# Patient Record
Sex: MALE | Race: WHITE | NOT HISPANIC OR LATINO | URBAN - METROPOLITAN AREA
[De-identification: names, ages, dates, MRNs, and addresses within clinical notes are randomized per-mention and may not be internally consistent; named-entity substitution may affect disease eponyms.]

---

## 2017-08-25 ENCOUNTER — INPATIENT (INPATIENT)
Facility: HOSPITAL | Age: 42
LOS: 3 days | Discharge: HOME | End: 2017-08-29
Attending: INTERNAL MEDICINE

## 2017-08-25 DIAGNOSIS — F10.229 ALCOHOL DEPENDENCE WITH INTOXICATION, UNSPECIFIED: ICD-10-CM

## 2017-08-31 DIAGNOSIS — Z78.1 PHYSICAL RESTRAINT STATUS: ICD-10-CM

## 2017-08-31 DIAGNOSIS — I95.9 HYPOTENSION, UNSPECIFIED: ICD-10-CM

## 2017-08-31 DIAGNOSIS — R55 SYNCOPE AND COLLAPSE: ICD-10-CM

## 2017-08-31 DIAGNOSIS — R59.0 LOCALIZED ENLARGED LYMPH NODES: ICD-10-CM

## 2017-08-31 DIAGNOSIS — F10.229 ALCOHOL DEPENDENCE WITH INTOXICATION, UNSPECIFIED: ICD-10-CM
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
## 2017-08-31 DIAGNOSIS — J69.0 PNEUMONITIS DUE TO INHALATION OF FOOD AND VOMIT: ICD-10-CM

## 2017-08-31 DIAGNOSIS — K86.9 DISEASE OF PANCREAS, UNSPECIFIED: ICD-10-CM

## 2017-09-05 DIAGNOSIS — F10.229 ALCOHOL DEPENDENCE WITH INTOXICATION, UNSPECIFIED: ICD-10-CM

## 2017-09-05 DIAGNOSIS — D61.818 OTHER PANCYTOPENIA: ICD-10-CM
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
## 2017-09-05 DIAGNOSIS — R40.2432 GLASGOW COMA SCALE SCORE 3-8, AT ARRIVAL TO EMERGENCY DEPARTMENT: ICD-10-CM

## 2017-09-05 DIAGNOSIS — R18.8 OTHER ASCITES: ICD-10-CM

## 2017-09-05 DIAGNOSIS — E27.9 DISORDER OF ADRENAL GLAND, UNSPECIFIED: ICD-10-CM

## 2017-10-02 PROBLEM — Z00.00 ENCOUNTER FOR PREVENTIVE HEALTH EXAMINATION: Status: ACTIVE | Noted: 2017-10-02

## 2018-10-30 ENCOUNTER — INPATIENT (INPATIENT)
Facility: HOSPITAL | Age: 43
LOS: 2 days | Discharge: HOME | End: 2018-11-02
Attending: INTERNAL MEDICINE | Admitting: INTERNAL MEDICINE

## 2018-10-30 VITALS
OXYGEN SATURATION: 98 % | HEART RATE: 95 BPM | DIASTOLIC BLOOD PRESSURE: 103 MMHG | RESPIRATION RATE: 20 BRPM | SYSTOLIC BLOOD PRESSURE: 183 MMHG | TEMPERATURE: 100 F

## 2018-10-30 DIAGNOSIS — Z98.890 OTHER SPECIFIED POSTPROCEDURAL STATES: Chronic | ICD-10-CM

## 2018-10-30 LAB
ALBUMIN SERPL ELPH-MCNC: 4.2 G/DL — SIGNIFICANT CHANGE UP (ref 3.5–5.2)
ALP SERPL-CCNC: 69 U/L — SIGNIFICANT CHANGE UP (ref 30–115)
ALT FLD-CCNC: 231 U/L — HIGH (ref 0–41)
ANION GAP SERPL CALC-SCNC: 22 MMOL/L — HIGH (ref 7–14)
AST SERPL-CCNC: 250 U/L — HIGH (ref 0–41)
BASE EXCESS BLDV CALC-SCNC: 4.6 MMOL/L — HIGH (ref -2–2)
BILIRUB SERPL-MCNC: 2.5 MG/DL — HIGH (ref 0.2–1.2)
BUN SERPL-MCNC: 9 MG/DL — LOW (ref 10–20)
CA-I SERPL-SCNC: 1.01 MMOL/L — LOW (ref 1.12–1.3)
CALCIUM SERPL-MCNC: 8.2 MG/DL — LOW (ref 8.5–10.1)
CHLORIDE SERPL-SCNC: 83 MMOL/L — LOW (ref 98–110)
CK SERPL-CCNC: 445 U/L — HIGH (ref 0–225)
CO2 SERPL-SCNC: 23 MMOL/L — SIGNIFICANT CHANGE UP (ref 17–32)
CREAT SERPL-MCNC: 0.7 MG/DL — SIGNIFICANT CHANGE UP (ref 0.7–1.5)
ETHANOL SERPL-MCNC: <10 MG/DL — HIGH
GAS PNL BLDV: 126 MMOL/L — LOW (ref 136–145)
GAS PNL BLDV: SIGNIFICANT CHANGE UP
GLUCOSE SERPL-MCNC: 137 MG/DL — HIGH (ref 70–99)
HCO3 BLDV-SCNC: 27 MMOL/L — SIGNIFICANT CHANGE UP (ref 22–29)
HCT VFR BLD CALC: 38.4 % — LOW (ref 42–52)
HCT VFR BLDA CALC: 44.6 % — HIGH (ref 34–44)
HGB BLD CALC-MCNC: 14.5 G/DL — SIGNIFICANT CHANGE UP (ref 14–18)
HGB BLD-MCNC: 14.3 G/DL — SIGNIFICANT CHANGE UP (ref 14–18)
LACTATE BLDV-MCNC: 2.6 MMOL/L — HIGH (ref 0.5–1.6)
LIDOCAIN IGE QN: 157 U/L — HIGH (ref 7–60)
MAGNESIUM SERPL-MCNC: 1 MG/DL — LOW (ref 1.8–2.4)
MCHC RBC-ENTMCNC: 31.4 PG — HIGH (ref 27–31)
MCHC RBC-ENTMCNC: 37.2 G/DL — HIGH (ref 32–37)
MCV RBC AUTO: 84.4 FL — SIGNIFICANT CHANGE UP (ref 80–94)
NRBC # BLD: 0 /100 WBCS — SIGNIFICANT CHANGE UP (ref 0–0)
PCO2 BLDV: 32 MMHG — LOW (ref 41–51)
PH BLDV: 7.53 — HIGH (ref 7.26–7.43)
PLATELET # BLD AUTO: 60 K/UL — LOW (ref 130–400)
PO2 BLDV: 45 MMHG — HIGH (ref 20–40)
POTASSIUM BLDV-SCNC: 3.2 MMOL/L — LOW (ref 3.3–5.6)
POTASSIUM SERPL-MCNC: 3.4 MMOL/L — LOW (ref 3.5–5)
POTASSIUM SERPL-SCNC: 3.4 MMOL/L — LOW (ref 3.5–5)
PROT SERPL-MCNC: 6.6 G/DL — SIGNIFICANT CHANGE UP (ref 6–8)
RBC # BLD: 4.55 M/UL — LOW (ref 4.7–6.1)
RBC # FLD: 11.2 % — LOW (ref 11.5–14.5)
SAO2 % BLDV: 85 % — SIGNIFICANT CHANGE UP
SODIUM SERPL-SCNC: 128 MMOL/L — LOW (ref 135–146)
TROPONIN T SERPL-MCNC: <0.01 NG/ML — SIGNIFICANT CHANGE UP
WBC # BLD: 3.94 K/UL — LOW (ref 4.8–10.8)
WBC # FLD AUTO: 3.94 K/UL — LOW (ref 4.8–10.8)

## 2018-10-30 RX ORDER — MAGNESIUM SULFATE 500 MG/ML
2 VIAL (ML) INJECTION ONCE
Qty: 0 | Refills: 0 | Status: COMPLETED | OUTPATIENT
Start: 2018-10-30 | End: 2018-10-30

## 2018-10-30 RX ORDER — CHLORHEXIDINE GLUCONATE 213 G/1000ML
1 SOLUTION TOPICAL
Qty: 0 | Refills: 0 | Status: DISCONTINUED | OUTPATIENT
Start: 2018-10-30 | End: 2018-11-02

## 2018-10-30 RX ORDER — FOLIC ACID 0.8 MG
5 TABLET ORAL ONCE
Qty: 0 | Refills: 0 | Status: DISCONTINUED | OUTPATIENT
Start: 2018-10-30 | End: 2018-10-31

## 2018-10-30 RX ORDER — THIAMINE MONONITRATE (VIT B1) 100 MG
500 TABLET ORAL ONCE
Qty: 0 | Refills: 0 | Status: DISCONTINUED | OUTPATIENT
Start: 2018-10-30 | End: 2018-10-31

## 2018-10-30 RX ORDER — ENOXAPARIN SODIUM 100 MG/ML
40 INJECTION SUBCUTANEOUS DAILY
Qty: 0 | Refills: 0 | Status: DISCONTINUED | OUTPATIENT
Start: 2018-10-30 | End: 2018-11-01

## 2018-10-30 RX ORDER — SODIUM CHLORIDE 9 MG/ML
1000 INJECTION INTRAMUSCULAR; INTRAVENOUS; SUBCUTANEOUS
Qty: 0 | Refills: 0 | Status: DISCONTINUED | OUTPATIENT
Start: 2018-10-30 | End: 2018-10-31

## 2018-10-30 RX ORDER — MAGNESIUM OXIDE 400 MG ORAL TABLET 241.3 MG
400 TABLET ORAL ONCE
Qty: 0 | Refills: 0 | Status: COMPLETED | OUTPATIENT
Start: 2018-10-30 | End: 2018-10-30

## 2018-10-30 RX ORDER — POTASSIUM CHLORIDE 20 MEQ
40 PACKET (EA) ORAL ONCE
Qty: 0 | Refills: 0 | Status: COMPLETED | OUTPATIENT
Start: 2018-10-30 | End: 2018-10-30

## 2018-10-30 RX ORDER — PANTOPRAZOLE SODIUM 20 MG/1
40 TABLET, DELAYED RELEASE ORAL
Qty: 0 | Refills: 0 | Status: DISCONTINUED | OUTPATIENT
Start: 2018-10-30 | End: 2018-11-02

## 2018-10-30 RX ORDER — THIAMINE MONONITRATE (VIT B1) 100 MG
100 TABLET ORAL DAILY
Qty: 0 | Refills: 0 | Status: DISCONTINUED | OUTPATIENT
Start: 2018-10-30 | End: 2018-11-02

## 2018-10-30 RX ORDER — FOLIC ACID 0.8 MG
1 TABLET ORAL DAILY
Qty: 0 | Refills: 0 | Status: DISCONTINUED | OUTPATIENT
Start: 2018-10-30 | End: 2018-11-02

## 2018-10-30 RX ADMIN — Medication 50 GRAM(S): at 21:28

## 2018-10-30 RX ADMIN — SODIUM CHLORIDE 125 MILLILITER(S): 9 INJECTION INTRAMUSCULAR; INTRAVENOUS; SUBCUTANEOUS at 21:34

## 2018-10-30 RX ADMIN — Medication 40 MILLIEQUIVALENT(S): at 21:32

## 2018-10-30 RX ADMIN — Medication 50 GRAM(S): at 23:48

## 2018-10-30 RX ADMIN — Medication 1 MILLIGRAM(S): at 19:56

## 2018-10-30 NOTE — H&P ADULT - NSHPSOCIALHISTORY_GEN_ALL_CORE
Social History:    Marital Status:  ( X )    (   ) Single    (   )    (  )   Lives with: (  ) alone  (  ) children   ( X ) spouse   (  ) parents  (  ) other    Substance Use (street drugs): ( X ) never used  (  ) other:  Tobacco Usage:  (  X ) never smoked   (   ) former smoker   (   ) current smoker  (     ) pack year  (        ) last cigarette date  Alcohol Usage: Heavy drinker

## 2018-10-30 NOTE — H&P ADULT - NSHPLABSRESULTS_GEN_ALL_CORE
14.3   3.94  )-----------( 60       ( 30 Oct 2018 19:15 )             38.4     128<L>  |  83<L>  |  9<L>  ----------------------------<  137<H>  3.4<L>   |  23  |  0.7    Ca    8.2<L>      30 Oct 2018 19:15  Mg     1.0     10-30    TPro  6.6  /  Alb  4.2  /  TBili  2.5<H>  /  DBili  x   /  AST  250<H>  /  ALT  231<H>  /  AlkPhos  69  10-30        CARDIAC MARKERS ( 30 Oct 2018 19:15 )  x     / <0.01 ng/mL / 445 U/L / x     / x        < from: CT Head No Cont (10.30.18 @ 21:02) >      IMPRESSION:     No acute intra-cranial pathology.    < end of copied text >

## 2018-10-30 NOTE — H&P ADULT - ASSESSMENT
43 yo male patient with no pertinent past medical history except for alcohol dependence and abuse, brought in because of seizure like activity on the day of presentation.    #) Alcohol withdrawal seizure 41 yo male patient with no pertinent past medical history except for alcohol dependence and abuse, brought in because of seizure like activity on the day of presentation.    #) Tonic seizure  R/O alcohol withdrawal seizure, vs seizure from hypomagnesemia (Mg 1 on presentation) likely secondary to alcoholism  Currently CIWA 1  Started on lorazepam protocole  Give thiamine and folic acid and multivitamin  Consider detox once patient stable  Replace electrolytes as needed (Keep K>4 and Mg>2)    #) Elevated liver enzymes  Likely secondary to chronic alcoholism  check RUQ sonogram  GI follow up as outpatient    #) GERD  continue with protonix    #) Miscellaneous  DVT and GI ppx  regular diet  activity as tolerated  full code  dispo home

## 2018-10-30 NOTE — H&P ADULT - ATTENDING COMMENTS
Patient is seen and examined independently. I agree with resident note above and plan of care -edited and corrected where applicable- with addition:     PHYSICAL EXAM:  Constitutional: middle age male resting in bed, NAD, awake and alert, well-developed  HEENT: PERR, EOMI, no tongue fasciculations, bluish tongue discoloration but no laceration  Neck: Soft and supple, No LAD, No JVD  Respiratory: Breath sounds are clear bilaterally, No wheezing, rales or rhonchi  Cardiovascular: S1 and S2, regular rate and rhythm, no Murmurs, gallops or rubs  Gastrointestinal: Bowel Sounds present, soft, nontender, nondistended, no guarding, no rebound  Extremities: No peripheral edema  Vascular: 2+ peripheral pulses  Neurological: A/O x 3, no focal deficits  Musculoskeletal: 5/5 strength b/l upper and lower extremities, no visible hand tremors    LABS: All Labs and Imaging Reviewed:  < from: 12 Lead ECG (10.31.18 @ 09:34) >  Diagnosis Line Normal sinus rhythm  Normal ECG    < from: CT Head No Cont (10.31.18 @ 09:21) >    IMPRESSION:   1.  When compared to previous study dated 10/30/2018 there is no   significant change.  2.  No acute fractures, mass effect, midline shift or hemorrhage.   3.  If these are new onset seizures or the patient continues to be   symptomatic MRI of the brain with and without contrast using Seizure   protocol may be helpful for further evaluation.    < from: Xray Chest 1 View- PORTABLE-Routine (10.31.18 @ 06:02) >  Impression:    No radiographic evidence of acute cardiopulmonary disease.    A/P: 43 yo M with PMHx of alcohol dependence and abuse brought in for seizure like activity on the day of presentation.    # Tonic seizure likely 2/2 alcohol withdrawal, r/o additional causes of seizures   - evaluated by critical care, stable for AMF  - CT head negative for acute pathology  - c/w [facility] protocol   - lorazepam prn  - thiamine and folic acid and multivitamin  - Keep K>4 and Mg>2  - pt was no amenable to detox consult today, will re-discuss  - DAVID bach 2/2 seizure, f/u repeat     # Elevated liver enzymes likely 2/2 chronic ETOH abuse  - improving  - f/u RUQ sonogram    # Malnutrition 2/2 alcohol abuse - magnesium deficiency  - Mg 1 on presentation  - repleated  - continue to monitor    # Hyponatremia likely 2/2 vomiting  - improving   - d/c IVFs as pt tolerating diet  - continue to monitor     # Thrombocytopenia likely 2/2 BM suppression 2/2 alcohol abuse  - no signs of bleeding  - continue to monitor     # GERD  - c/w protonix    # DVT ppx  - lovenox SQ    # Dispo: from home, will re-discuss detox eval

## 2018-10-30 NOTE — ED PROVIDER NOTE - CARE PLAN
Principal Discharge DX:	Alcohol withdrawal seizure  Secondary Diagnosis:	Hypomagnesemia  Secondary Diagnosis:	Hyponatremia

## 2018-10-30 NOTE — ED PROVIDER NOTE - PROGRESS NOTE DETAILS
Labs with Mg 1.0 and potassium ~3.2.  Will replete Mg and K+.  No further seizure activity noted, awaiting CTH to admit for alcohol withdrawal sz. Pt confirmed that stopped drinking EtOH 2/2 abd pain and feeling unwell yesterday.  Abd w/o any tenderness on exam today, but will add on lipase and continue to monitor.  Has borderline temperature here in the ED (38.1C/100.5F) of unclear significance, whether reactive in the setting of seizure/alcohol withdrawal or other infection.  Will obtain cultures but not treat otherwise. received s/o from Dr Morris, evaluated patient, patient stated he stopped drinking 2/2 abdominal pain with nausea and diarrhea.  last drink yesterday. mild tremors on exam. denies hallucination/HA/dizziness/anixety. patient is A&O x 3. move all extrmities. no nuchal rigidty. BS equal b/l. abdomen exam : flat soft non-tender. denies urinary sxs.  symptoms and vital sign improved after ativan 1mg IV D/w Dr Larsen, approve for ICU for further monitor

## 2018-10-30 NOTE — ED PROVIDER NOTE - MEDICAL DECISION MAKING DETAILS
42yM alcohol abuse, no prior hx seizures, presents with seizure after alcohol cessation yesterday. No hx WD seizure.  Pt treated with benzos, thiamine/folate, Mg/K+ repletion. CTH obtained.  Borderline fever, so cultures/CXR/UA obtained w/o clear source.  Will not swab for flu given 2d onset of generalized ill feeling w/o focal URI sx, so unlikely to treat even if it were to be positive.  Pt to be admitted to ICU for close monitoring given suspected alcohol withdrawal sz.  No further sz activity seen in the ED up to this time.

## 2018-10-30 NOTE — ED PROVIDER NOTE - NS ED ROS FT
Constitutional: See HPI.  Eyes: No visual changes, eye pain or discharge.   ENMT: No neck pain   Cardiac: No SOB or cp  Respiratory: No cough   GI: No nausea, vomiting, diarrhea or abdominal pain.  : No dysuria, frequency or burning.   MS: No myalgia, muscle weakness, joint pain or back pain.  Neuro: see hpi  Skin: No skin rash.

## 2018-10-30 NOTE — ED PROVIDER NOTE - ATTENDING CONTRIBUTION TO CARE
This is a 42yM with known alcohol abuse who presents after seizure at home.  Pt reports stopping all alcohol intake yesterday because he had some abdominal pain, nausea and ? vomiting.  Today, he had witnessed GTC (2min, witnessed by wife, pt lying on couch, eyes rolled back, tongue biting).  Pt felt warm on ED arrival, denies cough, current abd pain, n/v/d.  No prior hx alcohol cessation, DT or withdrawal seizure.    VS T100.5 HR 96 RR 14 /86 O2 sat 98% RA  CONSTITUTIONAL: well developed; well nourished; well appearing in no acute distress  HEAD: normocephalic; atraumatic  EYES: no conjunctival injection, no scleral icterus  ENT: no nasal discharge; airway clear.  NECK: supple; non tender. + full passive ROM in all directions  CARD: S1, S2 normal; no murmurs, gallops, or rubs. Regular rate and rhythm  RESP: no wheezes, rales or rhonchi. Good air movement bilaterally without significant accessory muscle use  ABD: soft; non-distended; non-tender. No rebound, no guarding, no pulsatile abdominal mass  EXT: moving all extremities spontaneously, normal ROM. No clubbing, cyanosis or edema  SKIN: warm and dry, no lesions noted  NEURO: now awake and alert, oriented, CN II-XII grossly intact,motor and sensory grossly intact with tremor, speech nonslurred, no focal deficits. GCS 15  PSYCH: calm, cooperative, appropriate, good eye contact, logical thought process, no apparent danger to self or others    labs  close monitoring  Ativan IV  thiamine, folate

## 2018-10-30 NOTE — H&P ADULT - HISTORY OF PRESENT ILLNESS
41 yo male patient with no pertinent past medical history except for alcohol dependence and abuse, brought in because of seizure like activity on the day of presentation.  Patient is a heavy alcohol drinker at baseline, but for the last 3 weeks, he was drinking around a bottle of vodka every day. On the day of presentation he had 2 episodes of vomiting in the morning, so he did not drink alcohol that day. In the afternoon, he was sitting in the sofa when suddenly, according to the wife, his eyes uprolled, and he made a weird sound as if he was shouting, then he lost consciousness, and became stiff. He did not have jerky movements of his extremities, but he was stiff during the whole episode. It lasted for like 2 minutes, after that he woke up but was confused for like half an hour. He went back to his baseline in ED.  No current symptoms. no chest pain, no palpitation, no SOB, no restlessness, no abdominal pain, no vomiting, no diarrhea, no urinary symptoms. No sweating/diaphoresis. 43 yo male patient with no pertinent past medical history except for alcohol dependence and abuse, brought in because of seizure like activity on the day of presentation.  Patient is a heavy alcohol drinker at baseline, but for the last 3 weeks, he was drinking around a bottle of vodka every day. On the day of presentation he had 2 episodes of vomiting in the morning, so he did not drink alcohol that day. In the afternoon, he was sitting in the sofa when suddenly, according to the wife, his eyes uprolled, and he made a weird sound as if he was shouting, then he lost consciousness, and became stiff. He did not have jerky movements of his extremities, but he was stiff during the whole episode. It lasted for like 2 minutes, after that he woke up but was confused for like half an hour. He went back to his baseline in ED.  No current symptoms. no chest pain, no palpitation, no SOB, no restlessness, no abdominal pain, no vomiting, no diarrhea, no urinary symptoms. No sweating/diaphoresis.    Attending addition: pt reports last drink was on Thursday. Denies prior history of alcohol withdrawal seizures. Does not recall details of episode. Bit his tongue during seizure episode. Reports good PO intake. Denies fevers, chills, prior seizure or intubation, dysuria.

## 2018-10-30 NOTE — ED PROVIDER NOTE - PHYSICAL EXAMINATION
CONSTITUTIONAL: WA / WN / NAD  HEAD: NCAT  EYES: PERRL; EOMI;   ENT: Normal pharynx; mucous membranes pink/moist, no erythema.  NECK: Supple; no meningeal signs  CARD: RRR; nl S1/S2; no M/R/G.   RESP: Respiratory rate and effort are normal; breath sounds clear and equal bilaterally.  ABD: Soft, NT ND   MSK/EXT: No gross deformities; full range of motion.  SKIN: Warm and dry;   NEURO: AAOx3  PSYCH: Memory Intact, Normal Affect CONSTITUTIONAL: WA / WN / NAD  HEAD: NCAT  EYES: PERRL; EOMI;   ENT: Normal pharynx; mucous membranes pink/moist, no erythema. + tongue biting   NECK: Supple; no meningeal signs  CARD: RRR; nl S1/S2; no M/R/G.   RESP: Respiratory rate and effort are normal; breath sounds clear and equal bilaterally.  ABD: Soft, NT ND   MSK/EXT: No gross deformities; full range of motion.  SKIN: Warm and dry;   NEURO: AAOx3  PSYCH: Memory Intact, Normal Affect

## 2018-10-30 NOTE — ED PROVIDER NOTE - OBJECTIVE STATEMENT
42 year old male no pmh 42 year old male no pmh w/ a history of alcohol abuse typically drinks half a pint a day. Patient's last drink was yesterday. As per patient's wife patient was laying on the couch and had two minutes of tonic clonic seizures with his eyes rolling back and tongue biting. Patient currently denies any fever chills headache dizziness cough n/v abdominal pain or urinary complaints.

## 2018-10-30 NOTE — H&P ADULT - NSHPPHYSICALEXAM_GEN_ALL_CORE
PHYSICAL EXAM:    T(F): 100.5, Max: 100.5 (10-30-18 @ 20:21)  HR: 96  BP: 126/86  RR: 14  SpO2: 98%    GENERAL: well-developed; not in distress  HEAD:  Atraumatic, Normocephalic  EYES: EOMI, PERRLA, conjunctiva and sclera clear  NECK: Supple, No JVD  CHEST/LUNG: Clear to auscultation bilaterally; No wheeze  HEART: Regular rate and rhythm; No murmurs, rubs, or gallops  ABDOMEN: Soft, Nontender, Nondistended; Bowel sounds present  EXTREMITIES:  2+ Peripheral Pulses, No clubbing, cyanosis, or edema  PSYCH: AAOx3  NEUROLOGY: non-focal  SKIN: No rashes or lesions

## 2018-10-31 LAB
ALBUMIN SERPL ELPH-MCNC: 4.5 G/DL — SIGNIFICANT CHANGE UP (ref 3.5–5.2)
ALP SERPL-CCNC: 73 U/L — SIGNIFICANT CHANGE UP (ref 30–115)
ALT FLD-CCNC: 212 U/L — HIGH (ref 0–41)
ANION GAP SERPL CALC-SCNC: 21 MMOL/L — HIGH (ref 7–14)
APTT BLD: 27.3 SEC — SIGNIFICANT CHANGE UP (ref 27–39.2)
AST SERPL-CCNC: 200 U/L — HIGH (ref 0–41)
BASOPHILS # BLD AUTO: 0.01 K/UL — SIGNIFICANT CHANGE UP (ref 0–0.2)
BASOPHILS NFR BLD AUTO: 0.1 % — SIGNIFICANT CHANGE UP (ref 0–1)
BILIRUB DIRECT SERPL-MCNC: 0.7 MG/DL — HIGH (ref 0–0.2)
BILIRUB INDIRECT FLD-MCNC: 1.9 MG/DL — HIGH (ref 0.2–1.2)
BILIRUB SERPL-MCNC: 2.6 MG/DL — HIGH (ref 0.2–1.2)
BUN SERPL-MCNC: 7 MG/DL — LOW (ref 10–20)
CALCIUM SERPL-MCNC: 8.8 MG/DL — SIGNIFICANT CHANGE UP (ref 8.5–10.1)
CHLORIDE SERPL-SCNC: 88 MMOL/L — LOW (ref 98–110)
CHOLEST SERPL-MCNC: 205 MG/DL — HIGH (ref 100–200)
CK SERPL-CCNC: 511 U/L — HIGH (ref 0–225)
CO2 SERPL-SCNC: 23 MMOL/L — SIGNIFICANT CHANGE UP (ref 17–32)
CREAT SERPL-MCNC: 0.7 MG/DL — SIGNIFICANT CHANGE UP (ref 0.7–1.5)
EOSINOPHIL # BLD AUTO: 0 K/UL — SIGNIFICANT CHANGE UP (ref 0–0.7)
EOSINOPHIL NFR BLD AUTO: 0 % — SIGNIFICANT CHANGE UP (ref 0–8)
ESTIMATED AVERAGE GLUCOSE: 103 MG/DL — SIGNIFICANT CHANGE UP (ref 68–114)
GLUCOSE SERPL-MCNC: 110 MG/DL — HIGH (ref 70–99)
HBA1C BLD-MCNC: 5.2 % — SIGNIFICANT CHANGE UP (ref 4–5.6)
HCT VFR BLD CALC: 41.6 % — LOW (ref 42–52)
HDLC SERPL-MCNC: 112 MG/DL — SIGNIFICANT CHANGE UP
HGB BLD-MCNC: 15.4 G/DL — SIGNIFICANT CHANGE UP (ref 14–18)
IMM GRANULOCYTES NFR BLD AUTO: 0.5 % — HIGH (ref 0.1–0.3)
INR BLD: 1.04 RATIO — SIGNIFICANT CHANGE UP (ref 0.65–1.3)
LIPID PNL WITH DIRECT LDL SERPL: 94 MG/DL — SIGNIFICANT CHANGE UP (ref 4–129)
LYMPHOCYTES # BLD AUTO: 0.8 K/UL — LOW (ref 1.2–3.4)
LYMPHOCYTES # BLD AUTO: 9.9 % — LOW (ref 20.5–51.1)
MAGNESIUM SERPL-MCNC: 1.9 MG/DL — SIGNIFICANT CHANGE UP (ref 1.8–2.4)
MCHC RBC-ENTMCNC: 31.6 PG — HIGH (ref 27–31)
MCHC RBC-ENTMCNC: 37 G/DL — SIGNIFICANT CHANGE UP (ref 32–37)
MCV RBC AUTO: 85.4 FL — SIGNIFICANT CHANGE UP (ref 80–94)
MONOCYTES # BLD AUTO: 0.76 K/UL — HIGH (ref 0.1–0.6)
MONOCYTES NFR BLD AUTO: 9.4 % — HIGH (ref 1.7–9.3)
NEUTROPHILS # BLD AUTO: 6.51 K/UL — HIGH (ref 1.4–6.5)
NEUTROPHILS NFR BLD AUTO: 80.1 % — HIGH (ref 42.2–75.2)
NRBC # BLD: 0 /100 WBCS — SIGNIFICANT CHANGE UP (ref 0–0)
PLATELET # BLD AUTO: 68 K/UL — LOW (ref 130–400)
POTASSIUM SERPL-MCNC: 3.8 MMOL/L — SIGNIFICANT CHANGE UP (ref 3.5–5)
POTASSIUM SERPL-SCNC: 3.8 MMOL/L — SIGNIFICANT CHANGE UP (ref 3.5–5)
PROT SERPL-MCNC: 7 G/DL — SIGNIFICANT CHANGE UP (ref 6–8)
PROTHROM AB SERPL-ACNC: 11.9 SEC — SIGNIFICANT CHANGE UP (ref 9.95–12.87)
RBC # BLD: 4.87 M/UL — SIGNIFICANT CHANGE UP (ref 4.7–6.1)
RBC # FLD: 11.3 % — LOW (ref 11.5–14.5)
SODIUM SERPL-SCNC: 132 MMOL/L — LOW (ref 135–146)
TOTAL CHOLESTEROL/HDL RATIO MEASUREMENT: 1.8 RATIO — LOW (ref 4–5.5)
TRIGL SERPL-MCNC: 73 MG/DL — SIGNIFICANT CHANGE UP (ref 10–149)
WBC # BLD: 8.12 K/UL — SIGNIFICANT CHANGE UP (ref 4.8–10.8)
WBC # FLD AUTO: 8.12 K/UL — SIGNIFICANT CHANGE UP (ref 4.8–10.8)

## 2018-10-31 RX ORDER — SODIUM CHLORIDE 9 MG/ML
1000 INJECTION, SOLUTION INTRAVENOUS
Qty: 0 | Refills: 0 | Status: DISCONTINUED | OUTPATIENT
Start: 2018-10-31 | End: 2018-10-31

## 2018-10-31 RX ORDER — MAGNESIUM SULFATE 500 MG/ML
1 VIAL (ML) INJECTION ONCE
Qty: 0 | Refills: 0 | Status: COMPLETED | OUTPATIENT
Start: 2018-10-31 | End: 2018-10-31

## 2018-10-31 RX ORDER — POTASSIUM CHLORIDE 20 MEQ
40 PACKET (EA) ORAL ONCE
Qty: 0 | Refills: 0 | Status: COMPLETED | OUTPATIENT
Start: 2018-10-31 | End: 2018-10-31

## 2018-10-31 RX ADMIN — PANTOPRAZOLE SODIUM 40 MILLIGRAM(S): 20 TABLET, DELAYED RELEASE ORAL at 09:30

## 2018-10-31 RX ADMIN — Medication 1 MILLIGRAM(S): at 14:47

## 2018-10-31 RX ADMIN — MAGNESIUM OXIDE 400 MG ORAL TABLET 400 MILLIGRAM(S): 241.3 TABLET ORAL at 02:48

## 2018-10-31 RX ADMIN — Medication 100 MILLIGRAM(S): at 14:50

## 2018-10-31 RX ADMIN — Medication 0.5 MILLIGRAM(S): at 11:19

## 2018-10-31 RX ADMIN — Medication 1 TABLET(S): at 14:48

## 2018-10-31 RX ADMIN — Medication 40 MILLIEQUIVALENT(S): at 10:26

## 2018-10-31 RX ADMIN — Medication 0.5 MILLIGRAM(S): at 17:05

## 2018-10-31 RX ADMIN — ENOXAPARIN SODIUM 40 MILLIGRAM(S): 100 INJECTION SUBCUTANEOUS at 14:50

## 2018-10-31 RX ADMIN — Medication 100 GRAM(S): at 10:25

## 2018-10-31 NOTE — CONSULT NOTE ADULT - ASSESSMENT
IMPRESSION:    ETOH withdrawal seizure   ETOH dependance   Depression    PLAN:    CNS: Ativan protocol (did not receive any ativan since last night - held by thaddeus) - Cut down to ativan .5 q 6h, than prn. EEG. Thiamine, Folate, MVN    HEENT: Oral care    PULMONARY:  HOB @ 45 degrees, CXR in am     CARDIOVASCULAR: I>O, if tolerating diet dc IVF     GI: GI prophylaxis.  Feeding, prn zofran     RENAL:  Follow up BMP.  Correct as needed. Check UA     INFECTIOUS DISEASE: Follow up cultures. Off abx.     HEMATOLOGICAL:  DVT prophylaxis.    ENDOCRINE:  Follow up FS.     MUSCULOSKELETAL: CK f/u     Downgrade to medical floor IMPRESSION:    ETOH withdrawal seizure   ETOH dependance   Depression    PLAN:    CNS: Ativan protocol.  EEG.  Repeat CTH. Thiamine, Folate, MVI.  Neuro eval     HEENT: Oral care    PULMONARY:  HOB @ 45 degrees, CXR in am     CARDIOVASCULAR: DC IVF once tolerating PO feeds      GI: GI prophylaxis.  Feeding, prn zofran.  RUQ sono.  Hepatitis panel     RENAL:  Follow up BMP.  Correct as needed. Check UA     INFECTIOUS DISEASE: Follow up cultures. Off abx.     HEMATOLOGICAL:  DVT prophylaxis.    ENDOCRINE:  Follow up FS.     MUSCULOSKELETAL: CK f/u     Downgrade to medical floor

## 2018-11-01 LAB
ALBUMIN SERPL ELPH-MCNC: 4.1 G/DL — SIGNIFICANT CHANGE UP (ref 3.5–5.2)
ALP SERPL-CCNC: 76 U/L — SIGNIFICANT CHANGE UP (ref 30–115)
ALT FLD-CCNC: 149 U/L — HIGH (ref 0–41)
ANION GAP SERPL CALC-SCNC: 17 MMOL/L — HIGH (ref 7–14)
AST SERPL-CCNC: 125 U/L — HIGH (ref 0–41)
BASOPHILS # BLD AUTO: 0.01 K/UL — SIGNIFICANT CHANGE UP (ref 0–0.2)
BASOPHILS NFR BLD AUTO: 0.2 % — SIGNIFICANT CHANGE UP (ref 0–1)
BILIRUB DIRECT SERPL-MCNC: 0.4 MG/DL — HIGH (ref 0–0.2)
BILIRUB INDIRECT FLD-MCNC: 1.1 MG/DL — SIGNIFICANT CHANGE UP (ref 0.2–1.2)
BILIRUB SERPL-MCNC: 1.5 MG/DL — HIGH (ref 0.2–1.2)
BUN SERPL-MCNC: 9 MG/DL — LOW (ref 10–20)
CALCIUM SERPL-MCNC: 8.8 MG/DL — SIGNIFICANT CHANGE UP (ref 8.5–10.1)
CHLORIDE SERPL-SCNC: 97 MMOL/L — LOW (ref 98–110)
CO2 SERPL-SCNC: 24 MMOL/L — SIGNIFICANT CHANGE UP (ref 17–32)
CREAT SERPL-MCNC: 0.8 MG/DL — SIGNIFICANT CHANGE UP (ref 0.7–1.5)
EOSINOPHIL # BLD AUTO: 0.03 K/UL — SIGNIFICANT CHANGE UP (ref 0–0.7)
EOSINOPHIL NFR BLD AUTO: 0.6 % — SIGNIFICANT CHANGE UP (ref 0–8)
GLUCOSE SERPL-MCNC: 92 MG/DL — SIGNIFICANT CHANGE UP (ref 70–99)
HCT VFR BLD CALC: 39.6 % — LOW (ref 42–52)
HGB BLD-MCNC: 14.2 G/DL — SIGNIFICANT CHANGE UP (ref 14–18)
IMM GRANULOCYTES NFR BLD AUTO: 0.2 % — SIGNIFICANT CHANGE UP (ref 0.1–0.3)
LYMPHOCYTES # BLD AUTO: 1.1 K/UL — LOW (ref 1.2–3.4)
LYMPHOCYTES # BLD AUTO: 23.4 % — SIGNIFICANT CHANGE UP (ref 20.5–51.1)
MAGNESIUM SERPL-MCNC: 2.3 MG/DL — SIGNIFICANT CHANGE UP (ref 1.8–2.4)
MCHC RBC-ENTMCNC: 31.6 PG — HIGH (ref 27–31)
MCHC RBC-ENTMCNC: 35.9 G/DL — SIGNIFICANT CHANGE UP (ref 32–37)
MCV RBC AUTO: 88.2 FL — SIGNIFICANT CHANGE UP (ref 80–94)
MONOCYTES # BLD AUTO: 0.81 K/UL — HIGH (ref 0.1–0.6)
MONOCYTES NFR BLD AUTO: 17.2 % — HIGH (ref 1.7–9.3)
NEUTROPHILS # BLD AUTO: 2.75 K/UL — SIGNIFICANT CHANGE UP (ref 1.4–6.5)
NEUTROPHILS NFR BLD AUTO: 58.4 % — SIGNIFICANT CHANGE UP (ref 42.2–75.2)
NRBC # BLD: 0 /100 WBCS — SIGNIFICANT CHANGE UP (ref 0–0)
PHOSPHATE SERPL-MCNC: 2.7 MG/DL — SIGNIFICANT CHANGE UP (ref 2.1–4.9)
PLATELET # BLD AUTO: 58 K/UL — LOW (ref 130–400)
POTASSIUM SERPL-MCNC: 3.9 MMOL/L — SIGNIFICANT CHANGE UP (ref 3.5–5)
POTASSIUM SERPL-SCNC: 3.9 MMOL/L — SIGNIFICANT CHANGE UP (ref 3.5–5)
PROT SERPL-MCNC: 6.6 G/DL — SIGNIFICANT CHANGE UP (ref 6–8)
RBC # BLD: 4.49 M/UL — LOW (ref 4.7–6.1)
RBC # FLD: 11.3 % — LOW (ref 11.5–14.5)
SODIUM SERPL-SCNC: 138 MMOL/L — SIGNIFICANT CHANGE UP (ref 135–146)
TSH SERPL-MCNC: 2.07 UIU/ML — SIGNIFICANT CHANGE UP (ref 0.27–4.2)
WBC # BLD: 4.71 K/UL — LOW (ref 4.8–10.8)
WBC # FLD AUTO: 4.71 K/UL — LOW (ref 4.8–10.8)

## 2018-11-01 RX ADMIN — Medication 0.5 MILLIGRAM(S): at 05:32

## 2018-11-01 RX ADMIN — CHLORHEXIDINE GLUCONATE 1 APPLICATION(S): 213 SOLUTION TOPICAL at 05:32

## 2018-11-01 RX ADMIN — Medication 0.5 MILLIGRAM(S): at 17:44

## 2018-11-01 RX ADMIN — Medication 100 MILLIGRAM(S): at 11:35

## 2018-11-01 RX ADMIN — Medication 1 TABLET(S): at 11:35

## 2018-11-01 RX ADMIN — PANTOPRAZOLE SODIUM 40 MILLIGRAM(S): 20 TABLET, DELAYED RELEASE ORAL at 08:37

## 2018-11-01 RX ADMIN — Medication 1 MILLIGRAM(S): at 11:35

## 2018-11-01 NOTE — PROGRESS NOTE ADULT - ASSESSMENT
41 yo male patient with no pertinent past medical history except for alcohol dependence and abuse, brought in because of seizure like activity on the day of presentation.    #Tonic seizure   - R/O alcohol withdrawal seizure, vs seizure from hypomagnesemia (Mg 1 on presentation) likely secondary to alcoholism  - Currently CIWA 1  - Started on lorazepam protocol  - Give thiamine and folic acid and multivitamin  - Replace electrolytes as needed (Keep K>4 and Mg>2)  - EEG Pending     #Elevated liver enzymes  - Likely secondary to chronic alcoholism  check RUQ sonogram  GI follow up as outpatient    #GERD  continue with protonix    #Miscellaneous  DVT and GI ppx  regular diet  activity as tolerated  full code  dispo home 41 yo male patient with no pertinent past medical history except for alcohol dependence and abuse, brought in because of seizure like activity on the day of presentation.    #Tonic seizure   - R/O alcohol withdrawal seizure, vs seizure from hypomagnesemia (Mg 1 on presentation) likely secondary to alcoholism  - Currently CIWA 1  - Started on lorazepam protocol  - Give thiamine and folic acid and multivitamin  - Replace electrolytes as needed (Keep K>4 and Mg>2)  - EEG Pending     #Elevated liver enzymes  - Likely secondary to chronic alcoholism  - GI follow up as outpatient  - JAVAN medrano as above     #Thrombocytopenia  - Possibly secondary to chronic alcohol abuse  - He has baseline low Platelet    - WIll d/c lovenox  - Trend platelet  - No active bleeding     #GERD  continue with protonix    #Miscellaneous  DVT - he is actively ambulating no need for ppx   regular diet  activity as tolerated  full code  dispo home

## 2018-11-01 NOTE — PROGRESS NOTE ADULT - SUBJECTIVE AND OBJECTIVE BOX
SUBJECTIVE:    Patient is a 42y old Male who presents with a chief complaint of seizure on the day of admission (31 Oct 2018 07:28)    Currently admitted to medicine with the primary diagnosis of Alcohol withdrawal seizure     Today is hospital day 2d. No acute overnight events.     PAST MEDICAL & SURGICAL HISTORY  GERD (gastroesophageal reflux disease)  Alcohol abuse  H/O hemorrhoidectomy    SOCIAL HISTORY:  Negative for smoking/alcohol/drug use.     ALLERGIES:  No Known Allergies    MEDICATIONS:  STANDING MEDICATIONS  chlorhexidine 4% Liquid 1 Application(s) Topical <User Schedule>  enoxaparin Injectable 40 milliGRAM(s) SubCutaneous daily  folic acid 1 milliGRAM(s) Oral daily  LORazepam     Tablet   Oral   LORazepam     Tablet 0.5 milliGRAM(s) Oral every 12 hours  multivitamin 1 Tablet(s) Oral daily  pantoprazole    Tablet 40 milliGRAM(s) Oral before breakfast  thiamine 100 milliGRAM(s) Oral daily    PRN MEDICATIONS  LORazepam     Tablet 1 milliGRAM(s) Oral every 4 hours PRN    VITALS:   T(F): 98.1  HR: 95  BP: 151/95  RR: 20  SpO2: 97%    LABS:                        14.2   4.71  )-----------( 58       ( 01 Nov 2018 06:23 )             39.6     11-01    138  |  97<L>  |  9<L>  ----------------------------<  92  3.9   |  24  |  0.8    Ca    8.8      01 Nov 2018 06:23  Phos  2.7     11-01  Mg     2.3     11-01    TPro  6.6  /  Alb  4.1  /  TBili  1.5<H>  /  DBili  0.4<H>  /  AST  125<H>  /  ALT  149<H>  /  AlkPhos  76  11-01    PT/INR - ( 31 Oct 2018 06:31 )   PT: 11.90 sec;   INR: 1.04 ratio         PTT - ( 31 Oct 2018 06:31 )  PTT:27.3 sec    Culture - Blood (collected 30 Oct 2018 21:41)  Source: .Blood Blood  Preliminary Report (01 Nov 2018 02:01):    No growth to date.    Culture - Blood (collected 30 Oct 2018 21:41)  Source: .Blood Blood  Preliminary Report (01 Nov 2018 02:01):    No growth to date.      CARDIAC MARKERS ( 31 Oct 2018 06:31 )  x     / x     / 511 U/L / x     / x      CARDIAC MARKERS ( 30 Oct 2018 19:15 )  x     / <0.01 ng/mL / 445 U/L / x     / x          RADIOLOGY:    < from: US Abdomen Limited (10.31.18 @ 22:17) >    IMPRESSION:    Hepatic steatosis.    Redemonstrated right lobe hemangiomas measure up to 1.4 x 1.0 x 1.4 cm,   stable.    Normal appearing gallbladder and biliary tree.    Note that the pancreas is obscured by bowel gas.    < end of copied text >      PHYSICAL EXAM:  GEN: No acute distress  LUNGS: Clear to auscultation bilaterally   HEART: S1/S2 present. RRR.   ABD: Soft, non-tender, non-distended. Bowel sounds present  EXT: NC/NC/NE/2+PP/FOLEY  NEURO: AAOX3 SUBJECTIVE:    Patient is a 42y old Male who presents with a chief complaint of seizure on the day of admission (31 Oct 2018 07:28)    Currently admitted to medicine with the primary diagnosis of Alcohol withdrawal seizure     Today is hospital day 2d. No acute overnight events. Pt pending EEG. LFTs remain elevated however improving.    PAST MEDICAL & SURGICAL HISTORY  GERD (gastroesophageal reflux disease)  Alcohol abuse  H/O hemorrhoidectomy    SOCIAL HISTORY:  Negative for smoking/alcohol/drug use.     ALLERGIES:  No Known Allergies    MEDICATIONS:  STANDING MEDICATIONS  chlorhexidine 4% Liquid 1 Application(s) Topical <User Schedule>  enoxaparin Injectable 40 milliGRAM(s) SubCutaneous daily  folic acid 1 milliGRAM(s) Oral daily  LORazepam     Tablet   Oral   LORazepam     Tablet 0.5 milliGRAM(s) Oral every 12 hours  multivitamin 1 Tablet(s) Oral daily  pantoprazole    Tablet 40 milliGRAM(s) Oral before breakfast  thiamine 100 milliGRAM(s) Oral daily    PRN MEDICATIONS  LORazepam     Tablet 1 milliGRAM(s) Oral every 4 hours PRN    VITALS:   T(F): 98.1  HR: 95  BP: 151/95  RR: 20  SpO2: 97%    LABS:                        14.2   4.71  )-----------( 58       ( 01 Nov 2018 06:23 )             39.6     11-01    138  |  97<L>  |  9<L>  ----------------------------<  92  3.9   |  24  |  0.8    Ca    8.8      01 Nov 2018 06:23  Phos  2.7     11-01  Mg     2.3     11-01    TPro  6.6  /  Alb  4.1  /  TBili  1.5<H>  /  DBili  0.4<H>  /  AST  125<H>  /  ALT  149<H>  /  AlkPhos  76  11-01    PT/INR - ( 31 Oct 2018 06:31 )   PT: 11.90 sec;   INR: 1.04 ratio         PTT - ( 31 Oct 2018 06:31 )  PTT:27.3 sec    Culture - Blood (collected 30 Oct 2018 21:41)  Source: .Blood Blood  Preliminary Report (01 Nov 2018 02:01):    No growth to date.    Culture - Blood (collected 30 Oct 2018 21:41)  Source: .Blood Blood  Preliminary Report (01 Nov 2018 02:01):    No growth to date.      CARDIAC MARKERS ( 31 Oct 2018 06:31 )  x     / x     / 511 U/L / x     / x      CARDIAC MARKERS ( 30 Oct 2018 19:15 )  x     / <0.01 ng/mL / 445 U/L / x     / x          RADIOLOGY:    < from: US Abdomen Limited (10.31.18 @ 22:17) >    IMPRESSION:    Hepatic steatosis.    Redemonstrated right lobe hemangiomas measure up to 1.4 x 1.0 x 1.4 cm,   stable.    Normal appearing gallbladder and biliary tree.    Note that the pancreas is obscured by bowel gas.    < end of copied text >      PHYSICAL EXAM:  GEN: No acute distress  LUNGS: Clear to auscultation bilaterally   HEART: S1/S2 present. RRR.   ABD: Soft, non-tender, non-distended. Bowel sounds present  EXT: NC/NC/NE/2+PP/FOLEY  NEURO: AAOX3 SUBJECTIVE:    Patient is a 42y old Male who presents with a chief complaint of seizure on the day of admission (31 Oct 2018 07:28)    Currently admitted to medicine with the primary diagnosis of Alcohol withdrawal seizure     Today is hospital day 2d. No acute overnight events. Pt pending EEG. LFTs remain elevated however improving. Denies tremors, auditory or visual hallucinations, headache or blurry vision.     PAST MEDICAL & SURGICAL HISTORY  GERD (gastroesophageal reflux disease)  Alcohol abuse  H/O hemorrhoidectomy    SOCIAL HISTORY:  Negative for smoking/alcohol/drug use.     ALLERGIES:  No Known Allergies    MEDICATIONS:  STANDING MEDICATIONS  chlorhexidine 4% Liquid 1 Application(s) Topical <User Schedule>  enoxaparin Injectable 40 milliGRAM(s) SubCutaneous daily  folic acid 1 milliGRAM(s) Oral daily  LORazepam     Tablet   Oral   LORazepam     Tablet 0.5 milliGRAM(s) Oral every 12 hours  multivitamin 1 Tablet(s) Oral daily  pantoprazole    Tablet 40 milliGRAM(s) Oral before breakfast  thiamine 100 milliGRAM(s) Oral daily    PRN MEDICATIONS  LORazepam     Tablet 1 milliGRAM(s) Oral every 4 hours PRN    VITALS:   T(F): 98.1  HR: 95  BP: 151/95  RR: 20  SpO2: 97%    LABS:                        14.2   4.71  )-----------( 58       ( 01 Nov 2018 06:23 )             39.6     11-01    138  |  97<L>  |  9<L>  ----------------------------<  92  3.9   |  24  |  0.8    Ca    8.8      01 Nov 2018 06:23  Phos  2.7     11-01  Mg     2.3     11-01    TPro  6.6  /  Alb  4.1  /  TBili  1.5<H>  /  DBili  0.4<H>  /  AST  125<H>  /  ALT  149<H>  /  AlkPhos  76  11-01    PT/INR - ( 31 Oct 2018 06:31 )   PT: 11.90 sec;   INR: 1.04 ratio         PTT - ( 31 Oct 2018 06:31 )  PTT:27.3 sec    Culture - Blood (collected 30 Oct 2018 21:41)  Source: .Blood Blood  Preliminary Report (01 Nov 2018 02:01):    No growth to date.    Culture - Blood (collected 30 Oct 2018 21:41)  Source: .Blood Blood  Preliminary Report (01 Nov 2018 02:01):    No growth to date.      CARDIAC MARKERS ( 31 Oct 2018 06:31 )  x     / x     / 511 U/L / x     / x      CARDIAC MARKERS ( 30 Oct 2018 19:15 )  x     / <0.01 ng/mL / 445 U/L / x     / x          RADIOLOGY:    < from: US Abdomen Limited (10.31.18 @ 22:17) >    IMPRESSION:    Hepatic steatosis.    Redemonstrated right lobe hemangiomas measure up to 1.4 x 1.0 x 1.4 cm,   stable.    Normal appearing gallbladder and biliary tree.    Note that the pancreas is obscured by bowel gas.    < end of copied text >      PHYSICAL EXAM:  GEN: No acute distress  LUNGS: Clear to auscultation bilaterally   HEART: S1/S2 present. RRR.   ABD: Soft, non-tender, non-distended. Bowel sounds present  EXT: NC/NC/NE/2+PP/FOLEY  NEURO: AAOX3

## 2018-11-02 VITALS
SYSTOLIC BLOOD PRESSURE: 125 MMHG | RESPIRATION RATE: 18 BRPM | HEART RATE: 104 BPM | DIASTOLIC BLOOD PRESSURE: 77 MMHG | TEMPERATURE: 99 F

## 2018-11-02 LAB
ALBUMIN SERPL ELPH-MCNC: 4.3 G/DL — SIGNIFICANT CHANGE UP (ref 3.5–5.2)
ALP SERPL-CCNC: 104 U/L — SIGNIFICANT CHANGE UP (ref 30–115)
ALT FLD-CCNC: 144 U/L — HIGH (ref 0–41)
ANION GAP SERPL CALC-SCNC: 17 MMOL/L — HIGH (ref 7–14)
AST SERPL-CCNC: 155 U/L — HIGH (ref 0–41)
BASOPHILS # BLD AUTO: 0.01 K/UL — SIGNIFICANT CHANGE UP (ref 0–0.2)
BASOPHILS NFR BLD AUTO: 0.2 % — SIGNIFICANT CHANGE UP (ref 0–1)
BILIRUB SERPL-MCNC: 1.3 MG/DL — HIGH (ref 0.2–1.2)
BUN SERPL-MCNC: 9 MG/DL — LOW (ref 10–20)
CALCIUM SERPL-MCNC: 9 MG/DL — SIGNIFICANT CHANGE UP (ref 8.5–10.1)
CHLORIDE SERPL-SCNC: 97 MMOL/L — LOW (ref 98–110)
CO2 SERPL-SCNC: 19 MMOL/L — SIGNIFICANT CHANGE UP (ref 17–32)
CREAT SERPL-MCNC: 0.9 MG/DL — SIGNIFICANT CHANGE UP (ref 0.7–1.5)
EOSINOPHIL # BLD AUTO: 0.06 K/UL — SIGNIFICANT CHANGE UP (ref 0–0.7)
EOSINOPHIL NFR BLD AUTO: 1.4 % — SIGNIFICANT CHANGE UP (ref 0–8)
GLUCOSE SERPL-MCNC: 195 MG/DL — HIGH (ref 70–99)
HCT VFR BLD CALC: 42.3 % — SIGNIFICANT CHANGE UP (ref 42–52)
HGB BLD-MCNC: 15.1 G/DL — SIGNIFICANT CHANGE UP (ref 14–18)
IMM GRANULOCYTES NFR BLD AUTO: 0.2 % — SIGNIFICANT CHANGE UP (ref 0.1–0.3)
LYMPHOCYTES # BLD AUTO: 1.09 K/UL — LOW (ref 1.2–3.4)
LYMPHOCYTES # BLD AUTO: 26.1 % — SIGNIFICANT CHANGE UP (ref 20.5–51.1)
MAGNESIUM SERPL-MCNC: 2.1 MG/DL — SIGNIFICANT CHANGE UP (ref 1.8–2.4)
MCHC RBC-ENTMCNC: 31.1 PG — HIGH (ref 27–31)
MCHC RBC-ENTMCNC: 35.7 G/DL — SIGNIFICANT CHANGE UP (ref 32–37)
MCV RBC AUTO: 87.2 FL — SIGNIFICANT CHANGE UP (ref 80–94)
MONOCYTES # BLD AUTO: 0.65 K/UL — HIGH (ref 0.1–0.6)
MONOCYTES NFR BLD AUTO: 15.6 % — HIGH (ref 1.7–9.3)
NEUTROPHILS # BLD AUTO: 2.36 K/UL — SIGNIFICANT CHANGE UP (ref 1.4–6.5)
NEUTROPHILS NFR BLD AUTO: 56.5 % — SIGNIFICANT CHANGE UP (ref 42.2–75.2)
NRBC # BLD: 0 /100 WBCS — SIGNIFICANT CHANGE UP (ref 0–0)
PLATELET # BLD AUTO: 79 K/UL — LOW (ref 130–400)
POTASSIUM SERPL-MCNC: 3.4 MMOL/L — LOW (ref 3.5–5)
POTASSIUM SERPL-SCNC: 3.4 MMOL/L — LOW (ref 3.5–5)
PROT SERPL-MCNC: 7.4 G/DL — SIGNIFICANT CHANGE UP (ref 6–8)
RBC # BLD: 4.85 M/UL — SIGNIFICANT CHANGE UP (ref 4.7–6.1)
RBC # FLD: 11.3 % — LOW (ref 11.5–14.5)
SODIUM SERPL-SCNC: 133 MMOL/L — LOW (ref 135–146)
WBC # BLD: 4.18 K/UL — LOW (ref 4.8–10.8)
WBC # FLD AUTO: 4.18 K/UL — LOW (ref 4.8–10.8)

## 2018-11-02 RX ORDER — FOLIC ACID 0.8 MG
1 TABLET ORAL
Qty: 30 | Refills: 0
Start: 2018-11-02 | End: 2018-12-01

## 2018-11-02 RX ORDER — THIAMINE MONONITRATE (VIT B1) 100 MG
1 TABLET ORAL
Qty: 30 | Refills: 0
Start: 2018-11-02 | End: 2018-12-01

## 2018-11-02 RX ORDER — INFLUENZA VIRUS VACCINE 15; 15; 15; 15 UG/.5ML; UG/.5ML; UG/.5ML; UG/.5ML
0.5 SUSPENSION INTRAMUSCULAR ONCE
Qty: 0 | Refills: 0 | Status: DISCONTINUED | OUTPATIENT
Start: 2018-11-02 | End: 2018-11-02

## 2018-11-02 RX ORDER — POTASSIUM CHLORIDE 20 MEQ
40 PACKET (EA) ORAL ONCE
Qty: 0 | Refills: 0 | Status: DISCONTINUED | OUTPATIENT
Start: 2018-11-02 | End: 2018-11-02

## 2018-11-02 RX ORDER — FOLIC ACID 0.8 MG
1 TABLET ORAL
Qty: 0 | Refills: 0 | COMMUNITY
Start: 2018-11-02

## 2018-11-02 RX ORDER — THIAMINE MONONITRATE (VIT B1) 100 MG
1 TABLET ORAL
Qty: 0 | Refills: 0 | COMMUNITY
Start: 2018-11-02

## 2018-11-02 RX ADMIN — Medication 1 MILLIGRAM(S): at 12:38

## 2018-11-02 RX ADMIN — Medication 1 TABLET(S): at 12:38

## 2018-11-02 RX ADMIN — PANTOPRAZOLE SODIUM 40 MILLIGRAM(S): 20 TABLET, DELAYED RELEASE ORAL at 08:31

## 2018-11-02 RX ADMIN — Medication 100 MILLIGRAM(S): at 12:38

## 2018-11-02 NOTE — DISCHARGE NOTE ADULT - HOSPITAL COURSE
A/P: 41 yo M with PMHx of alcohol dependence and abuse brought in for seizure like activity on the day of presentation.    # Tonic seizure likely 2/2 alcohol withdrawal, only 1 episode  - CT head negative for acute pathology  - lorazepam prn  - thiamine and folic acid and multivitamin  - LA vbg 2.6 10/30 likley 2/2 seizure,   - EEG done 11/2 result pending    # Elevated liver enzymes likely 2/2 chronic ETOH abuse  - , ,  11/2  - RUQ sonogram 10/31 - right lobe hemangiomas measure up to 1.4 x 1.0 x 1.4 cm,   - fu GI as out patient  -Hepatitis workup 2017 - negative for Hep A, B, C    # Malnutrition 2/2 alcohol abuse - magnesium deficiency  - Mg 1 on presentation > 2.1 11/2  - repleted    # Hyponatremia likely 2/2 vomiting - improving  - 133     # Thrombocytopenia likely 2/2 BM suppression 2/2 alcohol abuse  - 68 > 58 > 79  - no signs of bleeding    #Leukocytopenia  - 4.18 11/2 A/P: 41 yo M with PMHx of alcohol dependence and abuse brought in for seizure like activity on the day of presentation.    # Tonic seizure likely 2/2 alcohol withdrawal, only 1 episode  - CT head negative for acute pathology  - lorazepam prn  - thiamine and folic acid and multivitamin  - LA vbg 2.6 10/30 likley 2/2 seizure,   - EEG done 11/2 >>>     # Elevated liver enzymes likely 2/2 chronic ETOH abuse  - , ,  11/2  - RUQ sonogram 10/31 - right lobe hemangiomas measure up to 1.4 x 1.0 x 1.4 cm,   - fu GI as out patient  -Hepatitis workup 2017 - negative for Hep A, B, C    # Malnutrition 2/2 alcohol abuse - magnesium deficiency  - Mg 1 on presentation > 2.1 11/2  - repleted    # Hyponatremia likely 2/2 vomiting - improving  - 133     # Thrombocytopenia likely 2/2 BM suppression 2/2 alcohol abuse  - 68 > 58 > 79  - no signs of bleeding    #Leukocytopenia  - 4.18 11/2 A/P: 41 yo M with PMHx of alcohol dependence and abuse brought in for seizure like activity on the day of presentation.    # Tonic seizure likely 2/2 alcohol withdrawal, only 1 episode  - CT head negative for acute pathology  - lorazepam prn  - thiamine and folic acid and multivitamin  - LA vbg 2.6 10/30 likley 2/2 seizure,   - EEG done 11/2 >>> normal    # Elevated liver enzymes likely 2/2 chronic ETOH abuse  - , ,  11/2  - RUQ sonogram 10/31 - right lobe hemangiomas measure up to 1.4 x 1.0 x 1.4 cm,   - fu GI as out patient  -Hepatitis workup 2017 - negative for Hep A, B, C    # Malnutrition 2/2 alcohol abuse - magnesium deficiency  - Mg 1 on presentation > 2.1 11/2  - repleted    # Hyponatremia likely 2/2 vomiting - improving  - 133     # Thrombocytopenia likely 2/2 BM suppression 2/2 alcohol abuse  - 68 > 58 > 79  - no signs of bleeding    #Leukocytopenia  - 4.18 11/2

## 2018-11-02 NOTE — PROGRESS NOTE ADULT - ASSESSMENT
A/P: 43 yo M with PMHx of alcohol dependence and abuse brought in for seizure like activity on the day of presentation.    # Tonic seizure likely 2/2 alcohol withdrawal, only 1 episode  - evaluated by critical care, stable for AMF  - CT head negative for acute pathology  - c/w [facility] protocol   - lorazepam prn  - thiamine and folic acid and multivitamin  - Keep K>4 and Mg>2  - LA isreal 2/2 seizure, f/u repeat   - EEG done 11/2 result pending    # Elevated liver enzymes likely 2/2 chronic ETOH abuse  - , ,  11/2  - RUQ sonogram 10/31 - right lobe hemangiomas measure up to 1.4 x 1.0 x 1.4 cm,   - fu GI as out patient  -Hepatitis workup 2017 - negative for Hep A, B, C    # Malnutrition 2/2 alcohol abuse - magnesium deficiency  - Mg 1 on presentation > 2.1 11/2  - repleted      # Hyponatremia likely 2/2 vomiting - improving  - 133       # Thrombocytopenia likely 2/2 BM suppression 2/2 alcohol abuse  - 68 > 58 > 79  - no signs of bleeding  - will continue to monitor       #Leukocytopenia  - 4.18 11/2    # GERD  - c/w protonix    # DVT ppx- lovenox SQ  # Dispo: from home, will re-discuss detox eval .

## 2018-11-02 NOTE — PROGRESS NOTE ADULT - ATTENDING COMMENTS
Patient is seen and examined independently. I agree with resident note above and plan of care -edited and corrected where applicable- with addition:     Subjective: patient seen and examined at bedside earlier. Denies chest pain, palpitations, tremors, headache or blurry vision. S/p EEG this am, results pending.    Constitutional: middle age male, sitting in chair comfortably eating breakfast, awake and alert, well-developed  HEENT: PERR, EOMI, bruising over tongue   Neck: Soft and supple, No LAD, No JVD  Respiratory: Breath sounds are clear bilaterally, No wheezing, rales or rhonchi  Cardiovascular: S1 and S2, regular rate and rhythm, no Murmurs, gallops or rubs  Gastrointestinal: Bowel Sounds present, soft, nontender, nondistended, no guarding, no rebound  Extremities: No peripheral edema  Vascular: 2+ peripheral pulses  Neurological: A/O x 3, no focal deficits  Musculoskeletal: 5/5 strength b/l upper and lower extremities    All labs and Imaging reviewed    A/P: 41 yo M with PMHx of alcohol dependence and abuse brought in for seizure like activity on the day of presentation.    # Tonic seizure likely 2/2 alcohol withdrawal, r/o additional causes of seizures   - evaluated by critical care, stable for AMF  - CT head negative for acute pathology  - no signs of withdrawal  - c/w thiamine and folic acid and multivitamin  - refused detox eval  - EEG done results pending     # Elevated liver enzymes likely 2/2 chronic ETOH abuse  - improving  - RUQ sonogram negative for acute pathology  - outpt GI f/u for right lobe hemangiomas    # Malnutrition 2/2 alcohol abuse - magnesium deficiency  - Mg 1 on presentation  - resolved  - continue to monitor    Hyponatremia likely 2/2 vomiting   - resolved  - s/p IVFs  - continue to monitor     # Thrombocytopenia likely 2/2 BM suppression 2/2 alcohol abuse  - no signs of bleeding  - continue to monitor     # GERD  - c/w protonix    # DVT ppx  - SCDs due to thrombocytopenia, pt ambulating    # Dispo: stable to d/c home today pending EEG results
Patient is seen and examined independently. I agree with resident note above and plan of care -edited and corrected where applicable- with addition:     PHYSICAL EXAM:  Constitutional: middle age male, sitting in chair, NAD, awake and alert, well-developed  HEENT: PERR, EOMI, tongue discoloration   Neck: Soft and supple, No LAD, No JVD  Respiratory: Breath sounds are clear bilaterally, No wheezing, rales or rhonchi  Cardiovascular: S1 and S2, regular rate and rhythm, no Murmurs, gallops or rubs  Gastrointestinal: Bowel Sounds present, soft, nontender, nondistended, no guarding, no rebound  Extremities: No peripheral edema, no visible tremors  Vascular: 2+ peripheral pulses  Neurological: A/O x 3, no focal deficits    LABS: All Labs and Imaging Reviewed:  < from: US Abdomen Limited (10.31.18 @ 22:17) >  Hepatic steatosis.  Redemonstrated right lobe hemangiomas measure up to 1.4 x 1.0 x 1.4 cm,   stable.  Normal appearing gallbladder and biliary tree.  Note that the pancreas is obscured by bowel gas    A/P: 43 yo M with PMHx of alcohol dependence and abuse brought in for seizure like activity on the day of presentation.    # Tonic seizure likely 2/2 alcohol withdrawal, r/o additional causes of seizures   - evaluated by critical care, stable for AMF  - CT head negative for acute pathology  - c/w [facility] protocol   - lorazepam prn  - thiamine and folic acid and multivitamin  - Keep K>4 and Mg>2  - refused detox eval  - EEG pending    # Elevated liver enzymes likely 2/2 chronic ETOH abuse  - improving  - RUQ sonogram negative for acute pathology  - outpt GI f/u for right lobe hemangiomas    # Malnutrition 2/2 alcohol abuse - magnesium deficiency  - Mg 1 on presentation  - resolved  - continue to monitor    Hyponatremia likely 2/2 vomiting   - resolved  - s/p IVFs  - continue to monitor     # Thrombocytopenia likely 2/2 BM suppression 2/2 alcohol abuse  - no signs of bleeding  - continue to monitor     # GERD  - c/w protonix    # DVT ppx  - SCDs due to thrombocytopenia, pt ambulating    # Dispo: from home

## 2018-11-02 NOTE — PATIENT PROFILE ADULT - LAST BOWEL MOVEMENT DATE
01-Nov-2018
42 y/o male with fever, chills and infected diabetic foot ulcer with blood and foul smelling drainage x1 day. VS nml. Pt given vanc and zosyn. Vascular and Podiatry consulted. Clinically infected does not appear to extend to bone - however will get MR to r/o.  Pt will be admitted for IV abx.

## 2018-11-02 NOTE — DISCHARGE NOTE ADULT - PLAN OF CARE
symptom control, evaluation and prevention of complications You were diagnosed with alcohol withdrawal seizures.  EEG done showed  prolong alcohol use can cause electrolyte and minerals abnormalities, you had low sodium and magnesium levels which were repleted. You were diagnosed with alcohol withdrawal seizures.  EEG done showed normal activity  prolong alcohol use can cause electrolyte and minerals abnormalities, you had low sodium and magnesium levels which were repleted.

## 2018-11-02 NOTE — DISCHARGE NOTE ADULT - ADDITIONAL INSTRUCTIONS
please follow up with your primary care doctor as out patient.  please follow up with your GI doctor as out patient

## 2018-11-02 NOTE — DISCHARGE NOTE ADULT - PATIENT PORTAL LINK FT
You can access the [facility] Patient Portal, offered by [facility], by registering with the following website: [email or web address]

## 2018-11-02 NOTE — PROGRESS NOTE ADULT - SUBJECTIVE AND OBJECTIVE BOX
SUBJECTIVE:  Patient is a 42y old Male who presentED with a chief complaint of seizure on the day of admission (01 Nov 2018 14:10)  Currently admitted to medicine with the primary diagnosis of Alcohol withdrawal seizure       INTERVAL HISTORY:  Today is hospital day 4d. This morning he is resting comfortably in bed and reports no new issues or overnight events.     PAST MEDICAL & SURGICAL HISTORY  GERD (gastroesophageal reflux disease)  Alcohol abuse  H/O hemorrhoidectomy    SOCIAL HISTORY:  Negative for smoking/alcohol/drug use.     ALLERGIES:  No Known Allergies    MEDICATIONS:  STANDING MEDICATIONS  chlorhexidine 4% Liquid 1 Application(s) Topical <User Schedule>  folic acid 1 milliGRAM(s) Oral daily  influenza   Vaccine 0.5 milliLiter(s) IntraMuscular once  multivitamin 1 Tablet(s) Oral daily  pantoprazole    Tablet 40 milliGRAM(s) Oral before breakfast  thiamine 100 milliGRAM(s) Oral daily    PRN MEDICATIONS  LORazepam     Tablet 1 milliGRAM(s) Oral every 4 hours PRN    Home Medications:      VITALS:   T(F): 98  HR: 84  BP: 127/84  RR: 18  SpO2: 99%    LABS:                        15.1   4.18  )-----------( 79       ( 02 Nov 2018 07:32 )             42.3     11-02    133<L>  |  97<L>  |  9<L>  ----------------------------<  195<H>  3.4<L>   |  19  |  0.9    Ca    9.0      02 Nov 2018 07:32  Phos  2.7     11-01  Mg     2.1     11-02    TPro  7.4  /  Alb  4.3  /  TBili  1.3<H>  /  DBili  x   /  AST  155<H>  /  ALT  144<H>  /  AlkPhos  104  11-02      Culture - Blood (collected 30 Oct 2018 21:41)  Source: .Blood Blood  Preliminary Report (01 Nov 2018 02:01):    No growth to date.    Culture - Blood (collected 30 Oct 2018 21:41)  Source: .Blood Blood  Preliminary Report (01 Nov 2018 02:01):    No growth to date.      RADIOLOGY:  < from: US Abdomen Limited (10.31.18 @ 22:17) >  IMPRESSION:  Hepatic steatosis.  Redemonstrated right lobe hemangiomas measure up to 1.4 x 1.0 x 1.4 cm,   stable.  Normal appearing gallbladder and biliary tree.  Note that the pancreas is obscured by bowel gas.      < from: CT Head No Cont (10.31.18 @ 09:21) >  IMPRESSION:   1.  When compared to previous study dated 10/30/2018 there is no   significant change.  2.  No acute fractures, mass effect, midline shift or hemorrhage.   3.  If these are new onset seizures or the patient continues to be   symptomatic MRI of the brain with and without contrast using Seizure   protocol may be helpful for further evaluation.      < from: Xray Chest 1 View- PORTABLE-Routine (10.31.18 @ 06:02) >  Impression:    No radiographic evidence of acute cardiopulmonary disease.      < from: CT Head No Cont (10.30.18 @ 21:02) >  IMPRESSION:   No acute intra-cranial pathology.    PHYSICAL EXAM:  HEENT: PERRLA, EOMI, tongue discoloration   Neck: Soft and supple, No LAD, No JVD  Respiratory: Breath sounds are clear bilaterally, No wheezing, rales or rhonchi  Cardiovascular: S1 and S2, regular rate and rhythm, no Murmurs, gallops or rubs  Gastrointestinal: Bowel Sounds present, soft, nontender, nondistended, no guarding, no rebound  Extremities: No peripheral edema, no visible tremors  Vascular: 2+ peripheral pulses  Neurological: A/O x 3, no focal deficits

## 2018-11-02 NOTE — DISCHARGE NOTE ADULT - MEDICATION SUMMARY - MEDICATIONS TO TAKE
I will START or STAY ON the medications listed below when I get home from the hospital:    Multiple Vitamins oral tablet  -- 1 tab(s) by mouth once a day  -- Indication: For Health    folic acid 1 mg oral tablet  -- 1 tab(s) by mouth once a day  -- Indication: For Health    thiamine 100 mg oral tablet  -- 1 tab(s) by mouth once a day  -- Indication: For Health

## 2018-11-02 NOTE — DISCHARGE NOTE ADULT - CARE PLAN
Principal Discharge DX:	Alcohol withdrawal seizure  Goal:	symptom control, evaluation and prevention of complications  Assessment and plan of treatment:	You were diagnosed with alcohol withdrawal seizures.  EEG done showed  prolong alcohol use can cause electrolyte and minerals abnormalities, you had low sodium and magnesium levels which were repleted. Principal Discharge DX:	Alcohol withdrawal seizure  Goal:	symptom control, evaluation and prevention of complications  Assessment and plan of treatment:	You were diagnosed with alcohol withdrawal seizures.  EEG done showed normal activity  prolong alcohol use can cause electrolyte and minerals abnormalities, you had low sodium and magnesium levels which were repleted.

## 2018-11-05 LAB
CULTURE RESULTS: SIGNIFICANT CHANGE UP
CULTURE RESULTS: SIGNIFICANT CHANGE UP
SPECIMEN SOURCE: SIGNIFICANT CHANGE UP
SPECIMEN SOURCE: SIGNIFICANT CHANGE UP

## 2018-11-08 DIAGNOSIS — E87.1 HYPO-OSMOLALITY AND HYPONATREMIA: ICD-10-CM

## 2018-11-08 DIAGNOSIS — R94.5 ABNORMAL RESULTS OF LIVER FUNCTION STUDIES: ICD-10-CM

## 2018-11-08 DIAGNOSIS — E83.42 HYPOMAGNESEMIA: ICD-10-CM

## 2018-11-08 DIAGNOSIS — F10.239 ALCOHOL DEPENDENCE WITH WITHDRAWAL, UNSPECIFIED: ICD-10-CM

## 2018-11-08 DIAGNOSIS — K21.9 GASTRO-ESOPHAGEAL REFLUX DISEASE WITHOUT ESOPHAGITIS: ICD-10-CM

## 2018-11-08 DIAGNOSIS — F32.9 MAJOR DEPRESSIVE DISORDER, SINGLE EPISODE, UNSPECIFIED: ICD-10-CM

## 2018-11-08 DIAGNOSIS — E46 UNSPECIFIED PROTEIN-CALORIE MALNUTRITION: ICD-10-CM

## 2018-11-08 DIAGNOSIS — D69.6 THROMBOCYTOPENIA, UNSPECIFIED: ICD-10-CM

## 2018-11-08 DIAGNOSIS — D72.819 DECREASED WHITE BLOOD CELL COUNT, UNSPECIFIED: ICD-10-CM

## 2018-11-08 DIAGNOSIS — G40.409 OTHER GENERALIZED EPILEPSY AND EPILEPTIC SYNDROMES, NOT INTRACTABLE, WITHOUT STATUS EPILEPTICUS: ICD-10-CM

## 2019-03-11 PROBLEM — K21.9 GASTRO-ESOPHAGEAL REFLUX DISEASE WITHOUT ESOPHAGITIS: Chronic | Status: ACTIVE | Noted: 2018-10-30

## 2019-03-12 ENCOUNTER — OUTPATIENT (OUTPATIENT)
Dept: OUTPATIENT SERVICES | Facility: HOSPITAL | Age: 44
LOS: 1 days | Discharge: HOME | End: 2019-03-12

## 2019-03-12 DIAGNOSIS — D35.02 BENIGN NEOPLASM OF LEFT ADRENAL GLAND: ICD-10-CM

## 2019-03-12 DIAGNOSIS — Z98.890 OTHER SPECIFIED POSTPROCEDURAL STATES: Chronic | ICD-10-CM

## 2019-06-18 ENCOUNTER — EMERGENCY (EMERGENCY)
Facility: HOSPITAL | Age: 44
LOS: 0 days | Discharge: LEFT BEFORE DISCHARGE | End: 2019-06-18
Attending: EMERGENCY MEDICINE | Admitting: EMERGENCY MEDICINE
Payer: COMMERCIAL

## 2019-06-18 VITALS
DIASTOLIC BLOOD PRESSURE: 91 MMHG | OXYGEN SATURATION: 96 % | TEMPERATURE: 97 F | SYSTOLIC BLOOD PRESSURE: 139 MMHG | RESPIRATION RATE: 18 BRPM | HEART RATE: 78 BPM
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
## 2019-06-18 DIAGNOSIS — Z98.890 OTHER SPECIFIED POSTPROCEDURAL STATES: Chronic | ICD-10-CM
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
## 2019-06-18 DIAGNOSIS — F10.129 ALCOHOL ABUSE WITH INTOXICATION, UNSPECIFIED: ICD-10-CM
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
## 2019-06-18 DIAGNOSIS — Z79.899 OTHER LONG TERM (CURRENT) DRUG THERAPY: ICD-10-CM
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
## 2019-06-18 PROCEDURE — 70450 CT HEAD/BRAIN W/O DYE: CPT | Mod: 26

## 2019-06-18 PROCEDURE — 99284 EMERGENCY DEPT VISIT MOD MDM: CPT

## 2019-06-18 NOTE — ED PROVIDER NOTE - ATTENDING CONTRIBUTION TO CARE
42yo M BIBA after being found intoxicated at a bus stop. Pt states he did fall and hit his head. No loc, no n/v/d, patient endorses drinking hard liquor prior to arrival    CONSTITUTIONAL: Well-developed; well-nourished; in no acute distress. Sitting up and providing appropriate history and physical examination. + AOB, + Clinically intoxicated  TRAUMA: Primary and Secondary surveys intact, GCS 15, no midline CTLS spine tenderness, Pelvis stable, + moving all extremities  SKIN: skin exam is warm and dry, no acute rash.  HEAD: Normocephalic; atraumatic.  EYES: PERRL, 3 mm bilateral, no nystagmus, EOM intact; conjunctiva and sclera clear.  ENT: No nasal discharge; airway clear.  NECK: Supple; non tender. + full passive ROM in all directions. No JVD  CARD: S1, S2 normal; no murmurs, gallops, or rubs. Regular rate and rhythm. + Symmetric Strong Pulses  RESP: No wheezes, rales or rhonchi. Good air movement bilaterally  ABD: soft; non-distended; non-tender. No Rebound, No Guarding, No signs of peritonitis, No CVA tenderness. No pulsatile abdominal mass. + Strong and Symmetric Pulses  EXT: Normal ROM. No clubbing, cyanosis or edema. Dp and Pt Pulses intact. Cap refill less than 3 seconds  NEURO: CN 2-12 intact, normal finger to nose, normal romberg, stable gait, no sensory or motor deficits, Alert, oriented, grossly unremarkable. No Focal deficits. GCS 15. NIH 0  PSYCH: Cooperative, appropriate.

## 2019-06-18 NOTE — ED PROVIDER NOTE - CONSTITUTIONAL, MLM
Well appearing, well nourished, awake, alert, oriented to person, place, time/situation and in no apparent distress. intoxicated normal... Well appearing, awake, alert, oriented to person, place, time/situation and in no apparent distress. intoxicated

## 2019-06-18 NOTE — ED ADULT NURSE NOTE - INTERVENTIONS DEFINITIONS
Detail Level: Generalized Quality 137: Melanoma: Continuity Of Care - Recall System: Patient information entered into a recall system that includes: target date for the next exam specified AND a process to follow up with patients regarding missed or unscheduled appointments Detail Level: Detailed Physically safe environment: no spills, clutter or unnecessary equipment/Reinforce activity limits and safety measures with patient and family/Stretcher in lowest position, wheels locked, appropriate side rails in place Detail Level: Zone

## 2019-06-18 NOTE — ED PROVIDER NOTE - OBJECTIVE STATEMENT
44yo M BIBA after being found intoxicated at a bus stop. Pt states he did fall and hit his head. Pt is a poor historian. Pt denies any CP, SOB, nausea, vomiting, abdominal pain.

## 2019-06-18 NOTE — ED PROVIDER NOTE - CLINICAL SUMMARY MEDICAL DECISION MAKING FREE TEXT BOX
I personally evaluated the patient. I reviewed the Resident’s or Physician Assistant’s note (as assigned above), and agree with the findings and plan except as documented in my note. patient eloped. seen ambulating with stable gait

## 2020-09-21 ENCOUNTER — EMERGENCY (EMERGENCY)
Facility: HOSPITAL | Age: 45
LOS: 0 days | Discharge: HOME | End: 2020-09-21
Attending: EMERGENCY MEDICINE | Admitting: EMERGENCY MEDICINE
Payer: COMMERCIAL

## 2020-09-21 VITALS
RESPIRATION RATE: 17 BRPM | TEMPERATURE: 98 F | SYSTOLIC BLOOD PRESSURE: 123 MMHG | WEIGHT: 279.99 LBS | HEIGHT: 72 IN | OXYGEN SATURATION: 99 % | DIASTOLIC BLOOD PRESSURE: 78 MMHG | HEART RATE: 69 BPM

## 2020-09-21 DIAGNOSIS — F10.120 ALCOHOL ABUSE WITH INTOXICATION, UNCOMPLICATED: ICD-10-CM

## 2020-09-21 DIAGNOSIS — Z98.890 OTHER SPECIFIED POSTPROCEDURAL STATES: Chronic | ICD-10-CM

## 2020-09-21 PROCEDURE — XXXXX: CPT

## 2020-09-21 PROCEDURE — 99284 EMERGENCY DEPT VISIT MOD MDM: CPT

## 2020-09-21 NOTE — ED PROVIDER NOTE - OBJECTIVE STATEMENT
this is a 43 yo male presents to ed for evaluation of alcohol intox. patient states that he was drinking all day. patient was walking home and he went to sleep on someone lawn

## 2020-09-21 NOTE — ED PROVIDER NOTE - NS ED ROS FT
Review of Systems:  	•	CONSTITUTIONAL - intoxicated  no fever, no diaphoresis, no chills  	•	SKIN - no rash  	•	HEMATOLOGIC - no bleeding, no bruising  	•	EYES - no eye pain, no blurry vision  	•	ENT - no change in hearing, no sore throat, no ear pain or tinnitus  	•	RESPIRATORY - no shortness of breath, no cough  	•	CARDIAC - no chest pain, no palpitations  	•	GI - no abd pain, no nausea, no vomiting, no diarrhea, no constipation  	•	GENITO-URINARY - no discharge, no dysuria; no hematuria, no increased urinary frequency  	•	MUSCULOSKELETAL - no joint paint, no swelling, no redness  	•	NEUROLOGIC - no weakness, no headache, no paresthesias, no LOC  	•	PSYCH - no anxiety, non suicidal, non homicidal, no hallucination, no depression

## 2020-09-21 NOTE — ED PROVIDER NOTE - ATTENDING CONTRIBUTION TO CARE
44 y.o. male BIBA for alcohol intoxication. Patient states that he was drinking all day and fell asleep on someone's lawn on a way home. Pt has no complains. No signs of trauma. Agree with PAs exam. Will observe and reevaluate.

## 2020-09-21 NOTE — ED PROVIDER NOTE - NSFOLLOWUPCLINICS_GEN_ALL_ED_FT
[facility]  [facility]  [address] [place] [address]  Phone: [phone]  Fax:   Follow Up Time:

## 2020-09-21 NOTE — ED PROVIDER NOTE - PATIENT PORTAL LINK FT
You can access the FollowMyHealth Patient Portal offered by [facility] by registering at the following website: [email or web address]. By joining [employer]’s FollowMyHealth portal, you will also be able to view your health information using other applications (apps) compatible with our system.

## 2020-09-21 NOTE — ED ADULT NURSE NOTE - NSIMPLEMENTINTERV_GEN_ALL_ED
Implemented All Fall Risk Interventions:  [place] to call system. Call bell, personal items and telephone within reach. Instruct patient to call for assistance. Room bathroom lighting operational. Non-slip footwear when patient is off stretcher. Physically safe environment: no spills, clutter or unnecessary equipment. Stretcher in lowest position, wheels locked, appropriate side rails in place. Provide visual cue, wrist band, yellow gown, etc. Monitor gait and stability. Monitor for mental status changes and reorient to person, place, and time. Review medications for side effects contributing to fall risk. Reinforce activity limits and safety measures with patient and family.

## 2020-09-21 NOTE — ED PROVIDER NOTE - PROGRESS NOTE DETAILS
patient is resting . easily arousable patient walk to rest room . steady gait. patient walk back from rest room with steady gait. patient also clear speech

## 2021-03-29 ENCOUNTER — INPATIENT (INPATIENT)
Facility: HOSPITAL | Age: 46
LOS: 14 days | Discharge: HOME | End: 2021-04-13
Attending: INTERNAL MEDICINE | Admitting: INTERNAL MEDICINE
Payer: COMMERCIAL

## 2021-03-29 VITALS
RESPIRATION RATE: 19 BRPM | OXYGEN SATURATION: 96 % | HEART RATE: 140 BPM | DIASTOLIC BLOOD PRESSURE: 130 MMHG | SYSTOLIC BLOOD PRESSURE: 205 MMHG

## 2021-03-29 DIAGNOSIS — Z98.890 OTHER SPECIFIED POSTPROCEDURAL STATES: Chronic | ICD-10-CM

## 2021-03-29 LAB
ALBUMIN SERPL ELPH-MCNC: 3.5 G/DL — SIGNIFICANT CHANGE UP (ref 3.5–5.2)
ALP SERPL-CCNC: 81 U/L — SIGNIFICANT CHANGE UP (ref 30–115)
ALT FLD-CCNC: 51 U/L — HIGH (ref 0–41)
AMPHET UR-MCNC: SIGNIFICANT CHANGE UP
ANION GAP SERPL CALC-SCNC: 18 MMOL/L — HIGH (ref 7–14)
APPEARANCE UR: CLEAR — SIGNIFICANT CHANGE UP
APTT BLD: 32.1 SEC — SIGNIFICANT CHANGE UP (ref 27–39.2)
AST SERPL-CCNC: 93 U/L — HIGH (ref 0–41)
BACTERIA # UR AUTO: NEGATIVE — SIGNIFICANT CHANGE UP
BARBITURATES UR SCN-MCNC: SIGNIFICANT CHANGE UP
BASE EXCESS BLDV CALC-SCNC: 1.3 MMOL/L — SIGNIFICANT CHANGE UP (ref -2–2)
BASOPHILS # BLD AUTO: 0.01 K/UL — SIGNIFICANT CHANGE UP (ref 0–0.2)
BASOPHILS NFR BLD AUTO: 0.1 % — SIGNIFICANT CHANGE UP (ref 0–1)
BENZODIAZ UR-MCNC: SIGNIFICANT CHANGE UP
BILIRUB SERPL-MCNC: 1 MG/DL — SIGNIFICANT CHANGE UP (ref 0.2–1.2)
BILIRUB UR-MCNC: NEGATIVE — SIGNIFICANT CHANGE UP
BUN SERPL-MCNC: 4 MG/DL — LOW (ref 10–20)
CALCIUM SERPL-MCNC: 6.4 MG/DL — LOW (ref 8.5–10.1)
CHLORIDE SERPL-SCNC: 91 MMOL/L — LOW (ref 98–110)
CK SERPL-CCNC: 683 U/L — HIGH (ref 0–225)
CO2 SERPL-SCNC: 26 MMOL/L — SIGNIFICANT CHANGE UP (ref 17–32)
COCAINE METAB.OTHER UR-MCNC: SIGNIFICANT CHANGE UP
COLOR SPEC: YELLOW — SIGNIFICANT CHANGE UP
CREAT SERPL-MCNC: 0.8 MG/DL — SIGNIFICANT CHANGE UP (ref 0.7–1.5)
DIFF PNL FLD: NEGATIVE — SIGNIFICANT CHANGE UP
EOSINOPHIL # BLD AUTO: 0 K/UL — SIGNIFICANT CHANGE UP (ref 0–0.7)
EOSINOPHIL NFR BLD AUTO: 0 % — SIGNIFICANT CHANGE UP (ref 0–8)
EPI CELLS # UR: 1 /HPF — SIGNIFICANT CHANGE UP (ref 0–5)
ETHANOL SERPL-MCNC: <10 MG/DL — SIGNIFICANT CHANGE UP
GAS PNL BLDA: SIGNIFICANT CHANGE UP
GLUCOSE SERPL-MCNC: 117 MG/DL — HIGH (ref 70–99)
GLUCOSE UR QL: NEGATIVE — SIGNIFICANT CHANGE UP
HCO3 BLDV-SCNC: 27 MMOL/L — SIGNIFICANT CHANGE UP (ref 22–29)
HCT VFR BLD CALC: 36.5 % — LOW (ref 42–52)
HGB BLD-MCNC: 12.8 G/DL — LOW (ref 14–18)
HYALINE CASTS # UR AUTO: 0 /LPF — SIGNIFICANT CHANGE UP (ref 0–7)
IMM GRANULOCYTES NFR BLD AUTO: 0.5 % — HIGH (ref 0.1–0.3)
INR BLD: 1.11 RATIO — SIGNIFICANT CHANGE UP (ref 0.65–1.3)
KETONES UR-MCNC: SIGNIFICANT CHANGE UP
LACTATE BLDV-MCNC: 9.8 MMOL/L — HIGH (ref 0.5–1.6)
LACTATE SERPL-SCNC: 10.5 MMOL/L — CRITICAL HIGH (ref 0.7–2)
LEUKOCYTE ESTERASE UR-ACNC: NEGATIVE — SIGNIFICANT CHANGE UP
LIDOCAIN IGE QN: 205 U/L — HIGH (ref 7–60)
LYMPHOCYTES # BLD AUTO: 0.94 K/UL — LOW (ref 1.2–3.4)
LYMPHOCYTES # BLD AUTO: 12.7 % — LOW (ref 20.5–51.1)
MAGNESIUM SERPL-MCNC: 0.5 MG/DL — LOW (ref 1.8–2.4)
MCHC RBC-ENTMCNC: 32.7 PG — HIGH (ref 27–31)
MCHC RBC-ENTMCNC: 35.1 G/DL — SIGNIFICANT CHANGE UP (ref 32–37)
MCV RBC AUTO: 93.1 FL — SIGNIFICANT CHANGE UP (ref 80–94)
METHADONE UR-MCNC: SIGNIFICANT CHANGE UP
MONOCYTES # BLD AUTO: 0.83 K/UL — HIGH (ref 0.1–0.6)
MONOCYTES NFR BLD AUTO: 11.2 % — HIGH (ref 1.7–9.3)
NEUTROPHILS # BLD AUTO: 5.58 K/UL — SIGNIFICANT CHANGE UP (ref 1.4–6.5)
NEUTROPHILS NFR BLD AUTO: 75.5 % — HIGH (ref 42.2–75.2)
NITRITE UR-MCNC: NEGATIVE — SIGNIFICANT CHANGE UP
NRBC # BLD: 0 /100 WBCS — SIGNIFICANT CHANGE UP (ref 0–0)
OPIATES UR-MCNC: SIGNIFICANT CHANGE UP
OSMOLALITY SERPL: 275 MOS/KG — SIGNIFICANT CHANGE UP (ref 275–300)
PCO2 BLDV: 44 MMHG — SIGNIFICANT CHANGE UP (ref 42–55)
PCP SPEC-MCNC: SIGNIFICANT CHANGE UP
PH BLDV: 7.39 — SIGNIFICANT CHANGE UP (ref 7.26–7.43)
PH UR: 8.5 — HIGH (ref 5–8)
PLATELET # BLD AUTO: 188 K/UL — SIGNIFICANT CHANGE UP (ref 130–400)
PO2 BLDV: 57 MMHG — HIGH (ref 20–40)
POTASSIUM SERPL-MCNC: 3.1 MMOL/L — LOW (ref 3.5–5)
POTASSIUM SERPL-SCNC: 3.1 MMOL/L — LOW (ref 3.5–5)
PROPOXYPHENE QUALITATIVE URINE RESULT: SIGNIFICANT CHANGE UP
PROT SERPL-MCNC: 6.3 G/DL — SIGNIFICANT CHANGE UP (ref 6–8)
PROT UR-MCNC: ABNORMAL
PROTHROM AB SERPL-ACNC: 12.8 SEC — SIGNIFICANT CHANGE UP (ref 9.95–12.87)
RBC # BLD: 3.92 M/UL — LOW (ref 4.7–6.1)
RBC # FLD: 11.8 % — SIGNIFICANT CHANGE UP (ref 11.5–14.5)
RBC CASTS # UR COMP ASSIST: 2 /HPF — SIGNIFICANT CHANGE UP (ref 0–4)
SAO2 % BLDV: 87 % — SIGNIFICANT CHANGE UP
SARS-COV-2 RNA SPEC QL NAA+PROBE: SIGNIFICANT CHANGE UP
SODIUM SERPL-SCNC: 135 MMOL/L — SIGNIFICANT CHANGE UP (ref 135–146)
SP GR SPEC: 1.01 — SIGNIFICANT CHANGE UP (ref 1.01–1.03)
UROBILINOGEN FLD QL: SIGNIFICANT CHANGE UP
WBC # BLD: 7.4 K/UL — SIGNIFICANT CHANGE UP (ref 4.8–10.8)
WBC # FLD AUTO: 7.4 K/UL — SIGNIFICANT CHANGE UP (ref 4.8–10.8)
WBC UR QL: 1 /HPF — SIGNIFICANT CHANGE UP (ref 0–5)

## 2021-03-29 PROCEDURE — 76937 US GUIDE VASCULAR ACCESS: CPT | Mod: 26

## 2021-03-29 PROCEDURE — 71045 X-RAY EXAM CHEST 1 VIEW: CPT | Mod: 26,76

## 2021-03-29 PROCEDURE — 93010 ELECTROCARDIOGRAM REPORT: CPT

## 2021-03-29 PROCEDURE — 36556 INSERT NON-TUNNEL CV CATH: CPT

## 2021-03-29 PROCEDURE — 70450 CT HEAD/BRAIN W/O DYE: CPT | Mod: 26

## 2021-03-29 PROCEDURE — 72125 CT NECK SPINE W/O DYE: CPT | Mod: 26

## 2021-03-29 PROCEDURE — 76705 ECHO EXAM OF ABDOMEN: CPT | Mod: 26

## 2021-03-29 PROCEDURE — 93308 TTE F-UP OR LMTD: CPT | Mod: 26

## 2021-03-29 PROCEDURE — 99291 CRITICAL CARE FIRST HOUR: CPT | Mod: 25

## 2021-03-29 PROCEDURE — 72170 X-RAY EXAM OF PELVIS: CPT | Mod: 26

## 2021-03-29 RX ORDER — THIAMINE MONONITRATE (VIT B1) 100 MG
100 TABLET ORAL DAILY
Refills: 0 | Status: DISCONTINUED | OUTPATIENT
Start: 2021-03-29 | End: 2021-04-11

## 2021-03-29 RX ORDER — ROCURONIUM BROMIDE 10 MG/ML
100 VIAL (ML) INTRAVENOUS ONCE
Refills: 0 | Status: COMPLETED | OUTPATIENT
Start: 2021-03-29 | End: 2021-03-29

## 2021-03-29 RX ORDER — POTASSIUM CHLORIDE 20 MEQ
40 PACKET (EA) ORAL ONCE
Refills: 0 | Status: COMPLETED | OUTPATIENT
Start: 2021-03-29 | End: 2021-03-29

## 2021-03-29 RX ORDER — LEVETIRACETAM 250 MG/1
1000 TABLET, FILM COATED ORAL EVERY 12 HOURS
Refills: 0 | Status: DISCONTINUED | OUTPATIENT
Start: 2021-03-29 | End: 2021-04-12

## 2021-03-29 RX ORDER — THIAMINE MONONITRATE (VIT B1) 100 MG
100 TABLET ORAL DAILY
Refills: 0 | Status: DISCONTINUED | OUTPATIENT
Start: 2021-03-29 | End: 2021-03-29

## 2021-03-29 RX ORDER — PROPOFOL 10 MG/ML
20 INJECTION, EMULSION INTRAVENOUS
Qty: 1000 | Refills: 0 | Status: DISCONTINUED | OUTPATIENT
Start: 2021-03-29 | End: 2021-04-05

## 2021-03-29 RX ORDER — SODIUM CHLORIDE 9 MG/ML
1000 INJECTION, SOLUTION INTRAVENOUS ONCE
Refills: 0 | Status: COMPLETED | OUTPATIENT
Start: 2021-03-29 | End: 2021-03-29

## 2021-03-29 RX ORDER — FENTANYL CITRATE 50 UG/ML
1 INJECTION INTRAVENOUS
Qty: 5000 | Refills: 0 | Status: DISCONTINUED | OUTPATIENT
Start: 2021-03-29 | End: 2021-03-29

## 2021-03-29 RX ORDER — FENTANYL CITRATE 50 UG/ML
100 INJECTION INTRAVENOUS ONCE
Refills: 0 | Status: DISCONTINUED | OUTPATIENT
Start: 2021-03-29 | End: 2021-03-29

## 2021-03-29 RX ORDER — TRANEXAMIC ACID 100 MG/ML
1000 INJECTION, SOLUTION INTRAVENOUS ONCE
Refills: 0 | Status: COMPLETED | OUTPATIENT
Start: 2021-03-29 | End: 2021-03-29

## 2021-03-29 RX ORDER — MAGNESIUM SULFATE 500 MG/ML
2 VIAL (ML) INJECTION ONCE
Refills: 0 | Status: COMPLETED | OUTPATIENT
Start: 2021-03-29 | End: 2021-03-29

## 2021-03-29 RX ORDER — LEVETIRACETAM 250 MG/1
2000 TABLET, FILM COATED ORAL ONCE
Refills: 0 | Status: COMPLETED | OUTPATIENT
Start: 2021-03-29 | End: 2021-03-29

## 2021-03-29 RX ORDER — CALCIUM GLUCONATE 100 MG/ML
2 VIAL (ML) INTRAVENOUS ONCE
Refills: 0 | Status: COMPLETED | OUTPATIENT
Start: 2021-03-29 | End: 2021-03-29

## 2021-03-29 RX ORDER — SODIUM CHLORIDE 9 MG/ML
1000 INJECTION, SOLUTION INTRAVENOUS
Refills: 0 | Status: DISCONTINUED | OUTPATIENT
Start: 2021-03-29 | End: 2021-04-02

## 2021-03-29 RX ORDER — DEXMEDETOMIDINE HYDROCHLORIDE IN 0.9% SODIUM CHLORIDE 4 UG/ML
0.05 INJECTION INTRAVENOUS
Qty: 400 | Refills: 0 | Status: DISCONTINUED | OUTPATIENT
Start: 2021-03-29 | End: 2021-04-11

## 2021-03-29 RX ORDER — FOLIC ACID 0.8 MG
1 TABLET ORAL DAILY
Refills: 0 | Status: DISCONTINUED | OUTPATIENT
Start: 2021-03-29 | End: 2021-03-29

## 2021-03-29 RX ORDER — POTASSIUM CHLORIDE 20 MEQ
20 PACKET (EA) ORAL
Refills: 0 | Status: COMPLETED | OUTPATIENT
Start: 2021-03-29 | End: 2021-03-29

## 2021-03-29 RX ORDER — FOLIC ACID 0.8 MG
1 TABLET ORAL DAILY
Refills: 0 | Status: DISCONTINUED | OUTPATIENT
Start: 2021-03-29 | End: 2021-04-12

## 2021-03-29 RX ORDER — FENTANYL CITRATE 50 UG/ML
0.5 INJECTION INTRAVENOUS
Qty: 2500 | Refills: 0 | Status: DISCONTINUED | OUTPATIENT
Start: 2021-03-29 | End: 2021-04-02

## 2021-03-29 RX ORDER — ETOMIDATE 2 MG/ML
20 INJECTION INTRAVENOUS ONCE
Refills: 0 | Status: COMPLETED | OUTPATIENT
Start: 2021-03-29 | End: 2021-03-29

## 2021-03-29 RX ORDER — THIAMINE MONONITRATE (VIT B1) 100 MG
500 TABLET ORAL ONCE
Refills: 0 | Status: COMPLETED | OUTPATIENT
Start: 2021-03-29 | End: 2021-03-29

## 2021-03-29 RX ORDER — CHLORHEXIDINE GLUCONATE 213 G/1000ML
1 SOLUTION TOPICAL
Refills: 0 | Status: DISCONTINUED | OUTPATIENT
Start: 2021-03-29 | End: 2021-04-13

## 2021-03-29 RX ORDER — ENOXAPARIN SODIUM 100 MG/ML
40 INJECTION SUBCUTANEOUS DAILY
Refills: 0 | Status: DISCONTINUED | OUTPATIENT
Start: 2021-03-29 | End: 2021-04-13

## 2021-03-29 RX ORDER — POTASSIUM CHLORIDE 20 MEQ
40 PACKET (EA) ORAL ONCE
Refills: 0 | Status: DISCONTINUED | OUTPATIENT
Start: 2021-03-29 | End: 2021-03-29

## 2021-03-29 RX ADMIN — TRANEXAMIC ACID 220 MILLIGRAM(S): 100 INJECTION, SOLUTION INTRAVENOUS at 20:45

## 2021-03-29 RX ADMIN — LEVETIRACETAM 600 MILLIGRAM(S): 250 TABLET, FILM COATED ORAL at 17:40

## 2021-03-29 RX ADMIN — ETOMIDATE 20 MILLIGRAM(S): 2 INJECTION INTRAVENOUS at 17:27

## 2021-03-29 RX ADMIN — SODIUM CHLORIDE 125 MILLILITER(S): 9 INJECTION, SOLUTION INTRAVENOUS at 23:01

## 2021-03-29 RX ADMIN — FENTANYL CITRATE 100 MICROGRAM(S): 50 INJECTION INTRAVENOUS at 17:55

## 2021-03-29 RX ADMIN — FENTANYL CITRATE 4.25 MICROGRAM(S)/KG/HR: 50 INJECTION INTRAVENOUS at 21:01

## 2021-03-29 RX ADMIN — Medication 50 MILLIEQUIVALENT(S): at 20:50

## 2021-03-29 RX ADMIN — Medication 105 MILLIGRAM(S): at 18:10

## 2021-03-29 RX ADMIN — Medication 50 GRAM(S): at 18:57

## 2021-03-29 RX ADMIN — Medication 50 MILLIEQUIVALENT(S): at 18:58

## 2021-03-29 RX ADMIN — SODIUM CHLORIDE 2000 MILLILITER(S): 9 INJECTION, SOLUTION INTRAVENOUS at 17:28

## 2021-03-29 RX ADMIN — Medication 200 GRAM(S): at 22:06

## 2021-03-29 RX ADMIN — Medication 100 MILLIGRAM(S): at 20:06

## 2021-03-29 RX ADMIN — Medication 40 MILLIEQUIVALENT(S): at 18:57

## 2021-03-29 RX ADMIN — Medication 2 MILLIGRAM(S): at 18:10

## 2021-03-29 RX ADMIN — Medication 100 MILLIGRAM(S): at 17:28

## 2021-03-29 RX ADMIN — Medication 50 MILLIEQUIVALENT(S): at 23:00

## 2021-03-29 RX ADMIN — SODIUM CHLORIDE 1000 MILLILITER(S): 9 INJECTION, SOLUTION INTRAVENOUS at 22:02

## 2021-03-29 RX ADMIN — PROPOFOL 10.2 MICROGRAM(S)/KG/MIN: 10 INJECTION, EMULSION INTRAVENOUS at 17:50

## 2021-03-29 RX ADMIN — Medication 50 GRAM(S): at 20:46

## 2021-03-29 RX ADMIN — Medication 2 MILLIGRAM(S): at 20:46

## 2021-03-29 NOTE — H&P ADULT - ASSESSMENT
IMPRESSION:  Status epilepticus, generalized convulsive.   ETOH withdrawal / DT's   Acute respiratory failure with hypoxia due to above    10cm Tongue Laceration s/p repair   Hypomagnesemia.   Hypokalemia.  H/o of seizures   H/o HTN    PLAN:    CNS: Spontaneous awakening trial in AM. EEG. Neuro c/s for AEDs. c/w Keppra . Sedation: for now fentanyl, wean propofol, add Precedex     HEENT: Oral care, f/u OMFS for tongue lac.     PULMONARY:  HOB @ 45 degrees.  Vent changes as follows: Wean Fio2 to 60, than follow sat and wean as tolerated. Increase RR 18. Recheck ABG in AM     CARDIOVASCULAR: IVF Banana bag 125.     GI: GI prophylaxis.  Feeding in Am if fails SBT.  Bowel regimen     RENAL:  Follow up lytes.  Correct as needed    INFECTIOUS DISEASE: Follow up cultures    HEMATOLOGICAL:  DVT prophylaxis.    ENDOCRINE:  Follow up FS.  Insulin protocol if needed.    MUSCULOSKELETAL: Bedrest.     Full Code   MICU  IMPRESSION:  Status epilepticus, generalized convulsive.   ETOH withdrawal / DT's   Acute respiratory failure with hypoxia due to above    10cm Tongue Laceration s/p repair   Hypomagnesemia.   Hypokalemia.  H/o of seizures   H/o HTN    PLAN:    CNS: Spontaneous awakening trial in AM. EEG. Neuro c/s for AEDs. c/w Keppra . check UDS. Sedation: for now fentanyl, wean propofol, add Precedex     HEENT: Oral care, f/u OMFS for tongue lac.     PULMONARY:  HOB @ 45 degrees.  Vent changes as follows: Wean Fio2 to 60, than follow sat and wean as tolerated. Increase RR 18. Recheck ABG in AM     CARDIOVASCULAR: IVF Banana bag 125.     GI: GI prophylaxis.  Feeding in Am if fails SBT.  Bowel regimen     RENAL:  Follow up lytes.  Correct as needed    INFECTIOUS DISEASE: Follow up cultures, no abx for now. COVID pending     HEMATOLOGICAL:  DVT prophylaxis.    ENDOCRINE:  Follow up FS.  Insulin protocol if needed.    MUSCULOSKELETAL: Bedrest.     Full Code   MICU  IMPRESSION:  Status epilepticus, generalized convulsive.   ETOH withdrawal / DT's   Acute respiratory failure with hypoxia due to above    10cm Tongue Laceration s/p repair  HAGMA due Lactic acidosis secondary seizures    Hypomagnesemia.   Hypokalemia.  H/o of seizures   H/o HTN    PLAN:    CNS: Spontaneous awakening trial in AM. EEG. Neuro c/s for AEDs. c/w Keppra . check UDS. Sedation: for now fentanyl, wean propofol, add Precedex     HEENT: Oral care, f/u OMFS for tongue lac.     PULMONARY:  HOB @ 45 degrees.  Vent changes as follows: Wean Fio2 to 60, than follow sat and wean as tolerated. Increase RR 18. Recheck ABG in AM     CARDIOVASCULAR: IVF Banana bag 125.     GI: GI prophylaxis.  Feeding in Am if fails SBT.  Bowel regimen     RENAL:  Follow up lytes.  Correct as needed    INFECTIOUS DISEASE: Follow up cultures, no abx for now. COVID pending     HEMATOLOGICAL:  DVT prophylaxis.    ENDOCRINE:  Follow up FS.  Insulin protocol if needed.    MUSCULOSKELETAL: Bedrest.     Full Code   MICU  IMPRESSION:  Status epilepticus, generalized convulsive.   ETOH withdrawal / DT's   10cm Tongue Laceration s/p repair  HAGMA due Lactic acidosis secondary seizures    Hypomagnesemia.   Hypokalemia.  H/o of seizures   H/o HTN    PLAN:    CNS: Spontaneous awakening trial in AM. EEG. Neuro c/s for AEDs. c/w Keppra . check UDS. Sedation: for now fentanyl, wean propofol, add Precedex     HEENT: Oral care, f/u OMFS for tongue lac.     PULMONARY:  HOB @ 45 degrees.  Vent changes as follows: Wean Fio2 to 60, than follow sat and wean as tolerated. Increase RR 18. Recheck ABG in AM     CARDIOVASCULAR: IVF     GI: GI prophylaxis.  Feeding in Am if fails SBT.  Bowel regimen     RENAL:  Follow up lytes.  Correct as needed    INFECTIOUS DISEASE: Follow up cultures, UNASYN    HEMATOLOGICAL:  DVT prophylaxis.    ENDOCRINE:  Follow up FS.  Insulin protocol if needed.    MUSCULOSKELETAL: Bedrest.     Full Code   MICU

## 2021-03-29 NOTE — ED PROVIDER NOTE - CLINICAL SUMMARY MEDICAL DECISION MAKING FREE TEXT BOX
Patient found to have severe electrolyte derangements likely stemming from alcohol abuse and malnutrition.  Patient presents with alcohol withdrawal and likely delirium tremens. Patient found to have severe electrolyte derangements likely stemming from alcohol abuse and malnutrition.  Patient presents with alcohol withdrawal and likely delirium tremens. CT head performed and shows no serious traumatic injury. OMFS resutured tongue. Central line placed for access and electrolyte repletion and patient given Potassium, multiple doses of Magnesium, Thiamine, Calcium. Sedation given with Propofol and Fentanyl and doses of Ativan for alcohol withdrawal given. Patient given fluids and is making urine. CK elevated. QTc 631 and will continue to monitor patient and will repeat EKG. Potassium also given via OG tube. Wife spoken to about results and plan of care, need for ICU admission. Patient accepted to the ICU.

## 2021-03-29 NOTE — ED PROVIDER NOTE - WET READ LAUNCH FT
There are no Wet Read(s) to document. There are 2 Wet Read(s) to document. There are 3 Wet Read(s) to document. There is 1 Wet Read(s) to document.

## 2021-03-29 NOTE — ED PROVIDER NOTE - OBJECTIVE STATEMENT
45M with pmh of hyponatremia, hypomagnesemia, thrombocytopenia, and alcohol use disorder presents s/p seizure prior to arrival. PT arrived to the ED not protecting his airway with copious bloody secretions 2/2 tongue bite. No loss of continence. Limited history 2/2 patient condition. 45M with pmh of htn, hyponatremia, hypomagnesemia, thrombocytopenia, and alcohol use disorder presents s/p seizure prior to arrival. PT arrived to the ED not protecting his airway with copious bloody secretions 2/2 tongue bite. No loss of continence. Limited history 2/2 patient condition.    WF: d/w wife [phone], pt last drink was on friday, has been drinking multiple cans of beer for the past week. no medications. last seizure 2 years prior.

## 2021-03-29 NOTE — H&P ADULT - HISTORY OF PRESENT ILLNESS
44 y/o M PMHx of hyponatremia, hypomagnesemia, thrombocytopenia, and alcohol abuse and dependence who presents as a notification by EMS for status epilepticus. Patient had multiple seizures today and bit his tongue during his seizure with EMS. Patient assessed in Ed with diffuse tongue bleeding. Patient was given Versed by EMS and was not awake or protecting his airway. O2 saturation temporarily 82% and patient had NRB mask and was passively oxygenated to 100%. Patient pre-oxygenated and had emergent RSI for airway protection. After intubation, tongue pulled from mouth and he had a very deep and large laceration with persistent bleeding. Sutures placed and later redone by OMFS team post CT. CTH and C-spine negative. Magnesium and Potassium both found to be critically low. Per wife patient had binge last week and his last drink was on Friday. His last seizure was 2 years ago and tenants report he likely had a seizure in the morning as well. Patient sedated on Fentanyl and propofol in ED, and s/p IV 4mg Ativan, 2000mg Keppra,  40meq K+ powder via NGT, and 3 20meq K riders. 4Gm Mg and 2Gm Calcium gluconate and 1L LR and 500mg thiamine IVPB. Per wife pt not taking any medications, stopped his BP meds on his own 2 yrs ago.  46 y/o M PMHx of hyponatremia, hypomagnesemia, thrombocytopenia, and alcohol abuse and dependence who presents as a notification by EMS for status epilepticus. Patient had multiple seizures today and bit his tongue during his seizure with EMS. Patient assessed in Ed with diffuse tongue bleeding. Patient was given Versed by EMS and was not awake or protecting his airway. O2 saturation temporarily 82% and patient had NRB mask and was passively oxygenated to 100%. Patient pre-oxygenated and had emergent RSI for airway protection. After intubation, tongue pulled from mouth and he had a very deep and large laceration with persistent bleeding. Sutures placed and later redone by OMFS team post CT. CTH and C-spine negative. Magnesium and Potassium both found to be critically low. Per wife patient had binge last week and his last drink was on Friday. His last seizure was 2 years ago and tenants report he likely had a seizure in the morning as well. Patient sedated on Fentanyl and propofol in ED, and s/p IV 4mg Ativan, 2000mg Keppra,  40meq K+ powder via NGT, and 3 20meq K riders. 4Gm Mg and 2Gm Calcium gluconate and 1L LR and 500mg thiamine IVPB. Per wife pt not taking any medications, stopped his BP meds on his own 2 yrs ago. admitted to MICU, on propofol, precedex, fentanyL, Levo,

## 2021-03-29 NOTE — ED ADULT TRIAGE NOTE - CHIEF COMPLAINT QUOTE
pre note for status post seizure, pt bit tongue, profusely bleeding on arrival being suctioned by ems

## 2021-03-29 NOTE — ED PROVIDER NOTE - PROGRESS NOTE DETAILS
SC, written after the fact: PT intubated for airway protection and laceration and treated for likely alcohol withdrawal seizure SC, written after the fact: PT intubated for airway protection, laceration repaired and treated for likely alcohol withdrawal seizure Patient came in as a trauma I intend to get an E-FAST omfs at bedside for tongue laceration, persistent bleeding requiring suction

## 2021-03-29 NOTE — H&P ADULT - ATTENDING COMMENTS
PATIENT SEEN AND EXAMINED, AGREE WITH ABOVE, STATUS EPILEPTICUS/ SEVERE ELECTROLYTES DISTU/ ALCOHOL ABUSE/ SAT/ SBT/ KEPPRA/ CORRECT LYTES, NEURO EVAL, NPO, THIAMINE, FOLIC ACID

## 2021-03-29 NOTE — ED PROCEDURE NOTE - ATTENDING CONTRIBUTION TO CARE
I was physically present and helped performed this Ultrasound.  I supervised all views obtained and reviewed images in real time. I agree with findings documented. Results of Ultrasound discussed with patient.
I was physically present for and directly supervised this procedure.  Procedure was done as documented without any complications.

## 2021-03-29 NOTE — H&P ADULT - NSHPPHYSICALEXAM_GEN_ALL_CORE
ICU Vital Signs Last 24 Hrs  HR: 129 (29 Mar 2021 21:05) (108 - 145)  BP: 177/126 (29 Mar 2021 21:05) (140/83 - 205/130)  RR: 15 (29 Mar 2021 20:57) (15 - 20)  SpO2: 100% (29 Mar 2021 20:57) (96% - 100%)    PHYSICAL EXAM:  GENERAL: intubated and sedated   HEENT: ETT and NGT in place. Toungue with packing due lac repair   NECK: Supple. No lymphadenopathy or thyromegaly.  LUNGS: No respiratory distress or accessory muscle use. CTAB  HEART: RRR, S1 S2 Audible  ABDOMEN: Soft, nontender, and nondistended.  No gaurding or rigidity.  No hepatosplenomegaly was noted.  EXTREMITIES: Without any cyanosis, clubbing, rash, lesions or edema.

## 2021-03-29 NOTE — CONSULT NOTE ADULT - ASSESSMENT
OMFS Consult Note    45M w/ PMHx of hyponatremia, hypomagnesemia, thrombocytopenia, and alcohol abuse and dependence BIBEMS for status epilepticus. Patient experienced multiple seizures and bit his tongue while in route to hospital. Patient assessed in ED with diffuse tongue bleeding with multiple lacerations. OMFS notified for emergent evaluation and management. Patient seen at bedside per OMFS. Intubated and sedated for airway protection.      PAST MEDICAL & SURGICAL HISTORY:  Seizure disorder    Hypomagnesemia    ETOH abuse    ALL: NKDA      PE:    Vital Signs Last 24 Hrs  T(C): --  T(F): --  HR: 106 (29 Mar 2021 22:53) (106 - 145)  BP: 124/80 (29 Mar 2021 22:53) (124/80 - 205/130)  BP(mean): --  RR: 18 (29 Mar 2021 22:53) (15 - 20)  SpO2: 100% (29 Mar 2021 22:53) (96% - 100%)    GEN - Intubated and sedated  HEAD - NCAT, no lesions or lacerations  EYES - unable to assess CN II, II, VI  function due to sedation, no periorbital edema or ecchymosis, no bony steps  EARS - no drainage, no chirinos signs  NOSE - no septal hematomas, no nasal deviation or bony crepitus of nasal bridge  FACE - unable to assess CN V and VII function due to sedation, no lesions or lacerations  ORAL - ET tube in place, copious blood from lateral borders and ventral surface of tongue, multiple deep lacerations of tongue, tongue edematous, no mobility of dentition, maxilla stable  NECK - in C-collar, neck is soft    Imaging CT head: No evidence for intracranial hemorrhage, midline shift, or mass effect. Small right frontal extracalvarial hematoma. Marked nasopharyngeal edema.      Labs:                                   14.7                         12.8   7.40  )-----------( 188      ( 29 Mar 2021 20:08 )             36.5     03-29    135  |  91<L>  |  4<L>  ----------------------------<  117<H>  3.1<L>   |  26  |  0.8    Ca    6.4<L>      29 Mar 2021 18:45  Mg     0.5     03-29    TPro  6.3  /  Alb  3.5  /  TBili  1.0  /  DBili  x   /  AST  93<H>  /  ALT  51<H>  /  AlkPhos  81  03-29       PTT - ( 24 Mar 2021 12:30 )  PTT:25.9 sec      *ASSESSMENT:  45M w/ PMHx of alcohol abuse and dependence sustaining significant tongue bleed 2/2 to occlusal trauma from status epilepticus episode.    ED plan - repair of complex tongue lacerations and bleed at bedside.    Procedure: Tongue depressor bite block placed. 2-0 and 4-0 vicryl sutures placed for immediate closure of oozing wounds. Ventral border of tongue lined with hemostatic guaze. Hemostasis achieved. No occlusal interferences observed.    Recs:    - Unasyn 3g IV q6h x 7 days  - keep hemostatic gauze in place, OMFS to change in AM  - yankaur suction at bedside  - NPO/IVF for now  - OMFS to follow patient while in house  - Please call dental/OMFS at [facility] [phone] with any questions or concerns, thank you      Willie Marquez DMD, MD

## 2021-03-29 NOTE — ED PROVIDER NOTE - ATTENDING CONTRIBUTION TO CARE
I personally evaluated patient. I agree with the findings and plan with all documentation on chart except as documented  in my note.    History, ROS, exam limited by acuity of condition (Status Epilepiticus)    45 M y/o M PMH, hyponatremia, hypomagnesemia, thrombocytopenia, and alcohol use disorder presents s/p seizure prior to arrival. PT arrived to the ED not protecting his airway with copious bloody secretions 2/2 tongue bite. No loss of continence. Limited history 2/2 patient condition.    WF: d/w wife [phone], pt last drink was on friday, has been drinking multiple cans of beer for the past week. no medications. last seizure 2 years prior. I personally evaluated patient. I agree with the findings and plan with all documentation on chart except as documented  in my note.    History, ROS, exam limited by acuity of condition (Status Epilepiticus, airway compromise, critical illness). History obtained by EMS and later form wife and old chart:    45 M y/o M PMH, hyponatremia, hypomagnesemia, thrombocytopenia, and alcohol abuse and dependence who presents as a notification by EMS for status epilepticus. Patient had multiple seizures today and almost bite his tongue completely off during his seizure with EMS. Patient assessed and has diffuse tongue bleeding. Patient was given Versed by EMS and was not awake or protecting his airway. O2 saturation temporarily 82% and patient had NRB mask and was passively oxygenated to 100%. STAT Large bore IV placed. Patient pre-oxygenated and had emergent RSI. Patient intubated via Video laryngoscopy with Decanto suction through continued suction. After intubation, tongue pulled from mouth and he had a very deep and large laceration with persistent bleeding. Whip running stitches placed to control most of the bleeding so patient could be rushed for CT scan.     Complete blood work and head CT performed. Magnesium and Potassium both found to be critically low. They are being repleted. Wife came to ED and was spoken to and patient was drinking non-stop last week and his last drink was on Friday. His last seizure was 2 years ago and tenants report he likely had a seizure in the morning as well. Patient had persistent tongue oozing of blood and OMFS consulted for repair and bleeding control.

## 2021-03-29 NOTE — H&P ADULT - NSHPLABSRESULTS_GEN_ALL_CORE
12.8   7.40  )-----------( 188      ( 29 Mar 2021 20:08 )             36.5   03-29    135  |  91<L>  |  4<L>  ----------------------------<  117<H>  3.1<L>   |  26  |  0.8    Ca    6.4<L>      29 Mar 2021 18:45  Mg     0.5     03-29    TPro  6.3  /  Alb  3.5  /  TBili  1.0  /  DBili  x   /  AST  93<H>  /  ALT  51<H>  /  AlkPhos  81  03-29    arBlood Gas Profile - Arterial (03.29.21 @ 18:33)   pH, Arterial: 7.46: FIO2:100 MODE:_VC/AC VT:_450 RATE:_15   PEEP:_5 Comment:_MD ARMABULA was notified in person of the ABG   results, read back.   pCO2, Arterial: 47: FIO2:100 MODE:_VC/AC VT:_450 RATE:_15   PEEP:_5 Comment:_MD ARAMBULA was notified in person of the ABG   results, read back. mmHg   pO2, Arterial: 353: FIO2:100 MODE:_VC/AC VT:_450 RATE:_15   PEEP:_5 Comment:_MD ARAMBULA was notified in person of the ABG   results, read back. mmHg   HCO3, Arterial: 34: FIO2:100 MODE:_VC/AC VT:_450 RATE:_15   PEEP:_5 Comment:WANDA ARAMBULA was notified in person of the ABG   results, read back. mmoL/L   Base Excess, Arterial: 8.6: FIO2:100 MODE:_VC/AC VT:_450 RATE:_15     < from: Xray Pelvis AP only (03.29.21 @ 18:40) >    FINDINGS/  IMPRESSION:  No acute fracture or dislocation. Symmetric bilateral sacroiliac joints. Intact pubic symphysis.      < end of copied text >    < from: CT Cervical Spine No Cont (03.29.21 @ 19:49) >        IMPRESSION:    Degenerative changes as described above at C6-7. No evidence of fracture or facet subluxation.      < end of copied text >    < from: CT Head No Cont (03.29.21 @ 19:47) >    IMPRESSION:    No evidence for intracranial hemorrhage, midline shift, or mass effect.    Small right frontal extracalvarial hematoma.    Marked nasopharyngeal edema.    < end of copied text >

## 2021-03-29 NOTE — ED PROVIDER NOTE - CARE PLAN
Principal Discharge DX:	Status epilepticus, generalized convulsive  Secondary Diagnosis:	Acute respiratory failure with hypoxia  Secondary Diagnosis:	Delirium tremens  Secondary Diagnosis:	Hypomagnesemia  Secondary Diagnosis:	Hypokalemia

## 2021-03-30 LAB
ANION GAP SERPL CALC-SCNC: 11 MMOL/L — SIGNIFICANT CHANGE UP (ref 7–14)
ANION GAP SERPL CALC-SCNC: 15 MMOL/L — HIGH (ref 7–14)
ANION GAP SERPL CALC-SCNC: 16 MMOL/L — HIGH (ref 7–14)
BUN SERPL-MCNC: 3 MG/DL — LOW (ref 10–20)
BUN SERPL-MCNC: 4 MG/DL — LOW (ref 10–20)
BUN SERPL-MCNC: <3 MG/DL — LOW (ref 10–20)
CALCIUM SERPL-MCNC: 5.9 MG/DL — CRITICAL LOW (ref 8.5–10.1)
CALCIUM SERPL-MCNC: 6.8 MG/DL — LOW (ref 8.5–10.1)
CALCIUM SERPL-MCNC: 7.1 MG/DL — LOW (ref 8.5–10.1)
CHLORIDE SERPL-SCNC: 92 MMOL/L — LOW (ref 98–110)
CHLORIDE SERPL-SCNC: 95 MMOL/L — LOW (ref 98–110)
CHLORIDE SERPL-SCNC: 97 MMOL/L — LOW (ref 98–110)
CO2 SERPL-SCNC: 23 MMOL/L — SIGNIFICANT CHANGE UP (ref 17–32)
CO2 SERPL-SCNC: 26 MMOL/L — SIGNIFICANT CHANGE UP (ref 17–32)
CO2 SERPL-SCNC: 28 MMOL/L — SIGNIFICANT CHANGE UP (ref 17–32)
COVID-19 SPIKE DOMAIN AB INTERP: NEGATIVE — SIGNIFICANT CHANGE UP
COVID-19 SPIKE DOMAIN ANTIBODY RESULT: 0.4 U/ML — SIGNIFICANT CHANGE UP
CREAT SERPL-MCNC: 0.5 MG/DL — LOW (ref 0.7–1.5)
CREAT SERPL-MCNC: 0.7 MG/DL — SIGNIFICANT CHANGE UP (ref 0.7–1.5)
CREAT SERPL-MCNC: 0.7 MG/DL — SIGNIFICANT CHANGE UP (ref 0.7–1.5)
GLUCOSE BLDC GLUCOMTR-MCNC: 92 MG/DL — SIGNIFICANT CHANGE UP (ref 70–99)
GLUCOSE SERPL-MCNC: 113 MG/DL — HIGH (ref 70–99)
GLUCOSE SERPL-MCNC: 119 MG/DL — HIGH (ref 70–99)
GLUCOSE SERPL-MCNC: 125 MG/DL — HIGH (ref 70–99)
HCT VFR BLD CALC: 28.2 % — LOW (ref 42–52)
HGB BLD-MCNC: 10.1 G/DL — LOW (ref 14–18)
LACTATE SERPL-SCNC: 1.4 MMOL/L — SIGNIFICANT CHANGE UP (ref 0.7–2)
LACTATE SERPL-SCNC: 1.6 MMOL/L — SIGNIFICANT CHANGE UP (ref 0.7–2)
MAGNESIUM SERPL-MCNC: 1 MG/DL — LOW (ref 1.8–2.4)
MAGNESIUM SERPL-MCNC: 1.3 MG/DL — LOW (ref 1.8–2.4)
MAGNESIUM SERPL-MCNC: 1.6 MG/DL — LOW (ref 1.8–2.4)
MCHC RBC-ENTMCNC: 33 PG — HIGH (ref 27–31)
MCHC RBC-ENTMCNC: 35.8 G/DL — SIGNIFICANT CHANGE UP (ref 32–37)
MCV RBC AUTO: 92.2 FL — SIGNIFICANT CHANGE UP (ref 80–94)
NRBC # BLD: 0 /100 WBCS — SIGNIFICANT CHANGE UP (ref 0–0)
PHOSPHATE SERPL-MCNC: 4 MG/DL — SIGNIFICANT CHANGE UP (ref 2.1–4.9)
PHOSPHATE SERPL-MCNC: 4.6 MG/DL — SIGNIFICANT CHANGE UP (ref 2.1–4.9)
PLATELET # BLD AUTO: 147 K/UL — SIGNIFICANT CHANGE UP (ref 130–400)
POTASSIUM SERPL-MCNC: 2.8 MMOL/L — LOW (ref 3.5–5)
POTASSIUM SERPL-MCNC: 3.3 MMOL/L — LOW (ref 3.5–5)
POTASSIUM SERPL-MCNC: 3.5 MMOL/L — SIGNIFICANT CHANGE UP (ref 3.5–5)
POTASSIUM SERPL-SCNC: 2.8 MMOL/L — LOW (ref 3.5–5)
POTASSIUM SERPL-SCNC: 3.3 MMOL/L — LOW (ref 3.5–5)
POTASSIUM SERPL-SCNC: 3.5 MMOL/L — SIGNIFICANT CHANGE UP (ref 3.5–5)
RBC # BLD: 3.06 M/UL — LOW (ref 4.7–6.1)
RBC # FLD: 11.9 % — SIGNIFICANT CHANGE UP (ref 11.5–14.5)
SARS-COV-2 IGG+IGM SERPL QL IA: 0.4 U/ML — SIGNIFICANT CHANGE UP
SARS-COV-2 IGG+IGM SERPL QL IA: NEGATIVE — SIGNIFICANT CHANGE UP
SODIUM SERPL-SCNC: 134 MMOL/L — LOW (ref 135–146)
SODIUM SERPL-SCNC: 134 MMOL/L — LOW (ref 135–146)
SODIUM SERPL-SCNC: 135 MMOL/L — SIGNIFICANT CHANGE UP (ref 135–146)
WBC # BLD: 12.07 K/UL — HIGH (ref 4.8–10.8)
WBC # FLD AUTO: 12.07 K/UL — HIGH (ref 4.8–10.8)

## 2021-03-30 PROCEDURE — 71045 X-RAY EXAM CHEST 1 VIEW: CPT | Mod: 26,76

## 2021-03-30 PROCEDURE — 99222 1ST HOSP IP/OBS MODERATE 55: CPT

## 2021-03-30 RX ORDER — FENTANYL CITRATE 50 UG/ML
50 INJECTION INTRAVENOUS ONCE
Refills: 0 | Status: DISCONTINUED | OUTPATIENT
Start: 2021-03-30 | End: 2021-03-30

## 2021-03-30 RX ORDER — CHLORHEXIDINE GLUCONATE 213 G/1000ML
15 SOLUTION TOPICAL EVERY 12 HOURS
Refills: 0 | Status: DISCONTINUED | OUTPATIENT
Start: 2021-03-30 | End: 2021-04-05

## 2021-03-30 RX ORDER — DEXMEDETOMIDINE HYDROCHLORIDE IN 0.9% SODIUM CHLORIDE 4 UG/ML
86 INJECTION INTRAVENOUS ONCE
Refills: 0 | Status: DISCONTINUED | OUTPATIENT
Start: 2021-03-30 | End: 2021-03-30

## 2021-03-30 RX ORDER — MAGNESIUM SULFATE 500 MG/ML
2 VIAL (ML) INJECTION ONCE
Refills: 0 | Status: COMPLETED | OUTPATIENT
Start: 2021-03-30 | End: 2021-03-30

## 2021-03-30 RX ORDER — MAGNESIUM SULFATE 500 MG/ML
4 VIAL (ML) INJECTION ONCE
Refills: 0 | Status: DISCONTINUED | OUTPATIENT
Start: 2021-03-30 | End: 2021-03-30

## 2021-03-30 RX ORDER — NOREPINEPHRINE BITARTRATE/D5W 8 MG/250ML
0.05 PLASTIC BAG, INJECTION (ML) INTRAVENOUS
Qty: 16 | Refills: 0 | Status: DISCONTINUED | OUTPATIENT
Start: 2021-03-30 | End: 2021-04-02

## 2021-03-30 RX ORDER — SODIUM CHLORIDE 9 MG/ML
1000 INJECTION, SOLUTION INTRAVENOUS ONCE
Refills: 0 | Status: COMPLETED | OUTPATIENT
Start: 2021-03-30 | End: 2021-03-30

## 2021-03-30 RX ORDER — AMPICILLIN SODIUM AND SULBACTAM SODIUM 250; 125 MG/ML; MG/ML
3 INJECTION, POWDER, FOR SUSPENSION INTRAMUSCULAR; INTRAVENOUS EVERY 6 HOURS
Refills: 0 | Status: COMPLETED | OUTPATIENT
Start: 2021-03-30 | End: 2021-04-05

## 2021-03-30 RX ORDER — POTASSIUM CHLORIDE 20 MEQ
20 PACKET (EA) ORAL
Refills: 0 | Status: COMPLETED | OUTPATIENT
Start: 2021-03-30 | End: 2021-03-30

## 2021-03-30 RX ORDER — CALCIUM GLUCONATE 100 MG/ML
2 VIAL (ML) INTRAVENOUS ONCE
Refills: 0 | Status: COMPLETED | OUTPATIENT
Start: 2021-03-30 | End: 2021-03-30

## 2021-03-30 RX ORDER — MAGNESIUM SULFATE 500 MG/ML
4 VIAL (ML) INJECTION ONCE
Refills: 0 | Status: COMPLETED | OUTPATIENT
Start: 2021-03-30 | End: 2021-03-30

## 2021-03-30 RX ORDER — MAGNESIUM SULFATE 500 MG/ML
2 VIAL (ML) INJECTION
Refills: 0 | Status: COMPLETED | OUTPATIENT
Start: 2021-03-30 | End: 2021-03-30

## 2021-03-30 RX ADMIN — SODIUM CHLORIDE 1000 MILLILITER(S): 9 INJECTION, SOLUTION INTRAVENOUS at 01:35

## 2021-03-30 RX ADMIN — Medication 100 MILLIEQUIVALENT(S): at 01:35

## 2021-03-30 RX ADMIN — Medication 100 MILLIGRAM(S): at 13:25

## 2021-03-30 RX ADMIN — Medication 100 MILLIEQUIVALENT(S): at 03:00

## 2021-03-30 RX ADMIN — Medication 100 MILLIEQUIVALENT(S): at 04:00

## 2021-03-30 RX ADMIN — Medication 200 GRAM(S): at 01:40

## 2021-03-30 RX ADMIN — DEXMEDETOMIDINE HYDROCHLORIDE IN 0.9% SODIUM CHLORIDE 1.06 MICROGRAM(S)/KG/HR: 4 INJECTION INTRAVENOUS at 20:24

## 2021-03-30 RX ADMIN — FENTANYL CITRATE 100 MICROGRAM(S): 50 INJECTION INTRAVENOUS at 08:50

## 2021-03-30 RX ADMIN — AMPICILLIN SODIUM AND SULBACTAM SODIUM 200 GRAM(S): 250; 125 INJECTION, POWDER, FOR SUSPENSION INTRAMUSCULAR; INTRAVENOUS at 22:22

## 2021-03-30 RX ADMIN — Medication 50 MILLIEQUIVALENT(S): at 09:13

## 2021-03-30 RX ADMIN — LEVETIRACETAM 400 MILLIGRAM(S): 250 TABLET, FILM COATED ORAL at 09:14

## 2021-03-30 RX ADMIN — ENOXAPARIN SODIUM 40 MILLIGRAM(S): 100 INJECTION SUBCUTANEOUS at 13:24

## 2021-03-30 RX ADMIN — Medication 50 MILLIEQUIVALENT(S): at 11:36

## 2021-03-30 RX ADMIN — Medication 1 MILLIGRAM(S): at 13:24

## 2021-03-30 RX ADMIN — LEVETIRACETAM 400 MILLIGRAM(S): 250 TABLET, FILM COATED ORAL at 20:09

## 2021-03-30 RX ADMIN — CHLORHEXIDINE GLUCONATE 15 MILLILITER(S): 213 SOLUTION TOPICAL at 17:32

## 2021-03-30 RX ADMIN — Medication 100 GRAM(S): at 01:35

## 2021-03-30 RX ADMIN — DEXMEDETOMIDINE HYDROCHLORIDE IN 0.9% SODIUM CHLORIDE 1.06 MICROGRAM(S)/KG/HR: 4 INJECTION INTRAVENOUS at 23:14

## 2021-03-30 RX ADMIN — PROPOFOL 10.2 MICROGRAM(S)/KG/MIN: 10 INJECTION, EMULSION INTRAVENOUS at 23:13

## 2021-03-30 RX ADMIN — Medication 50 MILLIEQUIVALENT(S): at 14:53

## 2021-03-30 RX ADMIN — Medication 50 GRAM(S): at 22:22

## 2021-03-30 RX ADMIN — Medication 50 GRAM(S): at 11:37

## 2021-03-30 RX ADMIN — AMPICILLIN SODIUM AND SULBACTAM SODIUM 200 GRAM(S): 250; 125 INJECTION, POWDER, FOR SUSPENSION INTRAMUSCULAR; INTRAVENOUS at 10:14

## 2021-03-30 RX ADMIN — Medication 50 GRAM(S): at 10:46

## 2021-03-30 RX ADMIN — AMPICILLIN SODIUM AND SULBACTAM SODIUM 200 GRAM(S): 250; 125 INJECTION, POWDER, FOR SUSPENSION INTRAMUSCULAR; INTRAVENOUS at 17:34

## 2021-03-30 NOTE — CONSULT NOTE ADULT - ASSESSMENT
Impression:  44 y/o M PMHx of hyponatremia, hypomagnesemia, thrombocytopenia, and alcohol abuse who presented  for status epilepticus. Patient had multiple seizures at home  and bit his tongue during his seizure with brijesh bleeding and laceration needing sutures. Was given Versed by EMS and was not awake or protecting his airway. CTH and C-spine imaging negative for acute findings. Magnesium and Potassium and calcium found to be critically low. Per wife patient had binge drinking last week and his last drink was on Friday. His last seizure was 2 years ago and tenants report he likely had a seizure on the morning of presentation as well. Patient sedated on Fentanyl and propofol in ED, and s/p IV 4mg Ativan, 2000mg Keppra,  40meq K+ powder via NGT, and 3 20meq K riders. 4Gm Mg and 2Gm Calcium gluconate and 1L LR and 500mg thiamine IVPB. Per wife pt not taking any medications, stopped his BP meds on his own 2 yrs ago.   CTH and ct c spine wnl.      #Status Epilepticus in the setting of alcohol withdrawal /electrolyte imbalance        Suggestions:  -REEG in am  -Correct electrolytes, follow up cmp  -Seizure precautions  -Keep magnesium levels >2 and <2.5  -Continue Thiamine and folate  -Continue Keppra 1000mg bid  -Continue sedation  -Call for seizures    Disposition: Continue critical care monitoring     Impression:  44 y/o M PMHx of hyponatremia, hypomagnesemia, thrombocytopenia, and alcohol abuse who presented  for status epilepticus. Patient had multiple seizures at home  and bit his tongue during his seizure with brijesh bleeding and laceration needing sutures. Was given Versed by EMS and was not awake or protecting his airway. CTH and C-spine imaging negative for acute findings. Magnesium and Potassium and calcium found to be critically low. Per wife patient had binge drinking last week and his last drink was on Friday. His last seizure was 2 years ago and tenants report he likely had a seizure on the morning of presentation as well. Patient sedated on Fentanyl and propofol in ED, and s/p IV 4mg Ativan, 2000mg Keppra,  40meq K+ powder via NGT, and 3 20meq K riders. 4Gm Mg and 2Gm Calcium gluconate and 1L LR and 500mg thiamine IVPB. Per wife pt not taking any medications, stopped his BP meds on his own 2 yrs ago.   CTH and ct c spine wnl.      #Status Epilepticus in the setting of alcohol withdrawal /electrolyte imbalance      Plan:  #Neuro:  - Seizure precautions  - Continue Keppra 1gm IV q12hrs  - Neuro checks q1 hr  - REEG  -Correct electrolytes, follow up cmp  -Seizure precautions  -Keep magnesium levels >2 and <2.5  -Continue Thiamine and folate  -Continue sedation  -Call for seizures      #CV:  - Keep normotensive      #Resp:  - Ventilator management as per primary team  - VAP protocol  - HOB > 35 degrees  - Aspiration precautions      #Renal /Fluid/ Electrolytes  - Strict I/Os  - Keep Magnesium level > 2  - Monitor lytes, replete as needed      #GI:  - EN as per primary team  - Start  Protonix 40mg q24hrs      #Heme/Onc:  - SCS while in bed   -Lovenox 40 mg q 24      #ID:  - Keep normothermic; avoid fevers          Disposition: Continue critical care monitoring     Impression:  46 y/o M PMHx of hyponatremia, hypomagnesemia, thrombocytopenia, and alcohol abuse who presented  for status epilepticus. Patient had multiple seizures at home  and bit his tongue during his seizure with rbijesh bleeding and laceration needing sutures. Was given Versed by EMS and was not awake or protecting his airway. CTH and C-spine imaging negative for acute findings. Magnesium and Potassium and calcium found to be critically low. Per wife patient had binge drinking last week and his last drink was on Friday. His last seizure was 2 years ago and tenants report he likely had a seizure on the morning of presentation as well. Patient sedated on Fentanyl and propofol in ED, and s/p IV 4mg Ativan, 2000mg Keppra,  40meq K+ powder via NGT, and 3 20meq K riders. 4Gm Mg and 2Gm Calcium gluconate and 1L LR and 500mg thiamine IVPB. Per wife pt not taking any medications, stopped his BP meds on his own 2 yrs ago.   CTH and ct c spine wnl.      #Status Epilepticus in the setting of alcohol withdrawal /electrolyte imbalance      Plan:  #Neuro:  - Seizure precautions  - Continue Keppra 1gm IV q12hrs  - Neuro checks q1 hr  - Video EEG  -Correct electrolytes, follow up cmp  -Seizure precautions  -Keep magnesium levels >2 and <2.5  -Continue Thiamine and folate  -Continue sedation- Maintain propofol at 50 mcg for now and titrate according to EEG.   - Utilize fentanyl for ventilator synchrony   -Call for seizures      #CV:  - Keep normotensive      #Resp:  - Ventilator management as per primary team  - VAP protocol  - HOB > 35 degrees  - Aspiration precautions      #Renal /Fluid/ Electrolytes  - Strict I/Os  - Keep Magnesium level > 2  - Monitor lytes, replete as needed      #GI:  - EN as per primary team  - Start  Protonix 40mg q24hrs      #Heme/Onc:  - SCS while in bed   -Lovenox 40 mg q 24      #ID:  - Keep normothermic; avoid fevers          Disposition: Continue critical care monitoring    Please call with any patient concerns or questions.    #[phone]       Impression:  46 y/o M PMHx of hyponatremia, hypomagnesemia, thrombocytopenia, and alcohol abuse who presented  for status epilepticus. Patient had multiple seizures at home  and bit his tongue during his seizure with brijesh bleeding and laceration needing sutures. Was given Versed by EMS and was not awake or protecting his airway. CTH and C-spine imaging negative for acute findings. Magnesium and Potassium and calcium found to be critically low. Per wife patient had binge drinking last week and his last drink was on Friday. His last seizure was 2 years ago and tenants report he likely had a seizure on the morning of presentation as well. Patient sedated on Fentanyl and propofol in ED, and s/p IV 4mg Ativan, 2000mg Keppra,  40meq K+ powder via NGT, and 3 20meq K riders. 4Gm Mg and 2Gm Calcium gluconate and 1L LR and 500mg thiamine IVPB. Per wife pt not taking any medications, stopped his BP meds on his own 2 yrs ago.   CTH and ct c spine wnl.      #Status Epilepticus in the setting of alcohol withdrawal /electrolyte imbalance      Plan:  #Neuro:  - Seizure precautions  - Continue Keppra 1gm IV q12hrs  - Neuro checks q1 hr  - Video EEG  -Correct electrolytes, follow up cmp  -Seizure precautions  -Keep magnesium levels >2 and <2.5  -Continue Thiamine and folate  -Continue sedation- Maintain propofol at 50 mcg for now and titrate according to EEG.   - Utilize fentanyl for ventilator synchrony   - Initiate CIWA protocol for withdrawal symptoms  -Call for seizures      #CV:  - Keep normotensive      #Resp:  - Ventilator management as per primary team  - VAP protocol  - HOB > 35 degrees  - Aspiration precautions      #Renal /Fluid/ Electrolytes  - Strict I/Os  - Keep Magnesium level > 2  - Monitor lytes, replete as needed      #GI:  - EN as per primary team  - Start  Protonix 40mg q24hrs      #Heme/Onc:  - SCS while in bed   -Lovenox 40 mg q 24      #ID:  - Keep normothermic; avoid fevers          Disposition: Continue critical care monitoring    Please call with any patient concerns or questions.    #[phone]       Impression:  44 y/o M PMHx of hyponatremia, hypomagnesemia, thrombocytopenia, and alcohol abuse who presented  for status epilepticus. Patient had multiple seizures at home  and bit his tongue during his seizure with brijesh bleeding and laceration needing sutures. Was given Versed by EMS and was not awake or protecting his airway. CTH and C-spine imaging negative for acute findings. Magnesium and Potassium and calcium found to be critically low. Per wife patient had binge drinking last week and his last drink was on Friday. His last seizure was 2 years ago and tenants report he likely had a seizure on the morning of presentation as well. Patient sedated on Fentanyl and propofol in ED, and s/p IV 4mg Ativan, 2000mg Keppra,  40meq K+ powder via NGT, and 3 20meq K riders. 4Gm Mg and 2Gm Calcium gluconate and 1L LR and 500mg thiamine IVPB. Per wife pt not taking any medications, stopped his BP meds on his own 2 yrs ago.   CTH and ct c spine wnl.      #Status Epilepticus in the setting of alcohol withdrawal /electrolyte imbalance      Plan:  #Neuro:  - Seizure precautions  - Continue Keppra 1gm IV q12hrs  - Neuro checks q1 hr  - Video EEG  -Correct electrolytes, follow up cmp  -Seizure precautions  -Keep magnesium levels >2 and <2.5  -Continue Thiamine and folate  -Continue sedation- Maintain propofol at 50 mcg for now and titrate according to EEG (not for ICU sedation).   - Utilize fentanyl for ventilator synchrony   - Initiate CIWA protocol before starting propofol weaning (after 24 hours of VEEG -ve for seizures) for alcohol withdrawal symptoms   -Call for seizures      #CV:  - Keep normotensive      #Resp:  - Ventilator management as per primary team  - VAP protocol  - HOB > 35 degrees  - Aspiration precautions      #Renal /Fluid/ Electrolytes  - Strict I/Os  - Keep Magnesium level > 2  - Monitor lytes, replete as needed      #GI:  - EN as per primary team  - Start  Protonix 40mg q24hrs      #Heme/Onc:  - SCS while in bed   -Lovenox 40 mg q 24      #ID:  - Keep normothermic; avoid fevers          Disposition: Continue critical care monitoring    Please call with any patient concerns or questions.    #[phone]

## 2021-03-30 NOTE — PROGRESS NOTE ADULT - ASSESSMENT
OMFS Consult F/U Note    45M w/ PMHx of alcohol abuse and dependence sustaining significant tongue bleed 2/2 to occlusal trauma from status epilepticus episode.    Patient seen at bedside this AM. Patient transferred from ED to vent unit. ALICIA. He remains intubated and sedated.      PE:    ICU Vital Signs Last 24 Hrs  T(C): 37.3 (30 Mar 2021 02:00), Max: 37.3 (30 Mar 2021 02:00)  T(F): 99.1 (30 Mar 2021 02:00), Max: 99.1 (30 Mar 2021 02:00)  HR: 102 (30 Mar 2021 01:40) (100 - 145)  BP: 71/50 (30 Mar 2021 01:00) (71/50 - 205/130)  BP(mean): 56 (30 Mar 2021 01:00) (56 - 56)  ABP: --  ABP(mean): --  RR: 14 (30 Mar 2021 01:00) (12 - 20)  SpO2: 100% (30 Mar 2021 01:40) (96% - 100%)      GEN - Intubated and sedated  HEAD - NCAT, no lesions or lacerations  EYES - unable to assess CN II, II, VI  function due to sedation, no periorbital edema or ecchymosis, no bony steps  EARS - no drainage, no chirinos signs  NOSE - no septal hematomas, no nasal deviation or bony crepitus of nasal bridge  FACE - unable to assess CN V and VII function due to sedation, no lesions or lacerations  ORAL - ventral and lateral tongue wounds hemostatic, sutures intact, dressing in place, tongue remains edematous, no mobility of dentition, maxilla stable  NECK - neck is soft, FROM    Imaging CT head: No evidence for intracranial hemorrhage, midline shift, or mass effect. Small right frontal extracalvarial hematoma. Marked nasopharyngeal edema.      Labs:                                   10.1   12.07 )-----------( 147      ( 30 Mar 2021 06:00 )             28.2     03-30    134<L>  |  95<L>  |  3<L>  ----------------------------<  113<H>  3.3<L>   |  28  |  0.7    Ca    7.1<L>      30 Mar 2021 06:00  Phos  4.0     03-30  Mg     1.0     03-30    TPro  6.3  /  Alb  3.5  /  TBili  1.0  /  DBili  x   /  AST  93<H>  /  ALT  51<H>  /  AlkPhos  81  03-29      *ASSESSMENT:  45M w/ PMHx of alcohol abuse and dependence sustaining significant tongue bleed 2/2 to occlusal trauma from status epilepticus episode. Tongue bleed currently stable.    Recs:    - Unasyn 3g IV q6h x 7 days  - keep hemostatic gauze in place, OMFS to change in AM  - yankaur suction at bedside  - NPO/IVF for now  - OMFS to follow patient while in house  - Please call dental/OMFS at [facility] [phone] with any questions or concerns, thank you      Willie Marquez DMD, MD

## 2021-03-31 LAB
ALBUMIN SERPL ELPH-MCNC: 2.9 G/DL — LOW (ref 3.5–5.2)
ALP SERPL-CCNC: 60 U/L — SIGNIFICANT CHANGE UP (ref 30–115)
ALT FLD-CCNC: 28 U/L — SIGNIFICANT CHANGE UP (ref 0–41)
ANION GAP SERPL CALC-SCNC: 11 MMOL/L — SIGNIFICANT CHANGE UP (ref 7–14)
AST SERPL-CCNC: 38 U/L — SIGNIFICANT CHANGE UP (ref 0–41)
BASOPHILS # BLD AUTO: 0.03 K/UL — SIGNIFICANT CHANGE UP (ref 0–0.2)
BASOPHILS NFR BLD AUTO: 0.5 % — SIGNIFICANT CHANGE UP (ref 0–1)
BILIRUB SERPL-MCNC: 0.9 MG/DL — SIGNIFICANT CHANGE UP (ref 0.2–1.2)
BUN SERPL-MCNC: 4 MG/DL — LOW (ref 10–20)
CALCIUM SERPL-MCNC: 6.4 MG/DL — LOW (ref 8.5–10.1)
CHLORIDE SERPL-SCNC: 103 MMOL/L — SIGNIFICANT CHANGE UP (ref 98–110)
CO2 SERPL-SCNC: 28 MMOL/L — SIGNIFICANT CHANGE UP (ref 17–32)
CREAT SERPL-MCNC: 0.6 MG/DL — LOW (ref 0.7–1.5)
EOSINOPHIL # BLD AUTO: 0.05 K/UL — SIGNIFICANT CHANGE UP (ref 0–0.7)
EOSINOPHIL NFR BLD AUTO: 0.9 % — SIGNIFICANT CHANGE UP (ref 0–8)
GLUCOSE SERPL-MCNC: 102 MG/DL — HIGH (ref 70–99)
HCT VFR BLD CALC: 26.2 % — LOW (ref 42–52)
HGB BLD-MCNC: 9.2 G/DL — LOW (ref 14–18)
IMM GRANULOCYTES NFR BLD AUTO: 0.5 % — HIGH (ref 0.1–0.3)
LYMPHOCYTES # BLD AUTO: 0.83 K/UL — LOW (ref 1.2–3.4)
LYMPHOCYTES # BLD AUTO: 14.8 % — LOW (ref 20.5–51.1)
MAGNESIUM SERPL-MCNC: 1.7 MG/DL — LOW (ref 1.8–2.4)
MCHC RBC-ENTMCNC: 33 PG — HIGH (ref 27–31)
MCHC RBC-ENTMCNC: 35.1 G/DL — SIGNIFICANT CHANGE UP (ref 32–37)
MCV RBC AUTO: 93.9 FL — SIGNIFICANT CHANGE UP (ref 80–94)
MONOCYTES # BLD AUTO: 0.36 K/UL — SIGNIFICANT CHANGE UP (ref 0.1–0.6)
MONOCYTES NFR BLD AUTO: 6.4 % — SIGNIFICANT CHANGE UP (ref 1.7–9.3)
NEUTROPHILS # BLD AUTO: 4.29 K/UL — SIGNIFICANT CHANGE UP (ref 1.4–6.5)
NEUTROPHILS NFR BLD AUTO: 76.9 % — HIGH (ref 42.2–75.2)
NRBC # BLD: 0 /100 WBCS — SIGNIFICANT CHANGE UP (ref 0–0)
PLATELET # BLD AUTO: 80 K/UL — LOW (ref 130–400)
POTASSIUM SERPL-MCNC: 3.4 MMOL/L — LOW (ref 3.5–5)
POTASSIUM SERPL-SCNC: 3.4 MMOL/L — LOW (ref 3.5–5)
PROT SERPL-MCNC: 4.9 G/DL — LOW (ref 6–8)
RBC # BLD: 2.79 M/UL — LOW (ref 4.7–6.1)
RBC # FLD: 11.9 % — SIGNIFICANT CHANGE UP (ref 11.5–14.5)
SODIUM SERPL-SCNC: 142 MMOL/L — SIGNIFICANT CHANGE UP (ref 135–146)
WBC # BLD: 5.59 K/UL — SIGNIFICANT CHANGE UP (ref 4.8–10.8)
WBC # FLD AUTO: 5.59 K/UL — SIGNIFICANT CHANGE UP (ref 4.8–10.8)

## 2021-03-31 PROCEDURE — 99232 SBSQ HOSP IP/OBS MODERATE 35: CPT

## 2021-03-31 PROCEDURE — 95720 EEG PHY/QHP EA INCR W/VEEG: CPT

## 2021-03-31 PROCEDURE — 71045 X-RAY EXAM CHEST 1 VIEW: CPT | Mod: 26

## 2021-03-31 RX ORDER — MAGNESIUM SULFATE 500 MG/ML
2 VIAL (ML) INJECTION ONCE
Refills: 0 | Status: COMPLETED | OUTPATIENT
Start: 2021-03-31 | End: 2021-03-31

## 2021-03-31 RX ORDER — POTASSIUM CHLORIDE 20 MEQ
40 PACKET (EA) ORAL ONCE
Refills: 0 | Status: COMPLETED | OUTPATIENT
Start: 2021-03-31 | End: 2021-03-31

## 2021-03-31 RX ORDER — MAGNESIUM OXIDE 400 MG ORAL TABLET 241.3 MG
400 TABLET ORAL EVERY 8 HOURS
Refills: 0 | Status: COMPLETED | OUTPATIENT
Start: 2021-03-31 | End: 2021-04-02

## 2021-03-31 RX ORDER — CALCIUM GLUCONATE 100 MG/ML
1 VIAL (ML) INTRAVENOUS ONCE
Refills: 0 | Status: COMPLETED | OUTPATIENT
Start: 2021-03-31 | End: 2021-03-31

## 2021-03-31 RX ORDER — POTASSIUM CHLORIDE 20 MEQ
20 PACKET (EA) ORAL
Refills: 0 | Status: COMPLETED | OUTPATIENT
Start: 2021-03-31 | End: 2021-03-31

## 2021-03-31 RX ORDER — SODIUM ZIRCONIUM CYCLOSILICATE 10 G/10G
5 POWDER, FOR SUSPENSION ORAL ONCE
Refills: 0 | Status: COMPLETED | OUTPATIENT
Start: 2021-03-31 | End: 2021-03-31

## 2021-03-31 RX ADMIN — AMPICILLIN SODIUM AND SULBACTAM SODIUM 200 GRAM(S): 250; 125 INJECTION, POWDER, FOR SUSPENSION INTRAMUSCULAR; INTRAVENOUS at 21:13

## 2021-03-31 RX ADMIN — LEVETIRACETAM 400 MILLIGRAM(S): 250 TABLET, FILM COATED ORAL at 10:44

## 2021-03-31 RX ADMIN — LEVETIRACETAM 400 MILLIGRAM(S): 250 TABLET, FILM COATED ORAL at 20:44

## 2021-03-31 RX ADMIN — CHLORHEXIDINE GLUCONATE 1 APPLICATION(S): 213 SOLUTION TOPICAL at 09:00

## 2021-03-31 RX ADMIN — FENTANYL CITRATE 4.25 MICROGRAM(S)/KG/HR: 50 INJECTION INTRAVENOUS at 02:14

## 2021-03-31 RX ADMIN — DEXMEDETOMIDINE HYDROCHLORIDE IN 0.9% SODIUM CHLORIDE 1.06 MICROGRAM(S)/KG/HR: 4 INJECTION INTRAVENOUS at 12:24

## 2021-03-31 RX ADMIN — Medication 40 MILLIEQUIVALENT(S): at 08:51

## 2021-03-31 RX ADMIN — ENOXAPARIN SODIUM 40 MILLIGRAM(S): 100 INJECTION SUBCUTANEOUS at 11:03

## 2021-03-31 RX ADMIN — MAGNESIUM OXIDE 400 MG ORAL TABLET 400 MILLIGRAM(S): 241.3 TABLET ORAL at 13:05

## 2021-03-31 RX ADMIN — DEXMEDETOMIDINE HYDROCHLORIDE IN 0.9% SODIUM CHLORIDE 1.06 MICROGRAM(S)/KG/HR: 4 INJECTION INTRAVENOUS at 01:50

## 2021-03-31 RX ADMIN — Medication 1 MILLIGRAM(S): at 12:24

## 2021-03-31 RX ADMIN — AMPICILLIN SODIUM AND SULBACTAM SODIUM 200 GRAM(S): 250; 125 INJECTION, POWDER, FOR SUSPENSION INTRAMUSCULAR; INTRAVENOUS at 10:48

## 2021-03-31 RX ADMIN — DEXMEDETOMIDINE HYDROCHLORIDE IN 0.9% SODIUM CHLORIDE 1.06 MICROGRAM(S)/KG/HR: 4 INJECTION INTRAVENOUS at 20:55

## 2021-03-31 RX ADMIN — CHLORHEXIDINE GLUCONATE 15 MILLILITER(S): 213 SOLUTION TOPICAL at 08:51

## 2021-03-31 RX ADMIN — PROPOFOL 10.2 MICROGRAM(S)/KG/MIN: 10 INJECTION, EMULSION INTRAVENOUS at 12:23

## 2021-03-31 RX ADMIN — DEXMEDETOMIDINE HYDROCHLORIDE IN 0.9% SODIUM CHLORIDE 1.06 MICROGRAM(S)/KG/HR: 4 INJECTION INTRAVENOUS at 20:44

## 2021-03-31 RX ADMIN — SODIUM CHLORIDE 75 MILLILITER(S): 9 INJECTION, SOLUTION INTRAVENOUS at 01:49

## 2021-03-31 RX ADMIN — AMPICILLIN SODIUM AND SULBACTAM SODIUM 200 GRAM(S): 250; 125 INJECTION, POWDER, FOR SUSPENSION INTRAMUSCULAR; INTRAVENOUS at 15:01

## 2021-03-31 RX ADMIN — Medication 25 GRAM(S): at 08:17

## 2021-03-31 RX ADMIN — Medication 50 MILLIEQUIVALENT(S): at 17:02

## 2021-03-31 RX ADMIN — AMPICILLIN SODIUM AND SULBACTAM SODIUM 200 GRAM(S): 250; 125 INJECTION, POWDER, FOR SUSPENSION INTRAMUSCULAR; INTRAVENOUS at 04:58

## 2021-03-31 RX ADMIN — Medication 100 MILLIGRAM(S): at 12:23

## 2021-03-31 RX ADMIN — Medication 50 GRAM(S): at 14:59

## 2021-03-31 RX ADMIN — SODIUM ZIRCONIUM CYCLOSILICATE 5 GRAM(S): 10 POWDER, FOR SUSPENSION ORAL at 21:12

## 2021-03-31 RX ADMIN — MAGNESIUM OXIDE 400 MG ORAL TABLET 400 MILLIGRAM(S): 241.3 TABLET ORAL at 21:12

## 2021-03-31 RX ADMIN — PROPOFOL 10.2 MICROGRAM(S)/KG/MIN: 10 INJECTION, EMULSION INTRAVENOUS at 04:58

## 2021-03-31 RX ADMIN — PROPOFOL 10.2 MICROGRAM(S)/KG/MIN: 10 INJECTION, EMULSION INTRAVENOUS at 01:50

## 2021-03-31 RX ADMIN — CHLORHEXIDINE GLUCONATE 15 MILLILITER(S): 213 SOLUTION TOPICAL at 17:03

## 2021-03-31 RX ADMIN — PROPOFOL 10.2 MICROGRAM(S)/KG/MIN: 10 INJECTION, EMULSION INTRAVENOUS at 20:44

## 2021-03-31 RX ADMIN — Medication 100 GRAM(S): at 08:51

## 2021-03-31 RX ADMIN — Medication 50 MILLIEQUIVALENT(S): at 15:48

## 2021-03-31 NOTE — PROGRESS NOTE ADULT - ASSESSMENT
IMPRESSION:    Status epilepticus sp intubation for airway protection  ETOH withdrawal / DT's   Tongue Laceration s/p repair  H/o of seizures   H/o HTN    PLAN:    CNS: Spontaneous awakening trial in AM. EEG. Neuro c/s for AEDs. c/w Keppra . check UDS. Sedation: SAT    HEENT: Oral care, f/u OMFS for tongue lac.     PULMONARY:  HOB @ 45 degrees.  Vent changes as follows:Fio2  TO keep Sao2 92 to 96%, SBT    CARDIOVASCULAR: IVF     GI: GI prophylaxis.  Feeding   Bowel regimen     RENAL:  Follow up lytes.  Correct as needed    INFECTIOUS DISEASE: Follow up cultures, UNASYN    HEMATOLOGICAL:  DVT prophylaxis.    ENDOCRINE:  Follow up FS.  Insulin protocol if needed.    MUSCULOSKELETAL: Bedrest.     Full Code   MICU  IMPRESSION:    Status epilepticus sp intubation for airway protection  ETOH withdrawal / DT's   Tongue Laceration s/p repair  H/o of seizures   H/o HTN  ELECTROLYTES dist    PLAN:    CNS: Spontaneous awakening trial in AM. EEG. Neuro c/s for AEDs. c/w Keppra . check UDS. Sedation: SAT    HEENT: Oral care, f/u OMFS for tongue lac.     PULMONARY:  HOB @ 45 degrees.  Vent changes as follows:Fio2  TO keep Sao2 92 to 96%, SBT    CARDIOVASCULAR: IVF     GI: GI prophylaxis.  Feeding   Bowel regimen     RENAL:  Follow up lytes.  Correct as needed    INFECTIOUS DISEASE: Follow up cultures, UNASYN    HEMATOLOGICAL:  DVT prophylaxis.    ENDOCRINE:  Follow up FS.  Insulin protocol if needed.    MUSCULOSKELETAL: Bedrest.     Full Code   MICU

## 2021-03-31 NOTE — PROGRESS NOTE ADULT - SUBJECTIVE AND OBJECTIVE BOX
Specialty: Neuro Critical Care     HPI:  46 y/o M PMHx of hyponatremia, hypomagnesemia, thrombocytopenia, and alcohol abuse and dependence who presents as a notification by EMS for status epilepticus. Patient had multiple seizures today and bit his tongue during his seizure with EMS. Patient assessed in Ed with diffuse tongue bleeding. Patient was given Versed by EMS and was not awake or protecting his airway. O2 saturation temporarily 82% and patient had NRB mask and was passively oxygenated to 100%. Patient pre-oxygenated and had emergent RSI for airway protection. After intubation, tongue pulled from mouth and he had a very deep and large laceration with persistent bleeding. Sutures placed and later redone by OMFS team post CT. CTH and C-spine negative. Magnesium and Potassium both found to be critically low. Per wife patient had binge last week and his last drink was on Friday. His last seizure was 2 years ago and tenants report he likely had a seizure in the morning as well. Patient sedated on Fentanyl and propofol in ED, and s/p IV 4mg Ativan, 2000mg Keppra,  40meq K+ powder via NGT, and 3 20meq K riders. 4Gm Mg and 2Gm Calcium gluconate and 1L LR and 500mg thiamine IVPB. Per wife pt not taking any medications, stopped his BP meds on his own 2 yrs ago. admitted to MICU, on propofol, precedex, fentanyL, Levo,  (29 Mar 2021 21:28)      Past Medical and Surgical Hx:  PAST MEDICAL & SURGICAL HISTORY:  ETOH abuse  Hypomagnesemia  Seizure disorder      Allergies: No Known Allergies      ROS: unable to be obtained due to neurologic status and endotracheal intubation     PE:  General: ill appearing male, laying in bed, mechanical ventilation with sedation at 40mcg/kg of propofol   Mental status: lethargic, does not opens eyes spontaneously but will arouse with noxious stimuli, does not follow commands or track with eyes  Cranial nerves: pupils are 3mm, round, equal and reactive sluggishly to light bilaterally, upward gaze in both eyes  (+) cough  (+) gag  motor: no spontaneous movement noticed, no antigravity noticed in either extremity   sensory: no withdrawal to pain in either of the extremities  (bedside nurse did report that the patient was able to follow commands prior to sedation)    Vital Signs:  ICU Vital Signs Last 24 Hrs  T(C): 36.7 (31 Mar 2021 15:00), Max: 37.3 (30 Mar 2021 19:30)  T(F): 98 (31 Mar 2021 15:00), Max: 99.1 (30 Mar 2021 19:30)  HR: 72 (31 Mar 2021 15:00) (67 - 110)  BP: 117/78 (31 Mar 2021 15:00) (96/60 - 124/75)  BP(mean): 94 (31 Mar 2021 15:00) (72 - 94)  ABP: --  ABP(mean): --  RR: 15 (31 Mar 2021 15:00) (13 - 20)  SpO2: 100% (31 Mar 2021 15:) (99% - 100%)      Ventilator:  Mode: AC/ CMV (Assist Control/ Continuous Mandatory Ventilation)  RR (machine): 15  TV (machine): 400  FiO2: 40  PEEP: 5  ITime: 1.7  MAP: 8  PIP: 13      I/Os:  I&O's Detail    30 Mar 2021 07:01  -  31 Mar 2021 07:00  --------------------------------------------------------  IN:    Dexmedetomidine: 564 mL    FentaNYL: 114.8 mL    IV PiggyBack: 1300 mL    Lactated Ringers: 2275 mL    Norepinephrine: 137.9 mL    Propofol: 518.2 mL  Total IN: 4909.9 mL    OUT:    Indwelling Catheter - Urethral (mL): 3650 mL  Total OUT: 3650 mL    Total NET: 1259.9 mL      31 Mar 2021 07:01  -  31 Mar 2021 16:54  --------------------------------------------------------  IN:    Dexmedetomidine: 192 mL    FentaNYL: 5.2 mL    IV PiggyBack: 550 mL    Lactated Ringers: 600 mL    Propofol: 153.6 mL  Total IN: 1500.8 mL    OUT:    Estimated Blood Loss (mL): 20 mL    Indwelling Catheter - Urethral (mL): 1015 mL    Norepinephrine: 0 mL  Total OUT: 1035 mL    Total NET: 465.8 mL          Labs:      142  |  103  |  4<L>  ----------------------------<  102<H>  3.4<L>   |  28  |  0.6<L>    Ca    6.4<L>      31 Mar 2021 04:50  Phos  4.0       Mg     1.7         TPro  4.9<L>  /  Alb  2.9<L>  /  TBili  0.9  /  DBili  x   /  AST  38  /  ALT  28  /  AlkPhos  60      CBC Full  -  ( 31 Mar 2021 04:50 )  WBC Count : 5.59 K/uL  RBC Count : 2.79 M/uL  Hemoglobin : 9.2 g/dL  Hematocrit : 26.2 %  Platelet Count - Automated : 80 K/uL  Mean Cell Volume : 93.9 fL  Mean Cell Hemoglobin : 33.0 pg  Mean Cell Hemoglobin Concentration : 35.1 g/dL  Auto Neutrophil # : 4.29 K/uL  Auto Lymphocyte # : 0.83 K/uL  Auto Monocyte # : 0.36 K/uL  Auto Eosinophil # : 0.05 K/uL  Auto Basophil # : 0.03 K/uL  Auto Neutrophil % : 76.9 %  Auto Lymphocyte % : 14.8 %  Auto Monocyte % : 6.4 %  Auto Eosinophil % : 0.9 %  Auto Basophil % : 0.5 %    PT/INR - ( 29 Mar 2021 20:08 )   PT: 12.80 sec;   INR: 1.11 ratio         PTT - ( 29 Mar 2021 20:08 )  PTT:32.1 sec  LIVER FUNCTIONS - ( 31 Mar 2021 04:50 )  Alb: 2.9 g/dL / Pro: 4.9 g/dL / ALK PHOS: 60 U/L / ALT: 28 U/L / AST: 38 U/L / GGT: x           ABG - ( 31 Mar 2021 03:50 )  pH, Arterial: 7.43  pH, Blood: x     /  pCO2: 44    /  pO2: 142   / HCO3: 29    / Base Excess: 4.5   /  SaO2: 99          Microbiology:    Urinalysis Basic - ( 29 Mar 2021 18:35 )    Color: Yellow / Appearance: Clear / S.015 / pH: x  Gluc: x / Ketone: Trace  / Bili: Negative / Urobili: <2 mg/dL   Blood: x / Protein: 30 mg/dL / Nitrite: Negative   Leuk Esterase: Negative / RBC: 2 /HPF / WBC 1 /HPF   Sq Epi: x / Non Sq Epi: 1 /HPF / Bacteria: Negative        Medications Current and PRN:  MEDICATIONS  (STANDING):  ampicillin/sulbactam  IVPB 3 Gram(s) IV Intermittent every 6 hours  chlorhexidine 0.12% Liquid 15 milliLiter(s) Oral Mucosa every 12 hours  chlorhexidine 4% Liquid 1 Application(s) Topical <User Schedule>  dexMEDEtomidine Infusion 0.05 MICROgram(s)/kG/Hr (1.06 mL/Hr) IV Continuous <Continuous>  enoxaparin Injectable 40 milliGRAM(s) SubCutaneous daily  fentaNYL    Injectable 50 MICROGram(s) IV Push once  fentaNYL   Infusion. 0.5 MICROgram(s)/kG/Hr (4.25 mL/Hr) IV Continuous <Continuous>  folic acid Injectable 1 milliGRAM(s) IV Push daily  lactated ringers. 1000 milliLiter(s) (75 mL/Hr) IV Continuous <Continuous>  levETIRAcetam  IVPB 1000 milliGRAM(s) IV Intermittent every 12 hours  magnesium oxide 400 milliGRAM(s) Oral every 8 hours  norepinephrine Infusion 0.05 MICROgram(s)/kG/Min (3.98 mL/Hr) IV Continuous <Continuous>  potassium chloride  20 mEq/100 mL IVPB 20 milliEquivalent(s) IV Intermittent every 2 hours  propofol Infusion 20 MICROgram(s)/kG/Min (10.2 mL/Hr) IV Continuous <Continuous>  thiamine Injectable 100 milliGRAM(s) IV Push daily    MEDICATIONS  (PRN):      Radiology:  LAST CT of brain 3/29/2021 IMPRESSION:  No evidence for intracranial hemorrhage, midline shift, or mass effect.  Small right frontal extracalvarial hematoma.  Marked nasopharyngeal edema.      Assessment: 46 y/o M PMHx of hyponatremia, hypomagnesemia, thrombocytopenia, and alcohol abuse who presented  for status epilepticus. Patient had multiple seizures at home  and bit his tongue during his seizure with brijesh bleeding and laceration needing sutures. CTH and C-spine imaging negative for acute findings. Magnesium and Potassium and calcium found to be critically low. No seizures were reported on 3/31 video EEG.     Plan:  #Neuro:  - Seizure precautions  - Continue Keppra 1gm IV q12hrs  - Neuro checks q1 hr  - Video EEG can continue unless the propofol cannot be weaned down, then we will need to d/c vEEG. VEEG reading can be affected by the high dose of propofol which is why we are recommending the patient to be weaned off slowly.   -Keep magnesium levels >2 and <2.5  -Continue Thiamine and folate  - can begin to wean down the propofol by coming down 2mcg/kg per hour until completely off.   - Utilize fentanyl IV pushes for ventilator synchrony or increase precedex drip  - Initiate CIWA protocol before starting propofol weaning (after 24 hours of VEEG -ve for seizures) for alcohol withdrawal symptoms   -Call for seizures    #CV:  - Keep normotensive    #Resp:  - Ventilator management as per primary team  - HOB > 35 degrees  - Aspiration precautions    #Renal /Fluid/ Electrolytes  - Strict I/Os  - Keep Magnesium level > 2  - Monitor lytes, replete as needed    #GI:  - EN as per primary team    #Heme/Onc:  - SCS while in bed   - as per primary team    #ID:  - Keep normothermic; avoid fevers      MELANIE Delgado  Please feel free to contact for any questions or concerns. Thank you.  Extension: #[phone]

## 2021-03-31 NOTE — DIETITIAN INITIAL EVALUATION ADULT. - PHYSCIAL ASSESSMENT
intubated/ventilated. BMI: 26.9, IBW: 166#, 2+ generalized edema, 3+ to tongue. skin intact./overweight

## 2021-03-31 NOTE — PROGRESS NOTE ADULT - SUBJECTIVE AND OBJECTIVE BOX
Over Night Events: events noted, still intubated, ventilated, low grade fever    PHYSICAL EXAM    ICU Vital Signs Last 24 Hrs  T(C): 37.2 (31 Mar 2021 03:00), Max: 37.3 (30 Mar 2021 08:00)  T(F): 99 (31 Mar 2021 03:00), Max: 99.2 (30 Mar 2021 08:00)  HR: 77 (31 Mar 2021 05:00) (76 - 115)  BP: 103/65 (31 Mar 2021 05:00) (82/44 - 151/83)  BP(mean): 79 (31 Mar 2021 05:00) (72 - 109)  RR: 13 (31 Mar 2021 05:00) (13 - 30)  SpO2: 100% (31 Mar 2021 05:00) (98% - 100%)      General: ill looking  HEENT: DIANA             Lymph Nodes: No cervical LN   Lungs: Bilateral BS, r basilar rhonchi  Cardiovascular: Regular   Abdomen: Soft, Positive BS  Extremities: No clubbing   Skin: Warm  Neurological: Non focal       21 @ 07:01  -  21 @ 07:00  --------------------------------------------------------  IN:    Dexmedetomidine: 564 mL    FentaNYL: 114.8 mL    IV PiggyBack: 1300 mL    Lactated Ringers: 2275 mL    Norepinephrine: 137.9 mL    Propofol: 518.2 mL  Total IN: 4909.9 mL    OUT:    Indwelling Catheter - Urethral (mL): 3650 mL  Total OUT: 3650 mL    Total NET: 1259.9 mL          LABS:                          9.2    5.59  )-----------( 80       ( 31 Mar 2021 04:50 )             26.2                                                   142  |  103  |  4<L>  ----------------------------<  102<H>  3.4<L>   |  28  |  0.6<L>    Ca    6.4<L>      31 Mar 2021 04:50  Phos  4.0       Mg     1.7         TPro  4.9<L>  /  Alb  2.9<L>  /  TBili  0.9  /  DBili  x   /  AST  38  /  ALT  28  /  AlkPhos  60  -31      PT/INR - ( 29 Mar 2021 20:08 )   PT: 12.80 sec;   INR: 1.11 ratio         PTT - ( 29 Mar 2021 20:08 )  PTT:32.1 sec                                       Urinalysis Basic - ( 29 Mar 2021 18:35 )    Color: Yellow / Appearance: Clear / S.015 / pH: x  Gluc: x / Ketone: Trace  / Bili: Negative / Urobili: <2 mg/dL   Blood: x / Protein: 30 mg/dL / Nitrite: Negative   Leuk Esterase: Negative / RBC: 2 /HPF / WBC 1 /HPF   Sq Epi: x / Non Sq Epi: 1 /HPF / Bacteria: Negative        CARDIAC MARKERS ( 29 Mar 2021 18:45 )  x     / x     / 683 U/L / x     / x                                                LIVER FUNCTIONS - ( 31 Mar 2021 04:50 )  Alb: 2.9 g/dL / Pro: 4.9 g/dL / ALK PHOS: 60 U/L / ALT: 28 U/L / AST: 38 U/L / GGT: x                                                                                               Mode: AC/ CMV (Assist Control/ Continuous Mandatory Ventilation)  RR (machine): 15  TV (machine): 400  FiO2: 40  PEEP: 5  ITime: 1  MAP: 9  PIP: 10                                      ABG - ( 31 Mar 2021 03:50 )  pH, Arterial: 7.43  pH, Blood: x     /  pCO2: 44    /  pO2: 142   / HCO3: 29    / Base Excess: 4.5   /  SaO2: 99                  MEDICATIONS  (STANDING):  ampicillin/sulbactam  IVPB 3 Gram(s) IV Intermittent every 6 hours  chlorhexidine 0.12% Liquid 15 milliLiter(s) Oral Mucosa every 12 hours  chlorhexidine 4% Liquid 1 Application(s) Topical <User Schedule>  dexMEDEtomidine Infusion 0.05 MICROgram(s)/kG/Hr (1.06 mL/Hr) IV Continuous <Continuous>  enoxaparin Injectable 40 milliGRAM(s) SubCutaneous daily  fentaNYL    Injectable 50 MICROGram(s) IV Push once  fentaNYL   Infusion. 0.5 MICROgram(s)/kG/Hr (4.25 mL/Hr) IV Continuous <Continuous>  folic acid Injectable 1 milliGRAM(s) IV Push daily  lactated ringers. 1000 milliLiter(s) (75 mL/Hr) IV Continuous <Continuous>  levETIRAcetam  IVPB 1000 milliGRAM(s) IV Intermittent every 12 hours  norepinephrine Infusion 0.05 MICROgram(s)/kG/Min (3.98 mL/Hr) IV Continuous <Continuous>  propofol Infusion 20 MICROgram(s)/kG/Min (10.2 mL/Hr) IV Continuous <Continuous>  thiamine Injectable 100 milliGRAM(s) IV Push daily    CXR reviewed   Over Night Events: events noted, still intubated, ventilated, low grade fever, precedex, propofol, fentanyl, levophed 0.02 and LR    PHYSICAL EXAM    ICU Vital Signs Last 24 Hrs  T(C): 37.2 (31 Mar 2021 03:00), Max: 37.3 (30 Mar 2021 08:00)  T(F): 99 (31 Mar 2021 03:00), Max: 99.2 (30 Mar 2021 08:00)  HR: 77 (31 Mar 2021 05:00) (76 - 115)  BP: 103/65 (31 Mar 2021 05:00) (82/44 - 151/83)  BP(mean): 79 (31 Mar 2021 05:00) (72 - 109)  RR: 13 (31 Mar 2021 05:00) (13 - 30)  SpO2: 100% (31 Mar 2021 05:00) (98% - 100%)      General: ill looking  HEENT: DIANA             Lymph Nodes: No cervical LN   Lungs: Bilateral BS, r basilar rhonchi  Cardiovascular: Regular   Abdomen: Soft, Positive BS  Extremities: No clubbing   Skin: Warm  Neurological: Non focal       21 @ 07:01  -  21 @ 07:00  --------------------------------------------------------  IN:    Dexmedetomidine: 564 mL    FentaNYL: 114.8 mL    IV PiggyBack: 1300 mL    Lactated Ringers: 2275 mL    Norepinephrine: 137.9 mL    Propofol: 518.2 mL  Total IN: 4909.9 mL    OUT:    Indwelling Catheter - Urethral (mL): 3650 mL  Total OUT: 3650 mL    Total NET: 1259.9 mL          LABS:                          9.2    5.59  )-----------( 80       ( 31 Mar 2021 04:50 )             26.2                                                   142  |  103  |  4<L>  ----------------------------<  102<H>  3.4<L>   |  28  |  0.6<L>    Ca    6.4<L>      31 Mar 2021 04:50  Phos  4.0     03-30  Mg     1.7     03-31    TPro  4.9<L>  /  Alb  2.9<L>  /  TBili  0.9  /  DBili  x   /  AST  38  /  ALT  28  /  AlkPhos  60  03-31      PT/INR - ( 29 Mar 2021 20:08 )   PT: 12.80 sec;   INR: 1.11 ratio         PTT - ( 29 Mar 2021 20:08 )  PTT:32.1 sec                                       Urinalysis Basic - ( 29 Mar 2021 18:35 )    Color: Yellow / Appearance: Clear / S.015 / pH: x  Gluc: x / Ketone: Trace  / Bili: Negative / Urobili: <2 mg/dL   Blood: x / Protein: 30 mg/dL / Nitrite: Negative   Leuk Esterase: Negative / RBC: 2 /HPF / WBC 1 /HPF   Sq Epi: x / Non Sq Epi: 1 /HPF / Bacteria: Negative        CARDIAC MARKERS ( 29 Mar 2021 18:45 )  x     / x     / 683 U/L / x     / x                                                LIVER FUNCTIONS - ( 31 Mar 2021 04:50 )  Alb: 2.9 g/dL / Pro: 4.9 g/dL / ALK PHOS: 60 U/L / ALT: 28 U/L / AST: 38 U/L / GGT: x                                                                                               Mode: AC/ CMV (Assist Control/ Continuous Mandatory Ventilation)  RR (machine): 15  TV (machine): 400  FiO2: 40  PEEP: 5  ITime: 1  MAP: 9  PIP: 10                                      ABG - ( 31 Mar 2021 03:50 )  pH, Arterial: 7.43  pH, Blood: x     /  pCO2: 44    /  pO2: 142   / HCO3: 29    / Base Excess: 4.5   /  SaO2: 99                  MEDICATIONS  (STANDING):  ampicillin/sulbactam  IVPB 3 Gram(s) IV Intermittent every 6 hours  chlorhexidine 0.12% Liquid 15 milliLiter(s) Oral Mucosa every 12 hours  chlorhexidine 4% Liquid 1 Application(s) Topical <User Schedule>  dexMEDEtomidine Infusion 0.05 MICROgram(s)/kG/Hr (1.06 mL/Hr) IV Continuous <Continuous>  enoxaparin Injectable 40 milliGRAM(s) SubCutaneous daily  fentaNYL    Injectable 50 MICROGram(s) IV Push once  fentaNYL   Infusion. 0.5 MICROgram(s)/kG/Hr (4.25 mL/Hr) IV Continuous <Continuous>  folic acid Injectable 1 milliGRAM(s) IV Push daily  lactated ringers. 1000 milliLiter(s) (75 mL/Hr) IV Continuous <Continuous>  levETIRAcetam  IVPB 1000 milliGRAM(s) IV Intermittent every 12 hours  norepinephrine Infusion 0.05 MICROgram(s)/kG/Min (3.98 mL/Hr) IV Continuous <Continuous>  propofol Infusion 20 MICROgram(s)/kG/Min (10.2 mL/Hr) IV Continuous <Continuous>  thiamine Injectable 100 milliGRAM(s) IV Push daily    CXR reviewed

## 2021-03-31 NOTE — DIETITIAN INITIAL EVALUATION ADULT. - OTHER CALCULATIONS
Dosing wt: 187#/85kg, Ve: 6.54, Tmax: 37.3-->Calories: 1887 kcal/day ([id number]); Protein: 102-128 gm/day (1.2-1.5 gm/kg CBW); Fluids per Vent team

## 2021-03-31 NOTE — PROGRESS NOTE ADULT - ASSESSMENT
OMFS Consult F/U Note    45M w/ PMHx of alcohol abuse and dependence sustaining significant tongue bleed 2/2 to occlusal trauma from status epilepticus episode.    Patient seen at bedside this AM. NAEON. No oozing from mouth overnight per nursing staff. Tongue packing/dressing removed per OMFS this AM. Receiving EEG. He remains intubated and sedated.      PE:    ICU Vital Signs Last 24 Hrs  T(C): 37.2 (31 Mar 2021 03:00), Max: 37.3 (30 Mar 2021 12:00)  T(F): 99 (31 Mar 2021 03:00), Max: 99.1 (30 Mar 2021 12:00)  HR: 77 (31 Mar 2021 05:00) (77 - 115)  BP: 103/65 (31 Mar 2021 05:00) (96/60 - 151/83)  BP(mean): 79 (31 Mar 2021 05:00) (72 - 109)  ABP: --  ABP(mean): --  RR: 13 (31 Mar 2021 05:00) (13 - 30)  SpO2: 100% (31 Mar 2021 05:00) (98% - 100%)      GEN - Intubated and sedated  HEAD - NCAT, no lesions or lacerations  EYES - unable to assess CN II, II, VI  function due to sedation, no periorbital edema or ecchymosis, no bony steps  EARS - no drainage, no chirinos signs  NOSE - no septal hematomas, no nasal deviation or bony crepitus of nasal bridge  FACE - unable to assess CN V and VII function due to sedation, no lesions or lacerations  ORAL - ventral and lateral tongue wounds hemostatic, sutures intact, tongue edematous  NECK - neck is soft, FROM      Labs:                                   9.2    5.59  )-----------( 80       ( 31 Mar 2021 04:50 )             26.2     03-31    142  |  103  |  4<L>  ----------------------------<  102<H>  3.4<L>   |  28  |  0.6<L>    Ca    6.4<L>      31 Mar 2021 04:50  Phos  4.0     03-30  Mg     1.7     03-31    TPro  4.9<L>  /  Alb  2.9<L>  /  TBili  0.9  /  DBili  x   /  AST  38  /  ALT  28  /  AlkPhos  60  03-31               *ASSESSMENT:  45M w/ PMHx of alcohol abuse and dependence sustaining significant tongue bleed 2/2 to occlusal trauma from status epilepticus episode. Tongue bleed currently stable.    Recs:    - Unasyn 3g IV q6h x 7 days  - yankaur suction at bedside  - NPO/IVF for now  - OMFS to continue following for evaluation of tongue  - Please call dental/OMFS at [employer] [phone] with any questions or concerns, thank you      Willie Marquez DMD, MD

## 2021-03-31 NOTE — DIETITIAN INITIAL EVALUATION ADULT. - ADD RECOMMEND
1. If pt to be extubated, c/s SLP for speech and swallow eval, diet order to be consistent w/ their recs, 2. If unable to extubate, and plan for enteral nutrition, initiate the following: Vital HP @ 20ml/hr, increase rate by 15ml/hr Q4H, until goal rate of 55ml/hr reached. TF to provide 1827kcal, 116gm pro, 1104ml free water (includes kcal provided from propofol). Additional flushes per LIP. All recs discussed with LIP ([phone])

## 2021-03-31 NOTE — DIETITIAN INITIAL EVALUATION ADULT. - REASON FOR ADMISSION
Pt p/w significant tongue bleed 2/2 to occlusal trauma from status epilepticus episode; pt remains intubated and sedated. Tongue bleed currently stable; NPO/IVF for now.

## 2021-03-31 NOTE — DIETITIAN INITIAL EVALUATION ADULT. - PERTINENT MEDS FT
lovenox, abx, lactated ringers, mag ox, fentanyl, levophed, precedex, folic acid, keppra, propofol @ 19.2ml/hr (provides additional 507kcal), thiamine

## 2021-04-01 LAB
ALBUMIN SERPL ELPH-MCNC: 2.7 G/DL — LOW (ref 3.5–5.2)
ALBUMIN SERPL ELPH-MCNC: 2.8 G/DL — LOW (ref 3.5–5.2)
ALP SERPL-CCNC: 72 U/L — SIGNIFICANT CHANGE UP (ref 30–115)
ALP SERPL-CCNC: 84 U/L — SIGNIFICANT CHANGE UP (ref 30–115)
ALT FLD-CCNC: 23 U/L — SIGNIFICANT CHANGE UP (ref 0–41)
ALT FLD-CCNC: 27 U/L — SIGNIFICANT CHANGE UP (ref 0–41)
AMPHET UR-MCNC: NEGATIVE — SIGNIFICANT CHANGE UP
ANION GAP SERPL CALC-SCNC: 11 MMOL/L — SIGNIFICANT CHANGE UP (ref 7–14)
ANION GAP SERPL CALC-SCNC: 16 MMOL/L — HIGH (ref 7–14)
AST SERPL-CCNC: 33 U/L — SIGNIFICANT CHANGE UP (ref 0–41)
AST SERPL-CCNC: 39 U/L — SIGNIFICANT CHANGE UP (ref 0–41)
B-OH-BUTYR SERPL-SCNC: 3.5 MMOL/L — HIGH
BARBITURATES UR SCN-MCNC: NEGATIVE — SIGNIFICANT CHANGE UP
BASE EXCESS BLDA CALC-SCNC: 4.2 MMOL/L — HIGH (ref -2–2)
BASOPHILS # BLD AUTO: 0.01 K/UL — SIGNIFICANT CHANGE UP (ref 0–0.2)
BASOPHILS # BLD AUTO: 0.01 K/UL — SIGNIFICANT CHANGE UP (ref 0–0.2)
BASOPHILS NFR BLD AUTO: 0.2 % — SIGNIFICANT CHANGE UP (ref 0–1)
BASOPHILS NFR BLD AUTO: 0.3 % — SIGNIFICANT CHANGE UP (ref 0–1)
BENZODIAZ UR-MCNC: NEGATIVE — SIGNIFICANT CHANGE UP
BILIRUB DIRECT SERPL-MCNC: 0.8 MG/DL — HIGH (ref 0–0.2)
BILIRUB INDIRECT FLD-MCNC: 0.5 MG/DL — SIGNIFICANT CHANGE UP (ref 0.2–1.2)
BILIRUB SERPL-MCNC: 1 MG/DL — SIGNIFICANT CHANGE UP (ref 0.2–1.2)
BILIRUB SERPL-MCNC: 1.3 MG/DL — HIGH (ref 0.2–1.2)
BUN SERPL-MCNC: 3 MG/DL — LOW (ref 10–20)
BUN SERPL-MCNC: 4 MG/DL — LOW (ref 10–20)
CALCIUM SERPL-MCNC: 6.6 MG/DL — LOW (ref 8.5–10.1)
CALCIUM SERPL-MCNC: 6.8 MG/DL — LOW (ref 8.5–10.1)
CHLORIDE SERPL-SCNC: 101 MMOL/L — SIGNIFICANT CHANGE UP (ref 98–110)
CHLORIDE SERPL-SCNC: 99 MMOL/L — SIGNIFICANT CHANGE UP (ref 98–110)
CK SERPL-CCNC: 270 U/L — HIGH (ref 0–225)
CO2 SERPL-SCNC: 23 MMOL/L — SIGNIFICANT CHANGE UP (ref 17–32)
CO2 SERPL-SCNC: 26 MMOL/L — SIGNIFICANT CHANGE UP (ref 17–32)
COCAINE METAB.OTHER UR-MCNC: NEGATIVE — SIGNIFICANT CHANGE UP
CREAT SERPL-MCNC: 0.6 MG/DL — LOW (ref 0.7–1.5)
CREAT SERPL-MCNC: 0.7 MG/DL — SIGNIFICANT CHANGE UP (ref 0.7–1.5)
DRUG SCREEN 1, URINE RESULT: SIGNIFICANT CHANGE UP
EOSINOPHIL # BLD AUTO: 0.04 K/UL — SIGNIFICANT CHANGE UP (ref 0–0.7)
EOSINOPHIL # BLD AUTO: 0.06 K/UL — SIGNIFICANT CHANGE UP (ref 0–0.7)
EOSINOPHIL NFR BLD AUTO: 1.2 % — SIGNIFICANT CHANGE UP (ref 0–8)
EOSINOPHIL NFR BLD AUTO: 1.3 % — SIGNIFICANT CHANGE UP (ref 0–8)
GLUCOSE SERPL-MCNC: 132 MG/DL — HIGH (ref 70–99)
GLUCOSE SERPL-MCNC: 92 MG/DL — SIGNIFICANT CHANGE UP (ref 70–99)
HCO3 BLDA-SCNC: 28 MMOL/L — HIGH (ref 23–27)
HCT VFR BLD CALC: 25.1 % — LOW (ref 42–52)
HCT VFR BLD CALC: 26.3 % — LOW (ref 42–52)
HGB BLD-MCNC: 9.1 G/DL — LOW (ref 14–18)
HGB BLD-MCNC: 9.4 G/DL — LOW (ref 14–18)
HOROWITZ INDEX BLDA+IHG-RTO: 30 — SIGNIFICANT CHANGE UP
IMM GRANULOCYTES NFR BLD AUTO: 0.2 % — SIGNIFICANT CHANGE UP (ref 0.1–0.3)
IMM GRANULOCYTES NFR BLD AUTO: 0.3 % — SIGNIFICANT CHANGE UP (ref 0.1–0.3)
LYMPHOCYTES # BLD AUTO: 0.68 K/UL — LOW (ref 1.2–3.4)
LYMPHOCYTES # BLD AUTO: 0.74 K/UL — LOW (ref 1.2–3.4)
LYMPHOCYTES # BLD AUTO: 16 % — LOW (ref 20.5–51.1)
LYMPHOCYTES # BLD AUTO: 20.1 % — LOW (ref 20.5–51.1)
MAGNESIUM SERPL-MCNC: 1.2 MG/DL — LOW (ref 1.8–2.4)
MAGNESIUM SERPL-MCNC: 1.7 MG/DL — LOW (ref 1.8–2.4)
MCHC RBC-ENTMCNC: 33.2 PG — HIGH (ref 27–31)
MCHC RBC-ENTMCNC: 33.5 PG — HIGH (ref 27–31)
MCHC RBC-ENTMCNC: 35.7 G/DL — SIGNIFICANT CHANGE UP (ref 32–37)
MCHC RBC-ENTMCNC: 36.3 G/DL — SIGNIFICANT CHANGE UP (ref 32–37)
MCV RBC AUTO: 91.6 FL — SIGNIFICANT CHANGE UP (ref 80–94)
MCV RBC AUTO: 93.6 FL — SIGNIFICANT CHANGE UP (ref 80–94)
METHADONE UR-MCNC: NEGATIVE — SIGNIFICANT CHANGE UP
MONOCYTES # BLD AUTO: 0.47 K/UL — SIGNIFICANT CHANGE UP (ref 0.1–0.6)
MONOCYTES # BLD AUTO: 0.54 K/UL — SIGNIFICANT CHANGE UP (ref 0.1–0.6)
MONOCYTES NFR BLD AUTO: 10.2 % — HIGH (ref 1.7–9.3)
MONOCYTES NFR BLD AUTO: 16 % — HIGH (ref 1.7–9.3)
NEUTROPHILS # BLD AUTO: 2.1 K/UL — SIGNIFICANT CHANGE UP (ref 1.4–6.5)
NEUTROPHILS # BLD AUTO: 3.34 K/UL — SIGNIFICANT CHANGE UP (ref 1.4–6.5)
NEUTROPHILS NFR BLD AUTO: 62.1 % — SIGNIFICANT CHANGE UP (ref 42.2–75.2)
NEUTROPHILS NFR BLD AUTO: 72.1 % — SIGNIFICANT CHANGE UP (ref 42.2–75.2)
NRBC # BLD: 0 /100 WBCS — SIGNIFICANT CHANGE UP (ref 0–0)
NRBC # BLD: 0 /100 WBCS — SIGNIFICANT CHANGE UP (ref 0–0)
OPIATES UR-MCNC: NEGATIVE — SIGNIFICANT CHANGE UP
OSMOLALITY SERPL: 285 MOS/KG — SIGNIFICANT CHANGE UP (ref 275–300)
PCO2 BLDA: 38 MMHG — SIGNIFICANT CHANGE UP (ref 38–42)
PCP UR-MCNC: NEGATIVE — SIGNIFICANT CHANGE UP
PH BLDA: 7.48 — HIGH (ref 7.38–7.42)
PHOSPHATE SERPL-MCNC: 2.2 MG/DL — SIGNIFICANT CHANGE UP (ref 2.1–4.9)
PLATELET # BLD AUTO: 78 K/UL — LOW (ref 130–400)
PLATELET # BLD AUTO: 85 K/UL — LOW (ref 130–400)
PO2 BLDA: 98 MMHG — HIGH (ref 78–95)
POTASSIUM SERPL-MCNC: 3.5 MMOL/L — SIGNIFICANT CHANGE UP (ref 3.5–5)
POTASSIUM SERPL-MCNC: 4.2 MMOL/L — SIGNIFICANT CHANGE UP (ref 3.5–5)
POTASSIUM SERPL-SCNC: 3.5 MMOL/L — SIGNIFICANT CHANGE UP (ref 3.5–5)
POTASSIUM SERPL-SCNC: 4.2 MMOL/L — SIGNIFICANT CHANGE UP (ref 3.5–5)
PROPOXYPHENE QUALITATIVE URINE RESULT: NEGATIVE — SIGNIFICANT CHANGE UP
PROT SERPL-MCNC: 5.1 G/DL — LOW (ref 6–8)
PROT SERPL-MCNC: 5.2 G/DL — LOW (ref 6–8)
RBC # BLD: 2.74 M/UL — LOW (ref 4.7–6.1)
RBC # BLD: 2.81 M/UL — LOW (ref 4.7–6.1)
RBC # FLD: 11.9 % — SIGNIFICANT CHANGE UP (ref 11.5–14.5)
RBC # FLD: 12.1 % — SIGNIFICANT CHANGE UP (ref 11.5–14.5)
SAO2 % BLDA: 99 % — HIGH (ref 94–98)
SODIUM SERPL-SCNC: 136 MMOL/L — SIGNIFICANT CHANGE UP (ref 135–146)
SODIUM SERPL-SCNC: 140 MMOL/L — SIGNIFICANT CHANGE UP (ref 135–146)
THC UR QL: NEGATIVE — SIGNIFICANT CHANGE UP
WBC # BLD: 3.38 K/UL — LOW (ref 4.8–10.8)
WBC # BLD: 4.63 K/UL — LOW (ref 4.8–10.8)
WBC # FLD AUTO: 3.38 K/UL — LOW (ref 4.8–10.8)
WBC # FLD AUTO: 4.63 K/UL — LOW (ref 4.8–10.8)

## 2021-04-01 PROCEDURE — 71045 X-RAY EXAM CHEST 1 VIEW: CPT | Mod: 26

## 2021-04-01 PROCEDURE — 99232 SBSQ HOSP IP/OBS MODERATE 35: CPT

## 2021-04-01 PROCEDURE — 95720 EEG PHY/QHP EA INCR W/VEEG: CPT

## 2021-04-01 PROCEDURE — 71045 X-RAY EXAM CHEST 1 VIEW: CPT | Mod: 26,77

## 2021-04-01 RX ORDER — MAGNESIUM SULFATE 500 MG/ML
4 VIAL (ML) INJECTION ONCE
Refills: 0 | Status: DISCONTINUED | OUTPATIENT
Start: 2021-04-01 | End: 2021-04-01

## 2021-04-01 RX ORDER — SODIUM CHLORIDE 9 MG/ML
1000 INJECTION, SOLUTION INTRAVENOUS
Refills: 0 | Status: DISCONTINUED | OUTPATIENT
Start: 2021-04-01 | End: 2021-04-02

## 2021-04-01 RX ORDER — POLYETHYLENE GLYCOL 3350 17 G/17G
17 POWDER, FOR SOLUTION ORAL
Refills: 0 | Status: DISCONTINUED | OUTPATIENT
Start: 2021-04-01 | End: 2021-04-13

## 2021-04-01 RX ORDER — MAGNESIUM SULFATE 500 MG/ML
2 VIAL (ML) INJECTION ONCE
Refills: 0 | Status: DISCONTINUED | OUTPATIENT
Start: 2021-04-01 | End: 2021-04-01

## 2021-04-01 RX ORDER — MAGNESIUM SULFATE 500 MG/ML
2 VIAL (ML) INJECTION ONCE
Refills: 0 | Status: COMPLETED | OUTPATIENT
Start: 2021-04-01 | End: 2021-04-01

## 2021-04-01 RX ORDER — MAGNESIUM SULFATE 500 MG/ML
2 VIAL (ML) INJECTION
Refills: 0 | Status: COMPLETED | OUTPATIENT
Start: 2021-04-01 | End: 2021-04-01

## 2021-04-01 RX ORDER — SENNA PLUS 8.6 MG/1
2 TABLET ORAL AT BEDTIME
Refills: 0 | Status: DISCONTINUED | OUTPATIENT
Start: 2021-04-01 | End: 2021-04-13

## 2021-04-01 RX ADMIN — MAGNESIUM OXIDE 400 MG ORAL TABLET 400 MILLIGRAM(S): 241.3 TABLET ORAL at 06:22

## 2021-04-01 RX ADMIN — LEVETIRACETAM 400 MILLIGRAM(S): 250 TABLET, FILM COATED ORAL at 21:48

## 2021-04-01 RX ADMIN — PROPOFOL 10.2 MICROGRAM(S)/KG/MIN: 10 INJECTION, EMULSION INTRAVENOUS at 04:23

## 2021-04-01 RX ADMIN — AMPICILLIN SODIUM AND SULBACTAM SODIUM 200 GRAM(S): 250; 125 INJECTION, POWDER, FOR SUSPENSION INTRAMUSCULAR; INTRAVENOUS at 21:49

## 2021-04-01 RX ADMIN — MAGNESIUM OXIDE 400 MG ORAL TABLET 400 MILLIGRAM(S): 241.3 TABLET ORAL at 15:06

## 2021-04-01 RX ADMIN — AMPICILLIN SODIUM AND SULBACTAM SODIUM 200 GRAM(S): 250; 125 INJECTION, POWDER, FOR SUSPENSION INTRAMUSCULAR; INTRAVENOUS at 15:19

## 2021-04-01 RX ADMIN — Medication 1 MILLIGRAM(S): at 12:23

## 2021-04-01 RX ADMIN — Medication 2 MILLIGRAM(S): at 21:53

## 2021-04-01 RX ADMIN — AMPICILLIN SODIUM AND SULBACTAM SODIUM 200 GRAM(S): 250; 125 INJECTION, POWDER, FOR SUSPENSION INTRAMUSCULAR; INTRAVENOUS at 04:08

## 2021-04-01 RX ADMIN — SODIUM CHLORIDE 75 MILLILITER(S): 9 INJECTION, SOLUTION INTRAVENOUS at 09:04

## 2021-04-01 RX ADMIN — DEXMEDETOMIDINE HYDROCHLORIDE IN 0.9% SODIUM CHLORIDE 1.06 MICROGRAM(S)/KG/HR: 4 INJECTION INTRAVENOUS at 15:18

## 2021-04-01 RX ADMIN — PROPOFOL 10.2 MICROGRAM(S)/KG/MIN: 10 INJECTION, EMULSION INTRAVENOUS at 18:36

## 2021-04-01 RX ADMIN — POLYETHYLENE GLYCOL 3350 17 GRAM(S): 17 POWDER, FOR SOLUTION ORAL at 18:36

## 2021-04-01 RX ADMIN — DEXMEDETOMIDINE HYDROCHLORIDE IN 0.9% SODIUM CHLORIDE 1.06 MICROGRAM(S)/KG/HR: 4 INJECTION INTRAVENOUS at 12:07

## 2021-04-01 RX ADMIN — DEXMEDETOMIDINE HYDROCHLORIDE IN 0.9% SODIUM CHLORIDE 1.06 MICROGRAM(S)/KG/HR: 4 INJECTION INTRAVENOUS at 04:19

## 2021-04-01 RX ADMIN — SENNA PLUS 2 TABLET(S): 8.6 TABLET ORAL at 21:49

## 2021-04-01 RX ADMIN — DEXMEDETOMIDINE HYDROCHLORIDE IN 0.9% SODIUM CHLORIDE 1.06 MICROGRAM(S)/KG/HR: 4 INJECTION INTRAVENOUS at 18:36

## 2021-04-01 RX ADMIN — Medication 100 MILLIGRAM(S): at 15:07

## 2021-04-01 RX ADMIN — Medication 1 TABLET(S): at 15:06

## 2021-04-01 RX ADMIN — CHLORHEXIDINE GLUCONATE 15 MILLILITER(S): 213 SOLUTION TOPICAL at 06:21

## 2021-04-01 RX ADMIN — Medication 2 MILLIGRAM(S): at 18:36

## 2021-04-01 RX ADMIN — Medication 25 GRAM(S): at 12:25

## 2021-04-01 RX ADMIN — Medication 25 GRAM(S): at 08:55

## 2021-04-01 RX ADMIN — CHLORHEXIDINE GLUCONATE 1 APPLICATION(S): 213 SOLUTION TOPICAL at 06:21

## 2021-04-01 RX ADMIN — MAGNESIUM OXIDE 400 MG ORAL TABLET 400 MILLIGRAM(S): 241.3 TABLET ORAL at 21:49

## 2021-04-01 RX ADMIN — LEVETIRACETAM 400 MILLIGRAM(S): 250 TABLET, FILM COATED ORAL at 08:32

## 2021-04-01 RX ADMIN — DEXMEDETOMIDINE HYDROCHLORIDE IN 0.9% SODIUM CHLORIDE 1.06 MICROGRAM(S)/KG/HR: 4 INJECTION INTRAVENOUS at 08:32

## 2021-04-01 RX ADMIN — ENOXAPARIN SODIUM 40 MILLIGRAM(S): 100 INJECTION SUBCUTANEOUS at 12:23

## 2021-04-01 RX ADMIN — PROPOFOL 10.2 MICROGRAM(S)/KG/MIN: 10 INJECTION, EMULSION INTRAVENOUS at 08:33

## 2021-04-01 RX ADMIN — CHLORHEXIDINE GLUCONATE 15 MILLILITER(S): 213 SOLUTION TOPICAL at 18:36

## 2021-04-01 RX ADMIN — AMPICILLIN SODIUM AND SULBACTAM SODIUM 200 GRAM(S): 250; 125 INJECTION, POWDER, FOR SUSPENSION INTRAMUSCULAR; INTRAVENOUS at 09:44

## 2021-04-01 NOTE — PROGRESS NOTE ADULT - SUBJECTIVE AND OBJECTIVE BOX
Neurology Critical Care Progress Note     HPI:    46 y/o M PMHx of hyponatremia, hypomagnesemia, thrombocytopenia, and alcohol abuse (last drink friday 3/26)  and dependence who presents as a notification by EMS for status epilepticus. Patient had multiple seizures on 3/29. Patient was given Versed by EMS. Pt intubated for airway protection in ED. Pt noted to have laceration on tongue, repaired by OMFS on 3/29 and started on Unasyn. Per wife pt is non complaint with medications. He stopped his BP meds on his own 2 yrs ago. Pt was loaded with Keppra in ED on 3/29, and continues 1 gram BID. Pt admitted to critical care in Vent Unit. Fentanyl drip was stopped 3/31. Pt remains intubated on propofol and Precedex. Veeg done and completed 4/1. Concern over alcohol withdrawals, recommended to primary team to start CIWA protocol on 4/1.       Overnight events: None, video eeg disconnected. Negative for seizures. Pt remains intubated, on propofol an Precedex, currently following commands       PAST MEDICAL & SURGICAL HISTORY:  ETOH abuse    Hypomagnesemia    Seizure disorder    Current Medications:  ampicillin/sulbactam  IVPB 3 Gram(s) IV Intermittent every 6 hours  chlorhexidine 0.12% Liquid 15 milliLiter(s) Oral Mucosa every 12 hours  chlorhexidine 4% Liquid 1 Application(s) Topical <User Schedule>  dexMEDEtomidine Infusion 0.05 MICROgram(s)/kG/Hr IV Continuous <Continuous>  dextrose 5% + lactated ringers. 1000 milliLiter(s) IV Continuous <Continuous>  enoxaparin Injectable 40 milliGRAM(s) SubCutaneous daily  fentaNYL    Injectable 50 MICROGram(s) IV Push once  fentaNYL   Infusion. 0.5 MICROgram(s)/kG/Hr IV Continuous <Continuous>  folic acid Injectable 1 milliGRAM(s) IV Push daily  lactated ringers. 1000 milliLiter(s) IV Continuous <Continuous>  levETIRAcetam  IVPB 1000 milliGRAM(s) IV Intermittent every 12 hours  magnesium oxide 400 milliGRAM(s) Oral every 8 hours  multivitamin 1 Tablet(s) Oral daily  norepinephrine Infusion 0.05 MICROgram(s)/kG/Min IV Continuous <Continuous>  polyethylene glycol 3350 17 Gram(s) Oral two times a day  propofol Infusion 20 MICROgram(s)/kG/Min IV Continuous <Continuous>  senna 2 Tablet(s) Oral at bedtime  thiamine Injectable 100 milliGRAM(s) IV Push daily          T(F): 100 (04-01-21 @ 03:00), Max: 100 (04-01-21 @ 00:00)  HR: 79 (04-01-21 @ 12:00) (67 - 79)  BP: 134/83 (04-01-21 @ 12:00) (114/76 - 139/84)  RR: 12 (04-01-21 @ 12:00) (12 - 18)  SpO2: 100% (04-01-21 @ 09:58) (100% - 100%)                          9.4    4.63  )-----------( 78       ( 01 Apr 2021 05:40 )             26.3     04-01    140  |  101  |  4<L>  ----------------------------<  92  4.2   |  23  |  0.6<L>    Ca    6.8<L>      01 Apr 2021 05:40  Mg     1.2     04-01    TPro  5.1<L>  /  Alb  2.8<L>  /  TBili  1.0  /  DBili  x   /  AST  39  /  ALT  27  /  AlkPhos  72  04-01        LIVER FUNCTIONS - ( 01 Apr 2021 05:40 )  Alb: 2.8 g/dL / Pro: 5.1 g/dL / ALK PHOS: 72 U/L / ALT: 27 U/L / AST: 39 U/L / GGT: x           CARDIAC MARKERS ( 01 Apr 2021 11:01 )  x     / x     / 270 U/L / x     / x              I&O's Detail    31 Mar 2021 07:01  -  01 Apr 2021 07:00  --------------------------------------------------------  IN:    Dexmedetomidine: 534 mL    FentaNYL: 5.2 mL    IV PiggyBack: 850 mL    Lactated Ringers: 1650 mL    Propofol: 422.2 mL  Total IN: 3461.4 mL    OUT:    Estimated Blood Loss (mL): 20 mL    Indwelling Catheter - Urethral (mL): 2675 mL    Norepinephrine: 0 mL  Total OUT: 2695 mL    Total NET: 766.4 mL      01 Apr 2021 07:01  -  01 Apr 2021 13:23  --------------------------------------------------------  IN:    Dexmedetomidine: 90 mL    dextrose 5% + lactated ringers: 150 mL    Lactated Ringers: 75 mL    Propofol: 12 mL  Total IN: 327 mL    OUT:  Total OUT: 0 mL    Total NET: 327 mL      Neuro Exam:    Neurological:  Mental Status: Pt remains intubated but alert. Following command. Pt able to squeeze both hands, lift b/l arm and lower extremities. Pt also attempting to mouth words over ETT tube   Cranial Nerves:  II, III, IV, VI: PERRLA, EOM intact. Bobo intact by confrontation. No cranial nerve palsy or nystagmus noted.  V: corneal reflex intact bilaterally  VII: differed   VIII: differed  IX and X: gag intact  XI: trapezius strength intact bilateral  XII: differed  Motor: Strength 4/5 upper & 4/5 lower extremities   Sensory: Sensation to light touch, pain, vibration, proprioception, and stereognosis intact to trunk and bilaterally to both upper and lower extremities. Normal two point discrimination.   Cerebellar Function: differed  Reflexes: No clonus      Last CTH:    CT Head No Cont (03.29.21 @ 19:47)   IMPRESSION:    No evidence for intracranial hemorrhage, midline shift, or mass effect.    Small right frontal extracalvarial hematoma.    Marked nasopharyngeal edema.    Last EEG:    EEG w/ Video Each 12-26 Hours, Unmonitored (03.31.21 @ 09:00)     Impression  Abnormal due to presence of diffuse attenuation evolving into generalized slowing.      Clinical Correlation  Findings consistent with diffuse cerebral dysfunction. Cannot rule out effect of sedating medication.    Discussed with Neurology, Dr. Padron        ABG:ABG - ( 31 Mar 2021 03:50 )  pH, Arterial: 7.43  pH, Blood: x     /  pCO2: 44    /  pO2: 142   / HCO3: 29    / Base Excess: 4.5   /  SaO2: 99              Mode: AC/ CMV (Assist Control/ Continuous Mandatory Ventilation)  RR (machine): 15  TV (machine): 450  FiO2: 30  PEEP: 5  ITime: 1  MAP: 8  PIP: 15

## 2021-04-01 NOTE — PROGRESS NOTE ADULT - ASSESSMENT
IMPRESSION:  Status epilepticus sp intubation for airway protection  ETOH withdrawal / DT's   Tongue Laceration s/p repair  H/o of seizures   H/o HTN  Hypomagnesemia  Thrombocytopenia, likely 2/2 ETOH    PLAN:    CNS: Spontaneous awakening trial in AM. Fu Neuro. c/w Keppra . Repeat UDS, SDS. Decrease propofol to 30 and assess mental status. FU VEEG.     HEENT: Oral care, f/u OMFS for tongue lac.     PULMONARY:  HOB @ 45 degrees.  Vent changes as follows:Fio2  TO keep Sao2 92 to 96%, SBT    CARDIOVASCULAR: IVF     GI: GI prophylaxis.  Feeding   Bowel regimen     RENAL:  Follow up lytes.  Correct as needed    INFECTIOUS DISEASE: Follow up cultures, UNASYN    HEMATOLOGICAL:  DVT prophylaxis. send blood smear    ENDOCRINE:  Follow up FS.  Insulin protocol if needed.    MUSCULOSKELETAL: Bedrest.     Full Code   MICU  IMPRESSION:    Status epilepticus sp intubation for airway protection  possible aspiration pneumonia  ETOH withdrawal / DT's   Tongue Laceration s/p repair  H/o of seizures   H/o HTN  Hypomagnesemia  Thrombocytopenia    PLAN:    CNS: Spontaneous awakening trial in AM. Fu Neuro. c/w Keppra . Decrease propofol to 30 and assess mental status. FU VEEG.     HEENT: Oral care, f/u OMFS for tongue lac.     PULMONARY:  HOB @ 45 degrees.  Vent changes as follows:Fio2  TO keep Sao2 92 to 96%, SBT    CARDIOVASCULAR: IVF     GI: GI prophylaxis.  Feeding   Bowel regimen     RENAL:  Follow up lytes.  Correct as needed, give Mg    INFECTIOUS DISEASE: Follow up cultures, abx    HEMATOLOGICAL:  DVT prophylaxis. send blood smear, repeat CBC    ENDOCRINE:  Follow up FS.  Insulin protocol if needed.    MUSCULOSKELETAL: Bedrest.     Full Code   MICU

## 2021-04-01 NOTE — PROGRESS NOTE ADULT - ASSESSMENT
Assessment: 46 y/o M PMHx of hyponatremia, hypomagnesemia, thrombocytopenia, and alcohol abuse who presented  for status epilepticus. Patient had multiple seizures at home and bit his tongue during his seizure with brijesh bleeding and laceration needing sutures. CTH and C-spine imaging negative for acute findings. Magnesium and Potassium and calcium found to be critically low. No seizures were reported on 3/31 video EEG.     Plan:  #Neuro:  - Seizure precautions  - Continue Keppra 1gm IV q12hrs  - Neuro checks q1 hr  -Keep magnesium levels >2 and <2.5  -Continue Thiamine and folate  - Continue weaning down the propofol by coming down 2mcg/kg per hour until completely off (currently at 20 mcg/kg/min)   - Utilize fentanyl IV pushes for ventilator synchrony or increase Precedex drip  - Initiate CIWA protocol  -Call for seizures    #CV:  - Keep normotensive    #Resp:  - Ventilator management as per primary team  - HOB > 35 degrees  - Aspiration precautions  -SBTs trials (today tolerating for 5-10 minutes, will attempt more time on pressure support when off propofol)    #Renal /Fluid/ Electrolytes  - Strict I/Os  - Keep Magnesium level > 2  - Monitor lytes, replete as needed    #GI:  - Tube feeds per primary team    #Heme/Onc:  - SCS while in bed  - Lovenox 40 mg daily    #ID:  - Keep normothermic; avoid fevers      Alyson Arcos NP   #[id number]

## 2021-04-01 NOTE — PROGRESS NOTE ADULT - SUBJECTIVE AND OBJECTIVE BOX
Patient is a 45y old  Male who presents with a chief complaint of siezures (31 Mar 2021 16:54)        Over Night Events:  Remains on mechanical ventilation. Propofol at 40, precedex-1.5.       ROS:     All pertinent ROS are negative except HPI         PHYSICAL EXAM    ICU Vital Signs Last 24 Hrs  T(C): 37.8 (01 Apr 2021 03:00), Max: 37.8 (01 Apr 2021 00:00)  T(F): 100 (01 Apr 2021 03:00), Max: 100 (01 Apr 2021 00:00)  HR: 77 (01 Apr 2021 07:00) (67 - 82)  BP: 126/80 (01 Apr 2021 07:00) (102/63 - 128/79)  BP(mean): 99 (01 Apr 2021 07:00) (78 - 101)  RR: 18 (01 Apr 2021 07:00) (14 - 18)  SpO2: 100% (01 Apr 2021 07:00) (100% - 100%)    CONSTITUTIONAL:   Ill appearing. NAD    ENT:   Airway patent,   Mouth with normal mucosa.   No thrush    EYES:   Pupils equal,   Round and reactive to light.    CARDIAC:   Normal rate,   Regular rhythm.    No edema    RESPIRATORY:   ET+  Inspection- normal chest wall symmetry  Palpation- normal chest wall expansion  Auscultation- clear to auscultation bilaterally   No wheezing  Bilateral BS  Normal chest expansion  Not tachypneic,  No use of accessory muscles    GASTROINTESTINAL:  Abdomen soft,   Non-tender,   No guarding,   + BS    MUSCULOSKELETAL:   Range of motion is not limited,  No clubbing, cyanosis    NEUROLOGICAL:   Sedated   No motor  deficits.  pertinent DTRs normal    SKIN:   Skin normal color for race,   Warm and dry  No evidence of rash.    PSYCHIATRIC:   Sedated  No apparent risk to self or others.      03-31-21 @ 07:01  -  04-01-21 @ 07:00  --------------------------------------------------------  IN:    Dexmedetomidine: 534 mL    FentaNYL: 5.2 mL    IV PiggyBack: 850 mL    Lactated Ringers: 1650 mL    Propofol: 422.2 mL  Total IN: 3461.4 mL    OUT:    Estimated Blood Loss (mL): 20 mL    Indwelling Catheter - Urethral (mL): 2675 mL    Norepinephrine: 0 mL  Total OUT: 2695 mL    Total NET: 766.4 mL          LABS:                            9.4    4.63  )-----------( 78       ( 01 Apr 2021 05:40 )             26.3                                               04-01    140  |  101  |  4<L>  ----------------------------<  92  4.2   |  23  |  0.6<L>    Ca    6.8<L>      01 Apr 2021 05:40  Mg     1.2     04-01    TPro  5.1<L>  /  Alb  2.8<L>  /  TBili  1.0  /  DBili  x   /  AST  39  /  ALT  27  /  AlkPhos  72  04-01                                                                                           LIVER FUNCTIONS - ( 01 Apr 2021 05:40 )  Alb: 2.8 g/dL / Pro: 5.1 g/dL / ALK PHOS: 72 U/L / ALT: 27 U/L / AST: 39 U/L / GGT: x                                                                                               Mode: AC/ CMV (Assist Control/ Continuous Mandatory Ventilation)  RR (machine): 15  TV (machine): 400  FiO2: 40  PEEP: 5  ITime: 1  MAP: 8  PIP: 11                                      ABG - ( 31 Mar 2021 03:50 )  pH, Arterial: 7.43  pH, Blood: x     /  pCO2: 44    /  pO2: 142   / HCO3: 29    / Base Excess: 4.5   /  SaO2: 99                  MEDICATIONS  (STANDING):  ampicillin/sulbactam  IVPB 3 Gram(s) IV Intermittent every 6 hours  chlorhexidine 0.12% Liquid 15 milliLiter(s) Oral Mucosa every 12 hours  chlorhexidine 4% Liquid 1 Application(s) Topical <User Schedule>  dexMEDEtomidine Infusion 0.05 MICROgram(s)/kG/Hr (1.06 mL/Hr) IV Continuous <Continuous>  enoxaparin Injectable 40 milliGRAM(s) SubCutaneous daily  fentaNYL    Injectable 50 MICROGram(s) IV Push once  fentaNYL   Infusion. 0.5 MICROgram(s)/kG/Hr (4.25 mL/Hr) IV Continuous <Continuous>  folic acid Injectable 1 milliGRAM(s) IV Push daily  lactated ringers. 1000 milliLiter(s) (75 mL/Hr) IV Continuous <Continuous>  levETIRAcetam  IVPB 1000 milliGRAM(s) IV Intermittent every 12 hours  magnesium oxide 400 milliGRAM(s) Oral every 8 hours  magnesium sulfate  IVPB 4 Gram(s) IV Intermittent once  norepinephrine Infusion 0.05 MICROgram(s)/kG/Min (3.98 mL/Hr) IV Continuous <Continuous>  propofol Infusion 20 MICROgram(s)/kG/Min (10.2 mL/Hr) IV Continuous <Continuous>  thiamine Injectable 100 milliGRAM(s) IV Push daily    MEDICATIONS  (PRN):      New X-rays reviewed:                                                                                  ECHO not done    CXR interpreted by me:  stable     Patient is a 45y old  Male who presents with a chief complaint of siezures (31 Mar 2021 16:54)        Over Night Events:  Remains on mechanical ventilation. Propofol at 40, precedex-1.5. no fentanyl          PHYSICAL EXAM    ICU Vital Signs Last 24 Hrs  T(C): 37.8 (01 Apr 2021 03:00), Max: 37.8 (01 Apr 2021 00:00)  T(F): 100 (01 Apr 2021 03:00), Max: 100 (01 Apr 2021 00:00)  HR: 77 (01 Apr 2021 07:00) (67 - 82)  BP: 126/80 (01 Apr 2021 07:00) (102/63 - 128/79)  BP(mean): 99 (01 Apr 2021 07:00) (78 - 101)  RR: 18 (01 Apr 2021 07:00) (14 - 18)  SpO2: 100% (01 Apr 2021 07:00) (100% - 100%)    CONSTITUTIONAL:   Ill appearing.      ENT:   large tongue laceration    EYES:   Pupils equal,   Round and reactive to light.    CARDIAC:   Normal rate,   Regular rhythm.    No edema    RESPIRATORY:   ET+  dec bs both bases    GASTROINTESTINAL:  Abdomen soft,   Non-tender,   No guarding,   + BS    MUSCULOSKELETAL:   Range of motion is not limited,  No clubbing, cyanosis    NEUROLOGICAL:   Sedated   Non focal          03-31-21 @ 07:01  -  04-01-21 @ 07:00  --------------------------------------------------------  IN:    Dexmedetomidine: 534 mL    FentaNYL: 5.2 mL    IV PiggyBack: 850 mL    Lactated Ringers: 1650 mL    Propofol: 422.2 mL  Total IN: 3461.4 mL    OUT:    Estimated Blood Loss (mL): 20 mL    Indwelling Catheter - Urethral (mL): 2675 mL    Norepinephrine: 0 mL  Total OUT: 2695 mL    Total NET: 766.4 mL          LABS:                            9.4    4.63  )-----------( 78       ( 01 Apr 2021 05:40 )             26.3                                               04-01    140  |  101  |  4<L>  ----------------------------<  92  4.2   |  23  |  0.6<L>    Ca    6.8<L>      01 Apr 2021 05:40  Mg     1.2     04-01    TPro  5.1<L>  /  Alb  2.8<L>  /  TBili  1.0  /  DBili  x   /  AST  39  /  ALT  27  /  AlkPhos  72  04-01                                                                                           LIVER FUNCTIONS - ( 01 Apr 2021 05:40 )  Alb: 2.8 g/dL / Pro: 5.1 g/dL / ALK PHOS: 72 U/L / ALT: 27 U/L / AST: 39 U/L / GGT: x                                                                                               Mode: AC/ CMV (Assist Control/ Continuous Mandatory Ventilation)  RR (machine): 15  TV (machine): 400  FiO2: 40  PEEP: 5  ITime: 1  MAP: 8  PIP: 11                                      ABG - ( 31 Mar 2021 03:50 )  pH, Arterial: 7.43  pH, Blood: x     /  pCO2: 44    /  pO2: 142   / HCO3: 29    / Base Excess: 4.5   /  SaO2: 99                  MEDICATIONS  (STANDING):  ampicillin/sulbactam  IVPB 3 Gram(s) IV Intermittent every 6 hours  chlorhexidine 0.12% Liquid 15 milliLiter(s) Oral Mucosa every 12 hours  chlorhexidine 4% Liquid 1 Application(s) Topical <User Schedule>  dexMEDEtomidine Infusion 0.05 MICROgram(s)/kG/Hr (1.06 mL/Hr) IV Continuous <Continuous>  enoxaparin Injectable 40 milliGRAM(s) SubCutaneous daily  fentaNYL    Injectable 50 MICROGram(s) IV Push once  fentaNYL   Infusion. 0.5 MICROgram(s)/kG/Hr (4.25 mL/Hr) IV Continuous <Continuous>  folic acid Injectable 1 milliGRAM(s) IV Push daily  lactated ringers. 1000 milliLiter(s) (75 mL/Hr) IV Continuous <Continuous>  levETIRAcetam  IVPB 1000 milliGRAM(s) IV Intermittent every 12 hours  magnesium oxide 400 milliGRAM(s) Oral every 8 hours  magnesium sulfate  IVPB 4 Gram(s) IV Intermittent once  norepinephrine Infusion 0.05 MICROgram(s)/kG/Min (3.98 mL/Hr) IV Continuous <Continuous>  propofol Infusion 20 MICROgram(s)/kG/Min (10.2 mL/Hr) IV Continuous <Continuous>  thiamine Injectable 100 milliGRAM(s) IV Push daily      CXR interpreted by me:  reviewed

## 2021-04-02 LAB
ALBUMIN SERPL ELPH-MCNC: 2.9 G/DL — LOW (ref 3.5–5.2)
ALP SERPL-CCNC: 80 U/L — SIGNIFICANT CHANGE UP (ref 30–115)
ALT FLD-CCNC: 21 U/L — SIGNIFICANT CHANGE UP (ref 0–41)
ANION GAP SERPL CALC-SCNC: 10 MMOL/L — SIGNIFICANT CHANGE UP (ref 7–14)
AST SERPL-CCNC: 29 U/L — SIGNIFICANT CHANGE UP (ref 0–41)
BASOPHILS # BLD AUTO: 0.01 K/UL — SIGNIFICANT CHANGE UP (ref 0–0.2)
BASOPHILS NFR BLD AUTO: 0.3 % — SIGNIFICANT CHANGE UP (ref 0–1)
BILIRUB SERPL-MCNC: 1.3 MG/DL — HIGH (ref 0.2–1.2)
BUN SERPL-MCNC: <3 MG/DL — LOW (ref 10–20)
CALCIUM SERPL-MCNC: 6.7 MG/DL — LOW (ref 8.5–10.1)
CHLORIDE SERPL-SCNC: 101 MMOL/L — SIGNIFICANT CHANGE UP (ref 98–110)
CO2 SERPL-SCNC: 26 MMOL/L — SIGNIFICANT CHANGE UP (ref 17–32)
CREAT SERPL-MCNC: 0.6 MG/DL — LOW (ref 0.7–1.5)
EOSINOPHIL # BLD AUTO: 0.06 K/UL — SIGNIFICANT CHANGE UP (ref 0–0.7)
EOSINOPHIL NFR BLD AUTO: 1.8 % — SIGNIFICANT CHANGE UP (ref 0–8)
GLUCOSE SERPL-MCNC: 112 MG/DL — HIGH (ref 70–99)
HCT VFR BLD CALC: 25.9 % — LOW (ref 42–52)
HGB BLD-MCNC: 9 G/DL — LOW (ref 14–18)
IMM GRANULOCYTES NFR BLD AUTO: 0.6 % — HIGH (ref 0.1–0.3)
LYMPHOCYTES # BLD AUTO: 0.73 K/UL — LOW (ref 1.2–3.4)
LYMPHOCYTES # BLD AUTO: 22.3 % — SIGNIFICANT CHANGE UP (ref 20.5–51.1)
MAGNESIUM SERPL-MCNC: 1.7 MG/DL — LOW (ref 1.8–2.4)
MCHC RBC-ENTMCNC: 32 PG — HIGH (ref 27–31)
MCHC RBC-ENTMCNC: 34.7 G/DL — SIGNIFICANT CHANGE UP (ref 32–37)
MCV RBC AUTO: 92.2 FL — SIGNIFICANT CHANGE UP (ref 80–94)
MONOCYTES # BLD AUTO: 0.65 K/UL — HIGH (ref 0.1–0.6)
MONOCYTES NFR BLD AUTO: 19.9 % — HIGH (ref 1.7–9.3)
NEUTROPHILS # BLD AUTO: 1.8 K/UL — SIGNIFICANT CHANGE UP (ref 1.4–6.5)
NEUTROPHILS NFR BLD AUTO: 55.1 % — SIGNIFICANT CHANGE UP (ref 42.2–75.2)
NRBC # BLD: 0 /100 WBCS — SIGNIFICANT CHANGE UP (ref 0–0)
PLATELET # BLD AUTO: 94 K/UL — LOW (ref 130–400)
POTASSIUM SERPL-MCNC: 3.4 MMOL/L — LOW (ref 3.5–5)
POTASSIUM SERPL-SCNC: 3.4 MMOL/L — LOW (ref 3.5–5)
PROT SERPL-MCNC: 5.2 G/DL — LOW (ref 6–8)
RBC # BLD: 2.81 M/UL — LOW (ref 4.7–6.1)
RBC # FLD: 11.8 % — SIGNIFICANT CHANGE UP (ref 11.5–14.5)
SODIUM SERPL-SCNC: 137 MMOL/L — SIGNIFICANT CHANGE UP (ref 135–146)
WBC # BLD: 3.27 K/UL — LOW (ref 4.8–10.8)
WBC # FLD AUTO: 3.27 K/UL — LOW (ref 4.8–10.8)

## 2021-04-02 PROCEDURE — 93306 TTE W/DOPPLER COMPLETE: CPT | Mod: 26

## 2021-04-02 PROCEDURE — 71045 X-RAY EXAM CHEST 1 VIEW: CPT | Mod: 26

## 2021-04-02 PROCEDURE — 99232 SBSQ HOSP IP/OBS MODERATE 35: CPT

## 2021-04-02 RX ORDER — POTASSIUM CHLORIDE 20 MEQ
40 PACKET (EA) ORAL ONCE
Refills: 0 | Status: COMPLETED | OUTPATIENT
Start: 2021-04-02 | End: 2021-04-02

## 2021-04-02 RX ORDER — MAGNESIUM SULFATE 500 MG/ML
2 VIAL (ML) INJECTION ONCE
Refills: 0 | Status: COMPLETED | OUTPATIENT
Start: 2021-04-02 | End: 2021-04-02

## 2021-04-02 RX ADMIN — AMPICILLIN SODIUM AND SULBACTAM SODIUM 200 GRAM(S): 250; 125 INJECTION, POWDER, FOR SUSPENSION INTRAMUSCULAR; INTRAVENOUS at 03:42

## 2021-04-02 RX ADMIN — Medication 1 TABLET(S): at 12:37

## 2021-04-02 RX ADMIN — MAGNESIUM OXIDE 400 MG ORAL TABLET 400 MILLIGRAM(S): 241.3 TABLET ORAL at 05:07

## 2021-04-02 RX ADMIN — Medication 1.5 MILLIGRAM(S): at 21:48

## 2021-04-02 RX ADMIN — LEVETIRACETAM 400 MILLIGRAM(S): 250 TABLET, FILM COATED ORAL at 09:19

## 2021-04-02 RX ADMIN — Medication 1.5 MILLIGRAM(S): at 18:23

## 2021-04-02 RX ADMIN — Medication 2 MILLIGRAM(S): at 05:44

## 2021-04-02 RX ADMIN — CHLORHEXIDINE GLUCONATE 15 MILLILITER(S): 213 SOLUTION TOPICAL at 05:06

## 2021-04-02 RX ADMIN — DEXMEDETOMIDINE HYDROCHLORIDE IN 0.9% SODIUM CHLORIDE 1.06 MICROGRAM(S)/KG/HR: 4 INJECTION INTRAVENOUS at 05:05

## 2021-04-02 RX ADMIN — AMPICILLIN SODIUM AND SULBACTAM SODIUM 200 GRAM(S): 250; 125 INJECTION, POWDER, FOR SUSPENSION INTRAMUSCULAR; INTRAVENOUS at 16:51

## 2021-04-02 RX ADMIN — Medication 100 MILLIGRAM(S): at 12:36

## 2021-04-02 RX ADMIN — CHLORHEXIDINE GLUCONATE 1 APPLICATION(S): 213 SOLUTION TOPICAL at 05:06

## 2021-04-02 RX ADMIN — ENOXAPARIN SODIUM 40 MILLIGRAM(S): 100 INJECTION SUBCUTANEOUS at 12:52

## 2021-04-02 RX ADMIN — DEXMEDETOMIDINE HYDROCHLORIDE IN 0.9% SODIUM CHLORIDE 1.06 MICROGRAM(S)/KG/HR: 4 INJECTION INTRAVENOUS at 21:12

## 2021-04-02 RX ADMIN — LEVETIRACETAM 400 MILLIGRAM(S): 250 TABLET, FILM COATED ORAL at 21:12

## 2021-04-02 RX ADMIN — Medication 1 MILLIGRAM(S): at 12:37

## 2021-04-02 RX ADMIN — POLYETHYLENE GLYCOL 3350 17 GRAM(S): 17 POWDER, FOR SOLUTION ORAL at 05:08

## 2021-04-02 RX ADMIN — Medication 2 MILLIGRAM(S): at 03:40

## 2021-04-02 RX ADMIN — AMPICILLIN SODIUM AND SULBACTAM SODIUM 200 GRAM(S): 250; 125 INJECTION, POWDER, FOR SUSPENSION INTRAMUSCULAR; INTRAVENOUS at 11:01

## 2021-04-02 RX ADMIN — Medication 50 GRAM(S): at 09:26

## 2021-04-02 RX ADMIN — Medication 20 MILLIEQUIVALENT(S): at 09:25

## 2021-04-02 RX ADMIN — CHLORHEXIDINE GLUCONATE 15 MILLILITER(S): 213 SOLUTION TOPICAL at 18:23

## 2021-04-02 RX ADMIN — AMPICILLIN SODIUM AND SULBACTAM SODIUM 200 GRAM(S): 250; 125 INJECTION, POWDER, FOR SUSPENSION INTRAMUSCULAR; INTRAVENOUS at 21:48

## 2021-04-02 RX ADMIN — Medication 2 MILLIGRAM(S): at 10:55

## 2021-04-02 NOTE — PROGRESS NOTE ADULT - ASSESSMENT
IMPRESSION:    Status epilepticus sp intubation for airway protection  possible aspiration pneumonia  ETOH withdrawal / DT's   Tongue Laceration s/p repair  H/o of seizures   H/o HTN  Hypomagnesemia, hypokalemia  Thrombocytopenia likely 2/2 ETOH, improving    PLAN:    CNS: Spontaneous awakening trial in AM. JAMES Neuro. c/w Keppra.      HEENT: Oral care, f/u OMFS for tongue lac.     PULMONARY:  HOB @ 45 degrees.  Vent changes as follows: none TO keep Sao2 92 to 96%, SBT on precedex.    CARDIOVASCULAR: DC IVF    GI: GI prophylaxis. NPO for now for SBT and possible extubation today.     RENAL:  Follow up lytes. Correct as needed, replete Mg and K.    INFECTIOUS DISEASE: Follow up cultures, abx    HEMATOLOGICAL:  DVT prophylaxis.    ENDOCRINE:  Follow up FS.     MUSCULOSKELETAL: Bedrest.     Full Code     MICU monitoring for now IMPRESSION:    Status epilepticus sp intubation for airway protection  possible aspiration pneumonia  ETOH withdrawal / DT's   Tongue Laceration s/p repair  H/o of seizures   H/o HTN  Hypomagnesemia, hypokalemia  pancytopenia    PLAN:    CNS: Spontaneous awakening trial in AM. JAMES Neuro. c/w Keppra.      HEENT: Oral care, f/u OMFS for tongue lac.     PULMONARY:  HOB @ 45 degrees.  Vent changes as follows: none TO keep Sao2 92 to 96%, SBT on precedex.    CARDIOVASCULAR: DC IVF    GI: GI prophylaxis. NPO for now for SBT and possible extubation today.     RENAL:  Follow up lytes. Correct as needed, replete Mg and K.    INFECTIOUS DISEASE: Follow up cultures, abx    HEMATOLOGICAL:  DVT prophylaxis.    ENDOCRINE:  Follow up FS.     MUSCULOSKELETAL: Bedrest.     Full Code     MICU monitoring for now

## 2021-04-02 NOTE — PROGRESS NOTE ADULT - ASSESSMENT
ASSESSMENT  Status epilepticus sp intubation for airway protection  possible aspiration pneumonia  ETOH withdrawal / DT's   Tongue Laceration s/p repair  H/o of seizures   H/o HTN  Hypomagnesemia, hypokalemia  pancytopenia    PLAN:   NPO for now for SBT and possible extubation   -check vitamin D level       ASSESSMENT  Status epilepticus sp intubation for airway protection  possible aspiration pneumonia  ETOH withdrawal / DT's   Tongue Laceration s/p repair  H/o of seizures   H/o HTN  Hypomagnesemia, hypokalemia, hypophosphatemia  pancytopenia    PLAN:   NPO for now for SBT and possible extubation   correct K  and especially phos prior to attempting extubation  suspect poor po nutrient intake (except for alcohol calories) - particularly suspect inadequate protein, note BUN - and therefore refeeding syndrome electrolyte disturbances may be severe  if pt not extubated today, start Osmolite 1.5 at 25 ml/h and after tolerated x 4 h - and K and phos given - increase to goal 50 ml/h AND 3 Prosource TF per day  --> 108 gm protein and 1895 k/d

## 2021-04-02 NOTE — PROGRESS NOTE ADULT - ASSESSMENT
OMFS Consult F/U Note    45M w/ PMHx of alcohol abuse and dependence sustaining significant tongue bleed 2/2 to occlusal trauma from status epilepticus episode.    Patient seen at bedside this AM. NAEON. No oozing from mouth overnight per nurse. He remains intubated and sedated.      PE:    ICU Vital Signs Last 24 Hrs  T(C): 36.3 (02 Apr 2021 04:00), Max: 37.1 (01 Apr 2021 12:00)  T(F): 97.4 (02 Apr 2021 04:00), Max: 98.8 (01 Apr 2021 12:00)  HR: 88 (02 Apr 2021 09:00) (64 - 88)  BP: 157/95 (02 Apr 2021 09:00) (122/88 - 157/95)  BP(mean): 121 (02 Apr 2021 09:00) (75 - 121)  ABP: --  ABP(mean): --  RR: 15 (02 Apr 2021 09:00) (12 - 24)  SpO2: 100% (02 Apr 2021 09:00) (98% - 100%)      GEN - Intubated and sedated  HEAD - NCAT, no lesions or lacerations  EYES - unable to assess CN II, II, VI  function due to sedation, no periorbital edema or ecchymosis, no bony steps  EARS - no drainage, no chirinos signs  NOSE - no septal hematomas, no nasal deviation or bony crepitus of nasal bridge  FACE - unable to assess CN V and VII function due to sedation, no lesions or lacerations  ORAL - ventral and lateral tongue wounds hemostatic, sutures intact, tongue edematous (improving)  NECK - neck is soft, FROM    Labs:                        9.0    3.27  )-----------( 94       ( 02 Apr 2021 04:55 )             25.9     04-02    137  |  101  |  <3<L>  ----------------------------<  112<H>  3.4<L>   |  26  |  0.6<L>    Ca    6.7<L>      02 Apr 2021 04:55  Phos  2.2     04-01  Mg     1.7     04-02    TPro  5.2<L>  /  Alb  2.9<L>  /  TBili  1.3<H>  /  DBili  x   /  AST  29  /  ALT  21  /  AlkPhos  80  04-02                 *ASSESSMENT:  45M w/ PMHx of alcohol abuse and dependence sustaining significant tongue bleed 2/2 to occlusal trauma from status epilepticus episode. Tongue bleed currently stable. Tongue edema resolving.    Recs:    - Unasyn 3g IV q6h x 7 days  - yankaur suction at bedside  - NPO/IVF for now  - OMFS to continue following for evaluation of tongue  - Please call dental/OMFS at [phone] with any questions or concerns, thank you      Willie Marquez DMD, MD

## 2021-04-02 NOTE — PROGRESS NOTE ADULT - SUBJECTIVE AND OBJECTIVE BOX
46 y/o M PMHx of hyponatremia, hypomagnesemia, thrombocytopenia, and alcohol abuse and dependence who presents as a notification by EMS for status epilepticus. Patient had multiple seizures today and bit his tongue during his seizure with EMS. Patient assessed in Ed with diffuse tongue bleeding. Patient was given Versed by EMS and was not awake or protecting his airway. O2 saturation temporarily 82% and patient had NRB mask and was passively oxygenated to 100%. Patient pre-oxygenated and had emergent RSI for airway protection. After intubation, tongue pulled from mouth and he had a very deep and large laceration with persistent bleeding. Sutures placed and later redone by OMFS team post CT. CTH and C-spine negative. Per wife patient had binge last week and his last drink was on Friday. His last seizure was 2 years ago and tenants report he likely had a seizure in the morning as well. Patient sedated on Fentanyl and propofol in ED, and s/p IV 4mg Ativan, 2000mg Keppra,  40meq K+ powder via NGT, and 3 20meq K riders. 4Gm Mg and 2Gm Calcium gluconate and 1L LR and 500mg thiamine IVPB. Per wife pt not taking any medications, stopped his BP meds on his own 2 yrs ago. admitted to MICU, on propofol, precedex, fentanyL, Levo,  (29 Mar 2021 21:28)      PAST MEDICAL & SURGICAL HISTORY:  ETOH abuse  Hypomagnesemia  Seizure disorder     ICU Vital Signs Last 24 Hrs  T(C): 36.3 (02 Apr 2021 04:00), Max: 37.1 (01 Apr 2021 20:00)  T(F): 97.4 (02 Apr 2021 04:00), Max: 98.8 (01 Apr 2021 20:00)  HR: 99 (02 Apr 2021 13:00) (64 - 99)  BP: 122/79 (02 Apr 2021 13:00) (122/79 - 157/95)  BP(mean): 96 (02 Apr 2021 13:00) (75 - 121)  RR: 15 (02 Apr 2021 13:00) (12 - 24)  SpO2: 100% (02 Apr 2021 13:00) (98% - 100%)  Height (cm): 177.8 (03-29-21 @ 17:56)  Weight (kg): 85 (03-29-21 @ 17:56)  BMI (kg/m2): 26.9 (03-29-21 @ 17:56)  BSA (m2): 2.03 (03-29-21 @ 17:56)    01 Apr 2021 07:01  -  02 Apr 2021 07:00  --------------------------------------------------------  IN:    Dexmedetomidine: 686 mL    dextrose 5% + lactated ringers: 600 mL    Free Water: 50 mL    Lactated Ringers: 1050 mL    Propofol: 133.6 mL    Vital HN: 720 mL  Total IN: 3239.6 mL    OUT:    Indwelling Catheter - Urethral (mL): 2560 mL  Total OUT: 2560 mL    Total NET: 679.6 mL      02 Apr 2021 07:01  -  02 Apr 2021 13:51  --------------------------------------------------------  IN:    Dexmedetomidine: 138 mL    IV PiggyBack: 300 mL  Total IN: 438 mL    OUT:    Indwelling Catheter - Urethral (mL): 1125 mL  Total OUT: 1125 mL    Total NET: -687 mL  Mode: CPAP with PS  FiO2: 30  PEEP: 5  PS: 6  ITime: 1  MAP: 7  PIP: 11  PHYSICAL EXAM:  GENERAL: remain vented   HEENT:  +dry blood  at the mouth area  OGT in place  ABDOMEN: Soft, n/d  EXTREMITIES:  no edema  SKIN: No rashes or lesions  IV ACCESS:  FEEDING ACCESS: npo    MEDICATIONS  (STANDING):  ampicillin/sulbactam  IVPB 3 Gram(s) IV Intermittent every 6 hours  chlorhexidine 0.12% Liquid 15 milliLiter(s) Oral Mucosa every 12 hours  chlorhexidine 4% Liquid 1 Application(s) Topical <User Schedule>  dexMEDEtomidine Infusion 0.05 MICROgram(s)/kG/Hr (1.06 mL/Hr) IV Continuous <Continuous>  enoxaparin Injectable 40 milliGRAM(s) SubCutaneous daily  fentaNYL    Injectable 50 MICROGram(s) IV Push once  folic acid Injectable 1 milliGRAM(s) IV Push daily  levETIRAcetam  IVPB 1000 milliGRAM(s) IV Intermittent every 12 hours  LORazepam   Injectable   IV Push   LORazepam   Injectable 2 milliGRAM(s) IV Push every 4 hours  LORazepam   Injectable 1.5 milliGRAM(s) IV Push every 4 hours  multivitamin 1 Tablet(s) Oral daily  polyethylene glycol 3350 17 Gram(s) Oral two times a day  propofol Infusion 20 MICROgram(s)/kG/Min (10.2 mL/Hr) IV Continuous <Continuous>  senna 2 Tablet(s) Oral at bedtime  thiamine Injectable 100 milliGRAM(s) IV Push daily    MEDICATIONS  (PRN):  LORazepam   Injectable 2 milliGRAM(s) IV Push every 3 hours PRN Agitation    AllergiesNKDA      LABS:    04-02    137  |  101  |  <3<L>  ----------------------------<  112<H>  3.4<L>   |  26  |  0.6<L>    Ca    6.7<L>      02 Apr 2021 04:55  Phos  2.2     04-01  Mg     1.7     04-02  TPro  5.2<L>  /  Alb  2.9<L>  /  TBili  1.3<H>  /  DBili  x   /  AST  29  /  ALT  21  /  AlkPhos  80  04-02                          9.0    3.27  )-----------( 94       ( 02 Apr 2021 04:55 )             25.9   ABG - ( 02 Apr 2021 10:40 )  pH, Arterial: 7.48  pH, Blood: x     /  pCO2: 36    /  pO2: 107   / HCO3: 26    / Base Excess: 2.9   /  SaO2: 99      RADIOLOGY:  < from: Xray Chest 1 View- PORTABLE-Routine (Xray Chest 1 View- PORTABLE-Routine in AM.) (04.02.21 @ 07:00) >  IMPRESSION:  Bibasilar opacities, decreased on the left.     44 y/o M PMHx of hyponatremia, hypomagnesemia, thrombocytopenia, and alcohol abuse and dependence who presents as a notification by EMS for status epilepticus. Patient had multiple seizures today and bit his tongue during his seizure with EMS. Patient assessed in Ed with diffuse tongue bleeding. Patient was given Versed by EMS and was not awake or protecting his airway. O2 saturation temporarily 82% and patient had NRB mask and was passively oxygenated to 100%. Patient pre-oxygenated and had emergent RSI for airway protection. After intubation, tongue pulled from mouth and he had a very deep and large laceration with persistent bleeding. Sutures placed and later redone by OMFS team post CT. CTH and C-spine negative. Per wife patient had binge last week and his last drink was on Friday. His last seizure was 2 years ago and tenants report he likely had a seizure in the morning as well. Patient sedated on Fentanyl and propofol in ED, and s/p IV 4mg Ativan, 2000mg Keppra,  40meq K+ powder via NGT, and 3 20meq K riders. 4Gm Mg and 2Gm Calcium gluconate and 1L LR and 500mg thiamine IVPB. Per wife pt not taking any medications, stopped his BP meds on his own 2 yrs ago. admitted to MICU, on propofol, precedex, fentanyL, Levo,  (29 Mar 2021 21:28)    PAST MEDICAL & SURGICAL HISTORY:  ETOH abuse  Hypomagnesemia  Seizure disorder     ICU Vital Signs Last 24 Hrs  T(C): 36.3 (02 Apr 2021 04:00), Max: 37.1 (01 Apr 2021 20:00)  T(F): 97.4 (02 Apr 2021 04:00), Max: 98.8 (01 Apr 2021 20:00)  HR: 99 (02 Apr 2021 13:00) (64 - 99)  BP: 122/79 (02 Apr 2021 13:00) (122/79 - 157/95)  BP(mean): 96 (02 Apr 2021 13:00) (75 - 121)  RR: 15 (02 Apr 2021 13:00) (12 - 24)  SpO2: 100% (02 Apr 2021 13:00) (98% - 100%)  Height (cm): 177.8 (03-29-21 @ 17:56)  Weight (kg): 85 (03-29-21 @ 17:56)  BMI (kg/m2): 26.9 (03-29-21 @ 17:56)  BSA (m2): 2.03 (03-29-21 @ 17:56)    01 Apr 2021 07:01  -  02 Apr 2021 07:00  --------------------------------------------------------  IN:    Dexmedetomidine: 686 mL    dextrose 5% + lactated ringers: 600 mL    Free Water: 50 mL    Lactated Ringers: 1050 mL    Propofol: 133.6 mL    Vital HN: 720 mL  Total IN: 3239.6 mL    OUT:    Indwelling Catheter - Urethral (mL): 2560 mL  Total OUT: 2560 mL    Total NET: 679.6 mL      02 Apr 2021 07:01  -  02 Apr 2021 13:51  --------------------------------------------------------  IN:    Dexmedetomidine: 138 mL    IV PiggyBack: 300 mL  Total IN: 438 mL    OUT:    Indwelling Catheter - Urethral (mL): 1125 mL  Total OUT: 1125 mL    Total NET: -687 mL  Mode: CPAP with PS  FiO2: 30  PEEP: 5  PS: 6  ITime: 1  MAP: 7  PIP: 11  PHYSICAL EXAM:  GENERAL: remains vented, awake and following some commands   HEENT:  +dry blood  at the mouth area, large eschar to left of lower lip as well  OGT in place, Wilkin  ABDOMEN: Soft, n/d  EXTREMITIES:  no edema  SKIN: No rashes or lesions  IV ACCESS:  FEEDING ACCESS: npo    MEDICATIONS  (STANDING):  ampicillin/sulbactam  IVPB 3 Gram(s) IV Intermittent every 6 hours  chlorhexidine 0.12% Liquid 15 milliLiter(s) Oral Mucosa every 12 hours  chlorhexidine 4% Liquid 1 Application(s) Topical <User Schedule>  dexMEDEtomidine Infusion 0.05 MICROgram(s)/kG/Hr (1.06 mL/Hr) IV Continuous <Continuous>  enoxaparin Injectable 40 milliGRAM(s) SubCutaneous daily  fentaNYL    Injectable 50 MICROGram(s) IV Push once  folic acid Injectable 1 milliGRAM(s) IV Push daily  levETIRAcetam  IVPB 1000 milliGRAM(s) IV Intermittent every 12 hours  LORazepam   Injectable   IV Push   LORazepam   Injectable 2 milliGRAM(s) IV Push every 4 hours  LORazepam   Injectable 1.5 milliGRAM(s) IV Push every 4 hours  multivitamin 1 Tablet(s) Oral daily  polyethylene glycol 3350 17 Gram(s) Oral two times a day  propofol Infusion 20 MICROgram(s)/kG/Min (10.2 mL/Hr) IV Continuous <Continuous>  senna 2 Tablet(s) Oral at bedtime  thiamine Injectable 100 milliGRAM(s) IV Push daily    MEDICATIONS  (PRN):  LORazepam   Injectable 2 milliGRAM(s) IV Push every 3 hours PRN Agitation    AllergiesNKDA      LABS:    04-02    137  |  101  |  <3<L>  ----------------------------<  112<H>  3.4<L>   |  26  |  0.6<L>    Ca    6.7<L>      02 Apr 2021 04:55  Phos  2.2     04-01  Mg     1.7     04-02  TPro  5.2<L>  /  Alb  2.9<L>  /  TBili  1.3<H>  /  DBili  x   /  AST  29  /  ALT  21  /  AlkPhos  80  04-02                          9.0    3.27  )-----------( 94       ( 02 Apr 2021 04:55 )             25.9   ABG - ( 02 Apr 2021 10:40 )  pH, Arterial: 7.48  pH, Blood: x     /  pCO2: 36    /  pO2: 107   / HCO3: 26    / Base Excess: 2.9   /  SaO2: 99      RADIOLOGY:  < from: Xray Chest 1 View- PORTABLE-Routine (Xray Chest 1 View- PORTABLE-Routine in AM.) (04.02.21 @ 07:00) >  IMPRESSION:  Bibasilar opacities, decreased on the left.

## 2021-04-02 NOTE — PROGRESS NOTE ADULT - SUBJECTIVE AND OBJECTIVE BOX
Patient is a 45y old  Male who presents with a chief complaint of Seizures (01 Apr 2021 13:21)        Over Night Events:  No events overnight. Precedex- 1.5. Off propofol. Afebrile. No pressors.       ROS:     All pertinent ROS are negative except HPI         PHYSICAL EXAM    ICU Vital Signs Last 24 Hrs  T(C): 36.3 (02 Apr 2021 04:00), Max: 37.1 (01 Apr 2021 12:00)  T(F): 97.4 (02 Apr 2021 04:00), Max: 98.8 (01 Apr 2021 12:00)  HR: 68 (02 Apr 2021 06:00) (64 - 82)  BP: 124/82 (02 Apr 2021 06:00) (122/88 - 154/80)  BP(mean): 75 (02 Apr 2021 06:00) (75 - 109)  RR: 24 (02 Apr 2021 06:00) (12 - 24)  SpO2: 100% (02 Apr 2021 05:00) (98% - 100%)      CONSTITUTIONAL:   Ill appearing. NAD    ENT:   Airway patent,   Mouth with normal mucosa.   No thrush    EYES:   Pupils equal,   Round and reactive to light.    CARDIAC:   Normal rate,   Regular rhythm.    No edema    RESPIRATORY:   ET+  No wheezing  Bilateral BS  Normal chest expansion  Not tachypneic,  No use of accessory muscles    GASTROINTESTINAL:  Abdomen soft,   Non-tender,   No guarding,   + BS  Last BM- 2 days ago    MUSCULOSKELETAL:   Range of motion is not limited,  No clubbing, cyanosis    NEUROLOGICAL:   Follows commands  Moves all extremities  Alert and oriented   No motor  deficits.  pertinent DTRs normal    SKIN:   Skin normal color for race,   Warm and dry  No evidence of rash.    PSYCHIATRIC:   Sedated  No apparent risk to self or others.      04-01-21 @ 07:01  -  04-02-21 @ 07:00  --------------------------------------------------------  IN:    Dexmedetomidine: 686 mL    dextrose 5% + lactated ringers: 600 mL    Free Water: 50 mL    Lactated Ringers: 1050 mL    Propofol: 133.6 mL    Vital HN: 720 mL  Total IN: 3239.6 mL    OUT:    Indwelling Catheter - Urethral (mL): 2560 mL  Total OUT: 2560 mL    Total NET: 679.6 mL          LABS:                            9.0    3.27  )-----------( 94       ( 02 Apr 2021 04:55 )             25.9                                               04-02    137  |  101  |  <3<L>  ----------------------------<  112<H>  3.4<L>   |  26  |  0.6<L>    Ca    6.7<L>      02 Apr 2021 04:55  Phos  2.2     04-01  Mg     1.7     04-02    TPro  5.2<L>  /  Alb  2.9<L>  /  TBili  1.3<H>  /  DBili  x   /  AST  29  /  ALT  21  /  AlkPhos  80  04-02                                                 CARDIAC MARKERS ( 01 Apr 2021 11:01 )  x     / x     / 270 U/L / x     / x                                                LIVER FUNCTIONS - ( 02 Apr 2021 04:55 )  Alb: 2.9 g/dL / Pro: 5.2 g/dL / ALK PHOS: 80 U/L / ALT: 21 U/L / AST: 29 U/L / GGT: x                                                                                               Mode: AC/ CMV (Assist Control/ Continuous Mandatory Ventilation)  RR (machine): 15  TV (machine): 450  FiO2: 30  PEEP: 5  ITime: 1  MAP: 5  PIP: 11                                      ABG - ( 02 Apr 2021 03:18 )  pH, Arterial: 7.48  pH, Blood: x     /  pCO2: 38    /  pO2: 101   / HCO3: 29    / Base Excess: 4.8   /  SaO2: 98                  MEDICATIONS  (STANDING):  ampicillin/sulbactam  IVPB 3 Gram(s) IV Intermittent every 6 hours  chlorhexidine 0.12% Liquid 15 milliLiter(s) Oral Mucosa every 12 hours  chlorhexidine 4% Liquid 1 Application(s) Topical <User Schedule>  dexMEDEtomidine Infusion 0.05 MICROgram(s)/kG/Hr (1.06 mL/Hr) IV Continuous <Continuous>  dextrose 5% + lactated ringers. 1000 milliLiter(s) (75 mL/Hr) IV Continuous <Continuous>  enoxaparin Injectable 40 milliGRAM(s) SubCutaneous daily  fentaNYL    Injectable 50 MICROGram(s) IV Push once  fentaNYL   Infusion. 0.5 MICROgram(s)/kG/Hr (4.25 mL/Hr) IV Continuous <Continuous>  folic acid Injectable 1 milliGRAM(s) IV Push daily  lactated ringers. 1000 milliLiter(s) (75 mL/Hr) IV Continuous <Continuous>  levETIRAcetam  IVPB 1000 milliGRAM(s) IV Intermittent every 12 hours  LORazepam   Injectable   IV Push   LORazepam   Injectable 2 milliGRAM(s) IV Push every 4 hours  LORazepam   Injectable 1.5 milliGRAM(s) IV Push every 4 hours  multivitamin 1 Tablet(s) Oral daily  norepinephrine Infusion 0.05 MICROgram(s)/kG/Min (3.98 mL/Hr) IV Continuous <Continuous>  polyethylene glycol 3350 17 Gram(s) Oral two times a day  propofol Infusion 20 MICROgram(s)/kG/Min (10.2 mL/Hr) IV Continuous <Continuous>  senna 2 Tablet(s) Oral at bedtime  thiamine Injectable 100 milliGRAM(s) IV Push daily    MEDICATIONS  (PRN):  LORazepam   Injectable 2 milliGRAM(s) IV Push every 3 hours PRN Agitation      New X-rays reviewed:                                                                                  ECHO    CXR interpreted by me:  improved aeration on left side     Patient is a 45y old  Male who presents with a chief complaint of Seizures (01 Apr 2021 13:21)        Over Night Events:  No events overnight. Precedex- 1.5. Off propofol. Afebrile. No pressors. still intubated / ventialted        PHYSICAL EXAM    ICU Vital Signs Last 24 Hrs  T(C): 36.3 (02 Apr 2021 04:00), Max: 37.1 (01 Apr 2021 12:00)  T(F): 97.4 (02 Apr 2021 04:00), Max: 98.8 (01 Apr 2021 12:00)  HR: 68 (02 Apr 2021 06:00) (64 - 82)  BP: 124/82 (02 Apr 2021 06:00) (122/88 - 154/80)  BP(mean): 75 (02 Apr 2021 06:00) (75 - 109)  RR: 24 (02 Apr 2021 06:00) (12 - 24)  SpO2: 100% (02 Apr 2021 05:00) (98% - 100%)      CONSTITUTIONAL:   Ill appearing. bleeding though mouth    ENT:   Airway patent,   Mouth with normal mucosa.   No thrush    EYES:   Pupils equal,   Round and reactive to light.    CARDIAC:   Normal rate,   Regular rhythm.    No edema    RESPIRATORY:   ET+  No wheezing  Bilateral BS  Normal chest expansion  Not tachypneic,  No use of accessory muscles    GASTROINTESTINAL:  Abdomen soft,   Non-tender,   No guarding,   + BS  Last BM- 2 days ago    MUSCULOSKELETAL:   Range of motion is not limited,  No clubbing, cyanosis    NEUROLOGICAL:   Follows commands  Moves all extremities      SKIN:   Skin normal color for race,   Warm and dry  No evidence of rash.    PSYCHIATRIC:   Sedated  No apparent risk to self or others.      04-01-21 @ 07:01  -  04-02-21 @ 07:00  --------------------------------------------------------  IN:    Dexmedetomidine: 686 mL    dextrose 5% + lactated ringers: 600 mL    Free Water: 50 mL    Lactated Ringers: 1050 mL    Propofol: 133.6 mL    Vital HN: 720 mL  Total IN: 3239.6 mL    OUT:    Indwelling Catheter - Urethral (mL): 2560 mL  Total OUT: 2560 mL    Total NET: 679.6 mL          LABS:                            9.0    3.27  )-----------( 94       ( 02 Apr 2021 04:55 )             25.9                                               04-02    137  |  101  |  <3<L>  ----------------------------<  112<H>  3.4<L>   |  26  |  0.6<L>    Ca    6.7<L>      02 Apr 2021 04:55  Phos  2.2     04-01  Mg     1.7     04-02    TPro  5.2<L>  /  Alb  2.9<L>  /  TBili  1.3<H>  /  DBili  x   /  AST  29  /  ALT  21  /  AlkPhos  80  04-02                                                 CARDIAC MARKERS ( 01 Apr 2021 11:01 )  x     / x     / 270 U/L / x     / x                                                LIVER FUNCTIONS - ( 02 Apr 2021 04:55 )  Alb: 2.9 g/dL / Pro: 5.2 g/dL / ALK PHOS: 80 U/L / ALT: 21 U/L / AST: 29 U/L / GGT: x                                                                                               Mode: AC/ CMV (Assist Control/ Continuous Mandatory Ventilation)  RR (machine): 15  TV (machine): 450  FiO2: 30  PEEP: 5  ITime: 1  MAP: 5  PIP: 11                                      ABG - ( 02 Apr 2021 03:18 )  pH, Arterial: 7.48  pH, Blood: x     /  pCO2: 38    /  pO2: 101   / HCO3: 29    / Base Excess: 4.8   /  SaO2: 98                  MEDICATIONS  (STANDING):  ampicillin/sulbactam  IVPB 3 Gram(s) IV Intermittent every 6 hours  chlorhexidine 0.12% Liquid 15 milliLiter(s) Oral Mucosa every 12 hours  chlorhexidine 4% Liquid 1 Application(s) Topical <User Schedule>  dexMEDEtomidine Infusion 0.05 MICROgram(s)/kG/Hr (1.06 mL/Hr) IV Continuous <Continuous>  dextrose 5% + lactated ringers. 1000 milliLiter(s) (75 mL/Hr) IV Continuous <Continuous>  enoxaparin Injectable 40 milliGRAM(s) SubCutaneous daily  fentaNYL    Injectable 50 MICROGram(s) IV Push once  fentaNYL   Infusion. 0.5 MICROgram(s)/kG/Hr (4.25 mL/Hr) IV Continuous <Continuous>  folic acid Injectable 1 milliGRAM(s) IV Push daily  lactated ringers. 1000 milliLiter(s) (75 mL/Hr) IV Continuous <Continuous>  levETIRAcetam  IVPB 1000 milliGRAM(s) IV Intermittent every 12 hours  LORazepam   Injectable   IV Push   LORazepam   Injectable 2 milliGRAM(s) IV Push every 4 hours  LORazepam   Injectable 1.5 milliGRAM(s) IV Push every 4 hours  multivitamin 1 Tablet(s) Oral daily  norepinephrine Infusion 0.05 MICROgram(s)/kG/Min (3.98 mL/Hr) IV Continuous <Continuous>  polyethylene glycol 3350 17 Gram(s) Oral two times a day  propofol Infusion 20 MICROgram(s)/kG/Min (10.2 mL/Hr) IV Continuous <Continuous>  senna 2 Tablet(s) Oral at bedtime  thiamine Injectable 100 milliGRAM(s) IV Push daily    MEDICATIONS  (PRN):  LORazepam   Injectable 2 milliGRAM(s) IV Push every 3 hours PRN Agitation    CXR reviewed

## 2021-04-03 LAB
ALBUMIN SERPL ELPH-MCNC: 3 G/DL — LOW (ref 3.5–5.2)
ALP SERPL-CCNC: 86 U/L — SIGNIFICANT CHANGE UP (ref 30–115)
ALT FLD-CCNC: 18 U/L — SIGNIFICANT CHANGE UP (ref 0–41)
ANION GAP SERPL CALC-SCNC: 14 MMOL/L — SIGNIFICANT CHANGE UP (ref 7–14)
AST SERPL-CCNC: 26 U/L — SIGNIFICANT CHANGE UP (ref 0–41)
BASOPHILS # BLD AUTO: 0.01 K/UL — SIGNIFICANT CHANGE UP (ref 0–0.2)
BASOPHILS NFR BLD AUTO: 0.2 % — SIGNIFICANT CHANGE UP (ref 0–1)
BILIRUB SERPL-MCNC: 1.4 MG/DL — HIGH (ref 0.2–1.2)
BUN SERPL-MCNC: 8 MG/DL — LOW (ref 10–20)
CALCIUM SERPL-MCNC: 7 MG/DL — LOW (ref 8.5–10.1)
CHLORIDE SERPL-SCNC: 98 MMOL/L — SIGNIFICANT CHANGE UP (ref 98–110)
CO2 SERPL-SCNC: 26 MMOL/L — SIGNIFICANT CHANGE UP (ref 17–32)
CREAT SERPL-MCNC: 0.6 MG/DL — LOW (ref 0.7–1.5)
EOSINOPHIL # BLD AUTO: 0.05 K/UL — SIGNIFICANT CHANGE UP (ref 0–0.7)
EOSINOPHIL NFR BLD AUTO: 1 % — SIGNIFICANT CHANGE UP (ref 0–8)
GLUCOSE SERPL-MCNC: 121 MG/DL — HIGH (ref 70–99)
HCT VFR BLD CALC: 24.9 % — LOW (ref 42–52)
HGB BLD-MCNC: 9 G/DL — LOW (ref 14–18)
IMM GRANULOCYTES NFR BLD AUTO: 0.4 % — HIGH (ref 0.1–0.3)
LYMPHOCYTES # BLD AUTO: 0.61 K/UL — LOW (ref 1.2–3.4)
LYMPHOCYTES # BLD AUTO: 11.9 % — LOW (ref 20.5–51.1)
MAGNESIUM SERPL-MCNC: 1.3 MG/DL — LOW (ref 1.8–2.4)
MCHC RBC-ENTMCNC: 32.6 PG — HIGH (ref 27–31)
MCHC RBC-ENTMCNC: 36.1 G/DL — SIGNIFICANT CHANGE UP (ref 32–37)
MCV RBC AUTO: 90.2 FL — SIGNIFICANT CHANGE UP (ref 80–94)
MONOCYTES # BLD AUTO: 1.24 K/UL — HIGH (ref 0.1–0.6)
MONOCYTES NFR BLD AUTO: 24.3 % — HIGH (ref 1.7–9.3)
NEUTROPHILS # BLD AUTO: 3.18 K/UL — SIGNIFICANT CHANGE UP (ref 1.4–6.5)
NEUTROPHILS NFR BLD AUTO: 62.2 % — SIGNIFICANT CHANGE UP (ref 42.2–75.2)
NRBC # BLD: 0 /100 WBCS — SIGNIFICANT CHANGE UP (ref 0–0)
PLATELET # BLD AUTO: 117 K/UL — LOW (ref 130–400)
POTASSIUM SERPL-MCNC: 3.2 MMOL/L — LOW (ref 3.5–5)
POTASSIUM SERPL-SCNC: 3.2 MMOL/L — LOW (ref 3.5–5)
PROT SERPL-MCNC: 5.7 G/DL — LOW (ref 6–8)
RBC # BLD: 2.76 M/UL — LOW (ref 4.7–6.1)
RBC # FLD: 11.9 % — SIGNIFICANT CHANGE UP (ref 11.5–14.5)
SODIUM SERPL-SCNC: 138 MMOL/L — SIGNIFICANT CHANGE UP (ref 135–146)
WBC # BLD: 5.11 K/UL — SIGNIFICANT CHANGE UP (ref 4.8–10.8)
WBC # FLD AUTO: 5.11 K/UL — SIGNIFICANT CHANGE UP (ref 4.8–10.8)

## 2021-04-03 PROCEDURE — 99232 SBSQ HOSP IP/OBS MODERATE 35: CPT

## 2021-04-03 PROCEDURE — 71045 X-RAY EXAM CHEST 1 VIEW: CPT | Mod: 26

## 2021-04-03 RX ORDER — MAGNESIUM SULFATE 500 MG/ML
2 VIAL (ML) INJECTION ONCE
Refills: 0 | Status: COMPLETED | OUTPATIENT
Start: 2021-04-03 | End: 2021-04-03

## 2021-04-03 RX ORDER — POTASSIUM CHLORIDE 20 MEQ
20 PACKET (EA) ORAL
Refills: 0 | Status: COMPLETED | OUTPATIENT
Start: 2021-04-03 | End: 2021-04-03

## 2021-04-03 RX ADMIN — Medication 1 MILLIGRAM(S): at 22:00

## 2021-04-03 RX ADMIN — LEVETIRACETAM 400 MILLIGRAM(S): 250 TABLET, FILM COATED ORAL at 09:13

## 2021-04-03 RX ADMIN — CHLORHEXIDINE GLUCONATE 15 MILLILITER(S): 213 SOLUTION TOPICAL at 18:27

## 2021-04-03 RX ADMIN — Medication 50 MILLIEQUIVALENT(S): at 11:52

## 2021-04-03 RX ADMIN — Medication 1.5 MILLIGRAM(S): at 01:32

## 2021-04-03 RX ADMIN — LEVETIRACETAM 400 MILLIGRAM(S): 250 TABLET, FILM COATED ORAL at 21:04

## 2021-04-03 RX ADMIN — AMPICILLIN SODIUM AND SULBACTAM SODIUM 200 GRAM(S): 250; 125 INJECTION, POWDER, FOR SUSPENSION INTRAMUSCULAR; INTRAVENOUS at 22:00

## 2021-04-03 RX ADMIN — Medication 50 MILLIEQUIVALENT(S): at 12:37

## 2021-04-03 RX ADMIN — DEXMEDETOMIDINE HYDROCHLORIDE IN 0.9% SODIUM CHLORIDE 1.06 MICROGRAM(S)/KG/HR: 4 INJECTION INTRAVENOUS at 02:34

## 2021-04-03 RX ADMIN — Medication 1 MILLIGRAM(S): at 18:39

## 2021-04-03 RX ADMIN — DEXMEDETOMIDINE HYDROCHLORIDE IN 0.9% SODIUM CHLORIDE 1.06 MICROGRAM(S)/KG/HR: 4 INJECTION INTRAVENOUS at 07:22

## 2021-04-03 RX ADMIN — CHLORHEXIDINE GLUCONATE 1 APPLICATION(S): 213 SOLUTION TOPICAL at 06:19

## 2021-04-03 RX ADMIN — Medication 1.5 MILLIGRAM(S): at 13:46

## 2021-04-03 RX ADMIN — POLYETHYLENE GLYCOL 3350 17 GRAM(S): 17 POWDER, FOR SOLUTION ORAL at 18:27

## 2021-04-03 RX ADMIN — Medication 100 MILLIGRAM(S): at 13:27

## 2021-04-03 RX ADMIN — AMPICILLIN SODIUM AND SULBACTAM SODIUM 200 GRAM(S): 250; 125 INJECTION, POWDER, FOR SUSPENSION INTRAMUSCULAR; INTRAVENOUS at 11:53

## 2021-04-03 RX ADMIN — Medication 1.5 MILLIGRAM(S): at 06:19

## 2021-04-03 RX ADMIN — Medication 2 MILLIGRAM(S): at 23:13

## 2021-04-03 RX ADMIN — ENOXAPARIN SODIUM 40 MILLIGRAM(S): 100 INJECTION SUBCUTANEOUS at 13:21

## 2021-04-03 RX ADMIN — DEXMEDETOMIDINE HYDROCHLORIDE IN 0.9% SODIUM CHLORIDE 1.06 MICROGRAM(S)/KG/HR: 4 INJECTION INTRAVENOUS at 22:00

## 2021-04-03 RX ADMIN — CHLORHEXIDINE GLUCONATE 15 MILLILITER(S): 213 SOLUTION TOPICAL at 06:19

## 2021-04-03 RX ADMIN — Medication 2 MILLIGRAM(S): at 03:57

## 2021-04-03 RX ADMIN — Medication 1.5 MILLIGRAM(S): at 11:53

## 2021-04-03 RX ADMIN — Medication 50 MILLIEQUIVALENT(S): at 18:29

## 2021-04-03 RX ADMIN — AMPICILLIN SODIUM AND SULBACTAM SODIUM 200 GRAM(S): 250; 125 INJECTION, POWDER, FOR SUSPENSION INTRAMUSCULAR; INTRAVENOUS at 18:29

## 2021-04-03 RX ADMIN — Medication 2 MILLIGRAM(S): at 11:01

## 2021-04-03 RX ADMIN — AMPICILLIN SODIUM AND SULBACTAM SODIUM 200 GRAM(S): 250; 125 INJECTION, POWDER, FOR SUSPENSION INTRAMUSCULAR; INTRAVENOUS at 04:07

## 2021-04-03 RX ADMIN — Medication 50 GRAM(S): at 11:51

## 2021-04-03 NOTE — PROGRESS NOTE ADULT - ASSESSMENT
IMPRESSION:    Status epilepticus sp intubation for airway protection  possible aspiration pneumonia  ETOH withdrawal / DT's   Tongue Laceration s/p repair  H/o of seizures   H/o HTN  Hypomagnesemia, hypokalemia  pancytopenia    PLAN:    CNS: no sedation c/w Keppra.    neuro f/u  folate thiamine  detox protocol  wean precedex    HEENT: Oral care, f/u OMFS for tongue lac.     PULMONARY:  HOB @ 45 degrees.     CARDIOVASCULAR: DC IVF    GI: GI prophylaxis.     RENAL:  Follow up lytes. Correct as needed, replete Mg and K.    INFECTIOUS DISEASE: Follow up cultures, abx    HEMATOLOGICAL:  DVT prophylaxis.    ENDOCRINE:  Follow up FS.     MUSCULOSKELETAL: Bedrest.     Full Code     MICU monitoring for now

## 2021-04-03 NOTE — PROGRESS NOTE ADULT - SUBJECTIVE AND OBJECTIVE BOX
Patient is a 45y old  Male who presents with a chief complaint of siezures (02 Apr 2021 13:50)        HPI:  44 y/o M PMHx of hyponatremia, hypomagnesemia, thrombocytopenia, and alcohol abuse and dependence who presents as a notification by EMS for status epilepticus. Patient had multiple seizures today and bit his tongue during his seizure with EMS. Patient assessed in Ed with diffuse tongue bleeding. Patient was given Versed by EMS and was not awake or protecting his airway. O2 saturation temporarily 82% and patient had NRB mask and was passively oxygenated to 100%. Patient pre-oxygenated and had emergent RSI for airway protection. After intubation, tongue pulled from mouth and he had a very deep and large laceration with persistent bleeding. Sutures placed and later redone by OMFS team post CT. CTH and C-spine negative. Magnesium and Potassium both found to be critically low. Per wife patient had binge last week and his last drink was on Friday. His last seizure was 2 years ago and tenants report he likely had a seizure in the morning as well. Patient sedated on Fentanyl and propofol in ED, and s/p IV 4mg Ativan, 2000mg Keppra,  40meq K+ powder via NGT, and 3 20meq K riders. 4Gm Mg and 2Gm Calcium gluconate and 1L LR and 500mg thiamine IVPB. Per wife pt not taking any medications, stopped his BP meds on his own 2 yrs ago. admitted to MICU, on propofol, precedex, fentanyL, Levo,  (29 Mar 2021 21:28)      Pt evaluated on AM rounds.  I reviewed the radiology tests and hospital record prior to visiting the patient.    Interval Events: No overnight events.    REVIEW OF SYSTEMS:   see HPI      OBJECTIVE:  ICU Vital Signs Last 24 Hrs  T(C): 37.9 (03 Apr 2021 04:00), Max: 37.9 (03 Apr 2021 04:00)  T(F): 100.3 (03 Apr 2021 04:00), Max: 100.3 (03 Apr 2021 04:00)  HR: 91 (03 Apr 2021 06:00) (73 - 99)  BP: 121/72 (03 Apr 2021 06:00) (104/62 - 157/95)  BP(mean): 90 (03 Apr 2021 06:00) (77 - 121)  RR: 17 (03 Apr 2021 06:00) (13 - 25)  SpO2: 100% (03 Apr 2021 06:00) (100% - 100%)    Mode: CPAP with PS, FiO2: 30, PEEP: 5, PS: 6, ITime: 1, MAP: 7, PIP: 11    04-01 @ 07:01  -  04-02 @ 07:00  --------------------------------------------------------  IN: 3239.6 mL / OUT: 2560 mL / NET: 679.6 mL    04-02 @ 07:01 - 04-03 @ 06:35  --------------------------------------------------------  IN: 1049.7 mL / OUT: 2245 mL / NET: -1195.3 mL      CAPILLARY BLOOD GLUCOSE            PHYSICAL EXAM:     · CONSTITUTIONAL:   not septic appearing,   well nourished,   NAD    · ENMT:   Airway patent,   Nasal mucosa clear.  Mouth with normal mucosa.   No thrush    · EYES:   Clear bilaterally,   pupils equal,   round and reactive to light.    · CARDIAC:   Normal rate,   regular rhythm.    Heart sounds S1, S2.   No murmurs, no rubs or gallops on auscultation  no edema        CAROTID:   normal systolic impulse  no bruits    · RESPIRATORY:   rales  normal chest expansion  no retractions or use of accessory muscles  palpation of chest is normal with no fremitus  percussion of chest demonstrates no hyperresonance or dullness    · GASTROINTESTINAL:  Abdomen soft,   non-tender,   + BS  liver/spleen not palpable    · MUSCULOSKELETAL:   no clubbing, cyanosis      · NEUROLOGICAL:   awake alert oriented  no obvious cranial nerve abnormalities      · SKIN:   Skin normal color for race,   warm, dry   No evidence of rash.        · HEME LYMPH:   no splenomegaly.  No cervical  lymphadenopathy.  no inguinal lymphadenopathy    HOSPITAL MEDICATIONS:  MEDICATIONS  (STANDING):  ampicillin/sulbactam  IVPB 3 Gram(s) IV Intermittent every 6 hours  chlorhexidine 0.12% Liquid 15 milliLiter(s) Oral Mucosa every 12 hours  chlorhexidine 4% Liquid 1 Application(s) Topical <User Schedule>  dexMEDEtomidine Infusion 0.05 MICROgram(s)/kG/Hr (1.06 mL/Hr) IV Continuous <Continuous>  enoxaparin Injectable 40 milliGRAM(s) SubCutaneous daily  fentaNYL    Injectable 50 MICROGram(s) IV Push once  folic acid Injectable 1 milliGRAM(s) IV Push daily  levETIRAcetam  IVPB 1000 milliGRAM(s) IV Intermittent every 12 hours  LORazepam   Injectable   IV Push   LORazepam   Injectable 1.5 milliGRAM(s) IV Push every 4 hours  LORazepam   Injectable 1 milliGRAM(s) IV Push every 4 hours  multivitamin 1 Tablet(s) Oral daily  polyethylene glycol 3350 17 Gram(s) Oral two times a day  propofol Infusion 20 MICROgram(s)/kG/Min (10.2 mL/Hr) IV Continuous <Continuous>  senna 2 Tablet(s) Oral at bedtime  thiamine Injectable 100 milliGRAM(s) IV Push daily    MEDICATIONS  (PRN):  LORazepam   Injectable 2 milliGRAM(s) IV Push every 3 hours PRN Agitation    lactated ringers Bolus:   1000 milliLiter(s), IV Bolus, once, infuse over 60 Minute(s), Stop After 1 Doses  lactated ringers Bolus:   1000 milliLiter(s), IV Bolus, once, infuse over 60 Minute(s), Stop After 1 Doses  Provider's Contact #: [phone]  lactated ringers.: Solution, 1000 milliLiter(s) infuse at 75 mL/Hr  Provider's Contact #: [phone]  lactated ringers Bolus:   1000 milliLiter(s), IV Bolus, once, infuse over 30 Minute(s), Stop After 1 Doses  Provider's Contact #: [phone]      LABS:                        9.0    3.27  )-----------( 94       ( 02 Apr 2021 04:55 )             25.9     04-02    137  |  101  |  <3<L>  ----------------------------<  112<H>  3.4<L>   |  26  |  0.6<L>    Ca    6.7<L>      02 Apr 2021 04:55  Phos  2.2     04-01  Mg     1.7     04-02    TPro  5.2<L>  /  Alb  2.9<L>  /  TBili  1.3<H>  /  DBili  x   /  AST  29  /  ALT  21  /  AlkPhos  80  04-02        Arterial Blood Gas:  04-02 @ 15:58  7.53/32/124/26/100/3.8  ABG lactate: --  Arterial Blood Gas:  04-02 @ 10:40  7.48/36/107/26/99/2.9  ABG lactate: --  Arterial Blood Gas:  04-02 @ 03:18  7.48/38/101/29/98/4.8  ABG lactate: --  Arterial Blood Gas:  04-01 @ 18:06  7.48/38/98/28/99/4.2  ABG lactate: --  Arterial Blood Gas:  04-01 @ 13:47  7.47/36/102/26/98/2.2  ABG lactate: --      CARDIAC MARKERS ( 01 Apr 2021 11:01 )  x     / x     / 270 U/L / x     / x            Mode: CPAP with PS  FiO2: 30  PEEP: 5  PS: 6  ITime: 1  MAP: 7  PIP: 11      ABG - ( 02 Apr 2021 15:58 )  pH, Arterial: 7.53  pH, Blood: x     /  pCO2: 32    /  pO2: 124   / HCO3: 26    / Base Excess: 3.8   /  SaO2: 100                 RADIOLOGY: Today I personally reviewed latest CXR and other pertinent films.

## 2021-04-03 NOTE — PROGRESS NOTE ADULT - ASSESSMENT
OMFS Consult F/U Note    45M w/ PMHx of alcohol abuse and dependence sustaining significant tongue bleed 2/2 to occlusal trauma from status epilepticus episode.    Patient seen at bedside this AM. NAEON. Patient extubated yesterday, now mildly awake and responsive to commands. No oozing from tongue wound overnight.      PE:    ICU Vital Signs Last 24 Hrs  T(C): 37.9 (03 Apr 2021 04:00), Max: 37.9 (03 Apr 2021 04:00)  T(F): 100.3 (03 Apr 2021 04:00), Max: 100.3 (03 Apr 2021 04:00)  HR: 81 (03 Apr 2021 10:00) (73 - 99)  BP: 120/74 (03 Apr 2021 10:00) (104/62 - 145/90)  BP(mean): 99 (03 Apr 2021 10:00) (77 - 120)  ABP: --  ABP(mean): --  RR: 15 (03 Apr 2021 10:00) (14 - 25)  SpO2: 99% (03 Apr 2021 10:00) (99% - 100%)        GEN - Awake and mildly alert, responsive to commands, NAD  HEAD - NCAT, no lesions or lacerations  EYES - EOMI, PERRL, no periorbital edema or ecchymosis, no bony steps  EARS - no drainage, no chirinos signs  NOSE - no septal hematomas, no nasal deviation or bony crepitus of nasal bridge  FACE - CN V and VII intact bilaterally, no lesions or lacerations  ORAL - ventral and lateral tongue wounds hemostatic, sutures intact, tongue edematous (improving), tongue with appropriate mobility, no FOM elevation  NECK - neck is soft, FROM    Labs:                                   9.0    5.11  )-----------( 117      ( 03 Apr 2021 06:35 )             24.9     04-03    138  |  98  |  8<L>  ----------------------------<  121<H>  3.2<L>   |  26  |  0.6<L>    Ca    7.0<L>      03 Apr 2021 06:35  Phos  2.2     04-01  Mg     1.3     04-03    TPro  5.7<L>  /  Alb  3.0<L>  /  TBili  1.4<H>  /  DBili  x   /  AST  26  /  ALT  18  /  AlkPhos  86  04-03                 *ASSESSMENT:  45M w/ PMHx of alcohol abuse and dependence sustaining significant tongue bleed 2/2 to occlusal trauma from status epilepticus episode. Tongue bleed currently stable. Tongue edema resolving.    Recs:    - Ok to d/c abx  - yankaur suction at bedside  - can use peridex green sponge for oral hygiene, please scrub gently  - If cleared for PO intake, recommend non-chew diet for 2 weeks  - No further interventions per OMFS, patient can follow-up outpatient in [facility] on a Friday at 10am following discharge  - Please call dental/OMFS at [phone] with any questions or concerns, thank you      Willie Marquez DMD, MD

## 2021-04-03 NOTE — CHART NOTE - NSCHARTNOTEFT_GEN_A_CORE
Pt due for comprehensive reassessment today (4/3), being followed by NST as of 4/2. Defer recs to NST. RD to sign off on this case for this reason.

## 2021-04-04 LAB
AMPHET UR-MCNC: NEGATIVE — SIGNIFICANT CHANGE UP
ANION GAP SERPL CALC-SCNC: 16 MMOL/L — HIGH (ref 7–14)
BARBITURATES, URINE.: NEGATIVE — SIGNIFICANT CHANGE UP
BASOPHILS # BLD AUTO: 0.02 K/UL — SIGNIFICANT CHANGE UP (ref 0–0.2)
BASOPHILS NFR BLD AUTO: 0.4 % — SIGNIFICANT CHANGE UP (ref 0–1)
BENZODIAZ UR-MCNC: NEGATIVE — SIGNIFICANT CHANGE UP
BUN SERPL-MCNC: 7 MG/DL — LOW (ref 10–20)
CALCIUM SERPL-MCNC: 7.1 MG/DL — LOW (ref 8.5–10.1)
CHLORIDE SERPL-SCNC: 105 MMOL/L — SIGNIFICANT CHANGE UP (ref 98–110)
CO2 SERPL-SCNC: 22 MMOL/L — SIGNIFICANT CHANGE UP (ref 17–32)
COCAINE METAB.OTHER UR-MCNC: NEGATIVE — SIGNIFICANT CHANGE UP
CREAT SERPL-MCNC: 0.5 MG/DL — LOW (ref 0.7–1.5)
CREATININE, URINE THERAPEUTIC: 44.3 MG/DL — SIGNIFICANT CHANGE UP
EOSINOPHIL # BLD AUTO: 0.09 K/UL — SIGNIFICANT CHANGE UP (ref 0–0.7)
EOSINOPHIL NFR BLD AUTO: 2 % — SIGNIFICANT CHANGE UP (ref 0–8)
GLUCOSE SERPL-MCNC: 109 MG/DL — HIGH (ref 70–99)
HCT VFR BLD CALC: 24.4 % — LOW (ref 42–52)
HGB BLD-MCNC: 8.6 G/DL — LOW (ref 14–18)
IMM GRANULOCYTES NFR BLD AUTO: 0.7 % — HIGH (ref 0.1–0.3)
LYMPHOCYTES # BLD AUTO: 0.97 K/UL — LOW (ref 1.2–3.4)
LYMPHOCYTES # BLD AUTO: 21.5 % — SIGNIFICANT CHANGE UP (ref 20.5–51.1)
MAGNESIUM SERPL-MCNC: 1.4 MG/DL — LOW (ref 1.8–2.4)
MCHC RBC-ENTMCNC: 32.5 PG — HIGH (ref 27–31)
MCHC RBC-ENTMCNC: 35.2 G/DL — SIGNIFICANT CHANGE UP (ref 32–37)
MCV RBC AUTO: 92.1 FL — SIGNIFICANT CHANGE UP (ref 80–94)
METHADONE UR-MCNC: NEGATIVE — SIGNIFICANT CHANGE UP
METHAQUALONE UR QL: NEGATIVE — SIGNIFICANT CHANGE UP
METHAQUALONE UR-MCNC: NEGATIVE — SIGNIFICANT CHANGE UP
MONOCYTES # BLD AUTO: 1.09 K/UL — HIGH (ref 0.1–0.6)
MONOCYTES NFR BLD AUTO: 24.2 % — HIGH (ref 1.7–9.3)
NEUTROPHILS # BLD AUTO: 2.31 K/UL — SIGNIFICANT CHANGE UP (ref 1.4–6.5)
NEUTROPHILS NFR BLD AUTO: 51.2 % — SIGNIFICANT CHANGE UP (ref 42.2–75.2)
NRBC # BLD: 0 /100 WBCS — SIGNIFICANT CHANGE UP (ref 0–0)
OPIATES UR-MCNC: NEGATIVE — SIGNIFICANT CHANGE UP
PCP UR-MCNC: NEGATIVE — SIGNIFICANT CHANGE UP
PLATELET # BLD AUTO: 127 K/UL — LOW (ref 130–400)
POTASSIUM SERPL-MCNC: 3.6 MMOL/L — SIGNIFICANT CHANGE UP (ref 3.5–5)
POTASSIUM SERPL-SCNC: 3.6 MMOL/L — SIGNIFICANT CHANGE UP (ref 3.5–5)
PROPOXYPH UR QL: NEGATIVE — SIGNIFICANT CHANGE UP
RBC # BLD: 2.65 M/UL — LOW (ref 4.7–6.1)
RBC # FLD: 11.9 % — SIGNIFICANT CHANGE UP (ref 11.5–14.5)
SODIUM SERPL-SCNC: 143 MMOL/L — SIGNIFICANT CHANGE UP (ref 135–146)
THC UR QL: NEGATIVE — SIGNIFICANT CHANGE UP
WBC # BLD: 4.51 K/UL — LOW (ref 4.8–10.8)
WBC # FLD AUTO: 4.51 K/UL — LOW (ref 4.8–10.8)

## 2021-04-04 PROCEDURE — 99232 SBSQ HOSP IP/OBS MODERATE 35: CPT

## 2021-04-04 RX ORDER — MAGNESIUM SULFATE 500 MG/ML
2 VIAL (ML) INJECTION ONCE
Refills: 0 | Status: COMPLETED | OUTPATIENT
Start: 2021-04-04 | End: 2021-04-04

## 2021-04-04 RX ADMIN — CHLORHEXIDINE GLUCONATE 15 MILLILITER(S): 213 SOLUTION TOPICAL at 06:09

## 2021-04-04 RX ADMIN — Medication 100 MILLIGRAM(S): at 14:21

## 2021-04-04 RX ADMIN — Medication 1 MILLIGRAM(S): at 14:00

## 2021-04-04 RX ADMIN — LEVETIRACETAM 400 MILLIGRAM(S): 250 TABLET, FILM COATED ORAL at 20:56

## 2021-04-04 RX ADMIN — Medication 1 MILLIGRAM(S): at 10:05

## 2021-04-04 RX ADMIN — DEXMEDETOMIDINE HYDROCHLORIDE IN 0.9% SODIUM CHLORIDE 1.06 MICROGRAM(S)/KG/HR: 4 INJECTION INTRAVENOUS at 17:24

## 2021-04-04 RX ADMIN — Medication 25 GRAM(S): at 23:34

## 2021-04-04 RX ADMIN — Medication 1 MILLIGRAM(S): at 06:09

## 2021-04-04 RX ADMIN — POLYETHYLENE GLYCOL 3350 17 GRAM(S): 17 POWDER, FOR SOLUTION ORAL at 18:11

## 2021-04-04 RX ADMIN — Medication 0.5 MILLIGRAM(S): at 22:04

## 2021-04-04 RX ADMIN — DEXMEDETOMIDINE HYDROCHLORIDE IN 0.9% SODIUM CHLORIDE 1.06 MICROGRAM(S)/KG/HR: 4 INJECTION INTRAVENOUS at 04:52

## 2021-04-04 RX ADMIN — DEXMEDETOMIDINE HYDROCHLORIDE IN 0.9% SODIUM CHLORIDE 1.06 MICROGRAM(S)/KG/HR: 4 INJECTION INTRAVENOUS at 01:30

## 2021-04-04 RX ADMIN — CHLORHEXIDINE GLUCONATE 15 MILLILITER(S): 213 SOLUTION TOPICAL at 18:08

## 2021-04-04 RX ADMIN — AMPICILLIN SODIUM AND SULBACTAM SODIUM 200 GRAM(S): 250; 125 INJECTION, POWDER, FOR SUSPENSION INTRAMUSCULAR; INTRAVENOUS at 21:19

## 2021-04-04 RX ADMIN — Medication 1 MILLIGRAM(S): at 02:26

## 2021-04-04 RX ADMIN — SENNA PLUS 2 TABLET(S): 8.6 TABLET ORAL at 21:19

## 2021-04-04 RX ADMIN — Medication 0.5 MILLIGRAM(S): at 18:08

## 2021-04-04 RX ADMIN — AMPICILLIN SODIUM AND SULBACTAM SODIUM 200 GRAM(S): 250; 125 INJECTION, POWDER, FOR SUSPENSION INTRAMUSCULAR; INTRAVENOUS at 04:16

## 2021-04-04 RX ADMIN — Medication 1 MILLIGRAM(S): at 14:21

## 2021-04-04 RX ADMIN — CHLORHEXIDINE GLUCONATE 1 APPLICATION(S): 213 SOLUTION TOPICAL at 06:09

## 2021-04-04 RX ADMIN — ENOXAPARIN SODIUM 40 MILLIGRAM(S): 100 INJECTION SUBCUTANEOUS at 12:21

## 2021-04-04 RX ADMIN — LEVETIRACETAM 400 MILLIGRAM(S): 250 TABLET, FILM COATED ORAL at 10:06

## 2021-04-04 RX ADMIN — AMPICILLIN SODIUM AND SULBACTAM SODIUM 200 GRAM(S): 250; 125 INJECTION, POWDER, FOR SUSPENSION INTRAMUSCULAR; INTRAVENOUS at 12:18

## 2021-04-04 RX ADMIN — AMPICILLIN SODIUM AND SULBACTAM SODIUM 200 GRAM(S): 250; 125 INJECTION, POWDER, FOR SUSPENSION INTRAMUSCULAR; INTRAVENOUS at 15:41

## 2021-04-04 RX ADMIN — Medication 2 MILLIGRAM(S): at 20:47

## 2021-04-04 NOTE — PROGRESS NOTE ADULT - SUBJECTIVE AND OBJECTIVE BOX
Patient is a 45y old  Male who presents with a chief complaint of siezures (03 Apr 2021 10:52)        HPI:  44 y/o M PMHx of hyponatremia, hypomagnesemia, thrombocytopenia, and alcohol abuse and dependence who presents as a notification by EMS for status epilepticus. Patient had multiple seizures today and bit his tongue during his seizure with EMS. Patient assessed in Ed with diffuse tongue bleeding. Patient was given Versed by EMS and was not awake or protecting his airway. O2 saturation temporarily 82% and patient had NRB mask and was passively oxygenated to 100%. Patient pre-oxygenated and had emergent RSI for airway protection. After intubation, tongue pulled from mouth and he had a very deep and large laceration with persistent bleeding. Sutures placed and later redone by OMFS team post CT. CTH and C-spine negative. Magnesium and Potassium both found to be critically low. Per wife patient had binge last week and his last drink was on Friday. His last seizure was 2 years ago and tenants report he likely had a seizure in the morning as well. Patient sedated on Fentanyl and propofol in ED, and s/p IV 4mg Ativan, 2000mg Keppra,  40meq K+ powder via NGT, and 3 20meq K riders. 4Gm Mg and 2Gm Calcium gluconate and 1L LR and 500mg thiamine IVPB. Per wife pt not taking any medications, stopped his BP meds on his own 2 yrs ago. admitted to MICU, on propofol, precedex, fentanyL, Levo,  (29 Mar 2021 21:28)      Pt evaluated on AM rounds.  I reviewed the radiology tests and hospital record prior to visiting the patient.    Interval Events: No overnight events.    REVIEW OF SYSTEMS:   see HPI      OBJECTIVE:  ICU Vital Signs Last 24 Hrs  T(C): 37.3 (04 Apr 2021 04:00), Max: 37.3 (04 Apr 2021 00:00)  T(F): 99.2 (04 Apr 2021 04:00), Max: 99.2 (04 Apr 2021 00:00)  HR: 76 (04 Apr 2021 04:00) (73 - 92)  BP: 142/93 (04 Apr 2021 04:00) (102/63 - 153/100)  BP(mean): 113 (04 Apr 2021 04:00) (8 - 118)  ABP: --  ABP(mean): --  RR: 18 (04 Apr 2021 04:00) (14 - 20)  SpO2: 100% (04 Apr 2021 04:00) (98% - 100%)        04-02 @ 07:01  -  04-03 @ 07:00  --------------------------------------------------------  IN: 1049.7 mL / OUT: 2345 mL / NET: -1295.3 mL    04-03 @ 07:01  -  04-04 @ 06:23  --------------------------------------------------------  IN: 1254.9 mL / OUT: 1240 mL / NET: 14.9 mL      CAPILLARY BLOOD GLUCOSE            PHYSICAL EXAM:     · CONSTITUTIONAL:   not septic appearing,   well nourished,   NAD    · ENMT:   Airway patent,   Nasal mucosa clear.  Mouth with normal mucosa.   No thrush    · EYES:   Clear bilaterally,   pupils equal,   round and reactive to light.    · CARDIAC:   Normal rate,   regular rhythm.    Heart sounds S1, S2.   No murmurs, no rubs or gallops on auscultation  no edema        CAROTID:   normal systolic impulse  no bruits    · RESPIRATORY:   rales  normal chest expansion  no retractions or use of accessory muscles  palpation of chest is normal with no fremitus  percussion of chest demonstrates no hyperresonance or dullness    · GASTROINTESTINAL:  Abdomen soft,   non-tender,   + BS  liver/spleen not palpable    · MUSCULOSKELETAL:   no clubbing, cyanosis      · NEUROLOGICAL:   awake alert oriented  no obvious cranial nerve abnormalities      · SKIN:   Skin normal color for race,   warm, dry   No evidence of rash.        · HEME LYMPH:   no splenomegaly.  No cervical  lymphadenopathy.  no inguinal lymphadenopathy    HOSPITAL MEDICATIONS:  MEDICATIONS  (STANDING):  ampicillin/sulbactam  IVPB 3 Gram(s) IV Intermittent every 6 hours  chlorhexidine 0.12% Liquid 15 milliLiter(s) Oral Mucosa every 12 hours  chlorhexidine 4% Liquid 1 Application(s) Topical <User Schedule>  dexMEDEtomidine Infusion 0.05 MICROgram(s)/kG/Hr (1.06 mL/Hr) IV Continuous <Continuous>  enoxaparin Injectable 40 milliGRAM(s) SubCutaneous daily  fentaNYL    Injectable 50 MICROGram(s) IV Push once  folic acid Injectable 1 milliGRAM(s) IV Push daily  levETIRAcetam  IVPB 1000 milliGRAM(s) IV Intermittent every 12 hours  LORazepam   Injectable   IV Push   LORazepam   Injectable 1 milliGRAM(s) IV Push every 4 hours  LORazepam   Injectable 0.5 milliGRAM(s) IV Push every 4 hours  multivitamin 1 Tablet(s) Oral daily  polyethylene glycol 3350 17 Gram(s) Oral two times a day  propofol Infusion 20 MICROgram(s)/kG/Min (10.2 mL/Hr) IV Continuous <Continuous>  senna 2 Tablet(s) Oral at bedtime  thiamine Injectable 100 milliGRAM(s) IV Push daily    MEDICATIONS  (PRN):  LORazepam   Injectable 2 milliGRAM(s) IV Push every 3 hours PRN Agitation    lactated ringers Bolus:   1000 milliLiter(s), IV Bolus, once, infuse over 60 Minute(s), Stop After 1 Doses  lactated ringers Bolus:   1000 milliLiter(s), IV Bolus, once, infuse over 60 Minute(s), Stop After 1 Doses  Provider's Contact #: [phone]  lactated ringers.: Solution, 1000 milliLiter(s) infuse at 75 mL/Hr  Provider's Contact #: [phone]  lactated ringers Bolus:   1000 milliLiter(s), IV Bolus, once, infuse over 30 Minute(s), Stop After 1 Doses  Provider's Contact #: [phone]      LABS:                        8.6    4.51  )-----------( 127      ( 04 Apr 2021 04:30 )             24.4     04-04    143  |  105  |  7<L>  ----------------------------<  109<H>  3.6   |  22  |  0.5<L>    Ca    7.1<L>      04 Apr 2021 04:30  Mg     1.4     04-04    TPro  5.7<L>  /  Alb  3.0<L>  /  TBili  1.4<H>  /  DBili  x   /  AST  26  /  ALT  18  /  AlkPhos  86  04-03        Arterial Blood Gas:  04-02 @ 15:58  7.53/32/124/26/100/3.8  ABG lactate: --  Arterial Blood Gas:  04-02 @ 10:40  7.48/36/107/26/99/2.9  ABG lactate: --                ABG - ( 02 Apr 2021 15:58 )  pH, Arterial: 7.53  pH, Blood: x     /  pCO2: 32    /  pO2: 124   / HCO3: 26    / Base Excess: 3.8   /  SaO2: 100                 RADIOLOGY: Today I personally reviewed latest CXR and other pertinent films.

## 2021-04-04 NOTE — CONSULT NOTE ADULT - ASSESSMENT
Pt is a 46yo Male admitted s/p seizure/status epilepticus, tongue bite & laceration noted post extubated, s/p suture placement by OMFS. Called for acute assessment of oral bleeding.    ·	no need for new sutures to be placed at this time as pt not bleeding actively  ·	clots removed gently at bedside  ·	oral care  ·	gentle oral suction  ·	w/d with attng

## 2021-04-04 NOTE — PROGRESS NOTE ADULT - ASSESSMENT
IMPRESSION:    Status epilepticus sp intubation for airway protection  possible aspiration pneumonia  ETOH withdrawal / DT's   Tongue Laceration s/p repair  H/o of seizures   H/o HTN  Hypomagnesemia, hypokalemia  pancytopenia    PLAN:    CNS:  c/w Keppra.    neuro f/u  folate thiamine  detox protocol  wean precedex to off    HEENT: Oral care,   f/u OMFS for tongue lac.     PULMONARY:  HOB @ 45 degrees.     CARDIOVASCULAR: DC IVF    GI: GI prophylaxis.     RENAL:  Follow up lytes. Correct as needed, replete Mg and K.    INFECTIOUS DISEASE: Follow up cultures, abx    HEMATOLOGICAL:  DVT prophylaxis.    ENDOCRINE:  Follow up FS.     MUSCULOSKELETAL: Bedrest.     Full Code     MICU monitoring until off precedex

## 2021-04-05 LAB
ALBUMIN SERPL ELPH-MCNC: 3 G/DL — LOW (ref 3.5–5.2)
ALP SERPL-CCNC: 68 U/L — SIGNIFICANT CHANGE UP (ref 30–115)
ALT FLD-CCNC: 16 U/L — SIGNIFICANT CHANGE UP (ref 0–41)
ANION GAP SERPL CALC-SCNC: 21 MMOL/L — HIGH (ref 7–14)
AST SERPL-CCNC: 29 U/L — SIGNIFICANT CHANGE UP (ref 0–41)
BASOPHILS # BLD AUTO: 0.03 K/UL — SIGNIFICANT CHANGE UP (ref 0–0.2)
BASOPHILS NFR BLD AUTO: 0.5 % — SIGNIFICANT CHANGE UP (ref 0–1)
BILIRUB SERPL-MCNC: 1.2 MG/DL — SIGNIFICANT CHANGE UP (ref 0.2–1.2)
BUN SERPL-MCNC: 5 MG/DL — LOW (ref 10–20)
CALCIUM SERPL-MCNC: 7.6 MG/DL — LOW (ref 8.5–10.1)
CHLORIDE SERPL-SCNC: 107 MMOL/L — SIGNIFICANT CHANGE UP (ref 98–110)
CO2 SERPL-SCNC: 16 MMOL/L — LOW (ref 17–32)
CREAT SERPL-MCNC: 0.6 MG/DL — LOW (ref 0.7–1.5)
DRUG SCREEN, SERUM: SIGNIFICANT CHANGE UP
EOSINOPHIL # BLD AUTO: 0.07 K/UL — SIGNIFICANT CHANGE UP (ref 0–0.7)
EOSINOPHIL NFR BLD AUTO: 1.2 % — SIGNIFICANT CHANGE UP (ref 0–8)
GLUCOSE SERPL-MCNC: 94 MG/DL — SIGNIFICANT CHANGE UP (ref 70–99)
HCT VFR BLD CALC: 23.9 % — LOW (ref 42–52)
HGB BLD-MCNC: 8.6 G/DL — LOW (ref 14–18)
IMM GRANULOCYTES NFR BLD AUTO: 1 % — HIGH (ref 0.1–0.3)
LYMPHOCYTES # BLD AUTO: 1.1 K/UL — LOW (ref 1.2–3.4)
LYMPHOCYTES # BLD AUTO: 18.2 % — LOW (ref 20.5–51.1)
MAGNESIUM SERPL-MCNC: 1.8 MG/DL — SIGNIFICANT CHANGE UP (ref 1.8–2.4)
MCHC RBC-ENTMCNC: 32.7 PG — HIGH (ref 27–31)
MCHC RBC-ENTMCNC: 36 G/DL — SIGNIFICANT CHANGE UP (ref 32–37)
MCV RBC AUTO: 90.9 FL — SIGNIFICANT CHANGE UP (ref 80–94)
MONOCYTES # BLD AUTO: 1.12 K/UL — HIGH (ref 0.1–0.6)
MONOCYTES NFR BLD AUTO: 18.6 % — HIGH (ref 1.7–9.3)
NEUTROPHILS # BLD AUTO: 3.65 K/UL — SIGNIFICANT CHANGE UP (ref 1.4–6.5)
NEUTROPHILS NFR BLD AUTO: 60.5 % — SIGNIFICANT CHANGE UP (ref 42.2–75.2)
NRBC # BLD: 0 /100 WBCS — SIGNIFICANT CHANGE UP (ref 0–0)
PLATELET # BLD AUTO: 190 K/UL — SIGNIFICANT CHANGE UP (ref 130–400)
POTASSIUM SERPL-MCNC: 3.5 MMOL/L — SIGNIFICANT CHANGE UP (ref 3.5–5)
POTASSIUM SERPL-SCNC: 3.5 MMOL/L — SIGNIFICANT CHANGE UP (ref 3.5–5)
PROT SERPL-MCNC: 5.8 G/DL — LOW (ref 6–8)
RBC # BLD: 2.63 M/UL — LOW (ref 4.7–6.1)
RBC # FLD: 12.1 % — SIGNIFICANT CHANGE UP (ref 11.5–14.5)
SODIUM SERPL-SCNC: 144 MMOL/L — SIGNIFICANT CHANGE UP (ref 135–146)
WBC # BLD: 6.03 K/UL — SIGNIFICANT CHANGE UP (ref 4.8–10.8)
WBC # FLD AUTO: 6.03 K/UL — SIGNIFICANT CHANGE UP (ref 4.8–10.8)

## 2021-04-05 PROCEDURE — 99222 1ST HOSP IP/OBS MODERATE 55: CPT

## 2021-04-05 PROCEDURE — 99232 SBSQ HOSP IP/OBS MODERATE 35: CPT

## 2021-04-05 RX ORDER — MAGNESIUM SULFATE 500 MG/ML
2 VIAL (ML) INJECTION
Refills: 0 | Status: COMPLETED | OUTPATIENT
Start: 2021-04-05 | End: 2021-04-05

## 2021-04-05 RX ORDER — SODIUM CHLORIDE 9 MG/ML
1000 INJECTION, SOLUTION INTRAVENOUS
Refills: 0 | Status: DISCONTINUED | OUTPATIENT
Start: 2021-04-05 | End: 2021-04-06

## 2021-04-05 RX ORDER — POTASSIUM CHLORIDE 20 MEQ
20 PACKET (EA) ORAL
Refills: 0 | Status: COMPLETED | OUTPATIENT
Start: 2021-04-05 | End: 2021-04-05

## 2021-04-05 RX ADMIN — AMPICILLIN SODIUM AND SULBACTAM SODIUM 200 GRAM(S): 250; 125 INJECTION, POWDER, FOR SUSPENSION INTRAMUSCULAR; INTRAVENOUS at 16:50

## 2021-04-05 RX ADMIN — SODIUM CHLORIDE 75 MILLILITER(S): 9 INJECTION, SOLUTION INTRAVENOUS at 09:46

## 2021-04-05 RX ADMIN — CHLORHEXIDINE GLUCONATE 15 MILLILITER(S): 213 SOLUTION TOPICAL at 05:06

## 2021-04-05 RX ADMIN — Medication 0.5 MILLIGRAM(S): at 11:31

## 2021-04-05 RX ADMIN — ENOXAPARIN SODIUM 40 MILLIGRAM(S): 100 INJECTION SUBCUTANEOUS at 11:46

## 2021-04-05 RX ADMIN — Medication 50 GRAM(S): at 15:29

## 2021-04-05 RX ADMIN — Medication 100 MILLIGRAM(S): at 13:17

## 2021-04-05 RX ADMIN — DEXMEDETOMIDINE HYDROCHLORIDE IN 0.9% SODIUM CHLORIDE 1.06 MICROGRAM(S)/KG/HR: 4 INJECTION INTRAVENOUS at 15:38

## 2021-04-05 RX ADMIN — Medication 50 MILLIEQUIVALENT(S): at 15:30

## 2021-04-05 RX ADMIN — Medication 0.5 MILLIGRAM(S): at 05:10

## 2021-04-05 RX ADMIN — AMPICILLIN SODIUM AND SULBACTAM SODIUM 200 GRAM(S): 250; 125 INJECTION, POWDER, FOR SUSPENSION INTRAMUSCULAR; INTRAVENOUS at 22:16

## 2021-04-05 RX ADMIN — Medication 2 MILLIGRAM(S): at 11:11

## 2021-04-05 RX ADMIN — Medication 2 MILLIGRAM(S): at 00:50

## 2021-04-05 RX ADMIN — Medication 0.5 MILLIGRAM(S): at 14:14

## 2021-04-05 RX ADMIN — Medication 2 MILLIGRAM(S): at 06:02

## 2021-04-05 RX ADMIN — AMPICILLIN SODIUM AND SULBACTAM SODIUM 200 GRAM(S): 250; 125 INJECTION, POWDER, FOR SUSPENSION INTRAMUSCULAR; INTRAVENOUS at 11:21

## 2021-04-05 RX ADMIN — Medication 0.5 MILLIGRAM(S): at 02:00

## 2021-04-05 RX ADMIN — LEVETIRACETAM 400 MILLIGRAM(S): 250 TABLET, FILM COATED ORAL at 09:50

## 2021-04-05 RX ADMIN — Medication 2 MILLIGRAM(S): at 03:20

## 2021-04-05 RX ADMIN — Medication 1 MILLIGRAM(S): at 18:01

## 2021-04-05 RX ADMIN — POLYETHYLENE GLYCOL 3350 17 GRAM(S): 17 POWDER, FOR SOLUTION ORAL at 05:11

## 2021-04-05 RX ADMIN — LEVETIRACETAM 400 MILLIGRAM(S): 250 TABLET, FILM COATED ORAL at 20:06

## 2021-04-05 RX ADMIN — DEXMEDETOMIDINE HYDROCHLORIDE IN 0.9% SODIUM CHLORIDE 1.06 MICROGRAM(S)/KG/HR: 4 INJECTION INTRAVENOUS at 12:19

## 2021-04-05 RX ADMIN — Medication 2 MILLIGRAM(S): at 19:00

## 2021-04-05 RX ADMIN — CHLORHEXIDINE GLUCONATE 1 APPLICATION(S): 213 SOLUTION TOPICAL at 05:05

## 2021-04-05 RX ADMIN — Medication 50 MILLIEQUIVALENT(S): at 13:17

## 2021-04-05 RX ADMIN — DEXMEDETOMIDINE HYDROCHLORIDE IN 0.9% SODIUM CHLORIDE 1.06 MICROGRAM(S)/KG/HR: 4 INJECTION INTRAVENOUS at 22:34

## 2021-04-05 RX ADMIN — DEXMEDETOMIDINE HYDROCHLORIDE IN 0.9% SODIUM CHLORIDE 1.06 MICROGRAM(S)/KG/HR: 4 INJECTION INTRAVENOUS at 09:46

## 2021-04-05 RX ADMIN — AMPICILLIN SODIUM AND SULBACTAM SODIUM 200 GRAM(S): 250; 125 INJECTION, POWDER, FOR SUSPENSION INTRAMUSCULAR; INTRAVENOUS at 04:45

## 2021-04-05 RX ADMIN — Medication 50 GRAM(S): at 11:11

## 2021-04-05 RX ADMIN — DEXMEDETOMIDINE HYDROCHLORIDE IN 0.9% SODIUM CHLORIDE 1.06 MICROGRAM(S)/KG/HR: 4 INJECTION INTRAVENOUS at 18:01

## 2021-04-05 RX ADMIN — Medication 2 MILLIGRAM(S): at 01:47

## 2021-04-05 NOTE — PROGRESS NOTE ADULT - SUBJECTIVE AND OBJECTIVE BOX
Specialty: Neuro Critical Care     Interval Hx: Patient now extubated, awake. No reported seizures.      HPI:  44 y/o M PMHx of hyponatremia, hypomagnesemia, thrombocytopenia, and alcohol abuse and dependence who presents as a notification by EMS for status epilepticus. Patient had multiple seizures today and bit his tongue during his seizure with EMS. Patient assessed in Ed with diffuse tongue bleeding. Patient was given Versed by EMS and was not awake or protecting his airway. O2 saturation temporarily 82% and patient had NRB mask and was passively oxygenated to 100%. Patient pre-oxygenated and had emergent RSI for airway protection. After intubation, tongue pulled from mouth and he had a very deep and large laceration with persistent bleeding. Sutures placed and later redone by OMFS team post CT. CTH and C-spine negative. Magnesium and Potassium both found to be critically low. Per wife patient had binge last week and his last drink was on Friday. His last seizure was 2 years ago and tenants report he likely had a seizure in the morning as well. Patient sedated on Fentanyl and propofol in ED, and s/p IV 4mg Ativan, 2000mg Keppra,  40meq K+ powder via NGT, and 3 20meq K riders. 4Gm Mg and 2Gm Calcium gluconate and 1L LR and 500mg thiamine IVPB. Per wife pt not taking any medications, stopped his BP meds on his own 2 yrs ago. admitted to MICU, on propofol, precedex, fentanyL, Levo,  (29 Mar 2021 21:28)      Past Medical and Surgical Hx:  PAST MEDICAL & SURGICAL HISTORY:  ETOH abuse    Hypomagnesemia    Seizure disorder        Allergies: No Known Allergies      ROS:   A ten-point review of systems was otherwise negative except as noted.      PE:  Gen: WN male in NAD  Mental status: Awake, alert and oriented to self, place, and year.  Attention normal. Follows multistep commands. Language fluent  Cranial nerves: PERRL, VFF, no nystagmus, EOMI, facial sensation intact bilaterally and symmetric, face symmetric. Unable to assess if dysarthria present due to tongue laceration.  Motor: Normal bulk and tone, strength 5/5 in bilateral UE and LE.  Sensation: Intact to light touch      Vital Signs:  ICU Vital Signs Last 24 Hrs  T(C): 36.7 (05 Apr 2021 12:00), Max: 37.4 (05 Apr 2021 08:00)  T(F): 98 (05 Apr 2021 12:00), Max: 99.3 (05 Apr 2021 08:00)  HR: 75 (05 Apr 2021 12:00) (72 - 90)  BP: 127/83 (05 Apr 2021 12:00) (97/58 - 154/98)  BP(mean): 99 (05 Apr 2021 12:00) (72 - 121)  ABP: --  ABP(mean): --  RR: 19 (05 Apr 2021 12:00) (18 - 37)  SpO2: 100% (05 Apr 2021 12:00) (98% - 100%)      Medications Current and PRN:  MEDICATIONS  (STANDING):  ampicillin/sulbactam  IVPB 3 Gram(s) IV Intermittent every 6 hours  chlorhexidine 4% Liquid 1 Application(s) Topical <User Schedule>  dexMEDEtomidine Infusion 0.05 MICROgram(s)/kG/Hr (1.06 mL/Hr) IV Continuous <Continuous>  dextrose 5% + lactated ringers. 1000 milliLiter(s) (75 mL/Hr) IV Continuous <Continuous>  enoxaparin Injectable 40 milliGRAM(s) SubCutaneous daily  fentaNYL    Injectable 50 MICROGram(s) IV Push once  folic acid Injectable 1 milliGRAM(s) IV Push daily  levETIRAcetam  IVPB 1000 milliGRAM(s) IV Intermittent every 12 hours  LORazepam   Injectable   IV Push   LORazepam   Injectable 0.5 milliGRAM(s) IV Push every 4 hours  magnesium sulfate  IVPB 2 Gram(s) IV Intermittent every 2 hours  multivitamin 1 Tablet(s) Oral daily  polyethylene glycol 3350 17 Gram(s) Oral two times a day  potassium chloride  20 mEq/100 mL IVPB 20 milliEquivalent(s) IV Intermittent every 2 hours  senna 2 Tablet(s) Oral at bedtime  thiamine Injectable 100 milliGRAM(s) IV Push daily    MEDICATIONS  (PRN):  LORazepam   Injectable 2 milliGRAM(s) IV Push every 1 hour PRN Agitation      I/Os:  I&O's Summary    04 Apr 2021 07:01  -  05 Apr 2021 07:00  --------------------------------------------------------  IN: 482.5 mL / OUT: 1200 mL / NET: -717.5 mL    05 Apr 2021 07:01  -  05 Apr 2021 12:47  --------------------------------------------------------  IN: 707.5 mL / OUT: 425 mL / NET: 282.5 mL        Labs:  04-05    144  |  107  |  5<L>  ----------------------------<  94  3.5   |  16<L>  |  0.6<L>    Ca    7.6<L>      05 Apr 2021 04:30  Mg     1.8     04-05    TPro  5.8<L>  /  Alb  3.0<L>  /  TBili  1.2  /  DBili  x   /  AST  29  /  ALT  16  /  AlkPhos  68  04-05                          8.6    6.03  )-----------( 190      ( 05 Apr 2021 04:30 )             23.9       LIVER FUNCTIONS - ( 05 Apr 2021 04:30 )  Alb: 3.0 g/dL / Pro: 5.8 g/dL / ALK PHOS: 68 U/L / ALT: 16 U/L / AST: 29 U/L / GGT: x               Assessment: 44 y/o M PMHx of hyponatremia, hypomagnesemia, thrombocytopenia, and alcohol abuse who presented  for status epilepticus. Patient had multiple seizures at home and bit his tongue during his seizure with brijesh bleeding and laceration needing sutures. CTH and C-spine imaging negative for acute findings. Magnesium and Potassium and calcium found to be critically low. No seizures were reported on 3/31 video EEG. Now extubated and awake.    Plan:  - Continue management of EtOH withdrawal per primary  - Continue thiamine, folate  - Continue Keppra 1g BID  - Keep Mg >2      No further neurologic workup recommended at this time. Will sign off. Please re-consult for any new questions.    MELANIE Banegas  Extension: #[phone]

## 2021-04-05 NOTE — PROGRESS NOTE ADULT - ASSESSMENT
ASSESSMENT  Status epilepticus sp intubation for airway protection - extubated on 4/2  possible aspiration pneumonia  ETOH withdrawal / DT's   Tongue Laceration s/p repair  H/o of seizures   H/o HTN  Hypomagnesemia, hypokalemia, hypophosphatemia  pancytopenia - improving    SUGGEST:  - swallow eval now  - if pt can take po, start diet and monitor for adequacy of intake  - treat B1 and folate for a total of 5-7 days - then d/c them and cont po MV+minerals once daily  - correct hypophosphatemia if you want him to remain extubated  - if pt can not eat safely, place small bore NG feeding tube today and feed 360 ml Osmolite 1.5 q8h and add one PRosource TF with each feeding --> 100 gm protein and 1718 k/d

## 2021-04-05 NOTE — PROGRESS NOTE ADULT - SUBJECTIVE AND OBJECTIVE BOX
46 y/o M PMHx of hyponatremia, hypomagnesemia, thrombocytopenia, and alcohol abuse who presented  for status epilepticus. pt reportedly had stopped taking his medications at home.  Patient had multiple seizures at home and bit his tongue during his seizure with brijesh bleeding and laceration. See ENT f/u.  CTH and C-spine imaging negative for acute findings. Magnesium and Potassium and calcium found to be critically low. No seizures were reported on 3/31 video EEG. Now extubated and awake.  pt remains NPO - ? - - so is he going to be receiving his "po" meds that previously went via OGT?  Vital Signs Last 24 Hrs  T(C): 36.7 (05 Apr 2021 12:00), Max: 37.4 (05 Apr 2021 08:00)  T(F): 98 (05 Apr 2021 12:00), Max: 99.3 (05 Apr 2021 08:00)  HR: 73 (05 Apr 2021 14:00) (72 - 90)  BP: 101/63 (05 Apr 2021 14:00) (97/58 - 154/98)  BP(mean): 75 (05 Apr 2021 14:00) (72 - 121)  RR: 16 (05 Apr 2021 14:00) (16 - 37)  SpO2: 100% (05 Apr 2021 14:11) (98% - 100%)  pt awake, verbal, not combative    MEDICATIONS  (STANDING):  ampicillin/sulbactam  IVPB 3 Gram(s) IV Intermittent every 6 hours  chlorhexidine 4% Liquid 1 Application(s) Topical <User Schedule>  cloNIDine 0.3 milliGRAM(s) Oral every 8 hours  dexMEDEtomidine Infusion 0.05 MICROgram(s)/kG/Hr (1.06 mL/Hr) IV Continuous <Continuous>  dextrose 5% + lactated ringers. 1000 milliLiter(s) (75 mL/Hr) IV Continuous <Continuous>  enoxaparin Injectable 40 milliGRAM(s) SubCutaneous daily  fentaNYL    Injectable 50 MICROGram(s) IV Push once  folic acid Injectable 1 milliGRAM(s) IV Push daily  levETIRAcetam  IVPB 1000 milliGRAM(s) IV Intermittent every 12 hours  LORazepam   Injectable   IV Push   magnesium sulfate  IVPB 2 Gram(s) IV Intermittent every 2 hours - - - ????????????  multivitamin 1 Tablet(s) Oral daily  polyethylene glycol 3350 17 Gram(s) Oral two times a day  potassium chloride  20 mEq/100 mL IVPB 20 milliEquivalent(s) IV Intermittent every 2 hours  senna 2 Tablet(s) Oral at bedtime  thiamine Injectable 100 milliGRAM(s) IV Push daily                        8.6    6.03  )-----------( 190      ( 05 Apr 2021 04:30 )             23.9   04-05    144  |  107  |  5<L>  ----------------------------<  94  3.5   |  16<L>  |  0.6<L>    Ca    7.6<L>      05 Apr 2021 04:30  Mg     1.8     04-05    TPro  5.8<L>  /  Alb  3.0<L>  /  TBili  1.2  /  DBili  x   /  AST  29  /  ALT  16  /  AlkPhos  68  04-05    Phosphorus Level, Serum: 2.2 mg/dL (04.01.21 @ 22:47)

## 2021-04-05 NOTE — PROGRESS NOTE ADULT - SUBJECTIVE AND OBJECTIVE BOX
Patient is a 45y old  Male who presents with a chief complaint of siezures (04 Apr 2021 17:40)    Over Night Events:  Agitated overnight.  Remains on Precedex.    ROS:   All ROS are negative except HPI     PHYSICAL EXAM  ICU Vital Signs Last 24 Hrs  T(C): 36.6 (05 Apr 2021 06:00), Max: 36.7 (04 Apr 2021 20:00)  T(F): 97.8 (05 Apr 2021 06:00), Max: 98 (04 Apr 2021 20:00)  HR: 83 (05 Apr 2021 06:00) (72 - 90)  BP: 127/82 (05 Apr 2021 06:00) (97/58 - 166/89)  BP(mean): 100 (05 Apr 2021 06:00) (72 - 115)  RR: 18 (05 Apr 2021 06:00) (16 - 37)  SpO2: 99% (05 Apr 2021 07:53) (98% - 100%)    · CONSTITUTIONAL:   not septic appearing,   well nourished,   NAD    · ENMT:   Airway patent,   Nasal mucosa clear.  Mouth with normal mucosa.   No thrush    · EYES:   Clear bilaterally,   pupils equal,   round and reactive to light.    · CARDIAC:   Normal rate,   regular rhythm  Heart sounds S1, S2.   No murmurs, no rubs or gallops on auscultation  no edema    · RESPIRATORY:   rales  normal chest expansion  no retractions or use of accessory muscles  palpation of chest is normal with no fremitus  percussion of chest demonstrates no hyperresonance or dullness    · GASTROINTESTINAL:  Abdomen soft,   non-tender,   + BS  liver/spleen not palpable    · MUSCULOSKELETAL:   no clubbing, cyanosis    · NEUROLOGICAL:  awake alert oriented  no obvious cranial nerve abnormalities    · SKIN:   Skin normal color for race,   warm, dry   No evidence of rash.    · HEME LYMPH:   no splenomegaly.  No cervical  lymphadenopathy.  no inguinal lymphadenopathy      04-04-21 @ 07:01  -  04-05-21 @ 07:00  --------------------------------------------------------  IN:    Dexmedetomidine: 382.5 mL    IV PiggyBack: 100 mL  Total IN: 482.5 mL    OUT:    Indwelling Catheter - Urethral (mL): 1200 mL  Total OUT: 1200 mL    Total NET: -717.5 mL      LABS:                          8.6    6.03  )-----------( 190      ( 05 Apr 2021 04:30 )             23.9                                               04-05    144  |  107  |  5<L>  ----------------------------<  94  3.5   |  16<L>  |  0.6<L>    Ca    7.6<L>      05 Apr 2021 04:30  Mg     1.8     04-05    TPro  5.8<L>  /  Alb  3.0<L>  /  TBili  1.2  /  DBili  x   /  AST  29  /  ALT  16  /  AlkPhos  68  04-05     LIVER FUNCTIONS - ( 05 Apr 2021 04:30 )  Alb: 3.0 g/dL / Pro: 5.8 g/dL / ALK PHOS: 68 U/L / ALT: 16 U/L / AST: 29 U/L / GGT: x                                                             MEDICATIONS  (STANDING):  ampicillin/sulbactam  IVPB 3 Gram(s) IV Intermittent every 6 hours  chlorhexidine 0.12% Liquid 15 milliLiter(s) Oral Mucosa every 12 hours  chlorhexidine 4% Liquid 1 Application(s) Topical <User Schedule>  dexMEDEtomidine Infusion 0.05 MICROgram(s)/kG/Hr (1.06 mL/Hr) IV Continuous <Continuous>  enoxaparin Injectable 40 milliGRAM(s) SubCutaneous daily  fentaNYL    Injectable 50 MICROGram(s) IV Push once  folic acid Injectable 1 milliGRAM(s) IV Push daily  levETIRAcetam  IVPB 1000 milliGRAM(s) IV Intermittent every 12 hours  LORazepam   Injectable   IV Push   LORazepam   Injectable 0.5 milliGRAM(s) IV Push every 4 hours  multivitamin 1 Tablet(s) Oral daily  polyethylene glycol 3350 17 Gram(s) Oral two times a day  propofol Infusion 20 MICROgram(s)/kG/Min (10.2 mL/Hr) IV Continuous <Continuous>  senna 2 Tablet(s) Oral at bedtime  thiamine Injectable 100 milliGRAM(s) IV Push daily    MEDICATIONS  (PRN):  LORazepam   Injectable 2 milliGRAM(s) IV Push every 1 hour PRN Agitation      New X-rays reviewed:                                                                                  ECHO    CXR interpreted by me:  no acute cardiopulmonary findings

## 2021-04-05 NOTE — PROGRESS NOTE ADULT - ASSESSMENT
IMPRESSION:  Intubation for airway protection SP extubation 4/2  Status epilepticus  Possible Aspiration PNA  ETOH withdrawal / DT's  Tongue Laceration s/p repair  HO of seizures   HO HTN  Hypomagnesemia, Hypokalemia; improving  Pancytopenia, improving      SUGGEST:    CNS:  CIWA protocol.  Detox protocol Ativan taper.  Wean off Precedex.  c/w Keppra, check levels.  Thiamine, Folate, Multivitamin.  Neuro FU.    HEENT: Oral care.  OMFS eval appreciated.     PULMONARY:  HOB @ 45 degrees.  Aspiration precautions.  Target POx > 94%.  Incentive spirometry if able.     CARDIOVASCULAR:  D5-LR at 75cc/hr    GI: GI prophylaxis.  Feeding per speech and swallow.  Bowel regimen.    RENAL:  Follow up lytes.  Correct as needed, replete Mg and K.  Keep Mg > 2 and K > 4.  Monitor UO.    INFECTIOUS DISEASE:  Finish Unasyn total 5 days.  FU cultures.    HEMATOLOGICAL:  DVT prophylaxis.    ENDOCRINE:  Follow up FS.  TSH.    MUSCULOSKELETAL: Bedrest for now    Full Code  MICU for now until Precedex discontinued IMPRESSION:  Intubation for airway protection SP extubation 4/2  Status epilepticus  Possible Aspiration PNA  ETOH withdrawal / DT's  Tongue Laceration s/p repair  HO of seizures   HO HTN  Hypomagnesemia, Hypokalemia; improving  Pancytopenia, improving      SUGGEST:    CNS:  CIWA protocol.  Detox protocol Ativan taper.    Wean off Precedex.    c/w Keppra, check levels.  T  hiamine, Folate, Multivitamin.  Neuro FU.    HEENT: Oral care.  OMFS eval appreciated.     PULMONARY:  HOB @ 45 degrees.  Aspiration precautions.  Target POx > 94%.  Incentive spirometry if able.     CARDIOVASCULAR:  D5-LR at 75cc/hr    GI: GI prophylaxis.  Feeding per speech and swallow.  Bowel regimen.    RENAL:  Follow up lytes.  Correct as needed, replete Mg and K.  Keep Mg > 2 and K > 4.  Monitor UO.    INFECTIOUS DISEASE:  Finish Unasyn total 5 days.  FU cultures.    HEMATOLOGICAL:  DVT prophylaxis.    ENDOCRINE:  Follow up FS.  TSH.    MUSCULOSKELETAL: Bedrest for now    Full Code  MICU for now until Precedex discontinued

## 2021-04-06 LAB
ALBUMIN SERPL ELPH-MCNC: 2.9 G/DL — LOW (ref 3.5–5.2)
ALP SERPL-CCNC: 67 U/L — SIGNIFICANT CHANGE UP (ref 30–115)
ALT FLD-CCNC: 14 U/L — SIGNIFICANT CHANGE UP (ref 0–41)
ANION GAP SERPL CALC-SCNC: 9 MMOL/L — SIGNIFICANT CHANGE UP (ref 7–14)
AST SERPL-CCNC: 22 U/L — SIGNIFICANT CHANGE UP (ref 0–41)
BASOPHILS # BLD AUTO: 0.02 K/UL — SIGNIFICANT CHANGE UP (ref 0–0.2)
BASOPHILS NFR BLD AUTO: 0.4 % — SIGNIFICANT CHANGE UP (ref 0–1)
BILIRUB SERPL-MCNC: 1 MG/DL — SIGNIFICANT CHANGE UP (ref 0.2–1.2)
BUN SERPL-MCNC: <3 MG/DL — LOW (ref 10–20)
CALCIUM SERPL-MCNC: 8.1 MG/DL — LOW (ref 8.5–10.1)
CHLORIDE SERPL-SCNC: 103 MMOL/L — SIGNIFICANT CHANGE UP (ref 98–110)
CO2 SERPL-SCNC: 24 MMOL/L — SIGNIFICANT CHANGE UP (ref 17–32)
CREAT SERPL-MCNC: <0.5 MG/DL — LOW (ref 0.7–1.5)
CULTURE RESULTS: SIGNIFICANT CHANGE UP
EOSINOPHIL # BLD AUTO: 0.1 K/UL — SIGNIFICANT CHANGE UP (ref 0–0.7)
EOSINOPHIL NFR BLD AUTO: 2.1 % — SIGNIFICANT CHANGE UP (ref 0–8)
GLUCOSE SERPL-MCNC: 143 MG/DL — HIGH (ref 70–99)
HCT VFR BLD CALC: 24.4 % — LOW (ref 42–52)
HGB BLD-MCNC: 8.7 G/DL — LOW (ref 14–18)
IMM GRANULOCYTES NFR BLD AUTO: 0.8 % — HIGH (ref 0.1–0.3)
INR BLD: 1.28 RATIO — SIGNIFICANT CHANGE UP (ref 0.65–1.3)
LACTATE SERPL-SCNC: 1.1 MMOL/L — SIGNIFICANT CHANGE UP (ref 0.7–2)
LYMPHOCYTES # BLD AUTO: 0.9 K/UL — LOW (ref 1.2–3.4)
LYMPHOCYTES # BLD AUTO: 19 % — LOW (ref 20.5–51.1)
MAGNESIUM SERPL-MCNC: 1.6 MG/DL — LOW (ref 1.8–2.4)
MCHC RBC-ENTMCNC: 32.7 PG — HIGH (ref 27–31)
MCHC RBC-ENTMCNC: 35.7 G/DL — SIGNIFICANT CHANGE UP (ref 32–37)
MCV RBC AUTO: 91.7 FL — SIGNIFICANT CHANGE UP (ref 80–94)
MONOCYTES # BLD AUTO: 0.67 K/UL — HIGH (ref 0.1–0.6)
MONOCYTES NFR BLD AUTO: 14.1 % — HIGH (ref 1.7–9.3)
NEUTROPHILS # BLD AUTO: 3.01 K/UL — SIGNIFICANT CHANGE UP (ref 1.4–6.5)
NEUTROPHILS NFR BLD AUTO: 63.6 % — SIGNIFICANT CHANGE UP (ref 42.2–75.2)
NRBC # BLD: 0 /100 WBCS — SIGNIFICANT CHANGE UP (ref 0–0)
OSMOLALITY SERPL: 281 MOS/KG — SIGNIFICANT CHANGE UP (ref 275–300)
PHOSPHATE SERPL-MCNC: 2.7 MG/DL — SIGNIFICANT CHANGE UP (ref 2.1–4.9)
PLATELET # BLD AUTO: 196 K/UL — SIGNIFICANT CHANGE UP (ref 130–400)
POTASSIUM SERPL-MCNC: 3.1 MMOL/L — LOW (ref 3.5–5)
POTASSIUM SERPL-SCNC: 3.1 MMOL/L — LOW (ref 3.5–5)
PROT SERPL-MCNC: 5.6 G/DL — LOW (ref 6–8)
PROTHROM AB SERPL-ACNC: 14.7 SEC — HIGH (ref 9.95–12.87)
RBC # BLD: 2.66 M/UL — LOW (ref 4.7–6.1)
RBC # FLD: 12.1 % — SIGNIFICANT CHANGE UP (ref 11.5–14.5)
SODIUM SERPL-SCNC: 136 MMOL/L — SIGNIFICANT CHANGE UP (ref 135–146)
SPECIMEN SOURCE: SIGNIFICANT CHANGE UP
WBC # BLD: 4.74 K/UL — LOW (ref 4.8–10.8)
WBC # FLD AUTO: 4.74 K/UL — LOW (ref 4.8–10.8)

## 2021-04-06 PROCEDURE — 99232 SBSQ HOSP IP/OBS MODERATE 35: CPT

## 2021-04-06 RX ORDER — POTASSIUM CHLORIDE 20 MEQ
20 PACKET (EA) ORAL
Refills: 0 | Status: COMPLETED | OUTPATIENT
Start: 2021-04-06 | End: 2021-04-06

## 2021-04-06 RX ORDER — SODIUM CHLORIDE 9 MG/ML
1000 INJECTION, SOLUTION INTRAVENOUS
Refills: 0 | Status: DISCONTINUED | OUTPATIENT
Start: 2021-04-06 | End: 2021-04-11

## 2021-04-06 RX ORDER — MAGNESIUM SULFATE 500 MG/ML
2 VIAL (ML) INJECTION ONCE
Refills: 0 | Status: COMPLETED | OUTPATIENT
Start: 2021-04-06 | End: 2021-04-06

## 2021-04-06 RX ADMIN — Medication 0.3 MILLIGRAM(S): at 15:30

## 2021-04-06 RX ADMIN — DEXMEDETOMIDINE HYDROCHLORIDE IN 0.9% SODIUM CHLORIDE 1.06 MICROGRAM(S)/KG/HR: 4 INJECTION INTRAVENOUS at 03:18

## 2021-04-06 RX ADMIN — Medication 50 MILLIEQUIVALENT(S): at 11:39

## 2021-04-06 RX ADMIN — POLYETHYLENE GLYCOL 3350 17 GRAM(S): 17 POWDER, FOR SOLUTION ORAL at 18:17

## 2021-04-06 RX ADMIN — Medication 50 MILLIEQUIVALENT(S): at 15:00

## 2021-04-06 RX ADMIN — Medication 25 GRAM(S): at 10:18

## 2021-04-06 RX ADMIN — DEXMEDETOMIDINE HYDROCHLORIDE IN 0.9% SODIUM CHLORIDE 1.06 MICROGRAM(S)/KG/HR: 4 INJECTION INTRAVENOUS at 18:17

## 2021-04-06 RX ADMIN — LEVETIRACETAM 400 MILLIGRAM(S): 250 TABLET, FILM COATED ORAL at 08:04

## 2021-04-06 RX ADMIN — DEXMEDETOMIDINE HYDROCHLORIDE IN 0.9% SODIUM CHLORIDE 1.06 MICROGRAM(S)/KG/HR: 4 INJECTION INTRAVENOUS at 10:14

## 2021-04-06 RX ADMIN — Medication 0.5 MILLIGRAM(S): at 06:55

## 2021-04-06 RX ADMIN — CHLORHEXIDINE GLUCONATE 1 APPLICATION(S): 213 SOLUTION TOPICAL at 05:41

## 2021-04-06 RX ADMIN — Medication 1 TABLET(S): at 15:34

## 2021-04-06 RX ADMIN — DEXMEDETOMIDINE HYDROCHLORIDE IN 0.9% SODIUM CHLORIDE 1.06 MICROGRAM(S)/KG/HR: 4 INJECTION INTRAVENOUS at 11:07

## 2021-04-06 RX ADMIN — Medication 100 MILLIGRAM(S): at 18:15

## 2021-04-06 RX ADMIN — SENNA PLUS 2 TABLET(S): 8.6 TABLET ORAL at 21:51

## 2021-04-06 RX ADMIN — Medication 0.3 MILLIGRAM(S): at 21:51

## 2021-04-06 RX ADMIN — SODIUM CHLORIDE 75 MILLILITER(S): 9 INJECTION, SOLUTION INTRAVENOUS at 10:30

## 2021-04-06 RX ADMIN — DEXMEDETOMIDINE HYDROCHLORIDE IN 0.9% SODIUM CHLORIDE 1.06 MICROGRAM(S)/KG/HR: 4 INJECTION INTRAVENOUS at 23:37

## 2021-04-06 RX ADMIN — DEXMEDETOMIDINE HYDROCHLORIDE IN 0.9% SODIUM CHLORIDE 1.06 MICROGRAM(S)/KG/HR: 4 INJECTION INTRAVENOUS at 12:45

## 2021-04-06 RX ADMIN — DEXMEDETOMIDINE HYDROCHLORIDE IN 0.9% SODIUM CHLORIDE 1.06 MICROGRAM(S)/KG/HR: 4 INJECTION INTRAVENOUS at 08:03

## 2021-04-06 RX ADMIN — LEVETIRACETAM 400 MILLIGRAM(S): 250 TABLET, FILM COATED ORAL at 21:52

## 2021-04-06 RX ADMIN — Medication 50 MILLIEQUIVALENT(S): at 10:13

## 2021-04-06 RX ADMIN — DEXMEDETOMIDINE HYDROCHLORIDE IN 0.9% SODIUM CHLORIDE 1.06 MICROGRAM(S)/KG/HR: 4 INJECTION INTRAVENOUS at 06:55

## 2021-04-06 RX ADMIN — DEXMEDETOMIDINE HYDROCHLORIDE IN 0.9% SODIUM CHLORIDE 1.06 MICROGRAM(S)/KG/HR: 4 INJECTION INTRAVENOUS at 00:21

## 2021-04-06 RX ADMIN — Medication 1 MILLIGRAM(S): at 18:16

## 2021-04-06 RX ADMIN — ENOXAPARIN SODIUM 40 MILLIGRAM(S): 100 INJECTION SUBCUTANEOUS at 12:39

## 2021-04-06 NOTE — SWALLOW BEDSIDE ASSESSMENT ADULT - SWALLOW EVAL: DIAGNOSIS
+lingual edema w/ reduced ROM, +moderate oral dysphagia, no overt s/s of penetration/aspiration w/ thin and nectar thick liquids, no heavier viscosities tested 2' reduced lingual ROM

## 2021-04-06 NOTE — SWALLOW BEDSIDE ASSESSMENT ADULT - SWALLOW EVAL: VELAR ELEVATION
Pt called stating he is still not feeling 100% and has a question about a red remi on his body that you two had spoken about    unable to visualize

## 2021-04-06 NOTE — PROGRESS NOTE ADULT - ASSESSMENT
IMPRESSION:  Intubation for airway protection SP extubation 4/2  Status epilepticus  ETOH withdrawal / DT's on Precedex  Possible Aspiration PNA  Tongue Laceration s/p repair  HO of seizures   HO HTN  Hypomagnesemia, Hypokalemia  Pancytopenia, improving      SUGGEST:    CNS:  CIWA protocol, Versed instead of Ativan.  Detox protocol taper.  Wean off Precedex.  Clonidine  c/w Keppra, check levels.  Thiamine, Folate, Multivitamin.  Neuro FU.    HEENT: Oral care.  OMFS eval appreciated.     PULMONARY:  HOB @ 45 degrees.  Aspiration precautions.  Target POx 92 - 96%.  Incentive spirometry if able.     CARDIOVASCULAR:  LR at 75cc/hr    GI: GI prophylaxis.  Feeding per speech and swallow.  Bowel regimen.    RENAL:  Follow up lytes.  Correct as needed, replete Mg and K.  Keep Mg > 2 and K > 4.  Monitor UO.  Urine and serum osmolarity    INFECTIOUS DISEASE:  Finish Unasyn total 5 days.  FU cultures.    HEMATOLOGICAL:  DVT prophylaxis.    ENDOCRINE:  Follow up FS.  TSH.    MUSCULOSKELETAL: Bedrest for now    Full Code  MICU for now until Precedex discontinued IMPRESSION:  Intubation for airway protection SP extubation 4/2  Status epilepticus  ETOH withdrawal / DT's on Precedex  Possible Aspiration PNA  Tongue Laceration s/p repair  HO of seizures   HO HTN  Hypomagnesemia, Hypokalemia  Pancytopenia, improving      SUGGEST:    CNS:  CIWA protocol, Versed instead of Ativan.  Detox protocol taper.  Wean off Precedex.  Clonidine  c/w Keppra, check levels.  Thiamine, Folate, Multivitamin.  Neuro FU.    HEENT: Oral care.  OMFS eval appreciated.     PULMONARY:  HOB @ 45 degrees.  Aspiration precautions.  Target POx 92 - 96%.  Incentive spirometry if able.     CARDIOVASCULAR:  LR at 75cc/hr    GI: GI prophylaxis.  Feeding per speech and swallow.  Bowel regimen.    RENAL:  Follow up lytes.  Correct as needed, replete Mg and K.  Keep Mg > 2 and K > 4.  Monitor UO.  Urine and serum osmolarity    INFECTIOUS DISEASE:  Finish Unasyn total 5 days.  FU cultures.    HEMATOLOGICAL:  DVT prophylaxis.    ENDOCRINE:  Follow up FS.  TSH.    MUSCULOSKELETAL: Bedrest for now    D/c TLC  Full Code  MICU for now until Precedex discontinued

## 2021-04-06 NOTE — PROGRESS NOTE ADULT - SUBJECTIVE AND OBJECTIVE BOX
Patient is a 45y old  Male who presents with a chief complaint of siezures (05 Apr 2021 14:59)    Over Night Events:  Agitated overnight.  Remains on Precedex 1.5    ROS:   All ROS are negative except HPI     PHYSICAL EXAM  ICU Vital Signs Last 24 Hrs  T(C): 36.6 (06 Apr 2021 08:00), Max: 36.7 (05 Apr 2021 12:00)  T(F): 97.9 (06 Apr 2021 08:00), Max: 98 (05 Apr 2021 12:00)  HR: 63 (06 Apr 2021 08:00) (62 - 77)  BP: 155/94 (06 Apr 2021 08:00) (100/65 - 158/98)  BP(mean): 119 (06 Apr 2021 08:00) (65 - 122)  RR: 16 (06 Apr 2021 08:00) (16 - 23)  SpO2: 100% (06 Apr 2021 08:00) (98% - 100%)    · CONSTITUTIONAL:   not septic appearing,   well nourished,   NAD    · ENMT:   Airway patent,   Nasal mucosa clear.  Mouth with normal mucosa.   No thrush    · EYES:   Clear bilaterally,   pupils equal,   round and reactive to light.    · CARDIAC:   Normal rate,   regular rhythm  Heart sounds S1, S2.   No murmurs, no rubs or gallops on auscultation  no edema    · RESPIRATORY:   rales  normal chest expansion  no retractions or use of accessory muscles  palpation of chest is normal with no fremitus  percussion of chest demonstrates no hyperresonance or dullness    · GASTROINTESTINAL:  Abdomen soft,   non-tender,   + BS  liver/spleen not palpable    · MUSCULOSKELETAL:   no clubbing, cyanosis    · NEUROLOGICAL:  awake alert oriented  no obvious cranial nerve abnormalities    · SKIN:   Skin normal color for race,   warm, dry   No evidence of rash.    · HEME LYMPH:   no splenomegaly.  No cervical  lymphadenopathy.  no inguinal lymphadenopathy      04-05-21 @ 07:01  -  04-06-21 @ 07:00  --------------------------------------------------------  IN:    Dexmedetomidine: 787.5 mL    dextrose 5% + lactated ringers: 1725 mL    IV PiggyBack: 600 mL  Total IN: 3112.5 mL    OUT:    Indwelling Catheter - Urethral (mL): 1345 mL  Total OUT: 1345 mL    Total NET: 1767.5 mL      04-06-21 @ 07:01  -  04-06-21 @ 08:16  --------------------------------------------------------  IN:    Dexmedetomidine: 31.5 mL    dextrose 5% + lactated ringers: 75 mL    IV PiggyBack: 100 mL  Total IN: 206.5 mL    OUT:    Indwelling Catheter - Urethral (mL): 200 mL  Total OUT: 200 mL    Total NET: 6.5 mL        LABS:                          8.7    4.74  )-----------( 196      ( 06 Apr 2021 05:26 )             24.4     136  |  103  |  <3<L>  ----------------------------<  143<H>  3.1<L>   |  24  |  <0.5<L>    Ca    8.1<L>      06 Apr 2021 05:26  Phos  2.7     04-06  Mg     1.6     04-06    TPro  5.6<L>  /  Alb  2.9<L>  /  TBili  1.0  /  DBili  x   /  AST  22  /  ALT  14  /  AlkPhos  67  04-06    PT/INR - ( 06 Apr 2021 05:26 )   PT: 14.70 sec;   INR: 1.28 ratio         LIVER FUNCTIONS - ( 06 Apr 2021 05:26 )  Alb: 2.9 g/dL / Pro: 5.6 g/dL / ALK PHOS: 67 U/L / ALT: 14 U/L / AST: 22 U/L / GGT: x                                   MEDICATIONS  (STANDING):  chlorhexidine 4% Liquid 1 Application(s) Topical <User Schedule>  cloNIDine 0.3 milliGRAM(s) Oral every 8 hours  dexMEDEtomidine Infusion 0.05 MICROgram(s)/kG/Hr (1.06 mL/Hr) IV Continuous <Continuous>  dextrose 5% + lactated ringers. 1000 milliLiter(s) (75 mL/Hr) IV Continuous <Continuous>  enoxaparin Injectable 40 milliGRAM(s) SubCutaneous daily  fentaNYL    Injectable 50 MICROGram(s) IV Push once  folic acid Injectable 1 milliGRAM(s) IV Push daily  levETIRAcetam  IVPB 1000 milliGRAM(s) IV Intermittent every 12 hours  LORazepam   Injectable   IV Push   LORazepam   Injectable 0.5 milliGRAM(s) IV Push every 12 hours  multivitamin 1 Tablet(s) Oral daily  polyethylene glycol 3350 17 Gram(s) Oral two times a day  senna 2 Tablet(s) Oral at bedtime  thiamine Injectable 100 milliGRAM(s) IV Push daily    MEDICATIONS  (PRN):  LORazepam   Injectable 2 milliGRAM(s) IV Push every 1 hour PRN Agitation      New X-rays reviewed:                                                                                  ECHO    CXR interpreted by me:  no acute cardiopulmonary pathology, L IJ

## 2021-04-07 LAB
ALBUMIN SERPL ELPH-MCNC: 3 G/DL — LOW (ref 3.5–5.2)
ALP SERPL-CCNC: 77 U/L — SIGNIFICANT CHANGE UP (ref 30–115)
ALT FLD-CCNC: 20 U/L — SIGNIFICANT CHANGE UP (ref 0–41)
ANION GAP SERPL CALC-SCNC: 13 MMOL/L — SIGNIFICANT CHANGE UP (ref 7–14)
AST SERPL-CCNC: 35 U/L — SIGNIFICANT CHANGE UP (ref 0–41)
BILIRUB SERPL-MCNC: 1 MG/DL — SIGNIFICANT CHANGE UP (ref 0.2–1.2)
BUN SERPL-MCNC: 4 MG/DL — LOW (ref 10–20)
CALCIUM SERPL-MCNC: 8.1 MG/DL — LOW (ref 8.5–10.1)
CHLORIDE SERPL-SCNC: 101 MMOL/L — SIGNIFICANT CHANGE UP (ref 98–110)
CO2 SERPL-SCNC: 22 MMOL/L — SIGNIFICANT CHANGE UP (ref 17–32)
CREAT SERPL-MCNC: 0.6 MG/DL — LOW (ref 0.7–1.5)
GLUCOSE SERPL-MCNC: 122 MG/DL — HIGH (ref 70–99)
MAGNESIUM SERPL-MCNC: 1.3 MG/DL — LOW (ref 1.8–2.4)
MAGNESIUM SERPL-MCNC: 1.8 MG/DL — SIGNIFICANT CHANGE UP (ref 1.8–2.4)
PHOSPHATE SERPL-MCNC: 2.2 MG/DL — SIGNIFICANT CHANGE UP (ref 2.1–4.9)
POTASSIUM SERPL-MCNC: 3.5 MMOL/L — SIGNIFICANT CHANGE UP (ref 3.5–5)
POTASSIUM SERPL-SCNC: 3.5 MMOL/L — SIGNIFICANT CHANGE UP (ref 3.5–5)
PROT SERPL-MCNC: 5.8 G/DL — LOW (ref 6–8)
SARS-COV-2 RNA SPEC QL NAA+PROBE: SIGNIFICANT CHANGE UP
SODIUM SERPL-SCNC: 136 MMOL/L — SIGNIFICANT CHANGE UP (ref 135–146)
TSH SERPL-MCNC: 1.32 UIU/ML — SIGNIFICANT CHANGE UP (ref 0.27–4.2)

## 2021-04-07 PROCEDURE — 99232 SBSQ HOSP IP/OBS MODERATE 35: CPT

## 2021-04-07 RX ORDER — MAGNESIUM SULFATE 500 MG/ML
2 VIAL (ML) INJECTION ONCE
Refills: 0 | Status: DISCONTINUED | OUTPATIENT
Start: 2021-04-07 | End: 2021-04-07

## 2021-04-07 RX ORDER — MAGNESIUM SULFATE 500 MG/ML
2 VIAL (ML) INJECTION
Refills: 0 | Status: COMPLETED | OUTPATIENT
Start: 2021-04-07 | End: 2021-04-07

## 2021-04-07 RX ORDER — MAGNESIUM SULFATE 500 MG/ML
2 VIAL (ML) INJECTION ONCE
Refills: 0 | Status: COMPLETED | OUTPATIENT
Start: 2021-04-07 | End: 2021-04-07

## 2021-04-07 RX ORDER — AMIODARONE HYDROCHLORIDE 400 MG/1
0.5 TABLET ORAL
Qty: 900 | Refills: 0 | Status: DISCONTINUED | OUTPATIENT
Start: 2021-04-07 | End: 2021-04-07

## 2021-04-07 RX ORDER — AMIODARONE HYDROCHLORIDE 400 MG/1
1 TABLET ORAL
Qty: 900 | Refills: 0 | Status: DISCONTINUED | OUTPATIENT
Start: 2021-04-07 | End: 2021-04-07

## 2021-04-07 RX ADMIN — Medication 0.3 MILLIGRAM(S): at 05:38

## 2021-04-07 RX ADMIN — DEXMEDETOMIDINE HYDROCHLORIDE IN 0.9% SODIUM CHLORIDE 1.06 MICROGRAM(S)/KG/HR: 4 INJECTION INTRAVENOUS at 18:47

## 2021-04-07 RX ADMIN — Medication 100 MILLIGRAM(S): at 15:22

## 2021-04-07 RX ADMIN — DEXMEDETOMIDINE HYDROCHLORIDE IN 0.9% SODIUM CHLORIDE 1.06 MICROGRAM(S)/KG/HR: 4 INJECTION INTRAVENOUS at 10:16

## 2021-04-07 RX ADMIN — Medication 25 GRAM(S): at 23:59

## 2021-04-07 RX ADMIN — Medication 2 MILLIGRAM(S): at 21:56

## 2021-04-07 RX ADMIN — DEXMEDETOMIDINE HYDROCHLORIDE IN 0.9% SODIUM CHLORIDE 1.06 MICROGRAM(S)/KG/HR: 4 INJECTION INTRAVENOUS at 21:57

## 2021-04-07 RX ADMIN — Medication 0.3 MILLIGRAM(S): at 21:56

## 2021-04-07 RX ADMIN — CHLORHEXIDINE GLUCONATE 1 APPLICATION(S): 213 SOLUTION TOPICAL at 05:38

## 2021-04-07 RX ADMIN — Medication 1 TABLET(S): at 12:11

## 2021-04-07 RX ADMIN — DEXMEDETOMIDINE HYDROCHLORIDE IN 0.9% SODIUM CHLORIDE 1.06 MICROGRAM(S)/KG/HR: 4 INJECTION INTRAVENOUS at 05:41

## 2021-04-07 RX ADMIN — SODIUM CHLORIDE 75 MILLILITER(S): 9 INJECTION, SOLUTION INTRAVENOUS at 02:29

## 2021-04-07 RX ADMIN — Medication 1 MILLIGRAM(S): at 15:21

## 2021-04-07 RX ADMIN — ENOXAPARIN SODIUM 40 MILLIGRAM(S): 100 INJECTION SUBCUTANEOUS at 12:11

## 2021-04-07 RX ADMIN — Medication 25 GRAM(S): at 19:05

## 2021-04-07 RX ADMIN — Medication 0.3 MILLIGRAM(S): at 15:21

## 2021-04-07 RX ADMIN — LEVETIRACETAM 400 MILLIGRAM(S): 250 TABLET, FILM COATED ORAL at 21:37

## 2021-04-07 RX ADMIN — SENNA PLUS 2 TABLET(S): 8.6 TABLET ORAL at 21:57

## 2021-04-07 RX ADMIN — DEXMEDETOMIDINE HYDROCHLORIDE IN 0.9% SODIUM CHLORIDE 1.06 MICROGRAM(S)/KG/HR: 4 INJECTION INTRAVENOUS at 12:24

## 2021-04-07 RX ADMIN — Medication 2 MILLIGRAM(S): at 10:15

## 2021-04-07 RX ADMIN — DEXMEDETOMIDINE HYDROCHLORIDE IN 0.9% SODIUM CHLORIDE 1.06 MICROGRAM(S)/KG/HR: 4 INJECTION INTRAVENOUS at 02:34

## 2021-04-07 RX ADMIN — Medication 2 MILLIGRAM(S): at 15:19

## 2021-04-07 RX ADMIN — LEVETIRACETAM 400 MILLIGRAM(S): 250 TABLET, FILM COATED ORAL at 10:15

## 2021-04-07 RX ADMIN — DEXMEDETOMIDINE HYDROCHLORIDE IN 0.9% SODIUM CHLORIDE 1.06 MICROGRAM(S)/KG/HR: 4 INJECTION INTRAVENOUS at 15:20

## 2021-04-07 RX ADMIN — Medication 2 MILLIGRAM(S): at 18:47

## 2021-04-07 RX ADMIN — Medication 25 GRAM(S): at 21:34

## 2021-04-07 NOTE — PROGRESS NOTE ADULT - SUBJECTIVE AND OBJECTIVE BOX
Patient is a 45y old  Male who presents with a chief complaint of siezures (06 Apr 2021 08:16)    Over Night Events: Agitated overnight.  Calmer this morning.  Remains on Precedex at 1.    ROS:   All ROS are negative except HPI     PHYSICAL EXAM  ICU Vital Signs Last 24 Hrs  T(C): 36.5 (07 Apr 2021 07:00), Max: 36.9 (06 Apr 2021 16:00)  T(F): 97.7 (07 Apr 2021 07:00), Max: 98.4 (06 Apr 2021 16:00)  HR: 107 (07 Apr 2021 07:00) (64 - 107)  BP: 115/99 (07 Apr 2021 07:00) (103/64 - 160/97)  BP(mean): 82 (07 Apr 2021 07:00) (75 - 123)  RR: 20 (07 Apr 2021 07:00) (16 - 22)  SpO2: 100% (07 Apr 2021 07:00) (99% - 100%)    · CONSTITUTIONAL:   not septic appearing,   well nourished,   NAD    · ENMT:   Airway patent,   Nasal mucosa clear.  Mouth with normal mucosa.   No thrush    · EYES:   Clear bilaterally,   pupils equal,   round and reactive to light.    · CARDIAC:   Normal rate,   regular rhythm  Heart sounds S1, S2.   No murmurs, no rubs or gallops on auscultation  no edema    · RESPIRATORY:   rales  normal chest expansion  no retractions or use of accessory muscles  palpation of chest is normal with no fremitus  percussion of chest demonstrates no hyperresonance or dullness    · GASTROINTESTINAL:  Abdomen soft,   non-tender,   + BS  liver/spleen not palpable    · MUSCULOSKELETAL:   no clubbing, cyanosis    · NEUROLOGICAL:  awake alert oriented  no obvious cranial nerve abnormalities    · SKIN:   Skin normal color for race,   warm, dry   No evidence of rash.    · HEME LYMPH:   no splenomegaly.  No cervical  lymphadenopathy.  no inguinal lymphadenopathy      04-06-21 @ 07:01  -  04-07-21 @ 07:00  --------------------------------------------------------  IN:    Dexmedetomidine: 593.8 mL    dextrose 5% + lactated ringers: 75 mL    IV PiggyBack: 350 mL    Lactated Ringers: 1650 mL  Total IN: 2668.8 mL    OUT:    Indwelling Catheter - Urethral (mL): 1755 mL  Total OUT: 1755 mL    Total NET: 913.8 mL      LABS:                            8.7    4.74  )-----------( 196      ( 06 Apr 2021 05:26 )             24.4     136  |  103  |  <3<L>  ----------------------------<  143<H>  3.1<L>   |  24  |  <0.5<L>    Ca    8.1<L>      06 Apr 2021 05:26  Phos  2.7     04-06  Mg     1.6     04-06    TPro  5.6<L>  /  Alb  2.9<L>  /  TBili  1.0  /  DBili  x   /  AST  22  /  ALT  14  /  AlkPhos  67  04-06    PT/INR - ( 06 Apr 2021 05:26 )   PT: 14.70 sec;   INR: 1.28 ratio      LIVER FUNCTIONS - ( 06 Apr 2021 05:26 )  Alb: 2.9 g/dL / Pro: 5.6 g/dL / ALK PHOS: 67 U/L / ALT: 14 U/L / AST: 22 U/L / GGT: x            MEDICATIONS  (STANDING):  chlorhexidine 4% Liquid 1 Application(s) Topical <User Schedule>  cloNIDine 0.3 milliGRAM(s) Oral every 8 hours  dexMEDEtomidine Infusion 0.05 MICROgram(s)/kG/Hr (1.06 mL/Hr) IV Continuous <Continuous>  enoxaparin Injectable 40 milliGRAM(s) SubCutaneous daily  fentaNYL    Injectable 50 MICROGram(s) IV Push once  folic acid Injectable 1 milliGRAM(s) IV Push daily  lactated ringers. 1000 milliLiter(s) (75 mL/Hr) IV Continuous <Continuous>  levETIRAcetam  IVPB 1000 milliGRAM(s) IV Intermittent every 12 hours  multivitamin 1 Tablet(s) Oral daily  polyethylene glycol 3350 17 Gram(s) Oral two times a day  senna 2 Tablet(s) Oral at bedtime  thiamine Injectable 100 milliGRAM(s) IV Push daily    MEDICATIONS  (PRN):

## 2021-04-07 NOTE — PROGRESS NOTE ADULT - ASSESSMENT
IMPRESSION:  Intubation for airway protection SP extubation 4/2  Status epilepticus, resolved  ETOH withdrawal / DT's on Precedex  Possible Aspiration PNA, treated  Tongue Laceration s/p repair  HO of seizures   HO HTN  Hypomagnesemia, Hypokalemia  Pancytopenia      SUGGEST:    CNS:  CIWA protocol.  Detox protocol taper, Detox FU  Wean off Precedex.  Clonidine.  c/w Keppra, check levels.  Thiamine, Folate, Multivitamin.  Neuro FU.    HEENT: Oral care    PULMONARY:  HOB @ 45 degrees.  Aspiration precautions.  Target POx 92 - 96%.  Incentive spirometry if able.    CARDIOVASCULAR:  LR at 75cc/hr    GI: GI prophylaxis.  Feeding per speech and swallow.  Bowel regimen.    RENAL:  FU CMP, Mg & Phos.  Follow up lytes.  Correct as needed, replete Mg and K.  Keep Mg > 2 and K > 4.  Monitor UO.  Bladder scan.    INFECTIOUS DISEASE:  FU CBC.  Finished course of Unasyn.  Monitor off ABX.  FU cultures.    HEMATOLOGICAL:  DVT prophylaxis.    ENDOCRINE:  Follow up FS.  TSH.    MUSCULOSKELETAL: Bedrest for now    D/c TLC  Full Code  MICU for now until Precedex discontinued

## 2021-04-07 NOTE — SWALLOW BEDSIDE ASSESSMENT ADULT - SWALLOW EVAL: DIAGNOSIS
+Mod oral dysphagia with puree, and nectar-thick liquids with min overt s/s of penetration/aspiration for nectar-thick liquids. +Limited lingual ROM +Mod oral dysphagia with puree, nectar-thick, and thin liquids with min overt s/s of penetration/aspiration for thin liquids. +Limited lingual ROM

## 2021-04-07 NOTE — SWALLOW BEDSIDE ASSESSMENT ADULT - COMMENTS
Pt received awake on RA with adequate vocal quality. As per RN, pt coughed with liquids when fed by aid likely due to use of a spoon instead of a straw. Mod oral dysphagia with minimum overt s/s of penetration/aspiration; delayed cough post intake.

## 2021-04-07 NOTE — PROGRESS NOTE ADULT - ATTENDING COMMENTS
History, events, data and scans reviewed. Patient examined at bedside. I agree and approves plan of care as outlined above.
Attending Statement: I have personally performed a face to face diagnostic evaluation on this patient. The patient is suffering from Status epilepticus  Possible Aspiration PNA and ETOH withdrawal / DT's.  I have reviewed the above note and agree with the history, exam and suggestions for care, except as I have noted in the text. I have amended the treatment plans as necessary.
History, events, data and scans reviewed, patient examined at bedside on 03/31/2021. I agree and approved plan of care as outlined above.  No electrographic/clinical seizures  Taper propofol as recommended rate. Continue VEEG until pt remains sz free off propofol.  Discussed with MICU team
History, events, data and scans reviewed. Patient examined at bedside. I agree and approves plan of care as outlined above.  Tramolousness/tachycardia/sweating, suggestive of alcohol withdrawal autonomic hyperarousal. VEEG -ve.  Suggest to gradually taper propofol and for breakthrough agitation, give lorazepam IVP based on CIWA rather than going back up on propofol.  Discussed with ICU team.
Attending Statement: I have personally performed a face to face diagnostic evaluation on this patient. The patient is suffering from seizures..  I have reviewed the above note and agree with the history, exam and suggestions for care, except as I have noted in the text. I have amended the treatment plans as necessary.
patient seen and examined, agree with above, ST today, Neuro f/up, finish abx course
patient seen and examined, agree with above, Still intubated, agitated off sedation, DT, SAT daily, correct lytes, SBT

## 2021-04-08 LAB
ALBUMIN SERPL ELPH-MCNC: 3.1 G/DL — LOW (ref 3.5–5.2)
ALP SERPL-CCNC: 73 U/L — SIGNIFICANT CHANGE UP (ref 30–115)
ALT FLD-CCNC: 17 U/L — SIGNIFICANT CHANGE UP (ref 0–41)
ANION GAP SERPL CALC-SCNC: 13 MMOL/L — SIGNIFICANT CHANGE UP (ref 7–14)
AST SERPL-CCNC: 30 U/L — SIGNIFICANT CHANGE UP (ref 0–41)
BASOPHILS # BLD AUTO: 0.01 K/UL — SIGNIFICANT CHANGE UP (ref 0–0.2)
BASOPHILS NFR BLD AUTO: 0.2 % — SIGNIFICANT CHANGE UP (ref 0–1)
BILIRUB SERPL-MCNC: 1 MG/DL — SIGNIFICANT CHANGE UP (ref 0.2–1.2)
BUN SERPL-MCNC: 3 MG/DL — LOW (ref 10–20)
CALCIUM SERPL-MCNC: 8 MG/DL — LOW (ref 8.5–10.1)
CHLORIDE SERPL-SCNC: 108 MMOL/L — SIGNIFICANT CHANGE UP (ref 98–110)
CO2 SERPL-SCNC: 22 MMOL/L — SIGNIFICANT CHANGE UP (ref 17–32)
CREAT SERPL-MCNC: 0.5 MG/DL — LOW (ref 0.7–1.5)
EOSINOPHIL # BLD AUTO: 0.05 K/UL — SIGNIFICANT CHANGE UP (ref 0–0.7)
EOSINOPHIL NFR BLD AUTO: 1 % — SIGNIFICANT CHANGE UP (ref 0–8)
GLUCOSE SERPL-MCNC: 122 MG/DL — HIGH (ref 70–99)
HCT VFR BLD CALC: 26.7 % — LOW (ref 42–52)
HGB BLD-MCNC: 9.4 G/DL — LOW (ref 14–18)
IMM GRANULOCYTES NFR BLD AUTO: 0.4 % — HIGH (ref 0.1–0.3)
LYMPHOCYTES # BLD AUTO: 0.87 K/UL — LOW (ref 1.2–3.4)
LYMPHOCYTES # BLD AUTO: 17.6 % — LOW (ref 20.5–51.1)
MAGNESIUM SERPL-MCNC: 2.3 MG/DL — SIGNIFICANT CHANGE UP (ref 1.8–2.4)
MCHC RBC-ENTMCNC: 32.4 PG — HIGH (ref 27–31)
MCHC RBC-ENTMCNC: 35.2 G/DL — SIGNIFICANT CHANGE UP (ref 32–37)
MCV RBC AUTO: 92.1 FL — SIGNIFICANT CHANGE UP (ref 80–94)
MONOCYTES # BLD AUTO: 0.61 K/UL — HIGH (ref 0.1–0.6)
MONOCYTES NFR BLD AUTO: 12.4 % — HIGH (ref 1.7–9.3)
NEUTROPHILS # BLD AUTO: 3.37 K/UL — SIGNIFICANT CHANGE UP (ref 1.4–6.5)
NEUTROPHILS NFR BLD AUTO: 68.4 % — SIGNIFICANT CHANGE UP (ref 42.2–75.2)
NRBC # BLD: 0 /100 WBCS — SIGNIFICANT CHANGE UP (ref 0–0)
PHOSPHATE SERPL-MCNC: 3.4 MG/DL — SIGNIFICANT CHANGE UP (ref 2.1–4.9)
PLATELET # BLD AUTO: 274 K/UL — SIGNIFICANT CHANGE UP (ref 130–400)
POTASSIUM SERPL-MCNC: 3.4 MMOL/L — LOW (ref 3.5–5)
POTASSIUM SERPL-SCNC: 3.4 MMOL/L — LOW (ref 3.5–5)
PROT SERPL-MCNC: 6.1 G/DL — SIGNIFICANT CHANGE UP (ref 6–8)
RBC # BLD: 2.9 M/UL — LOW (ref 4.7–6.1)
RBC # FLD: 12.3 % — SIGNIFICANT CHANGE UP (ref 11.5–14.5)
SODIUM SERPL-SCNC: 143 MMOL/L — SIGNIFICANT CHANGE UP (ref 135–146)
WBC # BLD: 4.93 K/UL — SIGNIFICANT CHANGE UP (ref 4.8–10.8)
WBC # FLD AUTO: 4.93 K/UL — SIGNIFICANT CHANGE UP (ref 4.8–10.8)

## 2021-04-08 PROCEDURE — 76705 ECHO EXAM OF ABDOMEN: CPT | Mod: 26

## 2021-04-08 PROCEDURE — 71045 X-RAY EXAM CHEST 1 VIEW: CPT | Mod: 26

## 2021-04-08 PROCEDURE — 99232 SBSQ HOSP IP/OBS MODERATE 35: CPT

## 2021-04-08 PROCEDURE — 99253 IP/OBS CNSLTJ NEW/EST LOW 45: CPT | Mod: GC

## 2021-04-08 RX ORDER — POTASSIUM CHLORIDE 20 MEQ
40 PACKET (EA) ORAL EVERY 4 HOURS
Refills: 0 | Status: COMPLETED | OUTPATIENT
Start: 2021-04-08 | End: 2021-04-08

## 2021-04-08 RX ADMIN — Medication 1 MILLIGRAM(S): at 11:55

## 2021-04-08 RX ADMIN — Medication 40 MILLIEQUIVALENT(S): at 17:12

## 2021-04-08 RX ADMIN — Medication 0.3 MILLIGRAM(S): at 06:17

## 2021-04-08 RX ADMIN — SODIUM CHLORIDE 75 MILLILITER(S): 9 INJECTION, SOLUTION INTRAVENOUS at 00:55

## 2021-04-08 RX ADMIN — ENOXAPARIN SODIUM 40 MILLIGRAM(S): 100 INJECTION SUBCUTANEOUS at 11:55

## 2021-04-08 RX ADMIN — POLYETHYLENE GLYCOL 3350 17 GRAM(S): 17 POWDER, FOR SOLUTION ORAL at 06:18

## 2021-04-08 RX ADMIN — LEVETIRACETAM 400 MILLIGRAM(S): 250 TABLET, FILM COATED ORAL at 08:22

## 2021-04-08 RX ADMIN — Medication 0.3 MILLIGRAM(S): at 23:36

## 2021-04-08 RX ADMIN — Medication 4 MILLIGRAM(S): at 11:57

## 2021-04-08 RX ADMIN — DEXMEDETOMIDINE HYDROCHLORIDE IN 0.9% SODIUM CHLORIDE 1.06 MICROGRAM(S)/KG/HR: 4 INJECTION INTRAVENOUS at 08:22

## 2021-04-08 RX ADMIN — LEVETIRACETAM 400 MILLIGRAM(S): 250 TABLET, FILM COATED ORAL at 21:00

## 2021-04-08 RX ADMIN — Medication 4 MILLIGRAM(S): at 17:11

## 2021-04-08 RX ADMIN — Medication 0.3 MILLIGRAM(S): at 15:32

## 2021-04-08 RX ADMIN — DEXMEDETOMIDINE HYDROCHLORIDE IN 0.9% SODIUM CHLORIDE 1.06 MICROGRAM(S)/KG/HR: 4 INJECTION INTRAVENOUS at 00:53

## 2021-04-08 RX ADMIN — SENNA PLUS 2 TABLET(S): 8.6 TABLET ORAL at 23:37

## 2021-04-08 RX ADMIN — CHLORHEXIDINE GLUCONATE 1 APPLICATION(S): 213 SOLUTION TOPICAL at 06:17

## 2021-04-08 RX ADMIN — Medication 40 MILLIEQUIVALENT(S): at 23:36

## 2021-04-08 RX ADMIN — Medication 1 TABLET(S): at 11:55

## 2021-04-08 RX ADMIN — Medication 100 MILLIGRAM(S): at 11:55

## 2021-04-08 RX ADMIN — POLYETHYLENE GLYCOL 3350 17 GRAM(S): 17 POWDER, FOR SOLUTION ORAL at 17:12

## 2021-04-08 NOTE — SWALLOW BEDSIDE ASSESSMENT ADULT - ORAL PHASE
w/ puree/Delayed oral transit time/Lingual stasis
Delayed oral transit time
lingula pumping/Delayed oral transit time

## 2021-04-08 NOTE — PROGRESS NOTE ADULT - ASSESSMENT
IMPRESSION:  Intubation for airway protection SP extubation 4/2  Status epilepticus, resolved  ETOH withdrawal / DT's on Precedex  Possible Aspiration PNA, treated  Tongue Laceration s/p repair  HO of seizures   HO HTN  Hypomagnesemia, Hypokalemia  Pancytopenia      SUGGEST:    CNS:  CIWA protocol.  Detox protocol taper, Detox & Addiction Medicine eval.  Wean off Precedex.  c/w Clonidine.  c/w Keppra, check levels.  Thiamine, Folate, Multivitamin.    HEENT: Oral care    PULMONARY:  HOB @ 45 degrees.  Aspiration precautions.  Target POx 92 - 96%.  Incentive spirometry if able.    CARDIOVASCULAR:  LR at 75cc/hr    GI: GI prophylaxis.  Feeding per speech and swallow.  Bowel regimen.    RENAL:  FU CMP, Mg & Phos.  Follow up lytes.  Correct as needed, replete Mg and K.  Keep Mg > 2 and K > 4.  Monitor UO.  Bladder scan.    INFECTIOUS DISEASE:  FU CBC.  Monitor off ABX.  FU cultures.    HEMATOLOGICAL:  DVT prophylaxis.    ENDOCRINE:  Follow up FS.    MUSCULOSKELETAL: Bedrest for now    Full Code  MICU for now until Precedex discontinued IMPRESSION:  Intubation for airway protection SP extubation 4/2  Status epilepticus, resolved  ETOH withdrawal / DT's on Precedex  Possible Aspiration PNA, treated  Tongue Laceration s/p repair  HO of seizures   HO HTN  Hypomagnesemia, Hypokalemia  Pancytopenia      SUGGEST:    CNS:  CIWA protocol.  Detox protocol taper, Detox & Addiction Medicine eval.  Wean off Precedex.  c/w Clonidine.  c/w Keppra, check levels.  Thiamine, Folate, Multivitamin.    HEENT: Oral care    PULMONARY:  HOB @ 45 degrees.  Aspiration precautions.  Target POx 92 - 96%.  Incentive spirometry if able.    CARDIOVASCULAR:  LR at 75cc/hr    GI: GI prophylaxis.  Feeding per speech and swallow.  Bowel regimen.  RUQ US    RENAL:  FU CMP, Mg & Phos.  Follow up lytes.  Correct as needed, replete Mg and K.  Keep Mg > 2 and K > 4.  Monitor UO.  Bladder scan.    INFECTIOUS DISEASE:  FU CBC.  Monitor off ABX.  FU cultures.    HEMATOLOGICAL:  DVT prophylaxis.    ENDOCRINE:  Follow up FS.    MUSCULOSKELETAL: Bedrest for now    Full Code  MICU for now until Precedex discontinued

## 2021-04-08 NOTE — CONSULT NOTE ADULT - ATTENDING COMMENTS
Above reviewed and agree. Patient with oral tongue laceration, hemostatic after repair by OMFS. Recommend aggressive yet gentle oral care.
Mr Jacome is a 45 year old man with a history of Alcohol use disorder who was admitted to the vent unit for the management of seizures . Addiction medicine consult was called for the management of alcohol withdrawals.   Currently patient's alcohol withdrawal symptoms seem well controlled on the severe benzodiazepine taper that has been placed by the medical team. The fact that patient presents with alcohol withdrawals induced seizures makes him high risk. . He however does not appear to acknowledge that he is having difficulty with his alcohol consumption and is ambivalent about alcohol use disorder treatment.  Patient does not appear to be acutely psychotic , manic or depressed and does not have current suicidal ideations, intent or plan.   At this time, patient is not considered an imminent danger to himself or others and does not need inpatient psychiatric hospitalization. We are agreeable with the medical team to continue the severe benzodiazepine taper as currently ordered to detox patient from alcohol and prevent further seizures. Upon completion of his detox and medical clearance, patient will benefit from Substance use disorder rehab to help attain and maintain sobriety.  Although patient currently does not seem amenable to this service, the  [facility] team social workers with follow up with him with plan to encourage him to getting into Substance use disorder rehab if he is agreeable.
History, events, data and scans reviewed, patient examined at bedside. I agree and approved plan of care as outlined above.

## 2021-04-08 NOTE — PHARMACOTHERAPY INTERVENTION NOTE - INTERVENTION CATEGORIES
BLOOD PRESSURE: DO YOU KNOW YOUR NUMBERS?  The main points  • High blood pressure often has no symptoms. Many people do not know they have it.  • If not treated, high blood pressure can lead to stroke, heart disease, kidney disease and many other health problems.  • You can take steps to keep your blood   pressure in control, such as losing extra pounds, staying active, and limiting salt (sodium) and alcohol.  What do the numbers mean?  Blood pressure is the pressure inside the arteries (blood vessels). It is measured in two numbers, such as 110/70 (110 over 70).  • The first or top number (systolic pressure) is the highest pressure in the arteries when the heart pumps blood into them.  • The second or bottom number (diastolic pressure) is the lowest pressure when the heart rests to refill between beats.  When is blood pressure too high?  Look at the chart below. If your blood pressure stays below 120/80 at rest, you are in a healthy range. If your numbers are higher at rest, you’ll want to discuss this with your health care provider.  What causes high blood pressure?  In most cases, we don’t know the cause. But we do know that some risk factors are linked to high blood pressure.        Why it is important to know my risk factors?  Some risk factors can be controlled with lifestyle changes. Knowing your risk factors is the first step in preventing and controlling high blood pressure. Risk factors include:  • Age - The older you are, the greater your chance of getting high blood pressure.  • Heredity - High blood pressure can run in the family.  • Race - -Americans, for example, are more likely to have high blood pressure.  • Overweight - People who are overweight or obese are more likely to get high blood pressure.  • Salt intake - Too much sodium (salt) in the diet can increase blood pressure in some people.  • Alcohol intake - Too much alcohol can increase blood pressure.  • Little  Monitoring exercise - Lack of exercise can lead to being overweight and increase your risk for high blood pressure.  • Tobacco use - Smoking increases your blood pressure within the first few minutes of use.  • Diabetes or other medical conditions - Seven out of 10 people with diabetes have high blood pressure. High cholesterol, kidney disease and  sleep apnea also make you more likely to have high blood pressure.  • Stress - Stress is hard to measure, but increased stress can lead to high blood pressure.   Adults 18 years and older: What do your numbers mean?  When your blood pressure  stays below 120/80 at rest You have a healthy blood pressure. A healthy lifestyle will help you keep it this way.   When your blood pressure  stays between 120/80 and  139/89 at rest ... You are at increased risk for health effects from high blood pressure. Your health care provider may ask you to make changes in your diet, activity or other lifestyle habits.   When your blood pressure  stays over 140/90 at rest...     This is more serious and may require additional treatment (including medications) and lifestyle changes. The higher your blood pressure, the greater your risk for wear and tear on your arteries, heart disease and other health problems.   Can high blood pressure be cured?  In most cases, no. High blood pressure must be watched and treated for your whole life. Knowing your risk factors is the first step in preventing and controlling high blood pressure. With routine checks, high blood pressure can be found and treated early. Early treatment can reduce or   prevent the problems high blood pressure can cause (stroke, heart or kidney disease).  How is it treated?  To help control your blood pressure, your healthcare provider may prescribe one or more of the following:  • Weight control - A new meal plan may be the first step to control your blood pressure. Losing weight can also help you control cholesterol and diabetes.  • Limit  sodium (salt) - Most of us eat much more salt than we need. You may be advised to avoid salty foods and cut down on salt in cooking and at the table.  • Limit alcohol - Ask your provider how much alcohol you can safely have.  • Activity - Even a moderate amount of activity can help you control blood pressure, weight, cholesterol, diabetes and stress. Ask your health care provider to help you get started.  • Medication - If you are given a medication to help control your blood pressure, be sure to take it as prescribed. Don’t stop taking it on your own, even if you feel great. If you have side effects or  concerns about taking it, talk with your health care provider.  • Stop smoking - Cigarette smoking can increase blood pressure. It is also a major risk factor for heart disease.  • Manage your stress - High levels of stress can help lead to high blood pressure. If your blood pressure is already high, too much stress can make it worse. high blood pressure:  • Your blood pressure should be checked at every office visit. For healthy men and women, these checks should be done at least every two years. More frequent checks are needed for people at risk  or who have had high blood pressure in the past. Ask your provider if you should check your blood pressure at home between visits.  • Work with your provider on a plan for making the lifestyle changes that can help control your blood pressure (control weight, stop smoking, use less salt and alcohol, exercise, manage stress).  • Take medications exactly as prescribed and tell your provider if you have problems taking your medicine (side effects, trouble affording prescriptions).  Discuss any questions you have with your provider. Write your questions down and take them to your next visit. Here are some questions you might want to ask:  • How often should my blood pressure be checked?  • Should I check my blood pressure at home?  Is it okay to use walk-up blood pressure  machines in stores?  • What do I need to know about my medication?  What if I forget to take it? How long will I need to take it?  • How much salt is OK for me?  • How much alcohol is OK for me?  • How much exercise is safe for me?  More information on the Internet  The American Heart Association  (www.americanheart.org) is a good source  of blood pressure information, including a  “Blood Pressure Risk Calculator” that can  help you estimate your risk.  [id number] (

## 2021-04-08 NOTE — PROGRESS NOTE ADULT - SUBJECTIVE AND OBJECTIVE BOX
Patient is a 45y old  Male who presents with a chief complaint of seizures (07 Apr 2021 08:06)    Over Night Events:  Agitated overnight.  Remains on Precedex 1.5.    ROS:   All ROS are negative except HPI    PHYSICAL EXAM  ICU Vital Signs Last 24 Hrs  T(C): 36.1 (08 Apr 2021 00:00), Max: 36.7 (07 Apr 2021 12:00)  T(F): 97 (08 Apr 2021 00:00), Max: 98.1 (07 Apr 2021 12:00)  HR: 82 (08 Apr 2021 07:00) (66 - 115)  BP: 111/66 (08 Apr 2021 07:00) (107/67 - 151/91)  BP(mean): 85 (08 Apr 2021 07:00) (81 - 115)  RR: 16 (08 Apr 2021 07:00) (16 - 20)  SpO2: 100% (08 Apr 2021 07:00) (99% - 100%)    · CONSTITUTIONAL:   not septic appearing,   well nourished,   NAD    · ENMT:   Airway patent,   Nasal mucosa clear.  Mouth with normal mucosa.   No thrush    · EYES:   Clear bilaterally,   pupils equal,   round and reactive to light.    · CARDIAC:   Normal rate,   regular rhythm  Heart sounds S1, S2.   No murmurs, no rubs or gallops on auscultation  no edema    · RESPIRATORY:   bilateral breath sounds  normal chest expansion  no retractions or use of accessory muscles  palpation of chest is normal with no fremitus  percussion of chest demonstrates no hyperresonance or dullness    · GASTROINTESTINAL:  Abdomen soft,   non-tender,   + BS  liver/spleen not palpable    · MUSCULOSKELETAL:   no clubbing, cyanosis    · NEUROLOGICAL:  awake alert oriented  no obvious cranial nerve abnormalities    · SKIN:   Skin normal color for race,   warm, dry   No evidence of rash.    · HEME LYMPH:   no splenomegaly.  No cervical  lymphadenopathy.  no inguinal lymphadenopathy      04-07-21 @ 07:01  -  04-08-21 @ 07:00  --------------------------------------------------------  IN:    Dexmedetomidine: 414.7 mL    IV PiggyBack: 150 mL    Lactated Ringers: 1725 mL  Total IN: 2289.7 mL    OUT:    Enteral Tube Flush: 0 mL    Free Water: 0 mL    Voided (mL): 2603 mL  Total OUT: 2603 mL    Total NET: -313.3 mL          LABS:        136  |  101  |  4<L>  ----------------------------<  122<H>  3.5   |  22  |  0.6<L>    Ca    8.1<L>      07 Apr 2021 12:18  Phos  2.2     04-07  Mg     1.8     04-07    TPro  5.8<L>  /  Alb  3.0<L>  /  TBili  1.0  /  DBili  x   /  AST  35  /  ALT  20  /  AlkPhos  77  04-07      LIVER FUNCTIONS - ( 07 Apr 2021 12:18 )  Alb: 3.0 g/dL / Pro: 5.8 g/dL / ALK PHOS: 77 U/L / ALT: 20 U/L / AST: 35 U/L / GGT: x                                                MEDICATIONS  (STANDING):  chlorhexidine 4% Liquid 1 Application(s) Topical <User Schedule>  cloNIDine 0.3 milliGRAM(s) Oral every 8 hours  dexMEDEtomidine Infusion 0.05 MICROgram(s)/kG/Hr (1.06 mL/Hr) IV Continuous <Continuous>  enoxaparin Injectable 40 milliGRAM(s) SubCutaneous daily  fentaNYL    Injectable 50 MICROGram(s) IV Push once  folic acid Injectable 1 milliGRAM(s) IV Push daily  lactated ringers. 1000 milliLiter(s) (75 mL/Hr) IV Continuous <Continuous>  levETIRAcetam  IVPB 1000 milliGRAM(s) IV Intermittent every 12 hours  multivitamin 1 Tablet(s) Oral daily  polyethylene glycol 3350 17 Gram(s) Oral two times a day  senna 2 Tablet(s) Oral at bedtime  thiamine Injectable 100 milliGRAM(s) IV Push daily    MEDICATIONS  (PRN):      New X-rays reviewed:                                                                                  ECHO    CXR interpreted by me:  no acute cardiopulmonary pathology

## 2021-04-08 NOTE — CONSULT NOTE ADULT - ASSESSMENT
Pt is a 46yo  male, domiciled with his wife, working as a finished [employer], high school graduate, with no past psychiatric history, with history of etoh abuse, admitted for seizures. Addiction medicine consult placed for management of alcohol withdrawal. Per resident, no detox recommendations necessary at this time. Pt is currently on an ativan taper and patient's symptoms are currently well-controlled, though patient does require referral for services. [facility] team social workers aware, will follow patient.

## 2021-04-08 NOTE — CONSULT NOTE ADULT - SUBJECTIVE AND OBJECTIVE BOX
1.Presentation: Status epilepticus        2. Today's Acute Problems:  Status epilepticus ,intubated for airway protection.      3.History:  44 y/o M PMHx of hyponatremia, hypomagnesemia, thrombocytopenia, and alcohol abuse and dependence who presents as a notification by EMS for status epilepticus. Patient had multiple seizures today and bit his tongue during seizure s/p sutures by OMFS team. Patient was given Versed by EMS and was not awake or protecting his airway. O2 saturation temporarily 82% and patient had NRB mask and was passively oxygenated to 100%. Patient pre-oxygenated and had emergent RSI for airway protection. After intubation, tongue pulled from mouth and he had a very deep and large laceration with persistent bleeding.  CTH and C-spine negative. Magnesium and Potassium both found to be critically low. Per wife patient had binge last week and his last drink was on Friday. His last seizure was 2 years ago and tenants report he likely had a seizure in the morning as well. Patient sedated on Fentanyl and propofol in ED, and s/p IV 4mg Ativan, 2000mg Keppra,  40meq K+ powder via NGT, and 3 20meq K riders. 4Gm Mg and 2Gm Calcium gluconate and 1L LR and 500mg thiamine IVPB. Per wife pt not taking any medications, stopped his BP meds on his own 2 yrs ago.         5.Medications:  ampicillin/sulbactam  IVPB 3 Gram(s) IV Intermittent every 6 hours  calcium gluconate IVPB 2 Gram(s) IV Intermittent once  chlorhexidine 4% Liquid 1 Application(s) Topical <User Schedule>  dexMEDEtomidine Infusion 0.05 MICROgram(s)/kG/Hr IV Continuous <Continuous>  enoxaparin Injectable 40 milliGRAM(s) SubCutaneous daily  fentaNYL   Infusion. 0.5 MICROgram(s)/kG/Hr IV Continuous <Continuous>  folic acid Injectable 1 milliGRAM(s) IV Push daily  lactated ringers Bolus 1000 milliLiter(s) IV Bolus once  lactated ringers. 1000 milliLiter(s) IV Continuous <Continuous>  levETIRAcetam  IVPB 1000 milliGRAM(s) IV Intermittent every 12 hours  magnesium sulfate  IVPB 4 Gram(s) IV Intermittent once  norepinephrine Infusion 0.05 MICROgram(s)/kG/Min IV Continuous <Continuous>  potassium chloride  20 mEq/100 mL IVPB 20 milliEquivalent(s) IV Intermittent every 1 hour  propofol Infusion 20 MICROgram(s)/kG/Min IV Continuous <Continuous>  thiamine Injectable 100 milliGRAM(s) IV Push daily      6.Ancillary Management  Chest PT[]  Head of bed >35 [x]  Out of bed to chair []  PT/OT/ST []  Spirometry[]  DVT prophylaxis [x]      7.Neuro Exam:  patient intubated and sedated , not following commands   CN: pupils 2mm sluggish response to light, eyes midline, + Gag, sluggish corneals   Power: No Spontaneous extremity mvmts  Sensory: No response to pain  plantar response mute b/lly        Last CTH:  < from: CT Head No Cont (21 @ 19:47) >  FINDINGS:    The ventricles and cortical sulci are mildly prominent consistent with parenchymal volume loss.    There is no evidence for intracranial hemorrhage, large territorial infarct, midline shift, or mass effect.    The calvarium is intact. Small hematoma over the right frontal scalp. Marked nasopharyngeal edema. Minimal bilateral maxillarysinus mucosal thickening. The remaining visualized paranasal sinuses and mastoids are well-aerated. The orbits are unremarkable.      IMPRESSION:    No evidence for intracranial hemorrhage, midline shift, or mass effect.    Small right frontal extracalvarial hematoma.    Marked nasopharyngeal edema.          HAILE FLAHERTY M.D., RESIDENT RADIOLOGIST  This document has been electronically signed.  LISE MORELAND MD; Attending Radiologist  This document has been electronically signed. Mar 29 2021  8:52PM    < end of copied text >        8.CVS:  Vital Signs Last 24 Hrs  T(C): 37.3 (30 Mar 2021 02:00), Max: 37.3 (30 Mar 2021 02:00)  T(F): 99.1 (30 Mar 2021 02:00), Max: 99.1 (30 Mar 2021 02:00)  HR: 100 (30 Mar 2021 01:00) (100 - 145)  BP: 71/50 (30 Mar 2021 01:00) (71/50 - 205/130)  BP(mean): 56 (30 Mar 2021 01:00) (56 - 56)  RR: 14 (30 Mar 2021 01:00) (12 - 20)  SpO2: 100% (30 Mar 2021 01:00) (96% - 100%)          Last EKG:  < from: 12 Lead ECG (21 @ 18:01) >  Ventricular Rate 118 BPM    Atrial Rate 118 BPM    P-R Interval 104 ms    QRS Duration 86 ms    Q-T Interval 446 ms    QTC Calculation(Bazett) 625 ms    R Axis 15 degrees    T Axis 108 degrees    Diagnosis Line Sinus tachycardia with short AK  Minimal voltage criteria for LVH, may be normal variant ( R in aVL )  Inferior infarct , possibly acute  Marked ST abnormality, possible lateral subendocardial injury  Prolonged QT  ** ** ACUTE MI / STEMI ** **  Consider right ventricular involvement in acute inferior infarct  Abnormal ECG    NOTIFICATION - Please notify MD and re-edit. On re-edit indicate name of  physician and date/time notified.  Confirmed by KAMLA CRUZ MD ([id number]) on 3/29/2021 6:21:36 PM    < end of copied text >          9.Respiratory:  ABG:ABG - ( 29 Mar 2021 18:33 )  pH, Arterial: 7.46  pH, Blood: x     /  pCO2: 47    /  pO2: 353   / HCO3: 34    / Base Excess: 8.6   /  SaO2: 100         Mode: AC/ CMV (Assist Control/ Continuous Mandatory Ventilation)  RR (machine): 15  TV (machine): 0.45  FiO2: 100  PEEP: 5  ITime: 1  MAP: 8  PIP: 18      10.GI:  Prophylaxis    GI:  Protonix 40 mg IV q 24  LIVER FUNCTIONS - ( 29 Mar 2021 18:45 )  Alb: 3.5 g/dL / Pro: 6.3 g/dL / ALK PHOS: 81 U/L / ALT: 51 U/L / AST: 93 U/L / GGT: x                 11.Renal/Fluids/Electrolytes:        134<L>  |  92<L>  |  <3<L>  ----------------------------<  125<H>  2.8<L>   |  26  |  0.5<L>    Ca    5.9<LL>      29 Mar 2021 23:10  Phos  4.6       Mg     1.3         TPro  6.3  /  Alb  3.5  /  TBili  1.0  /  DBili  x   /  AST  93<H>  /  ALT  51<H>  /  AlkPhos  81            I&O's Detail    29 Mar 2021 07:01  -  30 Mar 2021 02:25  --------------------------------------------------------  IN:  Total IN: 0 mL    OUT:    Estimated Blood Loss (mL): 200 mL    Indwelling Catheter - Urethral (mL): 2800 mL  Total OUT: 3000 mL    Total NET: -3000 mL          12.ID:  TMax:  Vital Signs Last 24 Hrs  T(C): 37.3 (30 Mar 2021 02:00), Max: 37.3 (30 Mar 2021 02:00)  T(F): 99.1 (30 Mar 2021 02:00), Max: 99.1 (30 Mar 2021 02:00)  HR: 100 (30 Mar 2021 01:) (100 - 145)  BP: 71/50 (30 Mar 2021 01:00) (71/50 - 205/130)  BP(mean): 56 (30 Mar 2021 01:) (56 - 56)  RR: 14 (30 Mar 2021 01:00) (12 - 20)  SpO2: 100% (30 Mar 2021 01:) (96% - 100%)  Lactate, Blood: 10.5 mmol/L ( @ 20:08)    Urinalysis Basic - ( 29 Mar 2021 18:35 )    Color: Yellow / Appearance: Clear / S.015 / pH: x  Gluc: x / Ketone: Trace  / Bili: Negative / Urobili: <2 mg/dL   Blood: x / Protein: 30 mg/dL / Nitrite: Negative   Leuk Esterase: Negative / RBC: 2 /HPF / WBC 1 /HPF   Sq Epi: x / Non Sq Epi: 1 /HPF / Bacteria: Negative          13.Hematology:                        12.8   7.40  )-----------( 188      ( 29 Mar 2021 20:08 )             36.5        PTT - ( 29 Mar 2021 20:08 )  PTT:32.1 sec            DVT Prophylaxis  Lovenox[]   Heparin[]   Venodyne []   SCD's[x]      
Pt is a 46yo  male, domiciled with his wife, working as a finished [employer], high school graduate, with no past psychiatric history, with history of etoh abuse, admitted for seizures. Addiction medicine consult placed for management of alcohol withdrawal. Pt reports drinking half a liter of vodka for 2 weeks prior to admission. He reports drinking until he blacks out. He denies any past withdrawal seizures. Per resident, no detox recommendations necessary at this time. Pt is currently on an ativan taper and patient's symptoms are currently well-controlled, though patient does require referral for services. [facility] team social workers aware, will follow patient.    Collateral obtained from the pt's wife, Brianne, at [phone]. She confirms the pt has no past medical history. She reports that for week prior to admission the pt would drink "a lot" of beer at home and then go out. She does not know if he was drinking while out. She reports the pt wa sin therapy once for treatment of his alcohol use, but the pt only attended two sessions.                           9.4    4.93  )-----------( 274      ( 08 Apr 2021 06:37 )             26.7     04-08    143  |  108  |  3<L>  ----------------------------<  122<H>  3.4<L>   |  22  |  0.5<L>    Ca    8.0<L>      08 Apr 2021 06:37  Phos  3.4     04-08  Mg     2.3     04-08    TPro  6.1  /  Alb  3.1<L>  /  TBili  1.0  /  DBili  x   /  AST  30  /  ALT  17  /  AlkPhos  73  04-08    < from: CT Head No Cont (03.29.21 @ 19:47) >    IMPRESSION:    No evidence for intracranial hemorrhage, midline shift, or mass effect.    Small right frontal extracalvarial hematoma.    Marked nasopharyngeal edema.        < end of copied text >  < from: 12 Lead ECG (03.29.21 @ 18:01) >    Ventricular Rate 118 BPM    Atrial Rate 118 BPM    P-R Interval 104 ms    QRS Duration 86 ms    Q-T Interval 446 ms    QTC Calculation(Bazett) 625 ms    R Axis 15 degrees    T Axis 108 degrees    Diagnosis Line Sinus tachycardia with short NM  Minimal voltage criteria for LVH, may be normal variant ( R in aVL )  Inferior infarct , possibly acute  Marked ST abnormality, possible lateral subendocardial injury  Prolonged QT  ** ** ACUTE MI / STEMI ** **  Consider right ventricular involvement in acute inferior infarct  Abnormal ECG      < end of copied text >    MSE: Pt is fully oriented, awake, alert. Pt has no deformities, average build, wearing a hospital gown. Pt was cooperative with no involuntary movements. Pt speech had normal rate and volume. Pt mood was "fine, affect was euthymic and congruent. Thought process was linear, thought content was without suicidality or homicidality, thought perception was without auditory or visual hallucinations. Insight was fair, judgement was poor.    Dx: alcohol withdrawal 
Pt is a 46yo Male admitted s/p seizure/status epilepticus, tongue bite & laceration noted post extubated. PT previously seen by OMFS, suture placed in tongue laceration. Called bedside by medical team to assess acute bleeding and large blood clots noted from oral cavity. Pt seen and examined at bedside, extubated but not verbally responsive. Pt following simple commands. No history able to be obtained by patient at time of assessment.    PAST MEDICAL & SURGICAL HISTORY:  ETOH abuse    Hypomagnesemia    Seizure disorder      MEDICATIONS  (STANDING):  ampicillin/sulbactam  IVPB 3 Gram(s) IV Intermittent every 6 hours  chlorhexidine 0.12% Liquid 15 milliLiter(s) Oral Mucosa every 12 hours  chlorhexidine 4% Liquid 1 Application(s) Topical <User Schedule>  dexMEDEtomidine Infusion 0.05 MICROgram(s)/kG/Hr (1.06 mL/Hr) IV Continuous <Continuous>  enoxaparin Injectable 40 milliGRAM(s) SubCutaneous daily  fentaNYL    Injectable 50 MICROGram(s) IV Push once  folic acid Injectable 1 milliGRAM(s) IV Push daily  levETIRAcetam  IVPB 1000 milliGRAM(s) IV Intermittent every 12 hours  LORazepam   Injectable   IV Push   LORazepam   Injectable 0.5 milliGRAM(s) IV Push every 4 hours  magnesium sulfate  IVPB 2 Gram(s) IV Intermittent once  multivitamin 1 Tablet(s) Oral daily  polyethylene glycol 3350 17 Gram(s) Oral two times a day  propofol Infusion 20 MICROgram(s)/kG/Min (10.2 mL/Hr) IV Continuous <Continuous>  senna 2 Tablet(s) Oral at bedtime  thiamine Injectable 100 milliGRAM(s) IV Push daily    MEDICATIONS  (PRN):  LORazepam   Injectable 2 milliGRAM(s) IV Push every 3 hours PRN Agitation      Allergies    No Known Allergies    Intolerances      SOCIAL HISTORY:    FAMILY HISTORY:      REVIEW OF SYSTEMS   [X] Due to altered mental status/intubation, subjective information were not able to be obtained from patient. History was obtained, to the extent possible, from review of the chart and collateral sources of information.    Vital Signs Last 24 Hrs  T(C): 37.3 (04 Apr 2021 04:00), Max: 37.3 (04 Apr 2021 00:00)  T(F): 99.2 (04 Apr 2021 04:00), Max: 99.2 (04 Apr 2021 00:00)  HR: 72 (04 Apr 2021 16:00) (71 - 92)  BP: 121/76 (04 Apr 2021 16:00) (111/71 - 166/89)  BP(mean): 91 (04 Apr 2021 16:00) (8 - 118)  RR: 22 (04 Apr 2021 16:00) (14 - 22)  SpO2: 100% (04 Apr 2021 16:00) (98% - 100%)    GEN: NAD, opens eyes and follows simple commands. No drooling or pooling of secretions. No stridor or stertor. unable to assess vocal quality.  SKIN: Good color, non diaphoretic.  HEENT: NC/AT; Oral mucosa pink and moist. + large clot hanging out of pts mouth, cut with scissor. oral exam limited as patient not compliant exam. oral cavity suctioned, smaller clots removed. tongue very firm. sutures noted. no active bleeding noted.  NECK: Trachea midline, Neck supple, no TTP to B/L lateral neck, no cervical LAD.  RESP: No dyspnea, non-labored breathing. No use of accessory muscles.   CARDIO: +S1/S2  ABDO: Soft, NT.  EXT: FOLEY x 4      LABS:                        8.6    4.51  )-----------( 127      ( 04 Apr 2021 04:30 )             24.4     04-04    143  |  105  |  7<L>  ----------------------------<  109<H>  3.6   |  22  |  0.5<L>    Ca    7.1<L>      04 Apr 2021 04:30  Mg     1.4     04-04    TPro  5.7<L>  /  Alb  3.0<L>  /  TBili  1.4<H>  /  DBili  x   /  AST  26  /  ALT  18  /  AlkPhos  86  04-03

## 2021-04-08 NOTE — SWALLOW BEDSIDE ASSESSMENT ADULT - SWALLOW EVAL: DIAGNOSIS
+toleration of puree and thin liquids w/ mild oral dysphagia and w/o overt s/s of penetration/aspiration

## 2021-04-09 LAB
ALBUMIN SERPL ELPH-MCNC: 2.8 G/DL — LOW (ref 3.5–5.2)
ALP SERPL-CCNC: 66 U/L — SIGNIFICANT CHANGE UP (ref 30–115)
ALT FLD-CCNC: 19 U/L — SIGNIFICANT CHANGE UP (ref 0–41)
ANION GAP SERPL CALC-SCNC: 11 MMOL/L — SIGNIFICANT CHANGE UP (ref 7–14)
AST SERPL-CCNC: 31 U/L — SIGNIFICANT CHANGE UP (ref 0–41)
BASOPHILS # BLD AUTO: 0.02 K/UL — SIGNIFICANT CHANGE UP (ref 0–0.2)
BASOPHILS NFR BLD AUTO: 0.5 % — SIGNIFICANT CHANGE UP (ref 0–1)
BILIRUB SERPL-MCNC: 0.7 MG/DL — SIGNIFICANT CHANGE UP (ref 0.2–1.2)
BUN SERPL-MCNC: 4 MG/DL — LOW (ref 10–20)
CALCIUM SERPL-MCNC: 7.9 MG/DL — LOW (ref 8.5–10.1)
CHLORIDE SERPL-SCNC: 106 MMOL/L — SIGNIFICANT CHANGE UP (ref 98–110)
CO2 SERPL-SCNC: 23 MMOL/L — SIGNIFICANT CHANGE UP (ref 17–32)
CREAT SERPL-MCNC: 0.6 MG/DL — LOW (ref 0.7–1.5)
EOSINOPHIL # BLD AUTO: 0.08 K/UL — SIGNIFICANT CHANGE UP (ref 0–0.7)
EOSINOPHIL NFR BLD AUTO: 2 % — SIGNIFICANT CHANGE UP (ref 0–8)
GLUCOSE SERPL-MCNC: 113 MG/DL — HIGH (ref 70–99)
HCT VFR BLD CALC: 24.6 % — LOW (ref 42–52)
HGB BLD-MCNC: 8.6 G/DL — LOW (ref 14–18)
IMM GRANULOCYTES NFR BLD AUTO: 0.5 % — HIGH (ref 0.1–0.3)
LYMPHOCYTES # BLD AUTO: 1.29 K/UL — SIGNIFICANT CHANGE UP (ref 1.2–3.4)
LYMPHOCYTES # BLD AUTO: 31.8 % — SIGNIFICANT CHANGE UP (ref 20.5–51.1)
MAGNESIUM SERPL-MCNC: 1.7 MG/DL — LOW (ref 1.8–2.4)
MCHC RBC-ENTMCNC: 32.5 PG — HIGH (ref 27–31)
MCHC RBC-ENTMCNC: 35 G/DL — SIGNIFICANT CHANGE UP (ref 32–37)
MCV RBC AUTO: 92.8 FL — SIGNIFICANT CHANGE UP (ref 80–94)
MONOCYTES # BLD AUTO: 0.7 K/UL — HIGH (ref 0.1–0.6)
MONOCYTES NFR BLD AUTO: 17.2 % — HIGH (ref 1.7–9.3)
NEUTROPHILS # BLD AUTO: 1.95 K/UL — SIGNIFICANT CHANGE UP (ref 1.4–6.5)
NEUTROPHILS NFR BLD AUTO: 48 % — SIGNIFICANT CHANGE UP (ref 42.2–75.2)
NRBC # BLD: 0 /100 WBCS — SIGNIFICANT CHANGE UP (ref 0–0)
PHOSPHATE SERPL-MCNC: 3.8 MG/DL — SIGNIFICANT CHANGE UP (ref 2.1–4.9)
PLATELET # BLD AUTO: 298 K/UL — SIGNIFICANT CHANGE UP (ref 130–400)
POTASSIUM SERPL-MCNC: 3.5 MMOL/L — SIGNIFICANT CHANGE UP (ref 3.5–5)
POTASSIUM SERPL-SCNC: 3.5 MMOL/L — SIGNIFICANT CHANGE UP (ref 3.5–5)
PROT SERPL-MCNC: 5.7 G/DL — LOW (ref 6–8)
RBC # BLD: 2.65 M/UL — LOW (ref 4.7–6.1)
RBC # FLD: 12.5 % — SIGNIFICANT CHANGE UP (ref 11.5–14.5)
SODIUM SERPL-SCNC: 140 MMOL/L — SIGNIFICANT CHANGE UP (ref 135–146)
WBC # BLD: 4.06 K/UL — LOW (ref 4.8–10.8)
WBC # FLD AUTO: 4.06 K/UL — LOW (ref 4.8–10.8)

## 2021-04-09 PROCEDURE — 99232 SBSQ HOSP IP/OBS MODERATE 35: CPT

## 2021-04-09 PROCEDURE — 71045 X-RAY EXAM CHEST 1 VIEW: CPT | Mod: 26

## 2021-04-09 RX ORDER — MAGNESIUM SULFATE 500 MG/ML
2 VIAL (ML) INJECTION ONCE
Refills: 0 | Status: COMPLETED | OUTPATIENT
Start: 2021-04-09 | End: 2021-04-09

## 2021-04-09 RX ADMIN — ENOXAPARIN SODIUM 40 MILLIGRAM(S): 100 INJECTION SUBCUTANEOUS at 12:08

## 2021-04-09 RX ADMIN — Medication 0.3 MILLIGRAM(S): at 05:44

## 2021-04-09 RX ADMIN — POLYETHYLENE GLYCOL 3350 17 GRAM(S): 17 POWDER, FOR SOLUTION ORAL at 05:43

## 2021-04-09 RX ADMIN — CHLORHEXIDINE GLUCONATE 1 APPLICATION(S): 213 SOLUTION TOPICAL at 05:43

## 2021-04-09 RX ADMIN — SENNA PLUS 2 TABLET(S): 8.6 TABLET ORAL at 21:47

## 2021-04-09 RX ADMIN — Medication 1 MILLIGRAM(S): at 12:29

## 2021-04-09 RX ADMIN — Medication 4 MILLIGRAM(S): at 05:54

## 2021-04-09 RX ADMIN — Medication 25 GRAM(S): at 13:26

## 2021-04-09 RX ADMIN — DEXMEDETOMIDINE HYDROCHLORIDE IN 0.9% SODIUM CHLORIDE 1.06 MICROGRAM(S)/KG/HR: 4 INJECTION INTRAVENOUS at 00:17

## 2021-04-09 RX ADMIN — Medication 2 MILLIGRAM(S): at 13:09

## 2021-04-09 RX ADMIN — Medication 4 MILLIGRAM(S): at 03:09

## 2021-04-09 RX ADMIN — Medication 100 MILLIGRAM(S): at 12:29

## 2021-04-09 RX ADMIN — Medication 2 MILLIGRAM(S): at 21:47

## 2021-04-09 RX ADMIN — Medication 1 TABLET(S): at 12:08

## 2021-04-09 RX ADMIN — Medication 2 MILLIGRAM(S): at 09:11

## 2021-04-09 RX ADMIN — Medication 0.3 MILLIGRAM(S): at 21:46

## 2021-04-09 RX ADMIN — Medication 2 MILLIGRAM(S): at 17:01

## 2021-04-09 RX ADMIN — LEVETIRACETAM 400 MILLIGRAM(S): 250 TABLET, FILM COATED ORAL at 21:46

## 2021-04-09 RX ADMIN — LEVETIRACETAM 400 MILLIGRAM(S): 250 TABLET, FILM COATED ORAL at 08:02

## 2021-04-09 RX ADMIN — Medication 0.3 MILLIGRAM(S): at 13:09

## 2021-04-09 RX ADMIN — Medication 4 MILLIGRAM(S): at 00:16

## 2021-04-09 NOTE — PROGRESS NOTE ADULT - SUBJECTIVE AND OBJECTIVE BOX
Patient is a 45y old  Male who presents with a chief complaint of seizures (08 Apr 2021 17:06)    Over Night Events:  Was calm overnight.  Remains on Precedex 0.4    ROS:   All ROS are negative except HPI     PHYSICAL EXAM  ICU Vital Signs Last 24 Hrs  T(C): 37.2 (09 Apr 2021 03:00), Max: 37.2 (09 Apr 2021 03:00)  T(F): 99 (09 Apr 2021 03:00), Max: 99 (09 Apr 2021 03:00)  HR: 69 (09 Apr 2021 05:00) (68 - 80)  BP: 108/67 (09 Apr 2021 05:00) (97/49 - 126/76)  BP(mean): 82 (09 Apr 2021 05:00) (71 - 97)  ABP: --  ABP(mean): --  RR: 15 (09 Apr 2021 05:00) (12 - 22)  SpO2: 95% (09 Apr 2021 05:00) (94% - 100%)    · CONSTITUTIONAL:   not septic appearing,   well nourished,   NAD    · ENMT:   Airway patent,   Nasal mucosa clear.  Mouth with normal mucosa.   No thrush    · EYES:   Clear bilaterally,   pupils equal,   round and reactive to light.    · CARDIAC:   Normal rate,   regular rhythm  Heart sounds S1, S2.   No murmurs, no rubs or gallops on auscultation  no edema    · RESPIRATORY:   bilateral breath sounds  normal chest expansion  no retractions or use of accessory muscles  palpation of chest is normal with no fremitus  percussion of chest demonstrates no hyperresonance or dullness    · GASTROINTESTINAL:  Abdomen soft,   non-tender,   + BS  liver/spleen not palpable    · MUSCULOSKELETAL:   no clubbing, cyanosis    · NEUROLOGICAL:  awake alert oriented  no obvious cranial nerve abnormalities    · SKIN:   Skin normal color for race,   warm, dry   No evidence of rash.    · HEME LYMPH:   no splenomegaly.  No cervical  lymphadenopathy.  no inguinal lymphadenopathy      04-08-21 @ 07:01  -  04-09-21 @ 07:00  --------------------------------------------------------  IN:    Dexmedetomidine: 165.8 mL    Lactated Ringers: 900 mL    Oral Fluid: 540 mL  Total IN: 1605.8 mL    OUT:    Voided (mL): 1150 mL  Total OUT: 1150 mL    Total NET: 455.8 mL      LABS:                            9.4    4.93  )-----------( 274      ( 08 Apr 2021 06:37 )             26.7     143  |  108  |  3<L>  ----------------------------<  122<H>  3.4<L>   |  22  |  0.5<L>    Ca    8.0<L>      08 Apr 2021 06:37  Phos  3.4     04-08  Mg     2.3     04-08    TPro  6.1  /  Alb  3.1<L>  /  TBili  1.0  /  DBili  x   /  AST  30  /  ALT  17  /  AlkPhos  73  04-08      LIVER FUNCTIONS - ( 08 Apr 2021 06:37 )  Alb: 3.1 g/dL / Pro: 6.1 g/dL / ALK PHOS: 73 U/L / ALT: 17 U/L / AST: 30 U/L / GGT: x                                                MEDICATIONS  (STANDING):  chlorhexidine 4% Liquid 1 Application(s) Topical <User Schedule>  cloNIDine 0.3 milliGRAM(s) Oral every 8 hours  dexMEDEtomidine Infusion 0.05 MICROgram(s)/kG/Hr (1.06 mL/Hr) IV Continuous <Continuous>  enoxaparin Injectable 40 milliGRAM(s) SubCutaneous daily  fentaNYL    Injectable 50 MICROGram(s) IV Push once  folic acid Injectable 1 milliGRAM(s) IV Push daily  lactated ringers. 1000 milliLiter(s) (75 mL/Hr) IV Continuous <Continuous>  levETIRAcetam  IVPB 1000 milliGRAM(s) IV Intermittent every 12 hours  LORazepam   Injectable 4  IV Push   LORazepam   Injectable 2 milliGRAM(s) IV Push every 4 hours  multivitamin 1 Tablet(s) Oral daily  polyethylene glycol 3350 17 Gram(s) Oral two times a day  senna 2 Tablet(s) Oral at bedtime  thiamine Injectable 100 milliGRAM(s) IV Push daily    MEDICATIONS  (PRN):

## 2021-04-09 NOTE — PROGRESS NOTE ADULT - ASSESSMENT
IMPRESSION:  Intubation for airway protection SP extubation 4/2  Status epilepticus, resolved  ETOH withdrawal / DT's on Precedex, weaning off  Possible Aspiration PNA, treated  Tongue Laceration s/p repair  HO of seizures   HO HTN  Hypomagnesemia, Hypokalemia  Pancytopenia      SUGGEST:    CNS:  CIWA protocol.  Detox protocol taper.  Attempt to d/c Precedex.  c/w Clonidine until requiring less Ativan.  c/w Keppra, check levels.  Thiamine, Folate, Multivitamin.    HEENT: Oral care    PULMONARY:  HOB @ 45 degrees.  Aspiration precautions.  Target POx 92 - 96%.  Incentive spirometry if able.    CARDIOVASCULAR:  LR at 75cc/hr    GI: GI prophylaxis.  Feeding per speech and swallow.  Bowel regimen.    RENAL:  FU CMP, Mg & Phos.  Follow up lytes.  Correct as needed, replete Mg and K.  Keep Mg > 2 and K > 4.  Monitor UO.    INFECTIOUS DISEASE:  FU CBC.  Monitor off ABX.  FU cultures.    HEMATOLOGICAL:  DVT prophylaxis.    ENDOCRINE:  Follow up FS.    MUSCULOSKELETAL:  OOB to chair    Full Code  MICU for now until Precedex discontinued, possible downgrade in AM

## 2021-04-10 LAB
ALBUMIN SERPL ELPH-MCNC: 2.8 G/DL — LOW (ref 3.5–5.2)
ALP SERPL-CCNC: 62 U/L — SIGNIFICANT CHANGE UP (ref 30–115)
ALT FLD-CCNC: 19 U/L — SIGNIFICANT CHANGE UP (ref 0–41)
ANION GAP SERPL CALC-SCNC: 13 MMOL/L — SIGNIFICANT CHANGE UP (ref 7–14)
AST SERPL-CCNC: 31 U/L — SIGNIFICANT CHANGE UP (ref 0–41)
BASOPHILS # BLD AUTO: 0.03 K/UL — SIGNIFICANT CHANGE UP (ref 0–0.2)
BASOPHILS NFR BLD AUTO: 0.7 % — SIGNIFICANT CHANGE UP (ref 0–1)
BILIRUB SERPL-MCNC: 0.6 MG/DL — SIGNIFICANT CHANGE UP (ref 0.2–1.2)
BUN SERPL-MCNC: 3 MG/DL — LOW (ref 10–20)
CALCIUM SERPL-MCNC: 7.9 MG/DL — LOW (ref 8.5–10.1)
CHLORIDE SERPL-SCNC: 104 MMOL/L — SIGNIFICANT CHANGE UP (ref 98–110)
CO2 SERPL-SCNC: 21 MMOL/L — SIGNIFICANT CHANGE UP (ref 17–32)
CREAT SERPL-MCNC: 0.5 MG/DL — LOW (ref 0.7–1.5)
EOSINOPHIL # BLD AUTO: 0.06 K/UL — SIGNIFICANT CHANGE UP (ref 0–0.7)
EOSINOPHIL NFR BLD AUTO: 1.3 % — SIGNIFICANT CHANGE UP (ref 0–8)
GLUCOSE SERPL-MCNC: 106 MG/DL — HIGH (ref 70–99)
HCT VFR BLD CALC: 24.8 % — LOW (ref 42–52)
HGB BLD-MCNC: 8.6 G/DL — LOW (ref 14–18)
IMM GRANULOCYTES NFR BLD AUTO: 0.4 % — HIGH (ref 0.1–0.3)
LYMPHOCYTES # BLD AUTO: 1.16 K/UL — LOW (ref 1.2–3.4)
LYMPHOCYTES # BLD AUTO: 25.8 % — SIGNIFICANT CHANGE UP (ref 20.5–51.1)
MAGNESIUM SERPL-MCNC: 1.7 MG/DL — LOW (ref 1.8–2.4)
MCHC RBC-ENTMCNC: 32 PG — HIGH (ref 27–31)
MCHC RBC-ENTMCNC: 34.7 G/DL — SIGNIFICANT CHANGE UP (ref 32–37)
MCV RBC AUTO: 92.2 FL — SIGNIFICANT CHANGE UP (ref 80–94)
MONOCYTES # BLD AUTO: 0.57 K/UL — SIGNIFICANT CHANGE UP (ref 0.1–0.6)
MONOCYTES NFR BLD AUTO: 12.7 % — HIGH (ref 1.7–9.3)
NEUTROPHILS # BLD AUTO: 2.65 K/UL — SIGNIFICANT CHANGE UP (ref 1.4–6.5)
NEUTROPHILS NFR BLD AUTO: 59.1 % — SIGNIFICANT CHANGE UP (ref 42.2–75.2)
NRBC # BLD: 0 /100 WBCS — SIGNIFICANT CHANGE UP (ref 0–0)
PHOSPHATE SERPL-MCNC: 2.9 MG/DL — SIGNIFICANT CHANGE UP (ref 2.1–4.9)
PLATELET # BLD AUTO: 378 K/UL — SIGNIFICANT CHANGE UP (ref 130–400)
POTASSIUM SERPL-MCNC: 3.6 MMOL/L — SIGNIFICANT CHANGE UP (ref 3.5–5)
POTASSIUM SERPL-SCNC: 3.6 MMOL/L — SIGNIFICANT CHANGE UP (ref 3.5–5)
PROT SERPL-MCNC: 5.8 G/DL — LOW (ref 6–8)
RBC # BLD: 2.69 M/UL — LOW (ref 4.7–6.1)
RBC # FLD: 12.4 % — SIGNIFICANT CHANGE UP (ref 11.5–14.5)
SODIUM SERPL-SCNC: 138 MMOL/L — SIGNIFICANT CHANGE UP (ref 135–146)
WBC # BLD: 4.49 K/UL — LOW (ref 4.8–10.8)
WBC # FLD AUTO: 4.49 K/UL — LOW (ref 4.8–10.8)

## 2021-04-10 PROCEDURE — 99232 SBSQ HOSP IP/OBS MODERATE 35: CPT

## 2021-04-10 RX ORDER — ACETAMINOPHEN 500 MG
650 TABLET ORAL EVERY 6 HOURS
Refills: 0 | Status: DISCONTINUED | OUTPATIENT
Start: 2021-04-10 | End: 2021-04-13

## 2021-04-10 RX ADMIN — Medication 100 MILLIGRAM(S): at 12:45

## 2021-04-10 RX ADMIN — Medication 1 MILLIGRAM(S): at 13:11

## 2021-04-10 RX ADMIN — Medication 0.3 MILLIGRAM(S): at 13:11

## 2021-04-10 RX ADMIN — Medication 1 TABLET(S): at 12:45

## 2021-04-10 RX ADMIN — LEVETIRACETAM 400 MILLIGRAM(S): 250 TABLET, FILM COATED ORAL at 22:21

## 2021-04-10 RX ADMIN — Medication 2 MILLIGRAM(S): at 06:00

## 2021-04-10 RX ADMIN — ENOXAPARIN SODIUM 40 MILLIGRAM(S): 100 INJECTION SUBCUTANEOUS at 12:45

## 2021-04-10 RX ADMIN — Medication 1 MILLIGRAM(S): at 09:04

## 2021-04-10 RX ADMIN — Medication 2 MILLIGRAM(S): at 02:43

## 2021-04-10 RX ADMIN — Medication 0.3 MILLIGRAM(S): at 06:11

## 2021-04-10 RX ADMIN — POLYETHYLENE GLYCOL 3350 17 GRAM(S): 17 POWDER, FOR SOLUTION ORAL at 06:12

## 2021-04-10 RX ADMIN — CHLORHEXIDINE GLUCONATE 1 APPLICATION(S): 213 SOLUTION TOPICAL at 06:10

## 2021-04-10 RX ADMIN — Medication 650 MILLIGRAM(S): at 19:12

## 2021-04-10 RX ADMIN — LEVETIRACETAM 400 MILLIGRAM(S): 250 TABLET, FILM COATED ORAL at 08:01

## 2021-04-10 RX ADMIN — Medication 1 MILLIGRAM(S): at 22:21

## 2021-04-10 RX ADMIN — Medication 1 MILLIGRAM(S): at 17:29

## 2021-04-10 RX ADMIN — Medication 1 MILLIGRAM(S): at 12:45

## 2021-04-10 NOTE — PROGRESS NOTE ADULT - ASSESSMENT
IMPRESSION:  Intubation for airway protection SP extubation 4/2  Status epilepticus, resolved  ETOH withdrawal / DT's, resolved   Possible Aspiration PNA, treated  Tongue Laceration s/p repair  HO of seizures   HO HTN    Plan:    CNS:  Continue p[resent management.  Continue AEDs     HEENT: Oral care    PULMONARY:  HOB @ 45 degrees.  Aspiration precautions.      CARDIOVASCULAR:  I=O.      GI: GI prophylaxis.  Feeding.     RENAL:  Follow up lytes.  Correct as needed.    INFECTIOUS DISEASE:  FU CBC.  Monitor off ABX.  FU cultures.    HEMATOLOGICAL:  DVT prophylaxis.    ENDOCRINE:  Follow up FS.    MUSCULOSKELETAL:  OOB to chair    Full Code    transfer to floor

## 2021-04-11 LAB
ALBUMIN SERPL ELPH-MCNC: 3 G/DL — LOW (ref 3.5–5.2)
ALP SERPL-CCNC: 55 U/L — SIGNIFICANT CHANGE UP (ref 30–115)
ALT FLD-CCNC: 20 U/L — SIGNIFICANT CHANGE UP (ref 0–41)
AMMONIA BLD-MCNC: 18 UMOL/L — SIGNIFICANT CHANGE UP (ref 11–55)
ANION GAP SERPL CALC-SCNC: 13 MMOL/L — SIGNIFICANT CHANGE UP (ref 7–14)
AST SERPL-CCNC: 32 U/L — SIGNIFICANT CHANGE UP (ref 0–41)
BASOPHILS # BLD AUTO: 0.02 K/UL — SIGNIFICANT CHANGE UP (ref 0–0.2)
BASOPHILS NFR BLD AUTO: 0.3 % — SIGNIFICANT CHANGE UP (ref 0–1)
BILIRUB SERPL-MCNC: 0.7 MG/DL — SIGNIFICANT CHANGE UP (ref 0.2–1.2)
BUN SERPL-MCNC: <3 MG/DL — LOW (ref 10–20)
CALCIUM SERPL-MCNC: 8.5 MG/DL — SIGNIFICANT CHANGE UP (ref 8.5–10.1)
CHLORIDE SERPL-SCNC: 107 MMOL/L — SIGNIFICANT CHANGE UP (ref 98–110)
CO2 SERPL-SCNC: 20 MMOL/L — SIGNIFICANT CHANGE UP (ref 17–32)
CREAT SERPL-MCNC: 0.7 MG/DL — SIGNIFICANT CHANGE UP (ref 0.7–1.5)
EOSINOPHIL # BLD AUTO: 0.08 K/UL — SIGNIFICANT CHANGE UP (ref 0–0.7)
EOSINOPHIL NFR BLD AUTO: 1.3 % — SIGNIFICANT CHANGE UP (ref 0–8)
GLUCOSE SERPL-MCNC: 107 MG/DL — HIGH (ref 70–99)
HCT VFR BLD CALC: 24.7 % — LOW (ref 42–52)
HGB BLD-MCNC: 8.5 G/DL — LOW (ref 14–18)
IMM GRANULOCYTES NFR BLD AUTO: 0.5 % — HIGH (ref 0.1–0.3)
LYMPHOCYTES # BLD AUTO: 1.31 K/UL — SIGNIFICANT CHANGE UP (ref 1.2–3.4)
LYMPHOCYTES # BLD AUTO: 22.1 % — SIGNIFICANT CHANGE UP (ref 20.5–51.1)
MAGNESIUM SERPL-MCNC: 1.5 MG/DL — LOW (ref 1.8–2.4)
MCHC RBC-ENTMCNC: 31.6 PG — HIGH (ref 27–31)
MCHC RBC-ENTMCNC: 34.4 G/DL — SIGNIFICANT CHANGE UP (ref 32–37)
MCV RBC AUTO: 91.8 FL — SIGNIFICANT CHANGE UP (ref 80–94)
MONOCYTES # BLD AUTO: 0.81 K/UL — HIGH (ref 0.1–0.6)
MONOCYTES NFR BLD AUTO: 13.6 % — HIGH (ref 1.7–9.3)
NEUTROPHILS # BLD AUTO: 3.69 K/UL — SIGNIFICANT CHANGE UP (ref 1.4–6.5)
NEUTROPHILS NFR BLD AUTO: 62.2 % — SIGNIFICANT CHANGE UP (ref 42.2–75.2)
NRBC # BLD: 0 /100 WBCS — SIGNIFICANT CHANGE UP (ref 0–0)
PHOSPHATE SERPL-MCNC: 2.7 MG/DL — SIGNIFICANT CHANGE UP (ref 2.1–4.9)
PLATELET # BLD AUTO: 421 K/UL — HIGH (ref 130–400)
POTASSIUM SERPL-MCNC: 3.4 MMOL/L — LOW (ref 3.5–5)
POTASSIUM SERPL-SCNC: 3.4 MMOL/L — LOW (ref 3.5–5)
PROT SERPL-MCNC: 6.1 G/DL — SIGNIFICANT CHANGE UP (ref 6–8)
RBC # BLD: 2.69 M/UL — LOW (ref 4.7–6.1)
RBC # FLD: 12.1 % — SIGNIFICANT CHANGE UP (ref 11.5–14.5)
SODIUM SERPL-SCNC: 140 MMOL/L — SIGNIFICANT CHANGE UP (ref 135–146)
WBC # BLD: 5.94 K/UL — SIGNIFICANT CHANGE UP (ref 4.8–10.8)
WBC # FLD AUTO: 5.94 K/UL — SIGNIFICANT CHANGE UP (ref 4.8–10.8)

## 2021-04-11 PROCEDURE — 99233 SBSQ HOSP IP/OBS HIGH 50: CPT

## 2021-04-11 RX ORDER — POTASSIUM CHLORIDE 20 MEQ
40 PACKET (EA) ORAL ONCE
Refills: 0 | Status: COMPLETED | OUTPATIENT
Start: 2021-04-11 | End: 2021-04-11

## 2021-04-11 RX ORDER — MAGNESIUM OXIDE 400 MG ORAL TABLET 241.3 MG
400 TABLET ORAL
Refills: 0 | Status: DISCONTINUED | OUTPATIENT
Start: 2021-04-11 | End: 2021-04-13

## 2021-04-11 RX ORDER — MAGNESIUM SULFATE 500 MG/ML
2 VIAL (ML) INJECTION ONCE
Refills: 0 | Status: COMPLETED | OUTPATIENT
Start: 2021-04-11 | End: 2021-04-11

## 2021-04-11 RX ORDER — THIAMINE MONONITRATE (VIT B1) 100 MG
500 TABLET ORAL THREE TIMES A DAY
Refills: 0 | Status: DISCONTINUED | OUTPATIENT
Start: 2021-04-11 | End: 2021-04-13

## 2021-04-11 RX ADMIN — Medication 105 MILLIGRAM(S): at 22:10

## 2021-04-11 RX ADMIN — POLYETHYLENE GLYCOL 3350 17 GRAM(S): 17 POWDER, FOR SOLUTION ORAL at 05:19

## 2021-04-11 RX ADMIN — Medication 1 MILLIGRAM(S): at 11:21

## 2021-04-11 RX ADMIN — Medication 0.5 MILLIGRAM(S): at 09:44

## 2021-04-11 RX ADMIN — Medication 0.5 MILLIGRAM(S): at 13:11

## 2021-04-11 RX ADMIN — Medication 1 MILLIGRAM(S): at 01:26

## 2021-04-11 RX ADMIN — Medication 0.3 MILLIGRAM(S): at 00:08

## 2021-04-11 RX ADMIN — Medication 650 MILLIGRAM(S): at 01:24

## 2021-04-11 RX ADMIN — Medication 1 MILLIGRAM(S): at 06:07

## 2021-04-11 RX ADMIN — Medication 40 MILLIEQUIVALENT(S): at 13:12

## 2021-04-11 RX ADMIN — MAGNESIUM OXIDE 400 MG ORAL TABLET 400 MILLIGRAM(S): 241.3 TABLET ORAL at 13:12

## 2021-04-11 RX ADMIN — Medication 50 GRAM(S): at 12:16

## 2021-04-11 RX ADMIN — Medication 100 MILLIGRAM(S): at 11:22

## 2021-04-11 RX ADMIN — POLYETHYLENE GLYCOL 3350 17 GRAM(S): 17 POWDER, FOR SOLUTION ORAL at 17:27

## 2021-04-11 RX ADMIN — LEVETIRACETAM 400 MILLIGRAM(S): 250 TABLET, FILM COATED ORAL at 09:44

## 2021-04-11 RX ADMIN — Medication 650 MILLIGRAM(S): at 02:56

## 2021-04-11 RX ADMIN — Medication 105 MILLIGRAM(S): at 16:20

## 2021-04-11 RX ADMIN — Medication 0.3 MILLIGRAM(S): at 13:12

## 2021-04-11 RX ADMIN — Medication 1 TABLET(S): at 11:22

## 2021-04-11 RX ADMIN — Medication 0.5 MILLIGRAM(S): at 17:31

## 2021-04-11 RX ADMIN — MAGNESIUM OXIDE 400 MG ORAL TABLET 400 MILLIGRAM(S): 241.3 TABLET ORAL at 17:27

## 2021-04-11 RX ADMIN — Medication 1 MILLIGRAM(S): at 04:44

## 2021-04-11 RX ADMIN — Medication 0.3 MILLIGRAM(S): at 05:19

## 2021-04-11 RX ADMIN — LEVETIRACETAM 400 MILLIGRAM(S): 250 TABLET, FILM COATED ORAL at 22:10

## 2021-04-11 RX ADMIN — Medication 0.3 MILLIGRAM(S): at 22:08

## 2021-04-11 RX ADMIN — Medication 0.5 MILLIGRAM(S): at 22:07

## 2021-04-11 RX ADMIN — ENOXAPARIN SODIUM 40 MILLIGRAM(S): 100 INJECTION SUBCUTANEOUS at 11:21

## 2021-04-11 RX ADMIN — SENNA PLUS 2 TABLET(S): 8.6 TABLET ORAL at 22:08

## 2021-04-11 NOTE — PROGRESS NOTE ADULT - ASSESSMENT
ETOH withdrawal c/b seizures and DT's   Suspected folate and thiamine deficiencies   Possible WE   - VEEG and CTH negative   - UDS negative   Plan:   - c/w ativan taper, CIWA protocol q4h   - addiction medicine following   - [facility] f/u re: inpatient rehab   - keep mag > 2.0   - seizure precautions   - c/w Keppra as per neurology   - start high dose thiamine supplementation   - neuro f/u     10cm Tongue Laceration s/p repair by OMFS  - outpatient f/u in OMFS clinic, needs pureed diet for 2 weeks, tolerating pureed     HAGMA due Lactic acidosis secondary seizures - resolved     Magnesium deficiency 2/2 chronic alcohol abuse   Hypokalemia   - replete, start mag oxide TID     Transaminitis - resolved     Full Code

## 2021-04-11 NOTE — PROGRESS NOTE ADULT - SUBJECTIVE AND OBJECTIVE BOX
Pt seen and examined at bedside. Alert, awake, pleasant, continues to have hallucinations (talking to wife at bedside who is not present) and periods of agitation.     VITAL SIGNS (Last 24 hrs):  T(C): 36.5 (04-11-21 @ 05:18), Max: 37.9 (04-10-21 @ 18:00)  HR: 98 (04-11-21 @ 05:18) (70 - 100)  BP: 130/79 (04-11-21 @ 05:18) (106/64 - 148/88)  RR: 18 (04-11-21 @ 05:18) (18 - 20)  SpO2: 94% (04-11-21 @ 07:28) (94% - 100%)  Wt(kg): --  Daily     Daily     I&O's Summary    10 Apr 2021 07:01  -  11 Apr 2021 07:00  --------------------------------------------------------  IN: 1475 mL / OUT: 1650 mL / NET: -175 mL        PHYSICAL EXAM:  GENERAL: NAD   HEAD:  Atraumatic, Normocephalic  EYES: conjunctiva and sclera clear  NECK: Supple, No JVD  CHEST/LUNG: Clear to auscultation bilaterally; No wheeze  HEART: Regular rate and rhythm; No murmurs, rubs, or gallops  ABDOMEN: Soft, Nontender, Nondistended; Bowel sounds present  EXTREMITIES:  2+ Peripheral Pulses, No clubbing, cyanosis, or edema  PSYCH: AAOx3  SKIN: No rashes or lesions    Labs Reviewed       CBC Full  -  ( 11 Apr 2021 06:34 )  WBC Count : 5.94 K/uL  Hemoglobin : 8.5 g/dL  Hematocrit : 24.7 %  Platelet Count - Automated : 421 K/uL  Mean Cell Volume : 91.8 fL  Mean Cell Hemoglobin : 31.6 pg  Mean Cell Hemoglobin Concentration : 34.4 g/dL  Auto Neutrophil # : 3.69 K/uL  Auto Lymphocyte # : 1.31 K/uL  Auto Monocyte # : 0.81 K/uL  Auto Eosinophil # : 0.08 K/uL  Auto Basophil # : 0.02 K/uL  Auto Neutrophil % : 62.2 %  Auto Lymphocyte % : 22.1 %  Auto Monocyte % : 13.6 %  Auto Eosinophil % : 1.3 %  Auto Basophil % : 0.3 %    BMP:    04-11 @ 06:34    Blood Urea Nitrogen - <3  Calcium - 8.5  Carbond Dioxide - 20  Chloride - 107  Creatinine - 0.7  Glucose - 107  Potassium - 3.4  Sodium - 140      Hemoglobin A1c -     Urine Culture:  04-08 @ 06:37 Urine culture: --    Culture Results:   No growth to date.  Method Type: --     Specimen Source: .Blood None         MEDICATIONS  (STANDING):  chlorhexidine 4% Liquid 1 Application(s) Topical <User Schedule>  cloNIDine 0.3 milliGRAM(s) Oral every 8 hours  enoxaparin Injectable 40 milliGRAM(s) SubCutaneous daily  folic acid Injectable 1 milliGRAM(s) IV Push daily  levETIRAcetam  IVPB 1000 milliGRAM(s) IV Intermittent every 12 hours  LORazepam   Injectable 4  IV Push   LORazepam   Injectable 0.5 milliGRAM(s) IV Push every 4 hours  magnesium oxide 400 milliGRAM(s) Oral three times a day with meals  multivitamin 1 Tablet(s) Oral daily  polyethylene glycol 3350 17 Gram(s) Oral two times a day  senna 2 Tablet(s) Oral at bedtime  thiamine Injectable 100 milliGRAM(s) IV Push daily    MEDICATIONS  (PRN):  acetaminophen    Suspension .. 650 milliGRAM(s) Oral every 6 hours PRN Moderate Pain (4 - 6)

## 2021-04-12 LAB
ALBUMIN SERPL ELPH-MCNC: 3 G/DL — LOW (ref 3.5–5.2)
ALP SERPL-CCNC: 61 U/L — SIGNIFICANT CHANGE UP (ref 30–115)
ALT FLD-CCNC: 18 U/L — SIGNIFICANT CHANGE UP (ref 0–41)
ANION GAP SERPL CALC-SCNC: 12 MMOL/L — SIGNIFICANT CHANGE UP (ref 7–14)
AST SERPL-CCNC: 27 U/L — SIGNIFICANT CHANGE UP (ref 0–41)
BASOPHILS # BLD AUTO: 0.03 K/UL — SIGNIFICANT CHANGE UP (ref 0–0.2)
BASOPHILS NFR BLD AUTO: 0.9 % — SIGNIFICANT CHANGE UP (ref 0–1)
BILIRUB SERPL-MCNC: 0.6 MG/DL — SIGNIFICANT CHANGE UP (ref 0.2–1.2)
BUN SERPL-MCNC: 3 MG/DL — LOW (ref 10–20)
CALCIUM SERPL-MCNC: 8.6 MG/DL — SIGNIFICANT CHANGE UP (ref 8.5–10.1)
CHLORIDE SERPL-SCNC: 107 MMOL/L — SIGNIFICANT CHANGE UP (ref 98–110)
CO2 SERPL-SCNC: 20 MMOL/L — SIGNIFICANT CHANGE UP (ref 17–32)
CREAT SERPL-MCNC: 0.7 MG/DL — SIGNIFICANT CHANGE UP (ref 0.7–1.5)
EOSINOPHIL # BLD AUTO: 0.12 K/UL — SIGNIFICANT CHANGE UP (ref 0–0.7)
EOSINOPHIL NFR BLD AUTO: 3.4 % — SIGNIFICANT CHANGE UP (ref 0–8)
GLUCOSE SERPL-MCNC: 118 MG/DL — HIGH (ref 70–99)
HCT VFR BLD CALC: 26.3 % — LOW (ref 42–52)
HGB BLD-MCNC: 9 G/DL — LOW (ref 14–18)
IMM GRANULOCYTES NFR BLD AUTO: 0.9 % — HIGH (ref 0.1–0.3)
LYMPHOCYTES # BLD AUTO: 1.1 K/UL — LOW (ref 1.2–3.4)
LYMPHOCYTES # BLD AUTO: 31.5 % — SIGNIFICANT CHANGE UP (ref 20.5–51.1)
MAGNESIUM SERPL-MCNC: 2 MG/DL — SIGNIFICANT CHANGE UP (ref 1.8–2.4)
MCHC RBC-ENTMCNC: 31.4 PG — HIGH (ref 27–31)
MCHC RBC-ENTMCNC: 34.2 G/DL — SIGNIFICANT CHANGE UP (ref 32–37)
MCV RBC AUTO: 91.6 FL — SIGNIFICANT CHANGE UP (ref 80–94)
MONOCYTES # BLD AUTO: 0.64 K/UL — HIGH (ref 0.1–0.6)
MONOCYTES NFR BLD AUTO: 18.3 % — HIGH (ref 1.7–9.3)
NEUTROPHILS # BLD AUTO: 1.57 K/UL — SIGNIFICANT CHANGE UP (ref 1.4–6.5)
NEUTROPHILS NFR BLD AUTO: 45 % — SIGNIFICANT CHANGE UP (ref 42.2–75.2)
NRBC # BLD: 0 /100 WBCS — SIGNIFICANT CHANGE UP (ref 0–0)
PHOSPHATE SERPL-MCNC: 5 MG/DL — HIGH (ref 2.1–4.9)
PLATELET # BLD AUTO: 420 K/UL — HIGH (ref 130–400)
POTASSIUM SERPL-MCNC: 4 MMOL/L — SIGNIFICANT CHANGE UP (ref 3.5–5)
POTASSIUM SERPL-SCNC: 4 MMOL/L — SIGNIFICANT CHANGE UP (ref 3.5–5)
PROT SERPL-MCNC: 6.3 G/DL — SIGNIFICANT CHANGE UP (ref 6–8)
RBC # BLD: 2.87 M/UL — LOW (ref 4.7–6.1)
RBC # FLD: 12.2 % — SIGNIFICANT CHANGE UP (ref 11.5–14.5)
SODIUM SERPL-SCNC: 139 MMOL/L — SIGNIFICANT CHANGE UP (ref 135–146)
WBC # BLD: 3.49 K/UL — LOW (ref 4.8–10.8)
WBC # FLD AUTO: 3.49 K/UL — LOW (ref 4.8–10.8)

## 2021-04-12 PROCEDURE — 99232 SBSQ HOSP IP/OBS MODERATE 35: CPT

## 2021-04-12 RX ORDER — FOLIC ACID 0.8 MG
1 TABLET ORAL DAILY
Refills: 0 | Status: DISCONTINUED | OUTPATIENT
Start: 2021-04-12 | End: 2021-04-13

## 2021-04-12 RX ORDER — CHLORHEXIDINE GLUCONATE 213 G/1000ML
15 SOLUTION TOPICAL
Refills: 0 | Status: DISCONTINUED | OUTPATIENT
Start: 2021-04-12 | End: 2021-04-13

## 2021-04-12 RX ORDER — LEVETIRACETAM 250 MG/1
1000 TABLET, FILM COATED ORAL
Refills: 0 | Status: DISCONTINUED | OUTPATIENT
Start: 2021-04-12 | End: 2021-04-12

## 2021-04-12 RX ADMIN — MAGNESIUM OXIDE 400 MG ORAL TABLET 400 MILLIGRAM(S): 241.3 TABLET ORAL at 17:58

## 2021-04-12 RX ADMIN — Medication 0.5 MILLIGRAM(S): at 02:42

## 2021-04-12 RX ADMIN — Medication 0.5 MILLIGRAM(S): at 17:58

## 2021-04-12 RX ADMIN — MAGNESIUM OXIDE 400 MG ORAL TABLET 400 MILLIGRAM(S): 241.3 TABLET ORAL at 12:24

## 2021-04-12 RX ADMIN — Medication 0.3 MILLIGRAM(S): at 13:53

## 2021-04-12 RX ADMIN — Medication 0.5 MILLIGRAM(S): at 06:04

## 2021-04-12 RX ADMIN — POLYETHYLENE GLYCOL 3350 17 GRAM(S): 17 POWDER, FOR SOLUTION ORAL at 06:04

## 2021-04-12 RX ADMIN — Medication 105 MILLIGRAM(S): at 21:51

## 2021-04-12 RX ADMIN — Medication 105 MILLIGRAM(S): at 06:04

## 2021-04-12 RX ADMIN — ENOXAPARIN SODIUM 40 MILLIGRAM(S): 100 INJECTION SUBCUTANEOUS at 12:24

## 2021-04-12 RX ADMIN — MAGNESIUM OXIDE 400 MG ORAL TABLET 400 MILLIGRAM(S): 241.3 TABLET ORAL at 09:06

## 2021-04-12 RX ADMIN — Medication 1 MILLIGRAM(S): at 12:24

## 2021-04-12 RX ADMIN — Medication 105 MILLIGRAM(S): at 13:52

## 2021-04-12 RX ADMIN — Medication 0.3 MILLIGRAM(S): at 06:03

## 2021-04-12 RX ADMIN — Medication 1 TABLET(S): at 13:52

## 2021-04-12 RX ADMIN — CHLORHEXIDINE GLUCONATE 15 MILLILITER(S): 213 SOLUTION TOPICAL at 17:59

## 2021-04-12 NOTE — SWALLOW BEDSIDE ASSESSMENT ADULT - NS SPL SWALLOW CLINIC TRIAL FT
+toleration of puree diet and thin liquids
Moderate oral dysphagia without overt s/s of penetration/aspiration.
Mild oral dysphagia for puree, +toleration of puree and thin liquids w/o overt s/s of penetration/aspiration.
+moderate oral dysphagia w/o overt s/s of penetration/aspiration.

## 2021-04-12 NOTE — SWALLOW BEDSIDE ASSESSMENT ADULT - NS ASR SWALLOW FINDINGS DISCUS
CLAUDE Diaz/Physician/Nursing
Nursing/Patient
Nursing/Patient
Physician/Nursing/Patient
Physician/Nursing/Patient

## 2021-04-12 NOTE — PROGRESS NOTE ADULT - REASON FOR ADMISSION
Seizures
phillipres
Seizures
phillipres
seizures
phillipres

## 2021-04-12 NOTE — PROGRESS NOTE ADULT - SUBJECTIVE AND OBJECTIVE BOX
SUBJECTIVE:    Patient is a 45y old Male who presents with a chief complaint of alteration of mental status (11 Apr 2021 13:54)Currently admitted to medicine with the primary diagnosis of Status epilepticus likely secondary to alcohol withdrawal/ DT.   Today is hospital day 14d. The patient is AAO*4, however at times, he does experience visual hallucinations (claiming that his wife is sitting in the room while she is not)    PAST MEDICAL & SURGICAL HISTORY  ETOH abuse    Hypomagnesemia    Seizure disorder      SOCIAL HISTORY:  Negative for smoking/alcohol/drug use.     ALLERGIES:  No Known Allergies    MEDICATIONS:  STANDING MEDICATIONS  chlorhexidine 0.12% Liquid 15 milliLiter(s) Oral Mucosa two times a day  chlorhexidine 4% Liquid 1 Application(s) Topical <User Schedule>  cloNIDine 0.3 milliGRAM(s) Oral every 8 hours  enoxaparin Injectable 40 milliGRAM(s) SubCutaneous daily  folic acid 1 milliGRAM(s) Oral daily  levETIRAcetam 1000 milliGRAM(s) Oral two times a day  LORazepam   Injectable 4  IV Push   LORazepam   Injectable 0.5 milliGRAM(s) IV Push every 12 hours  magnesium oxide 400 milliGRAM(s) Oral three times a day with meals  multivitamin 1 Tablet(s) Oral daily  polyethylene glycol 3350 17 Gram(s) Oral two times a day  senna 2 Tablet(s) Oral at bedtime  thiamine IVPB 500 milliGRAM(s) IV Intermittent three times a day    PRN MEDICATIONS  acetaminophen    Suspension .. 650 milliGRAM(s) Oral every 6 hours PRN    VITALS:   T(F): 98.9  HR: 66  BP: 130/82  RR: 18  SpO2: 96%    LABS:                        9.0    3.49  )-----------( 420      ( 12 Apr 2021 06:37 )             26.3     04-12    139  |  107  |  3<L>  ----------------------------<  118<H>  4.0   |  20  |  0.7    Ca    8.6      12 Apr 2021 06:37  Phos  5.0     04-12  Mg     2.0     04-12    TPro  6.3  /  Alb  3.0<L>  /  TBili  0.6  /  DBili  x   /  AST  27  /  ALT  18  /  AlkPhos  61  04-12    RADIOLOGY:    C< from: Xray Chest 1 View- PORTABLE-Routine (Xray Chest 1 View- PORTABLE-Routine in AM.) (04.09.21 @ 08:41) >  Impression:    Low lung volumes. No consolidation or pneumothorax.    < end of copied text >    < from: CT Cervical Spine No Cont (03.29.21 @ 19:49) >    IMPRESSION:    Degenerative changes as described above at C6-7. No evidence of fracture or facet subluxation.    < end of copied text >    < from: CT Head No Cont (03.29.21 @ 19:47) >  IMPRESSION:    No evidence for intracranial hemorrhage, midline shift, or mass effect.    Small right frontal extracalvarial hematoma.    Marked nasopharyngeal edema.    < end of copied text >    < from: US Abdomen Limited (04.08.21 @ 10:09) >  IMPRESSION:  1.  Gallbladder sludge without cholelithiasis or sonographic evidence for acute cholecystitis.  2.  Hepatic steatosis.    < end of copied text >      PHYSICAL EXAM:  GEN: No acute distress  LUNGS: Clear to auscultation bilaterally   HEART: S1/S2 present. RRR.   ABD: Soft, non-tender, non-distended. Bowel sounds present  EXT: NC/NC/NE/2+PP/FOLEY/Skin Intact.   NEURO: AAOX3, but the patient has visual hallucinations (claiming that his wife is sitting in the room while she is not)    Cole Catheter:   Indwelling Urethral Catheter:     Connect To:  Straight Drainage/Gravity    Indication:  Urine Output Monitoring in Critically Ill (03-29-21 @ 17:44) (not performed) [Active]         SUBJECTIVE:    Patient is a 45y old Male who presents with a chief complaint of alteration of mental status (11 Apr 2021 13:54)Currently admitted to medicine with the primary diagnosis of Status epilepticus likely secondary to alcohol withdrawal/ DT.   Today is hospital day 14d. The patient is AAO*4, however at times, he does experience visual hallucinations (claiming that his wife is sitting in the room while she is not)    PAST MEDICAL & SURGICAL HISTORY  ETOH abuse    Hypomagnesemia    Seizure disorder      SOCIAL HISTORY:  Negative for smoking/alcohol/drug use.     ALLERGIES:  No Known Allergies    MEDICATIONS:  STANDING MEDICATIONS  chlorhexidine 0.12% Liquid 15 milliLiter(s) Oral Mucosa two times a day  chlorhexidine 4% Liquid 1 Application(s) Topical <User Schedule>  cloNIDine 0.3 milliGRAM(s) Oral every 8 hours  enoxaparin Injectable 40 milliGRAM(s) SubCutaneous daily  folic acid 1 milliGRAM(s) Oral daily  levETIRAcetam 1000 milliGRAM(s) Oral two times a day  LORazepam   Injectable 4  IV Push   LORazepam   Injectable 0.5 milliGRAM(s) IV Push every 12 hours  magnesium oxide 400 milliGRAM(s) Oral three times a day with meals  multivitamin 1 Tablet(s) Oral daily  polyethylene glycol 3350 17 Gram(s) Oral two times a day  senna 2 Tablet(s) Oral at bedtime  thiamine IVPB 500 milliGRAM(s) IV Intermittent three times a day    PRN MEDICATIONS  acetaminophen    Suspension .. 650 milliGRAM(s) Oral every 6 hours PRN    VITALS:   T(F): 98.9  HR: 66  BP: 130/82  RR: 18  SpO2: 96%    LABS:                        9.0    3.49  )-----------( 420      ( 12 Apr 2021 06:37 )             26.3     04-12    139  |  107  |  3<L>  ----------------------------<  118<H>  4.0   |  20  |  0.7    Ca    8.6      12 Apr 2021 06:37  Phos  5.0     04-12  Mg     2.0     04-12    TPro  6.3  /  Alb  3.0<L>  /  TBili  0.6  /  DBili  x   /  AST  27  /  ALT  18  /  AlkPhos  61  04-12    RADIOLOGY:    C< from: Xray Chest 1 View- PORTABLE-Routine (Xray Chest 1 View- PORTABLE-Routine in AM.) (04.09.21 @ 08:41) >  Impression:    Low lung volumes. No consolidation or pneumothorax.    < end of copied text >    < from: CT Cervical Spine No Cont (03.29.21 @ 19:49) >    IMPRESSION:    Degenerative changes as described above at C6-7. No evidence of fracture or facet subluxation.    < end of copied text >    < from: CT Head No Cont (03.29.21 @ 19:47) >  IMPRESSION:    No evidence for intracranial hemorrhage, midline shift, or mass effect.    Small right frontal extracalvarial hematoma.    Marked nasopharyngeal edema.    < end of copied text >    < from: US Abdomen Limited (04.08.21 @ 10:09) >  IMPRESSION:  1.  Gallbladder sludge without cholelithiasis or sonographic evidence for acute cholecystitis.  2.  Hepatic steatosis.    < end of copied text >      PHYSICAL EXAM:  GEN: No acute distress  LUNGS: Clear to auscultation bilaterally   HEART: S1/S2 present. RRR.   ABD: Soft, non-tender, non-distended. Bowel sounds present  EXT: NC/NC/NE/2+PP/FOLEY/Skin Intact.   NEURO: AAOX3, but the patient has visual hallucinations (claiming that his wife is sitting in the room while she is not)    Cole Catheter:   Indwelling Urethral Catheter:     Connect To:  Straight Drainage/Gravity    Indication:  Urine Output Monitoring in Critically Ill (03-29-21 @ 17:44) (not performed) [Active]    < from: EEG w/ Video Each 12-26 Hours, Unmonitored (03.31.21 @ 09:00) >  Awake:  Day 1- Diffusely attenuated background. After about 1 AM there is improvement with background consisting of diffuse theta. It is notoptimally organized and there is no sustained PDR.  Day 2- During periods of arousal background is obscured by muscle artifact.      Asleep:  Day 1- No clearly formed sleep architecture  Day 2- No clearly formed sleep architecture      Focal Slowing:  None      Generalized Slowing:  There is moderate generalized slowing    Breach Artifact:  None    Interictal Activity  None    Activation and Provocation Procedures:  None performed    Events:  None reported. No electrographic seizures    Impression  Abnormal due to presence of diffuse attenuation evolving into generalized slowing.      Clinical Correlation  Findings consistent with diffuse cerebral dysfunction. Cannot rule out effect of sedating medication.    < end of copied text >

## 2021-04-12 NOTE — PROGRESS NOTE ADULT - ASSESSMENT
45-year-old, male patient, admitted with a working diagnosis of status epilepticus/seizures secondary to Alcohol withdrawal/ Delirium Tremens.     ETOH withdrawal c/b seizures and DT's   Suspected folate and thiamine deficiencies   Possible WE   - VEEG and CTH negative   - UDS negative   Plan:   - c/w ativan taper, CIWA protocol q4h   - addiction medicine following   - CATCH f/u re: inpatient rehab   - keep mag > 2.0   - seizure precautions   - c/w Keppra as per neurology   - start high dose thiamine supplementation   - neuro f/u     10cm Tongue Laceration s/p repair by [facility]  - outpatient f/u in [facility] clinic, needs pureed diet for 2 weeks, tolerating pureed     HAGMA due Lactic acidosis secondary seizures - resolved     Magnesium deficiency 2/2 chronic alcohol abuse   Hypokalemia   - replete, start mag oxide TID     Transaminitis - resolved     Full Code  45-year-old, male patient, admitted with a working diagnosis of status epilepticus/seizures secondary to Alcohol withdrawal/ Delirium Tremens.     #ETOH withdrawal seizures and DT's:   - Hospitalized since 3/29/2021  - S/p intubation upon admission for airway protection and extubation.   - VEEG and CTH negative   - UDS negative   - CATCH f/u re: inpatient rehab. However the patient is willing to go home tomorrow.   - keep mag > 2.0   - seizure precautions   - c/w Keppra as per neurology   - start high dose thiamine supplementation   - neuro f/u     Suspected Wernicke's encephalopathy:    - Continue with Thiamine 500mg IV q8hrs   - Neurology recommendations: even if WE, there is no further recommendations except for the Thiamine.      #Suspected folate deficiency:   - Continue with folate 1mg po once daily    #Suspected thiamine deficiency:   - Continue with Thiamine 500mg IV q8hrs ( suspecting Wernicke's encephalopathy)     #Tongue Laceration s/p repair by [facility]   - Outpatient f/u in [facility] clinic, needs pureed diet for 2 weeks, tolerating pureed   - Continue with Peridex 2 times a day    #HAGMA:   - Likely secondary toLactic acidosis secondary to seizures - resolved     #Hypomagnesemia:   - Secondary to chronic alcohol use  - Resolved now  - Continue with Magnesium oxide 300mg po q8hrs with meals    #Transaminitis:   - Likely secondary to alcoholic hepatitis  - resolved   - AST:27,  ALT: 18  - Abdominal sonogram:     45-year-old, male patient, admitted with a working diagnosis of status epilepticus/seizures secondary to Alcohol withdrawal/ Delirium Tremens.     #ETOH withdrawal seizures and DT's:   - Hospitalized since 3/29/2021  - S/p intubation upon admission for airway protection and extubation.   - VEEG and CTH negative   - UDS negative   - CATCH f/u recommendations: inpatient rehab. However the patient is willing to go home tomorrow.   - CIWA score this mornin ( had visual hallucinations overnight)   - Continue with Ativan taper  - keep mag > 2.0   - seizure precautions   - Continue with Keppra 1000mg po q12hrs    #Suspected Wernicke's encephalopathy:    - Continue with Thiamine 500mg IV q8hrs   - Neurology recommendations: even if WE, there is no further recommendations except for the Thiamine.      #Suspected folate deficiency:   - Continue with folate 1mg po once daily    #Suspected thiamine deficiency:   - Continue with Thiamine 500mg IV q8hrs ( suspecting Wernicke's encephalopathy)     #Tongue Laceration s/p repair by [facility]   - Outpatient f/u in [facility] clinic, needs pureed diet for 2 weeks, tolerating pureed   - Continue with Peridex 2 times a day    #HAGMA:   - Likely secondary to Lactic acidosis secondary to seizures   - Resolved     #Hypomagnesemia:   - Secondary to chronic alcohol use  - Resolved now  - Continue with Magnesium oxide 300mg po q8hrs with meals    #Transaminitis:   - Likely secondary to alcoholic hepatitis  - Resolved   - AST:27, ALT: 18  - Abdominal sonogram:   a. Gallbladder sludge without cholelithiasis or sonographic evidence for acute cholecystitis.  b.  Hepatic steatosis.    #Misc:   - Diet: Dysphagia 1, pureed, thin liquids, tolerated.   - GI proph: None  - DVT proph: Lovenox 40mg subcutaneous once daily   - Dispo: Continue with Ativan taper, to be discharged in the next 24-48 hours    45-year-old, male patient, admitted with a working diagnosis of status epilepticus/seizures secondary to Alcohol withdrawal/ Delirium Tremens.     #ETOH withdrawal seizures and DT's:   - Hospitalized since 3/29/2021  - S/p intubation upon admission for airway protection and extubated.   - VEEG negative for seizures and CTH negative   - UDS negative   - CATCH f/u recommendations: inpatient rehab. However the patient is willing to go home tomorrow.   - CIWA score this mornin ( had visual hallucinations overnight)   - Continue with Ativan taper.   - keep mag > 2.0   - seizure precautions   - Continue with Keppra 1000mg po q12hrs. Spoke with Neurology PA [id number], will likely NOT need keppra upon discharge. The neurology team favors that we stop the keppra even before discharge ( if it was given for seizures only secondary to DT/Alcohol withdrawal). I spoke to the patient's wife JOSIE, on 2021 at 15:55 who stated that he is NOT on any seizure medication, he had a similar admission 2-3 years ago, no chronic AED used at home. Will discontinue Keppra for now, f no seizures, DC without keppra at home.      #Suspected Wernicke's encephalopathy:    - Continue with Thiamine 500mg IV q8hrs   - Neurology recommendations: even if WE, there is no further recommendations except for the Thiamine.      #Suspected folate deficiency:   - Continue with folate 1mg po once daily    #Suspected thiamine deficiency:   - Continue with Thiamine 500mg IV q8hrs ( suspecting Wernicke's encephalopathy)     #Tongue Laceration s/p repair by [facility]   - Outpatient f/u in [facility] clinic, needs pureed diet for 2 weeks, tolerating pureed   - Continue with Peridex 2 times a day    #HAGMA:   - Likely secondary to Lactic acidosis secondary to seizures   - Resolved     #Hypomagnesemia:   - Secondary to chronic alcohol use  - Resolved now  - Continue with Magnesium oxide 300mg po q8hrs with meals    #Transaminitis:   - Likely secondary to alcoholic hepatitis  - Resolved   - AST:27, ALT: 18  - Abdominal sonogram:   a. Gallbladder sludge without cholelithiasis or sonographic evidence for acute cholecystitis.  b. Hepatic steatosis.    #Misc:   - Diet: Dysphagia 1, pureed, thin liquids, tolerated.   - GI proph: None  - DVT proph: Lovenox 40mg subcutaneous once daily   - Dispo: Continue with Ativan taper, to be discharged in the next 24-48 hours.

## 2021-04-12 NOTE — PROGRESS NOTE ADULT - ASSESSMENT
ETOH withdrawal c/b seizures and DT's   Suspected folate and thiamine deficiencies   Possible Wernicke's Encephalopathy   - VEEG and CTH negative   - UDS negative   Plan:   - c/w ativan taper, CIWA protocol q4h   - addiction medicine following   - CATCH f/u re: inpatient rehab   - keep mag > 2.0   - seizure precautions   - c/w Keppra as per neurology   - c/w high dose thiamine supplementation   - neuro f/u     10cm Tongue Laceration s/p repair by [facility]  - outpatient f/u in [facility] clinic, needs pureed diet for 2 weeks, tolerating pureed     HAGMA due Lactic acidosis secondary seizures - resolved     Magnesium deficiency 2/2 chronic alcohol abuse   - c/w mag oxide TID     Transaminitis - resolved     Full Code    ETOH withdrawal c/b seizures and DT's   Suspected folate and thiamine deficiencies   Possible Wernicke's Encephalopathy   - VEEG and CTH negative   - UDS negative   Plan:   - c/w ativan taper, CIWA protocol q4h   - addiction medicine following   - CATCH f/u re: inpatient rehab   - keep mag > 2.0   - seizure precautions   - c/w Keppra as per neurology   - c/w high dose thiamine supplementation   - dc clonidine   - neuro f/u     10cm Tongue Laceration s/p repair by [facility]  - outpatient f/u in [facility] clinic, needs pureed diet for 2 weeks, tolerating pureed     HAGMA due Lactic acidosis secondary seizures - resolved     Magnesium deficiency 2/2 chronic alcohol abuse   - c/w mag oxide TID     Transaminitis - resolved     Full Code

## 2021-04-12 NOTE — SWALLOW BEDSIDE ASSESSMENT ADULT - SLP GENERAL OBSERVATIONS
Pt received in bed asleep, +reported toleration of breakfast, good intake 100%
Pt received in bed asleep, woke to verbal stim, +tent O2 mask, dry oral cavity, lingual edema
Pt received in bed on room air asleep, woke to verbal stim. +tolerating full liquids, yesterday reccs made to advance to puree w/ thin liquids
Pt received in bed asleep, on room air, woke to verbal stim, lingual edema is improving, speech still dysarthric
Pt received awake on RA. +adequate vocal quality.

## 2021-04-12 NOTE — PROGRESS NOTE ADULT - NSICDXPILOT_GEN_ALL_CORE
[place]

## 2021-04-12 NOTE — PROGRESS NOTE ADULT - SUBJECTIVE AND OBJECTIVE BOX
Pt seen and examined at bedside.    VITAL SIGNS (Last 24 hrs):  T(C): 36.4 (04-12-21 @ 12:22), Max: 37.2 (04-12-21 @ 05:01)  HR: 70 (04-12-21 @ 12:22) (66 - 95)  BP: 100/63 (04-12-21 @ 12:22) (93/54 - 144/75)  RR: 18 (04-12-21 @ 05:01) (18 - 18)  SpO2: 96% (04-11-21 @ 20:00) (96% - 96%)  Wt(kg): --  Daily     Daily     I&O's Summary    12 Apr 2021 07:01  -  12 Apr 2021 14:17  --------------------------------------------------------  IN: 110 mL / OUT: 750 mL / NET: -640 mL        PHYSICAL EXAM:  GENERAL: NAD  HEAD:  Atraumatic, Normocephalic  EYES: conjunctiva and sclera clear  NECK: Supple, No JVD  CHEST/LUNG: Clear to auscultation bilaterally; No wheeze  HEART: Regular rate and rhythm; No murmurs, rubs, or gallops  ABDOMEN: Soft, Nontender, Nondistended; Bowel sounds present  EXTREMITIES:  2+ Peripheral Pulses, No clubbing, cyanosis, or edema  PSYCH: AAOx3  NEUROLOGY: non-focal  SKIN: No rashes or lesions    Labs Reviewed  Spoke to patient in regards to abnormal labs.    CBC Full  -  ( 12 Apr 2021 06:37 )  WBC Count : 3.49 K/uL  Hemoglobin : 9.0 g/dL  Hematocrit : 26.3 %  Platelet Count - Automated : 420 K/uL  Mean Cell Volume : 91.6 fL  Mean Cell Hemoglobin : 31.4 pg  Mean Cell Hemoglobin Concentration : 34.2 g/dL  Auto Neutrophil # : 1.57 K/uL  Auto Lymphocyte # : 1.10 K/uL  Auto Monocyte # : 0.64 K/uL  Auto Eosinophil # : 0.12 K/uL  Auto Basophil # : 0.03 K/uL  Auto Neutrophil % : 45.0 %  Auto Lymphocyte % : 31.5 %  Auto Monocyte % : 18.3 %  Auto Eosinophil % : 3.4 %  Auto Basophil % : 0.9 %    BMP:    04-12 @ 06:37    Blood Urea Nitrogen - 3  Calcium - 8.6  Carbon Dioxide - 20  Chloride - 107  Creatinine - 0.7  Glucose - 118  Potassium - 4.0  Sodium - 139      04-08 @ 06:37 Urine culture: --    Culture Results:   No growth to date.  Method Type: --  Organism: --  Organism Identification: --  Specimen Source: .Blood None        MEDICATIONS  (STANDING):  chlorhexidine 0.12% Liquid 15 milliLiter(s) Oral Mucosa two times a day  chlorhexidine 4% Liquid 1 Application(s) Topical <User Schedule>  cloNIDine 0.3 milliGRAM(s) Oral every 8 hours  enoxaparin Injectable 40 milliGRAM(s) SubCutaneous daily  folic acid 1 milliGRAM(s) Oral daily  levETIRAcetam 1000 milliGRAM(s) Oral two times a day  LORazepam   Injectable 4  IV Push   LORazepam   Injectable 0.5 milliGRAM(s) IV Push every 12 hours  magnesium oxide 400 milliGRAM(s) Oral three times a day with meals  multivitamin 1 Tablet(s) Oral daily  polyethylene glycol 3350 17 Gram(s) Oral two times a day  senna 2 Tablet(s) Oral at bedtime  thiamine IVPB 500 milliGRAM(s) IV Intermittent three times a day    MEDICATIONS  (PRN):  acetaminophen    Suspension .. 650 milliGRAM(s) Oral every 6 hours PRN Moderate Pain (4 - 6)

## 2021-04-12 NOTE — SWALLOW BEDSIDE ASSESSMENT ADULT - SPECIFY REASON(S)
dysphagia f/u
dysphagia f/u, pt tolerating full liquids
dysphagia f/u, diet order not advanced in sunrise
Dysphagia f/u
swallow eval

## 2021-04-12 NOTE — SWALLOW BEDSIDE ASSESSMENT ADULT - SWALLOW EVAL: RECOMMENDED DIET
Full liquid diet
continue to suggest advance to dysphagia 1 w/ thin
Full liquid diet
continue dysphagia 1 w/ thin, upgrade as tolerated when cleared by [facility]
dysphagia 1 w/ thin liquids

## 2021-04-12 NOTE — PROGRESS NOTE ADULT - PROVIDER SPECIALTY LIST ADULT
Critical Care
Critical Care
Nutrition Support
Neurology
Neurology
Pulmonology
Critical Care
Internal Medicine
Neurology
Nutrition Support
OMFS
Pulmonology
Pulmonology
Critical Care
Internal Medicine
Pulmonology
Critical Care
Internal Medicine

## 2021-04-12 NOTE — SWALLOW BEDSIDE ASSESSMENT ADULT - SLP PERTINENT HISTORY OF CURRENT PROBLEM
Pt admitted w/ ETOH withdrawal, s/p Sz, intubated for airway protection, tongue laceration (2' biting during sz), now extubated, tongue sutured 2' active bleeding

## 2021-04-13 ENCOUNTER — TRANSCRIPTION ENCOUNTER (OUTPATIENT)
Age: 46
End: 2021-04-13

## 2021-04-13 VITALS — DIASTOLIC BLOOD PRESSURE: 56 MMHG | SYSTOLIC BLOOD PRESSURE: 114 MMHG | HEART RATE: 107 BPM | TEMPERATURE: 100 F

## 2021-04-13 LAB
ALBUMIN SERPL ELPH-MCNC: 3.1 G/DL — LOW (ref 3.5–5.2)
ALP SERPL-CCNC: 63 U/L — SIGNIFICANT CHANGE UP (ref 30–115)
ALT FLD-CCNC: 19 U/L — SIGNIFICANT CHANGE UP (ref 0–41)
ANION GAP SERPL CALC-SCNC: 12 MMOL/L — SIGNIFICANT CHANGE UP (ref 7–14)
AST SERPL-CCNC: 28 U/L — SIGNIFICANT CHANGE UP (ref 0–41)
BILIRUB SERPL-MCNC: 0.4 MG/DL — SIGNIFICANT CHANGE UP (ref 0.2–1.2)
BUN SERPL-MCNC: 6 MG/DL — LOW (ref 10–20)
CALCIUM SERPL-MCNC: 8.5 MG/DL — SIGNIFICANT CHANGE UP (ref 8.5–10.1)
CHLORIDE SERPL-SCNC: 105 MMOL/L — SIGNIFICANT CHANGE UP (ref 98–110)
CO2 SERPL-SCNC: 21 MMOL/L — SIGNIFICANT CHANGE UP (ref 17–32)
CREAT SERPL-MCNC: 0.9 MG/DL — SIGNIFICANT CHANGE UP (ref 0.7–1.5)
CULTURE RESULTS: SIGNIFICANT CHANGE UP
GLUCOSE SERPL-MCNC: 121 MG/DL — HIGH (ref 70–99)
HCT VFR BLD CALC: 26.5 % — LOW (ref 42–52)
HGB BLD-MCNC: 9.1 G/DL — LOW (ref 14–18)
MAGNESIUM SERPL-MCNC: 2 MG/DL — SIGNIFICANT CHANGE UP (ref 1.8–2.4)
MCHC RBC-ENTMCNC: 32 PG — HIGH (ref 27–31)
MCHC RBC-ENTMCNC: 34.3 G/DL — SIGNIFICANT CHANGE UP (ref 32–37)
MCV RBC AUTO: 93.3 FL — SIGNIFICANT CHANGE UP (ref 80–94)
NRBC # BLD: 0 /100 WBCS — SIGNIFICANT CHANGE UP (ref 0–0)
PHOSPHATE SERPL-MCNC: 4 MG/DL — SIGNIFICANT CHANGE UP (ref 2.1–4.9)
PLATELET # BLD AUTO: 432 K/UL — HIGH (ref 130–400)
POTASSIUM SERPL-MCNC: 4 MMOL/L — SIGNIFICANT CHANGE UP (ref 3.5–5)
POTASSIUM SERPL-SCNC: 4 MMOL/L — SIGNIFICANT CHANGE UP (ref 3.5–5)
PROT SERPL-MCNC: 6.2 G/DL — SIGNIFICANT CHANGE UP (ref 6–8)
RBC # BLD: 2.84 M/UL — LOW (ref 4.7–6.1)
RBC # FLD: 12 % — SIGNIFICANT CHANGE UP (ref 11.5–14.5)
SODIUM SERPL-SCNC: 138 MMOL/L — SIGNIFICANT CHANGE UP (ref 135–146)
SPECIMEN SOURCE: SIGNIFICANT CHANGE UP
VIT B12 SERPL-MCNC: 688 PG/ML — SIGNIFICANT CHANGE UP (ref 232–1245)
WBC # BLD: 4.77 K/UL — LOW (ref 4.8–10.8)
WBC # FLD AUTO: 4.77 K/UL — LOW (ref 4.8–10.8)

## 2021-04-13 PROCEDURE — 99239 HOSP IP/OBS DSCHRG MGMT >30: CPT

## 2021-04-13 RX ORDER — CHLORHEXIDINE GLUCONATE 213 G/1000ML
15 SOLUTION TOPICAL
Qty: 300 | Refills: 0
Start: 2021-04-13 | End: 2021-04-22

## 2021-04-13 RX ORDER — FOLIC ACID 0.8 MG
1 TABLET ORAL
Qty: 30 | Refills: 0
Start: 2021-04-13 | End: 2021-05-12

## 2021-04-13 RX ORDER — MAGNESIUM OXIDE 400 MG ORAL TABLET 241.3 MG
1 TABLET ORAL
Qty: 90 | Refills: 0
Start: 2021-04-13 | End: 2021-05-12

## 2021-04-13 RX ORDER — LEVETIRACETAM 250 MG/1
1 TABLET, FILM COATED ORAL
Qty: 42 | Refills: 0
Start: 2021-04-13 | End: 2021-05-03

## 2021-04-13 RX ORDER — THIAMINE MONONITRATE (VIT B1) 100 MG
1 TABLET ORAL
Qty: 42 | Refills: 0
Start: 2021-04-13 | End: 2021-04-26

## 2021-04-13 RX ORDER — THIAMINE MONONITRATE (VIT B1) 100 MG
1 TABLET ORAL
Qty: 30 | Refills: 0
Start: 2021-04-13 | End: 2021-05-12

## 2021-04-13 RX ADMIN — MAGNESIUM OXIDE 400 MG ORAL TABLET 400 MILLIGRAM(S): 241.3 TABLET ORAL at 08:48

## 2021-04-13 RX ADMIN — Medication 1 TABLET(S): at 11:24

## 2021-04-13 RX ADMIN — ENOXAPARIN SODIUM 40 MILLIGRAM(S): 100 INJECTION SUBCUTANEOUS at 11:24

## 2021-04-13 RX ADMIN — CHLORHEXIDINE GLUCONATE 15 MILLILITER(S): 213 SOLUTION TOPICAL at 06:26

## 2021-04-13 RX ADMIN — Medication 0.5 MILLIGRAM(S): at 06:36

## 2021-04-13 RX ADMIN — Medication 105 MILLIGRAM(S): at 06:27

## 2021-04-13 RX ADMIN — MAGNESIUM OXIDE 400 MG ORAL TABLET 400 MILLIGRAM(S): 241.3 TABLET ORAL at 11:24

## 2021-04-13 RX ADMIN — Medication 105 MILLIGRAM(S): at 13:47

## 2021-04-13 RX ADMIN — Medication 1 MILLIGRAM(S): at 11:24

## 2021-04-13 NOTE — DISCHARGE NOTE PROVIDER - NSDCMRMEDTOKEN_GEN_ALL_CORE_FT
chlorhexidine 0.12% mucous membrane liquid: 15 milliliter(s) mucous membrane 2 times a day  folic acid 1 mg oral tablet: 1 tab(s) orally once a day  levETIRAcetam 1000 mg oral tablet: 1 tab(s) orally 2 times a day     Likely To be stopped after you see Dr. Padron ( neurologist)  magnesium oxide 400 mg (241.3 mg elemental magnesium) oral tablet: 1 tab(s) orally 3 times a day (with meals)  Multiple Vitamins oral tablet: 1 tab(s) orally once a day  thiamine 100 mg oral tablet: 1 tab(s) orally every 8 hours   Start Date: 4/13/2021  End Date: 4/26/2021 ( Inclusive)   thiamine 100 mg oral tablet: 1 tab(s) orally once a day   Start Date: 4/27/2021

## 2021-04-13 NOTE — DISCHARGE NOTE PROVIDER - HOSPITAL COURSE
45-year-old, male patient, admitted with a working diagnosis of status epilepticus/seizures secondary to Alcohol withdrawal/ Delirium Tremens.     #ETOH withdrawal seizures and DT's:   - Hospitalized since 3/29/2021  - S/p intubation upon admission for airway protection and extubated.   - VEEG negative for seizures and CTH negative   - UDS negative   - CATCH f/u recommendations: inpatient rehab. However the patient is willing to go home tomorrow.   - CIWA score this mornin ( had visual hallucinations overnight)   - Continue with Ativan taper.   - keep mag > 2.0   - seizure precautions   - Continue with Keppra 1000mg po q12hrs. Spoke with Neurology PA [id number], will likely NOT need keppra upon discharge. The neurology team favors that we stop the keppra even before discharge ( if it was given for seizures only secondary to DT/Alcohol withdrawal). I spoke to the patient's wife JOSIE, on 2021 at 15:55 who stated that he is NOT on any seizure medication, he had a similar admission 2-3 years ago, no chronic AED used at home. Will discontinue Keppra for now, f no seizures, DC without keppra at home.      #Suspected Wernicke's encephalopathy:    - Continue with Thiamine 500mg IV q8hrs   - Neurology recommendations: even if WE, there is no further recommendations except for the Thiamine.      #Suspected folate deficiency:   - Continue with folate 1mg po once daily    #Suspected thiamine deficiency:   - Continue with Thiamine 500mg IV q8hrs ( suspecting Wernicke's encephalopathy)     #Tongue Laceration s/p repair by OMFS   - Outpatient f/u in OM clinic, needs pureed diet for 2 weeks, tolerating pureed   - Continue with Peridex 2 times a day    #HAGMA:   - Likely secondary to Lactic acidosis secondary to seizures   - Resolved     #Hypomagnesemia:   - Secondary to chronic alcohol use  - Resolved now  - Continue with Magnesium oxide 300mg po q8hrs with meals    #Transaminitis:   - Likely secondary to alcoholic hepatitis  - Resolved   - AST:27, ALT: 18  - Abdominal sonogram:   a. Gallbladder sludge without cholelithiasis or sonographic evidence for acute cholecystitis.  b. Hepatic steatosis.    #Misc:   - Diet: Dysphagia 1, pureed, thin liquids, tolerated.   - GI proph: None  - DVT proph: Lovenox 40mg subcutaneous once daily   - Dispo: Continue with Ativan taper, to be discharged in the next 24-48 hours. 45-year-old, male patient, admitted with a working diagnosis of status epilepticus/seizures secondary to Alcohol withdrawal/ Delirium Tremens.     #ETOH withdrawal seizures and DT's:   - Hospitalized since 3/29/2021  - S/p intubation upon admission for airway protection and extubated.   - VEEG negative for seizures and CTH negative   - UDS negative   - CATCH f/u recommendations: inpatient rehab. However the patient is willing to go home tomorrow.   - CIWA score this mornin ( had visual hallucinations overnight)   - Continue with Ativan taper.   - keep mag > 2.0   - seizure precautions   - Continue with Keppra 1000mg po q12hrs. Spoke with Neurology PA [id number], will likely NOT need keppra upon discharge. The neurology team favors that we stop the keppra even before discharge ( if it was given for seizures only secondary to DT/Alcohol withdrawal). I spoke to the patient's wife JOSIE, on 2021 at 15:55 who stated that he is NOT on any seizure medication, he had a similar admission 2-3 years ago, no chronic AED used at home. Will discontinue Keppra for now, f no seizures, DC without keppra at home.      #Suspected Wernicke's encephalopathy:    - Continue with Thiamine 500mg IV q8hrs   - Neurology recommendations: even if WE, there is no further recommendations except for the Thiamine.      #Suspected folate deficiency:   - Continue with folate 1mg po once daily    #Suspected thiamine deficiency:   - Continue with Thiamine 500mg IV q8hrs ( suspecting Wernicke's encephalopathy)     #Tongue Laceration s/p repair by OMFS   - Outpatient f/u in [facility], needs pureed diet for 2 weeks, tolerating pureed   - Continue with Peridex 2 times a day    #HAGMA:   - Likely secondary to Lactic acidosis secondary to seizures   - Resolved     #Hypomagnesemia:   - Secondary to chronic alcohol use  - Resolved now  - Continue with Magnesium oxide 300mg po q8hrs with meals    #Transaminitis:   - Likely secondary to alcoholic hepatitis  - Resolved   - AST:27, ALT: 18  - Abdominal sonogram:   a. Gallbladder sludge without cholelithiasis or sonographic evidence for acute cholecystitis.  b. Hepatic steatosis.   45-year-old, male patient, admitted with a working diagnosis of status epilepticus/seizures secondary to Alcohol withdrawal/ Delirium Tremens.     #ETOH withdrawal seizures and DT's:   - Hospitalized since 3/29/2021  - S/p intubation upon admission for airway protection and extubated.   - VEEG negative for seizures and CTH negative   - UDS negative   - CATCH f/u recommendations: inpatient rehab. However the patient is willing to go home tomorrow.   - CIWA score this mornin ( had visual hallucinations overnight)   - Continue with Ativan taper.   - keep mag > 2.0   - seizure precautions   - Continue with Keppra 1000mg po q12hrs. Spoke with Neurology PA [id number], will likely NOT need keppra upon discharge. The neurology team favors that we stop the keppra even before discharge ( if it was given for seizures only secondary to DT/Alcohol withdrawal). I spoke to the patient's wife JOSIE, on 2021 at 15:55 who stated that he is NOT on any seizure medication, he had a similar admission 2-3 years ago, no chronic AED used at home. Will discontinue Keppra for now, f no seizures, DC without keppra at home.      #Suspected Wernicke's encephalopathy:    - Continue with Thiamine 500mg IV q8hrs   - Neurology recommendations: even if WE, there is no further recommendations except for the Thiamine.      #Suspected folate deficiency:   - Continue with folate 1mg po once daily    #Suspected thiamine deficiency:   - Continue with Thiamine 500mg IV q8hrs ( suspecting Wernicke's encephalopathy)     #Tongue Laceration s/p repair by OMFS   - Outpatient f/u in [facility], needs pureed diet for 2 weeks, tolerating pureed   - Continue with Peridex 2 times a day    #HAGMA:   - Likely secondary to Lactic acidosis secondary to seizures   - Resolved     #Hypomagnesemia:   - Secondary to chronic alcohol use  - Resolved now  - Continue with Magnesium oxide 300mg po q8hrs with meals    #Transaminitis:   - Likely secondary to alcoholic hepatitis  - Resolved   - AST:27, ALT: 18  - Abdominal sonogram:   a. Gallbladder sludge without cholelithiasis or sonographic evidence for acute cholecystitis.  b. Hepatic steatosis      Attending addendum: Patient seen and examined. Patient is alert and oriented, no evidence of alcohol withdrawal. Will discharge on thiamine 100mg TID for 2 weeks to treat for possible Wernicke's encephalopathy. Case discussed with neurology, advised to discharge on Keppra and follow up as outpatient in 2 weeks in neurology clinic. Med rec reviewed.

## 2021-04-13 NOTE — PHYSICAL THERAPY INITIAL EVALUATION ADULT - GENERAL OBSERVATIONS, REHAB EVAL
Pt rec'd in bed awake, pleasant and cooperative and w/o compl.  Pt markus tx well and was left as received in bed and in No apparent distress.

## 2021-04-13 NOTE — DISCHARGE NOTE NURSING/CASE MANAGEMENT/SOCIAL WORK - PATIENT PORTAL LINK FT
You can access the FollowMyHealth Patient Portal offered by [facility] by registering at the following website: [email or web address]. By joining [email or web address] FollowMyHealth portal, you will also be able to view your health information using other applications (apps) compatible with our system.

## 2021-04-13 NOTE — DISCHARGE NOTE PROVIDER - NSFOLLOWUPCLINICS_GEN_ALL_ED_FT
[facility]  Medicine  [address], [place]   Phone: [phone]  Fax:   Follow Up Time: 1 week

## 2021-04-13 NOTE — DISCHARGE NOTE PROVIDER - PROVIDER TOKENS
PROVIDER:[TOKEN:[[id number]],SCHEDULEDAPPT:[04/16/2021],SCHEDULEDAPPTTIME:[10:00 AM]],PROVIDER:[TOKEN:[[id number]],FOLLOWUP:[2 weeks]]

## 2021-04-13 NOTE — DISCHARGE NOTE PROVIDER - CARE PROVIDER_API CALL
Maninder Hi; DDS)  Dentistry  [address] [place] [address], [place] [address]  Phone: [phone]  Fax: [phone]  Scheduled Appointment: 04/16/2021 10:00 AM    Martina Padron)  Neurology; Vascular Neurology  [address] [place] [address]  Phone: [phone]  Fax: [phone]  Follow Up Time: 2 weeks

## 2021-04-13 NOTE — DISCHARGE NOTE PROVIDER - NSDCFUADDINST_GEN_ALL_CORE_FT
A) For an optimal medical care, please make the suggested appointments    B) For an optimal medical care, please take your medications as prescribed.     C) You will need to follow up as an outpatient, with an outpatient rehabilitation program. Alcohol cessation is essential.    D) You will need to follow up  A) For an optimal medical care, please make the suggested appointments    B) For an optimal medical care, please take your medications as prescribed.     C) You will need to follow up as an outpatient, with an outpatient rehabilitation program. Alcohol cessation is essential.    D) You will need to follow up with the surgery team ( [facility]) who sutured your tongue. Meanwhile you should keep eating soft foods only    E) I do not see that you have a primary care physician assigned. In case you do not, please make an appointment to establish care at the Medicine clinic on [address] [place] [address], [facility].     F) Follow up with Dr. Hi, on Friday 4/16/2021, at 10:00, call to make the appointment so that he follows up with your tongue laceration.     G) Follow up with Dr. Padron ( Neurology) for the suspected Wernicke's encephalopathy A) For an optimal medical care, please make the suggested appointments    B) For an optimal medical care, please take your medications as prescribed.     C) You will need to follow up as an outpatient, with an outpatient rehabilitation program. Alcohol cessation is essential.    D) You will need to follow up with the surgery team ( [facility] team) who sutured your tongue. Meanwhile you should keep eating soft foods only    E) I do not see that you have a primary care physician assigned. In case you do not, please make an appointment to establish care at the Medicine clinic on [address] [place] [address], [facility].     F) Follow up with Dr. Hi, on Friday 4/16/2021, at 10:00, call to make the appointment so that he follows up with your tongue laceration.     G) Follow up with Dr. Padron ( Neurology) for the suspected Wernicke's encephalopathy, Meanwhile you will be receiving Thiamine.     Please take your Thiamine    Please take your Keppra, it will likely be stopped by Dr. Padron when you see him

## 2021-04-13 NOTE — DISCHARGE NOTE PROVIDER - NSDCCPCAREPLAN_GEN_ALL_CORE_FT
PRINCIPAL DISCHARGE DIAGNOSIS  Diagnosis: Status epilepticus, generalized convulsive  Assessment and Plan of Treatment: Your initial presentation, with seizures, is due to alcohol withdrawal. Alcohol withdrawal can cause a life threatening condition called Delirium Tremens. The seiruzres were secondary to that. You were intubated and had a tongue bite with an injury. You were intubated for airway protection and then subsequently extubated. You received your benzodiazepene taper to help with the withdrawal. You will need to follow up in the outpatient rehab program. Alcohol cessation is essential.      SECONDARY DISCHARGE DIAGNOSES  Diagnosis: Delirium tremens  Assessment and Plan of Treatment: Your initial presentation, with seizures, is due to alcohol withdrawal. Alcohol withdrawal can cause a life threatening condition called Delirium Tremens. The seiruzres were secondary to that. You were intubated and had a tongue bite with an injury. You were intubated for airway protection and then subsequently extubated. You received your benzodiazepene taper to help with the withdrawal. You will need to follow up in the outpatient rehab program. Alcohol cessation is essential.     PRINCIPAL DISCHARGE DIAGNOSIS  Diagnosis: Status epilepticus, generalized convulsive  Assessment and Plan of Treatment: Your initial presentation, with seizures, is due to alcohol withdrawal. Alcohol withdrawal can cause a life threatening condition called Delirium Tremens. The seiruzres were secondary to that. You were intubated and had a tongue bite with an injury. You were intubated for airway protection and then subsequently extubated. You received your benzodiazepene taper to help with the withdrawal. You will need to follow up in the outpatient rehab program. Alcohol cessation is essential.      SECONDARY DISCHARGE DIAGNOSES  Diagnosis: Delirium tremens  Assessment and Plan of Treatment: Your initial presentation, with seizures, is due to alcohol withdrawal. Alcohol withdrawal can cause a life threatening condition called Delirium Tremens. The seiruzres were secondary to that. You were intubated and had a tongue bite with an injury. You were intubated for airway protection and then subsequently extubated. You received your benzodiazepene taper to help with the withdrawal. You will need to follow up in the outpatient rehab program. Alcohol cessation is essential.    Diagnosis: Wernicke's encephalopathy  Assessment and Plan of Treatment: We are suspecting a possible Wernicke's encephalopathy, this is a condition that is charazterized by: Confusion, Ataxia and Nystagmus. This condition effects patients who consume alcohol heavily. Alcohol can be directly toxic to the neurons/brain and can cause toxicity through vitamin deficiency specifically B1 deificiency ( thiamine). You will need ot continue with your thiamine and follow up with neurology for suspected Wernicke's encephalopathy.

## 2021-04-13 NOTE — DISCHARGE NOTE PROVIDER - NSDCFUADDAPPT_GEN_ALL_CORE_FT
Please take your Thiamine    Please take your Keppra, it will likely be stopped by Dr. Padron when you see him

## 2021-04-15 LAB — LEVETIRACETAM SERPL-MCNC: 15.8 UG/ML — SIGNIFICANT CHANGE UP (ref 10–40)

## 2021-04-16 LAB — LEVETIRACETAM SERPL-MCNC: 15.9 UG/ML — SIGNIFICANT CHANGE UP (ref 10–40)

## 2021-04-16 NOTE — CDI QUERY NOTE - NSCDIOTHERTXTBX_GEN_ALL_CORE_HH
Documentation:  ** 3/29 H&P: Patient had multiple seizures today and bit his tongue during his seizure with EMS. Patient assessed in Ed with diffuse tongue bleeding. Patient was given Versed by EMS and was not awake or protecting his airway. O2 saturation temporarily 82% and patient had NRB mask and was passively oxygenated to 100%. Patient pre-oxygenated and had emergent RSI for airway protection.  ** 4/1 PN DR. Craig: Physical Exam: RESPIRATORY: dec bs both bases. Assessment: possible aspiration pneumonia  ** 4/10 PN Dr. Gonsales: Possible Aspiration PNA, treated    Vital:  4/1 @ 03:00: T 100, HR 78, RR 16 On vent    Lab:  WBC: 7.4 (3/29 @ 20:08)  WBC: 12.07 (3/30 @ 06:00)    Imaging:  3/31 CXR: Patchy opacities  4/1 CXR: Slightly increasing patchy opacities.  4/3 CXR: Bilateral opacities, decreased.    Orders:   3/30 - 4/5: Unasyn 3 gm IV / 6 Hours                                                        Based on your clinical judgment and consideration of these clinical indicators, please clarify regarding aspiration PNA at the time of discharge:  • Aspiration PNA treated during this admission.  • Other (please specify).  • Unable to determine.

## 2021-04-27 DIAGNOSIS — E83.39 OTHER DISORDERS OF PHOSPHORUS METABOLISM: ICD-10-CM

## 2021-04-27 DIAGNOSIS — J69.0 PNEUMONITIS DUE TO INHALATION OF FOOD AND VOMIT: ICD-10-CM

## 2021-04-27 DIAGNOSIS — E83.42 HYPOMAGNESEMIA: ICD-10-CM

## 2021-04-27 DIAGNOSIS — E53.8 DEFICIENCY OF OTHER SPECIFIED B GROUP VITAMINS: ICD-10-CM

## 2021-04-27 DIAGNOSIS — R45.1 RESTLESSNESS AND AGITATION: ICD-10-CM

## 2021-04-27 DIAGNOSIS — E87.2 ACIDOSIS: ICD-10-CM

## 2021-04-27 DIAGNOSIS — E87.6 HYPOKALEMIA: ICD-10-CM
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
## 2021-04-27 DIAGNOSIS — F10.231 ALCOHOL DEPENDENCE WITH WITHDRAWAL DELIRIUM: ICD-10-CM

## 2021-04-27 DIAGNOSIS — K70.10 ALCOHOLIC HEPATITIS WITHOUT ASCITES: ICD-10-CM
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
## 2021-04-27 DIAGNOSIS — S01.512A LACERATION WITHOUT FOREIGN BODY OF ORAL CAVITY, INITIAL ENCOUNTER: ICD-10-CM

## 2021-04-27 DIAGNOSIS — D61.818 OTHER PANCYTOPENIA: ICD-10-CM

## 2021-04-27 DIAGNOSIS — G40.901 EPILEPSY, UNSPECIFIED, NOT INTRACTABLE, WITH STATUS EPILEPTICUS: ICD-10-CM

## 2021-04-27 DIAGNOSIS — E83.51 HYPOCALCEMIA: ICD-10-CM

## 2021-04-27 DIAGNOSIS — J96.01 ACUTE RESPIRATORY FAILURE WITH HYPOXIA: ICD-10-CM

## 2021-04-27 DIAGNOSIS — E51.2 WERNICKE'S ENCEPHALOPATHY: ICD-10-CM

## 2021-04-27 DIAGNOSIS — E87.1 HYPO-OSMOLALITY AND HYPONATREMIA: ICD-10-CM

## 2021-04-27 DIAGNOSIS — R56.9 UNSPECIFIED CONVULSIONS: ICD-10-CM

## 2021-04-27 DIAGNOSIS — I10 ESSENTIAL (PRIMARY) HYPERTENSION: ICD-10-CM

## 2021-04-27 DIAGNOSIS — F10.232 ALCOHOL DEPENDENCE WITH WITHDRAWAL WITH PERCEPTUAL DISTURBANCE: ICD-10-CM

## 2021-05-06 ENCOUNTER — INPATIENT (INPATIENT)
Facility: HOSPITAL | Age: 46
LOS: 40 days | Discharge: NURSING, REHAB OR LONG-TERM CARE | End: 2021-06-16
Attending: STUDENT IN AN ORGANIZED HEALTH CARE EDUCATION/TRAINING PROGRAM | Admitting: STUDENT IN AN ORGANIZED HEALTH CARE EDUCATION/TRAINING PROGRAM
Payer: COMMERCIAL

## 2021-05-06 VITALS
DIASTOLIC BLOOD PRESSURE: 71 MMHG | SYSTOLIC BLOOD PRESSURE: 102 MMHG | TEMPERATURE: 98 F | HEART RATE: 85 BPM | OXYGEN SATURATION: 98 % | RESPIRATION RATE: 10 BRPM

## 2021-05-06 DIAGNOSIS — Z98.890 OTHER SPECIFIED POSTPROCEDURAL STATES: Chronic | ICD-10-CM

## 2021-05-06 LAB
ALBUMIN SERPL ELPH-MCNC: 3.1 G/DL — LOW (ref 3.5–5.2)
ALP SERPL-CCNC: 120 U/L — HIGH (ref 30–115)
ALT FLD-CCNC: 47 U/L — HIGH (ref 0–41)
ANION GAP SERPL CALC-SCNC: 24 MMOL/L — HIGH (ref 7–14)
ANISOCYTOSIS BLD QL: SIGNIFICANT CHANGE UP
APTT BLD: 29.4 SEC — SIGNIFICANT CHANGE UP (ref 27–39.2)
AST SERPL-CCNC: 146 U/L — HIGH (ref 0–41)
BASOPHILS # BLD AUTO: 0 K/UL — SIGNIFICANT CHANGE UP (ref 0–0.2)
BASOPHILS NFR BLD AUTO: 0 % — SIGNIFICANT CHANGE UP (ref 0–1)
BILIRUB SERPL-MCNC: 0.9 MG/DL — SIGNIFICANT CHANGE UP (ref 0.2–1.2)
BLD GP AB SCN SERPL QL: SIGNIFICANT CHANGE UP
BUN SERPL-MCNC: 10 MG/DL — SIGNIFICANT CHANGE UP (ref 10–20)
CALCIUM SERPL-MCNC: 7.6 MG/DL — LOW (ref 8.5–10.1)
CHLORIDE SERPL-SCNC: 101 MMOL/L — SIGNIFICANT CHANGE UP (ref 98–110)
CO2 SERPL-SCNC: 16 MMOL/L — LOW (ref 17–32)
CREAT SERPL-MCNC: 0.8 MG/DL — SIGNIFICANT CHANGE UP (ref 0.7–1.5)
EOSINOPHIL # BLD AUTO: 0 K/UL — SIGNIFICANT CHANGE UP (ref 0–0.7)
EOSINOPHIL NFR BLD AUTO: 0 % — SIGNIFICANT CHANGE UP (ref 0–8)
ETHANOL SERPL-MCNC: 341 MG/DL — HIGH
GIANT PLATELETS BLD QL SMEAR: PRESENT — SIGNIFICANT CHANGE UP
GLUCOSE SERPL-MCNC: 173 MG/DL — HIGH (ref 70–99)
HCT VFR BLD CALC: 50.9 % — SIGNIFICANT CHANGE UP (ref 42–52)
HGB BLD-MCNC: 16.9 G/DL — SIGNIFICANT CHANGE UP (ref 14–18)
INR BLD: 1.1 RATIO — SIGNIFICANT CHANGE UP (ref 0.65–1.3)
LACTATE SERPL-SCNC: 6 MMOL/L — CRITICAL HIGH (ref 0.7–2)
LIDOCAIN IGE QN: 164 U/L — HIGH (ref 7–60)
LYMPHOCYTES # BLD AUTO: 0.27 K/UL — LOW (ref 1.2–3.4)
LYMPHOCYTES # BLD AUTO: 3.7 % — LOW (ref 20.5–51.1)
MACROCYTES BLD QL: SIGNIFICANT CHANGE UP
MANUAL SMEAR VERIFICATION: SIGNIFICANT CHANGE UP
MCHC RBC-ENTMCNC: 30.4 PG — SIGNIFICANT CHANGE UP (ref 27–31)
MCHC RBC-ENTMCNC: 33.2 G/DL — SIGNIFICANT CHANGE UP (ref 32–37)
MCV RBC AUTO: 91.5 FL — SIGNIFICANT CHANGE UP (ref 80–94)
METAMYELOCYTES # FLD: 0.9 % — HIGH (ref 0–0)
MONOCYTES # BLD AUTO: 0.34 K/UL — SIGNIFICANT CHANGE UP (ref 0.1–0.6)
MONOCYTES NFR BLD AUTO: 4.6 % — SIGNIFICANT CHANGE UP (ref 1.7–9.3)
NEUTROPHILS # BLD AUTO: 6.31 K/UL — SIGNIFICANT CHANGE UP (ref 1.4–6.5)
NEUTROPHILS NFR BLD AUTO: 86.2 % — HIGH (ref 42.2–75.2)
NRBC # BLD: 4 /100 — HIGH (ref 0–0)
NRBC # BLD: SIGNIFICANT CHANGE UP /100 WBCS (ref 0–0)
PLAT MORPH BLD: ABNORMAL
PLATELET # BLD AUTO: 59 K/UL — LOW (ref 130–400)
POLYCHROMASIA BLD QL SMEAR: SLIGHT — SIGNIFICANT CHANGE UP
POTASSIUM SERPL-MCNC: 3.6 MMOL/L — SIGNIFICANT CHANGE UP (ref 3.5–5)
POTASSIUM SERPL-SCNC: 3.6 MMOL/L — SIGNIFICANT CHANGE UP (ref 3.5–5)
PROT SERPL-MCNC: 5.9 G/DL — LOW (ref 6–8)
PROTHROM AB SERPL-ACNC: 12.6 SEC — SIGNIFICANT CHANGE UP (ref 9.95–12.87)
RBC # BLD: 5.56 M/UL — SIGNIFICANT CHANGE UP (ref 4.7–6.1)
RBC # FLD: 15.5 % — HIGH (ref 11.5–14.5)
RBC BLD AUTO: ABNORMAL
SMUDGE CELLS # BLD: PRESENT — SIGNIFICANT CHANGE UP
SODIUM SERPL-SCNC: 141 MMOL/L — SIGNIFICANT CHANGE UP (ref 135–146)
VARIANT LYMPHS # BLD: 4.6 % — SIGNIFICANT CHANGE UP (ref 0–5)
WBC # BLD: 7.32 K/UL — SIGNIFICANT CHANGE UP (ref 4.8–10.8)
WBC # FLD AUTO: 7.32 K/UL — SIGNIFICANT CHANGE UP (ref 4.8–10.8)

## 2021-05-06 PROCEDURE — 31500 INSERT EMERGENCY AIRWAY: CPT

## 2021-05-06 PROCEDURE — 71275 CT ANGIOGRAPHY CHEST: CPT | Mod: 26,MA

## 2021-05-06 PROCEDURE — 71045 X-RAY EXAM CHEST 1 VIEW: CPT | Mod: 26

## 2021-05-06 PROCEDURE — 70450 CT HEAD/BRAIN W/O DYE: CPT | Mod: 26,MA

## 2021-05-06 PROCEDURE — 99291 CRITICAL CARE FIRST HOUR: CPT

## 2021-05-06 PROCEDURE — 99291 CRITICAL CARE FIRST HOUR: CPT | Mod: 25

## 2021-05-06 PROCEDURE — 72125 CT NECK SPINE W/O DYE: CPT | Mod: 26,MA

## 2021-05-06 PROCEDURE — 74178 CT ABD&PLV WO CNTR FLWD CNTR: CPT | Mod: 26,MA

## 2021-05-06 RX ORDER — ROCURONIUM BROMIDE 10 MG/ML
100 VIAL (ML) INTRAVENOUS ONCE
Refills: 0 | Status: COMPLETED | OUTPATIENT
Start: 2021-05-06 | End: 2021-05-06

## 2021-05-06 RX ORDER — SODIUM CHLORIDE 9 MG/ML
1000 INJECTION, SOLUTION INTRAVENOUS ONCE
Refills: 0 | Status: COMPLETED | OUTPATIENT
Start: 2021-05-06 | End: 2021-05-06

## 2021-05-06 RX ORDER — ETOMIDATE 2 MG/ML
20 INJECTION INTRAVENOUS ONCE
Refills: 0 | Status: COMPLETED | OUTPATIENT
Start: 2021-05-06 | End: 2021-05-06

## 2021-05-06 RX ORDER — MANNITOL
25 POWDER (GRAM) MISCELLANEOUS ONCE
Refills: 0 | Status: COMPLETED | OUTPATIENT
Start: 2021-05-06 | End: 2021-05-06

## 2021-05-06 RX ORDER — SODIUM CHLORIDE 9 MG/ML
1000 INJECTION, SOLUTION INTRAVENOUS
Refills: 0 | Status: DISCONTINUED | OUTPATIENT
Start: 2021-05-06 | End: 2021-05-07

## 2021-05-06 RX ORDER — TETANUS TOXOID, REDUCED DIPHTHERIA TOXOID AND ACELLULAR PERTUSSIS VACCINE, ADSORBED 5; 2.5; 8; 8; 2.5 [IU]/.5ML; [IU]/.5ML; UG/.5ML; UG/.5ML; UG/.5ML
0.5 SUSPENSION INTRAMUSCULAR ONCE
Refills: 0 | Status: COMPLETED | OUTPATIENT
Start: 2021-05-06 | End: 2021-05-06

## 2021-05-06 RX ORDER — MANNITOL
25 POWDER (GRAM) MISCELLANEOUS ONCE
Refills: 0 | Status: DISCONTINUED | OUTPATIENT
Start: 2021-05-06 | End: 2021-05-06

## 2021-05-06 RX ADMIN — Medication 100 MILLIGRAM(S): at 22:10

## 2021-05-06 RX ADMIN — ETOMIDATE 20 MILLIGRAM(S): 2 INJECTION INTRAVENOUS at 22:10

## 2021-05-06 RX ADMIN — SODIUM CHLORIDE 1000 MILLILITER(S): 9 INJECTION, SOLUTION INTRAVENOUS at 22:38

## 2021-05-06 RX ADMIN — Medication 500 GRAM(S): at 22:40

## 2021-05-06 RX ADMIN — TETANUS TOXOID, REDUCED DIPHTHERIA TOXOID AND ACELLULAR PERTUSSIS VACCINE, ADSORBED 0.5 MILLILITER(S): 5; 2.5; 8; 8; 2.5 SUSPENSION INTRAMUSCULAR at 22:39

## 2021-05-06 NOTE — ED PROVIDER NOTE - CLINICAL SUMMARY MEDICAL DECISION MAKING FREE TEXT BOX
history of etoh abuse bibems from home sp being found down in bathroom. per ems, wife heard pt fall in bathroom, found down, unresponsive. trauma alert called prenote but upon arrival and found to be minimally responsive, upgraded to code. no anticoagulation use. +obvious facial injury, L pupil dilated, unresponsive, +sluggish R pupil. no response to painful stimuli. pt intubated for airway protection. HOB 30 degrees. CTH showing large SDH, mannitol given, sp NSx eval, taken to OR as level 1.

## 2021-05-06 NOTE — H&P ADULT - NSHPPHYSICALEXAM_GEN_ALL_CORE
Vital Signs Last 24 Hrs  T(C): 36.6 (06 May 2021 21:59), Max: 36.6 (06 May 2021 21:59)  T(F): 97.9 (06 May 2021 21:59), Max: 97.9 (06 May 2021 21:59)  HR: 130 (06 May 2021 22:07) (85 - 153)  BP: 130/80 (06 May 2021 22:07) (68/58 - 130/80)  BP(mean): --  RR: 14 (06 May 2021 22:07) (10 - 14)  SpO2: 100% (06 May 2021 22:07) (98% - 100%)    Secondary Survey:   General: non responsive  HEENT: Normocephalic, left pupli diataed to 5mm not responsive and fixed, right pupil sluggish miotic 2mm  Neck: Soft, midline trachea.   Chest: no subcutaneous emphysema   Cardiac: S1, S2, RRR  Respiratory: Bilateral breath sounds,   Abdomen: Soft, unable to further access  Groin: Normal appearing, pelvis stable   Ext:  not responding to stimuli to upper and lower extremities. Palpable Radial b/l UE, b/l DP palpable in LE.   Back: unable to acces for pain  Rectal: No brijesh blood, DEJA with good tone    FAST: Negative

## 2021-05-06 NOTE — ED PROCEDURE NOTE - CPROC ED TRACHE INTUB DETAIL1
During intubation, applied gentle pressure to the cricoid cartilage. Patient was pre-oxygenated. An endotracheal tube (ETT) was placed through the vocal cords into the trachea.  ETT position was confirmed by auscultation of bilateral breath sounds to all lung fields. ETCO2 level was appropriate./Patient connected to ventilator with settings as ordered./During intubation, applied gentle pressure to the cricoid cartilage.

## 2021-05-06 NOTE — ED PROCEDURE NOTE - NS ED PROCEUDURE1 POST INTUBATION REVIEW
Appropriate capnography/Breath sounds bilateral Appropriate capnography/Breath sounds bilateral/Positive end tidal Co2 noted/Chest X-Ray

## 2021-05-06 NOTE — H&P ADULT - HISTORY OF PRESENT ILLNESS
TRAUMA ACTIVATION LEVEL:  Code trauma    HPI:    Patient is a 45yM w/ PMHx of Etoh abuse and seizures on keppra seen as Trauma Alert upgraded to a Code trauma during examination in the ED where the patient was found to have a GCS of 4 s/p found down unresponsive.  Trauma assessment in ED: intubated for airway protection

## 2021-05-06 NOTE — ED PROVIDER NOTE - OBJECTIVE STATEMENT
44 y/o M with pmh of etoh abuse and seizures presenting to the ED unresponsive. Per EMS, wife heard patient fall in the bathroom. Pt. was found on floor by wife, not responding. EMS called. Trauma alert upgraded to code trauma in the ED. Pt. sustained laceration to bridge of nose.

## 2021-05-06 NOTE — ED ADULT NURSE NOTE - OBJECTIVE STATEMENT
Pt BIBA c/o unresponsive;. Pt was found by wife in bathroom after pt fell. Trauma alert upgraded to code trauma. Pt unresponsive on arrival.

## 2021-05-06 NOTE — H&P ADULT - ATTENDING COMMENTS
45M s/p intoxicated fall , Traumatic L SDH, 1.5 mm hematoma, 1cm shift. Rest of pan scan negative. GCS 3 , pupils dilated NR. Intubated in trauma bay. Taken emergently to OR for craniectomy, sdh decompression. Mannitol given en route to OR. patients wife aware of poor prognosis.

## 2021-05-06 NOTE — ED PROVIDER NOTE - PHYSICAL EXAMINATION
CONSTITUTIONAL: not responding appropriately, altered mental status, L pupil blown.  SKIN: warm, dry  HEAD: Normocephalic; atraumatic.  EYES: PERRL, EOMI, no conjunctival erythema  ENT: No nasal discharge; airway clear.  NECK: Supple; non tender.  CARD: S1, S2 normal; no murmurs, gallops, or rubs. Regular rate and rhythm.   RESP: No wheezes, rales or rhonchi.  ABD: soft ntnd  EXT: Normal ROM.  No clubbing, cyanosis or edema.   LYMPH: No acute cervical adenopathy.  NEURO: A&Ox0, blown L pupil.  PSYCH: unable to assess. CONSTITUTIONAL: not responding appropriately, altered mental status, L pupil dilated and fixed.  SKIN: warm, dry  HEAD: Normocephalic; atraumatic.  EYES: PERRL, EOMI, no conjunctival erythema  ENT: No nasal discharge; airway clear.  NECK: Supple; non tender.  CARD: S1, S2 normal; no murmurs, gallops, or rubs. Regular rate and rhythm.   RESP: No wheezes, rales or rhonchi.  ABD: soft ntnd  EXT: Normal ROM.  No clubbing, cyanosis or edema.   LYMPH: No acute cervical adenopathy.  NEURO: A&Ox0, dilated anf fixed L pupil to 5 mm, R pupil 2 mm.  PSYCH: unable to assess. CONSTITUTIONAL: not responding appropriately, altered mental status, L pupil dilated and fixed.  SKIN: warm, dry  HEAD: Normocephalic; atraumatic.  EYES: PERRL, EOMI, no conjunctival erythema  ENT: No nasal discharge; airway clear.  NECK: Supple; non tender.  CARD: S1, S2 normal; no murmurs, gallops, or rubs. Regular rate and rhythm.   RESP: No wheezes, rales or rhonchi.  ABD: soft ntnd  EXT: Normal ROM.  No clubbing, cyanosis or edema.   LYMPH: No acute cervical adenopathy.  NEURO: A&Ox0, dilated and fixed L pupil to 5 mm, R pupil 2 mm.  PSYCH: unable to assess.

## 2021-05-06 NOTE — H&P ADULT - NSHPLABSRESULTS_GEN_ALL_CORE
Labs:  CAPILLARY BLOOD GLUCOSE      POCT Blood Glucose.: 149 mg/dL (06 May 2021 21:53)                  LFTs:         Coags:                        RADIOLOGY & ADDITIONAL STUDIES:  --------------------------------------------------------------------------------------- Labs:                          16.9   7.32  )-----------( 59       ( 06 May 2021 22:25 )             50.9       05-06    141  |  101  |  10  ----------------------------<  173<H>  3.6   |  16<L>  |  0.8    Ca    7.6<L>      06 May 2021 22:25    TPro  5.9<L>  /  Alb  3.1<L>  /  TBili  0.9  /  DBili  x   /  AST  146<H>  /  ALT  47<H>  /  AlkPhos  120<H>  05-06                  PT/INR - ( 06 May 2021 22:25 )   PT: 12.60 sec;   INR: 1.10 ratio         PTT - ( 06 May 2021 22:25 )  PTT:29.4 sec    Lactate Trend  05-06 @ 22:25 Lactate:6.0     CAPILLARY BLOOD GLUCOSE      POCT Blood Glucose.: 149 mg/dL (06 May 2021 21:53)    Culture Results:   No Growth Final (04-08 @ 06:37)        RADIOLOGY & ADDITIONAL STUDIES:  < from: CT Head No Cont (05.06.21 @ 22:24) >    Findings:    There is a large left acute subdural hemorrhage measuring 2 cm transverse. It extends both anteriorly and posteriorly along the left skull. This is associated with a left to right 1.5 cm midline shift. There are smaller right-sided subdural collections along the falx cerebrum (6 mm) and right temporal fossa (3 mm)Impression:  Large left subdural hemorrhage with associated left to right midline shift as above.    Smaller right-sided subdurals    < end of copied text >    < from: CT Cervical Spine No Cont (05.06.21 @ 22:24) >    IMPRESSION:      No evidence of a cervical spine fracture or subluxation.    C6-7 degenerative changes.    No significant change since the prior exam.    < end of copied text >        ---------------------------------------------------------------------------------------

## 2021-05-06 NOTE — ED ADULT NURSE REASSESSMENT NOTE - NS ED NURSE REASSESS COMMENT FT1
Pt intubated. Pt is at CT with RN, trauma, and RT. Pt going straight to OR with team. Cole placed. Mannitol running. Safety and comfort maintained.

## 2021-05-06 NOTE — PRE-ANESTHESIA EVALUATION ADULT - NSANTHPMHFT_GEN_ALL_CORE
44 y/o M with hx of seizures from ETOH abuse/withdrawal BIBA from home, as per EMS, pt's wife heard the pt fall in the bathroom. Wife found pt down in bathroom, has laceration to the bridge of his nose. unresponsive. Pt presented to ED with GCS 4 and Left blown pupil. Intubated in the Emergency room.

## 2021-05-06 NOTE — CONSULT NOTE ADULT - ASSESSMENT
Pt with large left acute SDH PLT<59 actively herniating. Taken to OR for attempt at life saving craniotomy. Attempted to contact wife no answer. Wife contacted by trauma team understands pt at grave risk of death/brain death/severe neurologic deficit even with needed intervention.

## 2021-05-06 NOTE — ED ADULT TRIAGE NOTE - CHIEF COMPLAINT QUOTE
BIBA from home, as per EMS, pt's wife heard the pt fall in the bathroom. wife found pt down in bathroom, has laceration to the bridge of his nose.

## 2021-05-06 NOTE — H&P ADULT - ASSESSMENT
ASSESSMENT:  Patient is a 45yM w/ PMHx of Etoh abuse and seizures on keppra seen as Trauma Alert upgraded to a Code trauma during examination in the ED where the patient was found to have a GCS of 4 s/p found down unresponsive., external signs of trauma include identified on right eye laceration and lower lip laceration. Trauma assessment in ED: ABCs intact , GCS 15 , AAOx3,  FOLEY.     Injuries identified:   - right eye laceration  -Lower lip laceration 2cm  -     PLAN:     OR as level 1 with neurosurgery  CT head significant for severe subdural with midline shift     pre op Labs: (CBC, BMP, Coags, T&S, UA, EtOH level)  Additional studies:  EKG  Utox    Trauma Imaging to include the following:    PANSCAN    Additional consultations:  - Neurosurgery      Disposition pending results of above labs and imaging  Above plan discussed with Trauma attending, Dr. John and ED team  --------------------------------------------------------------------------------------  05-06-21 @ 22:30 ASSESSMENT:  Patient is a 45yM w/ PMHx of Etoh abuse and seizures on keppra seen as Trauma Alert upgraded to a Code trauma during examination in the ED where the patient was found to have a GCS of 4 s/p found down unresponsive., external signs of trauma include identified on right eye laceration and lower lip laceration. Trauma assessment in ED: ABCs intact , GCS 15 , AAOx3,  FOLEY.     Injuries identified:   - right eye laceration  -Superficial inner aspect of lower lip laceration 2cm c/w bite jim  -     PLAN:     OR as level 1 with neurosurgery  CT head significant for severe subdural with midline shift     pre op Labs: (CBC, BMP, Coags, T&S, UA, EtOH level)  Additional studies:  EKG  Utox    Trauma Imaging to include the following:    PANSCAN    Additional consultations:  - Neurosurgery      Disposition pending results of above labs and imaging    Senior Resident Addendum  45M PMH ETOH abuse, seizures presenting to the ED as trauma alert, upgraded to code trauma with GCS 4 on presentation, intubated for airway protection, found to have large left acute subdural hemorrhage measuring 2 cm transverse extending both anteriorly and posteriorly along the left skull with a left to right 1.5 cm midline shift and smaller right-sided subdural collections along the falx cerebrum (6 mm) and right temporal fossa. Taken to the OR as level 1 by NIMISHA.  Initial attempt to call patient's wife for consent by NIMISHA was unsuccessful, trauma service called to update patient's wife Brianne Whiteside. She was made of aware of the grievous nature of her 's SDH and potential dismal prognosis. She understood that maximal efforts were being undertaken to save her 's life and that he would require intensive unit care during his stay in the hospital. NIMISHA will communicate the surgical findings to her after the completion of OR.        Above plan discussed with Trauma attending, Dr. John, Dr. Byrne, NIMISHA, trauma team, and ED team  --------------------------------------------------------------------------------------  05-06-21 @ 22:30 ASSESSMENT:  Patient is a 45yM w/ PMHx of Etoh abuse and seizures on keppra seen as Trauma Alert upgraded to a Code trauma during examination in the ED where the patient was found to have a GCS of 4 s/p found down unresponsive., external signs of trauma include identified on right eye laceration and lower lip laceration. Trauma assessment in ED: ABCs intact , GCS 15 , AAOx3,  FOLEY.     Injuries identified:   - right eye laceration  -Superficial inner aspect of lower lip laceration 2cm c/w bite jim  -     PLAN:     OR as level 1 with neurosurgery  CT head significant for severe subdural with midline shift     pre op Labs: (CBC, BMP, Coags, T&S, UA, EtOH level)  Additional studies:  EKG  Utox    Trauma Imaging to include the following:    PANSCAN    Additional consultations:  - Neurosurgery      Disposition pending results of above labs and imaging    Senior Resident Addendum  45M PMH ETOH abuse, seizures presenting to the ED as trauma alert s/p found down, reportedly binge-drinking for the past several days per his wife, who heard him fall upstairs in the bathroom, found down with eyes open but unresponsive, who then called EMS. Patient was upgraded to code trauma with GCS 4 on presentation,  intubated for airway protection, found to have large left acute subdural hemorrhage measuring 2 cm transverse extending both anteriorly and posteriorly along the left skull with a left to right 1.5 cm midline shift and smaller right-sided subdural collections along the falx cerebrum (6 mm) and right temporal fossa. Taken to the OR as level 1 by NIMISHA.  Initial attempt to call patient's wife for consent by NIMISHA was unsuccessful, trauma service called to update patient's wife Brianne Whiteside. She was made of aware of the grievous nature of her 's SDH and potential dismal prognosis. She understood that maximal efforts were being undertaken to save her 's life and that he would require intensive unit care during his stay in the hospital. NIMISHA will communicate the surgical findings to her after the completion of OR.        Above plan discussed with Trauma attending, Dr. John, Dr. Byrne, NIMISHA, trauma team, and ED team  --------------------------------------------------------------------------------------  05-06-21 @ 22:30 ASSESSMENT:  Patient is a 45yM w/ PMHx of Etoh abuse and seizures on keppra seen as Trauma Alert upgraded to a Code trauma during examination in the ED where the patient was found to have a GCS of 4 s/p found down unresponsive., external signs of trauma include identified on right eye laceration and lower lip laceration. Trauma assessment in ED: ABCs intact , GCS 15 , AAOx3,  FOLEY.     Injuries identified:   - superficial right eye laceration  -Superficial inner aspect of lower lip laceration 2cm c/w bite jim  -     PLAN:     OR as level 1 with neurosurgery  CT head significant for severe subdural with midline shift     pre op Labs: (CBC, BMP, Coags, T&S, UA, EtOH level)  Additional studies:  EKG  Utox    Trauma Imaging to include the following:    PANSCAN    Additional consultations:  - Neurosurgery      Disposition pending results of above labs and imaging    Senior Resident Addendum  45M PMH ETOH abuse, seizures presenting to the ED as trauma alert s/p found down, reportedly binge-drinking for the past several days per his wife, who heard him fall upstairs in the bathroom, found down with eyes open but unresponsive, who then called EMS. Patient was upgraded to code trauma with GCS 4 on presentation,  intubated for airway protection, found to have large left acute subdural hemorrhage measuring 2 cm transverse extending both anteriorly and posteriorly along the left skull with a left to right 1.5 cm midline shift and smaller right-sided subdural collections along the falx cerebrum (6 mm) and right temporal fossa. Taken to the OR as level 1 by NIMISHA.  Initial attempt to call patient's wife for consent by NIMISHA was unsuccessful, trauma service called to update patient's wife Brianne Whiteside. She was made of aware of the grievous nature of her 's SDH and potential dismal prognosis. She understood that maximal efforts were being undertaken to save her 's life and that he would require intensive unit care during his stay in the hospital. NIMISHA will communicate the surgical findings to her after the completion of OR.        Above plan discussed with Trauma attending, Dr. John, Dr. Byrne, NIMISHA, trauma team, and ED team  --------------------------------------------------------------------------------------  05-06-21 @ 22:30

## 2021-05-06 NOTE — ED ADULT NURSE NOTE - PMH
Alcohol abuse    ETOH abuse    GERD (gastroesophageal reflux disease)    Hypomagnesemia    Seizure disorder

## 2021-05-06 NOTE — H&P ADULT - NSICDXPASTMEDICALHX_GEN_ALL_CORE_FT
PAST MEDICAL HISTORY:  Alcohol abuse     ETOH abuse     GERD (gastroesophageal reflux disease)     Hypomagnesemia     Seizure disorder

## 2021-05-06 NOTE — ED PROVIDER NOTE - CARE PLAN
Principal Discharge DX:	Subdural hematoma  Secondary Diagnosis:	Respiratory failure  Secondary Diagnosis:	Fall

## 2021-05-06 NOTE — CONSULT NOTE ADULT - SUBJECTIVE AND OBJECTIVE BOX
Neurosurgery  Consult    Patient is a 45y old  Male who presents with a chief complaint of found down unresponsive (06 May 2021 22:29)      HPI: 46 y/o M with hxof siezures from ETONH abuse /withdrawal BIBA from home, as per EMS, pt's wife heard the pt fall in the bathroom. Wife found pt down in bathroom, has laceration to the bridge of his nose.  unresponsive. Pt presented to ED with GCS 4 and Left blown pupil..        TRAUMA ACTIVATION LEVEL:  Code trauma  Interval Hx;   PMHx of Etoh abuse and seizures on keppra seen as Trauma Alert upgraded to a Code trauma during examination in the ED where the patient was found to have a GCS of 4 s/p found down unresponsive.  Trauma assessment in ED: intubated for airway protection. Found to blown L pupil- HCT  shows large L ASDH.  Mannitol  given -> and slated for  Cat 1 case for Left craniotomy for evacuation of SDH          PAST MEDICAL & SURGICAL HISTORY:  Alcohol abuse  GERD (gastroesophageal reflux disease)  ETOH abuse  Hypomagnesemia  Seizure disorder  H/O hemorrhoidectomy        FAMILY HISTORY:  Family history of essential hypertension (Father)      Social History: ETOH  Allergies  No Known Allergies      Review of systems:    Constitutional: No fever, weight loss or fatigue    Eyes: No eye pain or discharge  ENMT:  No difficulty hearing; No sinus or throat pain  Neck: No pain or stiffness  Respiratory: No cough, wheezing, chills or hemoptysis  Cardiovascular: No chest pain, palpitations, shortness of breath, dyspnea on exertion  Gastrointestinal: No abdominal pain, nausea, vomiting or hematemesis; No diarrhea or constipation.   Genitourinary: No dysuria, frequency, hematuria or incontinence  Neurological: As per HPI  Skin: No rashes or lesions   Endocrine: No heat or cold intolerance; No hair loss  Musculoskeletal: No joint pain or swelling  Psychiatric: No depression, anxiety, mood swings  Heme/Lymph: No easy bruising or bleeding gums    Vital Signs Last 24 Hrs  T(C): 36.6 (06 May 2021 21:59), Max: 36.6 (06 May 2021 21:59)  T(F): 97.9 (06 May 2021 21:59), Max: 97.9 (06 May 2021 21:59)  HR: 130 (06 May 2021 22:07) (85 - 153)  BP: 130/80 (06 May 2021 22:07) (68/58 - 130/80)  BP(mean): --  RR: 14 (06 May 2021 22:07) (10 - 14)  SpO2: 100% (06 May 2021 22:07) (98% - 100%)    Neurologic Examination:  Intubated -  Pupils:  Motor:  Sensory:          Labs:   CBC Full  -  ( 06 May 2021 22:25 )  WBC Count : 7.32 K/uL  RBC Count : 5.56 M/uL  Hemoglobin : 16.9 g/dL  Hematocrit : 50.9 %  Platelet Count - Automated : 59 K/uL  Mean Cell Volume : 91.5 fL  Mean Cell Hemoglobin : 30.4 pg  Mean Cell Hemoglobin Concentration : 33.2 g/dL  Auto Neutrophil # : x  Auto Lymphocyte # : x  Auto Monocyte # : x  Auto Eosinophil # : x  Auto Basophil # : x  Auto Neutrophil % : x  Auto Lymphocyte % : x  Auto Monocyte % : x  Auto Eosinophil % : x  Auto Basophil % : x    05-06    141  |  101  |  10  ----------------------------<  173<H>  3.6   |  16<L>  |  0.8  Ca    7.6<L>      06 May 2021 22:25    TPro  5.9<L>  /  Alb  3.1<L>  /  TBili  0.9  /  DBili  x   /  AST  146<H>  /  ALT  47<H>  /  AlkPhos  120<H>  05-06  LIVER FUNCTIONS - ( 06 May 2021 22:25 )  Alb: 3.1 g/dL / Pro: 5.9 g/dL / ALK PHOS: 120 U/L / ALT: 47 U/L / AST: 146 U/L / GGT: x         PT/INR - ( 06 May 2021 22:25 )   PT: 12.60 sec;   INR: 1.10 ratio         PTT - ( 06 May 2021 22:25 )  PTT:29.4 sec        Neuroimaging:  NCHCT: CT Head No Cont:   EXAM:  CT BRAIN            PROCEDURE DATE:  05/06/2021            INTERPRETATION:  Clinical History/Reason For Exam: Trauma.    Technique: Multiple contiguous axial CT images were obtained from the base of the skull to the vertex without administration of intravenous contrast. Axial, coronal and sagittal images were reviewed.    Comparison: 3/29/2021    Findings:    There is a large left acute subdural hemorrhage measuring 2 cm transverse. It extends both anteriorly and posteriorly along the left skull. This is associated with a left to right 1.5 cm midline shift. There are smaller right-sided subdural collections along the falx cerebrum (6 mm) and right temporal fossa (3 mm)    Both ventricles are compressed. The sulci are effaced.    The gray-white matter differentiation is preserved.    The imaged paranasal sinuses and bilateral mastoid complexes are unremarkable. The patient is intubated.    No evidence of a depressed skull fracture. Right parietal scalp hematoma.    Beam hardening artifact is noted overlying the brain stem and posterior fossa which is inherent to CT in this location.      Impression:      Large left subdural hemorrhage with associated left to right midline shift as above.    Smaller right-sided subdurals    Spoke with CELESTE ESTEBAN on 5/6/2021 10:25 PM with readback.              YENI ZAYAS MD; Attending Radiologist  This document has been electronically signed. May  6 2021 10:32PM (05-06-21 @ 22:24)    CT Angiography/Perfusion:  MRI Brain NC:  MRA Head/Neck:  EEG:    Assessment:  This is a 45y Male with h/o     Plan:   -   05-06-21 @ 22:50         Neurosurgery  Consult    Patient is a 45y old  Male who presents with a chief complaint of found down unresponsive (06 May 2021 22:29)      HPI: 46 y/o M with hxof siezures from ETONH abuse /withdrawal BIBA from home, as per EMS, pt's wife heard the pt fall in the bathroom. Wife found pt down in bathroom, has laceration to the bridge of his nose.  unresponsive. Pt presented to ED with GCS 4 and Left blown pupil..        TRAUMA ACTIVATION LEVEL:  Code trauma  Interval Hx;   PMHx of Etoh abuse and seizures on keppra seen as Trauma Alert upgraded to a Code trauma during examination in the ED where the patient was found to have a GCS of 4 s/p found down unresponsive.  Trauma assessment in ED: intubated for airway protection. Found to blown L pupil- HCT  shows large L ASDH.  Mannitol  given -> and slated for  Cat 1 case for Left craniotomy for evacuation of SDH.           PAST MEDICAL & SURGICAL HISTORY:  Alcohol abuse  GERD (gastroesophageal reflux disease)  ETOH abuse  Hypomagnesemia  Seizure disorder  H/O hemorrhoidectomy        FAMILY HISTORY:  Family history of essential hypertension (Father)      Social History: ETOH  Allergies  No Known Allergies      Review of systems:    Constitutional: No fever, weight loss or fatigue    Eyes: No eye pain or discharge  ENMT:  No difficulty hearing; No sinus or throat pain  Neck: No pain or stiffness  Respiratory: No cough, wheezing, chills or hemoptysis  Cardiovascular: No chest pain, palpitations, shortness of breath, dyspnea on exertion  Gastrointestinal: No abdominal pain, nausea, vomiting or hematemesis; No diarrhea or constipation.   Genitourinary: No dysuria, frequency, hematuria or incontinence  Neurological: As per HPI  Skin: No rashes or lesions   Endocrine: No heat or cold intolerance; No hair loss  Musculoskeletal: No joint pain or swelling  Psychiatric: No depression, anxiety, mood swings  Heme/Lymph: No easy bruising or bleeding gums    Vital Signs Last 24 Hrs  T(C): 36.6 (06 May 2021 21:59), Max: 36.6 (06 May 2021 21:59)  T(F): 97.9 (06 May 2021 21:59), Max: 97.9 (06 May 2021 21:59)  HR: 130 (06 May 2021 22:07) (85 - 153)  BP: 130/80 (06 May 2021 22:07) (68/58 - 130/80)  BP(mean): --  RR: 14 (06 May 2021 22:07) (10 - 14)  SpO2: 100% (06 May 2021 22:07) (98% - 100%)    Neurologic Examination:  Intubated - pupils fixed b/l 3mm  no corneals No gaga  No movement to noxiuos stimus  Sensory; grossly intact            Labs:   CBC Full  -  ( 06 May 2021 22:25 )  WBC Count : 7.32 K/uL  RBC Count : 5.56 M/uL  Hemoglobin : 16.9 g/dL  Hematocrit : 50.9 %  Platelet Count - Automated : 59 K/uL  Mean Cell Volume : 91.5 fL  Mean Cell Hemoglobin : 30.4 pg  Mean Cell Hemoglobin Concentration : 33.2 g/dL  Auto Neutrophil # : x  Auto Lymphocyte # : x  Auto Monocyte # : x  Auto Eosinophil # : x  Auto Basophil # : x  Auto Neutrophil % : x  Auto Lymphocyte % : x  Auto Monocyte % : x  Auto Eosinophil % : x  Auto Basophil % : x    05-06    141  |  101  |  10  ----------------------------<  173<H>  3.6   |  16<L>  |  0.8  Ca    7.6<L>      06 May 2021 22:25    TPro  5.9<L>  /  Alb  3.1<L>  /  TBili  0.9  /  DBili  x   /  AST  146<H>  /  ALT  47<H>  /  AlkPhos  120<H>  05-06  LIVER FUNCTIONS - ( 06 May 2021 22:25 )  Alb: 3.1 g/dL / Pro: 5.9 g/dL / ALK PHOS: 120 U/L / ALT: 47 U/L / AST: 146 U/L / GGT: x         PT/INR - ( 06 May 2021 22:25 )   PT: 12.60 sec;   INR: 1.10 ratio         PTT - ( 06 May 2021 22:25 )  PTT:29.4 sec        Neuroimaging:  NCHCT: CT Head No Cont:   EXAM:  CT BRAIN            PROCEDURE DATE:  05/06/2021            INTERPRETATION:  Clinical History/Reason For Exam: Trauma.    Technique: Multiple contiguous axial CT images were obtained from the base of the skull to the vertex without administration of intravenous contrast. Axial, coronal and sagittal images were reviewed.    Comparison: 3/29/2021    Findings:    There is a large left acute subdural hemorrhage measuring 2 cm transverse. It extends both anteriorly and posteriorly along the left skull. This is associated with a left to right 1.5 cm midline shift. There are smaller right-sided subdural collections along the falx cerebrum (6 mm) and right temporal fossa (3 mm)    Both ventricles are compressed. The sulci are effaced.    The gray-white matter differentiation is preserved.    The imaged paranasal sinuses and bilateral mastoid complexes are unremarkable. The patient is intubated.    No evidence of a depressed skull fracture. Right parietal scalp hematoma.    Beam hardening artifact is noted overlying the brain stem and posterior fossa which is inherent to CT in this location.      Impression:      Large left subdural hemorrhage with associated left to right midline shift as above.    Smaller right-sided subdurals    Spoke with CELESTE ESTEBAN on 5/6/2021 10:25 PM with readback.              YENI ZAYAS MD; Attending Radiologist  This document has been electronically signed. May  6 2021 10:32PM (05-06-21 @ 22:24)    CT Angiography/Perfusion:  MRI Brain NC:  MRA Head/Neck:  EEG:    Assessment:  This is a 45y Male with h/o etoh abuse s/p unresponsive in Large L SDH s/p left craniectomy with evacuation of SDH    Plan:  Neurochecks q 1H         Keep HOB of 30         3% at 30cc/hr         Keep goal Na 145         Sedation PRN        Etco2 = 30-35    -   05-06-21 @ 22:50

## 2021-05-06 NOTE — H&P ADULT - NSICDXFAMILYHX_GEN_ALL_CORE_FT
FAMILY HISTORY:  Father  Still living? Unknown  Family history of essential hypertension, Age at diagnosis: Age Unknown

## 2021-05-07 PROBLEM — F10.10 ALCOHOL ABUSE, UNCOMPLICATED: Chronic | Status: ACTIVE | Noted: 2021-03-29

## 2021-05-07 PROBLEM — E83.42 HYPOMAGNESEMIA: Chronic | Status: ACTIVE | Noted: 2021-03-29

## 2021-05-07 LAB
A1C WITH ESTIMATED AVERAGE GLUCOSE RESULT: 5.2 % — SIGNIFICANT CHANGE UP (ref 4–5.6)
ANION GAP SERPL CALC-SCNC: 13 MMOL/L — SIGNIFICANT CHANGE UP (ref 7–14)
ANION GAP SERPL CALC-SCNC: 17 MMOL/L — HIGH (ref 7–14)
ANION GAP SERPL CALC-SCNC: 17 MMOL/L — HIGH (ref 7–14)
ANION GAP SERPL CALC-SCNC: 24 MMOL/L — HIGH (ref 7–14)
APTT BLD: 27.5 SEC — SIGNIFICANT CHANGE UP (ref 27–39.2)
APTT BLD: 27.7 SEC — SIGNIFICANT CHANGE UP (ref 27–39.2)
APTT BLD: 31.7 SEC — SIGNIFICANT CHANGE UP (ref 27–39.2)
BUN SERPL-MCNC: 10 MG/DL — SIGNIFICANT CHANGE UP (ref 10–20)
BUN SERPL-MCNC: 7 MG/DL — LOW (ref 10–20)
BUN SERPL-MCNC: 7 MG/DL — LOW (ref 10–20)
BUN SERPL-MCNC: 8 MG/DL — LOW (ref 10–20)
CALCIUM SERPL-MCNC: 5.5 MG/DL — CRITICAL LOW (ref 8.5–10.1)
CALCIUM SERPL-MCNC: 5.7 MG/DL — CRITICAL LOW (ref 8.5–10.1)
CALCIUM SERPL-MCNC: 6.6 MG/DL — LOW (ref 8.5–10.1)
CALCIUM SERPL-MCNC: 6.7 MG/DL — LOW (ref 8.5–10.1)
CHLORIDE SERPL-SCNC: 102 MMOL/L — SIGNIFICANT CHANGE UP (ref 98–110)
CHLORIDE SERPL-SCNC: 108 MMOL/L — SIGNIFICANT CHANGE UP (ref 98–110)
CHLORIDE SERPL-SCNC: 116 MMOL/L — HIGH (ref 98–110)
CHLORIDE SERPL-SCNC: 118 MMOL/L — HIGH (ref 98–110)
CK MB CFR SERPL CALC: 2.4 NG/ML — SIGNIFICANT CHANGE UP (ref 0.6–6.3)
CK MB CFR SERPL CALC: 4.2 NG/ML — SIGNIFICANT CHANGE UP (ref 0.6–6.3)
CK SERPL-CCNC: 292 U/L — HIGH (ref 0–225)
CO2 SERPL-SCNC: 14 MMOL/L — LOW (ref 17–32)
CO2 SERPL-SCNC: 15 MMOL/L — LOW (ref 17–32)
CO2 SERPL-SCNC: 17 MMOL/L — SIGNIFICANT CHANGE UP (ref 17–32)
CO2 SERPL-SCNC: 19 MMOL/L — SIGNIFICANT CHANGE UP (ref 17–32)
COVID-19 SPIKE DOMAIN AB INTERP: POSITIVE
COVID-19 SPIKE DOMAIN ANTIBODY RESULT: 184 U/ML — HIGH
CREAT SERPL-MCNC: 0.6 MG/DL — LOW (ref 0.7–1.5)
CREAT SERPL-MCNC: 0.7 MG/DL — SIGNIFICANT CHANGE UP (ref 0.7–1.5)
CREAT SERPL-MCNC: 0.7 MG/DL — SIGNIFICANT CHANGE UP (ref 0.7–1.5)
CREAT SERPL-MCNC: 0.8 MG/DL — SIGNIFICANT CHANGE UP (ref 0.7–1.5)
ESTIMATED AVERAGE GLUCOSE: 103 MG/DL — SIGNIFICANT CHANGE UP (ref 68–114)
GAS PNL BLDA: SIGNIFICANT CHANGE UP
GAS PNL BLDA: SIGNIFICANT CHANGE UP
GLUCOSE BLDC GLUCOMTR-MCNC: 151 MG/DL — HIGH (ref 70–99)
GLUCOSE BLDC GLUCOMTR-MCNC: 155 MG/DL — HIGH (ref 70–99)
GLUCOSE BLDC GLUCOMTR-MCNC: 193 MG/DL — HIGH (ref 70–99)
GLUCOSE BLDC GLUCOMTR-MCNC: 197 MG/DL — HIGH (ref 70–99)
GLUCOSE SERPL-MCNC: 172 MG/DL — HIGH (ref 70–99)
GLUCOSE SERPL-MCNC: 173 MG/DL — HIGH (ref 70–99)
GLUCOSE SERPL-MCNC: 196 MG/DL — HIGH (ref 70–99)
GLUCOSE SERPL-MCNC: 197 MG/DL — HIGH (ref 70–99)
HCT VFR BLD CALC: 25.4 % — LOW (ref 42–52)
HCT VFR BLD CALC: 27.2 % — LOW (ref 42–52)
HCT VFR BLD CALC: 29.6 % — LOW (ref 42–52)
HCT VFR BLD CALC: 34.9 % — LOW (ref 42–52)
HGB BLD-MCNC: 10 G/DL — LOW (ref 14–18)
HGB BLD-MCNC: 11.7 G/DL — LOW (ref 14–18)
HGB BLD-MCNC: 8.7 G/DL — LOW (ref 14–18)
HGB BLD-MCNC: 9 G/DL — LOW (ref 14–18)
INR BLD: 1.29 RATIO — SIGNIFICANT CHANGE UP (ref 0.65–1.3)
INR BLD: 1.41 RATIO — HIGH (ref 0.65–1.3)
INR BLD: 1.67 RATIO — HIGH (ref 0.65–1.3)
LACTATE SERPL-SCNC: 8.6 MMOL/L — CRITICAL HIGH (ref 0.7–2)
MAGNESIUM SERPL-MCNC: 1.4 MG/DL — LOW (ref 1.8–2.4)
MAGNESIUM SERPL-MCNC: 1.4 MG/DL — LOW (ref 1.8–2.4)
MAGNESIUM SERPL-MCNC: 2 MG/DL — SIGNIFICANT CHANGE UP (ref 1.8–2.4)
MCHC RBC-ENTMCNC: 29.8 PG — SIGNIFICANT CHANGE UP (ref 27–31)
MCHC RBC-ENTMCNC: 29.9 PG — SIGNIFICANT CHANGE UP (ref 27–31)
MCHC RBC-ENTMCNC: 30.3 PG — SIGNIFICANT CHANGE UP (ref 27–31)
MCHC RBC-ENTMCNC: 30.5 PG — SIGNIFICANT CHANGE UP (ref 27–31)
MCHC RBC-ENTMCNC: 33.1 G/DL — SIGNIFICANT CHANGE UP (ref 32–37)
MCHC RBC-ENTMCNC: 33.5 G/DL — SIGNIFICANT CHANGE UP (ref 32–37)
MCHC RBC-ENTMCNC: 33.8 G/DL — SIGNIFICANT CHANGE UP (ref 32–37)
MCHC RBC-ENTMCNC: 34.3 G/DL — SIGNIFICANT CHANGE UP (ref 32–37)
MCV RBC AUTO: 88.4 FL — SIGNIFICANT CHANGE UP (ref 80–94)
MCV RBC AUTO: 89 FL — SIGNIFICANT CHANGE UP (ref 80–94)
MCV RBC AUTO: 89.1 FL — SIGNIFICANT CHANGE UP (ref 80–94)
MCV RBC AUTO: 91.6 FL — SIGNIFICANT CHANGE UP (ref 80–94)
NRBC # BLD: 0 /100 WBCS — SIGNIFICANT CHANGE UP (ref 0–0)
OSMOLALITY SERPL: 309 MOS/KG — HIGH (ref 275–300)
OSMOLALITY SERPL: 330 MOS/KG — HIGH (ref 275–300)
OSMOLALITY SERPL: 359 MOS/KG — HIGH (ref 275–300)
PHOSPHATE SERPL-MCNC: 1.7 MG/DL — LOW (ref 2.1–4.9)
PHOSPHATE SERPL-MCNC: 3.3 MG/DL — SIGNIFICANT CHANGE UP (ref 2.1–4.9)
PHOSPHATE SERPL-MCNC: 4.6 MG/DL — SIGNIFICANT CHANGE UP (ref 2.1–4.9)
PLATELET # BLD AUTO: 111 K/UL — LOW (ref 130–400)
PLATELET # BLD AUTO: 54 K/UL — LOW (ref 130–400)
PLATELET # BLD AUTO: 55 K/UL — LOW (ref 130–400)
PLATELET # BLD AUTO: 91 K/UL — LOW (ref 130–400)
POTASSIUM SERPL-MCNC: 2.8 MMOL/L — LOW (ref 3.5–5)
POTASSIUM SERPL-MCNC: 3.7 MMOL/L — SIGNIFICANT CHANGE UP (ref 3.5–5)
POTASSIUM SERPL-MCNC: 4.4 MMOL/L — SIGNIFICANT CHANGE UP (ref 3.5–5)
POTASSIUM SERPL-MCNC: 4.5 MMOL/L — SIGNIFICANT CHANGE UP (ref 3.5–5)
POTASSIUM SERPL-SCNC: 2.8 MMOL/L — LOW (ref 3.5–5)
POTASSIUM SERPL-SCNC: 3.7 MMOL/L — SIGNIFICANT CHANGE UP (ref 3.5–5)
POTASSIUM SERPL-SCNC: 4.4 MMOL/L — SIGNIFICANT CHANGE UP (ref 3.5–5)
POTASSIUM SERPL-SCNC: 4.5 MMOL/L — SIGNIFICANT CHANGE UP (ref 3.5–5)
PROTHROM AB SERPL-ACNC: 14.8 SEC — HIGH (ref 9.95–12.87)
PROTHROM AB SERPL-ACNC: 16.2 SEC — HIGH (ref 9.95–12.87)
PROTHROM AB SERPL-ACNC: 19.2 SEC — HIGH (ref 9.95–12.87)
RAPID RVP RESULT: SIGNIFICANT CHANGE UP
RBC # BLD: 2.85 M/UL — LOW (ref 4.7–6.1)
RBC # BLD: 2.97 M/UL — LOW (ref 4.7–6.1)
RBC # BLD: 3.35 M/UL — LOW (ref 4.7–6.1)
RBC # BLD: 3.92 M/UL — LOW (ref 4.7–6.1)
RBC # FLD: 14.9 % — HIGH (ref 11.5–14.5)
RBC # FLD: 15.4 % — HIGH (ref 11.5–14.5)
RBC # FLD: 16 % — HIGH (ref 11.5–14.5)
RBC # FLD: 16.8 % — HIGH (ref 11.5–14.5)
SARS-COV-2 IGG+IGM SERPL QL IA: 184 U/ML — HIGH
SARS-COV-2 IGG+IGM SERPL QL IA: POSITIVE
SARS-COV-2 RNA SPEC QL NAA+PROBE: SIGNIFICANT CHANGE UP
SODIUM SERPL-SCNC: 136 MMOL/L — SIGNIFICANT CHANGE UP (ref 135–146)
SODIUM SERPL-SCNC: 146 MMOL/L — SIGNIFICANT CHANGE UP (ref 135–146)
SODIUM SERPL-SCNC: 148 MMOL/L — HIGH (ref 135–146)
SODIUM SERPL-SCNC: 150 MMOL/L — HIGH (ref 135–146)
TROPONIN T SERPL-MCNC: <0.01 NG/ML — SIGNIFICANT CHANGE UP
WBC # BLD: 4.04 K/UL — LOW (ref 4.8–10.8)
WBC # BLD: 5.54 K/UL — SIGNIFICANT CHANGE UP (ref 4.8–10.8)
WBC # BLD: 6.72 K/UL — SIGNIFICANT CHANGE UP (ref 4.8–10.8)
WBC # BLD: 8 K/UL — SIGNIFICANT CHANGE UP (ref 4.8–10.8)
WBC # FLD AUTO: 4.04 K/UL — LOW (ref 4.8–10.8)
WBC # FLD AUTO: 5.54 K/UL — SIGNIFICANT CHANGE UP (ref 4.8–10.8)
WBC # FLD AUTO: 6.72 K/UL — SIGNIFICANT CHANGE UP (ref 4.8–10.8)
WBC # FLD AUTO: 8 K/UL — SIGNIFICANT CHANGE UP (ref 4.8–10.8)

## 2021-05-07 PROCEDURE — 71045 X-RAY EXAM CHEST 1 VIEW: CPT | Mod: 26

## 2021-05-07 PROCEDURE — 61322 CRNEC/CRNOT DCMPRV W/O LOBEC: CPT

## 2021-05-07 PROCEDURE — 99291 CRITICAL CARE FIRST HOUR: CPT

## 2021-05-07 PROCEDURE — 70450 CT HEAD/BRAIN W/O DYE: CPT | Mod: 26

## 2021-05-07 PROCEDURE — 70260 X-RAY EXAM OF SKULL: CPT | Mod: 26

## 2021-05-07 PROCEDURE — 93010 ELECTROCARDIOGRAM REPORT: CPT

## 2021-05-07 RX ORDER — PANTOPRAZOLE SODIUM 20 MG/1
40 TABLET, DELAYED RELEASE ORAL DAILY
Refills: 0 | Status: DISCONTINUED | OUTPATIENT
Start: 2021-05-07 | End: 2021-05-08

## 2021-05-07 RX ORDER — PHYTONADIONE (VIT K1) 5 MG
10 TABLET ORAL ONCE
Refills: 0 | Status: COMPLETED | OUTPATIENT
Start: 2021-05-07 | End: 2021-05-07

## 2021-05-07 RX ORDER — PROPOFOL 10 MG/ML
5 INJECTION, EMULSION INTRAVENOUS
Qty: 1000 | Refills: 0 | Status: DISCONTINUED | OUTPATIENT
Start: 2021-05-07 | End: 2021-05-16

## 2021-05-07 RX ORDER — SODIUM CHLORIDE 9 MG/ML
1000 INJECTION INTRAMUSCULAR; INTRAVENOUS; SUBCUTANEOUS ONCE
Refills: 0 | Status: COMPLETED | OUTPATIENT
Start: 2021-05-07 | End: 2021-05-07

## 2021-05-07 RX ORDER — LEVETIRACETAM 250 MG/1
500 TABLET, FILM COATED ORAL EVERY 12 HOURS
Refills: 0 | Status: DISCONTINUED | OUTPATIENT
Start: 2021-05-07 | End: 2021-06-07

## 2021-05-07 RX ORDER — VASOPRESSIN 20 [USP'U]/ML
0.04 INJECTION INTRAVENOUS
Qty: 1 | Refills: 0 | Status: DISCONTINUED | OUTPATIENT
Start: 2021-05-07 | End: 2021-05-07

## 2021-05-07 RX ORDER — CEFAZOLIN SODIUM 1 G
1000 VIAL (EA) INJECTION EVERY 8 HOURS
Refills: 0 | Status: COMPLETED | OUTPATIENT
Start: 2021-05-07 | End: 2021-05-07

## 2021-05-07 RX ORDER — EPINEPHRINE 0.3 MG/.3ML
0.01 INJECTION INTRAMUSCULAR; SUBCUTANEOUS
Qty: 8 | Refills: 0 | Status: DISCONTINUED | OUTPATIENT
Start: 2021-05-07 | End: 2021-05-07

## 2021-05-07 RX ORDER — CHLORHEXIDINE GLUCONATE 213 G/1000ML
1 SOLUTION TOPICAL
Refills: 0 | Status: DISCONTINUED | OUTPATIENT
Start: 2021-05-07 | End: 2021-05-24

## 2021-05-07 RX ORDER — CALCIUM GLUCONATE 100 MG/ML
1 VIAL (ML) INTRAVENOUS ONCE
Refills: 0 | Status: COMPLETED | OUTPATIENT
Start: 2021-05-07 | End: 2021-05-07

## 2021-05-07 RX ORDER — VASOPRESSIN 20 [USP'U]/ML
0.04 INJECTION INTRAVENOUS
Qty: 50 | Refills: 0 | Status: DISCONTINUED | OUTPATIENT
Start: 2021-05-07 | End: 2021-05-07

## 2021-05-07 RX ORDER — MAGNESIUM SULFATE 500 MG/ML
2 VIAL (ML) INJECTION ONCE
Refills: 0 | Status: COMPLETED | OUTPATIENT
Start: 2021-05-07 | End: 2021-05-07

## 2021-05-07 RX ORDER — LEVETIRACETAM 250 MG/1
500 TABLET, FILM COATED ORAL
Refills: 0 | Status: DISCONTINUED | OUTPATIENT
Start: 2021-05-07 | End: 2021-05-07

## 2021-05-07 RX ORDER — DESMOPRESSIN ACETATE 0.1 MG/1
20 TABLET ORAL ONCE
Refills: 0 | Status: COMPLETED | OUTPATIENT
Start: 2021-05-07 | End: 2021-05-08

## 2021-05-07 RX ORDER — POTASSIUM PHOSPHATE, MONOBASIC POTASSIUM PHOSPHATE, DIBASIC 236; 224 MG/ML; MG/ML
30 INJECTION, SOLUTION INTRAVENOUS ONCE
Refills: 0 | Status: COMPLETED | OUTPATIENT
Start: 2021-05-07 | End: 2021-05-07

## 2021-05-07 RX ORDER — ACETAMINOPHEN 500 MG
1000 TABLET ORAL ONCE
Refills: 0 | Status: COMPLETED | OUTPATIENT
Start: 2021-05-07 | End: 2021-05-07

## 2021-05-07 RX ORDER — CALCIUM GLUCONATE 100 MG/ML
2 VIAL (ML) INTRAVENOUS
Refills: 0 | Status: DISCONTINUED | OUTPATIENT
Start: 2021-05-07 | End: 2021-05-07

## 2021-05-07 RX ORDER — CALCIUM GLUCONATE 100 MG/ML
2 VIAL (ML) INTRAVENOUS ONCE
Refills: 0 | Status: COMPLETED | OUTPATIENT
Start: 2021-05-07 | End: 2021-05-07

## 2021-05-07 RX ORDER — SODIUM CHLORIDE 5 G/100ML
500 INJECTION, SOLUTION INTRAVENOUS
Refills: 0 | Status: DISCONTINUED | OUTPATIENT
Start: 2021-05-07 | End: 2021-05-07

## 2021-05-07 RX ORDER — INSULIN LISPRO 100/ML
VIAL (ML) SUBCUTANEOUS EVERY 6 HOURS
Refills: 0 | Status: DISCONTINUED | OUTPATIENT
Start: 2021-05-07 | End: 2021-06-07

## 2021-05-07 RX ORDER — FENTANYL CITRATE 50 UG/ML
25 INJECTION INTRAVENOUS EVERY 4 HOURS
Refills: 0 | Status: DISCONTINUED | OUTPATIENT
Start: 2021-05-07 | End: 2021-05-11

## 2021-05-07 RX ORDER — NOREPINEPHRINE BITARTRATE/D5W 8 MG/250ML
0.05 PLASTIC BAG, INJECTION (ML) INTRAVENOUS
Qty: 32 | Refills: 0 | Status: DISCONTINUED | OUTPATIENT
Start: 2021-05-07 | End: 2021-05-07

## 2021-05-07 RX ORDER — SODIUM CHLORIDE 9 MG/ML
1000 INJECTION INTRAMUSCULAR; INTRAVENOUS; SUBCUTANEOUS
Refills: 0 | Status: DISCONTINUED | OUTPATIENT
Start: 2021-05-07 | End: 2021-05-08

## 2021-05-07 RX ORDER — FENTANYL CITRATE 50 UG/ML
50 INJECTION INTRAVENOUS ONCE
Refills: 0 | Status: DISCONTINUED | OUTPATIENT
Start: 2021-05-07 | End: 2021-05-07

## 2021-05-07 RX ORDER — MIDAZOLAM HYDROCHLORIDE 1 MG/ML
2 INJECTION, SOLUTION INTRAMUSCULAR; INTRAVENOUS ONCE
Refills: 0 | Status: DISCONTINUED | OUTPATIENT
Start: 2021-05-07 | End: 2021-05-07

## 2021-05-07 RX ORDER — ACETAMINOPHEN 500 MG
650 TABLET ORAL EVERY 6 HOURS
Refills: 0 | Status: DISCONTINUED | OUTPATIENT
Start: 2021-05-07 | End: 2021-05-08

## 2021-05-07 RX ORDER — CALCIUM GLUCONATE 100 MG/ML
1 VIAL (ML) INTRAVENOUS ONCE
Refills: 0 | Status: DISCONTINUED | OUTPATIENT
Start: 2021-05-07 | End: 2021-05-07

## 2021-05-07 RX ORDER — DEXMEDETOMIDINE HYDROCHLORIDE IN 0.9% SODIUM CHLORIDE 4 UG/ML
0.5 INJECTION INTRAVENOUS
Qty: 400 | Refills: 0 | Status: DISCONTINUED | OUTPATIENT
Start: 2021-05-07 | End: 2021-05-07

## 2021-05-07 RX ORDER — MAGNESIUM SULFATE 500 MG/ML
2 VIAL (ML) INJECTION
Refills: 0 | Status: COMPLETED | OUTPATIENT
Start: 2021-05-07 | End: 2021-05-07

## 2021-05-07 RX ORDER — POTASSIUM CHLORIDE 20 MEQ
20 PACKET (EA) ORAL
Refills: 0 | Status: COMPLETED | OUTPATIENT
Start: 2021-05-07 | End: 2021-05-07

## 2021-05-07 RX ORDER — SODIUM CHLORIDE 9 MG/ML
250 INJECTION INTRAMUSCULAR; INTRAVENOUS; SUBCUTANEOUS ONCE
Refills: 0 | Status: COMPLETED | OUTPATIENT
Start: 2021-05-07 | End: 2021-05-07

## 2021-05-07 RX ORDER — DESMOPRESSIN ACETATE 0.1 MG/1
20 TABLET ORAL ONCE
Refills: 0 | Status: DISCONTINUED | OUTPATIENT
Start: 2021-05-07 | End: 2021-05-07

## 2021-05-07 RX ORDER — PHENYLEPHRINE HYDROCHLORIDE 10 MG/ML
0.07 INJECTION INTRAVENOUS
Qty: 160 | Refills: 0 | Status: DISCONTINUED | OUTPATIENT
Start: 2021-05-07 | End: 2021-05-07

## 2021-05-07 RX ORDER — SODIUM CHLORIDE 9 MG/ML
1000 INJECTION INTRAMUSCULAR; INTRAVENOUS; SUBCUTANEOUS
Refills: 0 | Status: DISCONTINUED | OUTPATIENT
Start: 2021-05-07 | End: 2021-05-07

## 2021-05-07 RX ADMIN — Medication 2: at 11:48

## 2021-05-07 RX ADMIN — LEVETIRACETAM 420 MILLIGRAM(S): 250 TABLET, FILM COATED ORAL at 06:45

## 2021-05-07 RX ADMIN — FENTANYL CITRATE 25 MICROGRAM(S): 50 INJECTION INTRAVENOUS at 15:29

## 2021-05-07 RX ADMIN — PROPOFOL 2.38 MICROGRAM(S)/KG/MIN: 10 INJECTION, EMULSION INTRAVENOUS at 22:45

## 2021-05-07 RX ADMIN — FENTANYL CITRATE 50 MICROGRAM(S): 50 INJECTION INTRAVENOUS at 08:29

## 2021-05-07 RX ADMIN — FENTANYL CITRATE 50 MICROGRAM(S): 50 INJECTION INTRAVENOUS at 11:53

## 2021-05-07 RX ADMIN — Medication 3.72 MICROGRAM(S)/KG/MIN: at 01:14

## 2021-05-07 RX ADMIN — Medication 2: at 06:39

## 2021-05-07 RX ADMIN — Medication 102 MILLIGRAM(S): at 08:52

## 2021-05-07 RX ADMIN — Medication 100 MILLIGRAM(S): at 16:16

## 2021-05-07 RX ADMIN — DEXMEDETOMIDINE HYDROCHLORIDE IN 0.9% SODIUM CHLORIDE 9.93 MICROGRAM(S)/KG/HR: 4 INJECTION INTRAVENOUS at 03:16

## 2021-05-07 RX ADMIN — FENTANYL CITRATE 50 MICROGRAM(S): 50 INJECTION INTRAVENOUS at 10:44

## 2021-05-07 RX ADMIN — Medication 200 GRAM(S): at 23:45

## 2021-05-07 RX ADMIN — Medication 400 MILLIGRAM(S): at 15:00

## 2021-05-07 RX ADMIN — LEVETIRACETAM 420 MILLIGRAM(S): 250 TABLET, FILM COATED ORAL at 18:29

## 2021-05-07 RX ADMIN — VASOPRESSIN 2.4 UNIT(S)/MIN: 20 INJECTION INTRAVENOUS at 07:52

## 2021-05-07 RX ADMIN — Medication 50 GRAM(S): at 10:53

## 2021-05-07 RX ADMIN — Medication 50 GRAM(S): at 02:51

## 2021-05-07 RX ADMIN — CHLORHEXIDINE GLUCONATE 1 APPLICATION(S): 213 SOLUTION TOPICAL at 05:43

## 2021-05-07 RX ADMIN — POTASSIUM PHOSPHATE, MONOBASIC POTASSIUM PHOSPHATE, DIBASIC 83.33 MILLIMOLE(S): 236; 224 INJECTION, SOLUTION INTRAVENOUS at 12:40

## 2021-05-07 RX ADMIN — Medication 50 GRAM(S): at 12:54

## 2021-05-07 RX ADMIN — Medication 2: at 02:37

## 2021-05-07 RX ADMIN — Medication 100 MILLIEQUIVALENT(S): at 02:00

## 2021-05-07 RX ADMIN — Medication 100 MILLIGRAM(S): at 05:46

## 2021-05-07 RX ADMIN — Medication 100 MILLIEQUIVALENT(S): at 01:00

## 2021-05-07 RX ADMIN — SODIUM CHLORIDE 30 MILLILITER(S): 5 INJECTION, SOLUTION INTRAVENOUS at 02:11

## 2021-05-07 RX ADMIN — SODIUM CHLORIDE 1500 MILLILITER(S): 9 INJECTION INTRAMUSCULAR; INTRAVENOUS; SUBCUTANEOUS at 08:08

## 2021-05-07 RX ADMIN — Medication 2: at 18:29

## 2021-05-07 RX ADMIN — Medication 1000 MILLIGRAM(S): at 16:44

## 2021-05-07 RX ADMIN — SODIUM CHLORIDE 1000 MILLILITER(S): 9 INJECTION INTRAMUSCULAR; INTRAVENOUS; SUBCUTANEOUS at 02:47

## 2021-05-07 RX ADMIN — FENTANYL CITRATE 25 MICROGRAM(S): 50 INJECTION INTRAVENOUS at 07:56

## 2021-05-07 RX ADMIN — SODIUM CHLORIDE 100 MILLILITER(S): 9 INJECTION INTRAMUSCULAR; INTRAVENOUS; SUBCUTANEOUS at 02:29

## 2021-05-07 RX ADMIN — Medication 100 MILLIGRAM(S): at 23:46

## 2021-05-07 RX ADMIN — Medication 3.72 MICROGRAM(S)/KG/MIN: at 00:25

## 2021-05-07 RX ADMIN — Medication 200 GRAM(S): at 11:45

## 2021-05-07 RX ADMIN — SODIUM CHLORIDE 2000 MILLILITER(S): 9 INJECTION INTRAMUSCULAR; INTRAVENOUS; SUBCUTANEOUS at 08:16

## 2021-05-07 RX ADMIN — FENTANYL CITRATE 25 MICROGRAM(S): 50 INJECTION INTRAVENOUS at 14:47

## 2021-05-07 RX ADMIN — PANTOPRAZOLE SODIUM 40 MILLIGRAM(S): 20 TABLET, DELAYED RELEASE ORAL at 12:44

## 2021-05-07 RX ADMIN — MIDAZOLAM HYDROCHLORIDE 2 MILLIGRAM(S): 1 INJECTION, SOLUTION INTRAMUSCULAR; INTRAVENOUS at 16:37

## 2021-05-07 RX ADMIN — Medication 100 GRAM(S): at 02:49

## 2021-05-07 NOTE — CHART NOTE - NSCHARTNOTEFT_GEN_A_CORE
Surgery Transfer Note    Transfer from: Surgery  Transfer to:  ( X ) NSX (  ) Telemetry    (  ) RCU    (  ) Palliative    (  ) Stroke Unit    (  ) _______________  Accepting physican:      Hospital COURSE:  45M PMH ETOH abuse, seizures presenting to the ED as trauma alert s/p found down, reportedly binge-drinking for the past several days per his wife, who heard him fall upstairs in the bathroom, found down with eyes open but unresponsive, who then called EMS. Patient was upgraded to code trauma with GCS 4 on presentation,  intubated for airway protection, found to have large left acute subdural hemorrhage measuring 2 cm transverse extending both anteriorly and posteriorly along the left skull with a left to right 1.5 cm midline shift and smaller right-sided subdural collections along the falx cerebrum (6 mm) and right temporal fossa. Taken to the OR as level 1 by NIMISHA.  Initial attempt to call patient's wife for consent by NIMISHA was unsuccessful, trauma service called to update patient's wife Brianne Whiteside. She was made of aware of the grievous nature of her 's SDH and potential dismal prognosis. She understood that maximal efforts were being undertaken to save her 's life and that he would require intensive unit care during his stay in the hospital. NIMISHA will communicate the surgical findings to her after the completion of OR.  Patient acute SDH, cerebral herniation/compression. Acute venous bleeding tamponaded with gel foam.          ASSESSMENT & PLAN:   omari HIDALGO as primary team      signed out to MELANIE Haines at [phone]        Vital Signs Last 24 Hrs  T(C): 37.3 (07 May 2021 10:00), Max: 37.3 (07 May 2021 10:00)  T(F): 99.1 (07 May 2021 10:00), Max: 99.1 (07 May 2021 10:00)  HR: 118 (07 May 2021 11:54) (85 - 153)  BP: 96/57 (07 May 2021 10:00) (68/58 - 147/71)  BP(mean): 79 (07 May 2021 09:25) (75 - 79)  RR: 22 (07 May 2021 10:00) (10 - 38)  SpO2: 100% (07 May 2021 11:54) (98% - 100%)  I&O's Summary    06 May 2021 07:01  -  07 May 2021 07:00  --------------------------------------------------------  IN: 3055.6 mL / OUT: 2542 mL / NET: 513.6 mL    07 May 2021 07:01  -  07 May 2021 13:43  --------------------------------------------------------  IN: 1021.8 mL / OUT: 310 mL / NET: 711.8 mL          MEDICATIONS  (STANDING):  ceFAZolin   IVPB 1000 milliGRAM(s) IV Intermittent every 8 hours  chlorhexidine 4% Liquid 1 Application(s) Topical <User Schedule>  insulin lispro (ADMELOG) corrective regimen sliding scale   SubCutaneous every 6 hours  levETIRAcetam  IVPB 500 milliGRAM(s) IV Intermittent every 12 hours  pantoprazole  Injectable 40 milliGRAM(s) IV Push daily  propofol Infusion 5 MICROgram(s)/kG/Min (2.38 mL/Hr) IV Continuous <Continuous>  sodium chloride 0.9%. 1000 milliLiter(s) (120 mL/Hr) IV Continuous <Continuous>    MEDICATIONS  (PRN):  fentaNYL    Injectable 25 MICROGram(s) IV Push every 4 hours PRN Severe Pain (7 - 10)    General: non responsive  HEENT: Normocephalic, left pupli diataed to 5mm not responsive and fixed, right pupil sluggish miotic 2mm  Neck: Soft, midline trachea.   Chest: no subcutaneous emphysema   Cardiac: S1, S2, RRR  Respiratory: Bilateral breath sounds,   Abdomen: Soft, unable to further access  Groin: Normal appearing, pelvis stable   Ext:  not responding to stimuli to upper and lower extremities. Palpable Radial b/l UE, b/l DP palpable in LE.   Back: unable to acces for pain  Rectal: No brijesh blood, DEJA with good tone      LABS                                            8.7                   Neurophils% (auto):   x      (05-07 @ 09:17):    4.04 )-----------(54           Lymphocytes% (auto):  x                                             25.4                   Eosinphils% (auto):   x        Manual%: Neutrophils x    ; Lymphocytes x    ; Eosinophils x    ; Bands%: x    ; Blasts x                                    150    |  118    |  7                   Calcium: 5.7   / iCa: x      (05-07 @ 09:17)    ----------------------------<  172       Magnesium: 1.4                              3.7     |  15     |  0.7              Phosphorous: 1.7      TPro  5.9    /  Alb  3.1    /  TBili  0.9    /  DBili  x      /  AST  146    /  ALT  47     /  AlkPhos  120    06 May 2021 22:25    ( 05-07 @ 09:17 )   PT: 16.20 sec;   INR: 1.41 ratio  aPTT: 27.5 sec

## 2021-05-07 NOTE — CONSULT NOTE ADULT - SUBJECTIVE AND OBJECTIVE BOX
SANDRA DAS [id number]  45y Male  1d    HPI:  TRAUMA ACTIVATION LEVEL:  Code trauma    HPI:    Patient is a 45yM w/ PMHx of Etoh abuse and seizures on keppra seen as Trauma Alert upgraded to a Code trauma during examination in the ED where the patient was found to have a GCS of 4 s/p found down unresponsive.  Trauma assessment in ED: intubated for airway protection   (06 May 2021 22:29)    Patient taken to the OR as level for for craniotomy, intraop significant for cardiac arrest and MTP. Presented to PACU hypotensive, Right femoral TLC placed and Levophed initiated.     PAST MEDICAL & SURGICAL HISTORY:  Alcohol abuse    GERD (gastroesophageal reflux disease)    ETOH abuse    Hypomagnesemia    Seizure disorder    H/O hemorrhoidectomy          MEDICATIONS  (STANDING):  norepinephrine Infusion 0.05 MICROgram(s)/kG/Min (3.72 mL/Hr) IV Continuous <Continuous>  potassium chloride  20 mEq/100 mL IVPB 20 milliEquivalent(s) IV Intermittent every 1 hour  sodium chloride 0.9%. 1000 milliLiter(s) (100 mL/Hr) IV Continuous <Continuous>  sodium chloride 3%. 500 milliLiter(s) (30 mL/Hr) IV Continuous <Continuous>    MEDICATIONS  (PRN):      Allergies    No Known Allergies    Intolerances        REVIEW OF SYSTEMS    [ ] A ten-point review of systems was otherwise negative except as noted.  [x] Due to altered mental status/intubation, subjective information were not able to be obtained from the patient. History was obtained, to the extent possible, from review of the chart and collateral sources of information.      Vital Signs Last 24 Hrs  T(C): 36.6 (06 May 2021 23:06), Max: 36.6 (06 May 2021 21:59)  T(F): 97.9 (06 May 2021 21:59), Max: 97.9 (06 May 2021 21:59)  HR: 130 (06 May 2021 23:06) (85 - 153)  BP: 130/80 (06 May 2021 23:06) (68/58 - 130/80)  BP(mean): --  RR: 14 (06 May 2021 23:06) (10 - 14)  SpO2: 100% (07 May 2021 00:15) (98% - 100%)    PHYSICAL EXAM:  GCS-3T  GENERAL: no sedation, unarousable  CHEST/LUNG: intubated status, mechanical breath sounds bilaterally  HEART: sinus tachycardia, hypotensive  ABDOMEN: Soft Nondistended;   EXTREMITIES:  No clubbing, cyanosis, or edema      LABS:  Labs:  CAPILLARY BLOOD GLUCOSE      POCT Blood Glucose.: 149 mg/dL (06 May 2021 21:53)                          9.0    8.00  )-----------( 111      ( 07 May 2021 00:59 )             27.2       Auto Neutrophil %: 86.2 % (05-06-21 @ 22:25)    05-06    141  |  101  |  10  ----------------------------<  173<H>  3.6   |  16<L>  |  0.8      Calcium, Total Serum: 7.6 mg/dL (05-06-21 @ 22:25)      LFTs:             5.9  | 0.9  | 146      ------------------[120     ( 06 May 2021 22:25 )  3.1  | x    | 47          Lipase:164    Amylase:x         Blood Gas Arterial, Lactate: 7.7 mmoL/L (05-07-21 @ 00:22)  Lactate, Blood: 6.0 mmol/L (05-06-21 @ 22:25)    ABG - ( 07 May 2021 00:22 )  pH: 7.20  /  pCO2: 44    /  pO2: 417   / HCO3: 17    / Base Excess: -10.3 /  SaO2: 100               Coags:     12.60  ----< 1.10    ( 06 May 2021 22:25 )     29.4              Alcohol, Blood: 341 mg/dL (05-06-21 @ 22:25)            RADIOLOGY & ADDITIONAL STUDIES:  < from: CT Head No Cont (05.06.21 @ 22:24) >    Impression:      Large left subdural hemorrhage with associated left to right midline shift as above.    Smaller right-sided subdurals    < end of copied text >      < from: CT Cervical Spine No Cont (05.06.21 @ 22:24) >  IMPRESSION:      No evidence of a cervical spine fracture or subluxation.    C6-7 degenerative changes.    No significant change since the prior exam.    < end of copied text >    < from: CT Angio Chest w/ IV Cont (05.06.21 @ 22:44) >  Impression:      No acute intra-abdominal or intrathoracic traumatic changes.    2.2 cm left adrenal indeterminate nodule, larger since the prior exam.  Nonemergent MRI may be of benefit when patient is clinically able.    4 mm right middle lobe nodule. Follow-up in 6 months is recommended.    < end of copied text >    < from: CT Abdomen and Pelvis w/wo IV Cont (05.06.21 @ 22:45) >  Impression:      No acute intra-abdominal or intrathoracic traumatic changes.    2.2 cm left adrenal indeterminate nodule, larger since the prior exam.  Nonemergent MRI may be of benefit when patient is clinically able.    4 mm right middle lobe nodule. Follow-up in 6 months is recommended.    < end of copied text >

## 2021-05-07 NOTE — CONSULT NOTE ADULT - ATTENDING COMMENTS
s/p traumatic SDH s/p craniectomy decompression, Code blue in OR w rosc, ,massive transfusion     Traumatic sdh s/p decompression, GCS 3t , pupils now reactive, + corneal  - Mannitol given   - hob 30 deg  - 3 % na at 50cc, serum osm and na q6h   - CTH in am   - hyperventilate co2 35    Acute respiratory failure requiring MV    450/50/ 5/60%  check abg     acute blood loss anemia  ebl 1.5 L , hypocoaguable in OR , thrombocytopenia platelets 50      S/P cardiac arrest in OR  Hypotension- norepinephrine Infusion 0.05 MICROgram(s)/kG/Min IV Continuous <Continuous>  Goal MAP > 65mmHg  Imaging: EKG unremarkable    Labs: Troponin      Dispo: SICU management

## 2021-05-07 NOTE — CHART NOTE - NSCHARTNOTEFT_GEN_A_CORE
Pt currently in PACU 4am - several hours post op        Sedation low dose precedex 0.3mcg with low dose levophed    EXAM: Pupils: (R) + eye bobbing w 6th palsey  4->3mm brisk ? mild hippus                         (L) symmetric 4.5->3mm            Motor: RUE extending to noxious                      LUE spontaneously pulling on clothes noxious attempts to localized but does not crossmidline                     LE's no movement to noxious noted      IMP: s/p hemicraniectomy with evacuation of ASDH (l)    Plan: Continue 30cc/hr           sedation prn           HOB at SBP          keep SBP less than 140  but MAP at 65 Pt currently in PACU 4am - several hours post op        Sedation low dose precedex 0.3mcg with low dose levophed    EXAM: Pupils: (R) + eye bobbing w 6th palsey  4->3mm brisk ? mild hippus                         (L) symmetric 4.5->3mm            Motor: RUE extending to noxious                      LUE spontaneously pulling on clothes noxious attempts to localized but does not crossmidline                     LE's no movement to noxious noted      IMP: s/p hemicraniectomy with evacuation of ASDH (l)    Plan: Continue 30cc/hr           sedation prn           HOB at SBP          keep SBP less than 140  but MAP at 65    Addendum by Attending: Pt s/p craniectomy. Pt with cardiac arrest in OR with SROC with chest compression ACLS medications. Stabilizing in PACU. D/w wife when she arrived at hospital that patient remains critical and that prognosis is grim, patient still at risk for death/brain death and should he survive severe neurologic impairment. She understands and wishes to cont with aggressive care.

## 2021-05-07 NOTE — CONSULT NOTE ADULT - ASSESSMENT
Assessment & Plan    45y Male 1d s/p     NEURO:  GCS 3T  S/P craniotomy for large left SDH  no sedation  RASS -5  | Unarousable  | No response to voice or physical stimulation   3% NS at 30ml/hr- q6hr BMP and Serum Osmolality  Head of bed @ 30 degrees  Repeat Head CT in AM    RESP:     Intubated, ventilator settings: 450/16/100%/5  Follow up post op ABG      CARDS:   S/P cardiac arrest in OR  Hypotension- norepinephrine Infusion 0.05 MICROgram(s)/kG/Min IV Continuous <Continuous>  Goal MAP > 65mmHg      Imaging: EKG unremarkable    Labs: Troponin      GI/NUTR:   Diet, NPO      GI Prophylaxis-Protonix IV    Bowel regimen-none currently            /RENAL:   Cole in place    Strict I/O-    BUN/Cr-  Labs:          BUN/Cr- 10/0.8  -->          Electrolytes-Na 141 // K 3.6 // Mg -- //  Phos -- (05-06 @ 22:25)  Replete Electrolytes PRN                 HEME/ONC:   S/P massive transfusion protocol  4uPRBCs 2FFP 1 platelet intraop    DVT prophylaxis-holding    Acute anemia-H/H 9.0 (05-07 @ 00:59)  16.9 (05-06 @ 22:25)        ID:  Monitor for Fever or Leukocytosis  Periop Ancef    ENDO:  q6 POC glucose    Glucose Glucose, Serum: 173 (05-06 @ 22:25)  Sliding scale insulin    LINES/DRAINS:  GIOVANI, Kelsey Cantrell, Right Femoral TLC    DISPO:    SICU Assessment & Plan  45y Male s/p fall with large left SDH and neurological status change, level 1 for emergent craniotomy    NEURO:  GCS 3T  S/P craniotomy for large left SDH  no sedation  RASS -5  | Unarousable  | No response to voice or physical stimulation   3% NS at 30ml/hr- q6hr BMP and Serum Osmolality  Head of bed @ 30 degrees  Repeat Head CT in AM    RESP:     Intubated, ventilator settings: 450/16/100%/5  Follow up post op ABG      CARDS:   S/P cardiac arrest in OR  Hypotension- norepinephrine Infusion 0.05 MICROgram(s)/kG/Min IV Continuous <Continuous>  Goal MAP > 65mmHg      Imaging: EKG unremarkable    Labs: Troponin      GI/NUTR:   Diet, NPO      GI Prophylaxis-Protonix IV    Bowel regimen-none currently            /RENAL:   Cole in place    Strict I/O-    BUN/Cr-  Labs:          BUN/Cr- 10/0.8  -->          Electrolytes-Na 141 // K 3.6 // Mg -- //  Phos -- (05-06 @ 22:25)  Replete Electrolytes PRN                 HEME/ONC:   S/P massive transfusion protocol  4uPRBCs 2FFP 1 platelet intraop    DVT prophylaxis-holding    Acute anemia-H/H 9.0 (05-07 @ 00:59)  16.9 (05-06 @ 22:25)        ID:  Monitor for Fever or Leukocytosis  Periop Ancef    ENDO:  q6 POC glucose    Glucose Glucose, Serum: 173 (05-06 @ 22:25)  Sliding scale insulin    LINES/DRAINS:  PIV, [place], Cole, Right Femoral TLC    DISPO:    SICU

## 2021-05-07 NOTE — CHART NOTE - NSCHARTNOTEFT_GEN_A_CORE
Trauma tertiary Survey    Patient seen and examined. No complaints at this time.     General: non responsive  HEENT: Normocephalic, left pupli diataed to 5mm not responsive and fixed, right pupil sluggish miotic 2mm  Neck: Soft, midline trachea.   Chest: no subcutaneous emphysema   Cardiac: S1, S2, RRR  Respiratory: Bilateral breath sounds,   Abdomen: Soft, unable to further access  Groin: Normal appearing, pelvis stable   Ext:  not responding to stimuli to upper and lower extremities. Palpable Radial b/l UE, b/l DP palpable in LE.   Back: unable to acces for pain  Rectal: No brijesh blood, DEJA with good tone    Alcohol, Blood: 341 mg/dL (05-06-21 @ 22:25)    Findings:    There is a large left acute subdural hemorrhage measuring 2 cm transverse. It extends both anteriorly and posteriorly along the left skull. This is associated with a left to right 1.5 cm midline shift. There are smaller right-sided subdural collections along the falx cerebrum (6 mm) and right temporal fossa (3 mm)Impression:  Large left subdural hemorrhage with associated left to right midline shift as above.    Smaller right-sided subdurals    < end of copied text >    < from: CT Cervical Spine No Cont (05.06.21 @ 22:24) >    IMPRESSION:      No evidence of a cervical spine fracture or subluxation.    C6-7 degenerative changes.    No significant change since the prior exam.    < end of copied text >  < from: Xray Chest 1 View-PORTABLE IMMEDIATE (Xray Chest 1 View-PORTABLE IMMEDIATE .) (05.07.21 @ 11:29) >    mpression:    No consolidation, effusion or pneumothorax.      < end of copied text >    < from: Xray Skull Complete (05.07.21 @ 01:12) >    FINDINGS/  IMPRESSION:  Superior most aspect of the skull was incompletely imaged. Skin staples. Leads overlie the posterior occiput. Catheter along the superior skull. No additional radiopaque foreign bodies.    < end of copied text >    < from: CT Abdomen and Pelvis w/wo IV Cont (05.06.21 @ 22:45) >        Impression:      No acute intra-abdominal or intrathoracic traumatic changes.    < end of copied text >    < from: CT Cervical Spine No Cont (05.06.21 @ 22:24) >        IMPRESSION:      No evidence of a cervical spine fracture or subluxation.    C6-7 degenerative changes.    No significant change since the prior exam.    < end of copied text >        All images/reports reviewed. No further traumatic work-up warranted.

## 2021-05-07 NOTE — CHART NOTE - NSCHARTNOTEFT_GEN_A_CORE
PACU ANESTHESIA ADMISSION NOTE    Procedure: Left craniotomy, evacuation of hematoma  Post op diagnosis:  left subdural hematoma    __x__  Intubated  TV:_450__       Rate: __16__      FiO2: __100%__  PEEP 5    ____  Patent Airway    ____  Full return of protective reflexes    ____  Full recovery from anesthesia / back to baseline status    Vitals:  T(C): 98.4 F  HR: 113  BP: 116/51  RR: 16  SpO2: 100%     Mental Status:  unresponsive, not on sedation     Nausea/Vomiting:  __x__ NO  ______Yes,   See Post - Op Orders          Pain Scale (0-10):  _____    Treatment: _x___ None    ____ See Post - Op/PCA Orders    Post - Operative Fluids:   ____ Oral   __x__ See Post - Op Orders    Plan: Discharge:   ____Home       _____Floor     ___x__Critical Care    _____  Other:_________________    Comments: Level 1 trauma, presenting for left craniotomy for evacuation of subdural hematoma, patient transported directly to operating room by trauma team. During the procedure patient became pulseless, code called, compressions started, ROSC achieved after compressions and 3mg of epinephrine total (please see code sheet). Hemostasis attempted and patient stabilized. Patient transported to PACU with standard monitors as well as defibrillator. Once in PACU, 16G IV placed, PRBCs started by PACU RN, and pressors continued. Neurosurgery team, Trauma team and ICU team at bedside for report. Patient currently stabilized. In care of critical care team.

## 2021-05-07 NOTE — PROGRESS NOTE ADULT - SUBJECTIVE AND OBJECTIVE BOX
POD#1    S/P Left Craniectomy for evac of SDH    Pt seen and examined at bedside. Pt remains intubated, sedated.     Vital Signs Last 24 Hrs  T(C): 37.6 (07 May 2021 16:00), Max: 38.1 (07 May 2021 14:46)  T(F): 99.7 (07 May 2021 16:00), Max: 100.5 (07 May 2021 14:46)  HR: 134 (07 May 2021 16:05) (85 - 153)  BP: 96/57 (07 May 2021 10:00) (68/58 - 147/71)  BP(mean): 79 (07 May 2021 09:25) (75 - 79)  RR: 22 (07 May 2021 10:00) (10 - 38)  SpO2: 100% (07 May 2021 16:05) (98% - 100%)    I&O's Detail    06 May 2021 07:01  -  07 May 2021 07:00  --------------------------------------------------------  IN:    Cryoprecipitate: 22 mL    Dexmedetomidine: 73.1 mL    IV PiggyBack: 150 mL    Norepinephrine: 160.5 mL    PRBCs (Packed Red Blood Cells): 1200 mL    sodium chloride 0.9%: 300 mL    Sodium Chloride 0.9% Bolus: 1000 mL    sodium chloride 3%: 150 mL  Total IN: 3055.6 mL    OUT:    Indwelling Catheter - Urethral (mL): 2542 mL  Total OUT: 2542 mL    Total NET: 513.6 mL      07 May 2021 07:01  -  07 May 2021 17:07  --------------------------------------------------------  IN:    Dexmedetomidine: 19.8 mL    IV PiggyBack: 100 mL    Norepinephrine: 15 mL    Propofol: 47.4 mL    sodium chloride 0.9%: 250 mL    sodium chloride 0.9%: 840 mL    sodium chloride 3%: 30 mL    Vasopressin: 9.6 mL  Total IN: 1311.8 mL    OUT:    Indwelling Catheter - Urethral (mL): 440 mL  Total OUT: 440 mL    Total NET: 871.8 mL        I&O's Summary    06 May 2021 07:01  -  07 May 2021 07:00  --------------------------------------------------------  IN: 3055.6 mL / OUT: 2542 mL / NET: 513.6 mL    07 May 2021 07:01  -  07 May 2021 17:07  --------------------------------------------------------  IN: 1311.8 mL / OUT: 440 mL / NET: 871.8 mL        REVIEW OF SYSTEMS    [ ] A ten-point review of systems was otherwise negative except as noted.  [X] Due to altered mental status/intubation, subjective information were not able to be obtained from the patient. History was obtained, to the extent possible, from review of the chart and collateral sources of information.      PHYSICAL EXAM:  Neurological:  Opens eyes  Pupils sluggish  + corneals  + gag  ? Tracks to left  No right gaze  moves left hand spontaneously  Min movement of R hand  Min mvmt of toes B/L  Dressing + bloody d/c    LABS:                        8.7    4.04  )-----------( 54       ( 07 May 2021 09:17 )             25.4     05-07    150<H>  |  118<H>  |  7<L>  ----------------------------<  172<H>  3.7   |  15<L>  |  0.7    Ca    5.7<LL>      07 May 2021 09:17  Phos  1.7     05-07  Mg     1.4     05-07    TPro  5.9<L>  /  Alb  3.1<L>  /  TBili  0.9  /  DBili  x   /  AST  146<H>  /  ALT  47<H>  /  AlkPhos  120<H>  05-06    PT/INR - ( 07 May 2021 09:17 )   PT: 16.20 sec;   INR: 1.41 ratio         PTT - ( 07 May 2021 09:17 )  PTT:27.5 sec    CARDIAC MARKERS ( 07 May 2021 09:17 )  x     / <0.01 ng/mL / 292 U/L / x     / 4.2 ng/mL  CARDIAC MARKERS ( 07 May 2021 04:16 )  x     / <0.01 ng/mL / x     / x     / x          CAPILLARY BLOOD GLUCOSE      POCT Blood Glucose.: 155 mg/dL (07 May 2021 11:45)  POCT Blood Glucose.: 197 mg/dL (07 May 2021 06:07)  POCT Blood Glucose.: 193 mg/dL (07 May 2021 02:30)  POCT Blood Glucose.: 149 mg/dL (06 May 2021 21:53)    Magnesium, Serum: 1.4 mg/dL (05-07-21 @ 09:17)  Magnesium, Serum: 2.0 mg/dL (05-07-21 @ 04:16)  Magnesium, Serum: 1.4 mg/dL (05-07-21 @ 00:59)    Allergies    No Known Allergies    Intolerances      MEDICATIONS:  Antibiotics:  ceFAZolin   IVPB 1000 milliGRAM(s) IV Intermittent every 8 hours    Neuro:  acetaminophen   Tablet .. 650 milliGRAM(s) Oral every 6 hours  fentaNYL    Injectable 25 MICROGram(s) IV Push every 4 hours PRN  levETIRAcetam  IVPB 500 milliGRAM(s) IV Intermittent every 12 hours  propofol Infusion 5 MICROgram(s)/kG/Min IV Continuous <Continuous>      IVF:  sodium chloride 0.9%. 1000 milliLiter(s) IV Continuous <Continuous>      CULTURES:      RADIOLOGY & ADDITIONAL TESTS:  < from: CT Head No Cont (05.07.21 @ 14:10) >  Status post interval left craniectomy for evacuation of the left subdural hematoma with expected postoperative changes. Interval resolution of the midline shift.    Residual extra-axial blood products along the left craniectomy bed, right frontal convexity, and right middle cranial fossa convexity. Stable subdural hemorrhage along the cerebral falx and left tentorium.    New trace scattered subarachnoid hemorrhage along the right cerebral sulci and right cerebellar sulci.    New intraventricular hemorrhage in the bilateral occipital horns.    < end of copied text >      ASSESSMENT:  45y Male s/p    SUBDURAL HEMATOMA;RESPIRATORY FAILURE;FALL    ^SUBDURAL HEMATOMA;RESPIRATORY FAILURE;FALL    No pertinent family history in first degree relatives    Family history of essential hypertension (Father)    Handoff    MEWS Score    No pertinent past medical history    Alcohol abuse    GERD (gastroesophageal reflux disease)    ETOH abuse    Hypomagnesemia    Seizure disorder    Subdural hematoma    S/P subdural hematoma evacuation    Subdural hematoma    Subdural hematoma    Insertion of non-tunneled central venous catheter (CVC) in patient age 5 years of age or older    Craniotomy, decompressive    No significant past surgical history    H/O hemorrhoidectomy    FALL    22    Respiratory failure    Fall    [email or web address]        PLAN:  Platelets - unit given  ? DDAVP  Neuro checks q 1hr  Monitor Labs

## 2021-05-08 LAB
ANION GAP SERPL CALC-SCNC: 11 MMOL/L — SIGNIFICANT CHANGE UP (ref 7–14)
ANION GAP SERPL CALC-SCNC: 12 MMOL/L — SIGNIFICANT CHANGE UP (ref 7–14)
ANION GAP SERPL CALC-SCNC: 9 MMOL/L — SIGNIFICANT CHANGE UP (ref 7–14)
APTT BLD: 27.3 SEC — SIGNIFICANT CHANGE UP (ref 27–39.2)
APTT BLD: 27.3 SEC — SIGNIFICANT CHANGE UP (ref 27–39.2)
BASE EXCESS BLDA CALC-SCNC: 2.6 MMOL/L — HIGH (ref -2–2)
BUN SERPL-MCNC: 12 MG/DL — SIGNIFICANT CHANGE UP (ref 10–20)
BUN SERPL-MCNC: 14 MG/DL — SIGNIFICANT CHANGE UP (ref 10–20)
BUN SERPL-MCNC: 15 MG/DL — SIGNIFICANT CHANGE UP (ref 10–20)
CALCIUM SERPL-MCNC: 7.1 MG/DL — LOW (ref 8.5–10.1)
CALCIUM SERPL-MCNC: 7.2 MG/DL — LOW (ref 8.5–10.1)
CALCIUM SERPL-MCNC: 7.4 MG/DL — LOW (ref 8.5–10.1)
CHLORIDE SERPL-SCNC: 114 MMOL/L — HIGH (ref 98–110)
CHLORIDE SERPL-SCNC: 116 MMOL/L — HIGH (ref 98–110)
CHLORIDE SERPL-SCNC: 117 MMOL/L — HIGH (ref 98–110)
CO2 SERPL-SCNC: 20 MMOL/L — SIGNIFICANT CHANGE UP (ref 17–32)
CO2 SERPL-SCNC: 21 MMOL/L — SIGNIFICANT CHANGE UP (ref 17–32)
CO2 SERPL-SCNC: 22 MMOL/L — SIGNIFICANT CHANGE UP (ref 17–32)
CREAT SERPL-MCNC: 0.6 MG/DL — LOW (ref 0.7–1.5)
CREAT SERPL-MCNC: 0.6 MG/DL — LOW (ref 0.7–1.5)
CREAT SERPL-MCNC: 0.7 MG/DL — SIGNIFICANT CHANGE UP (ref 0.7–1.5)
GLUCOSE BLDC GLUCOMTR-MCNC: 106 MG/DL — HIGH (ref 70–99)
GLUCOSE BLDC GLUCOMTR-MCNC: 126 MG/DL — HIGH (ref 70–99)
GLUCOSE BLDC GLUCOMTR-MCNC: 130 MG/DL — HIGH (ref 70–99)
GLUCOSE BLDC GLUCOMTR-MCNC: 140 MG/DL — HIGH (ref 70–99)
GLUCOSE BLDC GLUCOMTR-MCNC: 148 MG/DL — HIGH (ref 70–99)
GLUCOSE SERPL-MCNC: 124 MG/DL — HIGH (ref 70–99)
GLUCOSE SERPL-MCNC: 146 MG/DL — HIGH (ref 70–99)
GLUCOSE SERPL-MCNC: 170 MG/DL — HIGH (ref 70–99)
HCO3 BLDA-SCNC: 26 MMOL/L — SIGNIFICANT CHANGE UP (ref 23–27)
HCT VFR BLD CALC: 26.6 % — LOW (ref 42–52)
HCT VFR BLD CALC: 28.4 % — LOW (ref 42–52)
HCT VFR BLD CALC: 28.6 % — LOW (ref 42–52)
HGB BLD-MCNC: 8.7 G/DL — LOW (ref 14–18)
HGB BLD-MCNC: 9.5 G/DL — LOW (ref 14–18)
HGB BLD-MCNC: 9.6 G/DL — LOW (ref 14–18)
INR BLD: 1.26 RATIO — SIGNIFICANT CHANGE UP (ref 0.65–1.3)
INR BLD: 1.27 RATIO — SIGNIFICANT CHANGE UP (ref 0.65–1.3)
MAGNESIUM SERPL-MCNC: 2 MG/DL — SIGNIFICANT CHANGE UP (ref 1.8–2.4)
MCHC RBC-ENTMCNC: 29.9 PG — SIGNIFICANT CHANGE UP (ref 27–31)
MCHC RBC-ENTMCNC: 30.2 PG — SIGNIFICANT CHANGE UP (ref 27–31)
MCHC RBC-ENTMCNC: 30.4 PG — SIGNIFICANT CHANGE UP (ref 27–31)
MCHC RBC-ENTMCNC: 32.7 G/DL — SIGNIFICANT CHANGE UP (ref 32–37)
MCHC RBC-ENTMCNC: 33.5 G/DL — SIGNIFICANT CHANGE UP (ref 32–37)
MCHC RBC-ENTMCNC: 33.6 G/DL — SIGNIFICANT CHANGE UP (ref 32–37)
MCV RBC AUTO: 89.1 FL — SIGNIFICANT CHANGE UP (ref 80–94)
MCV RBC AUTO: 91 FL — SIGNIFICANT CHANGE UP (ref 80–94)
MCV RBC AUTO: 92.4 FL — SIGNIFICANT CHANGE UP (ref 80–94)
NRBC # BLD: 0 /100 WBCS — SIGNIFICANT CHANGE UP (ref 0–0)
PCO2 BLDA: 33 MMHG — LOW (ref 38–42)
PH BLDA: 7.5 — HIGH (ref 7.38–7.42)
PHOSPHATE SERPL-MCNC: 3.1 MG/DL — SIGNIFICANT CHANGE UP (ref 2.1–4.9)
PHOSPHATE SERPL-MCNC: 3.2 MG/DL — SIGNIFICANT CHANGE UP (ref 2.1–4.9)
PHOSPHATE SERPL-MCNC: 4 MG/DL — SIGNIFICANT CHANGE UP (ref 2.1–4.9)
PLATELET # BLD AUTO: 67 K/UL — LOW (ref 130–400)
PLATELET # BLD AUTO: 77 K/UL — LOW (ref 130–400)
PLATELET # BLD AUTO: 81 K/UL — LOW (ref 130–400)
PO2 BLDA: 196 MMHG — HIGH (ref 78–95)
POTASSIUM SERPL-MCNC: 3.9 MMOL/L — SIGNIFICANT CHANGE UP (ref 3.5–5)
POTASSIUM SERPL-MCNC: 4.2 MMOL/L — SIGNIFICANT CHANGE UP (ref 3.5–5)
POTASSIUM SERPL-MCNC: 4.3 MMOL/L — SIGNIFICANT CHANGE UP (ref 3.5–5)
POTASSIUM SERPL-SCNC: 3.9 MMOL/L — SIGNIFICANT CHANGE UP (ref 3.5–5)
POTASSIUM SERPL-SCNC: 4.2 MMOL/L — SIGNIFICANT CHANGE UP (ref 3.5–5)
POTASSIUM SERPL-SCNC: 4.3 MMOL/L — SIGNIFICANT CHANGE UP (ref 3.5–5)
PROTHROM AB SERPL-ACNC: 14.5 SEC — HIGH (ref 9.95–12.87)
PROTHROM AB SERPL-ACNC: 14.6 SEC — HIGH (ref 9.95–12.87)
RBC # BLD: 2.88 M/UL — LOW (ref 4.7–6.1)
RBC # BLD: 3.12 M/UL — LOW (ref 4.7–6.1)
RBC # BLD: 3.21 M/UL — LOW (ref 4.7–6.1)
RBC # FLD: 16.8 % — HIGH (ref 11.5–14.5)
SAO2 % BLDA: 100 % — HIGH (ref 94–98)
SODIUM SERPL-SCNC: 146 MMOL/L — SIGNIFICANT CHANGE UP (ref 135–146)
SODIUM SERPL-SCNC: 148 MMOL/L — HIGH (ref 135–146)
SODIUM SERPL-SCNC: 148 MMOL/L — HIGH (ref 135–146)
WBC # BLD: 6.41 K/UL — SIGNIFICANT CHANGE UP (ref 4.8–10.8)
WBC # BLD: 6.87 K/UL — SIGNIFICANT CHANGE UP (ref 4.8–10.8)
WBC # BLD: 7.07 K/UL — SIGNIFICANT CHANGE UP (ref 4.8–10.8)
WBC # FLD AUTO: 6.41 K/UL — SIGNIFICANT CHANGE UP (ref 4.8–10.8)
WBC # FLD AUTO: 6.87 K/UL — SIGNIFICANT CHANGE UP (ref 4.8–10.8)
WBC # FLD AUTO: 7.07 K/UL — SIGNIFICANT CHANGE UP (ref 4.8–10.8)

## 2021-05-08 PROCEDURE — 71045 X-RAY EXAM CHEST 1 VIEW: CPT | Mod: 26

## 2021-05-08 PROCEDURE — 93306 TTE W/DOPPLER COMPLETE: CPT | Mod: 26

## 2021-05-08 PROCEDURE — 99291 CRITICAL CARE FIRST HOUR: CPT

## 2021-05-08 RX ORDER — ACETAMINOPHEN 500 MG
650 TABLET ORAL EVERY 6 HOURS
Refills: 0 | Status: DISCONTINUED | OUTPATIENT
Start: 2021-05-08 | End: 2021-05-21

## 2021-05-08 RX ORDER — SENNA PLUS 8.6 MG/1
2 TABLET ORAL AT BEDTIME
Refills: 0 | Status: DISCONTINUED | OUTPATIENT
Start: 2021-05-08 | End: 2021-05-19

## 2021-05-08 RX ORDER — ACETAMINOPHEN 500 MG
650 TABLET ORAL EVERY 6 HOURS
Refills: 0 | Status: DISCONTINUED | OUTPATIENT
Start: 2021-05-08 | End: 2021-05-08

## 2021-05-08 RX ORDER — PANTOPRAZOLE SODIUM 20 MG/1
40 TABLET, DELAYED RELEASE ORAL DAILY
Refills: 0 | Status: DISCONTINUED | OUTPATIENT
Start: 2021-05-09 | End: 2021-05-13

## 2021-05-08 RX ORDER — POTASSIUM PHOSPHATE, MONOBASIC POTASSIUM PHOSPHATE, DIBASIC 236; 224 MG/ML; MG/ML
15 INJECTION, SOLUTION INTRAVENOUS ONCE
Refills: 0 | Status: COMPLETED | OUTPATIENT
Start: 2021-05-08 | End: 2021-05-08

## 2021-05-08 RX ADMIN — Medication 650 MILLIGRAM(S): at 00:29

## 2021-05-08 RX ADMIN — SENNA PLUS 2 TABLET(S): 8.6 TABLET ORAL at 21:56

## 2021-05-08 RX ADMIN — FENTANYL CITRATE 25 MICROGRAM(S): 50 INJECTION INTRAVENOUS at 21:00

## 2021-05-08 RX ADMIN — PROPOFOL 2.38 MICROGRAM(S)/KG/MIN: 10 INJECTION, EMULSION INTRAVENOUS at 06:37

## 2021-05-08 RX ADMIN — PROPOFOL 2.38 MICROGRAM(S)/KG/MIN: 10 INJECTION, EMULSION INTRAVENOUS at 16:10

## 2021-05-08 RX ADMIN — POTASSIUM PHOSPHATE, MONOBASIC POTASSIUM PHOSPHATE, DIBASIC 62.5 MILLIMOLE(S): 236; 224 INJECTION, SOLUTION INTRAVENOUS at 06:38

## 2021-05-08 RX ADMIN — LEVETIRACETAM 420 MILLIGRAM(S): 250 TABLET, FILM COATED ORAL at 05:31

## 2021-05-08 RX ADMIN — PROPOFOL 2.38 MICROGRAM(S)/KG/MIN: 10 INJECTION, EMULSION INTRAVENOUS at 12:25

## 2021-05-08 RX ADMIN — SODIUM CHLORIDE 120 MILLILITER(S): 9 INJECTION INTRAMUSCULAR; INTRAVENOUS; SUBCUTANEOUS at 14:31

## 2021-05-08 RX ADMIN — DESMOPRESSIN ACETATE 220 MICROGRAM(S): 0.1 TABLET ORAL at 00:23

## 2021-05-08 RX ADMIN — FENTANYL CITRATE 25 MICROGRAM(S): 50 INJECTION INTRAVENOUS at 20:30

## 2021-05-08 RX ADMIN — PROPOFOL 2.38 MICROGRAM(S)/KG/MIN: 10 INJECTION, EMULSION INTRAVENOUS at 02:55

## 2021-05-08 RX ADMIN — LEVETIRACETAM 420 MILLIGRAM(S): 250 TABLET, FILM COATED ORAL at 17:38

## 2021-05-08 RX ADMIN — Medication 650 MILLIGRAM(S): at 05:30

## 2021-05-08 RX ADMIN — PROPOFOL 2.38 MICROGRAM(S)/KG/MIN: 10 INJECTION, EMULSION INTRAVENOUS at 20:30

## 2021-05-08 RX ADMIN — FENTANYL CITRATE 25 MICROGRAM(S): 50 INJECTION INTRAVENOUS at 00:38

## 2021-05-08 RX ADMIN — CHLORHEXIDINE GLUCONATE 1 APPLICATION(S): 213 SOLUTION TOPICAL at 05:31

## 2021-05-08 NOTE — PROGRESS NOTE ADULT - SUBJECTIVE AND OBJECTIVE BOX
Subjective: 45yMale with a pmhx of SUBDURAL HEMATOMA;RESPIRATORY FAILURE;FALL    ^SUBDURAL HEMATOMA;RESPIRATORY FAILURE;FALL    No pertinent family history in first degree relatives    Family history of essential hypertension (Father)    Handoff    MEWS Score    No pertinent past medical history    Alcohol abuse    GERD (gastroesophageal reflux disease)    ETOH abuse    Hypomagnesemia    Seizure disorder    Subdural hematoma    S/P subdural hematoma evacuation    Subdural hematoma    Subdural hematoma    Insertion of non-tunneled central venous catheter (CVC) in patient age 5 years of age or older    Craniotomy, decompressive    No significant past surgical history    H/O hemorrhoidectomy    FALL    23    Respiratory failure    Fall    SysAdmin_VisitLink        POD#2  S/P Left Craniectomy for evac of SDH    Pt seen and examined at bedside with Dr Norton. Pt remains intubated, sedated on Propofol.  Not following commands.  Attempts to open eyes to noxious stimuli.  Head dressing saturated.      Allergies    No Known Allergies    Intolerances        Imaging: < from: CT Head No Cont (05.07.21 @ 14:10) >  IMPRESSION:  Status post interval left craniectomy for evacuation of the left subdural hematoma with expected postoperative changes. Interval resolution of the midline shift.  Residual extra-axial blood products along the left craniectomy bed, right frontal convexity, and right middle cranial fossa convexity. Stable subdural hemorrhage along the cerebral falx and left tentorium.  New trace scattered subarachnoid hemorrhage along the right cerebral sulci and right cerebellar sulci.  New intraventricular hemorrhage in the bilateral occipital horns.  < end of copied text >      Vital Signs Last 24 Hrs  T(C): 36.6 (08 May 2021 08:08), Max: 38.1 (07 May 2021 14:46)  T(F): 97.9 (08 May 2021 08:08), Max: 100.5 (07 May 2021 14:46)  HR: 100 (08 May 2021 12:00) (92 - 136)  BP: --  BP(mean): --  RR: 19 (08 May 2021 12:00) (17 - 24)  SpO2: 100% (08 May 2021 12:00) (100% - 100%)      acetaminophen   Tablet .. 650 milliGRAM(s) Oral every 6 hours PRN  chlorhexidine 4% Liquid 1 Application(s) Topical <User Schedule>  fentaNYL    Injectable 25 MICROGram(s) IV Push every 4 hours PRN  insulin lispro (ADMELOG) corrective regimen sliding scale   SubCutaneous every 6 hours  levETIRAcetam  IVPB 500 milliGRAM(s) IV Intermittent every 12 hours  propofol Infusion 5 MICROgram(s)/kG/Min IV Continuous <Continuous>  senna 2 Tablet(s) Oral at bedtime  sodium chloride 0.9%. 1000 milliLiter(s) IV Continuous <Continuous>        05-07-21 @ 07:01  -  05-08-21 @ 07:00  --------------------------------------------------------  IN: 4871.8 mL / OUT: 1120 mL / NET: 3751.8 mL    05-08-21 @ 07:01  -  05-08-21 @ 12:39  --------------------------------------------------------  IN: 848.1 mL / OUT: 276 mL / NET: 572.1 mL        REVIEW OF SYSTEMS    [ ] A ten-point review of systems was otherwise negative except as noted.  [x ] Due to altered mental status/intubation, subjective information were not able to be obtained from the patient. History was obtained, to the extent possible, from review of the chart and collateral sources of information.      Neuro Exam:  Intubated, sedated on Propofol  attempts to open eyes to pain  not following commands  pupils equal, briskly reactive  +corneals  +gag  minimal w/d LUE  moves LLE to pain  no movt R side            CBC Full  -  ( 08 May 2021 11:40 )  WBC Count : 6.41 K/uL  RBC Count : 2.88 M/uL  Hemoglobin : 8.7 g/dL  Hematocrit : 26.6 %  Platelet Count - Automated : 77 K/uL  Mean Cell Volume : 92.4 fL  Mean Cell Hemoglobin : 30.2 pg  Mean Cell Hemoglobin Concentration : 32.7 g/dL  Auto Neutrophil # : x  Auto Lymphocyte # : x  Auto Monocyte # : x  Auto Eosinophil # : x  Auto Basophil # : x  Auto Neutrophil % : x  Auto Lymphocyte % : x  Auto Monocyte % : x  Auto Eosinophil % : x  Auto Basophil % : x    05-08    148<H>  |  117<H>  |  15  ----------------------------<  124<H>  4.3   |  22  |  0.6<L>    Ca    7.4<L>      08 May 2021 11:40  Phos  4.0     05-08  Mg     2.0     05-08    TPro  5.9<L>  /  Alb  3.1<L>  /  TBili  0.9  /  DBili  x   /  AST  146<H>  /  ALT  47<H>  /  AlkPhos  120<H>  05-06    PT/INR - ( 08 May 2021 04:25 )   PT: 14.50 sec;   INR: 1.26 ratio         PTT - ( 08 May 2021 04:25 )  PTT:27.3 sec        Assessment/Plan:   will d/w attg       Subjective: 45yMale with a pmhx of SUBDURAL HEMATOMA;RESPIRATORY FAILURE;FALL    ^SUBDURAL HEMATOMA;RESPIRATORY FAILURE;FALL    No pertinent family history in first degree relatives    Family history of essential hypertension (Father)    Handoff    MEWS Score    No pertinent past medical history    Alcohol abuse    GERD (gastroesophageal reflux disease)    ETOH abuse    Hypomagnesemia    Seizure disorder    Subdural hematoma    S/P subdural hematoma evacuation    Subdural hematoma    Subdural hematoma    Insertion of non-tunneled central venous catheter (CVC) in patient age 5 years of age or older    Craniotomy, decompressive    No significant past surgical history    H/O hemorrhoidectomy    FALL    23    Respiratory failure    Fall    SysAdmin_VisitLink        POD#2  S/P Left Craniectomy for evac of SDH    Pt seen and examined at bedside with Dr Norton. Pt remains intubated, sedated on Propofol.  Not following commands.  Attempts to open eyes to noxious stimuli.  Head dressing saturated.      Allergies    No Known Allergies    Intolerances        Imaging: < from: CT Head No Cont (05.07.21 @ 14:10) >  IMPRESSION:  Status post interval left craniectomy for evacuation of the left subdural hematoma with expected postoperative changes. Interval resolution of the midline shift.  Residual extra-axial blood products along the left craniectomy bed, right frontal convexity, and right middle cranial fossa convexity. Stable subdural hemorrhage along the cerebral falx and left tentorium.  New trace scattered subarachnoid hemorrhage along the right cerebral sulci and right cerebellar sulci.  New intraventricular hemorrhage in the bilateral occipital horns.  < end of copied text >      Vital Signs Last 24 Hrs  T(C): 36.6 (08 May 2021 08:08), Max: 38.1 (07 May 2021 14:46)  T(F): 97.9 (08 May 2021 08:08), Max: 100.5 (07 May 2021 14:46)  HR: 100 (08 May 2021 12:00) (92 - 136)  BP: --  BP(mean): --  RR: 19 (08 May 2021 12:00) (17 - 24)  SpO2: 100% (08 May 2021 12:00) (100% - 100%)      acetaminophen   Tablet .. 650 milliGRAM(s) Oral every 6 hours PRN  chlorhexidine 4% Liquid 1 Application(s) Topical <User Schedule>  fentaNYL    Injectable 25 MICROGram(s) IV Push every 4 hours PRN  insulin lispro (ADMELOG) corrective regimen sliding scale   SubCutaneous every 6 hours  levETIRAcetam  IVPB 500 milliGRAM(s) IV Intermittent every 12 hours  propofol Infusion 5 MICROgram(s)/kG/Min IV Continuous <Continuous>  senna 2 Tablet(s) Oral at bedtime  sodium chloride 0.9%. 1000 milliLiter(s) IV Continuous <Continuous>        05-07-21 @ 07:01  -  05-08-21 @ 07:00  --------------------------------------------------------  IN: 4871.8 mL / OUT: 1120 mL / NET: 3751.8 mL    05-08-21 @ 07:01  -  05-08-21 @ 12:39  --------------------------------------------------------  IN: 848.1 mL / OUT: 276 mL / NET: 572.1 mL        REVIEW OF SYSTEMS    [ ] A ten-point review of systems was otherwise negative except as noted.  [x ] Due to altered mental status/intubation, subjective information were not able to be obtained from the patient. History was obtained, to the extent possible, from review of the chart and collateral sources of information.      Neuro Exam:  Intubated, sedated on Propofol  attempts to open eyes to pain  not following commands  pupils equal, briskly reactive  +corneals  +gag  minimal w/d LUE  moves LLE to pain  no movt R side            CBC Full  -  ( 08 May 2021 11:40 )  WBC Count : 6.41 K/uL  RBC Count : 2.88 M/uL  Hemoglobin : 8.7 g/dL  Hematocrit : 26.6 %  Platelet Count - Automated : 77 K/uL  Mean Cell Volume : 92.4 fL  Mean Cell Hemoglobin : 30.2 pg  Mean Cell Hemoglobin Concentration : 32.7 g/dL  Auto Neutrophil # : x  Auto Lymphocyte # : x  Auto Monocyte # : x  Auto Eosinophil # : x  Auto Basophil # : x  Auto Neutrophil % : x  Auto Lymphocyte % : x  Auto Monocyte % : x  Auto Eosinophil % : x  Auto Basophil % : x    05-08    148<H>  |  117<H>  |  15  ----------------------------<  124<H>  4.3   |  22  |  0.6<L>    Ca    7.4<L>      08 May 2021 11:40  Phos  4.0     05-08  Mg     2.0     05-08    TPro  5.9<L>  /  Alb  3.1<L>  /  TBili  0.9  /  DBili  x   /  AST  146<H>  /  ALT  47<H>  /  AlkPhos  120<H>  05-06    PT/INR - ( 08 May 2021 04:25 )   PT: 14.50 sec;   INR: 1.26 ratio         PTT - ( 08 May 2021 04:25 )  PTT:27.3 sec        Assessment/Plan:   dressing saturated-wound stapled for oozing  hold SQH for now- plts 77  keep Na 145-150  maintain SBP<140  neuro checks q 1hr  cont SICU care  d/w attg

## 2021-05-08 NOTE — PROGRESS NOTE ADULT - ASSESSMENT
Assessment & Plan    45y Male 1d s/p fall with large left SDH and neurological status change, level 1 for emergent craniotomy    NEURO:  Hx of EtOH abuse and prior Delerium Tremens episodes  GCS 6T  S/P craniotomy for large left SDH  Propofol started, D/C Precedex  RASS -5  | Unarousable  | No response to voice or physical stimulation   3% NS at 30ml/hr- q6hr BMP and Serum Osmolality  Head of bed @ 30 degrees  Repeat Head CT complete  Keppra 500mg BID    RESP:    Intubated, ventilator settings: 450/16/60/5  AB.47/29/181/21      CARDS:   S/P cardiac arrest in OR & MTP  Hypotension- norepinephrine Infusion 0.05 MICROgram(s)/kG/Min IV Continuous <Continuous>  Goal MAP > 65mmHg  IVF NS@120ml/h  trop <0.01 x 3    GI/NUTR:   Diet, NPO    GI Prophylaxis-Protonix IV    Bowel regimen-none currently      /RENAL:   Cole in place    Strict I/O-  IVF NS 120cc/h  Labs:          BUN/Cr- 10/0.8            Electrolytes-Na 141 // K 3.6 // Mg 2.0  Phos 4.1  Replete Electrolytes PRN      HEME/ONC:   S/P massive transfusion protocol  4uPRBCs 2FFP 2 platelet intraop    DVT prophylaxis-holding    Acute blood loss anemia-  H/H - 8.7/25.4  Plt 111> 94>54> 55, 1 unit of PLT, 81, 2nd unit PLT      ID:  elder op ancef  monitor for leukocytosis, fever  WBC 6.8    ENDO:  q6 POC glucose  Sliding scale insulin    LINES/DRAINS:  PIV, [place], Cole, Right Femoral TLC    DISPO:    SICU

## 2021-05-08 NOTE — PROGRESS NOTE ADULT - ATTENDING COMMENTS
45y Male 1d s/p fall with large left SDH and neurological status change, level 1 for emergent craniotomy      GCS 7T, E- 2, M- 4 LUE localizes   S/P craniotomy for large left SDH  Propofol started, D/C Precedex  dc 3% na , ns 125  Head of bed @ 30 degrees  Repeat Head CT complete  Keppra 500mg BID    RESP:    Intubated, ventilator settings: 450/16/60/5  AB.47/33/181/21        S/P cardiac arrest in OR & MTP  Hypotension- norepinephrine Infusion 0.05 MICROgram(s)/kG/Min IV Continuous <Continuous>  Goal MAP > 65mmHg  IVF NS@120ml/h  trop <0.01 x 3       Diet- TF started    GI Prophylaxis-Protonix IV     IVF NS 120cc/h- DC ivF                 Electrolytes-Na 141 // K 3.6 // Mg 2.0  Phos 4.1  Replete Electrolytes PRN       S/P massive transfusion protocol  4uPRBCs 2FFP 2 platelet intraop    DVT prophylaxis-holding -> still bleeding from incision     Acute blood loss anemia-  H/H - 8.7/25.4  Plt 111> 94>54> 55, 1 unit of PLT, 67, 2nd unit PLT- > 11am       elder op ancef completed       q6 POC glucose  Sliding scale insulin    LINES/DRAINS:  GIOVANI, [place], Cole, Right Femoral TLC    DISPO:    SICU management

## 2021-05-09 LAB
ANION GAP SERPL CALC-SCNC: 8 MMOL/L — SIGNIFICANT CHANGE UP (ref 7–14)
BASE EXCESS BLDA CALC-SCNC: 4.3 MMOL/L — HIGH (ref -2–2)
BUN SERPL-MCNC: 13 MG/DL — SIGNIFICANT CHANGE UP (ref 10–20)
CALCIUM SERPL-MCNC: 7.3 MG/DL — LOW (ref 8.5–10.1)
CHLORIDE SERPL-SCNC: 114 MMOL/L — HIGH (ref 98–110)
CO2 SERPL-SCNC: 22 MMOL/L — SIGNIFICANT CHANGE UP (ref 17–32)
CREAT SERPL-MCNC: 0.5 MG/DL — LOW (ref 0.7–1.5)
GLUCOSE BLDC GLUCOMTR-MCNC: 113 MG/DL — HIGH (ref 70–99)
GLUCOSE BLDC GLUCOMTR-MCNC: 116 MG/DL — HIGH (ref 70–99)
GLUCOSE BLDC GLUCOMTR-MCNC: 126 MG/DL — HIGH (ref 70–99)
GLUCOSE BLDC GLUCOMTR-MCNC: 132 MG/DL — HIGH (ref 70–99)
GLUCOSE SERPL-MCNC: 145 MG/DL — HIGH (ref 70–99)
HCO3 BLDA-SCNC: 28 MMOL/L — HIGH (ref 23–27)
HCT VFR BLD CALC: 23.4 % — LOW (ref 42–52)
HCT VFR BLD CALC: 23.7 % — LOW (ref 42–52)
HCT VFR BLD CALC: 23.8 % — LOW (ref 42–52)
HCT VFR BLD CALC: 24.9 % — LOW (ref 42–52)
HGB BLD-MCNC: 7.6 G/DL — LOW (ref 14–18)
HGB BLD-MCNC: 7.9 G/DL — LOW (ref 14–18)
HGB BLD-MCNC: 7.9 G/DL — LOW (ref 14–18)
HGB BLD-MCNC: 8 G/DL — LOW (ref 14–18)
MAGNESIUM SERPL-MCNC: 2 MG/DL — SIGNIFICANT CHANGE UP (ref 1.8–2.4)
MCHC RBC-ENTMCNC: 29.6 PG — SIGNIFICANT CHANGE UP (ref 27–31)
MCHC RBC-ENTMCNC: 29.9 PG — SIGNIFICANT CHANGE UP (ref 27–31)
MCHC RBC-ENTMCNC: 29.9 PG — SIGNIFICANT CHANGE UP (ref 27–31)
MCHC RBC-ENTMCNC: 30.6 PG — SIGNIFICANT CHANGE UP (ref 27–31)
MCHC RBC-ENTMCNC: 32.1 G/DL — SIGNIFICANT CHANGE UP (ref 32–37)
MCHC RBC-ENTMCNC: 32.5 G/DL — SIGNIFICANT CHANGE UP (ref 32–37)
MCHC RBC-ENTMCNC: 33.2 G/DL — SIGNIFICANT CHANGE UP (ref 32–37)
MCHC RBC-ENTMCNC: 33.3 G/DL — SIGNIFICANT CHANGE UP (ref 32–37)
MCV RBC AUTO: 90.2 FL — SIGNIFICANT CHANGE UP (ref 80–94)
MCV RBC AUTO: 91.9 FL — SIGNIFICANT CHANGE UP (ref 80–94)
MCV RBC AUTO: 92.1 FL — SIGNIFICANT CHANGE UP (ref 80–94)
MCV RBC AUTO: 92.2 FL — SIGNIFICANT CHANGE UP (ref 80–94)
NRBC # BLD: 0 /100 WBCS — SIGNIFICANT CHANGE UP (ref 0–0)
PCO2 BLDA: 35 MMHG — LOW (ref 38–42)
PH BLDA: 7.51 — HIGH (ref 7.38–7.42)
PHOSPHATE SERPL-MCNC: 2 MG/DL — LOW (ref 2.1–4.9)
PLATELET # BLD AUTO: 103 K/UL — LOW (ref 130–400)
PLATELET # BLD AUTO: 71 K/UL — LOW (ref 130–400)
PLATELET # BLD AUTO: 79 K/UL — LOW (ref 130–400)
PLATELET # BLD AUTO: 85 K/UL — LOW (ref 130–400)
PO2 BLDA: 148 MMHG — HIGH (ref 78–95)
POTASSIUM SERPL-MCNC: 3.8 MMOL/L — SIGNIFICANT CHANGE UP (ref 3.5–5)
POTASSIUM SERPL-SCNC: 3.8 MMOL/L — SIGNIFICANT CHANGE UP (ref 3.5–5)
RBC # BLD: 2.54 M/UL — LOW (ref 4.7–6.1)
RBC # BLD: 2.58 M/UL — LOW (ref 4.7–6.1)
RBC # BLD: 2.64 M/UL — LOW (ref 4.7–6.1)
RBC # BLD: 2.7 M/UL — LOW (ref 4.7–6.1)
RBC # FLD: 15.5 % — HIGH (ref 11.5–14.5)
RBC # FLD: 16.1 % — HIGH (ref 11.5–14.5)
RBC # FLD: 16.3 % — HIGH (ref 11.5–14.5)
RBC # FLD: 16.3 % — HIGH (ref 11.5–14.5)
SAO2 % BLDA: 100 % — HIGH (ref 94–98)
SODIUM SERPL-SCNC: 144 MMOL/L — SIGNIFICANT CHANGE UP (ref 135–146)
WBC # BLD: 3.57 K/UL — LOW (ref 4.8–10.8)
WBC # BLD: 4.17 K/UL — LOW (ref 4.8–10.8)
WBC # BLD: 4.85 K/UL — SIGNIFICANT CHANGE UP (ref 4.8–10.8)
WBC # BLD: 4.98 K/UL — SIGNIFICANT CHANGE UP (ref 4.8–10.8)
WBC # FLD AUTO: 3.57 K/UL — LOW (ref 4.8–10.8)
WBC # FLD AUTO: 4.17 K/UL — LOW (ref 4.8–10.8)
WBC # FLD AUTO: 4.85 K/UL — SIGNIFICANT CHANGE UP (ref 4.8–10.8)
WBC # FLD AUTO: 4.98 K/UL — SIGNIFICANT CHANGE UP (ref 4.8–10.8)

## 2021-05-09 PROCEDURE — 71045 X-RAY EXAM CHEST 1 VIEW: CPT | Mod: 26

## 2021-05-09 PROCEDURE — 99222 1ST HOSP IP/OBS MODERATE 55: CPT

## 2021-05-09 PROCEDURE — 99291 CRITICAL CARE FIRST HOUR: CPT

## 2021-05-09 RX ORDER — CHLORHEXIDINE GLUCONATE 213 G/1000ML
15 SOLUTION TOPICAL
Refills: 0 | Status: DISCONTINUED | OUTPATIENT
Start: 2021-05-09 | End: 2021-05-24

## 2021-05-09 RX ORDER — POTASSIUM PHOSPHATE, MONOBASIC POTASSIUM PHOSPHATE, DIBASIC 236; 224 MG/ML; MG/ML
15 INJECTION, SOLUTION INTRAVENOUS ONCE
Refills: 0 | Status: COMPLETED | OUTPATIENT
Start: 2021-05-09 | End: 2021-05-09

## 2021-05-09 RX ORDER — HEPARIN SODIUM 5000 [USP'U]/ML
5000 INJECTION INTRAVENOUS; SUBCUTANEOUS EVERY 8 HOURS
Refills: 0 | Status: DISCONTINUED | OUTPATIENT
Start: 2021-05-09 | End: 2021-05-15

## 2021-05-09 RX ADMIN — PROPOFOL 2.38 MICROGRAM(S)/KG/MIN: 10 INJECTION, EMULSION INTRAVENOUS at 17:17

## 2021-05-09 RX ADMIN — PROPOFOL 2.38 MICROGRAM(S)/KG/MIN: 10 INJECTION, EMULSION INTRAVENOUS at 11:54

## 2021-05-09 RX ADMIN — LEVETIRACETAM 420 MILLIGRAM(S): 250 TABLET, FILM COATED ORAL at 17:17

## 2021-05-09 RX ADMIN — POTASSIUM PHOSPHATE, MONOBASIC POTASSIUM PHOSPHATE, DIBASIC 62.5 MILLIMOLE(S): 236; 224 INJECTION, SOLUTION INTRAVENOUS at 02:11

## 2021-05-09 RX ADMIN — HEPARIN SODIUM 5000 UNIT(S): 5000 INJECTION INTRAVENOUS; SUBCUTANEOUS at 16:42

## 2021-05-09 RX ADMIN — FENTANYL CITRATE 25 MICROGRAM(S): 50 INJECTION INTRAVENOUS at 00:37

## 2021-05-09 RX ADMIN — CHLORHEXIDINE GLUCONATE 1 APPLICATION(S): 213 SOLUTION TOPICAL at 06:16

## 2021-05-09 RX ADMIN — PROPOFOL 2.38 MICROGRAM(S)/KG/MIN: 10 INJECTION, EMULSION INTRAVENOUS at 16:42

## 2021-05-09 RX ADMIN — PANTOPRAZOLE SODIUM 40 MILLIGRAM(S): 20 TABLET, DELAYED RELEASE ORAL at 11:56

## 2021-05-09 RX ADMIN — FENTANYL CITRATE 25 MICROGRAM(S): 50 INJECTION INTRAVENOUS at 01:41

## 2021-05-09 RX ADMIN — HEPARIN SODIUM 5000 UNIT(S): 5000 INJECTION INTRAVENOUS; SUBCUTANEOUS at 11:54

## 2021-05-09 RX ADMIN — LEVETIRACETAM 420 MILLIGRAM(S): 250 TABLET, FILM COATED ORAL at 06:16

## 2021-05-09 RX ADMIN — PROPOFOL 2.38 MICROGRAM(S)/KG/MIN: 10 INJECTION, EMULSION INTRAVENOUS at 00:21

## 2021-05-09 RX ADMIN — HEPARIN SODIUM 5000 UNIT(S): 5000 INJECTION INTRAVENOUS; SUBCUTANEOUS at 21:18

## 2021-05-09 RX ADMIN — SENNA PLUS 2 TABLET(S): 8.6 TABLET ORAL at 21:19

## 2021-05-09 NOTE — PROGRESS NOTE ADULT - SUBJECTIVE AND OBJECTIVE BOX
Subjective: 45yMale with a pmhx of SUBDURAL HEMATOMA;RESPIRATORY FAILURE;FALL    ^FALL    No pertinent family history in first degree relatives    Family history of essential hypertension (Father)    Handoff    MEWS Score    No pertinent past medical history    Alcohol abuse    GERD (gastroesophageal reflux disease)    ETOH abuse    Hypomagnesemia    Seizure disorder    Subdural hematoma    S/P subdural hematoma evacuation    Subdural hematoma    Subdural hematoma    Insertion of non-tunneled central venous catheter (CVC) in patient age 5 years of age or older    Craniotomy, decompressive    No significant past surgical history    H/O hemorrhoidectomy    FALL    23    Respiratory failure    Fall    SysAdmin_VisitLink        POD#3  S/P Left Craniectomy for evac of SDH    Pt seen and examined at bedside with Dr Norton.  Pt remains intubated, sedated on Propofol.  Not following commands.  Head dressing intact and dry.    Allergies    No Known Allergies    Intolerances    Vital Signs Last 24 Hrs  T(C): 36.8 (09 May 2021 08:00), Max: 37.7 (08 May 2021 23:00)  T(F): 98.2 (09 May 2021 08:00), Max: 99.9 (08 May 2021 23:00)  HR: 123 (09 May 2021 10:00) (89 - 123)  BP: --  BP(mean): --  RR: 16 (09 May 2021 09:00) (16 - 21)  SpO2: 100% (09 May 2021 10:00) (100% - 100%)      acetaminophen   Tablet .. 650 milliGRAM(s) Oral every 6 hours PRN  chlorhexidine 4% Liquid 1 Application(s) Topical <User Schedule>  fentaNYL    Injectable 25 MICROGram(s) IV Push every 4 hours PRN  heparin   Injectable 5000 Unit(s) SubCutaneous every 8 hours  insulin lispro (ADMELOG) corrective regimen sliding scale   SubCutaneous every 6 hours  levETIRAcetam  IVPB 500 milliGRAM(s) IV Intermittent every 12 hours  pantoprazole   Suspension 40 milliGRAM(s) Oral daily  propofol Infusion 5 MICROgram(s)/kG/Min IV Continuous <Continuous>  senna 2 Tablet(s) Oral at bedtime        05-08-21 @ 07:01  -  05-09-21 @ 07:00  --------------------------------------------------------  IN: 3606.2 mL / OUT: 1581 mL / NET: 2025.2 mL    05-09-21 @ 07:01  -  05-09-21 @ 11:08  --------------------------------------------------------  IN: 137.4 mL / OUT: 0 mL / NET: 137.4 mL        REVIEW OF SYSTEMS    [ ] A ten-point review of systems was otherwise negative except as noted.  [x ] Due to altered mental status/intubation, subjective information were not able to be obtained from the patient. History was obtained, to the extent possible, from review of the chart and collateral sources of information.        Neuro Exam:  Intubated, sedated on Propofol  not following commands  pupils equal, briskly reactive  +corneals  +gag  purposeful RUE to pain  no movt LUE to pain  triple flexion b/l LE    wound: dressing intact.  no drainage      CBC Full  -  ( 09 May 2021 02:26 )  WBC Count : 4.85 K/uL  RBC Count : 2.54 M/uL  Hemoglobin : 7.6 g/dL  Hematocrit : 23.4 %  Platelet Count - Automated : 103 K/uL  Mean Cell Volume : 92.1 fL  Mean Cell Hemoglobin : 29.9 pg  Mean Cell Hemoglobin Concentration : 32.5 g/dL  Auto Neutrophil # : x  Auto Lymphocyte # : x  Auto Monocyte # : x  Auto Eosinophil # : x  Auto Basophil # : x  Auto Neutrophil % : x  Auto Lymphocyte % : x  Auto Monocyte % : x  Auto Eosinophil % : x  Auto Basophil % : x    05-08    144  |  114<H>  |  13  ----------------------------<  145<H>  3.8   |  22  |  0.5<L>    Ca    7.3<L>      08 May 2021 23:30  Phos  2.0     05-08  Mg     2.0     05-08      PT/INR - ( 08 May 2021 04:25 )   PT: 14.50 sec;   INR: 1.26 ratio         PTT - ( 08 May 2021 04:25 )  PTT:27.3 sec        Assessment/Plan:   Platelets 103  may start SQ Heparin for DVT ppx  cont GI ppx  keep Na 145-150  maintain SBP<140  neuro checks q 1hr  wean sedation to extubation as appropriate  cont SICU care  No left sided laying  d/w attg

## 2021-05-09 NOTE — DIETITIAN INITIAL EVALUATION ADULT. - NUTRITIONGOAL OUTCOME1
EN will provide > 85% nut < 105% estimated needs upon f/u in 3 days. EN will provide > 85% but < 105% estimated needs upon f/u in 3 days.

## 2021-05-09 NOTE — DIETITIAN INITIAL EVALUATION ADULT. - PATIENT MEETS CRITERIA FOR MALNUTRITION
8/11/2020      Re:   Carmencita Morales  [address]                        This is to certify that Carmencita Morales was seen in our office on 8/6/20. Sandeep Almendarez is excused from work from  Monday August 3rd 2020 through August 20th 2020 due to a   Medical condition. 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     If there are any questions of concerns, please call my office at [phone]          SIGNATURE:___________________________________________,   8/11/2020      Kim Yadav MD          [address] [id number] [facility]  [address] [facility]  [address] [facility]
no

## 2021-05-09 NOTE — DIETITIAN INITIAL EVALUATION ADULT. - OTHER INFO
Found unresponsive at home, s/p craniectomy for large Lt SDH POD#3. Intubated and sedated. Acute blood loss anemia. S/p massive transfusion protocol.

## 2021-05-09 NOTE — DIETITIAN INITIAL EVALUATION ADULT. - OTHER CALCULATIONS
Estimated energy needs: 1975kcal/d ([id number])  Estimated protein needs: 95-119gm/day (1.2-1.5gm/kg act wt)  Estimated fluid needs: per ICU team

## 2021-05-09 NOTE — CONSULT NOTE ADULT - SUBJECTIVE AND OBJECTIVE BOX
Patient is a 45y old  Male who presents with a chief complaint of found down unresponsive (08 May 2021 12:38)      HPI:  TRAUMA ACTIVATION LEVEL:  Code trauma    HPI and Hospital course      45M PMH ETOH abuse, seizures presenting to the ED as trauma alert s/p found down, reportedly binge-drinking for the past several days per his wife, who heard him fall upstairs in the bathroom, found down with eyes open but unresponsive, who then called EMS. Patient was upgraded to code trauma with GCS 4 on presentation,  intubated for airway protection, found to have large left acute subdural hemorrhage measuring 2 cm transverse extending both anteriorly and posteriorly along the left skull with a left to right 1.5 cm midline shift and smaller right-sided subdural collections along the falx cerebrum (6 mm) and right temporal fossa. Taken to the OR as level 1 by NIMISHA.  Patient acute SDH, cerebral herniation/compression. Acute venous bleeding tamponaded with gel foam and he underwent emergent craniotomy.     Hematology called for evaluation and management of Thrombocytopenia.       ROS:  Negative except for:    PAST MEDICAL & SURGICAL HISTORY:  Alcohol abuse    GERD (gastroesophageal reflux disease)    ETOH abuse    Hypomagnesemia    Seizure disorder    H/O hemorrhoidectomy        SOCIAL HISTORY:    FAMILY HISTORY:  Family history of essential hypertension (Father)        MEDICATIONS  (STANDING):  chlorhexidine 4% Liquid 1 Application(s) Topical <User Schedule>  insulin lispro (ADMELOG) corrective regimen sliding scale   SubCutaneous every 6 hours  levETIRAcetam  IVPB 500 milliGRAM(s) IV Intermittent every 12 hours  pantoprazole   Suspension 40 milliGRAM(s) Oral daily  propofol Infusion 5 MICROgram(s)/kG/Min (2.38 mL/Hr) IV Continuous <Continuous>  senna 2 Tablet(s) Oral at bedtime    MEDICATIONS  (PRN):  acetaminophen   Tablet .. 650 milliGRAM(s) Oral every 6 hours PRN Temp greater or equal to 38.5C (101.3F)  fentaNYL    Injectable 25 MICROGram(s) IV Push every 4 hours PRN Severe Pain (7 - 10)      Allergies    No Known Allergies    Intolerances        Vital Signs Last 24 Hrs  T(C): 37.3 (09 May 2021 04:00), Max: 37.7 (08 May 2021 23:00)  T(F): 99.2 (09 May 2021 04:00), Max: 99.9 (08 May 2021 23:00)  HR: 107 (09 May 2021 04:00) (92 - 123)  BP: --  BP(mean): --  RR: 17 (09 May 2021 04:00) (16 - 21)  SpO2: 100% (09 May 2021 04:00) (100% - 100%)    PHYSICAL EXAM  General: Intubated and on vent support   CV: normal S1/S2   Lungs: positive air movement b/l ant lungs  Ext: no clubbing cyanosis or edema  Neuro: SEdated and on vent support       LABS:                          7.6    4.85  )-----------( 103      ( 09 May 2021 02:26 )             23.4         Mean Cell Volume : 92.1 fL  Mean Cell Hemoglobin : 29.9 pg  Mean Cell Hemoglobin Concentration : 32.5 g/dL  Auto Neutrophil # : x  Auto Lymphocyte # : x  Auto Monocyte # : x  Auto Eosinophil # : x  Auto Basophil # : x  Auto Neutrophil % : x  Auto Lymphocyte % : x  Auto Monocyte % : x  Auto Eosinophil % : x  Auto Basophil % : x      Serial CBC's  05-09 @ 02:26  Hct-23.4 / Hgb-7.6 / Plat-103 / RBC-2.54 / WBC-4.85  Serial CBC's  05-08 @ 23:30  Hct-24.9 / Hgb-8.0 / Plat-71 / RBC-2.70 / WBC-4.98  Serial CBC's  05-08 @ 11:40  Hct-26.6 / Hgb-8.7 / Plat-77 / RBC-2.88 / WBC-6.41  Serial CBC's  05-08 @ 04:25  Hct-28.4 / Hgb-9.5 / Plat-67 / RBC-3.12 / WBC-7.07  Serial CBC's  05-07 @ 19:00  Hct-29.6 / Hgb-10.0 / Plat-55 / RBC-3.35 / WBC-5.54  Serial CBC's  05-07 @ 09:17  Hct-25.4 / Hgb-8.7 / Plat-54 / RBC-2.85 / WBC-4.04  Serial CBC's  05-07 @ 08:22  Hct-28.6 / Hgb-9.6 / Plat-81 / RBC-3.21 / WBC-6.87  Serial CBC's  05-07 @ 03:30  Hct-34.9 / Hgb-11.7 / Plat-91 / RBC-3.92 / WBC-6.72  Serial CBC's  05-07 @ 00:59  Hct-27.2 / Hgb-9.0 / Plat-111 / RBC-2.97 / WBC-8.00  Serial CBC's  05-06 @ 22:25  Hct-50.9 / Hgb-16.9 / Plat-59 / RBC-5.56 / WBC-7.32      05-08    144  |  114<H>  |  13  ----------------------------<  145<H>  3.8   |  22  |  0.5<L>    Ca    7.3<L>      08 May 2021 23:30  Phos  2.0     05-08  Mg     2.0     05-08        PT/INR - ( 08 May 2021 04:25 )   PT: 14.50 sec;   INR: 1.26 ratio         PTT - ( 08 May 2021 04:25 )  PTT:27.3 sec      < from: CT Angio Chest w/ IV Cont (05.06.21 @ 22:44) >  Impression:      No acute intra-abdominal or intrathoracic traumatic changes.    2.2 cm left adrenal indeterminate nodule, larger since the prior exam.  Nonemergent MRI may be of benefit when patient is clinically able.    4 mm right middle lobe nodule. Follow-up in 6 months is recommended.      < end of copied text >  < from: CT Head No Cont (05.06.21 @ 22:24) >  Impression:      Large left subdural hemorrhage with associated left to right midline shift as above.    Smaller right-sided subdurals      < end of copied text >

## 2021-05-09 NOTE — DIETITIAN INITIAL EVALUATION ADULT. - PHYSCIAL ASSESSMENT
deferred intubated, on mechanical ventilation, Ve- 7.4, Tmax 37.7C, BP-146/74,. Last BM 5/7. SKin- surgical incision intubated, on mechanical ventilation, Ve- 7.4, Tmax 37.7C, BP-146/74. Last BM 5/7. SKin- surgical incision

## 2021-05-09 NOTE — PROGRESS NOTE ADULT - ASSESSMENT
Assessment & Plan  45M s/p fall with large left SDH and neurological status change, level 1 for emergent craniotomy    NEURO:  Hx of EtOH abuse and prior Delerium Tremens episodes  GCS 6T  S/P craniotomy for large left SDH  Propofol started, D/C Precedex  RASS -5  | Unarousable  | No response to voice or physical stimulation   3% NS at 15ml/hr- q6hr BMP and Serum Osmolality, goal Na 145-150  Head of bed @ 30 degrees  Repeat Head CT -post op changes, resolution of MLS, stable SDH along cerebral falx and L tentorium, new trace scattered SAH along the right cerebral sulci and right cerebellar sulci and new IVP in the b/l occipital horns.  Keppra 500mg BID    RESP:    Intubated, /16/40/5  AB.50/33/165/25 -100%, LA 0.8      CARDS:   S/P cardiac arrest in OR & MTP  Hypotension- norepinephrine off  Goal MAP > 65mmHg  IVL  trop <0.01 x 3    GI/NUTR:   Diet: started TF osmolite 20cc goal 60cc    GI Prophylaxis-Protonix IV    Bowel regimen- added Senna    /RENAL:   Cole in place    Strict I/O-  IVL  Labs:          BUN/Cr- 15/0.6 > 13/0.5          Electrolytes-Lytes-Na 144 // K 3.8 // Phos 2.0 //  Mg 2.0- hypoK, hypophos repleted   Replete Electrolytes PRN  - Na goal 145-150, Na 148 >144    HEME/ONC:   S/P massive transfusion protocol  - 4uPRBCs 2FFP 2 platelet intraop  DVT prophylaxis: holding for now  Acute blood loss anemia  - H/H - 8.7/25.4 > 8.7/26.6 > 8/24.9  Plt 111 > 94 > 54 > 55 > 1 unit > 67 > 1u > 77 > 1U PLT >71> 1U PLT > 103  - Another 1unit of plts given on  due to persistent oozing during dressing change    ID:  antibiotics: none, completed elder op ancef  WBC 7 > 6.4 > 4.9    ENDO:  q6 POC glucose  Sliding scale insulin  A1c 5.2    LINES/DRAINS:  GIOVANI, [place], Cole, Right Femoral TLC  DISPO: SICU   Assessment & Plan  45M s/p fall with large left SDH and neurological status change, level 1 for emergent craniotomy    NEURO:  Hx of EtOH abuse and prior Delerium Tremens episodes  GCS 6T  S/P craniotomy for large left SDH  Propofol started, D/C Precedex  RASS -5  | Unarousable  | No response to voice or physical stimulation   Head of bed @ 30 degrees  Repeat Head CT -post op changes, resolution of MLS, stable SDH along cerebral falx and L tentorium, new trace scattered SAH along the right cerebral sulci and right cerebellar sulci and new IVP in the b/l occipital horns.  Keppra 500mg BID    RESP:    Intubated, /16/40/5  AB.50/33/165/25 -100%, LA 0.8      CARDS:   S/P cardiac arrest in OR & MTP  Hypotension- norepinephrine off  Goal MAP > 65mmHg  IVL  trop <0.01 x 3    GI/NUTR:   Diet: started TF osmolite 20cc goal 60cc    GI Prophylaxis-Protonix IV    Bowel regimen- added Senna    /RENAL:   Cole in place    Strict I/O-  IVL  Labs:          BUN/Cr- 15/0.6 > 13/0.5          Electrolytes-Lytes-Na 144 // K 3.8 // Phos 2.0 //  Mg 2.0- hypoK, hypophos repleted   Replete Electrolytes PRN  - Na goal 145-150, Na 148 >144    HEME/ONC:   S/P massive transfusion protocol  - 4uPRBCs 2FFP 2 platelet intraop  DVT prophylaxis: holding for now  Acute blood loss anemia  - H/H - 8.7/25.4 > 8.7/26.6 > 8/24.9  Plt 111 > 94 > 54 > 55 > 1 unit > 67 > 1u > 77 > 1U PLT >71> 1U PLT > 103  - Another 1unit of plts given on  due to persistent oozing during dressing change    ID:  antibiotics: none, completed elder op ancef  WBC 7 > 6.4 > 4.9    ENDO:  q6 POC glucose  Sliding scale insulin  A1c 5.2    LINES/DRAINS:  PIV, [place], Cole, Right Femoral TLC    DISPO: SICU

## 2021-05-09 NOTE — DIETITIAN INITIAL EVALUATION ADULT. - ENERGY INTAKE
as per RN; please note that with propofol sedation pt is receiving total of 2150kcal/day Receiving tube feeding and tolerating @ goal rate

## 2021-05-09 NOTE — PROGRESS NOTE ADULT - ATTENDING COMMENTS
45y Male 1d s/p fall with large left SDH and neurological status change, level 1 for emergent craniotomy      GCS 6-7T, E- 2, M- 4 , pupils reactive b/l , moves bl LE s , moves Left side spontaneously -- neurological exam slowly improving   S/P craniotomy for large left SDH  Propofol gtt  dc 3% na , ns 125  Head of bed @ 30 degrees  Repeat Head CT complete- no change   Keppra 500mg BID    hypoxic respiratory failure Intubated, ventilator settings:    450/16/30/5  AB.51/35/148      S/P cardiac arrest in OR & MTP  Hypotension- off vassopressors   Goal MAP > 65mmHg  IVF NS@125ml/h  trop <0.01 x 3       Diet- TF started now at goal     GI Prophylaxis-Protonix IV   bowel regimen      Electrolytes-Na 141 // K 3.6 // Mg 2.0  Phos 4.1  Replete Electrolytes PRN       S/P massive transfusion protocol  4uPRBCs 2FFP 2 platelet intraop    DVT prophylaxis-holding -> still bleeding from incision     Acute blood loss anemia-  H/H - 8.7/25.4  Plt 111> 94>54> 55, 1 unit of PLT, 67, 2nd unit PLT- > 3 unit of platelets >> now 103      LOVENOX  when ok with neuro sx    q6 POC glucose  Sliding scale insulin    LINES/DRAINS:  Tamia MATUTE Foley, Right Femoral TLC-- Remove today     DISPO:    SICU management. 45y Male 1d s/p fall with large left SDH and neurological status change, level 1 for emergent craniotomy      GCS 6-7T, E- 2, M- 4 , pupils reactive b/l , moves bl LE s , moves Left side spontaneously -- neurological exam slowly improving   S/P craniotomy for large left SDH  Propofol gtt  dc 3% na , ns 125  Head of bed @ 30 degrees  Repeat Head CT complete- no change   Keppra 500mg BID    hypoxic respiratory failure Intubated, ventilator settings:    450/16/30/5  AB.51/35/148      S/P cardiac arrest in OR & MTP  Hypotension- off vassopressors   Goal MAP > 65mmHg  IVF NS@125ml/h  trop <0.01 x 3       Diet- TF started now at goal     GI Prophylaxis-Protonix IV   bowel regimen      Electrolytes-Na 141 // K 3.6 // Mg 2.0  Phos 4.1  Replete Electrolytes PRN       S/P massive transfusion protocol  4uPRBCs 2FFP 2 platelet intraop    DVT prophylaxis-holding -> still bleeding from incision     Acute blood loss anemia-  H/H - 8.7/25.4  Plt 111> 94>54> 55, 1 unit of PLT, 67, 2nd unit PLT- > 3 unit of platelets >> now 103      start dvt ppx LOVENOX      q6 POC glucose  Sliding scale insulin    LINES/DRAINS:  Tamia MATUTE Foley, Right Femoral TLC-- Remove today     DISPO:    SICU management.

## 2021-05-09 NOTE — CONSULT NOTE ADULT - ASSESSMENT
45M PMH ETOH abuse, seizures presenting to the ED as trauma alert s/p found down, binge drinking for several days with GCS 4 on presentation,  intubated for airway protection. He was found to have acute SDH, cerebral herniation/compression. Acute venous bleeding tamponaded with gel foam and he underwent emergent craniotomy.     Hematology called for evaluation and management of Thrombocytopenia.      45M PMH ETOH abuse, seizures presenting to the ED as trauma alert s/p found down, binge drinking for several days with GCS 4 on presentation,  intubated for airway protection. He was found to have acute SDH, cerebral herniation/compression. Acute venous bleeding tamponaded with gel foam and he underwent emergent craniotomy.     Hematology called for evaluation and management of Thrombocytopenia.   On review of HIE his prior plt counts have been normal, even in April 2021 during his admission.       1. Acute thrombocytopenia likely 2/2 to BM toxicity from alcohol abuse and consumption of platelets from the acute bleed as well.     To complete work up- please check Hep B and Hep C panel, HIV  Keep target plt count at 100K given the CNS bleed and transfuse as needed or as per NeuroSx recs     2. Chronic Normocytic anemia   Check Folate, iron studies, ferritin  B12 was 688.   If he is iron deficient he will need GI work up to r/o varices given his extensive alcohol abuse history     Discussed with SICU team

## 2021-05-09 NOTE — DIETITIAN INITIAL EVALUATION ADULT. - ENTERAL
please change enetral nutriton regimen to Vital HP @55ml/hr (will provide 1320kcal, 116gm protein, 1109ml H20 plus additional ~625kcal from propofol sedation). Additional fluids per LIP. please change enteral nutrition regimen to Vital HP @55ml/hr (will provide 1320kcal, 116gm protein, 1109ml H20 plus additional ~625kcal from propofol sedation). Additional fluids per LIP.

## 2021-05-10 LAB
ANION GAP SERPL CALC-SCNC: 7 MMOL/L — SIGNIFICANT CHANGE UP (ref 7–14)
ANION GAP SERPL CALC-SCNC: 9 MMOL/L — SIGNIFICANT CHANGE UP (ref 7–14)
BASE EXCESS BLDA CALC-SCNC: 5.1 MMOL/L — HIGH (ref -2–2)
BUN SERPL-MCNC: 6 MG/DL — LOW (ref 10–20)
BUN SERPL-MCNC: 7 MG/DL — LOW (ref 10–20)
CALCIUM SERPL-MCNC: 7.3 MG/DL — LOW (ref 8.5–10.1)
CALCIUM SERPL-MCNC: 7.5 MG/DL — LOW (ref 8.5–10.1)
CHLORIDE SERPL-SCNC: 106 MMOL/L — SIGNIFICANT CHANGE UP (ref 98–110)
CHLORIDE SERPL-SCNC: 109 MMOL/L — SIGNIFICANT CHANGE UP (ref 98–110)
CO2 SERPL-SCNC: 24 MMOL/L — SIGNIFICANT CHANGE UP (ref 17–32)
CO2 SERPL-SCNC: 26 MMOL/L — SIGNIFICANT CHANGE UP (ref 17–32)
CREAT SERPL-MCNC: <0.5 MG/DL — LOW (ref 0.7–1.5)
CREAT SERPL-MCNC: <0.5 MG/DL — LOW (ref 0.7–1.5)
FERRITIN SERPL-MCNC: 913 NG/ML — HIGH (ref 30–400)
FOLATE SERPL-MCNC: 2.5 NG/ML — LOW
GLUCOSE BLDC GLUCOMTR-MCNC: 106 MG/DL — HIGH (ref 70–99)
GLUCOSE BLDC GLUCOMTR-MCNC: 109 MG/DL — HIGH (ref 70–99)
GLUCOSE BLDC GLUCOMTR-MCNC: 123 MG/DL — HIGH (ref 70–99)
GLUCOSE SERPL-MCNC: 120 MG/DL — HIGH (ref 70–99)
GLUCOSE SERPL-MCNC: 135 MG/DL — HIGH (ref 70–99)
HAV IGM SER-ACNC: SIGNIFICANT CHANGE UP
HBV CORE IGM SER-ACNC: SIGNIFICANT CHANGE UP
HBV SURFACE AG SER-ACNC: SIGNIFICANT CHANGE UP
HCO3 BLDA-SCNC: 28 MMOL/L — HIGH (ref 23–27)
HCV AB S/CO SERPL IA: 0.04 COI — SIGNIFICANT CHANGE UP
HCV AB S/CO SERPL IA: 0.13 S/CO — SIGNIFICANT CHANGE UP (ref 0–0.99)
HCV AB SERPL-IMP: SIGNIFICANT CHANGE UP
HCV AB SERPL-IMP: SIGNIFICANT CHANGE UP
HIV 1+2 AB+HIV1 P24 AG SERPL QL IA: SIGNIFICANT CHANGE UP
IRON SATN MFR SERPL: 15 % — SIGNIFICANT CHANGE UP (ref 15–50)
IRON SATN MFR SERPL: 21 UG/DL — LOW (ref 35–150)
MAGNESIUM SERPL-MCNC: 1.8 MG/DL — SIGNIFICANT CHANGE UP (ref 1.8–2.4)
PCO2 BLDA: 34 MMHG — LOW (ref 38–42)
PH BLDA: 7.52 — HIGH (ref 7.38–7.42)
PHOSPHATE SERPL-MCNC: 2.3 MG/DL — SIGNIFICANT CHANGE UP (ref 2.1–4.9)
PO2 BLDA: 164 MMHG — HIGH (ref 78–95)
POTASSIUM SERPL-MCNC: 3.6 MMOL/L — SIGNIFICANT CHANGE UP (ref 3.5–5)
POTASSIUM SERPL-MCNC: 3.7 MMOL/L — SIGNIFICANT CHANGE UP (ref 3.5–5)
POTASSIUM SERPL-SCNC: 3.6 MMOL/L — SIGNIFICANT CHANGE UP (ref 3.5–5)
POTASSIUM SERPL-SCNC: 3.7 MMOL/L — SIGNIFICANT CHANGE UP (ref 3.5–5)
SODIUM SERPL-SCNC: 139 MMOL/L — SIGNIFICANT CHANGE UP (ref 135–146)
SODIUM SERPL-SCNC: 142 MMOL/L — SIGNIFICANT CHANGE UP (ref 135–146)
TIBC SERPL-MCNC: 139 UG/DL — LOW (ref 220–430)
TRANSFERRIN SERPL-MCNC: 132 MG/DL — LOW (ref 200–360)
UIBC SERPL-MCNC: 118 UG/DL — SIGNIFICANT CHANGE UP (ref 110–370)

## 2021-05-10 PROCEDURE — 99291 CRITICAL CARE FIRST HOUR: CPT

## 2021-05-10 PROCEDURE — 71045 X-RAY EXAM CHEST 1 VIEW: CPT | Mod: 26

## 2021-05-10 PROCEDURE — 70450 CT HEAD/BRAIN W/O DYE: CPT | Mod: 26

## 2021-05-10 PROCEDURE — 71045 X-RAY EXAM CHEST 1 VIEW: CPT | Mod: 26,77

## 2021-05-10 RX ORDER — SODIUM CHLORIDE 9 MG/ML
2 INJECTION INTRAMUSCULAR; INTRAVENOUS; SUBCUTANEOUS EVERY 8 HOURS
Refills: 0 | Status: DISCONTINUED | OUTPATIENT
Start: 2021-05-10 | End: 2021-05-12

## 2021-05-10 RX ORDER — POTASSIUM CHLORIDE 20 MEQ
20 PACKET (EA) ORAL
Refills: 0 | Status: COMPLETED | OUTPATIENT
Start: 2021-05-10 | End: 2021-05-10

## 2021-05-10 RX ORDER — LABETALOL HCL 100 MG
5 TABLET ORAL ONCE
Refills: 0 | Status: COMPLETED | OUTPATIENT
Start: 2021-05-10 | End: 2021-05-10

## 2021-05-10 RX ORDER — SODIUM CHLORIDE 9 MG/ML
1000 INJECTION INTRAMUSCULAR; INTRAVENOUS; SUBCUTANEOUS
Refills: 0 | Status: DISCONTINUED | OUTPATIENT
Start: 2021-05-10 | End: 2021-05-11

## 2021-05-10 RX ORDER — POTASSIUM PHOSPHATE, MONOBASIC POTASSIUM PHOSPHATE, DIBASIC 236; 224 MG/ML; MG/ML
30 INJECTION, SOLUTION INTRAVENOUS ONCE
Refills: 0 | Status: COMPLETED | OUTPATIENT
Start: 2021-05-10 | End: 2021-05-10

## 2021-05-10 RX ORDER — MAGNESIUM SULFATE 500 MG/ML
1 VIAL (ML) INJECTION ONCE
Refills: 0 | Status: COMPLETED | OUTPATIENT
Start: 2021-05-10 | End: 2021-05-10

## 2021-05-10 RX ORDER — POLYETHYLENE GLYCOL 3350 17 G/17G
17 POWDER, FOR SOLUTION ORAL EVERY 12 HOURS
Refills: 0 | Status: DISCONTINUED | OUTPATIENT
Start: 2021-05-10 | End: 2021-05-11

## 2021-05-10 RX ADMIN — Medication 50 MILLIEQUIVALENT(S): at 13:53

## 2021-05-10 RX ADMIN — CHLORHEXIDINE GLUCONATE 15 MILLILITER(S): 213 SOLUTION TOPICAL at 05:34

## 2021-05-10 RX ADMIN — PANTOPRAZOLE SODIUM 40 MILLIGRAM(S): 20 TABLET, DELAYED RELEASE ORAL at 13:54

## 2021-05-10 RX ADMIN — SODIUM CHLORIDE 2 GRAM(S): 9 INJECTION INTRAMUSCULAR; INTRAVENOUS; SUBCUTANEOUS at 22:18

## 2021-05-10 RX ADMIN — Medication 5 MILLIGRAM(S): at 22:43

## 2021-05-10 RX ADMIN — POLYETHYLENE GLYCOL 3350 17 GRAM(S): 17 POWDER, FOR SOLUTION ORAL at 03:52

## 2021-05-10 RX ADMIN — POLYETHYLENE GLYCOL 3350 17 GRAM(S): 17 POWDER, FOR SOLUTION ORAL at 18:40

## 2021-05-10 RX ADMIN — CHLORHEXIDINE GLUCONATE 15 MILLILITER(S): 213 SOLUTION TOPICAL at 18:40

## 2021-05-10 RX ADMIN — SENNA PLUS 2 TABLET(S): 8.6 TABLET ORAL at 22:10

## 2021-05-10 RX ADMIN — CHLORHEXIDINE GLUCONATE 1 APPLICATION(S): 213 SOLUTION TOPICAL at 05:32

## 2021-05-10 RX ADMIN — POTASSIUM PHOSPHATE, MONOBASIC POTASSIUM PHOSPHATE, DIBASIC 83.33 MILLIMOLE(S): 236; 224 INJECTION, SOLUTION INTRAVENOUS at 03:51

## 2021-05-10 RX ADMIN — HEPARIN SODIUM 5000 UNIT(S): 5000 INJECTION INTRAVENOUS; SUBCUTANEOUS at 05:32

## 2021-05-10 RX ADMIN — HEPARIN SODIUM 5000 UNIT(S): 5000 INJECTION INTRAVENOUS; SUBCUTANEOUS at 22:11

## 2021-05-10 RX ADMIN — LEVETIRACETAM 420 MILLIGRAM(S): 250 TABLET, FILM COATED ORAL at 05:33

## 2021-05-10 RX ADMIN — Medication 50 GRAM(S): at 03:51

## 2021-05-10 RX ADMIN — HEPARIN SODIUM 5000 UNIT(S): 5000 INJECTION INTRAVENOUS; SUBCUTANEOUS at 13:54

## 2021-05-10 RX ADMIN — Medication 50 MILLIEQUIVALENT(S): at 16:00

## 2021-05-10 RX ADMIN — SODIUM CHLORIDE 75 MILLILITER(S): 9 INJECTION INTRAMUSCULAR; INTRAVENOUS; SUBCUTANEOUS at 01:00

## 2021-05-10 RX ADMIN — LEVETIRACETAM 420 MILLIGRAM(S): 250 TABLET, FILM COATED ORAL at 18:41

## 2021-05-10 NOTE — PROGRESS NOTE ADULT - ASSESSMENT
Assessment & Plan  45M s/p fall with large left SDH and neurological status change, level 1 for emergent craniotomy    NEURO:  Hx of EtOH abuse and prior Delerium Tremens episodes  GCS 7T  S/P craniotomy for large left SDH  Propofol started, D/C Precedex  RASS -5  | Unarousable  | No response to voice or physical stimulation   3% NS at 15ml/hr- q6hr BMP and Serum Osmolality, goal Na 145-150  Head of bed @ 30 degrees  Repeat Head CT -post op changes, resolution of MLS, stable SDH along cerebral falx and L tentorium, new trace scattered SAH along the right cerebral sulci and right cerebellar sulci and new IVP in the b/l occipital horns.  Keppra 500mg BID    RESP:    Intubated, /16/30/5  ABG:   AM CXR      CARDS:   S/P cardiac arrest in OR & MTP  Hypotension- norepinephrine off  Goal MAP > 65mmHg  IVL  trop <0.01 x 3    GI/NUTR:   Diet: change TF to Vital HP @ 55    GI Prophylaxis-Protonix IV    Bowel regimen- added Senna, added miralax     /RENAL:   Cole in place    Strict I/O-  IVL  Labs:          BUN/Cr- 15/0.6 > 7/0.5          Electrolytes-Na 142 // K 3.6 // Phos 2.3 //  Mg 1.8- hypoK, hypophos and hypomag repleted   Replete Electrolytes PRN  - Na goal 145-150, current Na 148 >142, started NS @75cc/h    HEME/ONC:   S/P massive transfusion protocol  - 4uPRBCs 2FFP 2 platelet intraop  DVT prophylaxis: holding for now  Acute blood loss anemia  - H/H - 7.9/23.8>7.9/23.7  Plt 111 > 94 > 54 > 55 > 1 unit > 67 > 1u > 77 > 103->85 > 79  - Another 1unit of plts given on 5/8 due to persistent oozing during dressing change  -5/9 Plts 85, no more plts transfusion, since no further oozing from the dressing, NSG ok with it    ID:  antibiotics: none, completed elder op ancef  WBC 7 > 6.4 > 4.9->4 > 3.5  afebrile     ENDO:  q6 POC glucose  Sliding scale insulin  A1c 5.2    LINES/DRAINS:  GIOVANI, [place]sey [place], peripherals, ETT, OGT     DISPO: SICU

## 2021-05-10 NOTE — PROGRESS NOTE ADULT - SUBJECTIVE AND OBJECTIVE BOX
SANDRA DAS  [id number]  45y Male    Indication for ICU admission: s/p craniectomy for large L SDH  Admit Date:05-06-21  ICU Date: 05-07-21  OR Date: 05-06-21    No Known Allergies    PAST MEDICAL & SURGICAL HISTORY:  Alcohol abuse  GERD (gastroesophageal reflux disease)  ETOH abuse  Hypomagnesemia  Seizure disorder  H/O hemorrhoidectomy      Home Medications:        24HRS EVENT:  5/9  Night  heme/onc labs sent  Na 144>142, started NS @75  no BM on this admission, started miralax    Day:  -Plts 85, no more plts transfusion, since no further oozing from the dressing, NSG ok with it  -femoral TLC to be d/c'd   -Hep sq restarted  -change TF to Vital HP @ 55    DVT PTX: Hep sq    GI PTX: PPI    ***Tubes/Lines/Drains  ***  Peripheral IV  Arterial Line	L rad	                Date   OGT, ETT  Urinary Catheter		Indication: Strict I&O    Date Placed:       REVIEW OF SYSTEMS  [ ] A ten-point review of systems was otherwise negative except as noted.  [x] Due to altered mental status/intubation, subjective information were not able to be obtained from the patient. History was obtained, to the extent possible, from review of the chart and collateral sources of information.           SANDRA DAS  [id number]  45y Male    Indication for ICU admission: s/p craniectomy for large L SDH  Admit Date:05-06-21  ICU Date: 05-07-21  OR Date: 05-06-21    No Known Allergies    PAST MEDICAL & SURGICAL HISTORY:  Alcohol abuse  GERD (gastroesophageal reflux disease)  ETOH abuse  Hypomagnesemia  Seizure disorder  H/O hemorrhoidectomy      Home Medications:        24HRS EVENT:  5/9  Night  heme/onc labs sent  Na 144>142, started NS @75  no BM on this admission, started miralax    Day:  -Plts 85, no more plts transfusion, since no further oozing from the dressing, NSG ok with it  -femoral TLC to be d/c'd   -Hep sq restarted  -change TF to Vital HP @ 55    DVT PTX: Hep sq    GI PTX: PPI    ***Tubes/Lines/Drains  ***  Peripheral IV  Arterial Line	L rad	                Date   OGT, ETT  Urinary Catheter		Indication: Strict I&O    Date Placed:       REVIEW OF SYSTEMS  [ ] A ten-point review of systems was otherwise negative except as noted.  [x] Due to altered mental status/intubation, subjective information were not able to be obtained from the patient. History was obtained, to the extent possible, from review of the chart and collateral sources of information.    Daily     Daily     Diet, NPO with Tube Feed:   Tube Feeding Modality: Orogastric  Vital High Protein  Total Volume for 24 Hours (mL): 1320  Continuous  Starting Tube Feed Rate mL per Hour: 55  Until Goal Tube Feed Rate (mL per Hour): 55  Tube Feed Duration (in Hours): 24  Tube Feed Start Time: 06:50 (05-09-21 @ 18:48)      CURRENT MEDS:  Neurologic Medications  acetaminophen   Tablet .. 650 milliGRAM(s) Oral every 6 hours PRN Temp greater or equal to 38.5C (101.3F)  fentaNYL    Injectable 25 MICROGram(s) IV Push every 4 hours PRN Severe Pain (7 - 10)  levETIRAcetam  IVPB 500 milliGRAM(s) IV Intermittent every 12 hours  propofol Infusion 5 MICROgram(s)/kG/Min IV Continuous <Continuous>    Respiratory Medications    Cardiovascular Medications    Gastrointestinal Medications  pantoprazole   Suspension 40 milliGRAM(s) Oral daily  polyethylene glycol 3350 17 Gram(s) Oral every 12 hours  senna 2 Tablet(s) Oral at bedtime  sodium chloride 0.9%. 1000 milliLiter(s) IV Continuous <Continuous>    Genitourinary Medications    Hematologic/Oncologic Medications  heparin   Injectable 5000 Unit(s) SubCutaneous every 8 hours    Antimicrobial/Immunologic Medications    Endocrine/Metabolic Medications  insulin lispro (ADMELOG) corrective regimen sliding scale   SubCutaneous every 6 hours    Topical/Other Medications  chlorhexidine 0.12% Liquid 15 milliLiter(s) Oral Mucosa two times a day  chlorhexidine 4% Liquid 1 Application(s) Topical <User Schedule>      ICU Vital Signs Last 24 Hrs  T(C): 36.8 (10 May 2021 08:00), Max: 37.7 (09 May 2021 20:33)  T(F): 98.2 (10 May 2021 08:00), Max: 99.8 (09 May 2021 20:33)  HR: 86 (10 May 2021 08:36) (82 - 108)  BP: 139/87 (10 May 2021 08:00) (139/87 - 139/87)  BP(mean): 108 (10 May 2021 08:00) (108 - 108)  ABP: 154/87 (10 May 2021 08:00) (134/69 - 167/87)  ABP(mean): 111 (10 May 2021 08:00) (88 - 111)  RR: 16 (10 May 2021 07:00) (16 - 19)  SpO2: 100% (10 May 2021 08:36) (100% - 100%)        Mode: AC/ CMV (Assist Control/ Continuous Mandatory Ventilation)  RR (machine): 16  TV (machine): 450  FiO2: 30  PEEP: 5  ITime: 1  MAP: 9  PIP: 18    ABG - ( 10 May 2021 03:55 )  pH, Arterial: 7.52  pH, Blood: x     /  pCO2: 34    /  pO2: 164   / HCO3: 28    / Base Excess: 5.1   /  SaO2: x                   I&O's Summary    09 May 2021 07:01  -  10 May 2021 07:00  --------------------------------------------------------  IN: 3155.1 mL / OUT: 2665 mL / NET: 490.1 mL    10 May 2021 07:01  -  10 May 2021 10:18  --------------------------------------------------------  IN: 0 mL / OUT: 525 mL / NET: -525 mL      I&O's Detail    09 May 2021 07:01  -  10 May 2021 07:00  --------------------------------------------------------  IN:    IV PiggyBack: 800 mL    Osmolite: 240 mL    Propofol: 545.1 mL    sodium chloride 0.9%: 525 mL    Vital High Protein: 1045 mL  Total IN: 3155.1 mL    OUT:    Indwelling Catheter - Urethral (mL): 2665 mL  Total OUT: 2665 mL    Total NET: 490.1 mL      10 May 2021 07:01  -  10 May 2021 10:18  --------------------------------------------------------  IN:  Total IN: 0 mL    OUT:    Indwelling Catheter - Urethral (mL): 525 mL  Total OUT: 525 mL    Total NET: -525 mL          PHYSICAL EXAM:    General/Neuro  RASS:   -4  | Deep sedation | No response to voice, but any movement to physical stimulation        GCS: 7T      Eyes:  2= open to pain    Verbal: 1T    Motor:  4= Withdrawal from Pain    Pupils: Right Pupil reactive to light    Lungs:      clear to auscultation, mechanical breath sounds bilaterally    Cardiovascular : S1, S2.  Regular rate and rhythm. minimal Peripheral edema   Cardiac Rhythm: Normal Sinus Rhythm    GI: Abdomen soft, Non-distended.      Extremities: Extremities warm, pink, well-perfused.     Derm: Good skin turgor, no skin breakdown.      :       Cole catheter in place.      CXR:       LABS:  CAPILLARY BLOOD GLUCOSE      POCT Blood Glucose.: 109 mg/dL (10 May 2021 06:47)  POCT Blood Glucose.: 132 mg/dL (09 May 2021 23:33)  POCT Blood Glucose.: 113 mg/dL (09 May 2021 17:18)  POCT Blood Glucose.: 126 mg/dL (09 May 2021 11:56)                          7.9    3.57  )-----------( 79       ( 09 May 2021 23:30 )             23.7       05-09    142  |  109  |  7<L>  ----------------------------<  135<H>  3.6   |  24  |  <0.5<L>    Ca    7.5<L>      09 May 2021 23:30  Phos  2.3     05-09  Mg     1.8     05-09

## 2021-05-10 NOTE — PROGRESS NOTE ADULT - ATTENDING COMMENTS
44 y/o male, S/P Fall.  Traumatic Left SDH.  Brain compression.  S/P Left Craniectomy.  Acute respiratory failure with hypoxia.  Hemodynamic instability.  Chronic Alcohol Abuse.  Dysphagia.    PLAN:  - sedation as needed  - neuro checks q1h  - get CT head today as per NS  - continue Vent support; follow ABG, CXR in am  - keep normotensive  - follow serum electrolytes and UOP  - on tube feeds; bowel regiment  - GI and DVT prophylaxis

## 2021-05-10 NOTE — PROGRESS NOTE ADULT - SUBJECTIVE AND OBJECTIVE BOX
Neurosurgery Progress Note    POD# 4  S/P Left Craniectomy for evac of SDH    Pt seen and examined at the bedside with Dr Byrne.  Pt remains intubated, mechanically ventilated, sedated on propofol ggt.  At present time the moves the L upper and lower extremities spontaneously, triple flexion of RLE.        Subjective: 45yMale with a pmhx of SUBDURAL HEMATOMA;RESPIRATORY FAILURE;FALL    ^FALL    No pertinent family history in first degree relatives    Family history of essential hypertension (Father)    Handoff    MEWS Score    No pertinent past medical history    Alcohol abuse    GERD (gastroesophageal reflux disease)    ETOH abuse    Hypomagnesemia    Seizure disorder    Subdural hematoma    S/P subdural hematoma evacuation    Subdural hematoma    Subdural hematoma    Insertion of non-tunneled central venous catheter (CVC) in patient age 5 years of age or older    Craniotomy, decompressive    No significant past surgical history    H/O hemorrhoidectomy    FALL    23    Respiratory failure    Fall    [email or web address]      s/p     Allergies    No Known Allergies    Intolerances        Vital Signs Last 24 Hrs  T(C): 36.4 (10 May 2021 07:52), Max: 37.7 (09 May 2021 20:33)  T(F): 97.5 (10 May 2021 07:52), Max: 99.8 (09 May 2021 20:33)  HR: 91 (10 May 2021 07:00) (82 - 123)  BP: --  BP(mean): --  RR: 16 (10 May 2021 07:00) (16 - 19)  SpO2: 100% (10 May 2021 07:00) (100% - 100%)      acetaminophen   Tablet .. 650 milliGRAM(s) Oral every 6 hours PRN  chlorhexidine 0.12% Liquid 15 milliLiter(s) Oral Mucosa two times a day  chlorhexidine 4% Liquid 1 Application(s) Topical <User Schedule>  fentaNYL    Injectable 25 MICROGram(s) IV Push every 4 hours PRN  heparin   Injectable 5000 Unit(s) SubCutaneous every 8 hours  insulin lispro (ADMELOG) corrective regimen sliding scale   SubCutaneous every 6 hours  levETIRAcetam  IVPB 500 milliGRAM(s) IV Intermittent every 12 hours  pantoprazole   Suspension 40 milliGRAM(s) Oral daily  polyethylene glycol 3350 17 Gram(s) Oral every 12 hours  propofol Infusion 5 MICROgram(s)/kG/Min IV Continuous <Continuous>  senna 2 Tablet(s) Oral at bedtime  sodium chloride 0.9%. 1000 milliLiter(s) IV Continuous <Continuous>        05-09-21 @ 07:01  -  05-10-21 @ 07:00  --------------------------------------------------------  IN: 2847.7 mL / OUT: 2355 mL / NET: 492.7 mL        REVIEW OF SYSTEMS    [ ] A ten-point review of systems was otherwise negative except as noted.  [ x] Due to altered mental status/intubation, subjective information were not able to be obtained from the patient. History was obtained, to the extent possible, from review of the chart and collateral sources of information.      Exam:  Pt is intubated, on mechanical ventilation  triggering ventilator   pupils equal, briskly reactive  + corneal  +gag  on propofol ggt  PERRLA, EOMI  Motor: Moves L upper and lower extremities spontaneously, RLE triple flexion        CBC Full  -  ( 09 May 2021 23:30 )  WBC Count : 3.57 K/uL  RBC Count : 2.58 M/uL  Hemoglobin : 7.9 g/dL  Hematocrit : 23.7 %  Platelet Count - Automated : 79 K/uL  Mean Cell Volume : 91.9 fL  Mean Cell Hemoglobin : 30.6 pg  Mean Cell Hemoglobin Concentration : 33.3 g/dL  Auto Neutrophil # : x  Auto Lymphocyte # : x  Auto Monocyte # : x  Auto Eosinophil # : x  Auto Basophil # : x  Auto Neutrophil % : x  Auto Lymphocyte % : x  Auto Monocyte % : x  Auto Eosinophil % : x  Auto Basophil % : x    05-09    142  |  109  |  7<L>  ----------------------------<  135<H>  3.6   |  24  |  <0.5<L>    Ca    7.5<L>      09 May 2021 23:30  Phos  2.3     05-09  Mg     1.8     05-09          Wound:  clean dry and intact      Imaging:  < from: CT Head No Cont (05.07.21 @ 14:10) >  IMPRESSION:    Status post interval left craniectomy for evacuation of the left subdural hematoma with expected postoperative changes. Interval resolution of the midline shift.    Residual extra-axial blood products along the left craniectomy bed, right frontal convexity, and right middle cranial fossa convexity. Stable subdural hemorrhage along the cerebral falx and left tentorium.    New trace scattered subarachnoid hemorrhage along the right cerebral sulci and right cerebellar sulci.    New intraventricular hemorrhage in the bilateral occipital horns.      CHIQUI SEPULVEDA M.D., ATTENDING RADIOLOGIST  This document has been electronically signed. May  7 2021  3:22PM    < end of copied text >        Assessment/Plan:   This is a 45 year old gentleman POD# 4, S/P Left Craniectomy for evac of SDH    -Neuro: Please obtain a CTH today, Neuro checks Q1h, Na goal: 145-150, continue Keppra 500mg BID, avoid laying the pt on the Left side, target RASS -2 to 0  -CV: target SBP <140, keep euvolemic   -Resp: Daily SBT, avoid hyperventilation, maintain normocapnic   -GI: TF, GI ppx, bowel regimen  -: Cole in place, replete lytes PRN, Na goal: 145-150   DVT prophylaxis SQH

## 2021-05-11 DIAGNOSIS — F10.10 ALCOHOL ABUSE, UNCOMPLICATED: ICD-10-CM

## 2021-05-11 DIAGNOSIS — S06.5X9A TRAUMATIC SUBDURAL HEMORRHAGE WITH LOSS OF CONSCIOUSNESS OF UNSPECIFIED DURATION, INITIAL ENCOUNTER: ICD-10-CM

## 2021-05-11 DIAGNOSIS — J96.90 RESPIRATORY FAILURE, UNSPECIFIED, UNSPECIFIED WHETHER WITH HYPOXIA OR HYPERCAPNIA: ICD-10-CM

## 2021-05-11 DIAGNOSIS — Z51.5 ENCOUNTER FOR PALLIATIVE CARE: ICD-10-CM

## 2021-05-11 LAB
ANION GAP SERPL CALC-SCNC: 8 MMOL/L — SIGNIFICANT CHANGE UP (ref 7–14)
ANION GAP SERPL CALC-SCNC: 9 MMOL/L — SIGNIFICANT CHANGE UP (ref 7–14)
APPEARANCE UR: CLEAR — SIGNIFICANT CHANGE UP
BASE EXCESS BLDA CALC-SCNC: 2.3 MMOL/L — HIGH (ref -2–2)
BILIRUB UR-MCNC: NEGATIVE — SIGNIFICANT CHANGE UP
BUN SERPL-MCNC: 5 MG/DL — LOW (ref 10–20)
BUN SERPL-MCNC: 7 MG/DL — LOW (ref 10–20)
CALCIUM SERPL-MCNC: 7.5 MG/DL — LOW (ref 8.5–10.1)
CALCIUM SERPL-MCNC: 7.6 MG/DL — LOW (ref 8.5–10.1)
CHLORIDE SERPL-SCNC: 100 MMOL/L — SIGNIFICANT CHANGE UP (ref 98–110)
CHLORIDE SERPL-SCNC: 103 MMOL/L — SIGNIFICANT CHANGE UP (ref 98–110)
CO2 SERPL-SCNC: 23 MMOL/L — SIGNIFICANT CHANGE UP (ref 17–32)
CO2 SERPL-SCNC: 25 MMOL/L — SIGNIFICANT CHANGE UP (ref 17–32)
COLOR SPEC: YELLOW — SIGNIFICANT CHANGE UP
CREAT SERPL-MCNC: <0.5 MG/DL — LOW (ref 0.7–1.5)
CREAT SERPL-MCNC: <0.5 MG/DL — LOW (ref 0.7–1.5)
DIFF PNL FLD: ABNORMAL
GLUCOSE BLDC GLUCOMTR-MCNC: 104 MG/DL — HIGH (ref 70–99)
GLUCOSE BLDC GLUCOMTR-MCNC: 129 MG/DL — HIGH (ref 70–99)
GLUCOSE BLDC GLUCOMTR-MCNC: 131 MG/DL — HIGH (ref 70–99)
GLUCOSE BLDC GLUCOMTR-MCNC: 152 MG/DL — HIGH (ref 70–99)
GLUCOSE BLDC GLUCOMTR-MCNC: 154 MG/DL — HIGH (ref 70–99)
GLUCOSE SERPL-MCNC: 129 MG/DL — HIGH (ref 70–99)
GLUCOSE SERPL-MCNC: 157 MG/DL — HIGH (ref 70–99)
GLUCOSE UR QL: ABNORMAL
HCO3 BLDA-SCNC: 25 MMOL/L — SIGNIFICANT CHANGE UP (ref 23–27)
HCT VFR BLD CALC: 24.6 % — LOW (ref 42–52)
HCT VFR BLD CALC: 25.5 % — LOW (ref 42–52)
HGB BLD-MCNC: 8.1 G/DL — LOW (ref 14–18)
HGB BLD-MCNC: 8.6 G/DL — LOW (ref 14–18)
KETONES UR-MCNC: NEGATIVE — SIGNIFICANT CHANGE UP
LEUKOCYTE ESTERASE UR-ACNC: ABNORMAL
MAGNESIUM SERPL-MCNC: 1.7 MG/DL — LOW (ref 1.8–2.4)
MAGNESIUM SERPL-MCNC: 1.9 MG/DL — SIGNIFICANT CHANGE UP (ref 1.8–2.4)
MCHC RBC-ENTMCNC: 30 PG — SIGNIFICANT CHANGE UP (ref 27–31)
MCHC RBC-ENTMCNC: 30.4 PG — SIGNIFICANT CHANGE UP (ref 27–31)
MCHC RBC-ENTMCNC: 32.9 G/DL — SIGNIFICANT CHANGE UP (ref 32–37)
MCHC RBC-ENTMCNC: 33.7 G/DL — SIGNIFICANT CHANGE UP (ref 32–37)
MCV RBC AUTO: 90.1 FL — SIGNIFICANT CHANGE UP (ref 80–94)
MCV RBC AUTO: 91.1 FL — SIGNIFICANT CHANGE UP (ref 80–94)
NITRITE UR-MCNC: POSITIVE
NRBC # BLD: 0 /100 WBCS — SIGNIFICANT CHANGE UP (ref 0–0)
PCO2 BLDA: 32 MMHG — LOW (ref 38–42)
PH BLDA: 7.5 — HIGH (ref 7.38–7.42)
PH UR: 7.5 — SIGNIFICANT CHANGE UP (ref 5–8)
PHOSPHATE SERPL-MCNC: 3.1 MG/DL — SIGNIFICANT CHANGE UP (ref 2.1–4.9)
PHOSPHATE SERPL-MCNC: 3.2 MG/DL — SIGNIFICANT CHANGE UP (ref 2.1–4.9)
PLATELET # BLD AUTO: 74 K/UL — LOW (ref 130–400)
PLATELET # BLD AUTO: 92 K/UL — LOW (ref 130–400)
PO2 BLDA: 138 MMHG — HIGH (ref 78–95)
POTASSIUM SERPL-MCNC: 3.8 MMOL/L — SIGNIFICANT CHANGE UP (ref 3.5–5)
POTASSIUM SERPL-MCNC: 4 MMOL/L — SIGNIFICANT CHANGE UP (ref 3.5–5)
POTASSIUM SERPL-SCNC: 3.8 MMOL/L — SIGNIFICANT CHANGE UP (ref 3.5–5)
POTASSIUM SERPL-SCNC: 4 MMOL/L — SIGNIFICANT CHANGE UP (ref 3.5–5)
PROT UR-MCNC: SIGNIFICANT CHANGE UP
RBC # BLD: 2.7 M/UL — LOW (ref 4.7–6.1)
RBC # BLD: 2.83 M/UL — LOW (ref 4.7–6.1)
RBC # FLD: 15.2 % — HIGH (ref 11.5–14.5)
RBC # FLD: 15.2 % — HIGH (ref 11.5–14.5)
SAO2 % BLDA: 100 % — HIGH (ref 94–98)
SODIUM SERPL-SCNC: 132 MMOL/L — LOW (ref 135–146)
SODIUM SERPL-SCNC: 136 MMOL/L — SIGNIFICANT CHANGE UP (ref 135–146)
SP GR SPEC: 1.01 — SIGNIFICANT CHANGE UP (ref 1.01–1.03)
UROBILINOGEN FLD QL: 2 — SIGNIFICANT CHANGE UP
WBC # BLD: 2.6 K/UL — LOW (ref 4.8–10.8)
WBC # BLD: 4.08 K/UL — LOW (ref 4.8–10.8)
WBC # FLD AUTO: 2.6 K/UL — LOW (ref 4.8–10.8)
WBC # FLD AUTO: 4.08 K/UL — LOW (ref 4.8–10.8)

## 2021-05-11 PROCEDURE — 71045 X-RAY EXAM CHEST 1 VIEW: CPT | Mod: 26

## 2021-05-11 PROCEDURE — 99223 1ST HOSP IP/OBS HIGH 75: CPT

## 2021-05-11 PROCEDURE — 99233 SBSQ HOSP IP/OBS HIGH 50: CPT

## 2021-05-11 PROCEDURE — 99497 ADVNCD CARE PLAN 30 MIN: CPT | Mod: 25

## 2021-05-11 PROCEDURE — 99291 CRITICAL CARE FIRST HOUR: CPT

## 2021-05-11 RX ORDER — VANCOMYCIN HCL 1 G
1000 VIAL (EA) INTRAVENOUS EVERY 12 HOURS
Refills: 0 | Status: DISCONTINUED | OUTPATIENT
Start: 2021-05-12 | End: 2021-05-13

## 2021-05-11 RX ORDER — CEFEPIME 1 G/1
INJECTION, POWDER, FOR SOLUTION INTRAMUSCULAR; INTRAVENOUS
Refills: 0 | Status: DISCONTINUED | OUTPATIENT
Start: 2021-05-11 | End: 2021-05-14

## 2021-05-11 RX ORDER — POTASSIUM CHLORIDE 20 MEQ
20 PACKET (EA) ORAL ONCE
Refills: 0 | Status: COMPLETED | OUTPATIENT
Start: 2021-05-11 | End: 2021-05-11

## 2021-05-11 RX ORDER — CEFEPIME 1 G/1
1000 INJECTION, POWDER, FOR SOLUTION INTRAMUSCULAR; INTRAVENOUS ONCE
Refills: 0 | Status: COMPLETED | OUTPATIENT
Start: 2021-05-11 | End: 2021-05-11

## 2021-05-11 RX ORDER — VANCOMYCIN HCL 1 G
VIAL (EA) INTRAVENOUS
Refills: 0 | Status: DISCONTINUED | OUTPATIENT
Start: 2021-05-11 | End: 2021-05-13

## 2021-05-11 RX ORDER — CEFEPIME 1 G/1
1000 INJECTION, POWDER, FOR SOLUTION INTRAMUSCULAR; INTRAVENOUS EVERY 12 HOURS
Refills: 0 | Status: DISCONTINUED | OUTPATIENT
Start: 2021-05-12 | End: 2021-05-14

## 2021-05-11 RX ORDER — LABETALOL HCL 100 MG
5 TABLET ORAL ONCE
Refills: 0 | Status: COMPLETED | OUTPATIENT
Start: 2021-05-11 | End: 2021-05-11

## 2021-05-11 RX ORDER — LABETALOL HCL 100 MG
10 TABLET ORAL ONCE
Refills: 0 | Status: COMPLETED | OUTPATIENT
Start: 2021-05-11 | End: 2021-05-11

## 2021-05-11 RX ORDER — VANCOMYCIN HCL 1 G
1000 VIAL (EA) INTRAVENOUS ONCE
Refills: 0 | Status: COMPLETED | OUTPATIENT
Start: 2021-05-11 | End: 2021-05-11

## 2021-05-11 RX ORDER — SODIUM CHLORIDE 5 G/100ML
500 INJECTION, SOLUTION INTRAVENOUS
Refills: 0 | Status: DISCONTINUED | OUTPATIENT
Start: 2021-05-11 | End: 2021-05-12

## 2021-05-11 RX ORDER — MAGNESIUM SULFATE 500 MG/ML
2 VIAL (ML) INJECTION ONCE
Refills: 0 | Status: COMPLETED | OUTPATIENT
Start: 2021-05-11 | End: 2021-05-11

## 2021-05-11 RX ORDER — AMLODIPINE BESYLATE 2.5 MG/1
10 TABLET ORAL DAILY
Refills: 0 | Status: DISCONTINUED | OUTPATIENT
Start: 2021-05-11 | End: 2021-05-19

## 2021-05-11 RX ORDER — AMLODIPINE BESYLATE 2.5 MG/1
5 TABLET ORAL DAILY
Refills: 0 | Status: DISCONTINUED | OUTPATIENT
Start: 2021-05-11 | End: 2021-05-11

## 2021-05-11 RX ADMIN — FENTANYL CITRATE 25 MICROGRAM(S): 50 INJECTION INTRAVENOUS at 20:42

## 2021-05-11 RX ADMIN — SODIUM CHLORIDE 30 MILLILITER(S): 5 INJECTION, SOLUTION INTRAVENOUS at 18:43

## 2021-05-11 RX ADMIN — PROPOFOL 2.38 MICROGRAM(S)/KG/MIN: 10 INJECTION, EMULSION INTRAVENOUS at 05:40

## 2021-05-11 RX ADMIN — Medication 50 MILLIEQUIVALENT(S): at 05:40

## 2021-05-11 RX ADMIN — CHLORHEXIDINE GLUCONATE 15 MILLILITER(S): 213 SOLUTION TOPICAL at 18:26

## 2021-05-11 RX ADMIN — POLYETHYLENE GLYCOL 3350 17 GRAM(S): 17 POWDER, FOR SOLUTION ORAL at 05:42

## 2021-05-11 RX ADMIN — CHLORHEXIDINE GLUCONATE 1 APPLICATION(S): 213 SOLUTION TOPICAL at 05:41

## 2021-05-11 RX ADMIN — HEPARIN SODIUM 5000 UNIT(S): 5000 INJECTION INTRAVENOUS; SUBCUTANEOUS at 13:59

## 2021-05-11 RX ADMIN — Medication 25 GRAM(S): at 02:10

## 2021-05-11 RX ADMIN — LEVETIRACETAM 420 MILLIGRAM(S): 250 TABLET, FILM COATED ORAL at 05:47

## 2021-05-11 RX ADMIN — HEPARIN SODIUM 5000 UNIT(S): 5000 INJECTION INTRAVENOUS; SUBCUTANEOUS at 21:19

## 2021-05-11 RX ADMIN — Medication 2: at 12:13

## 2021-05-11 RX ADMIN — Medication 250 MILLIGRAM(S): at 18:41

## 2021-05-11 RX ADMIN — SODIUM CHLORIDE 2 GRAM(S): 9 INJECTION INTRAMUSCULAR; INTRAVENOUS; SUBCUTANEOUS at 13:59

## 2021-05-11 RX ADMIN — AMLODIPINE BESYLATE 5 MILLIGRAM(S): 2.5 TABLET ORAL at 01:15

## 2021-05-11 RX ADMIN — Medication 650 MILLIGRAM(S): at 08:14

## 2021-05-11 RX ADMIN — HEPARIN SODIUM 5000 UNIT(S): 5000 INJECTION INTRAVENOUS; SUBCUTANEOUS at 05:48

## 2021-05-11 RX ADMIN — AMLODIPINE BESYLATE 10 MILLIGRAM(S): 2.5 TABLET ORAL at 21:12

## 2021-05-11 RX ADMIN — Medication 10 MILLIGRAM(S): at 21:19

## 2021-05-11 RX ADMIN — LEVETIRACETAM 420 MILLIGRAM(S): 250 TABLET, FILM COATED ORAL at 18:26

## 2021-05-11 RX ADMIN — PANTOPRAZOLE SODIUM 40 MILLIGRAM(S): 20 TABLET, DELAYED RELEASE ORAL at 12:00

## 2021-05-11 RX ADMIN — FENTANYL CITRATE 25 MICROGRAM(S): 50 INJECTION INTRAVENOUS at 06:56

## 2021-05-11 RX ADMIN — CHLORHEXIDINE GLUCONATE 15 MILLILITER(S): 213 SOLUTION TOPICAL at 05:41

## 2021-05-11 RX ADMIN — SODIUM CHLORIDE 2 GRAM(S): 9 INJECTION INTRAMUSCULAR; INTRAVENOUS; SUBCUTANEOUS at 21:12

## 2021-05-11 RX ADMIN — SODIUM CHLORIDE 2 GRAM(S): 9 INJECTION INTRAMUSCULAR; INTRAVENOUS; SUBCUTANEOUS at 05:47

## 2021-05-11 RX ADMIN — Medication 5 MILLIGRAM(S): at 02:10

## 2021-05-11 RX ADMIN — SENNA PLUS 2 TABLET(S): 8.6 TABLET ORAL at 21:12

## 2021-05-11 RX ADMIN — CEFEPIME 100 MILLIGRAM(S): 1 INJECTION, POWDER, FOR SOLUTION INTRAMUSCULAR; INTRAVENOUS at 18:38

## 2021-05-11 NOTE — PROGRESS NOTE ADULT - ASSESSMENT
Assessment & Plan  45M s/p fall with large left SDH and neurological status change, level 1 for emergent craniotomy    NEURO:  Hx of EtOH abuse and prior Delerium Tremens episodes  GCS 7T  S/P craniotomy for large left SDH  Propofol gtt  RASS -5  | Unarousable  | No response to voice or physical stimulation   Head of bed @ 30 degrees  Repeat Head CT #1 -post op changes, resolution of MLS, stable SDH along cerebral falx and L tentorium, new trace scattered SAH along the right cerebral sulci and right cerebellar sulci and new IVP in the b/l occipital horns.  Repeat Head CT #2: development of acute infarcts in left cerebral hemisphere and right cerebellum  Keppra 500mg BID  Salt tabs 2g q8    RESP:   Intubated, /16/30/5  AB.49/35/165/27 100%  AM CXR      CARDS:   S/P cardiac arrest in OR & MTP  No longer on pressors  Maintain normotensive  Goal MAP > 65mmHg  IVL  trop <0.01 x 3    GI/NUTR:   Diet: change TF to Vital HP @ 55    GI Prophylaxis-Protonix IV    Bowel regimen- added Senna, added miralax     /RENAL:   Cole in place    Strict I/O-  NS@75  Labs:          BUN/Cr- 15/0.6 > 7/0.5 > 6/0.5> 5/0.5          Electrolytes-Na 136 // K 3.8 // Phos 3.5 //  Mg 1.7  Replete Electrolytes PRN  - Na goal 145-150, current Na 148 >142, started NS @75cc/h    HEME/ONC:   S/P massive transfusion protocol  - 4uPRBCs 2FFP 2 platelet intraop  DVT prophylaxis: holding for now  Acute blood loss anemia  - H/H - 7.9/23.8>7.9/23.7 > 8.6/25.5  Plt 111 > 94 > 54 > 55 > 1 unit > 67 > 1u > 77 > 103->85 > 79 ---> no longer trend patrice neurosurgery  - Another 1unit of plts given on  due to persistent oozing during dressing change  - Plts 85, no more plts transfusion, since no further oozing from the dressing, NSG ok with it    ID:  antibiotics: none, completed elder op ancef  WBC 7 > 6.4 > 4.9->4 > 3.5 > 4.08  afebrile     ENDO:  q6 POC glucose  Sliding scale insulin  A1c 5.2    LINES/DRAINS:  GIOVANI, Tamia, Cole, peripherals, ETT, OGT     DISPO: SICU

## 2021-05-11 NOTE — CONSULT NOTE ADULT - ASSESSMENT
45yMale being evaluated for goals of care and symptom management. Pt s/p left crainiotomy for large SDH after pt was found unresponsive at home. Pt has a h/o alcohol abuse and seizures. Palliative care team consulted to support wife as pt has overall poor prognosis for a functional recovery.    Palliative care team met with wife, and introduced palliative care. She verbalized understanding of his current condition and treatment. She is open to further meetings if needed.         See Recs below.  - Full code  - SICU care  - neurosurgery follow up  - palliative care available for meetings and GOC conversations     Please call [phone] with questions or concerns 24/7.   We will continue to follow.

## 2021-05-11 NOTE — CONSULT NOTE ADULT - ATTENDING COMMENTS
Pt seen w above NP.  Case d/w NeuroSx (Dr. Byrne). Prognosis appears poor, but not yet definitive.  Palliative Care services introduced to pt's wife.  All questions answered in detail. Plan to cont current level of care   SUpport provided.  We will follow

## 2021-05-11 NOTE — CHART NOTE - NSCHARTNOTEFT_GEN_A_CORE
Registered Dietitian Follow-Up     Patient Profile Reviewed                           Yes [x]   No []     Nutrition History Previously Obtained        Yes []  No [x] -- still intubated, ventilated, on propofol      Pertinent Subjective Information: Per RN pt receiving tube feed at goal rate with no issues. No BM since adm, miralax ordered. MAP 89     Pertinent Medical Interventions:  s/p left craniotomy for large SDH  remains intubated   per neuro plan to wean sedation      Diet order: NPO with TF   Vital HP @ 55ml/hr x24hrs      Anthropometrics:  - Ht. 70"   - Wt.  175lbs (5/6); dosing   - %wt change  - BMI 25.1 -- based on dosing   - IBW 166lbs     Pertinent Lab Data: (5/10) H/H 8.6/25.5, BUN 5, Cr <0.5, , Mg 1.7, A1c 5.2% (5/7)   CAPILLARY BLOOD GLUCOSE  POCT Blood Glucose.: 154 mg/dL (11 May 2021 12:03)  POCT Blood Glucose.: 131 mg/dL (11 May 2021 07:32)  POCT Blood Glucose.: 129 mg/dL (11 May 2021 00:00)  POCT Blood Glucose.: 106 mg/dL (10 May 2021 18:47)       Pertinent Meds: heparin, sodium chloride 0.9%, admelog, miralax, sodium chloride, protonix, senna, propofol (19.1ml/hr, provides 504kcal)      Physical Findings:  - Appearance: intubated and sedated. +1 generalized edema, +2 L eye edema noted per RN flow sheets  - GI function: no BM since adm, miralax ordered   - Tubes: OGT   - Oral/Mouth cavity: NPO  - Skin: WDL      Nutrition Requirements  Weight Used: dosing wt 79.4kg Ve: 7.2 Tmax: 38.7C    Estimated Energy Needs    Continue []  Adjust [x]  2087kcal([id number])      Estimated Protein Needs    Continue [x]  Adjust []  95-119gm/day (1.2-1.5gm/kg act wt)      Estimated Fluid Needs        Continue [x]  Adjust []  per SICU team      Nutrient Intake: current tube feed regimen meeting 87% of pt's est kcal needs, 97% of estimated higher end protein needs         [x] Previous Nutrition Diagnosis: Less than optimal enteral nutrition infusion            [] Ongoing          [x] Resolved    [x] No active nutrition diagnosis identified at this time       Nutrition Intervention: enteral nutrition      Goal/Expected Outcome: Pt to meet greater than 75% of estimated needs within 3 days      Indicator/Monitoring: energy intake, body composition, NFPF, glucose profile     Recommendations:   Continue current TF regimen of Vital HP @55ml/hr (will provide 1320kcal, 116gm protein, 1109ml H20 plus additional ~504kcal from propofol sedation). Additional fluids per LIP. Maintain all aspiration precautions.

## 2021-05-11 NOTE — PROGRESS NOTE ADULT - ATTENDING COMMENTS
Patient off sedation this am and has all brain stem reflexes present.  In coma.  CT head yesterday shows areas of ischemia in left brain hemisphere and right cerebellum.    ASSESSMENT:  44 y/o male, S/P Fall.  Traumatic Left SDH.  Brain compression.  S/P Left Craniectomy.  Acute respiratory failure with hypoxia.  Hemodynamic instability.  Chronic Alcohol Abuse.  Dysphagia.    PLAN:  - sedation as needed  - Neurosurgery follow up needed  - on Keppra  - continue Vent support  - keep MAP>65  - on tube feeds; bowel regiment  - follow serum electrolytes and UOP  - put on #% saline - Na needs to get 145-150  - GI and DVT prophylaxis

## 2021-05-11 NOTE — CHART NOTE - NSCHARTNOTEFT_GEN_A_CORE
PALLIATIVE MEDICINE INTERDISCIPLINARY TEAM NOTE    Provider:  [ x  ]Social Work   [   ]          [  x ] Initial visit [   ] Follow up    Family or contact name / phone # Brock Whiteside  Met with: [   ] Patient  [  x ] Family  [   ] Other:    Primary Language: [   ] English [   ] Other*:                      *Interpretation provided by:    SUPPORT DIAGNOSES            (Check all that apply)  [  x ] Psychosocial spiritual assessment (PSSA)  [   ] EOL issues  [   ] Cultural / spiritual concerns  [   ] Pain / suffering  [   ] Dementia / AMS  [   ] Other:  [   ] AD issues  [   ] Grief / loss / sadness  [   ] Discharge issues  [   ] Distress / coping    PSYCHOSOCIAL ASSESSMENT OF PATIENT         (Check all that apply)  [ x  ] Initial Assessment            [   ] Reassessment          [   ] Not Applicable this visit    Pain/suffering acuity:  [ x  ] None to mild (0-3)           [   ] Moderate (4-6)        [   ] High (7-10)    Mental Status:  [   ] Alert/oriented (x3)          [   ] Confused/Altered(x2/x1)         [ x  ] Non-resp    Functional status:  [   ] Independent w ADLs      [   ] Needs Assistance             [ x  ] Bedbound/Full Care    Coping:  [   ] Coping well                     [   ] Coping w/difficulty            [   ] Poor coping *unable to assess - pt. not responsive    Support system:  [   ] Strong                              [  x ] Adequate                        [   ] Inadequate    SPIRITUAL ASSESSMENT  Christian/Spiritual practice: ___________________________    Role of organized Episcopal:  [   ] Important                     [   ] Some (fam tradition, cultural)               [   ] None    Effects on medical care:  [   ] Yes, _____________________________________                         [   ] None    Cultural/Buddhist need:  [   ] Yes, _____________________________________                         [   ] None    Refer to Pastoral Care:  [   ] Yes           [   ] No, not at this time    SERVICE PROVIDED  [ x  ]PSSA                                                                             [   ]Discharge support / facilitation  [   ]AD / goals of care counseling                                  [   ]EOL / death / bereavement counseling  [   ]Counseling / support                                                [   ] Family meeting  [   ]Prayer / sacrament / ritual                                      [   ] Referral   [   ]Other                                                                       NOTE and Plan of Care (PoC):    Pt. is a 45 year old,  male being evaluated for goals of care and symptom management. Pt s/p left crainiotomy for large SDH after pt was found unresponsive at home. Pt has a h/o alcohol abuse and seizures. Palliative care team consulted to support wife as pt has overall poor prognosis for a functional recovery.    LMSW encountered pt. resting in bed, not responsive.  Spouse at bedside and appears to have a good understanding of pt.'s current condition.  Understands that neurology is following, and unclear if pt. will improve or decline.  Spouse aware that PC will continue to follow and support her for any decision making regarding GOC.  Spouse states she is supported by her parents, and has children, ages 17 and 19.  LMSW offered assistance in speaking with patient's children regarding pt.'s condition; spouse defers for now but has PC contact information should she reconsider.  LMSW will be available as need for ongoing GOC conversations and for support to spouse/family.

## 2021-05-11 NOTE — CONSULT NOTE ADULT - SUBJECTIVE AND OBJECTIVE BOX
SANDRA DAS          MRN-[id number]              HPI: found unresponsive at home   TRAUMA ACTIVATION LEVEL:  Code trauma    HPI:    Patient is a 45yM w/ PMHx of Etoh abuse and seizures on keppra seen as Trauma Alert upgraded to a Code trauma during examination in the ED where the patient was found to have a GCS of 4 s/p found down unresponsive.  Trauma assessment in ED: intubated for airway protection   (06 May 2021 22:29)      PAST MEDICAL & SURGICAL HISTORY:  Alcohol abuse    GERD (gastroesophageal reflux disease)    ETOH abuse    Hypomagnesemia    Seizure disorder    H/O hemorrhoidectomy        FAMILY HISTORY:  Family history of essential hypertension (Father)     Reviewed and found non contributory in mother or father    SOCIAL HISTORY:     ROS:    Unable to attain due to:  unresponsiveness     symptoms below assessed through observation                 Dyspnea (Flip 0-10): 0                       N/V (Y/N): No                             Secretions (Y/N) : No                                          Agitation(Y/N): No                              Pain (Y/N): No                                   Allergies    No Known Allergies    Intolerances      Opiate Naive (Y/N):   -iStop reviewed (Y/N):   Ref#:            #: [id number]    There are no results for the search terms that you entered.    Medications:      MEDICATIONS  (STANDING):  amLODIPine   Tablet 5 milliGRAM(s) Oral daily  chlorhexidine 0.12% Liquid 15 milliLiter(s) Oral Mucosa two times a day  chlorhexidine 4% Liquid 1 Application(s) Topical <User Schedule>  heparin   Injectable 5000 Unit(s) SubCutaneous every 8 hours  insulin lispro (ADMELOG) corrective regimen sliding scale   SubCutaneous every 6 hours  levETIRAcetam  IVPB 500 milliGRAM(s) IV Intermittent every 12 hours  pantoprazole   Suspension 40 milliGRAM(s) Oral daily  polyethylene glycol 3350 17 Gram(s) Oral every 12 hours  propofol Infusion 5 MICROgram(s)/kG/Min (2.38 mL/Hr) IV Continuous <Continuous>  propranolol 20 milliGRAM(s) Oral every 8 hours  senna 2 Tablet(s) Oral at bedtime  sodium chloride   Oral Tab/Cap - Peds 2 Gram(s) Oral every 8 hours  sodium chloride 0.9%. 1000 milliLiter(s) (75 mL/Hr) IV Continuous <Continuous>    MEDICATIONS  (PRN):  acetaminophen   Tablet .. 650 milliGRAM(s) Oral every 6 hours PRN Temp greater or equal to 38.5C (101.3F)  fentaNYL    Injectable 25 MICROGram(s) IV Push every 4 hours PRN Severe Pain (7 - 10)      Labs:    CBC:                        8.6    4.08  )-----------( 92       ( 10 May 2021 23:55 )             25.5     CMP:    05-11    132<L>  |  100  |  7<L>  ----------------------------<  157<H>  4.0   |  23  |  <0.5<L>    Ca    7.5<L>      11 May 2021 11:18  Phos  3.2     05-11  Mg     1.9     05-11       Albumin, Serum: 3.1 g/dL (05-06-21 @ 22:25)    Imaging:  Reviewed    PEx:  T(C): 36.9 (05-11-21 @ 11:00), Max: 38.7 (05-11-21 @ 07:16)  HR: 100 (05-11-21 @ 11:00) (87 - 111)  BP: 148/92 (05-10-21 @ 19:00) (139/89 - 148/92)  RR: 18 (05-11-21 @ 07:00) (16 - 18)  SpO2: 100% (05-11-21 @ 11:00) (100% - 100%)  Wt(kg): --  Daily         General: AAOx0    found in bed in NAD  HEENT:  NCAT PERRL EOMI Non icteric MOM  Neck: Supple no masses  CVS: RR S1S2 No M/G/R  Resp: vented orally intubated   GI:  Soft NT ND BS+  :  Cole   Musc: No C/C/E    Neuro: (+) gag (+) corneals, (+) non purposeful movement   Psych: not able to assess  Skin: Non jaundiced   Lymph: Normal    Preadmit Karnofsky:  %           Current Karnofsky:     %  http://www.npcrc.org/files/news/karnofsky_performance_scale.pdf   http://www.npcrc.org/files/news/palliative_performance_scale_PPSv2.pdf  Cachexia (Y/N):   BMI:    Advanced Directives:     Full Code    Decision maker: The patient is not able to participate in complex medical decision making conversations.   Legal surrogate: spouse Brianne Miesha    GOALS OF CARE DISCUSSION       Palliative care info/counseling provided	           Family meeting       Advanced Directives addressed please see Advance Care Planning Note	           See previous Palliative Medicine Note       Documentation of GOC: 	    REFERRALS	        Palliative Med        Unit SW/Case Mgmt              Patient/Family Support   SANDRA DAS          [id number]              HPI: found unresponsive at home   TRAUMA ACTIVATION LEVEL:  Code trauma    HPI:    Patient is a 45yM w/ PMHx of Etoh abuse and seizures on keppra seen as Trauma Alert upgraded to a Code trauma during examination in the ED where the patient was found to have a GCS of 4 s/p found down unresponsive.  Trauma assessment in ED: intubated for airway protection   (06 May 2021 22:29)      PAST MEDICAL & SURGICAL HISTORY:  Alcohol abuse    GERD (gastroesophageal reflux disease)    ETOH abuse    Hypomagnesemia    Seizure disorder    H/O hemorrhoidectomy        FAMILY HISTORY:  Family history of essential hypertension (Father)     Reviewed and found non contributory in mother or father    SOCIAL HISTORY: ++etoh use, no reported tobacco use, pt resides at home w family     ROS:    Unable to attain due to:  unresponsiveness     symptoms below assessed through observation                 Dyspnea (Flip 0-10): 0                       N/V (Y/N): No                             Secretions (Y/N) : No                                          Agitation(Y/N): No                              Pain (Y/N): No                                   Allergies    No Known Allergies    Intolerances      Opiate Naive (Y/N):   -iStop reviewed (Y/N):   Ref#:            #: [id number]    There are no results for the search terms that you entered.    Medications:      MEDICATIONS  (STANDING):  amLODIPine   Tablet 5 milliGRAM(s) Oral daily  chlorhexidine 0.12% Liquid 15 milliLiter(s) Oral Mucosa two times a day  chlorhexidine 4% Liquid 1 Application(s) Topical <User Schedule>  heparin   Injectable 5000 Unit(s) SubCutaneous every 8 hours  insulin lispro (ADMELOG) corrective regimen sliding scale   SubCutaneous every 6 hours  levETIRAcetam  IVPB 500 milliGRAM(s) IV Intermittent every 12 hours  pantoprazole   Suspension 40 milliGRAM(s) Oral daily  polyethylene glycol 3350 17 Gram(s) Oral every 12 hours  propofol Infusion 5 MICROgram(s)/kG/Min (2.38 mL/Hr) IV Continuous <Continuous>  propranolol 20 milliGRAM(s) Oral every 8 hours  senna 2 Tablet(s) Oral at bedtime  sodium chloride   Oral Tab/Cap - Peds 2 Gram(s) Oral every 8 hours  sodium chloride 0.9%. 1000 milliLiter(s) (75 mL/Hr) IV Continuous <Continuous>    MEDICATIONS  (PRN):  acetaminophen   Tablet .. 650 milliGRAM(s) Oral every 6 hours PRN Temp greater or equal to 38.5C (101.3F)  fentaNYL    Injectable 25 MICROGram(s) IV Push every 4 hours PRN Severe Pain (7 - 10)      Labs:    CBC:                        8.6    4.08  )-----------( 92       ( 10 May 2021 23:55 )             25.5     CMP:    05-11    132<L>  |  100  |  7<L>  ----------------------------<  157<H>  4.0   |  23  |  <0.5<L>    Ca    7.5<L>      11 May 2021 11:18  Phos  3.2     05-11  Mg     1.9     05-11       Albumin, Serum: 3.1 g/dL (05-06-21 @ 22:25)    Imaging:  Reviewed    PEx:  T(C): 36.9 (05-11-21 @ 11:00), Max: 38.7 (05-11-21 @ 07:16)  HR: 100 (05-11-21 @ 11:00) (87 - 111)  BP: 148/92 (05-10-21 @ 19:00) (139/89 - 148/92)  RR: 18 (05-11-21 @ 07:00) (16 - 18)  SpO2: 100% (05-11-21 @ 11:00) (100% - 100%)  Wt(kg): --  Daily         General: found in bed in NAD s/p crani  HEENT:  NCAT PERRL EOMI Non icteric MOM  Neck: Supple no masses  CVS: RR S1S2 No M/G/R  Resp: vented orally intubated   GI:  Soft NT ND BS+  :  Cole   Musc: No C/C/E    Neuro: withdraws from pain,, (+)  L-sided movement   Psych: not able to assess  Skin: Non jaundiced   Lymph: no adenopathy     Preadmit Karnofsky: 100 %           Current Karnofsky:   10  %  http://www.npcrc.org/files/news/karnofsky_performance_scale.pdf   http://www.npcrc.org/files/news/palliative_performance_scale_PPSv2.pdf  Cachexia (Y/N):   BMI:    Advanced Directives:     Full Code    Decision maker: The patient is not able to participate in complex medical decision making conversations.   Legal surrogate: spouse Briannepatrica Rivasnano    GOALS OF CARE DISCUSSION       Palliative care info/counseling provided	           Family meeting       Advanced Directives addressed please see Advance Care Planning Note	           See previous Palliative Medicine Note       Documentation of GOC: 	    REFERRALS	        Palliative Med        Unit SW/Case Mgmt              Patient/Family Support

## 2021-05-11 NOTE — CONSULT NOTE ADULT - PROBLEM SELECTOR RECOMMENDATION 4
Full code status   ongoing supportive care   pt appears comfortable on vent w no symptoms to addresss   will revisit GOC as appropriate

## 2021-05-11 NOTE — PROGRESS NOTE ADULT - SUBJECTIVE AND OBJECTIVE BOX
SANDRA DAS  [id number]  45y Male    Indication for ICU admission: s/p craniectomy for large L SDH  Admit Date:05-06-21  ICU Date: 05-07-21  OR Date: 05-06-21    No Known Allergies    PAST MEDICAL & SURGICAL HISTORY:  Alcohol abuse  GERD (gastroesophageal reflux disease)  ETOH abuse  Hypomagnesemia  Seizure disorder  H/O hemorrhoidectomy      Home Medications:        24HRS EVENT:  5/10  DAY  -repeat CT head -> development of acute infarcts in left cerebral hemisphere and right cerebellium  -palliative consult to see tomorrow  -do not trend platelets per nsx    Overnight  -Hypertensive, given Labetalol 5mg x2 and started on Amlodipine  -Salt tabs added to maintain goal Na 145-150    DVT PTX: Hep sq    GI PTX: PPI    ***Tubes/Lines/Drains  ***  Peripheral IV  Arterial Line	L rad	                Date   OGT, ETT  Urinary Catheter		Indication: Strict I&O    Date Placed:       REVIEW OF SYSTEMS  [ ] A ten-point review of systems was otherwise negative except as noted.  [x] Due to altered mental status/intubation, subjective information were not able to be obtained from the patient. History was obtained, to the extent possible, from review of the chart and collateral sources of information.           SANDRA DAS  [id number]  45y Male    Indication for ICU admission: s/p craniectomy for large L SDH  Admit Date:21  ICU Date: 21  OR Date: 21    No Known Allergies    PAST MEDICAL & SURGICAL HISTORY:  Alcohol abuse  GERD (gastroesophageal reflux disease)  ETOH abuse  Hypomagnesemia  Seizure disorder  H/O hemorrhoidectomy      Home Medications:        24HRS EVENT:  5/10  DAY  -repeat CT head -> development of acute infarcts in left cerebral hemisphere and right cerebellium  -palliative consult to see tomorrow  -do not trend platelets per nsx    Overnight  -Hypertensive, given Labetalol 5mg x2 and started on Amlodipine  -Salt tabs added to maintain goal Na 145-150    DVT PTX: Hep sq    GI PTX: PPI    ***Tubes/Lines/Drains  ***  Peripheral IV  Arterial Line	L rad	                Date   OGT, ETT  Urinary Catheter		Indication: Strict I&O    Date Placed:       REVIEW OF SYSTEMS  [ ] A ten-point review of systems was otherwise negative except as noted.  [x] Due to altered mental status/intubation, subjective information were not able to be obtained from the patient. History was obtained, to the extent possible, from review of the chart and collateral sources of information.        Daily     Daily     Diet, NPO with Tube Feed:   Tube Feeding Modality: Orogastric  Vital High Protein  Total Volume for 24 Hours (mL): 1320  Continuous  Starting Tube Feed Rate mL per Hour: 55  Until Goal Tube Feed Rate (mL per Hour): 55  Tube Feed Duration (in Hours): 24  Tube Feed Start Time: 06:50 (21 @ 18:48)      CURRENT MEDS:  Neurologic Medications  acetaminophen   Tablet .. 650 milliGRAM(s) Oral every 6 hours PRN Temp greater or equal to 38.5C (101.3F)  fentaNYL    Injectable 25 MICROGram(s) IV Push every 4 hours PRN Severe Pain (7 - 10)  levETIRAcetam  IVPB 500 milliGRAM(s) IV Intermittent every 12 hours  propofol Infusion 5 MICROgram(s)/kG/Min IV Continuous <Continuous>    Respiratory Medications    Cardiovascular Medications  amLODIPine   Tablet 5 milliGRAM(s) Oral daily  propranolol 20 milliGRAM(s) Oral every 8 hours    Gastrointestinal Medications  pantoprazole   Suspension 40 milliGRAM(s) Oral daily  polyethylene glycol 3350 17 Gram(s) Oral every 12 hours  senna 2 Tablet(s) Oral at bedtime  sodium chloride   Oral Tab/Cap - Peds 2 Gram(s) Oral every 8 hours  sodium chloride 0.9%. 1000 milliLiter(s) IV Continuous <Continuous>    Genitourinary Medications    Hematologic/Oncologic Medications  heparin   Injectable 5000 Unit(s) SubCutaneous every 8 hours    Antimicrobial/Immunologic Medications  vancomycin  IVPB 1000 milliGRAM(s) IV Intermittent once  vancomycin  IVPB        Endocrine/Metabolic Medications  insulin lispro (ADMELOG) corrective regimen sliding scale   SubCutaneous every 6 hours    Topical/Other Medications  chlorhexidine 0.12% Liquid 15 milliLiter(s) Oral Mucosa two times a day  chlorhexidine 4% Liquid 1 Application(s) Topical <User Schedule>      ICU Vital Signs Last 24 Hrs  T(C): 36.6 (11 May 2021 16:36), Max: 38.7 (11 May 2021 07:16)  T(F): 97.9 (11 May 2021 16:36), Max: 101.7 (11 May 2021 07:16)  HR: 96 (11 May 2021 15:00) (87 - 111)  BP: 148/92 (10 May 2021 19:00) (148/92 - 148/92)  BP(mean): 115 (10 May 2021 19:00) (115 - 115)  ABP: 147/84 (11 May 2021 15:00) (116/61 - 186/95)  ABP(mean): 106 (11 May 2021 15:00) (54 - 121)  RR: 18 (11 May 2021 15:00) (16 - 20)  SpO2: 100% (11 May 2021 15:00) (100% - 100%)        Mode: AC/ CMV (Assist Control/ Continuous Mandatory Ventilation)  RR (machine): 16  TV (machine): 450  FiO2: 30  PEEP: 5  ITime: 1  MAP: 10  PIP: 21    ABG - ( 11 May 2021 03:31 )  pH, Arterial: 7.50  pH, Blood: x     /  pCO2: 32    /  pO2: 138   / HCO3: 25    / Base Excess: 2.3   /  SaO2: 100                 I&O's Summary    10 May 2021 07:01  -  11 May 2021 07:00  --------------------------------------------------------  IN: 4440 mL / OUT: 4970 mL / NET: -530 mL    11 May 2021 07:  -  11 May 2021 16:40  --------------------------------------------------------  IN: 1254.1 mL / OUT: 885 mL / NET: 369.1 mL      I&O's Detail    10 May 2021 07:  -  11 May 2021 07:00  --------------------------------------------------------  IN:    Enteral Tube Flush: 200 mL    IV PiggyBack: 550 mL    Propofol: 570 mL    sodium chloride 0.9%: 1800 mL    Vital High Protein: 1320 mL  Total IN: 4440 mL    OUT:    Indwelling Catheter - Urethral (mL): 4970 mL  Total OUT: 4970 mL    Total NET: -530 mL      11 May 2021 07:  -  11 May 2021 16:40  --------------------------------------------------------  IN:    Enteral Tube Flush: 50 mL    Propofol: 164.1 mL    sodium chloride 0.9%: 600 mL    Vital High Protein: 440 mL  Total IN: 1254.1 mL    OUT:    Indwelling Catheter - Urethral (mL): 885 mL  Total OUT: 885 mL    Total NET: 369.1 mL          PHYSICAL EXAM:    General/Neuro  RASS  -4  | Deep sedation | No response to voice, but any movement to physical stimulation    [place] Coma Scale: 6T  Eyes: 1   // Verbal: 1T     //Motor:4    //    Deficits:    Patient moving Left extremities > right, withdrawal from pain in all extremities except RUE  Pupils: equal, round, reactive to light    Lungs:      clear to auscultation, bilateral mechanical breath sounds    Cardiovascular : S1, S2.  Regular rate and rhythm.  Peripheral edema   Cardiac Rhythm: Normal Sinus Rhythm    GI: Abdomen soft, Non-tender, Non-distended.      Oroogastric tube in place.         Extremities: Extremities warm, pink, well-perfused.     Derm: Good skin turgor, no skin breakdown.      :       Cole catheter in place.      CXR:       LABS:  CAPILLARY BLOOD GLUCOSE      POCT Blood Glucose.: 154 mg/dL (11 May 2021 12:03)  POCT Blood Glucose.: 131 mg/dL (11 May 2021 07:32)  POCT Blood Glucose.: 129 mg/dL (11 May 2021 00:00)  POCT Blood Glucose.: 106 mg/dL (10 May 2021 18:47)                          8.6    4.08  )-----------( 92       ( 10 May 2021 23:55 )             25.5       05-11    132<L>  |  100  |  7<L>  ----------------------------<  157<H>  4.0   |  23  |  <0.5<L>    Ca    7.5<L>      11 May 2021 11:18  Phos  3.2     05-  Mg     1.9     05-11              Urinalysis Basic - ( 11 May 2021 12:13 )    Color: Yellow / Appearance: Clear / S.010 / pH: x  Gluc: x / Ketone: Negative  / Bili: Negative / Urobili: 2   Blood: x / Protein: Trace / Nitrite: Positive   Leuk Esterase: Small / RBC: 20 /HPF / WBC 5 /HPF   Sq Epi: x / Non Sq Epi: Occasional / Bacteria: x

## 2021-05-11 NOTE — PROGRESS NOTE ADULT - SUBJECTIVE AND OBJECTIVE BOX
Subjective: 45yMale with a pmhx of SUBDURAL HEMATOMA;RESPIRATORY FAILURE;FALL    ^FALL    No pertinent family history in first degree relatives    Family history of essential hypertension (Father)    Handoff    MEWS Score    No pertinent past medical history    Alcohol abuse    GERD (gastroesophageal reflux disease)    ETOH abuse    Hypomagnesemia    Seizure disorder    Subdural hematoma    S/P subdural hematoma evacuation    Subdural hematoma    Subdural hematoma    Insertion of non-tunneled central venous catheter (CVC) in patient age 5 years of age or older    Craniotomy, decompressive    No significant past surgical history    H/O hemorrhoidectomy    FALL    23    Respiratory failure    Fall    [email or web address]        POD#5  S/P Left Craniectomy for evac of SDH    Pt seen and examined at the bedside.  Pt remains intubated, mechanically ventilated, sedated on propofol ggt.  Not following commands.   Allergies    No Known Allergies    Intolerances        Imaging:    Vital Signs Last 24 Hrs  T(C): 38.7 (11 May 2021 07:16), Max: 38.7 (11 May 2021 07:16)  T(F): 101.7 (11 May 2021 07:16), Max: 101.7 (11 May 2021 07:16)  HR: 111 (11 May 2021 09:00) (81 - 111)  BP: 148/92 (10 May 2021 19:00) (139/89 - 148/92)  BP(mean): 115 (10 May 2021 19:00) (108 - 115)  RR: 18 (11 May 2021 07:00) (16 - 18)  SpO2: 100% (11 May 2021 09:00) (100% - 100%)      acetaminophen   Tablet .. 650 milliGRAM(s) Oral every 6 hours PRN  amLODIPine   Tablet 5 milliGRAM(s) Oral daily  chlorhexidine 0.12% Liquid 15 milliLiter(s) Oral Mucosa two times a day  chlorhexidine 4% Liquid 1 Application(s) Topical <User Schedule>  fentaNYL    Injectable 25 MICROGram(s) IV Push every 4 hours PRN  heparin   Injectable 5000 Unit(s) SubCutaneous every 8 hours  insulin lispro (ADMELOG) corrective regimen sliding scale   SubCutaneous every 6 hours  levETIRAcetam  IVPB 500 milliGRAM(s) IV Intermittent every 12 hours  pantoprazole   Suspension 40 milliGRAM(s) Oral daily  polyethylene glycol 3350 17 Gram(s) Oral every 12 hours  propofol Infusion 5 MICROgram(s)/kG/Min IV Continuous <Continuous>  propranolol 20 milliGRAM(s) Oral every 8 hours  senna 2 Tablet(s) Oral at bedtime  sodium chloride   Oral Tab/Cap - Peds 2 Gram(s) Oral every 8 hours  sodium chloride 0.9%. 1000 milliLiter(s) IV Continuous <Continuous>        05-10-21 @ 07:01  -  05-11-21 @ 07:00  --------------------------------------------------------  IN: 4440 mL / OUT: 4970 mL / NET: -530 mL    05-11-21 @ 07:01  -  05-11-21 @ 09:43  --------------------------------------------------------  IN: 357.6 mL / OUT: 250 mL / NET: 107.6 mL        REVIEW OF SYSTEMS    [ ] A ten-point review of systems was otherwise negative except as noted.  [ ] Due to altered mental status/intubation, subjective information were not able to be obtained from the patient. History was obtained, to the extent possible, from review of the chart and collateral sources of information.      Neuro Exam:  AAOX3. Verbal function intact  tongue midline, facial motions symmetric  PERRLA, EOMI  Pronator Drift:   Finger to Nose intact  Motor: MAEx4, 5/5 power in b/l UE and LE  Sensation: intact to touch in all extremities        Wound:    CBC Full  -  ( 10 May 2021 23:55 )  WBC Count : 4.08 K/uL  RBC Count : 2.83 M/uL  Hemoglobin : 8.6 g/dL  Hematocrit : 25.5 %  Platelet Count - Automated : 92 K/uL  Mean Cell Volume : 90.1 fL  Mean Cell Hemoglobin : 30.4 pg  Mean Cell Hemoglobin Concentration : 33.7 g/dL  Auto Neutrophil # : x  Auto Lymphocyte # : x  Auto Monocyte # : x  Auto Eosinophil # : x  Auto Basophil # : x  Auto Neutrophil % : x  Auto Lymphocyte % : x  Auto Monocyte % : x  Auto Eosinophil % : x  Auto Basophil % : x    05-10    136  |  103  |  5<L>  ----------------------------<  129<H>  3.8   |  25  |  <0.5<L>    Ca    7.6<L>      10 May 2021 23:55  Phos  3.1     05-10  Mg     1.7     05-10              Assessment/Plan:        Subjective: 45yMale with a pmhx of SUBDURAL HEMATOMA;RESPIRATORY FAILURE;FALL    ^FALL    No pertinent family history in first degree relatives    Family history of essential hypertension (Father)    Handoff    MEWS Score    No pertinent past medical history    Alcohol abuse    GERD (gastroesophageal reflux disease)    ETOH abuse    Hypomagnesemia    Seizure disorder    Subdural hematoma    S/P subdural hematoma evacuation    Subdural hematoma    Subdural hematoma    Insertion of non-tunneled central venous catheter (CVC) in patient age 5 years of age or older    Craniotomy, decompressive    No significant past surgical history    H/O hemorrhoidectomy    FALL    23    Respiratory failure    Fall    SysAdmin_VisitLink        POD#5  S/P Left Craniectomy for evac of SDH    Pt seen and examined at the bedside with Dr Norton.  Pt remains intubated, sedated on propofol gtt.  Follows command to squeeze with L hand.  Dressing saturated since Saturday.  Dressing changed at bedside. No active oozing noted.     Allergies    No Known Allergies    Intolerances        Imaging: < from: CT Head No Cont (05.10.21 @ 13:09) >  IMPRESSION:  In comparison with the prior CT scan of the brain dated May 7, 2021:  Partial resorption of the extra-axial fluid collections.  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  < end of copied text >      Vital Signs Last 24 Hrs  T(C): 38.7 (11 May 2021 07:16), Max: 38.7 (11 May 2021 07:16)  T(F): 101.7 (11 May 2021 07:16), Max: 101.7 (11 May 2021 07:16)  HR: 111 (11 May 2021 09:00) (81 - 111)  BP: 148/92 (10 May 2021 19:00) (139/89 - 148/92)  BP(mean): 115 (10 May 2021 19:00) (108 - 115)  RR: 18 (11 May 2021 07:00) (16 - 18)  SpO2: 100% (11 May 2021 09:00) (100% - 100%)      acetaminophen   Tablet .. 650 milliGRAM(s) Oral every 6 hours PRN  amLODIPine   Tablet 5 milliGRAM(s) Oral daily  chlorhexidine 0.12% Liquid 15 milliLiter(s) Oral Mucosa two times a day  chlorhexidine 4% Liquid 1 Application(s) Topical <User Schedule>  fentaNYL    Injectable 25 MICROGram(s) IV Push every 4 hours PRN  heparin   Injectable 5000 Unit(s) SubCutaneous every 8 hours  insulin lispro (ADMELOG) corrective regimen sliding scale   SubCutaneous every 6 hours  levETIRAcetam  IVPB 500 milliGRAM(s) IV Intermittent every 12 hours  pantoprazole   Suspension 40 milliGRAM(s) Oral daily  polyethylene glycol 3350 17 Gram(s) Oral every 12 hours  propofol Infusion 5 MICROgram(s)/kG/Min IV Continuous <Continuous>  propranolol 20 milliGRAM(s) Oral every 8 hours  senna 2 Tablet(s) Oral at bedtime  sodium chloride   Oral Tab/Cap - Peds 2 Gram(s) Oral every 8 hours  sodium chloride 0.9%. 1000 milliLiter(s) IV Continuous <Continuous>        05-10-21 @ 07:01  -  05-11-21 @ 07:00  --------------------------------------------------------  IN: 4440 mL / OUT: 4970 mL / NET: -530 mL    05-11-21 @ 07:01  -  05-11-21 @ 09:43  --------------------------------------------------------  IN: 357.6 mL / OUT: 250 mL / NET: 107.6 mL        REVIEW OF SYSTEMS    [ ] A ten-point review of systems was otherwise negative except as noted.  [x ] Due to altered mental status/intubation, subjective information were not able to be obtained from the patient. History was obtained, to the extent possible, from review of the chart and collateral sources of information.      Neuro Exam:  Intubated, sedated  attempts to follow commands on LUE only  pupils equal 3-4mm and briskly reactive  Left lateral gaze  grimaces to pain  follows command to squeeze L hand  spontaneous movt LUE  slight w/d b/l LE's to pain        CBC Full  -  ( 10 May 2021 23:55 )  WBC Count : 4.08 K/uL  RBC Count : 2.83 M/uL  Hemoglobin : 8.6 g/dL  Hematocrit : 25.5 %  Platelet Count - Automated : 92 K/uL  Mean Cell Volume : 90.1 fL  Mean Cell Hemoglobin : 30.4 pg  Mean Cell Hemoglobin Concentration : 33.7 g/dL  Auto Neutrophil # : x  Auto Lymphocyte # : x  Auto Monocyte # : x  Auto Eosinophil # : x  Auto Basophil # : x  Auto Neutrophil % : x  Auto Lymphocyte % : x  Auto Monocyte % : x  Auto Eosinophil % : x  Auto Basophil % : x    05-10    136  |  103  |  5<L>  ----------------------------<  129<H>  3.8   |  25  |  <0.5<L>    Ca    7.6<L>      10 May 2021 23:55  Phos  3.1     05-10  Mg     1.7     05-10              Assessment/Plan:   Hepatitis profile neg  appreciate Hematology cx- thrombocytopenia likely 2/2 bone marrow toxicity from ETOH abuse and consumption of plts from acute bleed  cont Neuro checks q 1hr  Liver function elevated- consider Abd sono   cont GI/DVT ppx  wean sedation to extubation as appropriate  cont SICU care  No left sided laying  d/w attg

## 2021-05-12 LAB
-  K. PNEUMONIAE GROUP: SIGNIFICANT CHANGE UP
ANION GAP SERPL CALC-SCNC: 7 MMOL/L — SIGNIFICANT CHANGE UP (ref 7–14)
ANION GAP SERPL CALC-SCNC: 8 MMOL/L — SIGNIFICANT CHANGE UP (ref 7–14)
ANION GAP SERPL CALC-SCNC: 9 MMOL/L — SIGNIFICANT CHANGE UP (ref 7–14)
ANISOCYTOSIS BLD QL: SLIGHT — SIGNIFICANT CHANGE UP
APTT BLD: 27.5 SEC — SIGNIFICANT CHANGE UP (ref 27–39.2)
BASOPHILS # BLD AUTO: 0 K/UL — SIGNIFICANT CHANGE UP (ref 0–0.2)
BASOPHILS NFR BLD AUTO: 0 % — SIGNIFICANT CHANGE UP (ref 0–1)
BUN SERPL-MCNC: 7 MG/DL — LOW (ref 10–20)
BUN SERPL-MCNC: 8 MG/DL — LOW (ref 10–20)
BUN SERPL-MCNC: 9 MG/DL — LOW (ref 10–20)
CALCIUM SERPL-MCNC: 6.6 MG/DL — LOW (ref 8.5–10.1)
CALCIUM SERPL-MCNC: 7 MG/DL — LOW (ref 8.5–10.1)
CALCIUM SERPL-MCNC: 7.5 MG/DL — LOW (ref 8.5–10.1)
CHLORIDE SERPL-SCNC: 102 MMOL/L — SIGNIFICANT CHANGE UP (ref 98–110)
CHLORIDE SERPL-SCNC: 103 MMOL/L — SIGNIFICANT CHANGE UP (ref 98–110)
CHLORIDE SERPL-SCNC: 109 MMOL/L — SIGNIFICANT CHANGE UP (ref 98–110)
CO2 SERPL-SCNC: 18 MMOL/L — SIGNIFICANT CHANGE UP (ref 17–32)
CO2 SERPL-SCNC: 20 MMOL/L — SIGNIFICANT CHANGE UP (ref 17–32)
CO2 SERPL-SCNC: 22 MMOL/L — SIGNIFICANT CHANGE UP (ref 17–32)
CREAT SERPL-MCNC: <0.5 MG/DL — LOW (ref 0.7–1.5)
EOSINOPHIL # BLD AUTO: 0 K/UL — SIGNIFICANT CHANGE UP (ref 0–0.7)
EOSINOPHIL NFR BLD AUTO: 0 % — SIGNIFICANT CHANGE UP (ref 0–8)
GAS PNL BLDA: SIGNIFICANT CHANGE UP
GIANT PLATELETS BLD QL SMEAR: PRESENT — SIGNIFICANT CHANGE UP
GLUCOSE BLDC GLUCOMTR-MCNC: 136 MG/DL — HIGH (ref 70–99)
GLUCOSE BLDC GLUCOMTR-MCNC: 141 MG/DL — HIGH (ref 70–99)
GLUCOSE BLDC GLUCOMTR-MCNC: 155 MG/DL — HIGH (ref 70–99)
GLUCOSE BLDC GLUCOMTR-MCNC: 159 MG/DL — HIGH (ref 70–99)
GLUCOSE SERPL-MCNC: 138 MG/DL — HIGH (ref 70–99)
GLUCOSE SERPL-MCNC: 138 MG/DL — HIGH (ref 70–99)
GLUCOSE SERPL-MCNC: 160 MG/DL — HIGH (ref 70–99)
GRAM STN FLD: SIGNIFICANT CHANGE UP
HCT VFR BLD CALC: 23.2 % — LOW (ref 42–52)
HGB BLD-MCNC: 7.8 G/DL — LOW (ref 14–18)
INR BLD: 1.08 RATIO — SIGNIFICANT CHANGE UP (ref 0.65–1.3)
LYMPHOCYTES # BLD AUTO: 0.25 K/UL — LOW (ref 1.2–3.4)
LYMPHOCYTES # BLD AUTO: 9.7 % — LOW (ref 20.5–51.1)
MAGNESIUM SERPL-MCNC: 1.6 MG/DL — LOW (ref 1.8–2.4)
MAGNESIUM SERPL-MCNC: 1.6 MG/DL — LOW (ref 1.8–2.4)
MANUAL SMEAR VERIFICATION: SIGNIFICANT CHANGE UP
MCHC RBC-ENTMCNC: 30.8 PG — SIGNIFICANT CHANGE UP (ref 27–31)
MCHC RBC-ENTMCNC: 33.6 G/DL — SIGNIFICANT CHANGE UP (ref 32–37)
MCV RBC AUTO: 91.7 FL — SIGNIFICANT CHANGE UP (ref 80–94)
METHOD TYPE: SIGNIFICANT CHANGE UP
MICROCYTES BLD QL: SLIGHT — SIGNIFICANT CHANGE UP
MONOCYTES # BLD AUTO: 0.44 K/UL — SIGNIFICANT CHANGE UP (ref 0.1–0.6)
MONOCYTES NFR BLD AUTO: 16.8 % — HIGH (ref 1.7–9.3)
NEUTROPHILS # BLD AUTO: 1.86 K/UL — SIGNIFICANT CHANGE UP (ref 1.4–6.5)
NEUTROPHILS NFR BLD AUTO: 70.8 % — SIGNIFICANT CHANGE UP (ref 42.2–75.2)
NEUTS BAND # BLD: 0.9 % — SIGNIFICANT CHANGE UP (ref 0–6)
NRBC # BLD: 0 /100 WBCS — SIGNIFICANT CHANGE UP (ref 0–0)
NRBC # BLD: 1 /100 — HIGH (ref 0–0)
NRBC # BLD: SIGNIFICANT CHANGE UP /100 WBCS (ref 0–0)
OSMOLALITY SERPL: 271 MOS/KG — LOW (ref 275–300)
OSMOLALITY SERPL: 273 MOS/KG — LOW (ref 275–300)
PHOSPHATE SERPL-MCNC: 2 MG/DL — LOW (ref 2.1–4.9)
PHOSPHATE SERPL-MCNC: 2.1 MG/DL — SIGNIFICANT CHANGE UP (ref 2.1–4.9)
PLAT MORPH BLD: NORMAL — SIGNIFICANT CHANGE UP
POLYCHROMASIA BLD QL SMEAR: SIGNIFICANT CHANGE UP
POTASSIUM SERPL-MCNC: 3.3 MMOL/L — LOW (ref 3.5–5)
POTASSIUM SERPL-MCNC: 3.8 MMOL/L — SIGNIFICANT CHANGE UP (ref 3.5–5)
POTASSIUM SERPL-MCNC: 4.2 MMOL/L — SIGNIFICANT CHANGE UP (ref 3.5–5)
POTASSIUM SERPL-SCNC: 3.3 MMOL/L — LOW (ref 3.5–5)
POTASSIUM SERPL-SCNC: 3.8 MMOL/L — SIGNIFICANT CHANGE UP (ref 3.5–5)
POTASSIUM SERPL-SCNC: 4.2 MMOL/L — SIGNIFICANT CHANGE UP (ref 3.5–5)
PROTHROM AB SERPL-ACNC: 12.4 SEC — SIGNIFICANT CHANGE UP (ref 9.95–12.87)
RBC # BLD: 2.53 M/UL — LOW (ref 4.7–6.1)
RBC # FLD: 15 % — HIGH (ref 11.5–14.5)
RBC BLD AUTO: ABNORMAL
SODIUM SERPL-SCNC: 131 MMOL/L — LOW (ref 135–146)
SODIUM SERPL-SCNC: 132 MMOL/L — LOW (ref 135–146)
SODIUM SERPL-SCNC: 135 MMOL/L — SIGNIFICANT CHANGE UP (ref 135–146)
SPECIMEN SOURCE: SIGNIFICANT CHANGE UP
SPECIMEN SOURCE: SIGNIFICANT CHANGE UP
VARIANT LYMPHS # BLD: 1.8 % — SIGNIFICANT CHANGE UP (ref 0–5)
WBC # BLD: 3.18 K/UL — LOW (ref 4.8–10.8)
WBC # FLD AUTO: 3.18 K/UL — LOW (ref 4.8–10.8)

## 2021-05-12 PROCEDURE — 99222 1ST HOSP IP/OBS MODERATE 55: CPT

## 2021-05-12 PROCEDURE — 71045 X-RAY EXAM CHEST 1 VIEW: CPT | Mod: 26,76

## 2021-05-12 PROCEDURE — 36556 INSERT NON-TUNNEL CV CATH: CPT

## 2021-05-12 RX ORDER — SODIUM CHLORIDE 5 G/100ML
500 INJECTION, SOLUTION INTRAVENOUS
Refills: 0 | Status: DISCONTINUED | OUTPATIENT
Start: 2021-05-12 | End: 2021-05-12

## 2021-05-12 RX ORDER — POTASSIUM PHOSPHATE, MONOBASIC POTASSIUM PHOSPHATE, DIBASIC 236; 224 MG/ML; MG/ML
15 INJECTION, SOLUTION INTRAVENOUS ONCE
Refills: 0 | Status: COMPLETED | OUTPATIENT
Start: 2021-05-12 | End: 2021-05-12

## 2021-05-12 RX ORDER — MAGNESIUM SULFATE 500 MG/ML
2 VIAL (ML) INJECTION
Refills: 0 | Status: COMPLETED | OUTPATIENT
Start: 2021-05-12 | End: 2021-05-12

## 2021-05-12 RX ORDER — SODIUM CHLORIDE 5 G/100ML
500 INJECTION, SOLUTION INTRAVENOUS
Refills: 0 | Status: DISCONTINUED | OUTPATIENT
Start: 2021-05-12 | End: 2021-05-18

## 2021-05-12 RX ORDER — POTASSIUM PHOSPHATE, MONOBASIC POTASSIUM PHOSPHATE, DIBASIC 236; 224 MG/ML; MG/ML
30 INJECTION, SOLUTION INTRAVENOUS ONCE
Refills: 0 | Status: DISCONTINUED | OUTPATIENT
Start: 2021-05-12 | End: 2021-05-12

## 2021-05-12 RX ORDER — POTASSIUM CHLORIDE 20 MEQ
20 PACKET (EA) ORAL ONCE
Refills: 0 | Status: COMPLETED | OUTPATIENT
Start: 2021-05-12 | End: 2021-05-12

## 2021-05-12 RX ORDER — SODIUM CHLORIDE 9 MG/ML
3 INJECTION INTRAMUSCULAR; INTRAVENOUS; SUBCUTANEOUS EVERY 8 HOURS
Refills: 0 | Status: DISCONTINUED | OUTPATIENT
Start: 2021-05-12 | End: 2021-05-12

## 2021-05-12 RX ORDER — SODIUM CHLORIDE 9 MG/ML
4 INJECTION INTRAMUSCULAR; INTRAVENOUS; SUBCUTANEOUS EVERY 6 HOURS
Refills: 0 | Status: DISCONTINUED | OUTPATIENT
Start: 2021-05-12 | End: 2021-05-12

## 2021-05-12 RX ORDER — MAGNESIUM SULFATE 500 MG/ML
2 VIAL (ML) INJECTION ONCE
Refills: 0 | Status: COMPLETED | OUTPATIENT
Start: 2021-05-12 | End: 2021-05-12

## 2021-05-12 RX ORDER — POTASSIUM CHLORIDE 20 MEQ
20 PACKET (EA) ORAL
Refills: 0 | Status: COMPLETED | OUTPATIENT
Start: 2021-05-12 | End: 2021-05-12

## 2021-05-12 RX ADMIN — Medication 250 MILLIGRAM(S): at 06:06

## 2021-05-12 RX ADMIN — Medication 50 GRAM(S): at 17:42

## 2021-05-12 RX ADMIN — Medication 100 MILLIEQUIVALENT(S): at 14:40

## 2021-05-12 RX ADMIN — HEPARIN SODIUM 5000 UNIT(S): 5000 INJECTION INTRAVENOUS; SUBCUTANEOUS at 13:44

## 2021-05-12 RX ADMIN — HEPARIN SODIUM 5000 UNIT(S): 5000 INJECTION INTRAVENOUS; SUBCUTANEOUS at 22:32

## 2021-05-12 RX ADMIN — Medication 50 GRAM(S): at 15:42

## 2021-05-12 RX ADMIN — FENTANYL CITRATE 25 MICROGRAM(S): 50 INJECTION INTRAVENOUS at 21:12

## 2021-05-12 RX ADMIN — CEFEPIME 100 MILLIGRAM(S): 1 INJECTION, POWDER, FOR SOLUTION INTRAMUSCULAR; INTRAVENOUS at 17:39

## 2021-05-12 RX ADMIN — POTASSIUM PHOSPHATE, MONOBASIC POTASSIUM PHOSPHATE, DIBASIC 62.5 MILLIMOLE(S): 236; 224 INJECTION, SOLUTION INTRAVENOUS at 18:26

## 2021-05-12 RX ADMIN — SODIUM CHLORIDE 4 GRAM(S): 9 INJECTION INTRAMUSCULAR; INTRAVENOUS; SUBCUTANEOUS at 17:40

## 2021-05-12 RX ADMIN — LEVETIRACETAM 420 MILLIGRAM(S): 250 TABLET, FILM COATED ORAL at 05:41

## 2021-05-12 RX ADMIN — SODIUM CHLORIDE 3 GRAM(S): 9 INJECTION INTRAMUSCULAR; INTRAVENOUS; SUBCUTANEOUS at 05:42

## 2021-05-12 RX ADMIN — CEFEPIME 100 MILLIGRAM(S): 1 INJECTION, POWDER, FOR SOLUTION INTRAMUSCULAR; INTRAVENOUS at 06:05

## 2021-05-12 RX ADMIN — PANTOPRAZOLE SODIUM 40 MILLIGRAM(S): 20 TABLET, DELAYED RELEASE ORAL at 13:44

## 2021-05-12 RX ADMIN — SODIUM CHLORIDE 40 MILLILITER(S): 5 INJECTION, SOLUTION INTRAVENOUS at 14:45

## 2021-05-12 RX ADMIN — LEVETIRACETAM 420 MILLIGRAM(S): 250 TABLET, FILM COATED ORAL at 17:39

## 2021-05-12 RX ADMIN — Medication 85 MILLIMOLE(S): at 03:29

## 2021-05-12 RX ADMIN — SODIUM CHLORIDE 30 MILLILITER(S): 5 INJECTION, SOLUTION INTRAVENOUS at 05:40

## 2021-05-12 RX ADMIN — SODIUM CHLORIDE 30 MILLILITER(S): 5 INJECTION, SOLUTION INTRAVENOUS at 07:59

## 2021-05-12 RX ADMIN — Medication 2: at 06:44

## 2021-05-12 RX ADMIN — Medication 100 MILLIEQUIVALENT(S): at 15:41

## 2021-05-12 RX ADMIN — Medication 2: at 00:03

## 2021-05-12 RX ADMIN — Medication 250 MILLIGRAM(S): at 17:39

## 2021-05-12 RX ADMIN — SODIUM CHLORIDE 50 MILLILITER(S): 5 INJECTION, SOLUTION INTRAVENOUS at 17:40

## 2021-05-12 RX ADMIN — CHLORHEXIDINE GLUCONATE 15 MILLILITER(S): 213 SOLUTION TOPICAL at 05:42

## 2021-05-12 RX ADMIN — Medication 50 MILLIEQUIVALENT(S): at 00:27

## 2021-05-12 RX ADMIN — CHLORHEXIDINE GLUCONATE 15 MILLILITER(S): 213 SOLUTION TOPICAL at 17:42

## 2021-05-12 RX ADMIN — HEPARIN SODIUM 5000 UNIT(S): 5000 INJECTION INTRAVENOUS; SUBCUTANEOUS at 05:42

## 2021-05-12 RX ADMIN — Medication 100 MILLIEQUIVALENT(S): at 23:07

## 2021-05-12 RX ADMIN — CHLORHEXIDINE GLUCONATE 1 APPLICATION(S): 213 SOLUTION TOPICAL at 05:43

## 2021-05-12 RX ADMIN — Medication 50 GRAM(S): at 00:27

## 2021-05-12 RX ADMIN — PROPOFOL 2.38 MICROGRAM(S)/KG/MIN: 10 INJECTION, EMULSION INTRAVENOUS at 07:45

## 2021-05-12 RX ADMIN — SENNA PLUS 2 TABLET(S): 8.6 TABLET ORAL at 23:07

## 2021-05-12 RX ADMIN — SODIUM CHLORIDE 4 GRAM(S): 9 INJECTION INTRAMUSCULAR; INTRAVENOUS; SUBCUTANEOUS at 13:44

## 2021-05-12 NOTE — CONSULT NOTE ADULT - SUBJECTIVE AND OBJECTIVE BOX
HPI:Patient is a 45yM w/ PMHx of Etoh abuse and seizures on keppra seen as Trauma Alert upgraded to a Code trauma during examination in the ED where the patient was found to have a GCS of 4 s/p found down unresponsive.  Trauma assessment in ED: intubated for airway protection   (06 May 2021 22:29)    [facility] consulted for SDH management.     T(C): 38.6 (05-11-21 @ 20:00), Max: 38.7 (05-11-21 @ 07:16)  HR: 103 (05-11-21 @ 22:00) (81 - 115)  BP: 97/52 (05-11-21 @ 22:00) (97/52 - 97/52)  BP(mean): 68 (05-11-21 @ 22:00) (68 - 68)  ABP: 180/88 (05-11-21 @ 21:00) (116/61 - 180/88)  ABP(mean): 117 (05-11-21 @ 21:00) (78 - 117)  RR: 19 (05-11-21 @ 20:00) (16 - 20)  SpO2: 100% (05-11-21 @ 22:00) (100% - 100%)    I&O's Detail    10 May 2021 07:01  -  11 May 2021 07:00  --------------------------------------------------------  IN:    Enteral Tube Flush: 200 mL    IV PiggyBack: 550 mL    Propofol: 570 mL    sodium chloride 0.9%: 1800 mL    Vital High Protein: 1320 mL  Total IN: 4440 mL    OUT:    Indwelling Catheter - Urethral (mL): 4970 mL  Total OUT: 4970 mL    Total NET: -530 mL      11 May 2021 07:01  -  12 May 2021 00:03  --------------------------------------------------------  IN:    Enteral Tube Flush: 80 mL    IV PiggyBack: 100 mL    IV PiggyBack: 50 mL    IV PiggyBack: 250 mL    Propofol: 316.4 mL    sodium chloride 0.9%: 900 mL    sodium chloride 3%: 150 mL    Vital High Protein: 880 mL  Total IN: 2726.4 mL    OUT:    Indwelling Catheter - Urethral (mL): 1845 mL  Total OUT: 1845 mL    Total NET: 881.4 mL    MEDICATIONS  (STANDING):  amLODIPine   Tablet 10 milliGRAM(s) Oral daily  cefepime   IVPB      chlorhexidine 0.12% Liquid 15 milliLiter(s) Oral Mucosa two times a day  chlorhexidine 4% Liquid 1 Application(s) Topical <User Schedule>  heparin   Injectable 5000 Unit(s) SubCutaneous every 8 hours  insulin lispro (ADMELOG) corrective regimen sliding scale   SubCutaneous every 6 hours  levETIRAcetam  IVPB 500 milliGRAM(s) IV Intermittent every 12 hours  pantoprazole   Suspension 40 milliGRAM(s) Oral daily  propofol Infusion 5 MICROgram(s)/kG/Min (2.38 mL/Hr) IV Continuous <Continuous>  propranolol 20 milliGRAM(s) Oral every 8 hours  senna 2 Tablet(s) Oral at bedtime  sodium chloride   Oral Tab/Cap - Peds 2 Gram(s) Oral every 8 hours  sodium chloride 3%. 500 milliLiter(s) (30 mL/Hr) IV Continuous <Continuous>  vancomycin  IVPB        MEDICATIONS  (PRN):  acetaminophen   Tablet .. 650 milliGRAM(s) Oral every 6 hours PRN Temp greater or equal to 38.5C (101.3F)  fentaNYL    Injectable 25 MICROGram(s) IV Push every 4 hours PRN Severe Pain (7 - 10)      Labs:                        8.1    2.60  )-----------( 74       ( 11 May 2021 23:23 )             24.6     05-11    131<L>  |  102  |  8<L>  ----------------------------<  138<H>  3.8   |  22  |  <0.5<L>    Ca    7.5<L>      11 May 2021 23:23  Phos  2.1     05-11  Mg     1.6     05-11      ABG - ( 11 May 2021 17:07 )  pH, Arterial: 7.48  pH, Blood: x     /  pCO2: 33    /  pO2: 151   / HCO3: 24    / Base Excess: 1.0   /  SaO2: 100     Mode: AC/ CMV (Assist Control/ Continuous Mandatory Ventilation), RR (machine): 16, TV (machine): 450, FiO2: 30, PEEP: 5, ITime: 1, MAP: 10, PIP: 23      Radiology:  < from: CT Head No Cont (05.10.21 @ 13:09) >    EXAM:  CT BRAIN            PROCEDURE DATE:  05/10/2021      < end of copied text >  < from: CT Head No Cont (05.10.21 @ 13:09) >  MPRESSION:    In comparison with the prior CT scan of the brain dated May 7, 2021:    Partial resorption of the extra-axial fluid collections.    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    < end of copied text >       ROS: Negative except for that of HPI      Physical Exam:    General: Ill appearing male, intubated, sedated, NAD    Neurological:    Mental Status: GCS 4, RASS -4    Cranial Nerves:  I: Deferred  II, III, IV, VI: 2 mm sluggish b/l. Right gaze noted.  V:  corneal reflex intact bilaterally  VII: Deferred due to ETT  VIII: Deferred  IX and X: + gag/cough  XI: Deferred  XII: Deferred    Motor: Normal muscle bulk/tone. Strength - Right UE:   1/5  Right LE:   0/5  Left UE:  1/5  Left LE:  0 /5    Sensory: Withdraws to noxious in the b/l UE    Cerebellar Function: Deferred    Reflexes: No clonus    Assessment & Plan: 45yM w/ PMHx of Etoh abuse and seizures on keppra (recently discharged on keppra after being in ICU for seizures). He had previous workup for seizures with a EEG that was negative. He presented s/p fall at home. 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 Now POD 6 from left isaías crani for SDH evacuation as well as CT head concerning for new left cerebral hemisphere and right cerebellum infarcts.      Plan:    Neuro:  - Neuro Checks Q1H  - CTA head and neck as patient has new strokes  - Daily Statin  - Sodium Goal: 145 - 150 as per NSx. Continue 3% and sodium tabs.  - Recent BMP showed Na 131 and downtrending- recommend hyponatremia w/u: Urine Na, urine Creatinine, serum/urine Osmo   - Video EEG as gaze concerning for seizures  - Continue Keppra 500 BID    CV:  - SBP goal < 140  - Normovolemia  - Normothermia-      Resp:  - Vent managment per Primary team  - HOB 45  - Recommend Plateau pressure < 30 to maintain venous drainage  - Wean sedation as tolerated  - Recommend PRN Fentanyl bolus for vent synchrony    GI/:  - EN as per Nutrition  - Strict Is/Os    Electrolytes:  - Replete as needed.  - Normoglycemia    ID:  - Per Primary    Hematology:  - SCDs and SQ Heparin        Thank you for involving [facility] in this patients plan of care. Please call with any questions or concerns.     Luma García NP    #[id number]

## 2021-05-12 NOTE — PROGRESS NOTE ADULT - ASSESSMENT
Assessment & Plan  45M s/p fall with large left SDH and neurological status change, level 1 for emergent craniotomy    NEURO:  Hx of EtOH abuse and prior Delerium Tremens episodes  S/P craniotomy for large left SDH  - Q1H neuro checks  - GCS 9T  - Keppra 500mg BID  - Salt tabs 2g q8, added 3% at 30 ml/hr  Sedation: Propofol gtt, daily SAT  - RASS -5  Head of bed @ 30 degrees  Repeat Head CT #1 post op changes, resolution of MLS, stable SDH along cerebral falx and L tentorium, new trace scattered SAH along the right cerebral sulci and right cerebellar sulci and new IVP in the b/l occipital horns.  Repeat Head CT #2: development of acute infarcts in left cerebral hemisphere and right cerebellum    RESP:   Intubated, /16/30/5  ABG: _______  AM CXR    CARDS:   S/P cardiac arrest in OR & MTP  - No longer on pressors  - Goal MAP > 65mmHg  IVL  trop <0.01 x 3    GI/NUTR:   Diet: TF to Vital HP @ 55  GI Prophylaxis-Protonix IV  Bowel regimen- MIralax, senna  - BM 5/11    /RENAL:   Cole in place, strict I/O-  Labs:          BUN/Cr- 15/0.6 > 7/0.5 > 6/0.5> 5/0.5          Electrolytes- Na 131, K 3.8, Mg 1.6, Phos 2.1  Na goal 145-150, current Na 132 added 3% at 30 ml/hr    HEME/ONC:   S/P massive transfusion protocol  - 4uPRBCs 2FFP 2 platelet intraop  DVT prophylaxis: HSQ  Acute blood loss anemia  - H/H - 7.9/23.8 > 7.9/23.7 > 8.6/25.5 > 8.1/24.6  Plt 111 > 94 > 54 > 55 > 1 unit > 67 > 1u > 77 > 103->85 > 79 ---> no longer trend per neurosurgery  - Another 1unit of plts given on 5/8 due to persistent oozing during dressing change  - 5/9 Plts 85, no more plts transfusion, since no further oozing from the dressing, NSG ok with it    ID:  antibiotics: none, completed elder op ancef  WBC 7 > 6.4 > 4.9->4 > 3.5 > 4.08 > 2.6  afebrile     ENDO:  q6 POC glucose  Sliding scale insulin  A1c 5.2    LINES/DRAINS:  GIOVANI, Kelsey Cantrell, peripherals, ETT, OGT   DISPO: SICU

## 2021-05-12 NOTE — PROGRESS NOTE ADULT - SUBJECTIVE AND OBJECTIVE BOX
SANDRA DAS  [id number]  45y Male    Indication for ICU admission: s/p craniectomy for large L SDH  Admit Date:05-06-21  ICU Date: 05-07-21  OR Date: 05-06-21    No Known Allergies    PAST MEDICAL & SURGICAL HISTORY:  Alcohol abuse  GERD (gastroesophageal reflux disease)  ETOH abuse  Hypomagnesemia  Seizure disorder  H/O hemorrhoidectomy    24HRS EVENT:  DAY  -palliative consult - psychosocial eval done  -Sedation vacation- following commands on left side  -Propranalol 20 mg q8  -neurology consult- pending  -Pancultured  -started Cefipime vanco 5/11 pm  - NSGY- to early to consider comfort measures    NIGHT  - 20 K, 2g Mg, 30 NaPhos    DVT PTX: Hep sq  GI PTX: PPI    ***Tubes/Lines/Drains  ***  Peripheral IV  Arterial Line	L rad	                Date 5/6  OGT, ETT  Urinary Catheter		Indication: Strict I&O    Date Placed: 5/6    REVIEW OF SYSTEMS  [ ] A ten-point review of systems was otherwise negative except as noted.  [x] Due to altered mental status/intubation, subjective information were not able to be obtained from the patient. History was obtained, to the extent possible, from review of the chart and collateral sources of information.   SANDRA DAS  [id number]  45y Male    Indication for ICU admission: s/p craniectomy for large L SDH  Admit Date:21  ICU Date: 21  OR Date: 21    No Known Allergies    PAST MEDICAL & SURGICAL HISTORY:  Alcohol abuse  GERD (gastroesophageal reflux disease)  ETOH abuse  Hypomagnesemia  Seizure disorder  H/O hemorrhoidectomy    24HRS EVENT:  DAY  -palliative consult - psychosocial eval done  -Sedation vacation- following commands on left side  -Propranalol 20 mg q8  -neurology consult- pending  -Pancultured  -started Cefipime vanco  pm  - NSGY- to early to consider comfort measures    NIGHT  - 20 K, 2g Mg, 30 NaPhos    DVT PTX: Hep sq  GI PTX: PPI    ***Tubes/Lines/Drains  ***  Peripheral IV  Arterial Line	L rad	                Date   OGT, ETT  Urinary Catheter		Indication: Strict I&O    Date Placed:     REVIEW OF SYSTEMS  [ ] A ten-point review of systems was otherwise negative except as noted.  [x] Due to altered mental status/intubation, subjective information were not able to be obtained from the patient. History was obtained, to the extent possible, from review of the chart and collateral sources of information.    Daily     Daily Weight in k.2 (12 May 2021 07:00)    Diet, NPO with Tube Feed:   Tube Feeding Modality: Orogastric  Vital High Protein  Total Volume for 24 Hours (mL): 1320  Continuous  Starting Tube Feed Rate mL per Hour: 55  Until Goal Tube Feed Rate (mL per Hour): 55  Tube Feed Duration (in Hours): 24  Tube Feed Start Time: 06:50 (21 @ 18:48)      CURRENT MEDS:  Neurologic Medications  acetaminophen   Tablet .. 650 milliGRAM(s) Oral every 6 hours PRN Temp greater or equal to 38.5C (101.3F)  fentaNYL    Injectable 25 MICROGram(s) IV Push every 4 hours PRN Severe Pain (7 - 10)  levETIRAcetam  IVPB 500 milliGRAM(s) IV Intermittent every 12 hours  propofol Infusion 5 MICROgram(s)/kG/Min IV Continuous <Continuous>    Respiratory Medications    Cardiovascular Medications  amLODIPine   Tablet 10 milliGRAM(s) Oral daily  propranolol 20 milliGRAM(s) Oral every 8 hours    Gastrointestinal Medications  pantoprazole   Suspension 40 milliGRAM(s) Oral daily  senna 2 Tablet(s) Oral at bedtime  sodium chloride 3 Gram(s) Oral every 8 hours  sodium chloride 3%. 500 milliLiter(s) IV Continuous <Continuous>    Genitourinary Medications    Hematologic/Oncologic Medications  heparin   Injectable 5000 Unit(s) SubCutaneous every 8 hours    Antimicrobial/Immunologic Medications  cefepime   IVPB      cefepime   IVPB 1000 milliGRAM(s) IV Intermittent every 12 hours  vancomycin  IVPB 1000 milliGRAM(s) IV Intermittent every 12 hours  vancomycin  IVPB        Endocrine/Metabolic Medications  insulin lispro (ADMELOG) corrective regimen sliding scale   SubCutaneous every 6 hours    Topical/Other Medications  chlorhexidine 0.12% Liquid 15 milliLiter(s) Oral Mucosa two times a day  chlorhexidine 4% Liquid 1 Application(s) Topical <User Schedule>      ICU Vital Signs Last 24 Hrs  T(C): 37.7 (12 May 2021 08:00), Max: 38.6 (11 May 2021 20:00)  T(F): 99.8 (12 May 2021 08:00), Max: 101.4 (11 May 2021 20:00)  HR: 91 (12 May 2021 08:00) (81 - 115)  BP: 115/72 (12 May 2021 08:00) (97/52 - 125/78)  BP(mean): 87 (12 May 2021 08:00) (68 - 97)  ABP: 144/69 (12 May 2021 08:00) (106/58 - 180/88)  ABP(mean): 89 (12 May 2021 08:00) (73 - 117)  RR: 22 (12 May 2021 08:00) (18 - 28)  SpO2: 100% (12 May 2021 08:00) (100% - 100%)      Mode: AC/ CMV (Assist Control/ Continuous Mandatory Ventilation)  RR (machine): 16  TV (machine): 450  FiO2: 30  PEEP: 5  ITime: 1  MAP: 10  PIP: 23    ABG - ( 12 May 2021 04:21 )  pH, Arterial: 7.47  pH, Blood: x     /  pCO2: 31    /  pO2: 125   / HCO3: 23    / Base Excess: -0.4  /  SaO2: 99                  I&O's Summary    11 May 2021 07:01  -  12 May 2021 07:00  --------------------------------------------------------  IN: 3758.4 mL / OUT: 2920 mL / NET: 838.4 mL      I&O's Detail    11 May 2021 07:01  -  12 May 2021 07:00  --------------------------------------------------------  IN:    Enteral Tube Flush: 80 mL    IV PiggyBack: 250 mL    IV PiggyBack: 150 mL    IV PiggyBack: 150 mL    IV PiggyBack: 50 mL    Propofol: 468.4 mL    sodium chloride 0.9%: 900 mL    sodium chloride 3%: 390 mL    Vital High Protein: 1320 mL  Total IN: 3758.4 mL    OUT:    Indwelling Catheter - Urethral (mL): 2920 mL  Total OUT: 2920 mL    Total NET: 838.4 mL          PHYSICAL EXAM:    General/Neuro  RASS:             GCS:     = E   / V   / M      Deficits:                             alert & oriented x 3, no focal deficits  Pupils:    Lungs:      clear to auscultation, Normal expansion/effort.     Cardiovascular : S1, S2.  Regular rate and rhythm.  Peripheral edema   Cardiac Rhythm: Normal Sinus Rhythm    GI: Abdomen soft, Non-tender, Non-distended.    Gastrostomy / Jejunostomy tube in place.  Nasogastric tube in place.  Colostomy / Ileostomy.    Wound:    Extremities: Extremities warm, pink, well-perfused. Pulses:Rt     Lt    Derm: Good skin turgor, no skin breakdown.      :       Cole catheter in place.      CXR:     LABS:  CAPILLARY BLOOD GLUCOSE      POCT Blood Glucose.: 159 mg/dL (12 May 2021 06:25)  POCT Blood Glucose.: 152 mg/dL (11 May 2021 23:32)  POCT Blood Glucose.: 104 mg/dL (11 May 2021 17:49)  POCT Blood Glucose.: 154 mg/dL (11 May 2021 12:03)                          8.1    2.60  )-----------( 74       ( 11 May 2021 23:23 )             24.6       05-11    131<L>  |  102  |  8<L>  ----------------------------<  138<H>  3.8   |  22  |  <0.5<L>    Ca    7.5<L>      11 May 2021 23:23  Phos  2.1     05-11  Mg     1.6     05-11        PT/INR - ( 12 May 2021 04:15 )   PT: 12.40 sec;   INR: 1.08 ratio         PTT - ( 12 May 2021 04:15 )  PTT:27.5 sec      Urinalysis Basic - ( 11 May 2021 12:13 )    Color: Yellow / Appearance: Clear / S.010 / pH: x  Gluc: x / Ketone: Negative  / Bili: Negative / Urobili: 2   Blood: x / Protein: Trace / Nitrite: Positive   Leuk Esterase: Small / RBC: 20 /HPF / WBC 5 /HPF   Sq Epi: x / Non Sq Epi: Occasional / Bacteria: x        Culture - Blood (collected 11 May 2021 11:18)  Source: .Blood None  Gram Stain (12 May 2021 02:53):    Growth in anaerobic bottle: Gram Negative Rods    Growth in aerobic bottle: Gram Negative Rods  Preliminary Report (12 May 2021 02:54):    Growth in anaerobic bottle: Gram Negative Rods    Growth in aerobic bottle: Gram Negative Rods    ***Blood Panel PCR results on this specimen are available    approximately 3 hours after the Gram stain result.***    Gram stain, PCR, and/or culture results may not always    correspond due to difference in methodologies.    ************************************************************    This PCR assay was performed by multiplex PCR. This    Assay tests for 66 bacterial and resistance gene targets.    Please refer to the [facility] test directory    at [email or web address] for details.  Organism: Blood Culture PCR (12 May 2021 05:15)  Organism: Blood Culture PCR (12 May 2021 05:15)    Culture - Bronchial (collected 11 May 2021 10:12)  Source: Bronch Wash Bronchoalveolar Lavage  Gram Stain (12 May 2021 07:04):    Few polymorphonuclear leukocytes per low power field    No Squamous epithelial cells per low power field    Few Gram Positive Rods per oil power field

## 2021-05-12 NOTE — PROGRESS NOTE ADULT - SUBJECTIVE AND OBJECTIVE BOX
Subjective: 45yMale with a pmhx of SUBDURAL HEMATOMA;RESPIRATORY FAILURE;FALL    ^FALL    No pertinent family history in first degree relatives    Family history of essential hypertension (Father)    Handoff    MEWS Score    No pertinent past medical history    Alcohol abuse    GERD (gastroesophageal reflux disease)    ETOH abuse    Hypomagnesemia    Seizure disorder    Subdural hematoma    S/P subdural hematoma evacuation    Subdural hematoma    Subdural hematoma    Subdural hematoma    Respiratory failure    ETOH abuse    Palliative care by specialist    Insertion of non-tunneled central venous catheter (CVC) in patient age 5 years of age or older    Craniotomy, decompressive    No significant past surgical history    H/O hemorrhoidectomy    FALL    22    Respiratory failure    Fall    [email or web address]        POD#6  S/P Left Craniectomy for evac of SDH    Pt seen and examined at the bedside.  Pt remains intubated, sedated on propofol gtt.  Purposeful movt LUE.  Not following commands.  Dressing saturated, will be changed.    Allergies    No Known Allergies    Intolerances        Vital Signs Last 24 Hrs  T(C): 37.7 (12 May 2021 08:00), Max: 38.6 (11 May 2021 20:00)  T(F): 99.8 (12 May 2021 08:00), Max: 101.4 (11 May 2021 20:00)  HR: 84 (12 May 2021 10:00) (80 - 115)  BP: 116/70 (12 May 2021 10:00) (97/52 - 125/78)  BP(mean): 88 (12 May 2021 10:00) (68 - 97)  RR: 20 (12 May 2021 10:00) (18 - 28)  SpO2: 100% (12 May 2021 10:00) (100% - 100%)      acetaminophen   Tablet .. 650 milliGRAM(s) Oral every 6 hours PRN  amLODIPine   Tablet 10 milliGRAM(s) Oral daily  cefepime   IVPB      cefepime   IVPB 1000 milliGRAM(s) IV Intermittent every 12 hours  chlorhexidine 0.12% Liquid 15 milliLiter(s) Oral Mucosa two times a day  chlorhexidine 4% Liquid 1 Application(s) Topical <User Schedule>  fentaNYL    Injectable 25 MICROGram(s) IV Push every 4 hours PRN  heparin   Injectable 5000 Unit(s) SubCutaneous every 8 hours  insulin lispro (ADMELOG) corrective regimen sliding scale   SubCutaneous every 6 hours  levETIRAcetam  IVPB 500 milliGRAM(s) IV Intermittent every 12 hours  pantoprazole   Suspension 40 milliGRAM(s) Oral daily  propofol Infusion 5 MICROgram(s)/kG/Min IV Continuous <Continuous>  propranolol 20 milliGRAM(s) Oral every 8 hours  senna 2 Tablet(s) Oral at bedtime  sodium chloride 4 Gram(s) Oral every 6 hours  sodium chloride 3%. 500 milliLiter(s) IV Continuous <Continuous>  vancomycin  IVPB 1000 milliGRAM(s) IV Intermittent every 12 hours  vancomycin  IVPB            05-11-21 @ 07:01  -  05-12-21 @ 07:00  --------------------------------------------------------  IN: 3758.4 mL / OUT: 2920 mL / NET: 838.4 mL        REVIEW OF SYSTEMS    [ ] A ten-point review of systems was otherwise negative except as noted.  [ x] Due to altered mental status/intubation, subjective information were not able to be obtained from the patient. History was obtained, to the extent possible, from review of the chart and collateral sources of information.      Neuro Exam:  Intubated, sedated  pupils equal and briskly reactive  Left lateral gaze  grimaces to pain   moves head to pain   purposeful, spontaneous movt LUE  slight w/d b/l LE's to pain       Wound: head dressing soaked, to be changed    CBC Full  -  ( 11 May 2021 23:23 )  WBC Count : 2.60 K/uL  RBC Count : 2.70 M/uL  Hemoglobin : 8.1 g/dL  Hematocrit : 24.6 %  Platelet Count - Automated : 74 K/uL  Mean Cell Volume : 91.1 fL  Mean Cell Hemoglobin : 30.0 pg  Mean Cell Hemoglobin Concentration : 32.9 g/dL  Auto Neutrophil # : 1.86 K/uL  Auto Lymphocyte # : 0.25 K/uL  Auto Monocyte # : 0.44 K/uL  Auto Eosinophil # : 0.00 K/uL  Auto Basophil # : 0.00 K/uL  Auto Neutrophil % : 70.8 %  Auto Lymphocyte % : 9.7 %  Auto Monocyte % : 16.8 %  Auto Eosinophil % : 0.0 %  Auto Basophil % : 0.0 %    05-11    131<L>  |  102  |  8<L>  ----------------------------<  138<H>  3.8   |  22  |  <0.5<L>    Ca    7.5<L>      11 May 2021 23:23  Phos  2.1     05-11  Mg     1.6     05-11      PT/INR - ( 12 May 2021 04:15 )   PT: 12.40 sec;   INR: 1.08 ratio         PTT - ( 12 May 2021 04:15 )  PTT:27.5 sec        Assessment/Plan:   - per NCC: CTA head and neck 2/2 new strokes  - Video EEG per NCC  - cont to target 145 - 150, 3% if needed  - +UTI, on Vanco, Cefepime  - cont Neuro checks q 1hr  - Liver function elevated- consider Abd sono   - monitor thrombocytopenia  - cont GI/DVT ppx  - wean sedation to extubation as appropriate  - cont SICU care  - No left sided laying   - f/u final blood cxs, prel: gm neg rods  - f/u final sputum cxs, prel: few gm pos rods  - d/w attg

## 2021-05-12 NOTE — PROGRESS NOTE ADULT - ATTENDING COMMENTS
Patient is more responsive today.  Has purposeful movements today.  All brain stem reflexes present.    ASSESSMENT:  46 y/o male, S/P Fall.  Traumatic Left SDH.  Brain compression.  S/P Left Craniectomy.  Acute respiratory failure with hypoxia.  Hemodynamic instability.  Chronic Alcohol Abuse.  Dysphagia.    PLAN:  - sedation prn  - on Keppra  - continue Vent support; follow ABG  - keep MAP>65  - on tube feeds; bowel regiment  - follow serum electrolytes and UOP  - needs Na 145-150  - on 3% saline; put CVC and increase it to 50 ml/h  - GI and DVT prophylaxis.

## 2021-05-13 LAB
-  AMIKACIN: SIGNIFICANT CHANGE UP
-  AMOXICILLIN/CLAVULANIC ACID: SIGNIFICANT CHANGE UP
-  AMOXICILLIN/CLAVULANIC ACID: SIGNIFICANT CHANGE UP
-  AMPICILLIN/SULBACTAM: SIGNIFICANT CHANGE UP
-  AMPICILLIN: SIGNIFICANT CHANGE UP
-  AZTREONAM: SIGNIFICANT CHANGE UP
-  CEFAZOLIN: SIGNIFICANT CHANGE UP
-  CEFEPIME: SIGNIFICANT CHANGE UP
-  CEFOXITIN: SIGNIFICANT CHANGE UP
-  CEFTRIAXONE: SIGNIFICANT CHANGE UP
-  CIPROFLOXACIN: SIGNIFICANT CHANGE UP
-  ERTAPENEM: SIGNIFICANT CHANGE UP
-  GENTAMICIN: SIGNIFICANT CHANGE UP
-  IMIPENEM: SIGNIFICANT CHANGE UP
-  IMIPENEM: SIGNIFICANT CHANGE UP
-  LEVOFLOXACIN: SIGNIFICANT CHANGE UP
-  MEROPENEM: SIGNIFICANT CHANGE UP
-  PIPERACILLIN/TAZOBACTAM: SIGNIFICANT CHANGE UP
-  TOBRAMYCIN: SIGNIFICANT CHANGE UP
-  TRIMETHOPRIM/SULFAMETHOXAZOLE: SIGNIFICANT CHANGE UP
ANION GAP SERPL CALC-SCNC: 10 MMOL/L — SIGNIFICANT CHANGE UP (ref 7–14)
ANION GAP SERPL CALC-SCNC: 11 MMOL/L — SIGNIFICANT CHANGE UP (ref 7–14)
ANION GAP SERPL CALC-SCNC: 7 MMOL/L — SIGNIFICANT CHANGE UP (ref 7–14)
ANION GAP SERPL CALC-SCNC: 9 MMOL/L — SIGNIFICANT CHANGE UP (ref 7–14)
BLD GP AB SCN SERPL QL: SIGNIFICANT CHANGE UP
BUN SERPL-MCNC: 6 MG/DL — LOW (ref 10–20)
BUN SERPL-MCNC: 6 MG/DL — LOW (ref 10–20)
BUN SERPL-MCNC: 7 MG/DL — LOW (ref 10–20)
BUN SERPL-MCNC: 7 MG/DL — LOW (ref 10–20)
CALCIUM SERPL-MCNC: 7.2 MG/DL — LOW (ref 8.5–10.1)
CALCIUM SERPL-MCNC: 7.2 MG/DL — LOW (ref 8.5–10.1)
CALCIUM SERPL-MCNC: 7.4 MG/DL — LOW (ref 8.5–10.1)
CALCIUM SERPL-MCNC: 7.5 MG/DL — LOW (ref 8.5–10.1)
CHLORIDE SERPL-SCNC: 103 MMOL/L — SIGNIFICANT CHANGE UP (ref 98–110)
CHLORIDE SERPL-SCNC: 104 MMOL/L — SIGNIFICANT CHANGE UP (ref 98–110)
CHLORIDE SERPL-SCNC: 104 MMOL/L — SIGNIFICANT CHANGE UP (ref 98–110)
CHLORIDE SERPL-SCNC: 106 MMOL/L — SIGNIFICANT CHANGE UP (ref 98–110)
CO2 SERPL-SCNC: 19 MMOL/L — SIGNIFICANT CHANGE UP (ref 17–32)
CO2 SERPL-SCNC: 19 MMOL/L — SIGNIFICANT CHANGE UP (ref 17–32)
CO2 SERPL-SCNC: 20 MMOL/L — SIGNIFICANT CHANGE UP (ref 17–32)
CO2 SERPL-SCNC: 21 MMOL/L — SIGNIFICANT CHANGE UP (ref 17–32)
CREAT SERPL-MCNC: <0.5 MG/DL — LOW (ref 0.7–1.5)
CULTURE RESULTS: SIGNIFICANT CHANGE UP
CULTURE RESULTS: SIGNIFICANT CHANGE UP
GLUCOSE BLDC GLUCOMTR-MCNC: 132 MG/DL — HIGH (ref 70–99)
GLUCOSE BLDC GLUCOMTR-MCNC: 135 MG/DL — HIGH (ref 70–99)
GLUCOSE BLDC GLUCOMTR-MCNC: 161 MG/DL — HIGH (ref 70–99)
GLUCOSE BLDC GLUCOMTR-MCNC: 163 MG/DL — HIGH (ref 70–99)
GLUCOSE SERPL-MCNC: 128 MG/DL — HIGH (ref 70–99)
GLUCOSE SERPL-MCNC: 131 MG/DL — HIGH (ref 70–99)
GLUCOSE SERPL-MCNC: 137 MG/DL — HIGH (ref 70–99)
GLUCOSE SERPL-MCNC: 158 MG/DL — HIGH (ref 70–99)
HCT VFR BLD CALC: 23 % — LOW (ref 42–52)
HGB BLD-MCNC: 7.8 G/DL — LOW (ref 14–18)
MAGNESIUM SERPL-MCNC: 1.8 MG/DL — SIGNIFICANT CHANGE UP (ref 1.8–2.4)
MAGNESIUM SERPL-MCNC: 2.2 MG/DL — SIGNIFICANT CHANGE UP (ref 1.8–2.4)
MCHC RBC-ENTMCNC: 30.2 PG — SIGNIFICANT CHANGE UP (ref 27–31)
MCHC RBC-ENTMCNC: 33.9 G/DL — SIGNIFICANT CHANGE UP (ref 32–37)
MCV RBC AUTO: 89.1 FL — SIGNIFICANT CHANGE UP (ref 80–94)
METHOD TYPE: SIGNIFICANT CHANGE UP
NRBC # BLD: 0 /100 WBCS — SIGNIFICANT CHANGE UP (ref 0–0)
ORGANISM # SPEC MICROSCOPIC CNT: SIGNIFICANT CHANGE UP
OSMOLALITY SERPL: 274 MOS/KG — LOW (ref 275–300)
OSMOLALITY SERPL: 277 MOS/KG — SIGNIFICANT CHANGE UP (ref 275–300)
PHOSPHATE SERPL-MCNC: 2.1 MG/DL — SIGNIFICANT CHANGE UP (ref 2.1–4.9)
PHOSPHATE SERPL-MCNC: 2.7 MG/DL — SIGNIFICANT CHANGE UP (ref 2.1–4.9)
PLATELET # BLD AUTO: 72 K/UL — LOW (ref 130–400)
PLATELET # BLD AUTO: 99 K/UL — LOW (ref 130–400)
POTASSIUM SERPL-MCNC: 3.7 MMOL/L — SIGNIFICANT CHANGE UP (ref 3.5–5)
POTASSIUM SERPL-MCNC: 3.7 MMOL/L — SIGNIFICANT CHANGE UP (ref 3.5–5)
POTASSIUM SERPL-MCNC: 4.1 MMOL/L — SIGNIFICANT CHANGE UP (ref 3.5–5)
POTASSIUM SERPL-MCNC: 4.3 MMOL/L — SIGNIFICANT CHANGE UP (ref 3.5–5)
POTASSIUM SERPL-SCNC: 3.7 MMOL/L — SIGNIFICANT CHANGE UP (ref 3.5–5)
POTASSIUM SERPL-SCNC: 3.7 MMOL/L — SIGNIFICANT CHANGE UP (ref 3.5–5)
POTASSIUM SERPL-SCNC: 4.1 MMOL/L — SIGNIFICANT CHANGE UP (ref 3.5–5)
POTASSIUM SERPL-SCNC: 4.3 MMOL/L — SIGNIFICANT CHANGE UP (ref 3.5–5)
RBC # BLD: 2.58 M/UL — LOW (ref 4.7–6.1)
RBC # FLD: 14.7 % — HIGH (ref 11.5–14.5)
SODIUM SERPL-SCNC: 132 MMOL/L — LOW (ref 135–146)
SODIUM SERPL-SCNC: 133 MMOL/L — LOW (ref 135–146)
SODIUM SERPL-SCNC: 134 MMOL/L — LOW (ref 135–146)
SODIUM SERPL-SCNC: 134 MMOL/L — LOW (ref 135–146)
SPECIMEN SOURCE: SIGNIFICANT CHANGE UP
SPECIMEN SOURCE: SIGNIFICANT CHANGE UP
TRIGL SERPL-MCNC: 138 MG/DL — SIGNIFICANT CHANGE UP
VANCOMYCIN TROUGH SERPL-MCNC: 4.1 UG/ML — LOW (ref 5–10)
WBC # BLD: 2.96 K/UL — LOW (ref 4.8–10.8)
WBC # FLD AUTO: 2.96 K/UL — LOW (ref 4.8–10.8)

## 2021-05-13 PROCEDURE — 99223 1ST HOSP IP/OBS HIGH 75: CPT

## 2021-05-13 PROCEDURE — 71045 X-RAY EXAM CHEST 1 VIEW: CPT | Mod: 26

## 2021-05-13 PROCEDURE — 99291 CRITICAL CARE FIRST HOUR: CPT

## 2021-05-13 PROCEDURE — 70498 CT ANGIOGRAPHY NECK: CPT | Mod: 26

## 2021-05-13 PROCEDURE — 70496 CT ANGIOGRAPHY HEAD: CPT | Mod: 26

## 2021-05-13 RX ORDER — POTASSIUM PHOSPHATE, MONOBASIC POTASSIUM PHOSPHATE, DIBASIC 236; 224 MG/ML; MG/ML
15 INJECTION, SOLUTION INTRAVENOUS ONCE
Refills: 0 | Status: COMPLETED | OUTPATIENT
Start: 2021-05-13 | End: 2021-05-13

## 2021-05-13 RX ORDER — VANCOMYCIN HCL 1 G
500 VIAL (EA) INTRAVENOUS ONCE
Refills: 0 | Status: COMPLETED | OUTPATIENT
Start: 2021-05-13 | End: 2021-05-13

## 2021-05-13 RX ORDER — VANCOMYCIN HCL 1 G
1250 VIAL (EA) INTRAVENOUS EVERY 8 HOURS
Refills: 0 | Status: DISCONTINUED | OUTPATIENT
Start: 2021-05-13 | End: 2021-05-14

## 2021-05-13 RX ORDER — PANTOPRAZOLE SODIUM 20 MG/1
40 TABLET, DELAYED RELEASE ORAL EVERY 12 HOURS
Refills: 0 | Status: DISCONTINUED | OUTPATIENT
Start: 2021-05-13 | End: 2021-05-16

## 2021-05-13 RX ORDER — FENTANYL CITRATE 50 UG/ML
0.5 INJECTION INTRAVENOUS
Qty: 2500 | Refills: 0 | Status: DISCONTINUED | OUTPATIENT
Start: 2021-05-13 | End: 2021-05-15

## 2021-05-13 RX ORDER — VANCOMYCIN HCL 1 G
1250 VIAL (EA) INTRAVENOUS EVERY 12 HOURS
Refills: 0 | Status: DISCONTINUED | OUTPATIENT
Start: 2021-05-13 | End: 2021-05-13

## 2021-05-13 RX ORDER — MAGNESIUM SULFATE 500 MG/ML
2 VIAL (ML) INJECTION ONCE
Refills: 0 | Status: COMPLETED | OUTPATIENT
Start: 2021-05-13 | End: 2021-05-13

## 2021-05-13 RX ORDER — POTASSIUM CHLORIDE 20 MEQ
20 PACKET (EA) ORAL ONCE
Refills: 0 | Status: COMPLETED | OUTPATIENT
Start: 2021-05-13 | End: 2021-05-13

## 2021-05-13 RX ORDER — SODIUM CHLORIDE 5 G/100ML
500 INJECTION, SOLUTION INTRAVENOUS
Refills: 0 | Status: DISCONTINUED | OUTPATIENT
Start: 2021-05-13 | End: 2021-05-13

## 2021-05-13 RX ORDER — SODIUM CHLORIDE 5 G/100ML
100 INJECTION, SOLUTION INTRAVENOUS
Refills: 0 | Status: DISCONTINUED | OUTPATIENT
Start: 2021-05-13 | End: 2021-05-13

## 2021-05-13 RX ORDER — FENTANYL CITRATE 50 UG/ML
0.5 INJECTION INTRAVENOUS
Qty: 5000 | Refills: 0 | Status: DISCONTINUED | OUTPATIENT
Start: 2021-05-13 | End: 2021-05-13

## 2021-05-13 RX ADMIN — Medication 85 MILLIMOLE(S): at 02:55

## 2021-05-13 RX ADMIN — POTASSIUM PHOSPHATE, MONOBASIC POTASSIUM PHOSPHATE, DIBASIC 62.5 MILLIMOLE(S): 236; 224 INJECTION, SOLUTION INTRAVENOUS at 16:53

## 2021-05-13 RX ADMIN — CHLORHEXIDINE GLUCONATE 15 MILLILITER(S): 213 SOLUTION TOPICAL at 17:19

## 2021-05-13 RX ADMIN — CHLORHEXIDINE GLUCONATE 1 APPLICATION(S): 213 SOLUTION TOPICAL at 05:47

## 2021-05-13 RX ADMIN — SODIUM CHLORIDE 200 MILLILITER(S): 5 INJECTION, SOLUTION INTRAVENOUS at 09:16

## 2021-05-13 RX ADMIN — CHLORHEXIDINE GLUCONATE 15 MILLILITER(S): 213 SOLUTION TOPICAL at 05:47

## 2021-05-13 RX ADMIN — HEPARIN SODIUM 5000 UNIT(S): 5000 INJECTION INTRAVENOUS; SUBCUTANEOUS at 13:20

## 2021-05-13 RX ADMIN — Medication 650 MILLIGRAM(S): at 16:30

## 2021-05-13 RX ADMIN — SENNA PLUS 2 TABLET(S): 8.6 TABLET ORAL at 21:40

## 2021-05-13 RX ADMIN — PROPOFOL 2.38 MICROGRAM(S)/KG/MIN: 10 INJECTION, EMULSION INTRAVENOUS at 05:43

## 2021-05-13 RX ADMIN — HEPARIN SODIUM 5000 UNIT(S): 5000 INJECTION INTRAVENOUS; SUBCUTANEOUS at 05:47

## 2021-05-13 RX ADMIN — LEVETIRACETAM 420 MILLIGRAM(S): 250 TABLET, FILM COATED ORAL at 05:49

## 2021-05-13 RX ADMIN — Medication 2: at 23:49

## 2021-05-13 RX ADMIN — PANTOPRAZOLE SODIUM 40 MILLIGRAM(S): 20 TABLET, DELAYED RELEASE ORAL at 17:19

## 2021-05-13 RX ADMIN — CEFEPIME 100 MILLIGRAM(S): 1 INJECTION, POWDER, FOR SOLUTION INTRAMUSCULAR; INTRAVENOUS at 05:48

## 2021-05-13 RX ADMIN — AMLODIPINE BESYLATE 10 MILLIGRAM(S): 2.5 TABLET ORAL at 05:47

## 2021-05-13 RX ADMIN — Medication 100 MILLIEQUIVALENT(S): at 14:57

## 2021-05-13 RX ADMIN — CEFEPIME 100 MILLIGRAM(S): 1 INJECTION, POWDER, FOR SOLUTION INTRAMUSCULAR; INTRAVENOUS at 17:18

## 2021-05-13 RX ADMIN — PANTOPRAZOLE SODIUM 40 MILLIGRAM(S): 20 TABLET, DELAYED RELEASE ORAL at 06:13

## 2021-05-13 RX ADMIN — Medication 166.67 MILLIGRAM(S): at 13:19

## 2021-05-13 RX ADMIN — Medication 250 MILLIGRAM(S): at 05:49

## 2021-05-13 RX ADMIN — FENTANYL CITRATE 3.97 MICROGRAM(S)/KG/HR: 50 INJECTION INTRAVENOUS at 09:31

## 2021-05-13 RX ADMIN — Medication 166.67 MILLIGRAM(S): at 09:04

## 2021-05-13 RX ADMIN — SODIUM CHLORIDE 60 MILLILITER(S): 5 INJECTION, SOLUTION INTRAVENOUS at 09:03

## 2021-05-13 RX ADMIN — HEPARIN SODIUM 5000 UNIT(S): 5000 INJECTION INTRAVENOUS; SUBCUTANEOUS at 22:32

## 2021-05-13 RX ADMIN — Medication 166.67 MILLIGRAM(S): at 22:33

## 2021-05-13 RX ADMIN — Medication 50 GRAM(S): at 14:57

## 2021-05-13 RX ADMIN — Medication 650 MILLIGRAM(S): at 15:34

## 2021-05-13 RX ADMIN — Medication 2: at 06:13

## 2021-05-13 RX ADMIN — LEVETIRACETAM 420 MILLIGRAM(S): 250 TABLET, FILM COATED ORAL at 17:18

## 2021-05-13 RX ADMIN — SODIUM CHLORIDE 60 MILLILITER(S): 5 INJECTION, SOLUTION INTRAVENOUS at 06:13

## 2021-05-13 NOTE — PROGRESS NOTE ADULT - SUBJECTIVE AND OBJECTIVE BOX
SANDRA DSA  [id number]  45y Male    Indication for ICU admission: s/p craniectomy for large L SDH  Admit Date:05-06-21  ICU Date: 05-07-21  OR Date: 05-06-21    No Known Allergies    PAST MEDICAL & SURGICAL HISTORY:  Alcohol abuse  GERD (gastroesophageal reflux disease)  ETOH abuse  Hypomagnesemia  Seizure disorder  H/O hemorrhoidectomy      Home Medications:        24HRS EVENT:  Day  BAL- GPR  Blood cx - klebsiella, sens pending   salt tabs inc 4 q6  R IJ TLC   Na 131>135> 132, increased 3% to 50cc  K 3.3, IP 2.0, Mg 1.6- repleted     Night  -no acute events      DVT PTX: Hep sq  GI PTX: PPI    ***Tubes/Lines/Drains  ***  Peripheral IV  Arterial Line	L rad	                Date 5/6  L IJ TLC     5/12  OGT, ETT  Urinary Catheter		Indication: Strict I&O    Date Placed: 5/6    REVIEW OF SYSTEMS  [ ] A ten-point review of systems was otherwise negative except as noted.  [x] Due to altered mental status/intubation, subjective information were not able to be obtained from the patient. History was obtained, to the extent possible, from review of the chart and collateral sources of information.             Daily     Daily Weight in k.2 (13 May 2021 04:00)    Diet, NPO with Tube Feed:   Tube Feeding Modality: Orogastric  Vital High Protein  Total Volume for 24 Hours (mL): 1320  Continuous  Starting Tube Feed Rate mL per Hour: 55  Until Goal Tube Feed Rate (mL per Hour): 55  Tube Feed Duration (in Hours): 24  Tube Feed Start Time: 06:50 (21 @ 18:48)      CURRENT MEDS:  Neurologic Medications  acetaminophen   Tablet .. 650 milliGRAM(s) Oral every 6 hours PRN Temp greater or equal to 38.5C (101.3F)  fentaNYL    Injectable 25 MICROGram(s) IV Push every 4 hours PRN Severe Pain (7 - 10)  fentaNYL   Infusion.. 0.5 MICROgram(s)/kG/Hr IV Continuous <Continuous>  levETIRAcetam  IVPB 500 milliGRAM(s) IV Intermittent every 12 hours  propofol Infusion 5 MICROgram(s)/kG/Min IV Continuous <Continuous>    Respiratory Medications    Cardiovascular Medications  amLODIPine   Tablet 10 milliGRAM(s) Oral daily  propranolol 20 milliGRAM(s) Oral every 8 hours    Gastrointestinal Medications  pantoprazole  Injectable 40 milliGRAM(s) IV Push every 12 hours  senna 2 Tablet(s) Oral at bedtime  sodium chloride 3%. 500 milliLiter(s) IV Continuous <Continuous>  sodium chloride 3%. 100 milliLiter(s) IV Continuous <Continuous>    Genitourinary Medications    Hematologic/Oncologic Medications  heparin   Injectable 5000 Unit(s) SubCutaneous every 8 hours    Antimicrobial/Immunologic Medications  cefepime   IVPB      cefepime   IVPB 1000 milliGRAM(s) IV Intermittent every 12 hours  vancomycin  IVPB 500 milliGRAM(s) IV Intermittent once  vancomycin  IVPB 1250 milliGRAM(s) IV Intermittent every 8 hours    Endocrine/Metabolic Medications  insulin lispro (ADMELOG) corrective regimen sliding scale   SubCutaneous every 6 hours    Topical/Other Medications  chlorhexidine 0.12% Liquid 15 milliLiter(s) Oral Mucosa two times a day  chlorhexidine 4% Liquid 1 Application(s) Topical <User Schedule>      ICU Vital Signs Last 24 Hrs  T(C): 38.3 (13 May 2021 07:10), Max: 38.3 (13 May 2021 07:10)  T(F): 100.9 (13 May 2021 07:10), Max: 100.9 (13 May 2021 07:10)  HR: 89 (13 May 2021 08:58) (76 - 93)  BP: 124/72 (13 May 2021 07:00) (109/64 - 134/78)  BP(mean): 91 (13 May 2021 07:00) (81 - 101)  ABP: 161/77 (13 May 2021 07:00) (114/67 - 174/83)  ABP(mean): 98 (13 May 2021 07:00) (83 - 108)  RR: 20 (13 May 2021 07:00) (20 - 23)  SpO2: 100% (13 May 2021 08:58) (100% - 100%)      Mode: AC/ CMV (Assist Control/ Continuous Mandatory Ventilation)  RR (machine): 16  TV (machine): 450  FiO2: 30  PEEP: 5  ITime: 1  MAP: 9  PIP: 16    ABG - ( 13 May 2021 03:50 )  pH, Arterial: 7.50  pH, Blood: x     /  pCO2: 28    /  pO2: 134   / HCO3: 22    / Base Excess: -0.5  /  SaO2: 100           I&O's Summary    12 May 2021 07:  -  13 May 2021 07:00  --------------------------------------------------------  IN: 4364.5 mL / OUT: 2980 mL / NET: 1384.5 mL    13 May 2021 07:  -  13 May 2021 09:02  --------------------------------------------------------  IN: 134 mL / OUT: 300 mL / NET: -166 mL      I&O's Detail    12 May 2021 07:01  -  13 May 2021 07:00  --------------------------------------------------------  IN:    IV PiggyBack: 200 mL    IV PiggyBack: 500 mL    IV PiggyBack: 150 mL    IV PiggyBack: 100 mL    IV PiggyBack: 228.5 mL    IV PiggyBack: 300 mL    Propofol: 456 mL    sodium chloride 3%: 210 mL    sodium chloride 3%: 120 mL    sodium chloride 3%: 780 mL    Vital High Protein: 1320 mL  Total IN: 4364.5 mL    OUT:    Indwelling Catheter - Urethral (mL): 2980 mL  Total OUT: 2980 mL    Total NET: 1384.5 mL      13 May 2021 07:01  -  13 May 2021 09:02  --------------------------------------------------------  IN:    Propofol: 19 mL    sodium chloride 3%: 60 mL    Vital High Protein: 55 mL  Total IN: 134 mL    OUT:    Indwelling Catheter - Urethral (mL): 300 mL  Total OUT: 300 mL    Total NET: -166 mL        PHYSICAL EXAM:    General/Neuro  RASS:   0   Sumner Coma Scale: 11T  Eyes: 4       // Verbal: 1T      //Motor:  6      //    Eyes:  4= open spontaneously    Verbal:  1= T    Motor:  6= Follows commands    Pupils: PERRLA    Lungs:      clear to auscultation, Mechanical Breath sounds bilaterally    Cardiovascular : S1, S2.  Regular rate and rhythm.  + Peripheral edema   Cardiac Rhythm: Normal Sinus Rhythm    GI: Abdomen soft, Non-tender, Non-distended.      Extremities: Extremities warm, pink, well-perfused.    Derm: Good skin turgor, no skin breakdown.      :       Cole catheter in place.      CXR:       LABS:  CAPILLARY BLOOD GLUCOSE      POCT Blood Glucose.: 161 mg/dL (13 May 2021 05:54)  POCT Blood Glucose.: 136 mg/dL (12 May 2021 23:21)  POCT Blood Glucose.: 141 mg/dL (12 May 2021 17:49)  POCT Blood Glucose.: 155 mg/dL (12 May 2021 15:31)                          7.8    3.18  )-----------( 99       ( 12 May 2021 23:30 )             23.2       05-13    133<L>  |  103  |  7<L>  ----------------------------<  158<H>  4.1   |  19  |  <0.5<L>    Ca    7.2<L>      13 May 2021 05:50  Phos  2.1     05-12  Mg     2.2     05-12        PT/INR - ( 12 May 2021 04:15 )   PT: 12.40 sec;   INR: 1.08 ratio         PTT - ( 12 May 2021 04:15 )  PTT:27.5 sec      Urinalysis Basic - ( 11 May 2021 12:13 )    Color: Yellow / Appearance: Clear / S.010 / pH: x  Gluc: x / Ketone: Negative  / Bili: Negative / Urobili: 2   Blood: x / Protein: Trace / Nitrite: Positive   Leuk Esterase: Small / RBC: 20 /HPF / WBC 5 /HPF   Sq Epi: x / Non Sq Epi: Occasional / Bacteria: x        Culture - Blood (collected 11 May 2021 11:18)  Source: .Blood None  Gram Stain (12 May 2021 02:53):    Growth in anaerobic bottle: Gram Negative Rods    Growth in aerobic bottle: Gram Negative Rods  Preliminary Report (12 May 2021 22:21):    Growth in aerobic and anaerobic bottles: Klebsiella variicola    ***Blood Panel PCR results on this specimen are available    approximately 3 hours after the Gram stain result.***    Gram stain, PCR, and/or culture results may not always    correspond due to difference in methodologies.    ************************************************************    This PCR assay was performed by multiplex PCR. This    Assay tests for 66 bacterial and resistance gene targets.    Please refer to the [facility] test directory    at [email or web address] for details.  Organism: Blood Culture PCR (12 May 2021 05:15)  Organism: Blood Culture PCR (12 May 2021 05:15)    Culture - Bronchial (collected 11 May 2021 10:12)  Source: Bronch Wash Bronchoalveolar Lavage  Gram Stain (12 May 2021 07:04):    Few polymorphonuclear leukocytes per low power field    No Squamous epithelial cells per low power field    Few Gram Positive Rods per oil power field  Preliminary Report (12 May 2021 21:40):    Moderate Klebsiella variicola    Moderate Serratia marcescens    Normal Respiratory Liz present      SANDRA DAS  [id number]  45y Male    Indication for ICU admission: s/p craniectomy for large L SDH  Admit Date:21  ICU Date: 21  OR Date: 21    No Known Allergies    PAST MEDICAL & SURGICAL HISTORY:  Alcohol abuse  GERD (gastroesophageal reflux disease)  ETOH abuse  Hypomagnesemia  Seizure disorder  H/O hemorrhoidectomy      Home Medications:        24HRS EVENT:  Day  BAL- GPR  Blood cx - klebsiella, sens pending   salt tabs inc 4 q6  R IJ TLC   Na 131>135> 132, increased 3% to 50cc  K 3.3, IP 2.0, Mg 1.6- repleted     Night  -no acute events      DVT PTX: Hep sq  GI PTX: PPI    ***Tubes/Lines/Drains  ***  Peripheral IV  Arterial Line	L rad	                Date   L IJ TLC       OGT, ETT  Urinary Catheter		Indication: Strict I&O    Date Placed:     REVIEW OF SYSTEMS  [ ] A ten-point review of systems was otherwise negative except as noted.  [x] Due to altered mental status/intubation, subjective information were not able to be obtained from the patient. History was obtained, to the extent possible, from review of the chart and collateral sources of information.

## 2021-05-13 NOTE — PROGRESS NOTE ADULT - ATTENDING COMMENTS
As above. d/w patient current improvement though modest in neurologic function. Pt wife would like to cont care at this time. We discussed need for wound revision given single layer closure due to patient with cardiac arrest, ACLS protocol and ROSC . Will plan for sunday/monday. Patient will also likely need trach and PEG. This was also discussed with his wife. She understands.

## 2021-05-13 NOTE — CHART NOTE - NSCHARTNOTEFT_GEN_A_CORE
PALLIATIVE MEDICINE INTERDISCIPLINARY TEAM NOTE    Provider:  [   ]Social Work   [   ]          [   ] Initial visit [   ] Follow up    Family or contact name / phone #   Met with: [   ] Patient  [   ] Family  [   ] Other:    Primary Language: [   ] English [   ] Other*:                      *Interpretation provided by:    SUPPORT DIAGNOSES            (Check all that apply)  [   ] Psychosocial spiritual assessment (PSSA)  [   ] EOL issues  [   ] Cultural / spiritual concerns  [   ] Pain / suffering  [   ] Dementia / AMS  [   ] Other:  [   ] AD issues  [   ] Grief / loss / sadness  [   ] Discharge issues  [   ] Distress / coping    PSYCHOSOCIAL ASSESSMENT OF PATIENT         (Check all that apply)  [   ] Initial Assessment            [   ] Reassessment          [   ] Not Applicable this visit    Pain/suffering acuity:  [   ] None to mild (0-3)           [   ] Moderate (4-6)        [   ] High (7-10)    Mental Status:  [   ] Alert/oriented (x3)          [   ] Confused/Altered(x2/x1)         [   ] Non-resp    Functional status:  [   ] Independent w ADLs      [   ] Needs Assistance             [   ] Bedbound/Full Care    Coping:  [   ] Coping well                     [   ] Coping w/difficulty            [   ] Poor coping    Support system:  [   ] Strong                              [   ] Adequate                        [   ] Inadequate    SPIRITUAL ASSESSMENT  Jain/Spiritual practice: __________________________    Role of organized Caodaism:  [   ] Important                     [   ] Some (fam tradition, cultural)               [   ] None    Effects on medical care:  [   ] Yes, _____________________________________                         [   ] None    Cultural/Yarsani need:  [   ] Yes, _____________________________________                         [   ] None    Refer to Pastoral Care:  [   ] Yes           [   ] No, not at this time    SERVICE PROVIDED  [   ]PSSA                                                                             [   ]Discharge support / facilitation  [   ]AD / goals of care counseling                                  [   ]EOL / death / bereavement counseling  [   ]Counseling / support                                                [   ] Family meeting  [  x ]Prayer / sacrament / ritual                                      [   ] Referral   [   ]Other                                                                       NOTE and Plan of Care (PoC): Pt is none communicative, I said a silent prayer. I will follow up as needed.

## 2021-05-13 NOTE — PROGRESS NOTE ADULT - SUBJECTIVE AND OBJECTIVE BOX
Gastroenterology Consultation:    Patient is a 45y old  Male who presents with a chief complaint of found down unresponsive (13 May 2021 10:05)      Admitted on: 05-06-21  HPI:   Patient is a 45y Man w/ PMHx of Etoh abuse and seizures on keppra, was brought in for unresponsiveness, GCS 4 on admission, s/p code trauma, intubated for airway protection. trauma workup positive for SDH s/p urgent craniotomy and hematoma evacuation, EBL 1L,  intraop significant for cardiac arrest and massive transfusion protocol.  patient remains intubated and sedated with propfol (agitated while off, does not follow commands)    GI are consulted for slowly dropping Hb and 1 report of melena. the nurse overnight reported 1 large melanotic stool?, DEJA yellow, repeated by surgery team negative for blood.       Prior records Reviewed (Y/N): Y  History obtained from person other than patient (Y/N): N    Prior EGD: none on chart   Prior Colonoscopy: hemorroidectomy 2015      PAST MEDICAL & SURGICAL HISTORY:  Alcohol abuse  GERD (gastroesophageal reflux disease)  ETOH abuse  Hypomagnesemia  Seizure disorder  H/O hemorrhoidectomy    FAMILY HISTORY:  Family history of essential hypertension (Father)        Social History:  Tobacco: TANGELA  Alcohol: TANGELA  Drugs: TANGELA    Home Medications:    MEDICATIONS  (STANDING):  amLODIPine   Tablet 10 milliGRAM(s) Oral daily  cefepime   IVPB      cefepime   IVPB 1000 milliGRAM(s) IV Intermittent every 12 hours  chlorhexidine 0.12% Liquid 15 milliLiter(s) Oral Mucosa two times a day  chlorhexidine 4% Liquid 1 Application(s) Topical <User Schedule>  fentaNYL   Infusion 0.5 MICROgram(s)/kG/Hr (3.97 mL/Hr) IV Continuous <Continuous>  heparin   Injectable 5000 Unit(s) SubCutaneous every 8 hours  insulin lispro (ADMELOG) corrective regimen sliding scale   SubCutaneous every 6 hours  levETIRAcetam  IVPB 500 milliGRAM(s) IV Intermittent every 12 hours  pantoprazole  Injectable 40 milliGRAM(s) IV Push every 12 hours  propofol Infusion 5 MICROgram(s)/kG/Min (2.38 mL/Hr) IV Continuous <Continuous>  propranolol 20 milliGRAM(s) Oral every 8 hours  senna 2 Tablet(s) Oral at bedtime  sodium chloride 3%. 500 milliLiter(s) (60 mL/Hr) IV Continuous <Continuous>  sodium chloride 3%. 500 milliLiter(s) (200 mL/Hr) IV Continuous <Continuous>  vancomycin  IVPB 1250 milliGRAM(s) IV Intermittent every 8 hours    MEDICATIONS  (PRN):  acetaminophen   Tablet .. 650 milliGRAM(s) Oral every 6 hours PRN Temp greater or equal to 38.5C (101.3F)  fentaNYL    Injectable 25 MICROGram(s) IV Push every 4 hours PRN Severe Pain (7 - 10)      Allergies  No Known Allergies      Review of Systems:   Constitutional:  +Fever   limited as intubated   Physical Examination:  T(C): 38.3 (05-13-21 @ 11:00), Max: 38.3 (05-13-21 @ 07:10)  HR: 96 (05-13-21 @ 11:00) (76 - 96)  BP: 124/75 (05-13-21 @ 09:00) (109/64 - 134/78)  RR: 16 (05-13-21 @ 11:00) (16 - 23)  SpO2: 100% (05-13-21 @ 11:00) (100% - 100%)    HEENT: ET tube, sedated  Respiratory:  mechanical ventilation, good air entry   Cardiovascular:  S1 S2, Regular rate and rhythm.  GI: Abdomen is soft, symmetric, and without distention. DEJA yellow loose BM  Extremities: no edema   Skin: no Jaundice.    Data: (reviewed by attending)                        7.8    3.18  )-----------( 99       ( 12 May 2021 23:30 )             23.2     Hgb Trend:  7.8  05-12-21 @ 23:30  8.1  05-11-21 @ 23:23  8.6  05-10-21 @ 23:55        05-13    133<L>  |  103  |  7<L>  ----------------------------<  158<H>  4.1   |  19  |  <0.5<L>    Ca    7.2<L>      13 May 2021 05:50  Phos  2.1     05-12  Mg     2.2     05-12      Liver panel trend:  TBili 0.9   /      /   ALT 47   /   AlkP 120   /   Tptn 5.9   /   Alb 3.1    /   DBili --      05-06      PT/INR - ( 12 May 2021 04:15 )   PT: 12.40 sec;   INR: 1.08 ratio         PTT - ( 12 May 2021 04:15 )  PTT:27.5 sec    Culture - Blood (collected 11 May 2021 11:18)  Source: .Blood None  Gram Stain (12 May 2021 02:53):    Growth in anaerobic bottle: Gram Negative Rods    Growth in aerobic bottle: Gram Negative Rods  Preliminary Report (12 May 2021 22:21):    Growth in aerobic and anaerobic bottles: Klebsiella variicola    ***Blood Panel PCR results on this specimen are available    approximately 3 hours after the Gram stain result.***    Gram stain, PCR, and/or culture results may not always    correspond due to difference in methodologies.    ************************************************************    This PCR assay was performed by multiplex PCR. This    Assay tests for 66 bacterial and resistance gene targets.    Please refer to the [facility] Profiles test directory    at [email or web address] for details.  Organism: Blood Culture PCR (12 May 2021 05:15)  Organism: Blood Culture PCR (12 May 2021 05:15)    Culture - Bronchial (collected 11 May 2021 10:12)  Source: Bronch Wash Bronchoalveolar Lavage  Gram Stain (12 May 2021 07:04):    Few polymorphonuclear leukocytes per low power field    No Squamous epithelial cells per low power field    Few Gram Positive Rods per oil power field  Preliminary Report (12 May 2021 21:40):    Moderate Klebsiella variicola    Moderate Serratia marcescens    Normal Respiratory Liz present    Radiology:(reviewed by attending)  < from: CT Abdomen and Pelvis w/wo IV Cont (05.06.21 @ 22:45) >    EXAM:  CT ABDOMEN AND PELVIS WAW IC        EXAM:  CT ANGIO CHEST (W)AW IC            PROCEDURE DATE:  05/06/2021            INTERPRETATION:  CLINICAL STATEMENT: Trauma    TECHNIQUE: Contiguous axial CT images were obtained from the thoracic inlet to the pubic symphysis following administration of 100cc Optiray 320 intravenous contrast.  Oral contrast was not administered.  Reformatted images in the coronal and sagittal planes were acquired.    COMPARISON CT: 8/25/2017    OTHER STUDIES USED FOR CORRELATION: None.      FINDINGS:    Chest:    The visualized portions of the thyroid gland are unremarkable. An endotracheal tube is seen above the wilman.    4 mm nodule in the right middle lobe (4/58)    The heart size is normal without pericardialeffusion. The great vessel diameters are within normal limits.    There is no mediastinal, hilar, supraclavicular or axillary lymphadenopathy.    There is bilateral lower lobe segmental atelectasis. There is no pneumothorax or pleural effusion.    Abdomen and pelvis:    There is severe hepatic steatosis. The spleen is unremarkable    Bilateral adrenal calcifications are noted, with a left adrenal nodule measuring 2.1 cm, previously 1.6 cm.    The pancreas is unremarkable.    The kidneys enhance symmetrically without hydroureteronephrosis. Too small to characterize hypodensity within the left kidney is noted.    The urinary bladder is partially distended with fluid.    There is no bowel obstruction, pneumoperitoneum or ascites. A normal appendix is seen in the right lower quadrant.    No evidence of bowel obstruction, pneumoperitoneum or ascites.    There are mild degenerative changes of the bilateral hips, sacroiliac joints and lower thoracic and lower lumbar spine. Chronic right-sided fractures involving second through sixth ribs.      Impression:      No acute intra-abdominal or intrathoracic traumatic changes.    2.2 cm left adrenal indeterminate nodule, larger since the prior exam.  Nonemergent MRI may be of benefit when patient is clinically able.    4 mm right middle lobe nodule. Follow-up in 6 months is recommended.                  YENI ZAYAS MD; Attending Radiologist  This document has been electronically signed. May  6 2021 11:35PM    < end of copied text >

## 2021-05-13 NOTE — PROGRESS NOTE ADULT - SUBJECTIVE AND OBJECTIVE BOX
Neurosurgery Progress Note    POD# 7  S/P Left Craniectomy for evac of SDH    Pt seen and examined at the bedside.  No major over night events.  The remains intubated, mechanically ventilated, sedated on propofol ggt, no vasopressors.  At present time the pt is no opening eyes to verbal stimuli, withdraws Left upper and lower extremities to painful stimuli, PERRLA        Subjective: 45yMale with a pmhx of SUBDURAL HEMATOMA;RESPIRATORY FAILURE;FALL    ^FALL    No pertinent family history in first degree relatives    Family history of essential hypertension (Father)    Handoff    MEWS Score    No pertinent past medical history    Alcohol abuse    GERD (gastroesophageal reflux disease)    ETOH abuse    Hypomagnesemia    Seizure disorder    Subdural hematoma    S/P subdural hematoma evacuation    Subdural hematoma    Subdural hematoma    Subdural hematoma    Respiratory failure    ETOH abuse    Palliative care by specialist    Insertion of non-tunneled central venous catheter (CVC) in patient age 5 years of age or older    Craniotomy, decompressive    No significant past surgical history    H/O hemorrhoidectomy    FALL    22    Respiratory failure    Fall    SysAdmin_VisitLink      POD# 7  S/P Left Craniectomy for evac of SDH    Allergies    No Known Allergies    Intolerances        Vital Signs Last 24 Hrs  T(C): 38.3 (13 May 2021 07:10), Max: 38.3 (13 May 2021 07:10)  T(F): 100.9 (13 May 2021 07:10), Max: 100.9 (13 May 2021 07:10)  HR: 90 (13 May 2021 09:00) (76 - 95)  BP: 124/75 (13 May 2021 09:00) (109/64 - 134/78)  BP(mean): 94 (13 May 2021 09:00) (81 - 101)  RR: 17 (13 May 2021 09:00) (17 - 23)  SpO2: 100% (13 May 2021 09:00) (100% - 100%)      acetaminophen   Tablet .. 650 milliGRAM(s) Oral every 6 hours PRN  amLODIPine   Tablet 10 milliGRAM(s) Oral daily  cefepime   IVPB      cefepime   IVPB 1000 milliGRAM(s) IV Intermittent every 12 hours  chlorhexidine 0.12% Liquid 15 milliLiter(s) Oral Mucosa two times a day  chlorhexidine 4% Liquid 1 Application(s) Topical <User Schedule>  fentaNYL    Injectable 25 MICROGram(s) IV Push every 4 hours PRN  fentaNYL   Infusion 0.5 MICROgram(s)/kG/Hr IV Continuous <Continuous>  heparin   Injectable 5000 Unit(s) SubCutaneous every 8 hours  insulin lispro (ADMELOG) corrective regimen sliding scale   SubCutaneous every 6 hours  levETIRAcetam  IVPB 500 milliGRAM(s) IV Intermittent every 12 hours  pantoprazole  Injectable 40 milliGRAM(s) IV Push every 12 hours  propofol Infusion 5 MICROgram(s)/kG/Min IV Continuous <Continuous>  propranolol 20 milliGRAM(s) Oral every 8 hours  senna 2 Tablet(s) Oral at bedtime  sodium chloride 3%. 500 milliLiter(s) IV Continuous <Continuous>  sodium chloride 3%. 500 milliLiter(s) IV Continuous <Continuous>  vancomycin  IVPB 1250 milliGRAM(s) IV Intermittent every 8 hours        05-12-21 @ 07:01  -  05-13-21 @ 07:00  --------------------------------------------------------  IN: 4364.5 mL / OUT: 2980 mL / NET: 1384.5 mL    05-13-21 @ 07:01  -  05-13-21 @ 10:09  --------------------------------------------------------  IN: 742.6 mL / OUT: 300 mL / NET: 442.6 mL        REVIEW OF SYSTEMS    [ ] A ten-point review of systems was otherwise negative except as noted.  [ x] Due to altered mental status/intubation, subjective information were not able to be obtained from the patient. History was obtained, to the extent possible, from review of the chart and collateral sources of information.      Exam:  Pt is intubated mechanically ventilated  sedated, on propofol ggt  PERRLA  grimaces to pain  with drawl Left upper and lower extremities to painful stimuli       CBC Full  -  ( 12 May 2021 23:30 )  WBC Count : 3.18 K/uL  RBC Count : 2.53 M/uL  Hemoglobin : 7.8 g/dL  Hematocrit : 23.2 %  Platelet Count - Automated : 99 K/uL  Mean Cell Volume : 91.7 fL  Mean Cell Hemoglobin : 30.8 pg  Mean Cell Hemoglobin Concentration : 33.6 g/dL  Auto Neutrophil # : x  Auto Lymphocyte # : x  Auto Monocyte # : x  Auto Eosinophil # : x  Auto Basophil # : x  Auto Neutrophil % : x  Auto Lymphocyte % : x  Auto Monocyte % : x  Auto Eosinophil % : x  Auto Basophil % : x    05-13    133<L>  |  103  |  7<L>  ----------------------------<  158<H>  4.1   |  19  |  <0.5<L>    Ca    7.2<L>      13 May 2021 05:50  Phos  2.1     05-12  Mg     2.2     05-12      PT/INR - ( 12 May 2021 04:15 )   PT: 12.40 sec;   INR: 1.08 ratio         PTT - ( 12 May 2021 04:15 )  PTT:27.5 sec        Wound:  clean dry and intact    Imaging:  < from: CT Head No Cont (05.10.21 @ 13:09) >  IMPRESSION:    In comparison with the prior CT scan of the brain dated May 7, 2021:    Partial resorption of the extra-axial fluid collections.    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    The case was discussed with MELANIE Gunter on May 10, 2021 time 2:26 PM with read back.      MARGIE PORTER MD; Attending Radiologist  This document has been electronically signed. May 10 2021  2:27PM    < end of copied text >      Assessment/Plan:   This is 45 gentleman, POD# 7, S/P Left Craniectomy for evac of SDH    -Neuro: Cont Neuro checks Q1hr, On 3% Nacl, Na target: 145 - 150 (Na 133), maintain Keppra 500mg BID  Neuro CC recs appreciated: CTA head & neck, statins, VEEG  -Resp: Daily SBT if safe  -CV: keep euvolemic, target SBP <140  -GI: TF, GI ppx  -: Cole in place, replete lytes PRN  -ID: Vanco/Cefepime for + UTI  DVT prophylaxis: SQH Neurosurgery Progress Note    POD# 7  S/P Left Craniectomy for evac of SDH    Pt seen and examined at the bedside.  No major over night events.  The remains intubated, mechanically ventilated, sedated on propofol ggt, no vasopressors.  At present time the pt is no opening eyes to verbal stimuli, withdraws Left upper and lower extremities to painful stimuli, PERRLA        Subjective: 45yMale with a pmhx of SUBDURAL HEMATOMA;RESPIRATORY FAILURE;FALL    ^FALL    No pertinent family history in first degree relatives    Family history of essential hypertension (Father)    Handoff    MEWS Score    No pertinent past medical history    Alcohol abuse    GERD (gastroesophageal reflux disease)    ETOH abuse    Hypomagnesemia    Seizure disorder    Subdural hematoma    S/P subdural hematoma evacuation    Subdural hematoma    Subdural hematoma    Subdural hematoma    Respiratory failure    ETOH abuse    Palliative care by specialist    Insertion of non-tunneled central venous catheter (CVC) in patient age 5 years of age or older    Craniotomy, decompressive    No significant past surgical history    H/O hemorrhoidectomy    FALL    22    Respiratory failure    Fall    SysAdmin_VisitLink      POD# 7  S/P Left Craniectomy for evac of SDH    Allergies    No Known Allergies    Intolerances        Vital Signs Last 24 Hrs  T(C): 38.3 (13 May 2021 07:10), Max: 38.3 (13 May 2021 07:10)  T(F): 100.9 (13 May 2021 07:10), Max: 100.9 (13 May 2021 07:10)  HR: 90 (13 May 2021 09:00) (76 - 95)  BP: 124/75 (13 May 2021 09:00) (109/64 - 134/78)  BP(mean): 94 (13 May 2021 09:00) (81 - 101)  RR: 17 (13 May 2021 09:00) (17 - 23)  SpO2: 100% (13 May 2021 09:00) (100% - 100%)      acetaminophen   Tablet .. 650 milliGRAM(s) Oral every 6 hours PRN  amLODIPine   Tablet 10 milliGRAM(s) Oral daily  cefepime   IVPB      cefepime   IVPB 1000 milliGRAM(s) IV Intermittent every 12 hours  chlorhexidine 0.12% Liquid 15 milliLiter(s) Oral Mucosa two times a day  chlorhexidine 4% Liquid 1 Application(s) Topical <User Schedule>  fentaNYL    Injectable 25 MICROGram(s) IV Push every 4 hours PRN  fentaNYL   Infusion 0.5 MICROgram(s)/kG/Hr IV Continuous <Continuous>  heparin   Injectable 5000 Unit(s) SubCutaneous every 8 hours  insulin lispro (ADMELOG) corrective regimen sliding scale   SubCutaneous every 6 hours  levETIRAcetam  IVPB 500 milliGRAM(s) IV Intermittent every 12 hours  pantoprazole  Injectable 40 milliGRAM(s) IV Push every 12 hours  propofol Infusion 5 MICROgram(s)/kG/Min IV Continuous <Continuous>  propranolol 20 milliGRAM(s) Oral every 8 hours  senna 2 Tablet(s) Oral at bedtime  sodium chloride 3%. 500 milliLiter(s) IV Continuous <Continuous>  sodium chloride 3%. 500 milliLiter(s) IV Continuous <Continuous>  vancomycin  IVPB 1250 milliGRAM(s) IV Intermittent every 8 hours        05-12-21 @ 07:01  -  05-13-21 @ 07:00  --------------------------------------------------------  IN: 4364.5 mL / OUT: 2980 mL / NET: 1384.5 mL    05-13-21 @ 07:01  -  05-13-21 @ 10:09  --------------------------------------------------------  IN: 742.6 mL / OUT: 300 mL / NET: 442.6 mL        REVIEW OF SYSTEMS    [ ] A ten-point review of systems was otherwise negative except as noted.  [ x] Due to altered mental status/intubation, subjective information were not able to be obtained from the patient. History was obtained, to the extent possible, from review of the chart and collateral sources of information.      Exam:  Pt is intubated mechanically ventilated  sedated, on propofol ggt  PERRLA  grimaces to pain  with drawl Left upper and lower extremities to painful stimuli       CBC Full  -  ( 12 May 2021 23:30 )  WBC Count : 3.18 K/uL  RBC Count : 2.53 M/uL  Hemoglobin : 7.8 g/dL  Hematocrit : 23.2 %  Platelet Count - Automated : 99 K/uL  Mean Cell Volume : 91.7 fL  Mean Cell Hemoglobin : 30.8 pg  Mean Cell Hemoglobin Concentration : 33.6 g/dL  Auto Neutrophil # : x  Auto Lymphocyte # : x  Auto Monocyte # : x  Auto Eosinophil # : x  Auto Basophil # : x  Auto Neutrophil % : x  Auto Lymphocyte % : x  Auto Monocyte % : x  Auto Eosinophil % : x  Auto Basophil % : x    05-13    133<L>  |  103  |  7<L>  ----------------------------<  158<H>  4.1   |  19  |  <0.5<L>    Ca    7.2<L>      13 May 2021 05:50  Phos  2.1     05-12  Mg     2.2     05-12      PT/INR - ( 12 May 2021 04:15 )   PT: 12.40 sec;   INR: 1.08 ratio         PTT - ( 12 May 2021 04:15 )  PTT:27.5 sec        Wound:  clean dry and intact    Imaging:  < from: CT Head No Cont (05.10.21 @ 13:09) >  IMPRESSION:    In comparison with the prior CT scan of the brain dated May 7, 2021:    Partial resorption of the extra-axial fluid collections.    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    The case was discussed with MELANIE Gunter on May 10, 2021 time 2:26 PM with read back.      MARGIE PORTER MD; Attending Radiologist  This document has been electronically signed. May 10 2021  2:27PM    < end of copied text >      Assessment/Plan:   This is 46 y/o gentleman, POD# 7, S/P Left Craniectomy for evac of SDH    -Neuro: Cont Neuro checks Q1hr, On 3% Nacl, Na target: 145 - 150 (Na 133), maintain Keppra 500mg BID  Neuro CC recs appreciated: CTA head & neck, statins, VEEG  -Resp: Daily SBT if safe  -CV: keep euvolemic, target SBP <140  -GI: TF, GI ppx  -: Cole in place, replete lytes PRN  -ID: Vanco/Cefepime for + UTI  DVT prophylaxis: SQH

## 2021-05-13 NOTE — PROGRESS NOTE ADULT - ATTENDING COMMENTS
Patient is arousable today.  Follows simple commands, still lethargic.    ASSESSMENT:  46 y/o male, S/P Fall.  Traumatic Left SDH.  Brain compression.  S/P Left Craniectomy.  Acute respiratory failure with hypoxia.  Hemodynamic instability.  Chronic Alcohol Abuse.  Dysphagia.  Hyponatremia.    PLAN:  - sedation vacation today  - Continue Vent support; follow ABG  - keep normotensive; MAP>65  - follow serum electrolytes and UOP  - increase 3% saline, follow Na  - blood culture shows Klebsiella - on Cefepime and Vanco  - ID f/u  - GI and DVT prophylaxis  - get CTA neck and brain as per Neurology    Discussed with NS - possible skull closure on Sunday.

## 2021-05-13 NOTE — PROGRESS NOTE ADULT - ASSESSMENT
Assessment & Plan  45M s/p fall with large left SDH and neurological status change, level 1 for emergent craniotomy    NEURO:  Hx of EtOH abuse and prior Delerium Tremens episodes  S/P craniotomy for large left SDH  - Q1H neuro checks  - GCS 9T  - Keppra 500mg BID  - Salt tabs 4g q6, inc 3% at 50 ml/hr  Sedation: Propofol gtt, daily SAT  - RASS -5  Head of bed @ 30 degrees  Repeat Head CT #1 post op changes, resolution of MLS, stable SDH along cerebral falx and L tentorium, new trace scattered SAH along the right cerebral sulci and right cerebellar sulci and new IVP in the b/l occipital horns.  Repeat Head CT #2: development of acute infarcts in left cerebral hemisphere and right cerebellum    RESP:   Intubated, /16/30/5  AB.5/ 28/134/22/100%  AM CXR    CARDS:   S/P cardiac arrest in OR & MTP  - No longer on pressors  - Goal MAP > 65mmHg  -amlodipine 10mg, keep SBP <140  -propranolol 20mg q8   trop <0.01 x 3    GI/NUTR:   Diet: TF to Vital HP @ 55  GI Prophylaxis-Protonix IV  Bowel regimen- MIralax, senna  - BM     /RENAL:   Cole in place, strict I/O-  Labs:          BUN/Cr- 7/0.5 >9/0.5> 7/0.5          Electrolytes- Na-132/ K-4.3/ Mg- 2.2/ Phos-2.1  Na goal 145-150, current Na 132 increased 3% to 50cc/h, Nacl tab 4gm q6    HEME/ONC:   S/P massive transfusion protocol  - 4uPRBCs 2FFP 2 platelet intraop  DVT prophylaxis: HSQ  Acute blood loss anemia  - H/H - 7.9/23.8 > 7.9/23.7 > 8.6/25.5 > 8.1/24.6 > 7.8  Plt 111 > 94 > 54 > 55 > 1 unit > 67 > 1u > 77 > 103->85 > 79 ---> 74, no longer trend per neurosurgery  - Another 1unit of plts given on  due to persistent oozing during dressing change  -  Plts 85, no more plts transfusion, since no further oozing from the dressing, NSG ok with it    ID:  antibiotics: vanc, cefepime, vanc level in am  WBC 7 > 6.4 > 4.9->4 > 3.5 > 4.08 > 2.6 > 3.18  afebrile   - u/a +leuk   -blood cx- klebsiella, sens pendign   -BAL- GPR, f/u cx    ENDO:  q6 POC glucose  Sliding scale insulin  A1c 5.2    LINES/DRAINS:  PIV, Tamia, Cole, peripherals, ETT, OGT, L IJ TLC   DISPO: SICU

## 2021-05-13 NOTE — PROGRESS NOTE ADULT - ASSESSMENT
Patient is a 45y Man w/ PMHx of Etoh abuse and seizures on keppra, was brought in for unresponsiveness, GCS 4 on admission, s/p code trauma, intubated for airway protection. trauma workup positive for SDH s/p urgent craniotomy and hematoma evacuation, EBL 1L,  intraop significant for cardiac arrest and massive transfusion protocol.  patient remains intubated and sedated with propfol (agitated while off, does not follow commands)    *Anemia  -no evidence of GI bleeding    Rec  -c/w PPI to prevent stress gastric ulcer / erosive gastritis while intubated  -tren CBC  -keep active type and screen  -transfuse as needed  -GOC discussion  -follow up by SICU    -for questions call [phone] during weekdays till 5pm and call GI service after 5pm and on weekends [phone]

## 2021-05-14 LAB
ANION GAP SERPL CALC-SCNC: 9 MMOL/L — SIGNIFICANT CHANGE UP (ref 7–14)
BASE EXCESS BLDA CALC-SCNC: 1 MMOL/L — SIGNIFICANT CHANGE UP (ref -2–2)
BUN SERPL-MCNC: 6 MG/DL — LOW (ref 10–20)
BUN SERPL-MCNC: 7 MG/DL — LOW (ref 10–20)
BUN SERPL-MCNC: 7 MG/DL — LOW (ref 10–20)
BUN SERPL-MCNC: 8 MG/DL — LOW (ref 10–20)
CALCIUM SERPL-MCNC: 7.5 MG/DL — LOW (ref 8.5–10.1)
CALCIUM SERPL-MCNC: 7.7 MG/DL — LOW (ref 8.5–10.1)
CALCIUM SERPL-MCNC: 7.7 MG/DL — LOW (ref 8.5–10.1)
CALCIUM SERPL-MCNC: 7.8 MG/DL — LOW (ref 8.5–10.1)
CHLORIDE SERPL-SCNC: 103 MMOL/L — SIGNIFICANT CHANGE UP (ref 98–110)
CHLORIDE SERPL-SCNC: 103 MMOL/L — SIGNIFICANT CHANGE UP (ref 98–110)
CHLORIDE SERPL-SCNC: 105 MMOL/L — SIGNIFICANT CHANGE UP (ref 98–110)
CHLORIDE SERPL-SCNC: 105 MMOL/L — SIGNIFICANT CHANGE UP (ref 98–110)
CO2 SERPL-SCNC: 21 MMOL/L — SIGNIFICANT CHANGE UP (ref 17–32)
CO2 SERPL-SCNC: 21 MMOL/L — SIGNIFICANT CHANGE UP (ref 17–32)
CO2 SERPL-SCNC: 22 MMOL/L — SIGNIFICANT CHANGE UP (ref 17–32)
CO2 SERPL-SCNC: 23 MMOL/L — SIGNIFICANT CHANGE UP (ref 17–32)
CREAT SERPL-MCNC: <0.5 MG/DL — LOW (ref 0.7–1.5)
GLUCOSE BLDC GLUCOMTR-MCNC: 105 MG/DL — HIGH (ref 70–99)
GLUCOSE SERPL-MCNC: 117 MG/DL — HIGH (ref 70–99)
GLUCOSE SERPL-MCNC: 120 MG/DL — HIGH (ref 70–99)
GLUCOSE SERPL-MCNC: 132 MG/DL — HIGH (ref 70–99)
GLUCOSE SERPL-MCNC: 145 MG/DL — HIGH (ref 70–99)
HCO3 BLDA-SCNC: 25 MMOL/L — SIGNIFICANT CHANGE UP (ref 23–27)
HCT VFR BLD CALC: 22.3 % — LOW (ref 42–52)
HCT VFR BLD CALC: 22.5 % — LOW (ref 42–52)
HGB BLD-MCNC: 7.2 G/DL — LOW (ref 14–18)
HGB BLD-MCNC: 7.4 G/DL — LOW (ref 14–18)
INR BLD: 1.06 RATIO — SIGNIFICANT CHANGE UP (ref 0.65–1.3)
MAGNESIUM SERPL-MCNC: 1.9 MG/DL — SIGNIFICANT CHANGE UP (ref 1.8–2.4)
MCHC RBC-ENTMCNC: 29.6 PG — SIGNIFICANT CHANGE UP (ref 27–31)
MCHC RBC-ENTMCNC: 30.5 PG — SIGNIFICANT CHANGE UP (ref 27–31)
MCHC RBC-ENTMCNC: 32 G/DL — SIGNIFICANT CHANGE UP (ref 32–37)
MCHC RBC-ENTMCNC: 33.2 G/DL — SIGNIFICANT CHANGE UP (ref 32–37)
MCV RBC AUTO: 91.8 FL — SIGNIFICANT CHANGE UP (ref 80–94)
MCV RBC AUTO: 92.6 FL — SIGNIFICANT CHANGE UP (ref 80–94)
NRBC # BLD: 0 /100 WBCS — SIGNIFICANT CHANGE UP (ref 0–0)
NRBC # BLD: 0 /100 WBCS — SIGNIFICANT CHANGE UP (ref 0–0)
OSMOLALITY SERPL: 277 MOS/KG — SIGNIFICANT CHANGE UP (ref 275–300)
OSMOLALITY SERPL: 279 MOS/KG — SIGNIFICANT CHANGE UP (ref 275–300)
OSMOLALITY SERPL: 282 MOS/KG — SIGNIFICANT CHANGE UP (ref 275–300)
PCO2 BLDA: 35 MMHG — LOW (ref 38–42)
PH BLDA: 7.46 — HIGH (ref 7.38–7.42)
PHOSPHATE SERPL-MCNC: 3 MG/DL — SIGNIFICANT CHANGE UP (ref 2.1–4.9)
PLATELET # BLD AUTO: 141 K/UL — SIGNIFICANT CHANGE UP (ref 130–400)
PLATELET # BLD AUTO: 179 K/UL — SIGNIFICANT CHANGE UP (ref 130–400)
PO2 BLDA: 126 MMHG — HIGH (ref 78–95)
POTASSIUM SERPL-MCNC: 3.9 MMOL/L — SIGNIFICANT CHANGE UP (ref 3.5–5)
POTASSIUM SERPL-MCNC: 4.1 MMOL/L — SIGNIFICANT CHANGE UP (ref 3.5–5)
POTASSIUM SERPL-MCNC: 4.2 MMOL/L — SIGNIFICANT CHANGE UP (ref 3.5–5)
POTASSIUM SERPL-MCNC: 4.3 MMOL/L — SIGNIFICANT CHANGE UP (ref 3.5–5)
POTASSIUM SERPL-SCNC: 3.9 MMOL/L — SIGNIFICANT CHANGE UP (ref 3.5–5)
POTASSIUM SERPL-SCNC: 4.1 MMOL/L — SIGNIFICANT CHANGE UP (ref 3.5–5)
POTASSIUM SERPL-SCNC: 4.2 MMOL/L — SIGNIFICANT CHANGE UP (ref 3.5–5)
POTASSIUM SERPL-SCNC: 4.3 MMOL/L — SIGNIFICANT CHANGE UP (ref 3.5–5)
PROTHROM AB SERPL-ACNC: 12.2 SEC — SIGNIFICANT CHANGE UP (ref 9.95–12.87)
RBC # BLD: 2.43 M/UL — LOW (ref 4.7–6.1)
RBC # BLD: 2.43 M/UL — LOW (ref 4.7–6.1)
RBC # FLD: 14.6 % — HIGH (ref 11.5–14.5)
RBC # FLD: 15 % — HIGH (ref 11.5–14.5)
SAO2 % BLDA: 100 % — HIGH (ref 94–98)
SARS-COV-2 RNA SPEC QL NAA+PROBE: SIGNIFICANT CHANGE UP
SODIUM SERPL-SCNC: 133 MMOL/L — LOW (ref 135–146)
SODIUM SERPL-SCNC: 135 MMOL/L — SIGNIFICANT CHANGE UP (ref 135–146)
SODIUM SERPL-SCNC: 135 MMOL/L — SIGNIFICANT CHANGE UP (ref 135–146)
SODIUM SERPL-SCNC: 136 MMOL/L — SIGNIFICANT CHANGE UP (ref 135–146)
SODIUM UR-SCNC: 256 MMOL/L — SIGNIFICANT CHANGE UP
WBC # BLD: 3.05 K/UL — LOW (ref 4.8–10.8)
WBC # BLD: 4.05 K/UL — LOW (ref 4.8–10.8)
WBC # FLD AUTO: 3.05 K/UL — LOW (ref 4.8–10.8)
WBC # FLD AUTO: 4.05 K/UL — LOW (ref 4.8–10.8)

## 2021-05-14 PROCEDURE — 99291 CRITICAL CARE FIRST HOUR: CPT

## 2021-05-14 PROCEDURE — 71045 X-RAY EXAM CHEST 1 VIEW: CPT | Mod: 26

## 2021-05-14 PROCEDURE — 99233 SBSQ HOSP IP/OBS HIGH 50: CPT

## 2021-05-14 PROCEDURE — 93010 ELECTROCARDIOGRAM REPORT: CPT

## 2021-05-14 RX ORDER — SODIUM CHLORIDE 9 MG/ML
500 INJECTION INTRAMUSCULAR; INTRAVENOUS; SUBCUTANEOUS ONCE
Refills: 0 | Status: COMPLETED | OUTPATIENT
Start: 2021-05-14 | End: 2021-05-14

## 2021-05-14 RX ORDER — CEFEPIME 1 G/1
1000 INJECTION, POWDER, FOR SOLUTION INTRAMUSCULAR; INTRAVENOUS ONCE
Refills: 0 | Status: COMPLETED | OUTPATIENT
Start: 2021-05-14 | End: 2021-05-14

## 2021-05-14 RX ORDER — MAGNESIUM SULFATE 500 MG/ML
1 VIAL (ML) INJECTION ONCE
Refills: 0 | Status: COMPLETED | OUTPATIENT
Start: 2021-05-14 | End: 2021-05-14

## 2021-05-14 RX ORDER — SODIUM CHLORIDE 9 MG/ML
3 INJECTION INTRAMUSCULAR; INTRAVENOUS; SUBCUTANEOUS EVERY 6 HOURS
Refills: 0 | Status: DISCONTINUED | OUTPATIENT
Start: 2021-05-14 | End: 2021-05-15

## 2021-05-14 RX ORDER — POTASSIUM PHOSPHATE, MONOBASIC POTASSIUM PHOSPHATE, DIBASIC 236; 224 MG/ML; MG/ML
15 INJECTION, SOLUTION INTRAVENOUS ONCE
Refills: 0 | Status: COMPLETED | OUTPATIENT
Start: 2021-05-14 | End: 2021-05-14

## 2021-05-14 RX ORDER — CEFEPIME 1 G/1
2000 INJECTION, POWDER, FOR SOLUTION INTRAMUSCULAR; INTRAVENOUS EVERY 12 HOURS
Refills: 0 | Status: DISCONTINUED | OUTPATIENT
Start: 2021-05-14 | End: 2021-05-19

## 2021-05-14 RX ADMIN — SENNA PLUS 2 TABLET(S): 8.6 TABLET ORAL at 21:02

## 2021-05-14 RX ADMIN — Medication 166.67 MILLIGRAM(S): at 05:53

## 2021-05-14 RX ADMIN — CHLORHEXIDINE GLUCONATE 15 MILLILITER(S): 213 SOLUTION TOPICAL at 05:52

## 2021-05-14 RX ADMIN — CEFEPIME 100 MILLIGRAM(S): 1 INJECTION, POWDER, FOR SOLUTION INTRAMUSCULAR; INTRAVENOUS at 18:18

## 2021-05-14 RX ADMIN — CHLORHEXIDINE GLUCONATE 15 MILLILITER(S): 213 SOLUTION TOPICAL at 17:50

## 2021-05-14 RX ADMIN — CEFEPIME 100 MILLIGRAM(S): 1 INJECTION, POWDER, FOR SOLUTION INTRAMUSCULAR; INTRAVENOUS at 06:52

## 2021-05-14 RX ADMIN — SODIUM CHLORIDE 60 MILLILITER(S): 5 INJECTION, SOLUTION INTRAVENOUS at 18:18

## 2021-05-14 RX ADMIN — HEPARIN SODIUM 5000 UNIT(S): 5000 INJECTION INTRAVENOUS; SUBCUTANEOUS at 13:45

## 2021-05-14 RX ADMIN — LEVETIRACETAM 420 MILLIGRAM(S): 250 TABLET, FILM COATED ORAL at 05:53

## 2021-05-14 RX ADMIN — CHLORHEXIDINE GLUCONATE 1 APPLICATION(S): 213 SOLUTION TOPICAL at 05:50

## 2021-05-14 RX ADMIN — SODIUM CHLORIDE 1000 MILLILITER(S): 9 INJECTION INTRAMUSCULAR; INTRAVENOUS; SUBCUTANEOUS at 17:00

## 2021-05-14 RX ADMIN — CEFEPIME 100 MILLIGRAM(S): 1 INJECTION, POWDER, FOR SOLUTION INTRAMUSCULAR; INTRAVENOUS at 07:56

## 2021-05-14 RX ADMIN — SODIUM CHLORIDE 3 GRAM(S): 9 INJECTION INTRAMUSCULAR; INTRAVENOUS; SUBCUTANEOUS at 23:33

## 2021-05-14 RX ADMIN — Medication 50 GRAM(S): at 01:43

## 2021-05-14 RX ADMIN — HEPARIN SODIUM 5000 UNIT(S): 5000 INJECTION INTRAVENOUS; SUBCUTANEOUS at 05:54

## 2021-05-14 RX ADMIN — PANTOPRAZOLE SODIUM 40 MILLIGRAM(S): 20 TABLET, DELAYED RELEASE ORAL at 17:50

## 2021-05-14 RX ADMIN — HEPARIN SODIUM 5000 UNIT(S): 5000 INJECTION INTRAVENOUS; SUBCUTANEOUS at 21:02

## 2021-05-14 RX ADMIN — POTASSIUM PHOSPHATE, MONOBASIC POTASSIUM PHOSPHATE, DIBASIC 62.5 MILLIMOLE(S): 236; 224 INJECTION, SOLUTION INTRAVENOUS at 01:42

## 2021-05-14 RX ADMIN — SODIUM CHLORIDE 60 MILLILITER(S): 5 INJECTION, SOLUTION INTRAVENOUS at 00:32

## 2021-05-14 RX ADMIN — AMLODIPINE BESYLATE 10 MILLIGRAM(S): 2.5 TABLET ORAL at 05:54

## 2021-05-14 RX ADMIN — PANTOPRAZOLE SODIUM 40 MILLIGRAM(S): 20 TABLET, DELAYED RELEASE ORAL at 05:53

## 2021-05-14 RX ADMIN — LEVETIRACETAM 420 MILLIGRAM(S): 250 TABLET, FILM COATED ORAL at 17:50

## 2021-05-14 RX ADMIN — SODIUM CHLORIDE 3 GRAM(S): 9 INJECTION INTRAMUSCULAR; INTRAVENOUS; SUBCUTANEOUS at 18:18

## 2021-05-14 RX ADMIN — PROPOFOL 2.38 MICROGRAM(S)/KG/MIN: 10 INJECTION, EMULSION INTRAVENOUS at 12:48

## 2021-05-14 RX ADMIN — SODIUM CHLORIDE 60 MILLILITER(S): 5 INJECTION, SOLUTION INTRAVENOUS at 10:00

## 2021-05-14 RX ADMIN — PROPOFOL 2.38 MICROGRAM(S)/KG/MIN: 10 INJECTION, EMULSION INTRAVENOUS at 22:59

## 2021-05-14 NOTE — PROGRESS NOTE ADULT - ASSESSMENT
Assessment & Plan  45M s/p fall with large left SDH and neurological status change, level 1 for emergent craniotomy    NEURO:  Hx of EtOH abuse and prior Delerium Tremens episodes  S/P craniotomy for large left SDH  - Q1H neuro checks  - GCS 9T  - Keppra 500mg BID  - HTS 3% at 60 ml/hr  Sedation: Propofol gtt, pain control w/ fent gtt   - RASS -5  Head of bed @ 30 degrees  Repeat Head CT #1 post op changes, resolution of MLS, stable SDH along cerebral falx and L tentorium, new trace scattered SAH along the right cerebral sulci and right cerebellar sulci and new IVP in the b/l occipital horns.  Repeat Head CT #2: development of acute infarcts in left cerebral hemisphere and right cerebellum  CTA neck and Brain - no evidence acute large vessel occlusion, severe stenosis of R vertebral artery and moderate stenosis of mid-distal basilar artery, possible jugular venous thrombosis, NCHCT recommended for left convexity subdural collection, and increasing edema around L temporal/occipital parenchemal hemorrhage      RESP:   Intubated, /16/30/5  ABG:   AM CXR  plan for Trach next week    CARDS:   S/P cardiac arrest in OR & MTP  - No longer on pressors  - Goal MAP > 65mmHg  -amlodipine 10mg, keep SBP <140  -propranolol 20mg q8   trop <0.01 x 3    GI/NUTR:   Diet: TF to Vital HP @ 55  GI Prophylaxis-Protonix IV  Bowel regimen- MIralax, senna  - BM 5/13    /RENAL:   Cole in place, strict I/O-  Labs:          BUN/Cr- 7/0.5 > 6/0.5          Electrolytes- Na 136 // K 3.9 // Phos 3.0 //  Mg 1.9- hypoK, hypophos, hypomag repleted   Na goal 145-150, current Na 134--> 3% @ 60cc/h, 136    HEME/ONC:   S/P massive transfusion protocol  - 4uPRBCs 2FFP 2 platelet intraop, post op 2 PLT  DVT prophylaxis: HSQ  Acute blood loss anemia  - H/H - 8.6/25.5 > 8.1/24.6 > 7.8/23> 7.5/22.3  Plt 72>141  - 5/9 Plts 85, no more plts transfusion, since no further oozing from the dressing, NSG ok with it    ID:  antibiotics: vanc, cefepime, vanc increased to 1250 q8, next trough before PM dose on 5/14  WBC 4.08 > 2.6 > 3.18 > 2.9>3.0  afebrile   5/11- u/a +leuk   -blood cx- klebsiella, pan sens   -BAL-  klebsiella, serratia marcenscens, pan sens  ID c/s pending     ENDO:  q6 POC glucose  Sliding scale insulin  A1c 5.2    LINES/DRAINS:  PIV, [place], Cole, peripherals, ETT, OGT, L IJ TLC   DISPO: SICU

## 2021-05-14 NOTE — PROGRESS NOTE ADULT - ASSESSMENT
Assessment: 45yM w/ PMHx of Etoh abuse and seizures on keppra (recently discharged on keppra after being in ICU for seizures). He had previous workup for seizures with a EEG that was negative. He presented s/p fall at home. 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 Now POD 8 from left isaías crani for SDH evacuation, s/p cardiac arrest in OR. CT head concerning for new left cerebral hemisphere and right cerebellum infarcts. CTA showed stenosis R vertebral artery, possible venous sinus thrombosis.    Plan:  #Neuro:  - Neurochecks q1h  - Post-operative management per neurosurgery; planning for wound revision on Sunday  - CTV head to evaluate for venous thrombus  - Start atorvastatin 80mg    #CV:  - Keep SBP<160    #Resp:  - Ventilator management as per primary team  - SAT/SBT as tolerated  - VAP protocol  - HOB > 35 degrees  - Aspiration precautions  - Keep SaO2 > 95%    #Renal/Fluid/Electolytes:  - Strict I/Os  - Keep euvolemic  - Monitor lytes, replete as needed    #GI:  - EN as per primary    #Heme/Onc:  - SCDs, heparin PPx    #ID:  - Abx as per ID  - Keep normothermic      Plan discussed with MELANIE Pearce  Please feel free to contact for any questions or concerns. Thank you.  Extension: #[phone]

## 2021-05-14 NOTE — PROGRESS NOTE ADULT - SUBJECTIVE AND OBJECTIVE BOX
Specialty: Neuro Critical Care     Interval Hx: Pt underwent CTA showing severe R vertebral stenosis, filling defect concerning for possible venous sinus thrombus.      HPI:  TRAUMA ACTIVATION LEVEL:  Code trauma    HPI:    Patient is a 45yM w/ PMHx of Etoh abuse and seizures on keppra seen as Trauma Alert upgraded to a Code trauma during examination in the ED where the patient was found to have a GCS of 4 s/p found down unresponsive.  Trauma assessment in ED: intubated for airway protection   (06 May 2021 22:29)      Past Medical and Surgical Hx:  PAST MEDICAL & SURGICAL HISTORY:  Alcohol abuse    GERD (gastroesophageal reflux disease)    ETOH abuse    Hypomagnesemia    Seizure disorder    H/O hemorrhoidectomy        Family Hx:  FAMILY HISTORY:  Family history of essential hypertension (Father)        Allergies: No Known Allergies      ROS:   Patient unable to participate in ROS due to neurologic status      Medications Current and PRN:  MEDICATIONS  (STANDING):  amLODIPine   Tablet 10 milliGRAM(s) Oral daily  cefepime   IVPB 2000 milliGRAM(s) IV Intermittent every 12 hours  chlorhexidine 0.12% Liquid 15 milliLiter(s) Oral Mucosa two times a day  chlorhexidine 4% Liquid 1 Application(s) Topical <User Schedule>  fentaNYL   Infusion 0.5 MICROgram(s)/kG/Hr (3.97 mL/Hr) IV Continuous <Continuous>  heparin   Injectable 5000 Unit(s) SubCutaneous every 8 hours  insulin lispro (ADMELOG) corrective regimen sliding scale   SubCutaneous every 6 hours  levETIRAcetam  IVPB 500 milliGRAM(s) IV Intermittent every 12 hours  pantoprazole  Injectable 40 milliGRAM(s) IV Push every 12 hours  propofol Infusion 5 MICROgram(s)/kG/Min (2.38 mL/Hr) IV Continuous <Continuous>  propranolol 20 milliGRAM(s) Oral every 8 hours  senna 2 Tablet(s) Oral at bedtime  sodium chloride 3%. 500 milliLiter(s) (60 mL/Hr) IV Continuous <Continuous>    MEDICATIONS  (PRN):  acetaminophen   Tablet .. 650 milliGRAM(s) Oral every 6 hours PRN Temp greater or equal to 38.5C (101.3F)  fentaNYL    Injectable 25 MICROGram(s) IV Push every 4 hours PRN Severe Pain (7 - 10)      Vital Signs:  ICU Vital Signs Last 24 Hrs  T(C): 37.3 (14 May 2021 12:00), Max: 38.1 (14 May 2021 07:15)  T(F): 99.2 (14 May 2021 12:00), Max: 100.5 (14 May 2021 07:15)  HR: 77 (14 May 2021 14:00) (74 - 94)  BP: 117/66 (14 May 2021 14:00) (113/64 - 130/81)  BP(mean): 86 (14 May 2021 14:00) (82 - 100)  ABP: 124/70 (14 May 2021 14:00) (111/62 - 241/241)  ABP(mean): 84 (14 May 2021 13:00) (78 - 241)  RR: 16 (14 May 2021 14:00) (16 - 25)  SpO2: 100% (14 May 2021 14:00) (100% - 100%)      Ventilator:  Mode: AC/ CMV (Assist Control/ Continuous Mandatory Ventilation)  RR (machine): 16  TV (machine): 450  FiO2: 30  PEEP: 5  ITime: 1  MAP: 10  PIP: 23        I/Os:  I&O's Summary    13 May 2021 07:01  -  14 May 2021 07:00  --------------------------------------------------------  IN: 3656.9 mL / OUT: 3220 mL / NET: 436.9 mL    14 May 2021 07:01  -  14 May 2021 15:23  --------------------------------------------------------  IN: 1106 mL / OUT: 1010 mL / NET: 96 mL          PE:  Gen: WN/WD male resting supine in bed, intubated/on sedation  Mental status: Sedated, opens eyes to voice. Moves LUE spontaneously. Tracks examiner. No clear command following  Cranial nerves: Pupils 3mm, reactive. No nystagmus or dysconjugate gaze. Possible R facial weakness, greater resistance to examiner opening eyelid on the left.  Motor:  Spontaneously moves LUE antigravity. Withdraws b/l LEs. RUE withdrawal to noxious stimuli  Sensation: Grimaces to noxious stimuli        Labs:  05-14    133<L>  |  103  |  8<L>  ----------------------------<  132<H>  4.1   |  21  |  <0.5<L>    Ca    7.7<L>      14 May 2021 11:55  Phos  3.0     05-13  Mg     1.9     05-13                            7.4    3.05  )-----------( 141      ( 13 May 2021 23:30 )             22.3     PT/INR - ( 13 May 2021 23:42 )   PT: 12.20 sec;   INR: 1.06 ratio             ABG - ( 14 May 2021 03:30 )  pH, Arterial: 7.46  pH, Blood: x     /  pCO2: 33    /  pO2: 123   / HCO3: 23    / Base Excess: -0.3  /  SaO2: 100           Radiology:    EXAM:  CT ANGIO NECK (W)AW IC        EXAM:  CT ANGIO BRAIN (W)AW IC            PROCEDURE DATE:  05/13/2021            INTERPRETATION:  Clinical History / Reason for exam: Patient with subdural hemorrhages, now with ischemic infarcts.    Technique: CT angiogram of the head and neck. CTA of the head and neck was performed following the intravenous administration of 100 cc Optiray 320 (0 cc discarded) with coronal, sagittal and multiple 3-D MIP and volume rendered reformats.    Correlation is madewith several prior CT scans of the head, most recently 5/10/2021.    Findings:    The patient is imaged with neck and head tilt to the right limiting evaluation.    NECK:  The visualized aortic arch and great vessel origins are patent.    The common,internal and external carotid arteries are patent. There are atheromatous changes of bilateral carotid bifurcations without significant stenosis.    The vertebral arteries are patent throughout their cervical courses.    HEAD:  The distal internal carotid arteries are patent. The anterior and middle cerebral arteries are patent.    The right vertebral artery demonstrates a focal severe stenosis involving the proximal V4 segment. The basilar artery demonstrates diminished caliber of the mid-distalsegment suggesting a moderate stenosis. The posterior cerebral arteries are patent. There is a direct origin of the left PCA from the anterior circulation, normal variant.    Possible filling defect within the left transverse sinus extending into thesigmoid sinus and internal jugular vein.    OTHER:  Endotracheal and orogastric tubes are present with surrounding retained separations.  Left-sided central venous catheter with distal tip within the SVC. Left neck infiltrative changes likely reflecting hematoma.    There is a large left frontal parietal craniectomy defect with overlying extra-axial mixed density fluid collection and large scalp hematoma. Overlying skin staples are noted.    There is compression of the left cerebral convexity andlateral ventricle with left-to-right midline shift measuring about 7 mm. The anterior cerebral arteries are deviated to the right.    Left temporal/occipital parenchymal hemorrhage with surrounding edema, increased.    Right frontal convexity and interhemispheric fissure subdural hygroma measuring about 5 mm.    The previously described large left cerebral hemispheric and smaller right cerebellar infarcts are less conspicuous on the current exam.    IMPRESSION:    1.  No evidence of acute large vessel occlusion.    2.  Severe stenoses of the right vertebral artery (proximal V4 segment) and moderate stenosis of the mid-distal basilar artery.    3.  Possible filling defect within the left transverse and sigmoid sinuses and proximal jugular veinsuspicious for venous thrombosis. Consider confirmation with a contrast-enhanced CTV or MRV.    4.  Head CT findings for which follow up NCHCT is recommended:  -Post left frontoparietal craniectomy with mixed density left convexity subdural collection as well as a large overlying scalp hematoma  -Left temporal/occipital parenchymal hemorrhage with surrounding edema, increased since 5/10.    5. Probable hematoma within the left neck at the level of the thyroid gland.              YSABEL ZIMMER MD; Attending Radiologist  This document has been electronically signed. May 13 2021  2:37PM

## 2021-05-14 NOTE — PROGRESS NOTE ADULT - SUBJECTIVE AND OBJECTIVE BOX
SANDRA DAS  [id number]  45y Male    Indication for ICU admission: s/p craniectomy for large L SDH  Admit Date:05-06-21  ICU Date: 05-07-21  OR Date: 05-06-21    No Known Allergies    PAST MEDICAL & SURGICAL HISTORY:  Alcohol abuse  GERD (gastroesophageal reflux disease)  ETOH abuse  Hypomagnesemia  Seizure disorder  H/O hemorrhoidectomy      Home Medications:        24HRS EVENT:  5/13  OV  ID c/s     Day  3% 100 ml bolus,   Q6h BMP  As per pharmacy- increase Vancomycin to 1250 q8h  Repeat blood culture recieved  Video EEG- not indicated as pt is more awake  fentanyl gtt started  =/- CTA neck and head- discuss with neurosurgery  rectal exam- no blood  T&S  NSGY d/w wife continue aggressive care-OR for washour SUnday  CTA neck and Brain - no evidence acute large vessel occlusion, severe stenosis of R vertebral artery and moderate stenosis of mid-distal basilar artery, possible jugular venous thrombosis, NCHCT recommended for left convexity subdural collection, and increasing edema around L temporal/occipital parenchemal hemorrhage  Kphos, Kcl and Mag repletion ordered at 2pm      DVT PTX: Hep sq  GI PTX: PPI    ***Tubes/Lines/Drains  ***  Peripheral IV  Arterial Line	L rad	                Date 5/6  L IJ TLC     5/12  OGT, ETT  Urinary Catheter		Indication: Strict I&O    Date Placed: 5/6    REVIEW OF SYSTEMS  [ ] A ten-point review of systems was otherwise negative except as noted.  [x] Due to altered mental status/intubation, subjective information were not able to be obtained from the patient. History was obtained, to the extent possible, from review of the chart and collateral sources of information.           SANDRA DAS  [id number]  45y Male    Indication for ICU admission: s/p craniectomy for large L SDH  Admit Date:05-06-21  ICU Date: 05-07-21  OR Date: 05-06-21    No Known Allergies    PAST MEDICAL & SURGICAL HISTORY:  Alcohol abuse  GERD (gastroesophageal reflux disease)  ETOH abuse  Hypomagnesemia  Seizure disorder  H/O hemorrhoidectomy      Home Medications:        24HRS EVENT:  5/13  OV  ID c/s     Day  3% 100 ml bolus,   Q6h BMP  As per pharmacy- increase Vancomycin to 1250 q8h  Repeat blood culture recieved  Video EEG- not indicated as pt is more awake  fentanyl gtt started  =/- CTA neck and head- discuss with neurosurgery  rectal exam- no blood  T&S  NSGY d/w wife continue aggressive care-OR for washour SUnday  CTA neck and Brain - no evidence acute large vessel occlusion, severe stenosis of R vertebral artery and moderate stenosis of mid-distal basilar artery, possible jugular venous thrombosis, NCHCT recommended for left convexity subdural collection, and increasing edema around L temporal/occipital parenchemal hemorrhage  Kphos, Kcl and Mag repletion ordered at 2pm      DVT PTX: Hep sq  GI PTX: PPI    ***Tubes/Lines/Drains  ***  Peripheral IV  Arterial Line	L rad	                Date 5/6  L IJ TLC     5/12  OGT, ETT  Urinary Catheter		Indication: Strict I&O    Date Placed: 5/6    REVIEW OF SYSTEMS  [ ] A ten-point review of systems was otherwise negative except as noted.  [x] Due to altered mental status/intubation, subjective information were not able to be obtained from the patient. History was obtained, to the extent possible, from review of the chart and collateral sources of information.      Daily     Daily     Diet, NPO with Tube Feed:   Tube Feeding Modality: Orogastric  Vital High Protein  Total Volume for 24 Hours (mL): 1320  Continuous  Starting Tube Feed Rate mL per Hour: 55  Until Goal Tube Feed Rate (mL per Hour): 55  Tube Feed Duration (in Hours): 24  Tube Feed Start Time: 06:50 (05-09-21 @ 18:48)      CURRENT MEDS:  Neurologic Medications  acetaminophen   Tablet .. 650 milliGRAM(s) Oral every 6 hours PRN Temp greater or equal to 38.5C (101.3F)  fentaNYL    Injectable 25 MICROGram(s) IV Push every 4 hours PRN Severe Pain (7 - 10)  fentaNYL   Infusion 0.5 MICROgram(s)/kG/Hr IV Continuous <Continuous>  levETIRAcetam  IVPB 500 milliGRAM(s) IV Intermittent every 12 hours  propofol Infusion 5 MICROgram(s)/kG/Min IV Continuous <Continuous>    Respiratory Medications    Cardiovascular Medications  amLODIPine   Tablet 10 milliGRAM(s) Oral daily  propranolol 20 milliGRAM(s) Oral every 8 hours    Gastrointestinal Medications  pantoprazole  Injectable 40 milliGRAM(s) IV Push every 12 hours  senna 2 Tablet(s) Oral at bedtime  sodium chloride 3%. 500 milliLiter(s) IV Continuous <Continuous>    Genitourinary Medications    Hematologic/Oncologic Medications  heparin   Injectable 5000 Unit(s) SubCutaneous every 8 hours    Antimicrobial/Immunologic Medications  cefepime   IVPB 2000 milliGRAM(s) IV Intermittent every 12 hours    Endocrine/Metabolic Medications  insulin lispro (ADMELOG) corrective regimen sliding scale   SubCutaneous every 6 hours    Topical/Other Medications  chlorhexidine 0.12% Liquid 15 milliLiter(s) Oral Mucosa two times a day  chlorhexidine 4% Liquid 1 Application(s) Topical <User Schedule>      ICU Vital Signs Last 24 Hrs  T(C): 38.1 (14 May 2021 07:15), Max: 38.3 (13 May 2021 11:00)  T(F): 100.5 (14 May 2021 07:15), Max: 100.9 (13 May 2021 11:00)  HR: 85 (14 May 2021 09:00) (74 - 96)  BP: 124/72 (14 May 2021 09:00) (113/64 - 132/80)  BP(mean): 92 (14 May 2021 09:00) (82 - 100)  ABP: 161/74 (14 May 2021 09:00) (111/62 - 241/241)  ABP(mean): 95 (14 May 2021 09:00) (78 - 241)  RR: 20 (14 May 2021 09:00) (16 - 25)  SpO2: 100% (14 May 2021 09:00) (100% - 100%)      Adult Advanced Hemodynamics Last 24 Hrs  CVP(mm Hg): --  CVP(cm H2O): --  CO: --  CI: --  PA: --  PA(mean): --  PCWP: --  SVR: --  SVRI: --  PVR: --  PVRI: --    Mode: AC/ CMV (Assist Control/ Continuous Mandatory Ventilation)  RR (machine): 16  TV (machine): 450  FiO2: 30  PEEP: 5  ITime: 1  MAP: 10  PIP: 23    ABG - ( 14 May 2021 03:30 )  pH, Arterial: 7.46  pH, Blood: x     /  pCO2: 33    /  pO2: 123   / HCO3: 23    / Base Excess: -0.3  /  SaO2: 100                 I&O's Summary    13 May 2021 07:01  -  14 May 2021 07:00  --------------------------------------------------------  IN: 3656.9 mL / OUT: 3220 mL / NET: 436.9 mL    14 May 2021 07:01  -  14 May 2021 09:32  --------------------------------------------------------  IN: 302 mL / OUT: 350 mL / NET: -48 mL      I&O's Detail    13 May 2021 07:01  -  14 May 2021 07:00  --------------------------------------------------------  IN:    Enteral Tube Flush: 50 mL    FentaNYL: 84 mL    IV PiggyBack: 100 mL    IV PiggyBack: 100 mL    IV PiggyBack: 100 mL    IV PiggyBack: 500 mL    IV PiggyBack: 250 mL    IV PiggyBack: 100 mL    Propofol: 227.9 mL    sodium chloride 3%: 100 mL    sodium chloride 3%: 1440 mL    Vital High Protein: 605 mL  Total IN: 3656.9 mL    OUT:    Indwelling Catheter - Urethral (mL): 3220 mL  Total OUT: 3220 mL    Total NET: 436.9 mL      14 May 2021 07:01  -  14 May 2021 09:32  --------------------------------------------------------  IN:    FentaNYL: 8 mL    IV PiggyBack: 50 mL    Propofol: 14 mL    sodium chloride 3%: 120 mL    Vital High Protein: 110 mL  Total IN: 302 mL    OUT:    Indwelling Catheter - Urethral (mL): 350 mL  Total OUT: 350 mL    Total NET: -48 mL          PHYSICAL EXAM:    General/Neuro  RASS - 3 | Moderate sedation|  Any movement (but no eye contact) to voice    Deficits:      Patricia Coma Scale:   Eyes:  3   // Verbal:   1T   //Motor:  6   // 10T    Pupils: PERRLA    Lungs:      clear to auscultation,    Cardiovascular : S1, S2.  Regular rate and rhythm.    Cardiac Rhythm: Normal Sinus Rhythm    GI: Abdomen soft, Non-tender, Non-distended.      Extremities: Extremities warm, pink, well-perfused.     Derm: Good skin turgor, no skin breakdown.      :       Cole catheter in place.      CXR:       LABS:  CAPILLARY BLOOD GLUCOSE      POCT Blood Glucose.: 163 mg/dL (13 May 2021 23:44)  POCT Blood Glucose.: 132 mg/dL (13 May 2021 17:41)  POCT Blood Glucose.: 135 mg/dL (13 May 2021 11:35)                          7.4    3.05  )-----------( 141      ( 13 May 2021 23:30 )             22.3       05-14    135  |  105  |  7<L>  ----------------------------<  120<H>  4.3   |  21  |  <0.5<L>    Ca    7.8<L>      14 May 2021 05:30  Phos  3.0     05-13  Mg     1.9     05-13        PT/INR - ( 13 May 2021 23:42 )   PT: 12.20 sec;   INR: 1.06 ratio                   Culture - Blood (collected 11 May 2021 11:18)  Source: .Blood None  Gram Stain (12 May 2021 02:53):    Growth in anaerobic bottle: Gram Negative Rods    Growth in aerobic bottle: Gram Negative Rods  Final Report (13 May 2021 16:09):    Growth in aerobic and anaerobic bottles: Klebsiella variicola    ***Blood Panel PCR results on this specimen are available    approximately 3 hours after the Gram stain result.***    Gram stain, PCR, and/or culture results may not always    correspond due to difference in methodologies.    ************************************************************    This PCR assay was performed by multiplex PCR. This    Assay tests for 66 bacterial and resistance gene targets.    Please refer to the [facility] [employer] test directory    at [email or web address] for details.  Organism: Blood Culture PCR  Klebsiella variicola (13 May 2021 16:09)  Organism: Klebsiella variicola (13 May 2021 16:09)  Organism: Blood Culture PCR (13 May 2021 16:09)    Culture - Bronchial (collected 11 May 2021 10:12)  Source: Bronch Wash Bronchoalveolar Lavage  Gram Stain (12 May 2021 07:04):    Few polymorphonuclear leukocytes per low power field    No Squamous epithelial cells per low power field    Few Gram Positive Rods per oil power field  Final Report (13 May 2021 16:19):    Moderate Klebsiella variicola    Moderate Serratia marcescens    Normal Respiratory Liz present  Organism: Klebsiella variicola  Serratia marcescens (13 May 2021 16:19)  Organism: Serratia marcescens (13 May 2021 16:19)  Organism: Klebsiella variicola (13 May 2021 16:19)

## 2021-05-14 NOTE — PROGRESS NOTE ADULT - SUBJECTIVE AND OBJECTIVE BOX
Neurosurgery Progress Note    POD# 8  S/P Left Craniectomy for evac of SDH    Pt seen and examined at the bedside.  NAEO. Pt is intubated, mechanically ventilated, sedated on propofol ggt, no vasopressors.   Off sedation patient opens eyes spontaneously, moves LUE to command (squeezes hand).         Subjective: 45yMale with a pmhx of SUBDURAL HEMATOMA;RESPIRATORY FAILURE;FALL    ^FALL    No pertinent family history in first degree relatives    Family history of essential hypertension (Father)    Handoff    MEWS Score    No pertinent past medical history    Alcohol abuse    GERD (gastroesophageal reflux disease)    ETOH abuse    Hypomagnesemia    Seizure disorder    Subdural hematoma    S/P subdural hematoma evacuation    Subdural hematoma    Subdural hematoma    Subdural hematoma    Respiratory failure    ETOH abuse    Palliative care by specialist    Insertion of non-tunneled central venous catheter (CVC) in patient age 5 years of age or older    Craniotomy, decompressive    No significant past surgical history    H/O hemorrhoidectomy    FALL    22    Respiratory failure    Fall    [email or web address]      s/p     Allergies    No Known Allergies    Intolerances        Vital Signs Last 24 Hrs  T(C): 38.1 (14 May 2021 07:15), Max: 38.3 (13 May 2021 11:00)  T(F): 100.5 (14 May 2021 07:15), Max: 100.9 (13 May 2021 11:00)  HR: 85 (14 May 2021 09:00) (74 - 96)  BP: 124/72 (14 May 2021 09:00) (113/64 - 132/80)  BP(mean): 92 (14 May 2021 09:00) (82 - 100)  RR: 20 (14 May 2021 09:00) (16 - 25)  SpO2: 100% (14 May 2021 09:00) (100% - 100%)      acetaminophen   Tablet .. 650 milliGRAM(s) Oral every 6 hours PRN  amLODIPine   Tablet 10 milliGRAM(s) Oral daily  cefepime   IVPB 2000 milliGRAM(s) IV Intermittent every 12 hours  chlorhexidine 0.12% Liquid 15 milliLiter(s) Oral Mucosa two times a day  chlorhexidine 4% Liquid 1 Application(s) Topical <User Schedule>  fentaNYL    Injectable 25 MICROGram(s) IV Push every 4 hours PRN  fentaNYL   Infusion 0.5 MICROgram(s)/kG/Hr IV Continuous <Continuous>  heparin   Injectable 5000 Unit(s) SubCutaneous every 8 hours  insulin lispro (ADMELOG) corrective regimen sliding scale   SubCutaneous every 6 hours  levETIRAcetam  IVPB 500 milliGRAM(s) IV Intermittent every 12 hours  pantoprazole  Injectable 40 milliGRAM(s) IV Push every 12 hours  propofol Infusion 5 MICROgram(s)/kG/Min IV Continuous <Continuous>  propranolol 20 milliGRAM(s) Oral every 8 hours  senna 2 Tablet(s) Oral at bedtime  sodium chloride 3%. 500 milliLiter(s) IV Continuous <Continuous>        05-13-21 @ 07:01  -  05-14-21 @ 07:00  --------------------------------------------------------  IN: 3656.9 mL / OUT: 3220 mL / NET: 436.9 mL    05-14-21 @ 07:01 - 05-14-21 @ 10:10  --------------------------------------------------------  IN: 302 mL / OUT: 350 mL / NET: -48 mL        REVIEW OF SYSTEMS    [ ] A ten-point review of systems was otherwise negative except as noted.  [x ] Due to altered mental status/intubation, subjective information were not able to be obtained from the patient. History was obtained, to the extent possible, from review of the chart and collateral sources of information.      Exam:  Pt is intubated mechanically ventilated  off propofol, spontaneously opens eyes  pupils 3mm, brisk,   PERRLA  +corneals, +gag  grimaces to pain  Moves LUE to command, hand  5/5  withdraws Left upper and lower extremities to noxious stimuli   minimal RUE withdrawal to noxious stimuli    Dressing in place. noted staining on dressing      CBC Full  -  ( 13 May 2021 23:30 )  WBC Count : 3.05 K/uL  RBC Count : 2.43 M/uL  Hemoglobin : 7.4 g/dL  Hematocrit : 22.3 %  Platelet Count - Automated : 141 K/uL  Mean Cell Volume : 91.8 fL  Mean Cell Hemoglobin : 30.5 pg  Mean Cell Hemoglobin Concentration : 33.2 g/dL  Auto Neutrophil # : x  Auto Lymphocyte # : x  Auto Monocyte # : x  Auto Eosinophil # : x  Auto Basophil # : x  Auto Neutrophil % : x  Auto Lymphocyte % : x  Auto Monocyte % : x  Auto Eosinophil % : x  Auto Basophil % : x    05-14    135  |  105  |  7<L>  ----------------------------<  120<H>  4.3   |  21  |  <0.5<L>    Ca    7.8<L>      14 May 2021 05:30  Phos  3.0     05-13  Mg     1.9     05-13      PT/INR - ( 13 May 2021 23:42 )   PT: 12.20 sec;   INR: 1.06 ratio        Imaging:  < from: CT Head No Cont (05.10.21 @ 13:09) >  IMPRESSION:    In comparison with the prior CT scan of the brain dated May 7, 2021:    Partial resorption of the extra-axial fluid collections.    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    The case was discussed with MELANIE Gunter on May 10, 2021 time 2:26 PM with read back.      MARGIE PORTER MD; Attending Radiologist  This document has been electronically signed. May 10 2021  2:27PM    < end of copied text >      Assessment/Plan:   This is 44 y/o gentleman, POD# 8, S/P Left Craniectomy for evac of SDH    -Neuro: Cont Neuro checks Q1hr, On 3% Nacl, Na target: 145 - 150 (Na 135), maintain Keppra 500mg BID  Neuro CC recs appreciated: CTA head & neck, statins, VEEG  -Resp: Daily SBT if safe  -CV: keep euvolemic, target SBP <140  -GI: TF, GI ppx  -: Cole in place, replete lytes PRN  -ID: Vanco/Cefepime for + UTI  DVT prophylaxis: SQH      D/w attending

## 2021-05-14 NOTE — PROGRESS NOTE ADULT - ASSESSMENT
45yMale being evaluated for supportive care in setting of Left Craniectomy for evac of SDH with poor neuro recovery.  POD 8        MEDD (morphine equivalent daily dose): NA      See Recs below.    Please call [phone] with questions or concerns 24/7.   We will continue to follow.

## 2021-05-14 NOTE — CONSULT NOTE ADULT - SUBJECTIVE AND OBJECTIVE BOX
JUANANORMANBASIL VAZQUEZZEGORZ  45y, Male  Allergy: No Known Allergies      CHIEF COMPLAINT:   found down unresponsive (14 May 2021 01:19)      LOS  8d    HPI  HPI:  TRAUMA ACTIVATION LEVEL:  Code trauma    HPI:    Patient is a 45yM w/ PMHx of Etoh abuse and seizures on keppra seen as Trauma Alert upgraded to a Code trauma during examination in the ED where the patient was found to have a GCS of 4 s/p found down unresponsive.  Trauma assessment in ED: intubated for airway protection   (06 May 2021 22:29)      INFECTIOUS DISEASE HISTORY:  ID consulted for Kleb bacteremia and PNA.   s/p craniectomy for large L SDH  5/11 BCX kleb variocola  5/11 BAL   Moderate Klebsiella variicola &  Moderate Serratia marcescens  currenlty on vanc/cefepime    PMH  PAST MEDICAL & SURGICAL HISTORY:  Alcohol abuse    GERD (gastroesophageal reflux disease)    ETOH abuse    Hypomagnesemia    Seizure disorder    H/O hemorrhoidectomy        FAMILY HISTORY  No pertinent family history in first degree relatives    Family history of essential hypertension (Father)        SOCIAL HISTORY  Social History:  unknown (29 Mar 2021 21:28)        ROS  unable to obtain history secondary to patient's mental status and/or sedation     VITALS:  T(F): 99.6, Max: 100.9 (05-13-21 @ 07:10)  HR: 80  BP: 120/69  RR: 16Vital Signs Last 24 Hrs  T(C): 37.6 (14 May 2021 03:00), Max: 38.3 (13 May 2021 07:10)  T(F): 99.6 (14 May 2021 03:00), Max: 100.9 (13 May 2021 07:10)  HR: 80 (14 May 2021 07:00) (74 - 96)  BP: 120/69 (14 May 2021 06:00) (113/64 - 132/80)  BP(mean): 89 (14 May 2021 06:00) (82 - 100)  RR: 16 (14 May 2021 07:00) (16 - 25)  SpO2: 100% (14 May 2021 07:00) (100% - 100%)    PHYSICAL EXAM:  ***    TESTS & MEASUREMENTS:                        7.4    3.05  )-----------( 141      ( 13 May 2021 23:30 )             22.3     05-14    135  |  105  |  7<L>  ----------------------------<  120<H>  4.3   |  21  |  <0.5<L>    Ca    7.8<L>      14 May 2021 05:30  Phos  3.0     05-13  Mg     1.9     05-13      eGFR if Non African American: 160 mL/min/1.73M2 (05-14-21 @ 05:30)  eGFR if : 186 mL/min/1.73M2 (05-14-21 @ 05:30)  eGFR if Non African American: 160 mL/min/1.73M2 (05-13-21 @ 23:42)  eGFR if : 186 mL/min/1.73M2 (05-13-21 @ 23:42)  eGFR if Non African American: 160 mL/min/1.73M2 (05-13-21 @ 16:50)  eGFR if : 186 mL/min/1.73M2 (05-13-21 @ 16:50)  eGFR if Non African American: 160 mL/min/1.73M2 (05-13-21 @ 12:05)  eGFR if : 186 mL/min/1.73M2 (05-13-21 @ 12:05)          Culture - Blood (collected 05-11-21 @ 11:18)  Source: .Blood None  Gram Stain (05-12-21 @ 02:53):    Growth in anaerobic bottle: Gram Negative Rods    Growth in aerobic bottle: Gram Negative Rods  Final Report (05-13-21 @ 16:09):    Growth in aerobic and anaerobic bottles: Klebsiella variicola    ***Blood Panel PCR results on this specimen are available    approximately 3 hours after the Gram stain result.***    Gram stain, PCR, and/or culture results may not always    correspond due to difference in methodologies.    ************************************************************    This PCR assay was performed by multiplex PCR. This    Assay tests for 66 bacterial and resistance gene targets.    Please refer to the [facility] [email or web address] test directory    at [email or web address] for details.  Organism: Blood Culture PCR  Klebsiella variicola (05-13-21 @ 16:09)  Organism: Klebsiella variicola (05-13-21 @ 16:09)      -  Amikacin: S <=16      -  Ampicillin: R <=8 These ampicillin results predict results for amoxicillin      -  Ampicillin/Sulbactam: S <=4/2 Enterobacter, Citrobacter, and Serratia may develop resistance during prolonged therapy (3-4 days)      -  Aztreonam: S <=4      -  Cefazolin: S <=2 Enterobacter, Citrobacter, and Serratia may develop resistance during prolonged therapy (3-4 days)      -  Cefepime: S <=2      -  Cefoxitin: S <=8      -  Ceftriaxone: S <=1 Enterobacter, Citrobacter, and Serratia may develop resistance during prolonged therapy      -  Ciprofloxacin: S <=0.25      -  Ertapenem: S <=0.5      -  Gentamicin: S <=2      -  Imipenem: S <=1      -  Levofloxacin: S <=0.5      -  Meropenem: S <=1      -  Piperacillin/Tazobactam: S <=8      -  Tobramycin: S <=2      -  Trimethoprim/Sulfamethoxazole: S <=0.5/9.5      Method Type: AISHA  Organism: Blood Culture PCR (05-13-21 @ 16:09)      -  Klebsiella pneumoniae: Detec      Method Type: PCR    Culture - Bronchial (collected 05-11-21 @ 10:12)  Source: Bronch Wash Bronchoalveolar Lavage  Gram Stain (05-12-21 @ 07:04):    Few polymorphonuclear leukocytes per low power field    No Squamous epithelial cells per low power field    Few Gram Positive Rods per oil power field  Final Report (05-13-21 @ 16:19):    Moderate Klebsiella variicola    Moderate Serratia marcescens    Normal Respiratory Liz present  Organism: Klebsiella variicola  Serratia marcescens (05-13-21 @ 16:19)  Organism: Serratia marcescens (05-13-21 @ 16:19)      -  Amikacin: S <=16      -  Amoxicillin/Clavulanic Acid: R >16/8      -  Ampicillin: R <=8 These ampicillin results predict results for amoxicillin      -  Ampicillin/Sulbactam: R 16/8 Enterobacter, Citrobacter, and Serratia may develop resistance during prolonged therapy (3-4 days)      -  Aztreonam: S <=4      -  Cefazolin: R >16 Enterobacter, Citrobacter, and Serratia may develop resistance during prolonged therapy (3-4 days)      -  Cefepime: S <=2      -  Cefoxitin: R <=8      -  Ceftriaxone: S <=1 Enterobacter, Citrobacter, and Serratia may develop resistance during prolonged therapy      -  Ciprofloxacin: S <=0.25      -  Ertapenem: S <=0.5      -  Gentamicin: S <=2      -  Levofloxacin: S <=0.5      -  Meropenem: S <=1      -  Piperacillin/Tazobactam: S <=8      -  Tobramycin: S <=2      -  Trimethoprim/Sulfamethoxazole: S <=0.5/9.5      Method Type: AISHA  Organism: Klebsiella variicola (05-13-21 @ 16:19)      -  Amikacin: S <=16      -  Amoxicillin/Clavulanic Acid: R >16/8      -  Ampicillin: R <=8 These ampicillin results predict results for amoxicillin      -  Ampicillin/Sulbactam: I 16/8 Enterobacter, Citrobacter, and Serratia may develop resistance during prolonged therapy (3-4 days)      -  Aztreonam: R >16      -  Cefazolin: R >16 Enterobacter, Citrobacter, and Serratia may develop resistance during prolonged therapy (3-4 days)      -  Cefepime: S <=2      -  Cefoxitin: S <=8      -  Ceftriaxone: S <=1 Enterobacter, Citrobacter, and Serratia may develop resistance during prolonged therapy      -  Ciprofloxacin: S <=0.25      -  Ertapenem: S <=0.5      -  Gentamicin: S <=2      -  Imipenem: S <=1      -  Levofloxacin: S <=0.5      -  Meropenem: S <=1      -  Piperacillin/Tazobactam: S <=8      -  Tobramycin: S 4      -  Trimethoprim/Sulfamethoxazole: S <=0.5/9.5      Method Type: AISHA    Culture - Blood (collected 04-08-21 @ 06:37)  Source: .Blood None  Final Report (04-13-21 @ 18:00):    No Growth Final    Culture - Blood (collected 04-01-21 @ 11:20)  Source: .Blood None  Final Report (04-06-21 @ 22:00):    No Growth Final            INFECTIOUS DISEASES TESTING  HIV-1/2 Combo Result: Nonreact (05-09-21 @ 23:30)  Hepatitis B Surface Antigen: Nonreact (05-09-21 @ 23:30)  Hepatitis C Virus Interpretation: Nonreact (05-09-21 @ 23:30)  Rapid RVP Result: NotDetec (05-06-21 @ 22:44)  COVID-19 PCR: NotDetec (04-06-21 @ 16:46)  COVID-19 PCR: NotDetec (03-29-21 @ 19:35)      INFLAMMATORY MARKERS      RADIOLOGY & ADDITIONAL TESTS:  I have personally reviewed the last Chest xray  CXR      CT      CARDIOLOGY TESTING  12 Lead ECG:   Ventricular Rate 78 BPM    Atrial Rate 78 BPM    P-R Interval 162 ms    QRS Duration 70 ms    Q-T Interval 364 ms    QTC Calculation(Bazett) 414 ms    P Axis 55 degrees    R Axis 30 degrees    T Axis 62 degrees    Diagnosis Line Normal sinus rhythm  Normal ECG    Confirmed by SALAS WASHINGTON MD ([id number]) on 5/14/2021 7:01:28 AM (05-14-21 @ 04:52)  12 Lead ECG:   Systolic  mmHg    Diastolic BP 60 mmHg    Ventricular Rate 116 BPM    Atrial Rate 116 BPM    P-R Interval 152 ms    QRS Duration 86 ms    Q-T Interval 364 ms    QTC Calculation(Bazett) 505 ms    P Axis 58 degrees    R Axis 59 degrees    T Axis 53 degrees    Diagnosis Line Sinus tachycardia  Possible Anterior infarct , age undetermined  Abnormal ECG    Confirmed by SAIMA HAGEN MD ([id number]) on 5/7/2021 5:08:50 AM (05-07-21 @ 01:08)      MEDICATIONS  amLODIPine   Tablet 10 Oral daily  cefepime   IVPB     cefepime   IVPB 1000 IV Intermittent every 12 hours  chlorhexidine 0.12% Liquid 15 Oral Mucosa two times a day  chlorhexidine 4% Liquid 1 Topical <User Schedule>  fentaNYL   Infusion 0.5 IV Continuous <Continuous>  heparin   Injectable 5000 SubCutaneous every 8 hours  insulin lispro (ADMELOG) corrective regimen sliding scale  SubCutaneous every 6 hours  levETIRAcetam  IVPB 500 IV Intermittent every 12 hours  pantoprazole  Injectable 40 IV Push every 12 hours  propofol Infusion 5 IV Continuous <Continuous>  propranolol 20 Oral every 8 hours  senna 2 Oral at bedtime  sodium chloride 3%. 500 IV Continuous <Continuous>  vancomycin  IVPB 1250 IV Intermittent every 8 hours      Weight  Weight (kg): 79.4 (05-06-21 @ 23:06)    ANTIBIOTICS:  cefepime   IVPB      cefepime   IVPB 1000 milliGRAM(s) IV Intermittent every 12 hours  vancomycin  IVPB 1250 milliGRAM(s) IV Intermittent every 8 hours      ALLERGIES:  No Known Allergies         SANDRA DAS  45y, Male  Allergy: No Known Allergies      CHIEF COMPLAINT:   found down unresponsive (14 May 2021 01:19)      LOS  8d    HPI  HPI:  TRAUMA ACTIVATION LEVEL:  Code trauma    HPI:    Patient is a 45yM w/ PMHx of Etoh abuse and seizures on keppra seen as Trauma Alert upgraded to a Code trauma during examination in the ED where the patient was found to have a GCS of 4 s/p found down unresponsive.  Trauma assessment in ED: intubated for airway protection   (06 May 2021 22:29)      INFECTIOUS DISEASE HISTORY:  ID consulted for Kleb bacteremia and PNA.   s/p craniectomy for large L SDH  5/11 BCX kleb variocola  5/11 BAL   Moderate Klebsiella variicola &  Moderate Serratia marcescens  currenlty on vanc/cefepime    PMH  PAST MEDICAL & SURGICAL HISTORY:  Alcohol abuse    GERD (gastroesophageal reflux disease)    ETOH abuse    Hypomagnesemia    Seizure disorder    H/O hemorrhoidectomy        FAMILY HISTORY  No pertinent family history in first degree relatives    Family history of essential hypertension (Father)        SOCIAL HISTORY  Social History:  unknown (29 Mar 2021 21:28)        ROS  unable to obtain history secondary to patient's mental status and/or sedation     VITALS:  T(F): 99.6, Max: 100.9 (05-13-21 @ 07:10)  HR: 80  BP: 120/69  RR: 16Vital Signs Last 24 Hrs  T(C): 37.6 (14 May 2021 03:00), Max: 38.3 (13 May 2021 07:10)  T(F): 99.6 (14 May 2021 03:00), Max: 100.9 (13 May 2021 07:10)  HR: 80 (14 May 2021 07:00) (74 - 96)  BP: 120/69 (14 May 2021 06:00) (113/64 - 132/80)  BP(mean): 89 (14 May 2021 06:00) (82 - 100)  RR: 16 (14 May 2021 07:00) (16 - 25)  SpO2: 100% (14 May 2021 07:00) (100% - 100%)    PHYSICAL EXAM:  General: intubated  HEENT: NCAT, dressings in place  CV: RRR  Lungs: symmetric chest expansion, decreased BS at bases  Abd: Soft  Skin: no rash  Neuro: sedated  Lines: no phlebitis     TESTS & MEASUREMENTS:                        7.4    3.05  )-----------( 141      ( 13 May 2021 23:30 )             22.3     05-14    135  |  105  |  7<L>  ----------------------------<  120<H>  4.3   |  21  |  <0.5<L>    Ca    7.8<L>      14 May 2021 05:30  Phos  3.0     05-13  Mg     1.9     05-13      eGFR if Non African American: 160 mL/min/1.73M2 (05-14-21 @ 05:30)  eGFR if : 186 mL/min/1.73M2 (05-14-21 @ 05:30)  eGFR if Non African American: 160 mL/min/1.73M2 (05-13-21 @ 23:42)  eGFR if : 186 mL/min/1.73M2 (05-13-21 @ 23:42)  eGFR if Non African American: 160 mL/min/1.73M2 (05-13-21 @ 16:50)  eGFR if : 186 mL/min/1.73M2 (05-13-21 @ 16:50)  eGFR if Non African American: 160 mL/min/1.73M2 (05-13-21 @ 12:05)  eGFR if : 186 mL/min/1.73M2 (05-13-21 @ 12:05)          Culture - Blood (collected 05-11-21 @ 11:18)  Source: .Blood None  Gram Stain (05-12-21 @ 02:53):    Growth in anaerobic bottle: Gram Negative Rods    Growth in aerobic bottle: Gram Negative Rods  Final Report (05-13-21 @ 16:09):    Growth in aerobic and anaerobic bottles: Klebsiella variicola    ***Blood Panel PCR results on this specimen are available    approximately 3 hours after the Gram stain result.***    Gram stain, PCR, and/or culture results may not always    correspond due to difference in methodologies.    ************************************************************    This PCR assay was performed by multiplex PCR. This    Assay tests for 66 bacterial and resistance gene targets.    Please refer to the [facility] [employer] EMR test directory    at [email or web address] for details.  Organism: Blood Culture PCR  Klebsiella variicola (05-13-21 @ 16:09)  Organism: Klebsiella variicola (05-13-21 @ 16:09)      -  Amikacin: S <=16      -  Ampicillin: R <=8 These ampicillin results predict results for amoxicillin      -  Ampicillin/Sulbactam: S <=4/2 Enterobacter, Citrobacter, and Serratia may develop resistance during prolonged therapy (3-4 days)      -  Aztreonam: S <=4      -  Cefazolin: S <=2 Enterobacter, Citrobacter, and Serratia may develop resistance during prolonged therapy (3-4 days)      -  Cefepime: S <=2      -  Cefoxitin: S <=8      -  Ceftriaxone: S <=1 Enterobacter, Citrobacter, and Serratia may develop resistance during prolonged therapy      -  Ciprofloxacin: S <=0.25      -  Ertapenem: S <=0.5      -  Gentamicin: S <=2      -  Imipenem: S <=1      -  Levofloxacin: S <=0.5      -  Meropenem: S <=1      -  Piperacillin/Tazobactam: S <=8      -  Tobramycin: S <=2      -  Trimethoprim/Sulfamethoxazole: S <=0.5/9.5      Method Type: AISHA  Organism: Blood Culture PCR (05-13-21 @ 16:09)      -  Klebsiella pneumoniae: Detec      Method Type: PCR    Culture - Bronchial (collected 05-11-21 @ 10:12)  Source: Bronch Wash Bronchoalveolar Lavage  Gram Stain (05-12-21 @ 07:04):    Few polymorphonuclear leukocytes per low power field    No Squamous epithelial cells per low power field    Few Gram Positive Rods per oil power field  Final Report (05-13-21 @ 16:19):    Moderate Klebsiella variicola    Moderate Serratia marcescens    Normal Respiratory Liz present  Organism: Klebsiella variicola  Serratia marcescens (05-13-21 @ 16:19)  Organism: Serratia marcescens (05-13-21 @ 16:19)      -  Amikacin: S <=16      -  Amoxicillin/Clavulanic Acid: R >16/8      -  Ampicillin: R <=8 These ampicillin results predict results for amoxicillin      -  Ampicillin/Sulbactam: R 16/8 Enterobacter, Citrobacter, and Serratia may develop resistance during prolonged therapy (3-4 days)      -  Aztreonam: S <=4      -  Cefazolin: R >16 Enterobacter, Citrobacter, and Serratia may develop resistance during prolonged therapy (3-4 days)      -  Cefepime: S <=2      -  Cefoxitin: R <=8      -  Ceftriaxone: S <=1 Enterobacter, Citrobacter, and Serratia may develop resistance during prolonged therapy      -  Ciprofloxacin: S <=0.25      -  Ertapenem: S <=0.5      -  Gentamicin: S <=2      -  Levofloxacin: S <=0.5      -  Meropenem: S <=1      -  Piperacillin/Tazobactam: S <=8      -  Tobramycin: S <=2      -  Trimethoprim/Sulfamethoxazole: S <=0.5/9.5      Method Type: AISHA  Organism: Klebsiella variicola (05-13-21 @ 16:19)      -  Amikacin: S <=16      -  Amoxicillin/Clavulanic Acid: R >16/8      -  Ampicillin: R <=8 These ampicillin results predict results for amoxicillin      -  Ampicillin/Sulbactam: I 16/8 Enterobacter, Citrobacter, and Serratia may develop resistance during prolonged therapy (3-4 days)      -  Aztreonam: R >16      -  Cefazolin: R >16 Enterobacter, Citrobacter, and Serratia may develop resistance during prolonged therapy (3-4 days)      -  Cefepime: S <=2      -  Cefoxitin: S <=8      -  Ceftriaxone: S <=1 Enterobacter, Citrobacter, and Serratia may develop resistance during prolonged therapy      -  Ciprofloxacin: S <=0.25      -  Ertapenem: S <=0.5      -  Gentamicin: S <=2      -  Imipenem: S <=1      -  Levofloxacin: S <=0.5      -  Meropenem: S <=1      -  Piperacillin/Tazobactam: S <=8      -  Tobramycin: S 4      -  Trimethoprim/Sulfamethoxazole: S <=0.5/9.5      Method Type: AISHA    Culture - Blood (collected 04-08-21 @ 06:37)  Source: .Blood None  Final Report (04-13-21 @ 18:00):    No Growth Final    Culture - Blood (collected 04-01-21 @ 11:20)  Source: .Blood None  Final Report (04-06-21 @ 22:00):    No Growth Final            INFECTIOUS DISEASES TESTING  HIV-1/2 Combo Result: Nonreact (05-09-21 @ 23:30)  Hepatitis B Surface Antigen: Nonreact (05-09-21 @ 23:30)  Hepatitis C Virus Interpretation: Nonreact (05-09-21 @ 23:30)  Rapid RVP Result: NotDetec (05-06-21 @ 22:44)  COVID-19 PCR: NotDetec (04-06-21 @ 16:46)  COVID-19 PCR: NotDetec (03-29-21 @ 19:35)      INFLAMMATORY MARKERS      RADIOLOGY & ADDITIONAL TESTS:  I have personally reviewed the last Chest xray  CXR      CT      CARDIOLOGY TESTING  12 Lead ECG:   Ventricular Rate 78 BPM    Atrial Rate 78 BPM    P-R Interval 162 ms    QRS Duration 70 ms    Q-T Interval 364 ms    QTC Calculation(Bazett) 414 ms    P Axis 55 degrees    R Axis 30 degrees    T Axis 62 degrees    Diagnosis Line Normal sinus rhythm  Normal ECG    Confirmed by SALAS WASHINGTON MD ([id number]) on 5/14/2021 7:01:28 AM (05-14-21 @ 04:52)  12 Lead ECG:   Systolic  mmHg    Diastolic BP 60 mmHg    Ventricular Rate 116 BPM    Atrial Rate 116 BPM    P-R Interval 152 ms    QRS Duration 86 ms    Q-T Interval 364 ms    QTC Calculation(Bazett) 505 ms    P Axis 58 degrees    R Axis 59 degrees    T Axis 53 degrees    Diagnosis Line Sinus tachycardia  Possible Anterior infarct , age undetermined  Abnormal ECG    Confirmed by SAIMA HAGEN MD ([id number]) on 5/7/2021 5:08:50 AM (05-07-21 @ 01:08)      MEDICATIONS  amLODIPine   Tablet 10 Oral daily  cefepime   IVPB     cefepime   IVPB 1000 IV Intermittent every 12 hours  chlorhexidine 0.12% Liquid 15 Oral Mucosa two times a day  chlorhexidine 4% Liquid 1 Topical <User Schedule>  fentaNYL   Infusion 0.5 IV Continuous <Continuous>  heparin   Injectable 5000 SubCutaneous every 8 hours  insulin lispro (ADMELOG) corrective regimen sliding scale  SubCutaneous every 6 hours  levETIRAcetam  IVPB 500 IV Intermittent every 12 hours  pantoprazole  Injectable 40 IV Push every 12 hours  propofol Infusion 5 IV Continuous <Continuous>  propranolol 20 Oral every 8 hours  senna 2 Oral at bedtime  sodium chloride 3%. 500 IV Continuous <Continuous>  vancomycin  IVPB 1250 IV Intermittent every 8 hours      Weight  Weight (kg): 79.4 (05-06-21 @ 23:06)    ANTIBIOTICS:  cefepime   IVPB      cefepime   IVPB 1000 milliGRAM(s) IV Intermittent every 12 hours  vancomycin  IVPB 1250 milliGRAM(s) IV Intermittent every 8 hours      ALLERGIES:  No Known Allergies

## 2021-05-14 NOTE — CONSULT NOTE ADULT - ASSESSMENT
ASSESSMENT  45yM w/ PMHx of Etoh abuse and seizures on keppra  found down unresponsive admitted 5/6. s/p craniectomy for large L SDH        IMPRESSION  #Klebsiella bacteremia , BAL also with Klebsiella but no PNA on imaging (CT/CXR)    5/11 BCX kleb variicola (S)    5/11 BAL   Moderate Klebsiella variicola &  Moderate Serratia marcescens  < from: Xray Chest 1 View- PORTABLE-Routine (05.13.21 @ 06:28) >No radiographic evidence of acute cardiopulmonary disease.    #SDH s/p craniectomy    Repeat CTH development of acute infarcts in left cerebral hemisphere and right cerebellum  #CT atelectasis   #HIV/Hep panel NR  Creatinine, Serum: <0.5 (05-14-21 @ 05:30)      RECOMMENDATIONS  This is an incomplete consult note. All final recommendations to follow after interview and examination of the patient. Please follow recommendations noted below.  - D/C VANC, no MRSA  - INCREASE to Cefepime 2g q12h IV     If any questions, please call or send a message on [employer] Teams  Spectra [phone]   ASSESSMENT  45yM w/ PMHx of Etoh abuse and seizures on keppra  found down unresponsive admitted 5/6. s/p craniectomy for large L SDH    IMPRESSION  #Klebsiella bacteremia , BAL also with Klebsiella but no PNA on imaging (CT/CXR)    5/11 BCX kleb variicola (S)    5/11 BAL   Moderate Klebsiella variicola &  Moderate Serratia marcescens  < from: Xray Chest 1 View- PORTABLE-Routine (05.13.21 @ 06:28) >No radiographic evidence of acute cardiopulmonary disease.    #SDH s/p craniectomy    Repeat CTH development of acute infarcts in left cerebral hemisphere and right cerebellum  #CT atelectasis   #HIV/Hep panel NR  Creatinine, Serum: <0.5 (05-14-21 @ 05:30)      RECOMMENDATIONS  - D/C VANC, no MRSA  - INCREASE to Cefepime 2g q12h IV   - trend WBC, fever curve  - Likely 7 days of ABX    If any questions, please call or send a message on [employer] Teams  Spectra [phone]

## 2021-05-14 NOTE — PROGRESS NOTE ADULT - SUBJECTIVE AND OBJECTIVE BOX
SANDRA DAS             [id number]    CC: SDH    HPI:  TRAUMA ACTIVATION LEVEL:  Code trauma    HPI:    Patient is a 45yM w/ PMHx of Etoh abuse and seizures on keppra seen as Trauma Alert upgraded to a Code trauma during examination in the ED where the patient was found to have a GCS of 4 s/p found down unresponsive.  Trauma assessment in ED: intubated for airway protection   (06 May 2021 22:29)      SUBJECTIVE: Pt seen, sedated, orally intubated     ROS:      UNABLE TO OBTAIN  due to: Pt sedated/comatose    PEx:  45y              General: sedated, critically ill  HEENT:  s/p crani w dressing intact, ET tube in place   CVS: RR S1S2 No M/G/R  Resp: Unlabored Non tachypneic No increased WOB, orally intubated  GI:  Soft NT ND BS+  :  Cole   Musc: No C/C/E    Neuro: Fofollows basic commands, R sided weakness   Psych: Calm  Skin: Non jaundiced, no rash   Lymph: no adenopathy       Last BM:       ALLERGIES:     OPIATE NAÏVE (Y/N):    MEDICATIONS: REVIEWED  MEDICATIONS  (STANDING):  amLODIPine   Tablet 10 milliGRAM(s) Oral daily  cefepime   IVPB 2000 milliGRAM(s) IV Intermittent every 12 hours  chlorhexidine 0.12% Liquid 15 milliLiter(s) Oral Mucosa two times a day  chlorhexidine 4% Liquid 1 Application(s) Topical <User Schedule>  fentaNYL   Infusion 0.5 MICROgram(s)/kG/Hr (3.97 mL/Hr) IV Continuous <Continuous>  heparin   Injectable 5000 Unit(s) SubCutaneous every 8 hours  insulin lispro (ADMELOG) corrective regimen sliding scale   SubCutaneous every 6 hours  levETIRAcetam  IVPB 500 milliGRAM(s) IV Intermittent every 12 hours  pantoprazole  Injectable 40 milliGRAM(s) IV Push every 12 hours  propofol Infusion 5 MICROgram(s)/kG/Min (2.38 mL/Hr) IV Continuous <Continuous>  propranolol 20 milliGRAM(s) Oral every 8 hours  senna 2 Tablet(s) Oral at bedtime  sodium chloride 3%. 500 milliLiter(s) (60 mL/Hr) IV Continuous <Continuous>    MEDICATIONS  (PRN):  acetaminophen   Tablet .. 650 milliGRAM(s) Oral every 6 hours PRN Temp greater or equal to 38.5C (101.3F)  fentaNYL    Injectable 25 MICROGram(s) IV Push every 4 hours PRN Severe Pain (7 - 10)      LABS: REVIEWED  CBC:                        7.4    3.05  )-----------( 141      ( 13 May 2021 23:30 )             22.3     CMP:    05-14    133<L>  |  103  |  8<L>  ----------------------------<  132<H>  4.1   |  21  |  <0.5<L>    Ca    7.7<L>      14 May 2021 11:55  Phos  3.0     05-13  Mg     1.9     05-13    Albumin, Serum: 3.1 g/dL (05-06-21 @ 22:25)      IMAGING: REVIEWED, CXR and EKG interpreted by me    ADVANCED DIRECTIVES:            FULL CODE             DECISION MAKER: Pt's wife  LEGAL SURROGATE:    PSYCHOSOCIAL-SPIRITUAL ASSESSMENT:       Reviewed       Care plan unchanged      GOALS OF CARE DISCUSSION       Palliative care info/counseling provided	         	           See previous Palliative Medicine Note      CURRENT DISPO PLAN:     WILL REMAIN IN HOSPITAL        REFERRALS	        Palliative Med        Unit SW/Case Mgmt

## 2021-05-14 NOTE — CHART NOTE - NSCHARTNOTEFT_GEN_A_CORE
Registered Dietitian Follow-Up     Patient Profile Reviewed                           Yes [x]   No []     Nutrition History Previously Obtained        Yes []  No [x] -- still intubated, ventilated, on propofol      Pertinent Subjective Information: Per RN pt receiving tube feed at goal rate with no issues.      Pertinent Medical Interventions:  s/p left craniotomy for large SDH  remains intubated   neurosx encouraging SBT daily if safe  possible plan for trach next week  GI consulted for GI bleed, no evidence of GI bleed      Diet order: NPO with TF   Vital HP @ 55ml/hr x24hrs      Anthropometrics:  - Ht. 70"   - Wt.  175lbs (5/6); dosing   190lbs (5/12)  192.2lbs (5/13) -- previously noted with edema, will continue to monitor wt trends   - %wt change  - BMI 25.1 -- based on dosing   - IBW 166lbs     Pertinent Lab Data: (5/10) H/H 8.6/25.5, BUN 5, Cr <0.5, , Mg 1.7, A1c 5.2% (5/7)   CAPILLARY BLOOD GLUCOSE  POCT Blood Glucose.: 154 mg/dL (11 May 2021 12:03)  POCT Blood Glucose.: 131 mg/dL (11 May 2021 07:32)  POCT Blood Glucose.: 129 mg/dL (11 May 2021 00:00)  POCT Blood Glucose.: 106 mg/dL (10 May 2021 18:47)       Pertinent Meds: heparin, abx, admelog, fentanyl, protonix, senna, propofol (7.1ml/hr, provides ~187kcal)      Physical Findings:  - Appearance: intubated and sedated. +1 generalized edema noted per RN flow sheets  - GI function: LBM 5/13   - Tubes: OGT   - Oral/Mouth cavity: NPO  - Skin: WDL      Nutrition Requirements  Weight Used: dosing wt 79.4kg Ve: 7.4 Tmax: 38.2C    Estimated Energy Needs    Continue []  Adjust [x]  2010kcal([id number])      Estimated Protein Needs    Continue [x]  Adjust []  95-119gm/day (1.2-1.5gm/kg act wt)      Estimated Fluid Needs        Continue [x]  Adjust []  per SICU team      Nutrient Intake: current tube feed regimen + kcal provided from propofol meeting 75% of pt's est kcal needs, 97% of estimated higher end protein needs         [x] Previous Nutrition Diagnosis: Less than optimal enteral nutrition infusion            [] Ongoing          [x] Resolved      Nutrition Diagnostic #1  Problem: inadequate protein energy intake   Etiology: current tube feed regimen, rate of propofol/kcal received from propofol decreased   Statement: meeting 75% of pt's est kcal needs     Nutrition Intervention: enteral nutrition      Goal/Expected Outcome: Pt to meet greater than 75% of estimated needs within 4 days      Indicator/Monitoring: energy intake, body composition, NFPF, glucose profile     Recommendations: Registered Dietitian Follow-Up     Patient Profile Reviewed                           Yes [x]   No []     Nutrition History Previously Obtained        Yes []  No [x] -- still intubated, ventilated, on propofol      Pertinent Subjective Pt receiving tube feed at goal rate with no issues.      Pertinent Medical Interventions:  s/p left craniotomy for large SDH  remains intubated   neurosx encouraging SBT daily if safe  possible plan for trach next week  GI consulted for GI bleed, no evidence of GI bleed      Diet order: NPO with TF   Vital HP @ 55ml/hr x24hrs      Anthropometrics:  - Ht. 70"   - Wt.  175lbs (5/6); dosing   190lbs (5/12)  192.2lbs (5/13) -- previously noted with edema, will continue to monitor wt trends   - %wt change  - BMI 25.1 -- based on dosing   - IBW 166lbs     Pertinent Lab Data: (5/10) H/H 8.6/25.5, BUN 5, Cr <0.5, , Mg 1.7, A1c 5.2% (5/7)   CAPILLARY BLOOD GLUCOSE  POCT Blood Glucose.: 154 mg/dL (11 May 2021 12:03)  POCT Blood Glucose.: 131 mg/dL (11 May 2021 07:32)  POCT Blood Glucose.: 129 mg/dL (11 May 2021 00:00)  POCT Blood Glucose.: 106 mg/dL (10 May 2021 18:47)       Pertinent Meds: heparin, abx, admelog, fentanyl, protonix, senna, propofol (7.1ml/hr, provides ~187kcal)      Physical Findings:  - Appearance: intubated and sedated. +1 generalized edema noted per RN flow sheets  - GI function: LBM 5/13   - Tubes: OGT   - Oral/Mouth cavity: NPO  - Skin: WDL      Nutrition Requirements  Weight Used: dosing wt 79.4kg Ve: 7.4 Tmax: 38.2C    Estimated Energy Needs    Continue []  Adjust [x]  2010kcal([id number])      Estimated Protein Needs    Continue [x]  Adjust []  95-119gm/day (1.2-1.5gm/kg act wt)      Estimated Fluid Needs        Continue [x]  Adjust []  per SICU team      Nutrient Intake: current tube feed regimen + kcal provided from propofol meeting 75% of pt's est kcal needs, 97% of estimated higher end protein needs         [x] Previous Nutrition Diagnosis: Less than optimal enteral nutrition infusion            [] Ongoing          [x] Resolved      Nutrition Diagnostic #1  Problem: inadequate protein energy intake   Etiology: current tube feed regimen, rate of propofol/kcal received from propofol decreased   Statement: meeting 75% of pt's est kcal needs     Nutrition Intervention: enteral nutrition      Goal/Expected Outcome: Pt to meet greater than 75% of estimated needs within 4 days      Indicator/Monitoring: energy intake, body composition, NFPF, glucose profile     Recommendations:  Switch pt to Osmolite 1.5 @ 50ml/hr x 24hrs as tolerated + 3 packets of Prosource TF to provide 1200ml formula, 1880kcal (+additional 187kcal from propofol), 108g protein, 914ml free water.  Recommend switching to Osmolite from [facility] as propofol titrated down, running at 7.1ml/hr (providing ~187kcal/day)

## 2021-05-14 NOTE — PROGRESS NOTE ADULT - ATTENDING COMMENTS
Patient is arousable, follows simple commands    ASSESSMENT:  44 y/o male, S/P Fall.  Traumatic Left SDH.  Brain compression.  S/P Left Craniectomy.  Acute respiratory failure with hypoxia.  Hemodynamic instability.  Chronic Alcohol Abuse.  Dysphagia.  Hyponatremia.    PLAN:  - again sedation vacation today  - continue Vent support; follow ABG  - keep normotensive; MAP>65  - follow serum electrolytes and UOP  - on 3% saline, follow Na  - blood culture shows Klebsiella - on Cefepime  - ID f/u  - GI and DVT prophylaxis  - NS f/u for possible skull closure.

## 2021-05-15 LAB
ANION GAP SERPL CALC-SCNC: 8 MMOL/L — SIGNIFICANT CHANGE UP (ref 7–14)
ANION GAP SERPL CALC-SCNC: 8 MMOL/L — SIGNIFICANT CHANGE UP (ref 7–14)
BASE EXCESS BLDA CALC-SCNC: 2.1 MMOL/L — HIGH (ref -2–2)
BASE EXCESS BLDA CALC-SCNC: 2.2 MMOL/L — HIGH (ref -2–2)
BUN SERPL-MCNC: 6 MG/DL — LOW (ref 10–20)
BUN SERPL-MCNC: 7 MG/DL — LOW (ref 10–20)
CALCIUM SERPL-MCNC: 7.8 MG/DL — LOW (ref 8.5–10.1)
CALCIUM SERPL-MCNC: 7.8 MG/DL — LOW (ref 8.5–10.1)
CHLORIDE SERPL-SCNC: 104 MMOL/L — SIGNIFICANT CHANGE UP (ref 98–110)
CHLORIDE SERPL-SCNC: 105 MMOL/L — SIGNIFICANT CHANGE UP (ref 98–110)
CO2 SERPL-SCNC: 22 MMOL/L — SIGNIFICANT CHANGE UP (ref 17–32)
CO2 SERPL-SCNC: 23 MMOL/L — SIGNIFICANT CHANGE UP (ref 17–32)
CREAT SERPL-MCNC: <0.5 MG/DL — LOW (ref 0.7–1.5)
CREAT SERPL-MCNC: <0.5 MG/DL — LOW (ref 0.7–1.5)
GLUCOSE BLDC GLUCOMTR-MCNC: 121 MG/DL — HIGH (ref 70–99)
GLUCOSE BLDC GLUCOMTR-MCNC: 127 MG/DL — HIGH (ref 70–99)
GLUCOSE BLDC GLUCOMTR-MCNC: 135 MG/DL — HIGH (ref 70–99)
GLUCOSE BLDC GLUCOMTR-MCNC: 152 MG/DL — HIGH (ref 70–99)
GLUCOSE SERPL-MCNC: 116 MG/DL — HIGH (ref 70–99)
GLUCOSE SERPL-MCNC: 165 MG/DL — HIGH (ref 70–99)
HCO3 BLDA-SCNC: 24 MMOL/L — SIGNIFICANT CHANGE UP (ref 23–27)
HCO3 BLDA-SCNC: 25 MMOL/L — SIGNIFICANT CHANGE UP (ref 23–27)
HCT VFR BLD CALC: 22.9 % — LOW (ref 42–52)
HGB BLD-MCNC: 7.5 G/DL — LOW (ref 14–18)
MAGNESIUM SERPL-MCNC: 1.6 MG/DL — LOW (ref 1.8–2.4)
MCHC RBC-ENTMCNC: 30 PG — SIGNIFICANT CHANGE UP (ref 27–31)
MCHC RBC-ENTMCNC: 32.8 G/DL — SIGNIFICANT CHANGE UP (ref 32–37)
MCV RBC AUTO: 91.6 FL — SIGNIFICANT CHANGE UP (ref 80–94)
NRBC # BLD: 0 /100 WBCS — SIGNIFICANT CHANGE UP (ref 0–0)
OSMOLALITY SERPL: 277 MOS/KG — SIGNIFICANT CHANGE UP (ref 275–300)
OSMOLALITY SERPL: 279 MOS/KG — SIGNIFICANT CHANGE UP (ref 275–300)
PCO2 BLDA: 26 MMHG — LOW (ref 38–42)
PCO2 BLDA: 31 MMHG — LOW (ref 38–42)
PH BLDA: 7.52 — HIGH (ref 7.38–7.42)
PH BLDA: 7.57 — HIGH (ref 7.38–7.42)
PHOSPHATE SERPL-MCNC: 3.2 MG/DL — SIGNIFICANT CHANGE UP (ref 2.1–4.9)
PLATELET # BLD AUTO: 229 K/UL — SIGNIFICANT CHANGE UP (ref 130–400)
PO2 BLDA: 126 MMHG — HIGH (ref 78–95)
PO2 BLDA: 190 MMHG — HIGH (ref 78–95)
POTASSIUM SERPL-MCNC: 4 MMOL/L — SIGNIFICANT CHANGE UP (ref 3.5–5)
POTASSIUM SERPL-MCNC: 4.1 MMOL/L — SIGNIFICANT CHANGE UP (ref 3.5–5)
POTASSIUM SERPL-SCNC: 4 MMOL/L — SIGNIFICANT CHANGE UP (ref 3.5–5)
POTASSIUM SERPL-SCNC: 4.1 MMOL/L — SIGNIFICANT CHANGE UP (ref 3.5–5)
RBC # BLD: 2.5 M/UL — LOW (ref 4.7–6.1)
RBC # FLD: 14.7 % — HIGH (ref 11.5–14.5)
SAO2 % BLDA: 100 % — HIGH (ref 94–98)
SAO2 % BLDA: 101 % — HIGH (ref 94–98)
SODIUM SERPL-SCNC: 134 MMOL/L — LOW (ref 135–146)
SODIUM SERPL-SCNC: 136 MMOL/L — SIGNIFICANT CHANGE UP (ref 135–146)
WBC # BLD: 4.48 K/UL — LOW (ref 4.8–10.8)
WBC # FLD AUTO: 4.48 K/UL — LOW (ref 4.8–10.8)

## 2021-05-15 PROCEDURE — 99291 CRITICAL CARE FIRST HOUR: CPT

## 2021-05-15 PROCEDURE — 71045 X-RAY EXAM CHEST 1 VIEW: CPT | Mod: 26

## 2021-05-15 RX ORDER — SODIUM CHLORIDE 9 MG/ML
4 INJECTION INTRAMUSCULAR; INTRAVENOUS; SUBCUTANEOUS
Refills: 0 | Status: DISCONTINUED | OUTPATIENT
Start: 2021-05-15 | End: 2021-05-17

## 2021-05-15 RX ORDER — FENTANYL CITRATE 50 UG/ML
25 INJECTION INTRAVENOUS EVERY 4 HOURS
Refills: 0 | Status: DISCONTINUED | OUTPATIENT
Start: 2021-05-15 | End: 2021-05-21

## 2021-05-15 RX ORDER — FENTANYL CITRATE 50 UG/ML
25 INJECTION INTRAVENOUS EVERY 4 HOURS
Refills: 0 | Status: DISCONTINUED | OUTPATIENT
Start: 2021-05-15 | End: 2021-05-15

## 2021-05-15 RX ORDER — FENTANYL CITRATE 50 UG/ML
0.25 INJECTION INTRAVENOUS
Qty: 2500 | Refills: 0 | Status: DISCONTINUED | OUTPATIENT
Start: 2021-05-15 | End: 2021-05-16

## 2021-05-15 RX ORDER — MAGNESIUM SULFATE 500 MG/ML
2 VIAL (ML) INJECTION ONCE
Refills: 0 | Status: COMPLETED | OUTPATIENT
Start: 2021-05-15 | End: 2021-05-15

## 2021-05-15 RX ADMIN — SODIUM CHLORIDE 60 MILLILITER(S): 5 INJECTION, SOLUTION INTRAVENOUS at 21:10

## 2021-05-15 RX ADMIN — SODIUM CHLORIDE 60 MILLILITER(S): 5 INJECTION, SOLUTION INTRAVENOUS at 02:37

## 2021-05-15 RX ADMIN — SODIUM CHLORIDE 3 GRAM(S): 9 INJECTION INTRAMUSCULAR; INTRAVENOUS; SUBCUTANEOUS at 17:06

## 2021-05-15 RX ADMIN — CEFEPIME 100 MILLIGRAM(S): 1 INJECTION, POWDER, FOR SOLUTION INTRAMUSCULAR; INTRAVENOUS at 17:08

## 2021-05-15 RX ADMIN — Medication 25 GRAM(S): at 02:07

## 2021-05-15 RX ADMIN — FENTANYL CITRATE 25 MICROGRAM(S): 50 INJECTION INTRAVENOUS at 23:52

## 2021-05-15 RX ADMIN — CEFEPIME 100 MILLIGRAM(S): 1 INJECTION, POWDER, FOR SOLUTION INTRAMUSCULAR; INTRAVENOUS at 05:01

## 2021-05-15 RX ADMIN — PANTOPRAZOLE SODIUM 40 MILLIGRAM(S): 20 TABLET, DELAYED RELEASE ORAL at 17:06

## 2021-05-15 RX ADMIN — HEPARIN SODIUM 5000 UNIT(S): 5000 INJECTION INTRAVENOUS; SUBCUTANEOUS at 13:23

## 2021-05-15 RX ADMIN — SODIUM CHLORIDE 3 GRAM(S): 9 INJECTION INTRAMUSCULAR; INTRAVENOUS; SUBCUTANEOUS at 05:00

## 2021-05-15 RX ADMIN — SENNA PLUS 2 TABLET(S): 8.6 TABLET ORAL at 21:01

## 2021-05-15 RX ADMIN — LEVETIRACETAM 420 MILLIGRAM(S): 250 TABLET, FILM COATED ORAL at 05:01

## 2021-05-15 RX ADMIN — CHLORHEXIDINE GLUCONATE 15 MILLILITER(S): 213 SOLUTION TOPICAL at 17:06

## 2021-05-15 RX ADMIN — SODIUM CHLORIDE 4 GRAM(S): 9 INJECTION INTRAMUSCULAR; INTRAVENOUS; SUBCUTANEOUS at 23:07

## 2021-05-15 RX ADMIN — SODIUM CHLORIDE 4 GRAM(S): 9 INJECTION INTRAMUSCULAR; INTRAVENOUS; SUBCUTANEOUS at 20:33

## 2021-05-15 RX ADMIN — HEPARIN SODIUM 5000 UNIT(S): 5000 INJECTION INTRAVENOUS; SUBCUTANEOUS at 05:01

## 2021-05-15 RX ADMIN — Medication 2: at 18:03

## 2021-05-15 RX ADMIN — CHLORHEXIDINE GLUCONATE 15 MILLILITER(S): 213 SOLUTION TOPICAL at 05:00

## 2021-05-15 RX ADMIN — SODIUM CHLORIDE 3 GRAM(S): 9 INJECTION INTRAMUSCULAR; INTRAVENOUS; SUBCUTANEOUS at 11:21

## 2021-05-15 RX ADMIN — PANTOPRAZOLE SODIUM 40 MILLIGRAM(S): 20 TABLET, DELAYED RELEASE ORAL at 05:01

## 2021-05-15 RX ADMIN — CHLORHEXIDINE GLUCONATE 1 APPLICATION(S): 213 SOLUTION TOPICAL at 05:01

## 2021-05-15 RX ADMIN — LEVETIRACETAM 420 MILLIGRAM(S): 250 TABLET, FILM COATED ORAL at 17:06

## 2021-05-15 RX ADMIN — FENTANYL CITRATE 25 MICROGRAM(S): 50 INJECTION INTRAVENOUS at 23:09

## 2021-05-15 RX ADMIN — FENTANYL CITRATE 3.97 MICROGRAM(S)/KG/HR: 50 INJECTION INTRAVENOUS at 02:39

## 2021-05-15 RX ADMIN — AMLODIPINE BESYLATE 10 MILLIGRAM(S): 2.5 TABLET ORAL at 05:00

## 2021-05-15 NOTE — PROGRESS NOTE ADULT - ATTENDING COMMENTS
Follows simple commands.  Tolerates tube feeds.  Na better today    ASSESSMENT:  46 y/o male, S/P Fall.  Traumatic Left SDH.  Brain compression.  S/P Left Craniectomy.  Acute respiratory failure with hypoxia.  Hemodynamic instability.  Chronic Alcohol Abuse.  Dysphagia.  Hyponatremia.    PLAN:  - continue Vent support  - sedation as needed  - keep normotensive; MAP>65  - follow serum electrolytes and UOP  - continue 3% saline  - on Cefepime for Klebsiella in blood and mini-BAL  - ID f/u  - GI and DVT prophylaxis

## 2021-05-15 NOTE — PROGRESS NOTE ADULT - SUBJECTIVE AND OBJECTIVE BOX
POD#8    S/P Left Craniectomy for evac of SDH    Pt seen and examined at bedside.     Vital Signs Last 24 Hrs  T(C): 37.4 (15 May 2021 08:00), Max: 37.6 (14 May 2021 15:17)  T(F): 99.4 (15 May 2021 08:00), Max: 99.6 (14 May 2021 15:17)  HR: 83 (15 May 2021 08:00) (73 - 94)  BP: 119/74 (15 May 2021 08:00) (115/62 - 130/81)  BP(mean): 90 (15 May 2021 08:00) (80 - 102)  RR: 20 (15 May 2021 08:00) (16 - 21)  SpO2: 100% (15 May 2021 08:00) (100% - 100%)    I&O's Detail    14 May 2021 07:01  -  15 May 2021 07:00  --------------------------------------------------------  IN:    Enteral Tube Flush: 230 mL    FentaNYL: 80 mL    IV PiggyBack: 100 mL    IV PiggyBack: 100 mL    IV PiggyBack: 100 mL    Osmolite: 450 mL    Propofol: 101.6 mL    Sodium Chloride 0.9% Bolus: 500 mL    sodium chloride 3%: 1200 mL    Vital High Protein: 825 mL  Total IN: 3686.6 mL    OUT:    Indwelling Catheter - Urethral (mL): 4030 mL  Total OUT: 4030 mL    Total NET: -343.4 mL      15 May 2021 07:01  -  15 May 2021 09:21  --------------------------------------------------------  IN:    FentaNYL: 2 mL    Osmolite: 100 mL    Propofol: 4.8 mL    sodium chloride 3%: 120 mL  Total IN: 226.8 mL    OUT:    Indwelling Catheter - Urethral (mL): 325 mL  Total OUT: 325 mL    Total NET: -98.2 mL        I&O's Summary    14 May 2021 07:01  -  15 May 2021 07:00  --------------------------------------------------------  IN: 3686.6 mL / OUT: 4030 mL / NET: -343.4 mL    15 May 2021 07:01  -  15 May 2021 09:21  --------------------------------------------------------  IN: 226.8 mL / OUT: 325 mL / NET: -98.2 mL        REVIEW OF SYSTEMS    [ ] A ten-point review of systems was otherwise negative except as noted.  [X] Due to altered mental status/intubation, subjective information were not able to be obtained from the patient. History was obtained, to the extent possible, from review of the chart and collateral sources of information.      PHYSICAL EXAM:  Neurological:                LABS:                        7.2    4.05  )-----------( 179      ( 14 May 2021 23:34 )             22.5     05-14    136  |  105  |  7<L>  ----------------------------<  116<H>  4.0   |  23  |  <0.5<L>    Ca    7.8<L>      14 May 2021 23:34  Phos  3.2     05-14  Mg     1.6     05-14      PT/INR - ( 13 May 2021 23:42 )   PT: 12.20 sec;   INR: 1.06 ratio      Allergies    No Known Allergies    Intolerances      MEDICATIONS:  Antibiotics:  cefepime   IVPB 2000 milliGRAM(s) IV Intermittent every 12 hours    Neuro:  acetaminophen   Tablet .. 650 milliGRAM(s) Oral every 6 hours PRN  fentaNYL    Injectable 25 MICROGram(s) IV Push every 4 hours PRN  fentaNYL   Infusion 0.25 MICROgram(s)/kG/Hr IV Continuous <Continuous>  levETIRAcetam  IVPB 500 milliGRAM(s) IV Intermittent every 12 hours  propofol Infusion 5 MICROgram(s)/kG/Min IV Continuous <Continuous>      IVF:  sodium chloride 3 Gram(s) Oral every 6 hours  sodium chloride 3%. 500 milliLiter(s) IV Continuous <Continuous>      CULTURES:  Culture Results:   No growth to date. (05-13 @ 11:19)  Culture Results:   Growth in aerobic and anaerobic bottles: Klebsiella variicola  ***Blood Panel PCR results on this specimen are available  approximately 3 hours after the Gram stain result.***  Gram stain, PCR, and/or culture results may not always  correspond due to difference in methodologies.  ************************************************************  This PCR assay was performed by multiplex PCR. This  Assay tests for 66 bacterial and resistance gene targets.  Please refer to the [facility] [employer] test directory  at [email or web address] for details. (05-11 @ 11:18)      RADIOLOGY & ADDITIONAL TESTS:      ASSESSMENT:  45y Male s/p    SUBDURAL HEMATOMA;RESPIRATORY FAILURE;FALL    ^FALL    No pertinent family history in first degree relatives    Family history of essential hypertension (Father)    Handoff    MEWS Score    No pertinent past medical history    Alcohol abuse    GERD (gastroesophageal reflux disease)    ETOH abuse    Hypomagnesemia    Seizure disorder    Subdural hematoma    S/P subdural hematoma evacuation    Subdural hematoma    Subdural hematoma    Subdural hematoma    Respiratory failure    ETOH abuse    Palliative care by specialist    Insertion of non-tunneled central venous catheter (CVC) in patient age 5 years of age or older    Craniotomy, decompressive    No significant past surgical history    H/O hemorrhoidectomy    FALL    23    Respiratory failure    Fall    SysAdmin_VisitLink        PLAN:  Continue Neuro checks  Correct Magnesium  NPO after MN for OR tomorrow POD#8    S/P Left Craniectomy for evac of SDH    Pt seen and examined at bedside.     Vital Signs Last 24 Hrs  T(C): 37.4 (15 May 2021 08:00), Max: 37.6 (14 May 2021 15:17)  T(F): 99.4 (15 May 2021 08:00), Max: 99.6 (14 May 2021 15:17)  HR: 83 (15 May 2021 08:00) (73 - 94)  BP: 119/74 (15 May 2021 08:00) (115/62 - 130/81)  BP(mean): 90 (15 May 2021 08:00) (80 - 102)  RR: 20 (15 May 2021 08:00) (16 - 21)  SpO2: 100% (15 May 2021 08:00) (100% - 100%)    I&O's Detail    14 May 2021 07:01  -  15 May 2021 07:00  --------------------------------------------------------  IN:    Enteral Tube Flush: 230 mL    FentaNYL: 80 mL    IV PiggyBack: 100 mL    IV PiggyBack: 100 mL    IV PiggyBack: 100 mL    Osmolite: 450 mL    Propofol: 101.6 mL    Sodium Chloride 0.9% Bolus: 500 mL    sodium chloride 3%: 1200 mL    Vital High Protein: 825 mL  Total IN: 3686.6 mL    OUT:    Indwelling Catheter - Urethral (mL): 4030 mL  Total OUT: 4030 mL    Total NET: -343.4 mL      15 May 2021 07:01  -  15 May 2021 09:21  --------------------------------------------------------  IN:    FentaNYL: 2 mL    Osmolite: 100 mL    Propofol: 4.8 mL    sodium chloride 3%: 120 mL  Total IN: 226.8 mL    OUT:    Indwelling Catheter - Urethral (mL): 325 mL  Total OUT: 325 mL    Total NET: -98.2 mL        I&O's Summary    14 May 2021 07:01  -  15 May 2021 07:00  --------------------------------------------------------  IN: 3686.6 mL / OUT: 4030 mL / NET: -343.4 mL    15 May 2021 07:01  -  15 May 2021 09:21  --------------------------------------------------------  IN: 226.8 mL / OUT: 325 mL / NET: -98.2 mL        REVIEW OF SYSTEMS    [ ] A ten-point review of systems was otherwise negative except as noted.  [X] Due to altered mental status/intubation, subjective information were not able to be obtained from the patient. History was obtained, to the extent possible, from review of the chart and collateral sources of information.      PHYSICAL EXAM:  Neurological:                LABS:                        7.2    4.05  )-----------( 179      ( 14 May 2021 23:34 )             22.5     05-14    136  |  105  |  7<L>  ----------------------------<  116<H>  4.0   |  23  |  <0.5<L>    Ca    7.8<L>      14 May 2021 23:34  Phos  3.2     05-14  Mg     1.6     05-14      PT/INR - ( 13 May 2021 23:42 )   PT: 12.20 sec;   INR: 1.06 ratio      Allergies    No Known Allergies    Intolerances      MEDICATIONS:  Antibiotics:  cefepime   IVPB 2000 milliGRAM(s) IV Intermittent every 12 hours    Neuro:  acetaminophen   Tablet .. 650 milliGRAM(s) Oral every 6 hours PRN  fentaNYL    Injectable 25 MICROGram(s) IV Push every 4 hours PRN  fentaNYL   Infusion 0.25 MICROgram(s)/kG/Hr IV Continuous <Continuous>  levETIRAcetam  IVPB 500 milliGRAM(s) IV Intermittent every 12 hours  propofol Infusion 5 MICROgram(s)/kG/Min IV Continuous <Continuous>      IVF:  sodium chloride 3 Gram(s) Oral every 6 hours  sodium chloride 3%. 500 milliLiter(s) IV Continuous <Continuous>      CULTURES:  Culture Results:   No growth to date. (05-13 @ 11:19)  Culture Results:   Growth in aerobic and anaerobic bottles: Klebsiella variicola  ***Blood Panel PCR results on this specimen are available  approximately 3 hours after the Gram stain result.***  Gram stain, PCR, and/or culture results may not always  correspond due to difference in methodologies.  ************************************************************  This PCR assay was performed by multiplex PCR. This  Assay tests for 66 bacterial and resistance gene targets.  Please refer to the [facility] [employer] test directory  at [email or web address] for details. (05-11 @ 11:18)      RADIOLOGY & ADDITIONAL TESTS:      ASSESSMENT:  45y Male s/p    SUBDURAL HEMATOMA;RESPIRATORY FAILURE;FALL    ^FALL    No pertinent family history in first degree relatives    Family history of essential hypertension (Father)    Handoff    MEWS Score    No pertinent past medical history    Alcohol abuse    GERD (gastroesophageal reflux disease)    ETOH abuse    Hypomagnesemia    Seizure disorder    Subdural hematoma    S/P subdural hematoma evacuation    Subdural hematoma    Subdural hematoma    Subdural hematoma    Respiratory failure    ETOH abuse    Palliative care by specialist    Insertion of non-tunneled central venous catheter (CVC) in patient age 5 years of age or older    Craniotomy, decompressive    No significant past surgical history    H/O hemorrhoidectomy    FALL    23    Respiratory failure    Fall    SysAdmin_VisitLink        PLAN:  Continue Neuro checks  Correct Magnesium  NPO after MN for OR tomorrow  Transfuse PRBC for acute blood loss anemia POD#8    S/P Left Craniectomy for evac of SDH    Pt seen and examined at bedside. Pt remains intubated, sedated.     Vital Signs Last 24 Hrs  T(C): 37.4 (15 May 2021 08:00), Max: 37.6 (14 May 2021 15:17)  T(F): 99.4 (15 May 2021 08:00), Max: 99.6 (14 May 2021 15:17)  HR: 83 (15 May 2021 08:00) (73 - 94)  BP: 119/74 (15 May 2021 08:00) (115/62 - 130/81)  BP(mean): 90 (15 May 2021 08:00) (80 - 102)  RR: 20 (15 May 2021 08:00) (16 - 21)  SpO2: 100% (15 May 2021 08:00) (100% - 100%)    I&O's Detail    14 May 2021 07:01  -  15 May 2021 07:00  --------------------------------------------------------  IN:    Enteral Tube Flush: 230 mL    FentaNYL: 80 mL    IV PiggyBack: 100 mL    IV PiggyBack: 100 mL    IV PiggyBack: 100 mL    Osmolite: 450 mL    Propofol: 101.6 mL    Sodium Chloride 0.9% Bolus: 500 mL    sodium chloride 3%: 1200 mL    Vital High Protein: 825 mL  Total IN: 3686.6 mL    OUT:    Indwelling Catheter - Urethral (mL): 4030 mL  Total OUT: 4030 mL    Total NET: -343.4 mL      15 May 2021 07:01  -  15 May 2021 09:21  --------------------------------------------------------  IN:    FentaNYL: 2 mL    Osmolite: 100 mL    Propofol: 4.8 mL    sodium chloride 3%: 120 mL  Total IN: 226.8 mL    OUT:    Indwelling Catheter - Urethral (mL): 325 mL  Total OUT: 325 mL    Total NET: -98.2 mL    I&O's Summary    14 May 2021 07:01  -  15 May 2021 07:00  --------------------------------------------------------  IN: 3686.6 mL / OUT: 4030 mL / NET: -343.4 mL    15 May 2021 07:01  -  15 May 2021 09:21  --------------------------------------------------------  IN: 226.8 mL / OUT: 325 mL / NET: -98.2 mL    REVIEW OF SYSTEMS    [ ] A ten-point review of systems was otherwise negative except as noted.  [X] Due to altered mental status/intubation, subjective information were not able to be obtained from the patient. History was obtained, to the extent possible, from review of the chart and collateral sources of information.      PHYSICAL EXAM:  Neurological:  Opens eyes to verbal  Pupi              LABS:                        7.2    4.05  )-----------( 179      ( 14 May 2021 23:34 )             22.5     05-14    136  |  105  |  7<L>  ----------------------------<  116<H>  4.0   |  23  |  <0.5<L>    Ca    7.8<L>      14 May 2021 23:34  Phos  3.2     05-14  Mg     1.6     05-14      PT/INR - ( 13 May 2021 23:42 )   PT: 12.20 sec;   INR: 1.06 ratio      Allergies    No Known Allergies    Intolerances      MEDICATIONS:  Antibiotics:  cefepime   IVPB 2000 milliGRAM(s) IV Intermittent every 12 hours    Neuro:  acetaminophen   Tablet .. 650 milliGRAM(s) Oral every 6 hours PRN  fentaNYL    Injectable 25 MICROGram(s) IV Push every 4 hours PRN  fentaNYL   Infusion 0.25 MICROgram(s)/kG/Hr IV Continuous <Continuous>  levETIRAcetam  IVPB 500 milliGRAM(s) IV Intermittent every 12 hours  propofol Infusion 5 MICROgram(s)/kG/Min IV Continuous <Continuous>      IVF:  sodium chloride 3 Gram(s) Oral every 6 hours  sodium chloride 3%. 500 milliLiter(s) IV Continuous <Continuous>      CULTURES:  Culture Results:   No growth to date. (05-13 @ 11:19)  Culture Results:   Growth in aerobic and anaerobic bottles: Klebsiella variicola  ***Blood Panel PCR results on this specimen are available  approximately 3 hours after the Gram stain result.***  Gram stain, PCR, and/or culture results may not always  correspond due to difference in methodologies.  ************************************************************  This PCR assay was performed by multiplex PCR. This  Assay tests for 66 bacterial and resistance gene targets.  Please refer to the [facility] [employer] test directory  at [email or web address] for details. (05-11 @ 11:18)      RADIOLOGY & ADDITIONAL TESTS:      ASSESSMENT:  45y Male s/p    SUBDURAL HEMATOMA;RESPIRATORY FAILURE;FALL    ^FALL    No pertinent family history in first degree relatives    Family history of essential hypertension (Father)    Handoff    MEWS Score    No pertinent past medical history    Alcohol abuse    GERD (gastroesophageal reflux disease)    ETOH abuse    Hypomagnesemia    Seizure disorder    Subdural hematoma    S/P subdural hematoma evacuation    Subdural hematoma    Subdural hematoma    Subdural hematoma    Respiratory failure    ETOH abuse    Palliative care by specialist    Insertion of non-tunneled central venous catheter (CVC) in patient age 5 years of age or older    Craniotomy, decompressive    No significant past surgical history    H/O hemorrhoidectomy    FALL    23    Respiratory failure    Fall    SysAdmin_VisitLink        PLAN:  Continue Neuro checks  Correct Magnesium  NPO after MN for OR tomorrow  Transfuse PRBC for acute blood loss anemia POD#8    S/P Left Craniectomy for evac of SDH    Pt seen and examined at bedside. Pt remains intubated, sedated.     Vital Signs Last 24 Hrs  T(C): 37.4 (15 May 2021 08:00), Max: 37.6 (14 May 2021 15:17)  T(F): 99.4 (15 May 2021 08:00), Max: 99.6 (14 May 2021 15:17)  HR: 83 (15 May 2021 08:00) (73 - 94)  BP: 119/74 (15 May 2021 08:00) (115/62 - 130/81)  BP(mean): 90 (15 May 2021 08:00) (80 - 102)  RR: 20 (15 May 2021 08:00) (16 - 21)  SpO2: 100% (15 May 2021 08:00) (100% - 100%)    I&O's Detail    14 May 2021 07:01  -  15 May 2021 07:00  --------------------------------------------------------  IN:    Enteral Tube Flush: 230 mL    FentaNYL: 80 mL    IV PiggyBack: 100 mL    IV PiggyBack: 100 mL    IV PiggyBack: 100 mL    Osmolite: 450 mL    Propofol: 101.6 mL    Sodium Chloride 0.9% Bolus: 500 mL    sodium chloride 3%: 1200 mL    Vital High Protein: 825 mL  Total IN: 3686.6 mL    OUT:    Indwelling Catheter - Urethral (mL): 4030 mL  Total OUT: 4030 mL    Total NET: -343.4 mL      15 May 2021 07:01  -  15 May 2021 09:21  --------------------------------------------------------  IN:    FentaNYL: 2 mL    Osmolite: 100 mL    Propofol: 4.8 mL    sodium chloride 3%: 120 mL  Total IN: 226.8 mL    OUT:    Indwelling Catheter - Urethral (mL): 325 mL  Total OUT: 325 mL    Total NET: -98.2 mL    I&O's Summary    14 May 2021 07:01  -  15 May 2021 07:00  --------------------------------------------------------  IN: 3686.6 mL / OUT: 4030 mL / NET: -343.4 mL    15 May 2021 07:01  -  15 May 2021 09:21  --------------------------------------------------------  IN: 226.8 mL / OUT: 325 mL / NET: -98.2 mL    REVIEW OF SYSTEMS    [ ] A ten-point review of systems was otherwise negative except as noted.  [X] Due to altered mental status/intubation, subjective information were not able to be obtained from the patient. History was obtained, to the extent possible, from review of the chart and collateral sources of information.      PHYSICAL EXAM:  Neurological:  Opens eyes to verbal  Pupils reactive  Purposeful movement of UE to pain  Movement of Right foot to pain  Does not follow commands at this time  Dressing + bloody drainage    LABS:                        7.2    4.05  )-----------( 179      ( 14 May 2021 23:34 )             22.5     05-14    136  |  105  |  7<L>  ----------------------------<  116<H>  4.0   |  23  |  <0.5<L>    Ca    7.8<L>      14 May 2021 23:34  Phos  3.2     05-14  Mg     1.6     05-14      PT/INR - ( 13 May 2021 23:42 )   PT: 12.20 sec;   INR: 1.06 ratio      Allergies    No Known Allergies    Intolerances      MEDICATIONS:  Antibiotics:  cefepime   IVPB 2000 milliGRAM(s) IV Intermittent every 12 hours    Neuro:  acetaminophen   Tablet .. 650 milliGRAM(s) Oral every 6 hours PRN  fentaNYL    Injectable 25 MICROGram(s) IV Push every 4 hours PRN  fentaNYL   Infusion 0.25 MICROgram(s)/kG/Hr IV Continuous <Continuous>  levETIRAcetam  IVPB 500 milliGRAM(s) IV Intermittent every 12 hours  propofol Infusion 5 MICROgram(s)/kG/Min IV Continuous <Continuous>      IVF:  sodium chloride 3 Gram(s) Oral every 6 hours  sodium chloride 3%. 500 milliLiter(s) IV Continuous <Continuous>      CULTURES:  Culture Results:   No growth to date. (05-13 @ 11:19)  Culture Results:   Growth in aerobic and anaerobic bottles: Klebsiella variicola  ***Blood Panel PCR results on this specimen are available  approximately 3 hours after the Gram stain result.***  Gram stain, PCR, and/or culture results may not always  correspond due to difference in methodologies.  ************************************************************  This PCR assay was performed by multiplex PCR. This  Assay tests for 66 bacterial and resistance gene targets.  Please refer to the [facility] [employer] test directory  at [email or web address] for details. (05-11 @ 11:18)      RADIOLOGY & ADDITIONAL TESTS:      ASSESSMENT:  45y Male s/p    SUBDURAL HEMATOMA;RESPIRATORY FAILURE;FALL    ^FALL    No pertinent family history in first degree relatives    Family history of essential hypertension (Father)    Handoff    MEWS Score    No pertinent past medical history    Alcohol abuse    GERD (gastroesophageal reflux disease)    ETOH abuse    Hypomagnesemia    Seizure disorder    Subdural hematoma    S/P subdural hematoma evacuation    Subdural hematoma    Subdural hematoma    Subdural hematoma    Respiratory failure    ETOH abuse    Palliative care by specialist    Insertion of non-tunneled central venous catheter (CVC) in patient age 5 years of age or older    Craniotomy, decompressive    No significant past surgical history    H/O hemorrhoidectomy    FALL    23    Respiratory failure    Fall    SysAdmin_VisitLink        PLAN:  Continue Neuro checks  Correct Magnesium  NPO after MN for OR tomorrow  Transfuse PRBC for acute blood loss anemia  Hold [facility] tonight

## 2021-05-15 NOTE — PROGRESS NOTE ADULT - SUBJECTIVE AND OBJECTIVE BOX
SANDRA DAS  [id number]  45y Male    Indication for ICU admission: s/p craniectomy for large L SDH  Admit Date:05-06-21  ICU Date: 05-07-21  OR Date: 05-06-21    No Known Allergies    PAST MEDICAL & SURGICAL HISTORY:  Alcohol abuse  GERD (gastroesophageal reflux disease)  ETOH abuse  Hypomagnesemia  Seizure disorder  H/O hemorrhoidectomy      Home Medications:        24HRS EVENT:  5/14  DAY:  - ID: d/c Vanco, incr Cefep to 2g q12h  - urine Na, osm  - bolus   - salt tabs 3g q6h  - TF switched to osmolite 1.5 @50 per nutrition    Night  -No acute events overnight      DVT PTX: Hep sq  GI PTX: PPI    ***Tubes/Lines/Drains  ***  Peripheral IV  Arterial Line	L rad	                Date 5/6  L IJ TLC     5/12  OGT, ETT  Urinary Catheter		Indication: Strict I&O    Date Placed: 5/6    REVIEW OF SYSTEMS  [ ] A ten-point review of systems was otherwise negative except as noted.  [x] Due to altered mental status/intubation, subjective information were not able to be obtained from the patient. History was obtained, to the extent possible, from review of the chart and collateral sources of information.       SANDRA DAS  [id number]  45y Male    Indication for ICU admission: s/p craniectomy for large L SDH  Admit Date:05-06-21  ICU Date: 05-07-21  OR Date: 05-06-21    No Known Allergies    PAST MEDICAL & SURGICAL HISTORY:  Alcohol abuse  GERD (gastroesophageal reflux disease)  ETOH abuse  Hypomagnesemia  Seizure disorder  H/O hemorrhoidectomy      Home Medications:        24HRS EVENT:  5/14  DAY:  - ID: d/c Vanco, incr Cefep to 2g q12h  - urine Na, osm  - bolus   - salt tabs 3g q6h  - TF switched to osmolite 1.5 @50 per nutrition    Night  -No acute events overnight      DVT PTX: Hep sq  GI PTX: PPI    ***Tubes/Lines/Drains  ***  Peripheral IV  Arterial Line	L rad	                Date 5/6  L IJ TLC     5/12  OGT, ETT  Urinary Catheter		Indication: Strict I&O    Date Placed: 5/6    REVIEW OF SYSTEMS  [ ] A ten-point review of systems was otherwise negative except as noted.  [x] Due to altered mental status/intubation, subjective information were not able to be obtained from the patient. History was obtained, to the extent possible, from review of the chart and collateral sources of information.      Daily     Daily     Diet, NPO after Midnight:      NPO Start Date: 15-May-2021,   NPO Start Time: 23:59 (05-15-21 @ 05:04)  Diet, NPO with Tube Feed:   Tube Feeding Modality: Orogastric  Osmolite 1.5 Jeff  Total Volume for 24 Hours (mL): 1200  Continuous  Starting Tube Feed Rate mL per Hour: 50  Until Goal Tube Feed Rate (mL per Hour): 50  Tube Feed Duration (in Hours): 24  Tube Feed Start Time: 18:30  No Carb Prosource TF     Qty per Day:  3 (05-14-21 @ 18:14)      CURRENT MEDS:  Neurologic Medications  acetaminophen   Tablet .. 650 milliGRAM(s) Oral every 6 hours PRN Temp greater or equal to 38.5C (101.3F)  fentaNYL    Injectable 25 MICROGram(s) IV Push every 4 hours PRN Severe Pain (7 - 10)  fentaNYL   Infusion 0.25 MICROgram(s)/kG/Hr IV Continuous <Continuous>  levETIRAcetam  IVPB 500 milliGRAM(s) IV Intermittent every 12 hours  propofol Infusion 5 MICROgram(s)/kG/Min IV Continuous <Continuous>    Respiratory Medications    Cardiovascular Medications  amLODIPine   Tablet 10 milliGRAM(s) Oral daily  propranolol 20 milliGRAM(s) Oral every 8 hours    Gastrointestinal Medications  pantoprazole  Injectable 40 milliGRAM(s) IV Push every 12 hours  senna 2 Tablet(s) Oral at bedtime  sodium chloride 3 Gram(s) Oral every 6 hours  sodium chloride 3%. 500 milliLiter(s) IV Continuous <Continuous>    Genitourinary Medications    Hematologic/Oncologic Medications  heparin   Injectable 5000 Unit(s) SubCutaneous every 8 hours    Antimicrobial/Immunologic Medications  cefepime   IVPB 2000 milliGRAM(s) IV Intermittent every 12 hours    Endocrine/Metabolic Medications  insulin lispro (ADMELOG) corrective regimen sliding scale   SubCutaneous every 6 hours    Topical/Other Medications  chlorhexidine 0.12% Liquid 15 milliLiter(s) Oral Mucosa two times a day  chlorhexidine 4% Liquid 1 Application(s) Topical <User Schedule>      ICU Vital Signs Last 24 Hrs  T(C): 37.4 (15 May 2021 08:00), Max: 37.6 (14 May 2021 15:17)  T(F): 99.4 (15 May 2021 08:00), Max: 99.6 (14 May 2021 15:17)  HR: 80 (15 May 2021 10:00) (73 - 94)  BP: 117/68 (15 May 2021 10:00) (115/62 - 130/81)  BP(mean): 85 (15 May 2021 10:00) (80 - 102)  ABP: 101/74 (14 May 2021 16:00) (101/74 - 124/70)  ABP(mean): 82 (14 May 2021 16:00) (82 - 109)  RR: 19 (15 May 2021 10:00) (16 - 21)  SpO2: 100% (15 May 2021 10:00) (100% - 100%)      Adult Advanced Hemodynamics Last 24 Hrs  CVP(mm Hg): --  CVP(cm H2O): --  CO: --  CI: --  PA: --  PA(mean): --  PCWP: --  SVR: --  SVRI: --  PVR: --  PVRI: --    Mode: AC/ CMV (Assist Control/ Continuous Mandatory Ventilation)  RR (machine): 16  TV (machine): 420  FiO2: 30  PEEP: 5  ITime: 1  MAP: 12  PIP: 28    ABG - ( 15 May 2021 05:09 )  pH, Arterial: 7.52  pH, Blood: x     /  pCO2: 31    /  pO2: 126   / HCO3: 25    / Base Excess: 2.1   /  SaO2: 100                 I&O's Summary    14 May 2021 07:01  -  15 May 2021 07:00  --------------------------------------------------------  IN: 3686.6 mL / OUT: 4030 mL / NET: -343.4 mL    15 May 2021 07:01  -  15 May 2021 11:10  --------------------------------------------------------  IN: 350.4 mL / OUT: 525 mL / NET: -174.6 mL      I&O's Detail    14 May 2021 07:01  -  15 May 2021 07:00  --------------------------------------------------------  IN:    Enteral Tube Flush: 230 mL    FentaNYL: 80 mL    IV PiggyBack: 100 mL    IV PiggyBack: 100 mL    IV PiggyBack: 100 mL    Osmolite: 450 mL    Propofol: 101.6 mL    Sodium Chloride 0.9% Bolus: 500 mL    sodium chloride 3%: 1200 mL    Vital High Protein: 825 mL  Total IN: 3686.6 mL    OUT:    Indwelling Catheter - Urethral (mL): 4030 mL  Total OUT: 4030 mL    Total NET: -343.4 mL      15 May 2021 07:01  -  15 May 2021 11:10  --------------------------------------------------------  IN:    FentaNYL: 2 mL    FentaNYL: 4 mL    Osmolite: 150 mL    Propofol: 14.4 mL    sodium chloride 3%: 180 mL  Total IN: 350.4 mL    OUT:    Indwelling Catheter - Urethral (mL): 525 mL  Total OUT: 525 mL    Total NET: -174.6 mL          PHYSICAL EXAM:    General/Neuro  RASS:    0         GCS:  11T   = E 4 / V1T   / M 6   Pupils:    Lungs:      clear to auscultation, Normal expansion/effort. Bilateral mechanical breath sounds     Cardiovascular : S1, S2.  Regular rate and rhythm.  Peripheral edema   Cardiac Rhythm: Normal Sinus Rhythm    GI: Abdomen soft, Non-tender, Non-distended.      Extremities: Extremities warm, pink, well-perfused. Pulses:Rt     Lt    Derm: Good skin turgor, no skin breakdown.      :       Cole catheter in place.      CXR:       LABS:  CAPILLARY BLOOD GLUCOSE      POCT Blood Glucose.: 135 mg/dL (15 May 2021 05:25)  POCT Blood Glucose.: 105 mg/dL (14 May 2021 23:30)                          7.2    4.05  )-----------( 179      ( 14 May 2021 23:34 )             22.5       05-14    136  |  105  |  7<L>  ----------------------------<  116<H>  4.0   |  23  |  <0.5<L>    Ca    7.8<L>      14 May 2021 23:34  Phos  3.2     05-14  Mg     1.6     05-14        PT/INR - ( 13 May 2021 23:42 )   PT: 12.20 sec;   INR: 1.06 ratio                   Culture - Blood (collected 13 May 2021 11:19)  Source: .Blood None  Preliminary Report (14 May 2021 22:01):    No growth to date.

## 2021-05-15 NOTE — PROGRESS NOTE ADULT - ASSESSMENT
Assessment & Plan  45M s/p fall with large left SDH and neurological status change, level 1 for emergent craniotomy    NEURO:  Hx of EtOH abuse and prior Delerium Tremens episodes  S/P craniotomy for large left SDH  - Q1H neuro checks  - GCS 11T  - Keppra 500mg BID  - HTS 3% at 60 ml/hr  Sedation: Propofol gtt, pain control w/ fent gtt   - goal RASS 0 to -1   Head of bed @ 30 degrees  Repeat Head CT #1 post op changes, resolution of MLS, stable SDH along cerebral falx and L tentorium, new trace scattered SAH along the right cerebral sulci and right cerebellar sulci and new IVP in the b/l occipital horns.  Repeat Head CT #2: development of acute infarcts in left cerebral hemisphere and right cerebellum  CTA neck and Brain - no evidence acute large vessel occlusion, severe stenosis of R vertebral artery and moderate stenosis of mid-distal basilar artery, possible jugular venous thrombosis, NCHCT recommended for left convexity subdural collection, and increasing edema around L temporal/occipital parenchemal hemorrhage      RESP:   Intubated, /16/30/5  ABG:   AM CXR  plan for Trach next week    CARDS:   S/P cardiac arrest in OR & MTP  -Acute HTN - started Amlodipine - goal SBP <140, MAP > 65  -propranolol 20mg q8   trop neg x3    GI/NUTR:   Diet: TF (Osmolite 1.5) @ 50  GI Prophylaxis-Protonix q12h for ?GIB   -no episodes since - continue to monitor  Bowel regimen-  Senna  - BM 5/13    /RENAL:   Cole - strict I/O    -increased UO - continue to monitor for cerebral salt wasting  Labs:          BUN/Cr- 7/0.5 > 7/0.5          Electrolytes- Na 135 // K 4.0// Phos 3.2 //  Mg 1.6  Na goal 145-150 - on 3% @ 60cc/h  -BMP, serum osm q6h  -restarted salt tabs 3g q6h, NS boluses prn  -consider Fludrocortisone    HEME/ONC:   S/P massive transfusion protocol  - 4uPRBCs 2FFP 2 platelet intraop, post op 2 PLT  DVT prophylaxis: HSQ  Acute blood loss anemia  - H/H - 7.8 > 7.4 > 7.2  Plt 72>141 > 179    ID:  Afebrile  Leukopenia - WBC 3  Antibiotics: increased Cefepime, Vanco d/c'ed  -ID following    5/11- u/a +leuk   -blood cx- klebsiella, pan sens   -BAL-  klebsiella, serratia marcenscens, rangel sens  -f/u blood cx 5/13    ENDO:  q6 POC glucose  Sliding scale insulin  A1c 5.2      DISPO: SICU

## 2021-05-15 NOTE — PROGRESS NOTE ADULT - ATTENDING COMMENTS
Pt seen examined on rounds. Agree with above plan. Pt continues to have improving MS. Plan for OR tomorrow for wound revision. D/w with wife extensively risk, benefits and alternatives including but not limited to bleeding, infection, stroke, seizure, CSF leak, need for further procedures includng delayed cranioplasty. She would like to proceed.

## 2021-05-16 LAB
ALBUMIN SERPL ELPH-MCNC: 2.9 G/DL — LOW (ref 3.5–5.2)
ALP SERPL-CCNC: 77 U/L — SIGNIFICANT CHANGE UP (ref 30–115)
ALT FLD-CCNC: 60 U/L — HIGH (ref 0–41)
ANION GAP SERPL CALC-SCNC: 10 MMOL/L — SIGNIFICANT CHANGE UP (ref 7–14)
ANION GAP SERPL CALC-SCNC: 10 MMOL/L — SIGNIFICANT CHANGE UP (ref 7–14)
APTT BLD: 27.5 SEC — SIGNIFICANT CHANGE UP (ref 27–39.2)
AST SERPL-CCNC: 40 U/L — SIGNIFICANT CHANGE UP (ref 0–41)
BASE EXCESS BLDA CALC-SCNC: 0.2 MMOL/L — SIGNIFICANT CHANGE UP (ref -2–2)
BASE EXCESS BLDA CALC-SCNC: 1.6 MMOL/L — SIGNIFICANT CHANGE UP (ref -2–2)
BASOPHILS # BLD AUTO: 0.01 K/UL — SIGNIFICANT CHANGE UP (ref 0–0.2)
BASOPHILS NFR BLD AUTO: 0.1 % — SIGNIFICANT CHANGE UP (ref 0–1)
BILIRUB SERPL-MCNC: 0.5 MG/DL — SIGNIFICANT CHANGE UP (ref 0.2–1.2)
BLD GP AB SCN SERPL QL: SIGNIFICANT CHANGE UP
BUN SERPL-MCNC: 6 MG/DL — LOW (ref 10–20)
BUN SERPL-MCNC: 6 MG/DL — LOW (ref 10–20)
CALCIUM SERPL-MCNC: 8 MG/DL — LOW (ref 8.5–10.1)
CALCIUM SERPL-MCNC: 8.1 MG/DL — LOW (ref 8.5–10.1)
CHLORIDE SERPL-SCNC: 103 MMOL/L — SIGNIFICANT CHANGE UP (ref 98–110)
CHLORIDE SERPL-SCNC: 105 MMOL/L — SIGNIFICANT CHANGE UP (ref 98–110)
CO2 SERPL-SCNC: 23 MMOL/L — SIGNIFICANT CHANGE UP (ref 17–32)
CO2 SERPL-SCNC: 23 MMOL/L — SIGNIFICANT CHANGE UP (ref 17–32)
CREAT SERPL-MCNC: <0.5 MG/DL — LOW (ref 0.7–1.5)
CREAT SERPL-MCNC: <0.5 MG/DL — LOW (ref 0.7–1.5)
EOSINOPHIL # BLD AUTO: 0.02 K/UL — SIGNIFICANT CHANGE UP (ref 0–0.7)
EOSINOPHIL NFR BLD AUTO: 0.3 % — SIGNIFICANT CHANGE UP (ref 0–8)
GAS PNL BLDA: SIGNIFICANT CHANGE UP
GAS PNL BLDA: SIGNIFICANT CHANGE UP
GLUCOSE BLDC GLUCOMTR-MCNC: 121 MG/DL — HIGH (ref 70–99)
GLUCOSE BLDC GLUCOMTR-MCNC: 128 MG/DL — HIGH (ref 70–99)
GLUCOSE BLDC GLUCOMTR-MCNC: 150 MG/DL — HIGH (ref 70–99)
GLUCOSE BLDC GLUCOMTR-MCNC: 171 MG/DL — HIGH (ref 70–99)
GLUCOSE SERPL-MCNC: 129 MG/DL — HIGH (ref 70–99)
GLUCOSE SERPL-MCNC: 145 MG/DL — HIGH (ref 70–99)
HCO3 BLDA-SCNC: 24 MMOL/L — SIGNIFICANT CHANGE UP (ref 21–29)
HCO3 BLDA-SCNC: 25 MMOL/L — SIGNIFICANT CHANGE UP (ref 23–27)
HCT VFR BLD CALC: 25.3 % — LOW (ref 42–52)
HCT VFR BLD CALC: 33.2 % — LOW (ref 42–52)
HGB BLD-MCNC: 11.1 G/DL — LOW (ref 14–18)
HGB BLD-MCNC: 8.2 G/DL — LOW (ref 14–18)
HOROWITZ INDEX BLDA+IHG-RTO: 30 — SIGNIFICANT CHANGE UP
HOROWITZ INDEX BLDA+IHG-RTO: 30 — SIGNIFICANT CHANGE UP
IMM GRANULOCYTES NFR BLD AUTO: 0.7 % — HIGH (ref 0.1–0.3)
INR BLD: 1.19 RATIO — SIGNIFICANT CHANGE UP (ref 0.65–1.3)
LYMPHOCYTES # BLD AUTO: 0.57 K/UL — LOW (ref 1.2–3.4)
LYMPHOCYTES # BLD AUTO: 8.2 % — LOW (ref 20.5–51.1)
MAGNESIUM SERPL-MCNC: 1.6 MG/DL — LOW (ref 1.8–2.4)
MAGNESIUM SERPL-MCNC: 1.9 MG/DL — SIGNIFICANT CHANGE UP (ref 1.8–2.4)
MCHC RBC-ENTMCNC: 29.6 PG — SIGNIFICANT CHANGE UP (ref 27–31)
MCHC RBC-ENTMCNC: 30 PG — SIGNIFICANT CHANGE UP (ref 27–31)
MCHC RBC-ENTMCNC: 32.4 G/DL — SIGNIFICANT CHANGE UP (ref 32–37)
MCHC RBC-ENTMCNC: 33.4 G/DL — SIGNIFICANT CHANGE UP (ref 32–37)
MCV RBC AUTO: 89.7 FL — SIGNIFICANT CHANGE UP (ref 80–94)
MCV RBC AUTO: 91.3 FL — SIGNIFICANT CHANGE UP (ref 80–94)
MONOCYTES # BLD AUTO: 0.26 K/UL — SIGNIFICANT CHANGE UP (ref 0.1–0.6)
MONOCYTES NFR BLD AUTO: 3.7 % — SIGNIFICANT CHANGE UP (ref 1.7–9.3)
NEUTROPHILS # BLD AUTO: 6.04 K/UL — SIGNIFICANT CHANGE UP (ref 1.4–6.5)
NEUTROPHILS NFR BLD AUTO: 87 % — HIGH (ref 42.2–75.2)
NRBC # BLD: 0 /100 WBCS — SIGNIFICANT CHANGE UP (ref 0–0)
NRBC # BLD: 0 /100 WBCS — SIGNIFICANT CHANGE UP (ref 0–0)
OSMOLALITY SERPL: 283 MOS/KG — SIGNIFICANT CHANGE UP (ref 275–300)
PCO2 BLDA: 34 MMHG — LOW (ref 38–42)
PCO2 BLDA: 36 MMHG — LOW (ref 38–42)
PH BLDA: 7.45 — HIGH (ref 7.38–7.42)
PH BLDA: 7.46 — HIGH (ref 7.38–7.42)
PHOSPHATE SERPL-MCNC: 3.1 MG/DL — SIGNIFICANT CHANGE UP (ref 2.1–4.9)
PLATELET # BLD AUTO: 259 K/UL — SIGNIFICANT CHANGE UP (ref 130–400)
PLATELET # BLD AUTO: 85 K/UL — LOW (ref 130–400)
PO2 BLDA: 117 MMHG — HIGH (ref 78–95)
PO2 BLDA: 84 MMHG — SIGNIFICANT CHANGE UP (ref 78–95)
POTASSIUM SERPL-MCNC: 4.1 MMOL/L — SIGNIFICANT CHANGE UP (ref 3.5–5)
POTASSIUM SERPL-MCNC: 4.3 MMOL/L — SIGNIFICANT CHANGE UP (ref 3.5–5)
POTASSIUM SERPL-SCNC: 4.1 MMOL/L — SIGNIFICANT CHANGE UP (ref 3.5–5)
POTASSIUM SERPL-SCNC: 4.3 MMOL/L — SIGNIFICANT CHANGE UP (ref 3.5–5)
PROT SERPL-MCNC: 5.4 G/DL — LOW (ref 6–8)
PROTHROM AB SERPL-ACNC: 13.7 SEC — HIGH (ref 9.95–12.87)
RBC # BLD: 2.77 M/UL — LOW (ref 4.7–6.1)
RBC # BLD: 3.7 M/UL — LOW (ref 4.7–6.1)
RBC # FLD: 14.2 % — SIGNIFICANT CHANGE UP (ref 11.5–14.5)
RBC # FLD: 14.5 % — SIGNIFICANT CHANGE UP (ref 11.5–14.5)
SAO2 % BLDA: 100 % — HIGH (ref 94–98)
SAO2 % BLDA: 98 % — HIGH (ref 92–96)
SODIUM SERPL-SCNC: 136 MMOL/L — SIGNIFICANT CHANGE UP (ref 135–146)
SODIUM SERPL-SCNC: 138 MMOL/L — SIGNIFICANT CHANGE UP (ref 135–146)
WBC # BLD: 4.64 K/UL — LOW (ref 4.8–10.8)
WBC # BLD: 6.95 K/UL — SIGNIFICANT CHANGE UP (ref 4.8–10.8)
WBC # FLD AUTO: 4.64 K/UL — LOW (ref 4.8–10.8)
WBC # FLD AUTO: 6.95 K/UL — SIGNIFICANT CHANGE UP (ref 4.8–10.8)

## 2021-05-16 PROCEDURE — 61312 CRNEC/CRNOT STTL XDRL/SDRL: CPT | Mod: 78

## 2021-05-16 PROCEDURE — 99291 CRITICAL CARE FIRST HOUR: CPT

## 2021-05-16 PROCEDURE — 71045 X-RAY EXAM CHEST 1 VIEW: CPT | Mod: 26

## 2021-05-16 PROCEDURE — 71045 X-RAY EXAM CHEST 1 VIEW: CPT | Mod: 26,77

## 2021-05-16 PROCEDURE — 70450 CT HEAD/BRAIN W/O DYE: CPT | Mod: 26

## 2021-05-16 RX ORDER — DEXMEDETOMIDINE HYDROCHLORIDE IN 0.9% SODIUM CHLORIDE 4 UG/ML
0.1 INJECTION INTRAVENOUS
Qty: 400 | Refills: 0 | Status: DISCONTINUED | OUTPATIENT
Start: 2021-05-16 | End: 2021-05-21

## 2021-05-16 RX ORDER — POLYETHYLENE GLYCOL 3350 17 G/17G
17 POWDER, FOR SOLUTION ORAL DAILY
Refills: 0 | Status: DISCONTINUED | OUTPATIENT
Start: 2021-05-16 | End: 2021-05-19

## 2021-05-16 RX ORDER — PANTOPRAZOLE SODIUM 20 MG/1
40 TABLET, DELAYED RELEASE ORAL DAILY
Refills: 0 | Status: DISCONTINUED | OUTPATIENT
Start: 2021-05-16 | End: 2021-05-17

## 2021-05-16 RX ORDER — MAGNESIUM SULFATE 500 MG/ML
2 VIAL (ML) INJECTION
Refills: 0 | Status: COMPLETED | OUTPATIENT
Start: 2021-05-16 | End: 2021-05-16

## 2021-05-16 RX ORDER — MAGNESIUM SULFATE 500 MG/ML
2 VIAL (ML) INJECTION ONCE
Refills: 0 | Status: COMPLETED | OUTPATIENT
Start: 2021-05-16 | End: 2021-05-16

## 2021-05-16 RX ADMIN — POLYETHYLENE GLYCOL 3350 17 GRAM(S): 17 POWDER, FOR SOLUTION ORAL at 19:28

## 2021-05-16 RX ADMIN — SODIUM CHLORIDE 4 GRAM(S): 9 INJECTION INTRAMUSCULAR; INTRAVENOUS; SUBCUTANEOUS at 16:06

## 2021-05-16 RX ADMIN — PANTOPRAZOLE SODIUM 40 MILLIGRAM(S): 20 TABLET, DELAYED RELEASE ORAL at 05:01

## 2021-05-16 RX ADMIN — SODIUM CHLORIDE 4 GRAM(S): 9 INJECTION INTRAMUSCULAR; INTRAVENOUS; SUBCUTANEOUS at 23:06

## 2021-05-16 RX ADMIN — CHLORHEXIDINE GLUCONATE 15 MILLILITER(S): 213 SOLUTION TOPICAL at 17:48

## 2021-05-16 RX ADMIN — CHLORHEXIDINE GLUCONATE 15 MILLILITER(S): 213 SOLUTION TOPICAL at 05:02

## 2021-05-16 RX ADMIN — CEFEPIME 100 MILLIGRAM(S): 1 INJECTION, POWDER, FOR SOLUTION INTRAMUSCULAR; INTRAVENOUS at 05:02

## 2021-05-16 RX ADMIN — PROPOFOL 2.38 MICROGRAM(S)/KG/MIN: 10 INJECTION, EMULSION INTRAVENOUS at 06:54

## 2021-05-16 RX ADMIN — SODIUM CHLORIDE 4 GRAM(S): 9 INJECTION INTRAMUSCULAR; INTRAVENOUS; SUBCUTANEOUS at 19:24

## 2021-05-16 RX ADMIN — Medication 25 GRAM(S): at 16:06

## 2021-05-16 RX ADMIN — SODIUM CHLORIDE 80 MILLILITER(S): 5 INJECTION, SOLUTION INTRAVENOUS at 20:36

## 2021-05-16 RX ADMIN — SODIUM CHLORIDE 70 MILLILITER(S): 5 INJECTION, SOLUTION INTRAVENOUS at 04:42

## 2021-05-16 RX ADMIN — FENTANYL CITRATE 25 MICROGRAM(S): 50 INJECTION INTRAVENOUS at 22:13

## 2021-05-16 RX ADMIN — CHLORHEXIDINE GLUCONATE 1 APPLICATION(S): 213 SOLUTION TOPICAL at 06:20

## 2021-05-16 RX ADMIN — PROPOFOL 2.38 MICROGRAM(S)/KG/MIN: 10 INJECTION, EMULSION INTRAVENOUS at 00:27

## 2021-05-16 RX ADMIN — DEXMEDETOMIDINE HYDROCHLORIDE IN 0.9% SODIUM CHLORIDE 1.99 MICROGRAM(S)/KG/HR: 4 INJECTION INTRAVENOUS at 23:07

## 2021-05-16 RX ADMIN — Medication 25 GRAM(S): at 00:27

## 2021-05-16 RX ADMIN — DEXMEDETOMIDINE HYDROCHLORIDE IN 0.9% SODIUM CHLORIDE 1.99 MICROGRAM(S)/KG/HR: 4 INJECTION INTRAVENOUS at 19:44

## 2021-05-16 RX ADMIN — SENNA PLUS 2 TABLET(S): 8.6 TABLET ORAL at 22:10

## 2021-05-16 RX ADMIN — FENTANYL CITRATE 1.99 MICROGRAM(S)/KG/HR: 50 INJECTION INTRAVENOUS at 04:45

## 2021-05-16 RX ADMIN — SODIUM CHLORIDE 4 GRAM(S): 9 INJECTION INTRAMUSCULAR; INTRAVENOUS; SUBCUTANEOUS at 12:47

## 2021-05-16 RX ADMIN — CEFEPIME 100 MILLIGRAM(S): 1 INJECTION, POWDER, FOR SOLUTION INTRAMUSCULAR; INTRAVENOUS at 17:47

## 2021-05-16 RX ADMIN — Medication 2: at 18:37

## 2021-05-16 RX ADMIN — AMLODIPINE BESYLATE 10 MILLIGRAM(S): 2.5 TABLET ORAL at 05:01

## 2021-05-16 RX ADMIN — LEVETIRACETAM 420 MILLIGRAM(S): 250 TABLET, FILM COATED ORAL at 05:02

## 2021-05-16 RX ADMIN — LEVETIRACETAM 420 MILLIGRAM(S): 250 TABLET, FILM COATED ORAL at 17:48

## 2021-05-16 RX ADMIN — SODIUM CHLORIDE 4 GRAM(S): 9 INJECTION INTRAMUSCULAR; INTRAVENOUS; SUBCUTANEOUS at 03:57

## 2021-05-16 RX ADMIN — FENTANYL CITRATE 25 MICROGRAM(S): 50 INJECTION INTRAVENOUS at 05:26

## 2021-05-16 RX ADMIN — FENTANYL CITRATE 25 MICROGRAM(S): 50 INJECTION INTRAVENOUS at 22:11

## 2021-05-16 RX ADMIN — Medication 25 GRAM(S): at 03:11

## 2021-05-16 RX ADMIN — FENTANYL CITRATE 25 MICROGRAM(S): 50 INJECTION INTRAVENOUS at 05:14

## 2021-05-16 RX ADMIN — PANTOPRAZOLE SODIUM 40 MILLIGRAM(S): 20 TABLET, DELAYED RELEASE ORAL at 12:47

## 2021-05-16 NOTE — PROGRESS NOTE ADULT - SUBJECTIVE AND OBJECTIVE BOX
NEUROSURGERY POST OP NOTE:    POD# 0 S/P wound revision of scalp wound    S: intubated      T(C): 37.8 (05-16-21 @ 16:00), Max: 37.8 (05-16-21 @ 16:00)  HR: 81 (05-16-21 @ 16:00) (60 - 82)  BP: 122/70 (05-16-21 @ 16:00) (114/70 - 156/83)  RR: 18 (05-16-21 @ 16:00) (16 - 20)  SpO2: 100% (05-16-21 @ 16:00) (100% - 100%)      05-15-21 @ 07:01  -  05-16-21 @ 07:00  --------------------------------------------------------  IN: 3437.7 mL / OUT: 3125 mL / NET: 312.7 mL    05-16-21 @ 07:01  -  05-16-21 @ 17:34  --------------------------------------------------------  IN: 1183.4 mL / OUT: 1340 mL / NET: -156.6 mL        acetaminophen   Tablet .. 650 milliGRAM(s) Oral every 6 hours PRN  amLODIPine   Tablet 10 milliGRAM(s) Oral daily  cefepime   IVPB 2000 milliGRAM(s) IV Intermittent every 12 hours  chlorhexidine 0.12% Liquid 15 milliLiter(s) Oral Mucosa two times a day  chlorhexidine 4% Liquid 1 Application(s) Topical <User Schedule>  fentaNYL    Injectable 25 MICROGram(s) IV Push every 4 hours PRN  fentaNYL   Infusion 0.25 MICROgram(s)/kG/Hr IV Continuous <Continuous>  insulin lispro (ADMELOG) corrective regimen sliding scale   SubCutaneous every 6 hours  levETIRAcetam  IVPB 500 milliGRAM(s) IV Intermittent every 12 hours  pantoprazole  Injectable 40 milliGRAM(s) IV Push daily  propofol Infusion 5 MICROgram(s)/kG/Min IV Continuous <Continuous>  propranolol 20 milliGRAM(s) Oral every 8 hours  senna 2 Tablet(s) Oral at bedtime  sodium chloride 4 Gram(s) Oral <User Schedule>  sodium chloride 3%. 500 milliLiter(s) IV Continuous <Continuous>      RADIOLOGY: < from: CT Head No Cont (05.16.21 @ 12:25) >  IMPRESSION:  In comparison to the prior head CT dated 5/10/2021:    1.  Patient is status post interval revision of the left-sided craniectomy with expected postsurgical changes as described.    2.  Slightly decreased size of the hematoma extending from the left extra-axial space to the overlying scalp soft tissues, overall measuring about 3 cm in width, previously 3.4 cm.    3.  Increased size of the left temporal/occipital hematoma / hemorrhagic contusionmeasuring up to about 4 cm AP, previously 1.5 cm.    4.  No significant interval change in the areas of acute infarction involving the left medial frontal and parietal lobes, the left insula, the left occipital/temporal lobes and the right cerebellarhemisphere. Small foci of hemorrhage noted in the left frontal infarct (ser 2 im 13) and left occipital infarct (ser 2  im 14).    5.  Developing hypoattenuation within the left basal frontal lobe compatible with developing contusion or infarction.    < end of copied text >      Exam: Patient intubated, PERRL, tracks light, opens eyes, slight movement of LUE and LLE to painful stimuli, no movement of right side to pain.        WOUND/DRAINS: dressing dry intact, KIKE drained 50 cc's        Assessment: 45yMale s/p revision of scalp wound, placement of KIKE, TBI      Plan: Continue ICU care, control BP, monitor Na, coags, cbc, platelets elevation of head, monitor KIKE drainage

## 2021-05-16 NOTE — CHART NOTE - NSCHARTNOTEFT_GEN_A_CORE
PACU ANESTHESIA ADMISSION NOTE      Procedure: Insertion of non-tunneled central venous catheter (CVC) in patient age 5 years of age or older    Craniotomy, decompressive      Post op diagnosis:  Subdural hematoma        ____  Intubated  TV:______       Rate: ______      FiO2: ______    __x__  Patent Airway    ____  Full return of protective reflexes    ____  Full recovery from anesthesia / back to baseline     Vitals:   T: 36.0         R:   16               BP:     138/78             Sat:    98%               P: 80      Mental Status:  __x__ Awake   __x___ Alert   _____ Drowsy   __x___ Sedated    Nausea/Vomiting:  __x__ NO  ______Yes,   See Post - Op Orders          Pain Scale (0-10):  __0___    Treatment: ____ None    ___x_ See Post - Op/PCA Orders    Post - Operative Fluids:   ____ Oral   __x__ See Post - Op Orders    Plan: Discharge:   __x__Home       _____Floor     _____Critical Care    _____  Other:_________________    Comments:  pt tolerated procedure well, pt transferred to [facility] and report was given to [facility] ICU RN, vital signs are stable and pt shows no signs of distress. no anesthesia complications, pt remains intubated and sedated.

## 2021-05-16 NOTE — CHART NOTE - NSCHARTNOTEFT_GEN_A_CORE
Patient seen and examined at bedside with nursing, crna and neurosurgery attending post operatively.  Patient spontaneously moving right upper extremity. Patient seen and examined at bedside with nursing, crna and neurosurgery attending post operatively.  Patient spontaneously moving right upper extremity.    Per attending patient did well intraoperatively with no complications. Clot was evacuated, bleeding noted. Hemostasis achieved without difficulty. KIKE drain left  in subgaleal space.     Operative time ~1hr  EBL: 400cc   -Received 1U PRBC intraop   -Currently receiving 2nd unit PRBC  UOP: 275cc  IVF: 600cc    Patient sedated with 0.25 of fentanyl gtt and 10 propofol gtt. Given 20mg rocuronium at initiation of case.     Brief operative findings: revision craniotomy wound with complex closure for persistent drainage, subgaleal hematoma   Wound swab for bacterial culture sent    Follow up/Plan:  -11AM labs  -finish 2nd unit PRBC   -CT head today   -Continue antibiotics  -Follow up OR cultures  -Post op chest xray  -Post op ABG

## 2021-05-16 NOTE — PROGRESS NOTE ADULT - ASSESSMENT
Assessment & Plan  45M s/p fall with large left SDH and neurological status change, level 1 for emergent craniotomy    NEURO:  Hx of EtOH abuse and prior Delerium Tremens episodes  S/P craniotomy for large left SDH  - Q1H neuro checks  - GCS 11T  - Keppra 500mg BID  - HTS 3% at 70 ml/hr  Sedation: Propofol gtt  - goal RASS 0 to -1   Pain control - Fent gtt   Head of bed @ 30 degrees  Repeat Head CT #1 post op changes, resolution of MLS, stable SDH along cerebral falx and L tentorium, new trace scattered SAH along the right cerebral sulci and right cerebellar sulci and new IVP in the b/l occipital horns.  Repeat Head CT #2: development of acute infarcts in left cerebral hemisphere and right cerebellum  CTA neck and Brain - no evidence acute large vessel occlusion, severe stenosis of R vertebral artery and moderate stenosis of mid-distal basilar artery, possible jugular venous thrombosis, NCHCT recommended for left convexity subdural collection, and increasing edema around L temporal/occipital parenchemal hemorrhage  *Plan for RTOR today for wound revision/cranioplasty      RESP:   Intubated, /16/30/5   ABG:   AM CXR  plan for Trach next week    CARDS:   S/P cardiac arrest in OR & MTP  -Acute HTN - on Amlodipine - goal SBP <140, MAP > 65  -propranolol 20mg q8  trop neg x3    GI/NUTR:   Diet: TF (Osmolite 1.5) @ 50 --> NPO @ MN  GI Prophylaxis-Protonix q12h for ?GIB   -no episodes since - consider decreasing to daily  Bowel regimen-  Senna  - BM 5/13 --> increase bowel regimen post-op    /RENAL:   Cole - strict I/O    -increased UO - continue to monitor for cerebral salt wasting    -consider Fludrocortisone  Labs:          BUN/Cr- 6/0.5          Electrolytes- Na 138 // K 4.1 // Phos 3.1 //  Mg 1.6             -hypomagnesemia, repleted  Na goal 145-150   -increased 3% to 70cc/hr  -increased salt tabs to 4g q4h, NS boluses prn  -BMP, serum osm q6h      HEME/ONC:   S/P massive transfusion protocol  - 4uPRBCs 2FFP 2 platelet intraop, post op 2 PLT  DVT prophylaxis: HSQ (held 2 doses for OR today)  Acute blood loss anemia  - H/H - 7.5 > 1pRBC > 8.2    ID:  Afebrile  Leukopenia - WBC 4  Antibiotics: Cefepime  -ID following    5/11- u/a +leuk   -blood cx- Klebsiella, (pan sens)   -BAL-  Klebsiella, Serratia marcenscens  (pan sens)  -f/u blood cx 5/13 - NGTD    ENDO:  q6 POC glucose  Sliding scale insulin  A1c 5.2      DISPO: SICU

## 2021-05-16 NOTE — PROGRESS NOTE ADULT - SUBJECTIVE AND OBJECTIVE BOX
SANDRA DAS  [id number]  45y Male    Indication for ICU admission: s/p craniectomy for large L SDH  Admit Date:05-06-21  ICU Date: 05-07-21  OR Date: 05-06-21    No Known Allergies    PAST MEDICAL & SURGICAL HISTORY:  Alcohol abuse  GERD (gastroesophageal reflux disease)  ETOH abuse  Hypomagnesemia  Seizure disorder  H/O hemorrhoidectomy      Home Medications:      24HRS EVENT:  5/15  DAY  -preop for OR tomorrow  -Hgb 7.5 --> transfuse 1u prbc per NSX    NIGHT  -holding HSQ for OR as per nsx  -Na remains under goal - increased salt tabs to 4g q4h, 3% to 70cc/hr      DVT PTX: Hep sq  GI PTX: PPI    ***Tubes/Lines/Drains  ***  Peripheral IV  Arterial Line	L rad	                Date 5/6  L IJ TLC     5/12  OGT, ETT  Urinary Catheter		Indication: Strict I&O    Date Placed: 5/6    REVIEW OF SYSTEMS  [ ] A ten-point review of systems was otherwise negative except as noted.  [x] Due to altered mental status/intubation, subjective information were not able to be obtained from the patient. History was obtained, to the extent possible, from review of the chart and collateral sources of information.           SANDRA DAS  [id number]  45y Male    Indication for ICU admission: s/p craniectomy for large L SDH  Admit Date:21  ICU Date: 21  OR Date: 21    No Known Allergies    PAST MEDICAL & SURGICAL HISTORY:  Alcohol abuse  GERD (gastroesophageal reflux disease)  ETOH abuse  Hypomagnesemia  Seizure disorder  H/O hemorrhoidectomy      Home Medications:      24HRS EVENT:  5/15  DAY  -preop for OR tomorrow  -Hgb 7.5 --> transfuse 1u prbc per NSX    NIGHT  -holding HSQ for OR as per nsx  -Na remains under goal - increased salt tabs to 4g q4h, 3% to 70cc/hr      DVT PTX: Hep sq  GI PTX: PPI    ***Tubes/Lines/Drains  ***  Peripheral IV  Arterial Line	L rad	                Date   L IJ TLC       OGT, ETT  Urinary Catheter		Indication: Strict I&O    Date Placed:     REVIEW OF SYSTEMS  [ ] A ten-point review of systems was otherwise negative except as noted.  [x] Due to altered mental status/intubation, subjective information were not able to be obtained from the patient. History was obtained, to the extent possible, from review of the chart and collateral sources of information.    Daily Height in cm: 177.8 (16 May 2021 04:10)    Daily Weight in k.4 (16 May 2021 06:00)    Diet, NPO after Midnight:      NPO Start Date: 15-May-2021,   NPO Start Time: 23:59 (05-15-21 @ 05:04)  Diet, NPO with Tube Feed:   Tube Feeding Modality: Orogastric  Osmolite 1.5 Jeff  Total Volume for 24 Hours (mL): 1200  Continuous  Starting Tube Feed Rate mL per Hour: 50  Until Goal Tube Feed Rate (mL per Hour): 50  Tube Feed Duration (in Hours): 24  Tube Feed Start Time: 18:30  No Carb Prosource TF     Qty per Day:  3 (21 @ 18:14)      CURRENT MEDS:  Neurologic Medications  acetaminophen   Tablet .. 650 milliGRAM(s) Oral every 6 hours PRN Temp greater or equal to 38.5C (101.3F)  fentaNYL    Injectable 25 MICROGram(s) IV Push every 4 hours PRN Severe Pain (7 - 10)  fentaNYL   Infusion 0.25 MICROgram(s)/kG/Hr IV Continuous <Continuous>  levETIRAcetam  IVPB 500 milliGRAM(s) IV Intermittent every 12 hours  propofol Infusion 5 MICROgram(s)/kG/Min IV Continuous <Continuous>    Respiratory Medications    Cardiovascular Medications  amLODIPine   Tablet 10 milliGRAM(s) Oral daily  propranolol 20 milliGRAM(s) Oral every 8 hours    Gastrointestinal Medications  pantoprazole  Injectable 40 milliGRAM(s) IV Push every 12 hours  senna 2 Tablet(s) Oral at bedtime  sodium chloride 4 Gram(s) Oral <User Schedule>  sodium chloride 3%. 500 milliLiter(s) IV Continuous <Continuous>    Genitourinary Medications    Hematologic/Oncologic Medications    Antimicrobial/Immunologic Medications  cefepime   IVPB 2000 milliGRAM(s) IV Intermittent every 12 hours    Endocrine/Metabolic Medications  insulin lispro (ADMELOG) corrective regimen sliding scale   SubCutaneous every 6 hours    Topical/Other Medications  chlorhexidine 0.12% Liquid 15 milliLiter(s) Oral Mucosa two times a day  chlorhexidine 4% Liquid 1 Application(s) Topical <User Schedule>      ICU Vital Signs Last 24 Hrs  T(C): 36.6 (16 May 2021 06:00), Max: 37.8 (15 May 2021 16:00)  T(F): 97.8 (16 May 2021 06:00), Max: 100 (15 May 2021 16:00)  HR: 69 (16 May 2021 07:00) (65 - 84)  BP: 120/75 (16 May 2021 07:00) (114/70 - 126/76)  BP(mean): 93 (16 May 2021 07:00) (83 - 96)  ABP: --  ABP(mean): --  RR: 16 (16 May 2021 07:00) (15 - 21)  SpO2: 100% (16 May 2021 07:00) (100% - 100%)      Adult Advanced Hemodynamics Last 24 Hrs  CVP(mm Hg): --  CVP(cm H2O): --  CO: --  CI: --  PA: --  PA(mean): --  PCWP: --  SVR: --  SVRI: --  PVR: --  PVRI: --    Mode: AC/ CMV (Assist Control/ Continuous Mandatory Ventilation)  RR (machine): 16  TV (machine): 420  FiO2: 30  PEEP: 5  ITime: 1  MAP: 9  PIP: 21    ABG - ( 16 May 2021 04:30 )  pH, Arterial: 7.46  pH, Blood: x     /  pCO2: 36    /  pO2: 117   / HCO3: 25    / Base Excess: 1.6   /  SaO2: 100                 I&O's Summary    15 May 2021 07:01  -  16 May 2021 07:00  --------------------------------------------------------  IN: 3437.7 mL / OUT: 3125 mL / NET: 312.7 mL      I&O's Detail    15 May 2021 07:  -  16 May 2021 07:00  --------------------------------------------------------  IN:    Enteral Tube Flush: 240 mL    FentaNYL: 46 mL    FentaNYL: 2 mL    IV PiggyBack: 100 mL    IV PiggyBack: 200 mL    IV PiggyBack: 100 mL    Osmolite: 800 mL    PRBCs (Packed Red Blood Cells): 271 mL    Propofol: 138.7 mL    sodium chloride 3%: 1540 mL  Total IN: 3437.7 mL    OUT:    Indwelling Catheter - Urethral (mL): 3125 mL  Total OUT: 3125 mL    Total NET: 312.7 mL          PHYSICAL EXAM:    General/Neuro  RASS:    0         GCS:  11T   = E 4 / V1T   / M 6   Pupils:    Lungs:      clear to auscultation, Normal expansion/effort. Bilateral mechanical breath sounds     Cardiovascular : S1, S2.  Regular rate and rhythm.  Peripheral edema   Cardiac Rhythm: Normal Sinus Rhythm    GI: Abdomen soft, Non-tender, Non-distended.      Extremities: Extremities warm, pink, well-perfused. Pulses:Rt     Lt    Derm: Good skin turgor, no skin breakdown.      :       Cole catheter in place.    CXR:       LABS:  CAPILLARY BLOOD GLUCOSE      POCT Blood Glucose.: 121 mg/dL (16 May 2021 05:25)  POCT Blood Glucose.: 121 mg/dL (15 May 2021 23:00)  POCT Blood Glucose.: 152 mg/dL (15 May 2021 17:18)  POCT Blood Glucose.: 127 mg/dL (15 May 2021 11:19)                          8.2    4.64  )-----------( 259      ( 16 May 2021 01:30 )             25.3       05-15    138  |  105  |  6<L>  ----------------------------<  145<H>  4.1   |  23  |  <0.5<L>    Ca    8.1<L>      15 May 2021 23:30  Phos  3.1     05-15  Mg     1.6     05-15        PT/INR - ( 15 May 2021 23:30 )   PT: 13.70 sec;   INR: 1.19 ratio         PTT - ( 15 May 2021 23:30 )  PTT:27.5 sec          Culture - Blood (collected 13 May 2021 11:19)  Source: .Blood None  Preliminary Report (14 May 2021 22:01):    No growth to date.

## 2021-05-16 NOTE — PROGRESS NOTE ADULT - ATTENDING COMMENTS
Patient remains arousable, follows simple commands.  In preop mode this am for OR with Neurosurgery.    ASSESSMENT:  44 y/o male, S/P Fall.  Traumatic Left SDH.  Brain compression.  S/P Left Craniectomy.  Acute respiratory failure with hypoxia.  Hemodynamic instability.  Chronic Alcohol Abuse.  Dysphagia.  Hyponatremia. Hypomagnesemia.    PLAN:  - on sedation  - continue Vent support; follow ABG and CXR postop  - keep MAP>65  - NPO for OR  - follow serum electrolytes and UOP  - on 3% saline, follow serum Na q6h  - on Cefepime for Klebsiella in blood and mini BAL  - GI and DVT prophylaxis

## 2021-05-17 LAB
ANION GAP SERPL CALC-SCNC: 10 MMOL/L — SIGNIFICANT CHANGE UP (ref 7–14)
ANION GAP SERPL CALC-SCNC: 10 MMOL/L — SIGNIFICANT CHANGE UP (ref 7–14)
ANION GAP SERPL CALC-SCNC: 13 MMOL/L — SIGNIFICANT CHANGE UP (ref 7–14)
ANION GAP SERPL CALC-SCNC: 9 MMOL/L — SIGNIFICANT CHANGE UP (ref 7–14)
APPEARANCE UR: CLEAR — SIGNIFICANT CHANGE UP
APTT BLD: 26.9 SEC — LOW (ref 27–39.2)
BASOPHILS # BLD AUTO: 0.02 K/UL — SIGNIFICANT CHANGE UP (ref 0–0.2)
BASOPHILS NFR BLD AUTO: 0.3 % — SIGNIFICANT CHANGE UP (ref 0–1)
BILIRUB UR-MCNC: NEGATIVE — SIGNIFICANT CHANGE UP
BUN SERPL-MCNC: 5 MG/DL — LOW (ref 10–20)
BUN SERPL-MCNC: 6 MG/DL — LOW (ref 10–20)
BUN SERPL-MCNC: 8 MG/DL — LOW (ref 10–20)
BUN SERPL-MCNC: 9 MG/DL — LOW (ref 10–20)
CALCIUM SERPL-MCNC: 7.8 MG/DL — LOW (ref 8.5–10.1)
CALCIUM SERPL-MCNC: 7.9 MG/DL — LOW (ref 8.5–10.1)
CALCIUM SERPL-MCNC: 8.1 MG/DL — LOW (ref 8.5–10.1)
CALCIUM SERPL-MCNC: 8.2 MG/DL — LOW (ref 8.5–10.1)
CHLORIDE SERPL-SCNC: 107 MMOL/L — SIGNIFICANT CHANGE UP (ref 98–110)
CHLORIDE SERPL-SCNC: 109 MMOL/L — SIGNIFICANT CHANGE UP (ref 98–110)
CHLORIDE SERPL-SCNC: 111 MMOL/L — HIGH (ref 98–110)
CHLORIDE SERPL-SCNC: 112 MMOL/L — HIGH (ref 98–110)
CO2 SERPL-SCNC: 20 MMOL/L — SIGNIFICANT CHANGE UP (ref 17–32)
CO2 SERPL-SCNC: 20 MMOL/L — SIGNIFICANT CHANGE UP (ref 17–32)
CO2 SERPL-SCNC: 21 MMOL/L — SIGNIFICANT CHANGE UP (ref 17–32)
CO2 SERPL-SCNC: 21 MMOL/L — SIGNIFICANT CHANGE UP (ref 17–32)
COLOR SPEC: SIGNIFICANT CHANGE UP
CREAT SERPL-MCNC: <0.5 MG/DL — LOW (ref 0.7–1.5)
DIFF PNL FLD: NEGATIVE — SIGNIFICANT CHANGE UP
EOSINOPHIL # BLD AUTO: 0 K/UL — SIGNIFICANT CHANGE UP (ref 0–0.7)
EOSINOPHIL NFR BLD AUTO: 0 % — SIGNIFICANT CHANGE UP (ref 0–8)
GAS PNL BLDA: SIGNIFICANT CHANGE UP
GLUCOSE BLDC GLUCOMTR-MCNC: 129 MG/DL — HIGH (ref 70–99)
GLUCOSE BLDC GLUCOMTR-MCNC: 160 MG/DL — HIGH (ref 70–99)
GLUCOSE BLDC GLUCOMTR-MCNC: 177 MG/DL — HIGH (ref 70–99)
GLUCOSE BLDC GLUCOMTR-MCNC: 220 MG/DL — HIGH (ref 70–99)
GLUCOSE SERPL-MCNC: 156 MG/DL — HIGH (ref 70–99)
GLUCOSE SERPL-MCNC: 169 MG/DL — HIGH (ref 70–99)
GLUCOSE SERPL-MCNC: 171 MG/DL — HIGH (ref 70–99)
GLUCOSE SERPL-MCNC: 192 MG/DL — HIGH (ref 70–99)
GLUCOSE UR QL: ABNORMAL
HCT VFR BLD CALC: 27.8 % — LOW (ref 42–52)
HCT VFR BLD CALC: 28.3 % — LOW (ref 42–52)
HCT VFR BLD CALC: 30.9 % — LOW (ref 42–52)
HGB BLD-MCNC: 10.5 G/DL — LOW (ref 14–18)
HGB BLD-MCNC: 9.3 G/DL — LOW (ref 14–18)
HGB BLD-MCNC: 9.4 G/DL — LOW (ref 14–18)
IMM GRANULOCYTES NFR BLD AUTO: 0.5 % — HIGH (ref 0.1–0.3)
KETONES UR-MCNC: NEGATIVE — SIGNIFICANT CHANGE UP
LEUKOCYTE ESTERASE UR-ACNC: NEGATIVE — SIGNIFICANT CHANGE UP
LYMPHOCYTES # BLD AUTO: 0.78 K/UL — LOW (ref 1.2–3.4)
LYMPHOCYTES # BLD AUTO: 9.8 % — LOW (ref 20.5–51.1)
MAGNESIUM SERPL-MCNC: 1.5 MG/DL — LOW (ref 1.8–2.4)
MAGNESIUM SERPL-MCNC: 2 MG/DL — SIGNIFICANT CHANGE UP (ref 1.8–2.4)
MCHC RBC-ENTMCNC: 29.7 PG — SIGNIFICANT CHANGE UP (ref 27–31)
MCHC RBC-ENTMCNC: 29.8 PG — SIGNIFICANT CHANGE UP (ref 27–31)
MCHC RBC-ENTMCNC: 29.9 PG — SIGNIFICANT CHANGE UP (ref 27–31)
MCHC RBC-ENTMCNC: 33.2 G/DL — SIGNIFICANT CHANGE UP (ref 32–37)
MCHC RBC-ENTMCNC: 33.5 G/DL — SIGNIFICANT CHANGE UP (ref 32–37)
MCHC RBC-ENTMCNC: 34 G/DL — SIGNIFICANT CHANGE UP (ref 32–37)
MCV RBC AUTO: 88 FL — SIGNIFICANT CHANGE UP (ref 80–94)
MCV RBC AUTO: 89.1 FL — SIGNIFICANT CHANGE UP (ref 80–94)
MCV RBC AUTO: 89.6 FL — SIGNIFICANT CHANGE UP (ref 80–94)
MONOCYTES # BLD AUTO: 0.9 K/UL — HIGH (ref 0.1–0.6)
MONOCYTES NFR BLD AUTO: 11.3 % — HIGH (ref 1.7–9.3)
NEUTROPHILS # BLD AUTO: 6.23 K/UL — SIGNIFICANT CHANGE UP (ref 1.4–6.5)
NEUTROPHILS NFR BLD AUTO: 78.1 % — HIGH (ref 42.2–75.2)
NITRITE UR-MCNC: NEGATIVE — SIGNIFICANT CHANGE UP
NRBC # BLD: 0 /100 WBCS — SIGNIFICANT CHANGE UP (ref 0–0)
OSMOLALITY SERPL: 291 MOS/KG — SIGNIFICANT CHANGE UP (ref 275–300)
OSMOLALITY SERPL: 293 MOS/KG — SIGNIFICANT CHANGE UP (ref 275–300)
OSMOLALITY UR: 795 MOS/KG — SIGNIFICANT CHANGE UP (ref 50–1200)
PH UR: 7 — SIGNIFICANT CHANGE UP (ref 5–8)
PHOSPHATE SERPL-MCNC: 3.1 MG/DL — SIGNIFICANT CHANGE UP (ref 2.1–4.9)
PHOSPHATE SERPL-MCNC: 3.4 MG/DL — SIGNIFICANT CHANGE UP (ref 2.1–4.9)
PLATELET # BLD AUTO: 262 K/UL — SIGNIFICANT CHANGE UP (ref 130–400)
PLATELET # BLD AUTO: 322 K/UL — SIGNIFICANT CHANGE UP (ref 130–400)
PLATELET # BLD AUTO: 369 K/UL — SIGNIFICANT CHANGE UP (ref 130–400)
POTASSIUM SERPL-MCNC: 3.6 MMOL/L — SIGNIFICANT CHANGE UP (ref 3.5–5)
POTASSIUM SERPL-MCNC: 4 MMOL/L — SIGNIFICANT CHANGE UP (ref 3.5–5)
POTASSIUM SERPL-MCNC: 4.1 MMOL/L — SIGNIFICANT CHANGE UP (ref 3.5–5)
POTASSIUM SERPL-MCNC: 4.2 MMOL/L — SIGNIFICANT CHANGE UP (ref 3.5–5)
POTASSIUM SERPL-SCNC: 3.6 MMOL/L — SIGNIFICANT CHANGE UP (ref 3.5–5)
POTASSIUM SERPL-SCNC: 4 MMOL/L — SIGNIFICANT CHANGE UP (ref 3.5–5)
POTASSIUM SERPL-SCNC: 4.1 MMOL/L — SIGNIFICANT CHANGE UP (ref 3.5–5)
POTASSIUM SERPL-SCNC: 4.2 MMOL/L — SIGNIFICANT CHANGE UP (ref 3.5–5)
PROT UR-MCNC: SIGNIFICANT CHANGE UP
RBC # BLD: 3.12 M/UL — LOW (ref 4.7–6.1)
RBC # BLD: 3.16 M/UL — LOW (ref 4.7–6.1)
RBC # BLD: 3.51 M/UL — LOW (ref 4.7–6.1)
RBC # FLD: 14.8 % — HIGH (ref 11.5–14.5)
RBC # FLD: 15 % — HIGH (ref 11.5–14.5)
RBC # FLD: 15.1 % — HIGH (ref 11.5–14.5)
SODIUM SERPL-SCNC: 137 MMOL/L — SIGNIFICANT CHANGE UP (ref 135–146)
SODIUM SERPL-SCNC: 140 MMOL/L — SIGNIFICANT CHANGE UP (ref 135–146)
SODIUM SERPL-SCNC: 142 MMOL/L — SIGNIFICANT CHANGE UP (ref 135–146)
SODIUM SERPL-SCNC: 144 MMOL/L — SIGNIFICANT CHANGE UP (ref 135–146)
SP GR SPEC: 1.02 — SIGNIFICANT CHANGE UP (ref 1.01–1.03)
UROBILINOGEN FLD QL: ABNORMAL
WBC # BLD: 10.76 K/UL — SIGNIFICANT CHANGE UP (ref 4.8–10.8)
WBC # BLD: 11.12 K/UL — HIGH (ref 4.8–10.8)
WBC # BLD: 7.97 K/UL — SIGNIFICANT CHANGE UP (ref 4.8–10.8)
WBC # FLD AUTO: 10.76 K/UL — SIGNIFICANT CHANGE UP (ref 4.8–10.8)
WBC # FLD AUTO: 11.12 K/UL — HIGH (ref 4.8–10.8)
WBC # FLD AUTO: 7.97 K/UL — SIGNIFICANT CHANGE UP (ref 4.8–10.8)

## 2021-05-17 PROCEDURE — 71045 X-RAY EXAM CHEST 1 VIEW: CPT | Mod: 26

## 2021-05-17 PROCEDURE — 99291 CRITICAL CARE FIRST HOUR: CPT

## 2021-05-17 PROCEDURE — 99232 SBSQ HOSP IP/OBS MODERATE 35: CPT

## 2021-05-17 RX ORDER — LACTULOSE 10 G/15ML
10 SOLUTION ORAL EVERY 8 HOURS
Refills: 0 | Status: DISCONTINUED | OUTPATIENT
Start: 2021-05-17 | End: 2021-05-17

## 2021-05-17 RX ORDER — CLEVIDIPINE BUTYRATE 50MG/100ML
1 VIAL (ML) INTRAVENOUS
Qty: 50 | Refills: 0 | Status: DISCONTINUED | OUTPATIENT
Start: 2021-05-17 | End: 2021-05-20

## 2021-05-17 RX ORDER — HEPARIN SODIUM 5000 [USP'U]/ML
5000 INJECTION INTRAVENOUS; SUBCUTANEOUS EVERY 8 HOURS
Refills: 0 | Status: DISCONTINUED | OUTPATIENT
Start: 2021-05-17 | End: 2021-05-26

## 2021-05-17 RX ORDER — POTASSIUM CHLORIDE 20 MEQ
20 PACKET (EA) ORAL
Refills: 0 | Status: COMPLETED | OUTPATIENT
Start: 2021-05-17 | End: 2021-05-17

## 2021-05-17 RX ORDER — TOLVAPTAN 15 MG/1
15 TABLET ORAL DAILY
Refills: 0 | Status: DISCONTINUED | OUTPATIENT
Start: 2021-05-17 | End: 2021-05-19

## 2021-05-17 RX ORDER — PANTOPRAZOLE SODIUM 20 MG/1
40 TABLET, DELAYED RELEASE ORAL DAILY
Refills: 0 | Status: DISCONTINUED | OUTPATIENT
Start: 2021-05-17 | End: 2021-06-07

## 2021-05-17 RX ORDER — MIDAZOLAM HYDROCHLORIDE 1 MG/ML
2 INJECTION, SOLUTION INTRAMUSCULAR; INTRAVENOUS ONCE
Refills: 0 | Status: DISCONTINUED | OUTPATIENT
Start: 2021-05-17 | End: 2021-05-17

## 2021-05-17 RX ADMIN — CEFEPIME 100 MILLIGRAM(S): 1 INJECTION, POWDER, FOR SOLUTION INTRAMUSCULAR; INTRAVENOUS at 05:45

## 2021-05-17 RX ADMIN — SODIUM CHLORIDE 80 MILLILITER(S): 5 INJECTION, SOLUTION INTRAVENOUS at 22:25

## 2021-05-17 RX ADMIN — Medication 100 MILLIEQUIVALENT(S): at 21:36

## 2021-05-17 RX ADMIN — LEVETIRACETAM 420 MILLIGRAM(S): 250 TABLET, FILM COATED ORAL at 05:42

## 2021-05-17 RX ADMIN — SENNA PLUS 2 TABLET(S): 8.6 TABLET ORAL at 21:54

## 2021-05-17 RX ADMIN — Medication 2: at 12:31

## 2021-05-17 RX ADMIN — AMLODIPINE BESYLATE 10 MILLIGRAM(S): 2.5 TABLET ORAL at 05:42

## 2021-05-17 RX ADMIN — POLYETHYLENE GLYCOL 3350 17 GRAM(S): 17 POWDER, FOR SOLUTION ORAL at 12:31

## 2021-05-17 RX ADMIN — TOLVAPTAN 15 MILLIGRAM(S): 15 TABLET ORAL at 23:30

## 2021-05-17 RX ADMIN — LEVETIRACETAM 420 MILLIGRAM(S): 250 TABLET, FILM COATED ORAL at 18:02

## 2021-05-17 RX ADMIN — Medication 2: at 23:40

## 2021-05-17 RX ADMIN — SODIUM CHLORIDE 80 MILLILITER(S): 5 INJECTION, SOLUTION INTRAVENOUS at 03:21

## 2021-05-17 RX ADMIN — CHLORHEXIDINE GLUCONATE 15 MILLILITER(S): 213 SOLUTION TOPICAL at 18:01

## 2021-05-17 RX ADMIN — LACTULOSE 10 GRAM(S): 10 SOLUTION ORAL at 13:42

## 2021-05-17 RX ADMIN — CHLORHEXIDINE GLUCONATE 1 APPLICATION(S): 213 SOLUTION TOPICAL at 05:43

## 2021-05-17 RX ADMIN — SODIUM CHLORIDE 4 GRAM(S): 9 INJECTION INTRAMUSCULAR; INTRAVENOUS; SUBCUTANEOUS at 03:21

## 2021-05-17 RX ADMIN — Medication 4: at 05:49

## 2021-05-17 RX ADMIN — HEPARIN SODIUM 5000 UNIT(S): 5000 INJECTION INTRAVENOUS; SUBCUTANEOUS at 21:54

## 2021-05-17 RX ADMIN — Medication 100 MILLIEQUIVALENT(S): at 18:34

## 2021-05-17 RX ADMIN — Medication 650 MILLIGRAM(S): at 18:01

## 2021-05-17 RX ADMIN — Medication 2 DROP(S): at 06:29

## 2021-05-17 RX ADMIN — CHLORHEXIDINE GLUCONATE 15 MILLILITER(S): 213 SOLUTION TOPICAL at 05:43

## 2021-05-17 RX ADMIN — MIDAZOLAM HYDROCHLORIDE 2 MILLIGRAM(S): 1 INJECTION, SOLUTION INTRAMUSCULAR; INTRAVENOUS at 18:50

## 2021-05-17 RX ADMIN — Medication 2 DROP(S): at 13:42

## 2021-05-17 RX ADMIN — PANTOPRAZOLE SODIUM 40 MILLIGRAM(S): 20 TABLET, DELAYED RELEASE ORAL at 12:31

## 2021-05-17 RX ADMIN — FENTANYL CITRATE 25 MICROGRAM(S): 50 INJECTION INTRAVENOUS at 18:47

## 2021-05-17 RX ADMIN — Medication 2 MG/HR: at 18:34

## 2021-05-17 RX ADMIN — HEPARIN SODIUM 5000 UNIT(S): 5000 INJECTION INTRAVENOUS; SUBCUTANEOUS at 13:42

## 2021-05-17 RX ADMIN — Medication 2 DROP(S): at 22:24

## 2021-05-17 RX ADMIN — DEXMEDETOMIDINE HYDROCHLORIDE IN 0.9% SODIUM CHLORIDE 1.99 MICROGRAM(S)/KG/HR: 4 INJECTION INTRAVENOUS at 03:23

## 2021-05-17 RX ADMIN — CEFEPIME 100 MILLIGRAM(S): 1 INJECTION, POWDER, FOR SOLUTION INTRAMUSCULAR; INTRAVENOUS at 18:01

## 2021-05-17 NOTE — PROGRESS NOTE ADULT - ASSESSMENT
Assessment: 45yM w/ PMHx of Etoh abuse and seizures on keppra (recently discharged on keppra after being in ICU for seizures). He had previous workup for seizures with a EEG that was negative. He presented s/p fall at home. 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 Underwent left isaías crani for SDH evacuation 5/7, s/p cardiac arrest in OR, s/p revision of crani 5/17. CT head showed new left cerebral hemisphere and right cerebellum infarcts. CTA showed stenosis R vertebral artery, possible venous sinus thrombosis.    Plan:  #Neuro:  - Neurochecks q1h  - Post-op management per neurosurgery  - CTV head to evaluate for possible venous thrombus  - Continue Keppra 500mg q12h    #CV:  - Keep SBP<160    #Resp:  - Ventilator management as per primary team  - SAT/SBT as tolerated  - VAP protocol  - HOB > 35 degrees  - Aspiration precautions  - Keep SaO2 > 95%    #Renal/Fluid/Electolytes:  - Strict I/Os  - Keep euvolemic  - Monitor lytes, replete as needed    #GI:  - EN as per primary    #Heme/Onc:  - SCDs, heparin PPx    #ID:  - Keep normothermic      MELANIE Banegas  Please feel free to contact for any questions or concerns. Thank you.  Extension: #[id number] Assessment: 45yM w/ PMHx of Etoh abuse and seizures on keppra (recently discharged on keppra after being in ICU for seizures). He had previous workup for seizures with a EEG that was negative. He presented s/p fall at home. 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 Underwent left isaías crani for SDH evacuation 5/7, s/p cardiac arrest in OR, s/p revision of crani 5/17. CT head showed new left cerebral hemisphere and right cerebellum infarcts. CTA showed stenosis R vertebral artery, possible venous sinus thrombosis.    Plan:  #Neuro:  - Neurochecks q1h  - Post-op management per neurosurgery  - CTV head to evaluate for possible venous thrombus  - Continue Keppra 500mg q12h    #CV:  - Keep SBP<160    #Resp:  - Ventilator management as per primary team  - SAT/SBT as tolerated  - VAP protocol  - HOB > 35 degrees  - Aspiration precautions  - Keep SaO2 > 95%    #Renal/Fluid/Electolytes:  - Na goal 140-150  - Strict I/Os  - Keep euvolemic  - Monitor lytes, replete as needed    #GI:  - EN as per primary    #Heme/Onc:  - SCDs, heparin PPx    #ID:  - Keep normothermic      MELANIE Banegas  Please feel free to contact for any questions or concerns. Thank you.  Extension: #[phone]

## 2021-05-17 NOTE — PROGRESS NOTE ADULT - ATTENDING COMMENTS
44 yo gentleman PMHx of Etoh abuse and seizures  presented s/p fall at home. 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 Underwent left isaías crani for SDH evacuation 5/7, s/p cardiac arrest in OR, s/p revision of crani 5/17. CT head showed new left cerebral hemisphere and right cerebellum infarcts. CTA showed stenosis R vertebral artery, possible venous sinus thrombosis. Agree with plan for CTV to r/o post traumatic CVST.

## 2021-05-17 NOTE — PROGRESS NOTE ADULT - SUBJECTIVE AND OBJECTIVE BOX
Specialty: Neuro Critical Care     Interval Hx: Pt underwent revision of L crani yesterday. No acute events overnight.        HPI:  TRAUMA ACTIVATION LEVEL:  Code trauma    HPI:    Patient is a 45yM w/ PMHx of Etoh abuse and seizures on keppra seen as Trauma Alert upgraded to a Code trauma during examination in the ED where the patient was found to have a GCS of 4 s/p found down unresponsive.  Trauma assessment in ED: intubated for airway protection   (06 May 2021 22:29)      Past Medical and Surgical Hx:  PAST MEDICAL & SURGICAL HISTORY:  Alcohol abuse    GERD (gastroesophageal reflux disease)    ETOH abuse    Hypomagnesemia    Seizure disorder    H/O hemorrhoidectomy        Family Hx:  FAMILY HISTORY:  Family history of essential hypertension (Father)        Allergies: No Known Allergies      ROS:   Patient unable to participate in ROS due to neurologic status      Medications Current and PRN:  MEDICATIONS  (STANDING):  amLODIPine   Tablet 10 milliGRAM(s) Oral daily  artificial tears (preservative free) Ophthalmic Solution 2 Drop(s) Both EYES three times a day  cefepime   IVPB 2000 milliGRAM(s) IV Intermittent every 12 hours  chlorhexidine 0.12% Liquid 15 milliLiter(s) Oral Mucosa two times a day  chlorhexidine 4% Liquid 1 Application(s) Topical <User Schedule>  dexMEDEtomidine Infusion 0.1 MICROgram(s)/kG/Hr (1.99 mL/Hr) IV Continuous <Continuous>  heparin   Injectable 5000 Unit(s) SubCutaneous every 8 hours  insulin lispro (ADMELOG) corrective regimen sliding scale   SubCutaneous every 6 hours  lactulose Syrup 10 Gram(s) Oral every 8 hours  levETIRAcetam  IVPB 500 milliGRAM(s) IV Intermittent every 12 hours  pantoprazole   Suspension 40 milliGRAM(s) Oral daily  polyethylene glycol 3350 17 Gram(s) Oral daily  propranolol 20 milliGRAM(s) Oral every 8 hours  senna 2 Tablet(s) Oral at bedtime  sodium chloride 3%. 500 milliLiter(s) (80 mL/Hr) IV Continuous <Continuous>    MEDICATIONS  (PRN):  acetaminophen   Tablet .. 650 milliGRAM(s) Oral every 6 hours PRN Temp greater or equal to 38.5C (101.3F)  fentaNYL    Injectable 25 MICROGram(s) IV Push every 4 hours PRN Severe Pain (7 - 10)      Vital Signs:  ICU Vital Signs Last 24 Hrs  T(C): 37.8 (17 May 2021 13:00), Max: 38 (17 May 2021 12:00)  T(F): 100.1 (17 May 2021 13:00), Max: 100.4 (17 May 2021 12:00)  HR: 80 (17 May 2021 12:00) (64 - 88)  BP: 133/80 (17 May 2021 13:00) (108/68 - 133/82)  BP(mean): 101 (17 May 2021 13:00) (83 - 102)  ABP: --  ABP(mean): --  RR: 23 (17 May 2021 12:00) (17 - 24)  SpO2: 100% (17 May 2021 13:00) (99% - 100%)      Ventilator:  Mode: SIMV (Synchronized Intermittent Mandatory Ventilation)  RR (machine): 12  TV (machine): 420  PEEP: 5  PS: 5  ITime: 1  MAP: 9  PIP: 20          I/Os:  I&O's Summary    16 May 2021 07:01  -  17 May 2021 07:00  --------------------------------------------------------  IN: 3731 mL / OUT: 3125 mL / NET: 606 mL    17 May 2021 07:01  -  17 May 2021 13:29  --------------------------------------------------------  IN: 907.4 mL / OUT: 1185 mL / NET: -277.6 mL          PE:  Gen: WN/WD male lying supine in bed, on MV  Mental status: Sedated on Precedex. Opens eyes and moves LUE spontaneously. Does not follow commands  Cranial nerves: Pupils 3mm, reactive. Left gaze preference, able to cross midline. No nystagmus. Face grossly symmetric. Cough reflex intact.  Motor:  Moves LUE antigravity, moves LLE within the plane of the bed. No movement RUE, RLE  Sensation: Grimaces to noxious stimuli        Labs:      142  |  112<H>  |  9<L>  ----------------------------<  192<H>  4.1   |  21  |  <0.5<L>    Ca    7.8<L>      17 May 2021 04:30  Phos  3.4     05-  Mg     2.0     05-16    TPro  5.4<L>  /  Alb  2.9<L>  /  TBili  0.5  /  DBili  x   /  AST  40  /  ALT  60<H>  /  AlkPhos  77  05-                          9.3    7.97  )-----------( 322      ( 16 May 2021 23:30 )             27.8     PT/INR - ( 15 May 2021 23:30 )   PT: 13.70 sec;   INR: 1.19 ratio         PTT - ( 15 May 2021 23:30 )  PTT:27.5 sec  LIVER FUNCTIONS - ( 16 May 2021 11:28 )  Alb: 2.9 g/dL / Pro: 5.4 g/dL / ALK PHOS: 77 U/L / ALT: 60 U/L / AST: 40 U/L / GGT: x           ABG - ( 17 May 2021 12:21 )  pH, Arterial: 7.52  pH, Blood: x     /  pCO2: 29    /  pO2: 98    / HCO3: 24    / Base Excess: 1.7   /  SaO2: 98                  Microbiology:    Urinalysis Basic - ( 17 May 2021 11:10 )    Color: Light Yellow / Appearance: Clear / S.018 / pH: x  Gluc: x / Ketone: Negative  / Bili: Negative / Urobili: 3 mg/dL   Blood: x / Protein: Trace / Nitrite: Negative   Leuk Esterase: Negative / RBC: x / WBC x   Sq Epi: x / Non Sq Epi: x / Bacteria: x        Radiology:  EXAM:  CT BRAIN            PROCEDURE DATE:  2021            INTERPRETATION:  CLINICAL HISTORY / REASON FOR EXAM: Status post revision of craniotomy wound.    TECHNIQUE: Noncontrast head CT.  Contiguous unenhanced CT axial images of the head from the base to the vertex with coronal and sagittal reformats.    COMPARISON:  Noncontrast head CT 5/10/2021    FINDINGS:    The patient is status post interval revision of the left frontoparietal craniectomy. A scalp drain is present. Subcutaneous emphysema and overlying skin staples are noted, as well as pneumocephalus.    There is a large hematoma extending from the left extra-axial space to the subgaleal layer measuring about 3 cm in thickness. This reflects an overall decreased size of the prior exam, previously 3.4 cm.    There is interval increase in the parenchymal hematoma within the left temporal and occipital lobes with surrounding edema.    No significant interval change in the areas of acute infarct involving the left medial frontal and parietal lobes, left insula, left temporal and occipital lobes and right cerebellar hemisphere.    Developing hypoattenuation within the left basal frontal lobe potentially reflecting infarct or contusion.    No significant interval change in scattered small subarachnoid hemorrhage.    No significant interval change in a small right convexity subdural hygroma measuring 6 mm.    There is left to right midline shift of about 3 mm, not significantly changed.    The paranasal sinuses, mastoids, and globes/orbits are unremarkable.    IMPRESSION:  In comparison to the prior head CT dated 5/10/2021:    1.  Patient is status post interval revision of the left-sided craniectomy with expected postsurgical changes as described.    2.  Slightly decreased size of the hematoma extending from the left extra-axial space to the overlying scalp soft tissues, overall measuring about 3 cm in width, previously 3.4 cm.    3.  Increased size of the left temporal/occipital hematoma / hemorrhagic contusion measuring up to about 4 cm AP, previously 1.5 cm.    4.  No significant interval change in the areas of acute infarction involving the left medial frontal and parietal lobes, the left insula, the left occipital/temporal lobes and the right cerebellarhemisphere. Small foci of hemorrhage noted in the left frontal infarct (ser 2 im 13) and left occipital infarct (ser 2  im 14).    5.  Developing hypoattenuation within the left basal frontal lobe compatible with developing contusion or infarction.            PIERRE KRAMER M.D., RESIDENT RADIOLOGIST  This document has been electronically signed.  YSABEL ZIMMER MD; Attending Radiologist  This document has been electronically signed. May 16 2021  1:18PM

## 2021-05-17 NOTE — PROGRESS NOTE ADULT - SUBJECTIVE AND OBJECTIVE BOX
POD#  10,1     S/P Left Craniectomy for evac of SDH  S/p wound revision    pt seen and examined at bedside pt is intubated , sedated in precedex & fentanyl , tries to open eyes     Vital Signs Last 24 Hrs  T(C): 37.4 (17 May 2021 07:15), Max: 37.8 (16 May 2021 16:00)  T(F): 99.3 (17 May 2021 07:15), Max: 100 (16 May 2021 16:00)  HR: 64 (17 May 2021 09:00) (60 - 88)  BP: 130/78 (17 May 2021 09:00) (108/68 - 156/83)  BP(mean): 100 (17 May 2021 09:00) (83 - 111)  RR: 26 (17 May 2021 09:00) (17 - 26)  SpO2: 100% (17 May 2021 09:00) (99% - 100%)    I&O's Detail    16 May 2021 07:01  -  17 May 2021 07:00  --------------------------------------------------------  IN:    Dexmedetomidine: 214 mL    Enteral Tube Flush: 280 mL    FentaNYL: 20 mL    IV PiggyBack: 50 mL    IV PiggyBack: 50 mL    IV PiggyBack: 100 mL    Osmolite: 1050 mL    PRBCs (Packed Red Blood Cells): 269 mL    Propofol: 48 mL    sodium chloride 3%: 1650 mL  Total IN: 3731 mL    OUT:    Bulb (mL): 80 mL    Indwelling Catheter - Urethral (mL): 3045 mL  Total OUT: 3125 mL    Total NET: 606 mL      17 May 2021 07:01  -  17 May 2021 09:48  --------------------------------------------------------  IN:    Dexmedetomidine: 39.6 mL    Osmolite: 100 mL    sodium chloride 3%: 160 mL  Total IN: 299.6 mL    OUT:    Indwelling Catheter - Urethral (mL): 375 mL  Total OUT: 375 mL    Total NET: -75.4 mL        I&O's Summary    16 May 2021 07:01  -  17 May 2021 07:00  --------------------------------------------------------  IN: 3731 mL / OUT: 3125 mL / NET: 606 mL    17 May 2021 07:01  -  17 May 2021 09:48  --------------------------------------------------------  IN: 299.6 mL / OUT: 375 mL / NET: -75.4 mL        PHYSICAL EXAM:  Neurological:    Intubated , PERRL   moves LUE spontaneously   no withdrawal to RUE to noxious stimuli   no withdrawal to noxious stimuli to bilateral LE"s   head incision clean dry intact         LABS:                        9.3    7.97  )-----------( 322      ( 16 May 2021 23:30 )             27.8     05-17    142  |  112<H>  |  9<L>  ----------------------------<  192<H>  4.1   |  21  |  <0.5<L>    Ca    7.8<L>      17 May 2021 04:30  Phos  3.4     05-16  Mg     2.0     05-16    TPro  5.4<L>  /  Alb  2.9<L>  /  TBili  0.5  /  DBili  x   /  AST  40  /  ALT  60<H>  /  AlkPhos  77  05-16    PT/INR - ( 15 May 2021 23:30 )   PT: 13.70 sec;   INR: 1.19 ratio         PTT - ( 15 May 2021 23:30 )  PTT:27.5 sec        CAPILLARY BLOOD GLUCOSE      POCT Blood Glucose.: 220 mg/dL (17 May 2021 05:47)  POCT Blood Glucose.: 150 mg/dL (16 May 2021 22:50)  POCT Blood Glucose.: 171 mg/dL (16 May 2021 18:30)  POCT Blood Glucose.: 128 mg/dL (16 May 2021 11:03)      Drug Levels: [] N/A  Vancomycin Level, Trough: 4.1 ug/mL (05-13 @ 05:50)    CSF Analysis: [] N/A      Allergies    No Known Allergies    Intolerances      MEDICATIONS:  Antibiotics:  cefepime   IVPB 2000 milliGRAM(s) IV Intermittent every 12 hours    Neuro:  acetaminophen   Tablet .. 650 milliGRAM(s) Oral every 6 hours PRN  dexMEDEtomidine Infusion 0.1 MICROgram(s)/kG/Hr IV Continuous <Continuous>  fentaNYL    Injectable 25 MICROGram(s) IV Push every 4 hours PRN  levETIRAcetam  IVPB 500 milliGRAM(s) IV Intermittent every 12 hours    Anticoagulation:    OTHER:  amLODIPine   Tablet 10 milliGRAM(s) Oral daily  artificial tears (preservative free) Ophthalmic Solution 2 Drop(s) Both EYES three times a day  chlorhexidine 0.12% Liquid 15 milliLiter(s) Oral Mucosa two times a day  chlorhexidine 4% Liquid 1 Application(s) Topical <User Schedule>  insulin lispro (ADMELOG) corrective regimen sliding scale   SubCutaneous every 6 hours  pantoprazole  Injectable 40 milliGRAM(s) IV Push daily  polyethylene glycol 3350 17 Gram(s) Oral daily  propranolol 20 milliGRAM(s) Oral every 8 hours  senna 2 Tablet(s) Oral at bedtime    IVF:  sodium chloride 3%. 500 milliLiter(s) IV Continuous <Continuous>    CULTURES:  Culture Results:   No growth to date. (05-13 @ 11:19)  Culture Results:   Growth in aerobic and anaerobic bottles: Klebsiella variicola  ***Blood Panel PCR results on this specimen are available  approximately 3 hours after the Gram stain result.***  Gram stain, PCR, and/or culture results may not always  correspond due to difference in methodologies.  ************************************************************  This PCR assay was performed by multiplex PCR. This  Assay tests for 66 bacterial and resistance gene targets.  Please refer to the [facility] [employer] test directory  at [email or web address] for details. (05-11 @ 11:18)    RADIOLOGY & ADDITIONAL TESTS:      ASSESSMENT:  45y Male s/p    SUBDURAL HEMATOMA;RESPIRATORY FAILURE;FALL    ^FALL    No pertinent family history in first degree relatives    Family history of essential hypertension (Father)    Handoff    MEWS Score    No pertinent past medical history    Alcohol abuse    GERD (gastroesophageal reflux disease)    ETOH abuse    Hypomagnesemia    Seizure disorder    Subdural hematoma    S/P subdural hematoma evacuation    Subdural hematoma    Subdural hematoma    Subdural hematoma    Respiratory failure    ETOH abuse    Palliative care by specialist    Insertion of non-tunneled central venous catheter (CVC) in patient age 5 years of age or older    Craniotomy, decompressive    Decompressive craniotomy    No significant past surgical history    H/O hemorrhoidectomy    FALL    22    Respiratory failure    Fall    [email or web address]          A/p                  S/P Left Craniectomy for evac of SDH         S/p wound revision               neuro checks q 1 hour                continue sq heparin

## 2021-05-17 NOTE — PROGRESS NOTE ADULT - SUBJECTIVE AND OBJECTIVE BOX
SANDRA DAS  [id number]  45y Male    Indication for ICU admission: s/p craniectomy for large L SDH  Admit Date:21  ICU Date: 21  OR Date: 21    No Known Allergies    PAST MEDICAL & SURGICAL HISTORY:  Alcohol abuse  GERD (gastroesophageal reflux disease)  ETOH abuse  Hypomagnesemia  Seizure disorder  H/O hemorrhoidectomy    Home Medications:    24HRS EVENT:     DAY  -2mag given  -s/p OR --> revision craniotomy wound with complex closure for persistent drainage, subgaleal hematoma. Clot was evacuated, bleeding noted. Hemostasis achieved without difficulty. KIKE drain left  in subgaleal space.   -2 U PRBC intraop and immediately post op  -CT head today  -Continue antibiotics  -Follow up OR cultures  -Post op chest xray    NIGHT  - increased 3%NaCl to 80 (max)  - Na 136 > 140  - prop + fent changed to precedex  - plan for SBT in AM    DVT PTX: Hep sq  GI PTX: PPI    ***Tubes/Lines/Drains  ***  Peripheral IV  Arterial Line	L rad	                Date   L IJ TLC       OGT, ETT  Urinary Catheter		Indication: Strict I&O    Date Placed:     REVIEW OF SYSTEMS  [ ] A ten-point review of systems was otherwise negative except as noted.  [x] Due to altered mental status/intubation, subjective information were not able to be obtained from the patient. History was obtained, to the extent possible, from review of the chart and collateral sources of information.    Daily Height in cm: 177.8 (16 May 2021 08:11)    Daily Weight in k.4 (16 May 2021 06:00)    Diet, NPO with Tube Feed:   Tube Feeding Modality: Orogastric  Osmolite 1.5 Jeff  Total Volume for 24 Hours (mL): 1200  Continuous  Starting Tube Feed Rate mL per Hour: 50  Until Goal Tube Feed Rate (mL per Hour): 50  Tube Feed Duration (in Hours): 24  Tube Feed Start Time: 18:30  No Carb Prosource TF     Qty per Day:  3 (21 @ 18:14)    CURRENT MEDS:  Neurologic Medications  acetaminophen   Tablet .. 650 milliGRAM(s) Oral every 6 hours PRN Temp greater or equal to 38.5C (101.3F)  dexMEDEtomidine Infusion 0.1 MICROgram(s)/kG/Hr IV Continuous <Continuous>  fentaNYL    Injectable 25 MICROGram(s) IV Push every 4 hours PRN Severe Pain (7 - 10)  levETIRAcetam  IVPB 500 milliGRAM(s) IV Intermittent every 12 hours    Respiratory Medications    Cardiovascular Medications  amLODIPine   Tablet 10 milliGRAM(s) Oral daily  propranolol 20 milliGRAM(s) Oral every 8 hours    Gastrointestinal Medications  pantoprazole  Injectable 40 milliGRAM(s) IV Push daily  polyethylene glycol 3350 17 Gram(s) Oral daily  senna 2 Tablet(s) Oral at bedtime  sodium chloride 4 Gram(s) Oral <User Schedule>  sodium chloride 3%. 500 milliLiter(s) IV Continuous <Continuous>    Genitourinary Medications    Hematologic/Oncologic Medications    Antimicrobial/Immunologic Medications  cefepime   IVPB 2000 milliGRAM(s) IV Intermittent every 12 hours    Endocrine/Metabolic Medications  insulin lispro (ADMELOG) corrective regimen sliding scale   SubCutaneous every 6 hours    Topical/Other Medications  chlorhexidine 0.12% Liquid 15 milliLiter(s) Oral Mucosa two times a day  chlorhexidine 4% Liquid 1 Application(s) Topical <User Schedule>    ICU Vital Signs Last 24 Hrs  T(C): 36.4 (17 May 2021 00:00), Max: 37.8 (16 May 2021 16:00)  T(F): 97.6 (17 May 2021 00:00), Max: 100 (16 May 2021 16:00)  HR: 74 (17 May 2021 03:00) (60 - 81)  BP: 112/70 (17 May 2021 03:00) (108/68 - 156/83)  BP(mean): 85 (17 May 2021 03:00) (83 - 111)  RR: 23 (17 May 2021 03:00) (16 - 23)  SpO2: 99% (17 May 2021 03:00) (99% - 100%)    Mode: AC/ CMV (Assist Control/ Continuous Mandatory Ventilation)  RR (machine): 16  TV (machine): 420  FiO2: 30  PEEP: 5  ITime: 1  MAP: 14  PIP: 27    ABG - ( 17 May 2021 03:30 )  pH, Arterial: 7.51  pH, Blood: x     /  pCO2: 29    /  pO2: 106   / HCO3: 23    / Base Excess: 0.3   /  SaO2: 99          I&O's Summary  15 May 2021 07:01  -  16 May 2021 07:00  --------------------------------------------------------  IN: 3437.7 mL / OUT: 3125 mL / NET: 312.7 mL    16 May 2021 07:  -  17 May 2021 04:19  --------------------------------------------------------  IN: 3079.6 mL / OUT: 2440 mL / NET: 639.6 mL    I&O's Detail    15 May 2021 07:  -  16 May 2021 07:00  --------------------------------------------------------  IN:    Enteral Tube Flush: 240 mL    FentaNYL: 46 mL    FentaNYL: 2 mL    IV PiggyBack: 100 mL    IV PiggyBack: 200 mL    IV PiggyBack: 100 mL    Osmolite: 800 mL    PRBCs (Packed Red Blood Cells): 271 mL    Propofol: 138.7 mL    sodium chloride 3%: 1540 mL  Total IN: 3437.7 mL    OUT:    Indwelling Catheter - Urethral (mL): 3125 mL  Total OUT: 3125 mL    Total NET: 312.7 mL      16 May 2021 07:  -  17 May 2021 04:19  --------------------------------------------------------  IN:    Dexmedetomidine: 142.6 mL    Enteral Tube Flush: 220 mL    FentaNYL: 20 mL    IV PiggyBack: 50 mL    IV PiggyBack: 50 mL    IV PiggyBack: 100 mL    Osmolite: 850 mL    PRBCs (Packed Red Blood Cells): 269 mL    Propofol: 48 mL    sodium chloride 3%: 1330 mL  Total IN: 3079.6 mL    OUT:    Bulb (mL): 70 mL    Indwelling Catheter - Urethral (mL): 2370 mL  Total OUT: 2440 mL    Total NET: 639.6 mL    PHYSICAL EXAM:    General/Neuro  - RASS:   0  - GCS: = E 3  / V  (NT) / M 6  - PERRL, +corneal reflex, +gag reflex  - spontaneously moves LUE and LLE     Lungs:        - Mechanically vented: Volume A/C 16/420/5/30%  - CTABL    Cardiovascular :   - S1, S2.  Regular rate and rhythm.  Peripheral edema   - Cardiac Rhythm: Normal Sinus Rhythm    GI:   - Abdomen soft, Non-tender, Non-distended.    - OGT in place     Extremities:   - Extremities warm, pink, well-perfused. Pulses:Rt     Lt    Derm:   - Good skin turgor, no skin breakdown.      :         - Cole catheter in place.    CXR:     LABS:  CAPILLARY BLOOD GLUCOSE  POCT Blood Glucose.: 150 mg/dL (16 May 2021 22:50)  POCT Blood Glucose.: 171 mg/dL (16 May 2021 18:30)  POCT Blood Glucose.: 128 mg/dL (16 May 2021 11:03)  POCT Blood Glucose.: 121 mg/dL (16 May 2021 05:25)                          9.3    7.97  )-----------( 322      ( 16 May 2021 23:30 )             27.8           140  |  109  |  8<L>  ----------------------------<  171<H>  4.2   |  21  |  <0.5<L>    Ca    7.9<L>      16 May 2021 23:30  Phos  3.4     -  Mg     2.0         TPro  5.4<L>  /  Alb  2.9<L>  /  TBili  0.5  /  DBili  x   /  AST  40  /  ALT  60<H>  /  AlkPhos  77  -      PT/INR - ( 15 May 2021 23:30 )   PT: 13.70 sec;   INR: 1.19 ratio    PTT - ( 15 May 2021 23:30 )  PTT:27.5 sec

## 2021-05-17 NOTE — PROGRESS NOTE ADULT - ATTENDING COMMENTS
large subdural   intubated   will wean sedation   continue Vent support   continue nutrition support  continue SICU Care I

## 2021-05-17 NOTE — PROGRESS NOTE ADULT - ASSESSMENT
Assessment & Plan  45M s/p fall with large left SDH and neurological status change, level 1 for emergent craniotomy    NEURO:  Hx of EtOH abuse and prior Delerium Tremens episodes  S/P craniotomy for large left SDH  - Q1H neuro checks  - GCS 9T  - Keppra 500mg BID  - HTS 3% at 80 ml/hr  Sedation: precedex   -propofol d/c'd  - goal RASS 0 to -1   Pain control - Fent gtt d/c'd  Head of bed @ 30 degrees  Repeat Head CT #1 post op changes, resolution of MLS, stable SDH along cerebral falx and L tentorium, new trace scattered SAH along the right cerebral sulci and right cerebellar sulci and new IVP in the b/l occipital horns.  Repeat Head CT #2: development of acute infarcts in left cerebral hemisphere and right cerebellum  Repeat Head CT #3 (): s/p interval revision of left-sided craniectomy with expected post-op changes, slightly decreased hematoma from 3.4 > 3cm on left extra axial space, increased size of left temporal/occipital hematoma/hemorrhagic contusion 1.5 cm > 4cm, no significant interval changes of prior infarcts, but small foci of hemorrhage noted in left frontal and left occipital infarcts  CTA neck and Brain - no evidence acute large vessel occlusion, severe stenosis of R vertebral artery and moderate stenosis of mid-distal basilar artery, possible jugular venous thrombosis, OR :  Per attending: patient did well intraop, no complications. Clot was evacuated, bleeding noted. Hemostasis achieved without difficulty. KIKE drainleft in subgaleal space.   Operative time: ~1hr  EBL: 400cc   -Received 1U PRBC intraop   -Received 1U PRBC immediately post op   UOP: 275cc  IVF: 600cc  Patient was sedated with current 0.25 of fentanyl gtt and 10 propofol gtt. Given 20mg rocuronium at initiation of case.   Brief operative findings: revision craniotomy wound with complex closure for persistent drainage, subgaleal hematoma   Post op CTH :     RESP:   Intubated, /16/30/5   AB.51/29/106/23/99%  AM CXR  Plan for Trach next week    CARDS:   S/P cardiac arrest in OR & MTP  -Acute HTN - on Amlodipine - goal SBP <140, MAP > 65  -propranolol 20mg q8  trop neg x3    GI/NUTR:   Diet: TF (Osmolite 1.5) @ 50   GI Prophylaxis-Protonix q24h  Bowel regimen-  Senna, Miralax   -Monitor for +bowel function    /RENAL:   Cole - strict I/O  Labs:          BUN/Cr- 6/0.5  ->           Electrolytes- Na 138 // K 4.1 // Phos 3.1 //  Mg 1.6             -hypomagnesemia, repleted   Na goal 145-150   -increased 3% to 70cc/hr  -increased salt tabs to 4g q4h, NS boluses prn  -BMP, serum osm q6h    HEME/ONC:   S/P massive transfusion protocol  - 4uPRBCs 2FFP 2 platelet intraop, post op 2 PLT  5/15: 1U PRBC per neurosurgery preoperatively  : 2U PRBC intraop and post op  DVT prophylaxis: HSQ held --> plan to restart     ID:  Afebrile  WBC:4.6 -> 6.9  Antibiotics: Cefepime  -ID following    - u/a +leuk   -blood cx- Klebsiella, (pan sens)   -BAL-  Klebsiella, Serratia marcenscens  (pan sens)  -f/u blood cx  - NGTD    ENDO:  q6 POC glucose  Sliding scale insulin  A1c 5.2    DISPO: SICU   Assessment & Plan  45M s/p fall with large left SDH and neurological status change, level 1 for emergent craniotomy    NEURO:  Hx of EtOH abuse and prior Delerium Tremens episodes  S/P craniotomy for large left SDH  - Q1H neuro checks  - GCS 9T  - Keppra 500mg BID  - HTS 3% at 80 ml/hr  Sedation: precedex   -propofol d/c'd  - goal RASS 0 to -1   Pain control - Fent gtt d/c'd  Head of bed @ 30 degrees  Repeat Head CT #1 post op changes, resolution of MLS, stable SDH along cerebral falx and L tentorium, new trace scattered SAH along the right cerebral sulci and right cerebellar sulci and new IVP in the b/l occipital horns.  Repeat Head CT #2: development of acute infarcts in left cerebral hemisphere and right cerebellum  CTA neck and Brain - no evidence acute large vessel occlusion, severe stenosis of R vertebral artery and moderate stenosis of mid-distal basilar artery, possible jugular venous thrombosis, OR :  Per attending: patient did well intraop, no complications. Clot was evacuated, bleeding noted. Hemostasis achieved without difficulty. KIKE drainleft in subgaleal space.   Operative time: ~1hr  EBL: 400cc   -Received 1U PRBC intraop   -Received 1U PRBC immediately post op   UOP: 275cc  IVF: 600cc  Patient was sedated with current 0.25 of fentanyl gtt and 10 propofol gtt. Given 20mg rocuronium at initiation of case.   Brief operative findings: revision craniotomy wound with complex closure for persistent drainage, subgaleal hematoma   Post op CTH : Repeat Head CT #3 (): s/p interval revision of left-sided craniectomy with expected post-op changes, slightly decreased hematoma from 3.4 > 3cm on left extra axial space, increased size of left temporal/occipital hematoma/hemorrhagic contusion 1.5 cm > 4cm, no significant interval changes of prior infarcts, but small foci of hemorrhage noted in left frontal and left occipital infarcts    RESP:   Intubated, /16/30/5   AB.51/29/106/23/99%  AM CXR  Plan for Trach next week    CARDS:   S/P cardiac arrest in OR & MTP  -Acute HTN - on Amlodipine - goal SBP <140, MAP > 65  -propranolol 20mg q8  trop neg x3    GI/NUTR:   Diet: TF (Osmolite 1.5) @ 50   GI Prophylaxis-Protonix q24h  Bowel regimen-  Senna, Miralax   -Monitor for +bowel function    /RENAL:   Cole - strict I/O  Labs:          BUN/Cr- 6/0.5  -> 8/0.5          Electrolytes- Na 140 // K 4.2 // Phos 3.4 //  Mg 2.0             -hypomagnesemia, repleted   Na goal 145-150   -increased 3% to 80cc/hr  -increased salt tabs to 4g q4h, NS boluses prn  -BMP, serum osm q6h    HEME/ONC:   S/P massive transfusion protocol  - 4uPRBCs 2FFP 2 platelet intraop, post op 2 PLT  5/15: 1U PRBC per neurosurgery preoperatively  : 2U PRBC intraop and post op  - H&H: 11.1/33.4 > 9.3/29.8  DVT prophylaxis: HSQ held --> plan to restart     ID:  Afebrile  WBC:4.6 -> 6.9  Antibiotics: Cefepime  -ID following    - u/a +leuk   -blood cx- Klebsiella, (pan sens)   -BAL-  Klebsiella, Serratia marcenscens  (pan sens)  -f/u blood cx  - NGTD    ENDO:  q6 POC glucose  Sliding scale insulin  A1c 5.2    DISPO: Case discussed with Dr. Avila and patient requires SICU-level care

## 2021-05-17 NOTE — PROGRESS NOTE ADULT - ASSESSMENT
ASSESSMENT  45yM w/ PMHx of Etoh abuse and seizures on keppra  found down unresponsive admitted 5/6. s/p craniectomy for large L SDH    IMPRESSION  #Klebsiella bacteremia , BAL also with Klebsiella but no PNA on imaging (CT/CXR)    5/13 BCX NGTD     5/11 BCX kleb variicola (S)    5/11 BAL   Moderate Klebsiella variicola &  Moderate Serratia marcescens  < from: Xray Chest 1 View- PORTABLE-Routine (05.13.21 @ 06:28) >No radiographic evidence of acute cardiopulmonary disease.    #SDH s/p craniectomy    5/16 OR WCX NG    5/16 s/p Revision craniotomy wound with complex closure for persistent drainage, subgaleal hematoma    Repeat CTH development of acute infarcts in left cerebral hemisphere and right cerebellum  #CT atelectasis   #HIV/Hep panel NR  Creatinine, Serum: <0.5 (05-14-21 @ 05:30)      RECOMMENDATIONS  - f/u OR WCX, thus far NG  - Cefepime 2g q12h IV 5/11  - trend WBC, fever curve  - Likely 7 days of ABX end 5/18    If any questions, please call or send a message on [employer]  Spectra [phone]

## 2021-05-17 NOTE — PROGRESS NOTE ADULT - SUBJECTIVE AND OBJECTIVE BOX
SANDRA DAS  45y, Male  Allergy: No Known Allergies      LOS  11d    CHIEF COMPLAINT: found down unresponsive (17 May 2021 04:17)      INTERVAL EVENTS/HPI  - afebrile, no pressors, on MV  - s/p Revision craniotomy wound with complex closure for persistent drainage, subgaleal hematoma  - T(F): , Max: 100 (05-16-21 @ 16:00)  - Tolerating medication  - WBC Count: 7.97 (05-16-21 @ 23:30)  WBC Count: 6.95 (05-16-21 @ 11:28)     - Creatinine, Serum: <0.5 (05-17-21 @ 04:30)  Creatinine, Serum: <0.5 (05-16-21 @ 23:30)       ROS  unable to obtain history secondary to patient's mental status and/or sedation     VITALS:  T(F): 99.3, Max: 100 (05-16-21 @ 16:00)  HR: 67  BP: 124/77  RR: 23Vital Signs Last 24 Hrs  T(C): 37.4 (17 May 2021 07:15), Max: 37.8 (16 May 2021 16:00)  T(F): 99.3 (17 May 2021 07:15), Max: 100 (16 May 2021 16:00)  HR: 67 (17 May 2021 08:11) (60 - 88)  BP: 124/77 (17 May 2021 07:00) (108/68 - 156/83)  BP(mean): 97 (17 May 2021 07:00) (83 - 111)  RR: 23 (17 May 2021 07:00) (17 - 24)  SpO2: 100% (17 May 2021 08:11) (99% - 100%)    PHYSICAL EXAM:  ***    FH: Non-contributory  Social Hx: Non-contributory    TESTS & MEASUREMENTS:                        9.3    7.97  )-----------( 322      ( 16 May 2021 23:30 )             27.8     05-17    142  |  112<H>  |  9<L>  ----------------------------<  192<H>  4.1   |  21  |  <0.5<L>    Ca    7.8<L>      17 May 2021 04:30  Phos  3.4     05-16  Mg     2.0     05-16    TPro  5.4<L>  /  Alb  2.9<L>  /  TBili  0.5  /  DBili  x   /  AST  40  /  ALT  60<H>  /  AlkPhos  77  05-16    eGFR if Non African American: 142 mL/min/1.73M2 (05-17-21 @ 04:30)  eGFR if African American: 165 mL/min/1.73M2 (05-17-21 @ 04:30)  eGFR if Non African American: 160 mL/min/1.73M2 (05-16-21 @ 23:30)  eGFR if : 186 mL/min/1.73M2 (05-16-21 @ 23:30)  eGFR if Non African American: 160 mL/min/1.73M2 (05-16-21 @ 11:28)  eGFR if : 186 mL/min/1.73M2 (05-16-21 @ 11:28)    LIVER FUNCTIONS - ( 16 May 2021 11:28 )  Alb: 2.9 g/dL / Pro: 5.4 g/dL / ALK PHOS: 77 U/L / ALT: 60 U/L / AST: 40 U/L / GGT: x               Culture - Blood (collected 05-13-21 @ 11:19)  Source: .Blood None  Preliminary Report (05-14-21 @ 22:01):    No growth to date.    Culture - Blood (collected 05-11-21 @ 11:18)  Source: .Blood None  Gram Stain (05-12-21 @ 02:53):    Growth in anaerobic bottle: Gram Negative Rods    Growth in aerobic bottle: Gram Negative Rods  Final Report (05-13-21 @ 16:09):    Growth in aerobic and anaerobic bottles: Klebsiella variicola    ***Blood Panel PCR results on this specimen are available    approximately 3 hours after the Gram stain result.***    Gram stain, PCR, and/or culture results may not always    correspond due to difference in methodologies.    ************************************************************    This PCR assay was performed by multiplex PCR. This    Assay tests for 66 bacterial and resistance gene targets.    Please refer to the [facility] test directory    at [email or web address] for details.  Organism: Blood Culture PCR  Klebsiella variicola (05-13-21 @ 16:09)  Organism: Klebsiella variicola (05-13-21 @ 16:09)      -  Amikacin: S <=16      -  Ampicillin: R <=8 These ampicillin results predict results for amoxicillin      -  Ampicillin/Sulbactam: S <=4/2 Enterobacter, Citrobacter, and Serratia may develop resistance during prolonged therapy (3-4 days)      -  Aztreonam: S <=4      -  Cefazolin: S <=2 Enterobacter, Citrobacter, and Serratia may develop resistance during prolonged therapy (3-4 days)      -  Cefepime: S <=2      -  Cefoxitin: S <=8      -  Ceftriaxone: S <=1 Enterobacter, Citrobacter, and Serratia may develop resistance during prolonged therapy      -  Ciprofloxacin: S <=0.25      -  Ertapenem: S <=0.5      -  Gentamicin: S <=2      -  Imipenem: S <=1      -  Levofloxacin: S <=0.5      -  Meropenem: S <=1      -  Piperacillin/Tazobactam: S <=8      -  Tobramycin: S <=2      -  Trimethoprim/Sulfamethoxazole: S <=0.5/9.5      Method Type: AISHA  Organism: Blood Culture PCR (05-13-21 @ 16:09)      -  Klebsiella pneumoniae: Detec      Method Type: PCR    Culture - Bronchial (collected 05-11-21 @ 10:12)  Source: Bronch Wash Bronchoalveolar Lavage  Gram Stain (05-12-21 @ 07:04):    Few polymorphonuclear leukocytes per low power field    No Squamous epithelial cells per low power field    Few Gram Positive Rods per oil power field  Final Report (05-13-21 @ 16:19):    Moderate Klebsiella variicola    Moderate Serratia marcescens    Normal Respiratory Liz present  Organism: Klebsiella variicola  Serratia marcescens (05-13-21 @ 16:19)  Organism: Serratia marcescens (05-13-21 @ 16:19)      -  Amikacin: S <=16      -  Amoxicillin/Clavulanic Acid: R >16/8      -  Ampicillin: R <=8 These ampicillin results predict results for amoxicillin      -  Ampicillin/Sulbactam: R 16/8 Enterobacter, Citrobacter, and Serratia may develop resistance during prolonged therapy (3-4 days)      -  Aztreonam: S <=4      -  Cefazolin: R >16 Enterobacter, Citrobacter, and Serratia may develop resistance during prolonged therapy (3-4 days)      -  Cefepime: S <=2      -  Cefoxitin: R <=8      -  Ceftriaxone: S <=1 Enterobacter, Citrobacter, and Serratia may develop resistance during prolonged therapy      -  Ciprofloxacin: S <=0.25      -  Ertapenem: S <=0.5      -  Gentamicin: S <=2      -  Levofloxacin: S <=0.5      -  Meropenem: S <=1      -  Piperacillin/Tazobactam: S <=8      -  Tobramycin: S <=2      -  Trimethoprim/Sulfamethoxazole: S <=0.5/9.5      Method Type: AISHA  Organism: Klebsiella variicola (05-13-21 @ 16:19)      -  Amikacin: S <=16      -  Amoxicillin/Clavulanic Acid: R >16/8      -  Ampicillin: R <=8 These ampicillin results predict results for amoxicillin      -  Ampicillin/Sulbactam: I 16/8 Enterobacter, Citrobacter, and Serratia may develop resistance during prolonged therapy (3-4 days)      -  Aztreonam: R >16      -  Cefazolin: R >16 Enterobacter, Citrobacter, and Serratia may develop resistance during prolonged therapy (3-4 days)      -  Cefepime: S <=2      -  Cefoxitin: S <=8      -  Ceftriaxone: S <=1 Enterobacter, Citrobacter, and Serratia may develop resistance during prolonged therapy      -  Ciprofloxacin: S <=0.25      -  Ertapenem: S <=0.5      -  Gentamicin: S <=2      -  Imipenem: S <=1      -  Levofloxacin: S <=0.5      -  Meropenem: S <=1      -  Piperacillin/Tazobactam: S <=8      -  Tobramycin: S 4      -  Trimethoprim/Sulfamethoxazole: S <=0.5/9.5      Method Type: AISHA            INFECTIOUS DISEASES TESTING  COVID-19 PCR: NotDetec (05-14-21 @ 18:58)  Vancomycin Level, Trough: 4.1 (05-13-21 @ 05:50)  HIV-1/2 Combo Result: Nonreact (05-09-21 @ 23:30)  Rapid RVP Result: NotDetec (05-06-21 @ 22:44)  COVID-19 PCR: NotDetec (04-06-21 @ 16:46)  COVID-19 PCR: NotDetec (03-29-21 @ 19:35)      INFLAMMATORY MARKERS      RADIOLOGY & ADDITIONAL TESTS:  I have personally reviewed the last available Chest xray  CXR  Xray Chest 1 View AP/PA:   EXAM:  XR CHEST 1 VIEW            PROCEDURE DATE:  05/16/2021            INTERPRETATION:  Clinical History / Reason for exam: 45-year-old male who is post-op.    Comparison : Chest radiograph performed 5/16/2021 at 5:49 AM.    Technique/Positioning: AP view.    Findings:    Support devices: Precordial leads are present.  Endotracheal tube is 4 cm above the wilman.  Enteric catheter courses below the left hemidiaphragm; the tip is excluded from the image.  Left jugular vein central venous catheter is stable, with tip in the superior vena cava.    Cardiac/mediastinum/hilum: The cardiac silhouette is magnified.    Lung parenchyma/Pleura: There are stable, bibasilar pulmonary opacities.  No pleural effusion or pneumothorax is seen.    Skeleton/soft tissues: Stable.    Impression:  1.  Support lines/tubes, as described.  2.  Stable, bibasilar pulmonary opacities.                  ZACK ARGUELLES MD; Attending Radiologist  This document has been electronically signed. May 16 2021 12:42PM (05-16-21 @ 10:54)      CT      CARDIOLOGY TESTING  12 Lead ECG:   Ventricular Rate 78 BPM    Atrial Rate 78 BPM    P-R Interval 162 ms    QRS Duration 70 ms    Q-T Interval 364 ms    QTC Calculation(Bazett) 414 ms    P Axis 55 degrees    R Axis 30 degrees    T Axis 62 degrees    Diagnosis Line Normal sinus rhythm  Normal ECG    Confirmed by SALAS WASHINGTON MD ([id number]) on 5/14/2021 7:01:28 AM (05-14-21 @ 04:52)  12 Lead ECG:   Systolic  mmHg    Diastolic BP 60 mmHg    Ventricular Rate 116 BPM    Atrial Rate 116 BPM    P-R Interval 152 ms    QRS Duration 86 ms    Q-T Interval 364 ms    QTC Calculation(Bazett) 505 ms    P Axis 58 degrees    R Axis 59 degrees    T Axis 53 degrees    Diagnosis Line Sinus tachycardia  Possible Anterior infarct , age undetermined  Abnormal ECG    Confirmed by XIAO MD, SAIMA ([id number]) on 5/7/2021 5:08:50 AM (05-07-21 @ 01:08)      MEDICATIONS  amLODIPine   Tablet 10 Oral daily  artificial tears (preservative free) Ophthalmic Solution 2 Both EYES three times a day  cefepime   IVPB 2000 IV Intermittent every 12 hours  chlorhexidine 0.12% Liquid 15 Oral Mucosa two times a day  chlorhexidine 4% Liquid 1 Topical <User Schedule>  dexMEDEtomidine Infusion 0.1 IV Continuous <Continuous>  insulin lispro (ADMELOG) corrective regimen sliding scale  SubCutaneous every 6 hours  levETIRAcetam  IVPB 500 IV Intermittent every 12 hours  pantoprazole  Injectable 40 IV Push daily  polyethylene glycol 3350 17 Oral daily  propranolol 20 Oral every 8 hours  senna 2 Oral at bedtime  sodium chloride 3%. 500 IV Continuous <Continuous>      WEIGHT  Weight (kg): 79.4 (05-16-21 @ 08:11)  Creatinine, Serum: <0.5 mg/dL (05-17-21 @ 04:30)  Creatinine, Serum: <0.5 mg/dL (05-16-21 @ 23:30)  Creatinine, Serum: <0.5 mg/dL (05-16-21 @ 11:28)      ANTIBIOTICS:  cefepime   IVPB 2000 milliGRAM(s) IV Intermittent every 12 hours      All available historical records have been reviewed       SANDRA DAS  45y, Male  Allergy: No Known Allergies      LOS  11d    CHIEF COMPLAINT: found down unresponsive (17 May 2021 04:17)      INTERVAL EVENTS/HPI  - afebrile, no pressors, on MV  - s/p Revision craniotomy wound with complex closure for persistent drainage, subgaleal hematoma  - T(F): , Max: 100 (05-16-21 @ 16:00)  - Tolerating medication  - WBC Count: 7.97 (05-16-21 @ 23:30)  WBC Count: 6.95 (05-16-21 @ 11:28)     - Creatinine, Serum: <0.5 (05-17-21 @ 04:30)  Creatinine, Serum: <0.5 (05-16-21 @ 23:30)       ROS  unable to obtain history secondary to patient's mental status and/or sedation     VITALS:  T(F): 99.3, Max: 100 (05-16-21 @ 16:00)  HR: 67  BP: 124/77  RR: 23Vital Signs Last 24 Hrs  T(C): 37.4 (17 May 2021 07:15), Max: 37.8 (16 May 2021 16:00)  T(F): 99.3 (17 May 2021 07:15), Max: 100 (16 May 2021 16:00)  HR: 67 (17 May 2021 08:11) (60 - 88)  BP: 124/77 (17 May 2021 07:00) (108/68 - 156/83)  BP(mean): 97 (17 May 2021 07:00) (83 - 111)  RR: 23 (17 May 2021 07:00) (17 - 24)  SpO2: 100% (17 May 2021 08:11) (99% - 100%)    PHYSICAL EXAM:  General: intubated  HEENT: NCAT, dressings in place  CV: RRR  Lungs: symmetric chest expansion, decreased BS at bases  Abd: Soft  Skin: no rash  Neuro: sedated  Lines: no phlebitis     FH: Non-contributory  Social Hx: Non-contributory    TESTS & MEASUREMENTS:                        9.3    7.97  )-----------( 322      ( 16 May 2021 23:30 )             27.8     05-17    142  |  112<H>  |  9<L>  ----------------------------<  192<H>  4.1   |  21  |  <0.5<L>    Ca    7.8<L>      17 May 2021 04:30  Phos  3.4     05-16  Mg     2.0     05-16    TPro  5.4<L>  /  Alb  2.9<L>  /  TBili  0.5  /  DBili  x   /  AST  40  /  ALT  60<H>  /  AlkPhos  77  05-16    eGFR if Non African American: 142 mL/min/1.73M2 (05-17-21 @ 04:30)  eGFR if African American: 165 mL/min/1.73M2 (05-17-21 @ 04:30)  eGFR if Non African American: 160 mL/min/1.73M2 (05-16-21 @ 23:30)  eGFR if : 186 mL/min/1.73M2 (05-16-21 @ 23:30)  eGFR if Non African American: 160 mL/min/1.73M2 (05-16-21 @ 11:28)  eGFR if : 186 mL/min/1.73M2 (05-16-21 @ 11:28)    LIVER FUNCTIONS - ( 16 May 2021 11:28 )  Alb: 2.9 g/dL / Pro: 5.4 g/dL / ALK PHOS: 77 U/L / ALT: 60 U/L / AST: 40 U/L / GGT: x               Culture - Blood (collected 05-13-21 @ 11:19)  Source: .Blood None  Preliminary Report (05-14-21 @ 22:01):    No growth to date.    Culture - Blood (collected 05-11-21 @ 11:18)  Source: .Blood None  Gram Stain (05-12-21 @ 02:53):    Growth in anaerobic bottle: Gram Negative Rods    Growth in aerobic bottle: Gram Negative Rods  Final Report (05-13-21 @ 16:09):    Growth in aerobic and anaerobic bottles: Klebsiella variicola    ***Blood Panel PCR results on this specimen are available    approximately 3 hours after the Gram stain result.***    Gram stain, PCR, and/or culture results may not always    correspond due to difference in methodologies.    ************************************************************    This PCR assay was performed by multiplex PCR. This    Assay tests for 66 bacterial and resistance gene targets.    Please refer to the [facility] test directory    at [email or web address] for details.  Organism: Blood Culture PCR  Klebsiella variicola (05-13-21 @ 16:09)  Organism: Klebsiella variicola (05-13-21 @ 16:09)      -  Amikacin: S <=16      -  Ampicillin: R <=8 These ampicillin results predict results for amoxicillin      -  Ampicillin/Sulbactam: S <=4/2 Enterobacter, Citrobacter, and Serratia may develop resistance during prolonged therapy (3-4 days)      -  Aztreonam: S <=4      -  Cefazolin: S <=2 Enterobacter, Citrobacter, and Serratia may develop resistance during prolonged therapy (3-4 days)      -  Cefepime: S <=2      -  Cefoxitin: S <=8      -  Ceftriaxone: S <=1 Enterobacter, Citrobacter, and Serratia may develop resistance during prolonged therapy      -  Ciprofloxacin: S <=0.25      -  Ertapenem: S <=0.5      -  Gentamicin: S <=2      -  Imipenem: S <=1      -  Levofloxacin: S <=0.5      -  Meropenem: S <=1      -  Piperacillin/Tazobactam: S <=8      -  Tobramycin: S <=2      -  Trimethoprim/Sulfamethoxazole: S <=0.5/9.5      Method Type: AISHA  Organism: Blood Culture PCR (05-13-21 @ 16:09)      -  Klebsiella pneumoniae: Detec      Method Type: PCR    Culture - Bronchial (collected 05-11-21 @ 10:12)  Source: Bronch Wash Bronchoalveolar Lavage  Gram Stain (05-12-21 @ 07:04):    Few polymorphonuclear leukocytes per low power field    No Squamous epithelial cells per low power field    Few Gram Positive Rods per oil power field  Final Report (05-13-21 @ 16:19):    Moderate Klebsiella variicola    Moderate Serratia marcescens    Normal Respiratory Liz present  Organism: Klebsiella variicola  Serratia marcescens (05-13-21 @ 16:19)  Organism: Serratia marcescens (05-13-21 @ 16:19)      -  Amikacin: S <=16      -  Amoxicillin/Clavulanic Acid: R >16/8      -  Ampicillin: R <=8 These ampicillin results predict results for amoxicillin      -  Ampicillin/Sulbactam: R 16/8 Enterobacter, Citrobacter, and Serratia may develop resistance during prolonged therapy (3-4 days)      -  Aztreonam: S <=4      -  Cefazolin: R >16 Enterobacter, Citrobacter, and Serratia may develop resistance during prolonged therapy (3-4 days)      -  Cefepime: S <=2      -  Cefoxitin: R <=8      -  Ceftriaxone: S <=1 Enterobacter, Citrobacter, and Serratia may develop resistance during prolonged therapy      -  Ciprofloxacin: S <=0.25      -  Ertapenem: S <=0.5      -  Gentamicin: S <=2      -  Levofloxacin: S <=0.5      -  Meropenem: S <=1      -  Piperacillin/Tazobactam: S <=8      -  Tobramycin: S <=2      -  Trimethoprim/Sulfamethoxazole: S <=0.5/9.5      Method Type: AISHA  Organism: Klebsiella variicola (05-13-21 @ 16:19)      -  Amikacin: S <=16      -  Amoxicillin/Clavulanic Acid: R >16/8      -  Ampicillin: R <=8 These ampicillin results predict results for amoxicillin      -  Ampicillin/Sulbactam: I 16/8 Enterobacter, Citrobacter, and Serratia may develop resistance during prolonged therapy (3-4 days)      -  Aztreonam: R >16      -  Cefazolin: R >16 Enterobacter, Citrobacter, and Serratia may develop resistance during prolonged therapy (3-4 days)      -  Cefepime: S <=2      -  Cefoxitin: S <=8      -  Ceftriaxone: S <=1 Enterobacter, Citrobacter, and Serratia may develop resistance during prolonged therapy      -  Ciprofloxacin: S <=0.25      -  Ertapenem: S <=0.5      -  Gentamicin: S <=2      -  Imipenem: S <=1      -  Levofloxacin: S <=0.5      -  Meropenem: S <=1      -  Piperacillin/Tazobactam: S <=8      -  Tobramycin: S 4      -  Trimethoprim/Sulfamethoxazole: S <=0.5/9.5      Method Type: AISHA            INFECTIOUS DISEASES TESTING  COVID-19 PCR: NotDetec (05-14-21 @ 18:58)  Vancomycin Level, Trough: 4.1 (05-13-21 @ 05:50)  HIV-1/2 Combo Result: Nonreact (05-09-21 @ 23:30)  Rapid RVP Result: NotDetec (05-06-21 @ 22:44)  COVID-19 PCR: NotDetec (04-06-21 @ 16:46)  COVID-19 PCR: NotDetec (03-29-21 @ 19:35)      INFLAMMATORY MARKERS      RADIOLOGY & ADDITIONAL TESTS:  I have personally reviewed the last available Chest xray  CXR  Xray Chest 1 View AP/PA:   EXAM:  XR CHEST 1 VIEW            PROCEDURE DATE:  05/16/2021            INTERPRETATION:  Clinical History / Reason for exam: 45-year-old male who is post-op.    Comparison : Chest radiograph performed 5/16/2021 at 5:49 AM.    Technique/Positioning: AP view.    Findings:    Support devices: Precordial leads are present.  Endotracheal tube is 4 cm above the wilman.  Enteric catheter courses below the left hemidiaphragm; the tip is excluded from the image.  Left jugular vein central venous catheter is stable, with tip in the superior vena cava.    Cardiac/mediastinum/hilum: The cardiac silhouette is magnified.    Lung parenchyma/Pleura: There are stable, bibasilar pulmonary opacities.  No pleural effusion or pneumothorax is seen.    Skeleton/soft tissues: Stable.    Impression:  1.  Support lines/tubes, as described.  2.  Stable, bibasilar pulmonary opacities.                  ZACK ARGUELLES MD; Attending Radiologist  This document has been electronically signed. May 16 2021 12:42PM (05-16-21 @ 10:54)      CT      CARDIOLOGY TESTING  12 Lead ECG:   Ventricular Rate 78 BPM    Atrial Rate 78 BPM    P-R Interval 162 ms    QRS Duration 70 ms    Q-T Interval 364 ms    QTC Calculation(Bazett) 414 ms    P Axis 55 degrees    R Axis 30 degrees    T Axis 62 degrees    Diagnosis Line Normal sinus rhythm  Normal ECG    Confirmed by SALAS WASHINGTON MD ([id number]) on 5/14/2021 7:01:28 AM (05-14-21 @ 04:52)  12 Lead ECG:   Systolic  mmHg    Diastolic BP 60 mmHg    Ventricular Rate 116 BPM    Atrial Rate 116 BPM    P-R Interval 152 ms    QRS Duration 86 ms    Q-T Interval 364 ms    QTC Calculation(Bazett) 505 ms    P Axis 58 degrees    R Axis 59 degrees    T Axis 53 degrees    Diagnosis Line Sinus tachycardia  Possible Anterior infarct , age undetermined  Abnormal ECG    Confirmed by SAIMA HAGEN MD ([id number]) on 5/7/2021 5:08:50 AM (05-07-21 @ 01:08)      MEDICATIONS  amLODIPine   Tablet 10 Oral daily  artificial tears (preservative free) Ophthalmic Solution 2 Both EYES three times a day  cefepime   IVPB 2000 IV Intermittent every 12 hours  chlorhexidine 0.12% Liquid 15 Oral Mucosa two times a day  chlorhexidine 4% Liquid 1 Topical <User Schedule>  dexMEDEtomidine Infusion 0.1 IV Continuous <Continuous>  insulin lispro (ADMELOG) corrective regimen sliding scale  SubCutaneous every 6 hours  levETIRAcetam  IVPB 500 IV Intermittent every 12 hours  pantoprazole  Injectable 40 IV Push daily  polyethylene glycol 3350 17 Oral daily  propranolol 20 Oral every 8 hours  senna 2 Oral at bedtime  sodium chloride 3%. 500 IV Continuous <Continuous>      WEIGHT  Weight (kg): 79.4 (05-16-21 @ 08:11)  Creatinine, Serum: <0.5 mg/dL (05-17-21 @ 04:30)  Creatinine, Serum: <0.5 mg/dL (05-16-21 @ 23:30)  Creatinine, Serum: <0.5 mg/dL (05-16-21 @ 11:28)      ANTIBIOTICS:  cefepime   IVPB 2000 milliGRAM(s) IV Intermittent every 12 hours      All available historical records have been reviewed

## 2021-05-17 NOTE — CHART NOTE - NSCHARTNOTEFT_GEN_A_CORE
PALLIATIVE MEDICINE INTERDISCIPLINARY TEAM NOTE    Provider:  [   ]Social Work   [   ]          [   ] Initial visit [   ] Follow up    Family or contact name / phone #   Met with: [   ] Patient  [   ] Family  [   ] Other:    Primary Language: [   ] English [   ] Other*:                      *Interpretation provided by:    SUPPORT DIAGNOSES            (Check all that apply)  [   ] Psychosocial spiritual assessment (PSSA)  [   ] EOL issues  [   ] Cultural / spiritual concerns  [   ] Pain / suffering  [   ] Dementia / AMS  [   ] Other:  [   ] AD issues  [   ] Grief / loss / sadness  [   ] Discharge issues  [   ] Distress / coping    PSYCHOSOCIAL ASSESSMENT OF PATIENT         (Check all that apply)  [   ] Initial Assessment            [   ] Reassessment          [   ] Not Applicable this visit    Pain/suffering acuity:  [   ] None to mild (0-3)           [   ] Moderate (4-6)        [   ] High (7-10)    Mental Status:  [   ] Alert/oriented (x3)          [   ] Confused/Altered(x2/x1)         [   ] Non-resp    Functional status:  [   ] Independent w ADLs      [   ] Needs Assistance             [   ] Bedbound/Full Care    Coping:  [   ] Coping well                     [   ] Coping w/difficulty            [   ] Poor coping    Support system:  [   ] Strong                              [   ] Adequate                        [   ] Inadequate    SPIRITUAL ASSESSMENT  Faith/Spiritual practice: _Catholic__________________________    Role of organized Sikh:  [   ] Important                     [ x  ] Some (fam tradition, cultural)               [   ] None    Effects on medical care:  [   ] Yes, _____________________________________                         [ x  ] None    Cultural/Episcopalian need:  [   ] Yes, _____________________________________                         [  x ] None    Refer to Pastoral Care:  [   ] Yes           [   ] No, not at this time    SERVICE PROVIDED  [   ]PSSA                                                                             [   ]Discharge support / facilitation  [   ]AD / goals of care counseling                                  [   ]EOL / death / bereavement counseling  [   ]Counseling / support                                                [   ] Family meeting  [ x  ]Prayer / sacrament / ritual                                      [   ] Referral   [   ]Other                                                                       NOTE and Plan of Care (PoC): I will continue to provide Pastoral support to Pt and spouse

## 2021-05-18 LAB
ANION GAP SERPL CALC-SCNC: 10 MMOL/L — SIGNIFICANT CHANGE UP (ref 7–14)
ANION GAP SERPL CALC-SCNC: 10 MMOL/L — SIGNIFICANT CHANGE UP (ref 7–14)
ANION GAP SERPL CALC-SCNC: 13 MMOL/L — SIGNIFICANT CHANGE UP (ref 7–14)
APPEARANCE UR: CLEAR — SIGNIFICANT CHANGE UP
BASE EXCESS BLDA CALC-SCNC: 2.8 MMOL/L — HIGH (ref -2–2)
BILIRUB UR-MCNC: NEGATIVE — SIGNIFICANT CHANGE UP
BUN SERPL-MCNC: 5 MG/DL — LOW (ref 10–20)
BUN SERPL-MCNC: 5 MG/DL — LOW (ref 10–20)
BUN SERPL-MCNC: 6 MG/DL — LOW (ref 10–20)
CALCIUM SERPL-MCNC: 8.1 MG/DL — LOW (ref 8.5–10.1)
CALCIUM SERPL-MCNC: 8.5 MG/DL — SIGNIFICANT CHANGE UP (ref 8.5–10.1)
CALCIUM SERPL-MCNC: 8.7 MG/DL — SIGNIFICANT CHANGE UP (ref 8.5–10.1)
CHLORIDE SERPL-SCNC: 113 MMOL/L — HIGH (ref 98–110)
CHLORIDE SERPL-SCNC: 114 MMOL/L — HIGH (ref 98–110)
CHLORIDE SERPL-SCNC: 123 MMOL/L — HIGH (ref 98–110)
CO2 SERPL-SCNC: 20 MMOL/L — SIGNIFICANT CHANGE UP (ref 17–32)
CO2 SERPL-SCNC: 23 MMOL/L — SIGNIFICANT CHANGE UP (ref 17–32)
CO2 SERPL-SCNC: 24 MMOL/L — SIGNIFICANT CHANGE UP (ref 17–32)
COLOR SPEC: COLORLESS — SIGNIFICANT CHANGE UP
CORTIS AM PEAK SERPL-MCNC: 18.3 UG/DL — SIGNIFICANT CHANGE UP (ref 6–18.4)
CREAT SERPL-MCNC: 0.5 MG/DL — LOW (ref 0.7–1.5)
CREAT SERPL-MCNC: <0.5 MG/DL — LOW (ref 0.7–1.5)
CREAT SERPL-MCNC: <0.5 MG/DL — LOW (ref 0.7–1.5)
CULTURE RESULTS: SIGNIFICANT CHANGE UP
DIFF PNL FLD: SIGNIFICANT CHANGE UP
GLUCOSE BLDC GLUCOMTR-MCNC: 130 MG/DL — HIGH (ref 70–99)
GLUCOSE BLDC GLUCOMTR-MCNC: 144 MG/DL — HIGH (ref 70–99)
GLUCOSE BLDC GLUCOMTR-MCNC: 145 MG/DL — HIGH (ref 70–99)
GLUCOSE BLDC GLUCOMTR-MCNC: 149 MG/DL — HIGH (ref 70–99)
GLUCOSE SERPL-MCNC: 151 MG/DL — HIGH (ref 70–99)
GLUCOSE SERPL-MCNC: 166 MG/DL — HIGH (ref 70–99)
GLUCOSE SERPL-MCNC: 179 MG/DL — HIGH (ref 70–99)
GLUCOSE UR QL: NEGATIVE — SIGNIFICANT CHANGE UP
HCO3 BLDA-SCNC: 26 MMOL/L — SIGNIFICANT CHANGE UP (ref 23–27)
INR BLD: 1.32 RATIO — HIGH (ref 0.65–1.3)
KETONES UR-MCNC: NEGATIVE — SIGNIFICANT CHANGE UP
LEUKOCYTE ESTERASE UR-ACNC: NEGATIVE — SIGNIFICANT CHANGE UP
MAGNESIUM SERPL-MCNC: 2.1 MG/DL — SIGNIFICANT CHANGE UP (ref 1.8–2.4)
NITRITE UR-MCNC: NEGATIVE — SIGNIFICANT CHANGE UP
OSMOLALITY SERPL: 290 MOS/KG — SIGNIFICANT CHANGE UP (ref 275–300)
OSMOLALITY SERPL: 299 MOS/KG — SIGNIFICANT CHANGE UP (ref 275–300)
OSMOLALITY SERPL: 304 MOS/KG — HIGH (ref 275–300)
OSMOLALITY SERPL: 320 MOS/KG — HIGH (ref 275–300)
OSMOLALITY UR: 60 MOS/KG — SIGNIFICANT CHANGE UP (ref 50–1200)
PCO2 BLDA: 33 MMHG — LOW (ref 38–42)
PH BLDA: 7.51 — HIGH (ref 7.38–7.42)
PH UR: 6.5 — SIGNIFICANT CHANGE UP (ref 5–8)
PHOSPHATE SERPL-MCNC: 3.3 MG/DL — SIGNIFICANT CHANGE UP (ref 2.1–4.9)
PO2 BLDA: 151 MMHG — HIGH (ref 78–95)
POTASSIUM SERPL-MCNC: 3.8 MMOL/L — SIGNIFICANT CHANGE UP (ref 3.5–5)
POTASSIUM SERPL-MCNC: 3.9 MMOL/L — SIGNIFICANT CHANGE UP (ref 3.5–5)
POTASSIUM SERPL-MCNC: 4 MMOL/L — SIGNIFICANT CHANGE UP (ref 3.5–5)
POTASSIUM SERPL-SCNC: 3.8 MMOL/L — SIGNIFICANT CHANGE UP (ref 3.5–5)
POTASSIUM SERPL-SCNC: 3.9 MMOL/L — SIGNIFICANT CHANGE UP (ref 3.5–5)
POTASSIUM SERPL-SCNC: 4 MMOL/L — SIGNIFICANT CHANGE UP (ref 3.5–5)
PROT UR-MCNC: NEGATIVE — SIGNIFICANT CHANGE UP
PROTHROM AB SERPL-ACNC: 15.2 SEC — HIGH (ref 9.95–12.87)
SAO2 % BLDA: 100 % — HIGH (ref 94–98)
SODIUM SERPL-SCNC: 147 MMOL/L — HIGH (ref 135–146)
SODIUM SERPL-SCNC: 147 MMOL/L — HIGH (ref 135–146)
SODIUM SERPL-SCNC: 156 MMOL/L — HIGH (ref 135–146)
SP GR SPEC: 1 — LOW (ref 1.01–1.03)
SPECIMEN SOURCE: SIGNIFICANT CHANGE UP
UROBILINOGEN FLD QL: SIGNIFICANT CHANGE UP

## 2021-05-18 PROCEDURE — 99291 CRITICAL CARE FIRST HOUR: CPT

## 2021-05-18 PROCEDURE — 71045 X-RAY EXAM CHEST 1 VIEW: CPT | Mod: 26

## 2021-05-18 RX ORDER — SODIUM CHLORIDE 9 MG/ML
250 INJECTION INTRAMUSCULAR; INTRAVENOUS; SUBCUTANEOUS ONCE
Refills: 0 | Status: COMPLETED | OUTPATIENT
Start: 2021-05-18 | End: 2021-05-18

## 2021-05-18 RX ORDER — SODIUM CHLORIDE 9 MG/ML
500 INJECTION INTRAMUSCULAR; INTRAVENOUS; SUBCUTANEOUS ONCE
Refills: 0 | Status: COMPLETED | OUTPATIENT
Start: 2021-05-18 | End: 2021-05-18

## 2021-05-18 RX ORDER — SODIUM CHLORIDE 9 MG/ML
500 INJECTION, SOLUTION INTRAVENOUS ONCE
Refills: 0 | Status: COMPLETED | OUTPATIENT
Start: 2021-05-18 | End: 2021-05-18

## 2021-05-18 RX ORDER — SODIUM CHLORIDE 5 G/100ML
500 INJECTION, SOLUTION INTRAVENOUS
Refills: 0 | Status: DISCONTINUED | OUTPATIENT
Start: 2021-05-18 | End: 2021-05-18

## 2021-05-18 RX ORDER — MAGNESIUM SULFATE 500 MG/ML
2 VIAL (ML) INJECTION
Refills: 0 | Status: COMPLETED | OUTPATIENT
Start: 2021-05-18 | End: 2021-05-18

## 2021-05-18 RX ORDER — POTASSIUM CHLORIDE 20 MEQ
20 PACKET (EA) ORAL ONCE
Refills: 0 | Status: COMPLETED | OUTPATIENT
Start: 2021-05-18 | End: 2021-05-18

## 2021-05-18 RX ADMIN — SENNA PLUS 2 TABLET(S): 8.6 TABLET ORAL at 21:23

## 2021-05-18 RX ADMIN — Medication 50 GRAM(S): at 03:55

## 2021-05-18 RX ADMIN — Medication 650 MILLIGRAM(S): at 19:52

## 2021-05-18 RX ADMIN — CHLORHEXIDINE GLUCONATE 15 MILLILITER(S): 213 SOLUTION TOPICAL at 05:14

## 2021-05-18 RX ADMIN — AMLODIPINE BESYLATE 10 MILLIGRAM(S): 2.5 TABLET ORAL at 05:02

## 2021-05-18 RX ADMIN — Medication 2 DROP(S): at 21:15

## 2021-05-18 RX ADMIN — Medication 100 MILLIEQUIVALENT(S): at 18:52

## 2021-05-18 RX ADMIN — PANTOPRAZOLE SODIUM 40 MILLIGRAM(S): 20 TABLET, DELAYED RELEASE ORAL at 12:37

## 2021-05-18 RX ADMIN — FENTANYL CITRATE 25 MICROGRAM(S): 50 INJECTION INTRAVENOUS at 11:50

## 2021-05-18 RX ADMIN — CEFEPIME 100 MILLIGRAM(S): 1 INJECTION, POWDER, FOR SOLUTION INTRAMUSCULAR; INTRAVENOUS at 05:14

## 2021-05-18 RX ADMIN — FENTANYL CITRATE 25 MICROGRAM(S): 50 INJECTION INTRAVENOUS at 12:20

## 2021-05-18 RX ADMIN — SODIUM CHLORIDE 500 MILLILITER(S): 9 INJECTION INTRAMUSCULAR; INTRAVENOUS; SUBCUTANEOUS at 12:40

## 2021-05-18 RX ADMIN — CHLORHEXIDINE GLUCONATE 1 APPLICATION(S): 213 SOLUTION TOPICAL at 05:02

## 2021-05-18 RX ADMIN — SODIUM CHLORIDE 1500 MILLILITER(S): 9 INJECTION INTRAMUSCULAR; INTRAVENOUS; SUBCUTANEOUS at 13:42

## 2021-05-18 RX ADMIN — SODIUM CHLORIDE 500 MILLILITER(S): 9 INJECTION, SOLUTION INTRAVENOUS at 18:29

## 2021-05-18 RX ADMIN — SODIUM CHLORIDE 500 MILLILITER(S): 9 INJECTION, SOLUTION INTRAVENOUS at 17:16

## 2021-05-18 RX ADMIN — Medication 650 MILLIGRAM(S): at 13:00

## 2021-05-18 RX ADMIN — Medication 650 MILLIGRAM(S): at 23:55

## 2021-05-18 RX ADMIN — Medication 2 DROP(S): at 05:02

## 2021-05-18 RX ADMIN — LEVETIRACETAM 420 MILLIGRAM(S): 250 TABLET, FILM COATED ORAL at 05:14

## 2021-05-18 RX ADMIN — Medication 2 MG/HR: at 17:21

## 2021-05-18 RX ADMIN — HEPARIN SODIUM 5000 UNIT(S): 5000 INJECTION INTRAVENOUS; SUBCUTANEOUS at 21:29

## 2021-05-18 RX ADMIN — Medication 2 DROP(S): at 13:43

## 2021-05-18 RX ADMIN — HEPARIN SODIUM 5000 UNIT(S): 5000 INJECTION INTRAVENOUS; SUBCUTANEOUS at 05:02

## 2021-05-18 RX ADMIN — Medication 650 MILLIGRAM(S): at 12:40

## 2021-05-18 RX ADMIN — CEFEPIME 100 MILLIGRAM(S): 1 INJECTION, POWDER, FOR SOLUTION INTRAMUSCULAR; INTRAVENOUS at 17:15

## 2021-05-18 RX ADMIN — Medication 2 MG/HR: at 12:41

## 2021-05-18 RX ADMIN — LEVETIRACETAM 420 MILLIGRAM(S): 250 TABLET, FILM COATED ORAL at 17:15

## 2021-05-18 RX ADMIN — SODIUM CHLORIDE 50 MILLILITER(S): 5 INJECTION, SOLUTION INTRAVENOUS at 12:37

## 2021-05-18 RX ADMIN — TOLVAPTAN 15 MILLIGRAM(S): 15 TABLET ORAL at 12:37

## 2021-05-18 RX ADMIN — DEXMEDETOMIDINE HYDROCHLORIDE IN 0.9% SODIUM CHLORIDE 1.99 MICROGRAM(S)/KG/HR: 4 INJECTION INTRAVENOUS at 02:09

## 2021-05-18 RX ADMIN — SODIUM CHLORIDE 3000 MILLILITER(S): 9 INJECTION INTRAMUSCULAR; INTRAVENOUS; SUBCUTANEOUS at 12:37

## 2021-05-18 RX ADMIN — CHLORHEXIDINE GLUCONATE 15 MILLILITER(S): 213 SOLUTION TOPICAL at 17:15

## 2021-05-18 RX ADMIN — Medication 50 GRAM(S): at 01:19

## 2021-05-18 RX ADMIN — HEPARIN SODIUM 5000 UNIT(S): 5000 INJECTION INTRAVENOUS; SUBCUTANEOUS at 13:43

## 2021-05-18 NOTE — CHART NOTE - NSCHARTNOTEFT_GEN_A_CORE
Case discussed in Palliative Care rounds and EMR reviewed.  Palliative Care will sign off as pt. does not require ongoing symptom management and as GOC are clear.  Spouse has our contact information should she request palliative care to be re-involved.  Casey reconsult PRN [phone].

## 2021-05-18 NOTE — PROGRESS NOTE ADULT - SUBJECTIVE AND OBJECTIVE BOX
SANDRA JUANAALEC  [id number]  45y Male    Indication for ICU admission: s/p craniectomy for large L SDH  Admit Date:05-06-21  ICU Date: 05-07-21  OR Date: 05-06-21    No Known Allergies    PAST MEDICAL & SURGICAL HISTORY:  Alcohol abuse  GERD (gastroesophageal reflux disease)  ETOH abuse  Hypomagnesemia  Seizure disorder  H/O hemorrhoidectomy    Home Medications:    24HRS EVENT:  5/17  DAY  -SIMV didn't tolerate  -ok to restart HSQ  -d/w nsgy regarding starting tolvaptan -- > start as per Dr. Avila (Na 144)  -placed Right radial adilene  -repeat CT venogram to r/o sinous thrombus as per neurology    Overnight-  Cleviprex up to 8 mg/hr  First dose of Tolvaptam at 2330   ( prior to first dose)  3% Sodium chloride  continued at 80 ml./hr      DVT PTX: Hep sq  GI PTX: PPI    ***Tubes/Lines/Drains  ***  Peripheral IV  Left basilic midline 5/17  Arterial Line Right radial	Date 5/17  L IJ TLC     5/12  OGT, ETT  Urinary Catheter		Indication: Strict I&O    Date Placed: 5/6    REVIEW OF SYSTEMS  [ ] A ten-point review of systems was otherwise negative except as noted.  [x] Due to altered mental status/intubation, subjective information were not able to be obtained from the patient. History was obtained, to the extent possible, from review of the chart and collateral sources of information.     SANDRA JUANAALEC  [id number]  45y Male    Indication for ICU admission: s/p craniectomy for large L SDH  Admit Date:21  ICU Date: 21  OR Date: 21    No Known Allergies    PAST MEDICAL & SURGICAL HISTORY:  Alcohol abuse  GERD (gastroesophageal reflux disease)  ETOH abuse  Hypomagnesemia  Seizure disorder  H/O hemorrhoidectomy    Home Medications:    24HRS EVENT:    DAY  -SIMV didn't tolerate  -ok to restart HSQ  -d/w nsgy regarding starting tolvaptan -- > start as per Dr. Avila (Na 144)  -placed Right radial adilene  -repeat CT venogram to r/o sinous thrombus as per neurology    Overnight-  Cleviprex up to 8 mg/hr  First dose of Tolvaptam at 2330   ( prior to first dose)  3% Sodium chloride  continued at 80 ml./hr      DVT PTX: Hep sq  GI PTX: PPI    ***Tubes/Lines/Drains  ***  Peripheral IV  Left basilic midline   Arterial Line Right radial	Date   L IJ TLC       OGT, ETT  Urinary Catheter		Indication: Strict I&O    Date Placed:     REVIEW OF SYSTEMS  [ ] A ten-point review of systems was otherwise negative except as noted.  [x] Due to altered mental status/intubation, subjective information were not able to be obtained from the patient. History was obtained, to the extent possible, from review of the chart and collateral sources of information.    Daily     Daily     Diet, NPO with Tube Feed:   Tube Feeding Modality: Orogastric  Osmolite 1.5 Jeff  Total Volume for 24 Hours (mL): 1200  Continuous  Starting Tube Feed Rate mL per Hour: 50  Until Goal Tube Feed Rate (mL per Hour): 50  Tube Feed Duration (in Hours): 24  Tube Feed Start Time: 18:30  No Carb Prosource TF     Qty per Day:  3 (21 @ 18:14)      CURRENT MEDS:  Neurologic Medications  acetaminophen   Tablet .. 650 milliGRAM(s) Oral every 6 hours PRN Temp greater or equal to 38.5C (101.3F)  dexMEDEtomidine Infusion 0.1 MICROgram(s)/kG/Hr IV Continuous <Continuous>  fentaNYL    Injectable 25 MICROGram(s) IV Push every 4 hours PRN Severe Pain (7 - 10)  levETIRAcetam  IVPB 500 milliGRAM(s) IV Intermittent every 12 hours    Respiratory Medications    Cardiovascular Medications  amLODIPine   Tablet 10 milliGRAM(s) Oral daily  clevidipine Infusion 1 mG/Hr IV Continuous <Continuous>  propranolol 20 milliGRAM(s) Oral every 8 hours  tolvaptan 15 milliGRAM(s) Oral daily    Gastrointestinal Medications  pantoprazole   Suspension 40 milliGRAM(s) Oral daily  polyethylene glycol 3350 17 Gram(s) Oral daily  senna 2 Tablet(s) Oral at bedtime  sodium chloride 3%. 500 milliLiter(s) IV Continuous <Continuous>    Genitourinary Medications    Hematologic/Oncologic Medications  heparin   Injectable 5000 Unit(s) SubCutaneous every 8 hours    Antimicrobial/Immunologic Medications  cefepime   IVPB 2000 milliGRAM(s) IV Intermittent every 12 hours    Endocrine/Metabolic Medications  insulin lispro (ADMELOG) corrective regimen sliding scale   SubCutaneous every 6 hours    Topical/Other Medications  artificial tears (preservative free) Ophthalmic Solution 2 Drop(s) Both EYES three times a day  chlorhexidine 0.12% Liquid 15 milliLiter(s) Oral Mucosa two times a day  chlorhexidine 4% Liquid 1 Application(s) Topical <User Schedule>      ICU Vital Signs Last 24 Hrs  T(C): 36.7 (18 May 2021 07:31), Max: 38.3 (17 May 2021 17:00)  T(F): 98.1 (18 May 2021 07:31), Max: 100.9 (17 May 2021 17:00)  HR: 101 (18 May 2021 10:00) (69 - 117)  BP: 125/72 (18 May 2021 10:00) (113/61 - 153/81)  BP(mean): 92 (18 May 2021 10:00) (82 - 113)  ABP: 147/66 (18 May 2021 10:00) (126/64 - 203/83)  ABP(mean): 90 (18 May 2021 10:00) (73 - 110)  RR: 24 (18 May 2021 10:00) (19 - 38)  SpO2: 100% (18 May 2021 10:00) (100% - 100%)      Mode: AC/ CMV (Assist Control/ Continuous Mandatory Ventilation)  RR (machine): 16  TV (machine): 420  FiO2: 25  PEEP: 5  ITime: 1  MAP: 11  PIP: 30    ABG - ( 18 May 2021 04:42 )  pH, Arterial: 7.51  pH, Blood: x     /  pCO2: 32    /  pO2: 114   / HCO3: 25    / Base Excess: 2.5   /  SaO2: 100                 I&O's Summary    17 May 2021 07:  -  18 May 2021 07:00  --------------------------------------------------------  IN: 4185.3 mL / OUT: 3790 mL / NET: 395.3 mL    18 May 2021 07:  -  18 May 2021 10:39  --------------------------------------------------------  IN: 409 mL / OUT: 445 mL / NET: -36 mL      I&O's Detail    17 May 2021 07:  -  18 May 2021 07:00  --------------------------------------------------------  IN:    Clevidipine: 142 mL    Dexmedetomidine: 248.3 mL    Enteral Tube Flush: 175 mL    IV PiggyBack: 100 mL    IV PiggyBack: 100 mL    IV PiggyBack: 100 mL    IV PiggyBack: 200 mL    Osmolite: 1200 mL    sodium chloride 3%: 1920 mL  Total IN: 4185.3 mL    OUT:    Bulb (mL): 50 mL    Indwelling Catheter - Urethral (mL): 3740 mL  Total OUT: 3790 mL    Total NET: 395.3 mL      18 May 2021 07:  -  18 May 2021 10:39  --------------------------------------------------------  IN:    Clevidipine: 10 mL    Dexmedetomidine: 9 mL    Osmolite: 150 mL    sodium chloride 3%: 240 mL  Total IN: 409 mL    OUT:    Indwelling Catheter - Urethral (mL): 445 mL  Total OUT: 445 mL    Total NET: -36 mL          PHYSICAL EXAM:    General/Neuro  RASS:       -1  | Drowsy           | Not fully alert, but has sustained awakening to voice    Deficits:   unable to flex RUE. +Flexion to pain in LUE and bilateral lower extremities. Purposeful movements as well, however, not following commands  Pupils: PERRLA    Lungs:      clear to auscultation, Bilateral mechanical breath sounds    Cardiovascular : S1, S2.  Regular rate and rhythm.  Cardiac Rhythm: Normal Sinus Rhythm    GI: Abdomen soft, Non-tender, Non-distended.  Orogastric tube in place.     Extremities: Extremities warm, pink, well-perfused. Pulses:Rt     Lt    Derm: Good skin turgor, no skin breakdown.      :       Cole catheter in place.      CXR:       LABS:  CAPILLARY BLOOD GLUCOSE      POCT Blood Glucose.: 145 mg/dL (18 May 2021 05:16)  POCT Blood Glucose.: 160 mg/dL (17 May 2021 23:33)  POCT Blood Glucose.: 129 mg/dL (17 May 2021 17:17)  POCT Blood Glucose.: 177 mg/dL (17 May 2021 12:05)                          9.4    11.12 )-----------( 369      ( 17 May 2021 23:22 )             28.3       05-18    147<H>  |  113<H>  |  5<L>  ----------------------------<  166<H>  3.8   |  24  |  <0.5<L>    Ca    8.1<L>      18 May 2021 04:20  Phos  3.1     05-  Mg     1.5     -    TPro  5.4<L>  /  Alb  2.9<L>  /  TBili  0.5  /  DBili  x   /  AST  40  /  ALT  60<H>  /  AlkPhos  77  05-16      PT/INR - ( 17 May 2021 23:22 )   PT: 15.20 sec;   INR: 1.32 ratio         PTT - ( 17 May 2021 23:22 )  PTT:26.9 sec      Urinalysis Basic - ( 17 May 2021 11:10 )    Color: Light Yellow / Appearance: Clear / S.018 / pH: x  Gluc: x / Ketone: Negative  / Bili: Negative / Urobili: 3 mg/dL   Blood: x / Protein: Trace / Nitrite: Negative   Leuk Esterase: Negative / RBC: x / WBC x   Sq Epi: x / Non Sq Epi: x / Bacteria: x        Culture - Surgical Swab (collected 16 May 2021 08:54)  Source: .Surgical Swab None  Preliminary Report (17 May 2021 16:51):    Rare Coag Negative Staphylococcus

## 2021-05-18 NOTE — PROGRESS NOTE ADULT - ASSESSMENT
ASSESSMENT  45yM w/ PMHx of Etoh abuse and seizures on keppra  found down unresponsive admitted 5/6. s/p craniectomy for large L SDH    IMPRESSION  #Klebsiella bacteremia , BAL also with Klebsiella but no PNA on imaging (CT/CXR)    5/13 BCX NGTD     5/11 BCX kleb variicola (S)    5/11 BAL   Moderate Klebsiella variicola &  Moderate Serratia marcescens  < from: Xray Chest 1 View- PORTABLE-Routine (05.13.21 @ 06:28) >No radiographic evidence of acute cardiopulmonary disease.    #SDH s/p craniectomy    5/16 OR WCX   Rare Coag Negative Staphylococcus- likely skin colonizer     5/16 s/p Revision craniotomy wound with complex closure for persistent drainage, subgaleal hematoma    Repeat CTH development of acute infarcts in left cerebral hemisphere and right cerebellum  #CT atelectasis   #HIV/Hep panel NR  Creatinine, Serum: <0.5 (05-14-21 @ 05:30)      RECOMMENDATIONS  - ***  - This is a preliminary incomplete pended note, all final recommendations to follow after interview and examination of the patient.     If any questions, please call or send a message on [employer] Teams  Spectra [phone]   ASSESSMENT  45yM w/ PMHx of Etoh abuse and seizures on keppra  found down unresponsive admitted 5/6. s/p craniectomy for large L SDH    IMPRESSION  #Klebsiella bacteremia , BAL also with Klebsiella but no PNA on imaging (CT/CXR)    5/13 BCX NGTD     5/11 BCX kleb variicola (S)    5/11 BAL   Moderate Klebsiella variicola &  Moderate Serratia marcescens  < from: Xray Chest 1 View- PORTABLE-Routine (05.13.21 @ 06:28) >No radiographic evidence of acute cardiopulmonary disease.    #SDH s/p craniectomy    5/16 OR WCX   Rare Coag Negative Staphylococcus- likely skin colonizer     5/16 s/p Revision craniotomy wound with complex closure for persistent drainage, subgaleal hematoma    Repeat CTH development of acute infarcts in left cerebral hemisphere and right cerebellum  #CT atelectasis   #HIV/Hep panel NR  Creatinine, Serum: <0.5 (05-14-21 @ 05:30)      RECOMMENDATIONS  - Send BCX as fever  - Cefepime 2g q12h IV 5/11, D/C today to complete 7 days  - trend WBC, fever curve  - Per SICU/ NSYG- no concern for wound infection- if any concern would start Vanco    If any questions, please call or send a message on [employer] Teams  Spectra [phone]

## 2021-05-18 NOTE — CHART NOTE - NSCHARTNOTEFT_GEN_A_CORE
Registered Dietitian Follow-Up     Patient Profile Reviewed                           Yes [x]   No []     Nutrition History Previously Obtained        Yes []  No [x] -- still intubated, ventilated, on precedex      Pertinent Subjective: Per RN tolerating current tube feed. Passing stool.      Pertinent Medical Interventions:  s/p left craniotomy for large SDH  S/p wound revision  remains intubated   neurosx encouraging SBT daily if safe  possible plan for trach      Diet order: NPO with TF   Osmolite 1.5 @50ml/hr x24hrs      Anthropometrics:  - Ht. 70"   - Wt.  175lbs (5/6)  190lbs (5/12)  192.2lbs (5/13)   175lbs (5/16); dosing  183.8lbs (5/16)  195.1lbs (5/17) -- wt changes may be d/t fluid shifts vs bed scale error, will continue to monitor   - %wt change  - BMI 25.1 -- based on dosing   - IBW 166lbs     Pertinent Lab Data: (5/17) H/H 9.4/28.2 (5/18) Na 147, BUN 6, Cr <0.5, , A1c 5.2% (5/7)  CAPILLARY BLOOD GLUCOSE  POCT Blood Glucose.: 144 mg/dL (18 May 2021 11:33)  POCT Blood Glucose.: 145 mg/dL (18 May 2021 05:16)  POCT Blood Glucose.: 160 mg/dL (17 May 2021 23:33)  POCT Blood Glucose.: 129 mg/dL (17 May 2021 17:17)         Pertinent Meds: heparin, abx, sodium chloride 3%, admelog, fentanyl, protonix, senna, cleviprex  (running @ 10ml/hr)      Physical Findings:  - Appearance: intubated and sedated. +1 R arm, +2 R hand edema noted per RN flow sheets  - GI function: LBM 5/17   - Tubes: OGT   - Oral/Mouth cavity: NPO  - Skin: WDL      Nutrition Requirements  Weight Used: dosing wt 79.4kg Ve: 10.5 Tmax: 37.8C -- kcal adjusted backed on new data     Estimated Energy Needs    Continue []  Adjust [x]  2039kcal([id number])      Estimated Protein Needs    Continue [x]  Adjust []  95-119gm/day (1.2-1.5gm/kg act wt)      Estimated Fluid Needs        Continue [x]  Adjust []  per SICU team      Nutrient Intake: current tube feed regimen meeting 92% of pt's est kcal needs, 91% of estimated higher end protein needs         [x] Previous Nutrition Diagnosis: inadequate protein energy intake             [] Ongoing          [x] Resolved       Nutrition Intervention: enteral nutrition      Goal/Expected Outcome: Pt to meet greater than 75% of estimated needs within 3 days      Indicator/Monitoring: energy intake, body composition, NFPF, glucose profile     Recommendations:  Continue Osmolite 1.5 @ 50ml/hr x 24hrs as tolerated + 3 packets of [employer] TF to provide 1200ml formula, 1880kcal, 108g protein, 914ml free water.  Received ~284kcal yesterday provided from cleviprex. (provides 2kcal/ml lipid)

## 2021-05-18 NOTE — PROGRESS NOTE ADULT - SUBJECTIVE AND OBJECTIVE BOX
POD# 11/1    S/P Left Craniectomy for evac of SDH  S/p wound revision    Pt seen and examined at bedside. Pt remains trach'd.      Vital Signs Last 24 Hrs  T(C): 36.7 (18 May 2021 07:31), Max: 38.3 (17 May 2021 17:00)  T(F): 98.1 (18 May 2021 07:31), Max: 100.9 (17 May 2021 17:00)  HR: 92 (18 May 2021 08:56) (69 - 117)  BP: 113/65 (18 May 2021 06:00) (113/61 - 153/81)  BP(mean): 84 (18 May 2021 06:00) (82 - 113)  RR: 22 (18 May 2021 06:00) (19 - 38)  SpO2: 100% (18 May 2021 08:56) (100% - 100%)    I&O's Detail    17 May 2021 07:01  -  18 May 2021 07:00  --------------------------------------------------------  IN:    Clevidipine: 142 mL    Dexmedetomidine: 248.3 mL    Enteral Tube Flush: 175 mL    IV PiggyBack: 100 mL    IV PiggyBack: 100 mL    IV PiggyBack: 100 mL    IV PiggyBack: 200 mL    Osmolite: 1200 mL    sodium chloride 3%: 1920 mL  Total IN: 4185.3 mL    OUT:    Bulb (mL): 50 mL    Indwelling Catheter - Urethral (mL): 3740 mL  Total OUT: 3790 mL    Total NET: 395.3 mL        I&O's Summary    17 May 2021 07:01  -  18 May 2021 07:00  --------------------------------------------------------  IN: 4185.3 mL / OUT: 3790 mL / NET: 395.3 mL        REVIEW OF SYSTEMS    [ ] A ten-point review of systems was otherwise negative except as noted.  [X] Due to altered mental status/intubation, subjective information were not able to be obtained from the patient. History was obtained, to the extent possible, from review of the chart and collateral sources of information.      PHYSICAL EXAM:  Neurological:                LABS:                        9.4    11.12 )-----------( 369      ( 17 May 2021 23:22 )             28.3     05-18    147<H>  |  113<H>  |  5<L>  ----------------------------<  166<H>  3.8   |  24  |  <0.5<L>    Ca    8.1<L>      18 May 2021 04:20  Phos  3.1     05-  Mg     1.5     -    TPro  5.4<L>  /  Alb  2.9<L>  /  TBili  0.5  /  DBili  x   /  AST  40  /  ALT  60<H>  /  AlkPhos  77  05-16    PT/INR - ( 17 May 2021 23:22 )   PT: 15.20 sec;   INR: 1.32 ratio         PTT - ( 17 May 2021 23:22 )  PTT:26.9 sec  Urinalysis Basic - ( 17 May 2021 11:10 )    Color: Light Yellow / Appearance: Clear / S.018 / pH: x  Gluc: x / Ketone: Negative  / Bili: Negative / Urobili: 3 mg/dL   Blood: x / Protein: Trace / Nitrite: Negative   Leuk Esterase: Negative / RBC: x / WBC x   Sq Epi: x / Non Sq Epi: x / Bacteria: x    Allergies    No Known Allergies    Intolerances      MEDICATIONS:  Antibiotics:  cefepime   IVPB 2000 milliGRAM(s) IV Intermittent every 12 hours    Neuro:  acetaminophen   Tablet .. 650 milliGRAM(s) Oral every 6 hours PRN  dexMEDEtomidine Infusion 0.1 MICROgram(s)/kG/Hr IV Continuous <Continuous>  fentaNYL    Injectable 25 MICROGram(s) IV Push every 4 hours PRN  levETIRAcetam  IVPB 500 milliGRAM(s) IV Intermittent every 12 hours      IVF:  sodium chloride 3%. 500 milliLiter(s) IV Continuous <Continuous>      CULTURES:  Culture Results:   Rare Coag Negative Staphylococcus ( @ 08:54)  Culture Results:   No growth to date. ( @ 11:19)      RADIOLOGY & ADDITIONAL TESTS:      ASSESSMENT:  45y Male s/p    SUBDURAL HEMATOMA;RESPIRATORY FAILURE;FALL    ^FALL    No pertinent family history in first degree relatives    Family history of essential hypertension (Father)    Handoff    MEWS Score    No pertinent past medical history    Alcohol abuse    GERD (gastroesophageal reflux disease)    ETOH abuse    Hypomagnesemia    Seizure disorder    Subdural hematoma    S/P subdural hematoma evacuation    Subdural hematoma    Subdural hematoma    Subdural hematoma    Respiratory failure    ETOH abuse    Palliative care by specialist    Insertion of non-tunneled central venous catheter (CVC) in patient age 5 years of age or older    Craniotomy, decompressive    Decompressive craniotomy    No significant past surgical history    H/O hemorrhoidectomy    FALL    23    Respiratory failure    Fall    [email or web address]        PLAN:  Continue current mgmt POD# 11/1    S/P Left Craniectomy for evac of SDH  S/p wound revision    Pt seen and examined at bedside. Pt remains trach'd.      Vital Signs Last 24 Hrs  T(C): 36.7 (18 May 2021 07:31), Max: 38.3 (17 May 2021 17:00)  T(F): 98.1 (18 May 2021 07:31), Max: 100.9 (17 May 2021 17:00)  HR: 92 (18 May 2021 08:56) (69 - 117)  BP: 113/65 (18 May 2021 06:00) (113/61 - 153/81)  BP(mean): 84 (18 May 2021 06:00) (82 - 113)  RR: 22 (18 May 2021 06:00) (19 - 38)  SpO2: 100% (18 May 2021 08:56) (100% - 100%)    I&O's Detail    17 May 2021 07:01  -  18 May 2021 07:00  --------------------------------------------------------  IN:    Clevidipine: 142 mL    Dexmedetomidine: 248.3 mL    Enteral Tube Flush: 175 mL    IV PiggyBack: 100 mL    IV PiggyBack: 100 mL    IV PiggyBack: 100 mL    IV PiggyBack: 200 mL    Osmolite: 1200 mL    sodium chloride 3%: 1920 mL  Total IN: 4185.3 mL    OUT:    Bulb (mL): 50 mL    Indwelling Catheter - Urethral (mL): 3740 mL  Total OUT: 3790 mL    Total NET: 395.3 mL        I&O's Summary    17 May 2021 07:01  -  18 May 2021 07:00  --------------------------------------------------------  IN: 4185.3 mL / OUT: 3790 mL / NET: 395.3 mL        REVIEW OF SYSTEMS    [ ] A ten-point review of systems was otherwise negative except as noted.  [X] Due to altered mental status/intubation, subjective information were not able to be obtained from the patient. History was obtained, to the extent possible, from review of the chart and collateral sources of information.      PHYSICAL EXAM:  Neurological:  Eyes open  PERRL  Tracks  Does not follow commands at this time  min w/d RUE/RLE  Moves LUE/LLE spontaneously  Dressing intact  KIKE in place    LABS:                        9.4    11.12 )-----------( 369      ( 17 May 2021 23:22 )             28.3     05-18    147<H>  |  113<H>  |  5<L>  ----------------------------<  166<H>  3.8   |  24  |  <0.5<L>    Ca    8.1<L>      18 May 2021 04:20  Phos  3.1     05-17  Mg     1.5     05-    TPro  5.4<L>  /  Alb  2.9<L>  /  TBili  0.5  /  DBili  x   /  AST  40  /  ALT  60<H>  /  AlkPhos  77  05-16    PT/INR - ( 17 May 2021 23:22 )   PT: 15.20 sec;   INR: 1.32 ratio         PTT - ( 17 May 2021 23:22 )  PTT:26.9 sec  Urinalysis Basic - ( 17 May 2021 11:10 )    Color: Light Yellow / Appearance: Clear / S.018 / pH: x  Gluc: x / Ketone: Negative  / Bili: Negative / Urobili: 3 mg/dL   Blood: x / Protein: Trace / Nitrite: Negative   Leuk Esterase: Negative / RBC: x / WBC x   Sq Epi: x / Non Sq Epi: x / Bacteria: x    Allergies    No Known Allergies    Intolerances      MEDICATIONS:  Antibiotics:  cefepime   IVPB 2000 milliGRAM(s) IV Intermittent every 12 hours    Neuro:  acetaminophen   Tablet .. 650 milliGRAM(s) Oral every 6 hours PRN  dexMEDEtomidine Infusion 0.1 MICROgram(s)/kG/Hr IV Continuous <Continuous>  fentaNYL    Injectable 25 MICROGram(s) IV Push every 4 hours PRN  levETIRAcetam  IVPB 500 milliGRAM(s) IV Intermittent every 12 hours      IVF:  sodium chloride 3%. 500 milliLiter(s) IV Continuous <Continuous>      CULTURES:  Culture Results:   Rare Coag Negative Staphylococcus ( @ 08:54)  Culture Results:   No growth to date. ( @ 11:19)      RADIOLOGY & ADDITIONAL TESTS:      ASSESSMENT:  45y Male s/p    SUBDURAL HEMATOMA;RESPIRATORY FAILURE;FALL    ^FALL    No pertinent family history in first degree relatives    Family history of essential hypertension (Father)    Handoff    MEWS Score    No pertinent past medical history    Alcohol abuse    GERD (gastroesophageal reflux disease)    ETOH abuse    Hypomagnesemia    Seizure disorder    Subdural hematoma    S/P subdural hematoma evacuation    Subdural hematoma    Subdural hematoma    Subdural hematoma    Respiratory failure    ETOH abuse    Palliative care by specialist    Insertion of non-tunneled central venous catheter (CVC) in patient age 5 years of age or older    Craniotomy, decompressive    Decompressive craniotomy    No significant past surgical history    H/O hemorrhoidectomy    FALL    23    Respiratory failure    Fall    [email or web address]        PLAN:  Continue current mgmt

## 2021-05-18 NOTE — PROGRESS NOTE ADULT - SUBJECTIVE AND OBJECTIVE BOX
SANDRA DAS  45y, Male  Allergy: No Known Allergies      LOS  12d    CHIEF COMPLAINT: found down unresponsive (18 May 2021 02:43)      INTERVAL EVENTS/HPI  - No acute events overnight, tm 100.9, on MV 30% FiO2  - T(F): , Max: 100.9 (21 @ 17:00)  - WBC Count: 11.12 (21 @ 23:22)  WBC Count: 10.76 (21 @ 16:00)  - Creatinine, Serum: <0.5 (21 @ 04:20)  Creatinine, Serum: <0.5 (21 @ 23:22)       ROS  unable to obtain history secondary to patient's mental status and/or sedation     VITALS:  T(F): 100, Max: 100.9 (21 @ 17:00)  HR: 79  BP: 113/65  RR: 22Vital Signs Last 24 Hrs  T(C): 37.8 (18 May 2021 04:00), Max: 38.3 (17 May 2021 17:00)  T(F): 100 (18 May 2021 04:00), Max: 100.9 (17 May 2021 17:00)  HR: 79 (18 May 2021 06:00) (64 - 117)  BP: 113/65 (18 May 2021 06:00) (113/61 - 153/81)  BP(mean): 84 (18 May 2021 06:00) (82 - 113)  RR: 22 (18 May 2021 06:00) (19 - 38)  SpO2: 100% (18 May 2021 06:00) (100% - 100%)    PHYSICAL EXAM:  ***    FH: Non-contributory  Social Hx: Non-contributory    TESTS & MEASUREMENTS:                        9.4    11.12 )-----------( 369      ( 17 May 2021 23:22 )             28.3     -    147<H>  |  113<H>  |  5<L>  ----------------------------<  166<H>  3.8   |  24  |  <0.5<L>    Ca    8.1<L>      18 May 2021 04:20  Phos  3.1     05-17  Mg     1.5         TPro  5.4<L>  /  Alb  2.9<L>  /  TBili  0.5  /  DBili  x   /  AST  40  /  ALT  60<H>  /  AlkPhos  77      eGFR if Non African American: 160 mL/min/1.73M2 (21 @ 04:20)  eGFR if : 186 mL/min/1.73M2 (21 @ 04:20)  eGFR if Non African American: 160 mL/min/1.73M2 (21 @ 23:22)  eGFR if : 186 mL/min/1.73M2 (21 @ 23:22)  eGFR if Non African American: 160 mL/min/1.73M2 (21 @ 16:00)  eGFR if : 186 mL/min/1.73M2 (21 @ 16:00)    LIVER FUNCTIONS - ( 16 May 2021 11:28 )  Alb: 2.9 g/dL / Pro: 5.4 g/dL / ALK PHOS: 77 U/L / ALT: 60 U/L / AST: 40 U/L / GGT: x           Urinalysis Basic - ( 17 May 2021 11:10 )    Color: Light Yellow / Appearance: Clear / S.018 / pH: x  Gluc: x / Ketone: Negative  / Bili: Negative / Urobili: 3 mg/dL   Blood: x / Protein: Trace / Nitrite: Negative   Leuk Esterase: Negative / RBC: x / WBC x   Sq Epi: x / Non Sq Epi: x / Bacteria: x        Culture - Surgical Swab (collected 21 @ 08:54)  Source: .Surgical Swab None  Preliminary Report (21 @ 16:51):    Rare Coag Negative Staphylococcus    Culture - Blood (collected 21 @ 11:19)  Source: .Blood None  Preliminary Report (21 @ 22:01):    No growth to date.    Culture - Blood (collected 21 @ 11:18)  Source: .Blood None  Gram Stain (21 @ 02:53):    Growth in anaerobic bottle: Gram Negative Rods    Growth in aerobic bottle: Gram Negative Rods  Final Report (21 @ 16:09):    Growth in aerobic and anaerobic bottles: Klebsiella variicola    ***Blood Panel PCR results on this specimen are available    approximately 3 hours after the Gram stain result.***    Gram stain, PCR, and/or culture results may not always    correspond due to difference in methodologies.    ************************************************************    This PCR assay was performed by multiplex PCR. This    Assay tests for 66 bacterial and resistance gene targets.    Please refer to the [facility] [employer] test directory    at [email or web address] for details.  Organism: Blood Culture PCR  Klebsiella variicola (21 @ 16:09)  Organism: Klebsiella variicola (21 @ 16:09)      -  Amikacin: S <=16      -  Ampicillin: R <=8 These ampicillin results predict results for amoxicillin      -  Ampicillin/Sulbactam: S <=4/2 Enterobacter, Citrobacter, and Serratia may develop resistance during prolonged therapy (3-4 days)      -  Aztreonam: S <=4      -  Cefazolin: S <=2 Enterobacter, Citrobacter, and Serratia may develop resistance during prolonged therapy (3-4 days)      -  Cefepime: S <=2      -  Cefoxitin: S <=8      -  Ceftriaxone: S <=1 Enterobacter, Citrobacter, and Serratia may develop resistance during prolonged therapy      -  Ciprofloxacin: S <=0.25      -  Ertapenem: S <=0.5      -  Gentamicin: S <=2      -  Imipenem: S <=1      -  Levofloxacin: S <=0.5      -  Meropenem: S <=1      -  Piperacillin/Tazobactam: S <=8      -  Tobramycin: S <=2      -  Trimethoprim/Sulfamethoxazole: S <=0.5/9.5      Method Type: AISHA  Organism: Blood Culture PCR (21 @ 16:09)      -  Klebsiella pneumoniae: Detec      Method Type: PCR    Culture - Bronchial (collected 21 @ 10:12)  Source: Bronch Wash Bronchoalveolar Lavage  Gram Stain (21 @ 07:04):    Few polymorphonuclear leukocytes per low power field    No Squamous epithelial cells per low power field    Few Gram Positive Rods per oil power field  Final Report (21 @ 16:19):    Moderate Klebsiella variicola    Moderate Serratia marcescens    Normal Respiratory Liz present  Organism: Klebsiella hannaicola  Serratia marcescens (21 @ 16:19)  Organism: Serratia marcescens (21 @ 16:19)      -  Amikacin: S <=16      -  Amoxicillin/Clavulanic Acid: R >16/8      -  Ampicillin: R <=8 These ampicillin results predict results for amoxicillin      -  Ampicillin/Sulbactam: R 16/8 Enterobacter, Citrobacter, and Serratia may develop resistance during prolonged therapy (3-4 days)      -  Aztreonam: S <=4      -  Cefazolin: R >16 Enterobacter, Citrobacter, and Serratia may develop resistance during prolonged therapy (3-4 days)      -  Cefepime: S <=2      -  Cefoxitin: R <=8      -  Ceftriaxone: S <=1 Enterobacter, Citrobacter, and Serratia may develop resistance during prolonged therapy      -  Ciprofloxacin: S <=0.25      -  Ertapenem: S <=0.5      -  Gentamicin: S <=2      -  Levofloxacin: S <=0.5      -  Meropenem: S <=1      -  Piperacillin/Tazobactam: S <=8      -  Tobramycin: S <=2      -  Trimethoprim/Sulfamethoxazole: S <=0.5/9.5      Method Type: AISHA  Organism: Klebsiella variicola (21 @ 16:19)      -  Amikacin: S <=16      -  Amoxicillin/Clavulanic Acid: R >16/8      -  Ampicillin: R <=8 These ampicillin results predict results for amoxicillin      -  Ampicillin/Sulbactam: I 16/8 Enterobacter, Citrobacter, and Serratia may develop resistance during prolonged therapy (3-4 days)      -  Aztreonam: R >16      -  Cefazolin: R >16 Enterobacter, Citrobacter, and Serratia may develop resistance during prolonged therapy (3-4 days)      -  Cefepime: S <=2      -  Cefoxitin: S <=8      -  Ceftriaxone: S <=1 Enterobacter, Citrobacter, and Serratia may develop resistance during prolonged therapy      -  Ciprofloxacin: S <=0.25      -  Ertapenem: S <=0.5      -  Gentamicin: S <=2      -  Imipenem: S <=1      -  Levofloxacin: S <=0.5      -  Meropenem: S <=1      -  Piperacillin/Tazobactam: S <=8      -  Tobramycin: S 4      -  Trimethoprim/Sulfamethoxazole: S <=0.5/9.5      Method Type: AISHA            INFECTIOUS DISEASES TESTING  COVID-19 PCR: NotDetec (21 @ 18:58)  Vancomycin Level, Trough: 4.1 (21 @ 05:50)  HIV-1/2 Combo Result: Nonreact (21 @ 23:30)  Rapid RVP Result: NotDetec (21 @ 22:44)  COVID-19 PCR: NotDetec (21 @ 16:46)  COVID-19 PCR: NotDetec (21 @ 19:35)      INFLAMMATORY MARKERS      RADIOLOGY & ADDITIONAL TESTS:  I have personally reviewed the last available Chest xray  CXR  Xray Chest 1 View AP/PA:   EXAM:  XR CHEST 1 VIEW            PROCEDURE DATE:  2021            INTERPRETATION:  Clinical History / Reason for exam: 45-year-old male who is post-op.    Comparison : Chest radiograph performed 2021 at 5:49 AM.    Technique/Positioning: AP view.    Findings:    Support devices: Precordial leads are present.  Endotracheal tube is 4 cm above the wilman.  Enteric catheter courses below the left hemidiaphragm; the tip is excluded from the image.  Left jugular vein central venous catheter is stable, with tip in the superior vena cava.    Cardiac/mediastinum/hilum: The cardiac silhouette is magnified.    Lung parenchyma/Pleura: There are stable, bibasilar pulmonary opacities.  No pleural effusion or pneumothorax is seen.    Skeleton/soft tissues: Stable.    Impression:  1.  Support lines/tubes, as described.  2.  Stable, bibasilar pulmonary opacities.                  ZACK ARGUELLES MD; Attending Radiologist  This document has been electronically signed. May 16 2021 12:42PM (21 @ 10:54)      CT      CARDIOLOGY TESTING  12 Lead ECG:   Ventricular Rate 78 BPM    Atrial Rate 78 BPM    P-R Interval 162 ms    QRS Duration 70 ms    Q-T Interval 364 ms    QTC Calculation(Bazett) 414 ms    P Axis 55 degrees    R Axis 30 degrees    T Axis 62 degrees    Diagnosis Line Normal sinus rhythm  Normal ECG    Confirmed by SALAS WASHINGTON MD ([id number]) on 2021 7:01:28 AM (21 @ 04:52)  12 Lead ECG:   Systolic  mmHg    Diastolic BP 60 mmHg    Ventricular Rate 116 BPM    Atrial Rate 116 BPM    P-R Interval 152 ms    QRS Duration 86 ms    Q-T Interval 364 ms    QTC Calculation(Bazett) 505 ms    P Axis 58 degrees    R Axis 59 degrees    T Axis 53 degrees    Diagnosis Line Sinus tachycardia  Possible Anterior infarct , age undetermined  Abnormal ECG    Confirmed by SAIMA HAGEN MD ([id number]) on 2021 5:08:50 AM (21 @ 01:08)      MEDICATIONS  amLODIPine   Tablet 10 Oral daily  artificial tears (preservative free) Ophthalmic Solution 2 Both EYES three times a day  cefepime   IVPB 2000 IV Intermittent every 12 hours  chlorhexidine 0.12% Liquid 15 Oral Mucosa two times a day  chlorhexidine 4% Liquid 1 Topical <User Schedule>  clevidipine Infusion 1 IV Continuous <Continuous>  dexMEDEtomidine Infusion 0.1 IV Continuous <Continuous>  heparin   Injectable 5000 SubCutaneous every 8 hours  insulin lispro (ADMELOG) corrective regimen sliding scale  SubCutaneous every 6 hours  levETIRAcetam  IVPB 500 IV Intermittent every 12 hours  pantoprazole   Suspension 40 Oral daily  polyethylene glycol 3350 17 Oral daily  propranolol 20 Oral every 8 hours  senna 2 Oral at bedtime  sodium chloride 3%. 500 IV Continuous <Continuous>  tolvaptan 15 Oral daily      WEIGHT  Weight (kg): 79.4 (21 @ 08:11)  Creatinine, Serum: <0.5 mg/dL (21 @ 04:20)  Creatinine, Serum: <0.5 mg/dL (21 @ 23:22)  Creatinine, Serum: <0.5 mg/dL (21 @ 16:00)      ANTIBIOTICS:  cefepime   IVPB 2000 milliGRAM(s) IV Intermittent every 12 hours      All available historical records have been reviewed       TIHAGO SANDRA  45y, Male  Allergy: No Known Allergies      LOS  12d    CHIEF COMPLAINT: found down unresponsive (18 May 2021 02:43)      INTERVAL EVENTS/HPI  - No acute events overnight, tm 100.9, on MV 30% FiO2  - T(F): , Max: 100.9 (21 @ 17:00)  - WBC Count: 11.12 (21 @ 23:22)  WBC Count: 10.76 (21 @ 16:00)  - Creatinine, Serum: <0.5 (21 @ 04:20)  Creatinine, Serum: <0.5 (21 @ 23:22)       ROS  unable to obtain history secondary to patient's mental status and/or sedation     VITALS:  T(F): 100, Max: 100.9 (21 @ 17:00)  HR: 79  BP: 113/65  RR: 22Vital Signs Last 24 Hrs  T(C): 37.8 (18 May 2021 04:00), Max: 38.3 (17 May 2021 17:00)  T(F): 100 (18 May 2021 04:00), Max: 100.9 (17 May 2021 17:00)  HR: 79 (18 May 2021 06:00) (64 - 117)  BP: 113/65 (18 May 2021 06:00) (113/61 - 153/81)  BP(mean): 84 (18 May 2021 06:00) (82 - 113)  RR: 22 (18 May 2021 06:00) (19 - 38)  SpO2: 100% (18 May 2021 06:00) (100% - 100%)    PHYSICAL EXAM:  General: intubated  HEENT: NCAT, dressings in place  CV: RRR  Lungs: symmetric chest expansion, decreased BS at bases  Abd: Soft  Skin: no rash  Neuro: opens eyes to tactile stimulation  Lines: no phlebitis     FH: Non-contributory  Social Hx: Non-contributory    TESTS & MEASUREMENTS:                        9.4    11.12 )-----------( 369      ( 17 May 2021 23:22 )             28.3         147<H>  |  113<H>  |  5<L>  ----------------------------<  166<H>  3.8   |  24  |  <0.5<L>    Ca    8.1<L>      18 May 2021 04:20  Phos  3.1       Mg     1.5         TPro  5.4<L>  /  Alb  2.9<L>  /  TBili  0.5  /  DBili  x   /  AST  40  /  ALT  60<H>  /  AlkPhos  77      eGFR if Non African American: 160 mL/min/1.73M2 (21 @ 04:20)  eGFR if : 186 mL/min/1.73M2 (21 @ 04:20)  eGFR if Non African American: 160 mL/min/1.73M2 (21 @ 23:22)  eGFR if : 186 mL/min/1.73M2 (21 @ 23:22)  eGFR if Non African American: 160 mL/min/1.73M2 (21 @ 16:00)  eGFR if : 186 mL/min/1.73M2 (21 @ 16:00)    LIVER FUNCTIONS - ( 16 May 2021 11:28 )  Alb: 2.9 g/dL / Pro: 5.4 g/dL / ALK PHOS: 77 U/L / ALT: 60 U/L / AST: 40 U/L / GGT: x           Urinalysis Basic - ( 17 May 2021 11:10 )    Color: Light Yellow / Appearance: Clear / S.018 / pH: x  Gluc: x / Ketone: Negative  / Bili: Negative / Urobili: 3 mg/dL   Blood: x / Protein: Trace / Nitrite: Negative   Leuk Esterase: Negative / RBC: x / WBC x   Sq Epi: x / Non Sq Epi: x / Bacteria: x        Culture - Surgical Swab (collected 21 @ 08:54)  Source: .Surgical Swab None  Preliminary Report (21 @ 16:51):    Rare Coag Negative Staphylococcus    Culture - Blood (collected 21 @ 11:19)  Source: .Blood None  Preliminary Report (21 @ 22:01):    No growth to date.    Culture - Blood (collected 21 @ 11:18)  Source: .Blood None  Gram Stain (21 @ 02:53):    Growth in anaerobic bottle: Gram Negative Rods    Growth in aerobic bottle: Gram Negative Rods  Final Report (21 @ 16:09):    Growth in aerobic and anaerobic bottles: Klebsiella variicola    ***Blood Panel PCR results on this specimen are available    approximately 3 hours after the Gram stain result.***    Gram stain, PCR, and/or culture results may not always    correspond due to difference in methodologies.    ************************************************************    This PCR assay was performed by multiplex PCR. This    Assay tests for 66 bacterial and resistance gene targets.    Please refer to the [facility] [employer] test directory    at [email or web address] for details.  Organism: Blood Culture PCR  Klebsiella variicola (21 @ 16:09)  Organism: Klebsiella variicola (21 @ 16:09)      -  Amikacin: S <=16      -  Ampicillin: R <=8 These ampicillin results predict results for amoxicillin      -  Ampicillin/Sulbactam: S <=4/2 Enterobacter, Citrobacter, and Serratia may develop resistance during prolonged therapy (3-4 days)      -  Aztreonam: S <=4      -  Cefazolin: S <=2 Enterobacter, Citrobacter, and Serratia may develop resistance during prolonged therapy (3-4 days)      -  Cefepime: S <=2      -  Cefoxitin: S <=8      -  Ceftriaxone: S <=1 Enterobacter, Citrobacter, and Serratia may develop resistance during prolonged therapy      -  Ciprofloxacin: S <=0.25      -  Ertapenem: S <=0.5      -  Gentamicin: S <=2      -  Imipenem: S <=1      -  Levofloxacin: S <=0.5      -  Meropenem: S <=1      -  Piperacillin/Tazobactam: S <=8      -  Tobramycin: S <=2      -  Trimethoprim/Sulfamethoxazole: S <=0.5/9.5      Method Type: AISHA  Organism: Blood Culture PCR (21 @ 16:09)      -  Klebsiella pneumoniae: Detec      Method Type: PCR    Culture - Bronchial (collected 21 @ 10:12)  Source: Bronch Wash Bronchoalveolar Lavage  Gram Stain (21 @ 07:04):    Few polymorphonuclear leukocytes per low power field    No Squamous epithelial cells per low power field    Few Gram Positive Rods per oil power field  Final Report (21 @ 16:19):    Moderate Klebsiella variicola    Moderate Serratia marcescens    Normal Respiratory Liz present  Organism: Klebsiella variicola  Serratia marcescens (21 @ 16:19)  Organism: Serratia marcescens (21 @ 16:19)      -  Amikacin: S <=16      -  Amoxicillin/Clavulanic Acid: R >16/8      -  Ampicillin: R <=8 These ampicillin results predict results for amoxicillin      -  Ampicillin/Sulbactam: R 16/8 Enterobacter, Citrobacter, and Serratia may develop resistance during prolonged therapy (3-4 days)      -  Aztreonam: S <=4      -  Cefazolin: R >16 Enterobacter, Citrobacter, and Serratia may develop resistance during prolonged therapy (3-4 days)      -  Cefepime: S <=2      -  Cefoxitin: R <=8      -  Ceftriaxone: S <=1 Enterobacter, Citrobacter, and Serratia may develop resistance during prolonged therapy      -  Ciprofloxacin: S <=0.25      -  Ertapenem: S <=0.5      -  Gentamicin: S <=2      -  Levofloxacin: S <=0.5      -  Meropenem: S <=1      -  Piperacillin/Tazobactam: S <=8      -  Tobramycin: S <=2      -  Trimethoprim/Sulfamethoxazole: S <=0.5/9.5      Method Type: AISHA  Organism: Klebsiella variicola (21 @ 16:19)      -  Amikacin: S <=16      -  Amoxicillin/Clavulanic Acid: R >16/8      -  Ampicillin: R <=8 These ampicillin results predict results for amoxicillin      -  Ampicillin/Sulbactam: I 16/8 Enterobacter, Citrobacter, and Serratia may develop resistance during prolonged therapy (3-4 days)      -  Aztreonam: R >16      -  Cefazolin: R >16 Enterobacter, Citrobacter, and Serratia may develop resistance during prolonged therapy (3-4 days)      -  Cefepime: S <=2      -  Cefoxitin: S <=8      -  Ceftriaxone: S <=1 Enterobacter, Citrobacter, and Serratia may develop resistance during prolonged therapy      -  Ciprofloxacin: S <=0.25      -  Ertapenem: S <=0.5      -  Gentamicin: S <=2      -  Imipenem: S <=1      -  Levofloxacin: S <=0.5      -  Meropenem: S <=1      -  Piperacillin/Tazobactam: S <=8      -  Tobramycin: S 4      -  Trimethoprim/Sulfamethoxazole: S <=0.5/9.5      Method Type: AISHA            INFECTIOUS DISEASES TESTING  COVID-19 PCR: NotDetec (21 @ 18:58)  Vancomycin Level, Trough: 4.1 (21 @ 05:50)  HIV-1/2 Combo Result: Nonreact (21 @ 23:30)  Rapid RVP Result: NotDetec (21 @ 22:44)  COVID-19 PCR: NotDetec (21 @ 16:46)  COVID-19 PCR: NotDetec (21 @ 19:35)      INFLAMMATORY MARKERS      RADIOLOGY & ADDITIONAL TESTS:  I have personally reviewed the last available Chest xray  CXR  Xray Chest 1 View AP/PA:   EXAM:  XR CHEST 1 VIEW            PROCEDURE DATE:  2021            INTERPRETATION:  Clinical History / Reason for exam: 45-year-old male who is post-op.    Comparison : Chest radiograph performed 2021 at 5:49 AM.    Technique/Positioning: AP view.    Findings:    Support devices: Precordial leads are present.  Endotracheal tube is 4 cm above the wilman.  Enteric catheter courses below the left hemidiaphragm; the tip is excluded from the image.  Left jugular vein central venous catheter is stable, with tip in the superior vena cava.    Cardiac/mediastinum/hilum: The cardiac silhouette is magnified.    Lung parenchyma/Pleura: There are stable, bibasilar pulmonary opacities.  No pleural effusion or pneumothorax is seen.    Skeleton/soft tissues: Stable.    Impression:  1.  Support lines/tubes, as described.  2.  Stable, bibasilar pulmonary opacities.                  ZACK ARGUELLES MD; Attending Radiologist  This document has been electronically signed. May 16 2021 12:42PM (21 @ 10:54)      CT      CARDIOLOGY TESTING  12 Lead ECG:   Ventricular Rate 78 BPM    Atrial Rate 78 BPM    P-R Interval 162 ms    QRS Duration 70 ms    Q-T Interval 364 ms    QTC Calculation(Bazett) 414 ms    P Axis 55 degrees    R Axis 30 degrees    T Axis 62 degrees    Diagnosis Line Normal sinus rhythm  Normal ECG    Confirmed by SALAS WASHINGTON MD ([id number]) on 2021 7:01:28 AM (21 @ 04:52)  12 Lead ECG:   Systolic  mmHg    Diastolic BP 60 mmHg    Ventricular Rate 116 BPM    Atrial Rate 116 BPM    P-R Interval 152 ms    QRS Duration 86 ms    Q-T Interval 364 ms    QTC Calculation(Bazett) 505 ms    P Axis 58 degrees    R Axis 59 degrees    T Axis 53 degrees    Diagnosis Line Sinus tachycardia  Possible Anterior infarct , age undetermined  Abnormal ECG    Confirmed by SAIMA HAGEN MD ([id number]) on 2021 5:08:50 AM (21 @ 01:08)      MEDICATIONS  amLODIPine   Tablet 10 Oral daily  artificial tears (preservative free) Ophthalmic Solution 2 Both EYES three times a day  cefepime   IVPB 2000 IV Intermittent every 12 hours  chlorhexidine 0.12% Liquid 15 Oral Mucosa two times a day  chlorhexidine 4% Liquid 1 Topical <User Schedule>  clevidipine Infusion 1 IV Continuous <Continuous>  dexMEDEtomidine Infusion 0.1 IV Continuous <Continuous>  heparin   Injectable 5000 SubCutaneous every 8 hours  insulin lispro (ADMELOG) corrective regimen sliding scale  SubCutaneous every 6 hours  levETIRAcetam  IVPB 500 IV Intermittent every 12 hours  pantoprazole   Suspension 40 Oral daily  polyethylene glycol 3350 17 Oral daily  propranolol 20 Oral every 8 hours  senna 2 Oral at bedtime  sodium chloride 3%. 500 IV Continuous <Continuous>  tolvaptan 15 Oral daily      WEIGHT  Weight (kg): 79.4 (21 @ 08:11)  Creatinine, Serum: <0.5 mg/dL (21 @ 04:20)  Creatinine, Serum: <0.5 mg/dL (21 @ 23:22)  Creatinine, Serum: <0.5 mg/dL (21 @ 16:00)      ANTIBIOTICS:  cefepime   IVPB 2000 milliGRAM(s) IV Intermittent every 12 hours      All available historical records have been reviewed

## 2021-05-18 NOTE — PROGRESS NOTE ADULT - ATTENDING COMMENTS
open eyes   breathing over the vent settings   continue Vent support   continue nutrition support   hold sedation   continue SICU Care

## 2021-05-18 NOTE — PROGRESS NOTE ADULT - ASSESSMENT
Assessment & Plan  45M s/p fall with large left SDH and neurological status change, level 1 for emergent craniotomy    NEURO:  Hx of EtOH abuse and prior Delerium Tremens episodes  S/P craniotomy for large left SDH  - Q1H neuro checks  - GCS 11T post op 5/16  - Keppra 500mg BID  - HTS 3% at 80 ml/hr  - Tolvaptam daily  Sedation: precedex  - goal RASS 0 to -1   Pain control - prn low dose fentanyl IVP  Head of bed @ 30 degrees  Repeat Head CT #1 post op changes, resolution of MLS, stable SDH along cerebral falx and L tentorium, new trace scattered SAH along the right cerebral sulci and right cerebellar sulci and new IVP in the b/l occipital horns.  Repeat Head CT #2: development of acute infarcts in left cerebral hemisphere and right cerebellum  CTA neck and Brain - no evidence acute large vessel occlusion, severe stenosis of R vertebral artery and moderate stenosis of mid-distal basilar artery, possible jugular venous thrombosis, NCHCT recommended for left convexity subdural collection, and increasing edema around L temporal/occipital parenchemal hemorrhage  OR 5/16:  Clot was evacuated, bleeding noted. Hemostasis achieved without difficulty. IKKE drain left in subgaleal space. Brief operative findings: revision craniotomy wound with complex closure for persistent drainage, subgaleal hematoma   Post op CTH 5/16: expected post-op changes  --repeat CT venogram to r/o sinous thrombus as per neurology     RESP:   Intubated, /16/30/5   ABG:   -SIMV didn't tolerate  AM CXR  Plan for Trach next week if not extubated    CARDS:   S/P cardiac arrest in OR & MTPA  -Acute HTN - on Clevidipine  goal -160  MAP > 65  -Amlodipine  -propranolol 20mg q8  trop neg x3    GI/NUTR:   Diet: TF (Osmolite 1.5) @ 50   GI Prophylaxis-Protonix q24h  Bowel regimen-  Senna   +BM x 3 on 5/17- held Miralax    /RENAL:   Cole - strict I/O  Labs:          BUN/Cr- 6/0.5  -> 5/0.5          Electrolytes- Na 137 // K 4.0 // Phos 3.1 //  Mg 1.5             -hypomagnesemia, repleted with 4 gms magnesium sulfate  Na goal 145-150   -increased 3% to 80cc/hr  -Started Tolvaptam- first dose 5/17  -BMP, serum osm q6h    HEME/ONC:   S/P massive transfusion protocol  - 4uPRBCs 2FFP 2 platelet intraop, post op 2 PLT  5/15: 1U PRBC pre- operatively  5/16: 2U PRBC intraop and post op  DVT prophylaxis: HSQ  restarted 5/17    ID:  Afebrile  WBC:4.6 -> 6.9  Antibiotics: Cefepime  -ID following  -likely will d/c abx on 5/18 5/11- u/a +leuk   -blood cx- Klebsiella, (pan sens)   -BAL-  Klebsiella, Serratia marcenscens  (pan sens)  -f/u blood cx 5/13 - NGTD    ENDO:  q6 POC glucose  Sliding scale insulin  A1c 5.2    DISPO: SICU

## 2021-05-19 LAB
ALBUMIN SERPL ELPH-MCNC: 3 G/DL — LOW (ref 3.5–5.2)
ALP SERPL-CCNC: 132 U/L — HIGH (ref 30–115)
ALT FLD-CCNC: 47 U/L — HIGH (ref 0–41)
ANION GAP SERPL CALC-SCNC: 10 MMOL/L — SIGNIFICANT CHANGE UP (ref 7–14)
ANION GAP SERPL CALC-SCNC: 11 MMOL/L — SIGNIFICANT CHANGE UP (ref 7–14)
ANION GAP SERPL CALC-SCNC: 11 MMOL/L — SIGNIFICANT CHANGE UP (ref 7–14)
AST SERPL-CCNC: 23 U/L — SIGNIFICANT CHANGE UP (ref 0–41)
BASOPHILS # BLD AUTO: 0.03 K/UL — SIGNIFICANT CHANGE UP (ref 0–0.2)
BASOPHILS NFR BLD AUTO: 0.3 % — SIGNIFICANT CHANGE UP (ref 0–1)
BILIRUB DIRECT SERPL-MCNC: 0.2 MG/DL — SIGNIFICANT CHANGE UP (ref 0–0.2)
BILIRUB INDIRECT FLD-MCNC: 0.3 MG/DL — SIGNIFICANT CHANGE UP (ref 0.2–1.2)
BILIRUB SERPL-MCNC: 0.5 MG/DL — SIGNIFICANT CHANGE UP (ref 0.2–1.2)
BUN SERPL-MCNC: 11 MG/DL — SIGNIFICANT CHANGE UP (ref 10–20)
BUN SERPL-MCNC: 6 MG/DL — LOW (ref 10–20)
BUN SERPL-MCNC: 8 MG/DL — LOW (ref 10–20)
CALCIUM SERPL-MCNC: 8.9 MG/DL — SIGNIFICANT CHANGE UP (ref 8.5–10.1)
CHLORIDE SERPL-SCNC: 117 MMOL/L — HIGH (ref 98–110)
CHLORIDE SERPL-SCNC: 119 MMOL/L — HIGH (ref 98–110)
CHLORIDE SERPL-SCNC: 124 MMOL/L — HIGH (ref 98–110)
CO2 SERPL-SCNC: 23 MMOL/L — SIGNIFICANT CHANGE UP (ref 17–32)
CO2 SERPL-SCNC: 26 MMOL/L — SIGNIFICANT CHANGE UP (ref 17–32)
CO2 SERPL-SCNC: 27 MMOL/L — SIGNIFICANT CHANGE UP (ref 17–32)
CREAT SERPL-MCNC: 0.5 MG/DL — LOW (ref 0.7–1.5)
CREAT SERPL-MCNC: <0.5 MG/DL — LOW (ref 0.7–1.5)
CREAT SERPL-MCNC: <0.5 MG/DL — LOW (ref 0.7–1.5)
EOSINOPHIL # BLD AUTO: 0 K/UL — SIGNIFICANT CHANGE UP (ref 0–0.7)
EOSINOPHIL NFR BLD AUTO: 0 % — SIGNIFICANT CHANGE UP (ref 0–8)
GAS PNL BLDA: SIGNIFICANT CHANGE UP
GLUCOSE BLDC GLUCOMTR-MCNC: 122 MG/DL — HIGH (ref 70–99)
GLUCOSE BLDC GLUCOMTR-MCNC: 142 MG/DL — HIGH (ref 70–99)
GLUCOSE BLDC GLUCOMTR-MCNC: 151 MG/DL — HIGH (ref 70–99)
GLUCOSE SERPL-MCNC: 141 MG/DL — HIGH (ref 70–99)
GLUCOSE SERPL-MCNC: 147 MG/DL — HIGH (ref 70–99)
GLUCOSE SERPL-MCNC: 148 MG/DL — HIGH (ref 70–99)
HCT VFR BLD CALC: 30.1 % — LOW (ref 42–52)
HGB BLD-MCNC: 9.8 G/DL — LOW (ref 14–18)
IMM GRANULOCYTES NFR BLD AUTO: 0.6 % — HIGH (ref 0.1–0.3)
LYMPHOCYTES # BLD AUTO: 1.11 K/UL — LOW (ref 1.2–3.4)
LYMPHOCYTES # BLD AUTO: 11.9 % — LOW (ref 20.5–51.1)
MAGNESIUM SERPL-MCNC: 1.9 MG/DL — SIGNIFICANT CHANGE UP (ref 1.8–2.4)
MAGNESIUM SERPL-MCNC: 2.1 MG/DL — SIGNIFICANT CHANGE UP (ref 1.8–2.4)
MCHC RBC-ENTMCNC: 30.1 PG — SIGNIFICANT CHANGE UP (ref 27–31)
MCHC RBC-ENTMCNC: 32.6 G/DL — SIGNIFICANT CHANGE UP (ref 32–37)
MCV RBC AUTO: 92.3 FL — SIGNIFICANT CHANGE UP (ref 80–94)
MONOCYTES # BLD AUTO: 0.98 K/UL — HIGH (ref 0.1–0.6)
MONOCYTES NFR BLD AUTO: 10.5 % — HIGH (ref 1.7–9.3)
NEUTROPHILS # BLD AUTO: 7.12 K/UL — HIGH (ref 1.4–6.5)
NEUTROPHILS NFR BLD AUTO: 76.7 % — HIGH (ref 42.2–75.2)
NRBC # BLD: 0 /100 WBCS — SIGNIFICANT CHANGE UP (ref 0–0)
OSMOLALITY SERPL: 322 MOS/KG — HIGH (ref 275–300)
OSMOLALITY SERPL: 324 MOS/KG — HIGH (ref 275–300)
OSMOLALITY SERPL: 328 MOS/KG — HIGH (ref 275–300)
PHOSPHATE SERPL-MCNC: 4.2 MG/DL — SIGNIFICANT CHANGE UP (ref 2.1–4.9)
PHOSPHATE SERPL-MCNC: 4.2 MG/DL — SIGNIFICANT CHANGE UP (ref 2.1–4.9)
PLATELET # BLD AUTO: 455 K/UL — HIGH (ref 130–400)
POTASSIUM SERPL-MCNC: 3.4 MMOL/L — LOW (ref 3.5–5)
POTASSIUM SERPL-MCNC: 3.9 MMOL/L — SIGNIFICANT CHANGE UP (ref 3.5–5)
POTASSIUM SERPL-MCNC: 4 MMOL/L — SIGNIFICANT CHANGE UP (ref 3.5–5)
POTASSIUM SERPL-SCNC: 3.4 MMOL/L — LOW (ref 3.5–5)
POTASSIUM SERPL-SCNC: 3.9 MMOL/L — SIGNIFICANT CHANGE UP (ref 3.5–5)
POTASSIUM SERPL-SCNC: 4 MMOL/L — SIGNIFICANT CHANGE UP (ref 3.5–5)
PROT SERPL-MCNC: 6.1 G/DL — SIGNIFICANT CHANGE UP (ref 6–8)
RBC # BLD: 3.26 M/UL — LOW (ref 4.7–6.1)
RBC # FLD: 15.1 % — HIGH (ref 11.5–14.5)
SODIUM SERPL-SCNC: 153 MMOL/L — HIGH (ref 135–146)
SODIUM SERPL-SCNC: 157 MMOL/L — HIGH (ref 135–146)
SODIUM SERPL-SCNC: 158 MMOL/L — HIGH (ref 135–146)
WBC # BLD: 9.3 K/UL — SIGNIFICANT CHANGE UP (ref 4.8–10.8)
WBC # FLD AUTO: 9.3 K/UL — SIGNIFICANT CHANGE UP (ref 4.8–10.8)

## 2021-05-19 PROCEDURE — 71045 X-RAY EXAM CHEST 1 VIEW: CPT | Mod: 26

## 2021-05-19 PROCEDURE — 99291 CRITICAL CARE FIRST HOUR: CPT

## 2021-05-19 RX ORDER — SODIUM CHLORIDE 9 MG/ML
500 INJECTION, SOLUTION INTRAVENOUS ONCE
Refills: 0 | Status: COMPLETED | OUTPATIENT
Start: 2021-05-19 | End: 2021-05-19

## 2021-05-19 RX ORDER — POTASSIUM CHLORIDE 20 MEQ
20 PACKET (EA) ORAL
Refills: 0 | Status: COMPLETED | OUTPATIENT
Start: 2021-05-19 | End: 2021-05-19

## 2021-05-19 RX ORDER — LISINOPRIL 2.5 MG/1
10 TABLET ORAL DAILY
Refills: 0 | Status: DISCONTINUED | OUTPATIENT
Start: 2021-05-19 | End: 2021-05-25

## 2021-05-19 RX ORDER — POTASSIUM CHLORIDE 20 MEQ
20 PACKET (EA) ORAL ONCE
Refills: 0 | Status: COMPLETED | OUTPATIENT
Start: 2021-05-19 | End: 2021-05-19

## 2021-05-19 RX ORDER — NIFEDIPINE 30 MG
20 TABLET, EXTENDED RELEASE 24 HR ORAL EVERY 8 HOURS
Refills: 0 | Status: DISCONTINUED | OUTPATIENT
Start: 2021-05-19 | End: 2021-05-19

## 2021-05-19 RX ORDER — SODIUM CHLORIDE 9 MG/ML
250 INJECTION, SOLUTION INTRAVENOUS ONCE
Refills: 0 | Status: COMPLETED | OUTPATIENT
Start: 2021-05-19 | End: 2021-05-19

## 2021-05-19 RX ORDER — SODIUM CHLORIDE 9 MG/ML
500 INJECTION INTRAMUSCULAR; INTRAVENOUS; SUBCUTANEOUS ONCE
Refills: 0 | Status: COMPLETED | OUTPATIENT
Start: 2021-05-19 | End: 2021-05-19

## 2021-05-19 RX ORDER — NICARDIPINE HYDROCHLORIDE 30 MG/1
30 CAPSULE, EXTENDED RELEASE ORAL EVERY 8 HOURS
Refills: 0 | Status: DISCONTINUED | OUTPATIENT
Start: 2021-05-19 | End: 2021-05-19

## 2021-05-19 RX ORDER — LISINOPRIL 2.5 MG/1
20 TABLET ORAL DAILY
Refills: 0 | Status: DISCONTINUED | OUTPATIENT
Start: 2021-05-19 | End: 2021-05-19

## 2021-05-19 RX ORDER — SODIUM CHLORIDE 9 MG/ML
250 INJECTION INTRAMUSCULAR; INTRAVENOUS; SUBCUTANEOUS ONCE
Refills: 0 | Status: COMPLETED | OUTPATIENT
Start: 2021-05-19 | End: 2021-05-19

## 2021-05-19 RX ORDER — MAGNESIUM SULFATE 500 MG/ML
1 VIAL (ML) INJECTION ONCE
Refills: 0 | Status: COMPLETED | OUTPATIENT
Start: 2021-05-19 | End: 2021-05-19

## 2021-05-19 RX ADMIN — LEVETIRACETAM 420 MILLIGRAM(S): 250 TABLET, FILM COATED ORAL at 05:23

## 2021-05-19 RX ADMIN — FENTANYL CITRATE 25 MICROGRAM(S): 50 INJECTION INTRAVENOUS at 03:42

## 2021-05-19 RX ADMIN — Medication 50 MILLIEQUIVALENT(S): at 14:21

## 2021-05-19 RX ADMIN — HEPARIN SODIUM 5000 UNIT(S): 5000 INJECTION INTRAVENOUS; SUBCUTANEOUS at 14:21

## 2021-05-19 RX ADMIN — Medication 2 DROP(S): at 13:54

## 2021-05-19 RX ADMIN — SODIUM CHLORIDE 3000 MILLILITER(S): 9 INJECTION, SOLUTION INTRAVENOUS at 05:23

## 2021-05-19 RX ADMIN — DEXMEDETOMIDINE HYDROCHLORIDE IN 0.9% SODIUM CHLORIDE 1.99 MICROGRAM(S)/KG/HR: 4 INJECTION INTRAVENOUS at 20:38

## 2021-05-19 RX ADMIN — DEXMEDETOMIDINE HYDROCHLORIDE IN 0.9% SODIUM CHLORIDE 1.99 MICROGRAM(S)/KG/HR: 4 INJECTION INTRAVENOUS at 16:30

## 2021-05-19 RX ADMIN — Medication 50 MILLIEQUIVALENT(S): at 17:42

## 2021-05-19 RX ADMIN — PANTOPRAZOLE SODIUM 40 MILLIGRAM(S): 20 TABLET, DELAYED RELEASE ORAL at 12:12

## 2021-05-19 RX ADMIN — LISINOPRIL 10 MILLIGRAM(S): 2.5 TABLET ORAL at 17:48

## 2021-05-19 RX ADMIN — SODIUM CHLORIDE 3000 MILLILITER(S): 9 INJECTION INTRAMUSCULAR; INTRAVENOUS; SUBCUTANEOUS at 21:42

## 2021-05-19 RX ADMIN — CEFEPIME 100 MILLIGRAM(S): 1 INJECTION, POWDER, FOR SOLUTION INTRAMUSCULAR; INTRAVENOUS at 05:21

## 2021-05-19 RX ADMIN — Medication 650 MILLIGRAM(S): at 12:12

## 2021-05-19 RX ADMIN — SODIUM CHLORIDE 6000 MILLILITER(S): 9 INJECTION INTRAMUSCULAR; INTRAVENOUS; SUBCUTANEOUS at 21:42

## 2021-05-19 RX ADMIN — Medication 650 MILLIGRAM(S): at 06:47

## 2021-05-19 RX ADMIN — Medication 50 MILLIEQUIVALENT(S): at 16:11

## 2021-05-19 RX ADMIN — CHLORHEXIDINE GLUCONATE 1 APPLICATION(S): 213 SOLUTION TOPICAL at 05:21

## 2021-05-19 RX ADMIN — Medication 650 MILLIGRAM(S): at 20:38

## 2021-05-19 RX ADMIN — SODIUM CHLORIDE 500 MILLILITER(S): 9 INJECTION, SOLUTION INTRAVENOUS at 01:09

## 2021-05-19 RX ADMIN — Medication 650 MILLIGRAM(S): at 13:10

## 2021-05-19 RX ADMIN — CHLORHEXIDINE GLUCONATE 15 MILLILITER(S): 213 SOLUTION TOPICAL at 17:48

## 2021-05-19 RX ADMIN — Medication 20 MILLIGRAM(S): at 14:21

## 2021-05-19 RX ADMIN — Medication 2 DROP(S): at 21:43

## 2021-05-19 RX ADMIN — Medication 650 MILLIGRAM(S): at 03:56

## 2021-05-19 RX ADMIN — Medication 2: at 23:45

## 2021-05-19 RX ADMIN — FENTANYL CITRATE 25 MICROGRAM(S): 50 INJECTION INTRAVENOUS at 02:13

## 2021-05-19 RX ADMIN — AMLODIPINE BESYLATE 10 MILLIGRAM(S): 2.5 TABLET ORAL at 05:22

## 2021-05-19 RX ADMIN — LEVETIRACETAM 420 MILLIGRAM(S): 250 TABLET, FILM COATED ORAL at 17:49

## 2021-05-19 RX ADMIN — CHLORHEXIDINE GLUCONATE 15 MILLILITER(S): 213 SOLUTION TOPICAL at 05:21

## 2021-05-19 RX ADMIN — Medication 650 MILLIGRAM(S): at 20:30

## 2021-05-19 RX ADMIN — Medication 50 GRAM(S): at 14:21

## 2021-05-19 RX ADMIN — Medication 2 DROP(S): at 05:21

## 2021-05-19 RX ADMIN — HEPARIN SODIUM 5000 UNIT(S): 5000 INJECTION INTRAVENOUS; SUBCUTANEOUS at 21:12

## 2021-05-19 RX ADMIN — Medication 100 MILLIEQUIVALENT(S): at 01:08

## 2021-05-19 RX ADMIN — Medication 2: at 05:43

## 2021-05-19 RX ADMIN — HEPARIN SODIUM 5000 UNIT(S): 5000 INJECTION INTRAVENOUS; SUBCUTANEOUS at 05:22

## 2021-05-19 NOTE — PROGRESS NOTE ADULT - ASSESSMENT
ASSESSMENT  45yM w/ PMHx of Etoh abuse and seizures on keppra  found down unresponsive admitted 5/6. s/p craniectomy for large L SDH    IMPRESSION  #Klebsiella bacteremia , BAL also with Klebsiella but no PNA on imaging (CT/CXR)    5/13 BCX NGTD     5/11 BCX kleb variicola (S)    5/11 BAL   Moderate Klebsiella variicola &  Moderate Serratia marcescens  < from: Xray Chest 1 View- PORTABLE-Routine (05.13.21 @ 06:28) >No radiographic evidence of acute cardiopulmonary disease.    #SDH s/p craniectomy    5/16 OR WCX   Rare Coag Negative Staphylococcus (S oxacillin)- likely skin colonizer     5/16 s/p Revision craniotomy wound with complex closure for persistent drainage, subgaleal hematoma    Repeat CTH development of acute infarcts in left cerebral hemisphere and right cerebellum  #CT atelectasis   #HIV/Hep panel NR  Creatinine, Serum: <0.5 (05-14-21 @ 05:30)      RECOMMENDATIONS  - f/u BCX as fever  - Cefepime 2g q12h IV 5/11-; D8, consider D/C (would have also coveraged WCX coNS as oxacillin S, suspect skin colonizer)  - Rule out central fevers, DVT, non-infectious causes  - trend WBC, fever curve  - Per SICU/ NSYG- no concern for wound infection- if any concern would start Vanco    If any questions, please call or send a message on [employer] Teams  Spectra [phone]

## 2021-05-19 NOTE — PROGRESS NOTE ADULT - SUBJECTIVE AND OBJECTIVE BOX
SANDRA DAS  [id number]  45y Male    Indication for ICU admission: s/p craniectomy for large L SDH  Admit Date:05-06-21  ICU Date: 05-07-21  OR Date: 05-06-21    No Known Allergies    PAST MEDICAL & SURGICAL HISTORY:  Alcohol abuse  GERD (gastroesophageal reflux disease)  ETOH abuse  Hypomagnesemia  Seizure disorder  H/O hemorrhoidectomy    Home Medications:    24HRS EVENT:  DAY  -OFF 3%  -Na up to 156 now, pouring out urine, tachy 120, Nicom x2, responsive once, gave total 2L.  Will reassess tolvaptam in AM  -q2hr neurochecks  -pm abg s/p (insp pause 0.7): 7.51/33/151/%  -f/u or cx    NIGHT  Tachycardic to 120's  Tmax 101.4F, Bcx drawn  Na 158,  > 500 LR bolus  Serum osmolality 324  UOP 2.4L  NICOM 250 LR ->         DVT PTX: Hep sq  GI PTX: PPI    ***Tubes/Lines/Drains  ***  Peripheral IV  Left basilic midline 5/17  Arterial Line Right radial	Date 5/17  L IJ TLC     5/12  OGT, ETT  Urinary Catheter		Indication: Strict I&O    Date Placed: 5/6    REVIEW OF SYSTEMS  [ ] A ten-point review of systems was otherwise negative except as noted.  [x] Due to altered mental status/intubation, subjective information were not able to be obtained from the patient. History was obtained, to the extent possible, from review of the chart and collateral sources of information.   SANDRA DAS  [id number]  45y Male    Indication for ICU admission: s/p craniectomy for large L SDH  Admit Date:21  ICU Date: 21  OR Date: 21    No Known Allergies    PAST MEDICAL & SURGICAL HISTORY:  Alcohol abuse  GERD (gastroesophageal reflux disease)  ETOH abuse  Hypomagnesemia  Seizure disorder  H/O hemorrhoidectomy    Home Medications:    24HRS EVENT:  DAY  -OFF 3%  -Na up to 156 now, pouring out urine, tachy 120, Nicom x2, responsive once, gave total 2L.  Will reassess tolvaptam in AM  -q2hr neurochecks  -pm abg s/p (insp pause 0.7): 7.51/33/151/%  -f/u or cx    NIGHT  Tachycardic to 120's  Tmax 101.4F, Bcx drawn  Na 158,  > 500 LR bolus  Serum osmolality 324  UOP 2.4L  NICOM 250 LR ->         DVT PTX: Hep sq  GI PTX: PPI    ***Tubes/Lines/Drains  ***  Peripheral IV  Left basilic midline   Arterial Line Right radial	Date   L IJ TLC       OGT, ETT  Urinary Catheter		Indication: Strict I&O    Date Placed:     REVIEW OF SYSTEMS  [ ] A ten-point review of systems was otherwise negative except as noted.  [x] Due to altered mental status/intubation, subjective information were not able to be obtained from the patient. History was obtained, to the extent possible, from review of the chart and collateral sources of information.      Daily     Daily     CURRENT MEDS:  Neurologic Medications  acetaminophen   Tablet .. 650 milliGRAM(s) Oral every 6 hours PRN Temp greater or equal to 38.5C (101.3F)  dexMEDEtomidine Infusion 0.1 MICROgram(s)/kG/Hr IV Continuous <Continuous>  fentaNYL    Injectable 25 MICROGram(s) IV Push every 4 hours PRN Severe Pain (7 - 10)  levETIRAcetam  IVPB 500 milliGRAM(s) IV Intermittent every 12 hours    Respiratory Medications    Cardiovascular Medications  amLODIPine   Tablet 10 milliGRAM(s) Oral daily  clevidipine Infusion 1 mG/Hr IV Continuous <Continuous>  propranolol 20 milliGRAM(s) Oral every 8 hours  tolvaptan 15 milliGRAM(s) Oral daily    Gastrointestinal Medications  pantoprazole   Suspension 40 milliGRAM(s) Oral daily  polyethylene glycol 3350 17 Gram(s) Oral daily  senna 2 Tablet(s) Oral at bedtime    Genitourinary Medications    Hematologic/Oncologic Medications  heparin   Injectable 5000 Unit(s) SubCutaneous every 8 hours    Antimicrobial/Immunologic Medications  cefepime   IVPB 2000 milliGRAM(s) IV Intermittent every 12 hours    Endocrine/Metabolic Medications  insulin lispro (ADMELOG) corrective regimen sliding scale   SubCutaneous every 6 hours    Topical/Other Medications  artificial tears (preservative free) Ophthalmic Solution 2 Drop(s) Both EYES three times a day  chlorhexidine 0.12% Liquid 15 milliLiter(s) Oral Mucosa two times a day  chlorhexidine 4% Liquid 1 Application(s) Topical <User Schedule>      ICU Vital Signs Last 24 Hrs  T(C): 36.1 (19 May 2021 05:00), Max: 38.6 (18 May 2021 19:00)  T(F): 97 (19 May 2021 05:00), Max: 101.4 (18 May 2021 19:00)  HR: 96 (19 May 2021 07:00) (85 - 128)  BP: 124/75 (19 May 2021 07:00) (123/76 - 151/86)  BP(mean): 92 (19 May 2021 07:00) (91 - 108)  ABP: 140/73 (19 May 2021 07:00) (114/73 - 167/73)  ABP(mean): 93 (19 May 2021 07:00) (85 - 101)  RR: 22 (19 May 2021 07:00) (22 - 27)  SpO2: 100% (19 May 2021 07:00) (98% - 100%)        Mode: AC/ CMV (Assist Control/ Continuous Mandatory Ventilation)  RR (machine): 16  TV (machine): 420  FiO2: 25  PEEP: 5  ITime: 1.2  MAP: 14  PIP: 24    ABG - ( 19 May 2021 04:25 )  pH, Arterial: 7.53  pH, Blood: x     /  pCO2: 34    /  pO2: 100   / HCO3: 28    / Base Excess: 5.2   /  SaO2: 99                  I&O's Detail    18 May 2021 07:01  -  19 May 2021 07:00  --------------------------------------------------------  IN:    Clevidipine: 231 mL    Dexmedetomidine: 9 mL    Enteral Tube Flush: 110 mL    IV PiggyBack: 100 mL    IV PiggyBack: 50 mL    IV PiggyBack: 100 mL    Lactated Ringers Bolus: 1750 mL    Osmolite: 1200 mL    Sodium Chloride 0.9% Bolus: 1000 mL    sodium chloride 3%: 100 mL    sodium chloride 3%: 240 mL    sodium chloride 3%: 150 mL  Total IN: 5040 mL    OUT:    Bulb (mL): 44 mL    Indwelling Catheter - Urethral (mL): 8995 mL  Total OUT: 9039 mL    Total NET: -3999 mL        I&O's Summary    18 May 2021 07:01  -  19 May 2021 07:00  --------------------------------------------------------  IN: 5040 mL / OUT: 9039 mL / NET: -3999 mL        CXR:     LABS:  Labs:  CAPILLARY BLOOD GLUCOSE      POCT Blood Glucose.: 151 mg/dL (19 May 2021 05:41)  POCT Blood Glucose.: 130 mg/dL (18 May 2021 22:37)  POCT Blood Glucose.: 149 mg/dL (18 May 2021 18:22)  POCT Blood Glucose.: 144 mg/dL (18 May 2021 11:33)                          9.8    9.30  )-----------( 455      ( 18 May 2021 23:30 )             30.1       Auto Neutrophil %: 76.7 % (21 @ 23:30)  Auto Immature Granulocyte %: 0.6 % (21 @ 23:30)        158<H>  |  124<H>  |  6<L>  ----------------------------<  148<H>  3.9   |  23  |  <0.5<L>      eGFR if Non African American: 142 mL/min/1.73M2 (21 @ 23:30)      LFTs:             6.1  | 0.5  | 23       ------------------[132     ( 18 May 2021 23:30 )  3.0  | 0.2  | 47          Lipase:x      Amylase:x         Blood Gas Arterial, Lactate: 1.1 mmoL/L (21 @ 04:25)  Blood Gas Arterial, Lactate: 1.2 mmoL/L (21 @ 14:33)  Blood Gas Arterial, Lactate: 1.0 mmoL/L (21 @ 04:42)  Blood Gas Arterial, Lactate: 1.1 mmoL/L (21 @ 12:21)  Blood Gas Arterial, Lactate: 1.1 mmoL/L (21 @ 03:30)  Blood Gas Arterial, Lactate: 0.8 mmoL/L (21 @ 12:56)    ABG - ( 19 May 2021 04:25 )  pH: 7.53  /  pCO2: 34    /  pO2: 100   / HCO3: 28    / Base Excess: 5.2   /  SaO2: 99              ABG - ( 18 May 2021 14:33 )  pH: 7.51  /  pCO2: 33    /  pO2: 151   / HCO3: 26    / Base Excess: 2.8   /  SaO2: 100             ABG - ( 18 May 2021 04:42 )  pH: 7.51  /  pCO2: 32    /  pO2: 114   / HCO3: 25    / Base Excess: 2.5   /  SaO2: 100               Coags:     15.20  ----< 1.32    ( 17 May 2021 23:22 )     26.9            Urinalysis Basic - ( 18 May 2021 13:55 )    Color: Colorless / Appearance: Clear / S.003 / pH: x  Gluc: x / Ketone: Negative  / Bili: Negative / Urobili: <2 mg/dL   Blood: x / Protein: Negative / Nitrite: Negative   Leuk Esterase: Negative / RBC: x / WBC x   Sq Epi: x / Non Sq Epi: x / Bacteria: x        Culture - Surgical Swab (collected 16 May 2021 08:54)  Source: .Surgical Swab None  Preliminary Report (17 May 2021 16:51):    Rare Coag Negative Staphylococcus  Organism: Coag Negative Staphylococcus (18 May 2021 17:56)  Organism: Coag Negative Staphylococcus (18 May 2021 17:56)          PHYSICAL EXAM:    General/Neuro     GCS:  10T   =  E  4 / V 1  / M  5    Deficits:  alert & oriented x 3, no focal deficits  Pupils:  Lungs:  clear to auscultation, Normal expansion/effort.   Cardiovascular : S1, S2.  Regular rate and rhythm.    Cardiac Rhythm: Normal Sinus Rhythm  GI: Abdomen soft, Non-tender, Non-distended.    Gastrostomy / Jejunostomy tube in place.  Nasogastric tube in place.  Colostomy / Ileostomy.    Wound:  Extremities: Extremities warm, pink, well-perfused.  Peripheral edema.  Pulses: Rt     Lt  Derm: Good skin turgor, no skin breakdown.    : Cole catheter in place.                 SANDRA DAS  [id number]  45y Male    Indication for ICU admission: s/p craniectomy for large L SDH  Admit Date:21  ICU Date: 21  OR Date: 21    No Known Allergies    PAST MEDICAL & SURGICAL HISTORY:  Alcohol abuse  GERD (gastroesophageal reflux disease)  ETOH abuse  Hypomagnesemia  Seizure disorder  H/O hemorrhoidectomy    Home Medications:    24HRS EVENT:  DAY  -OFF 3%  -Na up to 156 now, pouring out urine, tachy 120, Nicom x2, responsive once, gave total 2L.  Will reassess tolvaptam in AM  -q2hr neurochecks  -pm abg s/p (insp pause 0.7): 7.51/33/151/%  -f/u or cx    NIGHT  Tachycardic to 120's  Tmax 101.4F, Bcx drawn  Na 158,  > 500 LR bolus  Serum osmolality 324  UOP 2.4L  NICOM 250 LR ->         DVT PTX: Hep sq  GI PTX: PPI    ***Tubes/Lines/Drains  ***  Peripheral IV  Left basilic midline   Arterial Line Right radial	Date   L IJ TLC       OGT, ETT  Urinary Catheter		Indication: Strict I&O    Date Placed:     REVIEW OF SYSTEMS  [ ] A ten-point review of systems was otherwise negative except as noted.  [x] Due to altered mental status/intubation, subjective information were not able to be obtained from the patient. History was obtained, to the extent possible, from review of the chart and collateral sources of information.      Daily     Daily     CURRENT MEDS:  Neurologic Medications  acetaminophen   Tablet .. 650 milliGRAM(s) Oral every 6 hours PRN Temp greater or equal to 38.5C (101.3F)  dexMEDEtomidine Infusion 0.1 MICROgram(s)/kG/Hr IV Continuous <Continuous>  fentaNYL    Injectable 25 MICROGram(s) IV Push every 4 hours PRN Severe Pain (7 - 10)  levETIRAcetam  IVPB 500 milliGRAM(s) IV Intermittent every 12 hours    Respiratory Medications    Cardiovascular Medications  amLODIPine   Tablet 10 milliGRAM(s) Oral daily  clevidipine Infusion 1 mG/Hr IV Continuous <Continuous>  propranolol 20 milliGRAM(s) Oral every 8 hours  tolvaptan 15 milliGRAM(s) Oral daily    Gastrointestinal Medications  pantoprazole   Suspension 40 milliGRAM(s) Oral daily  polyethylene glycol 3350 17 Gram(s) Oral daily  senna 2 Tablet(s) Oral at bedtime    Genitourinary Medications    Hematologic/Oncologic Medications  heparin   Injectable 5000 Unit(s) SubCutaneous every 8 hours    Antimicrobial/Immunologic Medications  cefepime   IVPB 2000 milliGRAM(s) IV Intermittent every 12 hours    Endocrine/Metabolic Medications  insulin lispro (ADMELOG) corrective regimen sliding scale   SubCutaneous every 6 hours    Topical/Other Medications  artificial tears (preservative free) Ophthalmic Solution 2 Drop(s) Both EYES three times a day  chlorhexidine 0.12% Liquid 15 milliLiter(s) Oral Mucosa two times a day  chlorhexidine 4% Liquid 1 Application(s) Topical <User Schedule>      ICU Vital Signs Last 24 Hrs  T(C): 36.1 (19 May 2021 05:00), Max: 38.6 (18 May 2021 19:00)  T(F): 97 (19 May 2021 05:00), Max: 101.4 (18 May 2021 19:00)  HR: 96 (19 May 2021 07:00) (85 - 128)  BP: 124/75 (19 May 2021 07:00) (123/76 - 151/86)  BP(mean): 92 (19 May 2021 07:00) (91 - 108)  ABP: 140/73 (19 May 2021 07:00) (114/73 - 167/73)  ABP(mean): 93 (19 May 2021 07:00) (85 - 101)  RR: 22 (19 May 2021 07:00) (22 - 27)  SpO2: 100% (19 May 2021 07:00) (98% - 100%)        Mode: AC/ CMV (Assist Control/ Continuous Mandatory Ventilation)  RR (machine): 16  TV (machine): 420  FiO2: 25  PEEP: 5  ITime: 1.2  MAP: 14  PIP: 24    ABG - ( 19 May 2021 04:25 )  pH, Arterial: 7.53  pH, Blood: x     /  pCO2: 34    /  pO2: 100   / HCO3: 28    / Base Excess: 5.2   /  SaO2: 99                  I&O's Detail    18 May 2021 07:01  -  19 May 2021 07:00  --------------------------------------------------------  IN:    Clevidipine: 231 mL    Dexmedetomidine: 9 mL    Enteral Tube Flush: 110 mL    IV PiggyBack: 100 mL    IV PiggyBack: 50 mL    IV PiggyBack: 100 mL    Lactated Ringers Bolus: 1750 mL    Osmolite: 1200 mL    Sodium Chloride 0.9% Bolus: 1000 mL    sodium chloride 3%: 100 mL    sodium chloride 3%: 240 mL    sodium chloride 3%: 150 mL  Total IN: 5040 mL    OUT:    Bulb (mL): 44 mL    Indwelling Catheter - Urethral (mL): 8995 mL  Total OUT: 9039 mL    Total NET: -3999 mL        I&O's Summary    18 May 2021 07:01  -  19 May 2021 07:00  --------------------------------------------------------  IN: 5040 mL / OUT: 9039 mL / NET: -3999 mL        CXR:     LABS:  Labs:  CAPILLARY BLOOD GLUCOSE      POCT Blood Glucose.: 151 mg/dL (19 May 2021 05:41)  POCT Blood Glucose.: 130 mg/dL (18 May 2021 22:37)  POCT Blood Glucose.: 149 mg/dL (18 May 2021 18:22)  POCT Blood Glucose.: 144 mg/dL (18 May 2021 11:33)                          9.8    9.30  )-----------( 455      ( 18 May 2021 23:30 )             30.1       Auto Neutrophil %: 76.7 % (21 @ 23:30)  Auto Immature Granulocyte %: 0.6 % (21 @ 23:30)        158<H>  |  124<H>  |  6<L>  ----------------------------<  148<H>  3.9   |  23  |  <0.5<L>      eGFR if Non African American: 142 mL/min/1.73M2 (21 @ 23:30)      LFTs:             6.1  | 0.5  | 23       ------------------[132     ( 18 May 2021 23:30 )  3.0  | 0.2  | 47          Lipase:x      Amylase:x         Blood Gas Arterial, Lactate: 1.1 mmoL/L (21 @ 04:25)  Blood Gas Arterial, Lactate: 1.2 mmoL/L (21 @ 14:33)  Blood Gas Arterial, Lactate: 1.0 mmoL/L (21 @ 04:42)  Blood Gas Arterial, Lactate: 1.1 mmoL/L (21 @ 12:21)  Blood Gas Arterial, Lactate: 1.1 mmoL/L (21 @ 03:30)  Blood Gas Arterial, Lactate: 0.8 mmoL/L (21 @ 12:56)    ABG - ( 19 May 2021 04:25 )  pH: 7.53  /  pCO2: 34    /  pO2: 100   / HCO3: 28    / Base Excess: 5.2   /  SaO2: 99              ABG - ( 18 May 2021 14:33 )  pH: 7.51  /  pCO2: 33    /  pO2: 151   / HCO3: 26    / Base Excess: 2.8   /  SaO2: 100             ABG - ( 18 May 2021 04:42 )  pH: 7.51  /  pCO2: 32    /  pO2: 114   / HCO3: 25    / Base Excess: 2.5   /  SaO2: 100               Coags:     15.20  ----< 1.32    ( 17 May 2021 23:22 )     26.9            Urinalysis Basic - ( 18 May 2021 13:55 )    Color: Colorless / Appearance: Clear / S.003 / pH: x  Gluc: x / Ketone: Negative  / Bili: Negative / Urobili: <2 mg/dL   Blood: x / Protein: Negative / Nitrite: Negative   Leuk Esterase: Negative / RBC: x / WBC x   Sq Epi: x / Non Sq Epi: x / Bacteria: x        Culture - Surgical Swab (collected 16 May 2021 08:54)  Source: .Surgical Swab None  Preliminary Report (17 May 2021 16:51):    Rare Coag Negative Staphylococcus  Organism: Coag Negative Staphylococcus (18 May 2021 17:56)  Organism: Coag Negative Staphylococcus (18 May 2021 17:56)          PHYSICAL EXAM:    General/Neuro     GCS:  10T   =  E  4 / V 1  / M  5    not following commands, however tracks, moves all extremities except RUE  Lungs:  clear to auscultation, Normal expansion/effort.   Cardiovascular : S1, S2.  Regular rate and rhythm.    Cardiac Rhythm: Normal Sinus Rhythm  GI: Abdomen soft, Non-tender, Non-distended.    Extremities: Extremities warm, well-perfused.    Derm: Good skin turgor, no skin breakdown.    : Cole catheter in place.

## 2021-05-19 NOTE — PROGRESS NOTE ADULT - SUBJECTIVE AND OBJECTIVE BOX
HPI:  TRAUMA ACTIVATION LEVEL:  Code trauma    HPI:    Patient is a 45yM w/ PMHx of Etoh abuse and seizures on keppra seen as Trauma Alert upgraded to a Code trauma during examination in the ED where the patient was found to have a GCS of 4 s/p found down unresponsive.  Trauma assessment in ED: intubated for airway protection   (06 May 2021 22:29)    [facility] following for management of SDH    Overnight Events: Patient febrile overnight, 2L given, BLood cx drawn, increased urine output.    T(C): 37.1 (05-19-21 @ 08:14), Max: 38.6 (05-18-21 @ 19:00)  HR: 113 (05-19-21 @ 10:00) (95 - 128)  BP: 130/78 (05-19-21 @ 10:00) (123/74 - 151/77)  BP(mean): 97 (05-19-21 @ 10:00) (91 - 103)  ABP: 161/72 (05-19-21 @ 10:00) (114/73 - 161/72)  ABP(mean): 95 (05-19-21 @ 10:00) (85 - 100)  RR: 23 (05-19-21 @ 10:00) (22 - 27)  SpO2: 100% (05-19-21 @ 10:00) (98% - 100%)    I&O's Detail    18 May 2021 07:01  -  19 May 2021 07:00  --------------------------------------------------------  IN:    Clevidipine: 231 mL    Dexmedetomidine: 9 mL    Enteral Tube Flush: 110 mL    IV PiggyBack: 100 mL    IV PiggyBack: 50 mL    IV PiggyBack: 100 mL    Lactated Ringers Bolus: 1750 mL    Osmolite: 1200 mL    Sodium Chloride 0.9% Bolus: 1000 mL    sodium chloride 3%: 100 mL    sodium chloride 3%: 240 mL    sodium chloride 3%: 150 mL  Total IN: 5040 mL    OUT:    Bulb (mL): 44 mL    Indwelling Catheter - Urethral (mL): 8995 mL  Total OUT: 9039 mL    Total NET: -3999 mL      19 May 2021 07:01  -  19 May 2021 12:15  --------------------------------------------------------  IN:    Clevidipine: 32 mL    Osmolite: 200 mL  Total IN: 232 mL    OUT:    Indwelling Catheter - Urethral (mL): 680 mL  Total OUT: 680 mL    Total NET: -448 mL    MEDICATIONS  (STANDING):  artificial tears (preservative free) Ophthalmic Solution 2 Drop(s) Both EYES three times a day  chlorhexidine 0.12% Liquid 15 milliLiter(s) Oral Mucosa two times a day  chlorhexidine 4% Liquid 1 Application(s) Topical <User Schedule>  clevidipine Infusion 1 mG/Hr (2 mL/Hr) IV Continuous <Continuous>  dexMEDEtomidine Infusion 0.1 MICROgram(s)/kG/Hr (1.99 mL/Hr) IV Continuous <Continuous>  heparin   Injectable 5000 Unit(s) SubCutaneous every 8 hours  insulin lispro (ADMELOG) corrective regimen sliding scale   SubCutaneous every 6 hours  levETIRAcetam  IVPB 500 milliGRAM(s) IV Intermittent every 12 hours  NIFEdipine IR 20 milliGRAM(s) Oral every 8 hours  pantoprazole   Suspension 40 milliGRAM(s) Oral daily  propranolol 20 milliGRAM(s) Oral every 8 hours    MEDICATIONS  (PRN):  acetaminophen   Tablet .. 650 milliGRAM(s) Oral every 6 hours PRN Temp greater or equal to 38.5C (101.3F)  fentaNYL    Injectable 25 MICROGram(s) IV Push every 4 hours PRN Severe Pain (7 - 10)      Labs:                        9.8    9.30  )-----------( 455      ( 18 May 2021 23:30 )             30.1     05-18    158<H>  |  124<H>  |  6<L>  ----------------------------<  148<H>  3.9   |  23  |  <0.5<L>    Ca    8.9      18 May 2021 23:30  Phos  4.2     05-18  Mg     2.1     05-18    TPro  6.1  /  Alb  3.0<L>  /  TBili  0.5  /  DBili  0.2  /  AST  23  /  ALT  47<H>  /  AlkPhos  132<H>  05-18    LIVER FUNCTIONS - ( 18 May 2021 23:30 )  Alb: 3.0 g/dL / Pro: 6.1 g/dL / ALK PHOS: 132 U/L / ALT: 47 U/L / AST: 23 U/L / GGT: x           PT/INR - ( 17 May 2021 23:22 )   PT: 15.20 sec;   INR: 1.32 ratio         PTT - ( 17 May 2021 23:22 )  PTT:26.9 sec  05-13 Chol -- LDL -- HDL -- Trig 138    ABG - ( 19 May 2021 04:25 )  pH, Arterial: 7.53  pH, Blood: x     /  pCO2: 34    /  pO2: 100   / HCO3: 28    / Base Excess: 5.2   /  SaO2: 99      Mode: AC/ CMV (Assist Control/ Continuous Mandatory Ventilation), RR (machine): 16, TV (machine): 420, FiO2: 25, PEEP: 5, ITime: 1, MAP: 14, PIP: 22    Drug Levels: [] N/A  Vancomycin Level, Trough: 4.1 ug/mL (05-13 @ 05:50)    CULTURES:  Culture Results:   Rare Coag Negative Staphylococcus (05-16 @ 08:54)  Culture Results:   No Growth Final (05-13 @ 11:19)        Radiology:    < from: CT Head No Cont (05.16.21 @ 12:25) >    EXAM:  CT BRAIN            PROCEDURE DATE:  05/16/2021      < end of copied text >  < from: CT Head No Cont (05.16.21 @ 12:25) >    IMPRESSION:  In comparison to the prior head CT dated 5/10/2021:    1.  Patient is status post interval revision of the left-sided craniectomy with expected postsurgical changes as described.    2.  Slightly decreased size of the hematoma extending from the left extra-axial space to the overlying scalp soft tissues, overall measuring about 3 cm in width, previously 3.4 cm.    3.  Increased size of the left temporal/occipital hematoma / hemorrhagic contusionmeasuring up to about 4 cm AP, previously 1.5 cm.    4.  No significant interval change in the areas of acute infarction involving the left medial frontal and parietal lobes, the left insula, the left occipital/temporal lobes and the right cerebellarhemisphere. Small foci of hemorrhage noted in the left frontal infarct (ser 2 im 13) and left occipital infarct (ser 2  im 14).    5.  Developing hypoattenuation within the left basal frontal lobe compatible with developing contusion or infarction.    < end of copied text >      ICH Score:    GCS:  3-4:             +2  5-12:           +1  13- 15:          0    Age > 80:    Yes  +1  No     0    ICH Volume > 30 mL:    Yes   +1  No     0    IVH:   Yes    +1  No      0    Infratentorial Origin of Hemmorhage:    Yes  +1  No    0     Total Score: 1      ROS: Negative except for that of HPI      Physical Exam:    General: Ill appearing male, intubated, off sedation, NAD    Neurological:    Mental Status: GCS 8, CPOT 0     Cranial Nerves:  I: Deferred  II, III, IV, VI: 4 mm PERRLA, EOM intact. No cranial nerve palsy or nystagmus noted.  V:  corneal reflex intact bilaterally  VII: Deferred 2/2 ETT  VIII: Deferred  IX and X: gag/cough intact  XI: Deferred  XII: Deferred    Motor:  Strength - Right UE:   0/5  Right LE:  0 /5  Left UE:  3+ /5  Left LE:   1+/5    Sensory: Sensation to painful stimuli intact throughout    Cerebellar Function: Deferred    Reflexes: No clonus      Assessment & Plan:  45yM w/ PMHx of Etoh abuse and seizures on keppra (recently discharged on keppra after being in ICU for seizures). He had previous workup for seizures with a EEG that was negative. He presented s/p fall at home. 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 Underwent left isaías crani for SDH evacuation 5/7, s/p cardiac arrest in OR, s/p revision of crani 5/17, s/p wound revision . CT head showed new left cerebral hemisphere and right cerebellum infarcts. CTA showed stenosis R vertebral artery, repeat CT stable. Patient with waxing and waning neurologic exam, pending CTV.      Plan:    Neuro:  - Neurochecks q1h  - Post-op management per neurosurgery  - CTV head to evaluate for possible venous thrombus  - Continue Keppra 500mg q12H  - Continue to hold 3%, goal Na 140 - 150  - Recommend D/C Tolvaptan   - PT/OT when able    CV:  - SBP goal < 160  - Normovolemia  - Normothermia-      Resp:  - Vent managment per Primary team  - HOB 45  - Recommend Plateau pressure < 30 to maintain venous drainage  - Wean sedation as tolerated, PS trial  - Recommend PRN Fentanyl bolus for vent synchrony    GI/:  - EN as per Nutrition  - Strict Is/Os    Electrolytes:  - Replete as needed.  - Normoglycemia    ID:  - Per Primary    Hematology:  - SCDs and SQ Heparin        Thank you for involving [facility] in this patients plan of care. Please call with any questions or concerns.     Luma García NP    #[id number]

## 2021-05-19 NOTE — PROGRESS NOTE ADULT - ATTENDING COMMENTS
more awake and alert   will place in PSV   continue off sedation   continue nutrition support   d/c drain   continue SICU Care

## 2021-05-19 NOTE — PROGRESS NOTE ADULT - ATTENDING COMMENTS
45yM w/ PMHx as above presented s/p fall at home. 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 Underwent left isaías crani for SDH evacuation 5/7, s/p cardiac arrest in OR, s/p revision of crani 5/17, s/p wound revision . CT head showed new left cerebral hemisphere and right cerebellum infarcts. CTA showed stenosis R vertebral artery, repeat CT stable. Patient with waxing and waning neurologic exam, pending CTV

## 2021-05-19 NOTE — PROGRESS NOTE ADULT - SUBJECTIVE AND OBJECTIVE BOX
THIAGO SANDRA  45y, Male  Allergy: No Known Allergies      LOS  13d    CHIEF COMPLAINT: found down unresponsive (19 May 2021 10:23)      INTERVAL EVENTS/HPI  - Febrile, extubated to BIPAP, No leukocytosis   - T(F): , Max: 101.4 (21 @ 19:00)  - Tolerating medication  - WBC Count: 9.30 (21 @ 23:30)  WBC Count: 11.12 (21 @ 23:22)  - Creatinine, Serum: <0.5 (21 @ 23:30)  Creatinine, Serum: 0.5 (21 @ 16:00)       ROS  unable to obtain history secondary to patient's mental status and/or sedation     VITALS:  T(F): 98.8, Max: 101.4 (21 @ 19:00)  HR: 113  BP: 130/78  RR: 23Vital Signs Last 24 Hrs  T(C): 37.1 (19 May 2021 08:14), Max: 38.6 (18 May 2021 19:00)  T(F): 98.8 (19 May 2021 08:14), Max: 101.4 (18 May 2021 19:00)  HR: 113 (19 May 2021 10:00) (95 - 128)  BP: 130/78 (19 May 2021 10:00) (123/74 - 151/86)  BP(mean): 97 (19 May 2021 10:00) (91 - 108)  RR: 23 (19 May 2021 10:00) (22 - 27)  SpO2: 100% (19 May 2021 10:00) (98% - 100%)    PHYSICAL EXAM:  Gen: BIPAP  HEENT: Normocephalic, atraumatic, dressings KIKE  Neck: supple, no lymphadenopathy  CV: Regular rate & regular rhythm  Lungs: decreased BS at bases, no fremitus  Abdomen: Soft, BS present  Ext: Warm, well perfused  Neuro: R deficits  Skin: no rash, no erythema  Lines: no phlebitis     FH: Non-contributory  Social Hx: Non-contributory    TESTS & MEASUREMENTS:                        9.8    9.30  )-----------( 455      ( 18 May 2021 23:30 )             30.1     05-18    158<H>  |  124<H>  |  6<L>  ----------------------------<  148<H>  3.9   |  23  |  <0.5<L>    Ca    8.9      18 May 2021 23:30  Phos  4.2       Mg     2.1         TPro  6.1  /  Alb  3.0<L>  /  TBili  0.5  /  DBili  0.2  /  AST  23  /  ALT  47<H>  /  AlkPhos  132<H>      eGFR if Non African American: 142 mL/min/1.73M2 (21 @ 23:30)  eGFR if : 165 mL/min/1.73M2 (21 @ 23:30)  eGFR if : 151 mL/min/1.73M2 (21 @ 16:00)  eGFR if Non African American: 130 mL/min/1.73M2 (21 @ 16:00)  eGFR if Non African American: 160 mL/min/1.73M2 (21 @ 11:50)  eGFR if : 186 mL/min/1.73M2 (21 @ 11:50)    LIVER FUNCTIONS - ( 18 May 2021 23:30 )  Alb: 3.0 g/dL / Pro: 6.1 g/dL / ALK PHOS: 132 U/L / ALT: 47 U/L / AST: 23 U/L / GGT: x           Urinalysis Basic - ( 18 May 2021 13:55 )    Color: Colorless / Appearance: Clear / S.003 / pH: x  Gluc: x / Ketone: Negative  / Bili: Negative / Urobili: <2 mg/dL   Blood: x / Protein: Negative / Nitrite: Negative   Leuk Esterase: Negative / RBC: x / WBC x   Sq Epi: x / Non Sq Epi: x / Bacteria: x        Culture - Surgical Swab (collected 21 @ 08:54)  Source: .Surgical Swab None  Preliminary Report (21 @ 16:51):    Rare Coag Negative Staphylococcus  Organism: Coag Negative Staphylococcus (21 @ 17:56)  Organism: Coag Negative Staphylococcus (21 @ 17:56)      -  Ampicillin/Sulbactam: S <=8/4      -  Cefazolin: S <=4      -  Clindamycin: S <=0.25      -  Erythromycin: S <=0.25      -  Gentamicin: S <=1 Should not be used as monotherapy      -  Oxacillin: S <=0.25      -  Penicillin: R 0.25      -  RIF- Rifampin: S <=1 Should not be used as monotherapy      -  Tetra/Doxy: S <=1      -  Trimethoprim/Sulfamethoxazole: S <=0.5/9.5      -  Vancomycin: S 0.5      Method Type: AISHA    Culture - Blood (collected 21 @ 11:19)  Source: .Blood None  Final Report (21 @ 22:03):    No Growth Final    Culture - Blood (collected 21 @ 11:18)  Source: .Blood None  Gram Stain (21 @ 02:53):    Growth in anaerobic bottle: Gram Negative Rods    Growth in aerobic bottle: Gram Negative Rods  Final Report (21 @ 16:09):    Growth in aerobic and anaerobic bottles: Klebsiella variicola    ***Blood Panel PCR results on this specimen are available    approximately 3 hours after the Gram stain result.***    Gram stain, PCR, and/or culture results may not always    correspond due to difference in methodologies.    ************************************************************    This PCR assay was performed by multiplex PCR. This    Assay tests for 66 bacterial and resistance gene targets.    Please refer to the [facility] [employer] test directory    at [email or web address] for details.  Organism: Blood Culture PCR  Klebsiella variicola (21 @ 16:09)  Organism: Klebsiella variicola (21 @ 16:09)      -  Amikacin: S <=16      -  Ampicillin: R <=8 These ampicillin results predict results for amoxicillin      -  Ampicillin/Sulbactam: S <=4/2 Enterobacter, Citrobacter, and Serratia may develop resistance during prolonged therapy (3-4 days)      -  Aztreonam: S <=4      -  Cefazolin: S <=2 Enterobacter, Citrobacter, and Serratia may develop resistance during prolonged therapy (3-4 days)      -  Cefepime: S <=2      -  Cefoxitin: S <=8      -  Ceftriaxone: S <=1 Enterobacter, Citrobacter, and Serratia may develop resistance during prolonged therapy      -  Ciprofloxacin: S <=0.25      -  Ertapenem: S <=0.5      -  Gentamicin: S <=2      -  Imipenem: S <=1      -  Levofloxacin: S <=0.5      -  Meropenem: S <=1      -  Piperacillin/Tazobactam: S <=8      -  Tobramycin: S <=2      -  Trimethoprim/Sulfamethoxazole: S <=0.5/9.5      Method Type: AISHA  Organism: Blood Culture PCR (21 @ 16:09)      -  Klebsiella pneumoniae: Detec      Method Type: PCR    Culture - Bronchial (collected 21 @ 10:12)  Source: Bronch Wash Bronchoalveolar Lavage  Gram Stain (21 @ 07:04):    Few polymorphonuclear leukocytes per low power field    No Squamous epithelial cells per low power field    Few Gram Positive Rods per oil power field  Final Report (21 @ 16:19):    Moderate Klebsiella variicola    Moderate Serratia marcescens    Normal Respiratory Liz present  Organism: Klebsiella variicola  Serratia marcescens (21 @ 16:19)  Organism: Serratia marcescens (21 @ 16:19)      -  Amikacin: S <=16      -  Amoxicillin/Clavulanic Acid: R >16/8      -  Ampicillin: R <=8 These ampicillin results predict results for amoxicillin      -  Ampicillin/Sulbactam: R 16/8 Enterobacter, Citrobacter, and Serratia may develop resistance during prolonged therapy (3-4 days)      -  Aztreonam: S <=4      -  Cefazolin: R >16 Enterobacter, Citrobacter, and Serratia may develop resistance during prolonged therapy (3-4 days)      -  Cefepime: S <=2      -  Cefoxitin: R <=8      -  Ceftriaxone: S <=1 Enterobacter, Citrobacter, and Serratia may develop resistance during prolonged therapy      -  Ciprofloxacin: S <=0.25      -  Ertapenem: S <=0.5      -  Gentamicin: S <=2      -  Levofloxacin: S <=0.5      -  Meropenem: S <=1      -  Piperacillin/Tazobactam: S <=8      -  Tobramycin: S <=2      -  Trimethoprim/Sulfamethoxazole: S <=0.5/9.5      Method Type: AISHA  Organism: Klebsiella variicola (21 @ 16:19)      -  Amikacin: S <=16      -  Amoxicillin/Clavulanic Acid: R >16/8      -  Ampicillin: R <=8 These ampicillin results predict results for amoxicillin      -  Ampicillin/Sulbactam: I 16/8 Enterobacter, Citrobacter, and Serratia may develop resistance during prolonged therapy (3-4 days)      -  Aztreonam: R >16      -  Cefazolin: R >16 Enterobacter, Citrobacter, and Serratia may develop resistance during prolonged therapy (3-4 days)      -  Cefepime: S <=2      -  Cefoxitin: S <=8      -  Ceftriaxone: S <=1 Enterobacter, Citrobacter, and Serratia may develop resistance during prolonged therapy      -  Ciprofloxacin: S <=0.25      -  Ertapenem: S <=0.5      -  Gentamicin: S <=2      -  Imipenem: S <=1      -  Levofloxacin: S <=0.5      -  Meropenem: S <=1      -  Piperacillin/Tazobactam: S <=8      -  Tobramycin: S 4      -  Trimethoprim/Sulfamethoxazole: S <=0.5/9.5      Method Type: AISHA            INFECTIOUS DISEASES TESTING  COVID-19 PCR: NotDetec (21 @ 18:58)  Vancomycin Level, Trough: 4.1 (21 @ 05:50)  HIV-1/2 Combo Result: Nonreact (21 @ 23:30)  Rapid RVP Result: NotDetec (21 @ 22:44)  COVID-19 PCR: NotDetec (21 @ 16:46)  COVID-19 PCR: NotDetec (21 @ 19:35)      INFLAMMATORY MARKERS      RADIOLOGY & ADDITIONAL TESTS:  I have personally reviewed the last available Chest xray  CXR  Xray Chest 1 View AP/PA:   EXAM:  XR CHEST 1 VIEW            PROCEDURE DATE:  2021            INTERPRETATION:  Clinical History / Reason for exam: 45-year-old male who is post-op.    Comparison : Chest radiograph performed 2021 at 5:49 AM.    Technique/Positioning: AP view.    Findings:    Support devices: Precordial leads are present.  Endotracheal tube is 4 cm above the wilman.  Enteric catheter courses below the left hemidiaphragm; the tip is excluded from the image.  Left jugular vein central venous catheter is stable, with tip in the superior vena cava.    Cardiac/mediastinum/hilum: The cardiac silhouette is magnified.    Lung parenchyma/Pleura: There are stable, bibasilar pulmonary opacities.  No pleural effusion or pneumothorax is seen.    Skeleton/soft tissues: Stable.    Impression:  1.  Support lines/tubes, as described.  2.  Stable, bibasilar pulmonary opacities.                  ZACK ARGUELLES MD; Attending Radiologist  This document has been electronically signed. May 16 2021 12:42PM (21 @ 10:54)      CT      CARDIOLOGY TESTING  12 Lead ECG:   Ventricular Rate 78 BPM    Atrial Rate 78 BPM    P-R Interval 162 ms    QRS Duration 70 ms    Q-T Interval 364 ms    QTC Calculation(Bazett) 414 ms    P Axis 55 degrees    R Axis 30 degrees    T Axis 62 degrees    Diagnosis Line Normal sinus rhythm  Normal ECG    Confirmed by SALAS WASHINGTON MD ([id number]) on 2021 7:01:28 AM (21 @ 04:52)  12 Lead ECG:   Systolic  mmHg    Diastolic BP 60 mmHg    Ventricular Rate 116 BPM    Atrial Rate 116 BPM    P-R Interval 152 ms    QRS Duration 86 ms    Q-T Interval 364 ms    QTC Calculation(Bazett) 505 ms    P Axis 58 degrees    R Axis 59 degrees    T Axis 53 degrees    Diagnosis Line Sinus tachycardia  Possible Anterior infarct , age undetermined  Abnormal ECG    Confirmed by SAIMA HAGEN MD ([id number]) on 2021 5:08:50 AM (21 @ 01:08)      MEDICATIONS  artificial tears (preservative free) Ophthalmic Solution 2 Both EYES three times a day  chlorhexidine 0.12% Liquid 15 Oral Mucosa two times a day  chlorhexidine 4% Liquid 1 Topical <User Schedule>  clevidipine Infusion 1 IV Continuous <Continuous>  dexMEDEtomidine Infusion 0.1 IV Continuous <Continuous>  heparin   Injectable 5000 SubCutaneous every 8 hours  insulin lispro (ADMELOG) corrective regimen sliding scale  SubCutaneous every 6 hours  levETIRAcetam  IVPB 500 IV Intermittent every 12 hours  niCARdipine 30 Oral every 8 hours  pantoprazole   Suspension 40 Oral daily  propranolol 20 Oral every 8 hours      WEIGHT  Weight (kg): 79.4 (21 @ 08:11)  Creatinine, Serum: <0.5 mg/dL (21 @ 23:30)  Creatinine, Serum: 0.5 mg/dL (21 @ 16:00)  Creatinine, Serum: <0.5 mg/dL (21 @ 11:50)      ANTIBIOTICS:      All available historical records have been reviewed

## 2021-05-19 NOTE — PROGRESS NOTE ADULT - ASSESSMENT
Assessment & Plan  45M s/p fall with large left SDH and neurological status change, level 1 for emergent craniotomy    NEURO:  Hx of EtOH abuse and prior Delerium Tremens episodes  S/P craniotomy for large left SDH  - Q2H neuro checks  - GCS 11T post op 5/16  - Keppra 500mg BID  - HTS 3% OFF now  - Tolvaptam daily  Sedation: precedex  - goal RASS 0 to -1   Pain control - prn low dose fentanyl IVP  Head of bed @ 30 degrees  Repeat Head CT #1 post op changes, resolution of MLS, stable SDH along cerebral falx and L tentorium, new trace scattered SAH along the right cerebral sulci and right cerebellar sulci and new IVP in the b/l occipital horns.  Repeat Head CT #2: development of acute infarcts in left cerebral hemisphere and right cerebellum  CTA neck and Brain - no evidence acute large vessel occlusion, severe stenosis of R vertebral artery and moderate stenosis of mid-distal basilar artery, possible jugular venous thrombosis, NCHCT recommended for left convexity subdural collection, and increasing edema around L temporal/occipital parenchemal hemorrhage  OR 5/16:  Clot was evacuated, bleeding noted. Hemostasis achieved without difficulty. KIKE drain left in subgaleal space. Brief operative findings: revision craniotomy wound with complex closure for persistent drainage, subgaleal hematoma   Post op CTH 5/16: expected post-op changes  --repeat CT venogram to r/o sinous thrombus as per neurology     RESP:   Intubated, /16/30/5   -pm abg s/p (insp pause 0.7): 7.51/33/151/%  AM CXR  Plan for Trach next week if not extubated    CARDS:   S/P cardiac arrest in OR & MTP  -Acute HTN - on Clevidipine  goal -160  MAP > 65  -Amlodipine  -propranolol 20mg q8  trop neg x3    GI/NUTR:   Diet: TF (Osmolite 1.5) @ 50   GI Prophylaxis-Protonix q24h  Bowel regimen-  Senna   +BM x 3 on 5/17- held Miralax    /RENAL:   Cole - strict I/O  Labs:          BUN/Cr- 6/0.5  -> 5/0.5 > 6/0.5          Electrolytes- Na 137-->>158 // K 3.9 // Phos 4.2 //  Mg 2.1             -hypomagnesemia, repleted with 4 gms magnesium sulfate  Na goal 145-150   -OFF 3%  -Na up to 156 now, pouring out urine, tachy 120, Nicom x2, responsive once, gave total 2L.  Will reassess tolvaptam in AM  -Started Tolvaptam- fisrt dose 5/17  -BMP, serum osm q6h    HEME/ONC:   S/P massive transfusion protocol  - 4uPRBCs 2FFP 2 platelet intraop, post op 2 PLT  5/15: 1U PRBC pre- operatively  5/16: 2U PRBC intraop and post op  DVT prophylaxis: HSQ  restarted 5/17    ID:  Afebrile  WBC:4.6 -> 6.9  Antibiotics: Cefepime  -ID following  -likely will d/c abx on 5/18 5/11- u/a +leuk   -blood cx- Klebsiella, (pan sens)   -BAL-  Klebsiella, Serratia marcenscens  (pan sens)  -f/u blood cx 5/13 - NGTD    ENDO:  q6 POC glucose  Sliding scale insulin  A1c 5.2    DISPO: SICUA       Assessment & Plan  45M s/p fall with large left SDH and neurological status change, level 1 for emergent craniotomy    NEURO:  Hx of EtOH abuse and prior Delerium Tremens episodes  S/P craniotomy for large left SDH  - Q2H neuro checks  - GCS 11T post op 5/16  - Keppra 500mg BID  - HTS 3% OFF now  - Tolvaptam daily  Sedation: precedex  - goal RASS 0 to -1   Pain control - prn low dose fentanyl IVP  Head of bed @ 30 degrees  Repeat Head CT #1 post op changes, resolution of MLS, stable SDH along cerebral falx and L tentorium, new trace scattered SAH along the right cerebral sulci and right cerebellar sulci and new IVP in the b/l occipital horns.  Repeat Head CT #2: development of acute infarcts in left cerebral hemisphere and right cerebellum  CTA neck and Brain - no evidence acute large vessel occlusion, severe stenosis of R vertebral artery and moderate stenosis of mid-distal basilar artery, possible jugular venous thrombosis, NCHCT recommended for left convexity subdural collection, and increasing edema around L temporal/occipital parenchemal hemorrhage  OR 5/16:  Clot was evacuated, bleeding noted. Hemostasis achieved without difficulty. KIKE drain left in subgaleal space. Brief operative findings: revision craniotomy wound with complex closure for persistent drainage, subgaleal hematoma   Post op CTH 5/16: expected post-op changes  --repeat CT venogram to r/o sinous thrombus as per neurology     RESP:   Intubated, /16/25/5   -pm abg s/p (insp pause 0.7): 7.51/33/151/%  AM CXR  Plan for Trach next week if not extubated    CARDS:   S/P cardiac arrest in OR & MTP  -Acute HTN - on Clevidipine  goal -160  MAP > 65  -Amlodipine --> switch to Nifedipine, wean off Cleviprex gtt  -propranolol 20mg q8  trop neg x3    GI/NUTR:   Diet: TF (Osmolite 1.5) @ 50   GI Prophylaxis-Protonix q24h  Bowel regimen-  Senna   +BM x 3 on 5/17- held Miralax    /RENAL:   Cole - strict I/O  Labs:          BUN/Cr- 6/0.5  -> 5/0.5 > 6/0.5          Electrolytes- Na 137-->>158 // K 3.9 // Phos 4.2 //  Mg 2.1             -hypomagnesemia, repleted with 4 gms magnesium sulfate  Na goal 145-150   -OFF 3%  -Na up to 156 now, pouring out urine, tachy 120, Nicom x2, responsive once, gave total 2L.  Will reassess tolvaptam in AM  -Started Tolvaptam- fisrt dose 5/17  -BMP, serum osm q6h    HEME/ONC:   S/P massive transfusion protocol  - 4uPRBCs 2FFP 2 platelet intraop, post op 2 PLT  5/15: 1U PRBC pre- operatively  5/16: 2U PRBC intraop and post op  DVT prophylaxis: HSQ  restarted 5/17    ID:  Afebrile  WBC:4.6 -> 6.9  Antibiotics: Cefepime --> d/c  -ID following  -likely will d/c abx on 5/18 5/11- u/a +leuk   -blood cx- Klebsiella, (pan sens)   -BAL-  Klebsiella, Serratia marcenscens  (pan sens)  -f/u blood cx 5/13 - NGTD    ENDO:  q6 POC glucose  Sliding scale insulin  A1c 5.2    DISPO: SICU

## 2021-05-19 NOTE — PROGRESS NOTE ADULT - SUBJECTIVE AND OBJECTIVE BOX
Subjective: 45yMale with a pmhx of SUBDURAL HEMATOMA;RESPIRATORY FAILURE;FALL    ^FALL    No pertinent family history in first degree relatives    Family history of essential hypertension (Father)    Handoff    MEWS Score    No pertinent past medical history    Alcohol abuse    GERD (gastroesophageal reflux disease)    ETOH abuse    Hypomagnesemia    Seizure disorder    Subdural hematoma    S/P subdural hematoma evacuation    Subdural hematoma    Subdural hematoma    Subdural hematoma    Respiratory failure    ETOH abuse    Palliative care by specialist    Insertion of non-tunneled central venous catheter (CVC) in patient age 5 years of age or older    Craniotomy, decompressive    Decompressive craniotomy    No significant past surgical history    H/O hemorrhoidectomy    FALL    22    Respiratory failure    Fall    [email or web address]    POD# 12/2    S/P Left Craniectomy for evac of SDH  S/p wound revision    Pt seen and examined at bedside. Pt remains intubated. Eyes spontaneously open with a L-gaze preference. ?following commands in LUE to squeeze hands and show two fingers in polish.     Allergies    No Known Allergies    Intolerances      Vital Signs Last 24 Hrs  T(C): 37.1 (19 May 2021 08:14), Max: 38.6 (18 May 2021 19:00)  T(F): 98.8 (19 May 2021 08:14), Max: 101.4 (18 May 2021 19:00)  HR: 113 (19 May 2021 10:00) (95 - 128)  BP: 130/78 (19 May 2021 10:00) (123/74 - 151/86)  BP(mean): 97 (19 May 2021 10:00) (91 - 108)  RR: 23 (19 May 2021 10:00) (22 - 27)  SpO2: 100% (19 May 2021 10:00) (98% - 100%)    acetaminophen   Tablet .. 650 milliGRAM(s) Oral every 6 hours PRN  artificial tears (preservative free) Ophthalmic Solution 2 Drop(s) Both EYES three times a day  chlorhexidine 0.12% Liquid 15 milliLiter(s) Oral Mucosa two times a day  chlorhexidine 4% Liquid 1 Application(s) Topical <User Schedule>  clevidipine Infusion 1 mG/Hr IV Continuous <Continuous>  dexMEDEtomidine Infusion 0.1 MICROgram(s)/kG/Hr IV Continuous <Continuous>  fentaNYL    Injectable 25 MICROGram(s) IV Push every 4 hours PRN  heparin   Injectable 5000 Unit(s) SubCutaneous every 8 hours  insulin lispro (ADMELOG) corrective regimen sliding scale   SubCutaneous every 6 hours  levETIRAcetam  IVPB 500 milliGRAM(s) IV Intermittent every 12 hours  niCARdipine 30 milliGRAM(s) Oral every 8 hours  pantoprazole   Suspension 40 milliGRAM(s) Oral daily  propranolol 20 milliGRAM(s) Oral every 8 hours      05-18-21 @ 07:01  -  05-19-21 @ 07:00  --------------------------------------------------------  IN: 5040 mL / OUT: 9039 mL / NET: -3999 mL    05-19-21 @ 07:01  - 05-19-21 @ 10:23  --------------------------------------------------------  IN: 232 mL / OUT: 680 mL / NET: -448 mL      Exam:  Eyes open spontaneously  PERRL b/l   + L-gaze preference  Attempting to show two fingers on the LUE, squeezes L hand to command in Polish  No withdrawal/movement of R-side  Clean, dry, head wrap in place  KIKE drain in place    CBC Full  -  ( 18 May 2021 23:30 )  WBC Count : 9.30 K/uL  RBC Count : 3.26 M/uL  Hemoglobin : 9.8 g/dL  Hematocrit : 30.1 %  Platelet Count - Automated : 455 K/uL  Mean Cell Volume : 92.3 fL  Mean Cell Hemoglobin : 30.1 pg  Mean Cell Hemoglobin Concentration : 32.6 g/dL  Auto Neutrophil # : 7.12 K/uL  Auto Lymphocyte # : 1.11 K/uL  Auto Monocyte # : 0.98 K/uL  Auto Eosinophil # : 0.00 K/uL  Auto Basophil # : 0.03 K/uL  Auto Neutrophil % : 76.7 %  Auto Lymphocyte % : 11.9 %  Auto Monocyte % : 10.5 %  Auto Eosinophil % : 0.0 %  Auto Basophil % : 0.3 %    05-18    158<H>  |  124<H>  |  6<L>  ----------------------------<  148<H>  3.9   |  23  |  <0.5<L>    Ca    8.9      18 May 2021 23:30  Phos  4.2     05-18  Mg     2.1     05-18    TPro  6.1  /  Alb  3.0<L>  /  TBili  0.5  /  DBili  0.2  /  AST  23  /  ALT  47<H>  /  AlkPhos  132<H>  05-18    PT/INR - ( 17 May 2021 23:22 )   PT: 15.20 sec;   INR: 1.32 ratio         PTT - ( 17 May 2021 23:22 )  PTT:26.9 sec      Assessment/Plan:   - Remove KIKE drain today   - Routine EEG to r/o seizures today  - Continue neuro checks     Above case and plan d/w Dr. Byrne

## 2021-05-20 ENCOUNTER — TRANSCRIPTION ENCOUNTER (OUTPATIENT)
Age: 46
End: 2021-05-20

## 2021-05-20 LAB
ALBUMIN SERPL ELPH-MCNC: 2.6 G/DL — LOW (ref 3.5–5.2)
ALP SERPL-CCNC: 118 U/L — HIGH (ref 30–115)
ALT FLD-CCNC: 35 U/L — SIGNIFICANT CHANGE UP (ref 0–41)
ANION GAP SERPL CALC-SCNC: 10 MMOL/L — SIGNIFICANT CHANGE UP (ref 7–14)
ANION GAP SERPL CALC-SCNC: 10 MMOL/L — SIGNIFICANT CHANGE UP (ref 7–14)
AST SERPL-CCNC: 20 U/L — SIGNIFICANT CHANGE UP (ref 0–41)
BASE EXCESS BLDA CALC-SCNC: 5.4 MMOL/L — HIGH (ref -2–2)
BILIRUB DIRECT SERPL-MCNC: 0.2 MG/DL — SIGNIFICANT CHANGE UP (ref 0–0.2)
BILIRUB INDIRECT FLD-MCNC: 0.2 MG/DL — SIGNIFICANT CHANGE UP (ref 0.2–1.2)
BILIRUB SERPL-MCNC: 0.4 MG/DL — SIGNIFICANT CHANGE UP (ref 0.2–1.2)
BUN SERPL-MCNC: 12 MG/DL — SIGNIFICANT CHANGE UP (ref 10–20)
BUN SERPL-MCNC: 13 MG/DL — SIGNIFICANT CHANGE UP (ref 10–20)
CALCIUM SERPL-MCNC: 8.4 MG/DL — LOW (ref 8.5–10.1)
CALCIUM SERPL-MCNC: 8.5 MG/DL — SIGNIFICANT CHANGE UP (ref 8.5–10.1)
CHLORIDE SERPL-SCNC: 110 MMOL/L — SIGNIFICANT CHANGE UP (ref 98–110)
CHLORIDE SERPL-SCNC: 118 MMOL/L — HIGH (ref 98–110)
CO2 SERPL-SCNC: 25 MMOL/L — SIGNIFICANT CHANGE UP (ref 17–32)
CO2 SERPL-SCNC: 26 MMOL/L — SIGNIFICANT CHANGE UP (ref 17–32)
CREAT SERPL-MCNC: 0.5 MG/DL — LOW (ref 0.7–1.5)
CREAT SERPL-MCNC: <0.5 MG/DL — LOW (ref 0.7–1.5)
GLUCOSE BLDC GLUCOMTR-MCNC: 116 MG/DL — HIGH (ref 70–99)
GLUCOSE BLDC GLUCOMTR-MCNC: 131 MG/DL — HIGH (ref 70–99)
GLUCOSE BLDC GLUCOMTR-MCNC: 144 MG/DL — HIGH (ref 70–99)
GLUCOSE BLDC GLUCOMTR-MCNC: 165 MG/DL — HIGH (ref 70–99)
GLUCOSE BLDC GLUCOMTR-MCNC: 90 MG/DL — SIGNIFICANT CHANGE UP (ref 70–99)
GLUCOSE SERPL-MCNC: 107 MG/DL — HIGH (ref 70–99)
GLUCOSE SERPL-MCNC: 172 MG/DL — HIGH (ref 70–99)
HCO3 BLDA-SCNC: 28 MMOL/L — HIGH (ref 23–27)
HCT VFR BLD CALC: 28.1 % — LOW (ref 42–52)
HCT VFR BLD CALC: 32 % — LOW (ref 42–52)
HGB BLD-MCNC: 10.2 G/DL — LOW (ref 14–18)
HGB BLD-MCNC: 8.5 G/DL — LOW (ref 14–18)
HOROWITZ INDEX BLDA+IHG-RTO: 35 — SIGNIFICANT CHANGE UP
MAGNESIUM SERPL-MCNC: 2 MG/DL — SIGNIFICANT CHANGE UP (ref 1.8–2.4)
MCHC RBC-ENTMCNC: 28.5 PG — SIGNIFICANT CHANGE UP (ref 27–31)
MCHC RBC-ENTMCNC: 28.8 PG — SIGNIFICANT CHANGE UP (ref 27–31)
MCHC RBC-ENTMCNC: 30.2 G/DL — LOW (ref 32–37)
MCHC RBC-ENTMCNC: 31.9 G/DL — LOW (ref 32–37)
MCV RBC AUTO: 90.4 FL — SIGNIFICANT CHANGE UP (ref 80–94)
MCV RBC AUTO: 94.3 FL — HIGH (ref 80–94)
NRBC # BLD: 0 /100 WBCS — SIGNIFICANT CHANGE UP (ref 0–0)
NRBC # BLD: 0 /100 WBCS — SIGNIFICANT CHANGE UP (ref 0–0)
PCO2 BLDA: 34 MMHG — LOW (ref 38–42)
PH BLDA: 7.53 — HIGH (ref 7.38–7.42)
PHOSPHATE SERPL-MCNC: 4.7 MG/DL — SIGNIFICANT CHANGE UP (ref 2.1–4.9)
PLATELET # BLD AUTO: 446 K/UL — HIGH (ref 130–400)
PLATELET # BLD AUTO: 479 K/UL — HIGH (ref 130–400)
PO2 BLDA: 161 MMHG — HIGH (ref 78–95)
POTASSIUM SERPL-MCNC: 3.8 MMOL/L — SIGNIFICANT CHANGE UP (ref 3.5–5)
POTASSIUM SERPL-MCNC: 4.1 MMOL/L — SIGNIFICANT CHANGE UP (ref 3.5–5)
POTASSIUM SERPL-SCNC: 3.8 MMOL/L — SIGNIFICANT CHANGE UP (ref 3.5–5)
POTASSIUM SERPL-SCNC: 4.1 MMOL/L — SIGNIFICANT CHANGE UP (ref 3.5–5)
PROT SERPL-MCNC: 5.6 G/DL — LOW (ref 6–8)
RBC # BLD: 2.98 M/UL — LOW (ref 4.7–6.1)
RBC # BLD: 3.54 M/UL — LOW (ref 4.7–6.1)
RBC # FLD: 14.5 % — SIGNIFICANT CHANGE UP (ref 11.5–14.5)
RBC # FLD: 14.9 % — HIGH (ref 11.5–14.5)
SAO2 % BLDA: 99 % — HIGH (ref 94–98)
SODIUM SERPL-SCNC: 146 MMOL/L — SIGNIFICANT CHANGE UP (ref 135–146)
SODIUM SERPL-SCNC: 153 MMOL/L — HIGH (ref 135–146)
WBC # BLD: 8.58 K/UL — SIGNIFICANT CHANGE UP (ref 4.8–10.8)
WBC # BLD: 9.08 K/UL — SIGNIFICANT CHANGE UP (ref 4.8–10.8)
WBC # FLD AUTO: 8.58 K/UL — SIGNIFICANT CHANGE UP (ref 4.8–10.8)
WBC # FLD AUTO: 9.08 K/UL — SIGNIFICANT CHANGE UP (ref 4.8–10.8)

## 2021-05-20 PROCEDURE — 71045 X-RAY EXAM CHEST 1 VIEW: CPT | Mod: 26

## 2021-05-20 PROCEDURE — 70496 CT ANGIOGRAPHY HEAD: CPT | Mod: 26

## 2021-05-20 PROCEDURE — 99291 CRITICAL CARE FIRST HOUR: CPT

## 2021-05-20 RX ORDER — SODIUM CHLORIDE 9 MG/ML
250 INJECTION INTRAMUSCULAR; INTRAVENOUS; SUBCUTANEOUS ONCE
Refills: 0 | Status: COMPLETED | OUTPATIENT
Start: 2021-05-20 | End: 2021-05-20

## 2021-05-20 RX ORDER — SENNA PLUS 8.6 MG/1
2 TABLET ORAL AT BEDTIME
Refills: 0 | Status: DISCONTINUED | OUTPATIENT
Start: 2021-05-20 | End: 2021-06-01

## 2021-05-20 RX ORDER — POTASSIUM CHLORIDE 20 MEQ
20 PACKET (EA) ORAL ONCE
Refills: 0 | Status: COMPLETED | OUTPATIENT
Start: 2021-05-20 | End: 2021-05-20

## 2021-05-20 RX ADMIN — Medication 650 MILLIGRAM(S): at 04:02

## 2021-05-20 RX ADMIN — HEPARIN SODIUM 5000 UNIT(S): 5000 INJECTION INTRAVENOUS; SUBCUTANEOUS at 05:11

## 2021-05-20 RX ADMIN — Medication 2 DROP(S): at 05:13

## 2021-05-20 RX ADMIN — SODIUM CHLORIDE 3000 MILLILITER(S): 9 INJECTION INTRAMUSCULAR; INTRAVENOUS; SUBCUTANEOUS at 06:41

## 2021-05-20 RX ADMIN — Medication 650 MILLIGRAM(S): at 13:10

## 2021-05-20 RX ADMIN — Medication 650 MILLIGRAM(S): at 04:46

## 2021-05-20 RX ADMIN — SENNA PLUS 2 TABLET(S): 8.6 TABLET ORAL at 21:05

## 2021-05-20 RX ADMIN — Medication 650 MILLIGRAM(S): at 19:12

## 2021-05-20 RX ADMIN — HEPARIN SODIUM 5000 UNIT(S): 5000 INJECTION INTRAVENOUS; SUBCUTANEOUS at 21:05

## 2021-05-20 RX ADMIN — Medication 650 MILLIGRAM(S): at 18:12

## 2021-05-20 RX ADMIN — SODIUM CHLORIDE 3000 MILLILITER(S): 9 INJECTION INTRAMUSCULAR; INTRAVENOUS; SUBCUTANEOUS at 03:59

## 2021-05-20 RX ADMIN — PANTOPRAZOLE SODIUM 40 MILLIGRAM(S): 20 TABLET, DELAYED RELEASE ORAL at 13:33

## 2021-05-20 RX ADMIN — FENTANYL CITRATE 25 MICROGRAM(S): 50 INJECTION INTRAVENOUS at 23:55

## 2021-05-20 RX ADMIN — LEVETIRACETAM 420 MILLIGRAM(S): 250 TABLET, FILM COATED ORAL at 05:13

## 2021-05-20 RX ADMIN — CHLORHEXIDINE GLUCONATE 1 APPLICATION(S): 213 SOLUTION TOPICAL at 05:12

## 2021-05-20 RX ADMIN — FENTANYL CITRATE 25 MICROGRAM(S): 50 INJECTION INTRAVENOUS at 03:11

## 2021-05-20 RX ADMIN — Medication 650 MILLIGRAM(S): at 12:09

## 2021-05-20 RX ADMIN — Medication 2 DROP(S): at 13:31

## 2021-05-20 RX ADMIN — DEXMEDETOMIDINE HYDROCHLORIDE IN 0.9% SODIUM CHLORIDE 1.99 MICROGRAM(S)/KG/HR: 4 INJECTION INTRAVENOUS at 04:48

## 2021-05-20 RX ADMIN — HEPARIN SODIUM 5000 UNIT(S): 5000 INJECTION INTRAVENOUS; SUBCUTANEOUS at 13:33

## 2021-05-20 RX ADMIN — Medication 100 MILLIEQUIVALENT(S): at 19:48

## 2021-05-20 RX ADMIN — FENTANYL CITRATE 25 MICROGRAM(S): 50 INJECTION INTRAVENOUS at 03:59

## 2021-05-20 RX ADMIN — Medication 2 DROP(S): at 21:13

## 2021-05-20 RX ADMIN — LEVETIRACETAM 420 MILLIGRAM(S): 250 TABLET, FILM COATED ORAL at 17:17

## 2021-05-20 RX ADMIN — CHLORHEXIDINE GLUCONATE 15 MILLILITER(S): 213 SOLUTION TOPICAL at 05:11

## 2021-05-20 RX ADMIN — CHLORHEXIDINE GLUCONATE 15 MILLILITER(S): 213 SOLUTION TOPICAL at 17:17

## 2021-05-20 NOTE — PROGRESS NOTE ADULT - SUBJECTIVE AND OBJECTIVE BOX
SANDRA DAS  [id number]  45y Male    Indication for ICU admission: s/p craniectomy for large L SDH  Admit Date:21  ICU Date: 21  OR Date: 21    No Known Allergies    PAST MEDICAL & SURGICAL HISTORY:  Alcohol abuse  GERD (gastroesophageal reflux disease)  ETOH abuse  Hypomagnesemia  Seizure disorder  H/O hemorrhoidectomy    Home Medications:    24HRS EVENT:    DAY  -routine EEG done   -d/c tolvaptam, Na 153 now, UO slowed down /hr  -change Amlodipine to Lisinopril 10, wean cleviprex   -nsx d/c'd KIKE drain   -dc abx, re-cx again when spikes  -PS ~1.5 hrs, failed 2/2 tachypnea, tachycardia   -episode of agitation, biting on ETT, elevated peak pressure --> placed on 100% FiO2 momentarily, prop 2cc, restarted Precedex  -CT Venogram tomorrow     NIGHT:  - TMax 101.6 --> cultures sent  - no claviprex since     DVT PTX: Hep sq  GI PTX: PPI    ***Tubes/Lines/Drains  ***  Peripheral IV  Left basilic midline   Arterial Line Right radial	Date   L IJ TLC       OGT, ETT  Urinary Catheter		Indication: Strict I&O    Date Placed:     REVIEW OF SYSTEMS  [ ] A ten-point review of systems was otherwise negative except as noted.  [x] Due to altered mental status/intubation, subjective information were not able to be obtained from the patient. History was obtained, to the extent possible, from review of the chart and collateral sources of information.    CURRENT MEDS:  Neurologic Medications  acetaminophen   Tablet .. 650 milliGRAM(s) Oral every 6 hours PRN Temp greater or equal to 38.5C (101.3F)  dexMEDEtomidine Infusion 0.1 MICROgram(s)/kG/Hr IV Continuous <Continuous>  fentaNYL    Injectable 25 MICROGram(s) IV Push every 4 hours PRN Severe Pain (7 - 10)  levETIRAcetam  IVPB 500 milliGRAM(s) IV Intermittent every 12 hours    Respiratory Medications    Cardiovascular Medications  clevidipine Infusion 1 mG/Hr IV Continuous <Continuous>  lisinopril 10 milliGRAM(s) Oral daily  propranolol 20 milliGRAM(s) Oral every 8 hours    Gastrointestinal Medications  pantoprazole   Suspension 40 milliGRAM(s) Oral daily    Genitourinary Medications    Hematologic/Oncologic Medications  heparin   Injectable 5000 Unit(s) SubCutaneous every 8 hours    Antimicrobial/Immunologic Medications    Endocrine/Metabolic Medications  insulin lispro (ADMELOG) corrective regimen sliding scale   SubCutaneous every 6 hours    Topical/Other Medications  artificial tears (preservative free) Ophthalmic Solution 2 Drop(s) Both EYES three times a day  chlorhexidine 0.12% Liquid 15 milliLiter(s) Oral Mucosa two times a day  chlorhexidine 4% Liquid 1 Application(s) Topical <User Schedule>    ICU Vital Signs Last 24 Hrs  T(C): 37.7 (19 May 2021 23:00), Max: 38.6 (19 May 2021 20:00)  T(F): 99.8 (19 May 2021 23:00), Max: 101.5 (19 May 2021 20:00)  HR: 95 (20 May 2021 01:00) (92 - 128)  BP: 92/51 (20 May 2021 01:00) (92/51 - 151/77)  BP(mean): 65 (20 May 2021 01:00) (65 - 103)  ABP: 123/52 (20 May 2021 00:00) (100/50 - 161/72)  ABP(mean): 70 (20 May 2021 00:00) (65 - 100)  RR: 25 (20 May 2021 01:00) (16 - 25)  SpO2: 100% (20 May 2021 01:00) (99% - 100%)    Mode: AC/ CMV (Assist Control/ Continuous Mandatory Ventilation)  RR (machine): 16  TV (machine): 420  FiO2: 35  PEEP: 5  ITime: 1  MAP: 12  PIP: 19    ABG - ( 19 May 2021 16:55 ): 7.49 / 39 / 133 / 30 / 99%    I&O's Summary  18 May 2021 07:01  -  19 May 2021 07:00  --------------------------------------------------------  IN: 5040 mL / OUT: 9039 mL / NET: -3999 mL    19 May 2021 07:01  -  20 May 2021 02:41  --------------------------------------------------------  IN: 2307.2 mL / OUT: 1910 mL / NET: 397.2 mL    I&O's Detail    18 May 2021 07:01  -  19 May 2021 07:00  --------------------------------------------------------  IN:    Clevidipine: 231 mL    Dexmedetomidine: 9 mL    Enteral Tube Flush: 110 mL    IV PiggyBack: 100 mL    IV PiggyBack: 50 mL    IV PiggyBack: 100 mL    Lactated Ringers Bolus: 1750 mL    Osmolite: 1200 mL    Sodium Chloride 0.9% Bolus: 1000 mL    sodium chloride 3%: 100 mL    sodium chloride 3%: 240 mL    sodium chloride 3%: 150 mL  Total IN: 5040 mL    OUT:    Bulb (mL): 44 mL    Indwelling Catheter - Urethral (mL): 8995 mL  Total OUT: 9039 mL    Total NET: -3999 mL    19 May 2021 07:01  -  20 May 2021 02:41  --------------------------------------------------------  IN:    Clevidipine: 66 mL    Dexmedetomidine: 111.2 mL    Enteral Tube Flush: 30 mL    IV PiggyBack: 50 mL    IV PiggyBack: 300 mL    IV PiggyBack: 100 mL    Osmolite: 900 mL    Sodium Chloride 0.9% Bolus: 750 mL  Total IN: 2307.2 mL    OUT:    Bulb (mL): 10 mL    Indwelling Catheter - Urethral (mL): 1800 mL    Rectal Tube (mL): 100 mL  Total OUT: 1910 mL  Total NET: 397.2 mL    PHYSICAL EXAM:  General/Neuro  - RASS: 0  GCS: E 1  / V  NT / M6 (squeezes with LUE)   - PERRL, +corneal, +gag    Lungs:        - clear to auscultation, Normal expansion/effort.     Cardiovascular :   - S1, S2.  Regular rate and rhythm.  Peripheral edema   - Cardiac Rhythm: Normal Sinus Rhythm    GI:   - Abdomen soft, Non-tender, Non-distended.      Extremities:   - Extremities warm, pink, well-perfused. Pulses:Rt     Lt    Derm:   - Good skin turgor, no skin breakdown.    - site of craniectomy wound repair appears well-healing, without drainage, bleeding, or signs of infection    :         - Cole catheter in place.    CXR:     LABS:  CAPILLARY BLOOD GLUCOSE  POCT Blood Glucose.: 165 mg/dL (19 May 2021 23:38)  POCT Blood Glucose.: 122 mg/dL (19 May 2021 17:39)  POCT Blood Glucose.: 142 mg/dL (19 May 2021 11:40)  POCT Blood Glucose.: 151 mg/dL (19 May 2021 05:41)                       8.5    9.08  )-----------( 479      ( 19 May 2021 23:43 )             28.1           153<H>  |  118<H>  |  13  ----------------------------<  172<H>  4.1   |  25  |  0.5<L>    Ca    8.4<L>      19 May 2021 23:43  Phos  4.7       Mg     2.0         TPro  5.6<L>  /  Alb  2.6<L>  /  TBili  0.4  /  DBili  0.2  /  AST  20  /  ALT  35  /  AlkPhos  118<H>      Urinalysis Basic - ( 18 May 2021 13:55 )  Color: Colorless / Appearance: Clear / S.003 / pH: x  Gluc: x / Ketone: Negative  / Bili: Negative / Urobili: <2 mg/dL   Blood: x / Protein: Negative / Nitrite: Negative   Leuk Esterase: Negative / RBC: x / WBC x   Sq Epi: x / Non Sq Epi: x / Bacteria: x

## 2021-05-20 NOTE — PROGRESS NOTE ADULT - SUBJECTIVE AND OBJECTIVE BOX
Neurosurgery PA Note    Pt seen and examined. Off sedation since 6am. Continously febrile overnight.     Vital Signs Last 24 Hrs  T(C): 37.4 (20 May 2021 07:00), Max: 38.7 (20 May 2021 04:00)  T(F): 99.4 (20 May 2021 07:00), Max: 101.7 (20 May 2021 04:00)  HR: 89 (20 May 2021 09:00) (77 - 123)  BP: 117/68 (20 May 2021 05:00) (92/51 - 144/77)  BP(mean): 88 (20 May 2021 05:00) (65 - 99)  RR: 22 (20 May 2021 09:00) (16 - 25)  SpO2: 100% (20 May 2021 09:00) (99% - 100%)    Gen: Intubated on vent. NAD. Opens eyes and grimaces to noxious stimuli. L gaze preference.   Scalp incision with staples, suture C/D/I, no erythema, no induration, no fluctuance, no drainage. Flap soft.   PERRLA.   Neuro: Squeezes with L hand but  Does not appear to be  following  commands. LUE moves spontaneously. LLE withdraws to pain. RUE/RLE does not move spontaneously and does not withdraw to pain/noxious stimuli.     LABS:                        8.5    9.08  )-----------( 479      ( 19 May 2021 23:43 )             28.1     05-19    153<H>  |  118<H>  |  13  ----------------------------<  172<H>  4.1   |  25  |  0.5<L>    Ca    8.4<L>      19 May 2021 23:43  Phos  4.7     05-19  Mg     2.0     05-19    Bcx: 5/19 NGTD  OR cx: coag neg staph    EEG: Impression:  -  Abnormal due to the presence of: generalized slowing as above      a/p: 45M hx of ETOH abuse s/p fall with Large left SDH s/p L crani evacuation of SDH 5/7 with RTOR 5/16 s/p wound revision    1. continue q1 hr neurochecks  2. HOB 30  3. f/u ID for persistent fevers  4. maintain -150  5. continue keppra   6. primary care per ICU    above D/w Dr. Norton/Caty        Neurosurgery PA Note    Pt seen and examined. Off sedation since 6am. Continously febrile overnight.     Vital Signs Last 24 Hrs  T(C): 37.4 (20 May 2021 07:00), Max: 38.7 (20 May 2021 04:00)  T(F): 99.4 (20 May 2021 07:00), Max: 101.7 (20 May 2021 04:00)  HR: 89 (20 May 2021 09:00) (77 - 123)  BP: 117/68 (20 May 2021 05:00) (92/51 - 144/77)  BP(mean): 88 (20 May 2021 05:00) (65 - 99)  RR: 22 (20 May 2021 09:00) (16 - 25)  SpO2: 100% (20 May 2021 09:00) (99% - 100%)    Gen: Intubated on vent. NAD. Opens eyes and grimaces to noxious stimuli. L gaze preference.   Scalp incision with staples, suture C/D/I, no erythema, no induration, no fluctuance, no drainage. Flap soft.   PERRLA.   Neuro: Squeezes with L hand but  Does not appear to be  following  commands. LUE moves spontaneously. LLE withdraws to pain. RUE/RLE does not move spontaneously and does not withdraw to pain/noxious stimuli.     LABS:                        8.5    9.08  )-----------( 479      ( 19 May 2021 23:43 )             28.1     05-19    153<H>  |  118<H>  |  13  ----------------------------<  172<H>  4.1   |  25  |  0.5<L>    Ca    8.4<L>      19 May 2021 23:43  Phos  4.7     05-19  Mg     2.0     05-19    Bcx: 5/19 NGTD  OR cx: coag neg staph    EEG: Impression:  -  Abnormal due to the presence of: generalized slowing as above      a/p: 45M hx of ETOH abuse s/p fall with Large left SDH s/p L crani evacuation of SDH 5/7 with RTOR 5/16 s/p wound revision    1. continue q1 hr neurochecks  2. HOB 30  3. f/u ID for persistent fevers  4. maintain -150  5. continue keppra   6. plan for CTV on monday 67 primary care per ICU    above D/w Dr. Norton/Caty        Neurosurgery PA Note    Pt seen and examined. Off sedation since 6am. Continously febrile overnight.     Vital Signs Last 24 Hrs  T(C): 37.4 (20 May 2021 07:00), Max: 38.7 (20 May 2021 04:00)  T(F): 99.4 (20 May 2021 07:00), Max: 101.7 (20 May 2021 04:00)  HR: 89 (20 May 2021 09:00) (77 - 123)  BP: 117/68 (20 May 2021 05:00) (92/51 - 144/77)  BP(mean): 88 (20 May 2021 05:00) (65 - 99)  RR: 22 (20 May 2021 09:00) (16 - 25)  SpO2: 100% (20 May 2021 09:00) (99% - 100%)    Gen: Intubated on vent. NAD. Opens eyes and grimaces to noxious stimuli. L gaze preference.   Scalp incision with staples, suture C/D/I, no erythema, no induration, no fluctuance, no drainage. Flap soft.   PERRLA.   Neuro: Squeezes with L hand but  Does not appear to be  following  commands. LUE moves spontaneously. LLE withdraws to pain. RUE/RLE does not move spontaneously and does not withdraw to pain/noxious stimuli.     LABS:                        8.5    9.08  )-----------( 479      ( 19 May 2021 23:43 )             28.1     05-19    153<H>  |  118<H>  |  13  ----------------------------<  172<H>  4.1   |  25  |  0.5<L>    Ca    8.4<L>      19 May 2021 23:43  Phos  4.7     05-19  Mg     2.0     05-19    Bcx: 5/19 NGTD  OR cx: coag neg staph    EEG: Impression:  -  Abnormal due to the presence of: generalized slowing as above      a/p: 45M hx of ETOH abuse s/p fall with Large left SDH s/p L crani evacuation of SDH 5/7 with intraop cardiac arrest with ROSC with RTOR 5/16 s/p wound revision    1. continue q1 hr neurochecks  2. HOB 30  3. f/u ID recs for persistent fevers  4. maintain -150  5. continue keppra   6. plan for CTV on monday   67 primary care per ICU    above D/w Dr. Norton/Caty

## 2021-05-20 NOTE — PROGRESS NOTE ADULT - ASSESSMENT
ASSESSMENT  45yM w/ PMHx of Etoh abuse and seizures on keppra  found down unresponsive admitted 5/6. s/p craniectomy for large L SDH    IMPRESSION  #Fevers    No leukocytosis, rule out non-infectious causes    5/19 BCX NGTD     5/18 UA unremarkable     #Klebsiella bacteremia , BAL also with Klebsiella but no PNA on imaging (CT/CXR)    5/13 BCX NGTD     5/11 BCX kleb variicola (S)    5/11 BAL   Moderate Klebsiella variicola &  Moderate Serratia marcescens  < from: Xray Chest 1 View- PORTABLE-Routine (05.13.21 @ 06:28) >No radiographic evidence of acute cardiopulmonary disease.    #SDH s/p craniectomy    5/16 OR WCX   Rare Coag Negative Staphylococcus (S oxacillin)- likely skin colonizer     5/16 s/p Revision craniotomy wound with complex closure for persistent drainage, subgaleal hematoma    Repeat CTH development of acute infarcts in left cerebral hemisphere and right cerebellum  #CT atelectasis   #HIV/Hep panel NR  Creatinine, Serum: <0.5 (05-14-21 @ 05:30)      RECOMMENDATIONS  - s/p D8 of ABX  - Rule out central fevers, DVT, non-infectious causes  - trend WBC, fever curve  - Per SICU/ NSYG- no concern for wound infection    If any questions, please call or send a message on [employer]  Spectra [phone]

## 2021-05-20 NOTE — PROGRESS NOTE ADULT - SUBJECTIVE AND OBJECTIVE BOX
THIAGO SANDRA  45y, Male  Allergy: No Known Allergies      LOS  14d    CHIEF COMPLAINT: found down unresponsive (20 May 2021 02:39)      INTERVAL EVENTS/HPI  - T(F): , Max: 101.7 (21 @ 04:00)  - WBC Count: 9.08 (21 @ 23:43)  WBC Count: 9.30 (21 @ 23:30)  - Creatinine, Serum: 0.5 (21 @ 23:43)  Creatinine, Serum: 0.5 (21 @ 16:43)     -   -   -     ROS  cannot obtain secondary to patient's sedation and/or mental status    VITALS:  T(F): 99.4, Max: 101.7 (21 @ 04:00)  HR: 89  BP: 117/68  RR: 22Vital Signs Last 24 Hrs  T(C): 37.4 (20 May 2021 07:00), Max: 38.7 (20 May 2021 04:00)  T(F): 99.4 (20 May 2021 07:00), Max: 101.7 (20 May 2021 04:00)  HR: 89 (20 May 2021 09:00) (77 - 123)  BP: 117/68 (20 May 2021 05:00) (92/51 - 144/77)  BP(mean): 88 (20 May 2021 05:00) (65 - 99)  RR: 22 (20 May 2021 09:00) (16 - 25)  SpO2: 100% (20 May 2021 09:00) (99% - 100%)    PHYSICAL EXAM:  Gen: Intubated  HEENT staples, clean incision  CV: RRR  Lungs: Decreased BS at bases  Abd: Soft  Neuro: opens eyes  Lines clean, no phlebitis    FH: Non-contributory  Social Hx: Non-contributory    TESTS & MEASUREMENTS:                        8.5    9.08  )-----------( 479      ( 19 May 2021 23:43 )             28.1     05-    153<H>  |  118<H>  |  13  ----------------------------<  172<H>  4.1   |  25  |  0.5<L>    Ca    8.4<L>      19 May 2021 23:43  Phos  4.7       Mg     2.0         TPro  5.6<L>  /  Alb  2.6<L>  /  TBili  0.4  /  DBili  0.2  /  AST  20  /  ALT  35  /  AlkPhos  118<H>      eGFR if Non African American: 130 mL/min/1.73M2 (21 @ 23:43)  eGFR if : 151 mL/min/1.73M2 (21 @ 23:43)  eGFR if Non African American: 130 mL/min/1.73M2 (21 @ 16:43)  eGFR if : 151 mL/min/1.73M2 (21 @ 16:43)  eGFR if : 165 mL/min/1.73M2 (21 @ 11:59)  eGFR if Non African American: 142 mL/min/1.73M2 (21 @ 11:59)    LIVER FUNCTIONS - ( 19 May 2021 23:43 )  Alb: 2.6 g/dL / Pro: 5.6 g/dL / ALK PHOS: 118 U/L / ALT: 35 U/L / AST: 20 U/L / GGT: x           Urinalysis Basic - ( 18 May 2021 13:55 )    Color: Colorless / Appearance: Clear / S.003 / pH: x  Gluc: x / Ketone: Negative  / Bili: Negative / Urobili: <2 mg/dL   Blood: x / Protein: Negative / Nitrite: Negative   Leuk Esterase: Negative / RBC: x / WBC x   Sq Epi: x / Non Sq Epi: x / Bacteria: x        Culture - Blood (collected 21 @ 00:27)  Source: .Blood None  Preliminary Report (21 @ 07:02):    No growth to date.    Culture - Surgical Swab (collected 21 @ 08:54)  Source: .Surgical Swab None  Preliminary Report (21 @ 16:51):    Rare Coag Negative Staphylococcus  Organism: Coag Negative Staphylococcus (21 @ 17:56)  Organism: Coag Negative Staphylococcus (21 @ 17:56)      -  Ampicillin/Sulbactam: S <=8/4      -  Cefazolin: S <=4      -  Clindamycin: S <=0.25      -  Erythromycin: S <=0.25      -  Gentamicin: S <=1 Should not be used as monotherapy      -  Oxacillin: S <=0.25      -  Penicillin: R 0.25      -  RIF- Rifampin: S <=1 Should not be used as monotherapy      -  Tetra/Doxy: S <=1      -  Trimethoprim/Sulfamethoxazole: S <=0.5/9.5      -  Vancomycin: S 0.5      Method Type: AISHA    Culture - Blood (collected 21 @ 11:19)  Source: .Blood None  Final Report (21 @ 22:03):    No Growth Final    Culture - Blood (collected 21 @ 11:18)  Source: .Blood None  Gram Stain (21 @ 02:53):    Growth in anaerobic bottle: Gram Negative Rods    Growth in aerobic bottle: Gram Negative Rods  Final Report (21 @ 16:09):    Growth in aerobic and anaerobic bottles: Klebsiella variicola    ***Blood Panel PCR results on this specimen are available    approximately 3 hours after the Gram stain result.***    Gram stain, PCR, and/or culture results may not always    correspond due to difference in methodologies.    ************************************************************    This PCR assay was performed by multiplex PCR. This    Assay tests for 66 bacterial and resistance gene targets.    Please refer to the [facility] [employer] test directory    at [email or web address] for details.  Organism: Blood Culture PCR  Klebsiella variicola (21 @ 16:09)  Organism: Klebsiella variicola (21 @ 16:09)      -  Amikacin: S <=16      -  Ampicillin: R <=8 These ampicillin results predict results for amoxicillin      -  Ampicillin/Sulbactam: S <=4/2 Enterobacter, Citrobacter, and Serratia may develop resistance during prolonged therapy (3-4 days)      -  Aztreonam: S <=4      -  Cefazolin: S <=2 Enterobacter, Citrobacter, and Serratia may develop resistance during prolonged therapy (3-4 days)      -  Cefepime: S <=2      -  Cefoxitin: S <=8      -  Ceftriaxone: S <=1 Enterobacter, Citrobacter, and Serratia may develop resistance during prolonged therapy      -  Ciprofloxacin: S <=0.25      -  Ertapenem: S <=0.5      -  Gentamicin: S <=2      -  Imipenem: S <=1      -  Levofloxacin: S <=0.5      -  Meropenem: S <=1      -  Piperacillin/Tazobactam: S <=8      -  Tobramycin: S <=2      -  Trimethoprim/Sulfamethoxazole: S <=0.5/9.5      Method Type: AISHA  Organism: Blood Culture PCR (21 @ 16:09)      -  Klebsiella pneumoniae: Detec      Method Type: PCR    Culture - Bronchial (collected 21 @ 10:12)  Source: Bronch Wash Bronchoalveolar Lavage  Gram Stain (21 @ 07:04):    Few polymorphonuclear leukocytes per low power field    No Squamous epithelial cells per low power field    Few Gram Positive Rods per oil power field  Final Report (21 @ 16:19):    Moderate Klebsiella variicola    Moderate Serratia marcescens    Normal Respiratory Liz present  Organism: Klebsiella variicola  Serratia marcescens (21 @ 16:19)  Organism: Serratia marcescens (21 @ 16:19)      -  Amikacin: S <=16      -  Amoxicillin/Clavulanic Acid: R >16/8      -  Ampicillin: R <=8 These ampicillin results predict results for amoxicillin      -  Ampicillin/Sulbactam: R 16/8 Enterobacter, Citrobacter, and Serratia may develop resistance during prolonged therapy (3-4 days)      -  Aztreonam: S <=4      -  Cefazolin: R >16 Enterobacter, Citrobacter, and Serratia may develop resistance during prolonged therapy (3-4 days)      -  Cefepime: S <=2      -  Cefoxitin: R <=8      -  Ceftriaxone: S <=1 Enterobacter, Citrobacter, and Serratia may develop resistance during prolonged therapy      -  Ciprofloxacin: S <=0.25      -  Ertapenem: S <=0.5      -  Gentamicin: S <=2      -  Levofloxacin: S <=0.5      -  Meropenem: S <=1      -  Piperacillin/Tazobactam: S <=8      -  Tobramycin: S <=2      -  Trimethoprim/Sulfamethoxazole: S <=0.5/9.5      Method Type: AISHA  Organism: Klebsiella variicola (21 @ 16:19)      -  Amikacin: S <=16      -  Amoxicillin/Clavulanic Acid: R >16/8      -  Ampicillin: R <=8 These ampicillin results predict results for amoxicillin      -  Ampicillin/Sulbactam: I 16/8 Enterobacter, Citrobacter, and Serratia may develop resistance during prolonged therapy (3-4 days)      -  Aztreonam: R >16      -  Cefazolin: R >16 Enterobacter, Citrobacter, and Serratia may develop resistance during prolonged therapy (3-4 days)      -  Cefepime: S <=2      -  Cefoxitin: S <=8      -  Ceftriaxone: S <=1 Enterobacter, Citrobacter, and Serratia may develop resistance during prolonged therapy      -  Ciprofloxacin: S <=0.25      -  Ertapenem: S <=0.5      -  Gentamicin: S <=2      -  Imipenem: S <=1      -  Levofloxacin: S <=0.5      -  Meropenem: S <=1      -  Piperacillin/Tazobactam: S <=8      -  Tobramycin: S 4      -  Trimethoprim/Sulfamethoxazole: S <=0.5/9.5      Method Type: AISHA            INFECTIOUS DISEASES TESTING  COVID-19 PCR: NotDetec (21 @ 18:58)  HIV-1/2 Combo Result: Nonreact (21 @ 23:30)  Rapid RVP Result: NotDetec (21 @ 22:44)  COVID-19 PCR: NotDetec (21 @ 16:46)  COVID-19 PCR: NotDetec (21 @ 19:35)      INFLAMMATORY MARKERS      RADIOLOGY & ADDITIONAL TESTS:  I have personally reviewed the last available Chest xray  CXR      CT      CARDIOLOGY TESTING  12 Lead ECG:   Ventricular Rate 78 BPM    Atrial Rate 78 BPM    P-R Interval 162 ms    QRS Duration 70 ms    Q-T Interval 364 ms    QTC Calculation(Bazett) 414 ms    P Axis 55 degrees    R Axis 30 degrees    T Axis 62 degrees    Diagnosis Line Normal sinus rhythm  Normal ECG    Confirmed by SALAS WASHINGTON MD ([id number]) on 2021 7:01:28 AM (21 @ 04:52)  12 Lead ECG:   Systolic  mmHg    Diastolic BP 60 mmHg    Ventricular Rate 116 BPM    Atrial Rate 116 BPM    P-R Interval 152 ms    QRS Duration 86 ms    Q-T Interval 364 ms    QTC Calculation(Bazett) 505 ms    P Axis 58 degrees    R Axis 59 degrees    T Axis 53 degrees    Diagnosis Line Sinus tachycardia  Possible Anterior infarct , age undetermined  Abnormal ECG    Confirmed by SAIMA HAGEN MD ([id number]) on 2021 5:08:50 AM (21 @ 01:08)      MEDICATIONS  artificial tears (preservative free) Ophthalmic Solution 2  chlorhexidine 0.12% Liquid 15  chlorhexidine 4% Liquid 1  dexMEDEtomidine Infusion 0.1  heparin   Injectable 5000  insulin lispro (ADMELOG) corrective regimen sliding scale   levETIRAcetam  IVPB 500  lisinopril 10  pantoprazole   Suspension 40  propranolol 20  senna 2      WEIGHT  Weight (kg): 79.4 (21 @ 08:11)  Creatinine, Serum: 0.5 mg/dL (21 @ 23:43)  Creatinine, Serum: 0.5 mg/dL (21 @ 16:43)  Creatinine, Serum: <0.5 mg/dL (21 @ 11:59)      ANTIBIOTICS:      All available historical records have been reviewed

## 2021-05-20 NOTE — PROGRESS NOTE ADULT - ASSESSMENT
45M s/p fall with large left SDH and neurological status change, level 1 for emergent craniotomy    NEURO:    Hx of EtOH abuse and prior Delerium Tremens episodes    S/P craniotomy for large left SDH     - Q2H neuro checks     - GCS 11T post op 5/16, but now 10T not following commands     - Keppra 500mg BID     - HTS 3% OFF now     - Tolvaptam d/c'd 5/19    Sedation: precedex restarted 5/19 due to agitation     - goal RASS 0 to -1     Pain control - prn low dose fentanyl IVP    Head of bed @ 30 degrees     - Repeat Head CT #1 post op changes, resolution of MLS, stable SDH along cerebral falx and L tentorium, new trace scattered SAH along the right cerebral sulci and right cerebellar sulci and new IVP in the b/l occipital horns.     - Repeat Head CT #2: development of acute infarcts in left cerebral hemisphere and right cerebellum     - CTA neck and Brain - no evidence acute large vessel occlusion, severe stenosis of R vertebral artery and moderate stenosis of mid-distal basilar artery, possible jugular venous thrombosis, NCHCT recommended for left convexity subdural collection, and increasing edema around L temporal/occipital parenchemal hemorrhage    OR 5/16:     - Clot was evacuated, bleeding noted. Hemostasis achieved without difficulty. KIKE drain left in subgaleal space. Brief operative findings: revision craniotomy wound with complex closure for persistent drainage, subgaleal hematoma     Post op CTH 5/16: expected post-op changes     - repeat CT venogram to r/o sinous thrombus as per neurology -plan for tomorrow    rEEG (5/19): borderline generalized slowing    RESP:     Intubated, /16/25/5      - pm abg s/p (insp pause 0.7): 7.49/39/133/30-99%    AM CXR    Plan for Trach next week if not extubated    CARDS:     S/P cardiac arrest in OR & MTP     - Acute HTN - on Clevidipine  goal -160  MAP > 65     - Amlodipine changed to lisinopril 10, trying to wean Cleviprex     - propranolol 20mg q8    trop neg x3    GI/NUTR:     Diet: TF (Osmolite 1.5) @ 50     GI Prophylaxis-Protonix q24h    Bowel regimen-  Senna     - +BM x 3 on 5/17- held Miralax, Dignishield in place    /RENAL:     Cole - strict I/O    Labs:     - BUN/Cr- 6/0.5 --> 13/0.5     - Electrolytes- Na 158 --> 153 // K 4.1 // Phos 4.7 //  Mg 2.0     - Na goal 145-150      - OFF 3%    Started Tolvaptam- fisrt dose 5/17, d/c'd on 5/19, pt went into DI with Na 158 and polyuria, Na down to 153, UO slowed down to /hr    BMP, serum osm q6h    HEME/ONC:     S/P massive transfusion protocol     - 4uPRBCs 2FFP 2 platelet intraop, post op 2 PLT    5/15: 1U PRBC pre- operatively    5/16: 2U PRBC intraop and post op    DVT prophylaxis: HSQ  restarted 5/17    ID:    05/19 --> TMax 101.5F --> repeat Bcx sent    WBC:4.6 -> 6.9 --> 9.08    Antibiotics: Cefepime d/c'd 5/19     - ID in agreement with d/c'ing    5/11- u/a +leuk     - blood cx- Klebsiella, (pan sens)     - BAL-  Klebsiella, Serratia marcenscens  (pan sens)     - f/u blood cx 5/13 - NGTD    ENDO:  q6 POC glucose  Sliding scale insulin  A1c 5.2    DISPO: Discussed with Dr. Avila. Patient continues to require SICU level care

## 2021-05-20 NOTE — DISCHARGE NOTE NURSING/CASE MANAGEMENT/SOCIAL WORK - PATIENT PORTAL LINK FT
You can access the FollowMyHealth Patient Portal offered by [facility] by registering at the following website: [email or web address]. By joining [email or web address]’s FollowMyHealth portal, you will also be able to view your health information using other applications (apps) compatible with our system.

## 2021-05-21 LAB
ALBUMIN SERPL ELPH-MCNC: 2.8 G/DL — LOW (ref 3.5–5.2)
ALP SERPL-CCNC: 118 U/L — HIGH (ref 30–115)
ALT FLD-CCNC: 31 U/L — SIGNIFICANT CHANGE UP (ref 0–41)
ANION GAP SERPL CALC-SCNC: 10 MMOL/L — SIGNIFICANT CHANGE UP (ref 7–14)
ANION GAP SERPL CALC-SCNC: 11 MMOL/L — SIGNIFICANT CHANGE UP (ref 7–14)
ANION GAP SERPL CALC-SCNC: 13 MMOL/L — SIGNIFICANT CHANGE UP (ref 7–14)
ANION GAP SERPL CALC-SCNC: 9 MMOL/L — SIGNIFICANT CHANGE UP (ref 7–14)
APPEARANCE UR: ABNORMAL
AST SERPL-CCNC: 20 U/L — SIGNIFICANT CHANGE UP (ref 0–41)
BACTERIA # UR AUTO: NEGATIVE — SIGNIFICANT CHANGE UP
BASE EXCESS BLDA CALC-SCNC: 4.3 MMOL/L — HIGH (ref -2–2)
BILIRUB SERPL-MCNC: 0.5 MG/DL — SIGNIFICANT CHANGE UP (ref 0.2–1.2)
BILIRUB UR-MCNC: NEGATIVE — SIGNIFICANT CHANGE UP
BUN SERPL-MCNC: 11 MG/DL — SIGNIFICANT CHANGE UP (ref 10–20)
BUN SERPL-MCNC: 11 MG/DL — SIGNIFICANT CHANGE UP (ref 10–20)
BUN SERPL-MCNC: 12 MG/DL — SIGNIFICANT CHANGE UP (ref 10–20)
BUN SERPL-MCNC: 13 MG/DL — SIGNIFICANT CHANGE UP (ref 10–20)
CALCIUM SERPL-MCNC: 8.3 MG/DL — LOW (ref 8.5–10.1)
CALCIUM SERPL-MCNC: 8.4 MG/DL — LOW (ref 8.5–10.1)
CALCIUM SERPL-MCNC: 8.6 MG/DL — SIGNIFICANT CHANGE UP (ref 8.5–10.1)
CALCIUM SERPL-MCNC: 8.6 MG/DL — SIGNIFICANT CHANGE UP (ref 8.5–10.1)
CHLORIDE SERPL-SCNC: 102 MMOL/L — SIGNIFICANT CHANGE UP (ref 98–110)
CHLORIDE SERPL-SCNC: 104 MMOL/L — SIGNIFICANT CHANGE UP (ref 98–110)
CHLORIDE SERPL-SCNC: 108 MMOL/L — SIGNIFICANT CHANGE UP (ref 98–110)
CHLORIDE SERPL-SCNC: 108 MMOL/L — SIGNIFICANT CHANGE UP (ref 98–110)
CHLORIDE UR-SCNC: 220 — SIGNIFICANT CHANGE UP
CO2 SERPL-SCNC: 22 MMOL/L — SIGNIFICANT CHANGE UP (ref 17–32)
CO2 SERPL-SCNC: 22 MMOL/L — SIGNIFICANT CHANGE UP (ref 17–32)
CO2 SERPL-SCNC: 24 MMOL/L — SIGNIFICANT CHANGE UP (ref 17–32)
CO2 SERPL-SCNC: 24 MMOL/L — SIGNIFICANT CHANGE UP (ref 17–32)
COLOR SPEC: YELLOW — SIGNIFICANT CHANGE UP
CREAT SERPL-MCNC: <0.5 MG/DL — LOW (ref 0.7–1.5)
DIFF PNL FLD: NEGATIVE — SIGNIFICANT CHANGE UP
EPI CELLS # UR: 2 /HPF — SIGNIFICANT CHANGE UP (ref 0–5)
GLUCOSE BLDC GLUCOMTR-MCNC: 110 MG/DL — HIGH (ref 70–99)
GLUCOSE BLDC GLUCOMTR-MCNC: 137 MG/DL — HIGH (ref 70–99)
GLUCOSE BLDC GLUCOMTR-MCNC: 154 MG/DL — HIGH (ref 70–99)
GLUCOSE BLDC GLUCOMTR-MCNC: 82 MG/DL — SIGNIFICANT CHANGE UP (ref 70–99)
GLUCOSE SERPL-MCNC: 108 MG/DL — HIGH (ref 70–99)
GLUCOSE SERPL-MCNC: 116 MG/DL — HIGH (ref 70–99)
GLUCOSE SERPL-MCNC: 137 MG/DL — HIGH (ref 70–99)
GLUCOSE SERPL-MCNC: 147 MG/DL — HIGH (ref 70–99)
GLUCOSE UR QL: NEGATIVE — SIGNIFICANT CHANGE UP
HCO3 BLDA-SCNC: 28 MMOL/L — HIGH (ref 23–27)
HOROWITZ INDEX BLDA+IHG-RTO: 30 — SIGNIFICANT CHANGE UP
HYALINE CASTS # UR AUTO: 0 /LPF — SIGNIFICANT CHANGE UP (ref 0–7)
KETONES UR-MCNC: NEGATIVE — SIGNIFICANT CHANGE UP
LEUKOCYTE ESTERASE UR-ACNC: NEGATIVE — SIGNIFICANT CHANGE UP
MAGNESIUM SERPL-MCNC: 1.8 MG/DL — SIGNIFICANT CHANGE UP (ref 1.8–2.4)
NITRITE UR-MCNC: NEGATIVE — SIGNIFICANT CHANGE UP
OSMOLALITY UR: 866 MOS/KG — SIGNIFICANT CHANGE UP (ref 50–1200)
PCO2 BLDA: 37 MMHG — LOW (ref 38–42)
PH BLDA: 7.48 — HIGH (ref 7.38–7.42)
PH UR: 7 — SIGNIFICANT CHANGE UP (ref 5–8)
PHOSPHATE SERPL-MCNC: 3.7 MG/DL — SIGNIFICANT CHANGE UP (ref 2.1–4.9)
PO2 BLDA: 148 MMHG — HIGH (ref 78–95)
POTASSIUM SERPL-MCNC: 3.6 MMOL/L — SIGNIFICANT CHANGE UP (ref 3.5–5)
POTASSIUM SERPL-MCNC: 3.9 MMOL/L — SIGNIFICANT CHANGE UP (ref 3.5–5)
POTASSIUM SERPL-MCNC: 3.9 MMOL/L — SIGNIFICANT CHANGE UP (ref 3.5–5)
POTASSIUM SERPL-MCNC: 4.4 MMOL/L — SIGNIFICANT CHANGE UP (ref 3.5–5)
POTASSIUM SERPL-SCNC: 3.6 MMOL/L — SIGNIFICANT CHANGE UP (ref 3.5–5)
POTASSIUM SERPL-SCNC: 3.9 MMOL/L — SIGNIFICANT CHANGE UP (ref 3.5–5)
POTASSIUM SERPL-SCNC: 3.9 MMOL/L — SIGNIFICANT CHANGE UP (ref 3.5–5)
POTASSIUM SERPL-SCNC: 4.4 MMOL/L — SIGNIFICANT CHANGE UP (ref 3.5–5)
PROT SERPL-MCNC: 5.9 G/DL — LOW (ref 6–8)
PROT UR-MCNC: ABNORMAL
RBC CASTS # UR COMP ASSIST: 1 /HPF — SIGNIFICANT CHANGE UP (ref 0–4)
SAO2 % BLDA: 99 % — HIGH (ref 94–98)
SODIUM SERPL-SCNC: 135 MMOL/L — SIGNIFICANT CHANGE UP (ref 135–146)
SODIUM SERPL-SCNC: 139 MMOL/L — SIGNIFICANT CHANGE UP (ref 135–146)
SODIUM SERPL-SCNC: 141 MMOL/L — SIGNIFICANT CHANGE UP (ref 135–146)
SODIUM SERPL-SCNC: 142 MMOL/L — SIGNIFICANT CHANGE UP (ref 135–146)
SODIUM UR-SCNC: 233 MMOL/L — SIGNIFICANT CHANGE UP
SP GR SPEC: 1.03 — HIGH (ref 1.01–1.03)
UROBILINOGEN FLD QL: ABNORMAL
WBC UR QL: 3 /HPF — SIGNIFICANT CHANGE UP (ref 0–5)

## 2021-05-21 PROCEDURE — 99291 CRITICAL CARE FIRST HOUR: CPT

## 2021-05-21 PROCEDURE — 71045 X-RAY EXAM CHEST 1 VIEW: CPT | Mod: 26

## 2021-05-21 RX ORDER — CISATRACURIUM BESYLATE 2 MG/ML
20 INJECTION INTRAVENOUS ONCE
Refills: 0 | Status: COMPLETED | OUTPATIENT
Start: 2021-05-21 | End: 2021-05-21

## 2021-05-21 RX ORDER — PROPOFOL 10 MG/ML
100 INJECTION, EMULSION INTRAVENOUS ONCE
Refills: 0 | Status: COMPLETED | OUTPATIENT
Start: 2021-05-21 | End: 2021-05-21

## 2021-05-21 RX ORDER — SODIUM CHLORIDE 5 G/100ML
500 INJECTION, SOLUTION INTRAVENOUS
Refills: 0 | Status: DISCONTINUED | OUTPATIENT
Start: 2021-05-21 | End: 2021-05-25

## 2021-05-21 RX ORDER — MIDAZOLAM HYDROCHLORIDE 1 MG/ML
5 INJECTION, SOLUTION INTRAMUSCULAR; INTRAVENOUS ONCE
Refills: 0 | Status: DISCONTINUED | OUTPATIENT
Start: 2021-05-21 | End: 2021-05-21

## 2021-05-21 RX ORDER — FENTANYL CITRATE 50 UG/ML
50 INJECTION INTRAVENOUS ONCE
Refills: 0 | Status: DISCONTINUED | OUTPATIENT
Start: 2021-05-21 | End: 2021-05-21

## 2021-05-21 RX ORDER — ACETAMINOPHEN 500 MG
650 TABLET ORAL EVERY 6 HOURS
Refills: 0 | Status: DISCONTINUED | OUTPATIENT
Start: 2021-05-21 | End: 2021-05-24

## 2021-05-21 RX ORDER — CEFEPIME 1 G/1
1000 INJECTION, POWDER, FOR SOLUTION INTRAMUSCULAR; INTRAVENOUS EVERY 8 HOURS
Refills: 0 | Status: DISCONTINUED | OUTPATIENT
Start: 2021-05-21 | End: 2021-05-23

## 2021-05-21 RX ORDER — MAGNESIUM SULFATE 500 MG/ML
1 VIAL (ML) INJECTION ONCE
Refills: 0 | Status: COMPLETED | OUTPATIENT
Start: 2021-05-21 | End: 2021-05-21

## 2021-05-21 RX ORDER — POTASSIUM CHLORIDE 20 MEQ
20 PACKET (EA) ORAL ONCE
Refills: 0 | Status: COMPLETED | OUTPATIENT
Start: 2021-05-21 | End: 2021-05-21

## 2021-05-21 RX ORDER — CEFEPIME 1 G/1
1000 INJECTION, POWDER, FOR SOLUTION INTRAMUSCULAR; INTRAVENOUS ONCE
Refills: 0 | Status: COMPLETED | OUTPATIENT
Start: 2021-05-21 | End: 2021-05-21

## 2021-05-21 RX ORDER — CEFEPIME 1 G/1
INJECTION, POWDER, FOR SOLUTION INTRAMUSCULAR; INTRAVENOUS
Refills: 0 | Status: DISCONTINUED | OUTPATIENT
Start: 2021-05-21 | End: 2021-05-23

## 2021-05-21 RX ORDER — POTASSIUM CHLORIDE 20 MEQ
20 PACKET (EA) ORAL
Refills: 0 | Status: COMPLETED | OUTPATIENT
Start: 2021-05-21 | End: 2021-05-21

## 2021-05-21 RX ADMIN — Medication 100 MILLIEQUIVALENT(S): at 14:40

## 2021-05-21 RX ADMIN — Medication 650 MILLIGRAM(S): at 18:56

## 2021-05-21 RX ADMIN — PANTOPRAZOLE SODIUM 40 MILLIGRAM(S): 20 TABLET, DELAYED RELEASE ORAL at 11:35

## 2021-05-21 RX ADMIN — HEPARIN SODIUM 5000 UNIT(S): 5000 INJECTION INTRAVENOUS; SUBCUTANEOUS at 13:05

## 2021-05-21 RX ADMIN — Medication 2 DROP(S): at 05:04

## 2021-05-21 RX ADMIN — CHLORHEXIDINE GLUCONATE 1 APPLICATION(S): 213 SOLUTION TOPICAL at 05:04

## 2021-05-21 RX ADMIN — Medication 100 GRAM(S): at 03:42

## 2021-05-21 RX ADMIN — Medication 2 DROP(S): at 21:22

## 2021-05-21 RX ADMIN — Medication 2: at 23:33

## 2021-05-21 RX ADMIN — Medication 650 MILLIGRAM(S): at 01:44

## 2021-05-21 RX ADMIN — LEVETIRACETAM 420 MILLIGRAM(S): 250 TABLET, FILM COATED ORAL at 05:03

## 2021-05-21 RX ADMIN — FENTANYL CITRATE 25 MICROGRAM(S): 50 INJECTION INTRAVENOUS at 04:10

## 2021-05-21 RX ADMIN — HEPARIN SODIUM 5000 UNIT(S): 5000 INJECTION INTRAVENOUS; SUBCUTANEOUS at 05:04

## 2021-05-21 RX ADMIN — PROPOFOL 100 MILLIGRAM(S): 10 INJECTION, EMULSION INTRAVENOUS at 13:35

## 2021-05-21 RX ADMIN — Medication 100 MILLIEQUIVALENT(S): at 21:23

## 2021-05-21 RX ADMIN — FENTANYL CITRATE 25 MICROGRAM(S): 50 INJECTION INTRAVENOUS at 03:56

## 2021-05-21 RX ADMIN — Medication 650 MILLIGRAM(S): at 21:21

## 2021-05-21 RX ADMIN — CEFEPIME 100 MILLIGRAM(S): 1 INJECTION, POWDER, FOR SOLUTION INTRAMUSCULAR; INTRAVENOUS at 11:35

## 2021-05-21 RX ADMIN — LEVETIRACETAM 420 MILLIGRAM(S): 250 TABLET, FILM COATED ORAL at 17:32

## 2021-05-21 RX ADMIN — Medication 100 MILLIEQUIVALENT(S): at 20:21

## 2021-05-21 RX ADMIN — CHLORHEXIDINE GLUCONATE 15 MILLILITER(S): 213 SOLUTION TOPICAL at 05:03

## 2021-05-21 RX ADMIN — Medication 650 MILLIGRAM(S): at 21:23

## 2021-05-21 RX ADMIN — LISINOPRIL 10 MILLIGRAM(S): 2.5 TABLET ORAL at 05:03

## 2021-05-21 RX ADMIN — Medication 650 MILLIGRAM(S): at 23:45

## 2021-05-21 RX ADMIN — FENTANYL CITRATE 25 MICROGRAM(S): 50 INJECTION INTRAVENOUS at 00:16

## 2021-05-21 RX ADMIN — CHLORHEXIDINE GLUCONATE 15 MILLILITER(S): 213 SOLUTION TOPICAL at 17:32

## 2021-05-21 RX ADMIN — FENTANYL CITRATE 50 MICROGRAM(S): 50 INJECTION INTRAVENOUS at 13:37

## 2021-05-21 RX ADMIN — CISATRACURIUM BESYLATE 20 MILLIGRAM(S): 2 INJECTION INTRAVENOUS at 13:25

## 2021-05-21 RX ADMIN — Medication 650 MILLIGRAM(S): at 08:50

## 2021-05-21 RX ADMIN — SENNA PLUS 2 TABLET(S): 8.6 TABLET ORAL at 21:22

## 2021-05-21 RX ADMIN — CEFEPIME 100 MILLIGRAM(S): 1 INJECTION, POWDER, FOR SOLUTION INTRAMUSCULAR; INTRAVENOUS at 22:00

## 2021-05-21 RX ADMIN — Medication 2 DROP(S): at 13:03

## 2021-05-21 RX ADMIN — MIDAZOLAM HYDROCHLORIDE 5 MILLIGRAM(S): 1 INJECTION, SOLUTION INTRAMUSCULAR; INTRAVENOUS at 13:25

## 2021-05-21 RX ADMIN — Medication 650 MILLIGRAM(S): at 07:51

## 2021-05-21 RX ADMIN — FENTANYL CITRATE 50 MICROGRAM(S): 50 INJECTION INTRAVENOUS at 13:26

## 2021-05-21 RX ADMIN — SODIUM CHLORIDE 50 MILLILITER(S): 5 INJECTION, SOLUTION INTRAVENOUS at 15:52

## 2021-05-21 RX ADMIN — HEPARIN SODIUM 5000 UNIT(S): 5000 INJECTION INTRAVENOUS; SUBCUTANEOUS at 21:22

## 2021-05-21 NOTE — PROGRESS NOTE ADULT - ATTENDING COMMENTS
awake and alert   tolerated   PSV   for drainage of scalp Seroma   Trache/PEG today   continue SICU Care

## 2021-05-21 NOTE — PROGRESS NOTE ADULT - SUBJECTIVE AND OBJECTIVE BOX
HPI:  TRAUMA ACTIVATION LEVEL:  Code trauma    Neurosurgery PA    Pt seen and examined. Polish  utilized over the phone. Patient not responding to commands.     Vital Signs Last 24 Hrs  T(C): 38.9 (21 May 2021 07:29), Max: 38.9 (21 May 2021 07:29)  T(F): 102.1 (21 May 2021 07:29), Max: 102.1 (21 May 2021 07:29)  HR: 92 (21 May 2021 08:02) (80 - 116)  BP: 143/85 (20 May 2021 18:00) (135/81 - 152/74)  BP(mean): 109 (20 May 2021 18:00) (102 - 109)  RR: 18 (21 May 2021 08:00) (17 - 25)  SpO2: 100% (21 May 2021 08:02) (100% - 100%)      PHYSICAL EXAM:  Gen: Intubated on vent. Not on sedation. NAD. Opens eyes and grimaces to noxious stimuli. L gaze preference.   Scalp incision with staples, suture C/D/I, no erythema, no induration, no fluctuance, no drainage. Flap soft.   PERRLA. + corneals.   Neuro: Not following simple commands with polish . LUE moves spontaneously. LLE withdraws to pain. RUE/RLE does not move spontaneously and does not withdraw to pain/noxious stimuli.     LABS:                        10.2   8.58  )-----------( 446      ( 20 May 2021 23:34 )             32.0     05-21    142  |  108  |  12  ----------------------------<  147<H>  4.4   |  24  |  <0.5<L>    Ca    8.4<L>      21 May 2021 05:40  Phos  3.7     05-20  Mg     1.8     05-20    TPro  5.9<L>  /  Alb  2.8<L>  /  TBili  0.5  /  DBili  x   /  AST  20  /  ALT  31  /  AlkPhos  118<H>  05-20            CAPILLARY BLOOD GLUCOSE      POCT Blood Glucose.: 137 mg/dL (21 May 2021 05:43)  POCT Blood Glucose.: 131 mg/dL (20 May 2021 23:32)  POCT Blood Glucose.: 90 mg/dL (20 May 2021 17:13)  POCT Blood Glucose.: 116 mg/dL (20 May 2021 12:00)        CULTURES:  Culture Results:   No growth to date. (05-20 @ 02:00)  Culture Results:   No growth to date. (05-19 @ 00:27)    ORX cx: coag neg staph     RADIOLOGY & ADDITIONAL TESTS:    IMPRESSION:    1.  Redemonstrated filling defect within the left transverse sinus, sigmoid sinus and proximal internal jugular vein compatible with thrombus. When compared to the CTA from 5/13, there has been interval partial recanalization of the left transverse sinus.    2.  Redemonstrated large left-sided craniectomy. The previously seen left scalp drain has been removed.    3.  Increasing size of the mixed density fluid collection/hematoma extending from the left extra-axial space to the overlying scalp, overall measuring 4 cm in width, previously 3 cm.    4.  Increasing frontal convexity subdural hygromas measuring 8 mm on the right and 9 mm on the left.    5.  Left to right midline shift of 4 mm, previously 3 mm.    6.  Redemonstrated acute/subacute infarcts within the left temporal lobe and insula. Additional previously demonstrated multifocal infarcts and edema are not as well delineated on this exam, recommend follow-up head CT.      a/p: 45M hx of ETOH abuse s/p fall with Large left SDH s/p L crani evacuation of SDH 5/7 with intraop cardiac arrest with ROSC with RTOR 5/16 s/p wound revision, neuro exam stable , OR cx + coag neg staph     1. continue q1 hr neurochecks  2. HOB 30  3. abx per ID   4. maintain -150  5. continue keppra   6. CTV reviewed by Dr. Byrne, she will discuss with Dr. Velasquez for possible thrombectomy. No AC recommended at this time  7. primary care per ICU    above D/w Dr. Byrne          HPI:  TRAUMA ACTIVATION LEVEL:  Code trauma    Neurosurgery PA    Pt seen and examined. Polish  utilized over the phone. Patient not responding to commands.     Vital Signs Last 24 Hrs  T(C): 38.9 (21 May 2021 07:29), Max: 38.9 (21 May 2021 07:29)  T(F): 102.1 (21 May 2021 07:29), Max: 102.1 (21 May 2021 07:29)  HR: 92 (21 May 2021 08:02) (80 - 116)  BP: 143/85 (20 May 2021 18:00) (135/81 - 152/74)  BP(mean): 109 (20 May 2021 18:00) (102 - 109)  RR: 18 (21 May 2021 08:00) (17 - 25)  SpO2: 100% (21 May 2021 08:02) (100% - 100%)      PHYSICAL EXAM:  Gen: Intubated on vent. Not on sedation. NAD. Opens eyes and grimaces to noxious stimuli. L gaze preference.   Scalp incision with staples, suture C/D/I, no erythema, no induration, no fluctuance, no drainage. Flap soft.   PERRLA. + corneals.   Neuro: Not following simple commands with polish . LUE moves spontaneously. LLE withdraws to pain. RUE/RLE does not move spontaneously and does not withdraw to pain/noxious stimuli.     LABS:                        10.2   8.58  )-----------( 446      ( 20 May 2021 23:34 )             32.0     05-21    142  |  108  |  12  ----------------------------<  147<H>  4.4   |  24  |  <0.5<L>    Ca    8.4<L>      21 May 2021 05:40  Phos  3.7     05-20  Mg     1.8     05-20    TPro  5.9<L>  /  Alb  2.8<L>  /  TBili  0.5  /  DBili  x   /  AST  20  /  ALT  31  /  AlkPhos  118<H>  05-20            CAPILLARY BLOOD GLUCOSE      POCT Blood Glucose.: 137 mg/dL (21 May 2021 05:43)  POCT Blood Glucose.: 131 mg/dL (20 May 2021 23:32)  POCT Blood Glucose.: 90 mg/dL (20 May 2021 17:13)  POCT Blood Glucose.: 116 mg/dL (20 May 2021 12:00)        CULTURES:  Culture Results:   No growth to date. (05-20 @ 02:00)  Culture Results:   No growth to date. (05-19 @ 00:27)    ORX cx: coag neg staph     RADIOLOGY & ADDITIONAL TESTS:    IMPRESSION:    1.  Redemonstrated filling defect within the left transverse sinus, sigmoid sinus and proximal internal jugular vein compatible with thrombus. When compared to the CTA from 5/13, there has been interval partial recanalization of the left transverse sinus.    2.  Redemonstrated large left-sided craniectomy. The previously seen left scalp drain has been removed.    3.  Increasing size of the mixed density fluid collection/hematoma extending from the left extra-axial space to the overlying scalp, overall measuring 4 cm in width, previously 3 cm.    4.  Increasing frontal convexity subdural hygromas measuring 8 mm on the right and 9 mm on the left.    5.  Left to right midline shift of 4 mm, previously 3 mm.    6.  Redemonstrated acute/subacute infarcts within the left temporal lobe and insula. Additional previously demonstrated multifocal infarcts and edema are not as well delineated on this exam, recommend follow-up head CT.      a/p: 45M hx of ETOH abuse s/p fall with Large left SDH s/p L crani evacuation of SDH 5/7 with intraop cardiac arrest with ROSC with RTOR 5/16 s/p wound revision, neuro exam stable , OR cx + coag neg staph     1. continue q1 hr neurochecks  2. HOB 30  3. abx per ID   4. maintain -150  5. continue keppra   6. CTV reviewed by Dr. Byrne, No AC recommended at this time, recommend consulting Dr. Velasquez for possible thrombectomy   7. primary care per ICU    above D/w Dr. Byrne     Pt reseen at bedside with Dr. Byrne. CTV reviewed by Dr. Byrne and significant for a L sided  subgaleal collection. Dr. Byrne performed drainage of L subgaleal collection under sterile technique at bedside, L side of head prepped with chloraprep. 15cc of blood tinged fluid sent for culture. Dressing applied. Recommend starting emperic Vanc and cefepime as we await fluid culture    above D/w Dr. Byrne

## 2021-05-21 NOTE — PROGRESS NOTE ADULT - SUBJECTIVE AND OBJECTIVE BOX
THIAGO SANDRA  45y, Male  Allergy: No Known Allergies      LOS  15d    CHIEF COMPLAINT: found down unresponsive (21 May 2021 09:13)      INTERVAL EVENTS/HPI  - febrile, awake , No leukocytosis   - T(F): , Max: 102.1 (21 @ 07:29)  - WBC Count: 8.58 (21 @ 23:34)  WBC Count: 9.08 (21 @ 23:43)  - Creatinine, Serum: <0.5 (21 @ 11:05)  Creatinine, Serum: <0.5 (21 @ 05:40)       ROS  unable to obtain history secondary to patient's mental status and/or sedation     VITALS:  T(F): 100.4, Max: 102.1 (21 @ 07:29)  HR: 92  BP: 143/85  RR: 22Vital Signs Last 24 Hrs  T(C): 38 (21 May 2021 09:00), Max: 38.9 (21 May 2021 07:29)  T(F): 100.4 (21 May 2021 09:00), Max: 102.1 (21 May 2021 07:29)  HR: 92 (21 May 2021 12:00) (73 - 116)  BP: 143/85 (20 May 2021 18:00) (135/81 - 152/74)  BP(mean): 109 (20 May 2021 18:00) (102 - 109)  RR: 22 (21 May 2021 12:00) (17 - 25)  SpO2: 100% (21 May 2021 12:00) (99% - 100%)    PHYSICAL EXAM:  General: intubated  HEENT: NCAT staples clean, no erythema/purulence   CV: RRR  Lungs: symmetric chest expansion, decreased BS at bases  Abd: Soft  Skin: no rash  Neuro: awake   Lines: no phlebitis     FH: Non-contributory  Social Hx: Non-contributory    TESTS & MEASUREMENTS:                        10.2   8.58  )-----------( 446      ( 20 May 2021 23:34 )             32.0         139  |  104  |  13  ----------------------------<  108<H>  3.9   |  22  |  <0.5<L>    Ca    8.6      21 May 2021 11:05  Phos  3.7       Mg     1.8         TPro  5.9<L>  /  Alb  2.8<L>  /  TBili  0.5  /  DBili  x   /  AST  20  /  ALT  31  /  AlkPhos  118<H>      eGFR if Non African American: 142 mL/min/1.73M2 (21 @ 11:05)  eGFR if African American: 165 mL/min/1.73M2 (21 @ 11:05)  eGFR if Non African American: 142 mL/min/1.73M2 (21 @ 05:40)  eGFR if African American: 165 mL/min/1.73M2 (21 @ 05:40)  eGFR if African American: 165 mL/min/1.73M2 (21 @ 23:34)  eGFR if Non African American: 142 mL/min/1.73M2 (21 @ 23:34)  eGFR if Non African American: 142 mL/min/1.73M2 (21 @ 18:35)  eGFR if : 165 mL/min/1.73M2 (21 @ 18:35)    LIVER FUNCTIONS - ( 20 May 2021 23:34 )  Alb: 2.8 g/dL / Pro: 5.9 g/dL / ALK PHOS: 118 U/L / ALT: 31 U/L / AST: 20 U/L / GGT: x           Urinalysis Basic - ( 21 May 2021 11:05 )    Color: Yellow / Appearance: Slightly Turbid / S.031 / pH: x  Gluc: x / Ketone: Negative  / Bili: Negative / Urobili: 12 mg/dL   Blood: x / Protein: 30 mg/dL / Nitrite: Negative   Leuk Esterase: Negative / RBC: 1 /HPF / WBC 3 /HPF   Sq Epi: x / Non Sq Epi: 2 /HPF / Bacteria: Negative        Culture - Blood (collected 21 @ 02:00)  Source: .Blood Blood  Preliminary Report (21 @ 07:02):    No growth to date.    Culture - Blood (collected 21 @ 00:27)  Source: .Blood None  Preliminary Report (21 @ 07:02):    No growth to date.    Culture - Surgical Swab (collected 21 @ 08:54)  Source: .Surgical Swab None  Preliminary Report (21 @ 16:51):    Rare Coag Negative Staphylococcus  Organism: Coag Negative Staphylococcus (21 @ 17:56)  Organism: Coag Negative Staphylococcus (21 @ 17:56)      -  Ampicillin/Sulbactam: S <=8/4      -  Cefazolin: S <=4      -  Clindamycin: S <=0.25      -  Erythromycin: S <=0.25      -  Gentamicin: S <=1 Should not be used as monotherapy      -  Oxacillin: S <=0.25      -  Penicillin: R 0.25      -  RIF- Rifampin: S <=1 Should not be used as monotherapy      -  Tetra/Doxy: S <=1      -  Trimethoprim/Sulfamethoxazole: S <=0.5/9.5      -  Vancomycin: S 0.5      Method Type: AISHA    Culture - Blood (collected 21 @ 11:19)  Source: .Blood None  Final Report (21 @ 22:03):    No Growth Final    Culture - Blood (collected 21 @ 11:18)  Source: .Blood None  Gram Stain (21 @ 02:53):    Growth in anaerobic bottle: Gram Negative Rods    Growth in aerobic bottle: Gram Negative Rods  Final Report (21 @ 16:09):    Growth in aerobic and anaerobic bottles: Klebsiella variicola    ***Blood Panel PCR results on this specimen are available    approximately 3 hours after the Gram stain result.***    Gram stain, PCR, and/or culture results may not always    correspond due to difference in methodologies.    ************************************************************    This PCR assay was performed by multiplex PCR. This    Assay tests for 66 bacterial and resistance gene targets.    Please refer to the [facility] test directory    at [email or web address] for details.  Organism: Blood Culture PCR  Klebsiella variicola (21 @ 16:09)  Organism: Klebsiella variicola (21 @ 16:09)      -  Amikacin: S <=16      -  Ampicillin: R <=8 These ampicillin results predict results for amoxicillin      -  Ampicillin/Sulbactam: S <=4/2 Enterobacter, Citrobacter, and Serratia may develop resistance during prolonged therapy (3-4 days)      -  Aztreonam: S <=4      -  Cefazolin: S <=2 Enterobacter, Citrobacter, and Serratia may develop resistance during prolonged therapy (3-4 days)      -  Cefepime: S <=2      -  Cefoxitin: S <=8      -  Ceftriaxone: S <=1 Enterobacter, Citrobacter, and Serratia may develop resistance during prolonged therapy      -  Ciprofloxacin: S <=0.25      -  Ertapenem: S <=0.5      -  Gentamicin: S <=2      -  Imipenem: S <=1      -  Levofloxacin: S <=0.5      -  Meropenem: S <=1      -  Piperacillin/Tazobactam: S <=8      -  Tobramycin: S <=2      -  Trimethoprim/Sulfamethoxazole: S <=0.5/9.5      Method Type: AISHA  Organism: Blood Culture PCR (21 @ 16:09)      -  Klebsiella pneumoniae: Detec      Method Type: PCR    Culture - Bronchial (collected 21 @ 10:12)  Source: Bronch Wash Bronchoalveolar Lavage  Gram Stain (21 @ 07:04):    Few polymorphonuclear leukocytes per low power field    No Squamous epithelial cells per low power field    Few Gram Positive Rods per oil power field  Final Report (21 @ 16:19):    Moderate Klebsiella variicola    Moderate Serratia marcescens    Normal Respiratory Liz present  Organism: Klebsiella variicola  Serratia marcescens (21 @ 16:19)  Organism: Serratia marcescens (21 @ 16:19)      -  Amikacin: S <=16      -  Amoxicillin/Clavulanic Acid: R >16/8      -  Ampicillin: R <=8 These ampicillin results predict results for amoxicillin      -  Ampicillin/Sulbactam: R 16/8 Enterobacter, Citrobacter, and Serratia may develop resistance during prolonged therapy (3-4 days)      -  Aztreonam: S <=4      -  Cefazolin: R >16 Enterobacter, Citrobacter, and Serratia may develop resistance during prolonged therapy (3-4 days)      -  Cefepime: S <=2      -  Cefoxitin: R <=8      -  Ceftriaxone: S <=1 Enterobacter, Citrobacter, and Serratia may develop resistance during prolonged therapy      -  Ciprofloxacin: S <=0.25      -  Ertapenem: S <=0.5      -  Gentamicin: S <=2      -  Levofloxacin: S <=0.5      -  Meropenem: S <=1      -  Piperacillin/Tazobactam: S <=8      -  Tobramycin: S <=2      -  Trimethoprim/Sulfamethoxazole: S <=0.5/9.5      Method Type: AISHA  Organism: Klebsiella variicola (21 @ 16:19)      -  Amikacin: S <=16      -  Amoxicillin/Clavulanic Acid: R >16/8      -  Ampicillin: R <=8 These ampicillin results predict results for amoxicillin      -  Ampicillin/Sulbactam: I 16/8 Enterobacter, Citrobacter, and Serratia may develop resistance during prolonged therapy (3-4 days)      -  Aztreonam: R >16      -  Cefazolin: R >16 Enterobacter, Citrobacter, and Serratia may develop resistance during prolonged therapy (3-4 days)      -  Cefepime: S <=2      -  Cefoxitin: S <=8      -  Ceftriaxone: S <=1 Enterobacter, Citrobacter, and Serratia may develop resistance during prolonged therapy      -  Ciprofloxacin: S <=0.25      -  Ertapenem: S <=0.5      -  Gentamicin: S <=2      -  Imipenem: S <=1      -  Levofloxacin: S <=0.5      -  Meropenem: S <=1      -  Piperacillin/Tazobactam: S <=8      -  Tobramycin: S 4      -  Trimethoprim/Sulfamethoxazole: S <=0.5/9.5      Method Type: AISHA            INFECTIOUS DISEASES TESTING  COVID-19 PCR: NotDetec (21 @ 04:30)  COVID-19 PCR: NotDetec (21 @ 18:58)  Vancomycin Level, Trough: 4.1 (21 @ 05:50)  HIV-1/2 Combo Result: Nonreact (21 @ 23:30)  Hepatitis C Virus Interpretation Result: Nonreact (21 @ 23:30)  Rapid RVP Result: NotDetec (21 @ 22:44)  COVID-19 PCR: NotDetec (21 @ 16:46)  COVID-19 PCR: NotDetec (21 @ 19:35)      INFLAMMATORY MARKERS      RADIOLOGY & ADDITIONAL TESTS:  I have personally reviewed the last available Chest xray  CXR      CT      CARDIOLOGY TESTING  12 Lead ECG:   Ventricular Rate 78 BPM    Atrial Rate 78 BPM    P-R Interval 162 ms    QRS Duration 70 ms    Q-T Interval 364 ms    QTC Calculation(Bazett) 414 ms    P Axis 55 degrees    R Axis 30 degrees    T Axis 62 degrees    Diagnosis Line Normal sinus rhythm  Normal ECG    Confirmed by SALAS WASHINGTON MD ([id number]) on 2021 7:01:28 AM (21 @ 04:52)      MEDICATIONS  artificial tears (preservative free) Ophthalmic Solution 2 Both EYES three times a day  cefepime   IVPB     cefepime   IVPB 1000 IV Intermittent every 8 hours  chlorhexidine 0.12% Liquid 15 Oral Mucosa two times a day  chlorhexidine 4% Liquid 1 Topical <User Schedule>  fentaNYL    Injectable 50 IV Push once  heparin   Injectable 5000 SubCutaneous every 8 hours  insulin lispro (ADMELOG) corrective regimen sliding scale  SubCutaneous every 6 hours  levETIRAcetam  IVPB 500 IV Intermittent every 12 hours  lisinopril 10 Oral daily  midazolam Injectable 5 IV Push once  pantoprazole   Suspension 40 Oral daily  potassium chloride  20 mEq/100 mL IVPB 20 IV Intermittent once  propofol Injectable 100 IV Push once  propranolol 20 Oral every 8 hours  senna 2 Oral at bedtime  sodium chloride 3%. 500 IV Continuous <Continuous>      WEIGHT  Weight (kg): 79.4 (21 @ 08:11)  Creatinine, Serum: <0.5 mg/dL (21 @ 11:05)  Creatinine, Serum: <0.5 mg/dL (21 @ 05:40)  Creatinine, Serum: <0.5 mg/dL (21 @ 23:34)  Creatinine, Serum: <0.5 mg/dL (21 @ 18:35)      ANTIBIOTICS:  cefepime   IVPB      cefepime   IVPB 1000 milliGRAM(s) IV Intermittent every 8 hours      All available historical records have been reviewed

## 2021-05-21 NOTE — PROGRESS NOTE ADULT - SUBJECTIVE AND OBJECTIVE BOX
SANDRA ADS  [id number]  45y Male    Indication for ICU admission: s/p craniectomy for large L SDH  Admit Date:05-06-21  ICU Date: 05-07-21  OR Date: 05-06-21    Allergies:  No Known Allergies    PAST MEDICAL & SURGICAL HISTORY:  Alcohol abuse  GERD (gastroesophageal reflux disease)  ETOH abuse  Hypomagnesemia  Seizure disorder  H/O hemorrhoidectomy    Home Medications:    24HRS EVENT:  5/20  DAY  - f/u CT venogram for thrombosis today  - D/C vEEG per neuro, no seizures on LTM report just generalzied slowing  - PS 10/5 (5/5) since 7:45 AM  - Restart miralax/ BR  - continue BMP BID  - ID - hold abx, f/u bc from overnight, culture if fever spikes again  - speak with wife regarding trach/PEG (Friday at bedside)    NIGHT  - NPO and tube feeds held at midnight for trach + peg in AM  - BCx from 05/19 NGTD  - Exam 11T  - CT venogram: Left transverse sinus thrombosis  - PSV: 12/5/30% pulling ~410-450 TV since 2100      DVT PTX: Hep sq  GI PTX: PPI    ***Tubes/Lines/Drains ***  Peripheral IV  Left basilic midline 5/17  Arterial Line Right radial	Date 5/17  L IJ TLC     5/12  OGT, ETT  Urinary Catheter		Indication: Strict I&O    Date Placed: 5/6    REVIEW OF SYSTEMS  [ ] A ten-point review of systems was otherwise negative except as noted.  [x] Due to altered mental status/intubation, subjective information were not able to be obtained from the patient. History was obtained, to the extent possible, from review of the chart and collateral sources of information.    Diet, NPO:   NPO for Procedure/Test     NPO Start Date: 20-May-2021,   NPO Start Time: 13:00 (05-20-21 @ 18:19)    CURRENT MEDS:  Neurologic Medications  acetaminophen   Tablet .. 650 milliGRAM(s) Oral every 6 hours PRN Temp greater or equal to 38.5C (101.3F)  dexMEDEtomidine Infusion 0.1 MICROgram(s)/kG/Hr IV Continuous <Continuous>  fentaNYL    Injectable 25 MICROGram(s) IV Push every 4 hours PRN Severe Pain (7 - 10)  levETIRAcetam  IVPB 500 milliGRAM(s) IV Intermittent every 12 hours    Respiratory Medications    Cardiovascular Medications  lisinopril 10 milliGRAM(s) Oral daily  propranolol 20 milliGRAM(s) Oral every 8 hours    Gastrointestinal Medications  pantoprazole   Suspension 40 milliGRAM(s) Oral daily  senna 2 Tablet(s) Oral at bedtime    Genitourinary Medications    Hematologic/Oncologic Medications  heparin   Injectable 5000 Unit(s) SubCutaneous every 8 hours    Antimicrobial/Immunologic Medications    Endocrine/Metabolic Medications  insulin lispro (ADMELOG) corrective regimen sliding scale   SubCutaneous every 6 hours    Topical/Other Medications  artificial tears (preservative free) Ophthalmic Solution 2 Drop(s) Both EYES three times a day  chlorhexidine 0.12% Liquid 15 milliLiter(s) Oral Mucosa two times a day  chlorhexidine 4% Liquid 1 Application(s) Topical <User Schedule>    ICU Vital Signs Last 24 Hrs  T(C): 38.1 (21 May 2021 01:47), Max: 38.7 (20 May 2021 04:00)  T(F): 100.5 (21 May 2021 01:47), Max: 101.7 (20 May 2021 04:00)  HR: 102 (21 May 2021 02:00) (77 - 116)  BP: 143/85 (20 May 2021 18:00) (105/64 - 152/74)  BP(mean): 109 (20 May 2021 18:00) (78 - 109)  ABP: 143/86 (21 May 2021 02:00) (96/73 - 178/89)  ABP(mean): 111 (21 May 2021 02:00) (70 - 117)  RR: 24 (21 May 2021 02:00) (17 - 25)  SpO2: 100% (21 May 2021 02:00) (100% - 100%)      Mode: CPAP with PS  FiO2: 30  PEEP: 5  PS: 12  PC:   PIP: 18    I&O's Summary  19 May 2021 07:01  -  20 May 2021 07:00  --------------------------------------------------------  IN: 3428.2 mL / OUT: 2425 mL / NET: 1003.2 mL    20 May 2021 07:01  -  21 May 2021 02:31  --------------------------------------------------------  IN: 880 mL / OUT: 2395 mL / NET: -1515 mL    I&O's Detail  19 May 2021 07:01  -  20 May 2021 07:00  --------------------------------------------------------  IN:    Clevidipine: 66 mL    Dexmedetomidine: 122.2 mL    Enteral Tube Flush: 90 mL    IV PiggyBack: 50 mL    IV PiggyBack: 300 mL    IV PiggyBack: 100 mL    Osmolite: 1200 mL    Sodium Chloride 0.9% Bolus: 1500 mL  Total IN: 3428.2 mL    OUT:    Bulb (mL): 10 mL    Indwelling Catheter - Urethral (mL): 2215 mL    Rectal Tube (mL): 200 mL  Total OUT: 2425 mL    Total NET: 1003.2 mL    20 May 2021 07:01  -  21 May 2021 02:31  --------------------------------------------------------  IN:    Enteral Tube Flush: 30 mL    IV PiggyBack: 100 mL    IV PiggyBack: 100 mL    Osmolite: 650 mL  Total IN: 880 mL    OUT:    Indwelling Catheter - Urethral (mL): 2220 mL    Rectal Tube (mL): 175 mL  Total OUT: 2395 mL    Total NET: -1515 mL    PHYSICAL EXAM:    General/Neuro  - RASS:   0  - GCS: 11T = E4, V (NT), M6  - unable to move or feel on right half of body  - can follow commands with LUE and LLE  - Pupils: PERRL    Lungs:        - clear to auscultation, Normal expansion/effort.     Cardiovascular:   - S1, S2.  Regular rate and rhythm.  Peripheral edema   - Cardiac Rhythm: Normal Sinus Rhythm    GI:   - Abdomen soft, Non-tender, Non-distended.    - Nasogastric tube in place.      Extremities:   - Extremities warm, pink, well-perfused. Pulses:Rt     Lt    Derm:   - Good skin turgor, no skin breakdown.      :         - Cole catheter in place.      CXR:     LABS:  CAPILLARY BLOOD GLUCOSE  POCT Blood Glucose.: 131 mg/dL (20 May 2021 23:32)  POCT Blood Glucose.: 90 mg/dL (20 May 2021 17:13)  POCT Blood Glucose.: 116 mg/dL (20 May 2021 12:00)  POCT Blood Glucose.: 144 mg/dL (20 May 2021 04:56)                          10.2   8.58  )-----------( 446      ( 20 May 2021 23:34 )             32.0       05-20    141  |  108  |  11  ----------------------------<  137<H>  3.9   |  22  |  <0.5<L>    Ca    8.6      20 May 2021 23:34  Phos  3.7     05-20  Mg     1.8     05-20    TPro  5.9<L>  /  Alb  2.8<L>  /  TBili  0.5  /  DBili  x   /  AST  20  /  ALT  31  /  AlkPhos  118<H>  05-20    Culture - Blood (collected 19 May 2021 00:27)  Source: .Blood None  Preliminary Report (20 May 2021 07:02):    No growth to date.       SANDRA DAS  [id number]  45y Male    Indication for ICU admission: s/p craniectomy for large L SDH  Admit Date:05-06-21  ICU Date: 05-07-21  OR Date: 05-06-21    Allergies:  No Known Allergies    PAST MEDICAL & SURGICAL HISTORY:  Alcohol abuse  GERD (gastroesophageal reflux disease)  ETOH abuse  Hypomagnesemia  Seizure disorder  H/O hemorrhoidectomy    Home Medications:    24HRS EVENT:  5/20  DAY  - f/u CT venogram for thrombosis today  - D/C vEEG per neuro, no seizures on LTM report just generalzied slowing  - PS 10/5 (5/5) since 7:45 AM  - Restart miralax/ BR  - continue BMP BID  - ID - hold abx, f/u bc from overnight, culture if fever spikes again  - speak with wife regarding trach/PEG (Friday at bedside)    NIGHT  - NPO and tube feeds held at midnight for trach + peg in AM  - BCx from 05/19 NGTD  - Exam 11T  - CT venogram: Left transverse sinus thrombosis  - PSV: 12/5/30% pulling ~410-450 TV since 2100      DVT PTX: Hep sq  GI PTX: PPI    ***Tubes/Lines/Drains ***  Peripheral IV  Left basilic midline 5/17  Arterial Line Right radial	Date 5/17  L IJ TLC     5/12  OGT, ETT  Urinary Catheter		Indication: Strict I&O    Date Placed: 5/6    REVIEW OF SYSTEMS  [ ] A ten-point review of systems was otherwise negative except as noted.  [x] Due to altered mental status/intubation, subjective information were not able to be obtained from the patient. History was obtained, to the extent possible, from review of the chart and collateral sources of information.    Diet, NPO:   NPO for Procedure/Test     NPO Start Date: 20-May-2021,   NPO Start Time: 13:00 (05-20-21 @ 18:19)    CURRENT MEDS:  Neurologic Medications  acetaminophen   Tablet .. 650 milliGRAM(s) Oral every 6 hours PRN Temp greater or equal to 38.5C (101.3F)  dexMEDEtomidine Infusion 0.1 MICROgram(s)/kG/Hr IV Continuous <Continuous>  fentaNYL    Injectable 25 MICROGram(s) IV Push every 4 hours PRN Severe Pain (7 - 10)  levETIRAcetam  IVPB 500 milliGRAM(s) IV Intermittent every 12 hours    Respiratory Medications    Cardiovascular Medications  lisinopril 10 milliGRAM(s) Oral daily  propranolol 20 milliGRAM(s) Oral every 8 hours    Gastrointestinal Medications  pantoprazole   Suspension 40 milliGRAM(s) Oral daily  senna 2 Tablet(s) Oral at bedtime    Genitourinary Medications    Hematologic/Oncologic Medications  heparin   Injectable 5000 Unit(s) SubCutaneous every 8 hours    Antimicrobial/Immunologic Medications    Endocrine/Metabolic Medications  insulin lispro (ADMELOG) corrective regimen sliding scale   SubCutaneous every 6 hours    Topical/Other Medications  artificial tears (preservative free) Ophthalmic Solution 2 Drop(s) Both EYES three times a day  chlorhexidine 0.12% Liquid 15 milliLiter(s) Oral Mucosa two times a day  chlorhexidine 4% Liquid 1 Application(s) Topical <User Schedule>    ICU Vital Signs Last 24 Hrs  T(C): 38.1 (21 May 2021 01:47), Max: 38.7 (20 May 2021 04:00)  T(F): 100.5 (21 May 2021 01:47), Max: 101.7 (20 May 2021 04:00)  HR: 102 (21 May 2021 02:00) (77 - 116)  BP: 143/85 (20 May 2021 18:00) (105/64 - 152/74)  BP(mean): 109 (20 May 2021 18:00) (78 - 109)  ABP: 143/86 (21 May 2021 02:00) (96/73 - 178/89)  ABP(mean): 111 (21 May 2021 02:00) (70 - 117)  RR: 24 (21 May 2021 02:00) (17 - 25)  SpO2: 100% (21 May 2021 02:00) (100% - 100%)      Mode: CPAP with PS  FiO2: 30  PEEP: 5  PS: 12  PC:   PIP: 18    I&O's Summary  19 May 2021 07:01  -  20 May 2021 07:00  --------------------------------------------------------  IN: 3428.2 mL / OUT: 2425 mL / NET: 1003.2 mL    20 May 2021 07:01  -  21 May 2021 02:31  --------------------------------------------------------  IN: 880 mL / OUT: 2395 mL / NET: -1515 mL    I&O's Detail  19 May 2021 07:01  -  20 May 2021 07:00  --------------------------------------------------------  IN:    Clevidipine: 66 mL    Dexmedetomidine: 122.2 mL    Enteral Tube Flush: 90 mL    IV PiggyBack: 50 mL    IV PiggyBack: 300 mL    IV PiggyBack: 100 mL    Osmolite: 1200 mL    Sodium Chloride 0.9% Bolus: 1500 mL  Total IN: 3428.2 mL    OUT:    Bulb (mL): 10 mL    Indwelling Catheter - Urethral (mL): 2215 mL    Rectal Tube (mL): 200 mL  Total OUT: 2425 mL    Total NET: 1003.2 mL    20 May 2021 07:01  -  21 May 2021 02:31  --------------------------------------------------------  IN:    Enteral Tube Flush: 30 mL    IV PiggyBack: 100 mL    IV PiggyBack: 100 mL    Osmolite: 650 mL  Total IN: 880 mL    OUT:    Indwelling Catheter - Urethral (mL): 2220 mL    Rectal Tube (mL): 175 mL  Total OUT: 2395 mL    Total NET: -1515 mL    PHYSICAL EXAM:    General/Neuro  - RASS:   0  - GCS: 11T = E4, V 1T, M6  - unable to move or feel on right half of body  - can follow commands with LUE and LLE  - Pupils: PERRL    Lungs:        - clear to auscultation, Normal expansion/effort.     Cardiovascular:   - S1, S2.  Regular rate and rhythm.  Peripheral edema   - Cardiac Rhythm: Normal Sinus Rhythm    GI:   - Abdomen soft, Non-tender, Non-distended.    - Nasogastric tube in place.      Extremities:   - Extremities warm, pink, well-perfused. Pulses:Rt     Lt    Derm:   - Good skin turgor, no skin breakdown.      :         - Cole catheter in place.      CXR:     LABS:  CAPILLARY BLOOD GLUCOSE  POCT Blood Glucose.: 131 mg/dL (20 May 2021 23:32)  POCT Blood Glucose.: 90 mg/dL (20 May 2021 17:13)  POCT Blood Glucose.: 116 mg/dL (20 May 2021 12:00)  POCT Blood Glucose.: 144 mg/dL (20 May 2021 04:56)                          10.2   8.58  )-----------( 446      ( 20 May 2021 23:34 )             32.0       05-20    141  |  108  |  11  ----------------------------<  137<H>  3.9   |  22  |  <0.5<L>    Ca    8.6      20 May 2021 23:34  Phos  3.7     05-20  Mg     1.8     05-20    TPro  5.9<L>  /  Alb  2.8<L>  /  TBili  0.5  /  DBili  x   /  AST  20  /  ALT  31  /  AlkPhos  118<H>  05-20    Culture - Blood (collected 19 May 2021 00:27)  Source: .Blood None  Preliminary Report (20 May 2021 07:02):    No growth to date.

## 2021-05-21 NOTE — PROGRESS NOTE ADULT - ASSESSMENT
ASSESSMENT  45yM w/ PMHx of Etoh abuse and seizures on keppra  found down unresponsive admitted 5/6. s/p craniectomy for large L SDH    IMPRESSION  #Fevers    No leukocytosis, rule out non-infectious causes / central fever    5/19 BCX NGTD     5/18 UA unremarkable     #Klebsiella bacteremia , BAL also with Klebsiella but no PNA on imaging (CT/CXR)    5/13 BCX NGTD     5/11 BCX kleb variicola (S)    5/11 BAL   Moderate Klebsiella variicola &  Moderate Serratia marcescens  < from: Xray Chest 1 View- PORTABLE-Routine (05.13.21 @ 06:28) >No radiographic evidence of acute cardiopulmonary disease.    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

## 2021-05-21 NOTE — PROGRESS NOTE ADULT - ASSESSMENT
Assessment & Plan  45M s/p fall with large left SDH and neurological status change, level 1 for emergent craniotomy    NEURO:    Hx of EtOH abuse and prior Delerium Tremens episodes    S/P craniotomy for large left SDH     - Q2H neuro checks     - GCS 11T post op 5/16, but now 10T not following commands     - Keppra 500mg BID     - HTS 3% OFF now     - Tolvaptam d/c'd 5/19    Sedation: precedex restarted 5/19 due to agitation     - goal RASS 0 to -1     Pain control - prn low dose fentanyl IVP    Head of bed @ 30 degrees     - Repeat Head CT #1 post op changes, resolution of MLS, stable SDH along cerebral falx and L tentorium, new trace scattered SAH along the right cerebral sulci and right cerebellar sulci and new IVP in the b/l occipital horns.     - Repeat Head CT #2: development of acute infarcts in left cerebral hemisphere and right cerebellum     - CTA neck and Brain - no evidence acute large vessel occlusion, severe stenosis of R vertebral artery and moderate stenosis of mid-distal basilar artery, possible jugular venous thrombosis, NCHCT recommended for left convexity subdural collection, and increasing edema around L temporal/occipital parenchemal hemorrhage    OR 5/16:     - Clot was evacuated, bleeding noted. Hemostasis achieved without difficulty. KIKE drain left in subgaleal space. Brief operative findings: revision craniotomy wound with complex closure for persistent drainage, subgaleal hematoma     Post op CTH 5/16: expected post-op changes     - repeat CT venogram to r/o sinous thrombus as per neurology -plan for tomorrow    rEEG (5/19): borderline generalized slowing    D/C vEEG per neuro, no seizures on LTM report just generalzied slowing    05/21/2021: CT Venogram:  Left transverse sinus thrombosis, Previously noted left subdural hematoma measures 2 cm, previously 3 cm in maximal thickness, New adjacent hygroma measuring 3 cm in maximal thickness and spanning the length of 8.7 cm, Left to rightmidline shift of 4 mm, previously 3 mm.    - NCC aware, awaiting further reccs    RESP:     Intubated, /16/25/5,  PS 12/5 daily as tolerated     - pm abg s/p (insp pause 0.7): 7.49/39/133/30-99%    AM CXR    Plan for trach tomorrow with Dr. Avila at bedside    CARDS:     S/P cardiac arrest in OR & MTP     - Acute HTN - on Clevidipine goal -160  MAP > 65     - Amlodipine changed to lisinopril 10, trying to wean Cleviprex     - propranolol 20mg q8    trop neg x3    GI/NUTR:     Diet: TF (Osmolite 1.5) @ 50     GI Prophylaxis-Protonix q24h    Bowel regimen restarted - Senna/Miralax     - +BM x 3 on 5/17-  Dignishield in place    /RENAL:     Cole - strict I/O    Labs:     - BUN/Cr- 6/0.5 --> 13/0.5 --> 12/0.5 (05/21)     - Electrolytes- (05/21) - Na 153 --> 146 // K 3.8 // Phos 3.7 //  Mg 1.8     - Na goal 145-150 as per NSGY     - OFF 3%    Started Tolvaptam- fisrt dose 5/17, d/c'd on 5/19, pt went into DI with Na 158 and polyuria, Na down to 153, UO slowed down to /hr    BMP BID, serum osm q6h    HEME/ONC:     S/P massive transfusion protocol     - 4uPRBCs 2FFP 2 platelet intraop, post op 2 PLT    5/15: 1U PRBC pre- operatively    5/16: 2U PRBC intraop and post op    DVT prophylaxis: HSQ restarted 5/17    f/u CT venogram for thrombosis today --> as above in neuro    ID:    05/19 --> TMax 101.5F --> repeat Bcx sent: (05/21): No Growth To Date    WBC:4.6 -> 6.9 --> 9.08 --> 8.58 (05/21)    Antibiotics: Cefepime d/c'd 5/19     - ID in agreement with d/c'ing    5/11- u/a +leuk     - blood cx- Klebsiella, (pan sens)     - BAL-  Klebsiella, Serratia marcenscens  (pan sens)     - f/u blood cx 5/13 - NGTD     - f/u blood culture 5/19    ENDO:  q6 POC glucose  Sliding scale insulin  A1c 5.2    DISPO: Discussed with Dr. Avila. Patient continues to require SICU level care

## 2021-05-21 NOTE — CHART NOTE - NSCHARTNOTEFT_GEN_A_CORE
Registered Dietitian Follow-Up     Patient Profile Reviewed                           Yes [x]   No []     Nutrition History Previously Obtained        Yes []  No [x] -- still intubated     Pertinent Subjective:      Pertinent Medical Interventions:  s/p left craniotomy for large SDH  S/p wound revision  remains intubated   plan for trach tomorrow      Diet order: NPO with TF   Osmolite 1.5 @50ml/hr x24hrs + 3 packets of Prosource TF      Anthropometrics:  - Ht. 70"   - Wt.  175lbs (5/6)  190lbs (5/12)  192.2lbs (5/13)   175lbs (5/16); dosing  183.8lbs (5/16)  195.1lbs (5/17)   177.9lbs (5/21) -- wt changes may be d/t fluid shifts vs bed scale error, will continue to monitor   - %wt change  - BMI 25.1 -- based on dosing   - IBW 166lbs     Pertinent Lab Data: (5/1A1c 5.2% (5/7)  CAPILLARY BLOOD GLUCOSE  POCT Blood Glucose.: 110 mg/dL (21 May 2021 11:03)  POCT Blood Glucose.: 137 mg/dL (21 May 2021 05:43)  POCT Blood Glucose.: 131 mg/dL (20 May 2021 23:32)  POCT Blood Glucose.: 90 mg/dL (20 May 2021 17:13)  POCT Blood Glucose.: 116 mg/dL (20 May 2021 12:00)         Pertinent Meds: heparin, abx, admelog, fentanyl, protonix, senna     Physical Findings:  - Appearance: intubated. +1 generalized, +2 R arm edema noted per RN flow sheets  - GI function: LBM 5/21, rectal tube in place   - Tubes: OGT   - Oral/Mouth cavity: NPO  - Skin: WDL      Nutrition Requirements  Weight Used: dosing wt 79.4kg Ve: 9.4 Tmax: 38.9C -- kcal adjusted backed on new data     Estimated Energy Needs    Continue []  Adjust [x]  2189kcal([id number])      Estimated Protein Needs    Continue [x]  Adjust []  95-119gm/day (1.2-1.5gm/kg act wt)      Estimated Fluid Needs        Continue [x]  Adjust []  per SICU team      Nutrient Intake: current tube feed regimen meeting 89% of pt's est kcal needs, 91% of estimated higher end protein needs         [x] Previous Nutrition Diagnosis: inadequate protein energy intake             [] Ongoing          [x] Resolved       Nutrition Intervention: enteral nutrition      Goal/Expected Outcome: Pt to meet % of estimated needs within 4 days      Indicator/Monitoring: energy intake, body composition, NFPF, glucose profile     Recommendations:  Continue Osmolite 1.5 @ 50ml/hr x 24hrs as tolerated + 3 packets of Prosource TF to provide 1200ml formula, 1920kcal, 108g protein, 914ml free water. Registered Dietitian Follow-Up     Patient Profile Reviewed                           Yes [x]   No []     Nutrition History Previously Obtained        Yes []  No [x] -- still intubated     Pertinent Subjective: Per discussion with resident, pt npo since this morning. Trach in place. Plan for PEG today. Previously tolerating current tube feed regimen of osmolite 1.5 with protein modulars.      Pertinent Medical Interventions:  s/p left craniotomy for large SDH  S/p wound revision  remains intubated now on trach      Diet order: NPO with TF   Osmolite 1.5 @50ml/hr x24hrs + 3 packets of Prosource TF      Anthropometrics:  - Ht. 70"   - Wt.  175lbs (5/6)  190lbs (5/12)  192.2lbs (5/13)   175lbs (5/16); dosing  183.8lbs (5/16)  195.1lbs (5/17)   177.9lbs (5/21) -- wt changes may be d/t fluid shifts vs bed scale error, will continue to monitor   - %wt change  - BMI 25.1 -- based on dosing   - IBW 166lbs     Pertinent Lab Data: (5/1A1c 5.2% (5/7)  CAPILLARY BLOOD GLUCOSE  POCT Blood Glucose.: 110 mg/dL (21 May 2021 11:03)  POCT Blood Glucose.: 137 mg/dL (21 May 2021 05:43)  POCT Blood Glucose.: 131 mg/dL (20 May 2021 23:32)  POCT Blood Glucose.: 90 mg/dL (20 May 2021 17:13)  POCT Blood Glucose.: 116 mg/dL (20 May 2021 12:00)         Pertinent Meds: heparin, abx, admelog, fentanyl, protonix, senna     Physical Findings:  - Appearance: intubated. +1 generalized, +2 R arm edema noted per RN flow sheets  - GI function: LBM 5/21, rectal tube in place   - Tubes: OGT   - Oral/Mouth cavity: NPO  - Skin: WDL      Nutrition Requirements  Weight Used: dosing wt 79.4kg Ve: 9.4 Tmax: 38.9C -- kcal adjusted backed on new data     Estimated Energy Needs    Continue []  Adjust [x] -- pt now with trach   2000-2200kcal (MSJ x 1.2-1.3AF)      Estimated Protein Needs    Continue []  Adjust [x]  95-111gm/day (1.2-1.4gm/kg act wt)       Estimated Fluid Needs        Continue [x]  Adjust []  per SICU team      Nutrient Intake: feeds held since this morning. Current tube feed regimen meeting 89% of pt's est kcal needs, 91% of estimated higher end protein needs         [x] Previous Nutrition Diagnosis: inadequate protein energy intake             [] Ongoing          [x] Resolved       Nutrition Intervention: enteral nutrition      Goal/Expected Outcome: Pt to meet % of estimated needs within 4 days      Indicator/Monitoring: energy intake, body composition, NFPF, glucose profile     Recommendations:  Discussed with resident switching pt to bolus feeds once PEG in place, now trach'ed. Can switch tube feed to Jevity 1.2 @ 240ml q6hrs and increase as tolerated to goal of 360ml q6hrs + 2 packets of Prosource TF to provide 1440ml formula, 1808kcal, 102g protein, 1162ml free water. Additional water flushes per LIP. Maintain aspiration precautions. Registered Dietitian Follow-Up     Patient Profile Reviewed                           Yes [x]   No []     Nutrition History Previously Obtained        Yes []  No [x] -- still intubated     Pertinent Subjective: Per discussion with resident, pt npo since this morning. Trach in place. Plan for PEG today. Previously tolerating current tube feed regimen of osmolite 1.5 with protein modulars.      Pertinent Medical Interventions:  s/p left craniotomy for large SDH  S/p wound revision  remains intubated now on trach      Diet order: NPO with TF   Osmolite 1.5 @50ml/hr x24hrs + 3 packets of Prosource TF      Anthropometrics:  - Ht. 70"   - Wt.  175lbs (5/6)  190lbs (5/12)  192.2lbs (5/13)   175lbs (5/16); dosing  183.8lbs (5/16)  195.1lbs (5/17)   177.9lbs (5/21) -- wt changes may be d/t fluid shifts vs bed scale error, will continue to monitor   - %wt change  - BMI 25.1 -- based on dosing   - IBW 166lbs     Pertinent Lab Data: (5/1A1c 5.2% (5/7)  CAPILLARY BLOOD GLUCOSE  POCT Blood Glucose.: 110 mg/dL (21 May 2021 11:03)  POCT Blood Glucose.: 137 mg/dL (21 May 2021 05:43)  POCT Blood Glucose.: 131 mg/dL (20 May 2021 23:32)  POCT Blood Glucose.: 90 mg/dL (20 May 2021 17:13)  POCT Blood Glucose.: 116 mg/dL (20 May 2021 12:00)         Pertinent Meds: heparin, abx, admelog, fentanyl, protonix, senna     Physical Findings:  - Appearance: intubated. +1 generalized, +2 R arm edema noted per RN flow sheets  - GI function: LBM 5/21, rectal tube in place   - Tubes: OGT   - Oral/Mouth cavity: NPO  - Skin: WDL      Nutrition Requirements  Weight Used: dosing wt 79.4kg Ve: 9.4 Tmax: 38.9C -- kcal adjusted backed on new data     Estimated Energy Needs    Continue []  Adjust [x] -- pt now with trach   2000-2200kcal (MSJ x 1.2-1.3AF)      Estimated Protein Needs    Continue []  Adjust [x]  95-111gm/day (1.2-1.4gm/kg act wt)       Estimated Fluid Needs        Continue [x]  Adjust []  per SICU team      Nutrient Intake: feeds held since this morning. Current tube feed regimen meeting 89% of pt's est kcal needs, 91% of estimated higher end protein needs         [x] Previous Nutrition Diagnosis: inadequate protein energy intake             [] Ongoing          [x] Resolved       Nutrition Intervention: enteral nutrition      Goal/Expected Outcome: Pt to meet % of estimated needs within 4 days      Indicator/Monitoring: energy intake, body composition, NFPF, glucose profile     Recommendations:  Discussed with resident switching pt to bolus feeds once PEG in place, now trach'ed. As medically feasible recommend switching tube feed to Jevity 1.2 @ 240ml q6hrs and increase as tolerated to goal of 360ml q6hrs + 2 packets of Prosource TF to provide 1440ml formula, 1808kcal, 102g protein, 1162ml free water. Additional water flushes per LIP. Maintain aspiration precautions.

## 2021-05-21 NOTE — PROCEDURE NOTE - NSICDXPROCEDURE_GEN_ALL_CORE_FT
PROCEDURES:  Insertion of non-tunneled central venous catheter (CVC) in patient age 5 years of age or older 07-May-2021 02:31:14  Mello Ash  Placement, tracheostomy and percutaneous endoscopic gastrostomy 21-May-2021 15:22:35  Mello Ash  
PROCEDURES:  Insertion of non-tunneled central venous catheter (CVC) in patient age 5 years of age or older 07-May-2021 02:31:14  Mello Ash

## 2021-05-22 LAB
ANION GAP SERPL CALC-SCNC: 10 MMOL/L — SIGNIFICANT CHANGE UP (ref 7–14)
ANION GAP SERPL CALC-SCNC: 8 MMOL/L — SIGNIFICANT CHANGE UP (ref 7–14)
BUN SERPL-MCNC: 11 MG/DL — SIGNIFICANT CHANGE UP (ref 10–20)
BUN SERPL-MCNC: 12 MG/DL — SIGNIFICANT CHANGE UP (ref 10–20)
BUN SERPL-MCNC: 12 MG/DL — SIGNIFICANT CHANGE UP (ref 10–20)
BUN SERPL-MCNC: 13 MG/DL — SIGNIFICANT CHANGE UP (ref 10–20)
CALCIUM SERPL-MCNC: 7.8 MG/DL — LOW (ref 8.5–10.1)
CALCIUM SERPL-MCNC: 7.9 MG/DL — LOW (ref 8.5–10.1)
CALCIUM SERPL-MCNC: 7.9 MG/DL — LOW (ref 8.5–10.1)
CALCIUM SERPL-MCNC: 8.1 MG/DL — LOW (ref 8.5–10.1)
CHLORIDE SERPL-SCNC: 106 MMOL/L — SIGNIFICANT CHANGE UP (ref 98–110)
CHLORIDE SERPL-SCNC: 108 MMOL/L — SIGNIFICANT CHANGE UP (ref 98–110)
CHLORIDE SERPL-SCNC: 108 MMOL/L — SIGNIFICANT CHANGE UP (ref 98–110)
CHLORIDE SERPL-SCNC: 111 MMOL/L — HIGH (ref 98–110)
CO2 SERPL-SCNC: 23 MMOL/L — SIGNIFICANT CHANGE UP (ref 17–32)
CO2 SERPL-SCNC: 24 MMOL/L — SIGNIFICANT CHANGE UP (ref 17–32)
CREAT SERPL-MCNC: <0.5 MG/DL — LOW (ref 0.7–1.5)
GLUCOSE BLDC GLUCOMTR-MCNC: 126 MG/DL — HIGH (ref 70–99)
GLUCOSE BLDC GLUCOMTR-MCNC: 133 MG/DL — HIGH (ref 70–99)
GLUCOSE BLDC GLUCOMTR-MCNC: 137 MG/DL — HIGH (ref 70–99)
GLUCOSE BLDC GLUCOMTR-MCNC: 154 MG/DL — HIGH (ref 70–99)
GLUCOSE BLDC GLUCOMTR-MCNC: 161 MG/DL — HIGH (ref 70–99)
GLUCOSE SERPL-MCNC: 142 MG/DL — HIGH (ref 70–99)
GLUCOSE SERPL-MCNC: 145 MG/DL — HIGH (ref 70–99)
GLUCOSE SERPL-MCNC: 148 MG/DL — HIGH (ref 70–99)
GLUCOSE SERPL-MCNC: 164 MG/DL — HIGH (ref 70–99)
GRAM STN FLD: SIGNIFICANT CHANGE UP
HCT VFR BLD CALC: 26.5 % — LOW (ref 42–52)
HGB BLD-MCNC: 8.6 G/DL — LOW (ref 14–18)
MAGNESIUM SERPL-MCNC: 1.6 MG/DL — LOW (ref 1.8–2.4)
MCHC RBC-ENTMCNC: 29.2 PG — SIGNIFICANT CHANGE UP (ref 27–31)
MCHC RBC-ENTMCNC: 32.5 G/DL — SIGNIFICANT CHANGE UP (ref 32–37)
MCV RBC AUTO: 89.8 FL — SIGNIFICANT CHANGE UP (ref 80–94)
NRBC # BLD: 0 /100 WBCS — SIGNIFICANT CHANGE UP (ref 0–0)
OSMOLALITY SERPL: 290 MOS/KG — SIGNIFICANT CHANGE UP (ref 275–300)
OSMOLALITY SERPL: 291 MOS/KG — SIGNIFICANT CHANGE UP (ref 275–300)
OSMOLALITY SERPL: 292 MOS/KG — SIGNIFICANT CHANGE UP (ref 275–300)
OSMOLALITY SERPL: 296 MOS/KG — SIGNIFICANT CHANGE UP (ref 275–300)
PHOSPHATE SERPL-MCNC: 3.5 MG/DL — SIGNIFICANT CHANGE UP (ref 2.1–4.9)
PLATELET # BLD AUTO: 471 K/UL — HIGH (ref 130–400)
POTASSIUM SERPL-MCNC: 3.9 MMOL/L — SIGNIFICANT CHANGE UP (ref 3.5–5)
POTASSIUM SERPL-MCNC: 4.2 MMOL/L — SIGNIFICANT CHANGE UP (ref 3.5–5)
POTASSIUM SERPL-MCNC: 4.4 MMOL/L — SIGNIFICANT CHANGE UP (ref 3.5–5)
POTASSIUM SERPL-MCNC: 4.4 MMOL/L — SIGNIFICANT CHANGE UP (ref 3.5–5)
POTASSIUM SERPL-SCNC: 3.9 MMOL/L — SIGNIFICANT CHANGE UP (ref 3.5–5)
POTASSIUM SERPL-SCNC: 4.2 MMOL/L — SIGNIFICANT CHANGE UP (ref 3.5–5)
POTASSIUM SERPL-SCNC: 4.4 MMOL/L — SIGNIFICANT CHANGE UP (ref 3.5–5)
POTASSIUM SERPL-SCNC: 4.4 MMOL/L — SIGNIFICANT CHANGE UP (ref 3.5–5)
RBC # BLD: 2.95 M/UL — LOW (ref 4.7–6.1)
RBC # FLD: 14.3 % — SIGNIFICANT CHANGE UP (ref 11.5–14.5)
SODIUM SERPL-SCNC: 139 MMOL/L — SIGNIFICANT CHANGE UP (ref 135–146)
SODIUM SERPL-SCNC: 141 MMOL/L — SIGNIFICANT CHANGE UP (ref 135–146)
SODIUM SERPL-SCNC: 142 MMOL/L — SIGNIFICANT CHANGE UP (ref 135–146)
SODIUM SERPL-SCNC: 142 MMOL/L — SIGNIFICANT CHANGE UP (ref 135–146)
SPECIMEN SOURCE: SIGNIFICANT CHANGE UP
WBC # BLD: 11.61 K/UL — HIGH (ref 4.8–10.8)
WBC # FLD AUTO: 11.61 K/UL — HIGH (ref 4.8–10.8)

## 2021-05-22 PROCEDURE — 71045 X-RAY EXAM CHEST 1 VIEW: CPT | Mod: 26

## 2021-05-22 RX ORDER — IBUPROFEN 200 MG
600 TABLET ORAL ONCE
Refills: 0 | Status: COMPLETED | OUTPATIENT
Start: 2021-05-22 | End: 2021-05-22

## 2021-05-22 RX ORDER — VANCOMYCIN HCL 1 G
1250 VIAL (EA) INTRAVENOUS EVERY 12 HOURS
Refills: 0 | Status: DISCONTINUED | OUTPATIENT
Start: 2021-05-22 | End: 2021-05-24

## 2021-05-22 RX ORDER — LABETALOL HCL 100 MG
10 TABLET ORAL ONCE
Refills: 0 | Status: DISCONTINUED | OUTPATIENT
Start: 2021-05-22 | End: 2021-05-22

## 2021-05-22 RX ORDER — VANCOMYCIN HCL 1 G
1000 VIAL (EA) INTRAVENOUS ONCE
Refills: 0 | Status: COMPLETED | OUTPATIENT
Start: 2021-05-22 | End: 2021-05-22

## 2021-05-22 RX ORDER — VANCOMYCIN HCL 1 G
250 VIAL (EA) INTRAVENOUS ONCE
Refills: 0 | Status: COMPLETED | OUTPATIENT
Start: 2021-05-22 | End: 2021-05-22

## 2021-05-22 RX ORDER — VANCOMYCIN HCL 1 G
VIAL (EA) INTRAVENOUS
Refills: 0 | Status: DISCONTINUED | OUTPATIENT
Start: 2021-05-22 | End: 2021-05-22

## 2021-05-22 RX ORDER — MAGNESIUM SULFATE 500 MG/ML
2 VIAL (ML) INJECTION
Refills: 0 | Status: COMPLETED | OUTPATIENT
Start: 2021-05-22 | End: 2021-05-22

## 2021-05-22 RX ORDER — POTASSIUM CHLORIDE 20 MEQ
20 PACKET (EA) ORAL ONCE
Refills: 0 | Status: COMPLETED | OUTPATIENT
Start: 2021-05-22 | End: 2021-05-22

## 2021-05-22 RX ORDER — IBUPROFEN 200 MG
600 TABLET ORAL EVERY 6 HOURS
Refills: 0 | Status: DISCONTINUED | OUTPATIENT
Start: 2021-05-22 | End: 2021-05-24

## 2021-05-22 RX ADMIN — CEFEPIME 100 MILLIGRAM(S): 1 INJECTION, POWDER, FOR SOLUTION INTRAMUSCULAR; INTRAVENOUS at 13:26

## 2021-05-22 RX ADMIN — LEVETIRACETAM 420 MILLIGRAM(S): 250 TABLET, FILM COATED ORAL at 17:39

## 2021-05-22 RX ADMIN — Medication 600 MILLIGRAM(S): at 23:42

## 2021-05-22 RX ADMIN — SODIUM CHLORIDE 65 MILLILITER(S): 5 INJECTION, SOLUTION INTRAVENOUS at 03:45

## 2021-05-22 RX ADMIN — Medication 50 MILLIEQUIVALENT(S): at 13:38

## 2021-05-22 RX ADMIN — CEFEPIME 100 MILLIGRAM(S): 1 INJECTION, POWDER, FOR SOLUTION INTRAMUSCULAR; INTRAVENOUS at 21:02

## 2021-05-22 RX ADMIN — Medication 650 MILLIGRAM(S): at 23:40

## 2021-05-22 RX ADMIN — Medication 650 MILLIGRAM(S): at 12:01

## 2021-05-22 RX ADMIN — HEPARIN SODIUM 5000 UNIT(S): 5000 INJECTION INTRAVENOUS; SUBCUTANEOUS at 21:02

## 2021-05-22 RX ADMIN — SODIUM CHLORIDE 65 MILLILITER(S): 5 INJECTION, SOLUTION INTRAVENOUS at 09:04

## 2021-05-22 RX ADMIN — LISINOPRIL 10 MILLIGRAM(S): 2.5 TABLET ORAL at 05:33

## 2021-05-22 RX ADMIN — CHLORHEXIDINE GLUCONATE 15 MILLILITER(S): 213 SOLUTION TOPICAL at 17:40

## 2021-05-22 RX ADMIN — Medication 600 MILLIGRAM(S): at 23:41

## 2021-05-22 RX ADMIN — Medication 2 DROP(S): at 21:00

## 2021-05-22 RX ADMIN — Medication 2 DROP(S): at 05:32

## 2021-05-22 RX ADMIN — SENNA PLUS 2 TABLET(S): 8.6 TABLET ORAL at 21:00

## 2021-05-22 RX ADMIN — Medication 2: at 06:41

## 2021-05-22 RX ADMIN — HEPARIN SODIUM 5000 UNIT(S): 5000 INJECTION INTRAVENOUS; SUBCUTANEOUS at 13:27

## 2021-05-22 RX ADMIN — Medication 650 MILLIGRAM(S): at 13:00

## 2021-05-22 RX ADMIN — Medication 650 MILLIGRAM(S): at 17:40

## 2021-05-22 RX ADMIN — Medication 650 MILLIGRAM(S): at 05:34

## 2021-05-22 RX ADMIN — CHLORHEXIDINE GLUCONATE 15 MILLILITER(S): 213 SOLUTION TOPICAL at 05:33

## 2021-05-22 RX ADMIN — Medication 50 GRAM(S): at 03:00

## 2021-05-22 RX ADMIN — Medication 100 MILLIGRAM(S): at 23:14

## 2021-05-22 RX ADMIN — PANTOPRAZOLE SODIUM 40 MILLIGRAM(S): 20 TABLET, DELAYED RELEASE ORAL at 12:01

## 2021-05-22 RX ADMIN — CHLORHEXIDINE GLUCONATE 1 APPLICATION(S): 213 SOLUTION TOPICAL at 05:33

## 2021-05-22 RX ADMIN — Medication 2: at 17:52

## 2021-05-22 RX ADMIN — SODIUM CHLORIDE 65 MILLILITER(S): 5 INJECTION, SOLUTION INTRAVENOUS at 18:30

## 2021-05-22 RX ADMIN — LEVETIRACETAM 420 MILLIGRAM(S): 250 TABLET, FILM COATED ORAL at 05:33

## 2021-05-22 RX ADMIN — Medication 600 MILLIGRAM(S): at 17:40

## 2021-05-22 RX ADMIN — HEPARIN SODIUM 5000 UNIT(S): 5000 INJECTION INTRAVENOUS; SUBCUTANEOUS at 05:33

## 2021-05-22 RX ADMIN — Medication 250 MILLIGRAM(S): at 18:53

## 2021-05-22 RX ADMIN — Medication 2 DROP(S): at 13:20

## 2021-05-22 RX ADMIN — Medication 50 GRAM(S): at 02:00

## 2021-05-22 RX ADMIN — Medication 650 MILLIGRAM(S): at 23:42

## 2021-05-22 RX ADMIN — Medication 600 MILLIGRAM(S): at 16:37

## 2021-05-22 RX ADMIN — CEFEPIME 100 MILLIGRAM(S): 1 INJECTION, POWDER, FOR SOLUTION INTRAMUSCULAR; INTRAVENOUS at 05:32

## 2021-05-22 RX ADMIN — Medication 650 MILLIGRAM(S): at 05:32

## 2021-05-22 RX ADMIN — Medication 650 MILLIGRAM(S): at 01:00

## 2021-05-22 NOTE — PROGRESS NOTE ADULT - SUBJECTIVE AND OBJECTIVE BOX
SANDRA DAS  [id number]  45y Male    Indication for ICU admission: s/p craniectomy for large L SDH  Admit Date:21  ICU Date: 21  OR Date: 21    No Known Allergies    PAST MEDICAL & SURGICAL HISTORY:  Alcohol abuse  GERD (gastroesophageal reflux disease)  ETOH abuse  Hypomagnesemia  Seizure disorder  H/O hemorrhoidectomy    Home Medications:    24HRS EVENT:    Day  Repeat urine osmoloarity >800  Na 140>139, started 3% @50cc   Motivalo to drain collectionj  abscess vs csf- started Cefepime  tmax 103, jain lavage,  cooling blanket  NSX- no need for thrombectomy at this time     PM  -PSV   -rpt temp 99  -GCS10T  -mg1.6 --> replete 2gm x 2  - inc 3% to 65/hr    DVT PTX: Hep sq  GI PTX: PPI    ***Tubes/Lines/Drains  ***  Peripheral IV  Left basilic midline   Arterial Line Right radial	Date   L IJ TLC       Trach/peg   Urinary Catheter		Indication: Strict I&O    Date Placed:     REVIEW OF SYSTEMS  [ ] A ten-point review of systems was otherwise negative except as noted.  [x] Due to altered mental status/intubation, subjective information were not able to be obtained from the patient. History was obtained, to the extent possible, from review of the chart and collateral sources of information.    Daily Weight in k.7 (21 May 2021 05:00)    CURRENT MEDS:  Neurologic Medications  acetaminophen   Tablet .. 650 milliGRAM(s) Oral every 6 hours  fentaNYL    Injectable 25 MICROGram(s) IV Push every 4 hours PRN Severe Pain (7 - 10)  levETIRAcetam  IVPB 500 milliGRAM(s) IV Intermittent every 12 hours    Respiratory Medications    Cardiovascular Medications  lisinopril 10 milliGRAM(s) Oral daily  propranolol 20 milliGRAM(s) Oral every 8 hours    Gastrointestinal Medications  pantoprazole   Suspension 40 milliGRAM(s) Oral daily  senna 2 Tablet(s) Oral at bedtime  sodium chloride 3%. 500 milliLiter(s) IV Continuous <Continuous>    Genitourinary Medications    Hematologic/Oncologic Medications  heparin   Injectable 5000 Unit(s) SubCutaneous every 8 hours    Antimicrobial/Immunologic Medications  cefepime   IVPB      cefepime   IVPB 1000 milliGRAM(s) IV Intermittent every 8 hours    Endocrine/Metabolic Medications  insulin lispro (ADMELOG) corrective regimen sliding scale   SubCutaneous every 6 hours    Topical/Other Medications  artificial tears (preservative free) Ophthalmic Solution 2 Drop(s) Both EYES three times a day  chlorhexidine 0.12% Liquid 15 milliLiter(s) Oral Mucosa two times a day  chlorhexidine 4% Liquid 1 Application(s) Topical <User Schedule>    ICU Vital Signs Last 24 Hrs  T(C): 37.9 (22 May 2021 00:00), Max: 39.8 (21 May 2021 19:00)  T(F): 100.3 (22 May 2021 00:00), Max: 103.6 (21 May 2021 19:00)  HR: 94 (22 May 2021 03:00) (73 - 126)  BP: 114/75 (22 May 2021 03:00) (68/43 - 154/85)  BP(mean): 90 (22 May 2021 03:00) (49 - 113)  ABP: 132/66 (22 May 2021 03:00) (64/29 - 178/89)  ABP(mean): 85 (22 May 2021 03:00) (42 - 121)  RR: 23 (22 May 2021 03:00) (17 - 36)  SpO2: 100% (22 May 2021 03:00) (99% - 100%)    Mode: CPAP with PS  FiO2: 40  PEEP: 5  PS: 12  MAP: 10  PIP: 21    ABG - ( 21 May 2021 15:03 )  pH, Arterial: 7.50  pH, Blood: x     /  pCO2: 35    /  pO2: 422   / HCO3: 27    / Base Excess: 3.8   /  SaO2: x         I&O's Summary    20 May 2021 07:01  -  21 May 2021 07:00  --------------------------------------------------------  IN: 1290 mL / OUT: 2810 mL / NET: -1520 mL    21 May 2021 07:01  -  22 May 2021 03:32  --------------------------------------------------------  IN: 1750 mL / OUT: 1775 mL / NET: -25 mL      I&O's Detail  20 May 2021 07:01  -  21 May 2021 07:00  --------------------------------------------------------  IN:    Enteral Tube Flush: 90 mL    IV PiggyBack: 100 mL    IV PiggyBack: 100 mL    IV PiggyBack: 200 mL    Osmolite: 800 mL  Total IN: 1290 mL    OUT:    Indwelling Catheter - Urethral (mL): 2635 mL    Rectal Tube (mL): 175 mL  Total OUT: 2810 mL  Total NET: -1520 mL    21 May 2021 07:01  -  22 May 2021 03:32  --------------------------------------------------------  IN:    IV PiggyBack: 100 mL    IV PiggyBack: 300 mL    IV PiggyBack: 50 mL    IV PiggyBack: 100 mL    Osmolite: 600 mL    sodium chloride 3%: 600 mL  Total IN: 1750 mL    OUT:    Indwelling Catheter - Urethral (mL): 1675 mL    Rectal Tube (mL): 100 mL  Total OUT: 1775 mL  Total NET: -25 mL      PHYSICAL EXAM:  General/Neuro  RASS: 0  - GCS: 10T, not following commands  - able to move LUE and LLE spontaneously but nothing on right half of body  - Pupils: PERRL    Lungs:        - clear to auscultation, Normal expansion/effort.     Cardiovascular:   - S1, S2.  Regular rate and rhythm.  Peripheral edema   - Cardiac Rhythm: Normal Sinus Rhythm    GI:   - Abdomen soft, Non-tender, Non-distended.    - Nasogastric tube in place.      Extremities:   - Extremities warm, pink, well-perfused. Pulses:Rt     Lt    Derm:   - Good skin turgor, no skin breakdown.      :         - Jain catheter in place.    CXR:     LABS:  CAPILLARY BLOOD GLUCOSE  POCT Blood Glucose.: 154 mg/dL (21 May 2021 23:29)  POCT Blood Glucose.: 82 mg/dL (21 May 2021 16:45)  POCT Blood Glucose.: 110 mg/dL (21 May 2021 11:03)  POCT Blood Glucose.: 137 mg/dL (21 May 2021 05:43)                     8.6    11.61 )-----------( 471      ( 21 May 2021 23:25 )             26.5         139  |  106  |  13  ----------------------------<  164<H>  4.4   |  23  |  <0.5<L>    Ca    7.9<L>      21 May 2021 23:25  Phos  3.5       Mg     1.6         TPro  5.9<L>  /  Alb  2.8<L>  /  TBili  0.5  /  DBili  x   /  AST  20  /  ALT  31  /  AlkPhos  118<H>        Urinalysis Basic - ( 21 May 2021 11:05 )  Color: Yellow / Appearance: Slightly Turbid / S.031 / pH: x  Gluc: x / Ketone: Negative  / Bili: Negative / Urobili: 12 mg/dL   Blood: x / Protein: 30 mg/dL / Nitrite: Negative   Leuk Esterase: Negative / RBC: 1 /HPF / WBC 3 /HPF   Sq Epi: x / Non Sq Epi: 2 /HPF / Bacteria: Negative    Culture - Body Fluid with Gram Stain (collected 21 May 2021 12:00)  Source: .Body Fluid CSF  Gram Stain (22 May 2021 02:58):    polymorphonuclear leukocytes seen    Gram positive cocci in pairs seen    by cytocentrifuge    Culture - Blood (collected 20 May 2021 02:00)  Source: .Blood Blood  Preliminary Report (21 May 2021 07:02):    No growth to date. SANDRA DAS  [id number]  45y Male    Indication for ICU admission: s/p craniectomy for large L SDH  Admit Date:21  ICU Date: 21  OR Date: 21    No Known Allergies    PAST MEDICAL & SURGICAL HISTORY:  Alcohol abuse  GERD (gastroesophageal reflux disease)  ETOH abuse  Hypomagnesemia  Seizure disorder  H/O hemorrhoidectomy    Home Medications:    24HRS EVENT:    Day  Repeat urine osmoloarity >800  Na 140>139, started 3% @50cc   Motivalo to drain collectionj  abscess vs csf- started Cefepime  tmax 103, jain lavage,  cooling blanket  NSX- no need for thrombectomy at this time     PM  -PSV   -rpt temp 99  -GCS10T  -mg1.6 --> replete 2gm x 2  - inc 3% to 65/hr    DVT PTX: Hep sq  GI PTX: PPI    ***Tubes/Lines/Drains  ***  Peripheral IV  Left basilic midline   Arterial Line Right radial	Date   L IJ TLC       Trach/peg   Urinary Catheter		Indication: Strict I&O    Date Placed:     REVIEW OF SYSTEMS  [ ] A ten-point review of systems was otherwise negative except as noted.  [x] Due to altered mental status/intubation, subjective information were not able to be obtained from the patient. History was obtained, to the extent possible, from review of the chart and collateral sources of information.    Daily Weight in k.7 (21 May 2021 05:00)    CURRENT MEDS:  Neurologic Medications  acetaminophen   Tablet .. 650 milliGRAM(s) Oral every 6 hours  fentaNYL    Injectable 25 MICROGram(s) IV Push every 4 hours PRN Severe Pain (7 - 10)  levETIRAcetam  IVPB 500 milliGRAM(s) IV Intermittent every 12 hours    Respiratory Medications    Cardiovascular Medications  lisinopril 10 milliGRAM(s) Oral daily  propranolol 20 milliGRAM(s) Oral every 8 hours    Gastrointestinal Medications  pantoprazole   Suspension 40 milliGRAM(s) Oral daily  senna 2 Tablet(s) Oral at bedtime  sodium chloride 3%. 500 milliLiter(s) IV Continuous <Continuous>    Genitourinary Medications    Hematologic/Oncologic Medications  heparin   Injectable 5000 Unit(s) SubCutaneous every 8 hours    Antimicrobial/Immunologic Medications  cefepime   IVPB      cefepime   IVPB 1000 milliGRAM(s) IV Intermittent every 8 hours    Endocrine/Metabolic Medications  insulin lispro (ADMELOG) corrective regimen sliding scale   SubCutaneous every 6 hours    Topical/Other Medications  artificial tears (preservative free) Ophthalmic Solution 2 Drop(s) Both EYES three times a day  chlorhexidine 0.12% Liquid 15 milliLiter(s) Oral Mucosa two times a day  chlorhexidine 4% Liquid 1 Application(s) Topical <User Schedule>    ICU Vital Signs Last 24 Hrs  T(C): 37.9 (22 May 2021 00:00), Max: 39.8 (21 May 2021 19:00)  T(F): 100.3 (22 May 2021 00:00), Max: 103.6 (21 May 2021 19:00)  HR: 94 (22 May 2021 03:00) (73 - 126)  BP: 114/75 (22 May 2021 03:00) (68/43 - 154/85)  BP(mean): 90 (22 May 2021 03:00) (49 - 113)  ABP: 132/66 (22 May 2021 03:00) (64/29 - 178/89)  ABP(mean): 85 (22 May 2021 03:00) (42 - 121)  RR: 23 (22 May 2021 03:00) (17 - 36)  SpO2: 100% (22 May 2021 03:00) (99% - 100%)    Mode: CPAP with PS  FiO2: 40  PEEP: 5  PS: 12  MAP: 10  PIP: 21    ABG - ( 21 May 2021 15:03 )  pH, Arterial: 7.50  pH, Blood: x     /  pCO2: 35    /  pO2: 422   / HCO3: 27    / Base Excess: 3.8   /  SaO2: x         I&O's Summary    20 May 2021 07:01  -  21 May 2021 07:00  --------------------------------------------------------  IN: 1290 mL / OUT: 2810 mL / NET: -1520 mL    21 May 2021 07:01  -  22 May 2021 03:32  --------------------------------------------------------  IN: 1750 mL / OUT: 1775 mL / NET: -25 mL      I&O's Detail  20 May 2021 07:01  -  21 May 2021 07:00  --------------------------------------------------------  IN:    Enteral Tube Flush: 90 mL    IV PiggyBack: 100 mL    IV PiggyBack: 100 mL    IV PiggyBack: 200 mL    Osmolite: 800 mL  Total IN: 1290 mL    OUT:    Indwelling Catheter - Urethral (mL): 2635 mL    Rectal Tube (mL): 175 mL  Total OUT: 2810 mL  Total NET: -1520 mL    21 May 2021 07:01  -  22 May 2021 03:32  --------------------------------------------------------  IN:    IV PiggyBack: 100 mL    IV PiggyBack: 300 mL    IV PiggyBack: 50 mL    IV PiggyBack: 100 mL    Osmolite: 600 mL    sodium chloride 3%: 600 mL  Total IN: 1750 mL    OUT:    Indwelling Catheter - Urethral (mL): 1675 mL    Rectal Tube (mL): 100 mL  Total OUT: 1775 mL  Total NET: -25 mL      PHYSICAL EXAM:  General/Neuro  RASS: 0  - GCS: 10T, not following commands  - able to move LUE and LLE spontaneously but nothing on right half of body  - Pupils: PERRL    Lungs:        - clear to auscultation, Normal expansion/effort.   - On PS 10/5    Cardiovascular:   - S1, S2.  Regular rate and rhythm.  Peripheral edema   - Cardiac Rhythm: Normal Sinus Rhythm    GI:   - Abdomen soft, Non-tender, Non-distended.    - Nasogastric tube in place.      Extremities:   - Extremities warm, pink, well-perfused.     Derm:   - Good skin turgor, no skin breakdown.      :         - Jain catheter in place.    CXR:     LABS:  CAPILLARY BLOOD GLUCOSE  POCT Blood Glucose.: 154 mg/dL (21 May 2021 23:29)  POCT Blood Glucose.: 82 mg/dL (21 May 2021 16:45)  POCT Blood Glucose.: 110 mg/dL (21 May 2021 11:03)  POCT Blood Glucose.: 137 mg/dL (21 May 2021 05:43)                     8.6    11.61 )-----------( 471      ( 21 May 2021 23:25 )             26.5         139  |  106  |  13  ----------------------------<  164<H>  4.4   |  23  |  <0.5<L>    Ca    7.9<L>      21 May 2021 23:25  Phos  3.5       Mg     1.6         TPro  5.9<L>  /  Alb  2.8<L>  /  TBili  0.5  /  DBili  x   /  AST  20  /  ALT  31  /  AlkPhos  118<H>        Urinalysis Basic - ( 21 May 2021 11:05 )  Color: Yellow / Appearance: Slightly Turbid / S.031 / pH: x  Gluc: x / Ketone: Negative  / Bili: Negative / Urobili: 12 mg/dL   Blood: x / Protein: 30 mg/dL / Nitrite: Negative   Leuk Esterase: Negative / RBC: 1 /HPF / WBC 3 /HPF   Sq Epi: x / Non Sq Epi: 2 /HPF / Bacteria: Negative    Culture - Body Fluid with Gram Stain (collected 21 May 2021 12:00)  Source: .Body Fluid CSF  Gram Stain (22 May 2021 02:58):    polymorphonuclear leukocytes seen    Gram positive cocci in pairs seen    by cytocentrifuge    Culture - Blood (collected 20 May 2021 02:00)  Source: .Blood Blood  Preliminary Report (21 May 2021 07:02):    No growth to date.

## 2021-05-22 NOTE — PROGRESS NOTE ADULT - SUBJECTIVE AND OBJECTIVE BOX
Subjective: 45yMale with a pmhx of SUBDURAL HEMATOMA;RESPIRATORY FAILURE;FALL    ^FALL    No pertinent family history in first degree relatives    Family history of essential hypertension (Father)    Handoff    MEWS Score    No pertinent past medical history    Alcohol abuse    GERD (gastroesophageal reflux disease)    ETOH abuse    Hypomagnesemia    Seizure disorder    Subdural hematoma    S/P subdural hematoma evacuation    Subdural hematoma    Subdural hematoma    Subdural hematoma    Respiratory failure    ETOH abuse    Palliative care by specialist    Insertion of non-tunneled central venous catheter (CVC) in patient age 5 years of age or older    Craniotomy, decompressive    Decompressive craniotomy    Placement, tracheostomy and percutaneous endoscopic gastrostomy    No significant past surgical history    H/O hemorrhoidectomy    FALL    23    Respiratory failure    Fall    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  Pt opens eyes spontaneously and to voice.  Attempts to follow commands on left. Tmax 103.6.      Allergies    No Known Allergies    Intolerances      Vital Signs Last 24 Hrs  T(C): 37.4 (22 May 2021 08:00), Max: 39.8 (21 May 2021 19:00)  T(F): 99.4 (22 May 2021 08:00), Max: 103.6 (21 May 2021 19:00)  HR: 90 (22 May 2021 09:00) (81 - 126)  BP: 139/78 (22 May 2021 09:00) (68/43 - 154/85)  BP(mean): 103 (22 May 2021 09:00) (49 - 113)  RR: 18 (22 May 2021 09:00) (17 - 36)  SpO2: 100% (22 May 2021 09:00) (99% - 100%)      acetaminophen   Tablet .. 650 milliGRAM(s) Oral every 6 hours  artificial tears (preservative free) Ophthalmic Solution 2 Drop(s) Both EYES three times a day  cefepime   IVPB      cefepime   IVPB 1000 milliGRAM(s) IV Intermittent every 8 hours  chlorhexidine 0.12% Liquid 15 milliLiter(s) Oral Mucosa two times a day  chlorhexidine 4% Liquid 1 Application(s) Topical <User Schedule>  fentaNYL    Injectable 25 MICROGram(s) IV Push every 4 hours PRN  heparin   Injectable 5000 Unit(s) SubCutaneous every 8 hours  insulin lispro (ADMELOG) corrective regimen sliding scale   SubCutaneous every 6 hours  levETIRAcetam  IVPB 500 milliGRAM(s) IV Intermittent every 12 hours  lisinopril 10 milliGRAM(s) Oral daily  pantoprazole   Suspension 40 milliGRAM(s) Oral daily  propranolol 20 milliGRAM(s) Oral every 8 hours  senna 2 Tablet(s) Oral at bedtime  sodium chloride 3%. 500 milliLiter(s) IV Continuous <Continuous>        05-21-21 @ 07:01  -  05-22-21 @ 07:00  --------------------------------------------------------  IN: 2510 mL / OUT: 2210 mL / NET: 300 mL    05-22-21 @ 07:01  -  05-22-21 @ 11:20  --------------------------------------------------------  IN: 345 mL / OUT: 250 mL / NET: 95 mL        REVIEW OF SYSTEMS    [ ] A ten-point review of systems was otherwise negative except as noted.  [x ] Due to altered mental status/intubation, subjective information were not able to be obtained from the patient. History was obtained, to the extent possible, from review of the chart and collateral sources of information.      Neuro Exam:  Eyes open spontaneously, to voice  clenches mouth closed  facial motions symmetric  PERRL  tracks  attempts to follow simple command with LUE  moves LUE/LLE spontaneously  RUE/RLE does not move spontaneously, no w/d to pain        Wound: staples intact, no drainage    CBC Full  -  ( 21 May 2021 23:25 )  WBC Count : 11.61 K/uL  RBC Count : 2.95 M/uL  Hemoglobin : 8.6 g/dL  Hematocrit : 26.5 %  Platelet Count - Automated : 471 K/uL  Mean Cell Volume : 89.8 fL  Mean Cell Hemoglobin : 29.2 pg  Mean Cell Hemoglobin Concentration : 32.5 g/dL  Auto Neutrophil # : x  Auto Lymphocyte # : x  Auto Monocyte # : x  Auto Eosinophil # : x  Auto Basophil # : x  Auto Neutrophil % : x  Auto Lymphocyte % : x  Auto Monocyte % : x  Auto Eosinophil % : x  Auto Basophil % : x    05-22    142  |  108  |  12  ----------------------------<  145<H>  4.2   |  24  |  <0.5<L>    Ca    7.8<L>      22 May 2021 05:00  Phos  3.5     05-21  Mg     1.6     05-21    TPro  5.9<L>  /  Alb  2.8<L>  /  TBili  0.5  /  DBili  x   /  AST  20  /  ALT  31  /  AlkPhos  118<H>  05-20            Assessment/Plan:   cont neuro checks q 1hr  f/u subgalial cultures  recommend consulting Dr. Velasquez for possible thrombectomy   maintain -150  continue keppra   care per BICU team  d/w attg

## 2021-05-22 NOTE — PROGRESS NOTE ADULT - ASSESSMENT
45M s/p fall with large left SDH and neurological status change, level 1 for emergent craniotomy    NEURO:    Hx of EtOH abuse and prior Delerium Tremens episodes    S/P craniotomy for large left SDH     - Q2H neuro checks     - GCS 11T post op 5/16, but now 10T not following commands     - Keppra 500mg BID     - Tolvaptam d/c'd 5/19, Na 142>139, restarted 3%@50cc     - no sedation    Pain control - prn low dose fentanyl IVP    Head of bed @ 30 degrees     - Repeat Head CT #1 post op changes, resolution of MLS, stable SDH along cerebral falx and L tentorium, new trace scattered SAH along the right cerebral sulci and right cerebellar sulci and new IVP in the b/l occipital horns.     - Repeat Head CT #2: development of acute infarcts in left cerebral hemisphere and right cerebellum     - CTA neck and Brain - no evidence acute large vessel occlusion, severe stenosis of R vertebral artery and moderate stenosis of mid-distal basilar artery, possible jugular venous thrombosis, NCHCT recommended for left convexity subdural collection, and increasing edema around L temporal/occipital parenchemal hemorrhage    OR 5/16:     - Clot was evacuated, bleeding noted. Hemostasis achieved without difficulty. KIKE drain left in subgaleal space. Brief operative findings: revision craniotomy wound with complex closure for persistent drainage, subgaleal hematoma     Post op CTH 5/16: expected post-op changes     - repeat CT venogram to r/o sinous thrombus as per neurology -plan for tomorrow    rEEG (5/19): borderline generalized slowing    D/C vEEG per neuro, no seizures on LTM report just generalzied slowing    05/21/2021: CT Venogram:  Left transverse sinus thrombosis, Previously noted left subdural hematoma measures 2 cm, previously 3 cm in maximal thickness, New adjacent hygroma measuring 3 cm in maximal thickness and spanning the length of 8.7 cm, Left to rightmidline shift of 4 mm, previously 3 mm.   NSX- no need for thrombectomy at this time     - NCC aware, awaiting further reccs    RESP:     Tracheostomy 05/21 on /16/50/5,  PS 12/5 daily as tolerated    s/p trach - 8 shiley     - pm abg s/p (insp pause 0.7):     AM CXR      CARDS:     S/P cardiac arrest in OR & MTP     - Acute HTN - on Clevidipine goal -160  MAP > 65     - Amlodipine changed to lisinopril 10     - propranolol 20mg q8    trop neg x3    GI/NUTR:     Diet: TF (Osmolite 1.5) @ 50 via peg    GI Prophylaxis-Protonix q24h    Bowel regimen restarted - Senna/Miralax     - +BM x 3 on 5/17-  Dignishield in place    /RENAL:     Jain - strict I/O    Labs:     - BUN/Cr- 13/0.5 (05/21 @ 2325)     - Electrolytes- na 139, k4.4, mg 1.6, phos 3.5 (05/21 @2325)     - Na goal 145-150 as per NSGY    Started Tolvaptam- fisrt dose 5/17, d/c'd on 5/19, pt went into DI with Na 158 and polyuria, Na down to 153, UO slowed down to /hr    -Na 139, restarted 3%, rate increased to 65/hr from 50/hr (05/21 overnight)    BMP BID, serum osm q6h    HEME/ONC:     S/P massive transfusion protocol     - 4uPRBCs 2FFP 2 platelet intraop, post op 2 PLT    5/15: 1U PRBC pre- operatively    5/16: 2U PRBC intraop and post op    DVT prophylaxis: HSQ restarted 5/17    f/u CT venogram for thrombosis today --> as above in neuro    ID:   tmax 103, tylenol, jain lavage, cooling blanket    WBC:4.6 -> 6.9 --> 9.08 --> 8.58     Antibiotics: Cefepime d/c'd 5/19, restarted 5/21     - ID in agreement with d/c'ing    5/11- u/a +leuk     - blood cx- Klebsiella, (pan sens)     - BAL-  Klebsiella, Serratia marcenscens  (pan sens)     - f/u blood culture 5/13, 5/19, 5/20- prelim neg     5/21- cranial collection cx sent,     ENDO:  q6 POC glucose  Sliding scale insulin  A1c 5.2    DISPO: Discussed with Dr. Shanks. Patient continues to require SICU level care

## 2021-05-23 LAB
-  AMPICILLIN: SIGNIFICANT CHANGE UP
-  TETRACYCLINE: SIGNIFICANT CHANGE UP
-  VANCOMYCIN: SIGNIFICANT CHANGE UP
ANION GAP SERPL CALC-SCNC: 8 MMOL/L — SIGNIFICANT CHANGE UP (ref 7–14)
ANION GAP SERPL CALC-SCNC: 9 MMOL/L — SIGNIFICANT CHANGE UP (ref 7–14)
BUN SERPL-MCNC: 10 MG/DL — SIGNIFICANT CHANGE UP (ref 10–20)
BUN SERPL-MCNC: 7 MG/DL — LOW (ref 10–20)
BUN SERPL-MCNC: 8 MG/DL — LOW (ref 10–20)
BUN SERPL-MCNC: 8 MG/DL — LOW (ref 10–20)
CALCIUM SERPL-MCNC: 7.7 MG/DL — LOW (ref 8.5–10.1)
CALCIUM SERPL-MCNC: 7.8 MG/DL — LOW (ref 8.5–10.1)
CALCIUM SERPL-MCNC: 8.2 MG/DL — LOW (ref 8.5–10.1)
CALCIUM SERPL-MCNC: 8.2 MG/DL — LOW (ref 8.5–10.1)
CHLORIDE SERPL-SCNC: 110 MMOL/L — SIGNIFICANT CHANGE UP (ref 98–110)
CHLORIDE SERPL-SCNC: 111 MMOL/L — HIGH (ref 98–110)
CHLORIDE SERPL-SCNC: 113 MMOL/L — HIGH (ref 98–110)
CHLORIDE SERPL-SCNC: 113 MMOL/L — HIGH (ref 98–110)
CO2 SERPL-SCNC: 22 MMOL/L — SIGNIFICANT CHANGE UP (ref 17–32)
CO2 SERPL-SCNC: 22 MMOL/L — SIGNIFICANT CHANGE UP (ref 17–32)
CO2 SERPL-SCNC: 23 MMOL/L — SIGNIFICANT CHANGE UP (ref 17–32)
CO2 SERPL-SCNC: 23 MMOL/L — SIGNIFICANT CHANGE UP (ref 17–32)
CREAT SERPL-MCNC: <0.5 MG/DL — LOW (ref 0.7–1.5)
GLUCOSE BLDC GLUCOMTR-MCNC: 137 MG/DL — HIGH (ref 70–99)
GLUCOSE BLDC GLUCOMTR-MCNC: 166 MG/DL — HIGH (ref 70–99)
GLUCOSE BLDC GLUCOMTR-MCNC: 167 MG/DL — HIGH (ref 70–99)
GLUCOSE SERPL-MCNC: 117 MG/DL — HIGH (ref 70–99)
GLUCOSE SERPL-MCNC: 131 MG/DL — HIGH (ref 70–99)
GLUCOSE SERPL-MCNC: 132 MG/DL — HIGH (ref 70–99)
GLUCOSE SERPL-MCNC: 153 MG/DL — HIGH (ref 70–99)
HCT VFR BLD CALC: 23.8 % — LOW (ref 42–52)
HGB BLD-MCNC: 7.6 G/DL — LOW (ref 14–18)
MAGNESIUM SERPL-MCNC: 1.9 MG/DL — SIGNIFICANT CHANGE UP (ref 1.8–2.4)
MCHC RBC-ENTMCNC: 29.2 PG — SIGNIFICANT CHANGE UP (ref 27–31)
MCHC RBC-ENTMCNC: 31.9 G/DL — LOW (ref 32–37)
MCV RBC AUTO: 91.5 FL — SIGNIFICANT CHANGE UP (ref 80–94)
METHOD TYPE: SIGNIFICANT CHANGE UP
NRBC # BLD: 0 /100 WBCS — SIGNIFICANT CHANGE UP (ref 0–0)
OSMOLALITY SERPL: 291 MOS/KG — SIGNIFICANT CHANGE UP (ref 275–300)
OSMOLALITY SERPL: 294 MOS/KG — SIGNIFICANT CHANGE UP (ref 275–300)
OSMOLALITY SERPL: 297 MOS/KG — SIGNIFICANT CHANGE UP (ref 275–300)
OSMOLALITY SERPL: 301 MOS/KG — HIGH (ref 275–300)
PHOSPHATE SERPL-MCNC: 3 MG/DL — SIGNIFICANT CHANGE UP (ref 2.1–4.9)
PLATELET # BLD AUTO: 339 K/UL — SIGNIFICANT CHANGE UP (ref 130–400)
POTASSIUM SERPL-MCNC: 3.7 MMOL/L — SIGNIFICANT CHANGE UP (ref 3.5–5)
POTASSIUM SERPL-MCNC: 3.8 MMOL/L — SIGNIFICANT CHANGE UP (ref 3.5–5)
POTASSIUM SERPL-MCNC: 3.9 MMOL/L — SIGNIFICANT CHANGE UP (ref 3.5–5)
POTASSIUM SERPL-MCNC: 4.1 MMOL/L — SIGNIFICANT CHANGE UP (ref 3.5–5)
POTASSIUM SERPL-SCNC: 3.7 MMOL/L — SIGNIFICANT CHANGE UP (ref 3.5–5)
POTASSIUM SERPL-SCNC: 3.8 MMOL/L — SIGNIFICANT CHANGE UP (ref 3.5–5)
POTASSIUM SERPL-SCNC: 3.9 MMOL/L — SIGNIFICANT CHANGE UP (ref 3.5–5)
POTASSIUM SERPL-SCNC: 4.1 MMOL/L — SIGNIFICANT CHANGE UP (ref 3.5–5)
RBC # BLD: 2.6 M/UL — LOW (ref 4.7–6.1)
RBC # FLD: 14.3 % — SIGNIFICANT CHANGE UP (ref 11.5–14.5)
SODIUM SERPL-SCNC: 141 MMOL/L — SIGNIFICANT CHANGE UP (ref 135–146)
SODIUM SERPL-SCNC: 141 MMOL/L — SIGNIFICANT CHANGE UP (ref 135–146)
SODIUM SERPL-SCNC: 144 MMOL/L — SIGNIFICANT CHANGE UP (ref 135–146)
SODIUM SERPL-SCNC: 144 MMOL/L — SIGNIFICANT CHANGE UP (ref 135–146)
WBC # BLD: 5.77 K/UL — SIGNIFICANT CHANGE UP (ref 4.8–10.8)
WBC # FLD AUTO: 5.77 K/UL — SIGNIFICANT CHANGE UP (ref 4.8–10.8)

## 2021-05-23 PROCEDURE — 71045 X-RAY EXAM CHEST 1 VIEW: CPT | Mod: 26

## 2021-05-23 PROCEDURE — 99233 SBSQ HOSP IP/OBS HIGH 50: CPT

## 2021-05-23 RX ORDER — POTASSIUM CHLORIDE 20 MEQ
20 PACKET (EA) ORAL
Refills: 0 | Status: COMPLETED | OUTPATIENT
Start: 2021-05-23 | End: 2021-05-23

## 2021-05-23 RX ORDER — CEFEPIME 1 G/1
2000 INJECTION, POWDER, FOR SOLUTION INTRAMUSCULAR; INTRAVENOUS EVERY 8 HOURS
Refills: 0 | Status: DISCONTINUED | OUTPATIENT
Start: 2021-05-23 | End: 2021-05-24

## 2021-05-23 RX ORDER — POTASSIUM CHLORIDE 20 MEQ
20 PACKET (EA) ORAL ONCE
Refills: 0 | Status: COMPLETED | OUTPATIENT
Start: 2021-05-23 | End: 2021-05-23

## 2021-05-23 RX ORDER — MAGNESIUM SULFATE 500 MG/ML
1 VIAL (ML) INJECTION ONCE
Refills: 0 | Status: COMPLETED | OUTPATIENT
Start: 2021-05-23 | End: 2021-05-23

## 2021-05-23 RX ORDER — LABETALOL HCL 100 MG
10 TABLET ORAL ONCE
Refills: 0 | Status: COMPLETED | OUTPATIENT
Start: 2021-05-23 | End: 2021-05-23

## 2021-05-23 RX ADMIN — Medication 2: at 06:14

## 2021-05-23 RX ADMIN — PANTOPRAZOLE SODIUM 40 MILLIGRAM(S): 20 TABLET, DELAYED RELEASE ORAL at 11:25

## 2021-05-23 RX ADMIN — Medication 62.5 MILLIMOLE(S): at 06:00

## 2021-05-23 RX ADMIN — Medication 166.67 MILLIGRAM(S): at 17:14

## 2021-05-23 RX ADMIN — Medication 650 MILLIGRAM(S): at 05:03

## 2021-05-23 RX ADMIN — Medication 650 MILLIGRAM(S): at 13:19

## 2021-05-23 RX ADMIN — Medication 10 MILLIGRAM(S): at 03:44

## 2021-05-23 RX ADMIN — LEVETIRACETAM 420 MILLIGRAM(S): 250 TABLET, FILM COATED ORAL at 05:02

## 2021-05-23 RX ADMIN — CEFEPIME 100 MILLIGRAM(S): 1 INJECTION, POWDER, FOR SOLUTION INTRAMUSCULAR; INTRAVENOUS at 22:32

## 2021-05-23 RX ADMIN — CHLORHEXIDINE GLUCONATE 15 MILLILITER(S): 213 SOLUTION TOPICAL at 05:02

## 2021-05-23 RX ADMIN — Medication 166.67 MILLIGRAM(S): at 05:01

## 2021-05-23 RX ADMIN — Medication 650 MILLIGRAM(S): at 23:53

## 2021-05-23 RX ADMIN — CHLORHEXIDINE GLUCONATE 15 MILLILITER(S): 213 SOLUTION TOPICAL at 17:07

## 2021-05-23 RX ADMIN — SODIUM CHLORIDE 70 MILLILITER(S): 5 INJECTION, SOLUTION INTRAVENOUS at 22:33

## 2021-05-23 RX ADMIN — HEPARIN SODIUM 5000 UNIT(S): 5000 INJECTION INTRAVENOUS; SUBCUTANEOUS at 05:01

## 2021-05-23 RX ADMIN — Medication 600 MILLIGRAM(S): at 17:01

## 2021-05-23 RX ADMIN — Medication 650 MILLIGRAM(S): at 17:06

## 2021-05-23 RX ADMIN — CEFEPIME 100 MILLIGRAM(S): 1 INJECTION, POWDER, FOR SOLUTION INTRAMUSCULAR; INTRAVENOUS at 13:07

## 2021-05-23 RX ADMIN — HEPARIN SODIUM 5000 UNIT(S): 5000 INJECTION INTRAVENOUS; SUBCUTANEOUS at 22:32

## 2021-05-23 RX ADMIN — Medication 600 MILLIGRAM(S): at 23:53

## 2021-05-23 RX ADMIN — Medication 2 DROP(S): at 05:03

## 2021-05-23 RX ADMIN — Medication 600 MILLIGRAM(S): at 05:02

## 2021-05-23 RX ADMIN — CHLORHEXIDINE GLUCONATE 1 APPLICATION(S): 213 SOLUTION TOPICAL at 05:02

## 2021-05-23 RX ADMIN — Medication 600 MILLIGRAM(S): at 17:07

## 2021-05-23 RX ADMIN — Medication 650 MILLIGRAM(S): at 05:01

## 2021-05-23 RX ADMIN — Medication 100 GRAM(S): at 04:56

## 2021-05-23 RX ADMIN — SENNA PLUS 2 TABLET(S): 8.6 TABLET ORAL at 22:32

## 2021-05-23 RX ADMIN — CEFEPIME 100 MILLIGRAM(S): 1 INJECTION, POWDER, FOR SOLUTION INTRAMUSCULAR; INTRAVENOUS at 05:02

## 2021-05-23 RX ADMIN — Medication 2 DROP(S): at 22:31

## 2021-05-23 RX ADMIN — Medication 600 MILLIGRAM(S): at 13:19

## 2021-05-23 RX ADMIN — Medication 2: at 13:25

## 2021-05-23 RX ADMIN — Medication 50 MILLIEQUIVALENT(S): at 17:00

## 2021-05-23 RX ADMIN — Medication 50 MILLIEQUIVALENT(S): at 04:57

## 2021-05-23 RX ADMIN — LISINOPRIL 10 MILLIGRAM(S): 2.5 TABLET ORAL at 05:01

## 2021-05-23 RX ADMIN — Medication 650 MILLIGRAM(S): at 17:01

## 2021-05-23 RX ADMIN — Medication 600 MILLIGRAM(S): at 05:04

## 2021-05-23 RX ADMIN — Medication 650 MILLIGRAM(S): at 11:25

## 2021-05-23 RX ADMIN — SODIUM CHLORIDE 70 MILLILITER(S): 5 INJECTION, SOLUTION INTRAVENOUS at 08:48

## 2021-05-23 RX ADMIN — Medication 600 MILLIGRAM(S): at 11:25

## 2021-05-23 RX ADMIN — Medication 50 MILLIEQUIVALENT(S): at 19:21

## 2021-05-23 RX ADMIN — SODIUM CHLORIDE 70 MILLILITER(S): 5 INJECTION, SOLUTION INTRAVENOUS at 01:44

## 2021-05-23 RX ADMIN — HEPARIN SODIUM 5000 UNIT(S): 5000 INJECTION INTRAVENOUS; SUBCUTANEOUS at 13:07

## 2021-05-23 RX ADMIN — LEVETIRACETAM 420 MILLIGRAM(S): 250 TABLET, FILM COATED ORAL at 17:01

## 2021-05-23 NOTE — CHART NOTE - NSCHARTNOTEFT_GEN_A_CORE
Patient having copious drainage from craniotomy incision site.  Wound cx growing E. faecalis, sensitivities pending.  Blood cx NGTD.  Under sterile conditions, incision was cleaned.  Butterfly needle used to tap fluctuant area and 5cc dark blood tinged fluid removed.  Staples were removed from medial portion of wound and about 5cc purulent material expressed from superficial wound. KIKE drain placed under skin and KIKE bulb left off suction,  Drainage collecting via gravity.  KIKE sutured in place and staples placed to secure incision.  Pt will be on add-on schedule for possible Washout of craniotomy wound for tomorrow.  Above discussed with Dr Byrne.

## 2021-05-23 NOTE — PROGRESS NOTE ADULT - SUBJECTIVE AND OBJECTIVE BOX
SANDRA DAS  [id number]  45y Male    Indication for ICU admission: s/p craniectomy for large L SDH  Admit Date:21  ICU Date: 21  OR Date: 21    No Known Allergies    PAST MEDICAL & SURGICAL HISTORY:  Alcohol abuse  GERD (gastroesophageal reflux disease)  ETOH abuse  Hypomagnesemia  Seizure disorder  H/O hemorrhoidectomy    Home Medications:    24HRS EVENT:  PM  -Tmax 100.0  -back on AC overnight, tolerating 30/5  -purulent drainage from scalp wound  -10 labetalol for HTN 170s  -Na 141 from 142, inc 3% to 70cc/hr  -15 NapHos, 1 K rider, 1g Mg    Day  -maintain pressure support 5/ for day, had ep of apnea, increased PS 10/5  -monitor temps  -dressing saturated (serous)  -added vanc as per ID   -put back on AC overnight  Crani cx- e. faecalis, sens pending   added motrin atc     DVT PTX: Heparin gtt, coumadin  GI PTX: PPI    ***Tubes/Lines/Drains  ***  Peripheral IV  Left basilic midline   Arterial Line Right radial	Date   L IJ TLC       Trach/peg   Urinary Catheter		Indication: Strict I&O    Date Placed:     REVIEW OF SYSTEMS  [ ] A ten-point review of systems was otherwise negative except as noted.  [x] Due to altered mental status/intubation, subjective information were not able to be obtained from the patient. History was obtained, to the extent possible, from review of the chart and collateral sources of information.    Daily     Daily Weight in k (23 May 2021 05:00)        CURRENT MEDS:  Neurologic Medications  acetaminophen   Tablet .. 650 milliGRAM(s) Oral every 6 hours  ibuprofen  Tablet. 600 milliGRAM(s) Oral every 6 hours  levETIRAcetam  IVPB 500 milliGRAM(s) IV Intermittent every 12 hours    Respiratory Medications    Cardiovascular Medications  lisinopril 10 milliGRAM(s) Oral daily  propranolol 20 milliGRAM(s) Oral every 8 hours    Gastrointestinal Medications  pantoprazole   Suspension 40 milliGRAM(s) Oral daily  senna 2 Tablet(s) Oral at bedtime  sodium chloride 3%. 500 milliLiter(s) IV Continuous <Continuous>    Genitourinary Medications    Hematologic/Oncologic Medications  heparin   Injectable 5000 Unit(s) SubCutaneous every 8 hours    Antimicrobial/Immunologic Medications  cefepime   IVPB 2000 milliGRAM(s) IV Intermittent every 8 hours  vancomycin  IVPB 1250 milliGRAM(s) IV Intermittent every 12 hours    Endocrine/Metabolic Medications  insulin lispro (ADMELOG) corrective regimen sliding scale   SubCutaneous every 6 hours    Topical/Other Medications  artificial tears (preservative free) Ophthalmic Solution 2 Drop(s) Both EYES three times a day  chlorhexidine 0.12% Liquid 15 milliLiter(s) Oral Mucosa two times a day  chlorhexidine 4% Liquid 1 Application(s) Topical <User Schedule>      ICU Vital Signs Last 24 Hrs  T(C): 97.1 (23 May 2021 08:00), Max: 97.1 (23 May 2021 08:00)  T(F): 206.8 (23 May 2021 08:00), Max: 206.8 (23 May 2021 08:00)  HR: 83 (23 May 2021 08:00) (80 - 104)  BP: 133/78 (23 May 2021 08:00) (110/64 - 196/105)  BP(mean): 101 (23 May 2021 08:00) (80 - 140)  ABP: 143/75 (23 May 2021 08:00) (102/56 - 199/82)  ABP(mean): 98 (23 May 2021 08:00) (71 - 116)  RR: 26 (23 May 2021 08:00) (19 - 27)  SpO2: 100% (23 May 2021 08:00) (99% - 100%)      Adult Advanced Hemodynamics Last 24 Hrs  CVP(mm Hg): --  CVP(cm H2O): --  CO: --  CI: --  PA: --  PA(mean): --  PCWP: --  SVR: --  SVRI: --  PVR: --  PVRI: --    Mode: AC/ CMV (Assist Control/ Continuous Mandatory Ventilation)  RR (machine): 16  TV (machine): 420  FiO2: 30  PEEP: 5  ITime: 1  MAP: 10  PIP: 16    ABG - ( 23 May 2021 04:20 )  pH, Arterial: 7.50  pH, Blood: x     /  pCO2: 31    /  pO2: 132   / HCO3: 24    / Base Excess: 1.3   /  SaO2: 100                 I&O's Summary    22 May 2021 07:01  -  23 May 2021 07:00  --------------------------------------------------------  IN: 4280 mL / OUT: 3410 mL / NET: 870 mL    23 May 2021 07:  -  23 May 2021 09:11  --------------------------------------------------------  IN: 240 mL / OUT: 175 mL / NET: 65 mL      I&O's Detail    22 May 2021 07:  -  23 May 2021 07:00  --------------------------------------------------------  IN:    Enteral Tube Flush: 100 mL    IV PiggyBack: 200 mL    IV PiggyBack: 250 mL    IV PiggyBack: 100 mL    IV PiggyBack: 150 mL    IV PiggyBack: 100 mL    IV PiggyBack: 600 mL    Osmolite: 1200 mL    sodium chloride 3%: 1580 mL  Total IN: 4280 mL    OUT:    Indwelling Catheter - Urethral (mL): 3010 mL    Rectal Tube (mL): 400 mL  Total OUT: 3410 mL    Total NET: 870 mL      23 May 2021 07:  -  23 May 2021 09:11  --------------------------------------------------------  IN:    Osmolite: 100 mL    sodium chloride 3%: 140 mL  Total IN: 240 mL    OUT:    Indwelling Catheter - Urethral (mL): 175 mL  Total OUT: 175 mL    Total NET: 65 mL          PHYSICAL EXAM:    General/Neuro  RASS:  0           GCS: 10T    = E-4   / V1T  / M5      Deficits:      awake, alert, RUE  and RLE weakness  Pupils: PERRLA    Scalp incision with purulent drainage    Lungs:      clear to auscultation, Normal expansion/effort. Tracheostomy in place    Cardiovascular : S1, S2.  Regular rate and rhythm.  Minimal Peripheral edema   Cardiac Rhythm: Normal Sinus Rhythm    GI: Abdomen soft, Non-tender, Non-distended.      Extremities: Extremities warm, pink, well-perfused. Pulses:Rt     Lt    Derm: Good skin turgor, no skin breakdown.      :       Cole catheter in place.      CXR:       LABS:  CAPILLARY BLOOD GLUCOSE      POCT Blood Glucose.: 166 mg/dL (23 May 2021 06:03)  POCT Blood Glucose.: 126 mg/dL (22 May 2021 23:18)  POCT Blood Glucose.: 133 mg/dL (22 May 2021 18:47)  POCT Blood Glucose.: 161 mg/dL (22 May 2021 17:46)  POCT Blood Glucose.: 137 mg/dL (22 May 2021 11:32)                          7.6    5.77  )-----------( 339      ( 22 May 2021 23:30 )             23.8       05-23    144  |  113<H>  |  8<L>  ----------------------------<  117<H>  3.9   |  22  |  <0.5<L>    Ca    8.2<L>      23 May 2021 04:30  Phos  3.0     05-22  Mg     1.9     05-22              Urinalysis Basic - ( 21 May 2021 11:05 )    Color: Yellow / Appearance: Slightly Turbid / S.031 / pH: x  Gluc: x / Ketone: Negative  / Bili: Negative / Urobili: 12 mg/dL   Blood: x / Protein: 30 mg/dL / Nitrite: Negative   Leuk Esterase: Negative / RBC: 1 /HPF / WBC 3 /HPF   Sq Epi: x / Non Sq Epi: 2 /HPF / Bacteria: Negative        Culture - Body Fluid with Gram Stain (collected 21 May 2021 12:00)  Source: .Body Fluid CSF  Gram Stain (22 May 2021 02:58):    polymorphonuclear leukocytes seen    Gram positive cocci in pairs seen    by cytocentrifuge  Preliminary Report (22 May 2021 18:54):    Few Enterococcus faecalis

## 2021-05-23 NOTE — PROGRESS NOTE ADULT - SUBJECTIVE AND OBJECTIVE BOX
Subjective: 45yMale with a pmhx of SUBDURAL HEMATOMA;RESPIRATORY FAILURE;FALL    ^FALL    No pertinent family history in first degree relatives    Family history of essential hypertension (Father)    Handoff    MEWS Score    No pertinent past medical history    Alcohol abuse    GERD (gastroesophageal reflux disease)    ETOH abuse    Hypomagnesemia    Seizure disorder    Subdural hematoma    S/P subdural hematoma evacuation    Subdural hematoma    Subdural hematoma    Subdural hematoma    Respiratory failure    ETOH abuse    Palliative care by specialist    Insertion of non-tunneled central venous catheter (CVC) in patient age 5 years of age or older    Craniotomy, decompressive    Decompressive craniotomy    Placement, tracheostomy and percutaneous endoscopic gastrostomy    No significant past surgical history    H/O hemorrhoidectomy    FALL    23    Respiratory failure    Fall    [email or web address]        POD#16 s/p Left craniectomy for evacuation SDH  POD#7 s/p Wound revision  POD#2 s/p Trach/PEG    Pt seen and examined at bedside with Dr Norton.  Pt opens eyes spontaneously.  Attempts to follow commands on left.  Purulent discharge noted from craniotomy site.     Allergies    No Known Allergies    Intolerances        Vital Signs Last 24 Hrs  T(C): 97.1 (23 May 2021 08:00), Max: 97.1 (23 May 2021 08:00)  T(F): 206.8 (23 May 2021 08:00), Max: 206.8 (23 May 2021 08:00)  HR: 88 (23 May 2021 09:00) (80 - 104)  BP: 148/91 (23 May 2021 09:00) (110/64 - 196/105)  BP(mean): 114 (23 May 2021 09:00) (80 - 140)  RR: 18 (23 May 2021 09:00) (18 - 27)  SpO2: 100% (23 May 2021 09:00) (99% - 100%)      acetaminophen   Tablet .. 650 milliGRAM(s) Oral every 6 hours  artificial tears (preservative free) Ophthalmic Solution 2 Drop(s) Both EYES three times a day  cefepime   IVPB 2000 milliGRAM(s) IV Intermittent every 8 hours  chlorhexidine 0.12% Liquid 15 milliLiter(s) Oral Mucosa two times a day  chlorhexidine 4% Liquid 1 Application(s) Topical <User Schedule>  heparin   Injectable 5000 Unit(s) SubCutaneous every 8 hours  ibuprofen  Tablet. 600 milliGRAM(s) Oral every 6 hours  insulin lispro (ADMELOG) corrective regimen sliding scale   SubCutaneous every 6 hours  levETIRAcetam  IVPB 500 milliGRAM(s) IV Intermittent every 12 hours  lisinopril 10 milliGRAM(s) Oral daily  pantoprazole   Suspension 40 milliGRAM(s) Oral daily  propranolol 20 milliGRAM(s) Oral every 8 hours  senna 2 Tablet(s) Oral at bedtime  sodium chloride 3%. 500 milliLiter(s) IV Continuous <Continuous>  vancomycin  IVPB 1250 milliGRAM(s) IV Intermittent every 12 hours        05-22-21 @ 07:01  -  05-23-21 @ 07:00  --------------------------------------------------------  IN: 4280 mL / OUT: 3410 mL / NET: 870 mL    05-23-21 @ 07:01  -  05-23-21 @ 10:12  --------------------------------------------------------  IN: 240 mL / OUT: 385 mL / NET: -145 mL        REVIEW OF SYSTEMS    [ ] A ten-point review of systems was otherwise negative except as noted.  [ x] Due to altered mental status/intubation, subjective information were not able to be obtained from the patient. History was obtained, to the extent possible, from review of the chart and collateral sources of information.      Neuro Exam:  Awake, intubated  eyes open spontaneously  PERRL  tracking  follows commands to squeeze L hand  minimal w/d LLE  no movt on R side    Wound: flap with purulent drainage     CBC Full  -  ( 22 May 2021 23:30 )  WBC Count : 5.77 K/uL  RBC Count : 2.60 M/uL  Hemoglobin : 7.6 g/dL  Hematocrit : 23.8 %  Platelet Count - Automated : 339 K/uL  Mean Cell Volume : 91.5 fL  Mean Cell Hemoglobin : 29.2 pg  Mean Cell Hemoglobin Concentration : 31.9 g/dL  Auto Neutrophil # : x  Auto Lymphocyte # : x  Auto Monocyte # : x  Auto Eosinophil # : x  Auto Basophil # : x  Auto Neutrophil % : x  Auto Lymphocyte % : x  Auto Monocyte % : x  Auto Eosinophil % : x  Auto Basophil % : x    05-23    144  |  113<H>  |  8<L>  ----------------------------<  117<H>  3.9   |  22  |  <0.5<L>    Ca    8.2<L>      23 May 2021 04:30  Phos  3.0     05-22  Mg     1.9     05-22              Assessment/Plan:   cont neuro checks q 1hr  follow up final sensitivities for wound cx  cont Vanco/Cefepime for now  OR tomorrow for wound washout  possible bedside drain placement  maintain Na 145-150  continue Keppra   care per BICU team  d/w attg

## 2021-05-23 NOTE — PROGRESS NOTE ADULT - ASSESSMENT
Assessment & Plan  45M s/p fall with large left SDH and neurological status change, level 1 for emergent craniotomy    NEURO:    Hx of EtOH abuse and prior Delerium Tremens episodes    S/P craniotomy for large left SDH     - Q2H neuro checks     - GCS 10T not following commands     - Keppra 500mg BID     - Tolvaptam d/c'd 5/19     - 3% @ 65cc -- Na goal 140-150     - No sedation    Pain control - prn low dose fentanyl IVP    Head of bed @ 30 degrees     - Repeat Head CT #1 post op changes, resolution of MLS, stable SDH along cerebral falx and L tentorium, new trace scattered SAH along the right cerebral sulci and right cerebellar sulci and new IVP in the b/l occipital horns.     - Repeat Head CT #2: development of acute infarcts in left cerebral hemisphere and right cerebellum     - CTA neck and Brain - no evidence acute large vessel occlusion, severe stenosis of R vertebral artery and moderate stenosis of mid-distal basilar artery, possible jugular venous thrombosis, NCHCT recommended for left convexity subdural collection, and increasing edema around L temporal/occipital parenchemal hemorrhage    OR 5/16:     - Clot was evacuated, bleeding noted. Hemostasis achieved without difficulty. KIKE drain left in subgaleal space. Brief operative findings: revision craniotomy wound with complex closure for persistent drainage, subgaleal hematoma     Post op CTH 5/16: expected post-op changes     - repeat CT venogram to r/o sinous thrombus as per neurology -plan for tomorrow    rEEG (5/19): borderline generalized slowing    D/C vEEG per neuro, no seizures on LTM report just generalzied slowing    05/21/2021: CT Venogram:  Left transverse sinus thrombosis, Previously noted left subdural hematoma measures 2 cm, previously 3 cm in maximal thickness, New adjacent hygroma measuring 3 cm in maximal thickness and spanning the length of 8.7 cm, Left to rightmidline shift of 4 mm, previously 3 mm.   NSX- no need for thrombectomy at this time     - NCC aware, awaiting further reccs    RESP:     Intubated --> PS 5/5, had ep of apnea, increased PS 10/5    OVN back on /16/30/5    S/p trach - 8 shiley    AM ABG:     AM CXR        CARDS:     S/P cardiac arrest in OR & MTP     - Hypertension Lisinopril 10mg     - Propranolol 20mg q8     - Trop neg x3    GI/NUTR:     Diet: TF (Osmolite 1.5) @ 50  via peg    GI Prophylaxis-Protonix q24h    Bowel regimen restarted - Senna/Miralax     - +BM --> Dignishield in place    /RENAL:     Jain - strict I/O, UO 150cc/hr    Labs:     - BUN/Cr- 12/0.5     - Electrolytes- Na 141 // K 3.8 // Mg 1.9 //  Phos 3.0 05-22 @ 23:30          -repleted     - Na goal 145-150 as per NSGY     - Started Tolvaptam- fisrt dose 5/17, d/c'd on 5/19, pt went into DI with Na 158 and polyuria     - 3% @ 65  --> BMP, serum osm q6h, last Osm 301    HEME/ONC:     S/P massive transfusion protocol     - 4uPRBCs 2FFP 2 platelet intraop, post op 2 PLT    5/15: 1U PRBC pre- operatively    5/16: 2U PRBC intraop and post op    DVT prophylaxis: Heparin gtt, Coumadin    f/u CT venogram for thrombosis today --> as above in neuro    ID:    Monitor Tmax:      -Tylenol, Motrin, jain lavage, cooling blanket    WBC: 8.58 --> 11.6 ->5.6    Antibiotics: Cefepime      -Follow up ID recs for + OR cultures     - 5/11- u/a +leuk     - Blood cx- Klebsiella, (pan sens)     - BAL-  Klebsiella, Serratia marcenscens  (pan sens)     - f/u blood culture 5/13, 5/19, 5/20- prelim neg      - 5/21- cranial collection cx -  e. faecalis, sens pending     ENDO:  q6 POC glucose  Sliding scale insulin  A1c 5.2    DISPO: Discussed with Dr. Avila. Patient continues to require SICU level care

## 2021-05-24 LAB
ANION GAP SERPL CALC-SCNC: 9 MMOL/L — SIGNIFICANT CHANGE UP (ref 7–14)
APTT BLD: 30.8 SEC — SIGNIFICANT CHANGE UP (ref 27–39.2)
BASOPHILS # BLD AUTO: 0.02 K/UL — SIGNIFICANT CHANGE UP (ref 0–0.2)
BASOPHILS NFR BLD AUTO: 0.3 % — SIGNIFICANT CHANGE UP (ref 0–1)
BLD GP AB SCN SERPL QL: SIGNIFICANT CHANGE UP
BUN SERPL-MCNC: 5 MG/DL — LOW (ref 10–20)
BUN SERPL-MCNC: 6 MG/DL — LOW (ref 10–20)
BUN SERPL-MCNC: 7 MG/DL — LOW (ref 10–20)
CALCIUM SERPL-MCNC: 7.8 MG/DL — LOW (ref 8.5–10.1)
CALCIUM SERPL-MCNC: 8 MG/DL — LOW (ref 8.5–10.1)
CALCIUM SERPL-MCNC: 8 MG/DL — LOW (ref 8.5–10.1)
CHLORIDE SERPL-SCNC: 108 MMOL/L — SIGNIFICANT CHANGE UP (ref 98–110)
CHLORIDE SERPL-SCNC: 109 MMOL/L — SIGNIFICANT CHANGE UP (ref 98–110)
CHLORIDE SERPL-SCNC: 114 MMOL/L — HIGH (ref 98–110)
CO2 SERPL-SCNC: 21 MMOL/L — SIGNIFICANT CHANGE UP (ref 17–32)
CO2 SERPL-SCNC: 22 MMOL/L — SIGNIFICANT CHANGE UP (ref 17–32)
CO2 SERPL-SCNC: 22 MMOL/L — SIGNIFICANT CHANGE UP (ref 17–32)
CREAT SERPL-MCNC: <0.5 MG/DL — LOW (ref 0.7–1.5)
CULTURE RESULTS: SIGNIFICANT CHANGE UP
EOSINOPHIL # BLD AUTO: 0.07 K/UL — SIGNIFICANT CHANGE UP (ref 0–0.7)
EOSINOPHIL NFR BLD AUTO: 1.2 % — SIGNIFICANT CHANGE UP (ref 0–8)
GLUCOSE BLDC GLUCOMTR-MCNC: 107 MG/DL — HIGH (ref 70–99)
GLUCOSE BLDC GLUCOMTR-MCNC: 123 MG/DL — HIGH (ref 70–99)
GLUCOSE SERPL-MCNC: 116 MG/DL — HIGH (ref 70–99)
GLUCOSE SERPL-MCNC: 132 MG/DL — HIGH (ref 70–99)
GLUCOSE SERPL-MCNC: 154 MG/DL — HIGH (ref 70–99)
HCT VFR BLD CALC: 23 % — LOW (ref 42–52)
HGB BLD-MCNC: 7.3 G/DL — LOW (ref 14–18)
IMM GRANULOCYTES NFR BLD AUTO: 0.5 % — HIGH (ref 0.1–0.3)
INR BLD: 1.19 RATIO — SIGNIFICANT CHANGE UP (ref 0.65–1.3)
LYMPHOCYTES # BLD AUTO: 0.69 K/UL — LOW (ref 1.2–3.4)
LYMPHOCYTES # BLD AUTO: 11.6 % — LOW (ref 20.5–51.1)
MAGNESIUM SERPL-MCNC: 1.6 MG/DL — LOW (ref 1.8–2.4)
MCHC RBC-ENTMCNC: 28.5 PG — SIGNIFICANT CHANGE UP (ref 27–31)
MCHC RBC-ENTMCNC: 31.7 G/DL — LOW (ref 32–37)
MCV RBC AUTO: 89.8 FL — SIGNIFICANT CHANGE UP (ref 80–94)
MONOCYTES # BLD AUTO: 0.55 K/UL — SIGNIFICANT CHANGE UP (ref 0.1–0.6)
MONOCYTES NFR BLD AUTO: 9.2 % — SIGNIFICANT CHANGE UP (ref 1.7–9.3)
NEUTROPHILS # BLD AUTO: 4.6 K/UL — SIGNIFICANT CHANGE UP (ref 1.4–6.5)
NEUTROPHILS NFR BLD AUTO: 77.2 % — HIGH (ref 42.2–75.2)
NRBC # BLD: 0 /100 WBCS — SIGNIFICANT CHANGE UP (ref 0–0)
OSMOLALITY SERPL: 289 MOS/KG — SIGNIFICANT CHANGE UP (ref 275–300)
OSMOLALITY SERPL: 289 MOS/KG — SIGNIFICANT CHANGE UP (ref 275–300)
OSMOLALITY SERPL: 291 MOS/KG — SIGNIFICANT CHANGE UP (ref 275–300)
PHOSPHATE SERPL-MCNC: 2.8 MG/DL — SIGNIFICANT CHANGE UP (ref 2.1–4.9)
PLATELET # BLD AUTO: 311 K/UL — SIGNIFICANT CHANGE UP (ref 130–400)
POTASSIUM SERPL-MCNC: 3.9 MMOL/L — SIGNIFICANT CHANGE UP (ref 3.5–5)
POTASSIUM SERPL-MCNC: 3.9 MMOL/L — SIGNIFICANT CHANGE UP (ref 3.5–5)
POTASSIUM SERPL-MCNC: 4.1 MMOL/L — SIGNIFICANT CHANGE UP (ref 3.5–5)
POTASSIUM SERPL-SCNC: 3.9 MMOL/L — SIGNIFICANT CHANGE UP (ref 3.5–5)
POTASSIUM SERPL-SCNC: 3.9 MMOL/L — SIGNIFICANT CHANGE UP (ref 3.5–5)
POTASSIUM SERPL-SCNC: 4.1 MMOL/L — SIGNIFICANT CHANGE UP (ref 3.5–5)
PROTHROM AB SERPL-ACNC: 13.7 SEC — HIGH (ref 9.95–12.87)
RBC # BLD: 2.56 M/UL — LOW (ref 4.7–6.1)
RBC # FLD: 14 % — SIGNIFICANT CHANGE UP (ref 11.5–14.5)
SODIUM SERPL-SCNC: 139 MMOL/L — SIGNIFICANT CHANGE UP (ref 135–146)
SODIUM SERPL-SCNC: 140 MMOL/L — SIGNIFICANT CHANGE UP (ref 135–146)
SODIUM SERPL-SCNC: 144 MMOL/L — SIGNIFICANT CHANGE UP (ref 135–146)
SPECIMEN SOURCE: SIGNIFICANT CHANGE UP
VANCOMYCIN TROUGH SERPL-MCNC: 7.6 UG/ML — SIGNIFICANT CHANGE UP (ref 5–10)
WBC # BLD: 5.96 K/UL — SIGNIFICANT CHANGE UP (ref 4.8–10.8)
WBC # FLD AUTO: 5.96 K/UL — SIGNIFICANT CHANGE UP (ref 4.8–10.8)

## 2021-05-24 PROCEDURE — 71045 X-RAY EXAM CHEST 1 VIEW: CPT | Mod: 26

## 2021-05-24 PROCEDURE — 99291 CRITICAL CARE FIRST HOUR: CPT | Mod: 25

## 2021-05-24 RX ORDER — CHLORHEXIDINE GLUCONATE 213 G/1000ML
1 SOLUTION TOPICAL
Refills: 0 | Status: DISCONTINUED | OUTPATIENT
Start: 2021-05-24 | End: 2021-06-07

## 2021-05-24 RX ORDER — CHLORHEXIDINE GLUCONATE 213 G/1000ML
15 SOLUTION TOPICAL
Refills: 0 | Status: DISCONTINUED | OUTPATIENT
Start: 2021-05-24 | End: 2021-06-07

## 2021-05-24 RX ORDER — POTASSIUM CHLORIDE 20 MEQ
20 PACKET (EA) ORAL ONCE
Refills: 0 | Status: COMPLETED | OUTPATIENT
Start: 2021-05-24 | End: 2021-05-24

## 2021-05-24 RX ORDER — POTASSIUM PHOSPHATE, MONOBASIC POTASSIUM PHOSPHATE, DIBASIC 236; 224 MG/ML; MG/ML
15 INJECTION, SOLUTION INTRAVENOUS ONCE
Refills: 0 | Status: COMPLETED | OUTPATIENT
Start: 2021-05-24 | End: 2021-05-24

## 2021-05-24 RX ORDER — ACETAMINOPHEN 500 MG
650 TABLET ORAL EVERY 6 HOURS
Refills: 0 | Status: DISCONTINUED | OUTPATIENT
Start: 2021-05-24 | End: 2021-06-07

## 2021-05-24 RX ORDER — MAGNESIUM SULFATE 500 MG/ML
2 VIAL (ML) INJECTION
Refills: 0 | Status: COMPLETED | OUTPATIENT
Start: 2021-05-24 | End: 2021-05-24

## 2021-05-24 RX ORDER — SODIUM CHLORIDE 9 MG/ML
4 INJECTION INTRAMUSCULAR; INTRAVENOUS; SUBCUTANEOUS EVERY 8 HOURS
Refills: 0 | Status: DISCONTINUED | OUTPATIENT
Start: 2021-05-24 | End: 2021-05-25

## 2021-05-24 RX ORDER — AMPICILLIN SODIUM AND SULBACTAM SODIUM 250; 125 MG/ML; MG/ML
3 INJECTION, POWDER, FOR SUSPENSION INTRAMUSCULAR; INTRAVENOUS EVERY 6 HOURS
Refills: 0 | Status: DISCONTINUED | OUTPATIENT
Start: 2021-05-24 | End: 2021-05-25

## 2021-05-24 RX ADMIN — LEVETIRACETAM 420 MILLIGRAM(S): 250 TABLET, FILM COATED ORAL at 18:26

## 2021-05-24 RX ADMIN — Medication 50 GRAM(S): at 01:30

## 2021-05-24 RX ADMIN — SODIUM CHLORIDE 4 GRAM(S): 9 INJECTION INTRAMUSCULAR; INTRAVENOUS; SUBCUTANEOUS at 22:25

## 2021-05-24 RX ADMIN — LEVETIRACETAM 420 MILLIGRAM(S): 250 TABLET, FILM COATED ORAL at 05:22

## 2021-05-24 RX ADMIN — SENNA PLUS 2 TABLET(S): 8.6 TABLET ORAL at 22:26

## 2021-05-24 RX ADMIN — Medication 2 DROP(S): at 14:00

## 2021-05-24 RX ADMIN — CHLORHEXIDINE GLUCONATE 1 APPLICATION(S): 213 SOLUTION TOPICAL at 05:22

## 2021-05-24 RX ADMIN — Medication 650 MILLIGRAM(S): at 06:00

## 2021-05-24 RX ADMIN — Medication 2 DROP(S): at 22:55

## 2021-05-24 RX ADMIN — HEPARIN SODIUM 5000 UNIT(S): 5000 INJECTION INTRAVENOUS; SUBCUTANEOUS at 05:22

## 2021-05-24 RX ADMIN — Medication 650 MILLIGRAM(S): at 20:05

## 2021-05-24 RX ADMIN — SODIUM CHLORIDE 80 MILLILITER(S): 5 INJECTION, SOLUTION INTRAVENOUS at 13:05

## 2021-05-24 RX ADMIN — Medication 100 MILLIEQUIVALENT(S): at 20:39

## 2021-05-24 RX ADMIN — Medication 650 MILLIGRAM(S): at 00:00

## 2021-05-24 RX ADMIN — HEPARIN SODIUM 5000 UNIT(S): 5000 INJECTION INTRAVENOUS; SUBCUTANEOUS at 22:25

## 2021-05-24 RX ADMIN — Medication 166.67 MILLIGRAM(S): at 07:00

## 2021-05-24 RX ADMIN — HEPARIN SODIUM 5000 UNIT(S): 5000 INJECTION INTRAVENOUS; SUBCUTANEOUS at 14:52

## 2021-05-24 RX ADMIN — Medication 2 DROP(S): at 05:23

## 2021-05-24 RX ADMIN — POTASSIUM PHOSPHATE, MONOBASIC POTASSIUM PHOSPHATE, DIBASIC 62.5 MILLIMOLE(S): 236; 224 INJECTION, SOLUTION INTRAVENOUS at 02:30

## 2021-05-24 RX ADMIN — LISINOPRIL 10 MILLIGRAM(S): 2.5 TABLET ORAL at 05:22

## 2021-05-24 RX ADMIN — AMPICILLIN SODIUM AND SULBACTAM SODIUM 200 GRAM(S): 250; 125 INJECTION, POWDER, FOR SUSPENSION INTRAMUSCULAR; INTRAVENOUS at 13:05

## 2021-05-24 RX ADMIN — Medication 600 MILLIGRAM(S): at 00:00

## 2021-05-24 RX ADMIN — CHLORHEXIDINE GLUCONATE 15 MILLILITER(S): 213 SOLUTION TOPICAL at 05:22

## 2021-05-24 RX ADMIN — CEFEPIME 100 MILLIGRAM(S): 1 INJECTION, POWDER, FOR SOLUTION INTRAMUSCULAR; INTRAVENOUS at 05:22

## 2021-05-24 RX ADMIN — CHLORHEXIDINE GLUCONATE 15 MILLILITER(S): 213 SOLUTION TOPICAL at 18:26

## 2021-05-24 RX ADMIN — Medication 50 GRAM(S): at 02:00

## 2021-05-24 RX ADMIN — Medication 650 MILLIGRAM(S): at 13:39

## 2021-05-24 RX ADMIN — Medication 600 MILLIGRAM(S): at 06:00

## 2021-05-24 RX ADMIN — Medication 600 MILLIGRAM(S): at 05:21

## 2021-05-24 RX ADMIN — SODIUM CHLORIDE 4 GRAM(S): 9 INJECTION INTRAMUSCULAR; INTRAVENOUS; SUBCUTANEOUS at 13:05

## 2021-05-24 RX ADMIN — Medication 650 MILLIGRAM(S): at 05:21

## 2021-05-24 RX ADMIN — AMPICILLIN SODIUM AND SULBACTAM SODIUM 200 GRAM(S): 250; 125 INJECTION, POWDER, FOR SUSPENSION INTRAMUSCULAR; INTRAVENOUS at 18:26

## 2021-05-24 RX ADMIN — PANTOPRAZOLE SODIUM 40 MILLIGRAM(S): 20 TABLET, DELAYED RELEASE ORAL at 13:05

## 2021-05-24 NOTE — PROGRESS NOTE ADULT - SUBJECTIVE AND OBJECTIVE BOX
Neurosurgery Progress Note    POD#17 S/P Left craniectomy for evacuation SDH  POD#8 S/P Wound revision  POD#3 S/P Trach/PEG      PT seen and examined at the bedside with Dr Byrne.  No major over night events.  At present time the is resting in bed, in NAD, hemodynamically stable.  Pt is with tracheostomy in place, mechanically ventilated, off sedation, no vasopressors, 3% Nacl ggt, KIKE in place with serosanguinous drainage.  The pt is opens eyes spontaneously and tracking, PERRLA, following simple commands, with L hand, B/L LE withdraws to painful stimuli.      Subjective: 45yMale with a pmhx of SUBDURAL HEMATOMA;RESPIRATORY FAILURE;FALL    ^FALL    No pertinent family history in first degree relatives    Family history of essential hypertension (Father)    Handoff    MEWS Score    No pertinent past medical history    Alcohol abuse    GERD (gastroesophageal reflux disease)    ETOH abuse    Hypomagnesemia    Seizure disorder    Subdural hematoma    S/P subdural hematoma evacuation    Subdural hematoma    Subdural hematoma    Subdural hematoma    Respiratory failure    ETOH abuse    Palliative care by specialist    Insertion of non-tunneled central venous catheter (CVC) in patient age 5 years of age or older    Craniotomy, decompressive    Decompressive craniotomy    Placement, tracheostomy and percutaneous endoscopic gastrostomy    No significant past surgical history    H/O hemorrhoidectomy    FALL    23    Respiratory failure    Fall    [email or web address]      POD#17 S/P Left craniectomy for evacuation SDH  POD#8 S/P Wound revision  POD#3 S/P Trach/PEG    Allergies    No Known Allergies    Intolerances        Vital Signs Last 24 Hrs  T(C): 36.4 (24 May 2021 07:56), Max: 37.7 (23 May 2021 12:00)  T(F): 97.5 (24 May 2021 07:56), Max: 99.9 (23 May 2021 12:00)  HR: 97 (24 May 2021 09:00) (66 - 97)  BP: 161/79 (24 May 2021 09:00) (123/78 - 169/101)  BP(mean): 113 (24 May 2021 09:00) (93 - 132)  RR: 20 (24 May 2021 09:00) (13 - 24)  SpO2: 100% (24 May 2021 09:00) (100% - 100%)      acetaminophen   Tablet .. 650 milliGRAM(s) Oral every 6 hours  artificial tears (preservative free) Ophthalmic Solution 2 Drop(s) Both EYES three times a day  cefepime   IVPB 2000 milliGRAM(s) IV Intermittent every 8 hours  chlorhexidine 0.12% Liquid 15 milliLiter(s) Oral Mucosa two times a day  chlorhexidine 4% Liquid 1 Application(s) Topical <User Schedule>  heparin   Injectable 5000 Unit(s) SubCutaneous every 8 hours  ibuprofen  Tablet. 600 milliGRAM(s) Oral every 6 hours  insulin lispro (ADMELOG) corrective regimen sliding scale   SubCutaneous every 6 hours  levETIRAcetam  IVPB 500 milliGRAM(s) IV Intermittent every 12 hours  lisinopril 10 milliGRAM(s) Oral daily  pantoprazole   Suspension 40 milliGRAM(s) Oral daily  propranolol 20 milliGRAM(s) Oral every 8 hours  senna 2 Tablet(s) Oral at bedtime  sodium chloride 3%. 500 milliLiter(s) IV Continuous <Continuous>  vancomycin  IVPB 1250 milliGRAM(s) IV Intermittent every 12 hours        05-23-21 @ 07:01  -  05-24-21 @ 07:00  --------------------------------------------------------  IN: 4530 mL / OUT: 3125 mL / NET: 1405 mL    05-24-21 @ 07:01  - 05-24-21 @ 09:51  --------------------------------------------------------  IN: 260 mL / OUT: 250 mL / NET: 10 mL        REVIEW OF SYSTEMS    [ ] A ten-point review of systems was otherwise negative except as noted.  [x ] Due to altered mental status/intubation, subjective information were not able to be obtained from the patient. History was obtained, to the extent possible, from review of the chart and collateral sources of information.      Exam:  -Tracheotomy tube, mechanically ventilated  -Off sedation  -Arousable, opens eyes spont, and tracking  -PERRLA  - R hand, showing 2 fingers when prompted  -Githdraws B/L LE to painful stimuli           CBC Full  -  ( 23 May 2021 23:49 )  WBC Count : 5.96 K/uL  RBC Count : 2.56 M/uL  Hemoglobin : 7.3 g/dL  Hematocrit : 23.0 %  Platelet Count - Automated : 311 K/uL  Mean Cell Volume : 89.8 fL  Mean Cell Hemoglobin : 28.5 pg  Mean Cell Hemoglobin Concentration : 31.7 g/dL  Auto Neutrophil # : 4.60 K/uL  Auto Lymphocyte # : 0.69 K/uL  Auto Monocyte # : 0.55 K/uL  Auto Eosinophil # : 0.07 K/uL  Auto Basophil # : 0.02 K/uL  Auto Neutrophil % : 77.2 %  Auto Lymphocyte % : 11.6 %  Auto Monocyte % : 9.2 %  Auto Eosinophil % : 1.2 %  Auto Basophil % : 0.3 %    05-24    139  |  108  |  6<L>  ----------------------------<  116<H>  4.1   |  22  |  <0.5<L>    Ca    8.0<L>      24 May 2021 05:54  Phos  2.8     05-23  Mg     1.6     05-23      PT/INR - ( 24 May 2021 05:54 )   PT: 13.70 sec;   INR: 1.19 ratio         PTT - ( 24 May 2021 05:54 )  PTT:30.8 sec        Wound:  clean dry and intact  KIKE drain in place,       Assessment/Plan:  This is a 45 year old gentleman, POD#17 S/P Left craniectomy for evacuation SDH, POD#8 S/P Wound revision    Neuro: Neuro checks Q1, On 3% NS @ 70cc/hr, Target Na 145-50, maintain Keppra, monitor KIKE OP hourly  Resp: Daily SBT if safe, HOB >35  GI: TF, GI ppx  : Cole in place, replete lytes PRN  ID:   WCX e. faecalis, WBC 5.9, afebrile, ID recs appreciated; Unasyn 3g q6h IV   DVT prophylaxis: SQH   Neurosurgery Progress Note    POD#17 S/P Left craniectomy for evacuation SDH  POD#8 S/P Wound revision  POD#3 S/P Trach/PEG      PT seen and examined at the bedside with Dr Byrne.  No major over night events.  At present time the is resting in bed, in NAD, hemodynamically stable.  Pt is with tracheostomy in place, mechanically ventilated, off sedation, no vasopressors, 3% Nacl ggt, KIKE in place with serosanguinous drainage.  The pt is opens eyes spontaneously and tracking, PERRLA, following simple commands, with L hand, B/L LE withdraws to painful stimuli.      Subjective: 45yMale with a pmhx of SUBDURAL HEMATOMA;RESPIRATORY FAILURE;FALL    ^FALL    No pertinent family history in first degree relatives    Family history of essential hypertension (Father)    Handoff    MEWS Score    No pertinent past medical history    Alcohol abuse    GERD (gastroesophageal reflux disease)    ETOH abuse    Hypomagnesemia    Seizure disorder    Subdural hematoma    S/P subdural hematoma evacuation    Subdural hematoma    Subdural hematoma    Subdural hematoma    Respiratory failure    ETOH abuse    Palliative care by specialist    Insertion of non-tunneled central venous catheter (CVC) in patient age 5 years of age or older    Craniotomy, decompressive    Decompressive craniotomy    Placement, tracheostomy and percutaneous endoscopic gastrostomy    No significant past surgical history    H/O hemorrhoidectomy    FALL    23    Respiratory failure    Fall    [email or web address]      POD#17 S/P Left craniectomy for evacuation SDH  POD#8 S/P Wound revision  POD#3 S/P Trach/PEG    Allergies    No Known Allergies    Intolerances        Vital Signs Last 24 Hrs  T(C): 36.4 (24 May 2021 07:56), Max: 37.7 (23 May 2021 12:00)  T(F): 97.5 (24 May 2021 07:56), Max: 99.9 (23 May 2021 12:00)  HR: 97 (24 May 2021 09:00) (66 - 97)  BP: 161/79 (24 May 2021 09:00) (123/78 - 169/101)  BP(mean): 113 (24 May 2021 09:00) (93 - 132)  RR: 20 (24 May 2021 09:00) (13 - 24)  SpO2: 100% (24 May 2021 09:00) (100% - 100%)      acetaminophen   Tablet .. 650 milliGRAM(s) Oral every 6 hours  artificial tears (preservative free) Ophthalmic Solution 2 Drop(s) Both EYES three times a day  cefepime   IVPB 2000 milliGRAM(s) IV Intermittent every 8 hours  chlorhexidine 0.12% Liquid 15 milliLiter(s) Oral Mucosa two times a day  chlorhexidine 4% Liquid 1 Application(s) Topical <User Schedule>  heparin   Injectable 5000 Unit(s) SubCutaneous every 8 hours  ibuprofen  Tablet. 600 milliGRAM(s) Oral every 6 hours  insulin lispro (ADMELOG) corrective regimen sliding scale   SubCutaneous every 6 hours  levETIRAcetam  IVPB 500 milliGRAM(s) IV Intermittent every 12 hours  lisinopril 10 milliGRAM(s) Oral daily  pantoprazole   Suspension 40 milliGRAM(s) Oral daily  propranolol 20 milliGRAM(s) Oral every 8 hours  senna 2 Tablet(s) Oral at bedtime  sodium chloride 3%. 500 milliLiter(s) IV Continuous <Continuous>  vancomycin  IVPB 1250 milliGRAM(s) IV Intermittent every 12 hours        05-23-21 @ 07:01  -  05-24-21 @ 07:00  --------------------------------------------------------  IN: 4530 mL / OUT: 3125 mL / NET: 1405 mL    05-24-21 @ 07:01  - 05-24-21 @ 09:51  --------------------------------------------------------  IN: 260 mL / OUT: 250 mL / NET: 10 mL        REVIEW OF SYSTEMS    [ ] A ten-point review of systems was otherwise negative except as noted.  [x ] Due to altered mental status/intubation, subjective information were not able to be obtained from the patient. History was obtained, to the extent possible, from review of the chart and collateral sources of information.      Exam:  -Tracheotomy tube, mechanically ventilated  -Off sedation  -Arousable, opens eyes spont, and tracking  -PERRLA  - R hand, showing 2 fingers when prompted  -Withdraws B/L LE to painful stimuli           CBC Full  -  ( 23 May 2021 23:49 )  WBC Count : 5.96 K/uL  RBC Count : 2.56 M/uL  Hemoglobin : 7.3 g/dL  Hematocrit : 23.0 %  Platelet Count - Automated : 311 K/uL  Mean Cell Volume : 89.8 fL  Mean Cell Hemoglobin : 28.5 pg  Mean Cell Hemoglobin Concentration : 31.7 g/dL  Auto Neutrophil # : 4.60 K/uL  Auto Lymphocyte # : 0.69 K/uL  Auto Monocyte # : 0.55 K/uL  Auto Eosinophil # : 0.07 K/uL  Auto Basophil # : 0.02 K/uL  Auto Neutrophil % : 77.2 %  Auto Lymphocyte % : 11.6 %  Auto Monocyte % : 9.2 %  Auto Eosinophil % : 1.2 %  Auto Basophil % : 0.3 %    05-24    139  |  108  |  6<L>  ----------------------------<  116<H>  4.1   |  22  |  <0.5<L>    Ca    8.0<L>      24 May 2021 05:54  Phos  2.8     05-23  Mg     1.6     05-23      PT/INR - ( 24 May 2021 05:54 )   PT: 13.70 sec;   INR: 1.19 ratio         PTT - ( 24 May 2021 05:54 )  PTT:30.8 sec        Wound:  clean dry and intact  KIKE drain in place,       Assessment/Plan:  This is a 45 year old gentleman, POD#17 S/P Left craniectomy for evacuation SDH, POD#8 S/P Wound revision    Neuro: Neuro checks Q1, On 3% NS @ 70cc/hr, Target Na 145-50, maintain Keppra, monitor KIKE OP hourly  Resp: Daily SBT if safe, HOB >35  GI: TF, GI ppx  : Cole in place, replete lytes PRN  ID:   WCX e. faecalis, WBC 5.9, afebrile, ID recs appreciated; Unasyn 3g q6h IV   DVT prophylaxis: SQH

## 2021-05-24 NOTE — PROGRESS NOTE ADULT - SUBJECTIVE AND OBJECTIVE BOX
SANDRA DAS  45y, Male  Allergy: No Known Allergies      LOS  18d    CHIEF COMPLAINT: found down unresponsive (24 May 2021 04:45)      INTERVAL EVENTS/HPI  - No acute events overnight, purulent discharged noted from craniotomy site per [facility] notes, restarted on ABX   - T(F): , Max: 99.9 (05-23-21 @ 12:00)  - Tolerating medication  - WBC Count: 5.96 (05-23-21 @ 23:49)  WBC Count: 5.77 (05-22-21 @ 23:30)     - Creatinine, Serum: <0.5 (05-24-21 @ 05:54)  Creatinine, Serum: <0.5 (05-23-21 @ 23:49)       ROS  ***    VITALS:  T(F): 97.8, Max: 99.9 (05-23-21 @ 12:00)  HR: 80  BP: 123/78  RR: 22Vital Signs Last 24 Hrs  T(C): 36.6 (24 May 2021 04:00), Max: 37.7 (23 May 2021 12:00)  T(F): 97.8 (24 May 2021 04:00), Max: 99.9 (23 May 2021 12:00)  HR: 80 (24 May 2021 07:00) (66 - 94)  BP: 123/78 (24 May 2021 07:00) (123/78 - 169/101)  BP(mean): 93 (24 May 2021 07:00) (93 - 132)  RR: 22 (24 May 2021 07:00) (13 - 26)  SpO2: 100% (24 May 2021 07:00) (100% - 100%)    PHYSICAL EXAM:  ***    FH: Non-contributory  Social Hx: Non-contributory    TESTS & MEASUREMENTS:                        7.3    5.96  )-----------( 311      ( 23 May 2021 23:49 )             23.0     05-24    139  |  108  |  6<L>  ----------------------------<  116<H>  4.1   |  22  |  <0.5<L>    Ca    8.0<L>      24 May 2021 05:54  Phos  2.8     05-23  Mg     1.6     05-23      eGFR if Non African American: 160 mL/min/1.73M2 (05-24-21 @ 05:54)  eGFR if : 186 mL/min/1.73M2 (05-24-21 @ 05:54)  eGFR if Non African American: 160 mL/min/1.73M2 (05-23-21 @ 23:49)  eGFR if : 186 mL/min/1.73M2 (05-23-21 @ 23:49)  eGFR if Non African American: 160 mL/min/1.73M2 (05-23-21 @ 15:25)  eGFR if : 186 mL/min/1.73M2 (05-23-21 @ 15:25)  eGFR if Non African American: 160 mL/min/1.73M2 (05-23-21 @ 11:34)  eGFR if : 186 mL/min/1.73M2 (05-23-21 @ 11:34)          Culture - Body Fluid with Gram Stain (collected 05-21-21 @ 12:00)  Source: .Body Fluid CSF  Gram Stain (05-22-21 @ 02:58):    polymorphonuclear leukocytes seen    Gram positive cocci in pairs seen    by cytocentrifuge  Preliminary Report (05-22-21 @ 18:54):    Few Enterococcus faecalis  Organism: Enterococcus faecalis (05-23-21 @ 17:14)  Organism: Enterococcus faecalis (05-23-21 @ 17:14)      -  Ampicillin: S <=2 Predicts results to ampicillin/sulbactam, amoxacillin-clavulanate and  piperacillin-tazobactam.      -  Tetra/Doxy: R >8      -  Vancomycin: S 2      Method Type: AISHA    Culture - Blood (collected 05-20-21 @ 02:00)  Source: .Blood Blood  Preliminary Report (05-21-21 @ 07:02):    No growth to date.    Culture - Blood (collected 05-19-21 @ 00:27)  Source: .Blood None  Final Report (05-24-21 @ 07:00):    No Growth Final    Culture - Surgical Swab (collected 05-16-21 @ 08:54)  Source: .Surgical Swab None  Final Report (05-21-21 @ 16:44):    Rare Coag Negative Staphylococcus  Organism: Coag Negative Staphylococcus (05-21-21 @ 16:44)  Organism: Coag Negative Staphylococcus (05-21-21 @ 16:44)      -  Ampicillin/Sulbactam: S <=8/4      -  Cefazolin: S <=4      -  Clindamycin: S <=0.25      -  Erythromycin: S <=0.25      -  Gentamicin: S <=1 Should not be used as monotherapy      -  Oxacillin: S <=0.25      -  Penicillin: R 0.25      -  RIF- Rifampin: S <=1 Should not be used as monotherapy      -  Tetra/Doxy: S <=1      -  Trimethoprim/Sulfamethoxazole: S <=0.5/9.5      -  Vancomycin: S 0.5      Method Type: AISHA    Culture - Blood (collected 05-13-21 @ 11:19)  Source: .Blood None  Final Report (05-18-21 @ 22:03):    No Growth Final    Culture - Blood (collected 05-11-21 @ 11:18)  Source: .Blood None  Gram Stain (05-12-21 @ 02:53):    Growth in anaerobic bottle: Gram Negative Rods    Growth in aerobic bottle: Gram Negative Rods  Final Report (05-13-21 @ 16:09):    Growth in aerobic and anaerobic bottles: Klebsiella variicola    ***Blood Panel PCR results on this specimen are available    approximately 3 hours after the Gram stain result.***    Gram stain, PCR, and/or culture results may not always    correspond due to difference in methodologies.    ************************************************************    This PCR assay was performed by multiplex PCR. This    Assay tests for 66 bacterial and resistance gene targets.    Please refer to the [facility] test directory    at [email or web address] for details.  Organism: Blood Culture PCR  Klebsiella variicola (05-13-21 @ 16:09)  Organism: Klebsiella variicola (05-13-21 @ 16:09)      -  Amikacin: S <=16      -  Ampicillin: R <=8 These ampicillin results predict results for amoxicillin      -  Ampicillin/Sulbactam: S <=4/2 Enterobacter, Citrobacter, and Serratia may develop resistance during prolonged therapy (3-4 days)      -  Aztreonam: S <=4      -  Cefazolin: S <=2 Enterobacter, Citrobacter, and Serratia may develop resistance during prolonged therapy (3-4 days)      -  Cefepime: S <=2      -  Cefoxitin: S <=8      -  Ceftriaxone: S <=1 Enterobacter, Citrobacter, and Serratia may develop resistance during prolonged therapy      -  Ciprofloxacin: S <=0.25      -  Ertapenem: S <=0.5      -  Gentamicin: S <=2      -  Imipenem: S <=1      -  Levofloxacin: S <=0.5      -  Meropenem: S <=1      -  Piperacillin/Tazobactam: S <=8      -  Tobramycin: S <=2      -  Trimethoprim/Sulfamethoxazole: S <=0.5/9.5      Method Type: AISHA  Organism: Blood Culture PCR (05-13-21 @ 16:09)      -  Klebsiella pneumoniae: Detec      Method Type: PCR    Culture - Bronchial (collected 05-11-21 @ 10:12)  Source: Bronch Wash Bronchoalveolar Lavage  Gram Stain (05-12-21 @ 07:04):    Few polymorphonuclear leukocytes per low power field    No Squamous epithelial cells per low power field    Few Gram Positive Rods per oil power field  Final Report (05-13-21 @ 16:19):    Moderate Klebsiella variicola    Moderate Serratia marcescens    Normal Respiratory Liz present  Organism: Klebsiella variicola  Serratia marcescens (05-13-21 @ 16:19)  Organism: Serratia marcescens (05-13-21 @ 16:19)      -  Amikacin: S <=16      -  Amoxicillin/Clavulanic Acid: R >16/8      -  Ampicillin: R <=8 These ampicillin results predict results for amoxicillin      -  Ampicillin/Sulbactam: R 16/8 Enterobacter, Citrobacter, and Serratia may develop resistance during prolonged therapy (3-4 days)      -  Aztreonam: S <=4      -  Cefazolin: R >16 Enterobacter, Citrobacter, and Serratia may develop resistance during prolonged therapy (3-4 days)      -  Cefepime: S <=2      -  Cefoxitin: R <=8      -  Ceftriaxone: S <=1 Enterobacter, Citrobacter, and Serratia may develop resistance during prolonged therapy      -  Ciprofloxacin: S <=0.25      -  Ertapenem: S <=0.5      -  Gentamicin: S <=2      -  Levofloxacin: S <=0.5      -  Meropenem: S <=1      -  Piperacillin/Tazobactam: S <=8      -  Tobramycin: S <=2      -  Trimethoprim/Sulfamethoxazole: S <=0.5/9.5      Method Type: AISHA  Organism: Klebsiella variicola (05-13-21 @ 16:19)      -  Amikacin: S <=16      -  Amoxicillin/Clavulanic Acid: R >16/8      -  Ampicillin: R <=8 These ampicillin results predict results for amoxicillin      -  Ampicillin/Sulbactam: I 16/8 Enterobacter, Citrobacter, and Serratia may develop resistance during prolonged therapy (3-4 days)      -  Aztreonam: R >16      -  Cefazolin: R >16 Enterobacter, Citrobacter, and Serratia may develop resistance during prolonged therapy (3-4 days)      -  Cefepime: S <=2      -  Cefoxitin: S <=8      -  Ceftriaxone: S <=1 Enterobacter, Citrobacter, and Serratia may develop resistance during prolonged therapy      -  Ciprofloxacin: S <=0.25      -  Ertapenem: S <=0.5      -  Gentamicin: S <=2      -  Imipenem: S <=1      -  Levofloxacin: S <=0.5      -  Meropenem: S <=1      -  Piperacillin/Tazobactam: S <=8      -  Tobramycin: S 4      -  Trimethoprim/Sulfamethoxazole: S <=0.5/9.5      Method Type: AISHA            INFECTIOUS DISEASES TESTING  Vancomycin Level, Trough: 7.6 (05-24-21 @ 05:54)  COVID-19 PCR: NotDetec (05-21-21 @ 04:30)  COVID-19 PCR: NotDetec (05-14-21 @ 18:58)  Vancomycin Level, Trough: 4.1 (05-13-21 @ 05:50)  HIV-1/2 Combo Result: Nonreact (05-09-21 @ 23:30)  Rapid RVP Result: NotDetec (05-06-21 @ 22:44)  COVID-19 PCR: NotDetec (04-06-21 @ 16:46)  COVID-19 PCR: NotDetec (03-29-21 @ 19:35)      INFLAMMATORY MARKERS      RADIOLOGY & ADDITIONAL TESTS:  I have personally reviewed the last available Chest xray  CXR      CT      CARDIOLOGY TESTING  12 Lead ECG:   Ventricular Rate 78 BPM    Atrial Rate 78 BPM    P-R Interval 162 ms    QRS Duration 70 ms    Q-T Interval 364 ms    QTC Calculation(Bazett) 414 ms    P Axis 55 degrees    R Axis 30 degrees    T Axis 62 degrees    Diagnosis Line Normal sinus rhythm  Normal ECG    Confirmed by SALAS WASHINGTON MD ([id number]) on 5/14/2021 7:01:28 AM (05-14-21 @ 04:52)      MEDICATIONS  acetaminophen   Tablet .. 650 Oral every 6 hours  artificial tears (preservative free) Ophthalmic Solution 2 Both EYES three times a day  cefepime   IVPB 2000 IV Intermittent every 8 hours  chlorhexidine 0.12% Liquid 15 Oral Mucosa two times a day  chlorhexidine 4% Liquid 1 Topical <User Schedule>  heparin   Injectable 5000 SubCutaneous every 8 hours  ibuprofen  Tablet. 600 Oral every 6 hours  insulin lispro (ADMELOG) corrective regimen sliding scale  SubCutaneous every 6 hours  levETIRAcetam  IVPB 500 IV Intermittent every 12 hours  lisinopril 10 Oral daily  pantoprazole   Suspension 40 Oral daily  propranolol 20 Oral every 8 hours  senna 2 Oral at bedtime  sodium chloride 3%. 500 IV Continuous <Continuous>  vancomycin  IVPB 1250 IV Intermittent every 12 hours      WEIGHT  Weight (kg): 79.4 (05-23-21 @ 16:30)  Creatinine, Serum: <0.5 mg/dL (05-24-21 @ 05:54)  Creatinine, Serum: <0.5 mg/dL (05-23-21 @ 23:49)  Creatinine, Serum: <0.5 mg/dL (05-23-21 @ 15:25)  Creatinine, Serum: <0.5 mg/dL (05-23-21 @ 11:34)      ANTIBIOTICS:  cefepime   IVPB 2000 milliGRAM(s) IV Intermittent every 8 hours  vancomycin  IVPB 1250 milliGRAM(s) IV Intermittent every 12 hours      All available historical records have been reviewed       VICTAYLOR SANDRA  45y, Male  Allergy: No Known Allergies      LOS  18d    CHIEF COMPLAINT: found down unresponsive (24 May 2021 04:45)      INTERVAL EVENTS/HPI  - No acute events overnight, purulent discharged noted from craniotomy site per [facility] notes, restarted on ABX   - T(F): , Max: 99.9 (05-23-21 @ 12:00)  - Tolerating medication  - WBC Count: 5.96 (05-23-21 @ 23:49)  WBC Count: 5.77 (05-22-21 @ 23:30)     - Creatinine, Serum: <0.5 (05-24-21 @ 05:54)  Creatinine, Serum: <0.5 (05-23-21 @ 23:49)       ROS  General: Denies rigors, nightsweats  HEENT: Denies headache, rhinorrhea, sore throat, eye pain  CV: Denies CP, palpitations  PULM: Denies wheezing, hemoptysis  GI: Denies hematemesis, hematochezia, melena  : Denies discharge, hematuria  MSK: Denies arthralgias, myalgias  SKIN: Denies rash, lesions  NEURO: Denies paresthesias, weakness  PSYCH: Denies depression, anxiety     VITALS:  T(F): 97.8, Max: 99.9 (05-23-21 @ 12:00)  HR: 80  BP: 123/78  RR: 22Vital Signs Last 24 Hrs  T(C): 36.6 (24 May 2021 04:00), Max: 37.7 (23 May 2021 12:00)  T(F): 97.8 (24 May 2021 04:00), Max: 99.9 (23 May 2021 12:00)  HR: 80 (24 May 2021 07:00) (66 - 94)  BP: 123/78 (24 May 2021 07:00) (123/78 - 169/101)  BP(mean): 93 (24 May 2021 07:00) (93 - 132)  RR: 22 (24 May 2021 07:00) (13 - 26)  SpO2: 100% (24 May 2021 07:00) (100% - 100%)    PHYSICAL EXAM:  Gen: NAD, resting in bed  HEENT staples, KIKE serosanguinous fluid   Neck: supple, no lymphadenopathy  CV: Regular rate & regular rhythm  Lungs: decreased BS at bases, no fremitus  Abdomen: Soft, BS present  Ext: Warm, well perfused  Neuro: awake  Skin: no rash, no erythema  Lines: no phlebitis     FH: Non-contributory  Social Hx: Non-contributory    TESTS & MEASUREMENTS:                        7.3    5.96  )-----------( 311      ( 23 May 2021 23:49 )             23.0     05-24    139  |  108  |  6<L>  ----------------------------<  116<H>  4.1   |  22  |  <0.5<L>    Ca    8.0<L>      24 May 2021 05:54  Phos  2.8     05-23  Mg     1.6     05-23      eGFR if Non African American: 160 mL/min/1.73M2 (05-24-21 @ 05:54)  eGFR if : 186 mL/min/1.73M2 (05-24-21 @ 05:54)  eGFR if Non African American: 160 mL/min/1.73M2 (05-23-21 @ 23:49)  eGFR if : 186 mL/min/1.73M2 (05-23-21 @ 23:49)  eGFR if Non African American: 160 mL/min/1.73M2 (05-23-21 @ 15:25)  eGFR if : 186 mL/min/1.73M2 (05-23-21 @ 15:25)  eGFR if Non African American: 160 mL/min/1.73M2 (05-23-21 @ 11:34)  eGFR if : 186 mL/min/1.73M2 (05-23-21 @ 11:34)          Culture - Body Fluid with Gram Stain (collected 05-21-21 @ 12:00)  Source: .Body Fluid CSF  Gram Stain (05-22-21 @ 02:58):    polymorphonuclear leukocytes seen    Gram positive cocci in pairs seen    by cytocentrifuge  Preliminary Report (05-22-21 @ 18:54):    Few Enterococcus faecalis  Organism: Enterococcus faecalis (05-23-21 @ 17:14)  Organism: Enterococcus faecalis (05-23-21 @ 17:14)      -  Ampicillin: S <=2 Predicts results to ampicillin/sulbactam, amoxacillin-clavulanate and  piperacillin-tazobactam.      -  Tetra/Doxy: R >8      -  Vancomycin: S 2      Method Type: AISHA    Culture - Blood (collected 05-20-21 @ 02:00)  Source: .Blood Blood  Preliminary Report (05-21-21 @ 07:02):    No growth to date.    Culture - Blood (collected 05-19-21 @ 00:27)  Source: .Blood None  Final Report (05-24-21 @ 07:00):    No Growth Final    Culture - Surgical Swab (collected 05-16-21 @ 08:54)  Source: .Surgical Swab None  Final Report (05-21-21 @ 16:44):    Rare Coag Negative Staphylococcus  Organism: Coag Negative Staphylococcus (05-21-21 @ 16:44)  Organism: Coag Negative Staphylococcus (05-21-21 @ 16:44)      -  Ampicillin/Sulbactam: S <=8/4      -  Cefazolin: S <=4      -  Clindamycin: S <=0.25      -  Erythromycin: S <=0.25      -  Gentamicin: S <=1 Should not be used as monotherapy      -  Oxacillin: S <=0.25      -  Penicillin: R 0.25      -  RIF- Rifampin: S <=1 Should not be used as monotherapy      -  Tetra/Doxy: S <=1      -  Trimethoprim/Sulfamethoxazole: S <=0.5/9.5      -  Vancomycin: S 0.5      Method Type: AISHA    Culture - Blood (collected 05-13-21 @ 11:19)  Source: .Blood None  Final Report (05-18-21 @ 22:03):    No Growth Final    Culture - Blood (collected 05-11-21 @ 11:18)  Source: .Blood None  Gram Stain (05-12-21 @ 02:53):    Growth in anaerobic bottle: Gram Negative Rods    Growth in aerobic bottle: Gram Negative Rods  Final Report (05-13-21 @ 16:09):    Growth in aerobic and anaerobic bottles: Klebsiella variicola    ***Blood Panel PCR results on this specimen are available    approximately 3 hours after the Gram stain result.***    Gram stain, PCR, and/or culture results may not always    correspond due to difference in methodologies.    ************************************************************    This PCR assay was performed by multiplex PCR. This    Assay tests for 66 bacterial and resistance gene targets.    Please refer to the [facility] test directory    at [email or web address] for details.  Organism: Blood Culture PCR  Klebsiella variicola (05-13-21 @ 16:09)  Organism: Klebsiella variicola (05-13-21 @ 16:09)      -  Amikacin: S <=16      -  Ampicillin: R <=8 These ampicillin results predict results for amoxicillin      -  Ampicillin/Sulbactam: S <=4/2 Enterobacter, Citrobacter, and Serratia may develop resistance during prolonged therapy (3-4 days)      -  Aztreonam: S <=4      -  Cefazolin: S <=2 Enterobacter, Citrobacter, and Serratia may develop resistance during prolonged therapy (3-4 days)      -  Cefepime: S <=2      -  Cefoxitin: S <=8      -  Ceftriaxone: S <=1 Enterobacter, Citrobacter, and Serratia may develop resistance during prolonged therapy      -  Ciprofloxacin: S <=0.25      -  Ertapenem: S <=0.5      -  Gentamicin: S <=2      -  Imipenem: S <=1      -  Levofloxacin: S <=0.5      -  Meropenem: S <=1      -  Piperacillin/Tazobactam: S <=8      -  Tobramycin: S <=2      -  Trimethoprim/Sulfamethoxazole: S <=0.5/9.5      Method Type: AISHA  Organism: Blood Culture PCR (05-13-21 @ 16:09)      -  Klebsiella pneumoniae: Detec      Method Type: PCR    Culture - Bronchial (collected 05-11-21 @ 10:12)  Source: Bronch Wash Bronchoalveolar Lavage  Gram Stain (05-12-21 @ 07:04):    Few polymorphonuclear leukocytes per low power field    No Squamous epithelial cells per low power field    Few Gram Positive Rods per oil power field  Final Report (05-13-21 @ 16:19):    Moderate Klebsiella variicola    Moderate Serratia marcescens    Normal Respiratory Liz present  Organism: Klebsiella variicola  Serratia marcescens (05-13-21 @ 16:19)  Organism: Serratia marcescens (05-13-21 @ 16:19)      -  Amikacin: S <=16      -  Amoxicillin/Clavulanic Acid: R >16/8      -  Ampicillin: R <=8 These ampicillin results predict results for amoxicillin      -  Ampicillin/Sulbactam: R 16/8 Enterobacter, Citrobacter, and Serratia may develop resistance during prolonged therapy (3-4 days)      -  Aztreonam: S <=4      -  Cefazolin: R >16 Enterobacter, Citrobacter, and Serratia may develop resistance during prolonged therapy (3-4 days)      -  Cefepime: S <=2      -  Cefoxitin: R <=8      -  Ceftriaxone: S <=1 Enterobacter, Citrobacter, and Serratia may develop resistance during prolonged therapy      -  Ciprofloxacin: S <=0.25      -  Ertapenem: S <=0.5      -  Gentamicin: S <=2      -  Levofloxacin: S <=0.5      -  Meropenem: S <=1      -  Piperacillin/Tazobactam: S <=8      -  Tobramycin: S <=2      -  Trimethoprim/Sulfamethoxazole: S <=0.5/9.5      Method Type: AISHA  Organism: Klebsiella variicola (05-13-21 @ 16:19)      -  Amikacin: S <=16      -  Amoxicillin/Clavulanic Acid: R >16/8      -  Ampicillin: R <=8 These ampicillin results predict results for amoxicillin      -  Ampicillin/Sulbactam: I 16/8 Enterobacter, Citrobacter, and Serratia may develop resistance during prolonged therapy (3-4 days)      -  Aztreonam: R >16      -  Cefazolin: R >16 Enterobacter, Citrobacter, and Serratia may develop resistance during prolonged therapy (3-4 days)      -  Cefepime: S <=2      -  Cefoxitin: S <=8      -  Ceftriaxone: S <=1 Enterobacter, Citrobacter, and Serratia may develop resistance during prolonged therapy      -  Ciprofloxacin: S <=0.25      -  Ertapenem: S <=0.5      -  Gentamicin: S <=2      -  Imipenem: S <=1      -  Levofloxacin: S <=0.5      -  Meropenem: S <=1      -  Piperacillin/Tazobactam: S <=8      -  Tobramycin: S 4      -  Trimethoprim/Sulfamethoxazole: S <=0.5/9.5      Method Type: AISHA            INFECTIOUS DISEASES TESTING  Vancomycin Level, Trough: 7.6 (05-24-21 @ 05:54)  COVID-19 PCR: NotDetec (05-21-21 @ 04:30)  COVID-19 PCR: NotDetec (05-14-21 @ 18:58)  Vancomycin Level, Trough: 4.1 (05-13-21 @ 05:50)  HIV-1/2 Combo Result: Nonreact (05-09-21 @ 23:30)  Rapid RVP Result: NotDetec (05-06-21 @ 22:44)  COVID-19 PCR: NotDetec (04-06-21 @ 16:46)  COVID-19 PCR: NotDetec (03-29-21 @ 19:35)      INFLAMMATORY MARKERS      RADIOLOGY & ADDITIONAL TESTS:  I have personally reviewed the last available Chest xray  CXR      CT      CARDIOLOGY TESTING  12 Lead ECG:   Ventricular Rate 78 BPM    Atrial Rate 78 BPM    P-R Interval 162 ms    QRS Duration 70 ms    Q-T Interval 364 ms    QTC Calculation(Bazett) 414 ms    P Axis 55 degrees    R Axis 30 degrees    T Axis 62 degrees    Diagnosis Line Normal sinus rhythm  Normal ECG    Confirmed by SALAS WASHINGTON MD ([id number]) on 5/14/2021 7:01:28 AM (05-14-21 @ 04:52)      MEDICATIONS  acetaminophen   Tablet .. 650 Oral every 6 hours  artificial tears (preservative free) Ophthalmic Solution 2 Both EYES three times a day  cefepime   IVPB 2000 IV Intermittent every 8 hours  chlorhexidine 0.12% Liquid 15 Oral Mucosa two times a day  chlorhexidine 4% Liquid 1 Topical <User Schedule>  heparin   Injectable 5000 SubCutaneous every 8 hours  ibuprofen  Tablet. 600 Oral every 6 hours  insulin lispro (ADMELOG) corrective regimen sliding scale  SubCutaneous every 6 hours  levETIRAcetam  IVPB 500 IV Intermittent every 12 hours  lisinopril 10 Oral daily  pantoprazole   Suspension 40 Oral daily  propranolol 20 Oral every 8 hours  senna 2 Oral at bedtime  sodium chloride 3%. 500 IV Continuous <Continuous>  vancomycin  IVPB 1250 IV Intermittent every 12 hours      WEIGHT  Weight (kg): 79.4 (05-23-21 @ 16:30)  Creatinine, Serum: <0.5 mg/dL (05-24-21 @ 05:54)  Creatinine, Serum: <0.5 mg/dL (05-23-21 @ 23:49)  Creatinine, Serum: <0.5 mg/dL (05-23-21 @ 15:25)  Creatinine, Serum: <0.5 mg/dL (05-23-21 @ 11:34)      ANTIBIOTICS:  cefepime   IVPB 2000 milliGRAM(s) IV Intermittent every 8 hours  vancomycin  IVPB 1250 milliGRAM(s) IV Intermittent every 12 hours      All available historical records have been reviewed

## 2021-05-24 NOTE — PROGRESS NOTE ADULT - ASSESSMENT
Assessment & Plan  45M s/p fall with large left SDH and neurological status change, level 1 for emergent craniotomy    NEURO:    Hx of EtOH abuse and prior Delerium Tremens episodes    S/P craniotomy for large left SDH     - Q2H neuro checks     - GCS 10T not following commands     - Keppra 500mg BID     - Tolvaptam d/c'd      - 3% @ 70cc -- Na goal 140-150     - No sedation    Pain control - prn low dose fentanyl IVP    Head of bed @ 30 degrees     - Repeat Head CT #1 post op changes, resolution of MLS, stable SDH along cerebral falx and L tentorium, new trace scattered SAH along the right cerebral sulci and right cerebellar sulci and new IVP in the b/l occipital horns.     - Repeat Head CT #2: development of acute infarcts in left cerebral hemisphere and right cerebellum     - CTA neck and Brain - no evidence acute large vessel occlusion, severe stenosis of R vertebral artery and moderate stenosis of mid-distal basilar artery, possible jugular venous thrombosis, NCHCT recommended for left convexity subdural collection, and increasing edema around L temporal/occipital parenchemal hemorrhage    OR :     - Clot was evacuated, bleeding noted. Hemostasis achieved without difficulty. KIKE drain left in subgaleal space. Brief operative findings: revision craniotomy wound with complex closure for persistent drainage, subgaleal hematoma     Post op CTH : expected post-op changes     - repeat CT venogram to r/o sinous thrombus as per neurology -plan for tomorrow    rEEG (): borderline generalized slowing    D/C vEEG per neuro, no seizures on LTM report just generalzied slowing    2021: CT Venogram:  Left transverse sinus thrombosis, Previously noted left subdural hematoma measures 2 cm, previously 3 cm in maximal thickness, New adjacent hygroma measuring 3 cm in maximal thickness and spanning the length of 8.7 cm, Left to rightmidline shift of 4 mm, previously 3 mm.   NSX- no need for thrombectomy at this time     - NCC aware, awaiting further reccs  - NSX placed KIKE under the skin to gravity, old blood aspirated and some pus, possible OR tomorrow for washout     RESP:     Intubated --> PS 10/    Vent 420/16/30/5    S/p trach - 8 shiley    AM AB.48/31/190/24/99% ()    AM CXR    CARDS:     S/P cardiac arrest in OR & MTP     - Hypertension Lisinopril 10mg     - Propranolol 20mg q8     - Trop neg x3    GI/NUTR:     Diet: TF (Osmolite 1.5) @ 50  via peg    GI Prophylaxis-Protonix q24h    Bowel regimen restarted - Senna/Miralax     - +BM --> Dignishield in place    /RENAL:     Jain - strict I/O, UO 90cc/hr (o/n -)    Labs:     - BUN/Cr- 12/0.5 --> () 7/0.5     - Electrolytes- na 144 / k 3.9 / mag 1.6 / phos 2.8          -repleted     - Na goal 140-150 as per NSGY     - Started Tolvaptam- fisrt dose , d/c'd on , pt went into DI with Na 158 and polyuria     - 3% @ 70 --> Na 141>144, serum osm q6h    HEME/ONC:     S/P massive transfusion protocol     - 4uPRBCs 2FFP 2 platelet intraop, post op 2 PLT    5/15: 1U PRBC pre- operatively    : 2U PRBC intraop and post op    DVT prophylaxis: Heparin sq    f/u CT venogram for thrombosis today --> as above in neuro    Hgb: 8.6 --> 7.6 --> 7.3    PLT: 471 --> 339 --> 311    ID:    Monitor Tmax: 99F      -Tylenol, Motrin, jain lavage, cooling blanket    WBC: 8.58 --> 11.6 ->5.6 >5.7    Antibiotics: Cefepime, vanc, level in am     -Follow up ID recs for + OR cultures     - - u/a +leuk     - Blood cx- Klebsiella, (pan sens)     - BAL-  Klebsiella, Serratia marcenscens  (pan sens)     - f/u blood culture , , - prelim neg      - - cranial collection cx -  e. faecalis, sens van    ENDO:  q6 POC glucose  Sliding scale insulin  A1c 5.2    DISPO: Case discussed with Dr. Hughes. Patient requires SICU-level care   Assessment & Plan  45M s/p fall with large left SDH and neurological status change, level 1 for emergent craniotomy    NEURO:    Hx of EtOH abuse and prior Delerium Tremens episodes    S/P craniotomy for large left SDH     - Q2H neuro checks     - GCS 10T/11T intermittently following commands     - Keppra 500mg BID     - Tolvaptam d/c'd      - 3% @ 80cc -- Na goal 140-150     - No sedation    Pain control - prn low dose fentanyl IVP    Head of bed @ 30 degrees     - Repeat Head CT #1 post op changes, resolution of MLS, stable SDH along cerebral falx and L tentorium, new trace scattered SAH along the right cerebral sulci and right cerebellar sulci and new IVP in the b/l occipital horns.     - Repeat Head CT #2: development of acute infarcts in left cerebral hemisphere and right cerebellum     - CTA neck and Brain - no evidence acute large vessel occlusion, severe stenosis of R vertebral artery and moderate stenosis of mid-distal basilar artery, possible jugular venous thrombosis, NCHCT recommended for left convexity subdural collection, and increasing edema around L temporal/occipital parenchemal hemorrhage    OR :     - Clot was evacuated, bleeding noted. Hemostasis achieved without difficulty. KIKE drain left in subgaleal space. Brief operative findings: revision craniotomy wound with complex closure for persistent drainage, subgaleal hematoma     Post op CTH : expected post-op changes     - repeat CT venogram to r/o sinous thrombus as per neurology -plan for tomorrow    rEEG (): borderline generalized slowing    D/C vEEG per neuro, no seizures on LTM report just generalzied slowing    2021: CT Venogram:  Left transverse sinus thrombosis, Previously noted left subdural hematoma measures 2 cm, previously 3 cm in maximal thickness, New adjacent hygroma measuring 3 cm in maximal thickness and spanning the length of 8.7 cm, Left to rightmidline shift of 4 mm, previously 3 mm.   NSX- no need for thrombectomy at this time     - NCC aware, awaiting further reccs  - NSX placed KIKE under the skin to gravity, old blood aspirated and some purulent material  -: return to OR for washout    RESP:     Intubated --> PS 10/5    Vent 420// (remained on PS for 24hrs)    S/p trach - 8 shiley    AM AB.48/31/190/24/99%     AM CXR    CARDS:     S/P cardiac arrest in OR & MTP     - Hypertension Lisinopril 10mg     - Propranolol 20mg q8     - Trop neg x3    GI/NUTR:     Diet: TF (Osmolite 1.5) @ 50 via peg       -held overnight for OR today    GI Prophylaxis-Protonix q24h    Bowel regimen restarted - Senna/Miralax     - +BM --> Dignishield in place    /RENAL:     Jain - strict I/O    Labs:     - BUN/Cr- 12/0.5 --> () 7/0.5     - Electrolytes- na 139 / k 4.1 / mag 1.6 / phos 2.8          -repleted     - Na goal 140-150 as per NSGY     - Started Tolvaptam- fisrt dose , d/c'd on , pt went into DI with Na 158 and polyuria     - 3% @ 80 --> Na goal 140-150    HEME/ONC:     S/P massive transfusion protocol     - 4uPRBCs 2FFP 2 platelet intraop, post op 2 PLT    5/15: 1U PRBC pre- operatively    : 2U PRBC intraop and post op    DVT prophylaxis: Heparin sq    f/u CT venogram for thrombosis today --> as above in neuro    Hgb: 8.6 --> 7.6 --> 7.3    PLT: 471 --> 339 --> 311    ID:    Monitor Tmax: 99F      -Tylenol, Motrin, jain lavage, cooling blanket    WBC: 8.58 --> 11.6 ->5.6 >5.7 > 5.9    Antibiotics: Cefepime, Vanc     -Follow up ID recs for + OR cultures:         Recommendations: Unasyn 3g q6hr (e.faecalis and coNS sensitive)     - - u/a +leuk     - Blood cx- Klebsiella, (pan sens)     - BAL-  Klebsiella, Serratia marcenscens  (pan sens)     - f/u blood culture , , - prelim neg      - - cranial collection cx -  e. faecalis, sens van    ENDO:  q6 POC glucose  Sliding scale insulin  A1c 5.2    DISPO: Case discussed with Dr. Hughes. Patient requires SICU-level care

## 2021-05-24 NOTE — PROGRESS NOTE ADULT - SUBJECTIVE AND OBJECTIVE BOX
SANDRA DAS  [id number]  45y Male    Indication for ICU admission: s/p craniectomy for large L SDH  Admit Date:05-06-21  ICU Date: 05-07-21  OR Date: 05-06-21    No Known Allergies    PAST MEDICAL & SURGICAL HISTORY:  Alcohol abuse  GERD (gastroesophageal reflux disease)  ETOH abuse  Hypomagnesemia  Seizure disorder  H/O hemorrhoidectomy    Home Medications:    24HRS EVENT:  Day  - BCx pending  - NSX placed KIKE under the skin to gravity, old blood aspirated and some pus, possible OR tomorrow for washout   - f/u ID  - inc cefepime 2gm  -PS 10/5     NIGHT  -NPO at [facility] for OR tmrw, washout with NSX  -15 KPhos, 2g x2 Mg  - ABG: PSV: 7.48/31/190/24/99%    DVT PTX: HSQ  GI PTX: PPI    ***Tubes/Lines/Drains***  Peripheral IV  Left basilic midline 5/17  Arterial Line Right radial	Date 5/17  L IJ TLC     5/12  Trach/peg 5/21  Urinary Catheter		Indication: Strict I&O    Date Placed: 5/6    REVIEW OF SYSTEMS  [ ] A ten-point review of systems was otherwise negative except as noted.  [x] Due to altered mental status/intubation, subjective information were not able to be obtained from the patient. History was obtained, to the extent possible, from review of the chart and collateral sources of information.     SANDRA DAS  [id number]  45y Male    Indication for ICU admission: s/p craniectomy for large L SDH  Admit Date:21  ICU Date: 21  OR Date: 21    No Known Allergies    PAST MEDICAL & SURGICAL HISTORY:  Alcohol abuse  GERD (gastroesophageal reflux disease)  ETOH abuse  Hypomagnesemia  Seizure disorder  H/O hemorrhoidectomy    Home Medications:    24HRS EVENT:  Day  - BCx pending  - NSX placed KIKE under the skin to gravity, old blood aspirated and some pus, possible OR tomorrow for washout   - f/u ID  - inc cefepime 2gm  -PS 10/5     NIGHT  -NPO at [facility] for OR tmrw, washout with NSX  -15 KPhos, 2g x2 Mg  - ABG: PSV: 7.48/31/190/24/99%    DVT PTX: HSQ  GI PTX: PPI    ***Tubes/Lines/Drains***  Peripheral IV  Left basilic midline   Arterial Line Right radial	Date   L IJ TLC       Trach/peg   Urinary Catheter		Indication: Strict I&O    Date Placed:     REVIEW OF SYSTEMS  [ ] A ten-point review of systems was otherwise negative except as noted.  [x] Due to altered mental status/intubation, subjective information were not able to be obtained from the patient. History was obtained, to the extent possible, from review of the chart and collateral sources of information.    Daily Height in cm: 177.8 (23 May 2021 16:30)    Daily Weight in k.9 (24 May 2021 05:00)    Diet, NPO after Midnight:      NPO Start Date: 23-May-2021,   NPO Start Time: 23:59 (21 @ 16:14)      CURRENT MEDS:  Neurologic Medications  acetaminophen   Tablet .. 650 milliGRAM(s) Oral every 6 hours  ibuprofen  Tablet. 600 milliGRAM(s) Oral every 6 hours  levETIRAcetam  IVPB 500 milliGRAM(s) IV Intermittent every 12 hours    Respiratory Medications    Cardiovascular Medications  lisinopril 10 milliGRAM(s) Oral daily  propranolol 20 milliGRAM(s) Oral every 8 hours    Gastrointestinal Medications  pantoprazole   Suspension 40 milliGRAM(s) Oral daily  senna 2 Tablet(s) Oral at bedtime  sodium chloride 3%. 500 milliLiter(s) IV Continuous <Continuous>    Genitourinary Medications    Hematologic/Oncologic Medications  heparin   Injectable 5000 Unit(s) SubCutaneous every 8 hours    Antimicrobial/Immunologic Medications  cefepime   IVPB 2000 milliGRAM(s) IV Intermittent every 8 hours  vancomycin  IVPB 1250 milliGRAM(s) IV Intermittent every 12 hours    Endocrine/Metabolic Medications  insulin lispro (ADMELOG) corrective regimen sliding scale   SubCutaneous every 6 hours    Topical/Other Medications  artificial tears (preservative free) Ophthalmic Solution 2 Drop(s) Both EYES three times a day  chlorhexidine 0.12% Liquid 15 milliLiter(s) Oral Mucosa two times a day  chlorhexidine 4% Liquid 1 Application(s) Topical <User Schedule>      ICU Vital Signs Last 24 Hrs  T(C): 36.4 (24 May 2021 07:56), Max: 37.7 (23 May 2021 12:00)  T(F): 97.5 (24 May 2021 07:56), Max: 99.9 (23 May 2021 12:00)  HR: 80 (24 May 2021 07:00) (66 - 94)  BP: 123/78 (24 May 2021 07:00) (123/78 - 169/101)  BP(mean): 93 (24 May 2021 07:00) (93 - 132)  ABP: 123/68 (24 May 2021 07:00) (115/68 - 171/93)  ABP(mean): 88 (24 May 2021 07:00) (84 - 120)  RR: 22 (24 May 2021 07:00) (13 - 24)  SpO2: 100% (24 May 2021 07:00) (100% - 100%)      Adult Advanced Hemodynamics Last 24 Hrs  CVP(mm Hg): --  CVP(cm H2O): --  CO: --  CI: --  PA: --  PA(mean): --  PCWP: --  SVR: --  SVRI: --  PVR: --  PVRI: --    Mode: CPAP with PS  FiO2: 30  PEEP: 5  PS: 10  MAP: 10  PIP: 17    ABG - ( 24 May 2021 03:52 )  pH, Arterial: 7.48  pH, Blood: x     /  pCO2: 31    /  pO2: 190   / HCO3: 24    / Base Excess: 0.2   /  SaO2: 99                  I&O's Summary    23 May 2021 07:01  -  24 May 2021 07:00  --------------------------------------------------------  IN: 4530 mL / OUT: 3125 mL / NET: 1405 mL      I&O's Detail    23 May 2021 07:  -  24 May 2021 07:00  --------------------------------------------------------  IN:    Enteral Tube Flush: 100 mL    IV PiggyBack: 200 mL    IV PiggyBack: 250 mL    IV PiggyBack: 200 mL    IV PiggyBack: 500 mL    IV PiggyBack: 200 mL    IV PiggyBack: 200 mL    Osmolite: 1200 mL    sodium chloride 3%: 1680 mL  Total IN: 4530 mL    OUT:    Bulb (mL): 25 mL    Indwelling Catheter - Urethral (mL): 2850 mL    Rectal Tube (mL): 250 mL  Total OUT: 3125 mL    Total NET: 1405 mL    PHYSICAL EXAM:    General/Neuro  11T  Following commands  Spontaneously moving RLE/RUE  No movement of LUE/LLE  KIKE in place, serosanguinous output  Dressings in place    Lungs: clear to auscultation, Normal expansion/effort.   Trach in place  Vent settings: PS 10/5    Cardiovascular : S1, S2.  Regular rate and rhythm.  Peripheral edema   Cardiac Rhythm: Normal Sinus Rhythm    GI: Abdomen soft, Non-tender, Non-distended.    Peg tube in place  Feeds held for OR  Digni shield in place with liquid stool present    Extremities: Extremities warm, pink, well-perfused.     Derm: Good skin turgor, no skin breakdown.      : Cole catheter in place.    LABS:  CAPILLARY BLOOD GLUCOSE      POCT Blood Glucose.: 123 mg/dL (24 May 2021 06:08)  POCT Blood Glucose.: 137 mg/dL (23 May 2021 17:10)  POCT Blood Glucose.: 167 mg/dL (23 May 2021 13:22)                          7.3    5.96  )-----------( 311      ( 23 May 2021 23:49 )             23.0       05-24    139  |  108  |  6<L>  ----------------------------<  116<H>  4.1   |  22  |  <0.5<L>    Ca    8.0<L>      24 May 2021 05:54  Phos  2.8     05  Mg     1.6             PT/INR - ( 24 May 2021 05:54 )   PT: 13.70 sec;   INR: 1.19 ratio         PTT - ( 24 May 2021 05:54 )  PTT:30.8 sec      Culture - Body Fluid with Gram Stain (collected 21 May 2021 12:00)  Source: .Body Fluid CSF  Gram Stain (22 May 2021 02:58):    polymorphonuclear leukocytes seen    Gram positive cocci in pairs seen    by cytocentrifuge  Preliminary Report (22 May 2021 18:54):    Few Enterococcus faecalis  Organism: Enterococcus faecalis (23 May 2021 17:14)  Organism: Enterococcus faecalis (23 May 2021 17:14)

## 2021-05-24 NOTE — OCCUPATIONAL THERAPY INITIAL EVALUATION ADULT - GENERAL OBSERVATIONS, REHAB EVAL
Pt observed in room with bilateral foot splints. Pt s/p decompressive crainotomy. Pt remains without bone flap. SICU team made aware pt requires protective helmet prior to oob. Order to be placed and therapy will continue when appropriate.

## 2021-05-24 NOTE — PROGRESS NOTE ADULT - ASSESSMENT
ASSESSMENT  45yM w/ PMHx of Etoh abuse and seizures on keppra  found down unresponsive admitted 5/6. s/p craniectomy for large L SDH    IMPRESSION  #Fevers secondary to purulent drainage from craniotomy site     WCX e. faecalis     WCX coNS (S oxacillin)    No leukocytosis, rule out non-infectious causes / central fever    5/19 BCX NGTD     5/18 UA unremarkable     #Klebsiella bacteremia , BAL also with Klebsiella but no PNA on imaging (CT/CXR)    5/13 BCX NGTD     5/11 BCX kleb variicola (S)    5/11 BAL   Moderate Klebsiella variicola &  Moderate Serratia marcescens  < from: Xray Chest 1 View- PORTABLE-Routine (05.13.21 @ 06:28) >No radiographic evidence of acute cardiopulmonary disease.    #SDH s/p craniectomy    5/16 OR WCX   Rare Coag Negative Staphylococcus (S oxacillin)- likely skin colonizer     5/16 s/p Revision craniotomy wound with complex closure for persistent drainage, subgaleal hematoma    Repeat CTH development of acute infarcts in left cerebral hemisphere and right cerebellum  #CT atelectasis   #HIV/Hep panel NR  Creatinine, Serum: <0.5 (05-14-21 @ 05:30)      RECOMMENDATIONS  - D/C Vanc, Cefepime--> Unasyn 3g q6h IV (both e.faecalis and coNS are S)  - Possible OR  - trend WBC, fever curve  - Per SICU/ NSYG    If any questions, please call or send a message on [employer] Teams  Spectra [phone]

## 2021-05-24 NOTE — PROGRESS NOTE ADULT - ATTENDING COMMENTS
Critical Care: [id number]   This patient has a high probability of sudden, clinically significant deterioration, which requires the highest level of physician preparednessto intervene urgently. I managed/supervised life or organ supportinginterventions that required frequent physician assessment. I devoted my full attention in the ICU to the direct care of this patient for the period of time indicated below. Time I spent with the family or surrogate(s) is included only if the patient was incapable of providing the necessary information or participating in medical decision making.   Time devoted to teaching and to any procedures I billed separately is not included.     SANDRA DAS 45y Male presented with fall while intoxicate, sustained large left side SDH, S/P craniectomy and decompression, post op course include respiratory failure S/P tracheostomy/PEG, profound hyponatremia due to cerebral salt waisting,   Patient is examined and evaluated at the bedside with SICU team. Treatment plan discussed with SICU team, nurses and primary team.  Chest X-ray and all relevant studies reviewed during rounds.  Will continue hemodynamic monitoring as per protocol in SICU.    Neuro:  GCS [10-11T ]   [x ]  Neurochecks as per SICU protocol      Sedated/Pain control with  [ ] Dilaudid drip, [ ]  Ketamine, [ ] Fentanyl, [ ] Propofol, [x ] Precedex, [ ] Versed, [ ] Ativan,              [ ] Tylenol, [ ] Gabapentin, [ ] OxyContin standing,  [ ] OxyContin PRN,  [ ] Dilaudid PRN pushes,  [ ]  PCA   [ ] Seroquel, [ ] Haldol, [ ] Zyprexa,  [ ] Clonazepam [ ] None  Paralysis [ ] Yes  [x ]  No    CV:   On pressors [ ]  Yes  [x ]  No          [ ]  Levophed, [ ] Sy-Synephrine, [ ] Vasopressin, [ ]  Epinephrine          [ ] Dobutamine,  [ ]  Midodrine, [ ] Milrinone,  [ ] Others       Respiratory: Acute respiratory insufficiency -> continue ventilatory support -> CPAP trial and wean to trach caller                        None Invasive Support                                   [ ] BiPAP   [ ] CPAP   [ ] HFNC   [ ] NR Face Mask   [ ] NC                        Ventilatory support  [x ] Yes [ ] No                              [ ] PC    [ ] VC   [x ] AC   [ ] BiVent/APRV   [ ] CPAP   [ ] Other     ABG -   pH: 7.52  /  pCO2: 30    /  pO2: 140   / HCO3: 25    / Base Excess: 2.3   /  SaO2: 100     Lactate: x      [x ] SBT    GI:      [x ]  bowel regiment   Nutrition: continue    [ ] TPN/PPN     [x ]  Tube Feeds -> Ismolyte @ 50 ml/hr    [ ]   Diet      Renal: Continue I&Os monitoring, Cole catheter  [ x] Yes    [ ]   No   Lytes/Acid-base: replete hypokalemia, hypomagnesemia, hypocalcemia, hypophosphatemia     ID: leukocytosis -> continue to monitor:         IV Abx [x ] Yes, [ ] No;  ID consulted [ x] Yes, [ ] No        Cultures [ ] Respiratory     [ ] Blood     [ ] Urine    [x ] Fluids craniectomy site  [ ]  None             Lines:   [ x] Right   [ ] Left                      [ ] Subclavian TLC        [x ]  Internal Jugular TLC     [ ]  Femoral TLC                     [ ] Subclavian Cordis    [ ] Internal Jugular Cordis   [ ] Femoral Cordis                     [ ] PICC     [ ]  Midline   [ ] Peripheral IVs                                        [ ] HD catheter               [x ] Right   [ ] Left                     [x ] Radial A-Line           [ ] Femoral A-Line            [ ] Axillary A-Line                  Heme: continue to evaluate for acute blood loss anemia- trend Hg/Hct                     Transfused as indicated     [ ] PRBC   [ ] Platelets   [ ] FFP   [ ] Cryoprecipitate    Endocrine: prevent and treat hyperglycemia with insulin as needed,           Insuline drip [ ] Yes, [x ] No     PV: follow pulse exam    Skin: decub precautions    DVT Prophylaxis:  [ ] SCDs  [ ] Heparin SQ  [x ] Lovenox      Stress Gastritis Prophylaxis: PPI/H2 Blockers if indicated    Mobility: patient is evaluated at the bedside with mobility team and the goals for today are discussed with PT [x ] bed rest, will order helmet to proceed with out of bed    PATIENT/FAMILY/SURROGATE CONFERENCE:   PURPOSE: To obtain necessary information, To discuss treatment options  under consideration today.    I saw and evaluated the patient personally. I have reviewed and agree with note above. Treatment plan discussed with SICU team, nurses and primary team at the time of the multidisciplinary rounds. The above note is NOT written at the time of rounds and will reflect all changes throughout management of the patient for the day note is written for.    Jenny Allen MD, FACS  Trauma/ACS/SCC Attending

## 2021-05-25 LAB
ANION GAP SERPL CALC-SCNC: 12 MMOL/L — SIGNIFICANT CHANGE UP (ref 7–14)
BASE EXCESS BLDA CALC-SCNC: 2.3 MMOL/L — HIGH (ref -2–2)
BUN SERPL-MCNC: 4 MG/DL — LOW (ref 10–20)
CALCIUM SERPL-MCNC: 8.3 MG/DL — LOW (ref 8.5–10.1)
CHLORIDE SERPL-SCNC: 108 MMOL/L — SIGNIFICANT CHANGE UP (ref 98–110)
CO2 SERPL-SCNC: 22 MMOL/L — SIGNIFICANT CHANGE UP (ref 17–32)
CREAT SERPL-MCNC: <0.5 MG/DL — LOW (ref 0.7–1.5)
CULTURE RESULTS: SIGNIFICANT CHANGE UP
GLUCOSE BLDC GLUCOMTR-MCNC: 104 MG/DL — HIGH (ref 70–99)
GLUCOSE BLDC GLUCOMTR-MCNC: 113 MG/DL — HIGH (ref 70–99)
GLUCOSE BLDC GLUCOMTR-MCNC: 123 MG/DL — HIGH (ref 70–99)
GLUCOSE BLDC GLUCOMTR-MCNC: 138 MG/DL — HIGH (ref 70–99)
GLUCOSE BLDC GLUCOMTR-MCNC: 99 MG/DL — SIGNIFICANT CHANGE UP (ref 70–99)
GLUCOSE SERPL-MCNC: 106 MG/DL — HIGH (ref 70–99)
HCO3 BLDA-SCNC: 25 MMOL/L — SIGNIFICANT CHANGE UP (ref 23–27)
HCT VFR BLD CALC: 24.4 % — LOW (ref 42–52)
HGB BLD-MCNC: 8 G/DL — LOW (ref 14–18)
MAGNESIUM SERPL-MCNC: 1.7 MG/DL — LOW (ref 1.8–2.4)
MCHC RBC-ENTMCNC: 29.1 PG — SIGNIFICANT CHANGE UP (ref 27–31)
MCHC RBC-ENTMCNC: 32.8 G/DL — SIGNIFICANT CHANGE UP (ref 32–37)
MCV RBC AUTO: 88.7 FL — SIGNIFICANT CHANGE UP (ref 80–94)
NRBC # BLD: 0 /100 WBCS — SIGNIFICANT CHANGE UP (ref 0–0)
PCO2 BLDA: 30 MMHG — LOW (ref 38–42)
PH BLDA: 7.52 — HIGH (ref 7.38–7.42)
PHOSPHATE SERPL-MCNC: 4 MG/DL — SIGNIFICANT CHANGE UP (ref 2.1–4.9)
PLATELET # BLD AUTO: 363 K/UL — SIGNIFICANT CHANGE UP (ref 130–400)
PO2 BLDA: 140 MMHG — HIGH (ref 78–95)
POTASSIUM SERPL-MCNC: 4 MMOL/L — SIGNIFICANT CHANGE UP (ref 3.5–5)
POTASSIUM SERPL-SCNC: 4 MMOL/L — SIGNIFICANT CHANGE UP (ref 3.5–5)
RBC # BLD: 2.75 M/UL — LOW (ref 4.7–6.1)
RBC # FLD: 13.9 % — SIGNIFICANT CHANGE UP (ref 11.5–14.5)
SAO2 % BLDA: 100 % — HIGH (ref 94–98)
SODIUM SERPL-SCNC: 142 MMOL/L — SIGNIFICANT CHANGE UP (ref 135–146)
SPECIMEN SOURCE: SIGNIFICANT CHANGE UP
WBC # BLD: 8.78 K/UL — SIGNIFICANT CHANGE UP (ref 4.8–10.8)
WBC # FLD AUTO: 8.78 K/UL — SIGNIFICANT CHANGE UP (ref 4.8–10.8)

## 2021-05-25 PROCEDURE — 99291 CRITICAL CARE FIRST HOUR: CPT | Mod: 25

## 2021-05-25 PROCEDURE — 71045 X-RAY EXAM CHEST 1 VIEW: CPT | Mod: 26

## 2021-05-25 RX ORDER — LISINOPRIL 2.5 MG/1
10 TABLET ORAL ONCE
Refills: 0 | Status: COMPLETED | OUTPATIENT
Start: 2021-05-25 | End: 2021-05-25

## 2021-05-25 RX ORDER — SODIUM CHLORIDE 5 G/100ML
500 INJECTION, SOLUTION INTRAVENOUS
Refills: 0 | Status: DISCONTINUED | OUTPATIENT
Start: 2021-05-25 | End: 2021-05-26

## 2021-05-25 RX ORDER — MEROPENEM 1 G/30ML
INJECTION INTRAVENOUS
Refills: 0 | Status: DISCONTINUED | OUTPATIENT
Start: 2021-05-25 | End: 2021-05-25

## 2021-05-25 RX ORDER — MEROPENEM 1 G/30ML
INJECTION INTRAVENOUS
Refills: 0 | Status: COMPLETED | OUTPATIENT
Start: 2021-05-25 | End: 2021-06-01

## 2021-05-25 RX ORDER — VANCOMYCIN HCL 1 G
1000 VIAL (EA) INTRAVENOUS EVERY 8 HOURS
Refills: 0 | Status: DISCONTINUED | OUTPATIENT
Start: 2021-05-25 | End: 2021-05-27

## 2021-05-25 RX ORDER — MAGNESIUM SULFATE 500 MG/ML
2 VIAL (ML) INJECTION ONCE
Refills: 0 | Status: COMPLETED | OUTPATIENT
Start: 2021-05-25 | End: 2021-05-25

## 2021-05-25 RX ORDER — MEROPENEM 1 G/30ML
2000 INJECTION INTRAVENOUS ONCE
Refills: 0 | Status: COMPLETED | OUTPATIENT
Start: 2021-05-25 | End: 2021-05-25

## 2021-05-25 RX ORDER — MAGNESIUM SULFATE 500 MG/ML
2 VIAL (ML) INJECTION ONCE
Refills: 0 | Status: DISCONTINUED | OUTPATIENT
Start: 2021-05-25 | End: 2021-05-25

## 2021-05-25 RX ORDER — MEROPENEM 1 G/30ML
2000 INJECTION INTRAVENOUS EVERY 8 HOURS
Refills: 0 | Status: COMPLETED | OUTPATIENT
Start: 2021-05-25 | End: 2021-06-01

## 2021-05-25 RX ORDER — LISINOPRIL 2.5 MG/1
20 TABLET ORAL DAILY
Refills: 0 | Status: DISCONTINUED | OUTPATIENT
Start: 2021-05-26 | End: 2021-06-07

## 2021-05-25 RX ORDER — SODIUM CHLORIDE 9 MG/ML
4 INJECTION INTRAMUSCULAR; INTRAVENOUS; SUBCUTANEOUS EVERY 6 HOURS
Refills: 0 | Status: DISCONTINUED | OUTPATIENT
Start: 2021-05-25 | End: 2021-05-26

## 2021-05-25 RX ADMIN — Medication 25 GRAM(S): at 01:27

## 2021-05-25 RX ADMIN — HEPARIN SODIUM 5000 UNIT(S): 5000 INJECTION INTRAVENOUS; SUBCUTANEOUS at 14:34

## 2021-05-25 RX ADMIN — AMPICILLIN SODIUM AND SULBACTAM SODIUM 200 GRAM(S): 250; 125 INJECTION, POWDER, FOR SUSPENSION INTRAMUSCULAR; INTRAVENOUS at 05:12

## 2021-05-25 RX ADMIN — CHLORHEXIDINE GLUCONATE 1 APPLICATION(S): 213 SOLUTION TOPICAL at 05:34

## 2021-05-25 RX ADMIN — HEPARIN SODIUM 5000 UNIT(S): 5000 INJECTION INTRAVENOUS; SUBCUTANEOUS at 05:14

## 2021-05-25 RX ADMIN — Medication 250 MILLIGRAM(S): at 21:24

## 2021-05-25 RX ADMIN — CHLORHEXIDINE GLUCONATE 15 MILLILITER(S): 213 SOLUTION TOPICAL at 05:14

## 2021-05-25 RX ADMIN — Medication 2 DROP(S): at 21:25

## 2021-05-25 RX ADMIN — Medication 650 MILLIGRAM(S): at 23:47

## 2021-05-25 RX ADMIN — SODIUM CHLORIDE 80 MILLILITER(S): 5 INJECTION, SOLUTION INTRAVENOUS at 01:27

## 2021-05-25 RX ADMIN — CHLORHEXIDINE GLUCONATE 15 MILLILITER(S): 213 SOLUTION TOPICAL at 17:15

## 2021-05-25 RX ADMIN — MEROPENEM 200 MILLIGRAM(S): 1 INJECTION INTRAVENOUS at 22:47

## 2021-05-25 RX ADMIN — SODIUM CHLORIDE 4 GRAM(S): 9 INJECTION INTRAMUSCULAR; INTRAVENOUS; SUBCUTANEOUS at 07:56

## 2021-05-25 RX ADMIN — PANTOPRAZOLE SODIUM 40 MILLIGRAM(S): 20 TABLET, DELAYED RELEASE ORAL at 11:47

## 2021-05-25 RX ADMIN — Medication 2 DROP(S): at 14:33

## 2021-05-25 RX ADMIN — Medication 650 MILLIGRAM(S): at 23:17

## 2021-05-25 RX ADMIN — LISINOPRIL 10 MILLIGRAM(S): 2.5 TABLET ORAL at 02:13

## 2021-05-25 RX ADMIN — LEVETIRACETAM 420 MILLIGRAM(S): 250 TABLET, FILM COATED ORAL at 05:13

## 2021-05-25 RX ADMIN — LEVETIRACETAM 420 MILLIGRAM(S): 250 TABLET, FILM COATED ORAL at 17:15

## 2021-05-25 RX ADMIN — SODIUM CHLORIDE 70 MILLILITER(S): 5 INJECTION, SOLUTION INTRAVENOUS at 21:24

## 2021-05-25 RX ADMIN — AMPICILLIN SODIUM AND SULBACTAM SODIUM 200 GRAM(S): 250; 125 INJECTION, POWDER, FOR SUSPENSION INTRAMUSCULAR; INTRAVENOUS at 00:06

## 2021-05-25 RX ADMIN — AMPICILLIN SODIUM AND SULBACTAM SODIUM 200 GRAM(S): 250; 125 INJECTION, POWDER, FOR SUSPENSION INTRAMUSCULAR; INTRAVENOUS at 11:47

## 2021-05-25 RX ADMIN — LISINOPRIL 10 MILLIGRAM(S): 2.5 TABLET ORAL at 05:14

## 2021-05-25 RX ADMIN — SODIUM CHLORIDE 4 GRAM(S): 9 INJECTION INTRAMUSCULAR; INTRAVENOUS; SUBCUTANEOUS at 11:47

## 2021-05-25 RX ADMIN — Medication 2 DROP(S): at 05:34

## 2021-05-25 RX ADMIN — SENNA PLUS 2 TABLET(S): 8.6 TABLET ORAL at 21:25

## 2021-05-25 RX ADMIN — SODIUM CHLORIDE 80 MILLILITER(S): 5 INJECTION, SOLUTION INTRAVENOUS at 16:00

## 2021-05-25 RX ADMIN — HEPARIN SODIUM 5000 UNIT(S): 5000 INJECTION INTRAVENOUS; SUBCUTANEOUS at 21:24

## 2021-05-25 RX ADMIN — Medication 650 MILLIGRAM(S): at 17:15

## 2021-05-25 RX ADMIN — SODIUM CHLORIDE 80 MILLILITER(S): 5 INJECTION, SOLUTION INTRAVENOUS at 09:00

## 2021-05-25 RX ADMIN — SODIUM CHLORIDE 4 GRAM(S): 9 INJECTION INTRAMUSCULAR; INTRAVENOUS; SUBCUTANEOUS at 18:32

## 2021-05-25 RX ADMIN — MEROPENEM 200 MILLIGRAM(S): 1 INJECTION INTRAVENOUS at 19:02

## 2021-05-25 NOTE — PROGRESS NOTE ADULT - ASSESSMENT
ASSESSMENT  45yM w/ PMHx of Etoh abuse and seizures on keppra  found down unresponsive admitted 5/6. s/p craniectomy for large L SDH    IMPRESSION  #Fevers secondary to craniotomy site collection and thrombus    5/21 aspirate craniotomy site WCX e. faecalis (S amp)    5/16 swab OR coNS (S oxacillin)    5/23 BCX NG    No leukocytosis, rule out non-infectious causes / central fever    5/19 BCX NGTD     5/18 UA unremarkable     CT 5/20 1.  Redemonstrated filling defect within the left transverse sinus, sigmoid sinus and proximal internal jugular vein compatible with thrombus. When compared to the CTA from 5/13, there has been interval partial recanalization of the left transverse sinus.  2.  Redemonstrated large left-sided craniectomy. The previously seen left scalp drain has been removed.  3.  Increasing size of the mixed density fluid collection/hematoma extending from the left extra-axial space to the overlying scalp, overall measuring 4 cm in width, previously 3 cm.  4.  Increasing frontal convexity subdural hygromas measuring 8 mm on the right and 9 mm on the left.  5.  Left to right midline shift of 4 mm, previously 3 mm.  6.  Redemonstrated acute/subacute infarcts within the left temporal lobe and insula. Additional previously demonstrated multifocal infarcts and edema are not as well delineated on this exam, recommend follow-up head CT.  #Klebsiella bacteremia , BAL also with Klebsiella but no PNA on imaging (CT/CXR)    5/13 BCX NGTD     5/11 BCX kleb variicola (S)    5/11 BAL   Moderate Klebsiella variicola &  Moderate Serratia marcescens  < from: Xray Chest 1 View- PORTABLE-Routine (05.13.21 @ 06:28) >No radiographic evidence of acute cardiopulmonary disease.    #SDH s/p craniectomy    5/16 OR WCX   Rare Coag Negative Staphylococcus (S oxacillin)- likely skin colonizer     5/16 s/p Revision craniotomy wound with complex closure for persistent drainage, subgaleal hematoma    Repeat CTH development of acute infarcts in left cerebral hemisphere and right cerebellum  #CT atelectasis   #HIV/Hep panel NR  Creatinine, Serum: <0.5 (05-14-21 @ 05:30)      RECOMMENDATIONS  - Unasyn 3g q6h IV, if spikes fever again, change to Seamus 2g q8h IV and Vanc 1g q8h IV   - Possible OR  - trend WBC, fever curve  - Per SICU/ NSYG    If any questions, please call or send a message on [employer] Teams  Spectra [phone]

## 2021-05-25 NOTE — CHART NOTE - NSCHARTNOTEFT_GEN_A_CORE
Registered Dietitian Follow-Up     Patient Profile Reviewed                           Yes [x]   No []     Nutrition History Previously Obtained        Yes []  No [x] -- still intubated     Pertinent Subjective:      Pertinent Medical Interventions:  s/p left craniotomy for large SDH  S/p wound revision  intubated s/p trach   s/p PEG placement     Diet order: NPO with TF   Osmolite 1.5 @50ml/hr x24hrs + 3 packets of Prosource TF      Anthropometrics:  - Ht. 70"   - Wt.  175lbs (5/6)  190lbs (5/12)  192.2lbs (5/13)   175lbs (5/16); dosing  183.8lbs (5/16)  195.1lbs (5/17)   177.9lbs (5/21)   197.3lbs (5/25)-- wt changes may be d/t fluid shifts vs bed scale error, will continue to monitor   - %wt change  - BMI 25.1 -- based on dosing   - IBW 166lbs     Pertinent Lab Data: (5/25) H/H 8.4/26.1, BUN 26, , A1c 6.0% (5/15)  CAPILLARY BLOOD GLUCOSE  POCT Blood Glucose.: 123 mg/dL (25 May 2021 11:58)  POCT Blood Glucose.: 104 mg/dL (25 May 2021 06:20)  POCT Blood Glucose.: 99 mg/dL (25 May 2021 00:01)       Pertinent Meds: heparin, abx, sodium chloride 3%, admelog, magnesium sulfate, sodium chloride, senna, protonix      Physical Findings:  - Appearance: intubated. +1 R arm, leg edema noted per RN flow sheets  - GI function: LBM 5/24  - Tubes: OGT   - Oral/Mouth cavity: NPO  - Skin: WDL      Nutrition Requirements  Weight Used: dosing wt 79.4kg -- continued from 5/21, pt now with trach     Estimated Energy Needs    Continue [x]  Adjust []   2000-2200kcal (MSJ x 1.2-1.3AF)      Estimated Protein Needs    Continue [x]  Adjust [x]  95-111gm/day (1.2-1.4gm/kg act wt)      Estimated Fluid Needs        Continue [x]  Adjust []  per SICU team      Nutrient Intake:         [x] Previous Nutrition Diagnosis:             [] Ongoing          [] Resolved       Nutrition Intervention: enteral nutrition      Goal/Expected Outcome: Pt to meet % of estimated needs within 3 days      Indicator/Monitoring: energy intake, body composition, NFPF, glucose profile     Recommendations: Registered Dietitian Follow-Up     Patient Profile Reviewed                           Yes [x]   No []     Nutrition History Previously Obtained        Yes []  No [x] -- still intubated     Pertinent Subjective: Per discussion with LIP, pt getting feeds through PEG. Noted in EMR diet order placed as continuous through OGT, correction will be made. Discussed transitioning pt to bolus feeds, plan is to continue with continuous for now as pt remains critical.      Pertinent Medical Interventions:  s/p left craniotomy for large SDH  S/p wound revision  intubated s/p trach   s/p PEG placement     Diet order: NPO with TF   Osmolite 1.5 @50ml/hr x24hrs + 3 packets of Prosource TF      Anthropometrics:  - Ht. 70"   - Wt.  175lbs (5/6)  190lbs (5/12)  192.2lbs (5/13)   175lbs (5/16); dosing  183.8lbs (5/16)  195.1lbs (5/17)   177.9lbs (5/21)   197.3lbs (5/25)-- wt changes may be d/t fluid shifts vs bed scale error, will continue to monitor   - %wt change  - BMI 25.1 -- based on dosing   - IBW 166lbs     Pertinent Lab Data: (5/25) H/H 8.4/26.1, BUN 26, , A1c 6.0% (5/15)  CAPILLARY BLOOD GLUCOSE  POCT Blood Glucose.: 123 mg/dL (25 May 2021 11:58)  POCT Blood Glucose.: 104 mg/dL (25 May 2021 06:20)  POCT Blood Glucose.: 99 mg/dL (25 May 2021 00:01)       Pertinent Meds: heparin, abx, sodium chloride 3%, admelog, magnesium sulfate, sodium chloride, senna, protonix      Physical Findings:  - Appearance: intubated. +1 R arm, leg edema noted per RN flow sheets  - GI function: LBM 5/24  - Tubes: OGT   - Oral/Mouth cavity: NPO  - Skin: WDL      Nutrition Requirements  Weight Used: dosing wt 79.4kg -- continued from 5/21, pt now with trach     Estimated Energy Needs    Continue [x]  Adjust []   2000-2200kcal (MSJ x 1.2-1.3AF)      Estimated Protein Needs    Continue [x]  Adjust [x]  95-111gm/day (1.2-1.4gm/kg act wt)      Estimated Fluid Needs        Continue [x]  Adjust []  per SICU team      Nutrient Intake: current tube feed regimen meets 87-96% of est kcal needs and % est protein needs      [x] No active nutrition diagnosis identified at this time      Nutrition Intervention: enteral nutrition      Goal/Expected Outcome: Pt to meet % of estimated needs within 3 days      Indicator/Monitoring: energy intake, body composition, NFPF, glucose profile     Recommendations:  If plan is to continue with continuous feeds, can continue with current tube feed regimen of Osmolite 1.5 @ 50ml/hr x 24hrs + 3 packets of prosource TF.     As medically feasible recommend changing continuous feeds to bolus feeds for long term nutrition support. Recommend Jevity 1.2 @ 360ml q4hrs, hold 1 feed at 2 AM for a total of 5 feeds/day. Recommended regimen will provide 1800ml formula, 2160kcal, 100g protein, 1453ml free water. Additional water flushes per LIP. Maintain aspiration precautions.  Discussed with [id number] Registered Dietitian Follow-Up     Patient Profile Reviewed                           Yes [x]   No []     Nutrition History Previously Obtained        Yes []  No [x] -- still intubated     Pertinent Subjective: Per discussion with LIP, pt getting feeds through PEG. Noted in EMR diet order placed as continuous through OGT, correction will be made. Discussed transitioning pt to bolus feeds, per LIP team plans to continue with continuous feeds as pt remains critical.      Pertinent Medical Interventions:  s/p left craniotomy for large SDH  S/p wound revision  intubated s/p trach   s/p PEG placement     Diet order: NPO with TF   Osmolite 1.5 @50ml/hr x24hrs + 3 packets of Prosource TF      Anthropometrics:  - Ht. 70"   - Wt.  175lbs (5/6)  190lbs (5/12)  192.2lbs (5/13)   175lbs (5/16); dosing  183.8lbs (5/16)  195.1lbs (5/17)   177.9lbs (5/21)   197.3lbs (5/25)-- wt changes may be d/t fluid shifts vs bed scale error, will continue to monitor   - %wt change  - BMI 25.1 -- based on dosing   - IBW 166lbs     Pertinent Lab Data: (5/25) H/H 8.4/26.1, BUN 26, , A1c 6.0% (5/15)  CAPILLARY BLOOD GLUCOSE  POCT Blood Glucose.: 123 mg/dL (25 May 2021 11:58)  POCT Blood Glucose.: 104 mg/dL (25 May 2021 06:20)  POCT Blood Glucose.: 99 mg/dL (25 May 2021 00:01)       Pertinent Meds: heparin, abx, sodium chloride 3%, admelog, magnesium sulfate, sodium chloride, senna, protonix      Physical Findings:  - Appearance: intubated. +1 R arm, leg edema noted per RN flow sheets  - GI function: LBM 5/24  - Tubes: OGT   - Oral/Mouth cavity: NPO  - Skin: WDL      Nutrition Requirements  Weight Used: dosing wt 79.4kg -- continued from 5/21, pt now with trach     Estimated Energy Needs    Continue [x]  Adjust []   2000-2200kcal (MSJ x 1.2-1.3AF)      Estimated Protein Needs    Continue [x]  Adjust [x]  95-111gm/day (1.2-1.4gm/kg act wt)      Estimated Fluid Needs        Continue [x]  Adjust []  per SICU team      Nutrient Intake: current tube feed regimen meets 87-96% of est kcal needs and % est protein needs      [x] No active nutrition diagnosis identified at this time      Nutrition Intervention: enteral nutrition      Goal/Expected Outcome: Pt to meet % of estimated needs within 3 days      Indicator/Monitoring: energy intake, body composition, NFPF, glucose profile     Recommendations:  If plan is to continue with continuous feeds, can continue with current tube feed regimen of Osmolite 1.5 @ 50ml/hr x 24hrs + 3 packets of prosource TF.     As medically feasible recommend changing continuous feeds to bolus feeds for long term nutrition support. Recommend Jevity 1.2 @ 360ml q4hrs, hold 1 feed at 2 AM for a total of 5 feeds/day. Recommended regimen will provide 1800ml formula, 2160kcal, 100g protein, 1453ml free water. Additional water flushes per LIP. Maintain aspiration precautions.  Discussed with [id number]

## 2021-05-25 NOTE — PROGRESS NOTE ADULT - ASSESSMENT
Assessment & Plan  45M s/p fall with large left SDH and neurological status change, level 1 for emergent craniotomy    NEURO:    Hx of EtOH abuse and prior Delerium Tremens episodes    S/P craniotomy for large left SDH     - Q2H neuro checks, No sedation, GCS 11T      - Keppra 500mg BID, 3% @ 80cc -- Na goal 140-150    Pain control - prn low dose fentanyl IVP, Tylenol    Head of bed @ 30 degrees     - Repeat Head CT #1 post op changes, resolution of MLS, stable SDH along cerebral falx and L tentorium, new trace scattered SAH along the right cerebral sulci and right cerebellar sulci and new IVP in the b/l occipital horns.     - Repeat Head CT #2: development of acute infarcts in left cerebral hemisphere and right cerebellum     - CTA neck and Brain - no evidence acute large vessel occlusion, severe stenosis of R vertebral artery and moderate stenosis of mid-distal basilar artery, possible jugular venous thrombosis, NCHCT recommended for left convexity subdural collection, and increasing edema around L temporal/occipital parenchemal hemorrhage    OR :     - Clot was evacuated, bleeding noted. Hemostasis achieved without difficulty. KIKE drain left in subgaleal space. Brief operative findings: revision craniotomy wound with complex closure for persistent drainage, subgaleal hematoma     Post op CTH : expected post-op changes     - repeat CT venogram to r/o sinous thrombus as per neurology -plan for tomorrow    rEEG (): borderline generalized slowing    D/C vEEG per neuro, no seizures on LTM report just generalzied slowing    2021: CT Venogram:  Left transverse sinus thrombosis, Previously noted left subdural hematoma measures 2 cm, previously 3 cm in maximal thickness, New adjacent hygroma measuring 3 cm in maximal thickness and spanning the length of 8.7 cm, Left to rightmidline shift of 4 mm, previously 3 mm.   NSX- no need for thrombectomy at this time     - NCC aware, awaiting further reccs     NSX placed KIKE under the skin to gravity, old blood aspirated and some pus, plan for RTOR for washout  canceled    - e. faecalis in cx --> ID following --> unasyn 3gm q8hrs; if continual fever spikes, RTOR otherwise continue non-invasive mgmt    RESP:     Intubated      s/p trach (8 Shiley) --> tolerating PS , currently tolerating trach collar      -place back on PS vs AC if no longer tolerating    AB.51/29/160/24/100% @ 40% T-Piece    AM CXR      CARDS:     S/P cardiac arrest in OR & MTP     - Hypertension - on Lisinopril 10mg     - Propranolol 20mg q8     - Trop neg x3     GI/NUTR:     Diet: TF (Osmolite 1.5) @ 50  via peg    GI Prophylaxis- PPI    Bowel regimen restarted - Senna, Miralax     - Dignishield in place    /RENAL:     Jain removed      - patient making adequate urine, condom cath in place    Labs:     - BUN/Cr- 4/0.5     - Electrolytes- Na 142, K 4.0, Mag 1.7, Phos 4.0          -repleted     - Hyponatremic - Na goal 140-150 as per NSGY     - 3% @ 80  - BMP, Osm q6h     -started on salt tabs 4g q8h    HEME/ONC:   Hgb stable, 7.3 > 8.0  PLT stable, 311 > 363    S/P massive transfusion protocol     - 4uPRBCs 2FFP 2 platelet intraop, post op 2 PLT    5/15: 1U PRBC pre- operatively    : 2U PRBC intraop and post op  DVT prophylaxis: Heparin sq    ID:    Monitor Tmax: 101.4 (axillary)      -Tylenol, jain lavage, cooling blanket    WBC: 6    Antibiotics: d/c'ed Cefepime & Vanc; started on Unasyn as per ID      -Follow up ID recs for + OR cultures     - - u/a +leuk     - Blood cx- Klebsiella, (pan sens)     - BAL-  Klebsiella, Serratia marcenscens  (pan sens)     - f/u blood culture , , - prelim neg      - - cranial collection cx -  e. faecalis, sens van    ENDO:  q6 POC glucose  Sliding scale insulin  A1c 5.2    DISPO: Case discussed with Dr. Hughes. Patient continues to require SICU-level care

## 2021-05-25 NOTE — PROGRESS NOTE ADULT - SUBJECTIVE AND OBJECTIVE BOX
SANDRA DAS  [id number]  45y Male    Indication for ICU admission: s/p craniectomy for large L SDH  Admit Date:21  ICU Date: 21  OR Date: 21    No Known Allergies    PAST MEDICAL & SURGICAL HISTORY:  Alcohol abuse  GERD (gastroesophageal reflux disease)  ETOH abuse  Hypomagnesemia  Seizure disorder  H/O hemorrhoidectomy    Home Medications:  NONE    24HRS EVENT:    DAY:  - no RTOR  - d/c Vanco, Cefepime; start Unasyn  - d/c jain   - PS  trial --> tolerating trach collar  - started on salt tabs 4g q8h  - order helmet (nsx)  - OOB after helmet obtained  - d/c Motrin  - Tmax 101 axillary    NIGHT:  - tmax 101.9 @2243  -> NSGY aware. re-contact if worsens.  - repeat temp @ midnight 100.4  - patient voiding adequately with condom cath    DVT PTX: HSQ  GI PTX: PPI    ***Tubes/Lines/Drains  ***  Peripheral IV  Left basilic midline   Arterial Line Right radial	Date   L IJ TLC       Trach/peg     REVIEW OF SYSTEMS  [ ] A ten-point review of systems was otherwise negative except as noted.  [x] Due to altered mental status/intubation, subjective information were not able to be obtained from the patient. History was obtained, to the extent possible, from review of the chart and collateral sources of information.    Daily Weight in k.9 (24 May 2021 05:00)    Diet, NPO with Tube Feed:   Tube Feeding Modality: Orogastric  Osmolite 1.5 Jeff  Total Volume for 24 Hours (mL): 1200  Continuous  Starting Tube Feed Rate mL per Hour: 50  Until Goal Tube Feed Rate (mL per Hour): 50  Tube Feed Duration (in Hours): 24  Tube Feed Start Time: 00:00  No Carb Prosource TF     Qty per Day:  3 (21 @ 19:03)  Diet, NPO after Midnight:      NPO Start Date: 23-May-2021,   NPO Start Time: 23:59 (21 @ 16:14)    CURRENT MEDS:  Neurologic Medications  acetaminophen   Tablet .. 650 milliGRAM(s) Oral every 6 hours PRN Temp greater or equal to 38.5C (101.3F), Moderate Pain (4 - 6), Severe Pain (7 - 10)  levETIRAcetam  IVPB 500 milliGRAM(s) IV Intermittent every 12 hours    Respiratory Medications    Cardiovascular Medications  lisinopril 10 milliGRAM(s) Oral daily  propranolol 20 milliGRAM(s) Oral every 8 hours    Gastrointestinal Medications  pantoprazole   Suspension 40 milliGRAM(s) Oral daily  senna 2 Tablet(s) Oral at bedtime  sodium chloride 4 Gram(s) Oral every 8 hours  sodium chloride 3%. 500 milliLiter(s) IV Continuous <Continuous>    Genitourinary Medications    Hematologic/Oncologic Medications  heparin   Injectable 5000 Unit(s) SubCutaneous every 8 hours    Antimicrobial/Immunologic Medications  ampicillin/sulbactam  IVPB 3 Gram(s) IV Intermittent every 6 hours    Endocrine/Metabolic Medications  insulin lispro (ADMELOG) corrective regimen sliding scale   SubCutaneous every 6 hours    Topical/Other Medications  artificial tears (preservative free) Ophthalmic Solution 2 Drop(s) Both EYES three times a day  chlorhexidine 0.12% Liquid 15 milliLiter(s) Oral Mucosa two times a day  chlorhexidine 4% Liquid 1 Application(s) Topical <User Schedule>    ICU Vital Signs Last 24 Hrs  T(C): 38 (25 May 2021 00:00), Max: 38.8 (24 May 2021 20:00)  T(F): 100.4 (25 May 2021 00:00), Max: 101.9 (24 May 2021 20:00)  HR: 83 (25 May 2021 01:40) (75 - 123)  BP: 158/95 (25 May 2021 01:30) (123/78 - 169/101)  BP(mean): 121 (25 May 2021 01:30) (93 - 132)  ABP: 153/110 (25 May 2021 01:40) (58/58 - 288/284)  ABP(mean): 131 (25 May 2021 01:40) (58 - 284)  RR: 10 (25 May 2021 01:40) (8 - 53)  SpO2: 100% (25 May 2021 01:40) (97% - 100%)    ABG - ( 24 May 2021 15:39 )  pH, Arterial: 7.52  pH, Blood: x     /  pCO2: 29    /  pO2: 160   / HCO3: 24    / Base Excess: 1.5   /  SaO2: 100       I&O's Summary  23 May 2021 07:  -  24 May 2021 07:00  --------------------------------------------------------  IN: 4530 mL / OUT: 3125 mL / NET: 1405 mL    24 May 2021 07:  -  25 May 2021 02:31  --------------------------------------------------------  IN: 2990 mL / OUT: 1980 mL / NET: 1010 mL      I&O's Detail    23 May 2021 07:  -  24 May 2021 07:00  --------------------------------------------------------  IN:    Enteral Tube Flush: 100 mL    IV PiggyBack: 200 mL    IV PiggyBack: 250 mL    IV PiggyBack: 200 mL    IV PiggyBack: 500 mL    IV PiggyBack: 200 mL    IV PiggyBack: 200 mL    Osmolite: 1200 mL    sodium chloride 3%: 1680 mL  Total IN: 4530 mL    OUT:    Bulb (mL): 25 mL    Indwelling Catheter - Urethral (mL): 2850 mL    Rectal Tube (mL): 250 mL  Total OUT: 3125 mL  Total NET: 1405 mL    24 May 2021 07:  -  25 May 2021 02:31  --------------------------------------------------------  IN:    Enteral Tube Flush: 200 mL    IV PiggyBack: 100 mL    IV PiggyBack: 100 mL    IV PiggyBack: 300 mL    Osmolite: 850 mL    sodium chloride 3%: 1440 mL  Total IN: 2990 mL    OUT:    Bulb (mL): 360 mL    Indwelling Catheter - Urethral (mL): 800 mL    Rectal Tube (mL): 120 mL    Voided (mL): 700 mL  Total OUT: 1980 mL  Total NET: 1010 mL    PHYSICAL EXAM:  General/Neuro  GCS: 11T  - can follow commands with LUE and LLE. tracks across midline  Pupils: PERRL    Lungs:        - clear to auscultation, Normal expansion/effort.   - on Trach piece    Cardiovascular   - S1, S2.  Regular rate and regular rhythm.    - No Peripheral edema     GI:   - Abdomen soft, Non-tender, Non-distended.    - Nasogastric tube in place.      Extremities:   - Extremities warm, pink, well-perfused.    Derm:   - Good skin turgor, no skin breakdown.      :         - Jain catheter removed     CXR:       LABS:    CAPILLARY BLOOD GLUCOSE  POCT Blood Glucose.: 99 mg/dL (25 May 2021 00:01)  POCT Blood Glucose.: 107 mg/dL (24 May 2021 10:57)  POCT Blood Glucose.: 123 mg/dL (24 May 2021 06:08)                          8.0    8.78  )-----------( 363      ( 24 May 2021 23:40 )             24.4       05-  142  |  108  |  4<L>  ----------------------------<  106<H>  4.0   |  22  |  <0.5<L>    Ca    8.3<L>      24 May 2021 23:40  Phos  4.0       Mg     1.7           PT/INR - ( 24 May 2021 05:54 )   PT: 13.70 sec;   INR: 1.19 ratio     PTT - ( 24 May 2021 05:54 )  PTT:30.8 sec SANDRA DAS  [id number]  45y Male    Indication for ICU admission: s/p craniectomy for large L SDH  Admit Date:21  ICU Date: 21  OR Date: 21    No Known Allergies    PAST MEDICAL & SURGICAL HISTORY:  Alcohol abuse  GERD (gastroesophageal reflux disease)  ETOH abuse  Hypomagnesemia  Seizure disorder  H/O hemorrhoidectomy    Home Medications:  NONE    24HRS EVENT:    DAY:  - no RTOR  - d/c Vanco, Cefepime; start Unasyn  - d/c jain   - PS  trial --> tolerating trach collar  - started on salt tabs 4g q8h  - order helmet (nsx)  - OOB after helmet obtained  - d/c Motrin  - Tmax 101 axillary    NIGHT:  - tmax 101.9 @2243  -> NSGY aware. re-contact if worsens.  - repeat temp @ midnight 100.4  - patient voiding adequately with condom cath  - QTc 514 --> 2gm Mag given    DVT PTX: HSQ  GI PTX: PPI    ***Tubes/Lines/Drains  ***  Peripheral IV  Left basilic midline   Arterial Line Right radial	Date   L IJ TLC       Trach/peg     REVIEW OF SYSTEMS  [ ] A ten-point review of systems was otherwise negative except as noted.  [x] Due to altered mental status/intubation, subjective information were not able to be obtained from the patient. History was obtained, to the extent possible, from review of the chart and collateral sources of information.    Daily Weight in k.9 (24 May 2021 05:00)    Diet, NPO with Tube Feed:   Tube Feeding Modality: Orogastric  Osmolite 1.5 Jeff  Total Volume for 24 Hours (mL): 1200  Continuous  Starting Tube Feed Rate mL per Hour: 50  Until Goal Tube Feed Rate (mL per Hour): 50  Tube Feed Duration (in Hours): 24  Tube Feed Start Time: 00:00  No Carb Prosource TF     Qty per Day:  3 (21 @ 19:03)  Diet, NPO after Midnight:      NPO Start Date: 23-May-2021,   NPO Start Time: 23:59 (21 @ 16:14)    CURRENT MEDS:  Neurologic Medications  acetaminophen   Tablet .. 650 milliGRAM(s) Oral every 6 hours PRN Temp greater or equal to 38.5C (101.3F), Moderate Pain (4 - 6), Severe Pain (7 - 10)  levETIRAcetam  IVPB 500 milliGRAM(s) IV Intermittent every 12 hours    Respiratory Medications    Cardiovascular Medications  lisinopril 10 milliGRAM(s) Oral daily  propranolol 20 milliGRAM(s) Oral every 8 hours    Gastrointestinal Medications  pantoprazole   Suspension 40 milliGRAM(s) Oral daily  senna 2 Tablet(s) Oral at bedtime  sodium chloride 4 Gram(s) Oral every 8 hours  sodium chloride 3%. 500 milliLiter(s) IV Continuous <Continuous>    Genitourinary Medications    Hematologic/Oncologic Medications  heparin   Injectable 5000 Unit(s) SubCutaneous every 8 hours    Antimicrobial/Immunologic Medications  ampicillin/sulbactam  IVPB 3 Gram(s) IV Intermittent every 6 hours    Endocrine/Metabolic Medications  insulin lispro (ADMELOG) corrective regimen sliding scale   SubCutaneous every 6 hours    Topical/Other Medications  artificial tears (preservative free) Ophthalmic Solution 2 Drop(s) Both EYES three times a day  chlorhexidine 0.12% Liquid 15 milliLiter(s) Oral Mucosa two times a day  chlorhexidine 4% Liquid 1 Application(s) Topical <User Schedule>    ICU Vital Signs Last 24 Hrs  T(C): 38 (25 May 2021 00:00), Max: 38.8 (24 May 2021 20:00)  T(F): 100.4 (25 May 2021 00:00), Max: 101.9 (24 May 2021 20:00)  HR: 83 (25 May 2021 01:40) (75 - 123)  BP: 158/95 (25 May 2021 01:30) (123/78 - 169/101)  BP(mean): 121 (25 May 2021 01:30) (93 - 132)  ABP: 153/110 (25 May 2021 01:40) (58/58 - 288/284)  ABP(mean): 131 (25 May 2021 01:40) (58 - 284)  RR: 10 (25 May 2021 01:40) (8 - 53)  SpO2: 100% (25 May 2021 01:40) (97% - 100%)    ABG - ( 24 May 2021 15:39 )  pH, Arterial: 7.52  pH, Blood: x     /  pCO2: 29    /  pO2: 160   / HCO3: 24    / Base Excess: 1.5   /  SaO2: 100       I&O's Summary  23 May 2021 07:  -  24 May 2021 07:00  --------------------------------------------------------  IN: 4530 mL / OUT: 3125 mL / NET: 1405 mL    24 May 2021 07:  -  25 May 2021 02:31  --------------------------------------------------------  IN: 2990 mL / OUT: 1980 mL / NET: 1010 mL      I&O's Detail    23 May 2021 07:  -  24 May 2021 07:00  --------------------------------------------------------  IN:    Enteral Tube Flush: 100 mL    IV PiggyBack: 200 mL    IV PiggyBack: 250 mL    IV PiggyBack: 200 mL    IV PiggyBack: 500 mL    IV PiggyBack: 200 mL    IV PiggyBack: 200 mL    Osmolite: 1200 mL    sodium chloride 3%: 1680 mL  Total IN: 4530 mL    OUT:    Bulb (mL): 25 mL    Indwelling Catheter - Urethral (mL): 2850 mL    Rectal Tube (mL): 250 mL  Total OUT: 3125 mL  Total NET: 1405 mL    24 May 2021 07:  -  25 May 2021 02:31  --------------------------------------------------------  IN:    Enteral Tube Flush: 200 mL    IV PiggyBack: 100 mL    IV PiggyBack: 100 mL    IV PiggyBack: 300 mL    Osmolite: 850 mL    sodium chloride 3%: 1440 mL  Total IN: 2990 mL    OUT:    Bulb (mL): 360 mL    Indwelling Catheter - Urethral (mL): 800 mL    Rectal Tube (mL): 120 mL    Voided (mL): 700 mL  Total OUT: 1980 mL  Total NET: 1010 mL    PHYSICAL EXAM:  General/Neuro  GCS: 11T  - can follow commands with LUE and LLE. tracks across midline  Pupils: PERRL    Lungs:        - clear to auscultation, Normal expansion/effort.   - on Trach piece    Cardiovascular   - S1, S2.  Regular rate and regular rhythm.    - No Peripheral edema     GI:   - Abdomen soft, Non-tender, Non-distended.    - Nasogastric tube in place.      Extremities:   - Extremities warm, pink, well-perfused.    Derm:   - Good skin turgor, no skin breakdown.      :         - Jain catheter removed     CXR:       LABS:    CAPILLARY BLOOD GLUCOSE  POCT Blood Glucose.: 99 mg/dL (25 May 2021 00:01)  POCT Blood Glucose.: 107 mg/dL (24 May 2021 10:57)  POCT Blood Glucose.: 123 mg/dL (24 May 2021 06:08)                          8.0    8.78  )-----------( 363      ( 24 May 2021 23:40 )             24.4       05  142  |  108  |  4<L>  ----------------------------<  106<H>  4.0   |  22  |  <0.5<L>    Ca    8.3<L>      24 May 2021 23:40  Phos  4.0       Mg     1.7           PT/INR - ( 24 May 2021 05:54 )   PT: 13.70 sec;   INR: 1.19 ratio     PTT - ( 24 May 2021 05:54 )  PTT:30.8 sec SANDRA DAS  [id number]  45y Male    Indication for ICU admission: s/p craniectomy for large L SDH  Admit Date:21  ICU Date: 21  OR Date: 21    No Known Allergies    PAST MEDICAL & SURGICAL HISTORY:  Alcohol abuse  GERD (gastroesophageal reflux disease)  ETOH abuse  Hypomagnesemia  Seizure disorder  H/O hemorrhoidectomy    Home Medications:  NONE    24HRS EVENT:    DAY:  - no RTOR  - d/c Vanco, Cefepime; start Unasyn  - d/c jain   - PS  trial --> tolerating trach collar  - started on salt tabs 4g q8h  - order helmet (nsx)  - OOB after helmet obtained  - d/c Motrin  - Tmax 101 axillary    NIGHT:  - tmax 101.9 @2243  -> NSGY aware. re-contact if worsens.  - repeat temp @ midnight 100.4  - patient voiding adequately with condom cath  - QTc 514 --> 2gm Mag given --> repeat 414  - lisinopril 10 --> 20 because diastolics 100s (bp 150s/100s) o/n  - ID Attending: "Unasyn 3g q6h IV, if spikes fever again, change to Seamus 2g q8h IV and Vanc 1g q8h IV"    DVT PTX: HSQ  GI PTX: PPI    ***Tubes/Lines/Drains  ***  Peripheral IV  Left basilic midline   Arterial Line Right radial	Date   L IJ TLC       Trach/peg     REVIEW OF SYSTEMS  [ ] A ten-point review of systems was otherwise negative except as noted.  [x] Due to altered mental status/intubation, subjective information were not able to be obtained from the patient. History was obtained, to the extent possible, from review of the chart and collateral sources of information.    Daily Weight in k.9 (24 May 2021 05:00)    Diet, NPO with Tube Feed:   Tube Feeding Modality: Orogastric  Osmolite 1.5 Jeff  Total Volume for 24 Hours (mL): 1200  Continuous  Starting Tube Feed Rate mL per Hour: 50  Until Goal Tube Feed Rate (mL per Hour): 50  Tube Feed Duration (in Hours): 24  Tube Feed Start Time: 00:00  No Carb Prosource TF     Qty per Day:  3 (21 @ 19:03)  Diet, NPO after Midnight:      NPO Start Date: 23-May-2021,   NPO Start Time: 23:59 (21 @ 16:14)    CURRENT MEDS:  Neurologic Medications  acetaminophen   Tablet .. 650 milliGRAM(s) Oral every 6 hours PRN Temp greater or equal to 38.5C (101.3F), Moderate Pain (4 - 6), Severe Pain (7 - 10)  levETIRAcetam  IVPB 500 milliGRAM(s) IV Intermittent every 12 hours    Respiratory Medications    Cardiovascular Medications  lisinopril 10 milliGRAM(s) Oral daily  propranolol 20 milliGRAM(s) Oral every 8 hours    Gastrointestinal Medications  pantoprazole   Suspension 40 milliGRAM(s) Oral daily  senna 2 Tablet(s) Oral at bedtime  sodium chloride 4 Gram(s) Oral every 8 hours  sodium chloride 3%. 500 milliLiter(s) IV Continuous <Continuous>    Genitourinary Medications    Hematologic/Oncologic Medications  heparin   Injectable 5000 Unit(s) SubCutaneous every 8 hours    Antimicrobial/Immunologic Medications  ampicillin/sulbactam  IVPB 3 Gram(s) IV Intermittent every 6 hours    Endocrine/Metabolic Medications  insulin lispro (ADMELOG) corrective regimen sliding scale   SubCutaneous every 6 hours    Topical/Other Medications  artificial tears (preservative free) Ophthalmic Solution 2 Drop(s) Both EYES three times a day  chlorhexidine 0.12% Liquid 15 milliLiter(s) Oral Mucosa two times a day  chlorhexidine 4% Liquid 1 Application(s) Topical <User Schedule>    ICU Vital Signs Last 24 Hrs  T(C): 38 (25 May 2021 00:00), Max: 38.8 (24 May 2021 20:00)  T(F): 100.4 (25 May 2021 00:00), Max: 101.9 (24 May 2021 20:00)  HR: 83 (25 May 2021 01:40) (75 - 123)  BP: 158/95 (25 May 2021 01:30) (123/78 - 169/101)  BP(mean): 121 (25 May 2021 01:30) (93 - 132)  ABP: 153/110 (25 May 2021 01:40) (58/58 - 288/284)  ABP(mean): 131 (25 May 2021 01:40) (58 - 284)  RR: 10 (25 May 2021 01:40) (8 - 53)  SpO2: 100% (25 May 2021 01:40) (97% - 100%)    ABG - ( 24 May 2021 15:39 )  pH, Arterial: 7.52  pH, Blood: x     /  pCO2: 29    /  pO2: 160   / HCO3: 24    / Base Excess: 1.5   /  SaO2: 100       I&O's Summary  23 May 2021 07:01  -  24 May 2021 07:00  --------------------------------------------------------  IN: 4530 mL / OUT: 3125 mL / NET: 1405 mL    24 May 2021 07:01  -  25 May 2021 02:31  --------------------------------------------------------  IN: 2990 mL / OUT: 1980 mL / NET: 1010 mL      I&O's Detail    23 May 2021 07:01  -  24 May 2021 07:00  --------------------------------------------------------  IN:    Enteral Tube Flush: 100 mL    IV PiggyBack: 200 mL    IV PiggyBack: 250 mL    IV PiggyBack: 200 mL    IV PiggyBack: 500 mL    IV PiggyBack: 200 mL    IV PiggyBack: 200 mL    Osmolite: 1200 mL    sodium chloride 3%: 1680 mL  Total IN: 4530 mL    OUT:    Bulb (mL): 25 mL    Indwelling Catheter - Urethral (mL): 2850 mL    Rectal Tube (mL): 250 mL  Total OUT: 3125 mL  Total NET: 1405 mL    24 May 2021 07:01  -  25 May 2021 02:31  --------------------------------------------------------  IN:    Enteral Tube Flush: 200 mL    IV PiggyBack: 100 mL    IV PiggyBack: 100 mL    IV PiggyBack: 300 mL    Osmolite: 850 mL    sodium chloride 3%: 1440 mL  Total IN: 2990 mL    OUT:    Bulb (mL): 360 mL    Indwelling Catheter - Urethral (mL): 800 mL    Rectal Tube (mL): 120 mL    Voided (mL): 700 mL  Total OUT: 1980 mL  Total NET: 1010 mL    PHYSICAL EXAM:  General/Neuro  GCS: 11T  - can follow commands with LUE and LLE. tracks across midline  Pupils: PERRL    Lungs:        - clear to auscultation, Normal expansion/effort.   - on Trach piece    Cardiovascular   - S1, S2.  Regular rate and regular rhythm.    - No Peripheral edema     GI:   - Abdomen soft, Non-tender, Non-distended.    - Nasogastric tube in place.      Extremities:   - Extremities warm, pink, well-perfused.    Derm:   - Good skin turgor, no skin breakdown.      :         - Jain catheter removed     CXR:       LABS:    CAPILLARY BLOOD GLUCOSE  POCT Blood Glucose.: 99 mg/dL (25 May 2021 00:01)  POCT Blood Glucose.: 107 mg/dL (24 May 2021 10:57)  POCT Blood Glucose.: 123 mg/dL (24 May 2021 06:08)                          8.0    8.78  )-----------( 363      ( 24 May 2021 23:40 )             24.4         142  |  108  |  4<L>  ----------------------------<  106<H>  4.0   |  22  |  <0.5<L>    Ca    8.3<L>      24 May 2021 23:40  Phos  4.0       Mg     1.7           PT/INR - ( 24 May 2021 05:54 )   PT: 13.70 sec;   INR: 1.19 ratio     PTT - ( 24 May 2021 05:54 )  PTT:30.8 sec

## 2021-05-25 NOTE — PROGRESS NOTE ADULT - ATTENDING COMMENTS
Critical Care: [id number]   This patient has a high probability of sudden, clinically significant deterioration, which requires the highest level of physician preparedness to intervene urgently. I managed/supervised life or organ supporting interventions that required frequent physician assessment. I devoted my full attention in the ICU to the direct care of this patient for the period of time indicated below. Time I spent with the family or surrogate(s) is included only if the patient was incapable of providing the necessary information or participating in medical decision making.   Time devoted to teaching and to any procedures I billed separately is not included.     ANIA DASGORZ 45y Male presented with SDH due to fall and post-OP respiratory failure, severe hyponatremia due to cerebral salt waisting  Patient is examined and evaluated at the bedside with SICU team. Treatment plan discussed with SICU team, nurses and primary team.  Chest X-ray and all relevant studies reviewed during rounds.  Will continue hemodynamic monitoring as per protocol in SICU.    Neuro:  GCS [11T ]   [x ]  Neurochecks as per SICU protocol      Sedated/Pain control with  [ ] Dilaudid drip, [ ]  Ketamine, [ ] Fentanyl, [ ] Propofol, [ ] Precedex, [ ] Versed, [ ] Ativan,              [ ] Tylenol, [ ] Gabapentin, [ ] OxyContin standing,  [ ] OxyContin PRN,  [ ] Dilaudid PRN pushes,  [ ]  PCA   [ ] Seroquel, [ ] Haldol, [ ] Zyprexa,  [ ] Clonazepam [x ] None  Paralysis [ ] Yes  [ x]  No    CV: tachycardic at times  On pressors [ ]  Yes  [ x]  No          [ ]  Levophed, [ ] Sy-Synephrine, [ ] Vasopressin, [ ]  Epinephrine          [ ] Dobutamine,  [ ]  Midodrine, [ ] Milrinone,  [ ] Others       Respiratory: Acute respiratory insufficiency -> tolerated trach caller over night                        None Invasive Support                                   [ ] BiPAP   [ ] CPAP   [ ] HFNC   [ ] NR Face Mask   [ ] NC  {x] Trach caller                        Ventilatory support  [ ] Yes [ ] No                              [ ] PC    [ ] VC   [ ] AC   [ ] BiVent/APRV   [ ] CPAP   [ ] Other     ABG - ( 25 May 2021 03:13 )  pH: 7.52  /  pCO2: 30    /  pO2: 140   / HCO3: 25    / Base Excess: 2.3   /  SaO2: 100     Lactate: x      [ ] SBT    GI:      [ x]  bowel regiment   Nutrition: continue    [ ] TPN/PPN     [x ]  Tube Feeds -> Ismolyte @ 50 ml/hr    [ ]   Diet      Renal: Continue I&Os monitoring, Cole catheter  [ x] Yes    [ ]   No -> will D/C Cole and replace with taxes cath  Lytes/Acid-base: replete hypokalemia, hypomagnesemia, hypocalcemia, hypophosphatemia     ID: leukocytosis -> continue to monitor:         IV Abx [x ] Yes, [ ] No;  ID consulted [x ] Yes, [ ] No        Cultures [ ] Respiratory     [ ] Blood     [ ] Urine    [ x] Fluids craniectomy site  [ ]  None             Lines:   [x ] Right   [ ] Left                      [ ] Subclavian TLC        [x ]  Internal Jugular TLC     [ ]  Femoral TLC                     [ ] Subclavian Cordis    [ ] Internal Jugular Cordis   [ ] Femoral Cordis                     [ ] PICC     [ ]  Midline   [ ] Peripheral IVs                                        [ ] HD catheter               [x ] Right   [ ] Left                     [x ] Radial A-Line           [ ] Femoral A-Line            [ ] Axillary A-Line                  Heme: continue to evaluate for acute blood loss anemia- trend Hg/Hct                     Transfused as indicated     [ ] PRBC   [ ] Platelets   [ ] FFP   [ ] Cryoprecipitate    Endocrine: prevent and treat hyperglycemia with insulin as needed,           Insuline drip [ ] Yes, [x ] No     PV: follow pulse exam    Skin: decub precautions    DVT Prophylaxis:  [ ] SCDs  [ ] Heparin SQ  [x ] Lovenox      Stress Gastritis Prophylaxis: PPI/H2 Blockers if indicated    Mobility: patient is evaluated at the bedside with mobility team and the goals for today are discussed with PT [ x] out of bed to chair    PATIENT/FAMILY/SURROGATE CONFERENCE:   PURPOSE: To obtain necessary information, To discuss treatment options  under consideration today.    I saw and evaluated the patient personally. I have reviewed and agree with note above. Treatment plan discussed with SICU team, nurses and primary team at the time of the multidisciplinary rounds. The above note is NOT written at the time of rounds and will reflect all changes throughout management of the patient for the day note is written for.    Jenny Allen MD, FACS  Trauma/ACS/SCC Attending

## 2021-05-25 NOTE — OCCUPATIONAL THERAPY INITIAL EVALUATION ADULT - SPECIFY REASON(S)
Attempted to see pt for OT evaluation however pt currently having trach/peg tube placed at bedside, will follow up when pt clear
Attempted to see pt for OT evaluation however pt remains on bedrest, will follow up

## 2021-05-25 NOTE — PROGRESS NOTE ADULT - SUBJECTIVE AND OBJECTIVE BOX
POD#9/18    S/P Left Craniectomy for evac of SDH  S/P Wound Revision    Pt seen and examined at bedside.       Vital Signs Last 24 Hrs  T(C): 37.6 (25 May 2021 07:17), Max: 38.8 (24 May 2021 20:00)  T(F): 99.6 (25 May 2021 07:17), Max: 101.9 (24 May 2021 20:00)  HR: 97 (25 May 2021 08:10) (82 - 123)  BP: 129/79 (25 May 2021 07:00) (129/73 - 162/90)  BP(mean): 99 (25 May 2021 07:00) (95 - 121)  RR: 24 (25 May 2021 07:00) (8 - 53)  SpO2: 99% (25 May 2021 08:10) (97% - 100%)    I&O's Detail    24 May 2021 07:01  -  25 May 2021 07:00  --------------------------------------------------------  IN:    Enteral Tube Flush: 200 mL    IV PiggyBack: 100 mL    IV PiggyBack: 50 mL    IV PiggyBack: 400 mL    IV PiggyBack: 200 mL    Osmolite: 1200 mL    sodium chloride 3%: 1920 mL  Total IN: 4070 mL    OUT:    Bulb (mL): 460 mL    Indwelling Catheter - Urethral (mL): 800 mL    Rectal Tube (mL): 220 mL    Voided (mL): 1900 mL  Total OUT: 3380 mL    Total NET: 690 mL        I&O's Summary    24 May 2021 07:01  -  25 May 2021 07:00  --------------------------------------------------------  IN: 4070 mL / OUT: 3380 mL / NET: 690 mL        REVIEW OF SYSTEMS    [ ] A ten-point review of systems was otherwise negative except as noted.  [ ] Due to altered mental status/intubation, subjective information were not able to be obtained from the patient. History was obtained, to the extent possible, from review of the chart and collateral sources of information.      PHYSICAL EXAM:  Neurological:                LABS:                        8.0    8.78  )-----------( 363      ( 24 May 2021 23:40 )             24.4     05-24    142  |  108  |  4<L>  ----------------------------<  106<H>  4.0   |  22  |  <0.5<L>    Ca    8.3<L>      24 May 2021 23:40  Phos  4.0     05-24  Mg     1.7     05-24      PT/INR - ( 24 May 2021 05:54 )   PT: 13.70 sec;   INR: 1.19 ratio         PTT - ( 24 May 2021 05:54 )  PTT:30.8 sec        CAPILLARY BLOOD GLUCOSE      POCT Blood Glucose.: 104 mg/dL (25 May 2021 06:20)  POCT Blood Glucose.: 99 mg/dL (25 May 2021 00:01)  POCT Blood Glucose.: 107 mg/dL (24 May 2021 10:57)    Magnesium, Serum: 1.7 mg/dL (05-24-21 @ 23:40)          CSF Analysis: [] N/A      Allergies    No Known Allergies    Intolerances      MEDICATIONS:  Antibiotics:  ampicillin/sulbactam  IVPB 3 Gram(s) IV Intermittent every 6 hours    Neuro:  acetaminophen   Tablet .. 650 milliGRAM(s) Oral every 6 hours PRN  levETIRAcetam  IVPB 500 milliGRAM(s) IV Intermittent every 12 hours      IVF:  magnesium sulfate  IVPB 2 Gram(s) IV Intermittent once  sodium chloride 4 Gram(s) Oral every 8 hours  sodium chloride 3%. 500 milliLiter(s) IV Continuous <Continuous>      CULTURES:  Culture Results:   No growth to date. (05-23 @ 19:19)  Culture Results:   Few Enterococcus faecalis (05-21 @ 12:00)      RADIOLOGY & ADDITIONAL TESTS:      ASSESSMENT:  45y Male s/p    SUBDURAL HEMATOMA;RESPIRATORY FAILURE;FALL    ^FALL    No pertinent family history in first degree relatives    Family history of essential hypertension (Father)    Handoff    MEWS Score    No pertinent past medical history    Alcohol abuse    GERD (gastroesophageal reflux disease)    ETOH abuse    Hypomagnesemia    Seizure disorder    Subdural hematoma    S/P subdural hematoma evacuation    Subdural hematoma    Subdural hematoma    Subdural hematoma    Respiratory failure    ETOH abuse    Palliative care by specialist    Insertion of non-tunneled central venous catheter (CVC) in patient age 5 years of age or older    Craniotomy, decompressive    Decompressive craniotomy    Placement, tracheostomy and percutaneous endoscopic gastrostomy    No significant past surgical history    H/O hemorrhoidectomy    FALL    23    Respiratory failure    Fall    SysAdmin_VisitLink        PLAN:   POD#9/18    S/P Left Craniectomy for evac of SDH  S/P Wound Revision    Pt seen and examined at bedside.       Vital Signs Last 24 Hrs  T(C): 37.6 (25 May 2021 07:17), Max: 38.8 (24 May 2021 20:00)  T(F): 99.6 (25 May 2021 07:17), Max: 101.9 (24 May 2021 20:00)  HR: 97 (25 May 2021 08:10) (82 - 123)  BP: 129/79 (25 May 2021 07:00) (129/73 - 162/90)  BP(mean): 99 (25 May 2021 07:00) (95 - 121)  RR: 24 (25 May 2021 07:00) (8 - 53)  SpO2: 99% (25 May 2021 08:10) (97% - 100%)    I&O's Detail    24 May 2021 07:01  -  25 May 2021 07:00  --------------------------------------------------------  IN:    Enteral Tube Flush: 200 mL    IV PiggyBack: 100 mL    IV PiggyBack: 50 mL    IV PiggyBack: 400 mL    IV PiggyBack: 200 mL    Osmolite: 1200 mL    sodium chloride 3%: 1920 mL  Total IN: 4070 mL    OUT:    Bulb (mL): 460 mL    Indwelling Catheter - Urethral (mL): 800 mL    Rectal Tube (mL): 220 mL    Voided (mL): 1900 mL  Total OUT: 3380 mL    Total NET: 690 mL        I&O's Summary    24 May 2021 07:01  -  25 May 2021 07:00  --------------------------------------------------------  IN: 4070 mL / OUT: 3380 mL / NET: 690 mL        REVIEW OF SYSTEMS    [ ] A ten-point review of systems was otherwise negative except as noted.  [X] Due to altered mental status/intubation, subjective information were not able to be obtained from the patient. History was obtained, to the extent possible, from review of the chart and collateral sources of information.      PHYSICAL EXAM:  Neurological:                LABS:                        8.0    8.78  )-----------( 363      ( 24 May 2021 23:40 )             24.4     05-24    142  |  108  |  4<L>  ----------------------------<  106<H>  4.0   |  22  |  <0.5<L>    Ca    8.3<L>      24 May 2021 23:40  Phos  4.0     05-24  Mg     1.7     05-24      PT/INR - ( 24 May 2021 05:54 )   PT: 13.70 sec;   INR: 1.19 ratio    PTT - ( 24 May 2021 05:54 )  PTT:30.8 sec    Allergies    No Known Allergies    Intolerances      MEDICATIONS:  Antibiotics:  ampicillin/sulbactam  IVPB 3 Gram(s) IV Intermittent every 6 hours    Neuro:  acetaminophen   Tablet .. 650 milliGRAM(s) Oral every 6 hours PRN  levETIRAcetam  IVPB 500 milliGRAM(s) IV Intermittent every 12 hours      IVF:  magnesium sulfate  IVPB 2 Gram(s) IV Intermittent once  sodium chloride 4 Gram(s) Oral every 8 hours  sodium chloride 3%. 500 milliLiter(s) IV Continuous <Continuous>      CULTURES:  Culture Results:   No growth to date. (05-23 @ 19:19)  Culture Results:   Few Enterococcus faecalis (05-21 @ 12:00)      RADIOLOGY & ADDITIONAL TESTS:      ASSESSMENT:  45y Male s/p    SUBDURAL HEMATOMA;RESPIRATORY FAILURE;FALL    ^FALL    No pertinent family history in first degree relatives    Family history of essential hypertension (Father)    Handoff    MEWS Score    No pertinent past medical history    Alcohol abuse    GERD (gastroesophageal reflux disease)    ETOH abuse    Hypomagnesemia    Seizure disorder    Subdural hematoma    S/P subdural hematoma evacuation    Subdural hematoma    Subdural hematoma    Subdural hematoma    Respiratory failure    ETOH abuse    Palliative care by specialist    Insertion of non-tunneled central venous catheter (CVC) in patient age 5 years of age or older    Craniotomy, decompressive    Decompressive craniotomy    Placement, tracheostomy and percutaneous endoscopic gastrostomy    No significant past surgical history    H/O hemorrhoidectomy    FALL    23    Respiratory failure    Fall    SysAdmin_VisitLink        PLAN:  Continue Current Mgmt POD#9/18    S/P Left Craniectomy for evac of SDH  S/P Wound Revision    Pt seen and examined at bedside. Pt trach'd. No sedation. Currently off vent.    Vital Signs Last 24 Hrs  T(C): 37.6 (25 May 2021 07:17), Max: 38.8 (24 May 2021 20:00)  T(F): 99.6 (25 May 2021 07:17), Max: 101.9 (24 May 2021 20:00)  HR: 97 (25 May 2021 08:10) (82 - 123)  BP: 129/79 (25 May 2021 07:00) (129/73 - 162/90)  BP(mean): 99 (25 May 2021 07:00) (95 - 121)  RR: 24 (25 May 2021 07:00) (8 - 53)  SpO2: 99% (25 May 2021 08:10) (97% - 100%)    I&O's Detail    24 May 2021 07:01  -  25 May 2021 07:00  --------------------------------------------------------  IN:    Enteral Tube Flush: 200 mL    IV PiggyBack: 100 mL    IV PiggyBack: 50 mL    IV PiggyBack: 400 mL    IV PiggyBack: 200 mL    Osmolite: 1200 mL    sodium chloride 3%: 1920 mL  Total IN: 4070 mL    OUT:    Bulb (mL): 460 mL    Indwelling Catheter - Urethral (mL): 800 mL    Rectal Tube (mL): 220 mL    Voided (mL): 1900 mL  Total OUT: 3380 mL    Total NET: 690 mL        I&O's Summary    24 May 2021 07:01  -  25 May 2021 07:00  --------------------------------------------------------  IN: 4070 mL / OUT: 3380 mL / NET: 690 mL        REVIEW OF SYSTEMS    [ ] A ten-point review of systems was otherwise negative except as noted.  [X] Due to altered mental status/intubation, subjective information were not able to be obtained from the patient. History was obtained, to the extent possible, from review of the chart and collateral sources of information.      PHYSICAL EXAM:  Neurological:  Opens eyes  Pupils reactive  ? tracking  Does not follow commands at this time  Moves left side spontaneously  w/d RLE to pain  Incision intact  KIKE in place    LABS:                        8.0    8.78  )-----------( 363      ( 24 May 2021 23:40 )             24.4     05-24    142  |  108  |  4<L>  ----------------------------<  106<H>  4.0   |  22  |  <0.5<L>    Ca    8.3<L>      24 May 2021 23:40  Phos  4.0     05-24  Mg     1.7     05-24      PT/INR - ( 24 May 2021 05:54 )   PT: 13.70 sec;   INR: 1.19 ratio    PTT - ( 24 May 2021 05:54 )  PTT:30.8 sec    Allergies    No Known Allergies    Intolerances      MEDICATIONS:  Antibiotics:  ampicillin/sulbactam  IVPB 3 Gram(s) IV Intermittent every 6 hours    Neuro:  acetaminophen   Tablet .. 650 milliGRAM(s) Oral every 6 hours PRN  levETIRAcetam  IVPB 500 milliGRAM(s) IV Intermittent every 12 hours      IVF:  magnesium sulfate  IVPB 2 Gram(s) IV Intermittent once  sodium chloride 4 Gram(s) Oral every 8 hours  sodium chloride 3%. 500 milliLiter(s) IV Continuous <Continuous>      CULTURES:  Culture Results:   No growth to date. (05-23 @ 19:19)  Culture Results:   Few Enterococcus faecalis (05-21 @ 12:00)      RADIOLOGY & ADDITIONAL TESTS:      ASSESSMENT:  45y Male s/p    SUBDURAL HEMATOMA;RESPIRATORY FAILURE;FALL    ^FALL    No pertinent family history in first degree relatives    Family history of essential hypertension (Father)    Handoff    MEWS Score    No pertinent past medical history    Alcohol abuse    GERD (gastroesophageal reflux disease)    ETOH abuse    Hypomagnesemia    Seizure disorder    Subdural hematoma    S/P subdural hematoma evacuation    Subdural hematoma    Subdural hematoma    Subdural hematoma    Respiratory failure    ETOH abuse    Palliative care by specialist    Insertion of non-tunneled central venous catheter (CVC) in patient age 5 years of age or older    Craniotomy, decompressive    Decompressive craniotomy    Placement, tracheostomy and percutaneous endoscopic gastrostomy    No significant past surgical history    H/O hemorrhoidectomy    FALL    23    Respiratory failure    Fall    SysAdmin_VisitLink        PLAN:  Continue Current Mgmt

## 2021-05-25 NOTE — PROGRESS NOTE ADULT - SUBJECTIVE AND OBJECTIVE BOX
THIAGO SANDRA  45y, Male  Allergy: No Known Allergies      LOS  19d    CHIEF COMPLAINT: found down unresponsive (25 May 2021 02:26)      INTERVAL EVENTS/HPI  - Febrile  - T(F): , Max: 101.9 (05-24-21 @ 20:00)  - Tolerating medication  - WBC Count: 8.78 (05-24-21 @ 23:40)  WBC Count: 5.96 (05-23-21 @ 23:49)  - Creatinine, Serum: <0.5 (05-24-21 @ 23:40)  Creatinine, Serum: <0.5 (05-24-21 @ 16:10)       ROS  ***    VITALS:  T(F): 100.3, Max: 101.9 (05-24-21 @ 20:00)  HR: 87  BP: 159/94  RR: 12Vital Signs Last 24 Hrs  T(C): 37.9 (25 May 2021 04:00), Max: 38.8 (24 May 2021 20:00)  T(F): 100.3 (25 May 2021 04:00), Max: 101.9 (24 May 2021 20:00)  HR: 87 (25 May 2021 04:00) (75 - 123)  BP: 159/94 (25 May 2021 04:00) (123/78 - 162/90)  BP(mean): 120 (25 May 2021 04:00) (93 - 121)  RR: 12 (25 May 2021 04:00) (8 - 53)  SpO2: 100% (25 May 2021 04:00) (97% - 100%)    PHYSICAL EXAM:  ***    FH: Non-contributory  Social Hx: Non-contributory    TESTS & MEASUREMENTS:                        8.0    8.78  )-----------( 363      ( 24 May 2021 23:40 )             24.4     05-24    142  |  108  |  4<L>  ----------------------------<  106<H>  4.0   |  22  |  <0.5<L>    Ca    8.3<L>      24 May 2021 23:40  Phos  4.0     05-24  Mg     1.7     05-24      eGFR if Non African American: 142 mL/min/1.73M2 (05-24-21 @ 23:40)  eGFR if : 165 mL/min/1.73M2 (05-24-21 @ 23:40)  eGFR if Non African American: 142 mL/min/1.73M2 (05-24-21 @ 16:10)  eGFR if : 165 mL/min/1.73M2 (05-24-21 @ 16:10)          Culture - Blood (collected 05-23-21 @ 19:19)  Source: .Blood Blood-Peripheral  Preliminary Report (05-25-21 @ 03:01):    No growth to date.    Culture - Body Fluid with Gram Stain (collected 05-21-21 @ 12:00)  Source: .Body Fluid CSF  Gram Stain (05-22-21 @ 02:58):    polymorphonuclear leukocytes seen    Gram positive cocci in pairs seen    by cytocentrifuge  Preliminary Report (05-22-21 @ 18:54):    Few Enterococcus faecalis  Organism: Enterococcus faecalis (05-23-21 @ 17:14)  Organism: Enterococcus faecalis (05-23-21 @ 17:14)      -  Ampicillin: S <=2 Predicts results to ampicillin/sulbactam, amoxacillin-clavulanate and  piperacillin-tazobactam.      -  Tetra/Doxy: R >8      -  Vancomycin: S 2      Method Type: AISHA    Culture - Blood (collected 05-20-21 @ 02:00)  Source: .Blood Blood  Preliminary Report (05-21-21 @ 07:02):    No growth to date.    Culture - Blood (collected 05-19-21 @ 00:27)  Source: .Blood None  Final Report (05-24-21 @ 07:00):    No Growth Final    Culture - Surgical Swab (collected 05-16-21 @ 08:54)  Source: .Surgical Swab None  Final Report (05-21-21 @ 16:44):    Rare Coag Negative Staphylococcus  Organism: Coag Negative Staphylococcus (05-21-21 @ 16:44)  Organism: Coag Negative Staphylococcus (05-21-21 @ 16:44)      -  Ampicillin/Sulbactam: S <=8/4      -  Cefazolin: S <=4      -  Clindamycin: S <=0.25      -  Erythromycin: S <=0.25      -  Gentamicin: S <=1 Should not be used as monotherapy      -  Oxacillin: S <=0.25      -  Penicillin: R 0.25      -  RIF- Rifampin: S <=1 Should not be used as monotherapy      -  Tetra/Doxy: S <=1      -  Trimethoprim/Sulfamethoxazole: S <=0.5/9.5      -  Vancomycin: S 0.5      Method Type: AISHA    Culture - Blood (collected 05-13-21 @ 11:19)  Source: .Blood None  Final Report (05-18-21 @ 22:03):    No Growth Final    Culture - Blood (collected 05-11-21 @ 11:18)  Source: .Blood None  Gram Stain (05-12-21 @ 02:53):    Growth in anaerobic bottle: Gram Negative Rods    Growth in aerobic bottle: Gram Negative Rods  Final Report (05-13-21 @ 16:09):    Growth in aerobic and anaerobic bottles: Klebsiella variicola    ***Blood Panel PCR results on this specimen are available    approximately 3 hours after the Gram stain result.***    Gram stain, PCR, and/or culture results may not always    correspond due to difference in methodologies.    ************************************************************    This PCR assay was performed by multiplex PCR. This    Assay tests for 66 bacterial and resistance gene targets.    Please refer to the [facility] [employer] test directory    at [email or web address] for details.  Organism: Blood Culture PCR  Klebsiella variicola (05-13-21 @ 16:09)  Organism: Klebsiella variicola (05-13-21 @ 16:09)      -  Amikacin: S <=16      -  Ampicillin: R <=8 These ampicillin results predict results for amoxicillin      -  Ampicillin/Sulbactam: S <=4/2 Enterobacter, Citrobacter, and Serratia may develop resistance during prolonged therapy (3-4 days)      -  Aztreonam: S <=4      -  Cefazolin: S <=2 Enterobacter, Citrobacter, and Serratia may develop resistance during prolonged therapy (3-4 days)      -  Cefepime: S <=2      -  Cefoxitin: S <=8      -  Ceftriaxone: S <=1 Enterobacter, Citrobacter, and Serratia may develop resistance during prolonged therapy      -  Ciprofloxacin: S <=0.25      -  Ertapenem: S <=0.5      -  Gentamicin: S <=2      -  Imipenem: S <=1      -  Levofloxacin: S <=0.5      -  Meropenem: S <=1      -  Piperacillin/Tazobactam: S <=8      -  Tobramycin: S <=2      -  Trimethoprim/Sulfamethoxazole: S <=0.5/9.5      Method Type: AISHA  Organism: Blood Culture PCR (05-13-21 @ 16:09)      -  Klebsiella pneumoniae: Detec      Method Type: PCR    Culture - Bronchial (collected 05-11-21 @ 10:12)  Source: Bronch Wash Bronchoalveolar Lavage  Gram Stain (05-12-21 @ 07:04):    Few polymorphonuclear leukocytes per low power field    No Squamous epithelial cells per low power field    Few Gram Positive Rods per oil power field  Final Report (05-13-21 @ 16:19):    Moderate Klebsiella variicola    Moderate Serratia marcescens    Normal Respiratory Liz present  Organism: Klebsiella variicola  Serratia marcescens (05-13-21 @ 16:19)  Organism: Serratia marcescens (05-13-21 @ 16:19)      -  Amikacin: S <=16      -  Amoxicillin/Clavulanic Acid: R >16/8      -  Ampicillin: R <=8 These ampicillin results predict results for amoxicillin      -  Ampicillin/Sulbactam: R 16/8 Enterobacter, Citrobacter, and Serratia may develop resistance during prolonged therapy (3-4 days)      -  Aztreonam: S <=4      -  Cefazolin: R >16 Enterobacter, Citrobacter, and Serratia may develop resistance during prolonged therapy (3-4 days)      -  Cefepime: S <=2      -  Cefoxitin: R <=8      -  Ceftriaxone: S <=1 Enterobacter, Citrobacter, and Serratia may develop resistance during prolonged therapy      -  Ciprofloxacin: S <=0.25      -  Ertapenem: S <=0.5      -  Gentamicin: S <=2      -  Levofloxacin: S <=0.5      -  Meropenem: S <=1      -  Piperacillin/Tazobactam: S <=8      -  Tobramycin: S <=2      -  Trimethoprim/Sulfamethoxazole: S <=0.5/9.5      Method Type: AISHA  Organism: Klebsiella variicola (05-13-21 @ 16:19)      -  Amikacin: S <=16      -  Amoxicillin/Clavulanic Acid: R >16/8      -  Ampicillin: R <=8 These ampicillin results predict results for amoxicillin      -  Ampicillin/Sulbactam: I 16/8 Enterobacter, Citrobacter, and Serratia may develop resistance during prolonged therapy (3-4 days)      -  Aztreonam: R >16      -  Cefazolin: R >16 Enterobacter, Citrobacter, and Serratia may develop resistance during prolonged therapy (3-4 days)      -  Cefepime: S <=2      -  Cefoxitin: S <=8      -  Ceftriaxone: S <=1 Enterobacter, Citrobacter, and Serratia may develop resistance during prolonged therapy      -  Ciprofloxacin: S <=0.25      -  Ertapenem: S <=0.5      -  Gentamicin: S <=2      -  Imipenem: S <=1      -  Levofloxacin: S <=0.5      -  Meropenem: S <=1      -  Piperacillin/Tazobactam: S <=8      -  Tobramycin: S 4      -  Trimethoprim/Sulfamethoxazole: S <=0.5/9.5      Method Type: AISHA            INFECTIOUS DISEASES TESTING  Vancomycin Level, Trough: 7.6 (05-24-21 @ 05:54)  COVID-19 PCR: NotDetec (05-21-21 @ 04:30)  COVID-19 PCR: NotDetec (05-14-21 @ 18:58)  Vancomycin Level, Trough: 4.1 (05-13-21 @ 05:50)  HIV-1/2 Combo Result: Nonreact (05-09-21 @ 23:30)  Rapid RVP Result: NotDetec (05-06-21 @ 22:44)  COVID-19 PCR: NotDetec (04-06-21 @ 16:46)  COVID-19 PCR: NotDetec (03-29-21 @ 19:35)      INFLAMMATORY MARKERS      RADIOLOGY & ADDITIONAL TESTS:  I have personally reviewed the last available Chest xray  CXR      CT      CARDIOLOGY TESTING  12 Lead ECG:   Ventricular Rate 78 BPM    Atrial Rate 78 BPM    P-R Interval 162 ms    QRS Duration 70 ms    Q-T Interval 364 ms    QTC Calculation(Bazett) 414 ms    P Axis 55 degrees    R Axis 30 degrees    T Axis 62 degrees    Diagnosis Line Normal sinus rhythm  Normal ECG    Confirmed by SALAS WASHINGTON MD ([id number]) on 5/14/2021 7:01:28 AM (05-14-21 @ 04:52)      MEDICATIONS  ampicillin/sulbactam  IVPB 3 IV Intermittent every 6 hours  artificial tears (preservative free) Ophthalmic Solution 2 Both EYES three times a day  chlorhexidine 0.12% Liquid 15 Oral Mucosa two times a day  chlorhexidine 4% Liquid 1 Topical <User Schedule>  heparin   Injectable 5000 SubCutaneous every 8 hours  insulin lispro (ADMELOG) corrective regimen sliding scale  SubCutaneous every 6 hours  levETIRAcetam  IVPB 500 IV Intermittent every 12 hours  magnesium sulfate  IVPB 2 IV Intermittent once  pantoprazole   Suspension 40 Oral daily  propranolol 20 Oral every 8 hours  senna 2 Oral at bedtime  sodium chloride 4 Oral every 8 hours  sodium chloride 3%. 500 IV Continuous <Continuous>      WEIGHT  Weight (kg): 79.4 (05-23-21 @ 16:30)  Creatinine, Serum: <0.5 mg/dL (05-24-21 @ 23:40)  Creatinine, Serum: <0.5 mg/dL (05-24-21 @ 16:10)      ANTIBIOTICS:  ampicillin/sulbactam  IVPB 3 Gram(s) IV Intermittent every 6 hours      All available historical records have been reviewed       THIAGO SANDRA  45y, Male  Allergy: No Known Allergies      LOS  19d    CHIEF COMPLAINT: found down unresponsive (25 May 2021 02:26)      INTERVAL EVENTS/HPI  - Febrile  - T(F): , Max: 101.9 (05-24-21 @ 20:00)  - Tolerating medication  - WBC Count: 8.78 (05-24-21 @ 23:40)  WBC Count: 5.96 (05-23-21 @ 23:49)  - Creatinine, Serum: <0.5 (05-24-21 @ 23:40)  Creatinine, Serum: <0.5 (05-24-21 @ 16:10)       ROS  unable to obtain history secondary to patient's mental status and/or sedation     VITALS:  T(F): 100.3, Max: 101.9 (05-24-21 @ 20:00)  HR: 87  BP: 159/94  RR: 12Vital Signs Last 24 Hrs  T(C): 37.9 (25 May 2021 04:00), Max: 38.8 (24 May 2021 20:00)  T(F): 100.3 (25 May 2021 04:00), Max: 101.9 (24 May 2021 20:00)  HR: 87 (25 May 2021 04:00) (75 - 123)  BP: 159/94 (25 May 2021 04:00) (123/78 - 162/90)  BP(mean): 120 (25 May 2021 04:00) (93 - 121)  RR: 12 (25 May 2021 04:00) (8 - 53)  SpO2: 100% (25 May 2021 04:00) (97% - 100%)    PHYSICAL EXAM:  Gen: NAD, resting in bed  HEENT staples, KIKE serosanguinous fluid   Neck: supple, no lymphadenopathy  CV: Regular rate & regular rhythm  Lungs: decreased BS at bases, no fremitus  Abdomen: Soft, BS present  Ext: Warm, well perfused  Neuro: awake  Skin: no rash, no erythema  Lines: no phlebitis     FH: Non-contributory  Social Hx: Non-contributory    TESTS & MEASUREMENTS:                        8.0    8.78  )-----------( 363      ( 24 May 2021 23:40 )             24.4     05-24    142  |  108  |  4<L>  ----------------------------<  106<H>  4.0   |  22  |  <0.5<L>    Ca    8.3<L>      24 May 2021 23:40  Phos  4.0     05-24  Mg     1.7     05-24      eGFR if Non African American: 142 mL/min/1.73M2 (05-24-21 @ 23:40)  eGFR if : 165 mL/min/1.73M2 (05-24-21 @ 23:40)  eGFR if Non African American: 142 mL/min/1.73M2 (05-24-21 @ 16:10)  eGFR if : 165 mL/min/1.73M2 (05-24-21 @ 16:10)          Culture - Blood (collected 05-23-21 @ 19:19)  Source: .Blood Blood-Peripheral  Preliminary Report (05-25-21 @ 03:01):    No growth to date.    Culture - Body Fluid with Gram Stain (collected 05-21-21 @ 12:00)  Source: .Body Fluid CSF  Gram Stain (05-22-21 @ 02:58):    polymorphonuclear leukocytes seen    Gram positive cocci in pairs seen    by cytocentrifuge  Preliminary Report (05-22-21 @ 18:54):    Few Enterococcus faecalis  Organism: Enterococcus faecalis (05-23-21 @ 17:14)  Organism: Enterococcus faecalis (05-23-21 @ 17:14)      -  Ampicillin: S <=2 Predicts results to ampicillin/sulbactam, amoxacillin-clavulanate and  piperacillin-tazobactam.      -  Tetra/Doxy: R >8      -  Vancomycin: S 2      Method Type: AISHA    Culture - Blood (collected 05-20-21 @ 02:00)  Source: .Blood Blood  Preliminary Report (05-21-21 @ 07:02):    No growth to date.    Culture - Blood (collected 05-19-21 @ 00:27)  Source: .Blood None  Final Report (05-24-21 @ 07:00):    No Growth Final    Culture - Surgical Swab (collected 05-16-21 @ 08:54)  Source: .Surgical Swab None  Final Report (05-21-21 @ 16:44):    Rare Coag Negative Staphylococcus  Organism: Coag Negative Staphylococcus (05-21-21 @ 16:44)  Organism: Coag Negative Staphylococcus (05-21-21 @ 16:44)      -  Ampicillin/Sulbactam: S <=8/4      -  Cefazolin: S <=4      -  Clindamycin: S <=0.25      -  Erythromycin: S <=0.25      -  Gentamicin: S <=1 Should not be used as monotherapy      -  Oxacillin: S <=0.25      -  Penicillin: R 0.25      -  RIF- Rifampin: S <=1 Should not be used as monotherapy      -  Tetra/Doxy: S <=1      -  Trimethoprim/Sulfamethoxazole: S <=0.5/9.5      -  Vancomycin: S 0.5      Method Type: AISHA    Culture - Blood (collected 05-13-21 @ 11:19)  Source: .Blood None  Final Report (05-18-21 @ 22:03):    No Growth Final    Culture - Blood (collected 05-11-21 @ 11:18)  Source: .Blood None  Gram Stain (05-12-21 @ 02:53):    Growth in anaerobic bottle: Gram Negative Rods    Growth in aerobic bottle: Gram Negative Rods  Final Report (05-13-21 @ 16:09):    Growth in aerobic and anaerobic bottles: Klebsiella variicola    ***Blood Panel PCR results on this specimen are available    approximately 3 hours after the Gram stain result.***    Gram stain, PCR, and/or culture results may not always    correspond due to difference in methodologies.    ************************************************************    This PCR assay was performed by multiplex PCR. This    Assay tests for 66 bacterial and resistance gene targets.    Please refer to the [facility] [employer] test directory    at [email or web address] for details.  Organism: Blood Culture PCR  Klebsiella variicola (05-13-21 @ 16:09)  Organism: Klebsiella variicola (05-13-21 @ 16:09)      -  Amikacin: S <=16      -  Ampicillin: R <=8 These ampicillin results predict results for amoxicillin      -  Ampicillin/Sulbactam: S <=4/2 Enterobacter, Citrobacter, and Serratia may develop resistance during prolonged therapy (3-4 days)      -  Aztreonam: S <=4      -  Cefazolin: S <=2 Enterobacter, Citrobacter, and Serratia may develop resistance during prolonged therapy (3-4 days)      -  Cefepime: S <=2      -  Cefoxitin: S <=8      -  Ceftriaxone: S <=1 Enterobacter, Citrobacter, and Serratia may develop resistance during prolonged therapy      -  Ciprofloxacin: S <=0.25      -  Ertapenem: S <=0.5      -  Gentamicin: S <=2      -  Imipenem: S <=1      -  Levofloxacin: S <=0.5      -  Meropenem: S <=1      -  Piperacillin/Tazobactam: S <=8      -  Tobramycin: S <=2      -  Trimethoprim/Sulfamethoxazole: S <=0.5/9.5      Method Type: AISHA  Organism: Blood Culture PCR (05-13-21 @ 16:09)      -  Klebsiella pneumoniae: Detec      Method Type: PCR    Culture - Bronchial (collected 05-11-21 @ 10:12)  Source: Bronch Wash Bronchoalveolar Lavage  Gram Stain (05-12-21 @ 07:04):    Few polymorphonuclear leukocytes per low power field    No Squamous epithelial cells per low power field    Few Gram Positive Rods per oil power field  Final Report (05-13-21 @ 16:19):    Moderate Klebsiella variicola    Moderate Serratia marcescens    Normal Respiratory Liz present  Organism: Klebsiella variicola  Serratia marcescens (05-13-21 @ 16:19)  Organism: Serratia marcescens (05-13-21 @ 16:19)      -  Amikacin: S <=16      -  Amoxicillin/Clavulanic Acid: R >16/8      -  Ampicillin: R <=8 These ampicillin results predict results for amoxicillin      -  Ampicillin/Sulbactam: R 16/8 Enterobacter, Citrobacter, and Serratia may develop resistance during prolonged therapy (3-4 days)      -  Aztreonam: S <=4      -  Cefazolin: R >16 Enterobacter, Citrobacter, and Serratia may develop resistance during prolonged therapy (3-4 days)      -  Cefepime: S <=2      -  Cefoxitin: R <=8      -  Ceftriaxone: S <=1 Enterobacter, Citrobacter, and Serratia may develop resistance during prolonged therapy      -  Ciprofloxacin: S <=0.25      -  Ertapenem: S <=0.5      -  Gentamicin: S <=2      -  Levofloxacin: S <=0.5      -  Meropenem: S <=1      -  Piperacillin/Tazobactam: S <=8      -  Tobramycin: S <=2      -  Trimethoprim/Sulfamethoxazole: S <=0.5/9.5      Method Type: AISHA  Organism: Klebsiella variicola (05-13-21 @ 16:19)      -  Amikacin: S <=16      -  Amoxicillin/Clavulanic Acid: R >16/8      -  Ampicillin: R <=8 These ampicillin results predict results for amoxicillin      -  Ampicillin/Sulbactam: I 16/8 Enterobacter, Citrobacter, and Serratia may develop resistance during prolonged therapy (3-4 days)      -  Aztreonam: R >16      -  Cefazolin: R >16 Enterobacter, Citrobacter, and Serratia may develop resistance during prolonged therapy (3-4 days)      -  Cefepime: S <=2      -  Cefoxitin: S <=8      -  Ceftriaxone: S <=1 Enterobacter, Citrobacter, and Serratia may develop resistance during prolonged therapy      -  Ciprofloxacin: S <=0.25      -  Ertapenem: S <=0.5      -  Gentamicin: S <=2      -  Imipenem: S <=1      -  Levofloxacin: S <=0.5      -  Meropenem: S <=1      -  Piperacillin/Tazobactam: S <=8      -  Tobramycin: S 4      -  Trimethoprim/Sulfamethoxazole: S <=0.5/9.5      Method Type: AISHA            INFECTIOUS DISEASES TESTING  Vancomycin Level, Trough: 7.6 (05-24-21 @ 05:54)  COVID-19 PCR: NotDetec (05-21-21 @ 04:30)  COVID-19 PCR: NotDetec (05-14-21 @ 18:58)  Vancomycin Level, Trough: 4.1 (05-13-21 @ 05:50)  HIV-1/2 Combo Result: Nonreact (05-09-21 @ 23:30)  Rapid RVP Result: NotDetec (05-06-21 @ 22:44)  COVID-19 PCR: NotDetec (04-06-21 @ 16:46)  COVID-19 PCR: NotDetec (03-29-21 @ 19:35)      INFLAMMATORY MARKERS      RADIOLOGY & ADDITIONAL TESTS:  I have personally reviewed the last available Chest xray  CXR      CT      CARDIOLOGY TESTING  12 Lead ECG:   Ventricular Rate 78 BPM    Atrial Rate 78 BPM    P-R Interval 162 ms    QRS Duration 70 ms    Q-T Interval 364 ms    QTC Calculation(Bazett) 414 ms    P Axis 55 degrees    R Axis 30 degrees    T Axis 62 degrees    Diagnosis Line Normal sinus rhythm  Normal ECG    Confirmed by SALAS WASHINGTON MD ([id number]) on 5/14/2021 7:01:28 AM (05-14-21 @ 04:52)      MEDICATIONS  ampicillin/sulbactam  IVPB 3 IV Intermittent every 6 hours  artificial tears (preservative free) Ophthalmic Solution 2 Both EYES three times a day  chlorhexidine 0.12% Liquid 15 Oral Mucosa two times a day  chlorhexidine 4% Liquid 1 Topical <User Schedule>  heparin   Injectable 5000 SubCutaneous every 8 hours  insulin lispro (ADMELOG) corrective regimen sliding scale  SubCutaneous every 6 hours  levETIRAcetam  IVPB 500 IV Intermittent every 12 hours  magnesium sulfate  IVPB 2 IV Intermittent once  pantoprazole   Suspension 40 Oral daily  propranolol 20 Oral every 8 hours  senna 2 Oral at bedtime  sodium chloride 4 Oral every 8 hours  sodium chloride 3%. 500 IV Continuous <Continuous>      WEIGHT  Weight (kg): 79.4 (05-23-21 @ 16:30)  Creatinine, Serum: <0.5 mg/dL (05-24-21 @ 23:40)  Creatinine, Serum: <0.5 mg/dL (05-24-21 @ 16:10)      ANTIBIOTICS:  ampicillin/sulbactam  IVPB 3 Gram(s) IV Intermittent every 6 hours      All available historical records have been reviewed

## 2021-05-26 LAB
ANION GAP SERPL CALC-SCNC: 8 MMOL/L — SIGNIFICANT CHANGE UP (ref 7–14)
ANION GAP SERPL CALC-SCNC: 9 MMOL/L — SIGNIFICANT CHANGE UP (ref 7–14)
BASOPHILS # BLD AUTO: 0.01 K/UL — SIGNIFICANT CHANGE UP (ref 0–0.2)
BASOPHILS NFR BLD AUTO: 0.1 % — SIGNIFICANT CHANGE UP (ref 0–1)
BUN SERPL-MCNC: 5 MG/DL — LOW (ref 10–20)
BUN SERPL-MCNC: 5 MG/DL — LOW (ref 10–20)
CALCIUM SERPL-MCNC: 8 MG/DL — LOW (ref 8.5–10.1)
CALCIUM SERPL-MCNC: 8.5 MG/DL — SIGNIFICANT CHANGE UP (ref 8.5–10.1)
CHLORIDE SERPL-SCNC: 101 MMOL/L — SIGNIFICANT CHANGE UP (ref 98–110)
CHLORIDE SERPL-SCNC: 107 MMOL/L — SIGNIFICANT CHANGE UP (ref 98–110)
CO2 SERPL-SCNC: 23 MMOL/L — SIGNIFICANT CHANGE UP (ref 17–32)
CO2 SERPL-SCNC: 24 MMOL/L — SIGNIFICANT CHANGE UP (ref 17–32)
CREAT SERPL-MCNC: <0.5 MG/DL — LOW (ref 0.7–1.5)
CREAT SERPL-MCNC: <0.5 MG/DL — LOW (ref 0.7–1.5)
CULTURE RESULTS: SIGNIFICANT CHANGE UP
EOSINOPHIL # BLD AUTO: 0.03 K/UL — SIGNIFICANT CHANGE UP (ref 0–0.7)
EOSINOPHIL NFR BLD AUTO: 0.3 % — SIGNIFICANT CHANGE UP (ref 0–8)
GLUCOSE BLDC GLUCOMTR-MCNC: 121 MG/DL — HIGH (ref 70–99)
GLUCOSE BLDC GLUCOMTR-MCNC: 125 MG/DL — HIGH (ref 70–99)
GLUCOSE BLDC GLUCOMTR-MCNC: 143 MG/DL — HIGH (ref 70–99)
GLUCOSE SERPL-MCNC: 112 MG/DL — HIGH (ref 70–99)
GLUCOSE SERPL-MCNC: 142 MG/DL — HIGH (ref 70–99)
HCT VFR BLD CALC: 23.7 % — LOW (ref 42–52)
HGB BLD-MCNC: 7.7 G/DL — LOW (ref 14–18)
IMM GRANULOCYTES NFR BLD AUTO: 0.6 % — HIGH (ref 0.1–0.3)
LYMPHOCYTES # BLD AUTO: 1.36 K/UL — SIGNIFICANT CHANGE UP (ref 1.2–3.4)
LYMPHOCYTES # BLD AUTO: 15.3 % — LOW (ref 20.5–51.1)
MAGNESIUM SERPL-MCNC: 1.7 MG/DL — LOW (ref 1.8–2.4)
MCHC RBC-ENTMCNC: 28.3 PG — SIGNIFICANT CHANGE UP (ref 27–31)
MCHC RBC-ENTMCNC: 32.5 G/DL — SIGNIFICANT CHANGE UP (ref 32–37)
MCV RBC AUTO: 87.1 FL — SIGNIFICANT CHANGE UP (ref 80–94)
MONOCYTES # BLD AUTO: 0.86 K/UL — HIGH (ref 0.1–0.6)
MONOCYTES NFR BLD AUTO: 9.7 % — HIGH (ref 1.7–9.3)
NEUTROPHILS # BLD AUTO: 6.58 K/UL — HIGH (ref 1.4–6.5)
NEUTROPHILS NFR BLD AUTO: 74 % — SIGNIFICANT CHANGE UP (ref 42.2–75.2)
NRBC # BLD: 0 /100 WBCS — SIGNIFICANT CHANGE UP (ref 0–0)
ORGANISM # SPEC MICROSCOPIC CNT: SIGNIFICANT CHANGE UP
ORGANISM # SPEC MICROSCOPIC CNT: SIGNIFICANT CHANGE UP
OSMOLALITY SERPL: 278 MOS/KG — SIGNIFICANT CHANGE UP (ref 275–300)
PHOSPHATE SERPL-MCNC: 3.4 MG/DL — SIGNIFICANT CHANGE UP (ref 2.1–4.9)
PLATELET # BLD AUTO: 333 K/UL — SIGNIFICANT CHANGE UP (ref 130–400)
POTASSIUM SERPL-MCNC: 3.9 MMOL/L — SIGNIFICANT CHANGE UP (ref 3.5–5)
POTASSIUM SERPL-MCNC: 4.2 MMOL/L — SIGNIFICANT CHANGE UP (ref 3.5–5)
POTASSIUM SERPL-SCNC: 3.9 MMOL/L — SIGNIFICANT CHANGE UP (ref 3.5–5)
POTASSIUM SERPL-SCNC: 4.2 MMOL/L — SIGNIFICANT CHANGE UP (ref 3.5–5)
RBC # BLD: 2.72 M/UL — LOW (ref 4.7–6.1)
RBC # FLD: 13.6 % — SIGNIFICANT CHANGE UP (ref 11.5–14.5)
SODIUM SERPL-SCNC: 133 MMOL/L — LOW (ref 135–146)
SODIUM SERPL-SCNC: 139 MMOL/L — SIGNIFICANT CHANGE UP (ref 135–146)
SPECIMEN SOURCE: SIGNIFICANT CHANGE UP
WBC # BLD: 8.89 K/UL — SIGNIFICANT CHANGE UP (ref 4.8–10.8)
WBC # FLD AUTO: 8.89 K/UL — SIGNIFICANT CHANGE UP (ref 4.8–10.8)

## 2021-05-26 PROCEDURE — 99291 CRITICAL CARE FIRST HOUR: CPT | Mod: 25

## 2021-05-26 PROCEDURE — 93970 EXTREMITY STUDY: CPT | Mod: 26

## 2021-05-26 PROCEDURE — 71045 X-RAY EXAM CHEST 1 VIEW: CPT | Mod: 26

## 2021-05-26 PROCEDURE — 70450 CT HEAD/BRAIN W/O DYE: CPT | Mod: 26

## 2021-05-26 RX ORDER — ENOXAPARIN SODIUM 100 MG/ML
40 INJECTION SUBCUTANEOUS EVERY 24 HOURS
Refills: 0 | Status: DISCONTINUED | OUTPATIENT
Start: 2021-05-26 | End: 2021-06-07

## 2021-05-26 RX ORDER — SODIUM CHLORIDE 5 G/100ML
500 INJECTION, SOLUTION INTRAVENOUS
Refills: 0 | Status: DISCONTINUED | OUTPATIENT
Start: 2021-05-26 | End: 2021-05-27

## 2021-05-26 RX ORDER — SODIUM CHLORIDE 9 MG/ML
6 INJECTION INTRAMUSCULAR; INTRAVENOUS; SUBCUTANEOUS EVERY 6 HOURS
Refills: 0 | Status: DISCONTINUED | OUTPATIENT
Start: 2021-05-26 | End: 2021-05-28

## 2021-05-26 RX ORDER — POTASSIUM CHLORIDE 20 MEQ
20 PACKET (EA) ORAL ONCE
Refills: 0 | Status: COMPLETED | OUTPATIENT
Start: 2021-05-26 | End: 2021-05-26

## 2021-05-26 RX ORDER — MAGNESIUM SULFATE 500 MG/ML
2 VIAL (ML) INJECTION ONCE
Refills: 0 | Status: COMPLETED | OUTPATIENT
Start: 2021-05-26 | End: 2021-05-26

## 2021-05-26 RX ADMIN — Medication 250 MILLIGRAM(S): at 15:53

## 2021-05-26 RX ADMIN — PANTOPRAZOLE SODIUM 40 MILLIGRAM(S): 20 TABLET, DELAYED RELEASE ORAL at 11:32

## 2021-05-26 RX ADMIN — Medication 2 DROP(S): at 05:35

## 2021-05-26 RX ADMIN — Medication 2 DROP(S): at 21:13

## 2021-05-26 RX ADMIN — SODIUM CHLORIDE 60 MILLILITER(S): 5 INJECTION, SOLUTION INTRAVENOUS at 14:26

## 2021-05-26 RX ADMIN — Medication 650 MILLIGRAM(S): at 21:19

## 2021-05-26 RX ADMIN — CHLORHEXIDINE GLUCONATE 1 APPLICATION(S): 213 SOLUTION TOPICAL at 05:35

## 2021-05-26 RX ADMIN — LEVETIRACETAM 420 MILLIGRAM(S): 250 TABLET, FILM COATED ORAL at 17:16

## 2021-05-26 RX ADMIN — CHLORHEXIDINE GLUCONATE 15 MILLILITER(S): 213 SOLUTION TOPICAL at 17:16

## 2021-05-26 RX ADMIN — MEROPENEM 200 MILLIGRAM(S): 1 INJECTION INTRAVENOUS at 05:34

## 2021-05-26 RX ADMIN — MEROPENEM 200 MILLIGRAM(S): 1 INJECTION INTRAVENOUS at 21:12

## 2021-05-26 RX ADMIN — Medication 50 MILLIEQUIVALENT(S): at 03:17

## 2021-05-26 RX ADMIN — SENNA PLUS 2 TABLET(S): 8.6 TABLET ORAL at 21:14

## 2021-05-26 RX ADMIN — Medication 25 GRAM(S): at 03:17

## 2021-05-26 RX ADMIN — Medication 250 MILLIGRAM(S): at 21:14

## 2021-05-26 RX ADMIN — SODIUM CHLORIDE 60 MILLILITER(S): 5 INJECTION, SOLUTION INTRAVENOUS at 03:14

## 2021-05-26 RX ADMIN — MEROPENEM 200 MILLIGRAM(S): 1 INJECTION INTRAVENOUS at 15:37

## 2021-05-26 RX ADMIN — SODIUM CHLORIDE 4 GRAM(S): 9 INJECTION INTRAMUSCULAR; INTRAVENOUS; SUBCUTANEOUS at 00:24

## 2021-05-26 RX ADMIN — LISINOPRIL 20 MILLIGRAM(S): 2.5 TABLET ORAL at 05:37

## 2021-05-26 RX ADMIN — SODIUM CHLORIDE 4 GRAM(S): 9 INJECTION INTRAMUSCULAR; INTRAVENOUS; SUBCUTANEOUS at 11:33

## 2021-05-26 RX ADMIN — CHLORHEXIDINE GLUCONATE 15 MILLILITER(S): 213 SOLUTION TOPICAL at 05:34

## 2021-05-26 RX ADMIN — SODIUM CHLORIDE 4 GRAM(S): 9 INJECTION INTRAMUSCULAR; INTRAVENOUS; SUBCUTANEOUS at 05:37

## 2021-05-26 RX ADMIN — Medication 2 DROP(S): at 16:26

## 2021-05-26 RX ADMIN — Medication 650 MILLIGRAM(S): at 21:49

## 2021-05-26 RX ADMIN — Medication 250 MILLIGRAM(S): at 05:34

## 2021-05-26 RX ADMIN — LEVETIRACETAM 420 MILLIGRAM(S): 250 TABLET, FILM COATED ORAL at 05:34

## 2021-05-26 RX ADMIN — HEPARIN SODIUM 5000 UNIT(S): 5000 INJECTION INTRAVENOUS; SUBCUTANEOUS at 05:35

## 2021-05-26 RX ADMIN — HEPARIN SODIUM 5000 UNIT(S): 5000 INJECTION INTRAVENOUS; SUBCUTANEOUS at 15:36

## 2021-05-26 NOTE — OCCUPATIONAL THERAPY INITIAL EVALUATION ADULT - LEVEL OF INDEPENDENCE: BED TO CHAIR, REHAB EVAL
unsafe at this time pt with +R lateral lean, pushing, not following commands, phoebe use of full body lift,  pt placed in bed in chair mode to end session in prep for OOB to chair/unable to perform

## 2021-05-26 NOTE — OCCUPATIONAL THERAPY INITIAL EVALUATION ADULT - SITTING BALANCE: DYNAMIC
Pt attempted X2 to reach to therapist with L hand to stabilize while seated EOB, once hand place on bedrail, pt maintained grasp for ~30 seconds until replacing to bed, pt with heavy R lateral lean and +pushing/poor balance

## 2021-05-26 NOTE — OCCUPATIONAL THERAPY INITIAL EVALUATION ADULT - RANGE OF MOTION EXAMINATION, UPPER EXTREMITY
RUE no AROM observed, PROM WFL; LUE gross grasp/release WFL, wrist WFL, elbow at least 1/2 AROM WFL PROM, shoulder at least 1/4 AROM, noted to attempt to assist through full range, WFL PROM

## 2021-05-26 NOTE — OCCUPATIONAL THERAPY INITIAL EVALUATION ADULT - LUE MMT, REHAB EVAL
wrist/digits 4-/5, elbow at least 3-/5, shoulder at least 2/5, not following commands for formal assessment, made via observation during activity

## 2021-05-26 NOTE — OCCUPATIONAL THERAPY INITIAL EVALUATION ADULT - NS ASR FOLLOW COMMAND OT EVAL
Pt not following simple commands at this time, however attempts to localize to sound, squeezes L hand in response to therapist placing hand in his

## 2021-05-26 NOTE — PHYSICAL THERAPY INITIAL EVALUATION ADULT - BALANCE TRAINING, PT EVAL
Goal: Balance improved by 1 grade t/o by discharge to promote safety awareness and decrease risk of falling.

## 2021-05-26 NOTE — PHYSICAL THERAPY INITIAL EVALUATION ADULT - IMPAIRMENTS FOUND, PT EVAL
arousal, attention, and cognition/cognitive impairment/decreased midline orientation/gait, locomotion, and balance/posture

## 2021-05-26 NOTE — PROGRESS NOTE ADULT - ASSESSMENT
Assessment & Plan  45M s/p fall with large left SDH and neurological status change, level 1 for emergent craniotomy    NEURO:    Hx of EtOH abuse and prior Delerium Tremens episodes    S/P craniotomy for large left SDH     - Q2H neuro checks, No sedation, GCS 11T      - Keppra 500mg BID     -3% @ 70cc -- Na goal 135 ---> begin to wean 5/25 evening    Pain control - prn low dose fentanyl IVP, Tylenol    Head of bed @ 30 degrees    Helmet delivered --> does not need while laying in bed only during PT/OT       OR 5/16:     -Revision craniotomy wound with complex closure for persistent drainage, subgaleal hematoma   Imaging:     HCT 5/16-stable post op changes    CT Venogram-left transverse sinus thrombosis, no intervention currently    5/23 NSX placed KIKE under the skin to gravity, old blood aspirated and some pus, plan for     -e. faecalis in cx --> ID following, on abx    -if continues to spine with antibiotic change 5/25 plan to re culture    RESP:   s/p trach 5/21-on Tpiece since 5/24  Sutures to be removed 5/27 05-25 @ 13:42--7.36 / 32 / 161 / 18 / 99  O2 99  Lac 1.3        CARDS:     S/P cardiac arrest in OR & MTP     - Hypertension - on Lisinopril 10mg     - Propranolol 20mg q8    GI/NUTR:     Diet: TF (Osmolite 1.5) @ 50 via PEG    GI Prophylaxis- PPI    Bowel regimen restarted - Senna      -Dignishield removed 5/25    /RENAL:     Condom cath, + voiding.     Q6 BMP for hypertonic saline     - Hyponatremic - Na goal 135 per neurosurgery     - 3% @ 80  - BMP/ Osm q6h     - Begin weaning to sodium goal of 125     - Salt tabs 4g q6     HEME/ONC:   Hg >8, MTP on this admission  DVT prophylaxis: Heparin sq    ID:    Antibiotics: Meropenem 2q8, Vanc 1g q8      -started 5/25 for spiking temps during day      - trough prior to dose #4     - ID following     - 5/11- u/a +leuk     - f/u blood culture 5/13, 5/19, 5/20- prelim neg  --> plan to reculture if continues to spike temps      -Cranial Culture 5/21-e faceium    ENDO:  q6 POC glucose  Sliding scale insulin  A1c 5.2    DISPO: SICU

## 2021-05-26 NOTE — OCCUPATIONAL THERAPY INITIAL EVALUATION ADULT - PLANNED THERAPY INTERVENTIONS, OT EVAL
ADL retraining/balance training/bed mobility training/cognitive, visual perceptual/fine motor coordination training/massage/motor coordination training/neuromuscular re-education/parent/caregiver training.../prosthetic fitting/training/strengthening/stretching/transfer training

## 2021-05-26 NOTE — OCCUPATIONAL THERAPY INITIAL EVALUATION ADULT - LIVES WITH, PROFILE
social history obtained from chart as pt is nonverbal- pt lives with spouse/children in pvt home, +JAZZ, +stairs in home

## 2021-05-26 NOTE — OCCUPATIONAL THERAPY INITIAL EVALUATION ADULT - ORIENTATION, REHAB EVAL
pt appears to attempt to localize to name, however unable to maintain visual fixation more than 1-2 seconds

## 2021-05-26 NOTE — PHYSICAL THERAPY INITIAL EVALUATION ADULT - BED MOBILITY TRAINING, PT EVAL
Goal: pt will come supine to sit to supine with min assist by discharge to facilitate return to PLOF.

## 2021-05-26 NOTE — PHYSICAL THERAPY INITIAL EVALUATION ADULT - MODALITIES TREATMENT COMMENTS
Worked on sitting balance and midline posture. pt pushes to the right in sitting. Worked on BLE/BUE weight bearing in sitting.

## 2021-05-26 NOTE — OCCUPATIONAL THERAPY INITIAL EVALUATION ADULT - PATIENT PROFILE REVIEW, REHAB EVAL
Evaluation Attempted: 13:45/yes
Evaluation Time: 13:15; pt chart thoroughly reviewed prior to OT evaluation/yes
Evaluation Time: 09:20-10:13am; pt chart thoroughly reviewed prior to OT evaluation/yes

## 2021-05-26 NOTE — OCCUPATIONAL THERAPY INITIAL EVALUATION ADULT - IMPAIRMENTS CONTRIBUTING IMPAIRED BED MOBILITY, REHAB EVAL
Pt with +R side lateral lean/impaired balance/cognition/impaired coordination/decreased ROM/decreased strength

## 2021-05-26 NOTE — OCCUPATIONAL THERAPY INITIAL EVALUATION ADULT - ADL RETRAINING, OT EVAL
Patient will perform upper body dressing with moderate assistance by discharge. ; Patient will perform lower body dressing with moderate assistance with use of appropriate adaptive equipment as needed by discharge.

## 2021-05-26 NOTE — PHYSICAL THERAPY INITIAL EVALUATION ADULT - PLANNED THERAPY INTERVENTIONS, PT EVAL
STAIR ASSESSMENT AND TRAINING AS APPROPRIATE. Goal: TBD when stair status is assessed./balance training/bed mobility training/gait training/transfer training

## 2021-05-26 NOTE — OCCUPATIONAL THERAPY INITIAL EVALUATION ADULT - GENERAL OBSERVATIONS, REHAB EVAL
Pt received semi lopez in bed in NAD, +drain to gravity only L side of head, +trach on T-piece, +tele, +BP cuff, +pulse oxi, +condom cath, +b/l LE CAIR boots, +b/l LE sequentials, pt alert, nonverbal, not resisting evaluation, PT Rosamaria present for maximum pt performance during session as well as safety, left in bed in chair mode, bed repositioned to promote L head turn/midline, RN aware, all lines intact, vitals stable throughout session, +helmet with sitting EOB.

## 2021-05-26 NOTE — PHYSICAL THERAPY INITIAL EVALUATION ADULT - GENERAL OBSERVATIONS, REHAB EVAL
pt. encountered in bed in NAD. Following therapy, pt left in bed in chair position in NAD, Trach to T piece, L central IJ and L arm midline intact, L sided KIKE drain that is to GRAVITY intact. PEG tube intact. Condom cath intact. all lines and drains intact,VSS,  pt in clear view of RN. Co-treat with ANDRES Enriquez for safety and to maximize teatment. [phone]

## 2021-05-26 NOTE — PHYSICAL THERAPY INITIAL EVALUATION ADULT - PERTINENT HX OF CURRENT PROBLEM, REHAB EVAL
45M s/p fall with large left SDH and neurological status change, S/p Left Craniectomy for evac of SDH with poor neuro recovery.

## 2021-05-26 NOTE — PROGRESS NOTE ADULT - SUBJECTIVE AND OBJECTIVE BOX
SANDRA DAS  [id number]  45y Male    Indication for ICU admission: s/p craniectomy for large L SDH  Admit Date:05-06-21  ICU Date: 05-07-21  OR Date: 05-06-21    No Known Allergies    PAST MEDICAL & SURGICAL HISTORY:  Alcohol abuse  GERD (gastroesophageal reflux disease)  ETOH abuse  Hypomagnesemia  Seizure disorder  H/O hemorrhoidectomy    Home Medications:  NONE    24HRS EVENT:  5/25  NIGHT    -patients helmet delivered -PT consulted  -helmet only while in bed  -d/c adilene  -d/c digni shield  -spiked to 101.6 axillary, abx changed to Vanc/Seamus, d/c unasyn  -Salt tabs changed from 4g q8 to 4g q6  -start to wean 3% -- Na goal 135 acceptable per nsx    DVT PTX: HSQ  GI PTX: PPI    ***Tubes/Lines/Drains  ***  Peripheral IV  Left basilic midline 5/17  Arterial Line Right radial	Date 5/17  L IJ TLC     5/12  Trach/peg 5/21    REVIEW OF SYSTEMS  [ ] A ten-point review of systems was otherwise negative except as noted.  [x] Due to altered mental status/intubation, subjective information were not able to be obtained from the patient. History was obtained, to the extent possible, from review of the chart and collateral sources of information.       SANDRA DAS  [id number]  45y Male    Indication for ICU admission: s/p craniectomy for large L SDH  Admit Date:05-06-21  ICU Date: 05-07-21  OR Date: 05-06-21    No Known Allergies    PAST MEDICAL & SURGICAL HISTORY:  Alcohol abuse  GERD (gastroesophageal reflux disease)  ETOH abuse  Hypomagnesemia  Seizure disorder  H/O hemorrhoidectomy    Home Medications:  NONE    24HRS EVENT:  5/25  NIGHT    -patients helmet delivered -PT consulted  -helmet only while in bed  -d/c adilene  -d/c digni shield  -spiked to 101.6 axillary, abx changed to Vanc/Seamus, d/c unasyn  -Salt tabs changed from 4g q8 to 4g q6  -start to wean 3% -- Na goal 135 acceptable per nsx    DVT PTX: HSQ  GI PTX: PPI    ***Tubes/Lines/Drains  ***  Peripheral IV  Left basilic midline 5/17  Arterial Line Right radial	Date 5/17  L IJ TLC     5/12  Trach/peg 5/21    REVIEW OF SYSTEMS  [ ] A ten-point review of systems was otherwise negative except as noted.  [x] Due to altered mental status/intubation, subjective information were not able to be obtained from the patient. History was obtained, to the extent possible, from review of the chart and collateral sources of information.    Daily     Daily     Diet, NPO with Tube Feed:   Tube Feeding Modality: Orogastric  Osmolite 1.5 Jeff  Total Volume for 24 Hours (mL): 1200  Continuous  Starting Tube Feed Rate mL per Hour: 50  Until Goal Tube Feed Rate (mL per Hour): 50  Tube Feed Duration (in Hours): 24  Tube Feed Start Time: 00:00  No Carb Prosource TF     Qty per Day:  3 (05-24-21 @ 19:03)      CURRENT MEDS:  Neurologic Medications  acetaminophen   Tablet .. 650 milliGRAM(s) Oral every 6 hours PRN Temp greater or equal to 38.5C (101.3F), Moderate Pain (4 - 6), Severe Pain (7 - 10)  levETIRAcetam  IVPB 500 milliGRAM(s) IV Intermittent every 12 hours    Respiratory Medications    Cardiovascular Medications  lisinopril 20 milliGRAM(s) Oral daily  propranolol 20 milliGRAM(s) Oral every 8 hours    Gastrointestinal Medications  pantoprazole   Suspension 40 milliGRAM(s) Oral daily  senna 2 Tablet(s) Oral at bedtime  sodium chloride 4 Gram(s) Oral every 6 hours  sodium chloride 3%. 500 milliLiter(s) IV Continuous <Continuous>    Genitourinary Medications    Hematologic/Oncologic Medications  heparin   Injectable 5000 Unit(s) SubCutaneous every 8 hours    Antimicrobial/Immunologic Medications  meropenem  IVPB      meropenem  IVPB 2000 milliGRAM(s) IV Intermittent every 8 hours  vancomycin  IVPB 1000 milliGRAM(s) IV Intermittent every 8 hours    Endocrine/Metabolic Medications  insulin lispro (ADMELOG) corrective regimen sliding scale   SubCutaneous every 6 hours    Topical/Other Medications  artificial tears (preservative free) Ophthalmic Solution 2 Drop(s) Both EYES three times a day  chlorhexidine 0.12% Liquid 15 milliLiter(s) Oral Mucosa two times a day  chlorhexidine 4% Liquid 1 Application(s) Topical <User Schedule>      ICU Vital Signs Last 24 Hrs  T(C): 37.3 (25 May 2021 23:00), Max: 38.7 (25 May 2021 16:00)  T(F): 99.1 (25 May 2021 23:00), Max: 101.6 (25 May 2021 16:00)  HR: 74 (26 May 2021 03:00) (73 - 102)  BP: 124/79 (26 May 2021 03:00) (116/67 - 179/81)  BP(mean): 96 (26 May 2021 03:00) (86 - 124)  ABP: 137/82 (25 May 2021 09:00) (122/77 - 137/82)  ABP(mean): 101 (25 May 2021 09:00) (95 - 101)  RR: 25 (26 May 2021 03:00) (12 - 45)  SpO2: 97% (26 May 2021 03:00) (97% - 100%)      Adult Advanced Hemodynamics Last 24 Hrs  CVP(mm Hg): --  CVP(cm H2O): --  CO: --  CI: --  PA: --  PA(mean): --  PCWP: --  SVR: --  SVRI: --  PVR: --  PVRI: --    Mode: standby  FiO2: 35    ABG - ( 25 May 2021 03:13 )  pH, Arterial: 7.52  pH, Blood: x     /  pCO2: 30    /  pO2: 140   / HCO3: 25    / Base Excess: 2.3   /  SaO2: 100                 I&O's Summary    24 May 2021 07:01  -  25 May 2021 07:00  --------------------------------------------------------  IN: 4070 mL / OUT: 3380 mL / NET: 690 mL    25 May 2021 07:01  -  26 May 2021 06:42  --------------------------------------------------------  IN: 2890 mL / OUT: 3620 mL / NET: -730 mL      I&O's Detail    24 May 2021 07:01  -  25 May 2021 07:00  --------------------------------------------------------  IN:    Enteral Tube Flush: 200 mL    IV PiggyBack: 100 mL    IV PiggyBack: 50 mL    IV PiggyBack: 400 mL    IV PiggyBack: 200 mL    Osmolite: 1200 mL    sodium chloride 3%: 1920 mL  Total IN: 4070 mL    OUT:    Bulb (mL): 460 mL    Indwelling Catheter - Urethral (mL): 800 mL    Rectal Tube (mL): 220 mL    Voided (mL): 1900 mL  Total OUT: 3380 mL    Total NET: 690 mL      25 May 2021 07:01  -  26 May 2021 06:42  --------------------------------------------------------  IN:    Enteral Tube Flush: 180 mL    IV PiggyBack: 100 mL    IV PiggyBack: 100 mL    IV PiggyBack: 250 mL    IV PiggyBack: 100 mL    Osmolite: 850 mL    sodium chloride 3%: 960 mL    sodium chloride 3%: 350 mL  Total IN: 2890 mL    OUT:    Bulb (mL): 115 mL    Voided (mL): 3505 mL  Total OUT: 3620 mL    Total NET: -730 mL          PHYSICAL EXAM:    General/Neuro  GCS: 11T  - can follow commands with LUE and LLE. tracks across midline  Pupils: PERRL    Lungs:      clear to auscultation, Normal expansion/effort.     Cardiovascular : S1, S2.  Regular rate and rhythm.     GI: Abdomen soft, Non-tender, Non-distended.      Extremities: Extremities warm, pink, well-perfused. Pulses:Rt     Lt    Derm: Good skin turgor, no skin breakdown.      CXR:       LABS:  CAPILLARY BLOOD GLUCOSE      POCT Blood Glucose.: 125 mg/dL (26 May 2021 06:38)  POCT Blood Glucose.: 138 mg/dL (25 May 2021 23:11)  POCT Blood Glucose.: 113 mg/dL (25 May 2021 18:01)  POCT Blood Glucose.: 123 mg/dL (25 May 2021 11:58)                          7.7    8.89  )-----------( 333      ( 25 May 2021 23:30 )             23.7       05-25    139  |  107  |  5<L>  ----------------------------<  142<H>  3.9   |  24  |  <0.5<L>    Ca    8.0<L>      25 May 2021 23:30  Phos  3.4     05-25  Mg     1.7     05-25                  Culture - Blood (collected 23 May 2021 19:19)  Source: .Blood Blood-Peripheral  Preliminary Report (25 May 2021 03:01):    No growth to date.

## 2021-05-26 NOTE — PROGRESS NOTE ADULT - ATTENDING COMMENTS
Critical Care: [id number]   This patient has a high probability of sudden, clinically significant deterioration, which requires the highest level of physician preparedness to intervene urgently. I managed/supervised life or organ supporting interventions that required frequent physician assessment. I devoted my full attention in the ICU to the direct care of this patient for the period of time indicated below. Time I spent with the family or surrogate(s) is included only if the patient was incapable of providing the necessary information or participating in medical decision making.   Time devoted to teaching and to any procedures I billed separately is not included.     ANIA DASGORZ 45y Male presented with SDH after the fall while intoxicate, continues to have hyponatremia requiring 3% NaCl drip, respiratory failure  Patient is examined and evaluated at the bedside with SICU team. Treatment plan discussed with SICU team, nurses and primary team.  Chest X-ray and all relevant studies reviewed during rounds.  Will continue hemodynamic monitoring as per protocol in SICU.    Neuro:  GCS [ 11T]   [x ]  Neurochecks as per SICU protocol      Sedated/Pain control with  [ ] Dilaudid drip, [ ]  Ketamine, [ ] Fentanyl, [ ] Propofol, [ ] Precedex, [ ] Versed, [ ] Ativan,              [ ] Tylenol, [ ] Gabapentin, [ ] OxyContin standing,  [ ] OxyContin PRN,  [ ] Dilaudid PRN pushes,  [ ]  PCA   [ ] Seroquel, [ ] Haldol, [ ] Zyprexa,  [ ] Clonazepam [x ] None  Paralysis [ ] Yes  [x ]  No    CV: tachycardic at times  On pressors [ ]  Yes  [x ]  No          [ ]  Levophed, [ ] Sy-Synephrine, [ ] Vasopressin, [ ]  Epinephrine          [ ] Dobutamine,  [ ]  Midodrine, [ ] Milrinone,  [ ] Others       Respiratory: Acute respiratory insufficiency -> tolerated tach caller 24+hrs                        None Invasive Support                                   [ ] BiPAP   [ ] CPAP   [ ] HFNC   [ ] NR Face Mask   [ ] NC  [x] Trach Caller                         Ventilatory support  [ ] Yes [ x] No                              [ ] PC    [ ] VC   [ ] AC   [ ] BiVent/APRV   [ ] CPAP   [ ] Other     ABG - ( 25 May 2021 03:13 )  pH: 7.52  /  pCO2: 30    /  pO2: 140   / HCO3: 25    / Base Excess: 2.3   /  SaO2: 100     Lactate: x      [ ] SBT    GI:      [ x]  bowel regiment   Nutrition: continue    [ ] TPN/PPN     [ x]  Tube Feeds -> Ismolyte @50ml/hr   [ ]   Diet      Renal: Continue I&Os monitoring, Cole catheter  [ ] Yes    [x ]   No   Lytes/Acid-base: replete hypokalemia, hypomagnesemia, hypocalcemia, hypophosphatemia     ID: leukocytosis -> continue to monitor:         IV Abx [x ] Yes, [ ] No;  ID consulted [x ] Yes, [ ] No        Cultures [ ] Respiratory     [ ] Blood     [ ] Urine    [ ] Fluids   [ ]  None             Lines:   [x ] Right   [ ] Left                      [ ] Subclavian TLC        [x ]  Internal Jugular TLC     [ ]  Femoral TLC                     [ ] Subclavian Cordis    [ ] Internal Jugular Cordis   [ ] Femoral Cordis                     [ ] PICC     [ ]  Midline   [ ] Peripheral IVs                                        [ ] HD catheter               [x ] Right   [ ] Left                     [x ] Radial A-Line           [ ] Femoral A-Line            [ ] Axillary A-Line                  Heme: continue to evaluate for acute blood loss anemia- trend Hg/Hct                     Transfused as indicated     [ ] PRBC   [ ] Platelets   [ ] FFP   [ ] Cryoprecipitate    Endocrine: prevent and treat hyperglycemia with insulin as needed,           Insuline drip [ ] Yes, [x ] No     PV: follow pulse exam    Skin: decub precautions    DVT Prophylaxis:  [ ] SCDs  [ ] Heparin SQ  [x ] Lovenox      Stress Gastritis Prophylaxis: PPI/H2 Blockers if indicated    Mobility: patient is evaluated at the bedside with mobility team and the goals for today are discussed with PT [ x] out of bed to chair    PATIENT/FAMILY/SURROGATE CONFERENCE:   PURPOSE: To obtain necessary information, To discuss treatment options  under consideration today.    I saw and evaluated the patient personally. I have reviewed and agree with note above. Treatment plan discussed with SICU team, nurses and primary team at the time of the multidisciplinary rounds. The above note is NOT written at the time of rounds and will reflect all changes throughout management of the patient for the day note is written for.    Jenny Allen MD, FACS  Trauma/ACS/SCC Attending

## 2021-05-26 NOTE — OCCUPATIONAL THERAPY INITIAL EVALUATION ADULT - FINE MOTOR COORDINATION EXAM
unable to formally assess 2* to decreased cognition, LUE noted to be able to grasp and release items, however not always purposeful, no AROM RUE

## 2021-05-26 NOTE — PROGRESS NOTE ADULT - ASSESSMENT
ASSESSMENT  45yM w/ PMHx of Etoh abuse and seizures on keppra  found down unresponsive admitted 5/6. s/p craniectomy for large L SDH    IMPRESSION  #Fevers secondary to craniotomy site collection and thrombus    5/21 aspirate craniotomy site WCX e. faecalis (S amp)    5/16 swab OR coNS (S oxacillin)    5/23 BCX NG    No leukocytosis, rule out non-infectious causes / central fever    5/19 BCX NGTD     5/18 UA unremarkable     CT 5/20 1.  Redemonstrated filling defect within the left transverse sinus, sigmoid sinus and proximal internal jugular vein compatible with thrombus. When compared to the CTA from 5/13, there has been interval partial recanalization of the left transverse sinus.  2.  Redemonstrated large left-sided craniectomy. The previously seen left scalp drain has been removed.  3.  Increasing size of the mixed density fluid collection/hematoma extending from the left extra-axial space to the overlying scalp, overall measuring 4 cm in width, previously 3 cm.  4.  Increasing frontal convexity subdural hygromas measuring 8 mm on the right and 9 mm on the left.  5.  Left to right midline shift of 4 mm, previously 3 mm.  6.  Redemonstrated acute/subacute infarcts within the left temporal lobe and insula. Additional previously demonstrated multifocal infarcts and edema are not as well delineated on this exam, recommend follow-up head CT.  #Klebsiella bacteremia , BAL also with Klebsiella but no PNA on imaging (CT/CXR)    5/13 BCX NGTD     5/11 BCX kleb variicola (S)    5/11 BAL   Moderate Klebsiella variicola &  Moderate Serratia marcescens  < from: Xray Chest 1 View- PORTABLE-Routine (05.13.21 @ 06:28) >No radiographic evidence of acute cardiopulmonary disease.    #SDH s/p craniectomy    5/16 OR WCX   Rare Coag Negative Staphylococcus (S oxacillin)- likely skin colonizer     5/16 s/p Revision craniotomy wound with complex closure for persistent drainage, subgaleal hematoma    Repeat CTH development of acute infarcts in left cerebral hemisphere and right cerebellum  #CT atelectasis   #HIV/Hep panel NR  Creatinine, Serum: <0.5 (05-14-21 @ 05:30)      RECOMMENDATIONS  - Repeat BCX, none pendnig  - s/p Unasyn 3g q6h IV continued to spike fever, changed to Seamus 2g q8h IV and Vanc 1g q8h IV 5/25-  - Please check vanc trough 30 min prior to 4th dose   - Possible OR  - trend WBC, fever curve  - Per SICU/ NSYG  - Fevers can be central as well- no leukocytosis     If any questions, please call or send a message on [employer] Teams  Spectra [phone]

## 2021-05-26 NOTE — PROGRESS NOTE ADULT - SUBJECTIVE AND OBJECTIVE BOX
SANDRA DAS  45y, Male  Allergy: No Known Allergies      LOS  20d    CHIEF COMPLAINT: found down unresponsive (26 May 2021 02:12)      INTERVAL EVENTS/HPI  - spiked fever, changed to mp/vanc  - T(F): , Max: 101.6 (05-25-21 @ 16:00)  - Tolerating medication  - WBC Count: 8.89 (05-25-21 @ 23:30)  WBC Count: 8.78 (05-24-21 @ 23:40)  - Creatinine, Serum: <0.5 (05-25-21 @ 23:30)  Creatinine, Serum: <0.5 (05-24-21 @ 23:40)       ROS  unable to obtain history secondary to patient's mental status and/or sedation     VITALS:  T(F): 99.3, Max: 101.6 (05-25-21 @ 16:00)  HR: 97  BP: 138/87  RR: 19Vital Signs Last 24 Hrs  T(C): 37.4 (26 May 2021 07:40), Max: 38.7 (25 May 2021 16:00)  T(F): 99.3 (26 May 2021 07:40), Max: 101.6 (25 May 2021 16:00)  HR: 97 (26 May 2021 06:00) (73 - 104)  BP: 138/87 (26 May 2021 07:00) (116/67 - 179/81)  BP(mean): 108 (26 May 2021 07:00) (86 - 124)  RR: 19 (26 May 2021 07:00) (12 - 45)  SpO2: 100% (26 May 2021 07:00) (97% - 100%)    PHYSICAL EXAM:  Gen: NAD, resting in bed  HEENT staples, KIKE serosanguinous fluid   Neck: supple, no lymphadenopathy  CV: Regular rate & regular rhythm  Lungs: decreased BS at bases, no fremitus  Abdomen: Soft, BS present  Ext: Warm, well perfused  Neuro: awake  Skin: no rash, no erythema  Lines: no phlebitis       FH: Non-contributory  Social Hx: Non-contributory    TESTS & MEASUREMENTS:                        7.7    8.89  )-----------( 333      ( 25 May 2021 23:30 )             23.7     05-25    139  |  107  |  5<L>  ----------------------------<  142<H>  3.9   |  24  |  <0.5<L>    Ca    8.0<L>      25 May 2021 23:30  Phos  3.4     05-25  Mg     1.7     05-25      eGFR if Non African American: 142 mL/min/1.73M2 (05-25-21 @ 23:30)  eGFR if : 165 mL/min/1.73M2 (05-25-21 @ 23:30)          Culture - Blood (collected 05-23-21 @ 19:19)  Source: .Blood Blood-Peripheral  Preliminary Report (05-25-21 @ 03:01):    No growth to date.    Culture - Body Fluid with Gram Stain (collected 05-21-21 @ 12:00)  Source: .Body Fluid CSF  Gram Stain (05-22-21 @ 02:58):    polymorphonuclear leukocytes seen    Gram positive cocci in pairs seen    by cytocentrifuge  Preliminary Report (05-22-21 @ 18:54):    Few Enterococcus faecalis  Organism: Enterococcus faecalis (05-23-21 @ 17:14)  Organism: Enterococcus faecalis (05-23-21 @ 17:14)      -  Ampicillin: S <=2 Predicts results to ampicillin/sulbactam, amoxacillin-clavulanate and  piperacillin-tazobactam.      -  Tetra/Doxy: R >8      -  Vancomycin: S 2      Method Type: AISHA    Culture - Blood (collected 05-20-21 @ 02:00)  Source: .Blood Blood  Final Report (05-25-21 @ 07:01):    No Growth Final    Culture - Blood (collected 05-19-21 @ 00:27)  Source: .Blood None  Final Report (05-24-21 @ 07:00):    No Growth Final    Culture - Surgical Swab (collected 05-16-21 @ 08:54)  Source: .Surgical Swab None  Final Report (05-21-21 @ 16:44):    Rare Coag Negative Staphylococcus  Organism: Coag Negative Staphylococcus (05-21-21 @ 16:44)  Organism: Coag Negative Staphylococcus (05-21-21 @ 16:44)      -  Ampicillin/Sulbactam: S <=8/4      -  Cefazolin: S <=4      -  Clindamycin: S <=0.25      -  Erythromycin: S <=0.25      -  Gentamicin: S <=1 Should not be used as monotherapy      -  Oxacillin: S <=0.25      -  Penicillin: R 0.25      -  RIF- Rifampin: S <=1 Should not be used as monotherapy      -  Tetra/Doxy: S <=1      -  Trimethoprim/Sulfamethoxazole: S <=0.5/9.5      -  Vancomycin: S 0.5      Method Type: AISHA    Culture - Blood (collected 05-13-21 @ 11:19)  Source: .Blood None  Final Report (05-18-21 @ 22:03):    No Growth Final    Culture - Blood (collected 05-11-21 @ 11:18)  Source: .Blood None  Gram Stain (05-12-21 @ 02:53):    Growth in anaerobic bottle: Gram Negative Rods    Growth in aerobic bottle: Gram Negative Rods  Final Report (05-13-21 @ 16:09):    Growth in aerobic and anaerobic bottles: Klebsiella variicola    ***Blood Panel PCR results on this specimen are available    approximately 3 hours after the Gram stain result.***    Gram stain, PCR, and/or culture results may not always    correspond due to difference in methodologies.    ************************************************************    This PCR assay was performed by multiplex PCR. This    Assay tests for 66 bacterial and resistance gene targets.    Please refer to the [facility] [employer] test directory    at [email or web address] for details.  Organism: Blood Culture PCR  Klebsiella variicola (05-13-21 @ 16:09)  Organism: Klebsiella variicola (05-13-21 @ 16:09)      -  Amikacin: S <=16      -  Ampicillin: R <=8 These ampicillin results predict results for amoxicillin      -  Ampicillin/Sulbactam: S <=4/2 Enterobacter, Citrobacter, and Serratia may develop resistance during prolonged therapy (3-4 days)      -  Aztreonam: S <=4      -  Cefazolin: S <=2 Enterobacter, Citrobacter, and Serratia may develop resistance during prolonged therapy (3-4 days)      -  Cefepime: S <=2      -  Cefoxitin: S <=8      -  Ceftriaxone: S <=1 Enterobacter, Citrobacter, and Serratia may develop resistance during prolonged therapy      -  Ciprofloxacin: S <=0.25      -  Ertapenem: S <=0.5      -  Gentamicin: S <=2      -  Imipenem: S <=1      -  Levofloxacin: S <=0.5      -  Meropenem: S <=1      -  Piperacillin/Tazobactam: S <=8      -  Tobramycin: S <=2      -  Trimethoprim/Sulfamethoxazole: S <=0.5/9.5      Method Type: AISHA  Organism: Blood Culture PCR (05-13-21 @ 16:09)      -  Klebsiella pneumoniae: Detec      Method Type: PCR    Culture - Bronchial (collected 05-11-21 @ 10:12)  Source: Bronch Wash Bronchoalveolar Lavage  Gram Stain (05-12-21 @ 07:04):    Few polymorphonuclear leukocytes per low power field    No Squamous epithelial cells per low power field    Few Gram Positive Rods per oil power field  Final Report (05-13-21 @ 16:19):    Moderate Klebsiella variicola    Moderate Serratia marcescens    Normal Respiratory Liz present  Organism: Klebsiella variicola  Serratia marcescens (05-13-21 @ 16:19)  Organism: Serratia marcescens (05-13-21 @ 16:19)      -  Amikacin: S <=16      -  Amoxicillin/Clavulanic Acid: R >16/8      -  Ampicillin: R <=8 These ampicillin results predict results for amoxicillin      -  Ampicillin/Sulbactam: R 16/8 Enterobacter, Citrobacter, and Serratia may develop resistance during prolonged therapy (3-4 days)      -  Aztreonam: S <=4      -  Cefazolin: R >16 Enterobacter, Citrobacter, and Serratia may develop resistance during prolonged therapy (3-4 days)      -  Cefepime: S <=2      -  Cefoxitin: R <=8      -  Ceftriaxone: S <=1 Enterobacter, Citrobacter, and Serratia may develop resistance during prolonged therapy      -  Ciprofloxacin: S <=0.25      -  Ertapenem: S <=0.5      -  Gentamicin: S <=2      -  Levofloxacin: S <=0.5      -  Meropenem: S <=1      -  Piperacillin/Tazobactam: S <=8      -  Tobramycin: S <=2      -  Trimethoprim/Sulfamethoxazole: S <=0.5/9.5      Method Type: AISHA  Organism: Klebsiella variicola (05-13-21 @ 16:19)      -  Amikacin: S <=16      -  Amoxicillin/Clavulanic Acid: R >16/8      -  Ampicillin: R <=8 These ampicillin results predict results for amoxicillin      -  Ampicillin/Sulbactam: I 16/8 Enterobacter, Citrobacter, and Serratia may develop resistance during prolonged therapy (3-4 days)      -  Aztreonam: R >16      -  Cefazolin: R >16 Enterobacter, Citrobacter, and Serratia may develop resistance during prolonged therapy (3-4 days)      -  Cefepime: S <=2      -  Cefoxitin: S <=8      -  Ceftriaxone: S <=1 Enterobacter, Citrobacter, and Serratia may develop resistance during prolonged therapy      -  Ciprofloxacin: S <=0.25      -  Ertapenem: S <=0.5      -  Gentamicin: S <=2      -  Imipenem: S <=1      -  Levofloxacin: S <=0.5      -  Meropenem: S <=1      -  Piperacillin/Tazobactam: S <=8      -  Tobramycin: S 4      -  Trimethoprim/Sulfamethoxazole: S <=0.5/9.5      Method Type: AISHA            INFECTIOUS DISEASES TESTING  Vancomycin Level, Trough: 7.6 (05-24-21 @ 05:54)  COVID-19 PCR: NotDetec (05-21-21 @ 04:30)  COVID-19 PCR: NotDetec (05-14-21 @ 18:58)  Vancomycin Level, Trough: 4.1 (05-13-21 @ 05:50)  HIV-1/2 Combo Result: Nonreact (05-09-21 @ 23:30)  Rapid RVP Result: NotDetec (05-06-21 @ 22:44)  COVID-19 PCR: NotDetec (04-06-21 @ 16:46)  COVID-19 PCR: NotDetec (03-29-21 @ 19:35)      INFLAMMATORY MARKERS      RADIOLOGY & ADDITIONAL TESTS:  I have personally reviewed the last available Chest xray  CXR      CT      CARDIOLOGY TESTING  12 Lead ECG:   Ventricular Rate 78 BPM    Atrial Rate 78 BPM    P-R Interval 162 ms    QRS Duration 70 ms    Q-T Interval 364 ms    QTC Calculation(Bazett) 414 ms    P Axis 55 degrees    R Axis 30 degrees    T Axis 62 degrees    Diagnosis Line Normal sinus rhythm  Normal ECG    Confirmed by SALAS WASHINGTON MD ([id number]) on 5/14/2021 7:01:28 AM (05-14-21 @ 04:52)      MEDICATIONS  artificial tears (preservative free) Ophthalmic Solution 2 Both EYES three times a day  chlorhexidine 0.12% Liquid 15 Oral Mucosa two times a day  chlorhexidine 4% Liquid 1 Topical <User Schedule>  heparin   Injectable 5000 SubCutaneous every 8 hours  insulin lispro (ADMELOG) corrective regimen sliding scale  SubCutaneous every 6 hours  levETIRAcetam  IVPB 500 IV Intermittent every 12 hours  lisinopril 20 Oral daily  meropenem  IVPB     meropenem  IVPB 2000 IV Intermittent every 8 hours  pantoprazole   Suspension 40 Oral daily  propranolol 20 Oral every 8 hours  senna 2 Oral at bedtime  sodium chloride 4 Oral every 6 hours  sodium chloride 3%. 500 IV Continuous <Continuous>  vancomycin  IVPB 1000 IV Intermittent every 8 hours      WEIGHT  Weight (kg): 79.4 (05-23-21 @ 16:30)  Creatinine, Serum: <0.5 mg/dL (05-25-21 @ 23:30)      ANTIBIOTICS:  meropenem  IVPB      meropenem  IVPB 2000 milliGRAM(s) IV Intermittent every 8 hours  vancomycin  IVPB 1000 milliGRAM(s) IV Intermittent every 8 hours      All available historical records have been reviewed

## 2021-05-26 NOTE — PROGRESS NOTE ADULT - SUBJECTIVE AND OBJECTIVE BOX
Subjective: 45yMale with a pmhx of SUBDURAL HEMATOMA;RESPIRATORY FAILURE;FALL    ^FALL    No pertinent family history in first degree relatives    Family history of essential hypertension (Father)    Handoff    MEWS Score    No pertinent past medical history    Alcohol abuse    GERD (gastroesophageal reflux disease)    ETOH abuse    Hypomagnesemia    Seizure disorder    Subdural hematoma    S/P subdural hematoma evacuation    Subdural hematoma    Subdural hematoma    Subdural hematoma    Respiratory failure    ETOH abuse    Palliative care by specialist    Insertion of non-tunneled central venous catheter (CVC) in patient age 5 years of age or older    Craniotomy, decompressive    Decompressive craniotomy    Placement, tracheostomy and percutaneous endoscopic gastrostomy    No significant past surgical history    H/O hemorrhoidectomy    FALL    22    Respiratory failure    Fall    [email or web address]      POD#10/19    S/P Left Craniectomy for evac of SDH  S/P Wound Revision    Pt seen and examined at bedside with Dr Byrne.  Pt trached , currently off vent, on trach collar.  No sedation.  Being evaluated by OT at bedside.  Currently on 3% NaCl @60cc/hr.    Allergies    No Known Allergies    Intolerances      Vital Signs Last 24 Hrs  T(C): 38.2 (26 May 2021 08:00), Max: 38.7 (25 May 2021 16:00)  T(F): 100.8 (26 May 2021 08:00), Max: 101.6 (25 May 2021 16:00)  HR: 87 (26 May 2021 10:00) (73 - 104)  BP: 139/88 (26 May 2021 10:00) (116/67 - 179/81)  BP(mean): 108 (26 May 2021 10:00) (86 - 124)  RR: 29 (26 May 2021 10:00) (12 - 45)  SpO2: 100% (26 May 2021 10:00) (97% - 100%)      acetaminophen   Tablet .. 650 milliGRAM(s) Oral every 6 hours PRN  artificial tears (preservative free) Ophthalmic Solution 2 Drop(s) Both EYES three times a day  chlorhexidine 0.12% Liquid 15 milliLiter(s) Oral Mucosa two times a day  chlorhexidine 4% Liquid 1 Application(s) Topical <User Schedule>  heparin   Injectable 5000 Unit(s) SubCutaneous every 8 hours  insulin lispro (ADMELOG) corrective regimen sliding scale   SubCutaneous every 6 hours  levETIRAcetam  IVPB 500 milliGRAM(s) IV Intermittent every 12 hours  lisinopril 20 milliGRAM(s) Oral daily  meropenem  IVPB      meropenem  IVPB 2000 milliGRAM(s) IV Intermittent every 8 hours  pantoprazole   Suspension 40 milliGRAM(s) Oral daily  propranolol 20 milliGRAM(s) Oral every 8 hours  senna 2 Tablet(s) Oral at bedtime  sodium chloride 4 Gram(s) Oral every 6 hours  sodium chloride 3%. 500 milliLiter(s) IV Continuous <Continuous>  vancomycin  IVPB 1000 milliGRAM(s) IV Intermittent every 8 hours        05-25-21 @ 07:01  -  05-26-21 @ 07:00  --------------------------------------------------------  IN: 3690 mL / OUT: 5215 mL / NET: -1525 mL        REVIEW OF SYSTEMS    [ x] A ten-point review of systems was otherwise negative except as noted.  [ ] Due to altered mental status/intubation, subjective information were not able to be obtained from the patient. History was obtained, to the extent possible, from review of the chart and collateral sources of information.          Neurological:  Opens eyes spontaneously  Pupils reactive and equal  appears to track  attempts to follow on LUE  Moves left side spontaneously  w/d RLE to pain  grimaces to pain  Incision intact  KIKE in place 190cc/ 24 hrs- serosanguinous        CBC Full  -  ( 25 May 2021 23:30 )  WBC Count : 8.89 K/uL  RBC Count : 2.72 M/uL  Hemoglobin : 7.7 g/dL  Hematocrit : 23.7 %  Platelet Count - Automated : 333 K/uL  Mean Cell Volume : 87.1 fL  Mean Cell Hemoglobin : 28.3 pg  Mean Cell Hemoglobin Concentration : 32.5 g/dL  Auto Neutrophil # : 6.58 K/uL  Auto Lymphocyte # : 1.36 K/uL  Auto Monocyte # : 0.86 K/uL  Auto Eosinophil # : 0.03 K/uL  Auto Basophil # : 0.01 K/uL  Auto Neutrophil % : 74.0 %  Auto Lymphocyte % : 15.3 %  Auto Monocyte % : 9.7 %  Auto Eosinophil % : 0.3 %  Auto Basophil % : 0.1 %    05-25    139  |  107  |  5<L>  ----------------------------<  142<H>  3.9   |  24  |  <0.5<L>    Ca    8.0<L>      25 May 2021 23:30  Phos  3.4     05-25  Mg     1.7     05-25              Assessment/Plan:   allow Na level to normalize, keep >135  cont PT/OT/rehab  obtain CT head today  may d/c KIKE today if CTH improved  No laying on left side  cont GI/DVT ppx  care per BICU team  d/w attg

## 2021-05-27 LAB
ANION GAP SERPL CALC-SCNC: 12 MMOL/L — SIGNIFICANT CHANGE UP (ref 7–14)
ANION GAP SERPL CALC-SCNC: 7 MMOL/L — SIGNIFICANT CHANGE UP (ref 7–14)
ANION GAP SERPL CALC-SCNC: 9 MMOL/L — SIGNIFICANT CHANGE UP (ref 7–14)
BUN SERPL-MCNC: 6 MG/DL — LOW (ref 10–20)
CALCIUM SERPL-MCNC: 8 MG/DL — LOW (ref 8.5–10.1)
CALCIUM SERPL-MCNC: 8.1 MG/DL — LOW (ref 8.5–10.1)
CALCIUM SERPL-MCNC: 8.1 MG/DL — LOW (ref 8.5–10.1)
CHLORIDE SERPL-SCNC: 103 MMOL/L — SIGNIFICANT CHANGE UP (ref 98–110)
CHLORIDE SERPL-SCNC: 103 MMOL/L — SIGNIFICANT CHANGE UP (ref 98–110)
CHLORIDE SERPL-SCNC: 99 MMOL/L — SIGNIFICANT CHANGE UP (ref 98–110)
CO2 SERPL-SCNC: 22 MMOL/L — SIGNIFICANT CHANGE UP (ref 17–32)
CO2 SERPL-SCNC: 24 MMOL/L — SIGNIFICANT CHANGE UP (ref 17–32)
CO2 SERPL-SCNC: 25 MMOL/L — SIGNIFICANT CHANGE UP (ref 17–32)
CREAT SERPL-MCNC: <0.5 MG/DL — LOW (ref 0.7–1.5)
GLUCOSE BLDC GLUCOMTR-MCNC: 121 MG/DL — HIGH (ref 70–99)
GLUCOSE BLDC GLUCOMTR-MCNC: 135 MG/DL — HIGH (ref 70–99)
GLUCOSE BLDC GLUCOMTR-MCNC: 139 MG/DL — HIGH (ref 70–99)
GLUCOSE BLDC GLUCOMTR-MCNC: 145 MG/DL — HIGH (ref 70–99)
GLUCOSE BLDC GLUCOMTR-MCNC: 172 MG/DL — HIGH (ref 70–99)
GLUCOSE SERPL-MCNC: 141 MG/DL — HIGH (ref 70–99)
GLUCOSE SERPL-MCNC: 142 MG/DL — HIGH (ref 70–99)
GLUCOSE SERPL-MCNC: 154 MG/DL — HIGH (ref 70–99)
HCT VFR BLD CALC: 21.6 % — LOW (ref 42–52)
HCT VFR BLD CALC: 25.3 % — LOW (ref 42–52)
HGB BLD-MCNC: 7.4 G/DL — LOW (ref 14–18)
HGB BLD-MCNC: 8.3 G/DL — LOW (ref 14–18)
MAGNESIUM SERPL-MCNC: 1.7 MG/DL — LOW (ref 1.8–2.4)
MAGNESIUM SERPL-MCNC: 1.8 MG/DL — SIGNIFICANT CHANGE UP (ref 1.8–2.4)
MCHC RBC-ENTMCNC: 28.2 PG — SIGNIFICANT CHANGE UP (ref 27–31)
MCHC RBC-ENTMCNC: 29 PG — SIGNIFICANT CHANGE UP (ref 27–31)
MCHC RBC-ENTMCNC: 32.8 G/DL — SIGNIFICANT CHANGE UP (ref 32–37)
MCHC RBC-ENTMCNC: 34.3 G/DL — SIGNIFICANT CHANGE UP (ref 32–37)
MCV RBC AUTO: 84.7 FL — SIGNIFICANT CHANGE UP (ref 80–94)
MCV RBC AUTO: 86.1 FL — SIGNIFICANT CHANGE UP (ref 80–94)
NRBC # BLD: 0 /100 WBCS — SIGNIFICANT CHANGE UP (ref 0–0)
NRBC # BLD: 0 /100 WBCS — SIGNIFICANT CHANGE UP (ref 0–0)
OSMOLALITY SERPL: 277 MOS/KG — SIGNIFICANT CHANGE UP (ref 275–300)
PHOSPHATE SERPL-MCNC: 3.4 MG/DL — SIGNIFICANT CHANGE UP (ref 2.1–4.9)
PHOSPHATE SERPL-MCNC: 4.4 MG/DL — SIGNIFICANT CHANGE UP (ref 2.1–4.9)
PLATELET # BLD AUTO: 308 K/UL — SIGNIFICANT CHANGE UP (ref 130–400)
PLATELET # BLD AUTO: 338 K/UL — SIGNIFICANT CHANGE UP (ref 130–400)
POTASSIUM SERPL-MCNC: 3.8 MMOL/L — SIGNIFICANT CHANGE UP (ref 3.5–5)
POTASSIUM SERPL-MCNC: 4.1 MMOL/L — SIGNIFICANT CHANGE UP (ref 3.5–5)
POTASSIUM SERPL-MCNC: 4.3 MMOL/L — SIGNIFICANT CHANGE UP (ref 3.5–5)
POTASSIUM SERPL-SCNC: 3.8 MMOL/L — SIGNIFICANT CHANGE UP (ref 3.5–5)
POTASSIUM SERPL-SCNC: 4.1 MMOL/L — SIGNIFICANT CHANGE UP (ref 3.5–5)
POTASSIUM SERPL-SCNC: 4.3 MMOL/L — SIGNIFICANT CHANGE UP (ref 3.5–5)
RBC # BLD: 2.55 M/UL — LOW (ref 4.7–6.1)
RBC # BLD: 2.94 M/UL — LOW (ref 4.7–6.1)
RBC # FLD: 13.5 % — SIGNIFICANT CHANGE UP (ref 11.5–14.5)
RBC # FLD: 13.8 % — SIGNIFICANT CHANGE UP (ref 11.5–14.5)
SODIUM SERPL-SCNC: 133 MMOL/L — LOW (ref 135–146)
SODIUM SERPL-SCNC: 134 MMOL/L — LOW (ref 135–146)
SODIUM SERPL-SCNC: 137 MMOL/L — SIGNIFICANT CHANGE UP (ref 135–146)
VANCOMYCIN TROUGH SERPL-MCNC: 9 UG/ML — SIGNIFICANT CHANGE UP (ref 5–10)
WBC # BLD: 7.21 K/UL — SIGNIFICANT CHANGE UP (ref 4.8–10.8)
WBC # BLD: 8.16 K/UL — SIGNIFICANT CHANGE UP (ref 4.8–10.8)
WBC # FLD AUTO: 7.21 K/UL — SIGNIFICANT CHANGE UP (ref 4.8–10.8)
WBC # FLD AUTO: 8.16 K/UL — SIGNIFICANT CHANGE UP (ref 4.8–10.8)

## 2021-05-27 PROCEDURE — 71045 X-RAY EXAM CHEST 1 VIEW: CPT | Mod: 26

## 2021-05-27 PROCEDURE — 99291 CRITICAL CARE FIRST HOUR: CPT | Mod: 25

## 2021-05-27 RX ORDER — MAGNESIUM SULFATE 500 MG/ML
2 VIAL (ML) INJECTION ONCE
Refills: 0 | Status: COMPLETED | OUTPATIENT
Start: 2021-05-27 | End: 2021-05-27

## 2021-05-27 RX ORDER — VANCOMYCIN HCL 1 G
1500 VIAL (EA) INTRAVENOUS EVERY 8 HOURS
Refills: 0 | Status: DISCONTINUED | OUTPATIENT
Start: 2021-05-27 | End: 2021-05-29

## 2021-05-27 RX ORDER — SODIUM CHLORIDE 5 G/100ML
500 INJECTION, SOLUTION INTRAVENOUS
Refills: 0 | Status: DISCONTINUED | OUTPATIENT
Start: 2021-05-27 | End: 2021-05-31

## 2021-05-27 RX ORDER — POTASSIUM CHLORIDE 20 MEQ
20 PACKET (EA) ORAL ONCE
Refills: 0 | Status: COMPLETED | OUTPATIENT
Start: 2021-05-27 | End: 2021-05-27

## 2021-05-27 RX ORDER — VANCOMYCIN HCL 1 G
500 VIAL (EA) INTRAVENOUS ONCE
Refills: 0 | Status: DISCONTINUED | OUTPATIENT
Start: 2021-05-27 | End: 2021-05-27

## 2021-05-27 RX ORDER — MAGNESIUM SULFATE 500 MG/ML
1 VIAL (ML) INJECTION ONCE
Refills: 0 | Status: COMPLETED | OUTPATIENT
Start: 2021-05-27 | End: 2021-05-27

## 2021-05-27 RX ORDER — VANCOMYCIN HCL 1 G
1500 VIAL (EA) INTRAVENOUS EVERY 12 HOURS
Refills: 0 | Status: DISCONTINUED | OUTPATIENT
Start: 2021-05-27 | End: 2021-05-27

## 2021-05-27 RX ADMIN — MEROPENEM 200 MILLIGRAM(S): 1 INJECTION INTRAVENOUS at 05:36

## 2021-05-27 RX ADMIN — SODIUM CHLORIDE 6 GRAM(S): 9 INJECTION INTRAMUSCULAR; INTRAVENOUS; SUBCUTANEOUS at 00:33

## 2021-05-27 RX ADMIN — Medication 2 DROP(S): at 13:17

## 2021-05-27 RX ADMIN — SODIUM CHLORIDE 6 GRAM(S): 9 INJECTION INTRAMUSCULAR; INTRAVENOUS; SUBCUTANEOUS at 13:18

## 2021-05-27 RX ADMIN — LEVETIRACETAM 420 MILLIGRAM(S): 250 TABLET, FILM COATED ORAL at 17:59

## 2021-05-27 RX ADMIN — SODIUM CHLORIDE 50 MILLILITER(S): 5 INJECTION, SOLUTION INTRAVENOUS at 05:35

## 2021-05-27 RX ADMIN — Medication 300 MILLIGRAM(S): at 21:47

## 2021-05-27 RX ADMIN — Medication 250 MILLIGRAM(S): at 05:39

## 2021-05-27 RX ADMIN — MEROPENEM 200 MILLIGRAM(S): 1 INJECTION INTRAVENOUS at 13:18

## 2021-05-27 RX ADMIN — Medication 50 MILLIEQUIVALENT(S): at 02:31

## 2021-05-27 RX ADMIN — ENOXAPARIN SODIUM 40 MILLIGRAM(S): 100 INJECTION SUBCUTANEOUS at 17:59

## 2021-05-27 RX ADMIN — Medication 650 MILLIGRAM(S): at 05:59

## 2021-05-27 RX ADMIN — Medication 300 MILLIGRAM(S): at 13:18

## 2021-05-27 RX ADMIN — SODIUM CHLORIDE 50 MILLILITER(S): 5 INJECTION, SOLUTION INTRAVENOUS at 08:45

## 2021-05-27 RX ADMIN — LEVETIRACETAM 420 MILLIGRAM(S): 250 TABLET, FILM COATED ORAL at 05:37

## 2021-05-27 RX ADMIN — SODIUM CHLORIDE 6 GRAM(S): 9 INJECTION INTRAMUSCULAR; INTRAVENOUS; SUBCUTANEOUS at 17:59

## 2021-05-27 RX ADMIN — Medication 50 GRAM(S): at 15:45

## 2021-05-27 RX ADMIN — Medication 650 MILLIGRAM(S): at 05:37

## 2021-05-27 RX ADMIN — Medication 650 MILLIGRAM(S): at 15:45

## 2021-05-27 RX ADMIN — SENNA PLUS 2 TABLET(S): 8.6 TABLET ORAL at 21:45

## 2021-05-27 RX ADMIN — Medication 25 GRAM(S): at 01:31

## 2021-05-27 RX ADMIN — CHLORHEXIDINE GLUCONATE 15 MILLILITER(S): 213 SOLUTION TOPICAL at 17:58

## 2021-05-27 RX ADMIN — CHLORHEXIDINE GLUCONATE 1 APPLICATION(S): 213 SOLUTION TOPICAL at 05:36

## 2021-05-27 RX ADMIN — Medication 2: at 19:02

## 2021-05-27 RX ADMIN — PANTOPRAZOLE SODIUM 40 MILLIGRAM(S): 20 TABLET, DELAYED RELEASE ORAL at 13:20

## 2021-05-27 RX ADMIN — Medication 2 DROP(S): at 05:36

## 2021-05-27 RX ADMIN — MEROPENEM 200 MILLIGRAM(S): 1 INJECTION INTRAVENOUS at 22:32

## 2021-05-27 RX ADMIN — CHLORHEXIDINE GLUCONATE 15 MILLILITER(S): 213 SOLUTION TOPICAL at 05:36

## 2021-05-27 RX ADMIN — Medication 2 DROP(S): at 21:50

## 2021-05-27 RX ADMIN — SODIUM CHLORIDE 50 MILLILITER(S): 5 INJECTION, SOLUTION INTRAVENOUS at 20:00

## 2021-05-27 RX ADMIN — Medication 650 MILLIGRAM(S): at 16:36

## 2021-05-27 RX ADMIN — SODIUM CHLORIDE 6 GRAM(S): 9 INJECTION INTRAMUSCULAR; INTRAVENOUS; SUBCUTANEOUS at 05:36

## 2021-05-27 RX ADMIN — LISINOPRIL 20 MILLIGRAM(S): 2.5 TABLET ORAL at 05:37

## 2021-05-27 RX ADMIN — Medication 62.5 MILLIMOLE(S): at 08:46

## 2021-05-27 NOTE — PROGRESS NOTE ADULT - ATTENDING COMMENTS
Critical Care: [id number]   This patient has a high probability of sudden, clinically significant deterioration, which requires the highest level of physician preparedness to intervene urgently. I managed/supervised life or organ supporting interventions that required frequent physician assessment. I devoted my full attention in the ICU to the direct care of this patient for the period of time indicated below. Time I spent with the family or surrogate(s) is included only if the patient was incapable of providing the necessary information or participating in medical decision making.   Time devoted to teaching and to any procedures I billed separately is not included.     ANIA DASGORZ 45y Male admitted to [x ] SICU / [ ] SDU with intracranial hemorrhage SDH S/P craniectomy with post-OP respiratory failure, now tolerating CPAP and trach caller, tolerates tube feeds at goal, continue require 3% NaCl  Patient is examined and evaluated at the bedside with SICU team. Treatment plan discussed with SICU team, nurses and primary team.   Chest X-ray and all relevant studies reviewed during rounds.  Will continue hemodynamic monitoring as per protocol in SICU.    Neuro:  GCS [11T ]   [x ]  Neuro checks as per SICU protocol  [x ] 3% NaCl  -> wean to the goal of Na 135-145  Paralysis [ ] Yes  [ x]  No  Sedated/Pain control with                 [ ] Dilaudid drip, [ ]  Ketamine, [ ] Fentanyl, [ ] Propofol, [ ] Precedex, [ ] Versed, [ ] Ativan,                           [x ] Tylenol, [ ] Gabapentin, [ ] OxyContin standing,  [ ] OxyContin PRN,  [ ] Dilaudid PRN pushes, [x ] Fentanyl PRN pushes,  [ ]  PCA               [ ] Seroquel, [ ] Haldol, [ ] Zyprexa,  [ ] Clonazepam [ ] Xanax, [ ] Versed/Ativan PRN,  [ x]None    CV: mildly tachycardic at times, on propranolol for TBI with goal of been normotensive   On pressors [ ]  Yes  [ x]  No          [ ]  Levophed, [ ] Sy-Synephrine, [ ] Vasopressin, [ ]  Epinephrine          [ ] Dobutamine, [ ] Milrinone, [ ]  Midodrine,  [ ] Others       Respiratory: Acute respiratory insufficiency ->continue tach caller                        None Invasive Support  [ ] Incentive Spirometer                                  [ ] BiPAP   [ ] CPAP   [ ] HFNC   [ ] NR Face Mask   [ ] NC  [x]  Trach Caller                         Ventilatory support  [ ] Yes [x ] No   [ ] SBT                              [ ] PC    [ ] VC   [ ] AC   [ ] BiVent/APRV   [ ]CPAP     GI  [x ]  bowel regiment   [x] BM  Nutrition: continue    [ ]   Diet   [ ] TPN/PPN     [x ]  Tube Feeds   @ 55 ml/hr    Renal: Continue I&Os monitoring, Cole catheter  [ x] Yes    [ ]   No ,  [x ]Consideration for discontinuation  Lytes/Acid-base: replete hypokalemia, hypomagnesemia, hypocalcemia, hypophosphatemia     ID: leukocytosis -> continue to monitor:         IV Abx [x ] Yes, [ ] No;  ID consulted [x ] Yes, [ ] No        Cultures [ ] Respiratory     [ ] Blood     [ ] Urine    [x ] Fluids  CSF [ ]  None    Lines:   [ ] Right   [x ] Left                      [ ] Subclavian TLC        [ ]  Internal Jugular TLC     [ ]  Femoral TLC                     [ ] Subclavian Cordis    [ ] Internal Jugular Cordis   [ ] Femoral Cordis                     [ ] HD catheter                     [ ] PICC     [x ]  Midline   [ ] Peripheral IVs                                  [x ] Right   [ ] Left                     [ x] Radial A-Line           [ ] Femoral A-Line            [ ] Axillary A-Line                  Heme: continue to evaluate for acute blood loss anemia- trend Hg/Hct                     Transfused as indicated     [ ] PRBCs   [ ] Platelets   [ ] FFPs   [ ] Cryoprecipitate                     [ ] Lovenox  [ ] Heparin drip  [ ] NOVACs  [ ] ASA  [ ] Antiplatelets   Endocrine: prevent and treat hyperglycemia with insulin as needed,           Insuline drip [ ] Yes, [ x] No     PV: follow pulse exam    Skin: decub precautions    DVT Prophylaxis:  [ x] SCDs  [ ] Heparin SQ  [ x] Lovenox  SQ    Stress Gastritis Prophylaxis: PPI/H2 Blockers if indicated    Mobility: patient is evaluated at the bedside with mobility team and the goals for today are discussed with PT [ x] out of bed to chair    PATIENT/FAMILY/SURROGATE CONFERENCE:   PURPOSE: To obtain necessary information, To discuss treatment options  under consideration today.  I saw and evaluated the patient personally. I have reviewed and agree with note above. Treatment plan discussed with SICU team, nurses and primary team at the time of the multidisciplinary rounds. The above note is NOT written at the time of rounds and will reflect all changes throughout management of the patient for the day note is written for.    Jenny Allen MD, FACS  Trauma/ACS/SCC Attending

## 2021-05-27 NOTE — CONSULT NOTE ADULT - ASSESSMENT
IMPRESSION: Rehab of L SDH / s/p evacuation / right hemiplegia / aphasia    PRECAUTIONS: [   ] Cardiac  [   ] Respiratory  [   ] Seizures [   ] Contact Isolation  [   ] Droplet Isolation  [   ] Other    Weight Bearing Status:     RECOMMENDATION:    Out of Bed to Chair     DVT/Decubiti Prophylaxis    REHAB PLAN:     [  x  ] Bedside P/T 3-5 times a week   [   x ]   Bedside O/T  2-3 times a week             [  x  ] Speech Therapy               [    ]  No Rehab Therapy Indicated   Conditioning/ROM                                    ADL  Bed Mobility                                               Conditioning/ROM  Transfers                                                     Bed Mobility  Sitting /Standing Balance                         Transfers                                        Gait Training                                               Sitting/Standing Balance  Stair Training [   ]Applicable                    Home equipment Eval                                                                        Splinting  [   ] Only      GOALS:   ADL   [    ]   Independent                    Transfers  [    ] Independent                          Ambulation  [    ] Independent     [  x   ] With device                            [    ]  CG                                                         [    ]  CG                                                                  [    ] CG                            [ x   ] Min A                                                   [   x ] Min A                                                              [  x  ] Min  A          DISCHARGE PLAN:   [    ]  Good candidate for Intensive Rehabilitation/Hospital based                                             Will tolerate 3hrs Intensive Rehab Daily                                       [  x   ]  Short Term Rehab in Skilled Nursing Facility / snf                                       [     ]  Home with Outpatient or VN services                                         [     ]  Possible Candidate for Intensive Hospital based Rehab

## 2021-05-27 NOTE — PROGRESS NOTE ADULT - SUBJECTIVE AND OBJECTIVE BOX
POD# 9/18    S/P Left Craniectomy for evac of SDH  S/P Wound Revision    Pt seen and examined at bedside.     Vital Signs Last 24 Hrs  T(C): 36.5 (27 May 2021 07:48), Max: 38.6 (26 May 2021 20:00)  T(F): 97.7 (27 May 2021 07:48), Max: 101.5 (26 May 2021 20:00)  HR: 83 (27 May 2021 06:00) (79 - 114)  BP: 129/78 (27 May 2021 06:00) (118/70 - 151/81)  BP(mean): 98 (27 May 2021 06:00) (89 - 108)  RR: 17 (27 May 2021 06:00) (13 - 29)  SpO2: 100% (27 May 2021 06:00) (98% - 100%)    I&O's Detail    26 May 2021 07:01  -  27 May 2021 07:00  --------------------------------------------------------  IN:    IV PiggyBack: 100 mL    IV PiggyBack: 500 mL    IV PiggyBack: 250 mL    IV PiggyBack: 300 mL    Osmolite: 1200 mL    sodium chloride 3%: 300 mL    sodium chloride 3%: 1080 mL  Total IN: 3730 mL    OUT:    Bulb (mL): 90 mL    Voided (mL): 2270 mL  Total OUT: 2360 mL    Total NET: 1370 mL    I&O's Summary    26 May 2021 07:01  -  27 May 2021 07:00  --------------------------------------------------------  IN: 3730 mL / OUT: 2360 mL / NET: 1370 mL    REVIEW OF SYSTEMS    [ ] A ten-point review of systems was otherwise negative except as noted.  [X] Due to altered mental status/intubation, subjective information were not able to be obtained from the patient. History was obtained, to the extent possible, from review of the chart and collateral sources of information.      PHYSICAL EXAM:  Neurological:                LABS:                        7.4    8.16  )-----------( 338      ( 27 May 2021 00:00 )             21.6     05-27    134<L>  |  103  |  6<L>  ----------------------------<  141<H>  4.3   |  24  |  <0.5<L>    Ca    8.0<L>      27 May 2021 05:50  Phos  2.9     05-27  Mg     2.0     05-27    Allergies    No Known Allergies    Intolerances      MEDICATIONS:  Antibiotics:  meropenem  IVPB      meropenem  IVPB 2000 milliGRAM(s) IV Intermittent every 8 hours  vancomycin  IVPB 1000 milliGRAM(s) IV Intermittent every 8 hours    Neuro:  acetaminophen   Tablet .. 650 milliGRAM(s) Oral every 6 hours PRN  levETIRAcetam  IVPB 500 milliGRAM(s) IV Intermittent every 12 hours      IVF:  sodium chloride 6 Gram(s) Oral every 6 hours  sodium chloride 3%. 500 milliLiter(s) IV Continuous <Continuous>  sodium phosphate IVPB 15 milliMole(s) IV Intermittent once      CULTURES:  Culture Results:   No growth to date. (05-23 @ 19:19)  Culture Results:   Few Enterococcus faecalis (05-21 @ 12:00)      RADIOLOGY & ADDITIONAL TESTS:      ASSESSMENT:  45y Male s/p    SUBDURAL HEMATOMA;RESPIRATORY FAILURE;FALL    ^FALL    No pertinent family history in first degree relatives    Family history of essential hypertension (Father)    Handoff    MEWS Score    No pertinent past medical history    Alcohol abuse    GERD (gastroesophageal reflux disease)    ETOH abuse    Hypomagnesemia    Seizure disorder    Subdural hematoma    S/P subdural hematoma evacuation    Subdural hematoma    Subdural hematoma    Subdural hematoma    Respiratory failure    ETOH abuse    Palliative care by specialist    Insertion of non-tunneled central venous catheter (CVC) in patient age 5 years of age or older    Craniotomy, decompressive    Decompressive craniotomy    Placement, tracheostomy and percutaneous endoscopic gastrostomy    No significant past surgical history    H/O hemorrhoidectomy    FALL    23    Respiratory failure    Fall    [email or web address]        PLAN:  Continue current mgmt POD# 9/18    S/P Left Craniectomy for evac of SDH  S/P Wound Revision    Pt seen and examined at bedside. Pt remains on trach collar. No distress.    Vital Signs Last 24 Hrs  T(C): 36.5 (27 May 2021 07:48), Max: 38.6 (26 May 2021 20:00)  T(F): 97.7 (27 May 2021 07:48), Max: 101.5 (26 May 2021 20:00)  HR: 83 (27 May 2021 06:00) (79 - 114)  BP: 129/78 (27 May 2021 06:00) (118/70 - 151/81)  BP(mean): 98 (27 May 2021 06:00) (89 - 108)  RR: 17 (27 May 2021 06:00) (13 - 29)  SpO2: 100% (27 May 2021 06:00) (98% - 100%)    I&O's Detail    26 May 2021 07:01  -  27 May 2021 07:00  --------------------------------------------------------  IN:    IV PiggyBack: 100 mL    IV PiggyBack: 500 mL    IV PiggyBack: 250 mL    IV PiggyBack: 300 mL    Osmolite: 1200 mL    sodium chloride 3%: 300 mL    sodium chloride 3%: 1080 mL  Total IN: 3730 mL    OUT:    Bulb (mL): 90 mL    Voided (mL): 2270 mL  Total OUT: 2360 mL    Total NET: 1370 mL    I&O's Summary    26 May 2021 07:01  -  27 May 2021 07:00  --------------------------------------------------------  IN: 3730 mL / OUT: 2360 mL / NET: 1370 mL    REVIEW OF SYSTEMS    [ ] A ten-point review of systems was otherwise negative except as noted.  [X] Due to altered mental status/intubation, subjective information were not able to be obtained from the patient. History was obtained, to the extent possible, from review of the chart and collateral sources of information.      PHYSICAL EXAM:  Neurological:  Awake and alert  Pupils reactive  No following commands  Tracks  Grimaces to pain on Right side  Moves RUE/RLE spontaneously  Incision intact      LABS:                        7.4    8.16  )-----------( 338      ( 27 May 2021 00:00 )             21.6     05-27    134<L>  |  103  |  6<L>  ----------------------------<  141<H>  4.3   |  24  |  <0.5<L>    Ca    8.0<L>      27 May 2021 05:50  Phos  2.9     05-27  Mg     2.0     05-27    Allergies    No Known Allergies    Intolerances      MEDICATIONS:  Antibiotics:  meropenem  IVPB      meropenem  IVPB 2000 milliGRAM(s) IV Intermittent every 8 hours  vancomycin  IVPB 1000 milliGRAM(s) IV Intermittent every 8 hours    Neuro:  acetaminophen   Tablet .. 650 milliGRAM(s) Oral every 6 hours PRN  levETIRAcetam  IVPB 500 milliGRAM(s) IV Intermittent every 12 hours      IVF:  sodium chloride 6 Gram(s) Oral every 6 hours  sodium chloride 3%. 500 milliLiter(s) IV Continuous <Continuous>  sodium phosphate IVPB 15 milliMole(s) IV Intermittent once      CULTURES:  Culture Results:   No growth to date. (05-23 @ 19:19)  Culture Results:   Few Enterococcus faecalis (05-21 @ 12:00)      RADIOLOGY & ADDITIONAL TESTS:      ASSESSMENT:  45y Male s/p    SUBDURAL HEMATOMA;RESPIRATORY FAILURE;FALL    ^FALL    No pertinent family history in first degree relatives    Family history of essential hypertension (Father)    Handoff    MEWS Score    No pertinent past medical history    Alcohol abuse    GERD (gastroesophageal reflux disease)    ETOH abuse    Hypomagnesemia    Seizure disorder    Subdural hematoma    S/P subdural hematoma evacuation    Subdural hematoma    Subdural hematoma    Subdural hematoma    Respiratory failure    ETOH abuse    Palliative care by specialist    Insertion of non-tunneled central venous catheter (CVC) in patient age 5 years of age or older    Craniotomy, decompressive    Decompressive craniotomy    Placement, tracheostomy and percutaneous endoscopic gastrostomy    No significant past surgical history    H/O hemorrhoidectomy    FALL    23    Respiratory failure    Fall    SysAdmin_VisitLink        PLAN:  Continue current mgmt  Will D/C KIKE today

## 2021-05-27 NOTE — PROGRESS NOTE ADULT - SUBJECTIVE AND OBJECTIVE BOX
THIAGO SANDRA  45y, Male  Allergy: No Known Allergies      LOS  21d    CHIEF COMPLAINT: found down unresponsive (27 May 2021 08:21)      INTERVAL EVENTS/HPI  - No acute events overnight, remains febrile   - T(F): , Max: 101.5 (05-26-21 @ 20:00)  - Tolerating medication  - WBC Count: 8.16 (05-27-21 @ 00:00)  WBC Count: 8.89 (05-25-21 @ 23:30)  - Creatinine, Serum: <0.5 (05-27-21 @ 05:50)  Creatinine, Serum: <0.5 (05-27-21 @ 00:00)       ROS  ***    VITALS:  T(F): 97.7, Max: 101.5 (05-26-21 @ 20:00)  HR: 80  BP: 111/71  RR: 16Vital Signs Last 24 Hrs  T(C): 36.5 (27 May 2021 07:48), Max: 38.6 (26 May 2021 20:00)  T(F): 97.7 (27 May 2021 07:48), Max: 101.5 (26 May 2021 20:00)  HR: 80 (27 May 2021 08:00) (79 - 114)  BP: 111/71 (27 May 2021 08:00) (111/71 - 151/81)  BP(mean): 87 (27 May 2021 08:00) (87 - 108)  RR: 16 (27 May 2021 08:00) (13 - 29)  SpO2: 100% (27 May 2021 08:00) (98% - 100%)    PHYSICAL EXAM:  ***    FH: Non-contributory  Social Hx: Non-contributory    TESTS & MEASUREMENTS:                        7.4    8.16  )-----------( 338      ( 27 May 2021 00:00 )             21.6     05-27    134<L>  |  103  |  6<L>  ----------------------------<  141<H>  4.3   |  24  |  <0.5<L>    Ca    8.0<L>      27 May 2021 05:50  Phos  2.9     05-27  Mg     2.0     05-27      eGFR if African American: 165 mL/min/1.73M2 (05-27-21 @ 05:50)  eGFR if Non African American: 142 mL/min/1.73M2 (05-27-21 @ 05:50)  eGFR if Non African American: 142 mL/min/1.73M2 (05-27-21 @ 00:00)  eGFR if : 165 mL/min/1.73M2 (05-27-21 @ 00:00)  eGFR if Non African American: 142 mL/min/1.73M2 (05-26-21 @ 12:20)  eGFR if African American: 165 mL/min/1.73M2 (05-26-21 @ 12:20)          Culture - Blood (collected 05-23-21 @ 19:19)  Source: .Blood Blood-Peripheral  Preliminary Report (05-25-21 @ 03:01):    No growth to date.    Culture - Body Fluid with Gram Stain (collected 05-21-21 @ 12:00)  Source: .Body Fluid CSF  Gram Stain (05-22-21 @ 02:58):    polymorphonuclear leukocytes seen    Gram positive cocci in pairs seen    by cytocentrifuge  Final Report (05-26-21 @ 18:04):    Few Enterococcus faecalis  Organism: Enterococcus faecalis (05-26-21 @ 18:04)  Organism: Enterococcus faecalis (05-26-21 @ 18:04)      -  Ampicillin: S <=2 Predicts results to ampicillin/sulbactam, amoxacillin-clavulanate and  piperacillin-tazobactam.      -  Tetra/Doxy: R >8      -  Vancomycin: S 2      Method Type: AISHA    Culture - Blood (collected 05-20-21 @ 02:00)  Source: .Blood Blood  Final Report (05-25-21 @ 07:01):    No Growth Final    Culture - Blood (collected 05-19-21 @ 00:27)  Source: .Blood None  Final Report (05-24-21 @ 07:00):    No Growth Final    Culture - Surgical Swab (collected 05-16-21 @ 08:54)  Source: .Surgical Swab None  Final Report (05-21-21 @ 16:44):    Rare Coag Negative Staphylococcus  Organism: Coag Negative Staphylococcus (05-21-21 @ 16:44)  Organism: Coag Negative Staphylococcus (05-21-21 @ 16:44)      -  Ampicillin/Sulbactam: S <=8/4      -  Cefazolin: S <=4      -  Clindamycin: S <=0.25      -  Erythromycin: S <=0.25      -  Gentamicin: S <=1 Should not be used as monotherapy      -  Oxacillin: S <=0.25      -  Penicillin: R 0.25      -  RIF- Rifampin: S <=1 Should not be used as monotherapy      -  Tetra/Doxy: S <=1      -  Trimethoprim/Sulfamethoxazole: S <=0.5/9.5      -  Vancomycin: S 0.5      Method Type: AISHA    Culture - Blood (collected 05-13-21 @ 11:19)  Source: .Blood None  Final Report (05-18-21 @ 22:03):    No Growth Final    Culture - Blood (collected 05-11-21 @ 11:18)  Source: .Blood None  Gram Stain (05-12-21 @ 02:53):    Growth in anaerobic bottle: Gram Negative Rods    Growth in aerobic bottle: Gram Negative Rods  Final Report (05-13-21 @ 16:09):    Growth in aerobic and anaerobic bottles: Klebsiella variicola    ***Blood Panel PCR results on this specimen are available    approximately 3 hours after the Gram stain result.***    Gram stain, PCR, and/or culture results may not always    correspond due to difference in methodologies.    ************************************************************    This PCR assay was performed by multiplex PCR. This    Assay tests for 66 bacterial and resistance gene targets.    Please refer to the [facility] test directory    at [email or web address] for details.  Organism: Blood Culture PCR  Klebsiella variicola (05-13-21 @ 16:09)  Organism: Klebsiella variicola (05-13-21 @ 16:09)      -  Amikacin: S <=16      -  Ampicillin: R <=8 These ampicillin results predict results for amoxicillin      -  Ampicillin/Sulbactam: S <=4/2 Enterobacter, Citrobacter, and Serratia may develop resistance during prolonged therapy (3-4 days)      -  Aztreonam: S <=4      -  Cefazolin: S <=2 Enterobacter, Citrobacter, and Serratia may develop resistance during prolonged therapy (3-4 days)      -  Cefepime: S <=2      -  Cefoxitin: S <=8      -  Ceftriaxone: S <=1 Enterobacter, Citrobacter, and Serratia may develop resistance during prolonged therapy      -  Ciprofloxacin: S <=0.25      -  Ertapenem: S <=0.5      -  Gentamicin: S <=2      -  Imipenem: S <=1      -  Levofloxacin: S <=0.5      -  Meropenem: S <=1      -  Piperacillin/Tazobactam: S <=8      -  Tobramycin: S <=2      -  Trimethoprim/Sulfamethoxazole: S <=0.5/9.5      Method Type: AISHA  Organism: Blood Culture PCR (05-13-21 @ 16:09)      -  Klebsiella pneumoniae: Detec      Method Type: PCR    Culture - Bronchial (collected 05-11-21 @ 10:12)  Source: Bronch Wash Bronchoalveolar Lavage  Gram Stain (05-12-21 @ 07:04):    Few polymorphonuclear leukocytes per low power field    No Squamous epithelial cells per low power field    Few Gram Positive Rods per oil power field  Final Report (05-13-21 @ 16:19):    Moderate Klebsiella variicola    Moderate Serratia marcescens    Normal Respiratory Liz present  Organism: Klebsiella variicola  Serratia marcescens (05-13-21 @ 16:19)  Organism: Serratia marcescens (05-13-21 @ 16:19)      -  Amikacin: S <=16      -  Amoxicillin/Clavulanic Acid: R >16/8      -  Ampicillin: R <=8 These ampicillin results predict results for amoxicillin      -  Ampicillin/Sulbactam: R 16/8 Enterobacter, Citrobacter, and Serratia may develop resistance during prolonged therapy (3-4 days)      -  Aztreonam: S <=4      -  Cefazolin: R >16 Enterobacter, Citrobacter, and Serratia may develop resistance during prolonged therapy (3-4 days)      -  Cefepime: S <=2      -  Cefoxitin: R <=8      -  Ceftriaxone: S <=1 Enterobacter, Citrobacter, and Serratia may develop resistance during prolonged therapy      -  Ciprofloxacin: S <=0.25      -  Ertapenem: S <=0.5      -  Gentamicin: S <=2      -  Levofloxacin: S <=0.5      -  Meropenem: S <=1      -  Piperacillin/Tazobactam: S <=8      -  Tobramycin: S <=2      -  Trimethoprim/Sulfamethoxazole: S <=0.5/9.5      Method Type: AISHA  Organism: Klebsiella variicola (05-13-21 @ 16:19)      -  Amikacin: S <=16      -  Amoxicillin/Clavulanic Acid: R >16/8      -  Ampicillin: R <=8 These ampicillin results predict results for amoxicillin      -  Ampicillin/Sulbactam: I 16/8 Enterobacter, Citrobacter, and Serratia may develop resistance during prolonged therapy (3-4 days)      -  Aztreonam: R >16      -  Cefazolin: R >16 Enterobacter, Citrobacter, and Serratia may develop resistance during prolonged therapy (3-4 days)      -  Cefepime: S <=2      -  Cefoxitin: S <=8      -  Ceftriaxone: S <=1 Enterobacter, Citrobacter, and Serratia may develop resistance during prolonged therapy      -  Ciprofloxacin: S <=0.25      -  Ertapenem: S <=0.5      -  Gentamicin: S <=2      -  Imipenem: S <=1      -  Levofloxacin: S <=0.5      -  Meropenem: S <=1      -  Piperacillin/Tazobactam: S <=8      -  Tobramycin: S 4      -  Trimethoprim/Sulfamethoxazole: S <=0.5/9.5      Method Type: AISHA            INFECTIOUS DISEASES TESTING  Vancomycin Level, Trough: 9.0 (05-27-21 @ 05:50)  Vancomycin Level, Trough: 7.6 (05-24-21 @ 05:54)  COVID-19 PCR: NotDetec (05-21-21 @ 04:30)  COVID-19 PCR: NotDetec (05-14-21 @ 18:58)  Vancomycin Level, Trough: 4.1 (05-13-21 @ 05:50)  HIV-1/2 Combo Result: Nonreact (05-09-21 @ 23:30)  Rapid RVP Result: NotDetec (05-06-21 @ 22:44)  COVID-19 PCR: NotDetec (04-06-21 @ 16:46)  COVID-19 PCR: NotDetec (03-29-21 @ 19:35)      INFLAMMATORY MARKERS      RADIOLOGY & ADDITIONAL TESTS:  I have personally reviewed the last available Chest xray  CXR      CT      CARDIOLOGY TESTING  12 Lead ECG:   Ventricular Rate 78 BPM    Atrial Rate 78 BPM    P-R Interval 162 ms    QRS Duration 70 ms    Q-T Interval 364 ms    QTC Calculation(Bazett) 414 ms    P Axis 55 degrees    R Axis 30 degrees    T Axis 62 degrees    Diagnosis Line Normal sinus rhythm  Normal ECG    Confirmed by SALAS WASHINGTON MD ([id number]) on 5/14/2021 7:01:28 AM (05-14-21 @ 04:52)      MEDICATIONS  artificial tears (preservative free) Ophthalmic Solution 2 Both EYES three times a day  chlorhexidine 0.12% Liquid 15 Oral Mucosa two times a day  chlorhexidine 4% Liquid 1 Topical <User Schedule>  enoxaparin Injectable 40 SubCutaneous every 24 hours  insulin lispro (ADMELOG) corrective regimen sliding scale  SubCutaneous every 6 hours  levETIRAcetam  IVPB 500 IV Intermittent every 12 hours  lisinopril 20 Oral daily  meropenem  IVPB     meropenem  IVPB 2000 IV Intermittent every 8 hours  pantoprazole   Suspension 40 Oral daily  propranolol 20 Oral every 8 hours  senna 2 Oral at bedtime  sodium chloride 6 Oral every 6 hours  sodium chloride 3%. 500 IV Continuous <Continuous>  vancomycin  IVPB 1000 IV Intermittent every 8 hours      WEIGHT  Weight (kg): 79.4 (05-23-21 @ 16:30)  Creatinine, Serum: <0.5 mg/dL (05-27-21 @ 05:50)  Creatinine, Serum: <0.5 mg/dL (05-27-21 @ 00:00)  Creatinine, Serum: <0.5 mg/dL (05-26-21 @ 12:20)      ANTIBIOTICS:  meropenem  IVPB      meropenem  IVPB 2000 milliGRAM(s) IV Intermittent every 8 hours  vancomycin  IVPB 1000 milliGRAM(s) IV Intermittent every 8 hours      All available historical records have been reviewed       THIAGO SANDRA  45y, Male  Allergy: No Known Allergies      LOS  21d    CHIEF COMPLAINT: found down unresponsive (27 May 2021 08:21)      INTERVAL EVENTS/HPI  - No acute events overnight, remains febrile   - T(F): , Max: 101.5 (05-26-21 @ 20:00)  - Tolerating medication  - WBC Count: 8.16 (05-27-21 @ 00:00)  WBC Count: 8.89 (05-25-21 @ 23:30)  - Creatinine, Serum: <0.5 (05-27-21 @ 05:50)  Creatinine, Serum: <0.5 (05-27-21 @ 00:00)       ROS  unable to obtain history secondary to patient's mental status and/or sedation     VITALS:  T(F): 97.7, Max: 101.5 (05-26-21 @ 20:00)  HR: 80  BP: 111/71  RR: 16Vital Signs Last 24 Hrs  T(C): 36.5 (27 May 2021 07:48), Max: 38.6 (26 May 2021 20:00)  T(F): 97.7 (27 May 2021 07:48), Max: 101.5 (26 May 2021 20:00)  HR: 80 (27 May 2021 08:00) (79 - 114)  BP: 111/71 (27 May 2021 08:00) (111/71 - 151/81)  BP(mean): 87 (27 May 2021 08:00) (87 - 108)  RR: 16 (27 May 2021 08:00) (13 - 29)  SpO2: 100% (27 May 2021 08:00) (98% - 100%)    PHYSICAL EXAM:  Gen: NAD, trach  HEENT: Normocephalic, atraumatic L head defect, staples KIKE serosanguinous fluid   Neck: supple, no lymphadenopathy  CV: Regular rate & regular rhythm  Lungs: decreased BS at bases, no fremitus  Abdomen: Soft, BS present  Ext: Warm, well perfused  Neuro: non focal, awake  Skin: no rash, no erythema  Lines: no phlebitis   FH: Non-contributory  Social Hx: Non-contributory    TESTS & MEASUREMENTS:                        7.4    8.16  )-----------( 338      ( 27 May 2021 00:00 )             21.6     05-27    134<L>  |  103  |  6<L>  ----------------------------<  141<H>  4.3   |  24  |  <0.5<L>    Ca    8.0<L>      27 May 2021 05:50  Phos  2.9     05-27  Mg     2.0     05-27      eGFR if African American: 165 mL/min/1.73M2 (05-27-21 @ 05:50)  eGFR if Non African American: 142 mL/min/1.73M2 (05-27-21 @ 05:50)  eGFR if Non African American: 142 mL/min/1.73M2 (05-27-21 @ 00:00)  eGFR if : 165 mL/min/1.73M2 (05-27-21 @ 00:00)  eGFR if Non African American: 142 mL/min/1.73M2 (05-26-21 @ 12:20)  eGFR if African American: 165 mL/min/1.73M2 (05-26-21 @ 12:20)          Culture - Blood (collected 05-23-21 @ 19:19)  Source: .Blood Blood-Peripheral  Preliminary Report (05-25-21 @ 03:01):    No growth to date.    Culture - Body Fluid with Gram Stain (collected 05-21-21 @ 12:00)  Source: .Body Fluid CSF  Gram Stain (05-22-21 @ 02:58):    polymorphonuclear leukocytes seen    Gram positive cocci in pairs seen    by cytocentrifuge  Final Report (05-26-21 @ 18:04):    Few Enterococcus faecalis  Organism: Enterococcus faecalis (05-26-21 @ 18:04)  Organism: Enterococcus faecalis (05-26-21 @ 18:04)      -  Ampicillin: S <=2 Predicts results to ampicillin/sulbactam, amoxacillin-clavulanate and  piperacillin-tazobactam.      -  Tetra/Doxy: R >8      -  Vancomycin: S 2      Method Type: AISHA    Culture - Blood (collected 05-20-21 @ 02:00)  Source: .Blood Blood  Final Report (05-25-21 @ 07:01):    No Growth Final    Culture - Blood (collected 05-19-21 @ 00:27)  Source: .Blood None  Final Report (05-24-21 @ 07:00):    No Growth Final    Culture - Surgical Swab (collected 05-16-21 @ 08:54)  Source: .Surgical Swab None  Final Report (05-21-21 @ 16:44):    Rare Coag Negative Staphylococcus  Organism: Coag Negative Staphylococcus (05-21-21 @ 16:44)  Organism: Coag Negative Staphylococcus (05-21-21 @ 16:44)      -  Ampicillin/Sulbactam: S <=8/4      -  Cefazolin: S <=4      -  Clindamycin: S <=0.25      -  Erythromycin: S <=0.25      -  Gentamicin: S <=1 Should not be used as monotherapy      -  Oxacillin: S <=0.25      -  Penicillin: R 0.25      -  RIF- Rifampin: S <=1 Should not be used as monotherapy      -  Tetra/Doxy: S <=1      -  Trimethoprim/Sulfamethoxazole: S <=0.5/9.5      -  Vancomycin: S 0.5      Method Type: AISHA    Culture - Blood (collected 05-13-21 @ 11:19)  Source: .Blood None  Final Report (05-18-21 @ 22:03):    No Growth Final    Culture - Blood (collected 05-11-21 @ 11:18)  Source: .Blood None  Gram Stain (05-12-21 @ 02:53):    Growth in anaerobic bottle: Gram Negative Rods    Growth in aerobic bottle: Gram Negative Rods  Final Report (05-13-21 @ 16:09):    Growth in aerobic and anaerobic bottles: Klebsiella variicola    ***Blood Panel PCR results on this specimen are available    approximately 3 hours after the Gram stain result.***    Gram stain, PCR, and/or culture results may not always    correspond due to difference in methodologies.    ************************************************************    This PCR assay was performed by multiplex PCR. This    Assay tests for 66 bacterial and resistance gene targets.    Please refer to the [facility] test directory    at [email or web address] for details.  Organism: Blood Culture PCR  Klebsiella variicola (05-13-21 @ 16:09)  Organism: Klebsiella variicola (05-13-21 @ 16:09)      -  Amikacin: S <=16      -  Ampicillin: R <=8 These ampicillin results predict results for amoxicillin      -  Ampicillin/Sulbactam: S <=4/2 Enterobacter, Citrobacter, and Serratia may develop resistance during prolonged therapy (3-4 days)      -  Aztreonam: S <=4      -  Cefazolin: S <=2 Enterobacter, Citrobacter, and Serratia may develop resistance during prolonged therapy (3-4 days)      -  Cefepime: S <=2      -  Cefoxitin: S <=8      -  Ceftriaxone: S <=1 Enterobacter, Citrobacter, and Serratia may develop resistance during prolonged therapy      -  Ciprofloxacin: S <=0.25      -  Ertapenem: S <=0.5      -  Gentamicin: S <=2      -  Imipenem: S <=1      -  Levofloxacin: S <=0.5      -  Meropenem: S <=1      -  Piperacillin/Tazobactam: S <=8      -  Tobramycin: S <=2      -  Trimethoprim/Sulfamethoxazole: S <=0.5/9.5      Method Type: AISHA  Organism: Blood Culture PCR (05-13-21 @ 16:09)      -  Klebsiella pneumoniae: Detec      Method Type: PCR    Culture - Bronchial (collected 05-11-21 @ 10:12)  Source: Bronch Wash Bronchoalveolar Lavage  Gram Stain (05-12-21 @ 07:04):    Few polymorphonuclear leukocytes per low power field    No Squamous epithelial cells per low power field    Few Gram Positive Rods per oil power field  Final Report (05-13-21 @ 16:19):    Moderate Klebsiella variicola    Moderate Serratia marcescens    Normal Respiratory Liz present  Organism: Klebsiella variicola  Serratia marcescens (05-13-21 @ 16:19)  Organism: Serratia marcescens (05-13-21 @ 16:19)      -  Amikacin: S <=16      -  Amoxicillin/Clavulanic Acid: R >16/8      -  Ampicillin: R <=8 These ampicillin results predict results for amoxicillin      -  Ampicillin/Sulbactam: R 16/8 Enterobacter, Citrobacter, and Serratia may develop resistance during prolonged therapy (3-4 days)      -  Aztreonam: S <=4      -  Cefazolin: R >16 Enterobacter, Citrobacter, and Serratia may develop resistance during prolonged therapy (3-4 days)      -  Cefepime: S <=2      -  Cefoxitin: R <=8      -  Ceftriaxone: S <=1 Enterobacter, Citrobacter, and Serratia may develop resistance during prolonged therapy      -  Ciprofloxacin: S <=0.25      -  Ertapenem: S <=0.5      -  Gentamicin: S <=2      -  Levofloxacin: S <=0.5      -  Meropenem: S <=1      -  Piperacillin/Tazobactam: S <=8      -  Tobramycin: S <=2      -  Trimethoprim/Sulfamethoxazole: S <=0.5/9.5      Method Type: AISHA  Organism: Klebsiella variicola (05-13-21 @ 16:19)      -  Amikacin: S <=16      -  Amoxicillin/Clavulanic Acid: R >16/8      -  Ampicillin: R <=8 These ampicillin results predict results for amoxicillin      -  Ampicillin/Sulbactam: I 16/8 Enterobacter, Citrobacter, and Serratia may develop resistance during prolonged therapy (3-4 days)      -  Aztreonam: R >16      -  Cefazolin: R >16 Enterobacter, Citrobacter, and Serratia may develop resistance during prolonged therapy (3-4 days)      -  Cefepime: S <=2      -  Cefoxitin: S <=8      -  Ceftriaxone: S <=1 Enterobacter, Citrobacter, and Serratia may develop resistance during prolonged therapy      -  Ciprofloxacin: S <=0.25      -  Ertapenem: S <=0.5      -  Gentamicin: S <=2      -  Imipenem: S <=1      -  Levofloxacin: S <=0.5      -  Meropenem: S <=1      -  Piperacillin/Tazobactam: S <=8      -  Tobramycin: S 4      -  Trimethoprim/Sulfamethoxazole: S <=0.5/9.5      Method Type: AISHA            INFECTIOUS DISEASES TESTING  Vancomycin Level, Trough: 9.0 (05-27-21 @ 05:50)  Vancomycin Level, Trough: 7.6 (05-24-21 @ 05:54)  COVID-19 PCR: NotDetec (05-21-21 @ 04:30)  COVID-19 PCR: NotDetec (05-14-21 @ 18:58)  Vancomycin Level, Trough: 4.1 (05-13-21 @ 05:50)  HIV-1/2 Combo Result: Nonreact (05-09-21 @ 23:30)  Rapid RVP Result: NotDetec (05-06-21 @ 22:44)  COVID-19 PCR: NotDetec (04-06-21 @ 16:46)  COVID-19 PCR: NotDetec (03-29-21 @ 19:35)      INFLAMMATORY MARKERS      RADIOLOGY & ADDITIONAL TESTS:  I have personally reviewed the last available Chest xray  CXR      CT      CARDIOLOGY TESTING  12 Lead ECG:   Ventricular Rate 78 BPM    Atrial Rate 78 BPM    P-R Interval 162 ms    QRS Duration 70 ms    Q-T Interval 364 ms    QTC Calculation(Bazett) 414 ms    P Axis 55 degrees    R Axis 30 degrees    T Axis 62 degrees    Diagnosis Line Normal sinus rhythm  Normal ECG    Confirmed by SALAS WASHINGTON MD ([id number]) on 5/14/2021 7:01:28 AM (05-14-21 @ 04:52)      MEDICATIONS  artificial tears (preservative free) Ophthalmic Solution 2 Both EYES three times a day  chlorhexidine 0.12% Liquid 15 Oral Mucosa two times a day  chlorhexidine 4% Liquid 1 Topical <User Schedule>  enoxaparin Injectable 40 SubCutaneous every 24 hours  insulin lispro (ADMELOG) corrective regimen sliding scale  SubCutaneous every 6 hours  levETIRAcetam  IVPB 500 IV Intermittent every 12 hours  lisinopril 20 Oral daily  meropenem  IVPB     meropenem  IVPB 2000 IV Intermittent every 8 hours  pantoprazole   Suspension 40 Oral daily  propranolol 20 Oral every 8 hours  senna 2 Oral at bedtime  sodium chloride 6 Oral every 6 hours  sodium chloride 3%. 500 IV Continuous <Continuous>  vancomycin  IVPB 1000 IV Intermittent every 8 hours      WEIGHT  Weight (kg): 79.4 (05-23-21 @ 16:30)  Creatinine, Serum: <0.5 mg/dL (05-27-21 @ 05:50)  Creatinine, Serum: <0.5 mg/dL (05-27-21 @ 00:00)  Creatinine, Serum: <0.5 mg/dL (05-26-21 @ 12:20)      ANTIBIOTICS:  meropenem  IVPB      meropenem  IVPB 2000 milliGRAM(s) IV Intermittent every 8 hours  vancomycin  IVPB 1000 milliGRAM(s) IV Intermittent every 8 hours      All available historical records have been reviewed

## 2021-05-27 NOTE — PROGRESS NOTE ADULT - SUBJECTIVE AND OBJECTIVE BOX
SANDRAANJU DAS  [id number]  45y Male    Indication for ICU admission: s/p craniectomy for large L SDH  Admit Date:05-06-21  ICU Date: 05-07-21  OR Date: 05-06-21    No Known Allergies    PAST MEDICAL & SURGICAL HISTORY:  Alcohol abuse  GERD (gastroesophageal reflux disease)  ETOH abuse  Hypomagnesemia  Seizure disorder  H/O hemorrhoidectomy    Home Medications:  NONE    24HRS EVENT:  DAY  -Na 139>133, still 3% @60,inc Nacl 6q6 repeat pending @1600  -HSQ -> LVX, ok by nsx   -DVT sono -- negative  -OOBTC  -repeat CT head - there is slight increased thickness of a mixed density extra-axial collection within the craniectomy defect.. 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  -vanc level 8pm  tmax 100.8      Night  -Na 137, decreased 3% to 50.  Salt tabs up to 6g q6  -f/up Vanco Trough am level      DVT PTX: LVX  GI PTX: PPI    ***Tubes/Lines/Drains  ***  Peripheral IV  Left basilic midline 5/17  Arterial Line Right radial	Date 5/17  L IJ TLC     5/12  Trach/peg 5/21    REVIEW OF SYSTEMS  [ ] A ten-point review of systems was otherwise negative except as noted.  [x] Due to altered mental status/intubation, subjective information were not able to be obtained from the patient. History was obtained, to the extent possible, from review of the chart and collateral sources of information.     SANDRA DAS  [id number]  45y Male    Indication for ICU admission: s/p craniectomy for large L SDH  Admit Date:05-06-21  ICU Date: 05-07-21  OR Date: 05-06-21    No Known Allergies    PAST MEDICAL & SURGICAL HISTORY:  Alcohol abuse  GERD (gastroesophageal reflux disease)  ETOH abuse  Hypomagnesemia  Seizure disorder  H/O hemorrhoidectomy    Home Medications:  NONE    24HRS EVENT:  DAY  -Na 139>133, still 3% @60,inc Nacl 6q6 repeat pending @1600  -HSQ -> LVX, ok by nsx   -DVT sono -- negative  -OOBTC  -repeat CT head - there is slight increased thickness of a mixed density extra-axial collection within the craniectomy defect.. 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  -vanc level 8pm  tmax 100.8      Night  -Na 137, decreased 3% to 50.  Salt tabs up to 6g q6  -f/up Vanco Trough am level      DVT PTX: LVX  GI PTX: PPI    ***Tubes/Lines/Drains  ***  Peripheral IV  Left basilic midline 5/17  Arterial Line Right radial	Date 5/17  L IJ TLC     5/12  Trach/peg 5/21    REVIEW OF SYSTEMS  [ ] A ten-point review of systems was otherwise negative except as noted.  [x] Due to altered mental status/intubation, subjective information were not able to be obtained from the patient. History was obtained, to the extent possible, from review of the chart and collateral sources of information.      Daily     Daily     Diet, NPO with Tube Feed:   Tube Feeding Modality: Orogastric  Osmolite 1.5 Jeff  Total Volume for 24 Hours (mL): 1200  Continuous  Starting Tube Feed Rate mL per Hour: 50  Until Goal Tube Feed Rate (mL per Hour): 50  Tube Feed Duration (in Hours): 24  Tube Feed Start Time: 00:00  No Carb Prosource TF     Qty per Day:  3 (05-24-21 @ 19:03)      CURRENT MEDS:  Neurologic Medications  acetaminophen   Tablet .. 650 milliGRAM(s) Oral every 6 hours PRN Temp greater or equal to 38.5C (101.3F), Moderate Pain (4 - 6), Severe Pain (7 - 10)  levETIRAcetam  IVPB 500 milliGRAM(s) IV Intermittent every 12 hours    Respiratory Medications    Cardiovascular Medications  lisinopril 20 milliGRAM(s) Oral daily  propranolol 20 milliGRAM(s) Oral every 8 hours    Gastrointestinal Medications  pantoprazole   Suspension 40 milliGRAM(s) Oral daily  senna 2 Tablet(s) Oral at bedtime  sodium chloride 6 Gram(s) Oral every 6 hours  sodium chloride 3%. 500 milliLiter(s) IV Continuous <Continuous>    Genitourinary Medications    Hematologic/Oncologic Medications  enoxaparin Injectable 40 milliGRAM(s) SubCutaneous every 24 hours    Antimicrobial/Immunologic Medications  meropenem  IVPB      meropenem  IVPB 2000 milliGRAM(s) IV Intermittent every 8 hours  vancomycin  IVPB 1500 milliGRAM(s) IV Intermittent every 8 hours    Endocrine/Metabolic Medications  insulin lispro (ADMELOG) corrective regimen sliding scale   SubCutaneous every 6 hours    Topical/Other Medications  artificial tears (preservative free) Ophthalmic Solution 2 Drop(s) Both EYES three times a day  chlorhexidine 0.12% Liquid 15 milliLiter(s) Oral Mucosa two times a day  chlorhexidine 4% Liquid 1 Application(s) Topical <User Schedule>      ICU Vital Signs Last 24 Hrs  T(C): 36.5 (27 May 2021 07:48), Max: 38.6 (26 May 2021 20:00)  T(F): 97.7 (27 May 2021 07:48), Max: 101.5 (26 May 2021 20:00)  HR: 77 (27 May 2021 12:00) (75 - 114)  BP: 140/86 (27 May 2021 12:00) (111/71 - 151/81)  BP(mean): 107 (27 May 2021 12:00) (87 - 108)  ABP: --  ABP(mean): --  RR: 24 (27 May 2021 12:00) (13 - 29)  SpO2: 100% (27 May 2021 12:00) (98% - 100%)      Adult Advanced Hemodynamics Last 24 Hrs  CVP(mm Hg): --  CVP(cm H2O): --  CO: --  CI: --  PA: --  PA(mean): --  PCWP: --  SVR: --  SVRI: --  PVR: --  PVRI: --          I&O's Summary    26 May 2021 07:01  -  27 May 2021 07:00  --------------------------------------------------------  IN: 3730 mL / OUT: 2360 mL / NET: 1370 mL    27 May 2021 07:01  -  27 May 2021 13:06  --------------------------------------------------------  IN: 500 mL / OUT: 635 mL / NET: -135 mL      I&O's Detail    26 May 2021 07:01  -  27 May 2021 07:00  --------------------------------------------------------  IN:    IV PiggyBack: 100 mL    IV PiggyBack: 500 mL    IV PiggyBack: 250 mL    IV PiggyBack: 300 mL    Osmolite: 1200 mL    sodium chloride 3%: 300 mL    sodium chloride 3%: 1080 mL  Total IN: 3730 mL    OUT:    Bulb (mL): 90 mL    Voided (mL): 2270 mL  Total OUT: 2360 mL    Total NET: 1370 mL      27 May 2021 07:01  -  27 May 2021 13:06  --------------------------------------------------------  IN:    Osmolite: 250 mL    sodium chloride 3%: 250 mL  Total IN: 500 mL    OUT:    Bulb (mL): 35 mL    Voided (mL): 600 mL  Total OUT: 635 mL    Total NET: -135 mL          PHYSICAL EXAM:    General/Neuro  GCS 11T  Deficits: follows commands with LUE and LLE, tracks across midline, no movement with Right side, PERRL  KIKE drain in place with serous output    Lungs:      clear to auscultation, Normal expansion/effort.     Cardiovascular : S1, S2.  Regular rate and rhythm. No  Peripheral edema   Cardiac Rhythm: Normal Sinus Rhythm    GI: Abdomen soft, Non-tender, Non-distended.      Extremities: Extremities warm, pink, well-perfused.     Derm: Good skin turgor, no skin breakdown.      : Condom catheter, voiding      CXR:       LABS:  CAPILLARY BLOOD GLUCOSE      POCT Blood Glucose.: 145 mg/dL (27 May 2021 05:54)  POCT Blood Glucose.: 135 mg/dL (27 May 2021 00:03)  POCT Blood Glucose.: 143 mg/dL (26 May 2021 17:15)  POCT Blood Glucose.: 121 mg/dL (26 May 2021 15:03)                          7.4    8.16  )-----------( 338      ( 27 May 2021 00:00 )             21.6       05-27    134<L>  |  103  |  6<L>  ----------------------------<  141<H>  4.3   |  24  |  <0.5<L>    Ca    8.0<L>      27 May 2021 05:50  Phos  2.9     05-27  Mg     2.0     05-27

## 2021-05-27 NOTE — PROGRESS NOTE ADULT - ASSESSMENT
Assessment & Plan  45M s/p fall with large left SDH and neurological status change, level 1 for emergent craniotomy    NEURO:    Hx of EtOH abuse and prior Delerium Tremens episodes    S/P craniotomy for large left SDH     - Q2H neuro checks, No sedation, GCS 11T     - Keppra 500mg BID     -3% @ 60cc -- Na goal 135 ---> begin to wean          Na 139 >133, incr Nacl 6gm q6    Pain control - prn low dose fentanyl IVP, Tylenol    Head of bed @ 30 degrees    Helmet delivered --> does not need while laying in bed only during PT/OT       OR 5/16:     -Revision craniotomy wound with complex closure for persistent drainage, subgaleal hematoma   Imaging:     HCT 5/16-stable post op changes    CT Venogram-left transverse sinus thrombosis, no intervention currently    5/23 NSX placed KIKE under the skin to gravity, old blood aspirated and some pus, plan for     -e. faecalis in cx --> ID following, on abx    -if continues to spine with antibiotic change 5/25 plan to re culture   Repeat CT head 5/26- - there is slight increased thickness of a mixed density extra-axial collection within the craniectomy defect.. 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    RESP:   s/p trach 5/21-on Tpiece since 5/24  Sutures to be removed 5/27  ABG -       CARDS:     S/P cardiac arrest in OR & MTP     - Hypertension - on Lisinopril 20mg     - Propranolol 20mg q8    GI/NUTR:     Diet: TF (Osmolite 1.5) @ 50 via PEG    GI Prophylaxis- PPI    Bowel regimen restarted - Senna      -Dignishield removed 5/25    /RENAL:     Condom cath, + voiding.     Q6 BMP for hypertonic saline     - Hyponatremic - Na goal 135 per neurosurgery     - 3% @ 50  - 139>133->137     - Salt tabs incr 6g q6     HEME/ONC:   Hg >8, MTP on this admission  DVT prophylaxis: Heparin sq  5/26 DVT sono neg    ID:  febrile, tmax 100.8  WBC 8.8    Antibiotics: Meropenem 2q8, Vanc 1g q8      -started 5/25 for spiking temps during day      - trough prior to dose #4, 8pm      - ID following     - 5/11- u/a +leuk     - f/u blood culture 5/13, 5/19, 5/20, 5/23 prelim neg  --> plan to reculture if continues to spike temps      -Cranial Culture 5/21-e faceium    ENDO:  q6 POC glucose  Sliding scale insulin  A1c 5.2    DISPO: SICU    Assessment & Plan  45M s/p fall with large left SDH and neurological status change, level 1 for emergent craniotomy    NEURO:    Hx of EtOH abuse and prior Delerium Tremens episodes    S/P craniotomy for large left SDH     - Q2H neuro checks, No sedation, GCS 11T     - Keppra 500mg BID     - 3% @ 50cc -- Na goal 135 ---> begin to wean          Na 137 >134, incr Nacl 6gm q6    Head of bed @ 30 degrees    Helmet delivered --> does not need while laying in bed only during PT/OT       OR 5/16:     -Revision craniotomy wound with complex closure for persistent drainage, subgaleal hematoma   Imaging:     HCT 5/16-stable post op changes    CT Venogram-left transverse sinus thrombosis, no intervention currently    5/23 NSX placed KIKE under the skin to gravity, old blood aspirated and some pus, plan for     -e. faecalis in cx --> ID following, on abx    -if continues to spine with antibiotic change 5/25 plan to re culture   Repeat CT head 5/26- - there is slight increased thickness of a mixed density extra-axial collection within the craniectomy defect.. 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    RESP:   s/p trach 5/21-on Tpiece since 5/24  Sutures to be removed 5/27       CARDS:     S/P cardiac arrest in OR & MTP     - Hypertension - on Lisinopril 20mg     - Propranolol 20mg q8    GI/NUTR:     Diet: TF (Osmolite 1.5) @ 50 via PEG    GI Prophylaxis- PPI    Bowel regimen restarted - Senna      -Dignishield removed 5/25    /RENAL:     Condom cath, + voiding.     Q6 BMP for hypertonic saline     - Hyponatremic - Na goal 135 per neurosurgery     - 3% @ 50  - 139>133->137> 134     - Salt tabs incr 6g q6   Labs: Na 134 / K 4.3 / Mg 2 / phos 2.9  - Na phos    HEME/ONC:   Hg >8, MTP on this admission  DVT prophylaxis: Lovenox 40  5/26 DVT sono neg  Hb 7.7 --> 7.4    ID:  febrile, tmax 101.5  WBC 8.8 > 8.1    Antibiotics: Meropenem 2q8, Vanc increased to 1500 q8 per ID     -started 5/25 for spiking temps during day      - trough before AM dose 5/28     - ID following     - 5/11- u/a +leuk     - f/u blood culture 5/13, 5/19, 5/20, 5/23 prelim neg, 5/27  --> plan to reculture if continues to spike temps      -Cranial Culture 5/21-e faceium    ENDO:  q6 POC glucose  Sliding scale insulin  A1c 5.2    DISPO: SICU

## 2021-05-27 NOTE — CONSULT NOTE ADULT - SUBJECTIVE AND OBJECTIVE BOX
HPI:  TRAUMA ACTIVATION LEVEL:  Code trauma    HPI:    Patient is a 45yM w/ PMHx of Etoh abuse and seizures on keppra seen as Trauma Alert upgraded to a Code trauma during examination in the ED where the patient was found to have a GCS of 4 s/p found down unresponsive.  Trauma assessment in ED: intubated for airway protection. s/p fall with large L SDH, s/p craniotomy for evacuation on 5/6. Hospital course complicated for prolonged intubation, s/p tracheostomy, s/p peg, s/p revision of craniotomy wound on 5/16.         PAST MEDICAL & SURGICAL HISTORY:  Alcohol abuse    GERD (gastroesophageal reflux disease)    ETOH abuse    Hypomagnesemia    Seizure disorder    H/O hemorrhoidectomy        Hospital Course:    TODAY'S SUBJECTIVE & REVIEW OF SYMPTOMS:     Constitutional WNL   Cardio WNL   Resp WNL   GI WNL  Heme WNL  Endo WNL  Skin WNL  MSK WNL  Neuro right hemiplegia / non verbal  Cognitive WNL  Psych WNL      MEDICATIONS  (STANDING):  artificial tears (preservative free) Ophthalmic Solution 2 Drop(s) Both EYES three times a day  chlorhexidine 0.12% Liquid 15 milliLiter(s) Oral Mucosa two times a day  chlorhexidine 4% Liquid 1 Application(s) Topical <User Schedule>  enoxaparin Injectable 40 milliGRAM(s) SubCutaneous every 24 hours  insulin lispro (ADMELOG) corrective regimen sliding scale   SubCutaneous every 6 hours  levETIRAcetam  IVPB 500 milliGRAM(s) IV Intermittent every 12 hours  lisinopril 20 milliGRAM(s) Oral daily  meropenem  IVPB      meropenem  IVPB 2000 milliGRAM(s) IV Intermittent every 8 hours  pantoprazole   Suspension 40 milliGRAM(s) Oral daily  propranolol 20 milliGRAM(s) Oral every 8 hours  senna 2 Tablet(s) Oral at bedtime  sodium chloride 6 Gram(s) Oral every 6 hours  sodium chloride 3%. 500 milliLiter(s) (50 mL/Hr) IV Continuous <Continuous>  sodium phosphate IVPB 15 milliMole(s) IV Intermittent once  vancomycin  IVPB 1000 milliGRAM(s) IV Intermittent every 8 hours    MEDICATIONS  (PRN):  acetaminophen   Tablet .. 650 milliGRAM(s) Oral every 6 hours PRN Temp greater or equal to 38.5C (101.3F), Moderate Pain (4 - 6), Severe Pain (7 - 10)      FAMILY HISTORY:  Family history of essential hypertension (Father)        Allergies    No Known Allergies    Intolerances        SOCIAL HISTORY:    [  ] Etoh  [  ] Smoking  [  ] Substance abuse     Home Environment:  [   ] Home Alone  [x   ] Lives with Family  [   ] Home Health Aid    Dwelling:  [   ] Apartment  [ x  ] Private House  [   ] Adult Home  [   ] Skilled Nursing Facility      [   ] Short Term  [   ] Long Term  [ x  ] Stairs       Elevator [   ]    FUNCTIONAL STATUS PTA: (Check all that apply)  Ambulation: [  x  ]Independent    [   ] Dependent     [   ] Non-Ambulatory  Assistive Device: [   ] SA Cane  [   ]  Q Cane  [   ] Walker  [   ]  Wheelchair  ADL : [ x  ] Independent  [    ]  Dependent       Vital Signs Last 24 Hrs  T(C): 37.6 (27 May 2021 06:00), Max: 38.6 (26 May 2021 20:00)  T(F): 99.6 (27 May 2021 06:00), Max: 101.5 (26 May 2021 20:00)  HR: 83 (27 May 2021 06:00) (79 - 114)  BP: 129/78 (27 May 2021 06:00) (118/70 - 151/81)  BP(mean): 98 (27 May 2021 06:00) (89 - 108)  RR: 17 (27 May 2021 06:00) (13 - 29)  SpO2: 100% (27 May 2021 06:00) (98% - 100%)      PHYSICAL EXAM: Awake & non verbal  GENERAL: NAD, restrained   HEAD:  Normocephalic  CHEST/LUNG: + tracheostomy / decreased bs lung bases  HEART: S1S2+  ABDOMEN: Soft, Nontender, + peg  EXTREMITIES:  no calf tenderness    NERVOUS SYSTEM:  Cranial Nerves 2-12 intact [   ] Abnormal  [   ]  ROM: WFL all extremities [   ]  Abnormal [  x ]unable to move right side  Motor Strength: WFL all extremities  [   ]  Abnormal [ x  ]  Sensation: intact to light touch [   ] Abnormal [   ]    FUNCTIONAL STATUS:  Bed Mobility: Independent [   ]  Supervision [   ]  Needs Assistance [  x ]  N/A [   ]  Transfers: Independent [   ]  Supervision [   ]  Needs Assistance [   ]  N/A [   ]   Ambulation: Independent [   ]  Supervision [   ]  Needs Assistance [   ]  N/A [   ]  ADL: Independent [   ] Requires Assistance [   ] N/A [   ]      LABS:                        7.4    8.16  )-----------( 338      ( 27 May 2021 00:00 )             21.6     05-27    134<L>  |  103  |  6<L>  ----------------------------<  141<H>  4.3   |  24  |  <0.5<L>    Ca    8.0<L>      27 May 2021 05:50  Phos  2.9     05-27  Mg     2.0     05-27            RADIOLOGY & ADDITIONAL STUDIES:

## 2021-05-27 NOTE — PROGRESS NOTE ADULT - ASSESSMENT
ASSESSMENT  45yM w/ PMHx of Etoh abuse and seizures on keppra  found down unresponsive admitted 5/6. s/p craniectomy for large L SDH    IMPRESSION  #Fevers secondary to craniotomy site collection and thrombus    5/21 aspirate craniotomy site WCX e. faecalis (S amp)    5/16 swab OR coNS (S oxacillin)    5/23 BCX NG    No leukocytosis, rule out non-infectious causes / central fever    5/19 BCX NGTD     5/18 UA unremarkable   < from: CT Head No Cont (05.26.21 @ 12:49) >  Since prior CT of 5/16/2021 there is slight increased thickness of a mixed density extra-axial collection within the craniectomy defect.. 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 Please note evaluation for abscess is limited without intravenous contrast. A follow-up MRI of the brain with and without contrast is recommended for further evaluation.  Decreased blood products noted within the left temporal occipital region with unchanged areas of subacute infarct.    CT 5/20 1.  Redemonstrated filling defect within the left transverse sinus, sigmoid sinus and proximal internal jugular vein compatible with thrombus. When compared to the CTA from 5/13, there has been interval partial recanalization of the left transverse sinus.  2.  Redemonstrated large left-sided craniectomy. The previously seen left scalp drain has been removed.  3.  Increasing size of the mixed density fluid collection/hematoma extending from the left extra-axial space to the overlying scalp, overall measuring 4 cm in width, previously 3 cm.  4.  Increasing frontal convexity subdural hygromas measuring 8 mm on the right and 9 mm on the left.  5.  Left to right midline shift of 4 mm, previously 3 mm.  6.  Redemonstrated acute/subacute infarcts within the left temporal lobe and insula. Additional previously demonstrated multifocal infarcts and edema are not as well delineated on this exam, recommend follow-up head CT.  #Klebsiella bacteremia , BAL also with Klebsiella but no PNA on imaging (CT/CXR)    5/13 BCX NGTD     5/11 BCX kleb variicola (S)    5/11 BAL   Moderate Klebsiella variicola &  Moderate Serratia marcescens  < from: Xray Chest 1 View- PORTABLE-Routine (05.13.21 @ 06:28) >No radiographic evidence of acute cardiopulmonary disease.    #SDH s/p craniectomy    5/16 OR WCX   Rare Coag Negative Staphylococcus (S oxacillin)- likely skin colonizer     5/16 s/p Revision craniotomy wound with complex closure for persistent drainage, subgaleal hematoma    Repeat CTH development of acute infarcts in left cerebral hemisphere and right cerebellum  #CT atelectasis   #HIV/Hep panel NR  Creatinine, Serum: <0.5 (05-14-21 @ 05:30)      RECOMMENDATIONS  - Repeat BCX, none pending, last 5/23  - Seamus 2g q8h IV and Vanc IV 5/25-, s/p unasyn  - Vancomycin Level, Trough: 9.0 (05-27-21 @ 05:50) INCREASE to Vanc 1.5 q8h IV  - Please recheck vanc trough 30 min prior to 4th dose   - Recommend MRI Brain rule out abscess, may need further debridement/ source control  - trend WBC, fever curve  - Per SICU/ NSYG  - Fevers can be central as well- no leukocytosis vs contained subdural empyema     If any questions, please call or send a message on [employer] Teams  Spectra [phone]

## 2021-05-28 LAB
ANION GAP SERPL CALC-SCNC: 7 MMOL/L — SIGNIFICANT CHANGE UP (ref 7–14)
ANION GAP SERPL CALC-SCNC: 8 MMOL/L — SIGNIFICANT CHANGE UP (ref 7–14)
ANION GAP SERPL CALC-SCNC: 9 MMOL/L — SIGNIFICANT CHANGE UP (ref 7–14)
BUN SERPL-MCNC: 5 MG/DL — LOW (ref 10–20)
CALCIUM SERPL-MCNC: 7.9 MG/DL — LOW (ref 8.5–10.1)
CALCIUM SERPL-MCNC: 7.9 MG/DL — LOW (ref 8.5–10.1)
CALCIUM SERPL-MCNC: 8.1 MG/DL — LOW (ref 8.5–10.1)
CHLORIDE SERPL-SCNC: 101 MMOL/L — SIGNIFICANT CHANGE UP (ref 98–110)
CHLORIDE SERPL-SCNC: 102 MMOL/L — SIGNIFICANT CHANGE UP (ref 98–110)
CHLORIDE SERPL-SCNC: 99 MMOL/L — SIGNIFICANT CHANGE UP (ref 98–110)
CO2 SERPL-SCNC: 23 MMOL/L — SIGNIFICANT CHANGE UP (ref 17–32)
CO2 SERPL-SCNC: 25 MMOL/L — SIGNIFICANT CHANGE UP (ref 17–32)
CO2 SERPL-SCNC: 26 MMOL/L — SIGNIFICANT CHANGE UP (ref 17–32)
CREAT SERPL-MCNC: <0.5 MG/DL — LOW (ref 0.7–1.5)
GLUCOSE BLDC GLUCOMTR-MCNC: 107 MG/DL — HIGH (ref 70–99)
GLUCOSE BLDC GLUCOMTR-MCNC: 134 MG/DL — HIGH (ref 70–99)
GLUCOSE BLDC GLUCOMTR-MCNC: 149 MG/DL — HIGH (ref 70–99)
GLUCOSE BLDC GLUCOMTR-MCNC: 97 MG/DL — SIGNIFICANT CHANGE UP (ref 70–99)
GLUCOSE SERPL-MCNC: 105 MG/DL — HIGH (ref 70–99)
GLUCOSE SERPL-MCNC: 134 MG/DL — HIGH (ref 70–99)
GLUCOSE SERPL-MCNC: 161 MG/DL — HIGH (ref 70–99)
MAGNESIUM SERPL-MCNC: 1.9 MG/DL — SIGNIFICANT CHANGE UP (ref 1.8–2.4)
OSMOLALITY SERPL: 273 MOS/KG — LOW (ref 275–300)
OSMOLALITY SERPL: 282 MOS/KG — SIGNIFICANT CHANGE UP (ref 275–300)
PHOSPHATE SERPL-MCNC: 2.8 MG/DL — SIGNIFICANT CHANGE UP (ref 2.1–4.9)
POTASSIUM SERPL-MCNC: 3.9 MMOL/L — SIGNIFICANT CHANGE UP (ref 3.5–5)
POTASSIUM SERPL-MCNC: 4.1 MMOL/L — SIGNIFICANT CHANGE UP (ref 3.5–5)
POTASSIUM SERPL-MCNC: 4.2 MMOL/L — SIGNIFICANT CHANGE UP (ref 3.5–5)
POTASSIUM SERPL-SCNC: 3.9 MMOL/L — SIGNIFICANT CHANGE UP (ref 3.5–5)
POTASSIUM SERPL-SCNC: 4.1 MMOL/L — SIGNIFICANT CHANGE UP (ref 3.5–5)
POTASSIUM SERPL-SCNC: 4.2 MMOL/L — SIGNIFICANT CHANGE UP (ref 3.5–5)
SODIUM SERPL-SCNC: 132 MMOL/L — LOW (ref 135–146)
SODIUM SERPL-SCNC: 133 MMOL/L — LOW (ref 135–146)
SODIUM SERPL-SCNC: 135 MMOL/L — SIGNIFICANT CHANGE UP (ref 135–146)
VANCOMYCIN TROUGH SERPL-MCNC: 19 UG/ML — HIGH (ref 5–10)

## 2021-05-28 PROCEDURE — 70553 MRI BRAIN STEM W/O & W/DYE: CPT | Mod: 26

## 2021-05-28 PROCEDURE — 99291 CRITICAL CARE FIRST HOUR: CPT | Mod: 25

## 2021-05-28 PROCEDURE — 71045 X-RAY EXAM CHEST 1 VIEW: CPT | Mod: 26,77

## 2021-05-28 PROCEDURE — 71045 X-RAY EXAM CHEST 1 VIEW: CPT | Mod: 26

## 2021-05-28 RX ORDER — SODIUM CHLORIDE 9 MG/ML
6 INJECTION INTRAMUSCULAR; INTRAVENOUS; SUBCUTANEOUS
Refills: 0 | Status: DISCONTINUED | OUTPATIENT
Start: 2021-05-28 | End: 2021-05-29

## 2021-05-28 RX ADMIN — Medication 650 MILLIGRAM(S): at 08:29

## 2021-05-28 RX ADMIN — SENNA PLUS 2 TABLET(S): 8.6 TABLET ORAL at 21:32

## 2021-05-28 RX ADMIN — MEROPENEM 200 MILLIGRAM(S): 1 INJECTION INTRAVENOUS at 05:01

## 2021-05-28 RX ADMIN — Medication 650 MILLIGRAM(S): at 00:15

## 2021-05-28 RX ADMIN — LEVETIRACETAM 420 MILLIGRAM(S): 250 TABLET, FILM COATED ORAL at 17:15

## 2021-05-28 RX ADMIN — MEROPENEM 200 MILLIGRAM(S): 1 INJECTION INTRAVENOUS at 21:37

## 2021-05-28 RX ADMIN — SODIUM CHLORIDE 6 GRAM(S): 9 INJECTION INTRAMUSCULAR; INTRAVENOUS; SUBCUTANEOUS at 21:37

## 2021-05-28 RX ADMIN — Medication 2 DROP(S): at 21:31

## 2021-05-28 RX ADMIN — PANTOPRAZOLE SODIUM 40 MILLIGRAM(S): 20 TABLET, DELAYED RELEASE ORAL at 12:35

## 2021-05-28 RX ADMIN — Medication 2 DROP(S): at 05:02

## 2021-05-28 RX ADMIN — Medication 650 MILLIGRAM(S): at 09:29

## 2021-05-28 RX ADMIN — LEVETIRACETAM 420 MILLIGRAM(S): 250 TABLET, FILM COATED ORAL at 05:01

## 2021-05-28 RX ADMIN — Medication 300 MILLIGRAM(S): at 21:37

## 2021-05-28 RX ADMIN — SODIUM CHLORIDE 6 GRAM(S): 9 INJECTION INTRAMUSCULAR; INTRAVENOUS; SUBCUTANEOUS at 00:07

## 2021-05-28 RX ADMIN — MEROPENEM 200 MILLIGRAM(S): 1 INJECTION INTRAVENOUS at 14:38

## 2021-05-28 RX ADMIN — SODIUM CHLORIDE 6 GRAM(S): 9 INJECTION INTRAMUSCULAR; INTRAVENOUS; SUBCUTANEOUS at 17:15

## 2021-05-28 RX ADMIN — SODIUM CHLORIDE 6 GRAM(S): 9 INJECTION INTRAMUSCULAR; INTRAVENOUS; SUBCUTANEOUS at 05:03

## 2021-05-28 RX ADMIN — CHLORHEXIDINE GLUCONATE 15 MILLILITER(S): 213 SOLUTION TOPICAL at 05:04

## 2021-05-28 RX ADMIN — ENOXAPARIN SODIUM 40 MILLIGRAM(S): 100 INJECTION SUBCUTANEOUS at 17:17

## 2021-05-28 RX ADMIN — LISINOPRIL 20 MILLIGRAM(S): 2.5 TABLET ORAL at 05:03

## 2021-05-28 RX ADMIN — CHLORHEXIDINE GLUCONATE 15 MILLILITER(S): 213 SOLUTION TOPICAL at 17:17

## 2021-05-28 RX ADMIN — Medication 62.5 MILLIMOLE(S): at 05:20

## 2021-05-28 RX ADMIN — Medication 300 MILLIGRAM(S): at 12:35

## 2021-05-28 RX ADMIN — SODIUM CHLORIDE 6 GRAM(S): 9 INJECTION INTRAMUSCULAR; INTRAVENOUS; SUBCUTANEOUS at 14:38

## 2021-05-28 RX ADMIN — Medication 2 DROP(S): at 14:38

## 2021-05-28 RX ADMIN — CHLORHEXIDINE GLUCONATE 1 APPLICATION(S): 213 SOLUTION TOPICAL at 05:05

## 2021-05-28 RX ADMIN — Medication 300 MILLIGRAM(S): at 05:01

## 2021-05-28 RX ADMIN — SODIUM CHLORIDE 6 GRAM(S): 9 INJECTION INTRAMUSCULAR; INTRAVENOUS; SUBCUTANEOUS at 11:48

## 2021-05-28 NOTE — PROGRESS NOTE ADULT - SUBJECTIVE AND OBJECTIVE BOX
THIAGO SANDRA  45y, Male  Allergy: No Known Allergies      LOS  22d    CHIEF COMPLAINT: found down unresponsive (28 May 2021 06:08)      INTERVAL EVENTS/HPI  - still fevers, No leukocytosis - NSGY removed KIKE @ 2030  - T(F): , Max: 101.4 (05-27-21 @ 15:49)  - Tolerating medication  - WBC Count: 7.21 (05-27-21 @ 23:42)  WBC Count: 8.16 (05-27-21 @ 00:00)  - Creatinine, Serum: <0.5 (05-27-21 @ 23:42)  Creatinine, Serum: <0.5 (05-27-21 @ 11:00)       ROS  ***    VITALS:  T(F): 98.4, Max: 101.4 (05-27-21 @ 15:49)  HR: 78  BP: 149/91  RR: 27Vital Signs Last 24 Hrs  T(C): 36.9 (28 May 2021 06:00), Max: 38.6 (27 May 2021 15:49)  T(F): 98.4 (28 May 2021 06:00), Max: 101.4 (27 May 2021 15:49)  HR: 78 (28 May 2021 05:00) (75 - 105)  BP: 149/91 (28 May 2021 05:00) (111/71 - 153/86)  BP(mean): 116 (28 May 2021 05:00) (86 - 116)  RR: 27 (28 May 2021 05:00) (16 - 29)  SpO2: 100% (28 May 2021 05:00) (100% - 100%)    PHYSICAL EXAM:  ***    FH: Non-contributory  Social Hx: Non-contributory    TESTS & MEASUREMENTS:                        8.3    7.21  )-----------( 308      ( 27 May 2021 23:42 )             25.3     05-27    135  |  102  |  5<L>  ----------------------------<  161<H>  3.9   |  26  |  <0.5<L>    Ca    7.9<L>      27 May 2021 23:42  Phos  2.8     05-27  Mg     1.9     05-27      eGFR if Non African American: 142 mL/min/1.73M2 (05-27-21 @ 23:42)  eGFR if : 165 mL/min/1.73M2 (05-27-21 @ 23:42)  eGFR if : 186 mL/min/1.73M2 (05-27-21 @ 11:00)  eGFR if Non African American: 160 mL/min/1.73M2 (05-27-21 @ 11:00)          Culture - Blood (collected 05-23-21 @ 19:19)  Source: .Blood Blood-Peripheral  Preliminary Report (05-25-21 @ 03:01):    No growth to date.    Culture - Body Fluid with Gram Stain (collected 05-21-21 @ 12:00)  Source: .Body Fluid CSF  Gram Stain (05-22-21 @ 02:58):    polymorphonuclear leukocytes seen    Gram positive cocci in pairs seen    by cytocentrifuge  Final Report (05-26-21 @ 18:04):    Few Enterococcus faecalis  Organism: Enterococcus faecalis (05-26-21 @ 18:04)  Organism: Enterococcus faecalis (05-26-21 @ 18:04)      -  Ampicillin: S <=2 Predicts results to ampicillin/sulbactam, amoxacillin-clavulanate and  piperacillin-tazobactam.      -  Tetra/Doxy: R >8      -  Vancomycin: S 2      Method Type: AISHA    Culture - Blood (collected 05-20-21 @ 02:00)  Source: .Blood Blood  Final Report (05-25-21 @ 07:01):    No Growth Final    Culture - Blood (collected 05-19-21 @ 00:27)  Source: .Blood None  Final Report (05-24-21 @ 07:00):    No Growth Final    Culture - Surgical Swab (collected 05-16-21 @ 08:54)  Source: .Surgical Swab None  Final Report (05-21-21 @ 16:44):    Rare Coag Negative Staphylococcus  Organism: Coag Negative Staphylococcus (05-21-21 @ 16:44)  Organism: Coag Negative Staphylococcus (05-21-21 @ 16:44)      -  Ampicillin/Sulbactam: S <=8/4      -  Cefazolin: S <=4      -  Clindamycin: S <=0.25      -  Erythromycin: S <=0.25      -  Gentamicin: S <=1 Should not be used as monotherapy      -  Oxacillin: S <=0.25      -  Penicillin: R 0.25      -  RIF- Rifampin: S <=1 Should not be used as monotherapy      -  Tetra/Doxy: S <=1      -  Trimethoprim/Sulfamethoxazole: S <=0.5/9.5      -  Vancomycin: S 0.5      Method Type: AISHA    Culture - Blood (collected 05-13-21 @ 11:19)  Source: .Blood None  Final Report (05-18-21 @ 22:03):    No Growth Final    Culture - Blood (collected 05-11-21 @ 11:18)  Source: .Blood None  Gram Stain (05-12-21 @ 02:53):    Growth in anaerobic bottle: Gram Negative Rods    Growth in aerobic bottle: Gram Negative Rods  Final Report (05-13-21 @ 16:09):    Growth in aerobic and anaerobic bottles: Klebsiella variicola    ***Blood Panel PCR results on this specimen are available    approximately 3 hours after the Gram stain result.***    Gram stain, PCR, and/or culture results may not always    correspond due to difference in methodologies.    ************************************************************    This PCR assay was performed by multiplex PCR. This    Assay tests for 66 bacterial and resistance gene targets.    Please refer to the [facility] test directory    at [email or web address] for details.  Organism: Blood Culture PCR  Klebsiella variicola (05-13-21 @ 16:09)  Organism: Klebsiella variicola (05-13-21 @ 16:09)      -  Amikacin: S <=16      -  Ampicillin: R <=8 These ampicillin results predict results for amoxicillin      -  Ampicillin/Sulbactam: S <=4/2 Enterobacter, Citrobacter, and Serratia may develop resistance during prolonged therapy (3-4 days)      -  Aztreonam: S <=4      -  Cefazolin: S <=2 Enterobacter, Citrobacter, and Serratia may develop resistance during prolonged therapy (3-4 days)      -  Cefepime: S <=2      -  Cefoxitin: S <=8      -  Ceftriaxone: S <=1 Enterobacter, Citrobacter, and Serratia may develop resistance during prolonged therapy      -  Ciprofloxacin: S <=0.25      -  Ertapenem: S <=0.5      -  Gentamicin: S <=2      -  Imipenem: S <=1      -  Levofloxacin: S <=0.5      -  Meropenem: S <=1      -  Piperacillin/Tazobactam: S <=8      -  Tobramycin: S <=2      -  Trimethoprim/Sulfamethoxazole: S <=0.5/9.5      Method Type: AISHA  Organism: Blood Culture PCR (05-13-21 @ 16:09)      -  Klebsiella pneumoniae: Detec      Method Type: PCR    Culture - Bronchial (collected 05-11-21 @ 10:12)  Source: Bronch Wash Bronchoalveolar Lavage  Gram Stain (05-12-21 @ 07:04):    Few polymorphonuclear leukocytes per low power field    No Squamous epithelial cells per low power field    Few Gram Positive Rods per oil power field  Final Report (05-13-21 @ 16:19):    Moderate Klebsiella variicola    Moderate Serratia marcescens    Normal Respiratory Liz present  Organism: Klebsiella variicola  Serratia marcescens (05-13-21 @ 16:19)  Organism: Serratia marcescens (05-13-21 @ 16:19)      -  Amikacin: S <=16      -  Amoxicillin/Clavulanic Acid: R >16/8      -  Ampicillin: R <=8 These ampicillin results predict results for amoxicillin      -  Ampicillin/Sulbactam: R 16/8 Enterobacter, Citrobacter, and Serratia may develop resistance during prolonged therapy (3-4 days)      -  Aztreonam: S <=4      -  Cefazolin: R >16 Enterobacter, Citrobacter, and Serratia may develop resistance during prolonged therapy (3-4 days)      -  Cefepime: S <=2      -  Cefoxitin: R <=8      -  Ceftriaxone: S <=1 Enterobacter, Citrobacter, and Serratia may develop resistance during prolonged therapy      -  Ciprofloxacin: S <=0.25      -  Ertapenem: S <=0.5      -  Gentamicin: S <=2      -  Levofloxacin: S <=0.5      -  Meropenem: S <=1      -  Piperacillin/Tazobactam: S <=8      -  Tobramycin: S <=2      -  Trimethoprim/Sulfamethoxazole: S <=0.5/9.5      Method Type: AISHA  Organism: Klebsiella variicola (05-13-21 @ 16:19)      -  Amikacin: S <=16      -  Amoxicillin/Clavulanic Acid: R >16/8      -  Ampicillin: R <=8 These ampicillin results predict results for amoxicillin      -  Ampicillin/Sulbactam: I 16/8 Enterobacter, Citrobacter, and Serratia may develop resistance during prolonged therapy (3-4 days)      -  Aztreonam: R >16      -  Cefazolin: R >16 Enterobacter, Citrobacter, and Serratia may develop resistance during prolonged therapy (3-4 days)      -  Cefepime: S <=2      -  Cefoxitin: S <=8      -  Ceftriaxone: S <=1 Enterobacter, Citrobacter, and Serratia may develop resistance during prolonged therapy      -  Ciprofloxacin: S <=0.25      -  Ertapenem: S <=0.5      -  Gentamicin: S <=2      -  Imipenem: S <=1      -  Levofloxacin: S <=0.5      -  Meropenem: S <=1      -  Piperacillin/Tazobactam: S <=8      -  Tobramycin: S 4      -  Trimethoprim/Sulfamethoxazole: S <=0.5/9.5      Method Type: [facility]            INFECTIOUS DISEASES TESTING  Vancomycin Level, Trough: 9.0 (05-27-21 @ 05:50)  Vancomycin Level, Trough: 7.6 (05-24-21 @ 05:54)  COVID-19 PCR: NotDetec (05-21-21 @ 04:30)  COVID-19 PCR: NotDetec (05-14-21 @ 18:58)  Vancomycin Level, Trough: 4.1 (05-13-21 @ 05:50)  HIV-1/2 Combo Result: Nonreact (05-09-21 @ 23:30)  Rapid RVP Result: NotDetec (05-06-21 @ 22:44)  COVID-19 PCR: NotDetec (04-06-21 @ 16:46)  COVID-19 PCR: NotDetec (03-29-21 @ 19:35)      INFLAMMATORY MARKERS      RADIOLOGY & ADDITIONAL TESTS:  I have personally reviewed the last available Chest xray  CXR      CT      CARDIOLOGY TESTING      MEDICATIONS  artificial tears (preservative free) Ophthalmic Solution 2 Both EYES three times a day  chlorhexidine 0.12% Liquid 15 Oral Mucosa two times a day  chlorhexidine 4% Liquid 1 Topical <User Schedule>  enoxaparin Injectable 40 SubCutaneous every 24 hours  insulin lispro (ADMELOG) corrective regimen sliding scale  SubCutaneous every 6 hours  levETIRAcetam  IVPB 500 IV Intermittent every 12 hours  lisinopril 20 Oral daily  meropenem  IVPB     meropenem  IVPB 2000 IV Intermittent every 8 hours  pantoprazole   Suspension 40 Oral daily  propranolol 20 Oral every 8 hours  senna 2 Oral at bedtime  sodium chloride 6 Oral every 6 hours  sodium chloride 3%. 500 IV Continuous <Continuous>  vancomycin  IVPB 1500 IV Intermittent every 8 hours      WEIGHT  Weight (kg): 79.4 (05-23-21 @ 16:30)  Creatinine, Serum: <0.5 mg/dL (05-27-21 @ 23:42)  Creatinine, Serum: <0.5 mg/dL (05-27-21 @ 11:00)      ANTIBIOTICS:  meropenem  IVPB      meropenem  IVPB 2000 milliGRAM(s) IV Intermittent every 8 hours  vancomycin  IVPB 1500 milliGRAM(s) IV Intermittent every 8 hours      All available historical records have been reviewed       THIAGO SANDRA  45y, Male  Allergy: No Known Allergies      LOS  22d    CHIEF COMPLAINT: found down unresponsive (28 May 2021 06:08)      INTERVAL EVENTS/HPI  - still fevers, No leukocytosis - NSGY removed KIKE @ 2030  - T(F): , Max: 101.4 (05-27-21 @ 15:49)  - Tolerating medication  - WBC Count: 7.21 (05-27-21 @ 23:42)  WBC Count: 8.16 (05-27-21 @ 00:00)  - Creatinine, Serum: <0.5 (05-27-21 @ 23:42)  Creatinine, Serum: <0.5 (05-27-21 @ 11:00)       ROS  unable to obtain history secondary to patient's mental status and/or sedation     VITALS:  T(F): 98.4, Max: 101.4 (05-27-21 @ 15:49)  HR: 78  BP: 149/91  RR: 27Vital Signs Last 24 Hrs  T(C): 36.9 (28 May 2021 06:00), Max: 38.6 (27 May 2021 15:49)  T(F): 98.4 (28 May 2021 06:00), Max: 101.4 (27 May 2021 15:49)  HR: 78 (28 May 2021 05:00) (75 - 105)  BP: 149/91 (28 May 2021 05:00) (111/71 - 153/86)  BP(mean): 116 (28 May 2021 05:00) (86 - 116)  RR: 27 (28 May 2021 05:00) (16 - 29)  SpO2: 100% (28 May 2021 05:00) (100% - 100%)    PHYSICAL EXAM:  Gen: NAD, resting in bed  HEENT: Normocephalic, atraumatic, L head defect, staples  Neck: supple, no lymphadenopathy  CV: Regular rate & regular rhythm  Lungs: decreased BS at bases, no fremitus  Abdomen: Soft, BS present  Ext: Warm, well perfused  Neuro:  awake  Skin: no rash, no erythema  Lines: no phlebitis   FH: Non-contributory  Social Hx: Non-contributory    TESTS & MEASUREMENTS:                        8.3    7.21  )-----------( 308      ( 27 May 2021 23:42 )             25.3     05-27    135  |  102  |  5<L>  ----------------------------<  161<H>  3.9   |  26  |  <0.5<L>    Ca    7.9<L>      27 May 2021 23:42  Phos  2.8     05-27  Mg     1.9     05-27      eGFR if Non African American: 142 mL/min/1.73M2 (05-27-21 @ 23:42)  eGFR if : 165 mL/min/1.73M2 (05-27-21 @ 23:42)  eGFR if : 186 mL/min/1.73M2 (05-27-21 @ 11:00)  eGFR if Non African American: 160 mL/min/1.73M2 (05-27-21 @ 11:00)          Culture - Blood (collected 05-23-21 @ 19:19)  Source: .Blood Blood-Peripheral  Preliminary Report (05-25-21 @ 03:01):    No growth to date.    Culture - Body Fluid with Gram Stain (collected 05-21-21 @ 12:00)  Source: .Body Fluid CSF  Gram Stain (05-22-21 @ 02:58):    polymorphonuclear leukocytes seen    Gram positive cocci in pairs seen    by cytocentrifuge  Final Report (05-26-21 @ 18:04):    Few Enterococcus faecalis  Organism: Enterococcus faecalis (05-26-21 @ 18:04)  Organism: Enterococcus faecalis (05-26-21 @ 18:04)      -  Ampicillin: S <=2 Predicts results to ampicillin/sulbactam, amoxacillin-clavulanate and  piperacillin-tazobactam.      -  Tetra/Doxy: R >8      -  Vancomycin: S 2      Method Type: AISHA    Culture - Blood (collected 05-20-21 @ 02:00)  Source: .Blood Blood  Final Report (05-25-21 @ 07:01):    No Growth Final    Culture - Blood (collected 05-19-21 @ 00:27)  Source: .Blood None  Final Report (05-24-21 @ 07:00):    No Growth Final    Culture - Surgical Swab (collected 05-16-21 @ 08:54)  Source: .Surgical Swab None  Final Report (05-21-21 @ 16:44):    Rare Coag Negative Staphylococcus  Organism: Coag Negative Staphylococcus (05-21-21 @ 16:44)  Organism: Coag Negative Staphylococcus (05-21-21 @ 16:44)      -  Ampicillin/Sulbactam: S <=8/4      -  Cefazolin: S <=4      -  Clindamycin: S <=0.25      -  Erythromycin: S <=0.25      -  Gentamicin: S <=1 Should not be used as monotherapy      -  Oxacillin: S <=0.25      -  Penicillin: R 0.25      -  RIF- Rifampin: S <=1 Should not be used as monotherapy      -  Tetra/Doxy: S <=1      -  Trimethoprim/Sulfamethoxazole: S <=0.5/9.5      -  Vancomycin: S 0.5      Method Type: AISHA    Culture - Blood (collected 05-13-21 @ 11:19)  Source: .Blood None  Final Report (05-18-21 @ 22:03):    No Growth Final    Culture - Blood (collected 05-11-21 @ 11:18)  Source: .Blood None  Gram Stain (05-12-21 @ 02:53):    Growth in anaerobic bottle: Gram Negative Rods    Growth in aerobic bottle: Gram Negative Rods  Final Report (05-13-21 @ 16:09):    Growth in aerobic and anaerobic bottles: Klebsiella variicola    ***Blood Panel PCR results on this specimen are available    approximately 3 hours after the Gram stain result.***    Gram stain, PCR, and/or culture results may not always    correspond due to difference in methodologies.    ************************************************************    This PCR assay was performed by multiplex PCR. This    Assay tests for 66 bacterial and resistance gene targets.    Please refer to the [facility] test directory    at [email or web address] for details.  Organism: Blood Culture PCR  Klebsiella variicola (05-13-21 @ 16:09)  Organism: Klebsiella variicola (05-13-21 @ 16:09)      -  Amikacin: S <=16      -  Ampicillin: R <=8 These ampicillin results predict results for amoxicillin      -  Ampicillin/Sulbactam: S <=4/2 Enterobacter, Citrobacter, and Serratia may develop resistance during prolonged therapy (3-4 days)      -  Aztreonam: S <=4      -  Cefazolin: S <=2 Enterobacter, Citrobacter, and Serratia may develop resistance during prolonged therapy (3-4 days)      -  Cefepime: S <=2      -  Cefoxitin: S <=8      -  Ceftriaxone: S <=1 Enterobacter, Citrobacter, and Serratia may develop resistance during prolonged therapy      -  Ciprofloxacin: S <=0.25      -  Ertapenem: S <=0.5      -  Gentamicin: S <=2      -  Imipenem: S <=1      -  Levofloxacin: S <=0.5      -  Meropenem: S <=1      -  Piperacillin/Tazobactam: S <=8      -  Tobramycin: S <=2      -  Trimethoprim/Sulfamethoxazole: S <=0.5/9.5      Method Type: AISHA  Organism: Blood Culture PCR (05-13-21 @ 16:09)      -  Klebsiella pneumoniae: Detec      Method Type: PCR    Culture - Bronchial (collected 05-11-21 @ 10:12)  Source: Bronch Wash Bronchoalveolar Lavage  Gram Stain (05-12-21 @ 07:04):    Few polymorphonuclear leukocytes per low power field    No Squamous epithelial cells per low power field    Few Gram Positive Rods per oil power field  Final Report (05-13-21 @ 16:19):    Moderate Klebsiella variicola    Moderate Serratia marcescens    Normal Respiratory Liz present  Organism: Klebsiella variicola  Serratia marcescens (05-13-21 @ 16:19)  Organism: Serratia marcescens (05-13-21 @ 16:19)      -  Amikacin: S <=16      -  Amoxicillin/Clavulanic Acid: R >16/8      -  Ampicillin: R <=8 These ampicillin results predict results for amoxicillin      -  Ampicillin/Sulbactam: R 16/8 Enterobacter, Citrobacter, and Serratia may develop resistance during prolonged therapy (3-4 days)      -  Aztreonam: S <=4      -  Cefazolin: R >16 Enterobacter, Citrobacter, and Serratia may develop resistance during prolonged therapy (3-4 days)      -  Cefepime: S <=2      -  Cefoxitin: R <=8      -  Ceftriaxone: S <=1 Enterobacter, Citrobacter, and Serratia may develop resistance during prolonged therapy      -  Ciprofloxacin: S <=0.25      -  Ertapenem: S <=0.5      -  Gentamicin: S <=2      -  Levofloxacin: S <=0.5      -  Meropenem: S <=1      -  Piperacillin/Tazobactam: S <=8      -  Tobramycin: S <=2      -  Trimethoprim/Sulfamethoxazole: S <=0.5/9.5      Method Type: AISHA  Organism: Klebsiella variicola (05-13-21 @ 16:19)      -  Amikacin: S <=16      -  Amoxicillin/Clavulanic Acid: R >16/8      -  Ampicillin: R <=8 These ampicillin results predict results for amoxicillin      -  Ampicillin/Sulbactam: I 16/8 Enterobacter, Citrobacter, and Serratia may develop resistance during prolonged therapy (3-4 days)      -  Aztreonam: R >16      -  Cefazolin: R >16 Enterobacter, Citrobacter, and Serratia may develop resistance during prolonged therapy (3-4 days)      -  Cefepime: S <=2      -  Cefoxitin: S <=8      -  Ceftriaxone: S <=1 Enterobacter, Citrobacter, and Serratia may develop resistance during prolonged therapy      -  Ciprofloxacin: S <=0.25      -  Ertapenem: S <=0.5      -  Gentamicin: S <=2      -  Imipenem: S <=1      -  Levofloxacin: S <=0.5      -  Meropenem: S <=1      -  Piperacillin/Tazobactam: S <=8      -  Tobramycin: S 4      -  Trimethoprim/Sulfamethoxazole: S <=0.5/9.5      Method Type: AISHA            INFECTIOUS DISEASES TESTING  Vancomycin Level, Trough: 9.0 (05-27-21 @ 05:50)  Vancomycin Level, Trough: 7.6 (05-24-21 @ 05:54)  COVID-19 PCR: NotDetec (05-21-21 @ 04:30)  COVID-19 PCR: NotDetec (05-14-21 @ 18:58)  Vancomycin Level, Trough: 4.1 (05-13-21 @ 05:50)  HIV-1/2 Combo Result: Nonreact (05-09-21 @ 23:30)  Rapid RVP Result: NotDetec (05-06-21 @ 22:44)  COVID-19 PCR: NotDetec (04-06-21 @ 16:46)  COVID-19 PCR: NotDetec (03-29-21 @ 19:35)      INFLAMMATORY MARKERS      RADIOLOGY & ADDITIONAL TESTS:  I have personally reviewed the last available Chest xray  CXR      CT      CARDIOLOGY TESTING      MEDICATIONS  artificial tears (preservative free) Ophthalmic Solution 2 Both EYES three times a day  chlorhexidine 0.12% Liquid 15 Oral Mucosa two times a day  chlorhexidine 4% Liquid 1 Topical <User Schedule>  enoxaparin Injectable 40 SubCutaneous every 24 hours  insulin lispro (ADMELOG) corrective regimen sliding scale  SubCutaneous every 6 hours  levETIRAcetam  IVPB 500 IV Intermittent every 12 hours  lisinopril 20 Oral daily  meropenem  IVPB     meropenem  IVPB 2000 IV Intermittent every 8 hours  pantoprazole   Suspension 40 Oral daily  propranolol 20 Oral every 8 hours  senna 2 Oral at bedtime  sodium chloride 6 Oral every 6 hours  sodium chloride 3%. 500 IV Continuous <Continuous>  vancomycin  IVPB 1500 IV Intermittent every 8 hours      WEIGHT  Weight (kg): 79.4 (05-23-21 @ 16:30)  Creatinine, Serum: <0.5 mg/dL (05-27-21 @ 23:42)  Creatinine, Serum: <0.5 mg/dL (05-27-21 @ 11:00)      ANTIBIOTICS:  meropenem  IVPB      meropenem  IVPB 2000 milliGRAM(s) IV Intermittent every 8 hours  vancomycin  IVPB 1500 milliGRAM(s) IV Intermittent every 8 hours      All available historical records have been reviewed

## 2021-05-28 NOTE — PROGRESS NOTE ADULT - SUBJECTIVE AND OBJECTIVE BOX
Subjective:       POD#10/19    S/P Left Craniectomy for evac of SDH  S/P Wound Revision    No acute overnight events. NAD.  Pt remains on trach collar. Patient seen and examined at bedside.  Drain removed 5/27.      45yMale with a pmhx of SUBDURAL HEMATOMA;RESPIRATORY FAILURE;FALL    ^FALL    No pertinent family history in first degree relatives    Family history of essential hypertension (Father)    Handoff    MEWS Score    No pertinent past medical history    Alcohol abuse    GERD (gastroesophageal reflux disease)    ETOH abuse    Hypomagnesemia    Seizure disorder    Subdural hematoma    S/P subdural hematoma evacuation    Subdural hematoma    Subdural hematoma    Subdural hematoma    Respiratory failure    ETOH abuse    Palliative care by specialist    Insertion of non-tunneled central venous catheter (CVC) in patient age 5 years of age or older    Craniotomy, decompressive    Decompressive craniotomy    Placement, tracheostomy and percutaneous endoscopic gastrostomy    No significant past surgical history    H/O hemorrhoidectomy    FALL    22    Respiratory failure    Fall    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 650 milliGRAM(s) Oral every 6 hours PRN  artificial tears (preservative free) Ophthalmic Solution 2 Drop(s) Both EYES three times a day  chlorhexidine 0.12% Liquid 15 milliLiter(s) Oral Mucosa two times a day  chlorhexidine 4% Liquid 1 Application(s) Topical <User Schedule>  enoxaparin Injectable 40 milliGRAM(s) SubCutaneous every 24 hours  insulin lispro (ADMELOG) corrective regimen sliding scale   SubCutaneous every 6 hours  levETIRAcetam  IVPB 500 milliGRAM(s) IV Intermittent every 12 hours  lisinopril 20 milliGRAM(s) Oral daily  meropenem  IVPB      meropenem  IVPB 2000 milliGRAM(s) IV Intermittent every 8 hours  pantoprazole   Suspension 40 milliGRAM(s) Oral daily  propranolol 20 milliGRAM(s) Oral every 8 hours  senna 2 Tablet(s) Oral at bedtime  sodium chloride 6 Gram(s) Oral <User Schedule>  sodium chloride 3%. 500 milliLiter(s) IV Continuous <Continuous>  vancomycin  IVPB 1500 milliGRAM(s) IV Intermittent every 8 hours        05-27-21 @ 07:01  -  05-28-21 @ 07:00  --------------------------------------------------------  IN: 3800 mL / OUT: 2745 mL / NET: 1055 mL    05-28-21 @ 07:01 - 05-28-21 @ 10:54  --------------------------------------------------------  IN: 230 mL / OUT: 720 mL / NET: -490 mL        REVIEW OF SYSTEMS    [ ] A ten-point review of systems was otherwise negative except as noted.  [ x] Due to altered mental status/intubation, subjective information were not able to be obtained from the patient. History was obtained, to the extent possible, from review of the chart and collateral sources of information.      Exam:  Neurological:  Awake, alert  Pupils reactive  Tracks examiner  Moves LUE/LLE spontaneously   4/5  Appears to attempt to show 2 fingers or thumbs up and move toes when asked in native language  Incision intact  Flap soft  Grimaces to noxious stimuli RUE/RLE, no movement noted        CBC Full  -  ( 27 May 2021 23:42 )  WBC Count : 7.21 K/uL  RBC Count : 2.94 M/uL  Hemoglobin : 8.3 g/dL  Hematocrit : 25.3 %  Platelet Count - Automated : 308 K/uL  Mean Cell Volume : 86.1 fL  Mean Cell Hemoglobin : 28.2 pg  Mean Cell Hemoglobin Concentration : 32.8 g/dL  Auto Neutrophil # : x  Auto Lymphocyte # : x  Auto Monocyte # : x  Auto Eosinophil # : x  Auto Basophil # : x  Auto Neutrophil % : x  Auto Lymphocyte % : x  Auto Monocyte % : x  Auto Eosinophil % : x  Auto Basophil % : x    05-27    135  |  102  |  5<L>  ----------------------------<  161<H>  3.9   |  26  |  <0.5<L>    Ca    7.9<L>      27 May 2021 23:42  Phos  2.8     05-27  Mg     1.9     05-27          Imaging:  < from: CT Head No Cont (05.26.21 @ 12:49) >  EXAM:  CT BRAIN            PROCEDURE DATE:  05/26/2021            INTERPRETATION:  CLINICAL INDICATION: Abscess.    Technique: CT of the head was performed without contrast.    Multiple contiguous axial images were acquired from the skullbase to the vertex without the administration of intravenous contrast.  Coronal and sagittal reformations were made.    COMPARISON:  prior head CT dated 5/16/2021    FINDINGS:    Examination is limited due to poor positioning as well as beam hardening within the right cerebral hemisphere.    Redemonstration of left frontotemporal craniectomy postsurgical changes. Since prior examination there is been increase in size of an extra-axial mixed density fluid collection. The collection now measures approximately2 cm in greatest thickness. There is slightly increased herniation of soft tissue through the craniectomy defect since prior examination. There is similar swelling involving the scalp soft tissues.      Redemonstration of a subacute regions of infarct involving the left mesial frontal and parietal lobes, left insula, left temporal occipital lobes as well as the right cerebellar hemisphere. There has been significant decrease of previously noted hemorrhagic products within the left temporal occipital region.    There is similar bilateral subarachnoid hemorrhage though evaluation is limited due to beam hardening artifact in the right cerebral hemisphere.    Similar-appearing right frontoparietal convexity low density subdural collection likely representing a hygroma measuring approximately 1 cm in greatest thickness.      The visualized intraorbital compartments, paranasal sinuses and mastoid complexes appear free of acute disease.    IMPRESSION:  Examination is limited due to beam hardening as well as poor patient positioning.    Since prior CT of 5/16/2021 there is slight increasedthickness of a mixed density extra-axial collection within the craniectomy defect.. 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 Please note evaluation for abscess is limited without intravenous contrast. A follow-up MRI of the brain with and without contrast is recommended for further evaluation.    Decreased blood products noted within the left temporal occipital region with unchanged areas of subacute infarct.              GARO UMANZOR M.D., ATTENDING RADIOLOGIST  This document has been electronically signed. May 26 2021  1:41PM    < end of copied text >            Assessment/Plan:  44 y/o male s/p L craniotomy for evacuation of SDH, s/p wound revision, s/p trach/peg    Maintain Na level >135  f/u labs  MRI brain +/- contrast  cont PT/OT/rehab  No laying on left side  cont GI/DVT ppx  care per BICU team  d/w attg

## 2021-05-28 NOTE — CHART NOTE - NSCHARTNOTEFT_GEN_A_CORE
Registered Dietitian Follow-Up     Patient Profile Reviewed                           Yes [x]   No []     Nutrition History Previously Obtained        Yes []  No [x] -- still intubated     Pertinent Subjective:      Pertinent Medical Interventions:  s/p left craniotomy for large SDH  S/p wound revision  intubated s/p trach   s/p PEG placement     Diet order: NPO with TF   Osmolite 1.5 @50ml/hr x24hrs + 3 packets of Prosource TF      Anthropometrics:  - Ht. 70"   - Wt.  175lbs (5/6)  190lbs (5/12)  192.2lbs (5/13)   175lbs (5/16); dosing  183.8lbs (5/16)  195.1lbs (5/17)   177.9lbs (5/21)   197.3lbs (5/25)-- wt changes may be d/t fluid shifts vs bed scale error, will continue to monitor   - %wt change  - BMI 25.1 -- based on dosing   - IBW 166lbs     Pertinent Lab Data: (5/27) H/H 8.3/25.3, Na 132, BUN 5, Cr <0.5, , A1c 5.2% (5/7)   CAPILLARY BLOOD GLUCOSE  POCT Blood Glucose.: 134 mg/dL (28 May 2021 11:01)  POCT Blood Glucose.: 149 mg/dL (28 May 2021 06:00)  POCT Blood Glucose.: 139 mg/dL (27 May 2021 23:54)  POCT Blood Glucose.: 172 mg/dL (27 May 2021 18:13)  POCT Blood Glucose.: 121 mg/dL (27 May 2021 13:14)   A1c 6.0% (5/15)       Pertinent Meds: lovenox, abx, sodium chloride 3%, admelog, sodium chloride, senna, protonix      Physical Findings:  - Appearance: alert, unable to speak. +1 generalized, +2 R+L hand edema noted per RN flow sheets  - GI function: LBM 5/28  - Tubes: PEG   - Oral/Mouth cavity: NPO  - Skin: WDL      Nutrition Requirements  Weight Used: dosing wt 79.4kg -- continued from 5/21, pt now with trach     Estimated Energy Needs    Continue [x]  Adjust []   2000-2200kcal (MSJ x 1.2-1.3AF)      Estimated Protein Needs    Continue [x]  Adjust [x]  95-111gm/day (1.2-1.4gm/kg act wt)      Estimated Fluid Needs        Continue [x]  Adjust []  per SICU team      Nutrient Intake: current tube feed regimen meets 87-96% of est kcal needs and % est protein needs      [x] No active nutrition diagnosis identified at this time      Nutrition Intervention: enteral nutrition      Goal/Expected Outcome: Pt to meet % of estimated needs within 3 days      Indicator/Monitoring: energy intake, body composition, NFPF, glucose profile     Recommendations:  As medically feasible recommend changing continuous feeds to bolus feeds for long term nutrition support. Recommend Jevity 1.2 @ 360ml q4hrs, hold 1 feed at 2 AM for a total of 5 feeds/day. Recommended regimen will provide 1800ml formula, 2160kcal, 100g protein, 1453ml free water. Additional water flushes per LIP. Maintain aspiration precautions.  Discussed with [id number]. Registered Dietitian Follow-Up     Patient Profile Reviewed                           Yes [x]   No []     Nutrition History Previously Obtained        Yes []  No [x] -- still intubated     Pertinent Subjective: pt tolerating current tube feed regimen with no issues. npo again for MRI. Discussed with [id number] recommendation to transition pt to bolus feeds as part of pt's long term nutrition plan and need for fiber fortified formula. Per LIP, pt may need to go back into the OR. Transition to bolus feeds to be discussed during evening rounds.      Pertinent Medical Interventions:  s/p left craniotomy for large SDH  S/p wound revision  intubated s/p trach   s/p PEG placement     Diet order: NPO with TF   Osmolite 1.5 @50ml/hr x24hrs + 3 packets of Prosource TF      Anthropometrics:  - Ht. 70"   - Wt.  175lbs (5/6)  190lbs (5/12)  192.2lbs (5/13)   175lbs (5/16); dosing  183.8lbs (5/16)  195.1lbs (5/17)   177.9lbs (5/21)   197.3lbs (5/25)-- wt changes may be d/t fluid shifts vs bed scale error, will continue to monitor   - %wt change  - BMI 25.1 -- based on dosing   - IBW 166lbs     Pertinent Lab Data: (5/27) H/H 8.3/25.3, Na 132, BUN 5, Cr <0.5, , A1c 5.2% (5/7)   CAPILLARY BLOOD GLUCOSE  POCT Blood Glucose.: 134 mg/dL (28 May 2021 11:01)  POCT Blood Glucose.: 149 mg/dL (28 May 2021 06:00)  POCT Blood Glucose.: 139 mg/dL (27 May 2021 23:54)  POCT Blood Glucose.: 172 mg/dL (27 May 2021 18:13)  POCT Blood Glucose.: 121 mg/dL (27 May 2021 13:14)   A1c 6.0% (5/15)       Pertinent Meds: lovenox, abx, sodium chloride 3%, admelog, sodium chloride, senna, protonix      Physical Findings:  - Appearance: alert, unable to speak. +1 generalized, +2 R+L hand edema noted per RN flow sheets  - GI function: LBM 5/28  - Tubes: PEG   - Oral/Mouth cavity: NPO  - Skin: WDL      Nutrition Requirements  Weight Used: dosing wt 79.4kg -- continued from 5/21, pt now with trach     Estimated Energy Needs    Continue [x]  Adjust []   2000-2200kcal (MSJ x 1.2-1.3AF)      Estimated Protein Needs    Continue [x]  Adjust [x]  95-111gm/day (1.2-1.4gm/kg act wt)      Estimated Fluid Needs        Continue [x]  Adjust []  per SICU team      Nutrient Intake: current tube feed regimen meets 87-96% of est kcal needs and % est protein needs      [x] No active nutrition diagnosis identified at this time      Nutrition Intervention: enteral nutrition      Goal/Expected Outcome: Pt to meet % of estimated needs within 4 days      Indicator/Monitoring: energy intake, body composition, NFPF, glucose profile     Recommendations:  As medically feasible recommend changing continuous feeds to bolus feeds for long term nutrition support. Recommend Jevity 1.2 @ 360ml q4hrs, hold 1 feed at 2 AM for a total of 5 feeds/day. Recommended regimen will provide 1800ml formula, 2160kcal, 100g protein, 1453ml free water. Additional water flushes per LIP.   Discussed with [id number]

## 2021-05-28 NOTE — PROGRESS NOTE ADULT - SUBJECTIVE AND OBJECTIVE BOX
SANDRA DAS  [id number]  45y Male    Indication for ICU admission: s/p craniectomy for large L SDH  Admit Date:05-06-21  ICU Date: 05-07-21  OR Date: 05-06-21    No Known Allergies    PAST MEDICAL & SURGICAL HISTORY:  Alcohol abuse  GERD (gastroesophageal reflux disease)  ETOH abuse  Hypomagnesemia  Seizure disorder  H/O hemorrhoidectomy    Home Medications:  NONE    24HRS EVENT:  5/27  NIGHT:  - T-Max 100.7  - NSGY removed KIKE @ 2030  - na 133 --> f/u AM na --> if low, increase rate; double check with NSGY status of serum Na  - dispo pending  - as per NSGY, q2hr Neurochecks  -Repleted phos (NaPhos)  -BC 5/27 PM-received          DVT PTX: LVX  GI PTX: PPI    ***Tubes/Lines/Drains  ***  Peripheral IV  Left basilic midline 5/17  Arterial Line Right radial	Date 5/17  L IJ TLC     5/12  Trach/peg 5/21    REVIEW OF SYSTEMS  [ ] A ten-point review of systems was otherwise negative except as noted.  [x] Due to altered mental status/intubation, subjective information were not able to be obtained from the patient. History was obtained, to the extent possible, from review of the chart and collateral sources of information.   SANDRA DAS  [id number]  45y Male    Indication for ICU admission: s/p craniectomy for large L SDH  Admit Date:05-06-21  ICU Date: 05-07-21  OR Date: 05-06-21    No Known Allergies    PAST MEDICAL & SURGICAL HISTORY:  Alcohol abuse  GERD (gastroesophageal reflux disease)  ETOH abuse  Hypomagnesemia  Seizure disorder  H/O hemorrhoidectomy    Home Medications:  NONE    24HRS EVENT:  5/27  NIGHT:  - T-Max 100.7  - NSGY removed KIKE @ 2030  - na 133 --> f/u AM na --> if low, increase rate; double check with NSGY status of serum Na  - dispo pending  - as per NSGY, q2hr Neurochecks  -Repleted phos (NaPhos)  -BC 5/27 PM-received          DVT PTX: LVX  GI PTX: PPI    ***Tubes/Lines/Drains  ***  Peripheral IV  Left basilic midline 5/17  Arterial Line Right radial	Date 5/17  L IJ TLC     5/12  Trach/peg 5/21    REVIEW OF SYSTEMS  [ ] A ten-point review of systems was otherwise negative except as noted.  [x] Due to altered mental status/intubation, subjective information were not able to be obtained from the patient. History was obtained, to the extent possible, from review of the chart and collateral sources of information.        Daily     Daily     Diet, NPO with Tube Feed:   Tube Feeding Modality: Orogastric  Osmolite 1.5 Jeff  Total Volume for 24 Hours (mL): 1200  Continuous  Starting Tube Feed Rate mL per Hour: 50  Until Goal Tube Feed Rate (mL per Hour): 50  Tube Feed Duration (in Hours): 24  Tube Feed Start Time: 00:00  No Carb Prosource TF     Qty per Day:  3 (05-24-21 @ 19:03)      CURRENT MEDS:  Neurologic Medications  acetaminophen   Tablet .. 650 milliGRAM(s) Oral every 6 hours PRN Temp greater or equal to 38.5C (101.3F), Moderate Pain (4 - 6), Severe Pain (7 - 10)  levETIRAcetam  IVPB 500 milliGRAM(s) IV Intermittent every 12 hours    Respiratory Medications    Cardiovascular Medications  lisinopril 20 milliGRAM(s) Oral daily  propranolol 20 milliGRAM(s) Oral every 8 hours    Gastrointestinal Medications  pantoprazole   Suspension 40 milliGRAM(s) Oral daily  senna 2 Tablet(s) Oral at bedtime  sodium chloride 6 Gram(s) Oral <User Schedule>  sodium chloride 3%. 500 milliLiter(s) IV Continuous <Continuous>    Genitourinary Medications    Hematologic/Oncologic Medications  enoxaparin Injectable 40 milliGRAM(s) SubCutaneous every 24 hours    Antimicrobial/Immunologic Medications  meropenem  IVPB      meropenem  IVPB 2000 milliGRAM(s) IV Intermittent every 8 hours  vancomycin  IVPB 1500 milliGRAM(s) IV Intermittent every 8 hours    Endocrine/Metabolic Medications  insulin lispro (ADMELOG) corrective regimen sliding scale   SubCutaneous every 6 hours    Topical/Other Medications  artificial tears (preservative free) Ophthalmic Solution 2 Drop(s) Both EYES three times a day  chlorhexidine 0.12% Liquid 15 milliLiter(s) Oral Mucosa two times a day  chlorhexidine 4% Liquid 1 Application(s) Topical <User Schedule>      ICU Vital Signs Last 24 Hrs  T(C): 38.4 (28 May 2021 09:36), Max: 38.6 (27 May 2021 15:49)  T(F): 101.1 (28 May 2021 09:36), Max: 101.4 (27 May 2021 15:49)  HR: 98 (28 May 2021 10:00) (77 - 105)  BP: 129/79 (28 May 2021 10:00) (113/69 - 154/94)  BP(mean): 98 (28 May 2021 10:00) (86 - 116)  ABP: --  ABP(mean): --  RR: 25 (28 May 2021 10:00) (21 - 28)  SpO2: 100% (28 May 2021 10:00) (100% - 100%)      Adult Advanced Hemodynamics Last 24 Hrs  CVP(mm Hg): --  CVP(cm H2O): --  CO: --  CI: --  PA: --  PA(mean): --  PCWP: --  SVR: --  SVRI: --  PVR: --  PVRI: --          I&O's Summary    27 May 2021 07:01  -  28 May 2021 07:00  --------------------------------------------------------  IN: 3800 mL / OUT: 2745 mL / NET: 1055 mL    28 May 2021 07:01  -  28 May 2021 10:19  --------------------------------------------------------  IN: 230 mL / OUT: 720 mL / NET: -490 mL      I&O's Detail    27 May 2021 07:01  -  28 May 2021 07:00  --------------------------------------------------------  IN:    IV PiggyBack: 100 mL    IV PiggyBack: 200 mL    IV PiggyBack: 1000 mL    IV PiggyBack: 100 mL    Osmolite: 1200 mL    sodium chloride 3%: 1200 mL  Total IN: 3800 mL    OUT:    Bulb (mL): 35 mL    Voided (mL): 2710 mL  Total OUT: 2745 mL    Total NET: 1055 mL      28 May 2021 07:01  -  28 May 2021 10:19  --------------------------------------------------------  IN:    Oral Fluid: 30 mL    Osmolite: 100 mL    sodium chloride 3%: 100 mL  Total IN: 230 mL    OUT:    Voided (mL): 720 mL  Total OUT: 720 mL    Total NET: -490 mL          PHYSICAL EXAM:    General/Neuro  GCS:     = 11T   Deficits: opens eyes, follows commands on LUE and LLE. Does not respond with right side    Lungs:      clear to auscultation, Normal expansion/effort.     Cardiovascular : S1, S2.  Regular rate and rhythm. No Peripheral edema   Cardiac Rhythm: Normal Sinus Rhythm    GI: Abdomen soft, Non-tender, Non-distended.    PEG tube in place    Extremities: Extremities warm, pink, well-perfused.    Derm: Good skin turgor, no skin breakdown.      : Condom catheter      CXR:       LABS:  CAPILLARY BLOOD GLUCOSE      POCT Blood Glucose.: 149 mg/dL (28 May 2021 06:00)  POCT Blood Glucose.: 139 mg/dL (27 May 2021 23:54)  POCT Blood Glucose.: 172 mg/dL (27 May 2021 18:13)  POCT Blood Glucose.: 121 mg/dL (27 May 2021 13:14)                          8.3    7.21  )-----------( 308      ( 27 May 2021 23:42 )             25.3       05-27    135  |  102  |  5<L>  ----------------------------<  161<H>  3.9   |  26  |  <0.5<L>    Ca    7.9<L>      27 May 2021 23:42  Phos  2.8     05-27  Mg     1.9     05-27

## 2021-05-28 NOTE — PROGRESS NOTE ADULT - ATTENDING COMMENTS
Critical Care: [id number]   This patient has a high probability of sudden, clinically significant deterioration, which requires the highest level of physician preparedness to intervene urgently. I managed/supervised life or organ supporting interventions that required frequent physician assessment. I devoted my full attention in the ICU to the direct care of this patient for the period of time indicated below. Time I spent with the family or surrogate(s) is included only if the patient was incapable of providing the necessary information or participating in medical decision making.   Time devoted to teaching and to any procedures I billed separately is not included.     ANIA DASGORZ 45y Male admitted to [x ] SICU / [ ] SDU with intracranial hemorrhage SDH S/P craniectomy with post-OP respiratory failure, now tolerating trach caller 24 hrs+, tolerates tube feeds at goal, continue require 3% NaCl  Patient is examined and evaluated at the bedside with SICU team. Treatment plan discussed with SICU team, nurses and primary team.   Chest X-ray and all relevant studies reviewed during rounds.  Will continue hemodynamic monitoring as per protocol in SICU.    Neuro:  GCS [11T ]   [x ]  Neuro checks as per SICU protocol  [x ] 3% NaCl  -> wean to the goal of Na 135-145, continue to increase salt tabs  Paralysis [ ] Yes  [ x]  No  Sedated/Pain control with                 [ ] Dilaudid drip, [ ]  Ketamine, [ ] Fentanyl, [ ] Propofol, [ ] Precedex, [ ] Versed, [ ] Ativan,                           [x ] Tylenol, [ ] Gabapentin, [ ] OxyContin standing,  [ ] OxyContin PRN,  [ ] Dilaudid PRN pushes, [x ] Fentanyl PRN pushes,  [ ]  PCA               [ ] Seroquel, [ ] Haldol, [ ] Zyprexa,  [ ] Clonazepam [ ] Xanax, [ ] Versed/Ativan PRN,  [ x]None    CV: mildly tachycardic at times, on propranolol for TBI with goal of been normotensive   On pressors [ ]  Yes  [ x]  No          [ ]  Levophed, [ ] Sy-Synephrine, [ ] Vasopressin, [ ]  Epinephrine          [ ] Dobutamine, [ ] Milrinone, [ ]  Midodrine,  [ ] Others       Respiratory: Acute respiratory insufficiency ->continue tach caller                        None Invasive Support  [ ] Incentive Spirometer                                  [ ] BiPAP   [ ] CPAP   [ ] HFNC   [ ] NR Face Mask   [ ] NC  [x]  Trach Caller                         Ventilatory support  [ ] Yes [x ] No   [ ] SBT                              [ ] PC    [ ] VC   [ ] AC   [ ] BiVent/APRV   [ ]CPAP     GI  [x ]  bowel regiment   [x] BM  Nutrition: continue    [ ]   Diet   [ ] TPN/PPN     [x ]  Tube Feeds   @ 55 ml/hr    Renal: Continue I&Os monitoring, Cole catheter  [ x] Yes    [ ]   No ,  [x ]Consideration for discontinuation  Lytes/Acid-base: replete hypokalemia, hypomagnesemia, hypocalcemia, hypophosphatemia     ID: leukocytosis -> continue to monitor:         IV Abx [x ] Yes, [ ] No;  ID consulted [x ] Yes, [ ] No        Cultures [ ] Respiratory     [ ] Blood     [ ] Urine    [x ] Fluids  CSF [ ]  None    Lines:   [ ] Right   [x ] Left                      [ ] Subclavian TLC        [ ]  Internal Jugular TLC     [ ]  Femoral TLC                     [ ] Subclavian Cordis    [ ] Internal Jugular Cordis   [ ] Femoral Cordis                     [ ] HD catheter                     [ ] PICC     [x ]  Midline   [ ] Peripheral IVs                                  [x ] Right   [ ] Left                     [ x] Radial A-Line           [ ] Femoral A-Line            [ ] Axillary A-Line                  Heme: continue to evaluate for acute blood loss anemia- trend Hg/Hct                     Transfused as indicated     [ ] PRBCs   [ ] Platelets   [ ] FFPs   [ ] Cryoprecipitate                     [ ] Lovenox  [ ] Heparin drip  [ ] NOVACs  [ ] ASA  [ ] Antiplatelets   Endocrine: prevent and treat hyperglycemia with insulin as needed,           Insuline drip [ ] Yes, [ x] No     PV: follow pulse exam    Skin: decub precautions    DVT Prophylaxis:  [ x] SCDs  [ ] Heparin SQ  [ x] Lovenox  SQ    Stress Gastritis Prophylaxis: PPI/H2 Blockers if indicated    Mobility: patient is evaluated at the bedside with mobility team and the goals for today are discussed with PT [ x] out of bed to chair    PATIENT/FAMILY/SURROGATE CONFERENCE:   PURPOSE: To obtain necessary information, To discuss treatment options  under consideration today.  I saw and evaluated the patient personally. I have reviewed and agree with note above. Treatment plan discussed with SICU team, nurses and primary team at the time of the multidisciplinary rounds. The above note is NOT written at the time of rounds and will reflect all changes throughout management of the patient for the day note is written for.    Jenny Allen MD, FACS  Trauma/ACS/SCC Attending

## 2021-05-28 NOTE — PROGRESS NOTE ADULT - ASSESSMENT
ASSESSMENT  45yM w/ PMHx of Etoh abuse and seizures on keppra  found down unresponsive admitted 5/6. s/p craniectomy for large L SDH    IMPRESSION  #Fevers secondary to craniotomy site collection and thrombus    5/27 KIKE removed    5/23 BCX NG    5/21 aspirate craniotomy site WCX e. faecalis (S amp)    5/16 swab OR coNS (S oxacillin)    No leukocytosis, rule out non-infectious causes / central fever    5/19 BCX NGTD     5/18 UA unremarkable   < from: CT Head No Cont (05.26.21 @ 12:49) >  Since prior CT of 5/16/2021 there is slight increased thickness of a mixed density extra-axial collection within the craniectomy defect.. 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 Please note evaluation for abscess is limited without intravenous contrast. A follow-up MRI of the brain with and without contrast is recommended for further evaluation.  Decreased blood products noted within the left temporal occipital region with unchanged areas of subacute infarct.    CT 5/20 1.  Redemonstrated filling defect within the left transverse sinus, sigmoid sinus and proximal internal jugular vein compatible with thrombus. When compared to the CTA from 5/13, there has been interval partial recanalization of the left transverse sinus.  2.  Redemonstrated large left-sided craniectomy. The previously seen left scalp drain has been removed.  3.  Increasing size of the mixed density fluid collection/hematoma extending from the left extra-axial space to the overlying scalp, overall measuring 4 cm in width, previously 3 cm.  4.  Increasing frontal convexity subdural hygromas measuring 8 mm on the right and 9 mm on the left.  5.  Left to right midline shift of 4 mm, previously 3 mm.  6.  Redemonstrated acute/subacute infarcts within the left temporal lobe and insula. Additional previously demonstrated multifocal infarcts and edema are not as well delineated on this exam, recommend follow-up head CT.  #Klebsiella bacteremia , BAL also with Klebsiella but no PNA on imaging (CT/CXR)    5/13 BCX NGTD     5/11 BCX kleb variicola (S)    5/11 BAL   Moderate Klebsiella variicola &  Moderate Serratia marcescens  < from: Xray Chest 1 View- PORTABLE-Routine (05.13.21 @ 06:28) >No radiographic evidence of acute cardiopulmonary disease.    #SDH s/p craniectomy    5/16 OR WCX   Rare Coag Negative Staphylococcus (S oxacillin)- likely skin colonizer     5/16 s/p Revision craniotomy wound with complex closure for persistent drainage, subgaleal hematoma    Repeat CTH development of acute infarcts in left cerebral hemisphere and right cerebellum  #CT atelectasis   #HIV/Hep panel NR  Creatinine, Serum: <0.5 (05-14-21 @ 05:30)      RECOMMENDATIONS  - f/u bCX  - Seamus 2g q8h IV and Vanc IV 5/25-, s/p unasyn  - Vanc 1.5 q8h IV  - Please recheck vanc trough 30 min prior to 4th new dose   - Recommend MRI Brain rule out abscess, may need further debridement/ source control   - trend WBC, fever curve  - Per SICU/ NSYG  - Fevers can be central as well- no leukocytosis vs contained subdural empyema     If any questions, please call or send a message on [employer] Teams  Spectra [phone]

## 2021-05-29 LAB
ANION GAP SERPL CALC-SCNC: 8 MMOL/L — SIGNIFICANT CHANGE UP (ref 7–14)
ANION GAP SERPL CALC-SCNC: 9 MMOL/L — SIGNIFICANT CHANGE UP (ref 7–14)
BUN SERPL-MCNC: 5 MG/DL — LOW (ref 10–20)
BUN SERPL-MCNC: 6 MG/DL — LOW (ref 10–20)
CALCIUM SERPL-MCNC: 8 MG/DL — LOW (ref 8.5–10.1)
CALCIUM SERPL-MCNC: 8 MG/DL — LOW (ref 8.5–10.1)
CALCIUM SERPL-MCNC: 8.1 MG/DL — LOW (ref 8.5–10.1)
CALCIUM SERPL-MCNC: 8.4 MG/DL — LOW (ref 8.5–10.1)
CALCIUM SERPL-MCNC: 8.5 MG/DL — SIGNIFICANT CHANGE UP (ref 8.5–10.1)
CHLORIDE SERPL-SCNC: 100 MMOL/L — SIGNIFICANT CHANGE UP (ref 98–110)
CHLORIDE SERPL-SCNC: 101 MMOL/L — SIGNIFICANT CHANGE UP (ref 98–110)
CHLORIDE SERPL-SCNC: 102 MMOL/L — SIGNIFICANT CHANGE UP (ref 98–110)
CHLORIDE SERPL-SCNC: 104 MMOL/L — SIGNIFICANT CHANGE UP (ref 98–110)
CHLORIDE SERPL-SCNC: 97 MMOL/L — LOW (ref 98–110)
CO2 SERPL-SCNC: 23 MMOL/L — SIGNIFICANT CHANGE UP (ref 17–32)
CO2 SERPL-SCNC: 23 MMOL/L — SIGNIFICANT CHANGE UP (ref 17–32)
CO2 SERPL-SCNC: 25 MMOL/L — SIGNIFICANT CHANGE UP (ref 17–32)
CO2 SERPL-SCNC: 25 MMOL/L — SIGNIFICANT CHANGE UP (ref 17–32)
CO2 SERPL-SCNC: 26 MMOL/L — SIGNIFICANT CHANGE UP (ref 17–32)
CREAT SERPL-MCNC: <0.5 MG/DL — LOW (ref 0.7–1.5)
CULTURE RESULTS: SIGNIFICANT CHANGE UP
GLUCOSE BLDC GLUCOMTR-MCNC: 122 MG/DL — HIGH (ref 70–99)
GLUCOSE BLDC GLUCOMTR-MCNC: 125 MG/DL — HIGH (ref 70–99)
GLUCOSE BLDC GLUCOMTR-MCNC: 139 MG/DL — HIGH (ref 70–99)
GLUCOSE BLDC GLUCOMTR-MCNC: 172 MG/DL — HIGH (ref 70–99)
GLUCOSE SERPL-MCNC: 101 MG/DL — HIGH (ref 70–99)
GLUCOSE SERPL-MCNC: 113 MG/DL — HIGH (ref 70–99)
GLUCOSE SERPL-MCNC: 117 MG/DL — HIGH (ref 70–99)
GLUCOSE SERPL-MCNC: 145 MG/DL — HIGH (ref 70–99)
GLUCOSE SERPL-MCNC: 148 MG/DL — HIGH (ref 70–99)
HCT VFR BLD CALC: 24.8 % — LOW (ref 42–52)
HCT VFR BLD CALC: 27 % — LOW (ref 42–52)
HGB BLD-MCNC: 8.2 G/DL — LOW (ref 14–18)
HGB BLD-MCNC: 9.2 G/DL — LOW (ref 14–18)
MAGNESIUM SERPL-MCNC: 1.7 MG/DL — LOW (ref 1.8–2.4)
MAGNESIUM SERPL-MCNC: 1.9 MG/DL — SIGNIFICANT CHANGE UP (ref 1.8–2.4)
MAGNESIUM SERPL-MCNC: 2.3 MG/DL — SIGNIFICANT CHANGE UP (ref 1.8–2.4)
MCHC RBC-ENTMCNC: 28 PG — SIGNIFICANT CHANGE UP (ref 27–31)
MCHC RBC-ENTMCNC: 28.8 PG — SIGNIFICANT CHANGE UP (ref 27–31)
MCHC RBC-ENTMCNC: 33.1 G/DL — SIGNIFICANT CHANGE UP (ref 32–37)
MCHC RBC-ENTMCNC: 34.1 G/DL — SIGNIFICANT CHANGE UP (ref 32–37)
MCV RBC AUTO: 84.6 FL — SIGNIFICANT CHANGE UP (ref 80–94)
MCV RBC AUTO: 84.6 FL — SIGNIFICANT CHANGE UP (ref 80–94)
NRBC # BLD: 0 /100 WBCS — SIGNIFICANT CHANGE UP (ref 0–0)
NRBC # BLD: 0 /100 WBCS — SIGNIFICANT CHANGE UP (ref 0–0)
OSMOLALITY SERPL: 269 MOS/KG — LOW (ref 275–300)
OSMOLALITY SERPL: 276 MOS/KG — SIGNIFICANT CHANGE UP (ref 275–300)
PHOSPHATE SERPL-MCNC: 3.2 MG/DL — SIGNIFICANT CHANGE UP (ref 2.1–4.9)
PHOSPHATE SERPL-MCNC: 3.2 MG/DL — SIGNIFICANT CHANGE UP (ref 2.1–4.9)
PHOSPHATE SERPL-MCNC: 3.4 MG/DL — SIGNIFICANT CHANGE UP (ref 2.1–4.9)
PLATELET # BLD AUTO: 273 K/UL — SIGNIFICANT CHANGE UP (ref 130–400)
PLATELET # BLD AUTO: 351 K/UL — SIGNIFICANT CHANGE UP (ref 130–400)
POTASSIUM SERPL-MCNC: 3.7 MMOL/L — SIGNIFICANT CHANGE UP (ref 3.5–5)
POTASSIUM SERPL-MCNC: 4 MMOL/L — SIGNIFICANT CHANGE UP (ref 3.5–5)
POTASSIUM SERPL-MCNC: 4.2 MMOL/L — SIGNIFICANT CHANGE UP (ref 3.5–5)
POTASSIUM SERPL-MCNC: 4.3 MMOL/L — SIGNIFICANT CHANGE UP (ref 3.5–5)
POTASSIUM SERPL-MCNC: 4.4 MMOL/L — SIGNIFICANT CHANGE UP (ref 3.5–5)
POTASSIUM SERPL-SCNC: 3.7 MMOL/L — SIGNIFICANT CHANGE UP (ref 3.5–5)
POTASSIUM SERPL-SCNC: 4 MMOL/L — SIGNIFICANT CHANGE UP (ref 3.5–5)
POTASSIUM SERPL-SCNC: 4.2 MMOL/L — SIGNIFICANT CHANGE UP (ref 3.5–5)
POTASSIUM SERPL-SCNC: 4.3 MMOL/L — SIGNIFICANT CHANGE UP (ref 3.5–5)
POTASSIUM SERPL-SCNC: 4.4 MMOL/L — SIGNIFICANT CHANGE UP (ref 3.5–5)
RBC # BLD: 2.93 M/UL — LOW (ref 4.7–6.1)
RBC # BLD: 3.19 M/UL — LOW (ref 4.7–6.1)
RBC # FLD: 13.7 % — SIGNIFICANT CHANGE UP (ref 11.5–14.5)
RBC # FLD: 13.9 % — SIGNIFICANT CHANGE UP (ref 11.5–14.5)
SODIUM SERPL-SCNC: 131 MMOL/L — LOW (ref 135–146)
SODIUM SERPL-SCNC: 132 MMOL/L — LOW (ref 135–146)
SODIUM SERPL-SCNC: 134 MMOL/L — LOW (ref 135–146)
SODIUM SERPL-SCNC: 136 MMOL/L — SIGNIFICANT CHANGE UP (ref 135–146)
SODIUM SERPL-SCNC: 137 MMOL/L — SIGNIFICANT CHANGE UP (ref 135–146)
SPECIMEN SOURCE: SIGNIFICANT CHANGE UP
VANCOMYCIN TROUGH SERPL-MCNC: 21.7 UG/ML — HIGH (ref 5–10)
WBC # BLD: 5.03 K/UL — SIGNIFICANT CHANGE UP (ref 4.8–10.8)
WBC # BLD: 6.85 K/UL — SIGNIFICANT CHANGE UP (ref 4.8–10.8)
WBC # FLD AUTO: 5.03 K/UL — SIGNIFICANT CHANGE UP (ref 4.8–10.8)
WBC # FLD AUTO: 6.85 K/UL — SIGNIFICANT CHANGE UP (ref 4.8–10.8)

## 2021-05-29 PROCEDURE — 99291 CRITICAL CARE FIRST HOUR: CPT | Mod: 25

## 2021-05-29 PROCEDURE — 71045 X-RAY EXAM CHEST 1 VIEW: CPT | Mod: 26

## 2021-05-29 RX ORDER — MAGNESIUM SULFATE 500 MG/ML
2 VIAL (ML) INJECTION ONCE
Refills: 0 | Status: COMPLETED | OUTPATIENT
Start: 2021-05-29 | End: 2021-05-29

## 2021-05-29 RX ORDER — MAGNESIUM SULFATE 500 MG/ML
1 VIAL (ML) INJECTION ONCE
Refills: 0 | Status: COMPLETED | OUTPATIENT
Start: 2021-05-29 | End: 2021-05-29

## 2021-05-29 RX ORDER — POTASSIUM CHLORIDE 20 MEQ
20 PACKET (EA) ORAL
Refills: 0 | Status: COMPLETED | OUTPATIENT
Start: 2021-05-29 | End: 2021-05-29

## 2021-05-29 RX ORDER — SODIUM CHLORIDE 9 MG/ML
8 INJECTION INTRAMUSCULAR; INTRAVENOUS; SUBCUTANEOUS
Refills: 0 | Status: DISCONTINUED | OUTPATIENT
Start: 2021-05-29 | End: 2021-05-31

## 2021-05-29 RX ADMIN — LISINOPRIL 20 MILLIGRAM(S): 2.5 TABLET ORAL at 05:28

## 2021-05-29 RX ADMIN — ENOXAPARIN SODIUM 40 MILLIGRAM(S): 100 INJECTION SUBCUTANEOUS at 18:04

## 2021-05-29 RX ADMIN — Medication 100 MILLIEQUIVALENT(S): at 08:33

## 2021-05-29 RX ADMIN — Medication 650 MILLIGRAM(S): at 04:00

## 2021-05-29 RX ADMIN — SODIUM CHLORIDE 8 GRAM(S): 9 INJECTION INTRAMUSCULAR; INTRAVENOUS; SUBCUTANEOUS at 22:02

## 2021-05-29 RX ADMIN — Medication 100 GRAM(S): at 05:19

## 2021-05-29 RX ADMIN — SENNA PLUS 2 TABLET(S): 8.6 TABLET ORAL at 22:03

## 2021-05-29 RX ADMIN — SODIUM CHLORIDE 50 MILLILITER(S): 5 INJECTION, SOLUTION INTRAVENOUS at 05:33

## 2021-05-29 RX ADMIN — SODIUM CHLORIDE 8 GRAM(S): 9 INJECTION INTRAMUSCULAR; INTRAVENOUS; SUBCUTANEOUS at 18:04

## 2021-05-29 RX ADMIN — Medication 300 MILLIGRAM(S): at 05:20

## 2021-05-29 RX ADMIN — Medication 2 DROP(S): at 05:23

## 2021-05-29 RX ADMIN — Medication 100 MILLIEQUIVALENT(S): at 07:26

## 2021-05-29 RX ADMIN — CHLORHEXIDINE GLUCONATE 15 MILLILITER(S): 213 SOLUTION TOPICAL at 05:19

## 2021-05-29 RX ADMIN — SODIUM CHLORIDE 6 GRAM(S): 9 INJECTION INTRAMUSCULAR; INTRAVENOUS; SUBCUTANEOUS at 05:32

## 2021-05-29 RX ADMIN — Medication 2: at 06:05

## 2021-05-29 RX ADMIN — SODIUM CHLORIDE 6 GRAM(S): 9 INJECTION INTRAMUSCULAR; INTRAVENOUS; SUBCUTANEOUS at 02:03

## 2021-05-29 RX ADMIN — Medication 300 MILLIGRAM(S): at 14:00

## 2021-05-29 RX ADMIN — SODIUM CHLORIDE 8 GRAM(S): 9 INJECTION INTRAMUSCULAR; INTRAVENOUS; SUBCUTANEOUS at 13:59

## 2021-05-29 RX ADMIN — MEROPENEM 200 MILLIGRAM(S): 1 INJECTION INTRAVENOUS at 14:58

## 2021-05-29 RX ADMIN — MEROPENEM 200 MILLIGRAM(S): 1 INJECTION INTRAVENOUS at 22:02

## 2021-05-29 RX ADMIN — CHLORHEXIDINE GLUCONATE 1 APPLICATION(S): 213 SOLUTION TOPICAL at 05:28

## 2021-05-29 RX ADMIN — LEVETIRACETAM 420 MILLIGRAM(S): 250 TABLET, FILM COATED ORAL at 18:03

## 2021-05-29 RX ADMIN — MEROPENEM 200 MILLIGRAM(S): 1 INJECTION INTRAVENOUS at 05:32

## 2021-05-29 RX ADMIN — SODIUM CHLORIDE 50 MILLILITER(S): 5 INJECTION, SOLUTION INTRAVENOUS at 22:01

## 2021-05-29 RX ADMIN — CHLORHEXIDINE GLUCONATE 15 MILLILITER(S): 213 SOLUTION TOPICAL at 18:04

## 2021-05-29 RX ADMIN — Medication 650 MILLIGRAM(S): at 02:19

## 2021-05-29 RX ADMIN — LEVETIRACETAM 420 MILLIGRAM(S): 250 TABLET, FILM COATED ORAL at 05:27

## 2021-05-29 RX ADMIN — Medication 2 DROP(S): at 22:03

## 2021-05-29 RX ADMIN — Medication 2 DROP(S): at 14:00

## 2021-05-29 RX ADMIN — PANTOPRAZOLE SODIUM 40 MILLIGRAM(S): 20 TABLET, DELAYED RELEASE ORAL at 11:39

## 2021-05-29 RX ADMIN — SODIUM CHLORIDE 8 GRAM(S): 9 INJECTION INTRAMUSCULAR; INTRAVENOUS; SUBCUTANEOUS at 11:39

## 2021-05-29 RX ADMIN — Medication 50 GRAM(S): at 02:03

## 2021-05-29 NOTE — PROGRESS NOTE ADULT - ASSESSMENT
Assessment & Plan  45M s/p fall with large left SDH and neurological status change, level 1 for emergent craniotomy    NEURO:    Hx of EtOH abuse and prior Delerium Tremens episodes    S/P craniotomy for large left SDH     - Q2H neuro checks, No sedation, GCS 11T     - Keppra 500mg BID     - 3% @ 50cc -- Na goal 135 ---> begin to wean      - Nacl 6gm q4        Na 136 at mn 5/28    Head of bed @ 30 degrees    Helmet delivered --> does not need while laying in bed only during PT/OT     F/U MRI       OR 5/16:     -Revision craniotomy wound with complex closure for persistent drainage, subgaleal hematoma   Imaging:     HCT 5/16-stable post op changes    CT Venogram-left transverse sinus thrombosis, no intervention currently    5/23 NSX placed KIKE under the skin to gravity, old blood aspirated and some pus,  --> Drain DCed 5/27    -e. faecalis in cx --> ID following, on Vacomycoin       Repeat CT head 5/26- - there is slight increased thickness of a mixed density extra-axial collection within the craniectomy defect.. 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    RESP:   s/p trach 5/21-on Tpiece since 5/24  Sutures removed 5/27      CARDS:     S/P cardiac arrest in OR & MTP     - Hypertension - on Lisinopril 20mg     - Propranolol 20mg q8    GI/NUTR:     Diet: TF (Osmolite 1.5) @ 50 via PEG    GI Prophylaxis- PPI    Bowel regimen restarted - Senna     BM 5/28          /RENAL:   Monitor UO- condom cath in place    Condom cath, + voiding.     Q6 BMP for hypertonic saline     - Hyponatremic - Na goal 135 per neurosurgery     - Na 136     - 3% @ 50       - Salt tabs incr 6g q4    HEME/ONC:     DVT prophylaxis-enoxaparin Injectable, SCDs    Labs: Hb/Hct:  7.4/21.6  -->,  8.3/25.3  -->                      Plts:  308  -->,  338  -->      Hg >8, MTP on this admission  DVT prophylaxis: Lovenox 40  5/26 DVT sono neg      ID:  WBC- 8.89  --->>,  8.16  --->>,  7.21    Tmax  100.7  Antibiotics-meropenem  IVPB 2000 every 8 hours  vancomycin  IVPB 1500 every 8 hours  - Trough 19 on 5/28     - 5/11- u/a +leuk     - blood culture 5/13, 5/19, 5/20, 5/23 No growth      -Cranial Culture 5/21-e faceium     - Reculture if spikes    ENDO:  q6 POC glucose  Sliding scale insulin  A1c 5.2    DISPO: SICU          Assessment & Plan  45M s/p fall with large left SDH and neurological status change, POD 23 for emergent craniectomy now complicated by subgaleal infectedhematoma on abx s/p drainage on 5/16/2021    NEURO:    Hx of EtOH abuse and prior Delerium Tremens episodes    S/P craniectomy for large left SDH     - Q2H neuro checks, No sedation, GCS 11T     - Keppra 500mg BID     - 3% @ 50cc -- Na goal 135 ---> begin to wean      - Nacl 6gm q4        Na 134    Head of bed @ 30 degrees    Helmet delivered --> does not need while laying in bed only during PT/OT     F/U MRI       OR 5/16:     -Revision craniotomy wound with complex closure for persistent drainage, subgaleal hematoma   Imaging:     HCT 5/16-stable post op changes    CT Venogram-left transverse sinus thrombosis, no intervention currently    5/23 NSX placed KIKE under the skin to gravity, old blood aspirated and some pus,  --> Drain DCed 5/27    -e. faecalis in cx --> ID following, on Vacomycoin       Repeat CT head 5/26- - there is slight increased thickness of a mixed density extra-axial collection within the craniectomy defect.. 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    RESP:   s/p trach 5/21-on Tpiece since 5/24  Sutures removed 5/27      CARDS:     S/P cardiac arrest in OR & MTP     - Hypertension - on Lisinopril 20mg     - Propranolol 20mg q8    GI/NUTR:     Diet: TF (Osmolite 1.5) @ 50 via PEG    GI Prophylaxis- PPI    Bowel regimen restarted - Senna     BM 5/28          /RENAL:   Monitor UO- condom cath in place    Condom cath, + voiding.     Q6 BMP for hypertonic saline     - Hyponatremic - Na goal 135 per neurosurgery     - Na 136     - 3% @ 50       - Salt tabs incr 6g q4    HEME/ONC:     DVT prophylaxis-enoxaparin Injectable, SCDs    Labs: Hb/Hct:  7.4/21.6  -->,  8.3/25.3  -->                      Plts:  308  -->,  338  -->      Hg >8, MTP on this admission  DVT prophylaxis: Lovenox 40  5/26 DVT sono neg      ID:  WBC- 8.89  --->>,  8.16  --->>,  7.21    Tmax  100.7  Antibiotics-meropenem  IVPB 2000 every 8 hours  vancomycin  IVPB 1500 every 8 hours  - Trough 19 on 5/28     - 5/11- u/a +leuk     - blood culture 5/13, 5/19, 5/20, 5/23 No growth      -Cranial Culture 5/21-e faceium     - Reculture if spikes    ENDO:  q6 POC glucose  Sliding scale insulin  A1c 5.2    DISPO: SICU

## 2021-05-29 NOTE — PROGRESS NOTE ADULT - ATTENDING COMMENTS
Critical Care: [id number]   This patient has a high probability of sudden, clinically significant deterioration, which requires the highest level of physician preparedness to intervene urgently. I managed/supervised life or organ supporting interventions that required frequent physician assessment. I devoted my full attention in the ICU to the direct care of this patient for the period of time indicated below. Time I spent with the family or surrogate(s) is included only if the patient was incapable of providing the necessary information or participating in medical decision making.   Time devoted to teaching and to any procedures I billed separately is not included.     ANIA DASGORZ 45y Male admitted to [x ] SICU / [ ] SDU with intracranial hemorrhage SDH S/P craniectomy with post-OP respiratory failure, now tolerating trach caller 24 hrs+, tolerates tube feeds at goal, continue require 3% NaCl  Patient is examined and evaluated at the bedside with SICU team. Treatment plan discussed with SICU team, nurses and primary team.   Chest X-ray and all relevant studies reviewed during rounds.  Will continue hemodynamic monitoring as per protocol in SICU.    Neuro:  GCS [11T ]   [x ]  Neuro checks as per SICU protocol  [x ] 3% NaCl  -> wean to the goal of Na 135-145, continue to increase salt tabs  Left cranial drain D/C  Paralysis [ ] Yes  [ x]  No  Sedated/Pain control with                 [ ] Dilaudid drip, [ ]  Ketamine, [ ] Fentanyl, [ ] Propofol, [ ] Precedex, [ ] Versed, [ ] Ativan,                           [x ] Tylenol, [ ] Gabapentin, [ ] OxyContin standing,  [ ] OxyContin PRN,  [ ] Dilaudid PRN pushes, [x ] Fentanyl PRN pushes,  [ ]  PCA               [ ] Seroquel, [ ] Haldol, [ ] Zyprexa,  [ ] Clonazepam [ ] Xanax, [ ] Versed/Ativan PRN,  [ x]None    CV: mildly tachycardic at times, on propranolol for TBI with goal of been normotensive   On pressors [ ]  Yes  [ x]  No          [ ]  Levophed, [ ] Sy-Synephrine, [ ] Vasopressin, [ ]  Epinephrine          [ ] Dobutamine, [ ] Milrinone, [ ]  Midodrine,  [ ] Others       Respiratory: Acute respiratory insufficiency ->continue tach caller                        None Invasive Support  [ ] Incentive Spirometer                                  [ ] BiPAP   [ ] CPAP   [ ] HFNC   [ ] NR Face Mask   [ ] NC  [x]  Trach Caller                         Ventilatory support  [ ] Yes [x ] No   [ ] SBT                              [ ] PC    [ ] VC   [ ] AC   [ ] BiVent/APRV   [ ]CPAP     GI  [x ]  bowel regiment   [x] BM  Nutrition: continue    [ ]   Diet   [ ] TPN/PPN     [x ]  Tube Feeds   @ 55 ml/hr    Renal: Continue I&Os monitoring, Cole catheter  [ x] Yes    [ ]   No ,  [x ]Consideration for discontinuation  Lytes/Acid-base: replete hypokalemia, hypomagnesemia, hypocalcemia, hypophosphatemia     ID: leukocytosis -> continue to monitor:         IV Abx [x ] Yes, [ ] No;  ID consulted [x ] Yes, [ ] No        Cultures [ ] Respiratory     [ ] Blood     [ ] Urine    [x ] Fluids  CSF [ ]  None    Lines:   [ ] Right   [x ] Left                      [ ] Subclavian TLC        [ ]  Internal Jugular TLC     [ ]  Femoral TLC                     [ ] Subclavian Cordis    [ ] Internal Jugular Cordis   [ ] Femoral Cordis                     [ ] HD catheter                     [ ] PICC     [x ]  Midline   [ ] Peripheral IVs                                  [x ] Right   [ ] Left                     [ x] Radial A-Line           [ ] Femoral A-Line            [ ] Axillary A-Line                  Heme: continue to evaluate for acute blood loss anemia- trend Hg/Hct                     Transfused as indicated     [ ] PRBCs   [ ] Platelets   [ ] FFPs   [ ] Cryoprecipitate                     [ ] Lovenox  [ ] Heparin drip  [ ] NOVACs  [ ] ASA  [ ] Antiplatelets   Endocrine: prevent and treat hyperglycemia with insulin as needed,           Insuline drip [ ] Yes, [ x] No     PV: follow pulse exam    Skin: decub precautions    DVT Prophylaxis:  [ x] SCDs  [ ] Heparin SQ  [ x] Lovenox  SQ    Stress Gastritis Prophylaxis: PPI/H2 Blockers if indicated    Mobility: patient is evaluated at the bedside with mobility team and the goals for today are discussed with PT [ x] out of bed to chair with helmet     PATIENT/FAMILY/SURROGATE CONFERENCE:   PURPOSE: To obtain necessary information, To discuss treatment options  under consideration today.  I saw and evaluated the patient personally. I have reviewed and agree with note above. Treatment plan discussed with SICU team, nurses and primary team at the time of the multidisciplinary rounds. The above note is NOT written at the time of rounds and will reflect all changes throughout management of the patient for the day note is written for.    Jenny Allen MD, FACS  Trauma/ACS/SCC Attending

## 2021-05-29 NOTE — PROGRESS NOTE ADULT - SUBJECTIVE AND OBJECTIVE BOX
POD#22/12    S/P Left Craniectomy for evac of SDH  S/P Wound Revision    Pt seen and examined at bedside.     Vital Signs Last 24 Hrs  T(C): 36.6 (29 May 2021 06:00), Max: 38.4 (28 May 2021 09:36)  T(F): 97.8 (29 May 2021 06:00), Max: 101.1 (28 May 2021 09:36)  HR: 85 (29 May 2021 07:00) (77 - 118)  BP: 130/83 (29 May 2021 07:00) (110/61 - 146/89)  BP(mean): 102 (29 May 2021 07:00) (78 - 112)  RR: 20 (29 May 2021 07:00) (16 - 28)  SpO2: 100% (29 May 2021 07:00) (99% - 100%)    I&O's Detail    28 May 2021 07:01  -  29 May 2021 07:00  --------------------------------------------------------  IN:    Enteral Tube Flush: 50 mL    IV PiggyBack: 500 mL    IV PiggyBack: 100 mL    IV PiggyBack: 100 mL    IV PiggyBack: 700 mL    Oral Fluid: 30 mL    Osmolite: 950 mL    sodium chloride 3%: 1150 mL  Total IN: 3580 mL    OUT:    Voided (mL): 3180 mL  Total OUT: 3180 mL    Total NET: 400 mL        I&O's Summary    28 May 2021 07:01  -  29 May 2021 07:00  --------------------------------------------------------  IN: 3580 mL / OUT: 3180 mL / NET: 400 mL        REVIEW OF SYSTEMS    [ ] A ten-point review of systems was otherwise negative except as noted.  [X] Due to altered mental status/intubation, subjective information were not able to be obtained from the patient. History was obtained, to the extent possible, from review of the chart and collateral sources of information.      PHYSICAL EXAM:  Neurological:                LABS:                        8.2    6.85  )-----------( 351      ( 29 May 2021 04:18 )             24.8     05-29    134<L>  |  102  |  6<L>  ----------------------------<  145<H>  3.7   |  23  |  <0.5<L>    Ca    8.0<L>      29 May 2021 04:18  Phos  3.4     05-29  Mg     2.3     05-29              Intolerances      MEDICATIONS:  Antibiotics:  meropenem  IVPB      meropenem  IVPB 2000 milliGRAM(s) IV Intermittent every 8 hours  vancomycin  IVPB 1500 milliGRAM(s) IV Intermittent every 8 hours    Neuro:  acetaminophen   Tablet .. 650 milliGRAM(s) Oral every 6 hours PRN  levETIRAcetam  IVPB 500 milliGRAM(s) IV Intermittent every 12 hours      IVF:  potassium chloride  20 mEq/100 mL IVPB 20 milliEquivalent(s) IV Intermittent every 1 hour  sodium chloride 6 Gram(s) Oral <User Schedule>  sodium chloride 3%. 500 milliLiter(s) IV Continuous <Continuous>      CULTURES:  Culture Results:   No Growth Final (05-23 @ 19:19)  Culture Results:   Few Enterococcus faecalis (05-21 @ 12:00)      RADIOLOGY & ADDITIONAL TESTS:      ASSESSMENT:  45y Male s/p    SUBDURAL HEMATOMA;RESPIRATORY FAILURE;FALL    ^FALL    No pertinent family history in first degree relatives    Family history of essential hypertension (Father)    Handoff    MEWS Score    No pertinent past medical history    Alcohol abuse    GERD (gastroesophageal reflux disease)    ETOH abuse    Hypomagnesemia    Seizure disorder    Subdural hematoma    S/P subdural hematoma evacuation    Subdural hematoma    Subdural hematoma    Subdural hematoma    Respiratory failure    ETOH abuse    Palliative care by specialist    Insertion of non-tunneled central venous catheter (CVC) in patient age 5 years of age or older    Craniotomy, decompressive    Decompressive craniotomy    Placement, tracheostomy and percutaneous endoscopic gastrostomy    No significant past surgical history    H/O hemorrhoidectomy    FALL    23    Respiratory failure    Fall    SysAdmin_VisitLink        PLAN:   POD#22/12    S/P Left Craniectomy for evac of SDH  S/P Wound Revision    Pt seen and examined at bedside. Pt awake, alert. Off vent    Vital Signs Last 24 Hrs  T(C): 36.6 (29 May 2021 06:00), Max: 38.4 (28 May 2021 09:36)  T(F): 97.8 (29 May 2021 06:00), Max: 101.1 (28 May 2021 09:36)  HR: 85 (29 May 2021 07:00) (77 - 118)  BP: 130/83 (29 May 2021 07:00) (110/61 - 146/89)  BP(mean): 102 (29 May 2021 07:00) (78 - 112)  RR: 20 (29 May 2021 07:00) (16 - 28)  SpO2: 100% (29 May 2021 07:00) (99% - 100%)    I&O's Detail    28 May 2021 07:01  -  29 May 2021 07:00  --------------------------------------------------------  IN:    Enteral Tube Flush: 50 mL    IV PiggyBack: 500 mL    IV PiggyBack: 100 mL    IV PiggyBack: 100 mL    IV PiggyBack: 700 mL    Oral Fluid: 30 mL    Osmolite: 950 mL    sodium chloride 3%: 1150 mL  Total IN: 3580 mL    OUT:    Voided (mL): 3180 mL  Total OUT: 3180 mL    Total NET: 400 mL        I&O's Summary    28 May 2021 07:01  -  29 May 2021 07:00  --------------------------------------------------------  IN: 3580 mL / OUT: 3180 mL / NET: 400 mL        REVIEW OF SYSTEMS    [ ] A ten-point review of systems was otherwise negative except as noted.  [X] Due to altered mental status/intubation, subjective information were not able to be obtained from the patient. History was obtained, to the extent possible, from review of the chart and collateral sources of information.      PHYSICAL EXAM:  Neurological:  Pupils reactive  Tracks  Moves left side spontaneously  Min mvmt of R side to pain  Left Craniectomy defect full. No drainage    LABS:                        8.2    6.85  )-----------( 351      ( 29 May 2021 04:18 )             24.8     05-29    134<L>  |  102  |  6<L>  ----------------------------<  145<H>  3.7   |  23  |  <0.5<L>    Ca    8.0<L>      29 May 2021 04:18  Phos  3.4     05-29  Mg     2.3     05-29    MEDICATIONS:  Antibiotics:  meropenem  IVPB      meropenem  IVPB 2000 milliGRAM(s) IV Intermittent every 8 hours  vancomycin  IVPB 1500 milliGRAM(s) IV Intermittent every 8 hours    Neuro:  acetaminophen   Tablet .. 650 milliGRAM(s) Oral every 6 hours PRN  levETIRAcetam  IVPB 500 milliGRAM(s) IV Intermittent every 12 hours      IVF:  potassium chloride  20 mEq/100 mL IVPB 20 milliEquivalent(s) IV Intermittent every 1 hour  sodium chloride 6 Gram(s) Oral <User Schedule>  sodium chloride 3%. 500 milliLiter(s) IV Continuous <Continuous>      CULTURES:  Culture Results:   No Growth Final (05-23 @ 19:19)  Culture Results:   Few Enterococcus faecalis (05-21 @ 12:00)      RADIOLOGY & ADDITIONAL TESTS:      ASSESSMENT:  45y Male s/p    SUBDURAL HEMATOMA;RESPIRATORY FAILURE;FALL    ^FALL    No pertinent family history in first degree relatives    Family history of essential hypertension (Father)    Handoff    MEWS Score    No pertinent past medical history    Alcohol abuse    GERD (gastroesophageal reflux disease)    ETOH abuse    Hypomagnesemia    Seizure disorder    Subdural hematoma    S/P subdural hematoma evacuation    Subdural hematoma    Subdural hematoma    Subdural hematoma    Respiratory failure    ETOH abuse    Palliative care by specialist    Insertion of non-tunneled central venous catheter (CVC) in patient age 5 years of age or older    Craniotomy, decompressive    Decompressive craniotomy    Placement, tracheostomy and percutaneous endoscopic gastrostomy    No significant past surgical history    H/O hemorrhoidectomy    FALL    23    Respiratory failure    Fall    SysAdmin_VisitLink        PLAN:  Continue current mgmt POD#22/12    S/P Left Craniectomy for evac of SDH  S/P Wound Revision    Pt seen and examined at bedside. Pt awake, alert. Off vent    Vital Signs Last 24 Hrs  T(C): 36.6 (29 May 2021 06:00), Max: 38.4 (28 May 2021 09:36)  T(F): 97.8 (29 May 2021 06:00), Max: 101.1 (28 May 2021 09:36)  HR: 85 (29 May 2021 07:00) (77 - 118)  BP: 130/83 (29 May 2021 07:00) (110/61 - 146/89)  BP(mean): 102 (29 May 2021 07:00) (78 - 112)  RR: 20 (29 May 2021 07:00) (16 - 28)  SpO2: 100% (29 May 2021 07:00) (99% - 100%)    I&O's Detail    28 May 2021 07:01  -  29 May 2021 07:00  --------------------------------------------------------  IN:    Enteral Tube Flush: 50 mL    IV PiggyBack: 500 mL    IV PiggyBack: 100 mL    IV PiggyBack: 100 mL    IV PiggyBack: 700 mL    Oral Fluid: 30 mL    Osmolite: 950 mL    sodium chloride 3%: 1150 mL  Total IN: 3580 mL    OUT:    Voided (mL): 3180 mL  Total OUT: 3180 mL    Total NET: 400 mL        I&O's Summary    28 May 2021 07:01  -  29 May 2021 07:00  --------------------------------------------------------  IN: 3580 mL / OUT: 3180 mL / NET: 400 mL        REVIEW OF SYSTEMS    [ ] A ten-point review of systems was otherwise negative except as noted.  [X] Due to altered mental status/intubation, subjective information were not able to be obtained from the patient. History was obtained, to the extent possible, from review of the chart and collateral sources of information.      PHYSICAL EXAM:  Neurological:  Pupils reactive  Tracks  Moves left side spontaneously  Min mvmt of R side to pain  Left Craniectomy defect full. No drainage    LABS:                        8.2    6.85  )-----------( 351      ( 29 May 2021 04:18 )             24.8     05-29    134<L>  |  102  |  6<L>  ----------------------------<  145<H>  3.7   |  23  |  <0.5<L>    Ca    8.0<L>      29 May 2021 04:18  Phos  3.4     05-29  Mg     2.3     05-29    MEDICATIONS:  Antibiotics:  meropenem  IVPB      meropenem  IVPB 2000 milliGRAM(s) IV Intermittent every 8 hours  vancomycin  IVPB 1500 milliGRAM(s) IV Intermittent every 8 hours    Neuro:  acetaminophen   Tablet .. 650 milliGRAM(s) Oral every 6 hours PRN  levETIRAcetam  IVPB 500 milliGRAM(s) IV Intermittent every 12 hours      IVF:  potassium chloride  20 mEq/100 mL IVPB 20 milliEquivalent(s) IV Intermittent every 1 hour  sodium chloride 6 Gram(s) Oral <User Schedule>  sodium chloride 3%. 500 milliLiter(s) IV Continuous <Continuous>      CULTURES:  Culture Results:   No Growth Final (05-23 @ 19:19)  Culture Results:   Few Enterococcus faecalis (05-21 @ 12:00)      RADIOLOGY & ADDITIONAL TESTS:  < from: MR Head w/wo IV Cont (05.28.21 @ 20:10) >  1.9 cm enhancing soft tissue with internal mineralization along the anterior margin of the left frontal craniectomy reflect granulation tissue.    Diffuse thickening/enhancement of the bilateral pachymeninges, nonspecific but can be seen in setting of postop settings, intracranial hypotension, or meningitis. Recommend clinical correlation.    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 Redemonstrated subdural hematomas along the right cerebral and cerebellar convexities.    Evolving subacute infarcts in the medial left frontoparietal region, left insular cortex, and right cerebellum with associated gyral enhancements.    Peripherally enhancing evolving hemorrhagic contusion/hematoma in the left temporal occipital regions with scattered petechial gyral hemorrhage/enhancement.Moderate surrounding edema.    < end of copied text >      ASSESSMENT:  45y Male s/p    SUBDURAL HEMATOMA;RESPIRATORY FAILURE;FALL    ^FALL    No pertinent family history in first degree relatives    Family history of essential hypertension (Father)    Handoff    MEWS Score    No pertinent past medical history    Alcohol abuse    GERD (gastroesophageal reflux disease)    ETOH abuse    Hypomagnesemia    Seizure disorder    Subdural hematoma    S/P subdural hematoma evacuation    Subdural hematoma    Subdural hematoma    Subdural hematoma    Respiratory failure    ETOH abuse    Palliative care by specialist    Insertion of non-tunneled central venous catheter (CVC) in patient age 5 years of age or older    Craniotomy, decompressive    Decompressive craniotomy    Placement, tracheostomy and percutaneous endoscopic gastrostomy    No significant past surgical history    H/O hemorrhoidectomy    FALL    23    Respiratory failure    Fall    SysAdmin_VisitLink        PLAN:  Continue current mgmt  ID clearance for possible VPShunt this week

## 2021-05-29 NOTE — PROCEDURE NOTE - ADDITIONAL PROCEDURE DETAILS
Tracheostomy:  Patient pre-oxygenated with 100% FiO2, bronchoscope used to cannulate Endotracheal tube revealing copious mucus. Normal saline was then used to lavage the tube and visualize the trachea. Using safetrac technique, a needle was used to enter the trachea under direct visualization. Once the needle was seen within the trachea, a guidewire was then placed through the needle and the tract was dilated sequentially. A size 8 Tracheostomy was then placed over the guidewire. The endotracheal tube was removed. The ventilator was then connected to the tracheostomy, the balloon was inflated.  The patients saturation then improved from 92%--> 100%. The tracheostomy was then secured with 4 2-0 Nylon sutures      Gastrostomy:  An upper GI endoscope was then used to enter and insufflate the stomach. Using safetrac technique, the needle was placed through the area of transillumination, and seen to directly enter the stomach at exactly the same time air was able to be aspirated. The snare was then used to grasp the guidewire which was removed along with the scope retrograde through the oral cavity. The Gastrostomy tube was then secured to the guidewire and then brought anterograde through the oral cavity with the endoscope and into the stomach. The bumper was noted to be against the stomach wall and able to spin freely. The snare was removed and the stomach desufflated. The endoscope was then removed without difficulty.    Patient tolerated both procedures well.
existing TLC (damaged due to complication of transfer during MRI) replaced with a new TLC using guidewire
non pulsatile, collapsible vessel

## 2021-05-29 NOTE — PROGRESS NOTE ADULT - SUBJECTIVE AND OBJECTIVE BOX
SANDRA DAS  [id number]  45y Male    Indication for ICU admission: s/p craniectomy for large L SDH  Admit Date:05-06-21  ICU Date: 05-07-21  OR Date: 05-06-21    No Known Allergies    PAST MEDICAL & SURGICAL HISTORY:  Alcohol abuse  GERD (gastroesophageal reflux disease)  ETOH abuse  Hypomagnesemia  Seizure disorder  H/O hemorrhoidectomy    Home Medications:  NONE    24HRS EVENT:  5/28  NIGHT  - at MRI, brown port of TLC broken --> will exchange TLC  -Na 136- no change in rate of hypertonick sodium    Day  - MRI of brain to determine pseudomeningocele vs abscess  NSX to review  - vanc trough 19  - inc salt tabs to 6q4  - f/u SW      DVT PTX: LVX  GI PTX: PPI    ***Tubes/Lines/Drains  ***  Peripheral IV  Left basilic midline 5/17  Arterial Line Right radial	Date 5/17  L IJ TLC     5/29  Trach/peg 5/21    REVIEW OF SYSTEMS  [ ] A ten-point review of systems was otherwise negative except as noted.  [x] Due to altered mental status/intubation, subjective information were not able to be obtained from the patient. History was obtained, to the extent possible, from review of the chart and collateral sources of information.     SANDRA DAS  [id number]  45y Male    Indication for ICU admission: s/p craniectomy for large L SDH  Admit Date:05-06-21  ICU Date: 05-07-21  OR Date: 05-06-21    No Known Allergies    PAST MEDICAL & SURGICAL HISTORY:  Alcohol abuse  GERD (gastroesophageal reflux disease)  ETOH abuse  Hypomagnesemia  Seizure disorder  H/O hemorrhoidectomy    Home Medications:  NONE    24HRS EVENT:  5/28  NIGHT  - at MRI, brown port of TLC broken --> will exchange TLC  -Na 136- no change in rate of hypertonic sodium    Day  - MRI of brain to determine pseudomeningocele vs abscess  NSX to review  - vanc trough 19  - inc salt tabs to 6q4  - f/u SW      DVT PTX: LVX  GI PTX: PPI    ***Tubes/Lines/Drains  ***  Peripheral IV  Left basilic midline 5/17  Arterial Line Right radial	Date 5/17  L IJ TLC     5/29  Trach/peg 5/21    REVIEW OF SYSTEMS  [ ] A ten-point review of systems was otherwise negative except as noted.  [x] Due to altered mental status/intubation, subjective information were not able to be obtained from the patient. History was obtained, to the extent possible, from review of the chart and collateral sources of information.

## 2021-05-30 LAB
ANION GAP SERPL CALC-SCNC: 7 MMOL/L — SIGNIFICANT CHANGE UP (ref 7–14)
ANION GAP SERPL CALC-SCNC: 8 MMOL/L — SIGNIFICANT CHANGE UP (ref 7–14)
ANION GAP SERPL CALC-SCNC: 9 MMOL/L — SIGNIFICANT CHANGE UP (ref 7–14)
BUN SERPL-MCNC: 5 MG/DL — LOW (ref 10–20)
BUN SERPL-MCNC: 5 MG/DL — LOW (ref 10–20)
BUN SERPL-MCNC: 6 MG/DL — LOW (ref 10–20)
CALCIUM SERPL-MCNC: 8.1 MG/DL — LOW (ref 8.5–10.1)
CALCIUM SERPL-MCNC: 8.2 MG/DL — LOW (ref 8.5–10.1)
CALCIUM SERPL-MCNC: 8.5 MG/DL — SIGNIFICANT CHANGE UP (ref 8.5–10.1)
CHLORIDE SERPL-SCNC: 102 MMOL/L — SIGNIFICANT CHANGE UP (ref 98–110)
CHLORIDE SERPL-SCNC: 107 MMOL/L — SIGNIFICANT CHANGE UP (ref 98–110)
CHLORIDE SERPL-SCNC: 110 MMOL/L — SIGNIFICANT CHANGE UP (ref 98–110)
CO2 SERPL-SCNC: 23 MMOL/L — SIGNIFICANT CHANGE UP (ref 17–32)
CO2 SERPL-SCNC: 24 MMOL/L — SIGNIFICANT CHANGE UP (ref 17–32)
CO2 SERPL-SCNC: 26 MMOL/L — SIGNIFICANT CHANGE UP (ref 17–32)
CREAT SERPL-MCNC: <0.5 MG/DL — LOW (ref 0.7–1.5)
GLUCOSE BLDC GLUCOMTR-MCNC: 116 MG/DL — HIGH (ref 70–99)
GLUCOSE BLDC GLUCOMTR-MCNC: 135 MG/DL — HIGH (ref 70–99)
GLUCOSE BLDC GLUCOMTR-MCNC: 145 MG/DL — HIGH (ref 70–99)
GLUCOSE BLDC GLUCOMTR-MCNC: 194 MG/DL — HIGH (ref 70–99)
GLUCOSE BLDC GLUCOMTR-MCNC: 199 MG/DL — HIGH (ref 70–99)
GLUCOSE SERPL-MCNC: 138 MG/DL — HIGH (ref 70–99)
GLUCOSE SERPL-MCNC: 183 MG/DL — HIGH (ref 70–99)
GLUCOSE SERPL-MCNC: 193 MG/DL — HIGH (ref 70–99)
MAGNESIUM SERPL-MCNC: 1.7 MG/DL — LOW (ref 1.8–2.4)
OSMOLALITY SERPL: 281 MOS/KG — SIGNIFICANT CHANGE UP (ref 275–300)
OSMOLALITY SERPL: 284 MOS/KG — SIGNIFICANT CHANGE UP (ref 275–300)
OSMOLALITY SERPL: 293 MOS/KG — SIGNIFICANT CHANGE UP (ref 275–300)
PHOSPHATE SERPL-MCNC: 2.9 MG/DL — SIGNIFICANT CHANGE UP (ref 2.1–4.9)
POTASSIUM SERPL-MCNC: 3.8 MMOL/L — SIGNIFICANT CHANGE UP (ref 3.5–5)
POTASSIUM SERPL-MCNC: 4 MMOL/L — SIGNIFICANT CHANGE UP (ref 3.5–5)
POTASSIUM SERPL-MCNC: 4.3 MMOL/L — SIGNIFICANT CHANGE UP (ref 3.5–5)
POTASSIUM SERPL-SCNC: 3.8 MMOL/L — SIGNIFICANT CHANGE UP (ref 3.5–5)
POTASSIUM SERPL-SCNC: 4 MMOL/L — SIGNIFICANT CHANGE UP (ref 3.5–5)
POTASSIUM SERPL-SCNC: 4.3 MMOL/L — SIGNIFICANT CHANGE UP (ref 3.5–5)
SODIUM SERPL-SCNC: 135 MMOL/L — SIGNIFICANT CHANGE UP (ref 135–146)
SODIUM SERPL-SCNC: 139 MMOL/L — SIGNIFICANT CHANGE UP (ref 135–146)
SODIUM SERPL-SCNC: 142 MMOL/L — SIGNIFICANT CHANGE UP (ref 135–146)
VANCOMYCIN FLD-MCNC: 6.7 UG/ML — SIGNIFICANT CHANGE UP (ref 5–10)

## 2021-05-30 PROCEDURE — 71045 X-RAY EXAM CHEST 1 VIEW: CPT | Mod: 26

## 2021-05-30 PROCEDURE — 99291 CRITICAL CARE FIRST HOUR: CPT | Mod: 25

## 2021-05-30 RX ORDER — SODIUM CHLORIDE 9 MG/ML
250 INJECTION INTRAMUSCULAR; INTRAVENOUS; SUBCUTANEOUS ONCE
Refills: 0 | Status: COMPLETED | OUTPATIENT
Start: 2021-05-30 | End: 2021-05-30

## 2021-05-30 RX ORDER — MAGNESIUM SULFATE 500 MG/ML
1 VIAL (ML) INJECTION ONCE
Refills: 0 | Status: COMPLETED | OUTPATIENT
Start: 2021-05-30 | End: 2021-05-30

## 2021-05-30 RX ORDER — VANCOMYCIN HCL 1 G
1500 VIAL (EA) INTRAVENOUS EVERY 8 HOURS
Refills: 0 | Status: DISCONTINUED | OUTPATIENT
Start: 2021-05-30 | End: 2021-06-04

## 2021-05-30 RX ORDER — VANCOMYCIN HCL 1 G
1500 VIAL (EA) INTRAVENOUS ONCE
Refills: 0 | Status: COMPLETED | OUTPATIENT
Start: 2021-05-30 | End: 2021-05-30

## 2021-05-30 RX ORDER — VANCOMYCIN HCL 1 G
VIAL (EA) INTRAVENOUS
Refills: 0 | Status: DISCONTINUED | OUTPATIENT
Start: 2021-05-30 | End: 2021-06-04

## 2021-05-30 RX ADMIN — SODIUM CHLORIDE 3000 MILLILITER(S): 9 INJECTION INTRAMUSCULAR; INTRAVENOUS; SUBCUTANEOUS at 22:48

## 2021-05-30 RX ADMIN — SODIUM CHLORIDE 8 GRAM(S): 9 INJECTION INTRAMUSCULAR; INTRAVENOUS; SUBCUTANEOUS at 21:48

## 2021-05-30 RX ADMIN — Medication 300 MILLIGRAM(S): at 21:54

## 2021-05-30 RX ADMIN — SODIUM CHLORIDE 8 GRAM(S): 9 INJECTION INTRAMUSCULAR; INTRAVENOUS; SUBCUTANEOUS at 02:43

## 2021-05-30 RX ADMIN — Medication 300 MILLIGRAM(S): at 14:20

## 2021-05-30 RX ADMIN — SODIUM CHLORIDE 45 MILLILITER(S): 5 INJECTION, SOLUTION INTRAVENOUS at 10:00

## 2021-05-30 RX ADMIN — SODIUM CHLORIDE 8 GRAM(S): 9 INJECTION INTRAMUSCULAR; INTRAVENOUS; SUBCUTANEOUS at 05:51

## 2021-05-30 RX ADMIN — MEROPENEM 200 MILLIGRAM(S): 1 INJECTION INTRAVENOUS at 21:45

## 2021-05-30 RX ADMIN — SODIUM CHLORIDE 8 GRAM(S): 9 INJECTION INTRAMUSCULAR; INTRAVENOUS; SUBCUTANEOUS at 14:21

## 2021-05-30 RX ADMIN — Medication 2 DROP(S): at 21:45

## 2021-05-30 RX ADMIN — SODIUM CHLORIDE 8 GRAM(S): 9 INJECTION INTRAMUSCULAR; INTRAVENOUS; SUBCUTANEOUS at 17:58

## 2021-05-30 RX ADMIN — LEVETIRACETAM 420 MILLIGRAM(S): 250 TABLET, FILM COATED ORAL at 17:59

## 2021-05-30 RX ADMIN — SENNA PLUS 2 TABLET(S): 8.6 TABLET ORAL at 21:47

## 2021-05-30 RX ADMIN — MEROPENEM 200 MILLIGRAM(S): 1 INJECTION INTRAVENOUS at 05:53

## 2021-05-30 RX ADMIN — Medication 2: at 17:58

## 2021-05-30 RX ADMIN — MEROPENEM 200 MILLIGRAM(S): 1 INJECTION INTRAVENOUS at 14:20

## 2021-05-30 RX ADMIN — Medication 100 GRAM(S): at 00:14

## 2021-05-30 RX ADMIN — SODIUM CHLORIDE 3000 MILLILITER(S): 9 INJECTION INTRAMUSCULAR; INTRAVENOUS; SUBCUTANEOUS at 23:25

## 2021-05-30 RX ADMIN — ENOXAPARIN SODIUM 40 MILLIGRAM(S): 100 INJECTION SUBCUTANEOUS at 17:59

## 2021-05-30 RX ADMIN — LEVETIRACETAM 420 MILLIGRAM(S): 250 TABLET, FILM COATED ORAL at 05:53

## 2021-05-30 RX ADMIN — Medication 2 DROP(S): at 05:52

## 2021-05-30 RX ADMIN — CHLORHEXIDINE GLUCONATE 15 MILLILITER(S): 213 SOLUTION TOPICAL at 05:52

## 2021-05-30 RX ADMIN — PANTOPRAZOLE SODIUM 40 MILLIGRAM(S): 20 TABLET, DELAYED RELEASE ORAL at 11:39

## 2021-05-30 RX ADMIN — CHLORHEXIDINE GLUCONATE 1 APPLICATION(S): 213 SOLUTION TOPICAL at 05:52

## 2021-05-30 RX ADMIN — SODIUM CHLORIDE 50 MILLILITER(S): 5 INJECTION, SOLUTION INTRAVENOUS at 05:53

## 2021-05-30 RX ADMIN — Medication 2 DROP(S): at 14:20

## 2021-05-30 RX ADMIN — CHLORHEXIDINE GLUCONATE 15 MILLILITER(S): 213 SOLUTION TOPICAL at 17:58

## 2021-05-30 RX ADMIN — SODIUM CHLORIDE 8 GRAM(S): 9 INJECTION INTRAMUSCULAR; INTRAVENOUS; SUBCUTANEOUS at 11:38

## 2021-05-30 RX ADMIN — LISINOPRIL 20 MILLIGRAM(S): 2.5 TABLET ORAL at 05:52

## 2021-05-30 NOTE — PROGRESS NOTE ADULT - ASSESSMENT
Assessment & Plan  45M s/p fall with large left SDH and neurological status change, level 1 for emergent craniotomy    NEURO:    Hx of EtOH abuse and prior Delerium Tremens episodes    S/P craniectomy for large left SDH     - Q2H neuro checks, No sedation, GCS 11T     - Keppra 500mg BID     - 3% @ 50cc -- Na goal 135 ---> begin to wean      - Nacl increased to 8gm q4        Na 136->134 ->131 ->132    Head of bed @ 30 degrees    Helmet delivered --> does not need while laying in bed only during PT/OT     F/U MRI       OR 5/16:     -Revision craniectomy wound with complex closure for persistent drainage, subgaleal hematoma   Imaging:     HCT 5/16-stable post op changes    CT Venogram-left transverse sinus thrombosis, no intervention currently    5/23 NSX placed KIKE under the skin to gravity, old blood aspirated and some pus,  --> Drain DCed 5/27    -e. faecalis in cx --> ID following, on Vacomycoin       Repeat CT head 5/26- - there is slight increased thickness of a mixed density extra-axial collection within the craniectomy defect.. 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    RESP:   s/p trach 5/21-on Tpiece since 5/24  Sutures removed 5/27      CARDS:     S/P cardiac arrest in OR & MTP     - Hypertension - on Lisinopril 20mg     - Propranolol 20mg q8    GI/NUTR:     Diet: TF (Osmolite 1.5) @ 50 via PEG    GI Prophylaxis- PPI    Bowel regimen restarted - Senna     BM 5/28          /RENAL:   Monitor UO- condom cath in place    Condom cath, + voiding.     Q6 BMP for hypertonic saline     - Hyponatremic - Na goal 135 per neurosurgery     - last Na 137     - 3% @ 50       - Salt tabs incr 8g q4    HEME/ONC:     Labs: Hb/Hct:  9.2/27                    Plts:  308  -->,  338  -->   273   Hg >8, MTP on this admission  DVT prophylaxis: Lovenox 40  5/26 DVT sono neg      ID:  WBC- 5.03  Tmax  99.8  Antibiotics-meropenem  IVPB 2000 every 8 hours  vancomycin  IVPB 1500 every 8 hours  - Trough 21 on 5/29, PM dose held- recheck level at 11am     - 5/11- u/a +leuk     - blood culture 5/13, 5/19, 5/20, 5/23 No growth      -Cranial Culture 5/21-e faceium     - Reculture if spikes    ENDO:  q6 POC glucose  Sliding scale insulin  A1c 5.2    DISPO: SICU    Assessment & Plan  45M s/p fall with large left SDH and neurological status change, level 1 for emergent craniectomy    NEURO:    Hx of EtOH abuse and prior Delerium Tremens episodes    S/P craniectomy for large left SDH     - Q2H neuro checks, No sedation, GCS 11T     - Keppra 500mg BID     - 3% @ 50cc -- Na goal 135 ---> begin to wean      - Nacl increased to 8gm q4        Na 136->134 ->131 ->132    Head of bed @ 30 degrees    Helmet delivered --> does not need while laying in bed only during PT/OT           OR 5/16:     -Revision craniectomy wound with complex closure for persistent drainage, subgaleal hematoma   Imaging:     HCT 5/16-stable post op changes    CT Venogram-left transverse sinus thrombosis, no intervention currently    5/23 NSX placed KIKE under the skin to gravity, old blood aspirated and some pus,  --> Drain DCed 5/27    -e. faecalis in cx --> ID following, on Vancomycin     Repeat CT head 5/26- - there is slight increased thickness of a mixed density extra-axial collection within the craniectomy defect.. 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    RESP:   s/p trach 5/21-on T-piece since 5/24  Sutures removed 5/27      CARDS:     S/P cardiac arrest in OR & MTP     - Hypertension - on Lisinopril 20mg     - Propranolol 20mg q8    GI/NUTR:     Diet: TF (Osmolite 1.5) @ 50 via PEG    Bumper at 4 cm    GI Prophylaxis- PPI    Bowel regimen restarted - Senna    last BM 5/28          /RENAL:   Monitor UO- condom cath in place    Condom cath, + voiding. 4.9 L x 24 hrs    Q6 BMP for hypertonic saline     - Hyponatremic - Na goal 135 per neurosurgery     - last Na 137 from 131     - 3% @ 50       - Salt tabs incr 8g q4    HEME/ONC:     Labs: Hb/Hct:  9.2/27                    Plts:  308  -->,  338  -->   273   Hg >8, MTP on this admission  DVT prophylaxis: Lovenox 40  5/26 DVT sono neg      ID:  WBC- 5.03  Tmax  99.8  Antibiotics-meropenem  IVPB 2000 every 8 hours  vancomycin  IVPB 1500 every 8 hours  - Trough 21 on 5/29, PM dose held- recheck level at 11am     - 5/11- u/a +leuk     - blood culture 5/13, 5/19, 5/20, 5/23 No growth      -Cranial Culture 5/21-e faceium     - Reculture if spikes    ENDO:  q6 POC glucose  Sliding scale insulin  A1c 5.2    DISPO: SICU

## 2021-05-30 NOTE — PROGRESS NOTE ADULT - SUBJECTIVE AND OBJECTIVE BOX
POD#23/13    S/P Left Craniectomy for evac of SDH  S/P Wound Revision      Pt seen and examined at bedside pt is awake , doesn't follow commands , off vent  on CPAP     Vital Signs Last 24 Hrs  T(C): 37.4 (30 May 2021 07:14), Max: 37.7 (29 May 2021 15:20)  T(F): 99.4 (30 May 2021 07:14), Max: 99.8 (29 May 2021 15:20)  HR: 93 (30 May 2021 09:00) (81 - 101)  BP: 131/84 (30 May 2021 09:00) (118/78 - 136/74)  BP(mean): 103 (30 May 2021 09:00) (93 - 109)  RR: 20 (30 May 2021 09:13) (8 - 25)  SpO2: 100% (30 May 2021 09:13) (100% - 100%)    I&O's Detail    29 May 2021 07:01  -  30 May 2021 07:00  --------------------------------------------------------  IN:    IV PiggyBack: 200 mL    IV PiggyBack: 500 mL    IV PiggyBack: 100 mL    IV PiggyBack: 300 mL    Oral Fluid: 150 mL    Osmolite: 1200 mL    sodium chloride 3%: 1200 mL  Total IN: 3650 mL    OUT:    Voided (mL): 4925 mL  Total OUT: 4925 mL    Total NET: -1275 mL      30 May 2021 07:01  -  30 May 2021 09:27  --------------------------------------------------------  IN:    Osmolite: 50 mL    sodium chloride 3%: 50 mL  Total IN: 100 mL    OUT:    Voided (mL): 250 mL  Total OUT: 250 mL    Total NET: -150 mL        I&O's Summary    29 May 2021 07:01  -  30 May 2021 07:00  --------------------------------------------------------  IN: 3650 mL / OUT: 4925 mL / NET: -1275 mL    30 May 2021 07:01  -  30 May 2021 09:27  --------------------------------------------------------  IN: 100 mL / OUT: 250 mL / NET: -150 mL        PHYSICAL EXAM:        Awake , PERRL   tracks   Squeezes hand on the left   moves LLE spontaneously   minimal withdrawal to RUE & RLE to noxious stimuli   Head incision clean dry intact            Left craniectomy site defect full       LABS:                        9.2    5.03  )-----------( 273      ( 29 May 2021 23:05 )             27.0     05-29    137  |  104  |  5<L>  ----------------------------<  117<H>  4.2   |  25  |  <0.5<L>    Ca    8.1<L>      29 May 2021 23:05  Phos  3.2     05-29  Mg     1.9     05-29              CAPILLARY BLOOD GLUCOSE      POCT Blood Glucose.: 116 mg/dL (30 May 2021 06:51)  POCT Blood Glucose.: 122 mg/dL (29 May 2021 23:02)  POCT Blood Glucose.: 139 mg/dL (29 May 2021 17:47)  POCT Blood Glucose.: 125 mg/dL (29 May 2021 11:51)      Drug Levels: [] N/A  Vancomycin Level, Trough: 21.7 ug/mL (05-29 @ 11:20)  Vancomycin Level, Trough: 19.0 ug/mL (05-28 @ 11:00)  Vancomycin Level, Trough: 9.0 ug/mL (05-27 @ 05:50)    CSF Analysis: [] N/A      Allergies    No Known Allergies    Intolerances      MEDICATIONS:  Antibiotics:  meropenem  IVPB      meropenem  IVPB 2000 milliGRAM(s) IV Intermittent every 8 hours    Neuro:  acetaminophen   Tablet .. 650 milliGRAM(s) Oral every 6 hours PRN  levETIRAcetam  IVPB 500 milliGRAM(s) IV Intermittent every 12 hours    Anticoagulation:  enoxaparin Injectable 40 milliGRAM(s) SubCutaneous every 24 hours    OTHER:  artificial tears (preservative free) Ophthalmic Solution 2 Drop(s) Both EYES three times a day  chlorhexidine 0.12% Liquid 15 milliLiter(s) Oral Mucosa two times a day  chlorhexidine 4% Liquid 1 Application(s) Topical <User Schedule>  insulin lispro (ADMELOG) corrective regimen sliding scale   SubCutaneous every 6 hours  lisinopril 20 milliGRAM(s) Oral daily  pantoprazole   Suspension 40 milliGRAM(s) Oral daily  propranolol 20 milliGRAM(s) Oral every 8 hours  senna 2 Tablet(s) Oral at bedtime    IVF:  sodium chloride 8 Gram(s) Oral <User Schedule>  sodium chloride 3%. 500 milliLiter(s) IV Continuous <Continuous>    CULTURES:  Culture Results:   No growth to date. (05-28 @ 00:15)  Culture Results:   No growth to date. (05-28 @ 00:15)    RADIOLOGY & ADDITIONAL TESTS:      ASSESSMENT:  45y Male s/p    SUBDURAL HEMATOMA;RESPIRATORY FAILURE;FALL    ^FALL    No pertinent family history in first degree relatives    Family history of essential hypertension (Father)    Handoff    MEWS Score    No pertinent past medical history    Alcohol abuse    GERD (gastroesophageal reflux disease)    ETOH abuse    Hypomagnesemia    Seizure disorder    Subdural hematoma    S/P subdural hematoma evacuation    Subdural hematoma    Subdural hematoma    Subdural hematoma    Respiratory failure    ETOH abuse    Palliative care by specialist    Insertion of non-tunneled central venous catheter (CVC) in patient age 5 years of age or older    Craniotomy, decompressive    Decompressive craniotomy    Placement, tracheostomy and percutaneous endoscopic gastrostomy    No significant past surgical history    H/O hemorrhoidectomy    FALL    22    Respiratory failure    Fall    [email or web address]        A/p          S/P Left Craniectomy for evac of SDH         S/p Wound revision                 neuro checks                 ID clearance for  shunt placement                 Continue present care

## 2021-05-30 NOTE — PROGRESS NOTE ADULT - ATTENDING COMMENTS
Critical Care: [id number]   This patient has a high probability of sudden, clinically significant deterioration, which requires the highest level of physician preparedness to intervene urgently. I managed/supervised life or organ supporting interventions that required frequent physician assessment. I devoted my full attention in the ICU to the direct care of this patient for the period of time indicated below. Time I spent with the family or surrogate(s) is included only if the patient was incapable of providing the necessary information or participating in medical decision making.   Time devoted to teaching and to any procedures I billed separately is not included.     ANIA DASGORZ 45y Male admitted to [x ] SICU / [ ] SDU with intracranial hemorrhage SDH S/P craniectomy with post-OP respiratory failure, now tolerating trach caller 24 hrs+, tolerates tube feeds at goal, continue require 3% NaCl  Patient is examined and evaluated at the bedside with SICU team. Treatment plan discussed with SICU team, nurses and primary team.   Chest X-ray and all relevant studies reviewed during rounds.  Will continue hemodynamic monitoring as per protocol in SICU.    Neuro:  GCS [11T ]   [x ]  Neuro checks as per SICU protocol  [x ] 3% NaCl  -> wean to the goal of Na 135-145, continue to increase salt tabs  Left cranial drain D/C  Paralysis [ ] Yes  [ x]  No  Sedated/Pain control with                 [ ] Dilaudid drip, [ ]  Ketamine, [ ] Fentanyl, [ ] Propofol, [ ] Precedex, [ ] Versed, [ ] Ativan,                           [x ] Tylenol, [ ] Gabapentin, [ ] OxyContin standing,  [ ] OxyContin PRN,  [ ] Dilaudid PRN pushes, [x ] Fentanyl PRN pushes,  [ ]  PCA               [ ] Seroquel, [ ] Haldol, [ ] Zyprexa,  [ ] Clonazepam [ ] Xanax, [ ] Versed/Ativan PRN,  [ x]None    CV: mildly tachycardic at times, on propranolol for TBI with goal of been normotensive   On pressors [ ]  Yes  [ x]  No          [ ]  Levophed, [ ] Sy-Synephrine, [ ] Vasopressin, [ ]  Epinephrine          [ ] Dobutamine, [ ] Milrinone, [ ]  Midodrine,  [ ] Others       Respiratory: Acute respiratory insufficiency ->continue tach caller                        None Invasive Support  [ ] Incentive Spirometer                                  [ ] BiPAP   [ ] CPAP   [ ] HFNC   [ ] NR Face Mask   [ ] NC  [x]  Trach Caller                         Ventilatory support  [ ] Yes [x ] No   [ ] SBT                              [ ] PC    [ ] VC   [ ] AC   [ ] BiVent/APRV   [ ]CPAP     GI  [x ]  bowel regiment   [x] BM  Nutrition: continue    [ ]   Diet   [ ] TPN/PPN     [x ]  Tube Feeds   @ 55 ml/hr    Renal: Continue I&Os monitoring, Cole catheter  [ x] Yes    [ ]   No ,  [x ]Consideration for discontinuation  Lytes/Acid-base: replete hypokalemia, hypomagnesemia, hypocalcemia, hypophosphatemia     ID: leukocytosis -> continue to monitor:         IV Abx [x ] Yes, [ ] No;  ID consulted [x ] Yes, [ ] No        Cultures [ ] Respiratory     [ ] Blood     [ ] Urine    [x ] Fluids  CSF [ ]  None    Lines:   [ ] Right   [x ] Left                      [ ] Subclavian TLC        [ ]  Internal Jugular TLC     [ ]  Femoral TLC                     [ ] Subclavian Cordis    [ ] Internal Jugular Cordis   [ ] Femoral Cordis                     [ ] HD catheter                     [ ] PICC     [x ]  Midline   [ ] Peripheral IVs                                  [x ] Right   [ ] Left                     [ x] Radial A-Line           [ ] Femoral A-Line            [ ] Axillary A-Line                  Heme: continue to evaluate for acute blood loss anemia- trend Hg/Hct                     Transfused as indicated     [ ] PRBCs   [ ] Platelets   [ ] FFPs   [ ] Cryoprecipitate                     [ ] Lovenox  [ ] Heparin drip  [ ] NOVACs  [ ] ASA  [ ] Antiplatelets   Endocrine: prevent and treat hyperglycemia with insulin as needed,           Insuline drip [ ] Yes, [ x] No     PV: follow pulse exam    Skin: decub precautions    DVT Prophylaxis:  [ x] SCDs  [ ] Heparin SQ  [ x] Lovenox  SQ    Stress Gastritis Prophylaxis: PPI/H2 Blockers if indicated    Mobility: patient is evaluated at the bedside with mobility team and the goals for today are discussed with PT [ x] out of bed to chair with helmet     PATIENT/FAMILY/SURROGATE CONFERENCE:   PURPOSE: To obtain necessary information, To discuss treatment options  under consideration today.  I saw and evaluated the patient personally. I have reviewed and agree with note above. Treatment plan discussed with SICU team, nurses and primary team at the time of the multidisciplinary rounds. The above note is NOT written at the time of rounds and will reflect all changes throughout management of the patient for the day note is written for.    Jenny Allen MD, FACS  Trauma/ACS/SCC Attending

## 2021-05-30 NOTE — PROGRESS NOTE ADULT - SUBJECTIVE AND OBJECTIVE BOX
SANDRA DAS  [id number]  45y Male    Indication for ICU admission: s/p craniectomy for large L SDH  Admit Date:05-06-21  ICU Date: 05-07-21  OR Date: 05-06-21    No Known Allergies    PAST MEDICAL & SURGICAL HISTORY:  Alcohol abuse  GERD (gastroesophageal reflux disease)  ETOH abuse  Hypomagnesemia  Seizure disorder  H/O hemorrhoidectomy    Home Medications:  NONE    24HRS EVENT:    5/29  -increase salt tab 8g q4  -Vanco tr @ 11am = 21.7 --> received 1pm dose -- holding --> repeat level tomorrow 11am  -BMP, osm q6h  -f/u MRI results with nsx  -possible  shunt next week - will need ID for clearance      DVT PTX: LVX  GI PTX: PPI    ***Tubes/Lines/Drains  ***  Peripheral IV  Left basilic midline 5/17  Arterial Line Right radial	Date 5/17  L IJ TLC     5/29  Trach/peg 5/21    REVIEW OF SYSTEMS  [ ] A ten-point review of systems was otherwise negative except as noted.  [x] Due to altered mental status/intubation, subjective information were not able to be obtained from the patient. History was obtained, to the extent possible, from review of the chart and collateral sources of information.

## 2021-05-31 LAB
ANION GAP SERPL CALC-SCNC: 7 MMOL/L — SIGNIFICANT CHANGE UP (ref 7–14)
ANION GAP SERPL CALC-SCNC: 9 MMOL/L — SIGNIFICANT CHANGE UP (ref 7–14)
BASOPHILS # BLD AUTO: 0.01 K/UL — SIGNIFICANT CHANGE UP (ref 0–0.2)
BASOPHILS # BLD AUTO: 0.01 K/UL — SIGNIFICANT CHANGE UP (ref 0–0.2)
BASOPHILS NFR BLD AUTO: 0.2 % — SIGNIFICANT CHANGE UP (ref 0–1)
BASOPHILS NFR BLD AUTO: 0.2 % — SIGNIFICANT CHANGE UP (ref 0–1)
BUN SERPL-MCNC: 4 MG/DL — LOW (ref 10–20)
BUN SERPL-MCNC: 5 MG/DL — LOW (ref 10–20)
CALCIUM SERPL-MCNC: 8 MG/DL — LOW (ref 8.5–10.1)
CALCIUM SERPL-MCNC: 8.4 MG/DL — LOW (ref 8.5–10.1)
CHLORIDE SERPL-SCNC: 110 MMOL/L — SIGNIFICANT CHANGE UP (ref 98–110)
CHLORIDE SERPL-SCNC: 122 MMOL/L — HIGH (ref 98–110)
CO2 SERPL-SCNC: 24 MMOL/L — SIGNIFICANT CHANGE UP (ref 17–32)
CO2 SERPL-SCNC: 25 MMOL/L — SIGNIFICANT CHANGE UP (ref 17–32)
CREAT SERPL-MCNC: <0.5 MG/DL — LOW (ref 0.7–1.5)
CREAT SERPL-MCNC: <0.5 MG/DL — LOW (ref 0.7–1.5)
EOSINOPHIL # BLD AUTO: 0.07 K/UL — SIGNIFICANT CHANGE UP (ref 0–0.7)
EOSINOPHIL # BLD AUTO: 0.1 K/UL — SIGNIFICANT CHANGE UP (ref 0–0.7)
EOSINOPHIL NFR BLD AUTO: 1.4 % — SIGNIFICANT CHANGE UP (ref 0–8)
EOSINOPHIL NFR BLD AUTO: 1.9 % — SIGNIFICANT CHANGE UP (ref 0–8)
GLUCOSE BLDC GLUCOMTR-MCNC: 110 MG/DL — HIGH (ref 70–99)
GLUCOSE BLDC GLUCOMTR-MCNC: 118 MG/DL — HIGH (ref 70–99)
GLUCOSE BLDC GLUCOMTR-MCNC: 145 MG/DL — HIGH (ref 70–99)
GLUCOSE BLDC GLUCOMTR-MCNC: 91 MG/DL — SIGNIFICANT CHANGE UP (ref 70–99)
GLUCOSE SERPL-MCNC: 108 MG/DL — HIGH (ref 70–99)
GLUCOSE SERPL-MCNC: 168 MG/DL — HIGH (ref 70–99)
HCT VFR BLD CALC: 24.1 % — LOW (ref 42–52)
HCT VFR BLD CALC: 26.8 % — LOW (ref 42–52)
HCT VFR BLD CALC: 35.3 % — LOW (ref 42–52)
HGB BLD-MCNC: 11.9 G/DL — LOW (ref 14–18)
HGB BLD-MCNC: 7.9 G/DL — LOW (ref 14–18)
HGB BLD-MCNC: 8.6 G/DL — LOW (ref 14–18)
IMM GRANULOCYTES NFR BLD AUTO: 0.8 % — HIGH (ref 0.1–0.3)
IMM GRANULOCYTES NFR BLD AUTO: 0.8 % — HIGH (ref 0.1–0.3)
LYMPHOCYTES # BLD AUTO: 0.98 K/UL — LOW (ref 1.2–3.4)
LYMPHOCYTES # BLD AUTO: 1.02 K/UL — LOW (ref 1.2–3.4)
LYMPHOCYTES # BLD AUTO: 18.8 % — LOW (ref 20.5–51.1)
LYMPHOCYTES # BLD AUTO: 20.5 % — SIGNIFICANT CHANGE UP (ref 20.5–51.1)
MCHC RBC-ENTMCNC: 28 PG — SIGNIFICANT CHANGE UP (ref 27–31)
MCHC RBC-ENTMCNC: 29 PG — SIGNIFICANT CHANGE UP (ref 27–31)
MCHC RBC-ENTMCNC: 29 PG — SIGNIFICANT CHANGE UP (ref 27–31)
MCHC RBC-ENTMCNC: 32.1 G/DL — SIGNIFICANT CHANGE UP (ref 32–37)
MCHC RBC-ENTMCNC: 32.8 G/DL — SIGNIFICANT CHANGE UP (ref 32–37)
MCHC RBC-ENTMCNC: 33.7 G/DL — SIGNIFICANT CHANGE UP (ref 32–37)
MCV RBC AUTO: 86.1 FL — SIGNIFICANT CHANGE UP (ref 80–94)
MCV RBC AUTO: 87.3 FL — SIGNIFICANT CHANGE UP (ref 80–94)
MCV RBC AUTO: 88.6 FL — SIGNIFICANT CHANGE UP (ref 80–94)
MONOCYTES # BLD AUTO: 0.46 K/UL — SIGNIFICANT CHANGE UP (ref 0.1–0.6)
MONOCYTES # BLD AUTO: 0.58 K/UL — SIGNIFICANT CHANGE UP (ref 0.1–0.6)
MONOCYTES NFR BLD AUTO: 11.7 % — HIGH (ref 1.7–9.3)
MONOCYTES NFR BLD AUTO: 8.8 % — SIGNIFICANT CHANGE UP (ref 1.7–9.3)
NEUTROPHILS # BLD AUTO: 3.25 K/UL — SIGNIFICANT CHANGE UP (ref 1.4–6.5)
NEUTROPHILS # BLD AUTO: 3.63 K/UL — SIGNIFICANT CHANGE UP (ref 1.4–6.5)
NEUTROPHILS NFR BLD AUTO: 65.4 % — SIGNIFICANT CHANGE UP (ref 42.2–75.2)
NEUTROPHILS NFR BLD AUTO: 69.5 % — SIGNIFICANT CHANGE UP (ref 42.2–75.2)
NRBC # BLD: 0 /100 WBCS — SIGNIFICANT CHANGE UP (ref 0–0)
OSMOLALITY SERPL: 294 MOS/KG — SIGNIFICANT CHANGE UP (ref 275–300)
OSMOLALITY SERPL: 308 MOS/KG — HIGH (ref 275–300)
PLATELET # BLD AUTO: 292 K/UL — SIGNIFICANT CHANGE UP (ref 130–400)
PLATELET # BLD AUTO: 297 K/UL — SIGNIFICANT CHANGE UP (ref 130–400)
PLATELET # BLD AUTO: 80 K/UL — LOW (ref 130–400)
POTASSIUM SERPL-MCNC: 4 MMOL/L — SIGNIFICANT CHANGE UP (ref 3.5–5)
POTASSIUM SERPL-MCNC: 4.5 MMOL/L — SIGNIFICANT CHANGE UP (ref 3.5–5)
POTASSIUM SERPL-SCNC: 4 MMOL/L — SIGNIFICANT CHANGE UP (ref 3.5–5)
POTASSIUM SERPL-SCNC: 4.5 MMOL/L — SIGNIFICANT CHANGE UP (ref 3.5–5)
RBC # BLD: 2.72 M/UL — LOW (ref 4.7–6.1)
RBC # BLD: 3.07 M/UL — LOW (ref 4.7–6.1)
RBC # BLD: 4.1 M/UL — LOW (ref 4.7–6.1)
RBC # FLD: 14.1 % — SIGNIFICANT CHANGE UP (ref 11.5–14.5)
RBC # FLD: 14.3 % — SIGNIFICANT CHANGE UP (ref 11.5–14.5)
RBC # FLD: 14.4 % — SIGNIFICANT CHANGE UP (ref 11.5–14.5)
SODIUM SERPL-SCNC: 144 MMOL/L — SIGNIFICANT CHANGE UP (ref 135–146)
SODIUM SERPL-SCNC: 153 MMOL/L — HIGH (ref 135–146)
VANCOMYCIN TROUGH SERPL-MCNC: 15.7 UG/ML — HIGH (ref 5–10)
WBC # BLD: 3.59 K/UL — LOW (ref 4.8–10.8)
WBC # BLD: 4.97 K/UL — SIGNIFICANT CHANGE UP (ref 4.8–10.8)
WBC # BLD: 5.22 K/UL — SIGNIFICANT CHANGE UP (ref 4.8–10.8)
WBC # FLD AUTO: 3.59 K/UL — LOW (ref 4.8–10.8)
WBC # FLD AUTO: 4.97 K/UL — SIGNIFICANT CHANGE UP (ref 4.8–10.8)
WBC # FLD AUTO: 5.22 K/UL — SIGNIFICANT CHANGE UP (ref 4.8–10.8)

## 2021-05-31 PROCEDURE — 99291 CRITICAL CARE FIRST HOUR: CPT

## 2021-05-31 RX ORDER — SODIUM CHLORIDE 9 MG/ML
250 INJECTION INTRAMUSCULAR; INTRAVENOUS; SUBCUTANEOUS ONCE
Refills: 0 | Status: COMPLETED | OUTPATIENT
Start: 2021-05-31 | End: 2021-05-31

## 2021-05-31 RX ORDER — SODIUM CHLORIDE 9 MG/ML
6 INJECTION INTRAMUSCULAR; INTRAVENOUS; SUBCUTANEOUS EVERY 8 HOURS
Refills: 0 | Status: DISCONTINUED | OUTPATIENT
Start: 2021-05-31 | End: 2021-05-31

## 2021-05-31 RX ORDER — SODIUM CHLORIDE 5 G/100ML
500 INJECTION, SOLUTION INTRAVENOUS
Refills: 0 | Status: DISCONTINUED | OUTPATIENT
Start: 2021-05-31 | End: 2021-05-31

## 2021-05-31 RX ORDER — POTASSIUM CHLORIDE 20 MEQ
20 PACKET (EA) ORAL
Refills: 0 | Status: COMPLETED | OUTPATIENT
Start: 2021-05-31 | End: 2021-05-31

## 2021-05-31 RX ORDER — POTASSIUM PHOSPHATE, MONOBASIC POTASSIUM PHOSPHATE, DIBASIC 236; 224 MG/ML; MG/ML
15 INJECTION, SOLUTION INTRAVENOUS ONCE
Refills: 0 | Status: COMPLETED | OUTPATIENT
Start: 2021-05-31 | End: 2021-05-31

## 2021-05-31 RX ORDER — MAGNESIUM SULFATE 500 MG/ML
2 VIAL (ML) INJECTION ONCE
Refills: 0 | Status: COMPLETED | OUTPATIENT
Start: 2021-05-31 | End: 2021-05-31

## 2021-05-31 RX ADMIN — Medication 650 MILLIGRAM(S): at 06:39

## 2021-05-31 RX ADMIN — SODIUM CHLORIDE 8 GRAM(S): 9 INJECTION INTRAMUSCULAR; INTRAVENOUS; SUBCUTANEOUS at 10:26

## 2021-05-31 RX ADMIN — CHLORHEXIDINE GLUCONATE 15 MILLILITER(S): 213 SOLUTION TOPICAL at 05:21

## 2021-05-31 RX ADMIN — Medication 100 MILLIEQUIVALENT(S): at 13:49

## 2021-05-31 RX ADMIN — Medication 300 MILLIGRAM(S): at 05:39

## 2021-05-31 RX ADMIN — SODIUM CHLORIDE 8 GRAM(S): 9 INJECTION INTRAMUSCULAR; INTRAVENOUS; SUBCUTANEOUS at 03:05

## 2021-05-31 RX ADMIN — SODIUM CHLORIDE 8 GRAM(S): 9 INJECTION INTRAMUSCULAR; INTRAVENOUS; SUBCUTANEOUS at 05:31

## 2021-05-31 RX ADMIN — POTASSIUM PHOSPHATE, MONOBASIC POTASSIUM PHOSPHATE, DIBASIC 62.5 MILLIMOLE(S): 236; 224 INJECTION, SOLUTION INTRAVENOUS at 03:01

## 2021-05-31 RX ADMIN — Medication 650 MILLIGRAM(S): at 05:39

## 2021-05-31 RX ADMIN — MEROPENEM 200 MILLIGRAM(S): 1 INJECTION INTRAVENOUS at 14:28

## 2021-05-31 RX ADMIN — ENOXAPARIN SODIUM 40 MILLIGRAM(S): 100 INJECTION SUBCUTANEOUS at 17:46

## 2021-05-31 RX ADMIN — Medication 2 DROP(S): at 05:21

## 2021-05-31 RX ADMIN — Medication 2 DROP(S): at 21:27

## 2021-05-31 RX ADMIN — SODIUM CHLORIDE 30 MILLILITER(S): 5 INJECTION, SOLUTION INTRAVENOUS at 11:17

## 2021-05-31 RX ADMIN — SODIUM CHLORIDE 3000 MILLILITER(S): 9 INJECTION INTRAMUSCULAR; INTRAVENOUS; SUBCUTANEOUS at 02:29

## 2021-05-31 RX ADMIN — SODIUM CHLORIDE 8 GRAM(S): 9 INJECTION INTRAMUSCULAR; INTRAVENOUS; SUBCUTANEOUS at 13:47

## 2021-05-31 RX ADMIN — SENNA PLUS 2 TABLET(S): 8.6 TABLET ORAL at 21:28

## 2021-05-31 RX ADMIN — Medication 650 MILLIGRAM(S): at 15:30

## 2021-05-31 RX ADMIN — Medication 100 MILLIEQUIVALENT(S): at 14:28

## 2021-05-31 RX ADMIN — SODIUM CHLORIDE 40 MILLILITER(S): 5 INJECTION, SOLUTION INTRAVENOUS at 05:31

## 2021-05-31 RX ADMIN — MEROPENEM 200 MILLIGRAM(S): 1 INJECTION INTRAVENOUS at 05:39

## 2021-05-31 RX ADMIN — Medication 300 MILLIGRAM(S): at 21:31

## 2021-05-31 RX ADMIN — Medication 300 MILLIGRAM(S): at 14:27

## 2021-05-31 RX ADMIN — CHLORHEXIDINE GLUCONATE 15 MILLILITER(S): 213 SOLUTION TOPICAL at 17:46

## 2021-05-31 RX ADMIN — PANTOPRAZOLE SODIUM 40 MILLIGRAM(S): 20 TABLET, DELAYED RELEASE ORAL at 12:44

## 2021-05-31 RX ADMIN — Medication 50 GRAM(S): at 03:04

## 2021-05-31 RX ADMIN — SODIUM CHLORIDE 3000 MILLILITER(S): 9 INJECTION INTRAMUSCULAR; INTRAVENOUS; SUBCUTANEOUS at 16:51

## 2021-05-31 RX ADMIN — LEVETIRACETAM 420 MILLIGRAM(S): 250 TABLET, FILM COATED ORAL at 05:30

## 2021-05-31 RX ADMIN — Medication 100 MILLIEQUIVALENT(S): at 13:45

## 2021-05-31 RX ADMIN — LEVETIRACETAM 420 MILLIGRAM(S): 250 TABLET, FILM COATED ORAL at 17:46

## 2021-05-31 RX ADMIN — Medication 2 DROP(S): at 13:46

## 2021-05-31 RX ADMIN — LISINOPRIL 20 MILLIGRAM(S): 2.5 TABLET ORAL at 05:31

## 2021-05-31 RX ADMIN — CHLORHEXIDINE GLUCONATE 1 APPLICATION(S): 213 SOLUTION TOPICAL at 05:21

## 2021-05-31 RX ADMIN — MEROPENEM 200 MILLIGRAM(S): 1 INJECTION INTRAVENOUS at 21:31

## 2021-05-31 NOTE — PROGRESS NOTE ADULT - ATTENDING COMMENTS
Patient is arousable, follows simple commands.  Has persistent right hemiplegia.  Tolerates tube feeds, had BMs.  On T-collar for the past 48 hours.  Na 144, on 3% saline 30 ml/h currently.    ASSESSMENT:  ASSESSMENT:  46 y/o male, S/P Fall.  Traumatic Left SDH.  Brain compression.  S/P Left Craniectomy.  Acute respiratory failure.  At risk for hemodynamic instability.  Chronic Alcohol Abuse.  Dysphagia.  Hyponatremia.   Acute blood loss anemia.    PLAN:  - pain control as needed; continue intermittent neuro checks.  - on T-collar; needs frequent suctioning; chest PT  - euvolemic and adequately perfused currently; keep normotensive  - on Tube feeds; bowel regiment  - wean 3% saline; follow serum electrolytes and UOP  - GI and DVT prophylaxis  - on Vanco and Meropenem for 2 more weeks as per ID for Ent. fecalis in wound   - Neurosurgery follow up for possible  shunt after antibiotics completed Patient is arousable, follows simple commands.  Has persistent right hemiplegia.  Tolerates tube feeds, had BMs.  On T-collar for the past 48 hours.  Na 144, on 3% saline 30 ml/h currently.    ASSESSMENT:  44 y/o male, S/P Fall.  Traumatic Left SDH.  Brain compression.  S/P Left Craniectomy.  Acute respiratory failure.  At risk for hemodynamic instability.  Chronic Alcohol Abuse.  Dysphagia.  Hyponatremia.   Acute blood loss anemia.    PLAN:  - pain control as needed; continue intermittent neuro checks.  - on T-collar; needs frequent suctioning; chest PT  - euvolemic and adequately perfused currently; keep normotensive  - on Tube feeds; bowel regiment  - wean 3% saline; follow serum electrolytes and UOP  - GI and DVT prophylaxis  - on Vanco and Meropenem for 2 more weeks as per ID for Ent. fecalis in wound   - Neurosurgery follow up for possible  shunt after antibiotics completed

## 2021-05-31 NOTE — PROGRESS NOTE ADULT - SUBJECTIVE AND OBJECTIVE BOX
POD#24/14    S/P Left Craniectomy for evac of SDH  S/P Wound Revision    Vital Signs Last 24 Hrs  T(C): 36 (31 May 2021 07:31), Max: 37.7 (30 May 2021 12:00)  T(F): 96.8 (31 May 2021 07:31), Max: 99.9 (30 May 2021 12:00)  HR: 90 (31 May 2021 10:00) (85 - 115)  BP: 145/91 (31 May 2021 10:00) (112/73 - 156/91)  BP(mean): 113 (31 May 2021 10:00) (86 - 116)  RR: 21 (31 May 2021 10:00) (9 - 26)  SpO2: 100% (31 May 2021 10:00) (100% - 100%)    I&O's Detail    30 May 2021 07:01  -  31 May 2021 07:00  --------------------------------------------------------  IN:    Enteral Tube Flush: 300 mL    IV PiggyBack: 100 mL    IV PiggyBack: 200 mL    IV PiggyBack: 1000 mL    Osmolite: 1150 mL    Sodium Chloride 0.9% Bolus: 500 mL    sodium chloride 3%: 280 mL    sodium chloride 3%: 725 mL  Total IN: 4255 mL    OUT:    Voided (mL): 4140 mL  Total OUT: 4140 mL    Total NET: 115 mL      31 May 2021 07:01  -  31 May 2021 10:57  --------------------------------------------------------  IN:    Osmolite: 200 mL    sodium chloride 3%: 140 mL  Total IN: 340 mL    OUT:    Voided (mL): 710 mL  Total OUT: 710 mL    Total NET: -370 mL        I&O's Summary    30 May 2021 07:01  -  31 May 2021 07:00  --------------------------------------------------------  IN: 4255 mL / OUT: 4140 mL / NET: 115 mL    31 May 2021 07:01  -  31 May 2021 10:57  --------------------------------------------------------  IN: 340 mL / OUT: 710 mL / NET: -370 mL        REVIEW OF SYSTEMS    [ ] A ten-point review of systems was otherwise negative except as noted.  [ ] Due to altered mental status/intubation, subjective information were not able to be obtained from the patient. History was obtained, to the extent possible, from review of the chart and collateral sources of information.      PHYSICAL EXAM:  Neurological:                LABS:                        7.9    4.97  )-----------( 292      ( 31 May 2021 00:40 )             24.1     05-31    144  |  110  |  5<L>  ----------------------------<  108<H>  4.5   |  25  |  <0.5<L>    Ca    8.4<L>      31 May 2021 05:50  Phos  2.9     05-30  Mg     1.7     05-30              CAPILLARY BLOOD GLUCOSE      POCT Blood Glucose.: 91 mg/dL (31 May 2021 05:57)  POCT Blood Glucose.: 145 mg/dL (30 May 2021 23:11)  POCT Blood Glucose.: 194 mg/dL (30 May 2021 17:04)  POCT Blood Glucose.: 199 mg/dL (30 May 2021 17:01)  POCT Blood Glucose.: 135 mg/dL (30 May 2021 11:29)    Magnesium, Serum: 1.7 mg/dL (05-30-21 @ 23:20)          CSF Analysis: [] N/A      Allergies    No Known Allergies    Intolerances      MEDICATIONS:  Antibiotics:  meropenem  IVPB      meropenem  IVPB 2000 milliGRAM(s) IV Intermittent every 8 hours  vancomycin  IVPB      vancomycin  IVPB 1500 milliGRAM(s) IV Intermittent every 8 hours    Neuro:  acetaminophen   Tablet .. 650 milliGRAM(s) Oral every 6 hours PRN  levETIRAcetam  IVPB 500 milliGRAM(s) IV Intermittent every 12 hours      IVF:  sodium chloride 8 Gram(s) Oral <User Schedule>  sodium chloride 3%. 500 milliLiter(s) IV Continuous <Continuous>      CULTURES:  Culture Results:   No growth to date. (05-28 @ 00:15)  Culture Results:   No growth to date. (05-28 @ 00:15)      RADIOLOGY & ADDITIONAL TESTS:      ASSESSMENT:  45y Male s/p    SUBDURAL HEMATOMA;RESPIRATORY FAILURE;FALL    ^FALL    No pertinent family history in first degree relatives    Family history of essential hypertension (Father)    Handoff    MEWS Score    No pertinent past medical history    Alcohol abuse    GERD (gastroesophageal reflux disease)    ETOH abuse    Hypomagnesemia    Seizure disorder    Subdural hematoma    S/P subdural hematoma evacuation    Subdural hematoma    Subdural hematoma    Subdural hematoma    Respiratory failure    ETOH abuse    Palliative care by specialist    Insertion of non-tunneled central venous catheter (CVC) in patient age 5 years of age or older    Craniotomy, decompressive    Decompressive craniotomy    Placement, tracheostomy and percutaneous endoscopic gastrostomy    No significant past surgical history    H/O hemorrhoidectomy    FALL    23    Respiratory failure    Fall    SysAdmin_VisitLink        PLAN:   POD#24/14    S/P Left Craniectomy for evac of SDH  S/P Wound Revision      Vital Signs Last 24 Hrs  T(C): 36 (31 May 2021 07:31), Max: 37.7 (30 May 2021 12:00)  T(F): 96.8 (31 May 2021 07:31), Max: 99.9 (30 May 2021 12:00)  HR: 90 (31 May 2021 10:00) (85 - 115)  BP: 145/91 (31 May 2021 10:00) (112/73 - 156/91)  BP(mean): 113 (31 May 2021 10:00) (86 - 116)  RR: 21 (31 May 2021 10:00) (9 - 26)  SpO2: 100% (31 May 2021 10:00) (100% - 100%)    I&O's Detail    30 May 2021 07:01  -  31 May 2021 07:00  --------------------------------------------------------  IN:    Enteral Tube Flush: 300 mL    IV PiggyBack: 100 mL    IV PiggyBack: 200 mL    IV PiggyBack: 1000 mL    Osmolite: 1150 mL    Sodium Chloride 0.9% Bolus: 500 mL    sodium chloride 3%: 280 mL    sodium chloride 3%: 725 mL  Total IN: 4255 mL    OUT:    Voided (mL): 4140 mL  Total OUT: 4140 mL    Total NET: 115 mL      31 May 2021 07:01  -  31 May 2021 10:57  --------------------------------------------------------  IN:    Osmolite: 200 mL    sodium chloride 3%: 140 mL  Total IN: 340 mL    OUT:    Voided (mL): 710 mL  Total OUT: 710 mL    Total NET: -370 mL        I&O's Summary    30 May 2021 07:01  -  31 May 2021 07:00  --------------------------------------------------------  IN: 4255 mL / OUT: 4140 mL / NET: 115 mL    31 May 2021 07:01  -  31 May 2021 10:57  --------------------------------------------------------  IN: 340 mL / OUT: 710 mL / NET: -370 mL        REVIEW OF SYSTEMS    [ ] A ten-point review of systems was otherwise negative except as noted.  [ ] Due to altered mental status/intubation, subjective information were not able to be obtained from the patient. History was obtained, to the extent possible, from review of the chart and collateral sources of information.      PHYSICAL EXAM:  Neurological:                LABS:                        7.9    4.97  )-----------( 292      ( 31 May 2021 00:40 )             24.1     05-31    144  |  110  |  5<L>  ----------------------------<  108<H>  4.5   |  25  |  <0.5<L>    Ca    8.4<L>      31 May 2021 05:50  Phos  2.9     05-30  Mg     1.7     05-30              CAPILLARY BLOOD GLUCOSE      POCT Blood Glucose.: 91 mg/dL (31 May 2021 05:57)  POCT Blood Glucose.: 145 mg/dL (30 May 2021 23:11)  POCT Blood Glucose.: 194 mg/dL (30 May 2021 17:04)  POCT Blood Glucose.: 199 mg/dL (30 May 2021 17:01)  POCT Blood Glucose.: 135 mg/dL (30 May 2021 11:29)    Magnesium, Serum: 1.7 mg/dL (05-30-21 @ 23:20)          CSF Analysis: [] N/A      Allergies    No Known Allergies    Intolerances      MEDICATIONS:  Antibiotics:  meropenem  IVPB      meropenem  IVPB 2000 milliGRAM(s) IV Intermittent every 8 hours  vancomycin  IVPB      vancomycin  IVPB 1500 milliGRAM(s) IV Intermittent every 8 hours    Neuro:  acetaminophen   Tablet .. 650 milliGRAM(s) Oral every 6 hours PRN  levETIRAcetam  IVPB 500 milliGRAM(s) IV Intermittent every 12 hours      IVF:  sodium chloride 8 Gram(s) Oral <User Schedule>  sodium chloride 3%. 500 milliLiter(s) IV Continuous <Continuous>      CULTURES:  Culture Results:   No growth to date. (05-28 @ 00:15)  Culture Results:   No growth to date. (05-28 @ 00:15)      RADIOLOGY & ADDITIONAL TESTS:      ASSESSMENT:  45y Male s/p    SUBDURAL HEMATOMA;RESPIRATORY FAILURE;FALL    ^FALL    No pertinent family history in first degree relatives    Family history of essential hypertension (Father)    Handoff    MEWS Score    No pertinent past medical history    Alcohol abuse    GERD (gastroesophageal reflux disease)    ETOH abuse    Hypomagnesemia    Seizure disorder    Subdural hematoma    S/P subdural hematoma evacuation    Subdural hematoma    Subdural hematoma    Subdural hematoma    Respiratory failure    ETOH abuse    Palliative care by specialist    Insertion of non-tunneled central venous catheter (CVC) in patient age 5 years of age or older    Craniotomy, decompressive    Decompressive craniotomy    Placement, tracheostomy and percutaneous endoscopic gastrostomy    No significant past surgical history    H/O hemorrhoidectomy    FALL    23    Respiratory failure    Fall    SysAdmin_VisitLink        PLAN:   POD#24/14    S/P Left Craniectomy for evac of SDH  S/P Wound Revision    Pt seen and examined at bedside.     Vital Signs Last 24 Hrs  T(C): 36 (31 May 2021 07:31), Max: 37.7 (30 May 2021 12:00)  T(F): 96.8 (31 May 2021 07:31), Max: 99.9 (30 May 2021 12:00)  HR: 90 (31 May 2021 10:00) (85 - 115)  BP: 145/91 (31 May 2021 10:00) (112/73 - 156/91)  BP(mean): 113 (31 May 2021 10:00) (86 - 116)  RR: 21 (31 May 2021 10:00) (9 - 26)  SpO2: 100% (31 May 2021 10:00) (100% - 100%)    I&O's Detail    30 May 2021 07:01  -  31 May 2021 07:00  --------------------------------------------------------  IN:    Enteral Tube Flush: 300 mL    IV PiggyBack: 100 mL    IV PiggyBack: 200 mL    IV PiggyBack: 1000 mL    Osmolite: 1150 mL    Sodium Chloride 0.9% Bolus: 500 mL    sodium chloride 3%: 280 mL    sodium chloride 3%: 725 mL  Total IN: 4255 mL    OUT:    Voided (mL): 4140 mL  Total OUT: 4140 mL    Total NET: 115 mL      31 May 2021 07:01  -  31 May 2021 10:57  --------------------------------------------------------  IN:    Osmolite: 200 mL    sodium chloride 3%: 140 mL  Total IN: 340 mL    OUT:    Voided (mL): 710 mL  Total OUT: 710 mL    Total NET: -370 mL        I&O's Summary    30 May 2021 07:01  -  31 May 2021 07:00  --------------------------------------------------------  IN: 4255 mL / OUT: 4140 mL / NET: 115 mL    31 May 2021 07:01  -  31 May 2021 10:57  --------------------------------------------------------  IN: 340 mL / OUT: 710 mL / NET: -370 mL        REVIEW OF SYSTEMS    [ ] A ten-point review of systems was otherwise negative except as noted.  [X] Due to altered mental status/intubation, subjective information were not able to be obtained from the patient. History was obtained, to the extent possible, from review of the chart and collateral sources of information.      PHYSICAL EXAM:  Neurological:  Pupils reactive  Tracks  Moves left side spontaneously  Min mvmt of R side to pain  Left Craniectomy defect full. No drainage  Incision intact  Does not follow commands    LABS:                                   8.6    5.22  )-----------( 297      ( 31 May 2021 12:40 )             26.8     05-31    147<H>  |  111<H>  |  3<L>  ----------------------------<  102<H>  3.3<L>   |  24  |  <0.5<L>    Ca    7.1<L>      31 May 2021 11:59  Phos  2.9     05-30  Mg     1.7     05-30    Allergies    No Known Allergies    Intolerances    MEDICATIONS:  Antibiotics:  meropenem  IVPB      meropenem  IVPB 2000 milliGRAM(s) IV Intermittent every 8 hours  vancomycin  IVPB      vancomycin  IVPB 1500 milliGRAM(s) IV Intermittent every 8 hours    Neuro:  acetaminophen   Tablet .. 650 milliGRAM(s) Oral every 6 hours PRN  levETIRAcetam  IVPB 500 milliGRAM(s) IV Intermittent every 12 hours      IVF:  sodium chloride 8 Gram(s) Oral <User Schedule>  sodium chloride 3%. 500 milliLiter(s) IV Continuous <Continuous>      CULTURES:  Culture Results:   No growth to date. (05-28 @ 00:15)  Culture Results:   No growth to date. (05-28 @ 00:15)      RADIOLOGY & ADDITIONAL TESTS:      ASSESSMENT:  45y Male s/p    SUBDURAL HEMATOMA;RESPIRATORY FAILURE;FALL    ^FALL    No pertinent family history in first degree relatives    Family history of essential hypertension (Father)    Handoff    MEWS Score    No pertinent past medical history    Alcohol abuse    GERD (gastroesophageal reflux disease)    ETOH abuse    Hypomagnesemia    Seizure disorder    Subdural hematoma    S/P subdural hematoma evacuation    Subdural hematoma    Subdural hematoma    Subdural hematoma    Respiratory failure    ETOH abuse    Palliative care by specialist    Insertion of non-tunneled central venous catheter (CVC) in patient age 5 years of age or older    Craniotomy, decompressive    Decompressive craniotomy    Placement, tracheostomy and percutaneous endoscopic gastrostomy    No significant past surgical history    H/O hemorrhoidectomy    FALL    23    Respiratory failure    Fall    SysAdmin_VisitLink        PLAN:  Appreciate ID input  Will Schedule for VPS next week

## 2021-05-31 NOTE — PROGRESS NOTE ADULT - SUBJECTIVE AND OBJECTIVE BOX
SANDRA DAS  [id number]  45y Male    Indication for ICU admission: s/p craniectomy for large L SDH  Admit Date:05-06-21  ICU Date: 05-07-21  OR Date: 05-06-21    No Known Allergies    PAST MEDICAL & SURGICAL HISTORY:  Alcohol abuse  GERD (gastroesophageal reflux disease)  ETOH abuse  Hypomagnesemia  Seizure disorder  H/O hemorrhoidectomy    Home Medications:  NONE    24HRS EVENT:    5/30  DAY:  -Vanco tr @ 11am= 6.7, Vanco reordered  -f/u ID - clearance for  shunt & duration of abx  -decr 3% to 45cc/hr  -possible  shunt next week - will need ID for clearance      DVT PTX: LVX  GI PTX: PPI    ***Tubes/Lines/Drains  ***  Peripheral IV  Left basilic midline 5/17  Arterial Line Right radial	Date 5/17  L IJ TLC     5/29  Trach/peg 5/21    REVIEW OF SYSTEMS  [ ] A ten-point review of systems was otherwise negative except as noted.  [x] Due to altered mental status/intubation, subjective information were not able to be obtained from the patient. History was obtained, to the extent possible, from review of the chart and collateral sources of information.    	   SANDRA DAS  [id number]  45y Male    Indication for ICU admission: s/p craniectomy for large L SDH  Admit Date:05-06-21  ICU Date: 05-07-21  OR Date: 05-06-21    No Known Allergies    PAST MEDICAL & SURGICAL HISTORY:  Alcohol abuse  GERD (gastroesophageal reflux disease)  ETOH abuse  Hypomagnesemia  Seizure disorder  H/O hemorrhoidectomy    Home Medications:  NONE    24HRS EVENT:    5/30  DAY:  -Vanco tr @ 11am= 6.7, Vanco reordered  -f/u ID - clearance for  shunt & duration of abx  -decr 3% to 45cc/hr  -possible  shunt next week - will need ID for clearance      DVT PTX: LVX  GI PTX: PPI    ***Tubes/Lines/Drains  ***  Peripheral IV  Left basilic midline 5/17  Arterial Line Right radial	Date 5/17  L IJ TLC     5/29  Trach/peg 5/21    REVIEW OF SYSTEMS  [ ] A ten-point review of systems was otherwise negative except as noted.  [x] Due to altered mental status/intubation, subjective information were not able to be obtained from the patient. History was obtained, to the extent possible, from review of the chart and collateral sources of information.    Daily     Daily     Diet, NPO with Tube Feed:   Tube Feeding Modality: Orogastric  Osmolite 1.5 Jeff  Total Volume for 24 Hours (mL): 1200  Continuous  Starting Tube Feed Rate mL per Hour: 50  Until Goal Tube Feed Rate (mL per Hour): 50  Tube Feed Duration (in Hours): 24  Tube Feed Start Time: 00:00  No Carb Prosource TF     Qty per Day:  3 (05-24-21 @ 19:03)      CURRENT MEDS:  Neurologic Medications  acetaminophen   Tablet .. 650 milliGRAM(s) Oral every 6 hours PRN Temp greater or equal to 38.5C (101.3F), Moderate Pain (4 - 6), Severe Pain (7 - 10)  levETIRAcetam  IVPB 500 milliGRAM(s) IV Intermittent every 12 hours    Respiratory Medications    Cardiovascular Medications  lisinopril 20 milliGRAM(s) Oral daily  propranolol 20 milliGRAM(s) Oral every 8 hours    Gastrointestinal Medications  pantoprazole   Suspension 40 milliGRAM(s) Oral daily  senna 2 Tablet(s) Oral at bedtime  sodium chloride 8 Gram(s) Oral <User Schedule>  sodium chloride 3%. 500 milliLiter(s) IV Continuous <Continuous>    Genitourinary Medications    Hematologic/Oncologic Medications  enoxaparin Injectable 40 milliGRAM(s) SubCutaneous every 24 hours    Antimicrobial/Immunologic Medications  meropenem  IVPB      meropenem  IVPB 2000 milliGRAM(s) IV Intermittent every 8 hours  vancomycin  IVPB 1500 milliGRAM(s) IV Intermittent every 8 hours  vancomycin  IVPB        Endocrine/Metabolic Medications  insulin lispro (ADMELOG) corrective regimen sliding scale   SubCutaneous every 6 hours    Topical/Other Medications  artificial tears (preservative free) Ophthalmic Solution 2 Drop(s) Both EYES three times a day  chlorhexidine 0.12% Liquid 15 milliLiter(s) Oral Mucosa two times a day  chlorhexidine 4% Liquid 1 Application(s) Topical <User Schedule>      ICU Vital Signs Last 24 Hrs  T(C): 36 (31 May 2021 07:31), Max: 37.7 (30 May 2021 12:00)  T(F): 96.8 (31 May 2021 07:31), Max: 99.9 (30 May 2021 12:00)  HR: 91 (31 May 2021 09:00) (85 - 115)  BP: 112/73 (31 May 2021 09:00) (112/73 - 156/91)  BP(mean): 89 (31 May 2021 09:00) (86 - 116)  ABP: --  ABP(mean): --  RR: 25 (31 May 2021 09:00) (9 - 26)  SpO2: 100% (31 May 2021 09:00) (100% - 100%)      Adult Advanced Hemodynamics Last 24 Hrs  CVP(mm Hg): --  CVP(cm H2O): --  CO: --  CI: --  PA: --  PA(mean): --  PCWP: --  SVR: --  SVRI: --  PVR: --  PVRI: --          I&O's Summary    30 May 2021 07:01  -  31 May 2021 07:00  --------------------------------------------------------  IN: 4255 mL / OUT: 4140 mL / NET: 115 mL    31 May 2021 07:01  -  31 May 2021 09:27  --------------------------------------------------------  IN: 260 mL / OUT: 385 mL / NET: -125 mL      I&O's Detail    30 May 2021 07:01  -  31 May 2021 07:00  --------------------------------------------------------  IN:    Enteral Tube Flush: 300 mL    IV PiggyBack: 100 mL    IV PiggyBack: 200 mL    IV PiggyBack: 1000 mL    Osmolite: 1150 mL    Sodium Chloride 0.9% Bolus: 500 mL    sodium chloride 3%: 280 mL    sodium chloride 3%: 725 mL  Total IN: 4255 mL    OUT:    Voided (mL): 4140 mL  Total OUT: 4140 mL    Total NET: 115 mL      31 May 2021 07:01  -  31 May 2021 09:27  --------------------------------------------------------  IN:    Osmolite: 150 mL    sodium chloride 3%: 110 mL  Total IN: 260 mL    OUT:    Voided (mL): 385 mL  Total OUT: 385 mL    Total NET: -125 mL          PHYSICAL EXAM:    General/Neuro  RASS:             GCS: 11T    = E 4  / V 1  / M 6     Deficits:                             alert & oriented x 3, no focal deficits  Pupils: PERRla    Lungs:      clear to auscultation, Normal expansion/effort.     Cardiovascular : S1, S2.  Regular rate and rhythm.  Peripheral edema   Cardiac Rhythm: Normal Sinus Rhythm    GI: Abdomen soft, Non-tender, Non-distended.    Gastrostomy     Extremities: Extremities warm, pink, well-perfused. Pulses: palpable dp b/l    Derm: Good skin turgor, no skin breakdown.      :      voiding      CXR:     LABS:  CAPILLARY BLOOD GLUCOSE      POCT Blood Glucose.: 91 mg/dL (31 May 2021 05:57)  POCT Blood Glucose.: 145 mg/dL (30 May 2021 23:11)  POCT Blood Glucose.: 194 mg/dL (30 May 2021 17:04)  POCT Blood Glucose.: 199 mg/dL (30 May 2021 17:01)  POCT Blood Glucose.: 135 mg/dL (30 May 2021 11:29)                          7.9    4.97  )-----------( 292      ( 31 May 2021 00:40 )             24.1       05-31    144  |  110  |  5<L>  ----------------------------<  108<H>  4.5   |  25  |  <0.5<L>    Ca    8.4<L>      31 May 2021 05:50  Phos  2.9     05-30  Mg     1.7     05-30

## 2021-05-31 NOTE — PROGRESS NOTE ADULT - SUBJECTIVE AND OBJECTIVE BOX
THIAGO SANDRA  45y, Male  Allergy: No Known Allergies      LOS  25d    CHIEF COMPLAINT: found down unresponsive (31 May 2021 01:17)      INTERVAL EVENTS/HPI  - No acute events overnight  - T(F): , Max: 99.9 (05-30-21 @ 12:00) afebrile   - Tolerating medication  - WBC Count: 4.97 (05-31-21 @ 00:40)  WBC Count: 3.59 (05-30-21 @ 23:20)  - Creatinine, Serum: <0.5 (05-31-21 @ 05:50)  Creatinine, Serum: <0.5 (05-30-21 @ 23:20)       ROS  ***    VITALS:  T(F): 96.8, Max: 99.9 (05-30-21 @ 12:00)  HR: 91  BP: 112/73  RR: 25Vital Signs Last 24 Hrs  T(C): 36 (31 May 2021 07:31), Max: 37.7 (30 May 2021 12:00)  T(F): 96.8 (31 May 2021 07:31), Max: 99.9 (30 May 2021 12:00)  HR: 91 (31 May 2021 09:00) (85 - 115)  BP: 112/73 (31 May 2021 09:00) (112/73 - 156/91)  BP(mean): 89 (31 May 2021 09:00) (86 - 116)  RR: 25 (31 May 2021 09:00) (9 - 26)  SpO2: 100% (31 May 2021 09:00) (100% - 100%)    PHYSICAL EXAM:  ***    FH: Non-contributory  Social Hx: Non-contributory    TESTS & MEASUREMENTS:                        7.9    4.97  )-----------( 292      ( 31 May 2021 00:40 )             24.1     05-31    144  |  110  |  5<L>  ----------------------------<  108<H>  4.5   |  25  |  <0.5<L>    Ca    8.4<L>      31 May 2021 05:50  Phos  2.9     05-30  Mg     1.7     05-30      eGFR if Non African American: 142 mL/min/1.73M2 (05-31-21 @ 05:50)  eGFR if African American: 165 mL/min/1.73M2 (05-31-21 @ 05:50)  eGFR if Non African American: 142 mL/min/1.73M2 (05-30-21 @ 23:20)  eGFR if : 165 mL/min/1.73M2 (05-30-21 @ 23:20)  eGFR if Non African American: 160 mL/min/1.73M2 (05-30-21 @ 17:08)  eGFR if : 186 mL/min/1.73M2 (05-30-21 @ 17:08)  eGFR if Non African American: 142 mL/min/1.73M2 (05-30-21 @ 11:35)  eGFR if African American: 165 mL/min/1.73M2 (05-30-21 @ 11:35)          Culture - Blood (collected 05-28-21 @ 00:15)  Source: .Blood Blood  Preliminary Report (05-29-21 @ 08:01):    No growth to date.    Culture - Blood (collected 05-28-21 @ 00:15)  Source: .Blood Blood  Preliminary Report (05-29-21 @ 08:01):    No growth to date.    Culture - Blood (collected 05-23-21 @ 19:19)  Source: .Blood Blood-Peripheral  Final Report (05-29-21 @ 03:00):    No Growth Final    Culture - Body Fluid with Gram Stain (collected 05-21-21 @ 12:00)  Source: .Body Fluid CSF  Gram Stain (05-22-21 @ 02:58):    polymorphonuclear leukocytes seen    Gram positive cocci in pairs seen    by cytocentrifuge  Final Report (05-26-21 @ 18:04):    Few Enterococcus faecalis  Organism: Enterococcus faecalis (05-26-21 @ 18:04)  Organism: Enterococcus faecalis (05-26-21 @ 18:04)      -  Ampicillin: S <=2 Predicts results to ampicillin/sulbactam, amoxacillin-clavulanate and  piperacillin-tazobactam.      -  Tetra/Doxy: R >8      -  Vancomycin: S 2      Method Type: AISHA    Culture - Blood (collected 05-20-21 @ 02:00)  Source: .Blood Blood  Final Report (05-25-21 @ 07:01):    No Growth Final    Culture - Blood (collected 05-19-21 @ 00:27)  Source: .Blood None  Final Report (05-24-21 @ 07:00):    No Growth Final    Culture - Surgical Swab (collected 05-16-21 @ 08:54)  Source: .Surgical Swab None  Final Report (05-21-21 @ 16:44):    Rare Coag Negative Staphylococcus  Organism: Coag Negative Staphylococcus (05-21-21 @ 16:44)  Organism: Coag Negative Staphylococcus (05-21-21 @ 16:44)      -  Ampicillin/Sulbactam: S <=8/4      -  Cefazolin: S <=4      -  Clindamycin: S <=0.25      -  Erythromycin: S <=0.25      -  Gentamicin: S <=1 Should not be used as monotherapy      -  Oxacillin: S <=0.25      -  Penicillin: R 0.25      -  RIF- Rifampin: S <=1 Should not be used as monotherapy      -  Tetra/Doxy: S <=1      -  Trimethoprim/Sulfamethoxazole: S <=0.5/9.5      -  Vancomycin: S 0.5      Method Type: AISHA    Culture - Blood (collected 05-13-21 @ 11:19)  Source: .Blood None  Final Report (05-18-21 @ 22:03):    No Growth Final    Culture - Blood (collected 05-11-21 @ 11:18)  Source: .Blood None  Gram Stain (05-12-21 @ 02:53):    Growth in anaerobic bottle: Gram Negative Rods    Growth in aerobic bottle: Gram Negative Rods  Final Report (05-13-21 @ 16:09):    Growth in aerobic and anaerobic bottles: Klebsiella variicola    ***Blood Panel PCR results on this specimen are available    approximately 3 hours after the Gram stain result.***    Gram stain, PCR, and/or culture results may not always    correspond due to difference in methodologies.    ************************************************************    This PCR assay was performed by multiplex PCR. This    Assay tests for 66 bacterial and resistance gene targets.    Please refer to the [facility] test directory    at [email or web address] for details.  Organism: Blood Culture PCR  Klebsiella variicola (05-13-21 @ 16:09)  Organism: Klebsiella variicola (05-13-21 @ 16:09)      -  Amikacin: S <=16      -  Ampicillin: R <=8 These ampicillin results predict results for amoxicillin      -  Ampicillin/Sulbactam: S <=4/2 Enterobacter, Citrobacter, and Serratia may develop resistance during prolonged therapy (3-4 days)      -  Aztreonam: S <=4      -  Cefazolin: S <=2 Enterobacter, Citrobacter, and Serratia may develop resistance during prolonged therapy (3-4 days)      -  Cefepime: S <=2      -  Cefoxitin: S <=8      -  Ceftriaxone: S <=1 Enterobacter, Citrobacter, and Serratia may develop resistance during prolonged therapy      -  Ciprofloxacin: S <=0.25      -  Ertapenem: S <=0.5      -  Gentamicin: S <=2      -  Imipenem: S <=1      -  Levofloxacin: S <=0.5      -  Meropenem: S <=1      -  Piperacillin/Tazobactam: S <=8      -  Tobramycin: S <=2      -  Trimethoprim/Sulfamethoxazole: S <=0.5/9.5      Method Type: AISHA  Organism: Blood Culture PCR (05-13-21 @ 16:09)      -  Klebsiella pneumoniae: Detec      Method Type: PCR    Culture - Bronchial (collected 05-11-21 @ 10:12)  Source: Bronch Wash Bronchoalveolar Lavage  Gram Stain (05-12-21 @ 07:04):    Few polymorphonuclear leukocytes per low power field    No Squamous epithelial cells per low power field    Few Gram Positive Rods per oil power field  Final Report (05-13-21 @ 16:19):    Moderate Klebsiella variicola    Moderate Serratia marcescens    Normal Respiratory Liz present  Organism: Klebsiella variicola  Serratia marcescens (05-13-21 @ 16:19)  Organism: Serratia marcescens (05-13-21 @ 16:19)      -  Amikacin: S <=16      -  Amoxicillin/Clavulanic Acid: R >16/8      -  Ampicillin: R <=8 These ampicillin results predict results for amoxicillin      -  Ampicillin/Sulbactam: R 16/8 Enterobacter, Citrobacter, and Serratia may develop resistance during prolonged therapy (3-4 days)      -  Aztreonam: S <=4      -  Cefazolin: R >16 Enterobacter, Citrobacter, and Serratia may develop resistance during prolonged therapy (3-4 days)      -  Cefepime: S <=2      -  Cefoxitin: R <=8      -  Ceftriaxone: S <=1 Enterobacter, Citrobacter, and Serratia may develop resistance during prolonged therapy      -  Ciprofloxacin: S <=0.25      -  Ertapenem: S <=0.5      -  Gentamicin: S <=2      -  Levofloxacin: S <=0.5      -  Meropenem: S <=1      -  Piperacillin/Tazobactam: S <=8      -  Tobramycin: S <=2      -  Trimethoprim/Sulfamethoxazole: S <=0.5/9.5      Method Type: AISHA  Organism: Klebsiella variicola (05-13-21 @ 16:19)      -  Amikacin: S <=16      -  Amoxicillin/Clavulanic Acid: R >16/8      -  Ampicillin: R <=8 These ampicillin results predict results for amoxicillin      -  Ampicillin/Sulbactam: I 16/8 Enterobacter, Citrobacter, and Serratia may develop resistance during prolonged therapy (3-4 days)      -  Aztreonam: R >16      -  Cefazolin: R >16 Enterobacter, Citrobacter, and Serratia may develop resistance during prolonged therapy (3-4 days)      -  Cefepime: S <=2      -  Cefoxitin: S <=8      -  Ceftriaxone: S <=1 Enterobacter, Citrobacter, and Serratia may develop resistance during prolonged therapy      -  Ciprofloxacin: S <=0.25      -  Ertapenem: S <=0.5      -  Gentamicin: S <=2      -  Imipenem: S <=1      -  Levofloxacin: S <=0.5      -  Meropenem: S <=1      -  Piperacillin/Tazobactam: S <=8      -  Tobramycin: S 4      -  Trimethoprim/Sulfamethoxazole: S <=0.5/9.5      Method Type: AISHA            INFECTIOUS DISEASES TESTING  Vancomycin Level, Random: 6.7 (05-30-21 @ 11:35)  Vancomycin Level, Trough: 21.7 (05-29-21 @ 11:20)  Vancomycin Level, Trough: 19.0 (05-28-21 @ 11:00)  Vancomycin Level, Trough: 9.0 (05-27-21 @ 05:50)  Vancomycin Level, Trough: 7.6 (05-24-21 @ 05:54)  COVID-19 PCR: NotDetec (05-21-21 @ 04:30)  COVID-19 PCR: NotDetec (05-14-21 @ 18:58)  Vancomycin Level, Trough: 4.1 (05-13-21 @ 05:50)  HIV-1/2 Combo Result: Nonreact (05-09-21 @ 23:30)  Rapid RVP Result: NotDetec (05-06-21 @ 22:44)  COVID-19 PCR: NotDetec (04-06-21 @ 16:46)  COVID-19 PCR: NotDetec (03-29-21 @ 19:35)      INFLAMMATORY MARKERS      RADIOLOGY & ADDITIONAL TESTS:  I have personally reviewed the last available Chest xray  CXR      CT      CARDIOLOGY TESTING      MEDICATIONS  artificial tears (preservative free) Ophthalmic Solution 2 Both EYES three times a day  chlorhexidine 0.12% Liquid 15 Oral Mucosa two times a day  chlorhexidine 4% Liquid 1 Topical <User Schedule>  enoxaparin Injectable 40 SubCutaneous every 24 hours  insulin lispro (ADMELOG) corrective regimen sliding scale  SubCutaneous every 6 hours  levETIRAcetam  IVPB 500 IV Intermittent every 12 hours  lisinopril 20 Oral daily  meropenem  IVPB     meropenem  IVPB 2000 IV Intermittent every 8 hours  pantoprazole   Suspension 40 Oral daily  propranolol 20 Oral every 8 hours  senna 2 Oral at bedtime  sodium chloride 8 Oral <User Schedule>  sodium chloride 3%. 500 IV Continuous <Continuous>  vancomycin  IVPB 1500 IV Intermittent every 8 hours  vancomycin  IVPB         WEIGHT  Weight (kg): 79.4 (05-23-21 @ 16:30)  Creatinine, Serum: <0.5 mg/dL (05-31-21 @ 05:50)  Creatinine, Serum: <0.5 mg/dL (05-30-21 @ 23:20)  Creatinine, Serum: <0.5 mg/dL (05-30-21 @ 17:08)  Creatinine, Serum: <0.5 mg/dL (05-30-21 @ 11:35)      ANTIBIOTICS:  meropenem  IVPB      meropenem  IVPB 2000 milliGRAM(s) IV Intermittent every 8 hours  vancomycin  IVPB 1500 milliGRAM(s) IV Intermittent every 8 hours  vancomycin  IVPB          All available historical records have been reviewed       SANDRA DAS  45y, Male  Allergy: No Known Allergies      LOS  25d    CHIEF COMPLAINT: found down unresponsive (31 May 2021 01:17)      INTERVAL EVENTS/HPI  - No acute events overnight  - T(F): , Max: 99.9 (05-30-21 @ 12:00) afebrile   - Tolerating medication  - WBC Count: 4.97 (05-31-21 @ 00:40)  WBC Count: 3.59 (05-30-21 @ 23:20)  - Creatinine, Serum: <0.5 (05-31-21 @ 05:50)  Creatinine, Serum: <0.5 (05-30-21 @ 23:20)       ROS  unable to obtain history secondary to patient's mental status and/or sedation     VITALS:  T(F): 96.8, Max: 99.9 (05-30-21 @ 12:00)  HR: 91  BP: 112/73  RR: 25Vital Signs Last 24 Hrs  T(C): 36 (31 May 2021 07:31), Max: 37.7 (30 May 2021 12:00)  T(F): 96.8 (31 May 2021 07:31), Max: 99.9 (30 May 2021 12:00)  HR: 91 (31 May 2021 09:00) (85 - 115)  BP: 112/73 (31 May 2021 09:00) (112/73 - 156/91)  BP(mean): 89 (31 May 2021 09:00) (86 - 116)  RR: 25 (31 May 2021 09:00) (9 - 26)  SpO2: 100% (31 May 2021 09:00) (100% - 100%)    PHYSICAL EXAM:  Gen: NAD, resting in bed trach  HEENT: Normocephalic, atraumatic staples in place  Neck: supple, no lymphadenopathy  CV: Regular rate & regular rhythm  Lungs: decreased BS at bases, no fremitus  Abdomen: Soft, BS present  Ext: Warm, well perfused  Neuro: awake  Skin: no rash, no erythema  Lines: no phlebitis     FH: Non-contributory  Social Hx: Non-contributory    TESTS & MEASUREMENTS:                        7.9    4.97  )-----------( 292      ( 31 May 2021 00:40 )             24.1     05-31    144  |  110  |  5<L>  ----------------------------<  108<H>  4.5   |  25  |  <0.5<L>    Ca    8.4<L>      31 May 2021 05:50  Phos  2.9     05-30  Mg     1.7     05-30      eGFR if Non African American: 142 mL/min/1.73M2 (05-31-21 @ 05:50)  eGFR if African American: 165 mL/min/1.73M2 (05-31-21 @ 05:50)  eGFR if Non African American: 142 mL/min/1.73M2 (05-30-21 @ 23:20)  eGFR if : 165 mL/min/1.73M2 (05-30-21 @ 23:20)  eGFR if Non African American: 160 mL/min/1.73M2 (05-30-21 @ 17:08)  eGFR if : 186 mL/min/1.73M2 (05-30-21 @ 17:08)  eGFR if Non African American: 142 mL/min/1.73M2 (05-30-21 @ 11:35)  eGFR if African American: 165 mL/min/1.73M2 (05-30-21 @ 11:35)          Culture - Blood (collected 05-28-21 @ 00:15)  Source: .Blood Blood  Preliminary Report (05-29-21 @ 08:01):    No growth to date.    Culture - Blood (collected 05-28-21 @ 00:15)  Source: .Blood Blood  Preliminary Report (05-29-21 @ 08:01):    No growth to date.    Culture - Blood (collected 05-23-21 @ 19:19)  Source: .Blood Blood-Peripheral  Final Report (05-29-21 @ 03:00):    No Growth Final    Culture - Body Fluid with Gram Stain (collected 05-21-21 @ 12:00)  Source: .Body Fluid CSF  Gram Stain (05-22-21 @ 02:58):    polymorphonuclear leukocytes seen    Gram positive cocci in pairs seen    by cytocentrifuge  Final Report (05-26-21 @ 18:04):    Few Enterococcus faecalis  Organism: Enterococcus faecalis (05-26-21 @ 18:04)  Organism: Enterococcus faecalis (05-26-21 @ 18:04)      -  Ampicillin: S <=2 Predicts results to ampicillin/sulbactam, amoxacillin-clavulanate and  piperacillin-tazobactam.      -  Tetra/Doxy: R >8      -  Vancomycin: S 2      Method Type: AISHA    Culture - Blood (collected 05-20-21 @ 02:00)  Source: .Blood Blood  Final Report (05-25-21 @ 07:01):    No Growth Final    Culture - Blood (collected 05-19-21 @ 00:27)  Source: .Blood None  Final Report (05-24-21 @ 07:00):    No Growth Final    Culture - Surgical Swab (collected 05-16-21 @ 08:54)  Source: .Surgical Swab None  Final Report (05-21-21 @ 16:44):    Rare Coag Negative Staphylococcus  Organism: Coag Negative Staphylococcus (05-21-21 @ 16:44)  Organism: Coag Negative Staphylococcus (05-21-21 @ 16:44)      -  Ampicillin/Sulbactam: S <=8/4      -  Cefazolin: S <=4      -  Clindamycin: S <=0.25      -  Erythromycin: S <=0.25      -  Gentamicin: S <=1 Should not be used as monotherapy      -  Oxacillin: S <=0.25      -  Penicillin: R 0.25      -  RIF- Rifampin: S <=1 Should not be used as monotherapy      -  Tetra/Doxy: S <=1      -  Trimethoprim/Sulfamethoxazole: S <=0.5/9.5      -  Vancomycin: S 0.5      Method Type: AISHA    Culture - Blood (collected 05-13-21 @ 11:19)  Source: .Blood None  Final Report (05-18-21 @ 22:03):    No Growth Final    Culture - Blood (collected 05-11-21 @ 11:18)  Source: .Blood None  Gram Stain (05-12-21 @ 02:53):    Growth in anaerobic bottle: Gram Negative Rods    Growth in aerobic bottle: Gram Negative Rods  Final Report (05-13-21 @ 16:09):    Growth in aerobic and anaerobic bottles: Klebsiella variicola    ***Blood Panel PCR results on this specimen are available    approximately 3 hours after the Gram stain result.***    Gram stain, PCR, and/or culture results may not always    correspond due to difference in methodologies.    ************************************************************    This PCR assay was performed by multiplex PCR. This    Assay tests for 66 bacterial and resistance gene targets.    Please refer to the [facility] [employer] test directory    at [email or web address] for details.  Organism: Blood Culture PCR  Klebsiella variicola (05-13-21 @ 16:09)  Organism: Klebsiella variicola (05-13-21 @ 16:09)      -  Amikacin: S <=16      -  Ampicillin: R <=8 These ampicillin results predict results for amoxicillin      -  Ampicillin/Sulbactam: S <=4/2 Enterobacter, Citrobacter, and Serratia may develop resistance during prolonged therapy (3-4 days)      -  Aztreonam: S <=4      -  Cefazolin: S <=2 Enterobacter, Citrobacter, and Serratia may develop resistance during prolonged therapy (3-4 days)      -  Cefepime: S <=2      -  Cefoxitin: S <=8      -  Ceftriaxone: S <=1 Enterobacter, Citrobacter, and Serratia may develop resistance during prolonged therapy      -  Ciprofloxacin: S <=0.25      -  Ertapenem: S <=0.5      -  Gentamicin: S <=2      -  Imipenem: S <=1      -  Levofloxacin: S <=0.5      -  Meropenem: S <=1      -  Piperacillin/Tazobactam: S <=8      -  Tobramycin: S <=2      -  Trimethoprim/Sulfamethoxazole: S <=0.5/9.5      Method Type: AISHA  Organism: Blood Culture PCR (05-13-21 @ 16:09)      -  Klebsiella pneumoniae: Detec      Method Type: PCR    Culture - Bronchial (collected 05-11-21 @ 10:12)  Source: Bronch Wash Bronchoalveolar Lavage  Gram Stain (05-12-21 @ 07:04):    Few polymorphonuclear leukocytes per low power field    No Squamous epithelial cells per low power field    Few Gram Positive Rods per oil power field  Final Report (05-13-21 @ 16:19):    Moderate Klebsiella variicola    Moderate Serratia marcescens    Normal Respiratory Liz present  Organism: Klebsiella variicola  Serratia marcescens (05-13-21 @ 16:19)  Organism: Serratia marcescens (05-13-21 @ 16:19)      -  Amikacin: S <=16      -  Amoxicillin/Clavulanic Acid: R >16/8      -  Ampicillin: R <=8 These ampicillin results predict results for amoxicillin      -  Ampicillin/Sulbactam: R 16/8 Enterobacter, Citrobacter, and Serratia may develop resistance during prolonged therapy (3-4 days)      -  Aztreonam: S <=4      -  Cefazolin: R >16 Enterobacter, Citrobacter, and Serratia may develop resistance during prolonged therapy (3-4 days)      -  Cefepime: S <=2      -  Cefoxitin: R <=8      -  Ceftriaxone: S <=1 Enterobacter, Citrobacter, and Serratia may develop resistance during prolonged therapy      -  Ciprofloxacin: S <=0.25      -  Ertapenem: S <=0.5      -  Gentamicin: S <=2      -  Levofloxacin: S <=0.5      -  Meropenem: S <=1      -  Piperacillin/Tazobactam: S <=8      -  Tobramycin: S <=2      -  Trimethoprim/Sulfamethoxazole: S <=0.5/9.5      Method Type: AISHA  Organism: Klebsiella variicola (05-13-21 @ 16:19)      -  Amikacin: S <=16      -  Amoxicillin/Clavulanic Acid: R >16/8      -  Ampicillin: R <=8 These ampicillin results predict results for amoxicillin      -  Ampicillin/Sulbactam: I 16/8 Enterobacter, Citrobacter, and Serratia may develop resistance during prolonged therapy (3-4 days)      -  Aztreonam: R >16      -  Cefazolin: R >16 Enterobacter, Citrobacter, and Serratia may develop resistance during prolonged therapy (3-4 days)      -  Cefepime: S <=2      -  Cefoxitin: S <=8      -  Ceftriaxone: S <=1 Enterobacter, Citrobacter, and Serratia may develop resistance during prolonged therapy      -  Ciprofloxacin: S <=0.25      -  Ertapenem: S <=0.5      -  Gentamicin: S <=2      -  Imipenem: S <=1      -  Levofloxacin: S <=0.5      -  Meropenem: S <=1      -  Piperacillin/Tazobactam: S <=8      -  Tobramycin: S 4      -  Trimethoprim/Sulfamethoxazole: S <=0.5/9.5      Method Type: AISHA            INFECTIOUS DISEASES TESTING  Vancomycin Level, Random: 6.7 (05-30-21 @ 11:35)  Vancomycin Level, Trough: 21.7 (05-29-21 @ 11:20)  Vancomycin Level, Trough: 19.0 (05-28-21 @ 11:00)  Vancomycin Level, Trough: 9.0 (05-27-21 @ 05:50)  Vancomycin Level, Trough: 7.6 (05-24-21 @ 05:54)  COVID-19 PCR: NotDetec (05-21-21 @ 04:30)  COVID-19 PCR: NotDetec (05-14-21 @ 18:58)  Vancomycin Level, Trough: 4.1 (05-13-21 @ 05:50)  HIV-1/2 Combo Result: Nonreact (05-09-21 @ 23:30)  Rapid RVP Result: NotDetec (05-06-21 @ 22:44)  COVID-19 PCR: NotDetec (04-06-21 @ 16:46)  COVID-19 PCR: NotDetec (03-29-21 @ 19:35)      INFLAMMATORY MARKERS      RADIOLOGY & ADDITIONAL TESTS:  I have personally reviewed the last available Chest xray  CXR      CT      CARDIOLOGY TESTING      MEDICATIONS  artificial tears (preservative free) Ophthalmic Solution 2 Both EYES three times a day  chlorhexidine 0.12% Liquid 15 Oral Mucosa two times a day  chlorhexidine 4% Liquid 1 Topical <User Schedule>  enoxaparin Injectable 40 SubCutaneous every 24 hours  insulin lispro (ADMELOG) corrective regimen sliding scale  SubCutaneous every 6 hours  levETIRAcetam  IVPB 500 IV Intermittent every 12 hours  lisinopril 20 Oral daily  meropenem  IVPB     meropenem  IVPB 2000 IV Intermittent every 8 hours  pantoprazole   Suspension 40 Oral daily  propranolol 20 Oral every 8 hours  senna 2 Oral at bedtime  sodium chloride 8 Oral <User Schedule>  sodium chloride 3%. 500 IV Continuous <Continuous>  vancomycin  IVPB 1500 IV Intermittent every 8 hours  vancomycin  IVPB         WEIGHT  Weight (kg): 79.4 (05-23-21 @ 16:30)  Creatinine, Serum: <0.5 mg/dL (05-31-21 @ 05:50)  Creatinine, Serum: <0.5 mg/dL (05-30-21 @ 23:20)  Creatinine, Serum: <0.5 mg/dL (05-30-21 @ 17:08)  Creatinine, Serum: <0.5 mg/dL (05-30-21 @ 11:35)      ANTIBIOTICS:  meropenem  IVPB      meropenem  IVPB 2000 milliGRAM(s) IV Intermittent every 8 hours  vancomycin  IVPB 1500 milliGRAM(s) IV Intermittent every 8 hours  vancomycin  IVPB          All available historical records have been reviewed

## 2021-05-31 NOTE — PROGRESS NOTE ADULT - ASSESSMENT
Assessment & Plan  45M s/p fall with large left SDH and neurological status change, level 1 for emergent craniectomy complicated by subgleal hematoma/collection s/p wound revision and drainage on 5/16.    NEURO:      S/P craniectomy for large left SDH     - GCS 11T, Q2H neuro checks, No sedation,      - Keppra 500mg BID for sz ppx    Hyponatremia- goal Na+ 135     -  con't 3% @ 40cc and  NaCl tabs @ 8gm q4- 3% decreased to 40 on, f/u am BMP       - Na+ trend- 136->134 ->131 ->132->137->135-> 139>142    Maintian Head of bed @ 30 degrees    Helmet delivered --> does not need while laying in bed only during PT/OT       OR 5/16:     -Revision craniectomy wound with complex closure for persistent drainage, subgaleal hematoma   Imaging:     HCT 5/16-stable post op changes    CT Venogram-left transverse sinus thrombosis, no intervention currently    5/23 NSX placed KIKE under the skin to gravity, old blood aspirated and some pus,  -->      Drain DCed 5/27    -e. faecalis in cx --> ID following, on Vancomycin  , noon trough for 5/31   Repeat CT head 5/26- - there is slight increased thickness of a mixed density extra-axial collection within the craniectomy defect.. 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    RESP:   Respiratoy Failure    s/p trach 5/21    tolerating T-piece since 5/24    trach Sutures removed 5/27      CARDS:     S/P cardiac arrest in OR & MTP     - Hypertension - BP controlled on Lisinopril 20mg     - Propranolol 20mg q8  Tachycardia:  - fluid responsive nicom x1 on 14.9 % 250+250     GI/NUTR:     Diet: TF (Osmolite 1.5) @ 50 via PEG    Bumper at 4 cm    GI Prophylaxis- PPI    Bowel regimen restarted - Senna    last BM 5/30          /RENAL:   No acute issues    Condom cath, + voiding. 4.9 L x 24 hrs ON:     Q6 BMP for hypertonic saline     - Hyponatremic - Na goal 135 per neurosurgery     - last Na 142     - 3% @ 40     - Salt tabs incr 8g q4  Na 142 K 3.8  Mag 1.7  Phos 2.9-> 15 Kphos, 2mag     HEME/ONC:   No acute issues    Labs: Hb/Hct:  7.9/24 ( from 9.2/27)              Plts:  308  -->,  338  -->   273 ->292  Hg >8, MTP on this admission  DVT prophylaxis: Lovenox 40  5/26 DVT sono neg      ID:  Craniectomy site wound infection  Tmax  99.9,WBC- 4.9  Antibiotics-meropenem  IVPB 2000 every 8 hours, vancomycin  IVPB 1500 every 8 hours  - Trough 6.7 on 5/30, Vanco resumed, next trough due 5/31/@12pm (ordered)  Cultures     - 5/11- u/a +leuk     - blood culture 5/13, 5/19, 5/20, 5/23 No growth      -Cranial Culture 5/21-e faceium     - Reculture if spikes    ENDO:  FS controlled  q6 POC glucose  Sliding scale insulin PRN  A1c 5.2      DISPO: SICU

## 2021-05-31 NOTE — PROGRESS NOTE ADULT - ASSESSMENT
ASSESSMENT  45yM w/ PMHx of Etoh abuse and seizures on keppra  found down unresponsive admitted 5/6. s/p craniectomy for large L SDH    IMPRESSION  #RESOLVED Fevers secondary to craniotomy site collection and thrombus    5/27 KIKE removed    No CSF studies    5/23 BCX NG    5/21 aspirate craniotomy site WCX e. faecalis (S amp)    5/16 swab OR coNS (S oxacillin)    No leukocytosis, rule out non-infectious causes / central fever    5/19 BCX NGTD     5/18 UA unremarkable   < from: MR Head w/wo IV Cont (05.28.21 @ 20:10) >  1.9 cm enhancing soft tissue with internal mineralization along the anterior margin of the left frontal craniectomy reflect granulation tissue.  Diffuse thickening/enhancement of the bilateral pachymeninges, nonspecific but can be seen in setting of postop settings, intracranial hypotension, or meningitis.  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 Redemonstrated subdural hematomas along the right cerebral and cerebellar convexities.  Evolving subacute infarcts in the medial left frontoparietal region, left insular cortex, and right cerebellum with associated gyral enhancements.  Peripherally enhancing evolving hemorrhagic contusion/hematoma in the left temporal occipital regions with scattered petechial gyral hemorrhage/enhancement.Moderate surrounding edema.  < from: CT Head No Cont (05.26.21 @ 12:49) >  Since prior CT of 5/16/2021 there is slight increased thickness of a mixed density extra-axial collection within the craniectomy defect.. 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 Please note evaluation for abscess is limited without intravenous contrast. A follow-up MRI of the brain with and without contrast is recommended for further evaluation.  Decreased blood products noted within the left temporal occipital region with unchanged areas of subacute infarct.    CT 5/20 1.  Redemonstrated filling defect within the left transverse sinus, sigmoid sinus and proximal internal jugular vein compatible with thrombus. When compared to the CTA from 5/13, there has been interval partial recanalization of the left transverse sinus.  2.  Redemonstrated large left-sided craniectomy. The previously seen left scalp drain has been removed.  3.  Increasing size of the mixed density fluid collection/hematoma extending from the left extra-axial space to the overlying scalp, overall measuring 4 cm in width, previously 3 cm.  4.  Increasing frontal convexity subdural hygromas measuring 8 mm on the right and 9 mm on the left.  5.  Left to right midline shift of 4 mm, previously 3 mm.  6.  Redemonstrated acute/subacute infarcts within the left temporal lobe and insula. Additional previously demonstrated multifocal infarcts and edema are not as well delineated on this exam, recommend follow-up head CT.  #Klebsiella bacteremia , BAL also with Klebsiella but no PNA on imaging (CT/CXR)    5/13 BCX NGTD     5/11 BCX kleb variicola (S)    5/11 BAL   Moderate Klebsiella variicola &  Moderate Serratia marcescens  < from: Xray Chest 1 View- PORTABLE-Routine (05.13.21 @ 06:28) >No radiographic evidence of acute cardiopulmonary disease.    #SDH s/p craniectomy    5/16 OR WCX   Rare Coag Negative Staphylococcus (S oxacillin)- likely skin colonizer     5/16 s/p Revision craniotomy wound with complex closure for persistent drainage, subgaleal hematoma    Repeat CTH development of acute infarcts in left cerebral hemisphere and right cerebellum  #CT atelectasis   #HIV/Hep panel NR  Creatinine, Serum: <0.5 (05-14-21 @ 05:30)      RECOMMENDATIONS  - D7 of ABX , would await 10-14 days prior  shunt placement  - Seamus 2g q8h IV and Vanc IV 5/25-, s/p unasyn  - Vanc 1.5 q8h IV  Vancomycin Level, Random: 6.7 (05-30-21 @ 11:35)  Vancomycin Level, Trough: 21.7 (05-29-21 @ 11:20)- peak   Vancomycin Level, Trough: 19.0 (05-28-21 @ 11:00)- peak   - trend WBC, fever curve  - Per SICU/ NSYG  - Fevers can be central as well- no leukocytosis vs contained subdural empyema     If any questions, please call or send a message on [employer] Teams  Spectra [phone]

## 2021-06-01 LAB
ANION GAP SERPL CALC-SCNC: 10 MMOL/L — SIGNIFICANT CHANGE UP (ref 7–14)
ANION GAP SERPL CALC-SCNC: 10 MMOL/L — SIGNIFICANT CHANGE UP (ref 7–14)
ANION GAP SERPL CALC-SCNC: 11 MMOL/L — SIGNIFICANT CHANGE UP (ref 7–14)
ANION GAP SERPL CALC-SCNC: 8 MMOL/L — SIGNIFICANT CHANGE UP (ref 7–14)
ANION GAP SERPL CALC-SCNC: 8 MMOL/L — SIGNIFICANT CHANGE UP (ref 7–14)
ANION GAP SERPL CALC-SCNC: 9 MMOL/L — SIGNIFICANT CHANGE UP (ref 7–14)
ANION GAP SERPL CALC-SCNC: 9 MMOL/L — SIGNIFICANT CHANGE UP (ref 7–14)
BUN SERPL-MCNC: 4 MG/DL — LOW (ref 10–20)
BUN SERPL-MCNC: 5 MG/DL — LOW (ref 10–20)
BUN SERPL-MCNC: 6 MG/DL — LOW (ref 10–20)
CALCIUM SERPL-MCNC: 8.3 MG/DL — LOW (ref 8.5–10.1)
CALCIUM SERPL-MCNC: 8.4 MG/DL — LOW (ref 8.5–10.1)
CALCIUM SERPL-MCNC: 8.5 MG/DL — SIGNIFICANT CHANGE UP (ref 8.5–10.1)
CALCIUM SERPL-MCNC: 8.6 MG/DL — SIGNIFICANT CHANGE UP (ref 8.5–10.1)
CALCIUM SERPL-MCNC: 8.9 MG/DL — SIGNIFICANT CHANGE UP (ref 8.5–10.1)
CHLORIDE SERPL-SCNC: 93 MMOL/L — LOW (ref 98–110)
CHLORIDE SERPL-SCNC: 94 MMOL/L — LOW (ref 98–110)
CHLORIDE SERPL-SCNC: 95 MMOL/L — LOW (ref 98–110)
CHLORIDE SERPL-SCNC: 96 MMOL/L — LOW (ref 98–110)
CHLORIDE SERPL-SCNC: 96 MMOL/L — LOW (ref 98–110)
CO2 SERPL-SCNC: 25 MMOL/L — SIGNIFICANT CHANGE UP (ref 17–32)
CO2 SERPL-SCNC: 26 MMOL/L — SIGNIFICANT CHANGE UP (ref 17–32)
CO2 SERPL-SCNC: 27 MMOL/L — SIGNIFICANT CHANGE UP (ref 17–32)
CO2 SERPL-SCNC: 27 MMOL/L — SIGNIFICANT CHANGE UP (ref 17–32)
CO2 SERPL-SCNC: 28 MMOL/L — SIGNIFICANT CHANGE UP (ref 17–32)
CREAT SERPL-MCNC: <0.5 MG/DL — LOW (ref 0.7–1.5)
GLUCOSE BLDC GLUCOMTR-MCNC: 119 MG/DL — HIGH (ref 70–99)
GLUCOSE BLDC GLUCOMTR-MCNC: 146 MG/DL — HIGH (ref 70–99)
GLUCOSE BLDC GLUCOMTR-MCNC: 151 MG/DL — HIGH (ref 70–99)
GLUCOSE BLDC GLUCOMTR-MCNC: 162 MG/DL — HIGH (ref 70–99)
GLUCOSE SERPL-MCNC: 114 MG/DL — HIGH (ref 70–99)
GLUCOSE SERPL-MCNC: 120 MG/DL — HIGH (ref 70–99)
GLUCOSE SERPL-MCNC: 124 MG/DL — HIGH (ref 70–99)
GLUCOSE SERPL-MCNC: 126 MG/DL — HIGH (ref 70–99)
GLUCOSE SERPL-MCNC: 127 MG/DL — HIGH (ref 70–99)
GLUCOSE SERPL-MCNC: 141 MG/DL — HIGH (ref 70–99)
GLUCOSE SERPL-MCNC: 188 MG/DL — HIGH (ref 70–99)
HCT VFR BLD CALC: 24.9 % — LOW (ref 42–52)
HGB BLD-MCNC: 8.5 G/DL — LOW (ref 14–18)
MAGNESIUM SERPL-MCNC: 1.7 MG/DL — LOW (ref 1.8–2.4)
MCHC RBC-ENTMCNC: 28.9 PG — SIGNIFICANT CHANGE UP (ref 27–31)
MCHC RBC-ENTMCNC: 34.1 G/DL — SIGNIFICANT CHANGE UP (ref 32–37)
MCV RBC AUTO: 84.7 FL — SIGNIFICANT CHANGE UP (ref 80–94)
NRBC # BLD: 0 /100 WBCS — SIGNIFICANT CHANGE UP (ref 0–0)
OSMOLALITY SERPL: 267 MOS/KG — LOW (ref 275–300)
OSMOLALITY SERPL: 270 MOS/KG — LOW (ref 275–300)
OSMOLALITY SERPL: 271 MOS/KG — LOW (ref 275–300)
OSMOLALITY SERPL: 273 MOS/KG — LOW (ref 275–300)
OSMOLALITY SERPL: 273 MOS/KG — LOW (ref 275–300)
PHOSPHATE SERPL-MCNC: 2.9 MG/DL — SIGNIFICANT CHANGE UP (ref 2.1–4.9)
PLATELET # BLD AUTO: 353 K/UL — SIGNIFICANT CHANGE UP (ref 130–400)
POTASSIUM SERPL-MCNC: 4 MMOL/L — SIGNIFICANT CHANGE UP (ref 3.5–5)
POTASSIUM SERPL-MCNC: 4 MMOL/L — SIGNIFICANT CHANGE UP (ref 3.5–5)
POTASSIUM SERPL-MCNC: 4.2 MMOL/L — SIGNIFICANT CHANGE UP (ref 3.5–5)
POTASSIUM SERPL-MCNC: 4.3 MMOL/L — SIGNIFICANT CHANGE UP (ref 3.5–5)
POTASSIUM SERPL-MCNC: 4.8 MMOL/L — SIGNIFICANT CHANGE UP (ref 3.5–5)
POTASSIUM SERPL-SCNC: 4 MMOL/L — SIGNIFICANT CHANGE UP (ref 3.5–5)
POTASSIUM SERPL-SCNC: 4 MMOL/L — SIGNIFICANT CHANGE UP (ref 3.5–5)
POTASSIUM SERPL-SCNC: 4.2 MMOL/L — SIGNIFICANT CHANGE UP (ref 3.5–5)
POTASSIUM SERPL-SCNC: 4.3 MMOL/L — SIGNIFICANT CHANGE UP (ref 3.5–5)
POTASSIUM SERPL-SCNC: 4.8 MMOL/L — SIGNIFICANT CHANGE UP (ref 3.5–5)
RBC # BLD: 2.94 M/UL — LOW (ref 4.7–6.1)
RBC # FLD: 14.1 % — SIGNIFICANT CHANGE UP (ref 11.5–14.5)
SODIUM SERPL-SCNC: 127 MMOL/L — LOW (ref 135–146)
SODIUM SERPL-SCNC: 128 MMOL/L — LOW (ref 135–146)
SODIUM SERPL-SCNC: 129 MMOL/L — LOW (ref 135–146)
SODIUM SERPL-SCNC: 131 MMOL/L — LOW (ref 135–146)
WBC # BLD: 8.52 K/UL — SIGNIFICANT CHANGE UP (ref 4.8–10.8)
WBC # FLD AUTO: 8.52 K/UL — SIGNIFICANT CHANGE UP (ref 4.8–10.8)

## 2021-06-01 PROCEDURE — 99291 CRITICAL CARE FIRST HOUR: CPT

## 2021-06-01 PROCEDURE — 71045 X-RAY EXAM CHEST 1 VIEW: CPT | Mod: 26

## 2021-06-01 RX ORDER — MAGNESIUM SULFATE 500 MG/ML
1 VIAL (ML) INJECTION ONCE
Refills: 0 | Status: COMPLETED | OUTPATIENT
Start: 2021-06-01 | End: 2021-06-01

## 2021-06-01 RX ORDER — MAGNESIUM SULFATE 500 MG/ML
2 VIAL (ML) INJECTION ONCE
Refills: 0 | Status: COMPLETED | OUTPATIENT
Start: 2021-06-01 | End: 2021-06-01

## 2021-06-01 RX ORDER — SODIUM CHLORIDE 9 MG/ML
500 INJECTION INTRAMUSCULAR; INTRAVENOUS; SUBCUTANEOUS ONCE
Refills: 0 | Status: COMPLETED | OUTPATIENT
Start: 2021-06-01 | End: 2021-06-01

## 2021-06-01 RX ORDER — SODIUM CHLORIDE 5 G/100ML
500 INJECTION, SOLUTION INTRAVENOUS
Refills: 0 | Status: DISCONTINUED | OUTPATIENT
Start: 2021-06-01 | End: 2021-06-01

## 2021-06-01 RX ORDER — SODIUM CHLORIDE 9 MG/ML
6 INJECTION INTRAMUSCULAR; INTRAVENOUS; SUBCUTANEOUS EVERY 8 HOURS
Refills: 0 | Status: DISCONTINUED | OUTPATIENT
Start: 2021-06-01 | End: 2021-06-01

## 2021-06-01 RX ORDER — SODIUM CHLORIDE 9 MG/ML
8 INJECTION INTRAMUSCULAR; INTRAVENOUS; SUBCUTANEOUS EVERY 6 HOURS
Refills: 0 | Status: DISCONTINUED | OUTPATIENT
Start: 2021-06-01 | End: 2021-06-07

## 2021-06-01 RX ORDER — SODIUM CHLORIDE 9 MG/ML
1000 INJECTION INTRAMUSCULAR; INTRAVENOUS; SUBCUTANEOUS
Refills: 0 | Status: DISCONTINUED | OUTPATIENT
Start: 2021-06-01 | End: 2021-06-05

## 2021-06-01 RX ORDER — SODIUM CHLORIDE 5 G/100ML
500 INJECTION, SOLUTION INTRAVENOUS
Refills: 0 | Status: DISCONTINUED | OUTPATIENT
Start: 2021-06-01 | End: 2021-06-04

## 2021-06-01 RX ADMIN — LISINOPRIL 20 MILLIGRAM(S): 2.5 TABLET ORAL at 05:23

## 2021-06-01 RX ADMIN — CHLORHEXIDINE GLUCONATE 1 APPLICATION(S): 213 SOLUTION TOPICAL at 05:20

## 2021-06-01 RX ADMIN — SODIUM CHLORIDE 8 GRAM(S): 9 INJECTION INTRAMUSCULAR; INTRAVENOUS; SUBCUTANEOUS at 12:19

## 2021-06-01 RX ADMIN — Medication 100 GRAM(S): at 05:23

## 2021-06-01 RX ADMIN — PANTOPRAZOLE SODIUM 40 MILLIGRAM(S): 20 TABLET, DELAYED RELEASE ORAL at 12:19

## 2021-06-01 RX ADMIN — Medication 2 DROP(S): at 15:30

## 2021-06-01 RX ADMIN — SODIUM CHLORIDE 1000 MILLILITER(S): 9 INJECTION INTRAMUSCULAR; INTRAVENOUS; SUBCUTANEOUS at 17:44

## 2021-06-01 RX ADMIN — Medication 50 GRAM(S): at 02:08

## 2021-06-01 RX ADMIN — LEVETIRACETAM 420 MILLIGRAM(S): 250 TABLET, FILM COATED ORAL at 05:21

## 2021-06-01 RX ADMIN — Medication 300 MILLIGRAM(S): at 15:30

## 2021-06-01 RX ADMIN — Medication 2 DROP(S): at 05:20

## 2021-06-01 RX ADMIN — LEVETIRACETAM 420 MILLIGRAM(S): 250 TABLET, FILM COATED ORAL at 17:09

## 2021-06-01 RX ADMIN — SODIUM CHLORIDE 1200 MILLILITER(S): 9 INJECTION INTRAMUSCULAR; INTRAVENOUS; SUBCUTANEOUS at 12:18

## 2021-06-01 RX ADMIN — Medication 300 MILLIGRAM(S): at 05:23

## 2021-06-01 RX ADMIN — SODIUM CHLORIDE 6 GRAM(S): 9 INJECTION INTRAMUSCULAR; INTRAVENOUS; SUBCUTANEOUS at 03:04

## 2021-06-01 RX ADMIN — SODIUM CHLORIDE 50 MILLILITER(S): 9 INJECTION INTRAMUSCULAR; INTRAVENOUS; SUBCUTANEOUS at 17:09

## 2021-06-01 RX ADMIN — Medication 300 MILLIGRAM(S): at 22:07

## 2021-06-01 RX ADMIN — Medication 2: at 06:59

## 2021-06-01 RX ADMIN — Medication 62.5 MILLIMOLE(S): at 05:23

## 2021-06-01 RX ADMIN — CHLORHEXIDINE GLUCONATE 15 MILLILITER(S): 213 SOLUTION TOPICAL at 17:08

## 2021-06-01 RX ADMIN — SODIUM CHLORIDE 8 GRAM(S): 9 INJECTION INTRAMUSCULAR; INTRAVENOUS; SUBCUTANEOUS at 23:14

## 2021-06-01 RX ADMIN — ENOXAPARIN SODIUM 40 MILLIGRAM(S): 100 INJECTION SUBCUTANEOUS at 17:08

## 2021-06-01 RX ADMIN — SODIUM CHLORIDE 50 MILLILITER(S): 5 INJECTION, SOLUTION INTRAVENOUS at 16:48

## 2021-06-01 RX ADMIN — SODIUM CHLORIDE 8 GRAM(S): 9 INJECTION INTRAMUSCULAR; INTRAVENOUS; SUBCUTANEOUS at 17:09

## 2021-06-01 RX ADMIN — MEROPENEM 200 MILLIGRAM(S): 1 INJECTION INTRAVENOUS at 05:23

## 2021-06-01 RX ADMIN — MEROPENEM 200 MILLIGRAM(S): 1 INJECTION INTRAVENOUS at 16:48

## 2021-06-01 RX ADMIN — CHLORHEXIDINE GLUCONATE 15 MILLILITER(S): 213 SOLUTION TOPICAL at 05:20

## 2021-06-01 RX ADMIN — SODIUM CHLORIDE 30 MILLILITER(S): 5 INJECTION, SOLUTION INTRAVENOUS at 01:15

## 2021-06-01 RX ADMIN — Medication 2 DROP(S): at 22:00

## 2021-06-01 NOTE — PROGRESS NOTE ADULT - SUBJECTIVE AND OBJECTIVE BOX
SANDRA DAS  [id number]  45y Male    Indication for ICU admission: s/p craniectomy for large L SDH  Admit Date:05-06-21  ICU Date: 05-07-21  OR Date: 05-06-21    No Known Allergies    PAST MEDICAL & SURGICAL HISTORY:  Alcohol abuse  GERD (gastroesophageal reflux disease)  ETOH abuse  Hypomagnesemia  Seizure disorder  H/O hemorrhoidectomy    Home Medications:  NONE    24HRS EVENT:  Day  Na 144, dec 3%30, 147, holding 3%, 153, holding salt tab  watery bm, dignishield   as per ID, keep ABX for 2 wks,  Vanc level 15.7  K 3.3, 3k-riders >4.0  tachy to 100-110 ,nicom neg     NIGHT:   Sodium dropped from 153 to 131, 3% restarted at 30, salt tabs restarted , f/u am labs  opens eyes to voice, follows simle commands on LUE moves LLE spontanously  HDS, remians on t-piece, UO adequete           DVT PTX: LVX  GI PTX: PPI    ***Tubes/Lines/Drains  ***  Peripheral IV  Left basilic midline 5/17  Arterial Line Right radial	Date 5/17  L IJ TLC     5/29  Trach/peg 5/21    REVIEW OF SYSTEMS  [ ] A ten-point review of systems was otherwise negative except as noted.  [x] Due to altered mental status/intubation, subjective information were not able to be obtained from the patient. History was obtained, to the extent possible, from review of the chart and collateral sources of information.     SANDRA DAS  [id number]  45y Male    Indication for ICU admission: s/p craniectomy for large L SDH  Admit Date:05-06-21  ICU Date: 05-07-21  OR Date: 05-06-21    No Known Allergies    PAST MEDICAL & SURGICAL HISTORY:  Alcohol abuse  GERD (gastroesophageal reflux disease)  ETOH abuse  Hypomagnesemia  Seizure disorder  H/O hemorrhoidectomy    Home Medications:  NONE    24HRS EVENT:  Day  Na 144, dec 3%30, 147, holding 3%, 153, holding salt tab  watery bm, dignishield   as per ID, keep ABX for 2 wks,  Vanc level 15.7  K 3.3, 3k-riders >4.0  tachy to 100-110 ,nicom neg     NIGHT:   Sodium dropped from 153 to 131, 3% restarted at 30, salt tabs restarted , f/u am labs  opens eyes to voice, follows simle commands on LUE moves LLE spontanously  HDS, remians on t-piece, -300  - repletions: 3mag, 15Na phos           DVT PTX: LVX  GI PTX: PPI    ***Tubes/Lines/Drains  ***  Peripheral IV  Left basilic midline 5/17  Arterial Line Right radial	Date 5/17  L IJ TLC     5/29  Trach/peg 5/21    REVIEW OF SYSTEMS  [ ] A ten-point review of systems was otherwise negative except as noted.  [x] Due to altered mental status/intubation, subjective information were not able to be obtained from the patient. History was obtained, to the extent possible, from review of the chart and collateral sources of information.     SANDRA DAS  [id number]  45y Male    Indication for ICU admission: s/p craniectomy for large L SDH  Admit Date:05-06-21  ICU Date: 05-07-21  OR Date: 05-06-21    No Known Allergies    PAST MEDICAL & SURGICAL HISTORY:  Alcohol abuse  GERD (gastroesophageal reflux disease)  ETOH abuse  Hypomagnesemia  Seizure disorder  H/O hemorrhoidectomy    Home Medications:  NONE    24HRS EVENT:  Day  Na 144, dec 3%30, 147, holding 3%, 153, holding salt tab  watery bm, dignishield   as per ID, keep ABX for 2 wks,  Vanc level 15.7  K 3.3, 3k-riders >4.0  tachy to 100-110 ,nicom neg     NIGHT:   Sodium dropped from 153 to 131, 3% restarted at 30, salt tabs restarted , f/u am labs  opens eyes to voice, follows simle commands on LUE moves LLE spontanously  HDS, remians on t-piece, -300  - repletions: 3mag, 15Na phos           DVT PTX: LVX  GI PTX: PPI    ***Tubes/Lines/Drains  ***  Peripheral IV  Left basilic midline 5/17  Arterial Line Right radial	Date 5/17  L IJ TLC     5/29  Trach/peg 5/21    REVIEW OF SYSTEMS  [ ] A ten-point review of systems was otherwise negative except as noted.  [x] Due to altered mental status/intubation, subjective information were not able to be obtained from the patient. History was obtained, to the extent possible, from review of the chart and collateral sources of information.      Daily     Daily     Diet, NPO with Tube Feed:   Tube Feeding Modality: Orogastric  Osmolite 1.5 Jeff  Total Volume for 24 Hours (mL): 1200  Continuous  Starting Tube Feed Rate mL per Hour: 50  Until Goal Tube Feed Rate (mL per Hour): 50  Tube Feed Duration (in Hours): 24  Tube Feed Start Time: 00:00  No Carb Prosource TF     Qty per Day:  3 (05-24-21 @ 19:03)      CURRENT MEDS:  Neurologic Medications  acetaminophen   Tablet .. 650 milliGRAM(s) Oral every 6 hours PRN Temp greater or equal to 38.5C (101.3F), Moderate Pain (4 - 6), Severe Pain (7 - 10)  levETIRAcetam  IVPB 500 milliGRAM(s) IV Intermittent every 12 hours    Respiratory Medications    Cardiovascular Medications  lisinopril 20 milliGRAM(s) Oral daily  propranolol 20 milliGRAM(s) Oral every 8 hours    Gastrointestinal Medications  pantoprazole   Suspension 40 milliGRAM(s) Oral daily  senna 2 Tablet(s) Oral at bedtime  sodium chloride 8 Gram(s) Oral every 6 hours  sodium chloride 3%. 500 milliLiter(s) IV Continuous <Continuous>    Genitourinary Medications    Hematologic/Oncologic Medications  enoxaparin Injectable 40 milliGRAM(s) SubCutaneous every 24 hours    Antimicrobial/Immunologic Medications  meropenem  IVPB 2000 milliGRAM(s) IV Intermittent every 8 hours  meropenem  IVPB      vancomycin  IVPB      vancomycin  IVPB 1500 milliGRAM(s) IV Intermittent every 8 hours    Endocrine/Metabolic Medications  insulin lispro (ADMELOG) corrective regimen sliding scale   SubCutaneous every 6 hours    Topical/Other Medications  artificial tears (preservative free) Ophthalmic Solution 2 Drop(s) Both EYES three times a day  chlorhexidine 0.12% Liquid 15 milliLiter(s) Oral Mucosa two times a day  chlorhexidine 4% Liquid 1 Application(s) Topical <User Schedule>      ICU Vital Signs Last 24 Hrs  T(C): 37.6 (01 Jun 2021 07:19), Max: 38 (31 May 2021 15:32)  T(F): 99.6 (01 Jun 2021 07:19), Max: 100.4 (31 May 2021 15:32)  HR: 91 (01 Jun 2021 07:00) (79 - 114)  BP: 153/90 (01 Jun 2021 07:00) (112/73 - 161/91)  BP(mean): 110 (01 Jun 2021 06:00) (89 - 119)  ABP: --  ABP(mean): --  RR: 24 (01 Jun 2021 07:00) (13 - 26)  SpO2: 100% (01 Jun 2021 07:00) (98% - 100%)      Adult Advanced Hemodynamics Last 24 Hrs  CVP(mm Hg): --  CVP(cm H2O): --  CO: --  CI: --  PA: --  PA(mean): --  PCWP: --  SVR: --  SVRI: --  PVR: --  PVRI: --          I&O's Summary    31 May 2021 07:01  -  01 Jun 2021 07:00  --------------------------------------------------------  IN: 4260 mL / OUT: 5580 mL / NET: -1320 mL      I&O's Detail    31 May 2021 07:01  -  01 Jun 2021 07:00  --------------------------------------------------------  IN:    IV PiggyBack: 100 mL    IV PiggyBack: 750 mL    IV PiggyBack: 250 mL    IV PiggyBack: 300 mL    IV PiggyBack: 300 mL    IV PiggyBack: 200 mL    IV PiggyBack: 500 mL    Osmolite: 1200 mL    Sodium Chloride 0.9% Bolus: 250 mL    sodium chloride 3%: 210 mL    sodium chloride 3%: 200 mL  Total IN: 4260 mL    OUT:    Voided (mL): 5580 mL  Total OUT: 5580 mL    Total NET: -1320 mL          PHYSICAL EXAM:    General/Neuro  RASS:             GCS:     = E   / V   / M      Deficits:                             alert & oriented x 3, no focal deficits  Pupils:    Lungs:      clear to auscultation, Normal expansion/effort.     Cardiovascular : S1, S2.  Regular rate and rhythm.  Peripheral edema   Cardiac Rhythm: Normal Sinus Rhythm    GI: Abdomen soft, Non-tender, Non-distended.    Gastrostomy / Jejunostomy tube in place.  Nasogastric tube in place.  Colostomy / Ileostomy.    Wound:    Extremities: Extremities warm, pink, well-perfused. Pulses:Rt     Lt    Derm: Good skin turgor, no skin breakdown.      :       Cole catheter in place.      CXR:     LABS:  CAPILLARY BLOOD GLUCOSE      POCT Blood Glucose.: 162 mg/dL (01 Jun 2021 06:57)  POCT Blood Glucose.: 119 mg/dL (01 Jun 2021 03:49)  POCT Blood Glucose.: 145 mg/dL (31 May 2021 23:53)  POCT Blood Glucose.: 118 mg/dL (31 May 2021 17:44)  POCT Blood Glucose.: 110 mg/dL (31 May 2021 11:53)                          8.5    8.52  )-----------( 353      ( 31 May 2021 23:50 )             24.9       06-01    131<L>  |  95<L>  |  4<L>  ----------------------------<  114<H>  4.2   |  27  |  <0.5<L>    Ca    8.5      01 Jun 2021 03:45  Phos  2.9     05-31  Mg     1.7     05-31                   SANDRA DAS  [id number]  45y Male    Indication for ICU admission: s/p craniectomy for large L SDH  Admit Date:05-06-21  ICU Date: 05-07-21  OR Date: 05-06-21    No Known Allergies    PAST MEDICAL & SURGICAL HISTORY:  Alcohol abuse  GERD (gastroesophageal reflux disease)  ETOH abuse  Hypomagnesemia  Seizure disorder  H/O hemorrhoidectomy    Home Medications:  NONE    24HRS EVENT:  Day  Na 144, dec 3%30, 147, holding 3%, 153, holding salt tab  watery bm, dignishield   as per ID, keep ABX for 2 wks,  Vanc level 15.7  K 3.3, 3k-riders >4.0  tachy to 100-110 ,nicom neg     NIGHT:   Sodium dropped from 153 to 131, 3% restarted at 30, salt tabs restarted , f/u am labs  opens eyes to voice, follows simle commands on LUE moves LLE spontanously  HDS, remians on t-piece, -300  - repletions: 3mag, 15Na phos           DVT PTX: LVX  GI PTX: PPI    ***Tubes/Lines/Drains  ***  Peripheral IV  Left basilic midline 5/17  Arterial Line Right radial	Date 5/17  L IJ TLC     5/29  Trach/peg 5/21    REVIEW OF SYSTEMS  [ ] A ten-point review of systems was otherwise negative except as noted.  [x] Due to altered mental status/intubation, subjective information were not able to be obtained from the patient. History was obtained, to the extent possible, from review of the chart and collateral sources of information.      Daily     Daily     Diet, NPO with Tube Feed:   Tube Feeding Modality: Orogastric  Osmolite 1.5 Jeff  Total Volume for 24 Hours (mL): 1200  Continuous  Starting Tube Feed Rate mL per Hour: 50  Until Goal Tube Feed Rate (mL per Hour): 50  Tube Feed Duration (in Hours): 24  Tube Feed Start Time: 00:00  No Carb Prosource TF     Qty per Day:  3 (05-24-21 @ 19:03)      CURRENT MEDS:  Neurologic Medications  acetaminophen   Tablet .. 650 milliGRAM(s) Oral every 6 hours PRN Temp greater or equal to 38.5C (101.3F), Moderate Pain (4 - 6), Severe Pain (7 - 10)  levETIRAcetam  IVPB 500 milliGRAM(s) IV Intermittent every 12 hours    Respiratory Medications    Cardiovascular Medications  lisinopril 20 milliGRAM(s) Oral daily  propranolol 20 milliGRAM(s) Oral every 8 hours    Gastrointestinal Medications  pantoprazole   Suspension 40 milliGRAM(s) Oral daily  senna 2 Tablet(s) Oral at bedtime  sodium chloride 8 Gram(s) Oral every 6 hours  sodium chloride 3%. 500 milliLiter(s) IV Continuous <Continuous>    Genitourinary Medications    Hematologic/Oncologic Medications  enoxaparin Injectable 40 milliGRAM(s) SubCutaneous every 24 hours    Antimicrobial/Immunologic Medications  meropenem  IVPB 2000 milliGRAM(s) IV Intermittent every 8 hours  meropenem  IVPB      vancomycin  IVPB      vancomycin  IVPB 1500 milliGRAM(s) IV Intermittent every 8 hours    Endocrine/Metabolic Medications  insulin lispro (ADMELOG) corrective regimen sliding scale   SubCutaneous every 6 hours    Topical/Other Medications  artificial tears (preservative free) Ophthalmic Solution 2 Drop(s) Both EYES three times a day  chlorhexidine 0.12% Liquid 15 milliLiter(s) Oral Mucosa two times a day  chlorhexidine 4% Liquid 1 Application(s) Topical <User Schedule>      ICU Vital Signs Last 24 Hrs  T(C): 37.6 (01 Jun 2021 07:19), Max: 38 (31 May 2021 15:32)  T(F): 99.6 (01 Jun 2021 07:19), Max: 100.4 (31 May 2021 15:32)  HR: 91 (01 Jun 2021 07:00) (79 - 114)  BP: 153/90 (01 Jun 2021 07:00) (112/73 - 161/91)  BP(mean): 110 (01 Jun 2021 06:00) (89 - 119)  ABP: --  ABP(mean): --  RR: 24 (01 Jun 2021 07:00) (13 - 26)  SpO2: 100% (01 Jun 2021 07:00) (98% - 100%)      Adult Advanced Hemodynamics Last 24 Hrs  CVP(mm Hg): --  CVP(cm H2O): --  CO: --  CI: --  PA: --  PA(mean): --  PCWP: --  SVR: --  SVRI: --  PVR: --  PVRI: --          I&O's Summary    31 May 2021 07:01  -  01 Jun 2021 07:00  --------------------------------------------------------  IN: 4260 mL / OUT: 5580 mL / NET: -1320 mL      I&O's Detail    31 May 2021 07:01  -  01 Jun 2021 07:00  --------------------------------------------------------  IN:    IV PiggyBack: 100 mL    IV PiggyBack: 750 mL    IV PiggyBack: 250 mL    IV PiggyBack: 300 mL    IV PiggyBack: 300 mL    IV PiggyBack: 200 mL    IV PiggyBack: 500 mL    Osmolite: 1200 mL    Sodium Chloride 0.9% Bolus: 250 mL    sodium chloride 3%: 210 mL    sodium chloride 3%: 200 mL  Total IN: 4260 mL    OUT:    Voided (mL): 5580 mL  Total OUT: 5580 mL    Total NET: -1320 mL          PHYSICAL EXAM:    General/Neuro  RASS:             GCS:     = E 4  / V1T   / M    6 intermittently follows commands  Pupils: PERRL    Lungs:      clear to auscultation, Normal expansion/effort.     Cardiovascular : S1, S2.  Regular rate and rhythm.  Peripheral edema   Cardiac Rhythm: Normal Sinus Rhythm    GI: Abdomen soft, Non-tender, Non-distended.        Extremities: Extremities warm, pink, well-perfused.     Derm: Good skin turgor, no skin breakdown.      :   condom cath in place      CXR:     LABS:  CAPILLARY BLOOD GLUCOSE      POCT Blood Glucose.: 162 mg/dL (01 Jun 2021 06:57)  POCT Blood Glucose.: 119 mg/dL (01 Jun 2021 03:49)  POCT Blood Glucose.: 145 mg/dL (31 May 2021 23:53)  POCT Blood Glucose.: 118 mg/dL (31 May 2021 17:44)  POCT Blood Glucose.: 110 mg/dL (31 May 2021 11:53)                          8.5    8.52  )-----------( 353      ( 31 May 2021 23:50 )             24.9       06-01    131<L>  |  95<L>  |  4<L>  ----------------------------<  114<H>  4.2   |  27  |  <0.5<L>    Ca    8.5      01 Jun 2021 03:45  Phos  2.9     05-31  Mg     1.7     05-31

## 2021-06-01 NOTE — PROGRESS NOTE ADULT - ASSESSMENT
Assessment & Plan  45M s/p fall with large left SDH and neurological status change, level 1 for emergent craniectomy complicated by subgleal hematoma/collection s/p wound revision and drainage on 5/16.    NEURO:      S/P craniectomy for large left SDH     - GCS 11T, Q2H neuro checks, No sedation,      - Keppra 500mg BID for sz ppx    Hyponatremia- goal Na+ 135     -  3%@30 and salt tabs 8q6 restarted on , f/u am NA      - Na+ trend- 142 >144 >147>131    Maintian Head of bed @ 30 degrees    Helmet delivered --> does not need while laying in bed only during PT/OT       OR 5/16:     -Revision craniectomy wound with complex closure for persistent drainage, subgaleal hematoma   Imaging:     HCT 5/16-stable post op changes    CT Venogram-left transverse sinus thrombosis, no intervention currently    5/23 NSX placed KIKE under the skin to gravity, old blood aspirated and some pus,  -->      Drain DCed 5/27    -e. faecalis in cx --> ID following, on Vancomycin  , trough 15.7   Repeat CT head 5/26- - there is slight increased thickness of a mixed density extra-axial collection within the craniectomy defect.. 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    RESP:   Respiratoy Failure    s/p trach 5/21    tolerating T-piece since 5/24    trach Sutures removed 5/27      CARDS:     S/P cardiac arrest in OR & MTP     - Hypertension - BP controlled on Lisinopril 20mg     - Propranolol 20mg q8  Tachycardia:  - HR 88-97 on     GI/NUTR:     Diet: TF (Osmolite 1.5) @ 50 via PEG    Bumper at 4 cm    GI Prophylaxis- PPI    Bowel regimen restarted - Senna,       -5/31 -3 watery BM, dignishield placed           /RENAL:   No acute issues    Condom cath, + voiding.     Q6 BMP      - Hyponatremic - Na goal 135 per neurosurgery     - last Na 144>147>131, 3%@30 and salt tabs restarted   Lytes-     HEME/ONC:   No acute issues    Labs: Hb/Hct:  7.9/24 >8.6/26.8>              Plts:  292 > 297  Hg >8, MTP on this admission  DVT prophylaxis: Lovenox 40  5/26 DVT sono neg      ID:  Craniectomy site wound infection  Tmax  97.9,WBC-  Antibiotics-meropenem  IVPB 2000 every 8 hours, vancomycin  IVPB 1500 every 8 hours  - Vanc level 5/31- 15.7  Cultures     - 5/11- u/a +leuk     - blood culture 5/13, 5/19, 5/20, 5/23 No growth      -Cranial Culture 5/21-e faceium     - Reculture if spikes    ENDO:  FS controlled  q6 POC glucose  Sliding scale insulin PRN  A1c 5.2      DISPO: SICU    Assessment & Plan  45M s/p fall with large left SDH and neurological status change, level 1 for emergent craniectomy complicated by subgleal hematoma/collection s/p wound revision and drainage on 5/16.    NEURO:      S/P craniectomy for large left SDH     - GCS 11T, Q2H neuro checks, No sedation,      - Keppra 500mg BID for sz ppx    Hyponatremia- goal Na+ 135     -  3%@30 and salt tabs 8q6 restarted on , f/u am NA      - Na+ trend- 142 >144 >147>131    Maintian Head of bed @ 30 degrees    Helmet delivered --> does not need while laying in bed only during PT/OT   [ ] FU neurosurgery plan for  shunt for pseudomeningocele       OR 5/16:     -Revision craniectomy wound with complex closure for persistent drainage, subgaleal hematoma   Imaging:     HCT 5/16-stable post op changes    CT Venogram-left transverse sinus thrombosis, no intervention currently    5/23 NSX placed KIKE under the skin to gravity, old blood aspirated and some pus,  -->      Drain DCed 5/27    -e. faecalis in cx --> ID following, on Vancomycin  , trough 15.7   Repeat CT head 5/26- - there is slight increased thickness of a mixed density extra-axial collection within the craniectomy defect.. 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    RESP:   Respiratoy Failure    s/p trach 5/21    tolerating T-piece since 5/24    trach Sutures removed 5/27      CARDS:     S/P cardiac arrest in OR & MTP     - Hypertension - BP controlled on Lisinopril 20mg     - Propranolol 20mg q8  Tachycardia:  - HR 88-97     GI/NUTR:     Diet: TF (Osmolite 1.5) @ 50 via PEG    Bumper at 4 cm    GI Prophylaxis- PPI    Bowel regimen restarted - Senna,       -5/31 -3 watery BM, dignishield placed           /RENAL:   No acute issues    Condom cath, + voiding.     Q6 BMP      - Hyponatremic - Na goal 135 per neurosurgery     - last Na 144>147>131, 3%@30 and salt tabs 8g Q6hr restarted   Lytes-   FU bmp at 8am  06-01    131<L>  |  95<L>  |  4<L>  ----------------------------<  114<H>  4.2   |  27  |  <0.5<L>    Ca    8.5      01 Jun 2021 03:45  Phos  2.9     05-31  Mg     1.7     05-31        HEME/ONC:   No acute issues    Labs: Hb/Hct:  7.9/24 >8.6/26.8               Plts:  292 > 297  Hg >8, MTP on this admission  DVT prophylaxis: Lovenox 40  5/26 DVT sono neg             8.5    8.52  )-----------( 353      ( 31 May 2021 23:50 )             24.9       ID:  Craniectomy site wound infection  Tmax  97.9, WBC- 8.5  Antibiotics-meropenem  IVPB 2000 every 8 hours, vancomycin  IVPB 1500 every 8 hours  - Vanc level 5/31- 15.7  Cultures     - 5/11- u/a +leuk     - blood culture 5/13, 5/19, 5/20, 5/23 No growth      -Cranial Culture 5/21-e faceium     - Reculture if spikes    ENDO:  FS controlled  q6 POC glucose  Sliding scale insulin PRN  A1c 5.2      DISPO: SICU

## 2021-06-01 NOTE — PROGRESS NOTE ADULT - SUBJECTIVE AND OBJECTIVE BOX
POD#25/15    S/P Left Craniectomy for evac of SDH  S/P Wound Revision    pt seen and examined at bedside pt is awake doesnt follow commands but tracks       Vital Signs Last 24 Hrs  T(C): 37.6 (01 Jun 2021 07:19), Max: 38 (31 May 2021 15:32)  T(F): 99.6 (01 Jun 2021 07:19), Max: 100.4 (31 May 2021 15:32)  HR: 95 (01 Jun 2021 08:58) (79 - 114)  BP: 153/90 (01 Jun 2021 07:00) (125/75 - 161/91)  BP(mean): 110 (01 Jun 2021 06:00) (95 - 119)  RR: 24 (01 Jun 2021 07:00) (13 - 26)  SpO2: 100% (01 Jun 2021 08:58) (98% - 100%)    I&O's Detail    31 May 2021 07:01  -  01 Jun 2021 07:00  --------------------------------------------------------  IN:    IV PiggyBack: 100 mL    IV PiggyBack: 750 mL    IV PiggyBack: 250 mL    IV PiggyBack: 300 mL    IV PiggyBack: 300 mL    IV PiggyBack: 200 mL    IV PiggyBack: 500 mL    Osmolite: 1200 mL    Sodium Chloride 0.9% Bolus: 250 mL    sodium chloride 3%: 210 mL    sodium chloride 3%: 200 mL  Total IN: 4260 mL    OUT:    Voided (mL): 5580 mL  Total OUT: 5580 mL    Total NET: -1320 mL        I&O's Summary    31 May 2021 07:01  -  01 Jun 2021 07:00  --------------------------------------------------------  IN: 4260 mL / OUT: 5580 mL / NET: -1320 mL        PHYSICAL EXAM:    Awake , PERRL   tracks   Moves Left Upper and lower ext spontaneously  slight withdrawal LUE to noxious stimuli   slight withdrawal LLE to noxious stimuli   Head craniectomy defect slightly full           incision clean dry intact     LABS:                        8.5    8.52  )-----------( 353      ( 31 May 2021 23:50 )             24.9     06-01    131<L>  |  93<L>  |  4<L>  ----------------------------<  126<H>  4.3   |  28  |  <0.5<L>    Ca    8.6      01 Jun 2021 08:00  Phos  2.9     05-31  Mg     1.7     05-31              CAPILLARY BLOOD GLUCOSE      POCT Blood Glucose.: 162 mg/dL (01 Jun 2021 06:57)  POCT Blood Glucose.: 119 mg/dL (01 Jun 2021 03:49)  POCT Blood Glucose.: 145 mg/dL (31 May 2021 23:53)  POCT Blood Glucose.: 118 mg/dL (31 May 2021 17:44)  POCT Blood Glucose.: 110 mg/dL (31 May 2021 11:53)      Drug Levels: [] N/A  Vancomycin Level, Trough: 15.7 ug/mL (05-31 @ 11:59)  Vancomycin Level, Trough: 21.7 ug/mL (05-29 @ 11:20)  Vancomycin Level, Trough: 19.0 ug/mL (05-28 @ 11:00)    CSF Analysis: [] N/A      Allergies    No Known Allergies    Intolerances      MEDICATIONS:  Antibiotics:  meropenem  IVPB 2000 milliGRAM(s) IV Intermittent every 8 hours  meropenem  IVPB      vancomycin  IVPB      vancomycin  IVPB 1500 milliGRAM(s) IV Intermittent every 8 hours    Neuro:  acetaminophen   Tablet .. 650 milliGRAM(s) Oral every 6 hours PRN  levETIRAcetam  IVPB 500 milliGRAM(s) IV Intermittent every 12 hours    Anticoagulation:  enoxaparin Injectable 40 milliGRAM(s) SubCutaneous every 24 hours    OTHER:  artificial tears (preservative free) Ophthalmic Solution 2 Drop(s) Both EYES three times a day  chlorhexidine 0.12% Liquid 15 milliLiter(s) Oral Mucosa two times a day  chlorhexidine 4% Liquid 1 Application(s) Topical <User Schedule>  insulin lispro (ADMELOG) corrective regimen sliding scale   SubCutaneous every 6 hours  lisinopril 20 milliGRAM(s) Oral daily  pantoprazole   Suspension 40 milliGRAM(s) Oral daily  propranolol 20 milliGRAM(s) Oral every 8 hours  senna 2 Tablet(s) Oral at bedtime    IVF:  sodium chloride 8 Gram(s) Oral every 6 hours  sodium chloride 3%. 500 milliLiter(s) IV Continuous <Continuous>    CULTURES:  Culture Results:   No growth to date. (05-28 @ 00:15)  Culture Results:   No growth to date. (05-28 @ 00:15)    RADIOLOGY & ADDITIONAL TESTS:      ASSESSMENT:  45y Male s/p    SUBDURAL HEMATOMA;RESPIRATORY FAILURE;FALL    ^FALL    No pertinent family history in first degree relatives    Family history of essential hypertension (Father)    Handoff    MEWS Score    No pertinent past medical history    Alcohol abuse    GERD (gastroesophageal reflux disease)    ETOH abuse    Hypomagnesemia    Seizure disorder    Subdural hematoma    S/P subdural hematoma evacuation    Subdural hematoma    Subdural hematoma    Subdural hematoma    Respiratory failure    ETOH abuse    Palliative care by specialist    Insertion of non-tunneled central venous catheter (CVC) in patient age 5 years of age or older    Craniotomy, decompressive    Decompressive craniotomy    Placement, tracheostomy and percutaneous endoscopic gastrostomy    No significant past surgical history    H/O hemorrhoidectomy    FALL    22    Respiratory failure    Fall    [email or web address]          A/p              POD#25/15                   S/P Left Craniectomy for evac of SDH                 S/P Wound Revision                       neuro checks                       will need a   shunt                        appreciate ID input

## 2021-06-01 NOTE — CHART NOTE - NSCHARTNOTEFT_GEN_A_CORE
Sodium dropped from 153 to 131 on midnight labs. STAT BMP sent to confirm results and 3% restarted at 30 cc/hr until resulted. Also ordered AM BMP to trend. Dr. Longo made aware. Instructed to also restart PO salt tabs if remains low on STAT repeat.

## 2021-06-01 NOTE — PROGRESS NOTE ADULT - ATTENDING COMMENTS
Remains on T-collar.  Awake, follows simple commands.  Restarted on 3%% Saline   Tolerates tube feeds.    ASSESSMENT:  46 y/o male, S/P Fall.  Traumatic Left SDH.  Brain compression.  S/P Left Craniectomy.  Acute respiratory failure.  At risk for hemodynamic instability.  Chronic Alcohol Abuse.  Dysphagia.  Hyponatremia.   Acute blood loss anemia.    PLAN:  - intermittent neuro checks  - aggressive ET suctioning; chest PT  - continue on T-collar  - keep normotensive  - follow Na+; continue 3% saline  - on Seamus/Vanco as per ID  - GI and DVT prophylaxis     Discuss with Dr. Schulte and plan is to go back to OR with Neurosurgery in 1 week after cleared by ID

## 2021-06-01 NOTE — CHART NOTE - NSCHARTNOTEFT_GEN_A_CORE
Registered Dietitian Follow-Up     Patient Profile Reviewed                           Yes [x]   No []     Nutrition History Previously Obtained        Yes []  No [x] -- still intubated     Pertinent Medical Interventions:  s/p left craniotomy for large SDH  S/p wound revision  Hyponatremia noted, sodium chloride and salt tablets ordered   intubated s/p trach 5/21  s/p PEG placement     Diet order: NPO with TF   Osmolite 1.5 @50ml/hr x24hrs + 3 packets of Prosource TF      Anthropometrics:  - Ht. 70"   - Wt.  175lbs (5/6)  190lbs (5/12)  192.2lbs (5/13)   175lbs (5/16); dosing  183.8lbs (5/16)  195.1lbs (5/17)   177.9lbs (5/21)   197.3lbs (5/25)  179lbs (5/29) -- wt changes may be d/t fluid shifts vs bed scale error, will continue to monitor   - %wt change  - BMI 25.1 -- based on dosing   - IBW 166lbs     Pertinent Lab Data: (5/31) H/H 8.5/24.9 (6/1) Na 131, BUN 4, Cr <0.5, , A1c 5.2% (5/7)   CAPILLARY BLOOD GLUCOSE  POCT Blood Glucose.: 162 mg/dL (01 Jun 2021 06:57)  POCT Blood Glucose.: 119 mg/dL (01 Jun 2021 03:49)  POCT Blood Glucose.: 145 mg/dL (31 May 2021 23:53)  POCT Blood Glucose.: 118 mg/dL (31 May 2021 17:44)       Pertinent Meds: lovenox, abx, sodium chloride 3%, admelog, sodium chloride, protonix      Physical Findings:  - Appearance: alert, unable to speak. (5/29)+1 generalized, +2 R+L hand edema noted per RN flow sheets  - GI function: LBM 5/31 x3, rectal tube in place   - Tubes: PEG   - Oral/Mouth cavity: NPO  - Skin: WDL      Nutrition Requirements  Weight Used: dosing wt 79.4kg -- continued from 5/21, pt now with trach     Estimated Energy Needs    Continue [x]  Adjust []   2000-2200kcal (MSJ x 1.2-1.3AF)      Estimated Protein Needs    Continue [x]  Adjust []  95-111gm/day (1.2-1.4gm/kg act wt)      Estimated Fluid Needs        Continue [x]  Adjust []  per SICU team      Nutrient Intake: current tube feed regimen meets 87-96% of est kcal needs and % est protein needs      [x] No active nutrition diagnosis identified at this time      Nutrition Intervention: enteral nutrition      Goal/Expected Outcome: Pt to meet % of estimated needs within 3 days      Indicator/Monitoring: energy intake, body composition, NFPF, glucose profile     Recommendations:  As medically feasible recommend changing continuous feeds to bolus feeds for long term nutrition support. Pt would benefit from a fiber formula as noted with loose BMs x3 (5/31). Recommend Jevity 1.2 @ 360ml q4hrs, hold 1 feed at 2 AM for a total of 5 feeds/day. Recommended regimen will provide 1800ml formula, 2160kcal, 100g protein, 1453ml free water. Additional water flushes per LIP.   Discussed with [id number].

## 2021-06-01 NOTE — PROGRESS NOTE ADULT - SUBJECTIVE AND OBJECTIVE BOX
HPI: Patient is a 45yM w/ PMHx of Etoh abuse and seizures on keppra seen as Trauma Alert upgraded to a Code trauma during examination in the ED where the patient was found to have a GCS of 4 s/p found down unresponsive.  Trauma assessment in ED: intubated for airway protection. NCC following for management of SDH.   (06 May 2021 22:29)    Overnight Events: No events overnight, patient tolerating trach collar.    T(C): 37.6 (06-01-21 @ 07:19), Max: 38 (05-31-21 @ 15:32)  HR: 109 (06-01-21 @ 12:00) (79 - 114)  BP: 140/86 (06-01-21 @ 12:00) (125/75 - 153/90)  BP(mean): 108 (06-01-21 @ 12:00) (95 - 116)  ABP: --  ABP(mean): --  RR: 26 (06-01-21 @ 12:00) (13 - 26)  SpO2: 100% (06-01-21 @ 12:00) (98% - 100%)      I&O's Detail    31 May 2021 07:01  -  01 Jun 2021 07:00  --------------------------------------------------------  IN:    IV PiggyBack: 100 mL    IV PiggyBack: 750 mL    IV PiggyBack: 250 mL    IV PiggyBack: 300 mL    IV PiggyBack: 300 mL    IV PiggyBack: 200 mL    IV PiggyBack: 500 mL    Osmolite: 1200 mL    Sodium Chloride 0.9% Bolus: 250 mL    sodium chloride 3%: 200 mL    sodium chloride 3%: 210 mL  Total IN: 4260 mL    OUT:    Voided (mL): 5580 mL  Total OUT: 5580 mL    Total NET: -1320 mL      01 Jun 2021 07:01  -  01 Jun 2021 13:24  --------------------------------------------------------  IN:    Osmolite: 250 mL    sodium chloride 0.9%: 50 mL    Sodium Chloride 0.9% Bolus: 500 mL    sodium chloride 3%: 120 mL  Total IN: 920 mL    OUT:    Voided (mL): 1350 mL  Total OUT: 1350 mL    Total NET: -430 mL    MEDICATIONS  (STANDING):  artificial tears (preservative free) Ophthalmic Solution 2 Drop(s) Both EYES three times a day  chlorhexidine 0.12% Liquid 15 milliLiter(s) Oral Mucosa two times a day  chlorhexidine 4% Liquid 1 Application(s) Topical <User Schedule>  enoxaparin Injectable 40 milliGRAM(s) SubCutaneous every 24 hours  insulin lispro (ADMELOG) corrective regimen sliding scale   SubCutaneous every 6 hours  levETIRAcetam  IVPB 500 milliGRAM(s) IV Intermittent every 12 hours  lisinopril 20 milliGRAM(s) Oral daily  meropenem  IVPB      meropenem  IVPB 2000 milliGRAM(s) IV Intermittent every 8 hours  pantoprazole   Suspension 40 milliGRAM(s) Oral daily  propranolol 20 milliGRAM(s) Oral every 8 hours  sodium chloride 8 Gram(s) Oral every 6 hours  sodium chloride 0.9%. 1000 milliLiter(s) (50 mL/Hr) IV Continuous <Continuous>  sodium chloride 3%. 500 milliLiter(s) (50 mL/Hr) IV Continuous <Continuous>  vancomycin  IVPB      vancomycin  IVPB 1500 milliGRAM(s) IV Intermittent every 8 hours    MEDICATIONS  (PRN):  acetaminophen   Tablet .. 650 milliGRAM(s) Oral every 6 hours PRN Temp greater or equal to 38.5C (101.3F), Moderate Pain (4 - 6), Severe Pain (7 - 10)      Labs:                        8.5    8.52  )-----------( 353      ( 31 May 2021 23:50 )             24.9     06-01    129<L>  |  93<L>  |  4<L>  ----------------------------<  120<H>  4.3   |  25  |  <0.5<L>    Ca    8.6      01 Jun 2021 12:00  Phos  2.9     05-31  Mg     1.7     05-31    05-13 Chol -- LDL -- HDL -- Trig 138    Drug Levels: [] N/A  Vancomycin Level, Trough: 15.7 ug/mL (05-31 @ 11:59)  Vancomycin Level, Trough: 21.7 ug/mL (05-29 @ 11:20)  Vancomycin Level, Trough: 19.0 ug/mL (05-28 @ 11:00)      CULTURES:  Culture Results:   No growth to date. (05-28 @ 00:15)  Culture Results:   No growth to date. (05-28 @ 00:15)      Radiology:    < from: MR Head w/wo IV Cont (05.28.21 @ 20:10) >    EXAM:  MR BRAIN WAW IC            PROCEDURE DATE:  05/28/2021     < end of copied text >  < from: MR Head w/wo IV Cont (05.28.21 @ 20:10) >  IMPRESSION:    1.9 cm enhancing soft tissue with internal mineralization along the anterior margin of the left frontal craniectomy reflect granulation tissue.    Diffuse thickening/enhancement of the bilateral pachymeninges, nonspecific but can be seen in setting of postop settings, intracranial hypotension, or meningitis. Recommend clinical correlation.    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 Redemonstrated subdural hematomas along the right cerebral and cerebellar convexities.    Evolving subacute infarcts in the medial left frontoparietal region, left insular cortex, and right cerebellum with associated gyral enhancements.    Peripherally enhancing evolving hemorrhagic contusion/hematoma in the left temporal occipital regions with scattered petechial gyral hemorrhage/enhancement.Moderate surrounding edema.    < end of copied text >    ROS: Negative except for that of HPI      Physical Exam:    General: Ill appearing male patient with tracheostomy in place, tolerating trach collar.     Neurological:    Mental Status: Awake and attentive to surroundings but does not follow commands.    Cranial Nerves:  I: Deferred  II, III, IV, VI: 3 mm PERRLA.  V:  corneal reflex intact bilaterally  VII: face symmetrical at rest  VIII: Deferred  IX and X: gag intact  XI: Deferred  XII: Deferred    Motor: Strength - Right UE:   0/5  Right LE:  0 /5  Left UE:  1+ /5  Left LE:   1+/5    Sensory: Sensation to painful stimuli intact throughout    Cerebellar Function: Deferred    Reflexes: No clonus      Assessment & Plan: 45yM w/ PMHx of Etoh abuse and seizures on keppra (recently discharged on keppra after being in ICU for seizures). He had previous workup for seizures with a EEG that was negative. He presented s/p fall at home. 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 Underwent left isaías crani for SDH evacuation 5/7, s/p cardiac arrest in OR, s/p revision of crani 5/17, s/p wound revision . CT head showed new left cerebral hemisphere and right cerebellum infarcts. CTA showed stenosis R vertebral artery, repeat CT stable. CTV positive for venous thrombosis. MRI Brain showed pseudomeningocele. Patient now POD 25/15 s/p left crani for evac of SDH,  s/p tracheostomy with stable neurologic exam.       Plan:    Neuro:  - Neuro Checks Q4H  - STAT CT Head with change in Neuro exam  - Continue 3% Saline and sodium tabs for goal Na 140 - 145  - Continue Keppra 500 BID  - VPS with NSx  - SBP < 160  - PT/OT/SLP      Thank you for involving [facility] in this patients plan of care. Please call with any questions or concerns.     Luma García NP    #[id number]

## 2021-06-02 LAB
ANION GAP SERPL CALC-SCNC: 10 MMOL/L — SIGNIFICANT CHANGE UP (ref 7–14)
ANION GAP SERPL CALC-SCNC: 7 MMOL/L — SIGNIFICANT CHANGE UP (ref 7–14)
ANION GAP SERPL CALC-SCNC: 8 MMOL/L — SIGNIFICANT CHANGE UP (ref 7–14)
ANION GAP SERPL CALC-SCNC: 8 MMOL/L — SIGNIFICANT CHANGE UP (ref 7–14)
APPEARANCE UR: CLEAR — SIGNIFICANT CHANGE UP
BILIRUB UR-MCNC: NEGATIVE — SIGNIFICANT CHANGE UP
BUN SERPL-MCNC: 5 MG/DL — LOW (ref 10–20)
BUN SERPL-MCNC: 6 MG/DL — LOW (ref 10–20)
CALCIUM SERPL-MCNC: 8.7 MG/DL — SIGNIFICANT CHANGE UP (ref 8.5–10.1)
CALCIUM SERPL-MCNC: 8.8 MG/DL — SIGNIFICANT CHANGE UP (ref 8.5–10.1)
CALCIUM SERPL-MCNC: 8.9 MG/DL — SIGNIFICANT CHANGE UP (ref 8.5–10.1)
CALCIUM SERPL-MCNC: 9.1 MG/DL — SIGNIFICANT CHANGE UP (ref 8.5–10.1)
CHLORIDE SERPL-SCNC: 96 MMOL/L — LOW (ref 98–110)
CHLORIDE SERPL-SCNC: 97 MMOL/L — LOW (ref 98–110)
CHLORIDE SERPL-SCNC: 97 MMOL/L — LOW (ref 98–110)
CHLORIDE SERPL-SCNC: 99 MMOL/L — SIGNIFICANT CHANGE UP (ref 98–110)
CHLORIDE UR-SCNC: 231 — SIGNIFICANT CHANGE UP
CO2 SERPL-SCNC: 25 MMOL/L — SIGNIFICANT CHANGE UP (ref 17–32)
CO2 SERPL-SCNC: 26 MMOL/L — SIGNIFICANT CHANGE UP (ref 17–32)
CO2 SERPL-SCNC: 27 MMOL/L — SIGNIFICANT CHANGE UP (ref 17–32)
CO2 SERPL-SCNC: 28 MMOL/L — SIGNIFICANT CHANGE UP (ref 17–32)
COLOR SPEC: COLORLESS — SIGNIFICANT CHANGE UP
CREAT SERPL-MCNC: <0.5 MG/DL — LOW (ref 0.7–1.5)
CULTURE RESULTS: SIGNIFICANT CHANGE UP
CULTURE RESULTS: SIGNIFICANT CHANGE UP
DIFF PNL FLD: NEGATIVE — SIGNIFICANT CHANGE UP
GLUCOSE BLDC GLUCOMTR-MCNC: 164 MG/DL — HIGH (ref 70–99)
GLUCOSE BLDC GLUCOMTR-MCNC: 84 MG/DL — SIGNIFICANT CHANGE UP (ref 70–99)
GLUCOSE BLDC GLUCOMTR-MCNC: 92 MG/DL — SIGNIFICANT CHANGE UP (ref 70–99)
GLUCOSE SERPL-MCNC: 139 MG/DL — HIGH (ref 70–99)
GLUCOSE SERPL-MCNC: 87 MG/DL — SIGNIFICANT CHANGE UP (ref 70–99)
GLUCOSE SERPL-MCNC: 91 MG/DL — SIGNIFICANT CHANGE UP (ref 70–99)
GLUCOSE SERPL-MCNC: 95 MG/DL — SIGNIFICANT CHANGE UP (ref 70–99)
GLUCOSE UR QL: NEGATIVE — SIGNIFICANT CHANGE UP
HCT VFR BLD CALC: 24.5 % — LOW (ref 42–52)
HCT VFR BLD CALC: 26 % — LOW (ref 42–52)
HGB BLD-MCNC: 8.2 G/DL — LOW (ref 14–18)
HGB BLD-MCNC: 8.7 G/DL — LOW (ref 14–18)
KETONES UR-MCNC: NEGATIVE — SIGNIFICANT CHANGE UP
LEUKOCYTE ESTERASE UR-ACNC: NEGATIVE — SIGNIFICANT CHANGE UP
MAGNESIUM SERPL-MCNC: 1.9 MG/DL — SIGNIFICANT CHANGE UP (ref 1.8–2.4)
MAGNESIUM SERPL-MCNC: 2 MG/DL — SIGNIFICANT CHANGE UP (ref 1.8–2.4)
MAGNESIUM UR-MCNC: 5.7 MG/DL — SIGNIFICANT CHANGE UP
MCHC RBC-ENTMCNC: 28.1 PG — SIGNIFICANT CHANGE UP (ref 27–31)
MCHC RBC-ENTMCNC: 28.2 PG — SIGNIFICANT CHANGE UP (ref 27–31)
MCHC RBC-ENTMCNC: 33.5 G/DL — SIGNIFICANT CHANGE UP (ref 32–37)
MCHC RBC-ENTMCNC: 33.5 G/DL — SIGNIFICANT CHANGE UP (ref 32–37)
MCV RBC AUTO: 83.9 FL — SIGNIFICANT CHANGE UP (ref 80–94)
MCV RBC AUTO: 84.2 FL — SIGNIFICANT CHANGE UP (ref 80–94)
NITRITE UR-MCNC: NEGATIVE — SIGNIFICANT CHANGE UP
NRBC # BLD: 0 /100 WBCS — SIGNIFICANT CHANGE UP (ref 0–0)
NRBC # BLD: 0 /100 WBCS — SIGNIFICANT CHANGE UP (ref 0–0)
OSMOLALITY SERPL: 269 MOS/KG — LOW (ref 275–300)
OSMOLALITY SERPL: 271 MOS/KG — LOW (ref 275–300)
OSMOLALITY UR: 508 MOS/KG — SIGNIFICANT CHANGE UP (ref 50–1200)
PH UR: 7.5 — SIGNIFICANT CHANGE UP (ref 5–8)
PHOSPHATE 24H UR-MCNC: 15 MG/DL — SIGNIFICANT CHANGE UP
PHOSPHATE SERPL-MCNC: 3.8 MG/DL — SIGNIFICANT CHANGE UP (ref 2.1–4.9)
PHOSPHATE SERPL-MCNC: 3.8 MG/DL — SIGNIFICANT CHANGE UP (ref 2.1–4.9)
PLATELET # BLD AUTO: 330 K/UL — SIGNIFICANT CHANGE UP (ref 130–400)
PLATELET # BLD AUTO: 338 K/UL — SIGNIFICANT CHANGE UP (ref 130–400)
POTASSIUM SERPL-MCNC: 3.8 MMOL/L — SIGNIFICANT CHANGE UP (ref 3.5–5)
POTASSIUM SERPL-MCNC: 4.2 MMOL/L — SIGNIFICANT CHANGE UP (ref 3.5–5)
POTASSIUM SERPL-MCNC: 4.2 MMOL/L — SIGNIFICANT CHANGE UP (ref 3.5–5)
POTASSIUM SERPL-MCNC: 4.4 MMOL/L — SIGNIFICANT CHANGE UP (ref 3.5–5)
POTASSIUM SERPL-SCNC: 3.8 MMOL/L — SIGNIFICANT CHANGE UP (ref 3.5–5)
POTASSIUM SERPL-SCNC: 4.2 MMOL/L — SIGNIFICANT CHANGE UP (ref 3.5–5)
POTASSIUM SERPL-SCNC: 4.2 MMOL/L — SIGNIFICANT CHANGE UP (ref 3.5–5)
POTASSIUM SERPL-SCNC: 4.4 MMOL/L — SIGNIFICANT CHANGE UP (ref 3.5–5)
POTASSIUM UR-SCNC: 19 MMOL/L — SIGNIFICANT CHANGE UP
PROT UR-MCNC: NEGATIVE — SIGNIFICANT CHANGE UP
RBC # BLD: 2.91 M/UL — LOW (ref 4.7–6.1)
RBC # BLD: 3.1 M/UL — LOW (ref 4.7–6.1)
RBC # FLD: 14 % — SIGNIFICANT CHANGE UP (ref 11.5–14.5)
RBC # FLD: 14 % — SIGNIFICANT CHANGE UP (ref 11.5–14.5)
SODIUM SERPL-SCNC: 131 MMOL/L — LOW (ref 135–146)
SODIUM SERPL-SCNC: 132 MMOL/L — LOW (ref 135–146)
SODIUM SERPL-SCNC: 132 MMOL/L — LOW (ref 135–146)
SODIUM SERPL-SCNC: 133 MMOL/L — LOW (ref 135–146)
SODIUM UR-SCNC: 228 MMOL/L — SIGNIFICANT CHANGE UP
SP GR SPEC: 1.01 — SIGNIFICANT CHANGE UP (ref 1.01–1.03)
SPECIMEN SOURCE: SIGNIFICANT CHANGE UP
SPECIMEN SOURCE: SIGNIFICANT CHANGE UP
UROBILINOGEN FLD QL: SIGNIFICANT CHANGE UP
WBC # BLD: 7.25 K/UL — SIGNIFICANT CHANGE UP (ref 4.8–10.8)
WBC # BLD: 8.66 K/UL — SIGNIFICANT CHANGE UP (ref 4.8–10.8)
WBC # FLD AUTO: 7.25 K/UL — SIGNIFICANT CHANGE UP (ref 4.8–10.8)
WBC # FLD AUTO: 8.66 K/UL — SIGNIFICANT CHANGE UP (ref 4.8–10.8)

## 2021-06-02 PROCEDURE — 74018 RADEX ABDOMEN 1 VIEW: CPT | Mod: 26

## 2021-06-02 PROCEDURE — 71045 X-RAY EXAM CHEST 1 VIEW: CPT | Mod: 26

## 2021-06-02 PROCEDURE — 99291 CRITICAL CARE FIRST HOUR: CPT

## 2021-06-02 RX ORDER — MAGNESIUM SULFATE 500 MG/ML
1 VIAL (ML) INJECTION ONCE
Refills: 0 | Status: COMPLETED | OUTPATIENT
Start: 2021-06-02 | End: 2021-06-02

## 2021-06-02 RX ORDER — MEROPENEM 1 G/30ML
INJECTION INTRAVENOUS
Refills: 0 | Status: DISCONTINUED | OUTPATIENT
Start: 2021-06-03 | End: 2021-06-07

## 2021-06-02 RX ORDER — SODIUM CHLORIDE 9 MG/ML
500 INJECTION INTRAMUSCULAR; INTRAVENOUS; SUBCUTANEOUS ONCE
Refills: 0 | Status: COMPLETED | OUTPATIENT
Start: 2021-06-02 | End: 2021-06-03

## 2021-06-02 RX ORDER — FLUDROCORTISONE ACETATE 0.1 MG/1
0.1 TABLET ORAL ONCE
Refills: 0 | Status: COMPLETED | OUTPATIENT
Start: 2021-06-02 | End: 2021-06-02

## 2021-06-02 RX ORDER — MEROPENEM 1 G/30ML
2000 INJECTION INTRAVENOUS EVERY 8 HOURS
Refills: 0 | Status: DISCONTINUED | OUTPATIENT
Start: 2021-06-03 | End: 2021-06-07

## 2021-06-02 RX ORDER — POTASSIUM CHLORIDE 20 MEQ
20 PACKET (EA) ORAL ONCE
Refills: 0 | Status: COMPLETED | OUTPATIENT
Start: 2021-06-02 | End: 2021-06-02

## 2021-06-02 RX ORDER — MEROPENEM 1 G/30ML
2000 INJECTION INTRAVENOUS ONCE
Refills: 0 | Status: COMPLETED | OUTPATIENT
Start: 2021-06-02 | End: 2021-06-02

## 2021-06-02 RX ADMIN — FLUDROCORTISONE ACETATE 0.1 MILLIGRAM(S): 0.1 TABLET ORAL at 15:45

## 2021-06-02 RX ADMIN — Medication 2 DROP(S): at 22:19

## 2021-06-02 RX ADMIN — Medication 100 GRAM(S): at 04:10

## 2021-06-02 RX ADMIN — Medication 300 MILLIGRAM(S): at 05:23

## 2021-06-02 RX ADMIN — LEVETIRACETAM 420 MILLIGRAM(S): 250 TABLET, FILM COATED ORAL at 17:27

## 2021-06-02 RX ADMIN — LISINOPRIL 20 MILLIGRAM(S): 2.5 TABLET ORAL at 05:21

## 2021-06-02 RX ADMIN — CHLORHEXIDINE GLUCONATE 15 MILLILITER(S): 213 SOLUTION TOPICAL at 17:27

## 2021-06-02 RX ADMIN — Medication 300 MILLIGRAM(S): at 13:31

## 2021-06-02 RX ADMIN — CHLORHEXIDINE GLUCONATE 15 MILLILITER(S): 213 SOLUTION TOPICAL at 05:23

## 2021-06-02 RX ADMIN — PANTOPRAZOLE SODIUM 40 MILLIGRAM(S): 20 TABLET, DELAYED RELEASE ORAL at 12:32

## 2021-06-02 RX ADMIN — Medication 100 GRAM(S): at 04:09

## 2021-06-02 RX ADMIN — Medication 2 DROP(S): at 05:23

## 2021-06-02 RX ADMIN — Medication 100 MILLIEQUIVALENT(S): at 22:50

## 2021-06-02 RX ADMIN — Medication 300 MILLIGRAM(S): at 22:20

## 2021-06-02 RX ADMIN — CHLORHEXIDINE GLUCONATE 1 APPLICATION(S): 213 SOLUTION TOPICAL at 05:23

## 2021-06-02 RX ADMIN — SODIUM CHLORIDE 8 GRAM(S): 9 INJECTION INTRAMUSCULAR; INTRAVENOUS; SUBCUTANEOUS at 05:22

## 2021-06-02 RX ADMIN — Medication 2 DROP(S): at 12:59

## 2021-06-02 RX ADMIN — Medication 2: at 00:00

## 2021-06-02 RX ADMIN — ENOXAPARIN SODIUM 40 MILLIGRAM(S): 100 INJECTION SUBCUTANEOUS at 17:26

## 2021-06-02 RX ADMIN — SODIUM CHLORIDE 8 GRAM(S): 9 INJECTION INTRAMUSCULAR; INTRAVENOUS; SUBCUTANEOUS at 17:26

## 2021-06-02 NOTE — PROGRESS NOTE ADULT - SUBJECTIVE AND OBJECTIVE BOX
SANDRA DAS  [id number]  45y Male    Indication for ICU admission: s/p craniectomy for large L SDH  Admit Date:05-06-21  ICU Date: 05-07-21  OR Date: 05-06-21    No Known Allergies    PAST MEDICAL & SURGICAL HISTORY:  Alcohol abuse  GERD (gastroesophageal reflux disease)  ETOH abuse  Hypomagnesemia  Seizure disorder  H/O hemorrhoidectomy    Home Medications:  NONE    24HRS EVENT:  Day  6/1   Day  -NS bolus 500x2  - maintenance NS @50cc in attempt to wean 3%  -3% increased to 50cc  -f/u BMP @ 11 + 16:00  -watery BM- hold senna; send c. diff if continues  -NSX- no plan for washout  -Na downtrending 129.128 s/p increase in 3%. Repeat bmp 21:00 before increasing Na correction to prevent overcorrecting       ON:  - ON:  - Neuro exam same as previous  - 3% remains at 50-> 9pm Na 131, f/u am  - HDS, afebrile, -300        DVT PTX: LVX  GI PTX: PPI    ***Tubes/Lines/Drains  ***  Peripheral IV  Left basilic midline 5/17  Arterial Line Right radial	Date 5/17  L IJ TLC     5/29  Trach/peg 5/21    REVIEW OF SYSTEMS  [ ] A ten-point review of systems was otherwise negative except as noted.  [x] Due to altered mental status/intubation, subjective information were not able to be obtained from the patient. History was obtained, to the extent possible, from review of the chart and collateral sources of information.     SANDRA DAS  [id number]  45y Male    Indication for ICU admission: s/p craniectomy for large L SDH  Admit Date:05-06-21  ICU Date: 05-07-21  OR Date: 05-06-21    No Known Allergies    PAST MEDICAL & SURGICAL HISTORY:  Alcohol abuse  GERD (gastroesophageal reflux disease)  ETOH abuse  Hypomagnesemia  Seizure disorder  H/O hemorrhoidectomy    Home Medications:  NONE    24HRS EVENT:  Day  6/1   Day  -NS bolus 500x2  - maintenance NS @50cc in attempt to wean 3%  -3% increased to 50cc  -f/u BMP @ 11 + 16:00  -watery BM- hold senna; send c. diff if continues  -NSX- no plan for washout  -Na downtrending 129.128 s/p increase in 3%. Repeat bmp 21:00 before increasing Na correction to prevent overcorrecting       ON:  - ON:  - Neuro exam same as previous  - 3% remains at 50-> 9pm Na 131, 133  - afebrile, -300      DVT PTX: LVX  GI PTX: PPI    ***Tubes/Lines/Drains  ***  Peripheral IV  Left basilic midline 5/17  Arterial Line Right radial	Date 5/17  L IJ TLC     5/29  Trach/peg 5/21    REVIEW OF SYSTEMS  [ ] A ten-point review of systems was otherwise negative except as noted.  [x] Due to altered mental status/intubation, subjective information were not able to be obtained from the patient. History was obtained, to the extent possible, from review of the chart and collateral sources of information.    Daily     Daily     Diet, NPO with Tube Feed:   Tube Feeding Modality: Orogastric  Osmolite 1.5 Jeff  Total Volume for 24 Hours (mL): 1200  Continuous  Starting Tube Feed Rate mL per Hour: 50  Until Goal Tube Feed Rate (mL per Hour): 50  Tube Feed Duration (in Hours): 24  Tube Feed Start Time: 00:00  No Carb Prosource TF     Qty per Day:  3 (05-24-21 @ 19:03)      CURRENT MEDS:  Neurologic Medications  acetaminophen   Tablet .. 650 milliGRAM(s) Oral every 6 hours PRN Temp greater or equal to 38.5C (101.3F), Moderate Pain (4 - 6), Severe Pain (7 - 10)  levETIRAcetam  IVPB 500 milliGRAM(s) IV Intermittent every 12 hours    Respiratory Medications    Cardiovascular Medications  lisinopril 20 milliGRAM(s) Oral daily  propranolol 20 milliGRAM(s) Oral every 8 hours    Gastrointestinal Medications  pantoprazole   Suspension 40 milliGRAM(s) Oral daily  sodium chloride 8 Gram(s) Oral every 6 hours  sodium chloride 0.9%. 1000 milliLiter(s) IV Continuous <Continuous>  sodium chloride 3%. 500 milliLiter(s) IV Continuous <Continuous>    Genitourinary Medications    Hematologic/Oncologic Medications  enoxaparin Injectable 40 milliGRAM(s) SubCutaneous every 24 hours    Antimicrobial/Immunologic Medications  vancomycin  IVPB      vancomycin  IVPB 1500 milliGRAM(s) IV Intermittent every 8 hours    Endocrine/Metabolic Medications  insulin lispro (ADMELOG) corrective regimen sliding scale   SubCutaneous every 6 hours    Topical/Other Medications  artificial tears (preservative free) Ophthalmic Solution 2 Drop(s) Both EYES three times a day  chlorhexidine 0.12% Liquid 15 milliLiter(s) Oral Mucosa two times a day  chlorhexidine 4% Liquid 1 Application(s) Topical <User Schedule>      ICU Vital Signs Last 24 Hrs  T(C): 37.4 (02 Jun 2021 08:07), Max: 38.3 (01 Jun 2021 15:40)  T(F): 99.4 (02 Jun 2021 08:07), Max: 101 (01 Jun 2021 15:40)  HR: 95 (02 Jun 2021 07:00) (86 - 120)  BP: 129/80 (02 Jun 2021 07:00) (123/72 - 146/88)  BP(mean): 99 (02 Jun 2021 07:00) (92 - 111)  ABP: --  ABP(mean): --  RR: 56 (02 Jun 2021 07:00) (7 - 56)  SpO2: 100% (02 Jun 2021 07:00) (100% - 100%)      Adult Advanced Hemodynamics Last 24 Hrs  CVP(mm Hg): --  CVP(cm H2O): --  CO: --  CI: --  PA: --  PA(mean): --  PCWP: --  SVR: --  SVRI: --  PVR: --  PVRI: --          I&O's Summary    01 Jun 2021 07:01  -  02 Jun 2021 07:00  --------------------------------------------------------  IN: 3470 mL / OUT: 3550 mL / NET: -80 mL      I&O's Detail    01 Jun 2021 07:01  -  02 Jun 2021 07:00  --------------------------------------------------------  IN:    IV PiggyBack: 500 mL    IV PiggyBack: 100 mL    IV PiggyBack: 100 mL    Osmolite: 1150 mL    sodium chloride 0.9%: 200 mL    Sodium Chloride 0.9% Bolus: 500 mL    sodium chloride 3%: 120 mL    sodium chloride 3%: 800 mL  Total IN: 3470 mL    OUT:    Voided (mL): 3550 mL  Total OUT: 3550 mL    Total NET: -80 mL          PHYSICAL EXAM:    General/Neuro  RASS:             GCS:     = E   / V   / M      Deficits:                             alert & oriented x 3, no focal deficits  Pupils:    Lungs:      clear to auscultation, Normal expansion/effort.     Cardiovascular : S1, S2.  Regular rate and rhythm.  Peripheral edema   Cardiac Rhythm: Normal Sinus Rhythm    GI: Abdomen soft, Non-tender, Non-distended.    Gastrostomy / Jejunostomy tube in place.  Nasogastric tube in place.  Colostomy / Ileostomy.    Wound:    Extremities: Extremities warm, pink, well-perfused. Pulses:Rt     Lt    Derm: Good skin turgor, no skin breakdown.      :       Cole catheter in place.      CXR:     LABS:  CAPILLARY BLOOD GLUCOSE      POCT Blood Glucose.: 164 mg/dL (02 Jun 2021 06:28)  POCT Blood Glucose.: 151 mg/dL (01 Jun 2021 23:19)  POCT Blood Glucose.: 146 mg/dL (01 Jun 2021 17:12)                          8.2    7.25  )-----------( 330      ( 02 Jun 2021 02:00 )             24.5       06-02    133<L>  |  99  |  6<L>  ----------------------------<  139<H>  4.4   |  26  |  <0.5<L>    Ca    8.7      02 Jun 2021 02:00  Phos  3.8     06-02  Mg     1.9     06-02

## 2021-06-02 NOTE — PROGRESS NOTE ADULT - SUBJECTIVE AND OBJECTIVE BOX
Subjective: 45yMale with a pmhx of SUBDURAL HEMATOMA;RESPIRATORY FAILURE;FALL    ^FALL    No pertinent family history in first degree relatives    Family history of essential hypertension (Father)    Handoff    MEWS Score    No pertinent past medical history    Alcohol abuse    GERD (gastroesophageal reflux disease)    ETOH abuse    Hypomagnesemia    Seizure disorder    Subdural hematoma    S/P subdural hematoma evacuation    Subdural hematoma    Subdural hematoma    Subdural hematoma    Respiratory failure    ETOH abuse    Palliative care by specialist    Insertion of non-tunneled central venous catheter (CVC) in patient age 5 years of age or older    Craniotomy, decompressive    Decompressive craniotomy    Placement, tracheostomy and percutaneous endoscopic gastrostomy    No significant past surgical history    H/O hemorrhoidectomy    FALL    22    Respiratory failure    Fall    SysAdmin_VisitLink        POD#26/16    S/P Left Craniectomy for evac of SDH  S/P Wound Revision    pt seen and examined at bedside with Dr Byrne.   Pt is awake, not follow commands but tracks.  Able to move L side spontaneously.  Pt reportedly pulled out PEG.  Allergies    No Known Allergies    Intolerances      Vital Signs Last 24 Hrs  T(C): 37.4 (02 Jun 2021 08:07), Max: 38.3 (01 Jun 2021 15:40)  T(F): 99.4 (02 Jun 2021 08:07), Max: 101 (01 Jun 2021 15:40)  HR: 86 (02 Jun 2021 09:00) (86 - 120)  BP: 129/82 (02 Jun 2021 09:00) (123/72 - 145/86)  BP(mean): 100 (02 Jun 2021 09:00) (92 - 110)  RR: 20 (02 Jun 2021 09:00) (7 - 56)  SpO2: 100% (02 Jun 2021 09:00) (100% - 100%)      acetaminophen   Tablet .. 650 milliGRAM(s) Oral every 6 hours PRN  artificial tears (preservative free) Ophthalmic Solution 2 Drop(s) Both EYES three times a day  chlorhexidine 0.12% Liquid 15 milliLiter(s) Oral Mucosa two times a day  chlorhexidine 4% Liquid 1 Application(s) Topical <User Schedule>  enoxaparin Injectable 40 milliGRAM(s) SubCutaneous every 24 hours  insulin lispro (ADMELOG) corrective regimen sliding scale   SubCutaneous every 6 hours  levETIRAcetam  IVPB 500 milliGRAM(s) IV Intermittent every 12 hours  lisinopril 20 milliGRAM(s) Oral daily  pantoprazole   Suspension 40 milliGRAM(s) Oral daily  propranolol 20 milliGRAM(s) Oral every 8 hours  sodium chloride 8 Gram(s) Oral every 6 hours  sodium chloride 0.9%. 1000 milliLiter(s) IV Continuous <Continuous>  sodium chloride 3%. 500 milliLiter(s) IV Continuous <Continuous>  vancomycin  IVPB      vancomycin  IVPB 1500 milliGRAM(s) IV Intermittent every 8 hours        06-01-21 @ 07:01  -  06-02-21 @ 07:00  --------------------------------------------------------  IN: 3470 mL / OUT: 3550 mL / NET: -80 mL    06-02-21 @ 07:01  -  06-02-21 @ 10:49  --------------------------------------------------------  IN: 100 mL / OUT: 800 mL / NET: -700 mL        REVIEW OF SYSTEMS    [ ] A ten-point review of systems was otherwise negative except as noted.  [x ] Due to altered mental status/intubation, subjective information were not able to be obtained from the patient. History was obtained, to the extent possible, from review of the chart and collateral sources of information.        Awake, no verbal response  PERRL  tracks   no facial droop  moves LUE/LLE spontaneously and to command  slight w/d to RUE  no movt RLE  craniectomy site: slightly full, incision clean dry intact         CBC Full  -  ( 02 Jun 2021 02:00 )  WBC Count : 7.25 K/uL  RBC Count : 2.91 M/uL  Hemoglobin : 8.2 g/dL  Hematocrit : 24.5 %  Platelet Count - Automated : 330 K/uL  Mean Cell Volume : 84.2 fL  Mean Cell Hemoglobin : 28.2 pg  Mean Cell Hemoglobin Concentration : 33.5 g/dL  Auto Neutrophil # : x  Auto Lymphocyte # : x  Auto Monocyte # : x  Auto Eosinophil # : x  Auto Basophil # : x  Auto Neutrophil % : x  Auto Lymphocyte % : x  Auto Monocyte % : x  Auto Eosinophil % : x  Auto Basophil % : x    06-02    133<L>  |  99  |  6<L>  ----------------------------<  139<H>  4.4   |  26  |  <0.5<L>    Ca    8.7      02 Jun 2021 02:00  Phos  3.8     06-02  Mg     1.9     06-02        Assessment/Plan:   cont neuro checks q 1hr  poss OR Monday 6.7 for VPS  cont 3% NaCl for normal Na level  trend Hgb  SICU for plan for PEG replacement  d/w attg

## 2021-06-02 NOTE — PROGRESS NOTE ADULT - ATTENDING COMMENTS
Patient still has high UOP>3L daily as well as low serum Na despite 3% saline.  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  Given iv boluses to replace iv volume.  Remains arousable, follows simple commands.    ASSESSMENT:  44 y/o male, S/P Fall.  Traumatic Left SDH.  Brain compression.  S/P Left Craniectomy.  Acute respiratory failure.  At risk for hemodynamic instability.  Chronic Alcohol Abuse.  Dysphagia.  Hyponatremia.   Acute blood loss anemia.    PLAN:  - continue intermittent neuro checks  - frequent ET suctioning; continue aggressive chest PT  - off Vent; use O2 with NC as needed  - keep normotensive  - follow Na+; continue 3% saline  - give dose of Fludrocortisone po  - on Seamus andVanco as per ID  - GI and DVT prophylaxis     As per Neurosurgery will require  shut after completed antibiotic course.  Patient's wife updated regarding all the above.

## 2021-06-02 NOTE — PROGRESS NOTE ADULT - ASSESSMENT
Assessment & Plan  45M s/p fall with large left SDH and neurological status change, level 1 for emergent craniectomy complicated by subgleal hematoma/collection s/p wound revision and drainage on 5/16.    NEURO:      S/P craniectomy for large left SDH     - GCS 11T, Q2H neuro checks, No sedation,      - Keppra 500mg BID for sz ppx    Hyponatremia- goal Na+ 130-145     -  3%@50 and salt tabs 8q6 restarted       - Na+ trend- 142 >144 >147>131>128>131    Maintian Head of bed @ 30 degrees    Helmet delivered --> does not need while laying in bed only during PT/OT       OR 5/16:     -Revision craniectomy wound with complex closure for persistent drainage, subgaleal hematoma   Imaging:     HCT 5/16-stable post op changes    CT Venogram-left transverse sinus thrombosis, no intervention currently    5/23 NSX placed KIKE under the skin to gravity, old blood aspirated and some pus,  -->      Drain DCed 5/27    -e. faecalis in cx --> ID following, on Vancomycin/Meropenum , trough 15.7 5/31    Repeat CT head 5/26- - there is slight increased thickness of a mixed density extra-axial collection within the craniectomy defect.. 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    RESP:   Respiratoy Failure    s/p trach 5/21    tolerating T-piece since 5/24    trach Sutures removed 5/27      CARDS:     S/P cardiac arrest in OR & MTP     - Hypertension - BP controlled on Lisinopril 20mg     - Propranolol 20mg q8  Tachycardia:  - HR 94--103    GI/NUTR:     Diet: TF (Osmolite 1.5) @ 50 via PEG    Bumper at 4 cm    GI Prophylaxis- PPI    Bowel regimen restarted - Senna       -5/31 -3 watery BM           /RENAL:   No acute issues    Condom cath, + voiding.     Q6 BMP      - Hyponatremic - Na goal 135-140     - last Na 144>147>131>128,>131 3%@50 and salt tabs restarted      -NS started at 50cc/hr         HEME/ONC:   No acute issues    Labs: Hb/Hct:  7.9/24 >8.6/26.8>8.5/24.9>8.2/24.5              Plts:  292 > 297  Hg >8, MTP on this admission  DVT prophylaxis: Lovenox 40  5/26 DVT sono neg      ID:  Craniectomy site wound infection  Tmax  99.5  Antibiotics-meropenem  IVPB 2000 every 8 hours, vancomycin  IVPB 1500 every 8 hours  - Vanc level 5/31- 15.7  Cultures     - 5/11- u/a +leuk     - blood culture 5/13, 5/19, 5/20, 5/23 No growth      -Cranial Culture 5/21-e faceium     - Reculture if spikes    ENDO:  FS controlled  q6 POC glucose  Sliding scale insulin PRN  A1c 5.2      DISPO: SICU    Assessment & Plan  45M s/p fall with large left SDH and neurological status change, level 1 for emergent craniectomy complicated by subgleal hematoma/collection s/p wound revision and drainage on 5/16.    NEURO:      S/P craniectomy for large left SDH     - GCS 11T, Q2H neuro checks, No sedation,      - Keppra 500mg BID for sz ppx    Hyponatremia- goal Na+ 130-145     -  3%@50 and salt tabs 8q6 restarted       - Na+ trend- 142 >144 >147>131>128>131    Maintian Head of bed @ 30 degrees    Helmet delivered --> does not need while laying in bed only during PT/OT       OR 5/16:     -Revision craniectomy wound with complex closure for persistent drainage, subgaleal hematoma   Imaging:     HCT 5/16-stable post op changes    CT Venogram-left transverse sinus thrombosis, no intervention currently    5/23 NSX placed KIKE under the skin to gravity, old blood aspirated and some pus,  -->      Drain DCed 5/27    -e. faecalis in cx --> ID following, on Vancomycin/Meropenum , trough 15.7 5/31    Repeat CT head 5/26- - there is slight increased thickness of a mixed density extra-axial collection within the craniectomy defect.. 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    RESP:   Respiratoy Failure    s/p trach 5/21    tolerating T-piece since 5/24    trach Sutures removed 5/27        CARDS:     S/P cardiac arrest in OR & MTP     - Hypertension - BP controlled on Lisinopril 20mg     - Propranolol 20mg q8  Tachycardia:  - HR 94--103    GI/NUTR:     Diet: TF (Osmolite 1.5) @ 50 via PEG    Bumper at 4 cm    GI Prophylaxis- PPI    Bowel regimen restarted - Senna       -5/31 -3 watery BM           /RENAL:   No acute issues    Condom cath, + voiding.     Q6 BMP      - Hyponatremic - Na goal 135-140     - last Na 144>147>131>128,>131 3%@50 and salt tabs restarted      -NS started at 50cc/hr   Monitor UO-jain in place  Volume Status:  06-01-21 @ 07:01  -  06-02-21 @ 07:00  --------------------------------------------------------  IN: 3470 mL / OUT: 3550 mL / NET: -80 mL  Labs:          BUN/Cr- 5/<0.5  -->,  6/<0.5  -->,  6/<0.5  -->          Electrolytes-Na 133 // K 4.4 // Mg 1.9 //  Phos 3.8 (06-02 @ 02:00)         HEME/ONC:   No acute issues    Labs: Hb/Hct:  7.9/24 >8.6/26.8>8.5/24.9>8.2/24.5              Plts:  292 > 297  Hg >8, MTP on this admission  DVT prophylaxis: Lovenox 40  5/26 DVT sono neg                        8.2    7.25  )-----------( 330      ( 02 Jun 2021 02:00 )             24.5       ID:  Craniectomy site wound infection  Tmax  99.5  Antibiotics-meropenem  IVPB 2000 every 8 hours, vancomycin  IVPB 1500 every 8 hours  - Vanc level 5/31- 15.7  Cultures     - 5/11- u/a +leuk     - blood culture 5/13, 5/19, 5/20, 5/23 No growth      -Cranial Culture 5/21-e faceium     - Reculture if spikes    ENDO:  FS controlled  q6 POC glucose  Sliding scale insulin PRN  A1c 5.2      DISPO: SICU

## 2021-06-03 LAB
ANION GAP SERPL CALC-SCNC: 8 MMOL/L — SIGNIFICANT CHANGE UP (ref 7–14)
ANION GAP SERPL CALC-SCNC: 8 MMOL/L — SIGNIFICANT CHANGE UP (ref 7–14)
ANION GAP SERPL CALC-SCNC: 9 MMOL/L — SIGNIFICANT CHANGE UP (ref 7–14)
BUN SERPL-MCNC: 5 MG/DL — LOW (ref 10–20)
CALCIUM SERPL-MCNC: 7.8 MG/DL — LOW (ref 8.5–10.1)
CALCIUM SERPL-MCNC: 8.7 MG/DL — SIGNIFICANT CHANGE UP (ref 8.5–10.1)
CALCIUM SERPL-MCNC: 8.8 MG/DL — SIGNIFICANT CHANGE UP (ref 8.5–10.1)
CHLORIDE SERPL-SCNC: 102 MMOL/L — SIGNIFICANT CHANGE UP (ref 98–110)
CHLORIDE SERPL-SCNC: 94 MMOL/L — LOW (ref 98–110)
CHLORIDE SERPL-SCNC: 98 MMOL/L — SIGNIFICANT CHANGE UP (ref 98–110)
CO2 SERPL-SCNC: 24 MMOL/L — SIGNIFICANT CHANGE UP (ref 17–32)
CO2 SERPL-SCNC: 25 MMOL/L — SIGNIFICANT CHANGE UP (ref 17–32)
CO2 SERPL-SCNC: 25 MMOL/L — SIGNIFICANT CHANGE UP (ref 17–32)
CREAT SERPL-MCNC: <0.5 MG/DL — LOW (ref 0.7–1.5)
GLUCOSE BLDC GLUCOMTR-MCNC: 110 MG/DL — HIGH (ref 70–99)
GLUCOSE BLDC GLUCOMTR-MCNC: 122 MG/DL — HIGH (ref 70–99)
GLUCOSE SERPL-MCNC: 107 MG/DL — HIGH (ref 70–99)
GLUCOSE SERPL-MCNC: 111 MG/DL — HIGH (ref 70–99)
GLUCOSE SERPL-MCNC: 121 MG/DL — HIGH (ref 70–99)
HCT VFR BLD CALC: 23.7 % — LOW (ref 42–52)
HCT VFR BLD CALC: 25 % — LOW (ref 42–52)
HGB BLD-MCNC: 8 G/DL — LOW (ref 14–18)
HGB BLD-MCNC: 8.3 G/DL — LOW (ref 14–18)
MAGNESIUM SERPL-MCNC: 1.7 MG/DL — LOW (ref 1.8–2.4)
MAGNESIUM SERPL-MCNC: 1.9 MG/DL — SIGNIFICANT CHANGE UP (ref 1.8–2.4)
MCHC RBC-ENTMCNC: 27.9 PG — SIGNIFICANT CHANGE UP (ref 27–31)
MCHC RBC-ENTMCNC: 28.5 PG — SIGNIFICANT CHANGE UP (ref 27–31)
MCHC RBC-ENTMCNC: 33.2 G/DL — SIGNIFICANT CHANGE UP (ref 32–37)
MCHC RBC-ENTMCNC: 33.8 G/DL — SIGNIFICANT CHANGE UP (ref 32–37)
MCV RBC AUTO: 84.2 FL — SIGNIFICANT CHANGE UP (ref 80–94)
MCV RBC AUTO: 84.3 FL — SIGNIFICANT CHANGE UP (ref 80–94)
NRBC # BLD: 0 /100 WBCS — SIGNIFICANT CHANGE UP (ref 0–0)
NRBC # BLD: 0 /100 WBCS — SIGNIFICANT CHANGE UP (ref 0–0)
OSMOLALITY SERPL: 272 MOS/KG — LOW (ref 275–300)
PHOSPHATE SERPL-MCNC: 3.9 MG/DL — SIGNIFICANT CHANGE UP (ref 2.1–4.9)
PHOSPHATE SERPL-MCNC: 3.9 MG/DL — SIGNIFICANT CHANGE UP (ref 2.1–4.9)
PLATELET # BLD AUTO: 280 K/UL — SIGNIFICANT CHANGE UP (ref 130–400)
PLATELET # BLD AUTO: 281 K/UL — SIGNIFICANT CHANGE UP (ref 130–400)
POTASSIUM SERPL-MCNC: 4.1 MMOL/L — SIGNIFICANT CHANGE UP (ref 3.5–5)
POTASSIUM SERPL-MCNC: 4.2 MMOL/L — SIGNIFICANT CHANGE UP (ref 3.5–5)
POTASSIUM SERPL-MCNC: 4.2 MMOL/L — SIGNIFICANT CHANGE UP (ref 3.5–5)
POTASSIUM SERPL-SCNC: 4.1 MMOL/L — SIGNIFICANT CHANGE UP (ref 3.5–5)
POTASSIUM SERPL-SCNC: 4.2 MMOL/L — SIGNIFICANT CHANGE UP (ref 3.5–5)
POTASSIUM SERPL-SCNC: 4.2 MMOL/L — SIGNIFICANT CHANGE UP (ref 3.5–5)
RBC # BLD: 2.81 M/UL — LOW (ref 4.7–6.1)
RBC # BLD: 2.97 M/UL — LOW (ref 4.7–6.1)
RBC # FLD: 13.8 % — SIGNIFICANT CHANGE UP (ref 11.5–14.5)
RBC # FLD: 13.9 % — SIGNIFICANT CHANGE UP (ref 11.5–14.5)
SODIUM SERPL-SCNC: 127 MMOL/L — LOW (ref 135–146)
SODIUM SERPL-SCNC: 132 MMOL/L — LOW (ref 135–146)
SODIUM SERPL-SCNC: 134 MMOL/L — LOW (ref 135–146)
VANCOMYCIN TROUGH SERPL-MCNC: 19.9 UG/ML — HIGH (ref 5–10)
VANCOMYCIN TROUGH SERPL-MCNC: 33.6 UG/ML — HIGH (ref 5–10)
WBC # BLD: 5.67 K/UL — SIGNIFICANT CHANGE UP (ref 4.8–10.8)
WBC # BLD: 7.18 K/UL — SIGNIFICANT CHANGE UP (ref 4.8–10.8)
WBC # FLD AUTO: 5.67 K/UL — SIGNIFICANT CHANGE UP (ref 4.8–10.8)
WBC # FLD AUTO: 7.18 K/UL — SIGNIFICANT CHANGE UP (ref 4.8–10.8)

## 2021-06-03 PROCEDURE — 99291 CRITICAL CARE FIRST HOUR: CPT

## 2021-06-03 PROCEDURE — 71045 X-RAY EXAM CHEST 1 VIEW: CPT | Mod: 26

## 2021-06-03 RX ORDER — MAGNESIUM SULFATE 500 MG/ML
1 VIAL (ML) INJECTION ONCE
Refills: 0 | Status: COMPLETED | OUTPATIENT
Start: 2021-06-03 | End: 2021-06-03

## 2021-06-03 RX ORDER — MAGNESIUM SULFATE 500 MG/ML
2 VIAL (ML) INJECTION ONCE
Refills: 0 | Status: COMPLETED | OUTPATIENT
Start: 2021-06-03 | End: 2021-06-03

## 2021-06-03 RX ORDER — FLUDROCORTISONE ACETATE 0.1 MG/1
0.1 TABLET ORAL
Refills: 0 | Status: COMPLETED | OUTPATIENT
Start: 2021-06-03 | End: 2021-06-04

## 2021-06-03 RX ORDER — SENNA PLUS 8.6 MG/1
2 TABLET ORAL AT BEDTIME
Refills: 0 | Status: DISCONTINUED | OUTPATIENT
Start: 2021-06-03 | End: 2021-06-07

## 2021-06-03 RX ADMIN — SODIUM CHLORIDE 8 GRAM(S): 9 INJECTION INTRAMUSCULAR; INTRAVENOUS; SUBCUTANEOUS at 17:25

## 2021-06-03 RX ADMIN — Medication 600 MILLIGRAM(S): at 07:10

## 2021-06-03 RX ADMIN — FENTANYL CITRATE 25 MICROGRAM(S): 50 INJECTION INTRAVENOUS at 07:10

## 2021-06-03 RX ADMIN — FLUDROCORTISONE ACETATE 0.1 MILLIGRAM(S): 0.1 TABLET ORAL at 12:27

## 2021-06-03 RX ADMIN — LEVETIRACETAM 420 MILLIGRAM(S): 250 TABLET, FILM COATED ORAL at 17:25

## 2021-06-03 RX ADMIN — FLUDROCORTISONE ACETATE 0.1 MILLIGRAM(S): 0.1 TABLET ORAL at 22:52

## 2021-06-03 RX ADMIN — Medication 650 MILLIGRAM(S): at 07:02

## 2021-06-03 RX ADMIN — SODIUM CHLORIDE 3000 MILLILITER(S): 9 INJECTION INTRAMUSCULAR; INTRAVENOUS; SUBCUTANEOUS at 04:53

## 2021-06-03 RX ADMIN — Medication 650 MILLIGRAM(S): at 07:07

## 2021-06-03 RX ADMIN — LEVETIRACETAM 420 MILLIGRAM(S): 250 TABLET, FILM COATED ORAL at 06:37

## 2021-06-03 RX ADMIN — CHLORHEXIDINE GLUCONATE 15 MILLILITER(S): 213 SOLUTION TOPICAL at 06:38

## 2021-06-03 RX ADMIN — Medication 2 DROP(S): at 21:26

## 2021-06-03 RX ADMIN — Medication 300 MILLIGRAM(S): at 06:37

## 2021-06-03 RX ADMIN — CHLORHEXIDINE GLUCONATE 15 MILLILITER(S): 213 SOLUTION TOPICAL at 17:25

## 2021-06-03 RX ADMIN — CHLORHEXIDINE GLUCONATE 1 APPLICATION(S): 213 SOLUTION TOPICAL at 06:38

## 2021-06-03 RX ADMIN — FENTANYL CITRATE 25 MICROGRAM(S): 50 INJECTION INTRAVENOUS at 07:08

## 2021-06-03 RX ADMIN — SODIUM CHLORIDE 8 GRAM(S): 9 INJECTION INTRAMUSCULAR; INTRAVENOUS; SUBCUTANEOUS at 12:28

## 2021-06-03 RX ADMIN — Medication 2 DROP(S): at 06:21

## 2021-06-03 RX ADMIN — ENOXAPARIN SODIUM 40 MILLIGRAM(S): 100 INJECTION SUBCUTANEOUS at 17:25

## 2021-06-03 RX ADMIN — Medication 600 MILLIGRAM(S): at 07:05

## 2021-06-03 RX ADMIN — MEROPENEM 200 MILLIGRAM(S): 1 INJECTION INTRAVENOUS at 21:51

## 2021-06-03 RX ADMIN — Medication 100 GRAM(S): at 20:00

## 2021-06-03 RX ADMIN — Medication 25 GRAM(S): at 15:17

## 2021-06-03 RX ADMIN — PANTOPRAZOLE SODIUM 40 MILLIGRAM(S): 20 TABLET, DELAYED RELEASE ORAL at 12:28

## 2021-06-03 RX ADMIN — Medication 300 MILLIGRAM(S): at 14:46

## 2021-06-03 RX ADMIN — Medication 650 MILLIGRAM(S): at 07:09

## 2021-06-03 RX ADMIN — Medication 300 MILLIGRAM(S): at 21:51

## 2021-06-03 RX ADMIN — Medication 50 GRAM(S): at 04:53

## 2021-06-03 RX ADMIN — Medication 2 DROP(S): at 14:46

## 2021-06-03 RX ADMIN — Medication 650 MILLIGRAM(S): at 07:06

## 2021-06-03 RX ADMIN — Medication 650 MILLIGRAM(S): at 07:00

## 2021-06-03 RX ADMIN — SODIUM CHLORIDE 8 GRAM(S): 9 INJECTION INTRAMUSCULAR; INTRAVENOUS; SUBCUTANEOUS at 06:39

## 2021-06-03 RX ADMIN — Medication 650 MILLIGRAM(S): at 07:03

## 2021-06-03 RX ADMIN — MEROPENEM 200 MILLIGRAM(S): 1 INJECTION INTRAVENOUS at 13:56

## 2021-06-03 RX ADMIN — LISINOPRIL 20 MILLIGRAM(S): 2.5 TABLET ORAL at 06:36

## 2021-06-03 RX ADMIN — MEROPENEM 200 MILLIGRAM(S): 1 INJECTION INTRAVENOUS at 06:39

## 2021-06-03 NOTE — CONSULT NOTE ADULT - ASSESSMENT
# Hyponatremia, hypo-osmolar, high urine osm, high urine Na c/f SIADH / cerebral salt wasting   - cont salt tabs  - limit hypotonic infusions as much as possible, avoid free water enterally   - cont NS, check Na level q8h # Hyponatremia, hypo-osmolar, high urine osm, high urine Na c/f SIADH / cerebral salt wasting   - cont salt tabs  - limit hypotonic infusions as much as possible, avoid free water enterally   - cont NS, check Na level q8h  - recent TSH noted, wnl   - document strict i/o

## 2021-06-03 NOTE — PROGRESS NOTE ADULT - SUBJECTIVE AND OBJECTIVE BOX
POD#27/17    S/P Left Craniectomy for evac of SDH  S/P Wound Revision    pt seen and examined at bedside pt is awake tracks     Vital Signs Last 24 Hrs  T(C): 37.3 (2021 23:00), Max: 37.6 (2021 12:00)  T(F): 99.1 (2021 23:00), Max: 99.7 (2021 12:00)  HR: 97 (2021 11:00) (90 - 113)  BP: 125/77 (2021 11:00) (125/77 - 148/95)  BP(mean): 96 (2021 11:00) (96 - 117)  RR: 25 (2021 11:00) (13 - 36)  SpO2: 100% (2021 11:00) (98% - 100%)    I&O's Detail    2021 07:01  -  2021 07:00  --------------------------------------------------------  IN:    IV PiggyBack: 100 mL    IV PiggyBack: 100 mL    IV PiggyBack: 100 mL    IV PiggyBack: 500 mL    Osmolite: 600 mL    sodium chloride 0.9%: 1200 mL    sodium chloride 3%: 1200 mL  Total IN: 3800 mL    OUT:    PEG (Percutaneous Endoscopic Gastrostomy) Tube (mL): 350 mL    Voided (mL): 3675 mL  Total OUT: 4025 mL    Total NET: -225 mL      2021 07:01  -  2021 11:42  --------------------------------------------------------  IN:    Osmolite: 250 mL    sodium chloride 0.9%: 250 mL    sodium chloride 3%: 250 mL  Total IN: 750 mL    OUT:    Voided (mL): 1025 mL  Total OUT: 1025 mL    Total NET: -275 mL        I&O's Summary    2021 07:01  -  2021 07:00  --------------------------------------------------------  IN: 3800 mL / OUT: 4025 mL / NET: -225 mL    2021 07:01  -  2021 11:42  --------------------------------------------------------  IN: 750 mL / OUT: 1025 mL / NET: -275 mL        PHYSICAL EXAM:    Awake , PERRL   Moves LUE and LLE spontaneously   withdraws RUE minimally to noxious stimuli   no withdrawal of RLE to noxious stimuli   head incision clean dry intact      LABS:                        8.3    7.18  )-----------( 281      ( 2021 02:10 )             25.0     06-03    127<L>  |  94<L>  |  5<L>  ----------------------------<  107<H>  4.2   |  25  |  <0.5<L>    Ca    8.7      2021 05:25  Phos  3.9     06-03  Mg     1.7     06-03        Urinalysis Basic - ( 2021 11:29 )    Color: Colorless / Appearance: Clear / S.012 / pH: x  Gluc: x / Ketone: Negative  / Bili: Negative / Urobili: <2 mg/dL   Blood: x / Protein: Negative / Nitrite: Negative   Leuk Esterase: Negative / RBC: x / WBC x   Sq Epi: x / Non Sq Epi: x / Bacteria: x          CAPILLARY BLOOD GLUCOSE      POCT Blood Glucose.: 110 mg/dL (2021 02:04)  POCT Blood Glucose.: 92 mg/dL (2021 18:26)  POCT Blood Glucose.: 84 mg/dL (2021 12:24)      Drug Levels: [] N/A  Vancomycin Level, Trough: 19.9 ug/mL ( @ 05:25)  Vancomycin Level, Trough: 33.6 ug/mL ( @ 02:10)  Vancomycin Level, Trough: 15.7 ug/mL ( @ 11:59)    CSF Analysis: [] N/A      Allergies    No Known Allergies    Intolerances      MEDICATIONS:  Antibiotics:  meropenem  IVPB      meropenem  IVPB 2000 milliGRAM(s) IV Intermittent every 8 hours  vancomycin  IVPB      vancomycin  IVPB 1500 milliGRAM(s) IV Intermittent every 8 hours    Neuro:  acetaminophen   Tablet .. 650 milliGRAM(s) Oral every 6 hours PRN  levETIRAcetam  IVPB 500 milliGRAM(s) IV Intermittent every 12 hours    Anticoagulation:  enoxaparin Injectable 40 milliGRAM(s) SubCutaneous every 24 hours    OTHER:  artificial tears (preservative free) Ophthalmic Solution 2 Drop(s) Both EYES three times a day  chlorhexidine 0.12% Liquid 15 milliLiter(s) Oral Mucosa two times a day  chlorhexidine 4% Liquid 1 Application(s) Topical <User Schedule>  fludroCORTISONE 0.1 milliGRAM(s) Oral <User Schedule>  insulin lispro (ADMELOG) corrective regimen sliding scale   SubCutaneous every 6 hours  lisinopril 20 milliGRAM(s) Oral daily  pantoprazole   Suspension 40 milliGRAM(s) Oral daily  propranolol 20 milliGRAM(s) Oral every 8 hours    IVF:  sodium chloride 8 Gram(s) Oral every 6 hours  sodium chloride 0.9%. 1000 milliLiter(s) IV Continuous <Continuous>  sodium chloride 3%. 500 milliLiter(s) IV Continuous <Continuous>    CULTURES:  Culture Results:   No Growth Final ( @ 00:15)  Culture Results:   No Growth Final ( @ 00:15)    RADIOLOGY & ADDITIONAL TESTS:      ASSESSMENT:  45y Male s/p    SUBDURAL HEMATOMA;RESPIRATORY FAILURE;FALL    ^FALL    No pertinent family history in first degree relatives    Family history of essential hypertension (Father)    Handoff    MEWS Score    No pertinent past medical history    Alcohol abuse    GERD (gastroesophageal reflux disease)    ETOH abuse    Hypomagnesemia    Seizure disorder    Subdural hematoma    S/P subdural hematoma evacuation    Subdural hematoma    Subdural hematoma    Subdural hematoma    Respiratory failure    ETOH abuse    Palliative care by specialist    Insertion of non-tunneled central venous catheter (CVC) in patient age 5 years of age or older    Craniotomy, decompressive    Decompressive craniotomy    Placement, tracheostomy and percutaneous endoscopic gastrostomy    No significant past surgical history    H/O hemorrhoidectomy    FALL    22    Respiratory failure    Fall    SysAdmin_VisitLink        A/p         S/P Left Craniectomy for evac of SDH        S/p Wound revision               possible  shunt monday               [facility] for PEG               monitor Na

## 2021-06-03 NOTE — PROGRESS NOTE ADULT - SUBJECTIVE AND OBJECTIVE BOX
SANDRA DAS  [id number]  45y Male    Indication for ICU admission: s/p craniectomy for large L SDH  Admit Date:05-06-21  ICU Date: 05-07-21  OR Date: 05-06-21    No Known Allergies    PAST MEDICAL & SURGICAL HISTORY:  Alcohol abuse  GERD (gastroesophageal reflux disease)  ETOH abuse  Hypomagnesemia  Seizure disorder  H/O hemorrhoidectomy    Home Medications:  NONE    24HRS EVENT:  6/2  DAY  -Ulytes, Uosm, Paola>>Fludricortisone   -Pt self d/c'd PEG tube, jain placed, per Gave keep jain in okay to feed  -500 NS bolus, 20 Kcl    NIGHT:   -Na 132 on 3% @ 50, NS @ 50          DVT PTX: LVX  GI PTX: PPI    ***Tubes/Lines/Drains  ***  Peripheral IV  Left basilic midline 5/17  Arterial Line Right radial	Date 5/17  L IJ TLC     5/29  Trach/peg 5/21    REVIEW OF SYSTEMS  [ ] A ten-point review of systems was otherwise negative except as noted.  [x] Due to altered mental status/intubation, subjective information were not able to be obtained from the patient. History was obtained, to the extent possible, from review of the chart and collateral sources of information.   SANDRA DAS  [id number]  45y Male    Indication for ICU admission: s/p craniectomy for large L SDH  Admit Date:21  ICU Date: 21  OR Date: 21    No Known Allergies    PAST MEDICAL & SURGICAL HISTORY:  Alcohol abuse  GERD (gastroesophageal reflux disease)  ETOH abuse  Hypomagnesemia  Seizure disorder  H/O hemorrhoidectomy    Home Medications:  NONE    24HRS EVENT:    DAY  -Ulytes, Uosm, Fludrocortisone x1  -Pt self d/c'd PEG tube, jain placed to keep peg insertion site patent. Pt receiving tube feeds through jain   -500 NS bolus, 20 Kcl    NIGHT:   -Na 132 on 3% @ 50, NS @ 50          DVT PTX: LVX  GI PTX: PPI    ***Tubes/Lines/Drains  ***  Peripheral IV  Left basilic midline   Arterial Line Right radial	Date   L IJ TLC       Trach/peg     REVIEW OF SYSTEMS  [ ] A ten-point review of systems was otherwise negative except as noted.  [x] Due to altered mental status/intubation, subjective information were not able to be obtained from the patient. History was obtained, to the extent possible, from review of the chart and collateral sources of information.      Daily     Daily     Diet, NPO with Tube Feed:   Tube Feeding Modality: Orogastric  Osmolite 1.5 Jeff  Total Volume for 24 Hours (mL): 1200  Continuous  Starting Tube Feed Rate mL per Hour: 50  Until Goal Tube Feed Rate (mL per Hour): 50  Tube Feed Duration (in Hours): 24  Tube Feed Start Time: 00:00  No Carb Prosource TF     Qty per Day:  3 (21 @ 18:49)      CURRENT MEDS:  Neurologic Medications  acetaminophen   Tablet .. 650 milliGRAM(s) Oral every 6 hours PRN Temp greater or equal to 38.5C (101.3F), Moderate Pain (4 - 6), Severe Pain (7 - 10)  levETIRAcetam  IVPB 500 milliGRAM(s) IV Intermittent every 12 hours    Respiratory Medications    Cardiovascular Medications  lisinopril 20 milliGRAM(s) Oral daily  propranolol 20 milliGRAM(s) Oral every 8 hours    Gastrointestinal Medications  pantoprazole   Suspension 40 milliGRAM(s) Oral daily  sodium chloride 8 Gram(s) Oral every 6 hours  sodium chloride 0.9%. 1000 milliLiter(s) IV Continuous <Continuous>  sodium chloride 3%. 500 milliLiter(s) IV Continuous <Continuous>    Genitourinary Medications    Hematologic/Oncologic Medications  enoxaparin Injectable 40 milliGRAM(s) SubCutaneous every 24 hours    Antimicrobial/Immunologic Medications  meropenem  IVPB 2000 milliGRAM(s) IV Intermittent every 8 hours    vancomycin  IVPB 1500 milliGRAM(s) IV Intermittent every 8 hours    Endocrine/Metabolic Medications  insulin lispro (ADMELOG) corrective regimen sliding scale   SubCutaneous every 6 hours    Topical/Other Medications  artificial tears (preservative free) Ophthalmic Solution 2 Drop(s) Both EYES three times a day  chlorhexidine 0.12% Liquid 15 milliLiter(s) Oral Mucosa two times a day  chlorhexidine 4% Liquid 1 Application(s) Topical <User Schedule>      ICU Vital Signs Last 24 Hrs  T(C): 37.3 (2021 23:00), Max: 37.6 (2021 12:00)  T(F): 99.1 (2021 23:00), Max: 99.7 (2021 12:00)  HR: 101 (2021 07:00) (86 - 113)  BP: 134/83 (2021 07:00) (128/88 - 148/95)  BP(mean): 102 (2021 07:00) (99 - 117)  RR: 24 (2021 07:00) (13 - 27)  SpO2: 100% (2021 07:00) (98% - 100%)      I&O's Summary    2021 07:01  -  2021 07:00  --------------------------------------------------------  IN: 3800 mL / OUT: 4025 mL / NET: -225 mL    2021 07:01  -  2021 08:15  --------------------------------------------------------  IN: 150 mL / OUT: 350 mL / NET: -200 mL      I&O's Detail    2021 07:01  -  2021 07:00  --------------------------------------------------------  IN:    IV PiggyBack: 100 mL    IV PiggyBack: 100 mL    IV PiggyBack: 100 mL    IV PiggyBack: 500 mL    Osmolite: 600 mL    sodium chloride 0.9%: 1200 mL    sodium chloride 3%: 1200 mL  Total IN: 3800 mL    OUT:    PEG (Percutaneous Endoscopic Gastrostomy) Tube (mL): 350 mL    Voided (mL): 3675 mL  Total OUT: 4025 mL    Total NET: -225 mL      2021 07:01  -  2021 08:15  --------------------------------------------------------  IN:    Osmolite: 50 mL    sodium chloride 0.9%: 50 mL    sodium chloride 3%: 50 mL  Total IN: 150 mL    OUT:    Voided (mL): 350 mL  Total OUT: 350 mL    Total NET: -200 mL          PHYSICAL EXAM:    General/Neuro  GCS:     Pt is off sedation, AAO, follows commands  Deficits:  no focal deficits  Pupils: PERRLA bilaterally     Lungs: clear to auscultation, Normal expansion/effort., Trache in place, pt on T-piece without signs of respiratory distress    Cardiovascular : S1, S2.  Regular rate and rhythm.  Peripheral edema RUE with significant swelling  Cardiac Rhythm: Normal Sinus Rhythm    GI: Abdomen soft, Non-tender, Non-distended.    Gastrostomy with jain tube in place.       Extremities: Extremities warm, pink, well-perfused. RUE with significant swelling, bilateral lower extremities with SCD boots.     Derm: Good skin turgor, no skin breakdown.      :       Jain catheter in place.      CXR:     LABS:  CAPILLARY BLOOD GLUCOSE      POCT Blood Glucose.: 110 mg/dL (2021 02:04)  POCT Blood Glucose.: 92 mg/dL (2021 18:26)  POCT Blood Glucose.: 84 mg/dL (2021 12:24)                          8.3    7.18  )-----------( 281      ( 2021 02:10 )             25.0       06-03    127<L>  |  94<L>  |  5<L>  ----------------------------<  107<H>  4.2   |  25  |  <0.5<L>    Ca    8.7      2021 05:25  Phos  3.9     06-03  Mg     1.7     06-03              Urinalysis Basic - ( 2021 11:29 )    Color: Colorless / Appearance: Clear / S.012 / pH: x  Gluc: x / Ketone: Negative  / Bili: Negative / Urobili: <2 mg/dL   Blood: x / Protein: Negative / Nitrite: Negative   Leuk Esterase: Negative / RBC: x / WBC x   Sq Epi: x / Non Sq Epi: x / Bacteria: x           SANDRA DAS  [id number]  45y Male    Indication for ICU admission: s/p craniectomy for large L SDH  Admit Date:21  ICU Date: 21  OR Date: 21    No Known Allergies    PAST MEDICAL & SURGICAL HISTORY:  Alcohol abuse  GERD (gastroesophageal reflux disease)  ETOH abuse  Hypomagnesemia  Seizure disorder  H/O hemorrhoidectomy    Home Medications:  NONE    24HRS EVENT:    DAY  -Ulytes, Uosm, Fludrocortisone x1  -Pt self d/c'd PEG tube, jain placed to keep peg insertion site patent. Pt receiving tube feeds through jain   -500 NS bolus, 20 Kcl    NIGHT:   -Na 132 on 3% @ 50, NS @ 50          DVT PTX: LVX  GI PTX: PPI    ***Tubes/Lines/Drains  ***  Peripheral IV  Left basilic midline   Arterial Line Right radial	Date   L IJ TLC       Trach/peg     REVIEW OF SYSTEMS  [ ] A ten-point review of systems was otherwise negative except as noted.  [x] Due to altered mental status/intubation, subjective information were not able to be obtained from the patient. History was obtained, to the extent possible, from review of the chart and collateral sources of information.      Daily     Daily     Diet, NPO with Tube Feed:   Tube Feeding Modality: Orogastric  Osmolite 1.5 Jeff  Total Volume for 24 Hours (mL): 1200  Continuous  Starting Tube Feed Rate mL per Hour: 50  Until Goal Tube Feed Rate (mL per Hour): 50  Tube Feed Duration (in Hours): 24  Tube Feed Start Time: 00:00  No Carb Prosource TF     Qty per Day:  3 (21 @ 18:49)      CURRENT MEDS:  Neurologic Medications  acetaminophen   Tablet .. 650 milliGRAM(s) Oral every 6 hours PRN Temp greater or equal to 38.5C (101.3F), Moderate Pain (4 - 6), Severe Pain (7 - 10)  levETIRAcetam  IVPB 500 milliGRAM(s) IV Intermittent every 12 hours    Respiratory Medications    Cardiovascular Medications  lisinopril 20 milliGRAM(s) Oral daily  propranolol 20 milliGRAM(s) Oral every 8 hours    Gastrointestinal Medications  pantoprazole   Suspension 40 milliGRAM(s) Oral daily  sodium chloride 8 Gram(s) Oral every 6 hours  sodium chloride 0.9%. 1000 milliLiter(s) IV Continuous <Continuous>  sodium chloride 3%. 500 milliLiter(s) IV Continuous <Continuous>    Genitourinary Medications    Hematologic/Oncologic Medications  enoxaparin Injectable 40 milliGRAM(s) SubCutaneous every 24 hours    Antimicrobial/Immunologic Medications  meropenem  IVPB 2000 milliGRAM(s) IV Intermittent every 8 hours    vancomycin  IVPB 1500 milliGRAM(s) IV Intermittent every 8 hours    Endocrine/Metabolic Medications  insulin lispro (ADMELOG) corrective regimen sliding scale   SubCutaneous every 6 hours    Topical/Other Medications  artificial tears (preservative free) Ophthalmic Solution 2 Drop(s) Both EYES three times a day  chlorhexidine 0.12% Liquid 15 milliLiter(s) Oral Mucosa two times a day  chlorhexidine 4% Liquid 1 Application(s) Topical <User Schedule>      ICU Vital Signs Last 24 Hrs  T(C): 37.3 (2021 23:00), Max: 37.6 (2021 12:00)  T(F): 99.1 (2021 23:00), Max: 99.7 (2021 12:00)  HR: 101 (2021 07:00) (86 - 113)  BP: 134/83 (2021 07:00) (128/88 - 148/95)  BP(mean): 102 (2021 07:00) (99 - 117)  RR: 24 (2021 07:00) (13 - 27)  SpO2: 100% (2021 07:00) (98% - 100%)      I&O's Summary    2021 07:01  -  2021 07:00  --------------------------------------------------------  IN: 3800 mL / OUT: 4025 mL / NET: -225 mL    2021 07:01  -  2021 08:15  --------------------------------------------------------  IN: 150 mL / OUT: 350 mL / NET: -200 mL      I&O's Detail    2021 07:01  -  2021 07:00  --------------------------------------------------------  IN:    IV PiggyBack: 100 mL    IV PiggyBack: 100 mL    IV PiggyBack: 100 mL    IV PiggyBack: 500 mL    Osmolite: 600 mL    sodium chloride 0.9%: 1200 mL    sodium chloride 3%: 1200 mL  Total IN: 3800 mL    OUT:    PEG (Percutaneous Endoscopic Gastrostomy) Tube (mL): 350 mL    Voided (mL): 3675 mL  Total OUT: 4025 mL    Total NET: -225 mL      2021 07:01  -  2021 08:15  --------------------------------------------------------  IN:    Osmolite: 50 mL    sodium chloride 0.9%: 50 mL    sodium chloride 3%: 50 mL  Total IN: 150 mL    OUT:    Voided (mL): 350 mL  Total OUT: 350 mL    Total NET: -200 mL          PHYSICAL EXAM:    General/Neuro  GCS:     Pt is off sedation, AAO, follows commands, staples over craniotomy site, wound well healing without signs of infection  Deficits:  no focal deficits  Pupils: PERRLA bilaterally     Lungs: clear to auscultation, Normal expansion/effort., Trache in place, pt on T-piece without signs of respiratory distress    Cardiovascular : S1, S2.  Regular rate and rhythm.  Peripheral edema RUE with significant swelling  Cardiac Rhythm: Normal Sinus Rhythm    GI: Abdomen soft, Non-tender, Non-distended.    Gastrostomy with jain tube in place.       Extremities: Extremities warm, pink, well-perfused. RUE with significant swelling, bilateral lower extremities with SCD boots.     Derm: Good skin turgor, no skin breakdown.      :       Jain catheter in place.      CXR:     LABS:  CAPILLARY BLOOD GLUCOSE      POCT Blood Glucose.: 110 mg/dL (2021 02:04)  POCT Blood Glucose.: 92 mg/dL (2021 18:26)  POCT Blood Glucose.: 84 mg/dL (2021 12:24)                          8.3    7.18  )-----------( 281      ( 2021 02:10 )             25.0       06-03    127<L>  |  94<L>  |  5<L>  ----------------------------<  107<H>  4.2   |  25  |  <0.5<L>    Ca    8.7      2021 05:25  Phos  3.9     06-03  Mg     1.7     06-03              Urinalysis Basic - ( 2021 11:29 )    Color: Colorless / Appearance: Clear / S.012 / pH: x  Gluc: x / Ketone: Negative  / Bili: Negative / Urobili: <2 mg/dL   Blood: x / Protein: Negative / Nitrite: Negative   Leuk Esterase: Negative / RBC: x / WBC x   Sq Epi: x / Non Sq Epi: x / Bacteria: x

## 2021-06-03 NOTE — PROGRESS NOTE ADULT - ASSESSMENT
ASSESSMENT  45yM w/ PMHx of Etoh abuse and seizures on keppra  found down unresponsive admitted 5/6. s/p craniectomy for large L SDH    IMPRESSION  #RESOLVED Fevers secondary to craniotomy site collection and thrombus    5/27 KIKE removed    No CSF studies    5/23 BCX NG    5/21 aspirate craniotomy site WCX e. faecalis (S amp)    5/16 swab OR coNS (S oxacillin)    No leukocytosis, rule out non-infectious causes / central fever    5/19 BCX NGTD     5/18 UA unremarkable   < from: MR Head w/wo IV Cont (05.28.21 @ 20:10) >  1.9 cm enhancing soft tissue with internal mineralization along the anterior margin of the left frontal craniectomy reflect granulation tissue.  Diffuse thickening/enhancement of the bilateral pachymeninges, nonspecific but can be seen in setting of postop settings, intracranial hypotension, or meningitis.  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 Redemonstrated subdural hematomas along the right cerebral and cerebellar convexities.  Evolving subacute infarcts in the medial left frontoparietal region, left insular cortex, and right cerebellum with associated gyral enhancements.  Peripherally enhancing evolving hemorrhagic contusion/hematoma in the left temporal occipital regions with scattered petechial gyral hemorrhage/enhancement.Moderate surrounding edema.  < from: CT Head No Cont (05.26.21 @ 12:49) >  Since prior CT of 5/16/2021 there is slight increased thickness of a mixed density extra-axial collection within the craniectomy defect.. 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 Please note evaluation for abscess is limited without intravenous contrast. A follow-up MRI of the brain with and without contrast is recommended for further evaluation.  Decreased blood products noted within the left temporal occipital region with unchanged areas of subacute infarct.    CT 5/20 1.  Redemonstrated filling defect within the left transverse sinus, sigmoid sinus and proximal internal jugular vein compatible with thrombus. When compared to the CTA from 5/13, there has been interval partial recanalization of the left transverse sinus.  2.  Redemonstrated large left-sided craniectomy. The previously seen left scalp drain has been removed.  3.  Increasing size of the mixed density fluid collection/hematoma extending from the left extra-axial space to the overlying scalp, overall measuring 4 cm in width, previously 3 cm.  4.  Increasing frontal convexity subdural hygromas measuring 8 mm on the right and 9 mm on the left.  5.  Left to right midline shift of 4 mm, previously 3 mm.  6.  Redemonstrated acute/subacute infarcts within the left temporal lobe and insula. Additional previously demonstrated multifocal infarcts and edema are not as well delineated on this exam, recommend follow-up head CT.  #Klebsiella bacteremia , BAL also with Klebsiella but no PNA on imaging (CT/CXR)    5/13 BCX NGTD     5/11 BCX kleb variicola (S)    5/11 BAL   Moderate Klebsiella variicola &  Moderate Serratia marcescens  < from: Xray Chest 1 View- PORTABLE-Routine (05.13.21 @ 06:28) >No radiographic evidence of acute cardiopulmonary disease.    #SDH s/p craniectomy    5/16 OR WCX   Rare Coag Negative Staphylococcus (S oxacillin)- likely skin colonizer     5/16 s/p Revision craniotomy wound with complex closure for persistent drainage, subgaleal hematoma    Repeat CTH development of acute infarcts in left cerebral hemisphere and right cerebellum  #CT atelectasis   #HIV/Hep panel NR  Creatinine, Serum: <0.5 (05-14-21 @ 05:30)      RECOMMENDATIONS  - Possible  shunt Monday  - Seamus 2g q8h IV and Vanc IV 5/25-, s/p unasyn  - Vanc 1.5 q8h IV   Vancomycin Level, Trough: 19.9 (06-03-21 @ 05:25)  - trend WBC, fever curve  - Per SICU/ NSYG      If any questions, please call or send a message on [employer]  Spectra [phone]

## 2021-06-03 NOTE — PROGRESS NOTE ADULT - ATTENDING COMMENTS
Remains off vent, spontaneous breathing.  Follows simple commands.  Still with high UOP and hyponatremic.  Tolerates feeds.    ASSESSMENT:  46 y/o male, S/P Fall.  Traumatic Left SDH.  Brain compression.  S/P Left Craniectomy.  Acute respiratory failure.  At risk for hemodynamic instability.  Chronic Alcohol Abuse.  Dysphagia.  Hyponatremia.   Acute blood loss anemia.    PLAN:  - continue aggressive chest PT and suctioning  - use O2 with NC as needed  - keep MAP>65; give iv bolus to avoid dehydration.  - continue 3% saline and give Fludrocortisone po q8h x3 dose  - follow Na+ frequently to avoid mor than 12 mmol correction  - get Nephrology evaluation for hyponatremia.  - remains on Seamus/Vanco as per ID  - GI and DVT prophylaxis   - Neurosurgery f/u - plan for possible  shunt next week.

## 2021-06-03 NOTE — PROGRESS NOTE ADULT - ASSESSMENT
Assessment & Plan  45M s/p fall with large left SDH and neurological status change, level 1 for emergent craniectomy complicated by subgleal hematoma/collection s/p wound revision and drainage on 5/16.    NEURO:      S/P craniectomy for large left SDH     - GCS 11T, Q2H neuro checks, No sedation,      - Keppra 500mg BID for sz ppx    Hyponatremia- goal Na+ 130-145     -  3%@50 and salt tabs 8q6 restarted       - Na+ trend- 142 >144 >147>131>128>131>133    Maintian Head of bed @ 30 degrees    Helmet delivered --> does not need while laying in bed only during PT/OT       OR 5/16:     -Revision craniectomy wound with complex closure for persistent drainage, subgaleal hematoma   Imaging:     HCT 5/16-stable post op changes    CT Venogram-left transverse sinus thrombosis, no intervention currently    5/23 NSX placed KIKE under the skin to gravity, old blood aspirated and some pus,  -->      Drain DCed 5/27    -e. faecalis in cx --> ID following, on Vancomycin/Meropenum , trough 15.7 5/31    Repeat CT head 5/26- - there is slight increased thickness of a mixed density extra-axial collection within the craniectomy defect.. 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    RESP:   Respiratoy Failure    s/p trach 5/21    tolerating T-piece since 5/24    trach Sutures removed 5/27      CARDS:     S/P cardiac arrest in OR & MTP     - Hypertension - BP controlled on Lisinopril 20mg     - Propranolol 20mg q8  Tachycardia    GI/NUTR:     Diet: TF (Osmolite 1.5) @ 50 via jain in gastrostomy    GI Prophylaxis- PPI    Bowel regimen restarted - Senna         /RENAL:   No acute issues    Condom cath, + voiding.     Q6 BMP      - Hyponatremic in setting of Cerebral salt wasting - Na goal 135-140  keep euvolemic - 500 NS bolus x1     - 3%@50 and salt tabs restarted      -NS started at 50cc/hr   BUN/Cr- 5/<0.5  -->,  5/<0.5  -->,  5/<0.5  -->  Electrolytes-Na 132 // K 3.8 // Mg -- //  Phos -- (06-02 @ 20:00)          HEME/ONC:   No acute issues    Labs:   Hb/Hct:  8.2/24.5  -->,  8.7/26.0  -->,  8.3/25.0  -->  Plts:  330  -->,  338  -->,  281  -->  Hg >8, MTP on this admission  DVT prophylaxis: Lovenox 40  5/26 DVT sono neg      ID:  Craniectomy site wound infection  Tmax  99.5  Antibiotics-meropenem  IVPB 2000 every 8 hours, vancomycin  IVPB 1500 every 8 hours  - Vanc level 5/31- 15.7  Cultures     - 5/11- u/a +leuk     - blood culture 5/13, 5/19, 5/20, 5/23 No growth      -Cranial Culture 5/21-e faceium     - Reculture if spikes    ENDO:  FS controlled  q6 POC glucose  Sliding scale insulin PRN  A1c 5.2      DISPO: SICU    Assessment & Plan  45M s/p fall with large left SDH and neurological status change, level 1 for emergent craniectomy complicated by subgleal hematoma/collection s/p wound revision and drainage on 5/16.    NEURO:      S/P craniectomy for large left SDH     - GCS 11T, Q2H neuro checks, No sedation,      - Keppra 500mg BID for sz ppx    Hyponatremia- goal Na+ 130-145     -  3%@50 and salt tabs 8q6 restarted       - Na+ trend- 142 >144 >147>131>128>131>133>127    Maintian Head of bed @ 30 degrees    Helmet delivered --> does not need while laying in bed only during PT/OT       OR 5/16:     -Revision craniectomy wound with complex closure for persistent drainage, subgaleal hematoma   Imaging:     HCT 5/16-stable post op changes    CT Venogram-left transverse sinus thrombosis, no intervention currently    5/23 NSX placed KIKE under the skin to gravity, old blood aspirated and some pus,  -->      Drain DCed 5/27    -e. faecalis in cx --> ID following, on Vancomycin/Meropenum , trough 15.7 5/31    Repeat CT head 5/26- - there is slight increased thickness of a mixed density extra-axial collection within the craniectomy defect.. 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    RESP:   Respiratoy Failure    s/p trach 5/21    tolerating T-piece since 5/24    trach Sutures removed 5/27      CARDS:     S/P cardiac arrest in OR & MTP     - Hypertension - BP controlled on Lisinopril 20mg     - Propranolol 20mg q8  Tachycardia    GI/NUTR:     Diet: TF (Osmolite 1.5) @ 50 via jain in gastrostomy    GI Prophylaxis- PPI    Bowel regimen restarted - Senna         /RENAL:   No acute issues    Condom cath, + voiding.     Q6 BMP      - Hyponatremic in setting of Cerebral salt wasting - Na goal 135-140  keep euvolemic - 500 NS bolus x1     - 3%@50 and salt tabs restarted      -NS started at 50cc/hr   BUN/Cr- 5/<0.5  -->,  5/<0.5  -->,  5/<0.5  --> 5/0.5    127<L>  |  94<L>  |  5<L>  ----------------------------<  107<H>  4.2   |  25  |  <0.5<L>    Ca    8.7      03 Jun 2021 05:25  Phos  3.9     06-03  Mg     1.7     06-03      HEME/ONC:   No acute issues    Labs:   Hb/Hct:  8.2/24.5  -->,  8.7/26.0  -->,  8.3/25.0  -->  Plts:  330  -->,  338  -->,  281  -->  Hg >8, MTP on this admission  DVT prophylaxis: Lovenox 40  5/26 DVT sono neg                        8.3    7.18  )-----------( 281      ( 03 Jun 2021 02:10 )             25.0       ID:  Craniectomy site wound infection  Tmax  99.5  Antibiotics-meropenem  IVPB 2000 every 8 hours, vancomycin  IVPB 1500 every 8 hours  - Vanc level 6/1 19.9  Cultures     - 5/11- u/a +leuk     - blood culture 5/13, 5/19, 5/20, 5/23 No growth      -Cranial Culture 5/21-e faceium     - Reculture if spikes    ENDO:  FS controlled  q6 POC glucose  Sliding scale insulin PRN  A1c 5.2      DISPO: SICU    Assessment & Plan  45M s/p fall with large left SDH and neurological status change, level 1 for emergent craniectomy complicated by subgleal hematoma/collection s/p wound revision and drainage on 5/16.    NEURO:      S/P craniectomy for large left SDH     - GCS 11T, Q2H neuro checks, No sedation,      - Keppra 500mg BID for sz ppx    Hyponatremia- goal Na+ 130-145     -  3%@50 and salt tabs 8q6 restarted       - Na+ trend- 142 >144 >147>131>128>131>133>127      -Start Fludrocortisone Q8H for CSW    Maintain Head of bed @ 30 degrees    Helmet delivered --> does not need while laying in bed only during PT/OT       OR 5/16:     -Revision craniectomy wound with complex closure for persistent drainage, subgaleal hematoma   Imaging:     HCT 5/16-stable post op changes    CT Venogram-left transverse sinus thrombosis, no intervention currently    5/23 NSX placed KIKE under the skin to gravity, old blood aspirated and some pus,  -->      Drain DCed 5/27    -e. faecalis in cx --> ID following, on Vancomycin/Meropenum , trough 15.7 5/31    Repeat CT head 5/26- - there is slight increased thickness of a mixed density extra-axial collection within the craniectomy defect.. 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    RESP:   Respiratoy Failure    s/p trach 5/21    tolerating T-piece since 5/24    trach Sutures removed 5/27      CARDS:     S/P cardiac arrest in OR & MTP     - Hypertension - BP controlled on Lisinopril 20mg     - Propranolol 20mg q8  Tachycardia    GI/NUTR:     Diet: TF (Osmolite 1.5) @ 50 via jain in gastrostomy    GI Prophylaxis- PPI    Bowel regimen restarted - Senna         /RENAL:   No acute issues    Condom cath, + voiding.     Q6 BMP      - Hyponatremic in setting of Cerebral salt wasting - Na goal 135-140  keep euvolemic - 500 NS bolus x1     - 3%@50 and salt tabs restarted      -NS started at 50cc/hr   BUN/Cr- 5/<0.5  -->,  5/<0.5  -->,  5/<0.5  --> 5/0.5  - Renal consult for cerebral salt wasting not improving on 3% and 8g salt tabs    127<L>  |  94<L>  |  5<L>  ----------------------------<  107<H>  4.2   |  25  |  <0.5<L>    Ca    8.7      03 Jun 2021 05:25  Phos  3.9     06-03  Mg     1.7     06-03      HEME/ONC:   No acute issues    Labs:   Hb/Hct:  8.2/24.5  -->,  8.7/26.0  -->,  8.3/25.0  -->  Plts:  330  -->,  338  -->,  281  -->  Hg >8, MTP on this admission  DVT prophylaxis: Lovenox 40  5/26 DVT sono neg                        8.3    7.18  )-----------( 281      ( 03 Jun 2021 02:10 )             25.0       ID:  Craniectomy site wound infection  Tmax  99.5  Antibiotics-meropenem  IVPB 2000 every 8 hours, vancomycin  IVPB 1500 every 8 hours  - Vanc level 6/1 19.9  Cultures     - 5/11- u/a +leuk     - blood culture 5/13, 5/19, 5/20, 5/23 No growth      -Cranial Culture 5/21-e faceium     - Reculture if spikes    ENDO:  FS controlled  q6 POC glucose  Sliding scale insulin PRN  A1c 5.2    MSK:  Pt to started bed to chair       DISPO: SICU

## 2021-06-03 NOTE — PROGRESS NOTE ADULT - SUBJECTIVE AND OBJECTIVE BOX
THIAGO SANDRA  45y, Male  Allergy: No Known Allergies      LOS  28d    CHIEF COMPLAINT: found down unresponsive (2021 02:56)      INTERVAL EVENTS/HPI  - No acute events overnight, pt pulled out PEG, afebrile   - T(F): , Max: 99.7 (21 @ 12:00)  - Tolerating medication  - WBC Count: 7.18 (21 @ 02:10)  WBC Count: 8.66 (21 @ 13:35)     - Creatinine, Serum: <0.5 (21 @ 05:25)  Creatinine, Serum: <0.5 (21 @ 02:10)       ROS  ***    VITALS:  T(F): 99.1, Max: 99.7 (21 @ 12:00)  HR: 95  BP: 131/81  RR: 25Vital Signs Last 24 Hrs  T(C): 37.3 (2021 23:00), Max: 37.6 (2021 12:00)  T(F): 99.1 (2021 23:00), Max: 99.7 (2021 12:00)  HR: 95 (2021 10:00) (90 - 113)  BP: 131/81 (2021 10:00) (126/84 - 148/95)  BP(mean): 101 (2021 10:00) (99 - 117)  RR: 25 (2021 10:00) (13 - 36)  SpO2: 100% (2021 10:00) (98% - 100%)    PHYSICAL EXAM:  ***    FH: Non-contributory  Social Hx: Non-contributory    TESTS & MEASUREMENTS:                        8.3    7.18  )-----------( 281      ( 2021 02:10 )             25.0     06-03    127<L>  |  94<L>  |  5<L>  ----------------------------<  107<H>  4.2   |  25  |  <0.5<L>    Ca    8.7      2021 05:25  Phos  3.9     06-03  Mg     1.7     06-      eGFR if Non African American: 160 mL/min/1.73M2 (21 @ 05:25)  eGFR if : 186 mL/min/1.73M2 (21 @ 05:25)  eGFR if Non African American: 160 mL/min/1.73M2 (21 @ 02:10)  eGFR if : 186 mL/min/1.73M2 (21 @ 02:10)  eGFR if Non African American: 142 mL/min/1.73M2 (21 @ 20:00)  eGFR if African American: 165 mL/min/1.73M2 (21 @ 20:00)  eGFR if Non African American: 160 mL/min/1.73M2 (21 @ 16:45)  eGFR if : 186 mL/min/1.73M2 (21 @ 16:45)  eGFR if Non African American: 160 mL/min/1.73M2 (21 @ 13:35)  eGFR if : 186 mL/min/1.73M2 (21 @ 13:35)      Urinalysis Basic - ( 2021 11:29 )    Color: Colorless / Appearance: Clear / S.012 / pH: x  Gluc: x / Ketone: Negative  / Bili: Negative / Urobili: <2 mg/dL   Blood: x / Protein: Negative / Nitrite: Negative   Leuk Esterase: Negative / RBC: x / WBC x   Sq Epi: x / Non Sq Epi: x / Bacteria: x        Culture - Blood (collected 21 @ 00:15)  Source: .Blood Blood  Final Report (21 @ 08:01):    No Growth Final    Culture - Blood (collected 21 @ 00:15)  Source: .Blood Blood  Final Report (21 @ 08:01):    No Growth Final    Culture - Blood (collected 21 @ 19:19)  Source: .Blood Blood-Peripheral  Final Report (21 @ 03:00):    No Growth Final    Culture - Body Fluid with Gram Stain (collected 21 @ 12:00)  Source: .Body Fluid CSF  Gram Stain (21 @ 02:58):    polymorphonuclear leukocytes seen    Gram positive cocci in pairs seen    by cytocentrifuge  Final Report (21 @ 18:04):    Few Enterococcus faecalis  Organism: Enterococcus faecalis (21 @ 18:04)  Organism: Enterococcus faecalis (21 @ 18:04)      -  Ampicillin: S <=2 Predicts results to ampicillin/sulbactam, amoxacillin-clavulanate and  piperacillin-tazobactam.      -  Tetra/Doxy: R >8      -  Vancomycin: S 2      Method Type: AISHA    Culture - Blood (collected 21 @ 02:00)  Source: .Blood Blood  Final Report (21 @ 07:01):    No Growth Final    Culture - Blood (collected 21 @ 00:27)  Source: .Blood None  Final Report (21 @ 07:00):    No Growth Final    Culture - Surgical Swab (collected 21 @ 08:54)  Source: .Surgical Swab None  Final Report (21 @ 16:44):    Rare Coag Negative Staphylococcus  Organism: Coag Negative Staphylococcus (21 @ 16:44)  Organism: Coag Negative Staphylococcus (21 @ 16:44)      -  Ampicillin/Sulbactam: S <=8/4      -  Cefazolin: S <=4      -  Clindamycin: S <=0.25      -  Erythromycin: S <=0.25      -  Gentamicin: S <=1 Should not be used as monotherapy      -  Oxacillin: S <=0.25      -  Penicillin: R 0.25      -  RIF- Rifampin: S <=1 Should not be used as monotherapy      -  Tetra/Doxy: S <=1      -  Trimethoprim/Sulfamethoxazole: S <=0.5/9.5      -  Vancomycin: S 0.5      Method Type: AISHA    Culture - Blood (collected 21 @ 11:19)  Source: .Blood None  Final Report (21 @ 22:03):    No Growth Final    Culture - Blood (collected 21 @ 11:18)  Source: .Blood None  Gram Stain (21 @ 02:53):    Growth in anaerobic bottle: Gram Negative Rods    Growth in aerobic bottle: Gram Negative Rods  Final Report (21 @ 16:09):    Growth in aerobic and anaerobic bottles: Klebsiella variicola    ***Blood Panel PCR results on this specimen are available    approximately 3 hours after the Gram stain result.***    Gram stain, PCR, and/or culture results may not always    correspond due to difference in methodologies.    ************************************************************    This PCR assay was performed by multiplex PCR. This    Assay tests for 66 bacterial and resistance gene targets.    Please refer to the [facility] [employer] test directory    at [email or web address] for details.  Organism: Blood Culture PCR  Klebsiella variicola (21 @ 16:09)  Organism: Klebsiella variicola (21 @ 16:09)      -  Amikacin: S <=16      -  Ampicillin: R <=8 These ampicillin results predict results for amoxicillin      -  Ampicillin/Sulbactam: S <=4/2 Enterobacter, Citrobacter, and Serratia may develop resistance during prolonged therapy (3-4 days)      -  Aztreonam: S <=4      -  Cefazolin: S <=2 Enterobacter, Citrobacter, and Serratia may develop resistance during prolonged therapy (3-4 days)      -  Cefepime: S <=2      -  Cefoxitin: S <=8      -  Ceftriaxone: S <=1 Enterobacter, Citrobacter, and Serratia may develop resistance during prolonged therapy      -  Ciprofloxacin: S <=0.25      -  Ertapenem: S <=0.5      -  Gentamicin: S <=2      -  Imipenem: S <=1      -  Levofloxacin: S <=0.5      -  Meropenem: S <=1      -  Piperacillin/Tazobactam: S <=8      -  Tobramycin: S <=2      -  Trimethoprim/Sulfamethoxazole: S <=0.5/9.5      Method Type: AISHA  Organism: Blood Culture PCR (21 @ 16:09)      -  Klebsiella pneumoniae: Detec      Method Type: PCR    Culture - Bronchial (collected 21 @ 10:12)  Source: Bronch Wash Bronchoalveolar Lavage  Gram Stain (21 @ 07:04):    Few polymorphonuclear leukocytes per low power field    No Squamous epithelial cells per low power field    Few Gram Positive Rods per oil power field  Final Report (21 @ 16:19):    Moderate Klebsiella variicola    Moderate Serratia marcescens    Normal Respiratory Liz present  Organism: Klebsiella hannaicola  Serratia marcescens (21 @ 16:19)  Organism: Serratia marcescens (21 @ 16:19)      -  Amikacin: S <=16      -  Amoxicillin/Clavulanic Acid: R >16/8      -  Ampicillin: R <=8 These ampicillin results predict results for amoxicillin      -  Ampicillin/Sulbactam: R 16/8 Enterobacter, Citrobacter, and Serratia may develop resistance during prolonged therapy (3-4 days)      -  Aztreonam: S <=4      -  Cefazolin: R >16 Enterobacter, Citrobacter, and Serratia may develop resistance during prolonged therapy (3-4 days)      -  Cefepime: S <=2      -  Cefoxitin: R <=8      -  Ceftriaxone: S <=1 Enterobacter, Citrobacter, and Serratia may develop resistance during prolonged therapy      -  Ciprofloxacin: S <=0.25      -  Ertapenem: S <=0.5      -  Gentamicin: S <=2      -  Levofloxacin: S <=0.5      -  Meropenem: S <=1      -  Piperacillin/Tazobactam: S <=8      -  Tobramycin: S <=2      -  Trimethoprim/Sulfamethoxazole: S <=0.5/9.5      Method Type: AISHA  Organism: Klebsiella variicola (21 @ 16:19)      -  Amikacin: S <=16      -  Amoxicillin/Clavulanic Acid: R >16/8      -  Ampicillin: R <=8 These ampicillin results predict results for amoxicillin      -  Ampicillin/Sulbactam: I 16/8 Enterobacter, Citrobacter, and Serratia may develop resistance during prolonged therapy (3-4 days)      -  Aztreonam: R >16      -  Cefazolin: R >16 Enterobacter, Citrobacter, and Serratia may develop resistance during prolonged therapy (3-4 days)      -  Cefepime: S <=2      -  Cefoxitin: S <=8      -  Ceftriaxone: S <=1 Enterobacter, Citrobacter, and Serratia may develop resistance during prolonged therapy      -  Ciprofloxacin: S <=0.25      -  Ertapenem: S <=0.5      -  Gentamicin: S <=2      -  Imipenem: S <=1      -  Levofloxacin: S <=0.5      -  Meropenem: S <=1      -  Piperacillin/Tazobactam: S <=8      -  Tobramycin: S 4      -  Trimethoprim/Sulfamethoxazole: S <=0.5/9.5      Method Type: [facility]            INFECTIOUS DISEASES TESTING  Vancomycin Level, Trough: 19.9 (21 @ 05:25)  Vancomycin Level, Trough: 33.6 (21 @ 02:10)  Vancomycin Level, Trough: 15.7 (21 @ 11:59)  Vancomycin Level, Random: 6.7 (21 @ 11:35)  Vancomycin Level, Trough: 21.7 (21 @ 11:20)  Vancomycin Level, Trough: 19.0 (21 @ 11:00)  Vancomycin Level, Trough: 9.0 (21 @ 05:50)  Vancomycin Level, Trough: 7.6 (21 @ 05:54)  COVID-19 PCR: NotDetec (21 @ 04:30)  COVID-19 PCR: NotDetec (21 @ 18:58)  Vancomycin Level, Trough: 4.1 (21 @ 05:50)  HIV-1/2 Combo Result: Nonreact (21 @ 23:30)  Rapid RVP Result: NotDetec (21 @ 22:44)  COVID-19 PCR: NotDetec (21 @ 16:46)  COVID-19 PCR: NotDetec (21 @ 19:35)      INFLAMMATORY MARKERS      RADIOLOGY & ADDITIONAL TESTS:  I have personally reviewed the last available Chest xray  CXR  Xray Chest 1 View AP/PA:   EXAM:  XR CHEST 1 VIEW            PROCEDURE DATE:  2021            INTERPRETATION:  Clinical History / Reason for exam: ICU    Comparison : Chest radiograph dated 2021.    Technique/Positioning: Frontal view of the chest is submitted for review.    Findings:    Support devices: Left internal jugular approach central venous catheter with tip in the SVC. Stable tracheostomy.    Cardiac/mediastinum/hilum: Unchanged.    Lung parenchyma/Pleura: No focal consolidation. No pneumothorax.    Skeleton/soft tissues: Unchanged.    Impression:    No focal consolidation.                FATIMAH RUVALCABA M.D., RESIDENT RADIOLOGIST  This document has been electronically signed.  FAUSTO SHERIDAN M.D., ATTENDING RADIOLOGIST  This document has been electronically signed. 2021 10:57AM (21 @ 05:52)      CT      CARDIOLOGY TESTING      MEDICATIONS  artificial tears (preservative free) Ophthalmic Solution 2 Both EYES three times a day  chlorhexidine 0.12% Liquid 15 Oral Mucosa two times a day  chlorhexidine 4% Liquid 1 Topical <User Schedule>  enoxaparin Injectable 40 SubCutaneous every 24 hours  fludroCORTISONE 0.1 Oral <User Schedule>  insulin lispro (ADMELOG) corrective regimen sliding scale  SubCutaneous every 6 hours  levETIRAcetam  IVPB 500 IV Intermittent every 12 hours  lisinopril 20 Oral daily  meropenem  IVPB     meropenem  IVPB 2000 IV Intermittent every 8 hours  pantoprazole   Suspension 40 Oral daily  propranolol 20 Oral every 8 hours  sodium chloride 8 Oral every 6 hours  sodium chloride 0.9%. 1000 IV Continuous <Continuous>  sodium chloride 3%. 500 IV Continuous <Continuous>  vancomycin  IVPB     vancomycin  IVPB 1500 IV Intermittent every 8 hours      WEIGHT  Weight (kg): 79.4 (21 @ 16:30)  Creatinine, Serum: <0.5 mg/dL (21 @ 05:25)  Creatinine, Serum: <0.5 mg/dL (21 @ 02:10)  Creatinine, Serum: <0.5 mg/dL (21 @ 20:00)  Creatinine, Serum: <0.5 mg/dL (21 @ 16:45)  Creatinine, Serum: <0.5 mg/dL (21 @ 13:35)      ANTIBIOTICS:  meropenem  IVPB      meropenem  IVPB 2000 milliGRAM(s) IV Intermittent every 8 hours  vancomycin  IVPB      vancomycin  IVPB 1500 milliGRAM(s) IV Intermittent every 8 hours      All available historical records have been reviewed       VICANIA VAZQUEZGORZ  45y, Male  Allergy: No Known Allergies      LOS  28d    CHIEF COMPLAINT: found down unresponsive (2021 02:56)      INTERVAL EVENTS/HPI  - No acute events overnight, pt pulled out PEG, afebrile   - T(F): , Max: 99.7 (21 @ 12:00)  - Tolerating medication  - WBC Count: 7.18 (21 @ 02:10)  WBC Count: 8.66 (21 @ 13:35)     - Creatinine, Serum: <0.5 (21 @ 05:25)  Creatinine, Serum: <0.5 (21 @ 02:10)       ROS  General: Denies rigors, nightsweats  HEENT: Denies headache, rhinorrhea, sore throat, eye pain  CV: Denies CP, palpitations  PULM: Denies wheezing, hemoptysis  GI: Denies hematemesis, hematochezia, melena  : Denies discharge, hematuria  MSK: Denies arthralgias, myalgias  SKIN: Denies rash, lesions  NEURO: Denies paresthesias, weakness  PSYCH: Denies depression, anxiety     VITALS:  T(F): 99.1, Max: 99.7 (21 @ 12:00)  HR: 95  BP: 131/81  RR: 25Vital Signs Last 24 Hrs  T(C): 37.3 (2021 23:00), Max: 37.6 (2021 12:00)  T(F): 99.1 (2021 23:00), Max: 99.7 (2021 12:00)  HR: 95 (2021 10:00) (90 - 113)  BP: 131/81 (2021 10:00) (126/84 - 148/95)  BP(mean): 101 (2021 10:00) (99 - 117)  RR: 25 (2021 10:00) (13 - 36)  SpO2: 100% (2021 10:00) (98% - 100%)    PHYSICAL EXAM:  Gen: NAD, trach  HEENT: Normocephalic, atraumatic, incision clean, no erythema/purulence   Neck: supple, no lymphadenopathy  CV: Regular rate & regular rhythm  Lungs: decreased BS at bases, no fremitus  Abdomen: Soft, BS present  Ext: Warm, well perfused  Neuro:  awake  Skin: no rash, no erythema  Lines: no phlebitis     FH: Non-contributory  Social Hx: Non-contributory    TESTS & MEASUREMENTS:                        8.3    7.18  )-----------( 281      ( 2021 02:10 )             25.0     06-03    127<L>  |  94<L>  |  5<L>  ----------------------------<  107<H>  4.2   |  25  |  <0.5<L>    Ca    8.7      2021 05:25  Phos  3.9       Mg     1.7           eGFR if Non African American: 160 mL/min/1.73M2 (21 @ 05:25)  eGFR if : 186 mL/min/1.73M2 (21 @ 05:25)  eGFR if Non African American: 160 mL/min/1.73M2 (21 @ 02:10)  eGFR if : 186 mL/min/1.73M2 (21 @ 02:10)  eGFR if Non African American: 142 mL/min/1.73M2 (21 @ 20:00)  eGFR if African American: 165 mL/min/1.73M2 (21 @ 20:00)  eGFR if Non African American: 160 mL/min/1.73M2 (21 @ 16:45)  eGFR if : 186 mL/min/1.73M2 (21 @ 16:45)  eGFR if Non African American: 160 mL/min/1.73M2 (21 @ 13:35)  eGFR if : 186 mL/min/1.73M2 (21 @ 13:35)      Urinalysis Basic - ( 2021 11:29 )    Color: Colorless / Appearance: Clear / S.012 / pH: x  Gluc: x / Ketone: Negative  / Bili: Negative / Urobili: <2 mg/dL   Blood: x / Protein: Negative / Nitrite: Negative   Leuk Esterase: Negative / RBC: x / WBC x   Sq Epi: x / Non Sq Epi: x / Bacteria: x        Culture - Blood (collected 21 @ 00:15)  Source: .Blood Blood  Final Report (21 @ 08:01):    No Growth Final    Culture - Blood (collected 21 @ 00:15)  Source: .Blood Blood  Final Report (21 @ 08:01):    No Growth Final    Culture - Blood (collected 21 @ 19:19)  Source: .Blood Blood-Peripheral  Final Report (21 @ 03:00):    No Growth Final    Culture - Body Fluid with Gram Stain (collected 21 @ 12:00)  Source: .Body Fluid CSF  Gram Stain (21 @ 02:58):    polymorphonuclear leukocytes seen    Gram positive cocci in pairs seen    by cytocentrifuge  Final Report (21 @ 18:04):    Few Enterococcus faecalis  Organism: Enterococcus faecalis (21 @ 18:04)  Organism: Enterococcus faecalis (21 @ 18:04)      -  Ampicillin: S <=2 Predicts results to ampicillin/sulbactam, amoxacillin-clavulanate and  piperacillin-tazobactam.      -  Tetra/Doxy: R >8      -  Vancomycin: S 2      Method Type: AISHA    Culture - Blood (collected 21 @ 02:00)  Source: .Blood Blood  Final Report (21 @ 07:01):    No Growth Final    Culture - Blood (collected 21 @ 00:27)  Source: .Blood None  Final Report (21 @ 07:00):    No Growth Final    Culture - Surgical Swab (collected 21 @ 08:54)  Source: .Surgical Swab None  Final Report (21 @ 16:44):    Rare Coag Negative Staphylococcus  Organism: Coag Negative Staphylococcus (21 @ 16:44)  Organism: Coag Negative Staphylococcus (21 @ 16:44)      -  Ampicillin/Sulbactam: S <=8/4      -  Cefazolin: S <=4      -  Clindamycin: S <=0.25      -  Erythromycin: S <=0.25      -  Gentamicin: S <=1 Should not be used as monotherapy      -  Oxacillin: S <=0.25      -  Penicillin: R 0.25      -  RIF- Rifampin: S <=1 Should not be used as monotherapy      -  Tetra/Doxy: S <=1      -  Trimethoprim/Sulfamethoxazole: S <=0.5/9.5      -  Vancomycin: S 0.5      Method Type: AISHA    Culture - Blood (collected 21 @ 11:19)  Source: .Blood None  Final Report (21 @ 22:03):    No Growth Final    Culture - Blood (collected 21 @ 11:18)  Source: .Blood None  Gram Stain (21 @ 02:53):    Growth in anaerobic bottle: Gram Negative Rods    Growth in aerobic bottle: Gram Negative Rods  Final Report (21 @ 16:09):    Growth in aerobic and anaerobic bottles: Klebsiella variicola    ***Blood Panel PCR results on this specimen are available    approximately 3 hours after the Gram stain result.***    Gram stain, PCR, and/or culture results may not always    correspond due to difference in methodologies.    ************************************************************    This PCR assay was performed by multiplex PCR. This    Assay tests for 66 bacterial and resistance gene targets.    Please refer to the [facility] test directory    at [email or web address] for details.  Organism: Blood Culture PCR  Klebsiella variicola (21 @ 16:09)  Organism: Klebsiella variicola (21 @ 16:09)      -  Amikacin: S <=16      -  Ampicillin: R <=8 These ampicillin results predict results for amoxicillin      -  Ampicillin/Sulbactam: S <=4/2 Enterobacter, Citrobacter, and Serratia may develop resistance during prolonged therapy (3-4 days)      -  Aztreonam: S <=4      -  Cefazolin: S <=2 Enterobacter, Citrobacter, and Serratia may develop resistance during prolonged therapy (3-4 days)      -  Cefepime: S <=2      -  Cefoxitin: S <=8      -  Ceftriaxone: S <=1 Enterobacter, Citrobacter, and Serratia may develop resistance during prolonged therapy      -  Ciprofloxacin: S <=0.25      -  Ertapenem: S <=0.5      -  Gentamicin: S <=2      -  Imipenem: S <=1      -  Levofloxacin: S <=0.5      -  Meropenem: S <=1      -  Piperacillin/Tazobactam: S <=8      -  Tobramycin: S <=2      -  Trimethoprim/Sulfamethoxazole: S <=0.5/9.5      Method Type: AISHA  Organism: Blood Culture PCR (21 @ 16:09)      -  Klebsiella pneumoniae: Detec      Method Type: PCR    Culture - Bronchial (collected 21 @ 10:12)  Source: Bronch Wash Bronchoalveolar Lavage  Gram Stain (21 @ 07:04):    Few polymorphonuclear leukocytes per low power field    No Squamous epithelial cells per low power field    Few Gram Positive Rods per oil power field  Final Report (21 @ 16:19):    Moderate Klebsiella variicola    Moderate Serratia marcescens    Normal Respiratory Liz present  Organism: Klebsiella variicola  Serratia marcescens (21 @ 16:19)  Organism: Serratia marcescens (21 @ 16:19)      -  Amikacin: S <=16      -  Amoxicillin/Clavulanic Acid: R >16/8      -  Ampicillin: R <=8 These ampicillin results predict results for amoxicillin      -  Ampicillin/Sulbactam: R 16/8 Enterobacter, Citrobacter, and Serratia may develop resistance during prolonged therapy (3-4 days)      -  Aztreonam: S <=4      -  Cefazolin: R >16 Enterobacter, Citrobacter, and Serratia may develop resistance during prolonged therapy (3-4 days)      -  Cefepime: S <=2      -  Cefoxitin: R <=8      -  Ceftriaxone: S <=1 Enterobacter, Citrobacter, and Serratia may develop resistance during prolonged therapy      -  Ciprofloxacin: S <=0.25      -  Ertapenem: S <=0.5      -  Gentamicin: S <=2      -  Levofloxacin: S <=0.5      -  Meropenem: S <=1      -  Piperacillin/Tazobactam: S <=8      -  Tobramycin: S <=2      -  Trimethoprim/Sulfamethoxazole: S <=0.5/9.5      Method Type: AISHA  Organism: Klebsiella variicola (05-13-21 @ 16:19)      -  Amikacin: S <=16      -  Amoxicillin/Clavulanic Acid: R >16/8      -  Ampicillin: R <=8 These ampicillin results predict results for amoxicillin      -  Ampicillin/Sulbactam: I 16/8 Enterobacter, Citrobacter, and Serratia may develop resistance during prolonged therapy (3-4 days)      -  Aztreonam: R >16      -  Cefazolin: R >16 Enterobacter, Citrobacter, and Serratia may develop resistance during prolonged therapy (3-4 days)      -  Cefepime: S <=2      -  Cefoxitin: S <=8      -  Ceftriaxone: S <=1 Enterobacter, Citrobacter, and Serratia may develop resistance during prolonged therapy      -  Ciprofloxacin: S <=0.25      -  Ertapenem: S <=0.5      -  Gentamicin: S <=2      -  Imipenem: S <=1      -  Levofloxacin: S <=0.5      -  Meropenem: S <=1      -  Piperacillin/Tazobactam: S <=8      -  Tobramycin: S 4      -  Trimethoprim/Sulfamethoxazole: S <=0.5/9.5      Method Type: [facility]            INFECTIOUS DISEASES TESTING  Vancomycin Level, Trough: 19.9 (21 @ 05:25)  Vancomycin Level, Trough: 33.6 (21 @ 02:10)  Vancomycin Level, Trough: 15.7 (21 @ 11:59)  Vancomycin Level, Random: 6.7 (21 @ 11:35)  Vancomycin Level, Trough: 21.7 (21 @ 11:20)  Vancomycin Level, Trough: 19.0 (21 @ 11:00)  Vancomycin Level, Trough: 9.0 (21 @ 05:50)  Vancomycin Level, Trough: 7.6 (21 @ 05:54)  COVID-19 PCR: NotDetec (21 @ 04:30)  COVID-19 PCR: NotDetec (21 @ 18:58)  Vancomycin Level, Trough: 4.1 (21 @ 05:50)  HIV-1/2 Combo Result: Nonreact (21 @ 23:30)  Rapid RVP Result: NotDetec (21 @ 22:44)  COVID-19 PCR: NotDetec (21 @ 16:46)  COVID-19 PCR: NotDetec (21 @ 19:35)      INFLAMMATORY MARKERS      RADIOLOGY & ADDITIONAL TESTS:  I have personally reviewed the last available Chest xray  CXR  Xray Chest 1 View AP/PA:   EXAM:  XR CHEST 1 VIEW            PROCEDURE DATE:  2021            INTERPRETATION:  Clinical History / Reason for exam: ICU    Comparison : Chest radiograph dated 2021.    Technique/Positioning: Frontal view of the chest is submitted for review.    Findings:    Support devices: Left internal jugular approach central venous catheter with tip in the SVC. Stable tracheostomy.    Cardiac/mediastinum/hilum: Unchanged.    Lung parenchyma/Pleura: No focal consolidation. No pneumothorax.    Skeleton/soft tissues: Unchanged.    Impression:    No focal consolidation.                FATIMAH RUVALCABA M.D., RESIDENT RADIOLOGIST  This document has been electronically signed.  FAUSTO SHERIDAN M.D., ATTENDING RADIOLOGIST  This document has been electronically signed. 2021 10:57AM (21 @ 05:52)      CT      CARDIOLOGY TESTING      MEDICATIONS  artificial tears (preservative free) Ophthalmic Solution 2 Both EYES three times a day  chlorhexidine 0.12% Liquid 15 Oral Mucosa two times a day  chlorhexidine 4% Liquid 1 Topical <User Schedule>  enoxaparin Injectable 40 SubCutaneous every 24 hours  fludroCORTISONE 0.1 Oral <User Schedule>  insulin lispro (ADMELOG) corrective regimen sliding scale  SubCutaneous every 6 hours  levETIRAcetam  IVPB 500 IV Intermittent every 12 hours  lisinopril 20 Oral daily  meropenem  IVPB     meropenem  IVPB 2000 IV Intermittent every 8 hours  pantoprazole   Suspension 40 Oral daily  propranolol 20 Oral every 8 hours  sodium chloride 8 Oral every 6 hours  sodium chloride 0.9%. 1000 IV Continuous <Continuous>  sodium chloride 3%. 500 IV Continuous <Continuous>  vancomycin  IVPB     vancomycin  IVPB 1500 IV Intermittent every 8 hours      WEIGHT  Weight (kg): 79.4 (21 @ 16:30)  Creatinine, Serum: <0.5 mg/dL (21 @ 05:25)  Creatinine, Serum: <0.5 mg/dL (21 @ 02:10)  Creatinine, Serum: <0.5 mg/dL (06-02-21 @ 20:00)  Creatinine, Serum: <0.5 mg/dL (21 @ 16:45)  Creatinine, Serum: <0.5 mg/dL (21 @ 13:35)      ANTIBIOTICS:  meropenem  IVPB      meropenem  IVPB 2000 milliGRAM(s) IV Intermittent every 8 hours  vancomycin  IVPB      vancomycin  IVPB 1500 milliGRAM(s) IV Intermittent every 8 hours      All available historical records have been reviewed

## 2021-06-04 LAB
ANION GAP SERPL CALC-SCNC: 6 MMOL/L — LOW (ref 7–14)
ANION GAP SERPL CALC-SCNC: 8 MMOL/L — SIGNIFICANT CHANGE UP (ref 7–14)
ANION GAP SERPL CALC-SCNC: 9 MMOL/L — SIGNIFICANT CHANGE UP (ref 7–14)
ANION GAP SERPL CALC-SCNC: 9 MMOL/L — SIGNIFICANT CHANGE UP (ref 7–14)
BUN SERPL-MCNC: 4 MG/DL — LOW (ref 10–20)
BUN SERPL-MCNC: 5 MG/DL — LOW (ref 10–20)
BUN SERPL-MCNC: 5 MG/DL — LOW (ref 10–20)
BUN SERPL-MCNC: 7 MG/DL — LOW (ref 10–20)
CALCIUM SERPL-MCNC: 8.3 MG/DL — LOW (ref 8.5–10.1)
CALCIUM SERPL-MCNC: 8.5 MG/DL — SIGNIFICANT CHANGE UP (ref 8.5–10.1)
CALCIUM SERPL-MCNC: 8.6 MG/DL — SIGNIFICANT CHANGE UP (ref 8.5–10.1)
CALCIUM SERPL-MCNC: 8.8 MG/DL — SIGNIFICANT CHANGE UP (ref 8.5–10.1)
CHLORIDE SERPL-SCNC: 100 MMOL/L — SIGNIFICANT CHANGE UP (ref 98–110)
CHLORIDE SERPL-SCNC: 102 MMOL/L — SIGNIFICANT CHANGE UP (ref 98–110)
CHLORIDE SERPL-SCNC: 96 MMOL/L — LOW (ref 98–110)
CHLORIDE SERPL-SCNC: 99 MMOL/L — SIGNIFICANT CHANGE UP (ref 98–110)
CO2 SERPL-SCNC: 23 MMOL/L — SIGNIFICANT CHANGE UP (ref 17–32)
CO2 SERPL-SCNC: 25 MMOL/L — SIGNIFICANT CHANGE UP (ref 17–32)
CO2 SERPL-SCNC: 25 MMOL/L — SIGNIFICANT CHANGE UP (ref 17–32)
CO2 SERPL-SCNC: 26 MMOL/L — SIGNIFICANT CHANGE UP (ref 17–32)
CREAT SERPL-MCNC: <0.5 MG/DL — LOW (ref 0.7–1.5)
GLUCOSE BLDC GLUCOMTR-MCNC: 106 MG/DL — HIGH (ref 70–99)
GLUCOSE BLDC GLUCOMTR-MCNC: 116 MG/DL — HIGH (ref 70–99)
GLUCOSE BLDC GLUCOMTR-MCNC: 137 MG/DL — HIGH (ref 70–99)
GLUCOSE BLDC GLUCOMTR-MCNC: 144 MG/DL — HIGH (ref 70–99)
GLUCOSE SERPL-MCNC: 113 MG/DL — HIGH (ref 70–99)
GLUCOSE SERPL-MCNC: 120 MG/DL — HIGH (ref 70–99)
GLUCOSE SERPL-MCNC: 141 MG/DL — HIGH (ref 70–99)
GLUCOSE SERPL-MCNC: 166 MG/DL — HIGH (ref 70–99)
HCT VFR BLD CALC: 22.9 % — LOW (ref 42–52)
HGB BLD-MCNC: 7.8 G/DL — LOW (ref 14–18)
MAGNESIUM SERPL-MCNC: 1.8 MG/DL — SIGNIFICANT CHANGE UP (ref 1.8–2.4)
MAGNESIUM SERPL-MCNC: 2.1 MG/DL — SIGNIFICANT CHANGE UP (ref 1.8–2.4)
MCHC RBC-ENTMCNC: 29 PG — SIGNIFICANT CHANGE UP (ref 27–31)
MCHC RBC-ENTMCNC: 34.1 G/DL — SIGNIFICANT CHANGE UP (ref 32–37)
MCV RBC AUTO: 85.1 FL — SIGNIFICANT CHANGE UP (ref 80–94)
NRBC # BLD: 0 /100 WBCS — SIGNIFICANT CHANGE UP (ref 0–0)
OSMOLALITY SERPL: 275 MOS/KG — SIGNIFICANT CHANGE UP (ref 275–300)
OSMOLALITY SERPL: 280 MOS/KG — SIGNIFICANT CHANGE UP (ref 275–300)
PHOSPHATE SERPL-MCNC: 3.7 MG/DL — SIGNIFICANT CHANGE UP (ref 2.1–4.9)
PHOSPHATE SERPL-MCNC: 3.9 MG/DL — SIGNIFICANT CHANGE UP (ref 2.1–4.9)
PLATELET # BLD AUTO: 266 K/UL — SIGNIFICANT CHANGE UP (ref 130–400)
POTASSIUM SERPL-MCNC: 3.6 MMOL/L — SIGNIFICANT CHANGE UP (ref 3.5–5)
POTASSIUM SERPL-MCNC: 4.1 MMOL/L — SIGNIFICANT CHANGE UP (ref 3.5–5)
POTASSIUM SERPL-MCNC: 4.3 MMOL/L — SIGNIFICANT CHANGE UP (ref 3.5–5)
POTASSIUM SERPL-MCNC: 4.5 MMOL/L — SIGNIFICANT CHANGE UP (ref 3.5–5)
POTASSIUM SERPL-SCNC: 3.6 MMOL/L — SIGNIFICANT CHANGE UP (ref 3.5–5)
POTASSIUM SERPL-SCNC: 4.1 MMOL/L — SIGNIFICANT CHANGE UP (ref 3.5–5)
POTASSIUM SERPL-SCNC: 4.3 MMOL/L — SIGNIFICANT CHANGE UP (ref 3.5–5)
POTASSIUM SERPL-SCNC: 4.5 MMOL/L — SIGNIFICANT CHANGE UP (ref 3.5–5)
RBC # BLD: 2.69 M/UL — LOW (ref 4.7–6.1)
RBC # FLD: 13.8 % — SIGNIFICANT CHANGE UP (ref 11.5–14.5)
SODIUM SERPL-SCNC: 130 MMOL/L — LOW (ref 135–146)
SODIUM SERPL-SCNC: 131 MMOL/L — LOW (ref 135–146)
SODIUM SERPL-SCNC: 133 MMOL/L — LOW (ref 135–146)
SODIUM SERPL-SCNC: 134 MMOL/L — LOW (ref 135–146)
WBC # BLD: 5.51 K/UL — SIGNIFICANT CHANGE UP (ref 4.8–10.8)
WBC # FLD AUTO: 5.51 K/UL — SIGNIFICANT CHANGE UP (ref 4.8–10.8)

## 2021-06-04 PROCEDURE — 71045 X-RAY EXAM CHEST 1 VIEW: CPT | Mod: 26,77

## 2021-06-04 PROCEDURE — 36556 INSERT NON-TUNNEL CV CATH: CPT

## 2021-06-04 PROCEDURE — 99291 CRITICAL CARE FIRST HOUR: CPT

## 2021-06-04 PROCEDURE — 71045 X-RAY EXAM CHEST 1 VIEW: CPT | Mod: 26

## 2021-06-04 RX ORDER — POTASSIUM CHLORIDE 20 MEQ
20 PACKET (EA) ORAL
Refills: 0 | Status: COMPLETED | OUTPATIENT
Start: 2021-06-04 | End: 2021-06-05

## 2021-06-04 RX ORDER — SODIUM CHLORIDE 5 G/100ML
500 INJECTION, SOLUTION INTRAVENOUS
Refills: 0 | Status: DISCONTINUED | OUTPATIENT
Start: 2021-06-04 | End: 2021-06-04

## 2021-06-04 RX ORDER — FLUDROCORTISONE ACETATE 0.1 MG/1
0.1 TABLET ORAL EVERY 12 HOURS
Refills: 0 | Status: DISCONTINUED | OUTPATIENT
Start: 2021-06-04 | End: 2021-06-04

## 2021-06-04 RX ORDER — VANCOMYCIN HCL 1 G
2000 VIAL (EA) INTRAVENOUS EVERY 12 HOURS
Refills: 0 | Status: DISCONTINUED | OUTPATIENT
Start: 2021-06-04 | End: 2021-06-07

## 2021-06-04 RX ORDER — SODIUM CHLORIDE 5 G/100ML
500 INJECTION, SOLUTION INTRAVENOUS
Refills: 0 | Status: DISCONTINUED | OUTPATIENT
Start: 2021-06-04 | End: 2021-06-05

## 2021-06-04 RX ORDER — MAGNESIUM SULFATE 500 MG/ML
2 VIAL (ML) INJECTION ONCE
Refills: 0 | Status: COMPLETED | OUTPATIENT
Start: 2021-06-04 | End: 2021-06-04

## 2021-06-04 RX ORDER — FLUDROCORTISONE ACETATE 0.1 MG/1
0.1 TABLET ORAL
Refills: 0 | Status: COMPLETED | OUTPATIENT
Start: 2021-06-04 | End: 2021-06-04

## 2021-06-04 RX ADMIN — LEVETIRACETAM 420 MILLIGRAM(S): 250 TABLET, FILM COATED ORAL at 18:01

## 2021-06-04 RX ADMIN — Medication 2 DROP(S): at 21:15

## 2021-06-04 RX ADMIN — CHLORHEXIDINE GLUCONATE 15 MILLILITER(S): 213 SOLUTION TOPICAL at 18:01

## 2021-06-04 RX ADMIN — LEVETIRACETAM 420 MILLIGRAM(S): 250 TABLET, FILM COATED ORAL at 06:35

## 2021-06-04 RX ADMIN — SENNA PLUS 2 TABLET(S): 8.6 TABLET ORAL at 21:16

## 2021-06-04 RX ADMIN — ENOXAPARIN SODIUM 40 MILLIGRAM(S): 100 INJECTION SUBCUTANEOUS at 18:02

## 2021-06-04 RX ADMIN — Medication 2 DROP(S): at 06:32

## 2021-06-04 RX ADMIN — Medication 100 MILLIEQUIVALENT(S): at 23:13

## 2021-06-04 RX ADMIN — SODIUM CHLORIDE 8 GRAM(S): 9 INJECTION INTRAMUSCULAR; INTRAVENOUS; SUBCUTANEOUS at 13:20

## 2021-06-04 RX ADMIN — PANTOPRAZOLE SODIUM 40 MILLIGRAM(S): 20 TABLET, DELAYED RELEASE ORAL at 13:20

## 2021-06-04 RX ADMIN — Medication 250 MILLIGRAM(S): at 17:54

## 2021-06-04 RX ADMIN — SODIUM CHLORIDE 50 MILLILITER(S): 5 INJECTION, SOLUTION INTRAVENOUS at 13:51

## 2021-06-04 RX ADMIN — SODIUM CHLORIDE 8 GRAM(S): 9 INJECTION INTRAMUSCULAR; INTRAVENOUS; SUBCUTANEOUS at 23:11

## 2021-06-04 RX ADMIN — CHLORHEXIDINE GLUCONATE 15 MILLILITER(S): 213 SOLUTION TOPICAL at 06:35

## 2021-06-04 RX ADMIN — FLUDROCORTISONE ACETATE 0.1 MILLIGRAM(S): 0.1 TABLET ORAL at 04:24

## 2021-06-04 RX ADMIN — SODIUM CHLORIDE 8 GRAM(S): 9 INJECTION INTRAMUSCULAR; INTRAVENOUS; SUBCUTANEOUS at 02:33

## 2021-06-04 RX ADMIN — SODIUM CHLORIDE 8 GRAM(S): 9 INJECTION INTRAMUSCULAR; INTRAVENOUS; SUBCUTANEOUS at 06:36

## 2021-06-04 RX ADMIN — SODIUM CHLORIDE 40 MILLILITER(S): 5 INJECTION, SOLUTION INTRAVENOUS at 13:20

## 2021-06-04 RX ADMIN — FLUDROCORTISONE ACETATE 0.1 MILLIGRAM(S): 0.1 TABLET ORAL at 23:11

## 2021-06-04 RX ADMIN — CHLORHEXIDINE GLUCONATE 1 APPLICATION(S): 213 SOLUTION TOPICAL at 06:35

## 2021-06-04 RX ADMIN — MEROPENEM 200 MILLIGRAM(S): 1 INJECTION INTRAVENOUS at 13:37

## 2021-06-04 RX ADMIN — Medication 2 DROP(S): at 13:37

## 2021-06-04 RX ADMIN — SODIUM CHLORIDE 8 GRAM(S): 9 INJECTION INTRAMUSCULAR; INTRAVENOUS; SUBCUTANEOUS at 18:01

## 2021-06-04 RX ADMIN — MEROPENEM 200 MILLIGRAM(S): 1 INJECTION INTRAVENOUS at 06:37

## 2021-06-04 RX ADMIN — Medication 50 GRAM(S): at 02:37

## 2021-06-04 RX ADMIN — SENNA PLUS 2 TABLET(S): 8.6 TABLET ORAL at 02:33

## 2021-06-04 RX ADMIN — LISINOPRIL 20 MILLIGRAM(S): 2.5 TABLET ORAL at 06:35

## 2021-06-04 RX ADMIN — FLUDROCORTISONE ACETATE 0.1 MILLIGRAM(S): 0.1 TABLET ORAL at 10:53

## 2021-06-04 RX ADMIN — MEROPENEM 200 MILLIGRAM(S): 1 INJECTION INTRAVENOUS at 21:22

## 2021-06-04 RX ADMIN — Medication 300 MILLIGRAM(S): at 06:35

## 2021-06-04 RX ADMIN — FLUDROCORTISONE ACETATE 0.1 MILLIGRAM(S): 0.1 TABLET ORAL at 18:01

## 2021-06-04 NOTE — PROGRESS NOTE ADULT - ASSESSMENT
ASSESSMENT  45yM w/ PMHx of Etoh abuse and seizures on keppra  found down unresponsive admitted 5/6. s/p craniectomy for large L SDH    IMPRESSION  #RESOLVED Fevers secondary to craniotomy site collection and thrombus    5/27 KIKE removed    No CSF studies    5/23 BCX NG    5/21 aspirate craniotomy site WCX e. faecalis (S amp)    5/16 swab OR coNS (S oxacillin)    No leukocytosis, rule out non-infectious causes / central fever    5/19 BCX NGTD     5/18 UA unremarkable   < from: MR Head w/wo IV Cont (05.28.21 @ 20:10) >  1.9 cm enhancing soft tissue with internal mineralization along the anterior margin of the left frontal craniectomy reflect granulation tissue.  Diffuse thickening/enhancement of the bilateral pachymeninges, nonspecific but can be seen in setting of postop settings, intracranial hypotension, or meningitis.  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 Redemonstrated subdural hematomas along the right cerebral and cerebellar convexities.  Evolving subacute infarcts in the medial left frontoparietal region, left insular cortex, and right cerebellum with associated gyral enhancements.  Peripherally enhancing evolving hemorrhagic contusion/hematoma in the left temporal occipital regions with scattered petechial gyral hemorrhage/enhancement.Moderate surrounding edema.  < from: CT Head No Cont (05.26.21 @ 12:49) >  Since prior CT of 5/16/2021 there is slight increased thickness of a mixed density extra-axial collection within the craniectomy defect.. 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 Please note evaluation for abscess is limited without intravenous contrast. A follow-up MRI of the brain with and without contrast is recommended for further evaluation.  Decreased blood products noted within the left temporal occipital region with unchanged areas of subacute infarct.    CT 5/20 1.  Redemonstrated filling defect within the left transverse sinus, sigmoid sinus and proximal internal jugular vein compatible with thrombus. When compared to the CTA from 5/13, there has been interval partial recanalization of the left transverse sinus.  2.  Redemonstrated large left-sided craniectomy. The previously seen left scalp drain has been removed.  3.  Increasing size of the mixed density fluid collection/hematoma extending from the left extra-axial space to the overlying scalp, overall measuring 4 cm in width, previously 3 cm.  4.  Increasing frontal convexity subdural hygromas measuring 8 mm on the right and 9 mm on the left.  5.  Left to right midline shift of 4 mm, previously 3 mm.  6.  Redemonstrated acute/subacute infarcts within the left temporal lobe and insula. Additional previously demonstrated multifocal infarcts and edema are not as well delineated on this exam, recommend follow-up head CT.  #Klebsiella bacteremia , BAL also with Klebsiella but no PNA on imaging (CT/CXR)    5/13 BCX NGTD     5/11 BCX kleb variicola (S)    5/11 BAL   Moderate Klebsiella variicola &  Moderate Serratia marcescens  < from: Xray Chest 1 View- PORTABLE-Routine (05.13.21 @ 06:28) >No radiographic evidence of acute cardiopulmonary disease.    #SDH s/p craniectomy    5/16 OR WCX   Rare Coag Negative Staphylococcus (S oxacillin)- likely skin colonizer     5/16 s/p Revision craniotomy wound with complex closure for persistent drainage, subgaleal hematoma    Repeat CTH development of acute infarcts in left cerebral hemisphere and right cerebellum  #CT atelectasis   #HIV/Hep panel NR  Creatinine, Serum: <0.5 (05-14-21 @ 05:30)      RECOMMENDATIONS  - Possible  shunt Monday, cleared from ID standpoint  - Seamus 2g q8h IV and Vanc IV 5/25-, s/p unasyn  - Vanc 1.5 q8h IV   Vancomycin Level, Trough: 19.9 (06-03-21 @ 05:25)  - trend WBC, fever curve  - Per SICU/ NSYG      If any questions, please call or send a message on [employer]  Spectra [id number]

## 2021-06-04 NOTE — PROGRESS NOTE ADULT - SUBJECTIVE AND OBJECTIVE BOX
THIAGO SANDRA  45y, Male  Allergy: No Known Allergies      LOS  29d    CHIEF COMPLAINT: found down unresponsive (2021 00:16)      INTERVAL EVENTS/HPI  - No acute events overnight afebrile   - T(F): , Max: 99.1 (21 @ 23:55)  - Tolerating medication  - WBC Count: 5.67 (21 @ 23:10)  WBC Count: 7.18 (21 @ 02:10)     - Creatinine, Serum: <0.5 (21 @ 04:30)  Creatinine, Serum: <0.5 (21 @ 23:10)       ROS  ***    VITALS:  T(F): 98.5, Max: 99.1 (21 @ 23:55)  HR: 97  BP: 130/73  RR: 25Vital Signs Last 24 Hrs  T(C): 36.9 (2021 04:00), Max: 37.3 (2021 23:55)  T(F): 98.5 (2021 04:00), Max: 99.1 (2021 23:55)  HR: 97 (2021 07:00) (78 - 104)  BP: 130/73 (2021 06:00) (116/74 - 134/77)  BP(mean): 96 (2021 06:00) (89 - 101)  RR: 25 (2021 07:00) (10 - 37)  SpO2: 100% (2021 07:00) (100% - 100%)    PHYSICAL EXAM:  ***    FH: Non-contributory  Social Hx: Non-contributory    TESTS & MEASUREMENTS:                        8.0    5.67  )-----------( 280      ( 2021 23:10 )             23.7     06-04    133<L>  |  100  |  5<L>  ----------------------------<  120<H>  4.1   |  25  |  <0.5<L>    Ca    8.5      2021 04:30  Phos  3.9     06-04  Mg     2.1     06-04      eGFR if Non African American: 160 mL/min/1.73M2 (21 @ 04:30)  eGFR if : 186 mL/min/1.73M2 (21 @ 04:30)  eGFR if Non African American: 160 mL/min/1.73M2 (21 @ 23:10)  eGFR if : 186 mL/min/1.73M2 (21 @ 23:10)  eGFR if Non African American: 160 mL/min/1.73M2 (21 @ 17:55)  eGFR if : 186 mL/min/1.73M2 (21 @ 17:55)      Urinalysis Basic - ( 2021 11:29 )    Color: Colorless / Appearance: Clear / S.012 / pH: x  Gluc: x / Ketone: Negative  / Bili: Negative / Urobili: <2 mg/dL   Blood: x / Protein: Negative / Nitrite: Negative   Leuk Esterase: Negative / RBC: x / WBC x   Sq Epi: x / Non Sq Epi: x / Bacteria: x        Culture - Blood (collected 21 @ 00:15)  Source: .Blood Blood  Final Report (21 @ 08:01):    No Growth Final    Culture - Blood (collected 21 @ 00:15)  Source: .Blood Blood  Final Report (21 @ 08:01):    No Growth Final    Culture - Blood (collected 21 @ 19:19)  Source: .Blood Blood-Peripheral  Final Report (21 @ 03:00):    No Growth Final    Culture - Body Fluid with Gram Stain (collected 21 @ 12:00)  Source: .Body Fluid CSF  Gram Stain (21 @ 02:58):    polymorphonuclear leukocytes seen    Gram positive cocci in pairs seen    by cytocentrifuge  Final Report (21 @ 18:04):    Few Enterococcus faecalis  Organism: Enterococcus faecalis (21 @ 18:04)  Organism: Enterococcus faecalis (21 @ 18:04)      -  Ampicillin: S <=2 Predicts results to ampicillin/sulbactam, amoxacillin-clavulanate and  piperacillin-tazobactam.      -  Tetra/Doxy: R >8      -  Vancomycin: S 2      Method Type: AISHA    Culture - Blood (collected 21 @ 02:00)  Source: .Blood Blood  Final Report (21 @ 07:01):    No Growth Final    Culture - Blood (collected 21 @ 00:27)  Source: .Blood None  Final Report (21 @ 07:00):    No Growth Final    Culture - Surgical Swab (collected 21 @ 08:54)  Source: .Surgical Swab None  Final Report (21 @ 16:44):    Rare Coag Negative Staphylococcus  Organism: Coag Negative Staphylococcus (21 @ 16:44)  Organism: Coag Negative Staphylococcus (21 @ 16:44)      -  Ampicillin/Sulbactam: S <=8/4      -  Cefazolin: S <=4      -  Clindamycin: S <=0.25      -  Erythromycin: S <=0.25      -  Gentamicin: S <=1 Should not be used as monotherapy      -  Oxacillin: S <=0.25      -  Penicillin: R 0.25      -  RIF- Rifampin: S <=1 Should not be used as monotherapy      -  Tetra/Doxy: S <=1      -  Trimethoprim/Sulfamethoxazole: S <=0.5/9.5      -  Vancomycin: S 0.5      Method Type: AISHA    Culture - Blood (collected 21 @ 11:19)  Source: .Blood None  Final Report (21 @ 22:03):    No Growth Final    Culture - Blood (collected 21 @ 11:18)  Source: .Blood None  Gram Stain (21 @ 02:53):    Growth in anaerobic bottle: Gram Negative Rods    Growth in aerobic bottle: Gram Negative Rods  Final Report (21 @ 16:09):    Growth in aerobic and anaerobic bottles: Klebsiella variicola    ***Blood Panel PCR results on this specimen are available    approximately 3 hours after the Gram stain result.***    Gram stain, PCR, and/or culture results may not always    correspond due to difference in methodologies.    ************************************************************    This PCR assay was performed by multiplex PCR. This    Assay tests for 66 bacterial and resistance gene targets.    Please refer to the [facility] test directory    at [email or web address] for details.  Organism: Blood Culture PCR  Klebsiella variicola (21 @ 16:09)  Organism: Klebsiella variicola (21 @ 16:09)      -  Amikacin: S <=16      -  Ampicillin: R <=8 These ampicillin results predict results for amoxicillin      -  Ampicillin/Sulbactam: S <=4/2 Enterobacter, Citrobacter, and Serratia may develop resistance during prolonged therapy (3-4 days)      -  Aztreonam: S <=4      -  Cefazolin: S <=2 Enterobacter, Citrobacter, and Serratia may develop resistance during prolonged therapy (3-4 days)      -  Cefepime: S <=2      -  Cefoxitin: S <=8      -  Ceftriaxone: S <=1 Enterobacter, Citrobacter, and Serratia may develop resistance during prolonged therapy      -  Ciprofloxacin: S <=0.25      -  Ertapenem: S <=0.5      -  Gentamicin: S <=2      -  Imipenem: S <=1      -  Levofloxacin: S <=0.5      -  Meropenem: S <=1      -  Piperacillin/Tazobactam: S <=8      -  Tobramycin: S <=2      -  Trimethoprim/Sulfamethoxazole: S <=0.5/9.5      Method Type: AISHA  Organism: Blood Culture PCR (21 @ 16:09)      -  Klebsiella pneumoniae: Detec      Method Type: PCR    Culture - Bronchial (collected 21 @ 10:12)  Source: Bronch Wash Bronchoalveolar Lavage  Gram Stain (21 @ 07:04):    Few polymorphonuclear leukocytes per low power field    No Squamous epithelial cells per low power field    Few Gram Positive Rods per oil power field  Final Report (21 @ 16:19):    Moderate Klebsiella variicola    Moderate Serratia marcescens    Normal Respiratory Liz present  Organism: Klebsiella variicola  Serratia marcescens (21 @ 16:19)  Organism: Serratia marcescens (21 @ 16:19)      -  Amikacin: S <=16      -  Amoxicillin/Clavulanic Acid: R >16/8      -  Ampicillin: R <=8 These ampicillin results predict results for amoxicillin      -  Ampicillin/Sulbactam: R 16/8 Enterobacter, Citrobacter, and Serratia may develop resistance during prolonged therapy (3-4 days)      -  Aztreonam: S <=4      -  Cefazolin: R >16 Enterobacter, Citrobacter, and Serratia may develop resistance during prolonged therapy (3-4 days)      -  Cefepime: S <=2      -  Cefoxitin: R <=8      -  Ceftriaxone: S <=1 Enterobacter, Citrobacter, and Serratia may develop resistance during prolonged therapy      -  Ciprofloxacin: S <=0.25      -  Ertapenem: S <=0.5      -  Gentamicin: S <=2      -  Levofloxacin: S <=0.5      -  Meropenem: S <=1      -  Piperacillin/Tazobactam: S <=8      -  Tobramycin: S <=2      -  Trimethoprim/Sulfamethoxazole: S <=0.5/9.5      Method Type: AISHA  Organism: Klebsiella variicola (21 @ 16:19)      -  Amikacin: S <=16      -  Amoxicillin/Clavulanic Acid: R >16/8      -  Ampicillin: R <=8 These ampicillin results predict results for amoxicillin      -  Ampicillin/Sulbactam: I 16/8 Enterobacter, Citrobacter, and Serratia may develop resistance during prolonged therapy (3-4 days)      -  Aztreonam: R >16      -  Cefazolin: R >16 Enterobacter, Citrobacter, and Serratia may develop resistance during prolonged therapy (3-4 days)      -  Cefepime: S <=2      -  Cefoxitin: S <=8      -  Ceftriaxone: S <=1 Enterobacter, Citrobacter, and Serratia may develop resistance during prolonged therapy      -  Ciprofloxacin: S <=0.25      -  Ertapenem: S <=0.5      -  Gentamicin: S <=2      -  Imipenem: S <=1      -  Levofloxacin: S <=0.5      -  Meropenem: S <=1      -  Piperacillin/Tazobactam: S <=8      -  Tobramycin: S 4      -  Trimethoprim/Sulfamethoxazole: S <=0.5/9.5      Method Type: AISHA            INFECTIOUS DISEASES TESTING  Vancomycin Level, Trough: 19.9 (21 @ 05:25)  Vancomycin Level, Trough: 33.6 (21 @ 02:10)  Vancomycin Level, Trough: 15.7 (21 @ 11:59)  Vancomycin Level, Random: 6.7 (21 @ 11:35)  Vancomycin Level, Trough: 21.7 (21 @ 11:20)  Vancomycin Level, Trough: 19.0 (21 @ 11:00)  Vancomycin Level, Trough: 9.0 (21 @ 05:50)  Vancomycin Level, Trough: 7.6 (21 @ 05:54)  COVID-19 PCR: NotDetec (21 @ 04:30)  COVID-19 PCR: NotDetec (21 @ 18:58)  Vancomycin Level, Trough: 4.1 (21 @ 05:50)  HIV-1/2 Combo Result: Nonreact (21 @ 23:30)  Rapid RVP Result: NotDetec (21 @ 22:44)  COVID-19 PCR: NotDetec (21 @ 16:46)  COVID-19 PCR: NotDetec (21 @ 19:35)      INFLAMMATORY MARKERS      RADIOLOGY & ADDITIONAL TESTS:  I have personally reviewed the last available Chest xray  CXR  Xray Chest 1 View AP/PA:   EXAM:  XR CHEST 1 VIEW            PROCEDURE DATE:  2021            INTERPRETATION:  Clinical History / Reason for exam: ICU    Comparison : Chest radiograph dated 2021.    Technique/Positioning: Frontal view of the chest is submitted for review.    Findings:    Support devices: Left internal jugular approach central venous catheter with tip in the SVC. Stable tracheostomy.    Cardiac/mediastinum/hilum: Unchanged.    Lung parenchyma/Pleura: No focal consolidation. No pneumothorax.    Skeleton/soft tissues: Unchanged.    Impression:    No focal consolidation.                FATIMAH RUVALCABA M.D., RESIDENT RADIOLOGIST  This document has been electronically signed.  FAUSTO SHERIDAN M.D., ATTENDING RADIOLOGIST  This document has been electronically signed. 2021 10:57AM (21 @ 05:52)      CT      CARDIOLOGY TESTING      MEDICATIONS  artificial tears (preservative free) Ophthalmic Solution 2 Both EYES three times a day  chlorhexidine 0.12% Liquid 15 Oral Mucosa two times a day  chlorhexidine 4% Liquid 1 Topical <User Schedule>  enoxaparin Injectable 40 SubCutaneous every 24 hours  insulin lispro (ADMELOG) corrective regimen sliding scale  SubCutaneous every 6 hours  levETIRAcetam  IVPB 500 IV Intermittent every 12 hours  lisinopril 20 Oral daily  meropenem  IVPB     meropenem  IVPB 2000 IV Intermittent every 8 hours  pantoprazole   Suspension 40 Oral daily  propranolol 20 Oral every 8 hours  senna 2 Oral at bedtime  sodium chloride 8 Oral every 6 hours  sodium chloride 0.9%. 1000 IV Continuous <Continuous>  sodium chloride 3%. 500 IV Continuous <Continuous>  vancomycin  IVPB     vancomycin  IVPB 1500 IV Intermittent every 8 hours      WEIGHT  Weight (kg): 79.4 (21 @ 16:30)  Creatinine, Serum: <0.5 mg/dL (21 @ 04:30)  Creatinine, Serum: <0.5 mg/dL (21 @ 23:10)  Creatinine, Serum: <0.5 mg/dL (21 @ 17:55)      ANTIBIOTICS:  meropenem  IVPB      meropenem  IVPB 2000 milliGRAM(s) IV Intermittent every 8 hours  vancomycin  IVPB      vancomycin  IVPB 1500 milliGRAM(s) IV Intermittent every 8 hours      All available historical records have been reviewed       ANIA DASGORZ  45y, Male  Allergy: No Known Allergies      LOS  29d    CHIEF COMPLAINT: found down unresponsive (2021 00:16)      INTERVAL EVENTS/HPI  - No acute events overnight afebrile   - T(F): , Max: 99.1 (21 @ 23:55)  - Tolerating medication  - WBC Count: 5.67 (21 @ 23:10)  WBC Count: 7.18 (21 @ 02:10)     - Creatinine, Serum: <0.5 (21 @ 04:30)  Creatinine, Serum: <0.5 (21 @ 23:10)       ROS  General: Denies rigors, nightsweats  HEENT: Denies headache, rhinorrhea, sore throat, eye pain  CV: Denies CP, palpitations  PULM: Denies wheezing, hemoptysis  GI: Denies hematemesis, hematochezia, melena  : Denies discharge, hematuria  MSK: Denies arthralgias, myalgias  SKIN: Denies rash, lesions  NEURO: Denies paresthesias, weakness  PSYCH: Denies depression, anxiety     VITALS:  T(F): 98.5, Max: 99.1 (21 @ 23:55)  HR: 97  BP: 130/73  RR: 25Vital Signs Last 24 Hrs  T(C): 36.9 (2021 04:00), Max: 37.3 (2021 23:55)  T(F): 98.5 (2021 04:00), Max: 99.1 (2021 23:55)  HR: 97 (2021 07:00) (78 - 104)  BP: 130/73 (2021 06:00) (116/74 - 134/77)  BP(mean): 96 (2021 06:00) (89 - 101)  RR: 25 (2021 07:00) (10 - 37)  SpO2: 100% (2021 07:00) (100% - 100%)    PHYSICAL EXAM:  Gen: NAD, trach  HEENT: Normocephalic, atraumatic helmet  Neck: supple, no lymphadenopathy  CV: Regular rate & regular rhythm  Lungs: decreased BS at bases, no fremitus  Abdomen: Soft, BS present  Ext: Warm, well perfused  Neuro: R deficits  Skin: no rash, no erythema  Lines: no phlebitis     FH: Non-contributory  Social Hx: Non-contributory    TESTS & MEASUREMENTS:                        8.0    5.67  )-----------( 280      ( 2021 23:10 )             23.7     06-04    133<L>  |  100  |  5<L>  ----------------------------<  120<H>  4.1   |  25  |  <0.5<L>    Ca    8.5      2021 04:30  Phos  3.9     -  Mg     2.1     -04      eGFR if Non African American: 160 mL/min/1.73M2 (21 @ 04:30)  eGFR if : 186 mL/min/1.73M2 (21 @ 04:30)  eGFR if Non African American: 160 mL/min/1.73M2 (21 @ 23:10)  eGFR if : 186 mL/min/1.73M2 (21 @ 23:10)  eGFR if Non African American: 160 mL/min/1.73M2 (21 @ 17:55)  eGFR if : 186 mL/min/1.73M2 (21 @ 17:55)      Urinalysis Basic - ( 2021 11:29 )    Color: Colorless / Appearance: Clear / S.012 / pH: x  Gluc: x / Ketone: Negative  / Bili: Negative / Urobili: <2 mg/dL   Blood: x / Protein: Negative / Nitrite: Negative   Leuk Esterase: Negative / RBC: x / WBC x   Sq Epi: x / Non Sq Epi: x / Bacteria: x        Culture - Blood (collected 21 @ 00:15)  Source: .Blood Blood  Final Report (21 @ 08:01):    No Growth Final    Culture - Blood (collected 21 @ 00:15)  Source: .Blood Blood  Final Report (21 @ 08:01):    No Growth Final    Culture - Blood (collected 21 @ 19:19)  Source: .Blood Blood-Peripheral  Final Report (21 @ 03:00):    No Growth Final    Culture - Body Fluid with Gram Stain (collected 21 @ 12:00)  Source: .Body Fluid CSF  Gram Stain (21 @ 02:58):    polymorphonuclear leukocytes seen    Gram positive cocci in pairs seen    by cytocentrifuge  Final Report (21 @ 18:04):    Few Enterococcus faecalis  Organism: Enterococcus faecalis (21 @ 18:04)  Organism: Enterococcus faecalis (21 @ 18:04)      -  Ampicillin: S <=2 Predicts results to ampicillin/sulbactam, amoxacillin-clavulanate and  piperacillin-tazobactam.      -  Tetra/Doxy: R >8      -  Vancomycin: S 2      Method Type: AISHA    Culture - Blood (collected 21 @ 02:00)  Source: .Blood Blood  Final Report (21 @ 07:01):    No Growth Final    Culture - Blood (collected 21 @ 00:27)  Source: .Blood None  Final Report (21 @ 07:00):    No Growth Final    Culture - Surgical Swab (collected 21 @ 08:54)  Source: .Surgical Swab None  Final Report (21 @ 16:44):    Rare Coag Negative Staphylococcus  Organism: Coag Negative Staphylococcus (21 @ 16:44)  Organism: Coag Negative Staphylococcus (21 @ 16:44)      -  Ampicillin/Sulbactam: S <=8/4      -  Cefazolin: S <=4      -  Clindamycin: S <=0.25      -  Erythromycin: S <=0.25      -  Gentamicin: S <=1 Should not be used as monotherapy      -  Oxacillin: S <=0.25      -  Penicillin: R 0.25      -  RIF- Rifampin: S <=1 Should not be used as monotherapy      -  Tetra/Doxy: S <=1      -  Trimethoprim/Sulfamethoxazole: S <=0.5/9.5      -  Vancomycin: S 0.5      Method Type: AISHA    Culture - Blood (collected 21 @ 11:19)  Source: .Blood None  Final Report (21 @ 22:03):    No Growth Final    Culture - Blood (collected 21 @ 11:18)  Source: .Blood None  Gram Stain (21 @ 02:53):    Growth in anaerobic bottle: Gram Negative Rods    Growth in aerobic bottle: Gram Negative Rods  Final Report (21 @ 16:09):    Growth in aerobic and anaerobic bottles: Klebsiella variicola    ***Blood Panel PCR results on this specimen are available    approximately 3 hours after the Gram stain result.***    Gram stain, PCR, and/or culture results may not always    correspond due to difference in methodologies.    ************************************************************    This PCR assay was performed by multiplex PCR. This    Assay tests for 66 bacterial and resistance gene targets.    Please refer to the [facility] test directory    at [email or web address] for details.  Organism: Blood Culture PCR  Klebsiella variicola (21 @ 16:09)  Organism: Klebsiella variicola (21 @ 16:09)      -  Amikacin: S <=16      -  Ampicillin: R <=8 These ampicillin results predict results for amoxicillin      -  Ampicillin/Sulbactam: S <=4/2 Enterobacter, Citrobacter, and Serratia may develop resistance during prolonged therapy (3-4 days)      -  Aztreonam: S <=4      -  Cefazolin: S <=2 Enterobacter, Citrobacter, and Serratia may develop resistance during prolonged therapy (3-4 days)      -  Cefepime: S <=2      -  Cefoxitin: S <=8      -  Ceftriaxone: S <=1 Enterobacter, Citrobacter, and Serratia may develop resistance during prolonged therapy      -  Ciprofloxacin: S <=0.25      -  Ertapenem: S <=0.5      -  Gentamicin: S <=2      -  Imipenem: S <=1      -  Levofloxacin: S <=0.5      -  Meropenem: S <=1      -  Piperacillin/Tazobactam: S <=8      -  Tobramycin: S <=2      -  Trimethoprim/Sulfamethoxazole: S <=0.5/9.5      Method Type: AISHA  Organism: Blood Culture PCR (21 @ 16:09)      -  Klebsiella pneumoniae: Detec      Method Type: PCR    Culture - Bronchial (collected 21 @ 10:12)  Source: Bronch Wash Bronchoalveolar Lavage  Gram Stain (21 @ 07:04):    Few polymorphonuclear leukocytes per low power field    No Squamous epithelial cells per low power field    Few Gram Positive Rods per oil power field  Final Report (21 @ 16:19):    Moderate Klebsiella variicola    Moderate Serratia marcescens    Normal Respiratory Liz present  Organism: Klebsiella variicola  Serratia marcescens (21 @ 16:19)  Organism: Serratia marcescens (21 @ 16:19)      -  Amikacin: S <=16      -  Amoxicillin/Clavulanic Acid: R >16/8      -  Ampicillin: R <=8 These ampicillin results predict results for amoxicillin      -  Ampicillin/Sulbactam: R 16/8 Enterobacter, Citrobacter, and Serratia may develop resistance during prolonged therapy (3-4 days)      -  Aztreonam: S <=4      -  Cefazolin: R >16 Enterobacter, Citrobacter, and Serratia may develop resistance during prolonged therapy (3-4 days)      -  Cefepime: S <=2      -  Cefoxitin: R <=8      -  Ceftriaxone: S <=1 Enterobacter, Citrobacter, and Serratia may develop resistance during prolonged therapy      -  Ciprofloxacin: S <=0.25      -  Ertapenem: S <=0.5      -  Gentamicin: S <=2      -  Levofloxacin: S <=0.5      -  Meropenem: S <=1      -  Piperacillin/Tazobactam: S <=8      -  Tobramycin: S <=2      -  Trimethoprim/Sulfamethoxazole: S <=0.5/9.5      Method Type: AISHA  Organism: Klebsiella variicola (21 @ 16:19)      -  Amikacin: S <=16      -  Amoxicillin/Clavulanic Acid: R >16/8      -  Ampicillin: R <=8 These ampicillin results predict results for amoxicillin      -  Ampicillin/Sulbactam: I 16/8 Enterobacter, Citrobacter, and Serratia may develop resistance during prolonged therapy (3-4 days)      -  Aztreonam: R >16      -  Cefazolin: R >16 Enterobacter, Citrobacter, and Serratia may develop resistance during prolonged therapy (3-4 days)      -  Cefepime: S <=2      -  Cefoxitin: S <=8      -  Ceftriaxone: S <=1 Enterobacter, Citrobacter, and Serratia may develop resistance during prolonged therapy      -  Ciprofloxacin: S <=0.25      -  Ertapenem: S <=0.5      -  Gentamicin: S <=2      -  Imipenem: S <=1      -  Levofloxacin: S <=0.5      -  Meropenem: S <=1      -  Piperacillin/Tazobactam: S <=8      -  Tobramycin: S 4      -  Trimethoprim/Sulfamethoxazole: S <=0.5/9.5      Method Type: [facility]            INFECTIOUS DISEASES TESTING  Vancomycin Level, Trough: 19.9 (21 @ 05:25)  Vancomycin Level, Trough: 33.6 (21 @ 02:10)  Vancomycin Level, Trough: 15.7 (21 @ 11:59)  Vancomycin Level, Random: 6.7 (21 @ 11:35)  Vancomycin Level, Trough: 21.7 (21 @ 11:20)  Vancomycin Level, Trough: 19.0 (21 @ 11:00)  Vancomycin Level, Trough: 9.0 (21 @ 05:50)  Vancomycin Level, Trough: 7.6 (21 @ 05:54)  COVID-19 PCR: NotDetec (21 @ 04:30)  COVID-19 PCR: NotDetec (21 @ 18:58)  Vancomycin Level, Trough: 4.1 (21 @ 05:50)  HIV-1/2 Combo Result: Nonreact (21 @ 23:30)  Rapid RVP Result: NotDetec (21 @ 22:44)  COVID-19 PCR: NotDetec (21 @ 16:46)  COVID-19 PCR: NotDetec (21 @ 19:35)      INFLAMMATORY MARKERS      RADIOLOGY & ADDITIONAL TESTS:  I have personally reviewed the last available Chest xray  CXR  Xray Chest 1 View AP/PA:   EXAM:  XR CHEST 1 VIEW            PROCEDURE DATE:  2021            INTERPRETATION:  Clinical History / Reason for exam: ICU    Comparison : Chest radiograph dated 2021.    Technique/Positioning: Frontal view of the chest is submitted for review.    Findings:    Support devices: Left internal jugular approach central venous catheter with tip in the SVC. Stable tracheostomy.    Cardiac/mediastinum/hilum: Unchanged.    Lung parenchyma/Pleura: No focal consolidation. No pneumothorax.    Skeleton/soft tissues: Unchanged.    Impression:    No focal consolidation.                FATIMAH RUVALCABA M.D., RESIDENT RADIOLOGIST  This document has been electronically signed.  FASUTO SHERIDAN M.D., ATTENDING RADIOLOGIST  This document has been electronically signed. 2021 10:57AM (21 @ 05:52)      CT      CARDIOLOGY TESTING      MEDICATIONS  artificial tears (preservative free) Ophthalmic Solution 2 Both EYES three times a day  chlorhexidine 0.12% Liquid 15 Oral Mucosa two times a day  chlorhexidine 4% Liquid 1 Topical <User Schedule>  enoxaparin Injectable 40 SubCutaneous every 24 hours  insulin lispro (ADMELOG) corrective regimen sliding scale  SubCutaneous every 6 hours  levETIRAcetam  IVPB 500 IV Intermittent every 12 hours  lisinopril 20 Oral daily  meropenem  IVPB     meropenem  IVPB 2000 IV Intermittent every 8 hours  pantoprazole   Suspension 40 Oral daily  propranolol 20 Oral every 8 hours  senna 2 Oral at bedtime  sodium chloride 8 Oral every 6 hours  sodium chloride 0.9%. 1000 IV Continuous <Continuous>  sodium chloride 3%. 500 IV Continuous <Continuous>  vancomycin  IVPB     vancomycin  IVPB 1500 IV Intermittent every 8 hours      WEIGHT  Weight (kg): 79.4 (21 @ 16:30)  Creatinine, Serum: <0.5 mg/dL (21 @ 04:30)  Creatinine, Serum: <0.5 mg/dL (21 @ 23:10)  Creatinine, Serum: <0.5 mg/dL (21 @ 17:55)      ANTIBIOTICS:  meropenem  IVPB      meropenem  IVPB 2000 milliGRAM(s) IV Intermittent every 8 hours  vancomycin  IVPB      vancomycin  IVPB 1500 milliGRAM(s) IV Intermittent every 8 hours      All available historical records have been reviewed

## 2021-06-04 NOTE — PROGRESS NOTE ADULT - SUBJECTIVE AND OBJECTIVE BOX
Nephrology progress note    Patient is seen and examined, events over the last 24 h noted .  Seen for hyponatremia  Allergies:  No Known Allergies    Hospital Medications:   MEDICATIONS  (STANDING):  artificial tears (preservative free) Ophthalmic Solution 2 Drop(s) Both EYES three times a day  chlorhexidine 0.12% Liquid 15 milliLiter(s) Oral Mucosa two times a day  chlorhexidine 4% Liquid 1 Application(s) Topical <User Schedule>  enoxaparin Injectable 40 milliGRAM(s) SubCutaneous every 24 hours  fludroCORTISONE 0.1 milliGRAM(s) Oral <User Schedule>  insulin lispro (ADMELOG) corrective regimen sliding scale   SubCutaneous every 6 hours  levETIRAcetam  IVPB 500 milliGRAM(s) IV Intermittent every 12 hours  lisinopril 20 milliGRAM(s) Oral daily  meropenem  IVPB      meropenem  IVPB 2000 milliGRAM(s) IV Intermittent every 8 hours  pantoprazole   Suspension 40 milliGRAM(s) Oral daily  propranolol 20 milliGRAM(s) Oral every 8 hours  senna 2 Tablet(s) Oral at bedtime  sodium chloride 8 Gram(s) Oral every 6 hours  sodium chloride 0.9%. 1000 milliLiter(s) (50 mL/Hr) IV Continuous <Continuous>  sodium chloride 3%. 500 milliLiter(s) (50 mL/Hr) IV Continuous <Continuous>  vancomycin  IVPB 2000 milliGRAM(s) IV Intermittent every 12 hours        VITALS:  T(F): 99.1 (21 @ 07:21), Max: 99.1 (21 @ 23:55)  HR: 99 (21 @ 11:00)  BP: 130/73 (21 @ 11:00)  RR: 21 (21 @ 11:00)  SpO2: 99% (21 @ 11:00)  Wt(kg): --     @ 07:  -   @ 07:00  --------------------------------------------------------  IN: 3800 mL / OUT: 4025 mL / NET: -225 mL     @ 07:01  -   @ 07:00  --------------------------------------------------------  IN: 5450 mL / OUT: 3415 mL / NET: 2035 mL     @ 07:01  -   @ 12:51  --------------------------------------------------------  IN: 750 mL / OUT: 675 mL / NET: 75 mL          PHYSICAL EXAM:  Constitutional: NAD  Neck: No JVD  Respiratory: CTA  Cardiovascular: S1, S2, RRR  Gastrointestinal: BS+, soft, ND  Extremities: No peripheral edema  Neurological: Awake  : has cristobal.     LABS:      133<L>  |  100  |  5<L>  ----------------------------<  120<H>  4.1   |  25  |  <0.5<L>  SODIUM TREND:  Sodium 133 [ @ 04:30]  Sodium 130 [ @ 23:10]  Sodium 134 [ @ 17:55]  Sodium 127 [ @ 05:25]  Sodium 132 [ @ 02:10]  Sodium 132 [ @ 20:00]  Sodium 131 [ @ 16:45]  Sodium 132 [ @ 13:35]  Sodium 133 [ @ 02:00]  Sodium 131 [ @ 22:29]    Ca    8.5      2021 04:30  Phos  3.9       Mg     2.1                                 8.0    5.67  )-----------( 280      ( 2021 23:10 )             23.7       Urine Studies:  Urinalysis Basic - ( 2021 11:29 )    Color: Colorless / Appearance: Clear / S.012 / pH:   Gluc:  / Ketone: Negative  / Bili: Negative / Urobili: <2 mg/dL   Blood:  / Protein: Negative / Nitrite: Negative   Leuk Esterase: Negative / RBC:  / WBC    Sq Epi:  / Non Sq Epi:  / Bacteria:       Osmolality, Random Urine: 508 mos/kg ( @ 11:29)  Sodium, Random Urine: 228.0 mmoL/L ( @ 11:29)  Potassium, Random Urine: 19 mmol/L ( @ 11:29)  Chloride, Random Urine: 231 ( @ 11:29)    RADIOLOGY & ADDITIONAL STUDIES:  < from: Xray Chest 1 View- PORTABLE-Routine (21 @ 05:45) >  No radiographic evidence of acute cardiopulmonary disease.    < end of copied text >

## 2021-06-04 NOTE — PROGRESS NOTE ADULT - ASSESSMENT
Assessment & Plan  45M s/p fall with large left SDH and neurological status change, level 1 for emergent craniectomy complicated by subgleal hematoma/collection s/p wound revision and drainage on 5/16.    NEURO:      S/P craniectomy for large left SDH     - GCS 11T, Q2H neuro checks, No sedation     - Keppra 500mg BID for sz ppx    Hyponatremia- goal Na+ >135     -  Cerebral salt wasting     -  3%@50 and salt tabs 8q6        - Na+ trend- 131>133 >127>134     - Fludricortasone x3 doses    Maintian Head of bed @ 30 degrees    Craniectomy (no left side bone flap)Helmet --> does not need while laying in bed only     during PT/OT       OR 5/16:     -Revision craniectomy wound with complex closure for persistent drainage, subgaleal hematoma- e. 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  NSGY- will need  shunt    RESP:   Respiratoy Failure    s/p trach 5/21    tolerating T-piece since 5/24        CARDS:     S/P cardiac arrest in OR & MTP     - Hypertension - BP controlled on Lisinopril 20mg     - Propranolol 20mg q8  Tachycardia- intermittant 100-106, self resolves    GI/NUTR:     Diet: TF (Osmolite 1.5) @ 50 via  in gastrostomy (using jain in situ)    GI Prophylaxis- PPI    Bowel regimen restarted - Senna         Last BM 6/2    /RENAL:   No acute issues    Condom cath, + voiding.     Q6 BMP      - Hyponatremic in setting of Cerebral salt wasting - Na goal >135     - 3%@50 and salt tabs      -NS started at 50cc/hr       fludrocortison x3 doses 6/3  BUN/Cr- 5/<0.5  -->,  5/<0.5  -->,  5/<0.5  -->  Electrolytes-Na 127 // K 4.2 // Mg 1.7 //  Phos 3.9         HEME/ONC:   No acute issues    Labs:   Hb/Hct: 8.3/25.0  --> 8.0/23.7  Plts:  330  --> 281  -->280  Hg >8, MTP on this admission  DVT prophylaxis: Lovenox 40  5/26 DVT sono neg      ID:  Craniectomy site wound infection  WBC 5.67  Tmax  Afebrile  Antibiotics-meropenem  IVPB 2000 every 8 hours, vancomycin  IVPB 1500 every 8 hours  - Vanc level 6/3 19.8  Cultures     - 5/11- u/a +leuk     - blood culture 5/13, 5/19, 5/20, 5/23, 5/28 No growth      -Cranial Culture 5/21-e faceium     - Reculture if spikes    ENDO:  FS controlled  q6 POC glucose   Sliding scale insulin PRN  A1c 5.2      DISPO: SICU    Assessment & Plan  45M s/p fall with large left SDH and neurological status change, level 1 for emergent craniectomy complicated by subgleal hematoma/collection s/p wound revision and drainage on 5/16.    NEURO:      S/P craniectomy for large left SDH     - GCS 11T, Q2H neuro checks, No sedation     - Keppra 500mg BID for sz ppx    Hyponatremia- goal Na+ >135     -  Cerebral salt wasting     -  3%@50 and salt tabs 8q6        - Na+ trend- 131>133 >127>134     - Fludricortasone x3 doses repeat today    Maintain Head of bed @ 30 degrees    Craniectomy (no left side bone flap)Helmet --> does not need while laying in bed only     during PT/OT       OR 5/16:     -Revision craniectomy wound with complex closure for persistent drainage, subgaleal hematoma- e. 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  NSGY- will need  shunt    RESP:   Respiratoy Failure    s/p trach 5/21    tolerating T-piece since 5/24        CARDS:     S/P cardiac arrest in OR & MTP     - Hypertension - BP controlled on Lisinopril 20mg     - Propranolol 20mg q8  Tachycardia- intermittant 100-106, self resolves    GI/NUTR:     Diet: TF (Osmolite 1.5) @ 50 via  in gastrostomy (using jain in situ)    GI Prophylaxis- PPI    Bowel regimen restarted - Senna         Last BM 6/2    /RENAL:   No acute issues    Condom cath, + voiding.     Q6 BMP      - Hyponatremic in setting of Cerebral salt wasting - Na goal >135     - 3%@50 and salt tabs      -NS started at 50cc/hr       fludrocortisone x3 doses 6/3  BUN/Cr- 5/<0.5  -->,  5/<0.5  -->,  5/<0.5  -->  Electrolytes-Na 127 // K 4.2 // Mg 1.7 //  Phos 3.9         HEME/ONC:   No acute issues    Labs:   Hb/Hct: 8.3/25.0  --> 8.0/23.7  Plts:  330  --> 281  -->280  Hg >8, MTP on this admission  DVT prophylaxis: Lovenox 40  5/26 DVT sono neg      ID:  Craniectomy site wound infection  WBC 5.67  Tmax  Afebrile  Antibiotics-meropenem  IVPB 2000 every 8 hours, vancomycin  IVPB 1500 every 8 hours  - Vanc level 6/3 19.8  Cultures     - 5/11- u/a +leuk     - blood culture 5/13, 5/19, 5/20, 5/23, 5/28 No growth      -Cranial Culture 5/21-e faceium     - Reculture if spikes    ENDO:  FS controlled  q6 POC glucose   Sliding scale insulin PRN  A1c 5.2      DISPO: SICU

## 2021-06-04 NOTE — PROCEDURE NOTE - NSPROCDETAILS_GEN_ALL_CORE
guidewire recovered/lumen(s) aspirated and flushed/sterile dressing applied/sterile technique, catheter placed/ultrasound guidance with use of sterile gel and probe cove
guidewire recovered/lumen(s) aspirated and flushed/sterile dressing applied/sterile technique, catheter placed
guidewire recovered/lumen(s) aspirated and flushed/sterile dressing applied/sterile technique, catheter placed/ultrasound guidance with use of sterile gel and probe cove
location identified, draped/prepped, sterile technique used, needle inserted/introduced/positive blood return obtained via catheter/connected to a pressurized flush line/sutured in place/hemostasis with direct pressure, dressing applied/Seldinger technique/all materials/supplies accounted for at end of procedure
location identified, draped/prepped, sterile technique used/ultrasound guidance
guidewire recovered/lumen(s) aspirated and flushed/sterile dressing applied/sterile technique, catheter placed/ultrasound guidance with use of sterile gel and probe cove

## 2021-06-04 NOTE — PROGRESS NOTE ADULT - SUBJECTIVE AND OBJECTIVE BOX
Neurosurgery Progress Note      POD# 28 S/P Left Craniectomy for Evac of SDH  POD# 18 S/P Wound Revision    Pt seen and examined at the bedside in SICU.        Subjective: 45yMale with a pmhx of SUBDURAL HEMATOMA;RESPIRATORY FAILURE;FALL    ^FALL    No pertinent family history in first degree relatives    Family history of essential hypertension (Father)    Handoff    MEWS Score    No pertinent past medical history    Alcohol abuse    GERD (gastroesophageal reflux disease)    ETOH abuse    Hypomagnesemia    Seizure disorder    Subdural hematoma    S/P subdural hematoma evacuation    Subdural hematoma    Subdural hematoma    Subdural hematoma    Respiratory failure    ETOH abuse    Palliative care by specialist    Insertion of non-tunneled central venous catheter (CVC) in patient age 5 years of age or older    Craniotomy, decompressive    Decompressive craniotomy    Placement, tracheostomy and percutaneous endoscopic gastrostomy    No significant past surgical history    H/O hemorrhoidectomy    FALL    23    Respiratory failure    Fall    SysAdmin_VisitLink      POD# 28 S/P Left Craniectomy for Evac of SDH  POD# 18 S/P Wound Revision    Allergies    No Known Allergies    Intolerances        Vital Signs Last 24 Hrs  T(C): 37.3 (04 Jun 2021 07:21), Max: 37.3 (03 Jun 2021 23:55)  T(F): 99.1 (04 Jun 2021 07:21), Max: 99.1 (03 Jun 2021 23:55)  HR: 93 (04 Jun 2021 08:00) (78 - 102)  BP: 140/79 (04 Jun 2021 08:00) (116/74 - 140/79)  BP(mean): 102 (04 Jun 2021 08:00) (89 - 103)  RR: 23 (04 Jun 2021 08:00) (10 - 37)  SpO2: 100% (04 Jun 2021 08:00) (100% - 100%)      acetaminophen   Tablet .. 650 milliGRAM(s) Oral every 6 hours PRN  artificial tears (preservative free) Ophthalmic Solution 2 Drop(s) Both EYES three times a day  chlorhexidine 0.12% Liquid 15 milliLiter(s) Oral Mucosa two times a day  chlorhexidine 4% Liquid 1 Application(s) Topical <User Schedule>  enoxaparin Injectable 40 milliGRAM(s) SubCutaneous every 24 hours  insulin lispro (ADMELOG) corrective regimen sliding scale   SubCutaneous every 6 hours  levETIRAcetam  IVPB 500 milliGRAM(s) IV Intermittent every 12 hours  lisinopril 20 milliGRAM(s) Oral daily  meropenem  IVPB      meropenem  IVPB 2000 milliGRAM(s) IV Intermittent every 8 hours  pantoprazole   Suspension 40 milliGRAM(s) Oral daily  propranolol 20 milliGRAM(s) Oral every 8 hours  senna 2 Tablet(s) Oral at bedtime  sodium chloride 8 Gram(s) Oral every 6 hours  sodium chloride 0.9%. 1000 milliLiter(s) IV Continuous <Continuous>  sodium chloride 3%. 500 milliLiter(s) IV Continuous <Continuous>  vancomycin  IVPB      vancomycin  IVPB 1500 milliGRAM(s) IV Intermittent every 8 hours        06-03-21 @ 07:01  -  06-04-21 @ 07:00  --------------------------------------------------------  IN: 5450 mL / OUT: 3415 mL / NET: 2035 mL    06-04-21 @ 07:01  -  06-04-21 @ 08:31  --------------------------------------------------------  IN: 300 mL / OUT: 350 mL / NET: -50 mL        REVIEW OF SYSTEMS    [ ] A ten-point review of systems was otherwise negative except as noted.  [ ] Due to altered mental status/intubation, subjective information were not able to be obtained from the patient. History was obtained, to the extent possible, from review of the chart and collateral sources of information.      Exam:  AAOX3. Verbal function intact  tongue midline, facial motions symmetric  PERRLA, EOMI  Pronator Drift:   Finger to Nose intact  Motor: MAEx4, 5/5 power in b/l UE and LE  Sensation: intact to touch in all extremities        CBC Full  -  ( 03 Jun 2021 23:10 )  WBC Count : 5.67 K/uL  RBC Count : 2.81 M/uL  Hemoglobin : 8.0 g/dL  Hematocrit : 23.7 %  Platelet Count - Automated : 280 K/uL  Mean Cell Volume : 84.3 fL  Mean Cell Hemoglobin : 28.5 pg  Mean Cell Hemoglobin Concentration : 33.8 g/dL  Auto Neutrophil # : x  Auto Lymphocyte # : x  Auto Monocyte # : x  Auto Eosinophil # : x  Auto Basophil # : x  Auto Neutrophil % : x  Auto Lymphocyte % : x  Auto Monocyte % : x  Auto Eosinophil % : x  Auto Basophil % : x    06-04    133<L>  |  100  |  5<L>  ----------------------------<  120<H>  4.1   |  25  |  <0.5<L>    Ca    8.5      04 Jun 2021 04:30  Phos  3.9     06-04  Mg     2.1     06-04              Wound:  clean dry and intact    Imaging:    Assessment/Plan:   This is a 45 year old gentleman      Neurosurgery Progress Note      POD# 28 S/P Left Craniectomy for Evac of SDH  POD# 18 S/P Wound Revision    Pt seen and examined at the bedside in SICU.        Subjective: 45yMale with a pmhx of SUBDURAL HEMATOMA;RESPIRATORY FAILURE;FALL    ^FALL    No pertinent family history in first degree relatives    Family history of essential hypertension (Father)    Handoff    MEWS Score    No pertinent past medical history    Alcohol abuse    GERD (gastroesophageal reflux disease)    ETOH abuse    Hypomagnesemia    Seizure disorder    Subdural hematoma    S/P subdural hematoma evacuation    Subdural hematoma    Subdural hematoma    Subdural hematoma    Respiratory failure    ETOH abuse    Palliative care by specialist    Insertion of non-tunneled central venous catheter (CVC) in patient age 5 years of age or older    Craniotomy, decompressive    Decompressive craniotomy    Placement, tracheostomy and percutaneous endoscopic gastrostomy    No significant past surgical history    H/O hemorrhoidectomy    FALL    23    Respiratory failure    Fall    SysAdmin_VisitLink      POD# 28 S/P Left Craniectomy for Evac of SDH  POD# 18 S/P Wound Revision    Allergies    No Known Allergies    Intolerances        Vital Signs Last 24 Hrs  T(C): 37.3 (04 Jun 2021 07:21), Max: 37.3 (03 Jun 2021 23:55)  T(F): 99.1 (04 Jun 2021 07:21), Max: 99.1 (03 Jun 2021 23:55)  HR: 93 (04 Jun 2021 08:00) (78 - 102)  BP: 140/79 (04 Jun 2021 08:00) (116/74 - 140/79)  BP(mean): 102 (04 Jun 2021 08:00) (89 - 103)  RR: 23 (04 Jun 2021 08:00) (10 - 37)  SpO2: 100% (04 Jun 2021 08:00) (100% - 100%)      acetaminophen   Tablet .. 650 milliGRAM(s) Oral every 6 hours PRN  artificial tears (preservative free) Ophthalmic Solution 2 Drop(s) Both EYES three times a day  chlorhexidine 0.12% Liquid 15 milliLiter(s) Oral Mucosa two times a day  chlorhexidine 4% Liquid 1 Application(s) Topical <User Schedule>  enoxaparin Injectable 40 milliGRAM(s) SubCutaneous every 24 hours  insulin lispro (ADMELOG) corrective regimen sliding scale   SubCutaneous every 6 hours  levETIRAcetam  IVPB 500 milliGRAM(s) IV Intermittent every 12 hours  lisinopril 20 milliGRAM(s) Oral daily  meropenem  IVPB      meropenem  IVPB 2000 milliGRAM(s) IV Intermittent every 8 hours  pantoprazole   Suspension 40 milliGRAM(s) Oral daily  propranolol 20 milliGRAM(s) Oral every 8 hours  senna 2 Tablet(s) Oral at bedtime  sodium chloride 8 Gram(s) Oral every 6 hours  sodium chloride 0.9%. 1000 milliLiter(s) IV Continuous <Continuous>  sodium chloride 3%. 500 milliLiter(s) IV Continuous <Continuous>  vancomycin  IVPB      vancomycin  IVPB 1500 milliGRAM(s) IV Intermittent every 8 hours        06-03-21 @ 07:01  -  06-04-21 @ 07:00  --------------------------------------------------------  IN: 5450 mL / OUT: 3415 mL / NET: 2035 mL    06-04-21 @ 07:01  -  06-04-21 @ 08:31  --------------------------------------------------------  IN: 300 mL / OUT: 350 mL / NET: -50 mL        REVIEW OF SYSTEMS    [ ] A ten-point review of systems was otherwise negative except as noted.  [ ] Due to altered mental status/intubation, subjective information were not able to be obtained from the patient. History was obtained, to the extent possible, from review of the chart and collateral sources of information.      Exam:  AAOX3. Verbal function intact  tongue midline, facial motions symmetric  PERRLA, EOMI  Pronator Drift:   Finger to Nose intact  Motor: MAEx4, 5/5 power in b/l UE and LE  Sensation: intact to touch in all extremities        CBC Full  -  ( 03 Jun 2021 23:10 )  WBC Count : 5.67 K/uL  RBC Count : 2.81 M/uL  Hemoglobin : 8.0 g/dL  Hematocrit : 23.7 %  Platelet Count - Automated : 280 K/uL  Mean Cell Volume : 84.3 fL  Mean Cell Hemoglobin : 28.5 pg  Mean Cell Hemoglobin Concentration : 33.8 g/dL  Auto Neutrophil # : x  Auto Lymphocyte # : x  Auto Monocyte # : x  Auto Eosinophil # : x  Auto Basophil # : x  Auto Neutrophil % : x  Auto Lymphocyte % : x  Auto Monocyte % : x  Auto Eosinophil % : x  Auto Basophil % : x    06-04    133<L>  |  100  |  5<L>  ----------------------------<  120<H>  4.1   |  25  |  <0.5<L>    Ca    8.5      04 Jun 2021 04:30  Phos  3.9     06-04  Mg     2.1     06-04              Wound:  clean dry and intact    Imaging:    Assessment/Plan:   This is a 45 year old gentleman S/P Left Craniectomy for Evac of SDH    Neuro: Neuro exams Q1hr, on 3% Nacl @ 50, maintain Keppra 500mg BID, considering VPS for monday  CV: keep normotensive/euvolemic   Resp: continue trach collar as tolerated   GI: TF, GI ppx  : +Cole, Monitor UOP, replete lytes PRN  ID: 5/21 CSF few e. faecalis, on vanc/mp, Tmax 99.1, WBC 5.6  DVT prophylaxis: Lovenox   PT rehab Neurosurgery Progress Note      POD# 28 S/P Left Craniectomy for Evac of SDH  POD# 18 S/P Wound Revision    Pt seen and examined at the bedside in SICU.  No major over night events.  Pt resting in bed comfortably, in NAD, FOLEY and hemodynamically stable.  Pt with tracheostomy in place tolerating T Piece, on 3% Nacl ggt.  Pt is A+O, PERRLA, EOMI, moving Left extremities spont.      Subjective: 45yMale with a pmhx of SUBDURAL HEMATOMA;RESPIRATORY FAILURE;FALL    ^FALL    No pertinent family history in first degree relatives    Family history of essential hypertension (Father)    Handoff    MEWS Score    No pertinent past medical history    Alcohol abuse    GERD (gastroesophageal reflux disease)    ETOH abuse    Hypomagnesemia    Seizure disorder    Subdural hematoma    S/P subdural hematoma evacuation    Subdural hematoma    Subdural hematoma    Subdural hematoma    Respiratory failure    ETOH abuse    Palliative care by specialist    Insertion of non-tunneled central venous catheter (CVC) in patient age 5 years of age or older    Craniotomy, decompressive    Decompressive craniotomy    Placement, tracheostomy and percutaneous endoscopic gastrostomy    No significant past surgical history    H/O hemorrhoidectomy    FALL    23    Respiratory failure    Fall    SysAdmin_VisitLink      POD# 28 S/P Left Craniectomy for Evac of SDH  POD# 18 S/P Wound Revision    Allergies    No Known Allergies    Intolerances        Vital Signs Last 24 Hrs  T(C): 37.3 (04 Jun 2021 07:21), Max: 37.3 (03 Jun 2021 23:55)  T(F): 99.1 (04 Jun 2021 07:21), Max: 99.1 (03 Jun 2021 23:55)  HR: 93 (04 Jun 2021 08:00) (78 - 102)  BP: 140/79 (04 Jun 2021 08:00) (116/74 - 140/79)  BP(mean): 102 (04 Jun 2021 08:00) (89 - 103)  RR: 23 (04 Jun 2021 08:00) (10 - 37)  SpO2: 100% (04 Jun 2021 08:00) (100% - 100%)      acetaminophen   Tablet .. 650 milliGRAM(s) Oral every 6 hours PRN  artificial tears (preservative free) Ophthalmic Solution 2 Drop(s) Both EYES three times a day  chlorhexidine 0.12% Liquid 15 milliLiter(s) Oral Mucosa two times a day  chlorhexidine 4% Liquid 1 Application(s) Topical <User Schedule>  enoxaparin Injectable 40 milliGRAM(s) SubCutaneous every 24 hours  insulin lispro (ADMELOG) corrective regimen sliding scale   SubCutaneous every 6 hours  levETIRAcetam  IVPB 500 milliGRAM(s) IV Intermittent every 12 hours  lisinopril 20 milliGRAM(s) Oral daily  meropenem  IVPB      meropenem  IVPB 2000 milliGRAM(s) IV Intermittent every 8 hours  pantoprazole   Suspension 40 milliGRAM(s) Oral daily  propranolol 20 milliGRAM(s) Oral every 8 hours  senna 2 Tablet(s) Oral at bedtime  sodium chloride 8 Gram(s) Oral every 6 hours  sodium chloride 0.9%. 1000 milliLiter(s) IV Continuous <Continuous>  sodium chloride 3%. 500 milliLiter(s) IV Continuous <Continuous>  vancomycin  IVPB      vancomycin  IVPB 1500 milliGRAM(s) IV Intermittent every 8 hours        06-03-21 @ 07:01  -  06-04-21 @ 07:00  --------------------------------------------------------  IN: 5450 mL / OUT: 3415 mL / NET: 2035 mL    06-04-21 @ 07:01  -  06-04-21 @ 08:31  --------------------------------------------------------  IN: 300 mL / OUT: 350 mL / NET: -50 mL        REVIEW OF SYSTEMS    [ ] A ten-point review of systems was otherwise negative except as noted.  [ ] Due to altered mental status/intubation, subjective information were not able to be obtained from the patient. History was obtained, to the extent possible, from review of the chart and collateral sources of information.      Exam:  A+O  facial motions symmetric  PERRLA, EOMI, tracks  Moves LUE and LLE spontaneously and to commands  withdraws RUE minimally to noxious stimuli   no withdrawal of RLE to noxious stimuli         CBC Full  -  ( 03 Jun 2021 23:10 )  WBC Count : 5.67 K/uL  RBC Count : 2.81 M/uL  Hemoglobin : 8.0 g/dL  Hematocrit : 23.7 %  Platelet Count - Automated : 280 K/uL  Mean Cell Volume : 84.3 fL  Mean Cell Hemoglobin : 28.5 pg  Mean Cell Hemoglobin Concentration : 33.8 g/dL  Auto Neutrophil # : x  Auto Lymphocyte # : x  Auto Monocyte # : x  Auto Eosinophil # : x  Auto Basophil # : x  Auto Neutrophil % : x  Auto Lymphocyte % : x  Auto Monocyte % : x  Auto Eosinophil % : x  Auto Basophil % : x    06-04    133<L>  |  100  |  5<L>  ----------------------------<  120<H>  4.1   |  25  |  <0.5<L>    Ca    8.5      04 Jun 2021 04:30  Phos  3.9     06-04  Mg     2.1     06-04              Wound:  clean dry and intact      Assessment/Plan:   This is a 45 year old gentleman S/P Left Craniectomy for Evac of SDH    Neuro: Neuro exams Q1hr, on 3% Nacl @ 50, on Nacl tabs 8g Q6h, target Na goal 135-145, maintain Keppra 500mg BID, considering VPS for monday  CV: keep normotensive/euvolemic   Resp: continue trach collar as tolerated   GI: TF, GI ppx  : +Cole, Monitor UOP, replete lytes PRN, Target Na goal 135-145  ID: 5/21 CSF few e. faecalis, on vanc/mp, Tmax 99.1, WBC 5.6  DVT prophylaxis: Lovenox   PT rehab

## 2021-06-04 NOTE — PROGRESS NOTE ADULT - ATTENDING COMMENTS
Patient remains awake.  Slow but follows simple commands.  Tolerates feeds.  Has right side hemiplegia unchanged.  Na+ 133 this am.    ASSESSMENT:  46 y/o male, S/P Fall.  Traumatic Left SDH.  Brain compression.  S/P Left Craniectomy.  Acute respiratory failure.  At risk for hemodynamic instability.  Chronic Alcohol Abuse.  Dysphagia.  Hyponatremia.   Acute blood loss anemia.    PLAN:  - continue chest PT and suctioning  - keep MAP>65; currently well perfused and euvolemic  - continue 3% saline, continue again today Fludrocortisone po q8h x3 dose  - follow Na+ frequently to avoid more than 12 mmol correction  - get Nephrology evaluation for hyponatremia.  - remains on Seamus/Vanco as per ID  - GI and DVT prophylaxis   - Neurosurgery f/u

## 2021-06-04 NOTE — PROGRESS NOTE ADULT - SUBJECTIVE AND OBJECTIVE BOX
SANDRA DAS  [id number]  45y Male    Indication for ICU admission: s/p craniectomy for large L SDH  Admit Date:05-06-21  ICU Date: 05-07-21  OR Date: 05-06-21    No Known Allergies    PAST MEDICAL & SURGICAL HISTORY:  Alcohol abuse  GERD (gastroesophageal reflux disease)  ETOH abuse  Hypomagnesemia  Seizure disorder  H/O hemorrhoidectomy    Home Medications:  NONE    24HRS EVENT:  6/3  Day  -fludrocortisone 0.1 q8 x3 doses  -q6 BMP/Osm  -renal consult  -oob, PT OT consults    Overnight   despite fludrocortisone  Hypomagnesemia- repleted        DVT PTX: LVX  GI PTX: PPI    ***Tubes/Lines/Drains  ***  Peripheral IV  Left basilic midline 5/17  Arterial Line Right radial	Date 5/17  L IJ TLC     5/29  Trach/peg 5/21    REVIEW OF SYSTEMS  [ ] A ten-point review of systems was otherwise negative except as noted.  [x] Due to altered mental status/intubation, subjective information were not able to be obtained from the patient. History was obtained, to the extent possible, from review of the chart and collateral sources of information.    	   SANDRA DAS  [id number]  45y Male    Indication for ICU admission: s/p craniectomy for large L SDH  Admit Date:05-06-21  ICU Date: 05-07-21  OR Date: 05-06-21    No Known Allergies    PAST MEDICAL & SURGICAL HISTORY:  Alcohol abuse  GERD (gastroesophageal reflux disease)  ETOH abuse  Hypomagnesemia  Seizure disorder  H/O hemorrhoidectomy    Home Medications:  NONE    24HRS EVENT:  6/3  Day  -fludrocortisone 0.1 q8 x3 doses  -q6 BMP/Osm  -renal consult  -oob, PT OT consults    Overnight   despite fludrocortisone  Hypomagnesemia- repleted        DVT PTX: LVX  GI PTX: PPI    ***Tubes/Lines/Drains  ***  Peripheral IV  Left basilic midline 5/17  Arterial Line Right radial	Date 5/17  L IJ TLC     5/29  Trach/peg 5/21    REVIEW OF SYSTEMS  [ ] A ten-point review of systems was otherwise negative except as noted.  [x] Due to altered mental status/intubation, subjective information were not able to be obtained from the patient. History was obtained, to the extent possible, from review of the chart and collateral sources of information.    	  Daily     Daily     Diet, NPO with Tube Feed:   Tube Feeding Modality: Orogastric  Osmolite 1.5 Jeff  Total Volume for 24 Hours (mL): 1200  Continuous  Starting Tube Feed Rate mL per Hour: 50  Until Goal Tube Feed Rate (mL per Hour): 50  Tube Feed Duration (in Hours): 24  Tube Feed Start Time: 00:00  No Carb Prosource TF     Qty per Day:  3 (06-02-21 @ 18:49)      CURRENT MEDS:  Neurologic Medications  acetaminophen   Tablet .. 650 milliGRAM(s) Oral every 6 hours PRN Temp greater or equal to 38.5C (101.3F), Moderate Pain (4 - 6), Severe Pain (7 - 10)  levETIRAcetam  IVPB 500 milliGRAM(s) IV Intermittent every 12 hours    Respiratory Medications    Cardiovascular Medications  lisinopril 20 milliGRAM(s) Oral daily  propranolol 20 milliGRAM(s) Oral every 8 hours    Gastrointestinal Medications  pantoprazole   Suspension 40 milliGRAM(s) Oral daily  senna 2 Tablet(s) Oral at bedtime  sodium chloride 8 Gram(s) Oral every 6 hours  sodium chloride 0.9%. 1000 milliLiter(s) IV Continuous <Continuous>  sodium chloride 3%. 500 milliLiter(s) IV Continuous <Continuous>    Genitourinary Medications    Hematologic/Oncologic Medications  enoxaparin Injectable 40 milliGRAM(s) SubCutaneous every 24 hours    Antimicrobial/Immunologic Medications  meropenem  IVPB      meropenem  IVPB 2000 milliGRAM(s) IV Intermittent every 8 hours  vancomycin  IVPB 2000 milliGRAM(s) IV Intermittent every 12 hours    Endocrine/Metabolic Medications  fludroCORTISONE 0.1 milliGRAM(s) Oral every 12 hours  insulin lispro (ADMELOG) corrective regimen sliding scale   SubCutaneous every 6 hours    Topical/Other Medications  artificial tears (preservative free) Ophthalmic Solution 2 Drop(s) Both EYES three times a day  chlorhexidine 0.12% Liquid 15 milliLiter(s) Oral Mucosa two times a day  chlorhexidine 4% Liquid 1 Application(s) Topical <User Schedule>      ICU Vital Signs Last 24 Hrs  T(C): 37.3 (04 Jun 2021 07:21), Max: 37.3 (03 Jun 2021 23:55)  T(F): 99.1 (04 Jun 2021 07:21), Max: 99.1 (03 Jun 2021 23:55)  HR: 99 (04 Jun 2021 11:00) (78 - 102)  BP: 130/73 (04 Jun 2021 11:00) (116/74 - 140/79)  BP(mean): 96 (04 Jun 2021 11:00) (89 - 103)  ABP: --  ABP(mean): --  RR: 21 (04 Jun 2021 11:00) (10 - 37)  SpO2: 99% (04 Jun 2021 11:00) (99% - 100%)      I&O's Summary    03 Jun 2021 07:01  -  04 Jun 2021 07:00  --------------------------------------------------------  IN: 5450 mL / OUT: 3415 mL / NET: 2035 mL    04 Jun 2021 07:01  -  04 Jun 2021 11:34  --------------------------------------------------------  IN: 750 mL / OUT: 675 mL / NET: 75 mL      I&O's Detail    03 Jun 2021 07:01  -  04 Jun 2021 07:00  --------------------------------------------------------  IN:    IV PiggyBack: 50 mL    IV PiggyBack: 300 mL    IV PiggyBack: 250 mL    IV PiggyBack: 1250 mL    Osmolite: 1200 mL    sodium chloride 0.9%: 1200 mL    sodium chloride 3%: 1200 mL  Total IN: 5450 mL    OUT:    Voided (mL): 3415 mL  Total OUT: 3415 mL    Total NET: 2035 mL      04 Jun 2021 07:01  -  04 Jun 2021 11:34  --------------------------------------------------------  IN:    Osmolite: 250 mL    sodium chloride 0.9%: 250 mL    sodium chloride 3%: 250 mL  Total IN: 750 mL    OUT:    Voided (mL): 675 mL  Total OUT: 675 mL    Total NET: 75 mL          PHYSICAL EXAM:    General/Neuro  RASS:    0         GCS:   10  Deficits:  alert & oriented answers to voice, moves RUE and RLE spontaneously, no movement of LUE or LLE unchanged from prior exam.   staples in place to craniotomy incision, incision clean dry and intact  Pupils: PERRLA    Lungs: clear to auscultation, Normal expansion/effort.     Cardiovascular : S1, S2.  Regular rate and rhythm.  Peripheral edema   Cardiac Rhythm: Normal Sinus Rhythm    GI: Abdomen soft, Non-tender, Non-distended.    Cole in place of peg tube.   Wound:    Extremities: Extremities warm, pink, well-perfused. Pulses:2+    Derm: Good skin turgor, no skin breakdown.      : Condom catheter in place.      CXR:     LABS:  CAPILLARY BLOOD GLUCOSE      POCT Blood Glucose.: 137 mg/dL (04 Jun 2021 06:10)  POCT Blood Glucose.: 122 mg/dL (03 Jun 2021 18:07)                          8.0    5.67  )-----------( 280      ( 03 Jun 2021 23:10 )             23.7       06-04    133<L>  |  100  |  5<L>  ----------------------------<  120<H>  4.1   |  25  |  <0.5<L>    Ca    8.5      04 Jun 2021 04:30  Phos  3.9     06-04  Mg     2.1     06-04

## 2021-06-04 NOTE — PROCEDURE NOTE - NSINDICATIONS_GEN_A_CORE
critical illness/emergency venous access/hypertonic/irritant infusion/venous access/volume resuscitation
critical illness/hypertonic/irritant infusion/venous access
arterial puncture to obtain ABG's
antibiotic therapy
hypertonic/irritant infusion
critical illness/emergency venous access/hypertonic/irritant infusion

## 2021-06-04 NOTE — PROCEDURE NOTE - NSPOSTPRCRAD_GEN_A_CORE
central line located in the superior vena cava/post-procedure radiography performed
post-procedure radiography performed
central line located in the/central line located in the superior vena cava/depth of insertion/line adjusted to depth of insertion

## 2021-06-04 NOTE — PROGRESS NOTE ADULT - ASSESSMENT
POD# 28 S/P Left Craniectomy for Evac of SDH  # Hyponatremia, hypo-osmolar, high urine osm, high urine Na c/f SIADH / cerebral salt wasting   - cont salt tabs  - limit hypotonic infusions as much as possible, avoid free water enterally   - cont NS, check Na level q8h, no need for 3%NS  - recent TSH noted, wnl   - document strict i/o   Na is up to 133 today   - will sign off, call as needed   POD# 28 S/P Left Craniectomy for Evac of SDH  # Hyponatremia, hypo-osmolar, high urine osm, high urine Na c/f SIADH / cerebral salt wasting   - cont salt tabs; pt has been on 3%NS 50 cc/hr for awhile  - limit hypotonic infusions as much as possible, avoid free water enterally   - cont NS, check Na level q8h,   - may decrease 3%NS to 40 cc/hr - keep Na 131-135   - recent TSH noted, wnl   - document strict i/o   Na is up to 133 today   - will sign off, call as needed

## 2021-06-04 NOTE — PROCEDURE NOTE - NSSITEPREP_SKIN_A_CORE
chlorhexidine
chlorhexidine/Adherence to aseptic technique: hand hygiene prior to donning barriers (gown, gloves), don cap and mask, sterile drape over patient

## 2021-06-04 NOTE — CHART NOTE - NSCHARTNOTEFT_GEN_A_CORE
Registered Dietitian Follow-Up     Patient Profile Reviewed                           Yes [x]   No []     Nutrition History Previously Obtained        Yes []  No [x] -- unable to speak      Pertinent Medical Interventions:  s/p left craniotomy for large SDH  S/p wound revision  Hyponatremia noted, sodium chloride and salt tablets ordered   intubated s/p trach 5/21  s/p PEG placement     Diet order: NPO with TF   Osmolite 1.5 @50ml/hr x24hrs + 3 packets of Prosource TF      Anthropometrics:  - Ht. 70"   - Wt.  175lbs (5/6)  190lbs (5/12)  192.2lbs (5/13)   175lbs (5/16); dosing  183.8lbs (5/16)  195.1lbs (5/17)   177.9lbs (5/21)   197.3lbs (5/25)  179lbs (5/29) -- wt changes may be d/t fluid shifts vs bed scale error, will continue to monitor   - %wt change  - BMI 25.1 -- based on dosing   - IBW 166lbs     Pertinent Lab Data: (6/3_ H/H 8/23.7, Na 133, BUN 5, Cr <0.5,   CAPILLARY BLOOD GLUCOSE  POCT Blood Glucose.: 116 mg/dL (04 Jun 2021 12:35)  POCT Blood Glucose.: 137 mg/dL (04 Jun 2021 06:10)  POCT Blood Glucose.: 122 mg/dL (03 Jun 2021 18:07)      Pertinent Meds: lovenox, abx, sodium chloride 3%, admelog, sodium chloride, protonix      Physical Findings:  - Appearance: alert, unable to speak. +1 R hand, wrist edema noted per RN flow sheets  - GI function: LBM 6/2  - Tubes: PEG   - Oral/Mouth cavity: NPO  - Skin: WDL      Nutrition Requirements  Weight Used: dosing wt 79.4kg -- continued from 5/21, pt now with trach     Estimated Energy Needs    Continue [x]  Adjust []   2000-2200kcal (MSJ x 1.2-1.3AF)      Estimated Protein Needs    Continue [x]  Adjust []  95-111gm/day (1.2-1.4gm/kg act wt)      Estimated Fluid Needs        Continue [x]  Adjust []  per SICU team      Nutrient Intake: current tube feed regimen meets 87-96% of est kcal needs and % est protein needs      [x] No active nutrition diagnosis identified at this time      Nutrition Intervention: enteral nutrition      Goal/Expected Outcome: Pt to meet % of estimated needs within 7 days      Indicator/Monitoring: energy intake, body composition, NFPF, glucose profile     Recommendations:  As medically feasible recommend changing continuous feeds to bolus feeds for long term nutrition support. Pt would benefit from a fiber formula. Recommend Jevity 1.2 @ 360ml q4hrs, hold 1 feed at 2 AM for a total of 5 feeds/day. Recommended regimen will provide 1800ml formula, 2160kcal, 100g protein, 1453ml free water. Additional water flushes per LIP.   Discussed with [id number]. Team to discuss switching pt to bolus feeds after ?OR visit on Monday.

## 2021-06-04 NOTE — PROCEDURE NOTE - NSPOSTCAREGUIDE_GEN_A_CORE
Care for catheter as per unit/ICU protocols
Verbal/written post procedure instructions were given to patient/caregiver/Care for catheter as per unit/ICU protocols

## 2021-06-04 NOTE — PROCEDURE NOTE - NSANESTHESIA_GEN_A_CORE
PATIENTS ONCOLOGY RECORD LOCATED IN [facility]      Subjective     Name:  EPI MCBRIDE     Date:  05/15/2019  Address:  [address] [place] [address]  Home: [unfilled]  :  1965 AGE:  53 y.o.        RECORDS OBTAINED:  Patients Oncology Record is located in [facility]  
2% lidocaine
no anesthesia administered

## 2021-06-05 LAB
ANION GAP SERPL CALC-SCNC: 7 MMOL/L — SIGNIFICANT CHANGE UP (ref 7–14)
ANION GAP SERPL CALC-SCNC: 9 MMOL/L — SIGNIFICANT CHANGE UP (ref 7–14)
ANION GAP SERPL CALC-SCNC: 9 MMOL/L — SIGNIFICANT CHANGE UP (ref 7–14)
BUN SERPL-MCNC: 5 MG/DL — LOW (ref 10–20)
BUN SERPL-MCNC: 7 MG/DL — LOW (ref 10–20)
BUN SERPL-MCNC: <3 MG/DL — LOW (ref 10–20)
CALCIUM SERPL-MCNC: 8.9 MG/DL — SIGNIFICANT CHANGE UP (ref 8.5–10.1)
CALCIUM SERPL-MCNC: 9 MG/DL — SIGNIFICANT CHANGE UP (ref 8.5–10.1)
CALCIUM SERPL-MCNC: 9.1 MG/DL — SIGNIFICANT CHANGE UP (ref 8.5–10.1)
CHLORIDE SERPL-SCNC: 100 MMOL/L — SIGNIFICANT CHANGE UP (ref 98–110)
CHLORIDE SERPL-SCNC: 100 MMOL/L — SIGNIFICANT CHANGE UP (ref 98–110)
CHLORIDE SERPL-SCNC: 103 MMOL/L — SIGNIFICANT CHANGE UP (ref 98–110)
CO2 SERPL-SCNC: 27 MMOL/L — SIGNIFICANT CHANGE UP (ref 17–32)
CO2 SERPL-SCNC: 27 MMOL/L — SIGNIFICANT CHANGE UP (ref 17–32)
CO2 SERPL-SCNC: 28 MMOL/L — SIGNIFICANT CHANGE UP (ref 17–32)
CREAT SERPL-MCNC: <0.5 MG/DL — LOW (ref 0.7–1.5)
GLUCOSE BLDC GLUCOMTR-MCNC: 103 MG/DL — HIGH (ref 70–99)
GLUCOSE BLDC GLUCOMTR-MCNC: 122 MG/DL — HIGH (ref 70–99)
GLUCOSE BLDC GLUCOMTR-MCNC: 144 MG/DL — HIGH (ref 70–99)
GLUCOSE SERPL-MCNC: 112 MG/DL — HIGH (ref 70–99)
GLUCOSE SERPL-MCNC: 121 MG/DL — HIGH (ref 70–99)
GLUCOSE SERPL-MCNC: 146 MG/DL — HIGH (ref 70–99)
MAGNESIUM SERPL-MCNC: 1.7 MG/DL — LOW (ref 1.8–2.4)
OSMOLALITY SERPL: 284 MOS/KG — SIGNIFICANT CHANGE UP (ref 275–300)
OSMOLALITY SERPL: 284 MOS/KG — SIGNIFICANT CHANGE UP (ref 275–300)
PHOSPHATE SERPL-MCNC: 4 MG/DL — SIGNIFICANT CHANGE UP (ref 2.1–4.9)
POTASSIUM SERPL-MCNC: 3.7 MMOL/L — SIGNIFICANT CHANGE UP (ref 3.5–5)
POTASSIUM SERPL-MCNC: 3.7 MMOL/L — SIGNIFICANT CHANGE UP (ref 3.5–5)
POTASSIUM SERPL-MCNC: 3.8 MMOL/L — SIGNIFICANT CHANGE UP (ref 3.5–5)
POTASSIUM SERPL-SCNC: 3.7 MMOL/L — SIGNIFICANT CHANGE UP (ref 3.5–5)
POTASSIUM SERPL-SCNC: 3.7 MMOL/L — SIGNIFICANT CHANGE UP (ref 3.5–5)
POTASSIUM SERPL-SCNC: 3.8 MMOL/L — SIGNIFICANT CHANGE UP (ref 3.5–5)
SODIUM SERPL-SCNC: 136 MMOL/L — SIGNIFICANT CHANGE UP (ref 135–146)
SODIUM SERPL-SCNC: 136 MMOL/L — SIGNIFICANT CHANGE UP (ref 135–146)
SODIUM SERPL-SCNC: 138 MMOL/L — SIGNIFICANT CHANGE UP (ref 135–146)
VANCOMYCIN TROUGH SERPL-MCNC: 13.3 UG/ML — HIGH (ref 5–10)

## 2021-06-05 PROCEDURE — 99291 CRITICAL CARE FIRST HOUR: CPT

## 2021-06-05 PROCEDURE — 71045 X-RAY EXAM CHEST 1 VIEW: CPT | Mod: 26

## 2021-06-05 PROCEDURE — 70450 CT HEAD/BRAIN W/O DYE: CPT | Mod: 26

## 2021-06-05 RX ORDER — SODIUM CHLORIDE 5 G/100ML
500 INJECTION, SOLUTION INTRAVENOUS
Refills: 0 | Status: DISCONTINUED | OUTPATIENT
Start: 2021-06-05 | End: 2021-06-06

## 2021-06-05 RX ORDER — FLUDROCORTISONE ACETATE 0.1 MG/1
0.1 TABLET ORAL
Refills: 0 | Status: DISCONTINUED | OUTPATIENT
Start: 2021-06-05 | End: 2021-06-08

## 2021-06-05 RX ORDER — MAGNESIUM SULFATE 500 MG/ML
2 VIAL (ML) INJECTION ONCE
Refills: 0 | Status: COMPLETED | OUTPATIENT
Start: 2021-06-05 | End: 2021-06-05

## 2021-06-05 RX ORDER — POTASSIUM CHLORIDE 20 MEQ
20 PACKET (EA) ORAL
Refills: 0 | Status: COMPLETED | OUTPATIENT
Start: 2021-06-05 | End: 2021-06-05

## 2021-06-05 RX ADMIN — Medication 50 MILLIEQUIVALENT(S): at 21:20

## 2021-06-05 RX ADMIN — CHLORHEXIDINE GLUCONATE 15 MILLILITER(S): 213 SOLUTION TOPICAL at 05:54

## 2021-06-05 RX ADMIN — SODIUM CHLORIDE 40 MILLILITER(S): 5 INJECTION, SOLUTION INTRAVENOUS at 17:26

## 2021-06-05 RX ADMIN — Medication 2 DROP(S): at 05:54

## 2021-06-05 RX ADMIN — Medication 16.67 GRAM(S): at 01:32

## 2021-06-05 RX ADMIN — Medication 100 MILLIEQUIVALENT(S): at 01:30

## 2021-06-05 RX ADMIN — SODIUM CHLORIDE 8 GRAM(S): 9 INJECTION INTRAMUSCULAR; INTRAVENOUS; SUBCUTANEOUS at 11:38

## 2021-06-05 RX ADMIN — SODIUM CHLORIDE 40 MILLILITER(S): 5 INJECTION, SOLUTION INTRAVENOUS at 11:00

## 2021-06-05 RX ADMIN — SODIUM CHLORIDE 8 GRAM(S): 9 INJECTION INTRAMUSCULAR; INTRAVENOUS; SUBCUTANEOUS at 05:56

## 2021-06-05 RX ADMIN — Medication 250 MILLIGRAM(S): at 06:00

## 2021-06-05 RX ADMIN — Medication 2 DROP(S): at 13:22

## 2021-06-05 RX ADMIN — MEROPENEM 200 MILLIGRAM(S): 1 INJECTION INTRAVENOUS at 05:56

## 2021-06-05 RX ADMIN — Medication 50 MILLIEQUIVALENT(S): at 18:49

## 2021-06-05 RX ADMIN — SENNA PLUS 2 TABLET(S): 8.6 TABLET ORAL at 21:16

## 2021-06-05 RX ADMIN — MEROPENEM 200 MILLIGRAM(S): 1 INJECTION INTRAVENOUS at 13:22

## 2021-06-05 RX ADMIN — PANTOPRAZOLE SODIUM 40 MILLIGRAM(S): 20 TABLET, DELAYED RELEASE ORAL at 12:26

## 2021-06-05 RX ADMIN — LEVETIRACETAM 420 MILLIGRAM(S): 250 TABLET, FILM COATED ORAL at 17:40

## 2021-06-05 RX ADMIN — Medication 2 DROP(S): at 21:16

## 2021-06-05 RX ADMIN — SODIUM CHLORIDE 8 GRAM(S): 9 INJECTION INTRAMUSCULAR; INTRAVENOUS; SUBCUTANEOUS at 17:40

## 2021-06-05 RX ADMIN — Medication 250 MILLIGRAM(S): at 17:25

## 2021-06-05 RX ADMIN — ENOXAPARIN SODIUM 40 MILLIGRAM(S): 100 INJECTION SUBCUTANEOUS at 17:41

## 2021-06-05 RX ADMIN — MEROPENEM 200 MILLIGRAM(S): 1 INJECTION INTRAVENOUS at 21:20

## 2021-06-05 RX ADMIN — FLUDROCORTISONE ACETATE 0.1 MILLIGRAM(S): 0.1 TABLET ORAL at 10:34

## 2021-06-05 RX ADMIN — FLUDROCORTISONE ACETATE 0.1 MILLIGRAM(S): 0.1 TABLET ORAL at 18:49

## 2021-06-05 RX ADMIN — CHLORHEXIDINE GLUCONATE 15 MILLILITER(S): 213 SOLUTION TOPICAL at 17:41

## 2021-06-05 RX ADMIN — LISINOPRIL 20 MILLIGRAM(S): 2.5 TABLET ORAL at 05:57

## 2021-06-05 RX ADMIN — LEVETIRACETAM 420 MILLIGRAM(S): 250 TABLET, FILM COATED ORAL at 06:00

## 2021-06-05 RX ADMIN — CHLORHEXIDINE GLUCONATE 1 APPLICATION(S): 213 SOLUTION TOPICAL at 05:55

## 2021-06-05 NOTE — PROGRESS NOTE ADULT - ASSESSMENT
Assessment & Plan  45M s/p fall with large left SDH and neurological status change, level 1 for emergent craniectomy complicated by subgleal hematoma/collection s/p wound revision and drainage on 5/16.    NEURO:      S/P craniectomy for large left SDH     - GCS 11T, Q2H neuro checks, No sedation     - Keppra 500mg BID for sz ppx    Hyponatremia- goal Na+ >135     -  Cerebral salt wasting     -  3%@50 and salt tabs 8q6        - Na+ trend     - Fludricortasone x3 doses    Maintian Head of bed @ 30 degrees    Craniectomy (no left side bone flap)Helmet --> does not need while laying in bed only     during PT/OT       OR 5/16:     -Revision craniectomy wound with complex closure for persistent drainage, subgaleal hematoma- e. 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  NSGY- will need  shunt - likely on Monday    RESP:   Respiratoy Failure    s/p trach 5/21    tolerating T-piece since 5/24    ABG prn    AM CXR        CARDS:     S/P cardiac arrest in OR & MTP     - Hypertension - BP controlled on Lisinopril 20mg     - Propranolol 20mg q8  Tachycardia- intermittant 100-106, self resolves    GI/NUTR:     Diet: TF (Osmolite 1.5) @ 50 via  in gastrostomy (using jain in situ)    GI Prophylaxis- PPI    Bowel regimen restarted - Senna         Last BM 6/2    /RENAL:   No acute issues    Voiding via condom cath - monitor UO    Q6 BMP, serum osm      - Hyponatremic in setting of Cerebral salt wasting - Na goal >135     - 3%@50 and salt tabs      - NS @ 50cc/hr       fludrocortison x3 doses 6/3  BUN/Cr- 5/0.5  Electrolytes- _____  Monitor & replete elytes prn        HEME/ONC:   No acute issues    Labs:   Hb/Hct: 8.0/23.7 > ___  Hg >8, MTP on this admission  DVT prophylaxis: Lovenox 40  5/26 DVT sono neg      ID:  Craniectomy site wound infection  WBC 5 > ___  Afebrile  Antibiotics- Seamus, Vanco  - Vanc level 6/3 19.8  - f/u trough 6/5 @ 4pm  Cultures     - 5/11- u/a +leuk     - blood culture 5/13, 5/19, 5/20, 5/23, 5/28 No growth      -Cranial Culture 5/21-e faceium     - Reculture if spikes    ENDO:  FS controlled  q6 POC glucose   Sliding scale insulin PRN  A1c 5.2      DISPO: SICU

## 2021-06-05 NOTE — PROGRESS NOTE ADULT - SUBJECTIVE AND OBJECTIVE BOX
SANDRA DAS  [id number]  45y Male    Indication for ICU admission: s/p craniectomy for large L SDH  Admit Date:05-06-21  ICU Date: 05-07-21  OR Date: 05-06-21    No Known Allergies    PAST MEDICAL & SURGICAL HISTORY:  Alcohol abuse  GERD (gastroesophageal reflux disease)  ETOH abuse  Hypomagnesemia  Seizure disorder  H/O hemorrhoidectomy    Home Medications:  NONE    24HRS EVENT:  6/4  NIGHT:  -no UO x few hours, bladder scan 260 w/o intervention, now voiding    DAY  -continue fludrocortisone 0.1 x3  -Per nephro 3% decreased from 50 to 40cc, pt with Na downtrending will continue 3% at 50  -pt removed L TLC, new R TLC placed 6/4        DVT PTX: LVX  GI PTX: PPI    ***Tubes/Lines/Drains  ***  Peripheral IV  Left basilic midline 5/17  Arterial Line Right radial	Date 5/17  R IJ TLC     6/4  Trach/peg 5/21    REVIEW OF SYSTEMS  [ ] A ten-point review of systems was otherwise negative except as noted.  [x] Due to altered mental status/intubation, subjective information were not able to be obtained from the patient. History was obtained, to the extent possible, from review of the chart and collateral sources of information.

## 2021-06-05 NOTE — PROGRESS NOTE ADULT - SUBJECTIVE AND OBJECTIVE BOX
Subjective: 45yMale with a pmhx of SUBDURAL HEMATOMA;RESPIRATORY FAILURE;FALL    ^FALL    No pertinent family history in first degree relatives    Family history of essential hypertension (Father)    Handoff    MEWS Score    No pertinent past medical history    Alcohol abuse    GERD (gastroesophageal reflux disease)    ETOH abuse    Hypomagnesemia    Seizure disorder    Subdural hematoma    S/P subdural hematoma evacuation    Subdural hematoma    Subdural hematoma    Subdural hematoma    Respiratory failure    ETOH abuse    Palliative care by specialist    Insertion of non-tunneled central venous catheter (CVC) in patient age 5 years of age or older    Craniotomy, decompressive    Decompressive craniotomy    Placement, tracheostomy and percutaneous endoscopic gastrostomy    No significant past surgical history    H/O hemorrhoidectomy    FALL    23    Respiratory failure    Fall    [email or web address]        POD# 29 S/P Left Craniectomy for Evac of SDH  POD# 19 S/P Wound Revision    Pt seen and examined at the bedside w Dr Norton.   Pt with tracheostomy in place tolerating T Piece.  Remains on 3% @50cc/hr.   Pt eyes are opened spontaneously, Follows commands on left side.  Allergies    No Known Allergies    Intolerances        Imaging:     Vital Signs Last 24 Hrs  T(C): 36.9 (05 Jun 2021 08:00), Max: 37.7 (04 Jun 2021 15:48)  T(F): 98.4 (05 Jun 2021 08:00), Max: 99.8 (04 Jun 2021 15:48)  HR: 102 (05 Jun 2021 09:00) (78 - 108)  BP: 145/92 (05 Jun 2021 09:00) (97/62 - 149/91)  BP(mean): 114 (05 Jun 2021 09:00) (75 - 115)  RR: 19 (05 Jun 2021 09:00) (8 - 27)  SpO2: 100% (05 Jun 2021 09:30) (96% - 100%)      acetaminophen   Tablet .. 650 milliGRAM(s) Oral every 6 hours PRN  artificial tears (preservative free) Ophthalmic Solution 2 Drop(s) Both EYES three times a day  chlorhexidine 0.12% Liquid 15 milliLiter(s) Oral Mucosa two times a day  chlorhexidine 4% Liquid 1 Application(s) Topical <User Schedule>  enoxaparin Injectable 40 milliGRAM(s) SubCutaneous every 24 hours  fludroCORTISONE 0.1 milliGRAM(s) Oral <User Schedule>  insulin lispro (ADMELOG) corrective regimen sliding scale   SubCutaneous every 6 hours  levETIRAcetam  IVPB 500 milliGRAM(s) IV Intermittent every 12 hours  lisinopril 20 milliGRAM(s) Oral daily  meropenem  IVPB      meropenem  IVPB 2000 milliGRAM(s) IV Intermittent every 8 hours  pantoprazole   Suspension 40 milliGRAM(s) Oral daily  propranolol 20 milliGRAM(s) Oral every 8 hours  senna 2 Tablet(s) Oral at bedtime  sodium chloride 8 Gram(s) Oral every 6 hours  sodium chloride 3%. 500 milliLiter(s) IV Continuous <Continuous>  vancomycin  IVPB 2000 milliGRAM(s) IV Intermittent every 12 hours        06-04-21 @ 07:01  -  06-05-21 @ 07:00  --------------------------------------------------------  IN: 4520 mL / OUT: 2575 mL / NET: 1945 mL    06-05-21 @ 07:01  -  06-05-21 @ 11:04  --------------------------------------------------------  IN: 150 mL / OUT: 225 mL / NET: -75 mL        REVIEW OF SYSTEMS    [ ] A ten-point review of systems was otherwise negative except as noted.  [x ] Due to altered mental status/intubation, subjective information were not able to be obtained from the patient. History was obtained, to the extent possible, from review of the chart and collateral sources of information.      Awake, no verbal response  PERRL  tracks   no facial droop  moves LUE/LLE spontaneously and to command  slight w/d to RUE, mitten in place  no movt RLE  craniectomy site: slightly full, incision clean dry intact         CBC Full  -  ( 04 Jun 2021 23:30 )  WBC Count : 5.51 K/uL  RBC Count : 2.69 M/uL  Hemoglobin : 7.8 g/dL  Hematocrit : 22.9 %  Platelet Count - Automated : 266 K/uL  Mean Cell Volume : 85.1 fL  Mean Cell Hemoglobin : 29.0 pg  Mean Cell Hemoglobin Concentration : 34.1 g/dL  Auto Neutrophil # : x  Auto Lymphocyte # : x  Auto Monocyte # : x  Auto Eosinophil # : x  Auto Basophil # : x  Auto Neutrophil % : x  Auto Lymphocyte % : x  Auto Monocyte % : x  Auto Eosinophil % : x  Auto Basophil % : x    06-05    136  |  100  |  5<L>  ----------------------------<  146<H>  3.8   |  27  |  <0.5<L>    Ca    8.9      05 Jun 2021 06:17  Phos  4.0     06-04  Mg     1.7     06-04              Assessment/Plan:   cont neuro checks q 1hr  will discuss plan for possible VPS for 6.7  f/u SICU plan for PEG  cont 3% for normal Na level  cont to trend Hgb  care per SICU  d/w attg

## 2021-06-05 NOTE — PROGRESS NOTE ADULT - ATTENDING COMMENTS
UOP slightly lower today, had 2.5 L over 24 hours.  Na+ 136 today.  Awake; has right hemiparesis.    ASSESSMENT:  44 y/o male, S/P Fall.  Traumatic Left SDH.  Brain compression.  S/P Left Craniectomy.  Acute respiratory failure.  At risk for hemodynamic instability.  Chronic Alcohol Abuse.  Dysphagia.  Hyponatremia.   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  Acute blood loss anemia.    PLAN:  - chest PT and suctioning  - keep normotensive  - titrate 3% saline down, continue again today Fludrocortisone po q8h x3 dose  - follow Na+ frequently  - remains on Seamus/Vanco as per ID  - GI and DVT prophylaxis   - Neurosurgery f/u for further plan if  shunt needed.

## 2021-06-06 LAB
ANION GAP SERPL CALC-SCNC: 7 MMOL/L — SIGNIFICANT CHANGE UP (ref 7–14)
ANION GAP SERPL CALC-SCNC: 8 MMOL/L — SIGNIFICANT CHANGE UP (ref 7–14)
ANION GAP SERPL CALC-SCNC: 8 MMOL/L — SIGNIFICANT CHANGE UP (ref 7–14)
APTT BLD: 30.8 SEC — SIGNIFICANT CHANGE UP (ref 27–39.2)
BLD GP AB SCN SERPL QL: SIGNIFICANT CHANGE UP
BUN SERPL-MCNC: 4 MG/DL — LOW (ref 10–20)
BUN SERPL-MCNC: 5 MG/DL — LOW (ref 10–20)
BUN SERPL-MCNC: 6 MG/DL — LOW (ref 10–20)
CALCIUM SERPL-MCNC: 7.7 MG/DL — LOW (ref 8.5–10.1)
CALCIUM SERPL-MCNC: 9.2 MG/DL — SIGNIFICANT CHANGE UP (ref 8.5–10.1)
CALCIUM SERPL-MCNC: 9.5 MG/DL — SIGNIFICANT CHANGE UP (ref 8.5–10.1)
CHLORIDE SERPL-SCNC: 102 MMOL/L — SIGNIFICANT CHANGE UP (ref 98–110)
CHLORIDE SERPL-SCNC: 105 MMOL/L — SIGNIFICANT CHANGE UP (ref 98–110)
CHLORIDE SERPL-SCNC: 109 MMOL/L — SIGNIFICANT CHANGE UP (ref 98–110)
CO2 SERPL-SCNC: 25 MMOL/L — SIGNIFICANT CHANGE UP (ref 17–32)
CO2 SERPL-SCNC: 29 MMOL/L — SIGNIFICANT CHANGE UP (ref 17–32)
CO2 SERPL-SCNC: 29 MMOL/L — SIGNIFICANT CHANGE UP (ref 17–32)
CREAT SERPL-MCNC: <0.5 MG/DL — LOW (ref 0.7–1.5)
GLUCOSE BLDC GLUCOMTR-MCNC: 116 MG/DL — HIGH (ref 70–99)
GLUCOSE BLDC GLUCOMTR-MCNC: 116 MG/DL — HIGH (ref 70–99)
GLUCOSE BLDC GLUCOMTR-MCNC: 128 MG/DL — HIGH (ref 70–99)
GLUCOSE BLDC GLUCOMTR-MCNC: 130 MG/DL — HIGH (ref 70–99)
GLUCOSE BLDC GLUCOMTR-MCNC: 99 MG/DL — SIGNIFICANT CHANGE UP (ref 70–99)
GLUCOSE SERPL-MCNC: 109 MG/DL — HIGH (ref 70–99)
GLUCOSE SERPL-MCNC: 117 MG/DL — HIGH (ref 70–99)
GLUCOSE SERPL-MCNC: 81 MG/DL — SIGNIFICANT CHANGE UP (ref 70–99)
HCT VFR BLD CALC: 25 % — LOW (ref 42–52)
HCT VFR BLD CALC: 26.6 % — LOW (ref 42–52)
HGB BLD-MCNC: 8.3 G/DL — LOW (ref 14–18)
HGB BLD-MCNC: 8.6 G/DL — LOW (ref 14–18)
INR BLD: 1.21 RATIO — SIGNIFICANT CHANGE UP (ref 0.65–1.3)
MAGNESIUM SERPL-MCNC: 1.7 MG/DL — LOW (ref 1.8–2.4)
MCHC RBC-ENTMCNC: 27.9 PG — SIGNIFICANT CHANGE UP (ref 27–31)
MCHC RBC-ENTMCNC: 28.3 PG — SIGNIFICANT CHANGE UP (ref 27–31)
MCHC RBC-ENTMCNC: 32.3 G/DL — SIGNIFICANT CHANGE UP (ref 32–37)
MCHC RBC-ENTMCNC: 33.2 G/DL — SIGNIFICANT CHANGE UP (ref 32–37)
MCV RBC AUTO: 85.3 FL — SIGNIFICANT CHANGE UP (ref 80–94)
MCV RBC AUTO: 86.4 FL — SIGNIFICANT CHANGE UP (ref 80–94)
NRBC # BLD: 0 /100 WBCS — SIGNIFICANT CHANGE UP (ref 0–0)
NRBC # BLD: 0 /100 WBCS — SIGNIFICANT CHANGE UP (ref 0–0)
OSMOLALITY SERPL: 281 MOS/KG — SIGNIFICANT CHANGE UP (ref 275–300)
OSMOLALITY SERPL: 290 MOS/KG — SIGNIFICANT CHANGE UP (ref 275–300)
OSMOLALITY SERPL: 292 MOS/KG — SIGNIFICANT CHANGE UP (ref 275–300)
PHOSPHATE SERPL-MCNC: 3.5 MG/DL — SIGNIFICANT CHANGE UP (ref 2.1–4.9)
PLATELET # BLD AUTO: 284 K/UL — SIGNIFICANT CHANGE UP (ref 130–400)
PLATELET # BLD AUTO: 289 K/UL — SIGNIFICANT CHANGE UP (ref 130–400)
POTASSIUM SERPL-MCNC: 3.4 MMOL/L — LOW (ref 3.5–5)
POTASSIUM SERPL-MCNC: 4.1 MMOL/L — SIGNIFICANT CHANGE UP (ref 3.5–5)
POTASSIUM SERPL-MCNC: 4.7 MMOL/L — SIGNIFICANT CHANGE UP (ref 3.5–5)
POTASSIUM SERPL-SCNC: 3.4 MMOL/L — LOW (ref 3.5–5)
POTASSIUM SERPL-SCNC: 4.1 MMOL/L — SIGNIFICANT CHANGE UP (ref 3.5–5)
POTASSIUM SERPL-SCNC: 4.7 MMOL/L — SIGNIFICANT CHANGE UP (ref 3.5–5)
PROTHROM AB SERPL-ACNC: 13.9 SEC — HIGH (ref 9.95–12.87)
RBC # BLD: 2.93 M/UL — LOW (ref 4.7–6.1)
RBC # BLD: 3.08 M/UL — LOW (ref 4.7–6.1)
RBC # FLD: 13.6 % — SIGNIFICANT CHANGE UP (ref 11.5–14.5)
RBC # FLD: 13.8 % — SIGNIFICANT CHANGE UP (ref 11.5–14.5)
SARS-COV-2 RNA SPEC QL NAA+PROBE: SIGNIFICANT CHANGE UP
SARS-COV-2 RNA SPEC QL NAA+PROBE: SIGNIFICANT CHANGE UP
SODIUM SERPL-SCNC: 139 MMOL/L — SIGNIFICANT CHANGE UP (ref 135–146)
SODIUM SERPL-SCNC: 141 MMOL/L — SIGNIFICANT CHANGE UP (ref 135–146)
SODIUM SERPL-SCNC: 142 MMOL/L — SIGNIFICANT CHANGE UP (ref 135–146)
WBC # BLD: 4.33 K/UL — LOW (ref 4.8–10.8)
WBC # BLD: 4.53 K/UL — LOW (ref 4.8–10.8)
WBC # FLD AUTO: 4.33 K/UL — LOW (ref 4.8–10.8)
WBC # FLD AUTO: 4.53 K/UL — LOW (ref 4.8–10.8)

## 2021-06-06 PROCEDURE — 99222 1ST HOSP IP/OBS MODERATE 55: CPT

## 2021-06-06 PROCEDURE — 71045 X-RAY EXAM CHEST 1 VIEW: CPT | Mod: 26

## 2021-06-06 PROCEDURE — 99291 CRITICAL CARE FIRST HOUR: CPT

## 2021-06-06 RX ORDER — SODIUM CHLORIDE 5 G/100ML
500 INJECTION, SOLUTION INTRAVENOUS
Refills: 0 | Status: DISCONTINUED | OUTPATIENT
Start: 2021-06-06 | End: 2021-06-07

## 2021-06-06 RX ORDER — SODIUM CHLORIDE 5 G/100ML
500 INJECTION, SOLUTION INTRAVENOUS
Refills: 0 | Status: DISCONTINUED | OUTPATIENT
Start: 2021-06-06 | End: 2021-06-06

## 2021-06-06 RX ORDER — POTASSIUM CHLORIDE 20 MEQ
20 PACKET (EA) ORAL
Refills: 0 | Status: COMPLETED | OUTPATIENT
Start: 2021-06-06 | End: 2021-06-06

## 2021-06-06 RX ORDER — MAGNESIUM SULFATE 500 MG/ML
2 VIAL (ML) INJECTION ONCE
Refills: 0 | Status: COMPLETED | OUTPATIENT
Start: 2021-06-06 | End: 2021-06-06

## 2021-06-06 RX ADMIN — Medication 16.67 GRAM(S): at 03:50

## 2021-06-06 RX ADMIN — Medication 250 MILLIGRAM(S): at 17:40

## 2021-06-06 RX ADMIN — SODIUM CHLORIDE 8 GRAM(S): 9 INJECTION INTRAMUSCULAR; INTRAVENOUS; SUBCUTANEOUS at 05:22

## 2021-06-06 RX ADMIN — Medication 50 MILLIEQUIVALENT(S): at 17:04

## 2021-06-06 RX ADMIN — SODIUM CHLORIDE 8 GRAM(S): 9 INJECTION INTRAMUSCULAR; INTRAVENOUS; SUBCUTANEOUS at 23:00

## 2021-06-06 RX ADMIN — ENOXAPARIN SODIUM 40 MILLIGRAM(S): 100 INJECTION SUBCUTANEOUS at 17:36

## 2021-06-06 RX ADMIN — Medication 50 MILLIEQUIVALENT(S): at 17:36

## 2021-06-06 RX ADMIN — CHLORHEXIDINE GLUCONATE 1 APPLICATION(S): 213 SOLUTION TOPICAL at 05:23

## 2021-06-06 RX ADMIN — CHLORHEXIDINE GLUCONATE 15 MILLILITER(S): 213 SOLUTION TOPICAL at 05:22

## 2021-06-06 RX ADMIN — SODIUM CHLORIDE 8 GRAM(S): 9 INJECTION INTRAMUSCULAR; INTRAVENOUS; SUBCUTANEOUS at 12:25

## 2021-06-06 RX ADMIN — Medication 2 DROP(S): at 21:38

## 2021-06-06 RX ADMIN — LISINOPRIL 20 MILLIGRAM(S): 2.5 TABLET ORAL at 05:22

## 2021-06-06 RX ADMIN — SODIUM CHLORIDE 30 MILLILITER(S): 5 INJECTION, SOLUTION INTRAVENOUS at 07:12

## 2021-06-06 RX ADMIN — LEVETIRACETAM 420 MILLIGRAM(S): 250 TABLET, FILM COATED ORAL at 05:28

## 2021-06-06 RX ADMIN — PANTOPRAZOLE SODIUM 40 MILLIGRAM(S): 20 TABLET, DELAYED RELEASE ORAL at 12:25

## 2021-06-06 RX ADMIN — LEVETIRACETAM 420 MILLIGRAM(S): 250 TABLET, FILM COATED ORAL at 17:36

## 2021-06-06 RX ADMIN — Medication 250 MILLIGRAM(S): at 05:21

## 2021-06-06 RX ADMIN — SODIUM CHLORIDE 8 GRAM(S): 9 INJECTION INTRAMUSCULAR; INTRAVENOUS; SUBCUTANEOUS at 00:38

## 2021-06-06 RX ADMIN — MEROPENEM 200 MILLIGRAM(S): 1 INJECTION INTRAVENOUS at 21:43

## 2021-06-06 RX ADMIN — SENNA PLUS 2 TABLET(S): 8.6 TABLET ORAL at 21:39

## 2021-06-06 RX ADMIN — Medication 2 DROP(S): at 15:17

## 2021-06-06 RX ADMIN — MEROPENEM 200 MILLIGRAM(S): 1 INJECTION INTRAVENOUS at 05:21

## 2021-06-06 RX ADMIN — Medication 50 MILLIEQUIVALENT(S): at 15:18

## 2021-06-06 RX ADMIN — SODIUM CHLORIDE 8 GRAM(S): 9 INJECTION INTRAMUSCULAR; INTRAVENOUS; SUBCUTANEOUS at 17:36

## 2021-06-06 RX ADMIN — MEROPENEM 200 MILLIGRAM(S): 1 INJECTION INTRAVENOUS at 15:18

## 2021-06-06 RX ADMIN — Medication 2 DROP(S): at 05:22

## 2021-06-06 RX ADMIN — CHLORHEXIDINE GLUCONATE 15 MILLILITER(S): 213 SOLUTION TOPICAL at 17:36

## 2021-06-06 NOTE — PROGRESS NOTE ADULT - SUBJECTIVE AND OBJECTIVE BOX
POD# 29 S/P Left Craniectomy for Evac of SDH  POD# 19 S/P Wound Revision    Pt seen and examined at bedside. Pt trach'd    Vital Signs Last 24 Hrs  T(C): 36.9 (06 Jun 2021 04:00), Max: 37 (05 Jun 2021 15:30)  T(F): 98.4 (06 Jun 2021 04:00), Max: 98.6 (05 Jun 2021 15:30)  HR: 91 (06 Jun 2021 07:00) (75 - 113)  BP: 132/81 (06 Jun 2021 07:00) (114/74 - 162/83)  BP(mean): 101 (06 Jun 2021 07:00) (90 - 118)  RR: 21 (06 Jun 2021 06:00) (17 - 26)  SpO2: 100% (06 Jun 2021 07:00) (100% - 100%)    I&O's Detail    05 Jun 2021 07:01  -  06 Jun 2021 07:00  --------------------------------------------------------  IN:    Enteral Tube Flush: 151 mL    IV PiggyBack: 200 mL    IV PiggyBack: 200 mL    IV PiggyBack: 500 mL    IV PiggyBack: 200 mL    IV PiggyBack: 50 mL    IV PiggyBack: 500 mL    Osmolite: 1200 mL    sodium chloride 0.9%: 100 mL    sodium chloride 3%: 100 mL    sodium chloride 3%: 830 mL  Total IN: 4031 mL    OUT:    Voided (mL): 4660 mL  Total OUT: 4660 mL    Total NET: -629 mL    I&O's Summary    05 Jun 2021 07:01  -  06 Jun 2021 07:00  --------------------------------------------------------  IN: 4031 mL / OUT: 4660 mL / NET: -629 mL        REVIEW OF SYSTEMS    [ ] A ten-point review of systems was otherwise negative except as noted.  [X] Due to altered mental status/intubation, subjective information were not able to be obtained from the patient. History was obtained, to the extent possible, from review of the chart and collateral sources of information.      PHYSICAL EXAM:  Neurological:                LABS:                        8.3    4.33  )-----------( 284      ( 05 Jun 2021 23:45 )             25.0     06-05    139  |  102  |  4<L>  ----------------------------<  109<H>  4.1   |  29  |  <0.5<L>    Ca    9.5      05 Jun 2021 23:45  Phos  3.5     06-05  Mg     1.7     06-05    Allergies    No Known Allergies    Intolerances      MEDICATIONS:  Antibiotics:  meropenem  IVPB      meropenem  IVPB 2000 milliGRAM(s) IV Intermittent every 8 hours  vancomycin  IVPB 2000 milliGRAM(s) IV Intermittent every 12 hours    Neuro:  acetaminophen   Tablet .. 650 milliGRAM(s) Oral every 6 hours PRN  levETIRAcetam  IVPB 500 milliGRAM(s) IV Intermittent every 12 hours      IVF:  sodium chloride 8 Gram(s) Oral every 6 hours  sodium chloride 3%. 500 milliLiter(s) IV Continuous <Continuous>      CULTURES:  Culture Results:   No Growth Final (05-28 @ 00:15)  Culture Results:   No Growth Final (05-28 @ 00:15)      RADIOLOGY & ADDITIONAL TESTS:      ASSESSMENT:  45y Male s/p    SUBDURAL HEMATOMA;RESPIRATORY FAILURE;FALL    ^FALL    No pertinent family history in first degree relatives    Family history of essential hypertension (Father)    Handoff    MEWS Score    No pertinent past medical history    Alcohol abuse    GERD (gastroesophageal reflux disease)    ETOH abuse    Hypomagnesemia    Seizure disorder    Subdural hematoma    S/P subdural hematoma evacuation    Subdural hematoma    Subdural hematoma    Subdural hematoma    Respiratory failure    ETOH abuse    Palliative care by specialist    Insertion of non-tunneled central venous catheter (CVC) in patient age 5 years of age or older    Craniotomy, decompressive    Decompressive craniotomy    Placement, tracheostomy and percutaneous endoscopic gastrostomy    No significant past surgical history    H/O hemorrhoidectomy    FALL    23    Respiratory failure    Fall    SysAdmin_VisitLink        PLAN:  NPO after MN  Covid Swab POD# 29 S/P Left Craniectomy for Evac of SDH  POD# 19 S/P Wound Revision    Pt seen and examined at bedside. Pt trach'd    Vital Signs Last 24 Hrs  T(C): 36.9 (06 Jun 2021 04:00), Max: 37 (05 Jun 2021 15:30)  T(F): 98.4 (06 Jun 2021 04:00), Max: 98.6 (05 Jun 2021 15:30)  HR: 91 (06 Jun 2021 07:00) (75 - 113)  BP: 132/81 (06 Jun 2021 07:00) (114/74 - 162/83)  BP(mean): 101 (06 Jun 2021 07:00) (90 - 118)  RR: 21 (06 Jun 2021 06:00) (17 - 26)  SpO2: 100% (06 Jun 2021 07:00) (100% - 100%)    I&O's Detail    05 Jun 2021 07:01  -  06 Jun 2021 07:00  --------------------------------------------------------  IN:    Enteral Tube Flush: 151 mL    IV PiggyBack: 200 mL    IV PiggyBack: 200 mL    IV PiggyBack: 500 mL    IV PiggyBack: 200 mL    IV PiggyBack: 50 mL    IV PiggyBack: 500 mL    Osmolite: 1200 mL    sodium chloride 0.9%: 100 mL    sodium chloride 3%: 100 mL    sodium chloride 3%: 830 mL  Total IN: 4031 mL    OUT:    Voided (mL): 4660 mL  Total OUT: 4660 mL    Total NET: -629 mL    I&O's Summary    05 Jun 2021 07:01  -  06 Jun 2021 07:00  --------------------------------------------------------  IN: 4031 mL / OUT: 4660 mL / NET: -629 mL        REVIEW OF SYSTEMS    [ ] A ten-point review of systems was otherwise negative except as noted.  [X] Due to altered mental status/intubation, subjective information were not able to be obtained from the patient. History was obtained, to the extent possible, from review of the chart and collateral sources of information.      PHYSICAL EXAM:  Neurological:  Awake, Alert  Pupils reactive  Trachs  Moves Left side spontaneously  No mvmt of RUE/RLE  No Commands  Incision intact, no drainage  Skull defect full    LABS:                        8.3    4.33  )-----------( 284      ( 05 Jun 2021 23:45 )             25.0     06-05    139  |  102  |  4<L>  ----------------------------<  109<H>  4.1   |  29  |  <0.5<L>    Ca    9.5      05 Jun 2021 23:45  Phos  3.5     06-05  Mg     1.7     06-05    Allergies    No Known Allergies    Intolerances      MEDICATIONS:  Antibiotics:  meropenem  IVPB      meropenem  IVPB 2000 milliGRAM(s) IV Intermittent every 8 hours  vancomycin  IVPB 2000 milliGRAM(s) IV Intermittent every 12 hours    Neuro:  acetaminophen   Tablet .. 650 milliGRAM(s) Oral every 6 hours PRN  levETIRAcetam  IVPB 500 milliGRAM(s) IV Intermittent every 12 hours      IVF:  sodium chloride 8 Gram(s) Oral every 6 hours  sodium chloride 3%. 500 milliLiter(s) IV Continuous <Continuous>      CULTURES:  Culture Results:   No Growth Final (05-28 @ 00:15)  Culture Results:   No Growth Final (05-28 @ 00:15)      RADIOLOGY & ADDITIONAL TESTS:  < from: CT Head No Cont (06.05.21 @ 23:43) >  Interval increased size of extra-axial hypoattenuating/CSF density collection within the left craniectomy defect measuring approximately 4 cm in greatest thickness.    Increased left-to-right midline shift measuring 1.3 cm. Mass effect on the lateral ventricles.    Bilateral subarachnoid hemorrhages, slightly decreased in size. No evidence of new acute intracranial hemorrhage.    Unchanged areas of subacute infarcts as above.    < end of copied text >      ASSESSMENT:  45y Male s/p    SUBDURAL HEMATOMA;RESPIRATORY FAILURE;FALL    ^FALL    No pertinent family history in first degree relatives    Family history of essential hypertension (Father)    Handoff    MEWS Score    No pertinent past medical history    Alcohol abuse    GERD (gastroesophageal reflux disease)    ETOH abuse    Hypomagnesemia    Seizure disorder    Subdural hematoma    S/P subdural hematoma evacuation    Subdural hematoma    Subdural hematoma    Subdural hematoma    Respiratory failure    ETOH abuse    Palliative care by specialist    Insertion of non-tunneled central venous catheter (CVC) in patient age 5 years of age or older    Craniotomy, decompressive    Decompressive craniotomy    Placement, tracheostomy and percutaneous endoscopic gastrostomy    No significant past surgical history    H/O hemorrhoidectomy    FALL    23    Respiratory failure    Fall    SysAdmin_VisitLink        PLAN:  NPO after MN  Covid Swab

## 2021-06-06 NOTE — PROGRESS NOTE ADULT - SUBJECTIVE AND OBJECTIVE BOX
SANDRA DAS  [id number]  45y Male    Indication for ICU admission: s/p craniectomy for large L SDH  Admit Date:05-06-21  ICU Date: 05-07-21  OR Date: 05-06-21    No Known Allergies    PAST MEDICAL & SURGICAL HISTORY:  Alcohol abuse  GERD (gastroesophageal reflux disease)  ETOH abuse  Hypomagnesemia  Seizure disorder  H/O hemorrhoidectomy    Home Medications:  NONE    24HRS EVENT:  6/5  NIGHT:  -CT Head: increased L to R MLS 1.3cm w/ mass effect on lateral ventricles.   -Na 139 --> decr 3% to 30cc/hr    DAY  -NSX plans: CT hd tonight or tomorrow, then  shunt?  - titrate down 3% --> 40cc/hr  - DC NS  - 3 more doses of fludrocortisone .1 Q8H  - 1600 BMP -Na 138  -1600 Vanco Trough 13.3        DVT PTX: LVX  GI PTX: PPI    ***Tubes/Lines/Drains  ***  Peripheral IV  Left basilic midline 5/17  Arterial Line Right radial	Date 5/17  R IJ TLC     6/4  Trach/peg 5/21    REVIEW OF SYSTEMS  [ ] A ten-point review of systems was otherwise negative except as noted.  [x] Due to altered mental status/intubation, subjective information were not able to be obtained from the patient. History was obtained, to the extent possible, from review of the chart and collateral sources of information.     SANDRA DAS  [id number]  45y Male    Indication for ICU admission: s/p craniectomy for large L SDH  Admit Date:21  ICU Date: 21  OR Date: 21    No Known Allergies    PAST MEDICAL & SURGICAL HISTORY:  Alcohol abuse  GERD (gastroesophageal reflux disease)  ETOH abuse  Hypomagnesemia  Seizure disorder  H/O hemorrhoidectomy    Home Medications:  NONE    24HRS EVENT:    NIGHT:  -CT Head: increased L to R MLS 1.3cm w/ mass effect on lateral ventricles.   -Na 139 --> decr 3% to 30cc/hr    DAY  -NSX plans: CT hd tonight or tomorrow, then  shunt?  - titrate down 3% --> 40cc/hr  - DC NS  - 3 more doses of fludrocortisone .1 Q8H  - 1600 BMP -Na 138  -1600 Vanco Trough 13.3        DVT PTX: LVX  GI PTX: PPI    ***Tubes/Lines/Drains  ***  Peripheral IV  Left basilic midline   Arterial Line Right radial	Date   R IJ TLC     6/  Trach/peg     REVIEW OF SYSTEMS  [ ] A ten-point review of systems was otherwise negative except as noted.  [x] Due to altered mental status/intubation, subjective information were not able to be obtained from the patient. History was obtained, to the extent possible, from review of the chart and collateral sources of information.      Daily Weight in k.4 (2021 06:00)    CURRENT MEDS:  Neurologic Medications  acetaminophen   Tablet .. 650 milliGRAM(s) Oral every 6 hours PRN Temp greater or equal to 38.5C (101.3F), Moderate Pain (4 - 6), Severe Pain (7 - 10)  levETIRAcetam  IVPB 500 milliGRAM(s) IV Intermittent every 12 hours    Respiratory Medications    Cardiovascular Medications  lisinopril 20 milliGRAM(s) Oral daily  propranolol 20 milliGRAM(s) Oral every 8 hours    Gastrointestinal Medications  pantoprazole   Suspension 40 milliGRAM(s) Oral daily  senna 2 Tablet(s) Oral at bedtime  sodium chloride 8 Gram(s) Oral every 6 hours  sodium chloride 3%. 500 milliLiter(s) IV Continuous <Continuous>    Genitourinary Medications    Hematologic/Oncologic Medications  enoxaparin Injectable 40 milliGRAM(s) SubCutaneous every 24 hours    Antimicrobial/Immunologic Medications  meropenem  IVPB      meropenem  IVPB 2000 milliGRAM(s) IV Intermittent every 8 hours  vancomycin  IVPB 2000 milliGRAM(s) IV Intermittent every 12 hours    Endocrine/Metabolic Medications  fludroCORTISONE 0.1 milliGRAM(s) Oral <User Schedule>  insulin lispro (ADMELOG) corrective regimen sliding scale   SubCutaneous every 6 hours    Topical/Other Medications  artificial tears (preservative free) Ophthalmic Solution 2 Drop(s) Both EYES three times a day  chlorhexidine 0.12% Liquid 15 milliLiter(s) Oral Mucosa two times a day  chlorhexidine 4% Liquid 1 Application(s) Topical <User Schedule>      ICU Vital Signs Last 24 Hrs  T(C): 36.9 (2021 04:00), Max: 37 (2021 15:30)  T(F): 98.4 (2021 04:00), Max: 98.6 (2021 15:30)  HR: 91 (2021 07:00) (75 - 113)  BP: 132/81 (2021 07:00) (114/74 - 162/83)  BP(mean): 101 (2021 07:00) (90 - 118)  ABP: --  ABP(mean): --  RR: 21 (2021 06:00) (17 - 26)  SpO2: 100% (2021 07:00) (100% - 100%)        I&O's Detail    2021 07:01  -  2021 07:00  --------------------------------------------------------  IN:    Enteral Tube Flush: 151 mL    IV PiggyBack: 200 mL    IV PiggyBack: 200 mL    IV PiggyBack: 500 mL    IV PiggyBack: 200 mL    IV PiggyBack: 50 mL    IV PiggyBack: 500 mL    Osmolite: 1200 mL    sodium chloride 0.9%: 100 mL    sodium chloride 3%: 100 mL    sodium chloride 3%: 830 mL  Total IN: 4031 mL    OUT:    Voided (mL): 4660 mL  Total OUT: 4660 mL    Total NET: -629 mL        I&O's Summary    2021 07:01  -  2021 07:00  --------------------------------------------------------  IN: 4031 mL / OUT: 4660 mL / NET: -629 mL      LABS:    CAPILLARY BLOOD GLUCOSE      POCT Blood Glucose.: 130 mg/dL (2021 05:56)  POCT Blood Glucose.: 116 mg/dL (2021 00:33)  POCT Blood Glucose.: 122 mg/dL (2021 17:23)  POCT Blood Glucose.: 103 mg/dL (2021 11:33)                          8.3    4.33  )-----------( 284      ( 2021 23:45 )             25.0             139  |  102  |  4<L>  ----------------------------<  109<H>  4.1   |  29  |  <0.5<L>      Calcium, Total Serum: 9.5 mg/dL (21 @ 23:45)        PHYSICAL EXAM:    General/Neuro    Deficits: awake, opens eyes, tracks, intermittently follows commands  Lungs:  clear to auscultation, Normal expansion/effort.   Cardiovascular : S1, S2.  Regular rate and rhythm.    Cardiac Rhythm: Normal Sinus Rhythm  GI: Abdomen soft, Non-tender, Non-distended.    Extremities: Extremities warm, well-perfused. No Peripheral edema b/l LE    : Condom catheter in place.

## 2021-06-06 NOTE — PROGRESS NOTE ADULT - ATTENDING COMMENTS
Patient's neuro exam remains unchanged.  Awake, follows simple commands; has right hemiplegia.  CT head last night shows increased L to R midline shift.  Na+ better today. Now on 30 ml 3% saline.  Remains on T-collar    ASSESSMENT:  44 y/o male, S/P Fall.  Traumatic Left SDH.  Brain compression.  S/P Left Craniectomy.  Acute respiratory failure.  At risk for hemodynamic instability.  Chronic Alcohol Abuse.  Dysphagia.  Hyponatremia.   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  Acute blood loss anemia.    PLAN:  - Neurosurgery f/u - possible  shunt in am  - continue chest PT and suctioning  - keep normotensive  - on tube feeds; has bowel movements.  - titrate 3% saline further down; Na+ normalizing  - follow Na+ frequently  - on Seamus/Vanco as per ID  - GI and DVT prophylaxis

## 2021-06-06 NOTE — PROGRESS NOTE ADULT - SUBJECTIVE AND OBJECTIVE BOX
Surgery: Laparoscopic assisted placement of ventricular peritoneal shunt    No Known Allergies        T(C): 36.3 (06-06-21 @ 08:17), Max: 37 (06-05-21 @ 15:30)  HR: 98 (06-06-21 @ 11:00) (74 - 105)  BP: 135/92 (06-06-21 @ 11:00) (114/74 - 162/83)  RR: 19 (06-06-21 @ 11:00) (17 - 26)  SpO2: 100% (06-06-21 @ 11:00) (100% - 100%)  Wt(kg): --      06-05    139  |  102  |  4<L>  ----------------------------<  109<H>  4.1   |  29  |  <0.5<L>    Ca    9.5      05 Jun 2021 23:45  Phos  3.5     06-05  Mg     1.7     06-05      CBC Full  -  ( 05 Jun 2021 23:45 )  WBC Count : 4.33 K/uL  RBC Count : 2.93 M/uL  Hemoglobin : 8.3 g/dL  Hematocrit : 25.0 %  Platelet Count - Automated : 284 K/uL  Mean Cell Volume : 85.3 fL  Mean Cell Hemoglobin : 28.3 pg  Mean Cell Hemoglobin Concentration : 33.2 g/dL  Auto Neutrophil # : x  Auto Lymphocyte # : x  Auto Monocyte # : x  Auto Eosinophil # : x  Auto Basophil # : x  Auto Neutrophil % : x  Auto Lymphocyte % : x  Auto Monocyte % : x  Auto Eosinophil % : x  Auto Basophil % : x      Type & Screen (in past 72hrs): pending 6.6.21    CXR: < from: Xray Chest 1 View- PORTABLE-Routine (06.06.21 @ 05:24) >  Impression:    No radiographic evidence of acute cardiopulmonary disease.    Support tubes and lines as above.    < end of copied text >    EKG:< from: 12 Lead ECG (05.14.21 @ 04:52) >  Diagnosis Line Normal sinus rhythm  Normal ECG    < end of copied text >    ECHO:< from: TTE Echo Complete w/o Contrast w/ Doppler (05.08.21 @ 10:02) >    Summary:   1. Left ventricular ejection fraction, by visual estimation, is 45 to 50%.   2. Mildly decreased global left ventricular systolic function.   3. Multiple left ventricular regional wall motion abnormalities exist. See wall motion findings.   4. Normal left ventricular internal cavity size.   5. The left ventricular diastolic function could not be assessed in this study.   6. The mean global longitudinal peak strain by speckle tracking is -14.9% which is reduced.   7. Normal left atrial size.   8. Normal right atrial size.   9. No evidence of mitral valve regurgitation.  10. Mild tricuspid regurgitation.  11. LA volume Index is 23.3 ml/m² ml/m2.    < end of copied text >    Medical Clearances:   Other Clearances:     Last dose of antiplatelet/anticoagulation drug: last SQ Lovenox 6.6.21    Implanted Devices (pacemaker, drug pump...etc):  []YES   [] NO                  If yes --> EPS consulted to interrogate/adjust device:               Assessment: OR tomorrow    Plan:  f/u STAT Covid test  NPO p MN  consent to be obtained  hold AM Lovenox for OR  Consent: Signed by patient vs HCP                   NAME/NUMBER of HCP:      Surgery: Laparoscopic assisted placement of ventricular peritoneal shunt    No Known Allergies        T(C): 36.3 (06-06-21 @ 08:17), Max: 37 (06-05-21 @ 15:30)  HR: 98 (06-06-21 @ 11:00) (74 - 105)  BP: 135/92 (06-06-21 @ 11:00) (114/74 - 162/83)  RR: 19 (06-06-21 @ 11:00) (17 - 26)  SpO2: 100% (06-06-21 @ 11:00) (100% - 100%)  Wt(kg): --      06-05    139  |  102  |  4<L>  ----------------------------<  109<H>  4.1   |  29  |  <0.5<L>    Ca    9.5      05 Jun 2021 23:45  Phos  3.5     06-05  Mg     1.7     06-05      CBC Full  -  ( 05 Jun 2021 23:45 )  WBC Count : 4.33 K/uL  RBC Count : 2.93 M/uL  Hemoglobin : 8.3 g/dL  Hematocrit : 25.0 %  Platelet Count - Automated : 284 K/uL  Mean Cell Volume : 85.3 fL  Mean Cell Hemoglobin : 28.3 pg  Mean Cell Hemoglobin Concentration : 33.2 g/dL  Auto Neutrophil # : x  Auto Lymphocyte # : x  Auto Monocyte # : x  Auto Eosinophil # : x  Auto Basophil # : x  Auto Neutrophil % : x  Auto Lymphocyte % : x  Auto Monocyte % : x  Auto Eosinophil % : x  Auto Basophil % : x      Type & Screen (in past 72hrs): pending 6.6.21    CXR: < from: Xray Chest 1 View- PORTABLE-Routine (06.06.21 @ 05:24) >  Impression:    No radiographic evidence of acute cardiopulmonary disease.    Support tubes and lines as above.    < end of copied text >    EKG:< from: 12 Lead ECG (05.14.21 @ 04:52) >  Diagnosis Line Normal sinus rhythm  Normal ECG    < end of copied text >    ECHO:< from: TTE Echo Complete w/o Contrast w/ Doppler (05.08.21 @ 10:02) >    Summary:   1. Left ventricular ejection fraction, by visual estimation, is 45 to 50%.   2. Mildly decreased global left ventricular systolic function.   3. Multiple left ventricular regional wall motion abnormalities exist. See wall motion findings.   4. Normal left ventricular internal cavity size.   5. The left ventricular diastolic function could not be assessed in this study.   6. The mean global longitudinal peak strain by speckle tracking is -14.9% which is reduced.   7. Normal left atrial size.   8. Normal right atrial size.   9. No evidence of mitral valve regurgitation.  10. Mild tricuspid regurgitation.  11. LA volume Index is 23.3 ml/m² ml/m2.    < end of copied text >    Medical Clearances: SICU attg clearance pending  Other Clearances:     Last dose of antiplatelet/anticoagulation drug: last SQ Lovenox 6.6.21    Implanted Devices (pacemaker, drug pump...etc):  []YES   [] NO                  If yes --> EPS consulted to interrogate/adjust device:               Assessment: OR tomorrow    Plan:  f/u STAT Covid test  NPO p MN  consent to be obtained  hold AM Lovenox for OR  Consent: Signed by patient vs HCP                   NAME/NUMBER of HCP:      Surgery: Laparoscopic assisted placement of ventricular peritoneal shunt    No Known Allergies        T(C): 36.3 (06-06-21 @ 08:17), Max: 37 (06-05-21 @ 15:30)  HR: 98 (06-06-21 @ 11:00) (74 - 105)  BP: 135/92 (06-06-21 @ 11:00) (114/74 - 162/83)  RR: 19 (06-06-21 @ 11:00) (17 - 26)  SpO2: 100% (06-06-21 @ 11:00) (100% - 100%)  Wt(kg): --      06-05    139  |  102  |  4<L>  ----------------------------<  109<H>  4.1   |  29  |  <0.5<L>    Ca    9.5      05 Jun 2021 23:45  Phos  3.5     06-05  Mg     1.7     06-05      CBC Full  -  ( 05 Jun 2021 23:45 )  WBC Count : 4.33 K/uL  RBC Count : 2.93 M/uL  Hemoglobin : 8.3 g/dL  Hematocrit : 25.0 %  Platelet Count - Automated : 284 K/uL  Mean Cell Volume : 85.3 fL  Mean Cell Hemoglobin : 28.3 pg  Mean Cell Hemoglobin Concentration : 33.2 g/dL  Auto Neutrophil # : x  Auto Lymphocyte # : x  Auto Monocyte # : x  Auto Eosinophil # : x  Auto Basophil # : x  Auto Neutrophil % : x  Auto Lymphocyte % : x  Auto Monocyte % : x  Auto Eosinophil % : x  Auto Basophil % : x      Type & Screen (in past 72hrs): pending 6.6.21    CXR: < from: Xray Chest 1 View- PORTABLE-Routine (06.06.21 @ 05:24) >  Impression:    No radiographic evidence of acute cardiopulmonary disease.    Support tubes and lines as above.    < end of copied text >    EKG:< from: 12 Lead ECG (05.14.21 @ 04:52) >  Diagnosis Line Normal sinus rhythm  Normal ECG    < end of copied text >    ECHO:< from: TTE Echo Complete w/o Contrast w/ Doppler (05.08.21 @ 10:02) >    Summary:   1. Left ventricular ejection fraction, by visual estimation, is 45 to 50%.   2. Mildly decreased global left ventricular systolic function.   3. Multiple left ventricular regional wall motion abnormalities exist. See wall motion findings.   4. Normal left ventricular internal cavity size.   5. The left ventricular diastolic function could not be assessed in this study.   6. The mean global longitudinal peak strain by speckle tracking is -14.9% which is reduced.   7. Normal left atrial size.   8. Normal right atrial size.   9. No evidence of mitral valve regurgitation.  10. Mild tricuspid regurgitation.  11. LA volume Index is 23.3 ml/m² ml/m2.    < end of copied text >    Medical Clearances: SICU attg clearance pending  Other Clearances: - Possible  shunt Monday, cleared from ID standpoint    Last dose of antiplatelet/anticoagulation drug: last SQ Lovenox 6.6.21    Implanted Devices (pacemaker, drug pump...etc):  []YES   [] NO                  If yes --> EPS consulted to interrogate/adjust device:               Assessment: OR tomorrow    Plan:  f/u STAT Covid test  NPO p MN  consent to be obtained  hold AM Lovenox for OR  Consent: Signed by patient vs HCP                   NAME/NUMBER of HCP:      Surgery: Laparoscopic assisted placement of ventricular peritoneal shunt    No Known Allergies        T(C): 36.3 (06-06-21 @ 08:17), Max: 37 (06-05-21 @ 15:30)  HR: 98 (06-06-21 @ 11:00) (74 - 105)  BP: 135/92 (06-06-21 @ 11:00) (114/74 - 162/83)  RR: 19 (06-06-21 @ 11:00) (17 - 26)  SpO2: 100% (06-06-21 @ 11:00) (100% - 100%)  Wt(kg): --      06-05    139  |  102  |  4<L>  ----------------------------<  109<H>  4.1   |  29  |  <0.5<L>    Ca    9.5      05 Jun 2021 23:45  Phos  3.5     06-05  Mg     1.7     06-05      CBC Full  -  ( 05 Jun 2021 23:45 )  WBC Count : 4.33 K/uL  RBC Count : 2.93 M/uL  Hemoglobin : 8.3 g/dL  Hematocrit : 25.0 %  Platelet Count - Automated : 284 K/uL  Mean Cell Volume : 85.3 fL  Mean Cell Hemoglobin : 28.3 pg  Mean Cell Hemoglobin Concentration : 33.2 g/dL  Auto Neutrophil # : x  Auto Lymphocyte # : x  Auto Monocyte # : x  Auto Eosinophil # : x  Auto Basophil # : x  Auto Neutrophil % : x  Auto Lymphocyte % : x  Auto Monocyte % : x  Auto Eosinophil % : x  Auto Basophil % : x      Type & Screen (in past 72hrs): pending 6.6.21    CXR: < from: Xray Chest 1 View- PORTABLE-Routine (06.06.21 @ 05:24) >  Impression:    No radiographic evidence of acute cardiopulmonary disease.    Support tubes and lines as above.    < end of copied text >    EKG:< from: 12 Lead ECG (05.14.21 @ 04:52) >  Diagnosis Line Normal sinus rhythm  Normal ECG    < end of copied text >    ECHO:< from: TTE Echo Complete w/o Contrast w/ Doppler (05.08.21 @ 10:02) >    Summary:   1. Left ventricular ejection fraction, by visual estimation, is 45 to 50%.   2. Mildly decreased global left ventricular systolic function.   3. Multiple left ventricular regional wall motion abnormalities exist. See wall motion findings.   4. Normal left ventricular internal cavity size.   5. The left ventricular diastolic function could not be assessed in this study.   6. The mean global longitudinal peak strain by speckle tracking is -14.9% which is reduced.   7. Normal left atrial size.   8. Normal right atrial size.   9. No evidence of mitral valve regurgitation.  10. Mild tricuspid regurgitation.  11. LA volume Index is 23.3 ml/m² ml/m2.    < end of copied text >    Medical Clearances:  Other Clearances: - Possible  shunt Monday, cleared from ID standpoint  Cards clearance: Moderate-risk patient for a Moderate-risk surgery/procedure    Last dose of antiplatelet/anticoagulation drug: last SQ Lovenox 6.6.21    Implanted Devices (pacemaker, drug pump...etc):  []YES   [] NO                  If yes --> EPS consulted to interrogate/adjust device:               Assessment: OR tomorrow    Plan:  f/u STAT Covid test  NPO p MN  consent to be obtained  hold AM Lovenox for OR  Consent: Signed by patient vs HCP                   NAME/NUMBER of HCP:

## 2021-06-06 NOTE — CONSULT NOTE ADULT - ATTENDING COMMENTS
Seen / examined and above reviewed.    Risk factors include HTN and ETOH abuse.    Traumatic SDH s/p craniotomy.  Complicated hospitalization as above.  Scheduled for  shunt.    Appears comfortable.  Minimal interaction,  Hemodynamics stable.    ECHO: Mild LV dysfunction.    Intermediate risk patient for perioperative cardiac events.  Intermediate risk surgery.  No cardiac decompensation.    - EKG.  - No additional cardiac testing required prior to procedure.  - Cont Propranolol perioperatively. Seen / examined yesterday evening on rounds,  Above reviewed.    Risk factors include HTN and ETOH abuse.    Traumatic SDH s/p craniotomy.  Complicated hospitalization as above.  Scheduled for  shunt.    Appears comfortable.  Minimal interaction,  Hemodynamics stable.    ECHO: Mild LV dysfunction.    Intermediate risk patient for perioperative cardiac events.  Intermediate risk surgery.  No cardiac decompensation.    - EKG.  - No additional cardiac testing required prior to procedure.  - Cont Propranolol perioperatively.

## 2021-06-06 NOTE — CONSULT NOTE ADULT - SUBJECTIVE AND OBJECTIVE BOX
HPI:  TRAUMA ACTIVATION LEVEL:  Code trauma    HPI:    Patient is a 45yM w/ PMHx of Etoh abuse and seizures on keppra seen as Trauma Alert upgraded to a Code trauma during examination in the ED where the patient was found to have a GCS of 4 s/p found down unresponsive.  Trauma assessment in ED: intubated for airway protection   (06 May 2021 22:29)    Cardiology  Patient with long hospital stay; admitted for acute SDH s/p craniotomy on 5/7/2021; during surgery, he had cardiac arrest that was due to acute blood loss s/p massive transfusion protocol. Stay further complicated by bacteremia, acute stroke and left transverse sinus thrombosis; he underwent revision craniotomy wound with complex closure for persistent drainage on 5/16. 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 Patient is scheduled for  shunt on 6/7/2021.  I contacted the patient's wife for more cardiac information. From cardiac standpoint, patient is not known to have any cardiac disease. He has HTN. He drinks intermittently but has been heavily drinking for the past month. He never complained of chest pain or shortness of breath    PAST MEDICAL & SURGICAL HISTORY  Alcohol abuse    GERD (gastroesophageal reflux disease)    ETOH abuse    Hypomagnesemia    Seizure disorder    H/O hemorrhoidectomy        FAMILY HISTORY:  FAMILY HISTORY:  Family history of essential hypertension (Father)        SOCIAL HISTORY:  []smoker  [X]Alcohol  []Drug    ALLERGIES:  No Known Allergies      MEDICATIONS:  MEDICATIONS  (STANDING):  artificial tears (preservative free) Ophthalmic Solution 2 Drop(s) Both EYES three times a day  chlorhexidine 0.12% Liquid 15 milliLiter(s) Oral Mucosa two times a day  chlorhexidine 4% Liquid 1 Application(s) Topical <User Schedule>  enoxaparin Injectable 40 milliGRAM(s) SubCutaneous every 24 hours  fludroCORTISONE 0.1 milliGRAM(s) Oral <User Schedule>  insulin lispro (ADMELOG) corrective regimen sliding scale   SubCutaneous every 6 hours  levETIRAcetam  IVPB 500 milliGRAM(s) IV Intermittent every 12 hours  lisinopril 20 milliGRAM(s) Oral daily  meropenem  IVPB      meropenem  IVPB 2000 milliGRAM(s) IV Intermittent every 8 hours  pantoprazole   Suspension 40 milliGRAM(s) Oral daily  potassium chloride  20 mEq/100 mL IVPB 20 milliEquivalent(s) IV Intermittent every 2 hours  propranolol 20 milliGRAM(s) Oral every 8 hours  senna 2 Tablet(s) Oral at bedtime  sodium chloride 8 Gram(s) Oral every 6 hours  sodium chloride 3%. 500 milliLiter(s) (20 mL/Hr) IV Continuous <Continuous>  vancomycin  IVPB 2000 milliGRAM(s) IV Intermittent every 12 hours    MEDICATIONS  (PRN):  acetaminophen   Tablet .. 650 milliGRAM(s) Oral every 6 hours PRN Temp greater or equal to 38.5C (101.3F), Moderate Pain (4 - 6), Severe Pain (7 - 10)      HOME MEDICATIONS:  Home Medications:      VITALS:   T(F): 97.9 (06-06 @ 15:06), Max: 99.8 (06-04 @ 15:48)  HR: 93 (06-06 @ 13:00) (74 - 113)  BP: 131/84 (06-06 @ 13:00) (97/62 - 162/83)  BP(mean): 103 (06-06 @ 13:00) (75 - 118)  RR: 19 (06-06 @ 13:00) (8 - 37)  SpO2: 100% (06-06 @ 13:00) (96% - 100%)    I&O's Summary    05 Jun 2021 07:01  -  06 Jun 2021 07:00  --------------------------------------------------------  IN: 4031 mL / OUT: 4660 mL / NET: -629 mL    06 Jun 2021 07:01  -  06 Jun 2021 15:18  --------------------------------------------------------  IN: 240 mL / OUT: 300 mL / NET: -60 mL        REVIEW OF SYSTEMS:  Patient with trach; responds with yes and no to questions; did not have any complaints    PHYSICAL EXAM:  NEURO: patient is awake , alert  GEN: Not in acute distress  NECK: Trach in place  LUNGS: Clear to auscultation bilaterally   CARDIOVASCULAR: S1/S2 present, RRR  ABD: Soft, non-tender, non-distended  EXT: No WILLIAMS  SKIN: Intact    LABS:                        8.3    4.33  )-----------( 284      ( 05 Jun 2021 23:45 )             25.0     06-06    142  |  109  |  5<L>  ----------------------------<  81  3.4<L>   |  25  |  <0.5<L>    Ca    7.7<L>      06 Jun 2021 11:41  Phos  3.5     06-05  Mg     1.7     06-05                Troponin trend:      05-13 Chol -- LDL -- HDL -- Trig 138      RADIOLOGY:  -CXR:  < from: Xray Chest 1 View- PORTABLE-Routine (06.06.21 @ 05:24) >  Impression:    No radiographic evidence of acute cardiopulmonary disease.    < end of copied text >    -TTE:  < from: TTE Echo Complete w/o Contrast w/ Doppler (05.08.21 @ 10:02) >  Summary:   1. Left ventricular ejection fraction, by visual estimation, is 45 to 50%.   2. Mildly decreased global left ventricular systolic function.   3. Multiple left ventricular regional wall motion abnormalities exist. See wall motion findings.   4. Normal left ventricular internal cavity size.   5. The left ventricular diastolic function could not be assessed in this study.   6. The mean global longitudinal peak strain by speckle tracking is -14.9% which is reduced.   7. Normal left atrial size.   8. Normal right atrial size.   9. No evidence of mitral valve regurgitation.  10. Mild tricuspid regurgitation.  11. LA volume Index is 23.3 ml/m² ml/m2.    < end of copied text >    -CCTA:  -STRESS TEST:  -CATHETERIZATION:    ECG from 5/14/21: NSR    TELEMETRY EVENTS: none

## 2021-06-06 NOTE — PROGRESS NOTE ADULT - ASSESSMENT
Assessment & Plan  45M s/p fall with large left SDH and neurological status change, level 1 for emergent craniectomy complicated by subgleal hematoma/collection s/p wound revision and drainage on 5/16.    NEURO:      S/P craniectomy for large left SDH     - GCS 11T, Q2H neuro checks, No sedation     - Keppra 500mg BID for sz ppx    Hyponatremia- goal Na+ >135     -  Cerebral salt wasting     -  3%@ 40 and salt tabs 8q6        - Na+ trend     - Fludricortasone x 3 more doses    Maintian Head of bed @ 30 degrees    Craniectomy (no left side bone flap)Helmet --> does not need while laying in bed only     during PT/OT   OR 5/16:     -Revision craniectomy wound with complex closure for persistent drainage, subgaleal hematoma- e. 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  *** NSX- possible  shunt on Monday?        -eval CT H for pre-op planning: increased L to R MLS 1.3cm w/ mass effect on lateral ventricles.       RESP:   Respiratoy Failure    s/p trach 5/21    tolerating T-piece since 5/24    ABG prn    AM CXR        CARDS:     S/P cardiac arrest in OR & MTP     - Hypertension controlled on Lisinopril 20mg     - Propranolol 20mg q8  Intermittent tachycardia, self resolves    GI/NUTR:     Diet: TF (Osmolite 1.5) @ 50 via  in gastrostomy (using jain in situ)    GI Prophylaxis- PPI    Bowel regimen restarted - Senna         Last BM 6/5    /RENAL:   No acute issues    Voiding via condom cath - monitor UO    Q6 BMP, serum osm      - Hyponatremic in setting of Cerebral salt wasting - Na goal >135     - 3% @ 30 and salt tabs 8g q6h     - d/c'd NS     -fludrocortison x3 doses 6/3, additional 3 doses added on 6/5  BUN/Cr- 4/0.5  Electrolytes- Na+ 139 / K+ 4.1 / Mg 1.7 / Ph 3.5  Monitor & replete elytes prn      HEME/ONC:   No acute issues    Labs:   Hb/Hct: 7.8 > 8.3  maintain Hg >8, MTP on this admission  DVT prophylaxis: Lovenox 40  5/26 DVT sono neg    ID:  Craniectomy site wound infection  WBC 5.5  > 4.5  Afebrile  Antibiotics- Seamus, Vanco  - Vanco trough 6/5 @ 4pm --> 13.3  Cultures     - 5/11- u/a +leuk     - blood culture 5/13, 5/19, 5/20, 5/23, 5/28 No growth      -Cranial Culture 5/21-e faceium     - Reculture if spikes    ENDO:  FS controlled  q6 POC glucose   Sliding scale insulin PRN  A1c 5.2    DISPO: SICU    	  Assessment & Plan  45M s/p fall with large left SDH and neurological status change, level 1 for emergent craniectomy complicated by subgleal hematoma/collection s/p wound revision and drainage on 5/16.    NEURO:      S/P craniectomy for large left SDH     - GCS 11T, Q2H neuro checks, No sedation     - Keppra 500mg BID for sz ppx    Hyponatremia- goal Na+ >135     -  Cerebral salt wasting     -  3%@ 40 and salt tabs 8q6        - Na+ trend     - Fludricortasone x 3 more doses    Maintian Head of bed @ 30 degrees    Craniectomy (no left side bone flap)Helmet --> does not need while laying in bed only     during PT/OT   OR 5/16:     -Revision craniectomy wound with complex closure for persistent drainage, subgaleal hematoma- e. 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  *** NSX- possible  shunt on Monday?        -eval CT H for pre-op planning: increased L to R MLS 1.3cm w/ mass effect on lateral ventricles.     RESP:   Respiratoy Failure    s/p trach 5/21    tolerating T-piece since 5/24    ABG prn    AM CXR        CARDS:     S/P cardiac arrest in OR & MTP     - Hypertension controlled on Lisinopril 20mg     - Propranolol 20mg q8  Intermittent tachycardia, self resolves    GI/NUTR:     Diet: TF (Osmolite 1.5) @ 50 via  in gastrostomy (using jain in situ)    GI Prophylaxis- PPI    Bowel regimen restarted - Senna         Last BM 6/5    /RENAL:   No acute issues    Voiding via condom cath - monitor UO    Q6 BMP, serum osm      - Hyponatremic in setting of Cerebral salt wasting - Na goal >135     - 3% @ 30 and salt tabs 8g q6h     - d/c'd NS     -fludrocortison x3 doses 6/3, additional 3 doses added on 6/5  BUN/Cr- 4/0.5  Electrolytes- Na+ 139 / K+ 4.1 / Mg 1.7 / Ph 3.5  Monitor & replete elytes prn      HEME/ONC:   No acute issues    Labs:   Hb/Hct: 7.8 > 8.3  maintain Hg >8, MTP on this admission  DVT prophylaxis: Lovenox 40  5/26 DVT sono neg    ID:  Craniectomy site wound infection  WBC 5.5  > 4.3  Afebrile  Antibiotics- Seamus, Vanco  - Vanco trough 6/5 @ 4pm --> 13.3  Cultures     - 5/11- u/a +leuk     - blood culture 5/13, 5/19, 5/20, 5/23, 5/28 No growth      -Cranial Culture 5/21-e faceium     - Reculture if spikes    ENDO:  FS controlled  q6 POC glucose   Sliding scale insulin PRN  A1c 5.2    DISPO: SICU

## 2021-06-06 NOTE — CONSULT NOTE ADULT - ASSESSMENT
* Patient-based characteristics (Functional capacity)  Patient is able to achieve more than 4 MET (walk 4 blocks, climb 2 flights of stairs, etc...)          Y [X] / N [] (prior to current hospitalization)    High-risk patient:   Recent (<30 days) or active MI          Y [] / N [X]                                    Unstable or severe angina          Y [] / N [X]                                    Decompensated heart failure, or worsening or new-onset heart failure          Y [] / N [X]                                    Severe valvular disease          Y [] / N [X]                                    Significant arrhythmia (Tachy- or Bradyarrhythmia)          Y [] / N [X]    * Surgery/Procedure-based characteristics (Type of surgery)  - Elevated or Moderate-risk procedure (Inpatient)          Y [X] / N []      * Revised Cardiac Risk Index (RCRI)  1- History of ischemic heart disease          Y [] / N [X]  2- History of congestive heart failure          Y [X] / N []  3- History of stroke/TIA          Y [X] / N []  4- History of insulin-dependent diabetes          Y [] / N [X]  5- Chronic kidney disease (Cr >2mg/dL)          Y [] / N [X]  6- Undergoing suprainguinal vascular, intraperitoneal, or intrathoracic surgery          Y [] / N [X]    Class III risk (Two factors) --> 10.1% risk      * IMPRESSION & RECOMMENDATIONS:  Moderate-risk patient for a Moderate-risk surgery/procedure  Check EKG  No further cardiac work-up is needed at the moment. There are no current cardiac contraindications to prevent from proceeding with the scheduled surgery/procedure.    - This consult serves only as a elder-operative cardiac risk stratification and evaluation to predict 30-days cardiac complications risk and mortality. The decision to proceed with the surgery/procedure is made by the performing physician and the patient -

## 2021-06-07 LAB
ANION GAP SERPL CALC-SCNC: 11 MMOL/L — SIGNIFICANT CHANGE UP (ref 7–14)
ANION GAP SERPL CALC-SCNC: 17 MMOL/L — HIGH (ref 7–14)
ANION GAP SERPL CALC-SCNC: 9 MMOL/L — SIGNIFICANT CHANGE UP (ref 7–14)
BUN SERPL-MCNC: 6 MG/DL — LOW (ref 10–20)
BUN SERPL-MCNC: 7 MG/DL — LOW (ref 10–20)
BUN SERPL-MCNC: 8 MG/DL — LOW (ref 10–20)
CALCIUM SERPL-MCNC: 10.4 MG/DL — HIGH (ref 8.5–10.1)
CALCIUM SERPL-MCNC: 9.6 MG/DL — SIGNIFICANT CHANGE UP (ref 8.5–10.1)
CALCIUM SERPL-MCNC: 9.7 MG/DL — SIGNIFICANT CHANGE UP (ref 8.5–10.1)
CHLORIDE SERPL-SCNC: 100 MMOL/L — SIGNIFICANT CHANGE UP (ref 98–110)
CHLORIDE SERPL-SCNC: 103 MMOL/L — SIGNIFICANT CHANGE UP (ref 98–110)
CHLORIDE SERPL-SCNC: 98 MMOL/L — SIGNIFICANT CHANGE UP (ref 98–110)
CO2 SERPL-SCNC: 23 MMOL/L — SIGNIFICANT CHANGE UP (ref 17–32)
CO2 SERPL-SCNC: 27 MMOL/L — SIGNIFICANT CHANGE UP (ref 17–32)
CO2 SERPL-SCNC: 29 MMOL/L — SIGNIFICANT CHANGE UP (ref 17–32)
CREAT SERPL-MCNC: 0.6 MG/DL — LOW (ref 0.7–1.5)
CREAT SERPL-MCNC: <0.5 MG/DL — LOW (ref 0.7–1.5)
CREAT SERPL-MCNC: <0.5 MG/DL — LOW (ref 0.7–1.5)
GLUCOSE BLDC GLUCOMTR-MCNC: 135 MG/DL — HIGH (ref 70–99)
GLUCOSE BLDC GLUCOMTR-MCNC: 97 MG/DL — SIGNIFICANT CHANGE UP (ref 70–99)
GLUCOSE BLDC GLUCOMTR-MCNC: 98 MG/DL — SIGNIFICANT CHANGE UP (ref 70–99)
GLUCOSE SERPL-MCNC: 145 MG/DL — HIGH (ref 70–99)
GLUCOSE SERPL-MCNC: 97 MG/DL — SIGNIFICANT CHANGE UP (ref 70–99)
GLUCOSE SERPL-MCNC: 99 MG/DL — SIGNIFICANT CHANGE UP (ref 70–99)
HCT VFR BLD CALC: 32.8 % — LOW (ref 42–52)
HGB BLD-MCNC: 10.7 G/DL — LOW (ref 14–18)
INR BLD: 1.26 RATIO — SIGNIFICANT CHANGE UP (ref 0.65–1.3)
MAGNESIUM SERPL-MCNC: 1.8 MG/DL — SIGNIFICANT CHANGE UP (ref 1.8–2.4)
MAGNESIUM SERPL-MCNC: 2 MG/DL — SIGNIFICANT CHANGE UP (ref 1.8–2.4)
MCHC RBC-ENTMCNC: 27.6 PG — SIGNIFICANT CHANGE UP (ref 27–31)
MCHC RBC-ENTMCNC: 32.6 G/DL — SIGNIFICANT CHANGE UP (ref 32–37)
MCV RBC AUTO: 84.5 FL — SIGNIFICANT CHANGE UP (ref 80–94)
NRBC # BLD: 0 /100 WBCS — SIGNIFICANT CHANGE UP (ref 0–0)
OSMOLALITY SERPL: 294 MOS/KG — SIGNIFICANT CHANGE UP (ref 275–300)
PHOSPHATE SERPL-MCNC: 4 MG/DL — SIGNIFICANT CHANGE UP (ref 2.1–4.9)
PHOSPHATE SERPL-MCNC: 5 MG/DL — HIGH (ref 2.1–4.9)
PLATELET # BLD AUTO: 306 K/UL — SIGNIFICANT CHANGE UP (ref 130–400)
POTASSIUM SERPL-MCNC: 3.9 MMOL/L — SIGNIFICANT CHANGE UP (ref 3.5–5)
POTASSIUM SERPL-MCNC: 4.1 MMOL/L — SIGNIFICANT CHANGE UP (ref 3.5–5)
POTASSIUM SERPL-MCNC: 4.6 MMOL/L — SIGNIFICANT CHANGE UP (ref 3.5–5)
POTASSIUM SERPL-SCNC: 3.9 MMOL/L — SIGNIFICANT CHANGE UP (ref 3.5–5)
POTASSIUM SERPL-SCNC: 4.1 MMOL/L — SIGNIFICANT CHANGE UP (ref 3.5–5)
POTASSIUM SERPL-SCNC: 4.6 MMOL/L — SIGNIFICANT CHANGE UP (ref 3.5–5)
PROTHROM AB SERPL-ACNC: 14.5 SEC — HIGH (ref 9.95–12.87)
RBC # BLD: 3.88 M/UL — LOW (ref 4.7–6.1)
RBC # FLD: 13.7 % — SIGNIFICANT CHANGE UP (ref 11.5–14.5)
SODIUM SERPL-SCNC: 136 MMOL/L — SIGNIFICANT CHANGE UP (ref 135–146)
SODIUM SERPL-SCNC: 140 MMOL/L — SIGNIFICANT CHANGE UP (ref 135–146)
SODIUM SERPL-SCNC: 141 MMOL/L — SIGNIFICANT CHANGE UP (ref 135–146)
VANCOMYCIN TROUGH SERPL-MCNC: 19.4 UG/ML — HIGH (ref 5–10)
WBC # BLD: 10.34 K/UL — SIGNIFICANT CHANGE UP (ref 4.8–10.8)
WBC # FLD AUTO: 10.34 K/UL — SIGNIFICANT CHANGE UP (ref 4.8–10.8)

## 2021-06-07 PROCEDURE — 71045 X-RAY EXAM CHEST 1 VIEW: CPT | Mod: 26,77

## 2021-06-07 PROCEDURE — 71045 X-RAY EXAM CHEST 1 VIEW: CPT | Mod: 26

## 2021-06-07 PROCEDURE — 62223 ESTABLISH BRAIN CAVITY SHUNT: CPT | Mod: 62,78

## 2021-06-07 PROCEDURE — 99291 CRITICAL CARE FIRST HOUR: CPT

## 2021-06-07 RX ORDER — LEVETIRACETAM 250 MG/1
500 TABLET, FILM COATED ORAL EVERY 12 HOURS
Refills: 0 | Status: DISCONTINUED | OUTPATIENT
Start: 2021-06-07 | End: 2021-06-16

## 2021-06-07 RX ORDER — MAGNESIUM SULFATE 500 MG/ML
2 VIAL (ML) INJECTION ONCE
Refills: 0 | Status: COMPLETED | OUTPATIENT
Start: 2021-06-07 | End: 2021-06-07

## 2021-06-07 RX ORDER — ACETAMINOPHEN 500 MG
650 TABLET ORAL EVERY 6 HOURS
Refills: 0 | Status: DISCONTINUED | OUTPATIENT
Start: 2021-06-07 | End: 2021-06-16

## 2021-06-07 RX ORDER — LISINOPRIL 2.5 MG/1
20 TABLET ORAL DAILY
Refills: 0 | Status: DISCONTINUED | OUTPATIENT
Start: 2021-06-07 | End: 2021-06-16

## 2021-06-07 RX ORDER — VANCOMYCIN HCL 1 G
2000 VIAL (EA) INTRAVENOUS EVERY 12 HOURS
Refills: 0 | Status: DISCONTINUED | OUTPATIENT
Start: 2021-06-07 | End: 2021-06-08

## 2021-06-07 RX ORDER — SENNA PLUS 8.6 MG/1
2 TABLET ORAL AT BEDTIME
Refills: 0 | Status: DISCONTINUED | OUTPATIENT
Start: 2021-06-07 | End: 2021-06-16

## 2021-06-07 RX ORDER — PANTOPRAZOLE SODIUM 20 MG/1
40 TABLET, DELAYED RELEASE ORAL DAILY
Refills: 0 | Status: DISCONTINUED | OUTPATIENT
Start: 2021-06-07 | End: 2021-06-08

## 2021-06-07 RX ORDER — SODIUM CHLORIDE 9 MG/ML
1000 INJECTION, SOLUTION INTRAVENOUS
Refills: 0 | Status: DISCONTINUED | OUTPATIENT
Start: 2021-06-07 | End: 2021-06-07

## 2021-06-07 RX ORDER — HYDROMORPHONE HYDROCHLORIDE 2 MG/ML
0.5 INJECTION INTRAMUSCULAR; INTRAVENOUS; SUBCUTANEOUS
Refills: 0 | Status: DISCONTINUED | OUTPATIENT
Start: 2021-06-07 | End: 2021-06-07

## 2021-06-07 RX ORDER — MEROPENEM 1 G/30ML
1000 INJECTION INTRAVENOUS EVERY 8 HOURS
Refills: 0 | Status: DISCONTINUED | OUTPATIENT
Start: 2021-06-07 | End: 2021-06-13

## 2021-06-07 RX ORDER — ONDANSETRON 8 MG/1
4 TABLET, FILM COATED ORAL ONCE
Refills: 0 | Status: DISCONTINUED | OUTPATIENT
Start: 2021-06-07 | End: 2021-06-07

## 2021-06-07 RX ORDER — SODIUM CHLORIDE 9 MG/ML
8 INJECTION INTRAMUSCULAR; INTRAVENOUS; SUBCUTANEOUS EVERY 6 HOURS
Refills: 0 | Status: DISCONTINUED | OUTPATIENT
Start: 2021-06-07 | End: 2021-06-10

## 2021-06-07 RX ORDER — MORPHINE SULFATE 50 MG/1
2 CAPSULE, EXTENDED RELEASE ORAL
Refills: 0 | Status: DISCONTINUED | OUTPATIENT
Start: 2021-06-07 | End: 2021-06-07

## 2021-06-07 RX ADMIN — Medication 2 DROP(S): at 05:18

## 2021-06-07 RX ADMIN — Medication 250 MILLIGRAM(S): at 05:29

## 2021-06-07 RX ADMIN — MEROPENEM 200 MILLIGRAM(S): 1 INJECTION INTRAVENOUS at 15:20

## 2021-06-07 RX ADMIN — PANTOPRAZOLE SODIUM 40 MILLIGRAM(S): 20 TABLET, DELAYED RELEASE ORAL at 11:42

## 2021-06-07 RX ADMIN — LEVETIRACETAM 420 MILLIGRAM(S): 250 TABLET, FILM COATED ORAL at 21:35

## 2021-06-07 RX ADMIN — SODIUM CHLORIDE 8 GRAM(S): 9 INJECTION INTRAMUSCULAR; INTRAVENOUS; SUBCUTANEOUS at 05:18

## 2021-06-07 RX ADMIN — LEVETIRACETAM 420 MILLIGRAM(S): 250 TABLET, FILM COATED ORAL at 05:18

## 2021-06-07 RX ADMIN — Medication 25 GRAM(S): at 01:46

## 2021-06-07 RX ADMIN — SODIUM CHLORIDE 8 GRAM(S): 9 INJECTION INTRAMUSCULAR; INTRAVENOUS; SUBCUTANEOUS at 11:42

## 2021-06-07 RX ADMIN — Medication 2 DROP(S): at 15:19

## 2021-06-07 RX ADMIN — CHLORHEXIDINE GLUCONATE 1 APPLICATION(S): 213 SOLUTION TOPICAL at 05:19

## 2021-06-07 RX ADMIN — CHLORHEXIDINE GLUCONATE 15 MILLILITER(S): 213 SOLUTION TOPICAL at 05:18

## 2021-06-07 RX ADMIN — MEROPENEM 200 MILLIGRAM(S): 1 INJECTION INTRAVENOUS at 05:29

## 2021-06-07 RX ADMIN — MEROPENEM 100 MILLIGRAM(S): 1 INJECTION INTRAVENOUS at 21:44

## 2021-06-07 RX ADMIN — LISINOPRIL 20 MILLIGRAM(S): 2.5 TABLET ORAL at 05:18

## 2021-06-07 RX ADMIN — Medication 250 MILLIGRAM(S): at 21:44

## 2021-06-07 RX ADMIN — Medication 2 DROP(S): at 21:43

## 2021-06-07 NOTE — BRIEF OPERATIVE NOTE - OPERATION/FINDINGS
right frontal VPS strata II programmable valve set at 1.5, Lap assist for peritoneal cath placement.
Revision craniotomy wound with complex closure for persistent drainage, subgaleal hematoma
acute SDH, cerebral herniation/compression. Acute venous bleeding tamponaded with gel foam.
normal intra-abdominal anatomy

## 2021-06-07 NOTE — PRE-ANESTHESIA EVALUATION ADULT - NSANTHADDINFOFT_GEN_ALL_CORE
telephone consent from pt's wife Brianne
Patient brought straight to the OR as a level 1 trauma for emergency craniotomy. Surgeon Dr. Byrne attempted to call the patient's wife, but unable to contact her, unable to obtain consent in this emergent situation
Documentation performed after application of Anesthesia, as a result of urgency to get patient into the room. Full report and physical performed in the hallway.

## 2021-06-07 NOTE — PROGRESS NOTE ADULT - SUBJECTIVE AND OBJECTIVE BOX
NEUROSURGICAL POST OPERATIVE NOTE   S/P : PLACEMENT OF  SHUNT SET TO 1.5   POD #0    OR COURSE : Brought to OR 6.7.21 for placement of right frontal VPS strata II programmable valve set at 1.5, Lap assist for peritoneal cath placement.  Estimated Blood Loss: 20mL   Specimens : CSF cultures sent from OR     HPI:  Patient is a 45yM w/ PMHx of Etoh abuse and seizures on keppra seen as Trauma Alert upgraded to a Code trauma during examination in the ED where the patient was found to have a GCS of 4 s/p found down unresponsive.  Trauma assessment in ED: intubated for airway protection  (06 May 2021 22:29)    PAST MEDICAL & SURGICAL HISTORY:  Alcohol abuse    GERD (gastroesophageal reflux disease)    ETOH abuse    Hypomagnesemia    Seizure disorder    H/O hemorrhoidectomy    FAMILY HISTORY:  Family history of essential hypertension (Father)    Allergies  No Known Allergies    REVIEW OF SYSTEMS : All systems negative other than those listed in HPI  General:	-  Skin/Breast: -  Ophthalmologic: -   ENMT: -  Respiratory and Thorax: -  Cardiovascular: -	  Gastrointestinal: -  Genitourinary:-  Musculoskeletal:	-  Neurological:	-  Psychiatric:	-  Hematology/Lymphatics:	-  Endocrine:-  Allergic/Immunologic:	-    MEDICATIONS  (STANDING):  fludroCORTISONE 0.1 milliGRAM(s) Oral <User Schedule>    MEDICATIONS  (PRN):    Antibiotics:    Anticoagulation:    OTHER:  fludroCORTISONE 0.1 milliGRAM(s) Oral <User Schedule>    Vital Signs Last 24 Hrs  T(C): 36.4 (07 Jun 2021 17:58), Max: 36.4 (07 Jun 2021 17:47)  T(F): 97.6 (07 Jun 2021 17:47), Max: 97.6 (07 Jun 2021 17:47)  HR: 82 (07 Jun 2021 17:58) (67 - 104)  BP: 138/94 (07 Jun 2021 17:58) (129/81 - 166/94)  BP(mean): 111 (07 Jun 2021 17:58) (100 - 123)  RR: 18 (07 Jun 2021 17:58) (16 - 26)  SpO2: 99% (07 Jun 2021 17:58) (99% - 100%)    Physical Exam :  General : Pt remains on a trach collar   Facial features symmetric, No droop  Occular :   PERRLA, EOM    Wound : Surgical staples remain in place from previous crani site   - Right cranial site of shunt placement closed with staples covered w dressing, no active drainage   - Abdominal incision closed w sutures covered w dressing w/o any active drainage     LABS:                        8.6    4.53  )-----------( 289      ( 06 Jun 2021 23:32 )             26.6     06-07    140  |  100  |  6<L>  ----------------------------<  97  3.9   |  23  |  <0.5<L>    Ca    9.7      07 Jun 2021 04:20  Phos  4.0     06-06  Mg     1.8     06-06      PT/INR - ( 06 Jun 2021 17:30 )   PT: 13.90 sec;   INR: 1.21 ratio      PTT - ( 06 Jun 2021 17:30 )  PTT:30.8 sec    Assessment / Plan:   - PT brought back to BICU from OR  - Obtain CTH tonight s/p VPS placement   - F/u CSF cultures sent from OR  - R IJ removed in OR   - Cole catheter remains in place of PEG Tube until replaced, pt may continue feeds per ICU  - continue broad spectrum abx   - Discussed with attending    NEUROSURGICAL POST OPERATIVE NOTE   S/P : PLACEMENT OF  SHUNT SET TO 1.5   POD #0    OR COURSE : Brought to OR 6.7.21 for placement of right frontal VPS strata II programmable valve set at 1.5, Lap assist for peritoneal cath placement.  Estimated Blood Loss: 20mL   Specimens : CSF cultures sent from OR     HPI:  Patient is a 45yM w/ PMHx of Etoh abuse and seizures on keppra seen as Trauma Alert upgraded to a Code trauma during examination in the ED where the patient was found to have a GCS of 4 s/p found down unresponsive.  Trauma assessment in ED: intubated for airway protection  (06 May 2021 22:29)    PAST MEDICAL & SURGICAL HISTORY:  Alcohol abuse    GERD (gastroesophageal reflux disease)    ETOH abuse    Hypomagnesemia    Seizure disorder    H/O hemorrhoidectomy    FAMILY HISTORY:  Family history of essential hypertension (Father)    Allergies  No Known Allergies    REVIEW OF SYSTEMS : All systems negative other than those listed in HPI  General:	-  Skin/Breast: -  Ophthalmologic: -   ENMT: -  Respiratory and Thorax: -  Cardiovascular: -	  Gastrointestinal: -  Genitourinary:-  Musculoskeletal:	-  Neurological:	-  Psychiatric:	-  Hematology/Lymphatics:	-  Endocrine:-  Allergic/Immunologic:	-    MEDICATIONS  (STANDING):  fludroCORTISONE 0.1 milliGRAM(s) Oral <User Schedule>    MEDICATIONS  (PRN):    Antibiotics:    Anticoagulation:    OTHER:  fludroCORTISONE 0.1 milliGRAM(s) Oral <User Schedule>    Vital Signs Last 24 Hrs  T(C): 36.4 (07 Jun 2021 17:58), Max: 36.4 (07 Jun 2021 17:47)  T(F): 97.6 (07 Jun 2021 17:47), Max: 97.6 (07 Jun 2021 17:47)  HR: 82 (07 Jun 2021 17:58) (67 - 104)  BP: 138/94 (07 Jun 2021 17:58) (129/81 - 166/94)  BP(mean): 111 (07 Jun 2021 17:58) (100 - 123)  RR: 18 (07 Jun 2021 17:58) (16 - 26)  SpO2: 99% (07 Jun 2021 17:58) (99% - 100%)    Physical Exam :  General : Pt remains on a trach collar   Facial features symmetric, No droop  No verbal responses   No skull on left   Occular :   PERRLA, EOMI  Motor :  Moves LUE & LLE spontaneously   No movement of RUE/ RLE     Wound : Surgical staples remain in place from previous crani site   - Right cranial site of shunt placement closed with staples covered w dressing, no active drainage   - Abdominal incision closed w sutures covered w dressing w/o any active drainage     LABS:                        8.6    4.53  )-----------( 289      ( 06 Jun 2021 23:32 )             26.6     06-07    140  |  100  |  6<L>  ----------------------------<  97  3.9   |  23  |  <0.5<L>    Ca    9.7      07 Jun 2021 04:20  Phos  4.0     06-06  Mg     1.8     06-06      PT/INR - ( 06 Jun 2021 17:30 )   PT: 13.90 sec;   INR: 1.21 ratio      PTT - ( 06 Jun 2021 17:30 )  PTT:30.8 sec    Assessment / Plan:   - PT brought back to BICU from OR  - Obtain CTH tonight s/p VPS placement   - F/u CSF cultures sent from OR  - R IJ removed in OR   - Cole catheter remains in place of PEG Tube until replaced, pt may continue feeds per ICU  - continue broad spectrum abx   - Discussed with attending

## 2021-06-07 NOTE — PROGRESS NOTE ADULT - SUBJECTIVE AND OBJECTIVE BOX
SANDRA DAS  [id number]  45y Male    Indication for ICU admission: s/p craniectomy for large L SDH  Admit Date:05-06-21  ICU Date: 05-07-21  OR Date: 05-06-21    No Known Allergies    PAST MEDICAL & SURGICAL HISTORY:  Alcohol abuse  GERD (gastroesophageal reflux disease)  ETOH abuse  Hypomagnesemia  Seizure disorder  H/O hemorrhoidectomy    Home Medications:  NONE    24HRS EVENT:  6/6  NIGHT:  -Na 141 --> d/c'ed 3%    DAY  -plan for  shunt in am  -NPO p MN  -new T&S sent   -Covid swab NEG  -Na 141, 3% down to 10  -ID cleared pt for OR from note on 6/4  -cards c/s pending for clearance        DVT PTX: LVX  GI PTX: PPI    ***Tubes/Lines/Drains  ***  Peripheral IV  Left basilic midline 5/17  Arterial Line Right radial	Date 5/17  R IJ TLC     6/4  Trach/peg 5/21    REVIEW OF SYSTEMS  [ ] A ten-point review of systems was otherwise negative except as noted.  [x] Due to altered mental status/intubation, subjective information were not able to be obtained from the patient. History was obtained, to the extent possible, from review of the chart and collateral sources of information.

## 2021-06-07 NOTE — BRIEF OPERATIVE NOTE - NSICDXBRIEFPOSTOP_GEN_ALL_CORE_FT
POST-OP DIAGNOSIS:  Subdural hematoma 07-May-2021 11:22:58  Eleazar Byrne  Hydrocephalus 07-Jun-2021 20:11:35  Lidia Conley

## 2021-06-07 NOTE — PACU DISCHARGE NOTE - COMMENTS
Pt now SP GETA without complication. Back to SICU with O2via T-piece for transport and full report given to SICU staff. See anesthesia record for SICU admission vital signs.

## 2021-06-07 NOTE — PRE-ANESTHESIA EVALUATION ADULT - NSANTHOSAYNRD_GEN_A_CORE
unknown
unknown/No. HANK screening performed.  STOP BANG Legend: 0-2 = LOW Risk; 3-4 = INTERMEDIATE Risk; 5-8 = HIGH Risk
unknown
unknown/No. HANK screening performed.  STOP BANG Legend: 0-2 = LOW Risk; 3-4 = INTERMEDIATE Risk; 5-8 = HIGH Risk

## 2021-06-07 NOTE — PROGRESS NOTE ADULT - ASSESSMENT
Assessment & Plan  45M s/p fall with large left SDH and neurological status change, level 1 for emergent craniectomy complicated by subgleal hematoma/collection s/p wound revision and drainage on 5/16.    NEURO:      S/P craniectomy for large left SDH     - GCS 11T, Q2H neuro checks, No sedation     - Keppra 500mg BID for sz ppx    Hyponatremia, now resolved- goal Na+ >135     -  Cerebral salt wasting     -  d/c'ed 3%, continued on salt tabs 8q6 - plan to wean      - completed Fludricortasone x 6 doses    Maintian Head of bed @ 30 degrees    Craniectomy (no left side bone flap) Helmet --> does not need while laying in bed only     during PT/OT   OR 5/16:     -Revision craniectomy wound with complex closure for persistent drainage, subgaleal hematoma- e. 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  *** NSX -eval CT H for pre-op planning: increased L to R MLS 1.3cm w/ mass effect on lateral ventricles.   shunt planned for today    RESP:   Respiratoy Failure    s/p trach 5/21    tolerating T-piece since 5/24    ABG prn    AM CXR        CARDS:     S/P cardiac arrest in OR & MTP     - Hypertension controlled on Lisinopril 20mg     - Propranolol 20mg q8  -cleared for  shunt by cardiology (mod. risk for mod. risk procedure)    GI/NUTR:     Diet: TF (Osmolite 1.5) @ 50 via Gtube (using jain in situ) - NPO p MN for OR    GI Prophylaxis- PPI    Bowel regimen - Senna         Last BM 6/6    /RENAL:   No acute issues    Voiding via condom cath - monitor UO    Q6 BMP, serum osm      - Hyponatremic in setting of Cerebral salt wasting - Na goal >135     - d/c'ed 3%, continued on salt tabs 8g q6h - plan to wean     -completed Fludrocortisone x6 doses  BUN/Cr- 7/0.5  Electrolytes- Na+ 141 / K+ 4.1 / Mg 1.8 / Ph 4.0 -- hypomagnesemia, repleted  Monitor & replete elytes prn      HEME/ONC:   No acute issues    Labs:   Hgb stable, 8.6  maintain Hg >8, MTP on this admission  DVT prophylaxis: Lovenox 40  5/26 DVT sono neg  -new T&S sent 6/6    ID:  Craniectomy site wound infection  Borderline leukopenia - WBC stable,  4  Afebrile  Antibiotics- Seamus, Vanco  - Vanco trough 6/6 @ am - ___  Cultures     - 5/11- u/a +leuk     - blood culture 5/13, 5/19, 5/20, 5/23, 5/28 No growth      -Cranial Culture 5/21-e faceium     - Reculture if spikes  -6/6 Covid swab NEG  -ID cleared pt for OR from note on 6/4    ENDO:  FS controlled  q6 POC glucose   Sliding scale insulin PRN  A1c 5.2    DISPO: SICU

## 2021-06-07 NOTE — PRE-OP CHECKLIST - 2.
pt does NOT have a bone flap on left crani; do not reposition on that side
on Fentanyl & Prop gtt, 3%NS

## 2021-06-07 NOTE — BRIEF OPERATIVE NOTE - NSICDXBRIEFPREOP_GEN_ALL_CORE_FT
PRE-OP DIAGNOSIS:  Subdural hematoma 07-May-2021 11:23:18  Eleazar Byrne  Hydrocephalus 07-Jun-2021 20:11:24  Lidia Conley

## 2021-06-08 LAB
ANION GAP SERPL CALC-SCNC: 10 MMOL/L — SIGNIFICANT CHANGE UP (ref 7–14)
ANION GAP SERPL CALC-SCNC: 11 MMOL/L — SIGNIFICANT CHANGE UP (ref 7–14)
ANION GAP SERPL CALC-SCNC: 11 MMOL/L — SIGNIFICANT CHANGE UP (ref 7–14)
APTT BLD: 22.3 SEC — CRITICAL LOW (ref 27–39.2)
BUN SERPL-MCNC: 13 MG/DL — SIGNIFICANT CHANGE UP (ref 10–20)
BUN SERPL-MCNC: 16 MG/DL — SIGNIFICANT CHANGE UP (ref 10–20)
BUN SERPL-MCNC: 9 MG/DL — LOW (ref 10–20)
CALCIUM SERPL-MCNC: 10.4 MG/DL — HIGH (ref 8.5–10.1)
CALCIUM SERPL-MCNC: 9.7 MG/DL — SIGNIFICANT CHANGE UP (ref 8.5–10.1)
CALCIUM SERPL-MCNC: 9.8 MG/DL — SIGNIFICANT CHANGE UP (ref 8.5–10.1)
CHLORIDE SERPL-SCNC: 101 MMOL/L — SIGNIFICANT CHANGE UP (ref 98–110)
CHLORIDE SERPL-SCNC: 105 MMOL/L — SIGNIFICANT CHANGE UP (ref 98–110)
CHLORIDE SERPL-SCNC: 96 MMOL/L — LOW (ref 98–110)
CO2 SERPL-SCNC: 27 MMOL/L — SIGNIFICANT CHANGE UP (ref 17–32)
CO2 SERPL-SCNC: 27 MMOL/L — SIGNIFICANT CHANGE UP (ref 17–32)
CO2 SERPL-SCNC: 28 MMOL/L — SIGNIFICANT CHANGE UP (ref 17–32)
CREAT SERPL-MCNC: 0.5 MG/DL — LOW (ref 0.7–1.5)
GLUCOSE SERPL-MCNC: 150 MG/DL — HIGH (ref 70–99)
GLUCOSE SERPL-MCNC: 158 MG/DL — HIGH (ref 70–99)
GLUCOSE SERPL-MCNC: 164 MG/DL — HIGH (ref 70–99)
HCT VFR BLD CALC: 33.3 % — LOW (ref 42–52)
HGB BLD-MCNC: 10.7 G/DL — LOW (ref 14–18)
MAGNESIUM SERPL-MCNC: 2 MG/DL — SIGNIFICANT CHANGE UP (ref 1.8–2.4)
MCHC RBC-ENTMCNC: 27.2 PG — SIGNIFICANT CHANGE UP (ref 27–31)
MCHC RBC-ENTMCNC: 32.1 G/DL — SIGNIFICANT CHANGE UP (ref 32–37)
MCV RBC AUTO: 84.5 FL — SIGNIFICANT CHANGE UP (ref 80–94)
NRBC # BLD: 0 /100 WBCS — SIGNIFICANT CHANGE UP (ref 0–0)
PHOSPHATE SERPL-MCNC: 4.5 MG/DL — SIGNIFICANT CHANGE UP (ref 2.1–4.9)
PLATELET # BLD AUTO: 278 K/UL — SIGNIFICANT CHANGE UP (ref 130–400)
POTASSIUM SERPL-MCNC: 4.3 MMOL/L — SIGNIFICANT CHANGE UP (ref 3.5–5)
POTASSIUM SERPL-MCNC: 4.6 MMOL/L — SIGNIFICANT CHANGE UP (ref 3.5–5)
POTASSIUM SERPL-MCNC: 4.6 MMOL/L — SIGNIFICANT CHANGE UP (ref 3.5–5)
POTASSIUM SERPL-SCNC: 4.3 MMOL/L — SIGNIFICANT CHANGE UP (ref 3.5–5)
POTASSIUM SERPL-SCNC: 4.6 MMOL/L — SIGNIFICANT CHANGE UP (ref 3.5–5)
POTASSIUM SERPL-SCNC: 4.6 MMOL/L — SIGNIFICANT CHANGE UP (ref 3.5–5)
RBC # BLD: 3.94 M/UL — LOW (ref 4.7–6.1)
RBC # FLD: 13.5 % — SIGNIFICANT CHANGE UP (ref 11.5–14.5)
SODIUM SERPL-SCNC: 134 MMOL/L — LOW (ref 135–146)
SODIUM SERPL-SCNC: 139 MMOL/L — SIGNIFICANT CHANGE UP (ref 135–146)
SODIUM SERPL-SCNC: 143 MMOL/L — SIGNIFICANT CHANGE UP (ref 135–146)
VANCOMYCIN TROUGH SERPL-MCNC: 27.7 UG/ML — HIGH (ref 5–10)
WBC # BLD: 8.39 K/UL — SIGNIFICANT CHANGE UP (ref 4.8–10.8)
WBC # FLD AUTO: 8.39 K/UL — SIGNIFICANT CHANGE UP (ref 4.8–10.8)

## 2021-06-08 PROCEDURE — 70450 CT HEAD/BRAIN W/O DYE: CPT | Mod: 26

## 2021-06-08 PROCEDURE — 99291 CRITICAL CARE FIRST HOUR: CPT

## 2021-06-08 PROCEDURE — 71045 X-RAY EXAM CHEST 1 VIEW: CPT | Mod: 26

## 2021-06-08 RX ORDER — PANTOPRAZOLE SODIUM 20 MG/1
40 TABLET, DELAYED RELEASE ORAL DAILY
Refills: 0 | Status: DISCONTINUED | OUTPATIENT
Start: 2021-06-08 | End: 2021-06-08

## 2021-06-08 RX ORDER — ENOXAPARIN SODIUM 100 MG/ML
40 INJECTION SUBCUTANEOUS DAILY
Refills: 0 | Status: DISCONTINUED | OUTPATIENT
Start: 2021-06-08 | End: 2021-06-16

## 2021-06-08 RX ORDER — PANTOPRAZOLE SODIUM 20 MG/1
40 TABLET, DELAYED RELEASE ORAL DAILY
Refills: 0 | Status: DISCONTINUED | OUTPATIENT
Start: 2021-06-08 | End: 2021-06-16

## 2021-06-08 RX ADMIN — SODIUM CHLORIDE 8 GRAM(S): 9 INJECTION INTRAMUSCULAR; INTRAVENOUS; SUBCUTANEOUS at 05:52

## 2021-06-08 RX ADMIN — ENOXAPARIN SODIUM 40 MILLIGRAM(S): 100 INJECTION SUBCUTANEOUS at 11:35

## 2021-06-08 RX ADMIN — MEROPENEM 100 MILLIGRAM(S): 1 INJECTION INTRAVENOUS at 13:06

## 2021-06-08 RX ADMIN — Medication 250 MILLIGRAM(S): at 05:52

## 2021-06-08 RX ADMIN — SODIUM CHLORIDE 8 GRAM(S): 9 INJECTION INTRAMUSCULAR; INTRAVENOUS; SUBCUTANEOUS at 11:36

## 2021-06-08 RX ADMIN — Medication 2 DROP(S): at 05:16

## 2021-06-08 RX ADMIN — SENNA PLUS 2 TABLET(S): 8.6 TABLET ORAL at 21:33

## 2021-06-08 RX ADMIN — MEROPENEM 100 MILLIGRAM(S): 1 INJECTION INTRAVENOUS at 05:16

## 2021-06-08 RX ADMIN — Medication 650 MILLIGRAM(S): at 14:00

## 2021-06-08 RX ADMIN — MEROPENEM 100 MILLIGRAM(S): 1 INJECTION INTRAVENOUS at 21:33

## 2021-06-08 RX ADMIN — LEVETIRACETAM 400 MILLIGRAM(S): 250 TABLET, FILM COATED ORAL at 05:16

## 2021-06-08 RX ADMIN — Medication 2 DROP(S): at 13:07

## 2021-06-08 RX ADMIN — LEVETIRACETAM 400 MILLIGRAM(S): 250 TABLET, FILM COATED ORAL at 18:19

## 2021-06-08 RX ADMIN — Medication 2 DROP(S): at 21:34

## 2021-06-08 RX ADMIN — PANTOPRAZOLE SODIUM 40 MILLIGRAM(S): 20 TABLET, DELAYED RELEASE ORAL at 11:35

## 2021-06-08 RX ADMIN — Medication 650 MILLIGRAM(S): at 13:18

## 2021-06-08 RX ADMIN — Medication 250 MILLIGRAM(S): at 18:19

## 2021-06-08 RX ADMIN — LISINOPRIL 20 MILLIGRAM(S): 2.5 TABLET ORAL at 05:52

## 2021-06-08 RX ADMIN — SODIUM CHLORIDE 8 GRAM(S): 9 INJECTION INTRAMUSCULAR; INTRAVENOUS; SUBCUTANEOUS at 18:19

## 2021-06-08 NOTE — CHART NOTE - NSCHARTNOTEFT_GEN_A_CORE
SICU Transfer Note    Transfer from: SICU    SICU COURSE:    -resume lovenox   -per neuro no 3% continue salt tabs and serial Na  -continue abx 7 days post op until   NIGHT:  -s/p  shunt (OR time 1.5 hrs, EBL 30,  LR, 1 g Ancef, Zofran/decadron at end of case)  -q1h neurochecks    DAY  -TLC to be dc in OR 2/2 shunt placement       NIGHT:  -Na 141 --> d/c'ed 3%    DAY  -Covid swab NEG  -Na 141, 3% down to 10  -ID cleared pt for OR from note on   NIGHT:  -CT Head: increased L to R MLS 1.3cm w/ mass effect on lateral ventricles.   -Na 139 --> decr 3% to 30cc/hr    DAY  -NSX plans: CT hd tonight or tomorrow, then  shunt?  - titrate down 3% --> 40cc/hr  - 3 more doses of fludrocortisone .1 Q8H      NIGHT:  -no UO x few hours, bladder scan 260 w/o intervention, now voiding    DAY  -continue fludrocortisone 0.1 x3  -Per nephro 3% decreased from 50 to 40cc, pt with Na downtrending will continue 3% at 50    6/3  Day  -fludrocortisone 0.1 q8 x3 doses  -q6 BMP/Osm      DAY  -Ulytes, Uosm, Paola>>Fludricortisone   -Pt self d/c'd PEG tube, jain placed, per Gave keep jain in okay to feed  -500 NS bolus, 20 Kcl    NIGHT:   -Na 132 on 3% @ 50, NS @ 50        Day  -NS bolus 500x2  - maintenance NS @50cc in attempt to wean 3%  -3% increased to 50cc  -f/u BMP @ 11 + 16:00  -watery BM- hold senna; send c. diff if continues  -Na downtrending 129.128 s/p increase in 3%. Repeat bmp 21:00 before increasing Na correction to prevent overcorrecting     ON:  - Neuro exam same as previous  - 3% remains at 50-> 9pm Na 131>133 f/u 8am      Day  Na 144, dec 3%30, 147, holding 3%, 153, holding salt tab  watery bm, dignishield   as per ID, keep ABX for 2 wks,  Vanc level 15.7  K 3.3, 3k-riders >4.0  tachy to 100-110 ,nicom neg     NIGHT:   Sodium dropped from 153 to 131, 3% restarted at 30, salt tabs restarted , am Na 131, goal Na 130-145  opens eyes to voice, follows simle commands on LUE moves LLE spontanously  HDS, remians on t-piece, -300/hr  - repletions: 3mag, 15Na phos       DAY:  -Vanco tr @ 11am= 6.7, Vanco reordered  -f/u ID - clearance for  shunt & duration of abx  -decr 3% to 45cc/hr    Night:  - GCS [id number] + left gaze prefrence , pupils 2++ reactive, follows simple commands LUE ,moves LE spontanously   - Tachy 110- > nicom 14.9%- 250+ 250 , HR decreased-> repeat nicom 10.8 250x1   - 3% decreased to 40 ( Na 142)  - Hg 7.9 from 9.2 cbc 11  - UO 2.3L/shift      -increase salt tab 8g q4  -Vanco tr @ 11am = 21.7 --> received 1pm dose -- holding --> repeat level tomorrow 11am    NIGHT  NSGY- MRI c/w pseudomenigoceal- will require  shunt in the coming week      NIGHT  - at MRI, brown port of TLC broken --> will exchange TLC    Day  - MRI of brain to determine pseudomeningocele vs abscess  NSX- to review  - inc salt tabs to 6q4    NIGHT:  - T-Max 100.7  - NSGY removed KIKE @   - na 133 --> f/u AM na --> if low, increase rate; double check with NSGY status of serum Na  - LTAC tomorrow?  - as per NSGY, q2hr Neurochecks  -Repleted phos (NaPhos)  -BC  PM-received    Day  Vanc level 9, as per ID, increased to 1500 q8 and stat 500mg x1      Night  -Na 137, decreased 3% to 50.  Salt tabs up to 6g q6  -f/up Vanco Trough am level      DAY  -Na 139>133, still 3% @60,inc Nacl 6q6 repeat pending @1600  -DVT sono -- negative  -OOBTC  -repeat CT head - there is slight increased thickness of a mixed density extra-axial collection within the craniectomy defect.. 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  -vanc level 8pm  tmax 100.8      Na 139  -3% at 60cc/hr    DAY  -patients helmet delivered -PT consulted  -helmet only while in bed  -spiked to 101.6 axillary, abx changed to Vanc/Seamus, d/c unasyn  -Salt tabs changed from 4g q8 to 4g q6  -start to wean 3% -- Na goal 135 acceptable per nsx      NIGHT:   - QTc 514 --> 2gm Mag given --> repeat 414    DAY  - ?? RTOR for washout ?? (add-on) = cancelled  - d/c Vanco, Cefepime; start Unasyn  -started on salt tabs 4g q8h  - OOB after helmet obtained      NIGHT  -NPO at [facility] for OR tmrw, washout with NSX  Day  - NSX placed KIKE under the skin to gravity, old blood aspirated and some pus, possible OR tomorrow for washout   - inc cefepime 2gm      PM  -Tmax 100.0  -back on AC overnight, tolerating 30/5  -purulent drainage from scalp woun  -10 labetalol for HTN 170s  -Na 141 from 142, inc 3% to 70cc/hr, Osm 301    DAY  -maintain pressure support  for day, had ep of apnea, increased PS 10/5  -dressing saturated (serous)  -added vanc as per ID (1250 Q12H)  -Crani cx- e. faecalis, sens pending  -temperature and BP increasing --> added motrin atc  -Na 142      PM  -Inc 3% to 65cc/hr, rpt Na 142    Day  Repeat urine osmoloarity >800  Na 140>139, started 3% @50cc   Motivalo to drain collectionj  abscess vs csf- started Cefepime  NSX- no need for thrombectomy at this time       NIGHT  - CT venogram: Left transverse sinus thrombosis  - PSV: 30% pulling ~410-450 TV since     DAY  - ID - hold abx, f/u bc from overnight, culture if fever spikes again      NIGHT:  - TMax 101.6 (central fever) --> cultures sent  - no claviprex since 1600    DAY  -d/c tolvaptam, Na 153 now, UO slowed down /hr  -change Amlodipine to Lisinopril 10, wean cleviprex   -nsx d/c'd KIKE drain   -CT Venogram tomorrow       DAY  -OFF 3%  -Na up to 156 now, pouring out urine, tachy 120, Nicom x2, responsive once, gave total 2L.  Will reassess tolvaptam in AM    NIGHT  Na 158,  > 500 LR bolus  Serum osmolality 324      DAY  -Tolerated SIMV, however, still with Respiratory alkalosis; placed back on AC and sedation increased  -d/w nsgy regarding starting tolvaptan -- > start as per Dr. Avila (Na 144)  -repeat CT venogram to r/o sinous thrombus as per neurology  -sinus tachycardia 120s, fentanyl 25 IVP no response, NICOM 16% --> 500 bolus = 750cc total    Overnight-  Cleviprex up to 8 mg/hr  First dose of Tolvaptam at 2330   ( prior to first dose)  3% Sodium chloride  continued at 80 ml./hr      NIGHT  - increased 3%NaCl to 80 (max)  - Na 136 > 140    DAY  -s/p OR --> revision craniotomy wound with complex closure for persistent drainage, subgaleal hematoma. Clot was evacuated, bleeding noted. Hemostasis achieved without  difficulty. KIKE drain left  in subgaleal space.   -2 U PRBC intraop and immediately post op  -Post op AB.45/34/84/24    5/15  NIGHT:  -Inc 3% to 70cc/hr    DAY  -Hgb 7.5, tx 1u prbc per NSX  -Salt tabs 4g q4h      DAY:  - ID: d/c Vanco, incr Cefep to 2g q12h  - flap closure by NSGY on sun 21  - GCS 11t, opens eyes, R-sided weakness, follows commands on L (weak)  - urine Na, osm  - bolus   - salt tabs 3g q6h  - TF switched to osmolite 1.5 @50 per nutrition      Day  3% 100 ml bolus,   Q6h BMP  As per pharmacy- increase Vancomycin to 1250 q8h  fentanyl gtt started  NSGY d/w wife continue aggressive care-OR for washour   CTA neck and Brain - no evidence acute large vessel occlusion, severe stenosis of R vertebral artery and moderate stenosis of mid-distal basilar artery, possible jugular venous thrombosis, NCHCT recommended for left convexity subdural collection, and increasing edema around L temporal/occipital parenchemal hemorrhage      Day  BAL- GPR  Blood cx - klebsiella, sens pending   salt tabs inc 4 q6  Na 131>135> 132, increased 3% to 50cc      DAY  -palliative consult - psychosocial eval done  -Sedation vacation- following commands on left side  -Propranalol 20 mg q8  -started Cefipime vanco  pm    5/10  DAY  -repeat CT head -> development of acute infarcts in left cerebral hemisphere and right cerebellium  -palliative consult to see tomorrow    Overnight  -Salt tabs added to maintain goal Na 145-150      Night  Na 144>142, started NS @75    Day:  -Plts 85, no more plts transfusion, since no further oozing from the dressing, NSG ok with it  -change TF to Vital HP @ 55    5  Night  PLT 71, 1 unit PLT transfused (as per NSGY, PLT > 100)    DAY  - starting TF osmolite 20cc goal 60cc  - Plt 77 > +1u platelets  - Na goal 145-150, current Na 148  - ECHO: EF 45-50, mild TR    5/7  NIGHT  -DDAVP 20mcg x1 & Heme consult per NSX  -1 pack platelets for Plts 50s >81, 2nd PLT  -tachy imroving 110-120  -GCS 7T    DAY  -Post Op HCT - post op changes, resolution of MLS, stable SDH along cerebral falx and L tentorium, new trace scattered SAH along the right cerebral sulci and right cerebellar sulci and new IVP in the b/l occipital horns.  -NS @ 120  -GCS 6 on AM rounds  -10 vit K  -d/c precedex  -1 Pack of platelets given  -tachy and febrile -->1g tylenol    5/6  NIGHT  -1uPlatelets   -On Levo with Tachy--> Hgb 9.0 given 1L NS bolus      Vital Signs Last 24 Hrs  T(C): 36.6 (2021 12:00), Max: 36.6 (2021 12:00)  T(F): 97.8 (2021 12:00), Max: 97.8 (2021 12:00)  HR: 90 (2021 12:00) (67 - 104)  BP: 112/66 (2021 12:00) (112/66 - 166/94)  BP(mean): 84 (2021 12:00) (84 - 123)  RR: 18 (2021 12:00) (14 - 22)  SpO2: 100% (2021 12:00) (99% - 100%)  I&O's Summary    2021 07:01  -  2021 07:00  --------------------------------------------------------  IN: 1650 mL / OUT: 1280 mL / NET: 370 mL    2021 07:01  -  2021 12:40  --------------------------------------------------------  IN: 200 mL / OUT: 400 mL / NET: -200 mL      MEDICATIONS  (STANDING):  artificial tears (preservative free) Ophthalmic Solution 2 Drop(s) Both EYES three times a day  enoxaparin Injectable 40 milliGRAM(s) SubCutaneous daily  levETIRAcetam  IVPB 500 milliGRAM(s) IV Intermittent every 12 hours  lisinopril 20 milliGRAM(s) Oral daily  meropenem  IVPB 1000 milliGRAM(s) IV Intermittent every 8 hours  pantoprazole  Injectable 40 milliGRAM(s) IV Push daily  propranolol 20 milliGRAM(s) Oral every 8 hours  senna 2 Tablet(s) Oral at bedtime  sodium chloride 8 Gram(s) Oral every 6 hours  vancomycin  IVPB 2000 milliGRAM(s) IV Intermittent every 12 hours    MEDICATIONS  (PRN):  acetaminophen   Tablet .. 650 milliGRAM(s) Oral every 6 hours PRN Temp greater or equal to 38.5C (101.3F), Moderate Pain (4 - 6), Severe Pain (7 - 10)        LABS                                            10.7                  Neurophils% (auto):   x      ( @ 00:47):    8.39 )-----------(278          Lymphocytes% (auto):  x                                             33.3                   Eosinphils% (auto):   x        Manual%: Neutrophils x    ; Lymphocytes x    ; Eosinophils x    ; Bands%: x    ; Blasts x                                    134    |  96     |  9                   Calcium: 10.4  / iCa: x      ( @ 00:47)    ----------------------------<  158       Magnesium: 2.0                              4.3     |  27     |  0.5              Phosphorous: 4.5        (  @ 22:55 )   PT: 14.50 sec;   INR: 1.26 ratio  aPTT: 22.3 sec        ASSESSMENT & PLAN:   45M s/p fall with large left SDH and neurological status change, level 1 for emergent craniectomy complicated by subgleal hematoma/collection s/p wound revision and drainage on .    NEURO:      S/P craniectomy for large left SDH    S/P  shunt      - GCS 10T, Q2H neuro checks, No sedation     - Keppra 500mg BID for sz ppx    Hyponatremia, now resolved     -  Cerebral salt wasting     -  d/c'ed 3%, continued on salt tabs 8q6      - completed Fludricortasone x 6 doses    Maintian Head of bed @ 30 degrees    Craniectomy (no left side bone flap) Helmet --> does not need while laying in bed only     during PT/OT   OR :     -Revision craniectomy wound with complex closure for persistent drainage, subgaleal hematoma- e. 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  *** NSX -eval CT H for pre-op planning: increased L to R MLS 1.3cm w/ mass effect on lateral ventricles.    RTOR (): s/p  shunt    -q1h neurochecks, CT Head s/p  shunt- pending    RESP:   Respiratoy Failure    s/p trach     tolerating T-piece since     ABG prn    AM CXR        CARDS:     S/P cardiac arrest in OR & MTP     - Hypertension controlled on Lisinopril 20mg     - Propranolol 20mg q8      GI/NUTR:     Diet: TF (Osmolite 1.5) @ 50 via Gtube (using jain in situ)     GI Prophylaxis- PPI    Bowel regimen - Senna         Last BM     /RENAL:   No acute issues    Voiding via condom cath - monitor UO    Q6 BMP, serum osm      - Hyponatremic in setting of Cerebral salt wasting - goal eunatremic      - d/c'ed 3%, continued on salt tabs 8g q6h      -completed Fludrocortisone x6 doses  BUN/Cr- 9/0.5  Electrolytes- Na+ 134 / K+ 4.3 / Mg 2.0 / Ph 4.5  Monitor & replete elytes prn      HEME/ONC:   No acute issues    Labs:   Hgb stable, 8.6 > 10.7  maintain Hg >8, MTP on this admission  DVT prophylaxis: Lovenox 40   DVT sono neg  -T&S     ID:  Craniectomy site wound infection  Borderline leukopenia - WBC  4 > 10 (post-op) > 8  Afebrile  Antibiotics- Seamus, Vanco - continue through   - Vanco trough  @ 4pm - 19.4  Cultures     - - u/a +leuk     - blood culture , , , ,  No growth      -Cranial Culture -e faceium     - Reculture if spikes  - Covid swab NEG      ENDO:  FS controlled  q6 POC glucose   Sliding scale insulin PRN  A1c 5.2    DISPO: Vent unit    For Follow-Up:    -Q1H neuro checks  -Abx to be dc 7 days post op ()  -CT Head result  -Na levels on salt tabs 8g Q6H    Signed out to: SICU Transfer Note    Transfer from: SICU    SICU COURSE:    -resume lovenox   -per neuro no 3% continue salt tabs and serial Na  -continue abx 7 days post op until   NIGHT:  -s/p  shunt (OR time 1.5 hrs, EBL 30,  LR, 1 g Ancef, Zofran/decadron at end of case)  -q1h neurochecks    DAY  -TLC to be dc in OR 2/2 shunt placement       NIGHT:  -Na 141 --> d/c'ed 3%    DAY  -Covid swab NEG  -Na 141, 3% down to 10  -ID cleared pt for OR from note on   NIGHT:  -CT Head: increased L to R MLS 1.3cm w/ mass effect on lateral ventricles.   -Na 139 --> decr 3% to 30cc/hr    DAY  -NSX plans: CT hd tonight or tomorrow, then  shunt?  - titrate down 3% --> 40cc/hr  - 3 more doses of fludrocortisone .1 Q8H      NIGHT:  -no UO x few hours, bladder scan 260 w/o intervention, now voiding    DAY  -continue fludrocortisone 0.1 x3  -Per nephro 3% decreased from 50 to 40cc, pt with Na downtrending will continue 3% at 50    6/3  Day  -fludrocortisone 0.1 q8 x3 doses  -q6 BMP/Osm      DAY  -Ulytes, Uosm, Paola>>Fludricortisone   -Pt self d/c'd PEG tube, jain placed, per Gave keep jain in okay to feed  -500 NS bolus, 20 Kcl    NIGHT:   -Na 132 on 3% @ 50, NS @ 50        Day  -NS bolus 500x2  - maintenance NS @50cc in attempt to wean 3%  -3% increased to 50cc  -f/u BMP @ 11 + 16:00  -watery BM- hold senna; send c. diff if continues  -Na downtrending 129.128 s/p increase in 3%. Repeat bmp 21:00 before increasing Na correction to prevent overcorrecting     ON:  - Neuro exam same as previous  - 3% remains at 50-> 9pm Na 131>133 f/u 8am      Day  Na 144, dec 3%30, 147, holding 3%, 153, holding salt tab  watery bm, dignishield   as per ID, keep ABX for 2 wks,  Vanc level 15.7  K 3.3, 3k-riders >4.0  tachy to 100-110 ,nicom neg     NIGHT:   Sodium dropped from 153 to 131, 3% restarted at 30, salt tabs restarted , am Na 131, goal Na 130-145  opens eyes to voice, follows simle commands on LUE moves LLE spontanously  HDS, remians on t-piece, -300/hr  - repletions: 3mag, 15Na phos       DAY:  -Vanco tr @ 11am= 6.7, Vanco reordered  -f/u ID - clearance for  shunt & duration of abx  -decr 3% to 45cc/hr    Night:  - GCS [id number] + left gaze prefrence , pupils 2++ reactive, follows simple commands LUE ,moves LE spontanously   - Tachy 110- > nicom 14.9%- 250+ 250 , HR decreased-> repeat nicom 10.8 250x1   - 3% decreased to 40 ( Na 142)  - Hg 7.9 from 9.2 cbc 11  - UO 2.3L/shift      -increase salt tab 8g q4  -Vanco tr @ 11am = 21.7 --> received 1pm dose -- holding --> repeat level tomorrow 11am    NIGHT  NSGY- MRI c/w pseudomenigoceal- will require  shunt in the coming week      NIGHT  - at MRI, brown port of TLC broken --> will exchange TLC    Day  - MRI of brain to determine pseudomeningocele vs abscess  NSX- to review  - inc salt tabs to 6q4    NIGHT:  - T-Max 100.7  - NSGY removed KIKE @   - na 133 --> f/u AM na --> if low, increase rate; double check with NSGY status of serum Na  - LTAC tomorrow?  - as per NSGY, q2hr Neurochecks  -Repleted phos (NaPhos)  -BC  PM-received    Day  Vanc level 9, as per ID, increased to 1500 q8 and stat 500mg x1      Night  -Na 137, decreased 3% to 50.  Salt tabs up to 6g q6  -f/up Vanco Trough am level      DAY  -Na 139>133, still 3% @60,inc Nacl 6q6 repeat pending @1600  -DVT sono -- negative  -OOBTC  -repeat CT head - there is slight increased thickness of a mixed density extra-axial collection within the craniectomy defect.. 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  -vanc level 8pm  tmax 100.8      Na 139  -3% at 60cc/hr    DAY  -patients helmet delivered -PT consulted  -helmet only while in bed  -spiked to 101.6 axillary, abx changed to Vanc/Seamus, d/c unasyn  -Salt tabs changed from 4g q8 to 4g q6  -start to wean 3% -- Na goal 135 acceptable per nsx      NIGHT:   - QTc 514 --> 2gm Mag given --> repeat 414    DAY  - ?? RTOR for washout ?? (add-on) = cancelled  - d/c Vanco, Cefepime; start Unasyn  -started on salt tabs 4g q8h  - OOB after helmet obtained      NIGHT  -NPO at [facility] for OR tmrw, washout with NSX  Day  - NSX placed KIKE under the skin to gravity, old blood aspirated and some pus, possible OR tomorrow for washout   - inc cefepime 2gm      PM  -Tmax 100.0  -back on AC overnight, tolerating 30/5  -purulent drainage from scalp woun  -10 labetalol for HTN 170s  -Na 141 from 142, inc 3% to 70cc/hr, Osm 301    DAY  -maintain pressure support  for day, had ep of apnea, increased PS 10/5  -dressing saturated (serous)  -added vanc as per ID (1250 Q12H)  -Crani cx- e. faecalis, sens pending  -temperature and BP increasing --> added motrin atc  -Na 142      PM  -Inc 3% to 65cc/hr, rpt Na 142    Day  Repeat urine osmoloarity >800  Na 140>139, started 3% @50cc   Motivalo to drain collectionj  abscess vs csf- started Cefepime  NSX- no need for thrombectomy at this time       NIGHT  - CT venogram: Left transverse sinus thrombosis  - PSV: 30% pulling ~410-450 TV since     DAY  - ID - hold abx, f/u bc from overnight, culture if fever spikes again      NIGHT:  - TMax 101.6 (central fever) --> cultures sent  - no claviprex since 1600    DAY  -d/c tolvaptam, Na 153 now, UO slowed down /hr  -change Amlodipine to Lisinopril 10, wean cleviprex   -nsx d/c'd KIKE drain   -CT Venogram tomorrow       DAY  -OFF 3%  -Na up to 156 now, pouring out urine, tachy 120, Nicom x2, responsive once, gave total 2L.  Will reassess tolvaptam in AM    NIGHT  Na 158,  > 500 LR bolus  Serum osmolality 324      DAY  -Tolerated SIMV, however, still with Respiratory alkalosis; placed back on AC and sedation increased  -d/w nsgy regarding starting tolvaptan -- > start as per Dr. Avila (Na 144)  -repeat CT venogram to r/o sinous thrombus as per neurology  -sinus tachycardia 120s, fentanyl 25 IVP no response, NICOM 16% --> 500 bolus = 750cc total    Overnight-  Cleviprex up to 8 mg/hr  First dose of Tolvaptam at 2330   ( prior to first dose)  3% Sodium chloride  continued at 80 ml./hr      NIGHT  - increased 3%NaCl to 80 (max)  - Na 136 > 140    DAY  -s/p OR --> revision craniotomy wound with complex closure for persistent drainage, subgaleal hematoma. Clot was evacuated, bleeding noted. Hemostasis achieved without  difficulty. KIKE drain left  in subgaleal space.   -2 U PRBC intraop and immediately post op  -Post op AB.45/34/84/24    5/15  NIGHT:  -Inc 3% to 70cc/hr    DAY  -Hgb 7.5, tx 1u prbc per NSX  -Salt tabs 4g q4h      DAY:  - ID: d/c Vanco, incr Cefep to 2g q12h  - flap closure by NSGY on sun 21  - GCS 11t, opens eyes, R-sided weakness, follows commands on L (weak)  - urine Na, osm  - bolus   - salt tabs 3g q6h  - TF switched to osmolite 1.5 @50 per nutrition      Day  3% 100 ml bolus,   Q6h BMP  As per pharmacy- increase Vancomycin to 1250 q8h  fentanyl gtt started  NSGY d/w wife continue aggressive care-OR for washour   CTA neck and Brain - no evidence acute large vessel occlusion, severe stenosis of R vertebral artery and moderate stenosis of mid-distal basilar artery, possible jugular venous thrombosis, NCHCT recommended for left convexity subdural collection, and increasing edema around L temporal/occipital parenchemal hemorrhage      Day  BAL- GPR  Blood cx - klebsiella, sens pending   salt tabs inc 4 q6  Na 131>135> 132, increased 3% to 50cc      DAY  -palliative consult - psychosocial eval done  -Sedation vacation- following commands on left side  -Propranalol 20 mg q8  -started Cefipime vanco  pm    5/10  DAY  -repeat CT head -> development of acute infarcts in left cerebral hemisphere and right cerebellium  -palliative consult to see tomorrow    Overnight  -Salt tabs added to maintain goal Na 145-150      Night  Na 144>142, started NS @75    Day:  -Plts 85, no more plts transfusion, since no further oozing from the dressing, NSG ok with it  -change TF to Vital HP @ 55    5  Night  PLT 71, 1 unit PLT transfused (as per NSGY, PLT > 100)    DAY  - starting TF osmolite 20cc goal 60cc  - Plt 77 > +1u platelets  - Na goal 145-150, current Na 148  - ECHO: EF 45-50, mild TR    5/7  NIGHT  -DDAVP 20mcg x1 & Heme consult per NSX  -1 pack platelets for Plts 50s >81, 2nd PLT  -tachy imroving 110-120  -GCS 7T    DAY  -Post Op HCT - post op changes, resolution of MLS, stable SDH along cerebral falx and L tentorium, new trace scattered SAH along the right cerebral sulci and right cerebellar sulci and new IVP in the b/l occipital horns.  -NS @ 120  -GCS 6 on AM rounds  -10 vit K  -d/c precedex  -1 Pack of platelets given  -tachy and febrile -->1g tylenol    5/6  NIGHT  -1uPlatelets   -On Levo with Tachy--> Hgb 9.0 given 1L NS bolus      Vital Signs Last 24 Hrs  T(C): 36.6 (2021 12:00), Max: 36.6 (2021 12:00)  T(F): 97.8 (2021 12:00), Max: 97.8 (2021 12:00)  HR: 90 (2021 12:00) (67 - 104)  BP: 112/66 (2021 12:00) (112/66 - 166/94)  BP(mean): 84 (2021 12:00) (84 - 123)  RR: 18 (2021 12:00) (14 - 22)  SpO2: 100% (2021 12:00) (99% - 100%)  I&O's Summary    2021 07:01  -  2021 07:00  --------------------------------------------------------  IN: 1650 mL / OUT: 1280 mL / NET: 370 mL    2021 07:01  -  2021 12:40  --------------------------------------------------------  IN: 200 mL / OUT: 400 mL / NET: -200 mL      MEDICATIONS  (STANDING):  artificial tears (preservative free) Ophthalmic Solution 2 Drop(s) Both EYES three times a day  enoxaparin Injectable 40 milliGRAM(s) SubCutaneous daily  levETIRAcetam  IVPB 500 milliGRAM(s) IV Intermittent every 12 hours  lisinopril 20 milliGRAM(s) Oral daily  meropenem  IVPB 1000 milliGRAM(s) IV Intermittent every 8 hours  pantoprazole  Injectable 40 milliGRAM(s) IV Push daily  propranolol 20 milliGRAM(s) Oral every 8 hours  senna 2 Tablet(s) Oral at bedtime  sodium chloride 8 Gram(s) Oral every 6 hours  vancomycin  IVPB 2000 milliGRAM(s) IV Intermittent every 12 hours    MEDICATIONS  (PRN):  acetaminophen   Tablet .. 650 milliGRAM(s) Oral every 6 hours PRN Temp greater or equal to 38.5C (101.3F), Moderate Pain (4 - 6), Severe Pain (7 - 10)        LABS                                            10.7                  Neurophils% (auto):   x      ( @ 00:47):    8.39 )-----------(278          Lymphocytes% (auto):  x                                             33.3                   Eosinphils% (auto):   x        Manual%: Neutrophils x    ; Lymphocytes x    ; Eosinophils x    ; Bands%: x    ; Blasts x                                    134    |  96     |  9                   Calcium: 10.4  / iCa: x      ( @ 00:47)    ----------------------------<  158       Magnesium: 2.0                              4.3     |  27     |  0.5              Phosphorous: 4.5        (  @ 22:55 )   PT: 14.50 sec;   INR: 1.26 ratio  aPTT: 22.3 sec        ASSESSMENT & PLAN:   45M s/p fall with large left SDH and neurological status change, level 1 for emergent craniectomy complicated by subgleal hematoma/collection s/p wound revision and drainage on .    NEURO:      S/P craniectomy for large left SDH    S/P  shunt      - GCS 10T, Q2H neuro checks, No sedation     - Keppra 500mg BID for sz ppx    Hyponatremia, now resolved     -  Cerebral salt wasting     -  d/c'ed 3%, continued on salt tabs 8q6      - completed Fludricortasone x 6 doses    Maintian Head of bed @ 30 degrees    Craniectomy (no left side bone flap) Helmet --> does not need while laying in bed only     during PT/OT   OR :     -Revision craniectomy wound with complex closure for persistent drainage, subgaleal hematoma- e. 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  *** NSX -eval CT H for pre-op planning: increased L to R MLS 1.3cm w/ mass effect on lateral ventricles.    RTOR (): s/p  shunt    -q1h neurochecks, CT Head s/p  shunt- pending    RESP:   Respiratoy Failure    s/p trach     tolerating T-piece since     ABG prn    AM CXR        CARDS:     S/P cardiac arrest in OR & MTP     - Hypertension controlled on Lisinopril 20mg     - Propranolol 20mg q8      GI/NUTR:     Diet: TF (Osmolite 1.5) @ 50 via Gtube (using jain in situ)     GI Prophylaxis- PPI    Bowel regimen - Senna         Last BM     /RENAL:   No acute issues    Voiding via condom cath - monitor UO    Q6 BMP, serum osm      - Hyponatremic in setting of Cerebral salt wasting - goal eunatremic      - d/c'ed 3%, continued on salt tabs 8g q6h      -completed Fludrocortisone x6 doses  BUN/Cr- 9/0.5  Electrolytes- Na+ 134 / K+ 4.3 / Mg 2.0 / Ph 4.5  Monitor & replete elytes prn      HEME/ONC:   No acute issues    Labs:   Hgb stable, 8.6 > 10.7  maintain Hg >8, MTP on this admission  DVT prophylaxis: Lovenox 40   DVT sono neg  -T&S     ID:  Craniectomy site wound infection  Borderline leukopenia - WBC  4 > 10 (post-op) > 8  Afebrile  Antibiotics- Seamus, Vanco - continue through   - Vanco trough  @ 4pm - 19.4  Cultures     - - u/a +leuk     - blood culture , , , ,  No growth      -Cranial Culture -e faceium     - Reculture if spikes  - Covid swab NEG      ENDO:  FS controlled  q6 POC glucose   Sliding scale insulin PRN  A1c 5.2    DISPO: Vent unit    For Follow-Up:    -Q1H neuro checks  -Abx to be dc 7 days post op ()  -Na levels on salt tabs 8g Q6H    Signed out to: Eb NAVARRO at 1311

## 2021-06-08 NOTE — PROGRESS NOTE ADULT - SUBJECTIVE AND OBJECTIVE BOX
Subjective: 45yMale with a pmhx of SUBDURAL HEMATOMA;RESPIRATORY FAILURE;FALL    ^FALL    No pertinent family history in first degree relatives    Family history of essential hypertension (Father)    Handoff    MEWS Score    No pertinent past medical history    Alcohol abuse    GERD (gastroesophageal reflux disease)    ETOH abuse    Hypomagnesemia    Seizure disorder    Subdural hematoma    Hydrocephalus    S/P subdural hematoma evacuation    Subdural hematoma    Hydrocephalus    Subdural hematoma    Subdural hematoma    Respiratory failure    ETOH abuse    Palliative care by specialist    Insertion of non-tunneled central venous catheter (CVC) in patient age 5 years of age or older    Craniotomy, decompressive    Decompressive craniotomy    Placement, tracheostomy and percutaneous endoscopic gastrostomy    Laparoscopy, single incision    Ventriculoperitoneal shunt    No significant past surgical history    H/O hemorrhoidectomy    FALL    22    Respiratory failure    Fall    [email or web address]    POD# 29 S/P Left Craniectomy for Evac of SDH  POD# 19 S/P Wound Revision  POD#1 s/p placement of right frontal VPS strata II programmable valve set at 1.5, Lap assist for peritoneal cath placement    Pt seen and examined at bedside.  Pt is awake, eyes opened.  Aphasic.  Not moving R side spontaneously.       Allergies    No Known Allergies    Intolerances        Imaging: CTH 6.8 pending read    Vital Signs Last 24 Hrs  T(C): 36.3 (08 Jun 2021 07:20), Max: 36.4 (07 Jun 2021 17:47)  T(F): 97.4 (08 Jun 2021 07:20), Max: 97.6 (07 Jun 2021 17:47)  HR: 100 (08 Jun 2021 10:00) (67 - 104)  BP: 132/70 (08 Jun 2021 10:00) (118/78 - 166/94)  BP(mean): 94 (08 Jun 2021 10:00) (93 - 123)  RR: 16 (08 Jun 2021 10:00) (14 - 25)  SpO2: 100% (08 Jun 2021 10:00) (99% - 100%)      acetaminophen   Tablet .. 650 milliGRAM(s) Oral every 6 hours PRN  artificial tears (preservative free) Ophthalmic Solution 2 Drop(s) Both EYES three times a day  levETIRAcetam  IVPB 500 milliGRAM(s) IV Intermittent every 12 hours  lisinopril 20 milliGRAM(s) Oral daily  meropenem  IVPB 1000 milliGRAM(s) IV Intermittent every 8 hours  pantoprazole  Injectable 40 milliGRAM(s) IV Push daily  propranolol 20 milliGRAM(s) Oral every 8 hours  senna 2 Tablet(s) Oral at bedtime  sodium chloride 8 Gram(s) Oral every 6 hours  vancomycin  IVPB 2000 milliGRAM(s) IV Intermittent every 12 hours        06-07-21 @ 07:01  -  06-08-21 @ 07:00  --------------------------------------------------------  IN: 1650 mL / OUT: 1280 mL / NET: 370 mL    06-08-21 @ 07:01  -  06-08-21 @ 10:58  --------------------------------------------------------  IN: 200 mL / OUT: 400 mL / NET: -200 mL        REVIEW OF SYSTEMS    [ ] A ten-point review of systems was otherwise negative except as noted.  [x ] Due to altered mental status/intubation, subjective information were not able to be obtained from the patient. History was obtained, to the extent possible, from review of the chart and collateral sources of information.          Awake, no verbal response  PERRL  tracks   no facial droop  moves LUE/LLE spontaneously and sometimes to command  slight w/d to RUE, mitten in place  slight w/d to RLE  craniectomy site: slightly sunken, incision clean dry intact  VPS sites: clean and intact       CBC Full  -  ( 08 Jun 2021 00:47 )  WBC Count : 8.39 K/uL  RBC Count : 3.94 M/uL  Hemoglobin : 10.7 g/dL  Hematocrit : 33.3 %  Platelet Count - Automated : 278 K/uL  Mean Cell Volume : 84.5 fL  Mean Cell Hemoglobin : 27.2 pg  Mean Cell Hemoglobin Concentration : 32.1 g/dL  Auto Neutrophil # : x  Auto Lymphocyte # : x  Auto Monocyte # : x  Auto Eosinophil # : x  Auto Basophil # : x  Auto Neutrophil % : x  Auto Lymphocyte % : x  Auto Monocyte % : x  Auto Eosinophil % : x  Auto Basophil % : x    06-08    134<L>  |  96<L>  |  9<L>  ----------------------------<  158<H>  4.3   |  27  |  0.5<L>    Ca    10.4<H>      08 Jun 2021 00:47  Phos  4.5     06-08  Mg     2.0     06-08      PT/INR - ( 07 Jun 2021 22:55 )   PT: 14.50 sec;   INR: 1.26 ratio         PTT - ( 07 Jun 2021 22:55 )  PTT:22.3 sec        Assessment/Plan:   f/u SICU plan for PEG  can discontinue hypertonic saline  cont to trend serial Na levels  OK to start ppx Lovenox  F/u CSF cultures sent from OR  continue broad spectrum abx   care per SICU  d/w attg

## 2021-06-08 NOTE — PROGRESS NOTE ADULT - ASSESSMENT
Assessment & Plan  45M s/p fall with large left SDH and neurological status change, level 1 for emergent craniectomy complicated by subgleal hematoma/collection s/p wound revision and drainage on 5/16.    NEURO:      S/P craniectomy for large left SDH     - GCS 11T, Q2H neuro checks, No sedation     - Keppra 500mg BID for sz ppx    Hyponatremia, now resolved- goal Na+ >135     -  Cerebral salt wasting     -  d/c'ed 3%, continued on salt tabs 8q6 - plan to wean      - completed Fludricortasone x 6 doses    Maintian Head of bed @ 30 degrees    Craniectomy (no left side bone flap) Helmet --> does not need while laying in bed only     during PT/OT   OR 5/16:     -Revision craniectomy wound with complex closure for persistent drainage, subgaleal hematoma- e. 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  *** NSX -eval CT H for pre-op planning: increased L to R MLS 1.3cm w/ mass effect on lateral ventricles.   shunt planned for today  RTOR (6/7): s/p  shunt    -q1h neurochecks, CT Head    RESP:   Respiratoy Failure    s/p trach 5/21    tolerating T-piece since 5/24    ABG prn    AM CXR        CARDS:     S/P cardiac arrest in OR & MTP     - Hypertension controlled on Lisinopril 20mg     - Propranolol 20mg q8  -cleared for  shunt by cardiology (mod. risk for mod. risk procedure)    GI/NUTR:     Diet: TF (Osmolite 1.5) @ 50 via Gtube (using jain in situ)     GI Prophylaxis- PPI    Bowel regimen - Senna         Last BM 6/6    /RENAL:   No acute issues    Voiding via condom cath - monitor UO    Q6 BMP, serum osm      - Hyponatremic in setting of Cerebral salt wasting - Na goal >135     - d/c'ed 3%, continued on salt tabs 8g q6h - plan to wean     -completed Fludrocortisone x6 doses  BUN/Cr- 9/0.5  Electrolytes- Na+ 134 / K+ 4.3 / Mg 2.0 / Ph 4.5  Monitor & replete elytes prn      HEME/ONC:   No acute issues    Labs:   Hgb stable, 8.6 > 10.7  maintain Hg >8, MTP on this admission  DVT prophylaxis: Lovenox 40  5/26 DVT sono neg  -T&S 6/6    ID:  Craniectomy site wound infection  Borderline leukopenia - WBC  4 > 10 (post-op) > 8  Afebrile  Antibiotics- Seamus, Vanco  - Vanco trough 6/7 @ 4pm - ___  Cultures     - 5/11- u/a +leuk     - blood culture 5/13, 5/19, 5/20, 5/23, 5/28 No growth      -Cranial Culture 5/21-e faceium     - Reculture if spikes  -6/6 Covid swab NEG  -ID cleared pt for OR from note on 6/4    ENDO:  FS controlled  q6 POC glucose   Sliding scale insulin PRN  A1c 5.2    DISPO: SICU 	  Assessment & Plan  45M s/p fall with large left SDH and neurological status change, level 1 for emergent craniectomy complicated by subgleal hematoma/collection s/p wound revision and drainage on 5/16.    NEURO:      S/P craniectomy for large left SDH     - GCS 11T, Q2H neuro checks, No sedation     - Keppra 500mg BID for sz ppx    Hyponatremia, now resolved     -  Cerebral salt wasting     -  d/c'ed 3%, continued on salt tabs 8q6 - plan to wean      - completed Fludricortasone x 6 doses    Maintian Head of bed @ 30 degrees    Craniectomy (no left side bone flap) Helmet --> does not need while laying in bed only     during PT/OT   OR 5/16:     -Revision craniectomy wound with complex closure for persistent drainage, subgaleal hematoma- e. 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  *** NSX -eval CT H for pre-op planning: increased L to R MLS 1.3cm w/ mass effect on lateral ventricles.   shunt planned for today  RTOR (6/7): s/p  shunt    -q1h neurochecks, CT Head - pending    RESP:   Respiratoy Failure    s/p trach 5/21    tolerating T-piece since 5/24    ABG prn    AM CXR        CARDS:     S/P cardiac arrest in OR & MTP     - Hypertension controlled on Lisinopril 20mg     - Propranolol 20mg q8  -cleared for  shunt by cardiology (mod. risk for mod. risk procedure)    GI/NUTR:     Diet: TF (Osmolite 1.5) @ 50 via Gtube (using jain in situ)     GI Prophylaxis- PPI    Bowel regimen - Senna         Last BM 6/6    /RENAL:   No acute issues    Voiding via condom cath - monitor UO    Q6 BMP, serum osm      - Hyponatremic in setting of Cerebral salt wasting - Na goal >135     - d/c'ed 3%, continued on salt tabs 8g q6h - plan to wean     -completed Fludrocortisone x6 doses  BUN/Cr- 9/0.5  Electrolytes- Na+ 134 / K+ 4.3 / Mg 2.0 / Ph 4.5  Monitor & replete elytes prn      HEME/ONC:   No acute issues    Labs:   Hgb stable, 8.6 > 10.7  maintain Hg >8, MTP on this admission  DVT prophylaxis: Lovenox 40  5/26 DVT sono neg  -T&S 6/6    ID:  Craniectomy site wound infection  Borderline leukopenia - WBC  4 > 10 (post-op) > 8  Afebrile  Antibiotics- Seamus, Vanco  - Vanco trough 6/7 @ 4pm - 19.4  Cultures     - 5/11- u/a +leuk     - blood culture 5/13, 5/19, 5/20, 5/23, 5/28 No growth      -Cranial Culture 5/21-e faceium     - Reculture if spikes  -6/6 Covid swab NEG      ENDO:  FS controlled  q6 POC glucose   Sliding scale insulin PRN  A1c 5.2    DISPO: SICU 	  Assessment & Plan  45M s/p fall with large left SDH and neurological status change, level 1 for emergent craniectomy complicated by subgleal hematoma/collection s/p wound revision and drainage on 5/16.    NEURO:      S/P craniectomy for large left SDH    S/P  shunt 6/7     - GCS 10T, Q2H neuro checks, No sedation     - Keppra 500mg BID for sz ppx    Hyponatremia, now resolved     -  Cerebral salt wasting     -  d/c'ed 3%, continued on salt tabs 8q6      - completed Fludricortasone x 6 doses    Maintian Head of bed @ 30 degrees    Craniectomy (no left side bone flap) Helmet --> does not need while laying in bed only     during PT/OT   OR 5/16:     -Revision craniectomy wound with complex closure for persistent drainage, subgaleal hematoma- e. 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  *** NSX -eval CT H for pre-op planning: increased L to R MLS 1.3cm w/ mass effect on lateral ventricles.    RTOR (6/7): s/p  shunt    -q1h neurochecks, CT Head s/p  shunt- pending    RESP:   Respiratoy Failure    s/p trach 5/21    tolerating T-piece since 5/24    ABG prn    AM CXR        CARDS:     S/P cardiac arrest in OR & MTP     - Hypertension controlled on Lisinopril 20mg     - Propranolol 20mg q8      GI/NUTR:     Diet: TF (Osmolite 1.5) @ 50 via Gtube (using jain in situ)     GI Prophylaxis- PPI    Bowel regimen - Senna         Last BM 6/6    /RENAL:   No acute issues    Voiding via condom cath - monitor UO    Q6 BMP, serum osm      - Hyponatremic in setting of Cerebral salt wasting - goal eunatremic      - d/c'ed 3%, continued on salt tabs 8g q6h      -completed Fludrocortisone x6 doses  BUN/Cr- 9/0.5  Electrolytes- Na+ 134 / K+ 4.3 / Mg 2.0 / Ph 4.5  Monitor & replete elytes prn      HEME/ONC:   No acute issues    Labs:   Hgb stable, 8.6 > 10.7  maintain Hg >8, MTP on this admission  DVT prophylaxis: Lovenox 40  5/26 DVT sono neg  -T&S 6/6    ID:  Craniectomy site wound infection  Borderline leukopenia - WBC  4 > 10 (post-op) > 8  Afebrile  Antibiotics- Seamus, Vanco - continue through 7/14  - Vanco trough 6/7 @ 4pm - 19.4  Cultures     - 5/11- u/a +leuk     - blood culture 5/13, 5/19, 5/20, 5/23, 5/28 No growth      -Cranial Culture 5/21-e faceium     - Reculture if spikes  -6/6 Covid swab NEG      ENDO:  FS controlled  q6 POC glucose   Sliding scale insulin PRN  A1c 5.2    DISPO: SICU

## 2021-06-08 NOTE — PROGRESS NOTE ADULT - SUBJECTIVE AND OBJECTIVE BOX
SANDRA DAS  [id number]  45y Male    Indication for ICU admission: s/p craniectomy for large L SDH  Admit Date:05-06-21  ICU Date: 05-07-21  OR Date: 05-06-21    No Known Allergies    PAST MEDICAL & SURGICAL HISTORY:  Alcohol abuse  GERD (gastroesophageal reflux disease)  ETOH abuse  Hypomagnesemia  Seizure disorder  H/O hemorrhoidectomy    Home Medications:  NONE    24HRS EVENT:  6/7  NIGHT:  -s/p  shunt (OR time 1.5 hrs, EBL 30,  LR, 1 g Ancef, Zofran/decadron at end of case)  -q1h neurochecks  -TF resumed    DAY  -resume tube feeds after or  -resume dvt ppx  -TLC to be dc in OR 2/2 shunt placement       DVT PTX: LVX  GI PTX: PPI    ***Tubes/Lines/Drains  ***  Peripheral IV  Left basilic midline 5/17  Arterial Line Right radial	Date 5/17  R IJ TLC     6/4  Trach/peg 5/21    REVIEW OF SYSTEMS  [ ] A ten-point review of systems was otherwise negative except as noted.  [x] Due to altered mental status/intubation, subjective information were not able to be obtained from the patient. History was obtained, to the extent possible, from review of the chart and collateral sources of information.        SANDRA DAS  [id number]  45y Male    Indication for ICU admission: s/p craniectomy for large L SDH  Admit Date:05-06-21  ICU Date: 05-07-21  OR Date: 05-06-21    No Known Allergies    PAST MEDICAL & SURGICAL HISTORY:  Alcohol abuse  GERD (gastroesophageal reflux disease)  ETOH abuse  Hypomagnesemia  Seizure disorder  H/O hemorrhoidectomy    Home Medications:  NONE    24HRS EVENT:  6/7  NIGHT:  -s/p  shunt (OR time 1.5 hrs, EBL 30,  LR, 1 g Ancef, Zofran/decadron at end of case)  -q1h neurochecks  -TF resumed    DAY  -resume tube feeds after or  -resume dvt ppx  -TLC to be dc in OR 2/2 shunt placement       DVT PTX: LVX  GI PTX: PPI    ***Tubes/Lines/Drains  ***  Peripheral IV  Left basilic midline 5/17  Arterial Line Right radial	Date 5/17  Trach/peg 5/21    REVIEW OF SYSTEMS  [ ] A ten-point review of systems was otherwise negative except as noted.  [x] Due to altered mental status/intubation, subjective information were not able to be obtained from the patient. History was obtained, to the extent possible, from review of the chart and collateral sources of information.       Daily Height in cm: 177.8 (07 Jun 2021 21:01)    Daily     Diet, NPO with Tube Feed:   Tube Feeding Modality: Orogastric  Osmolite 1.5 Jeff  Total Volume for 24 Hours (mL): 1200  Continuous  Starting Tube Feed Rate mL per Hour: 50  Until Goal Tube Feed Rate (mL per Hour): 50  Tube Feed Duration (in Hours): 24  Tube Feed Start Time: 00:00  No Carb Prosource TF     Qty per Day:  3 (06-08-21 @ 03:47)      CURRENT MEDS:  Neurologic Medications  acetaminophen   Tablet .. 650 milliGRAM(s) Oral every 6 hours PRN Temp greater or equal to 38.5C (101.3F), Moderate Pain (4 - 6), Severe Pain (7 - 10)  levETIRAcetam  IVPB 500 milliGRAM(s) IV Intermittent every 12 hours    Respiratory Medications    Cardiovascular Medications  lisinopril 20 milliGRAM(s) Oral daily  propranolol 20 milliGRAM(s) Oral every 8 hours    Gastrointestinal Medications  pantoprazole  Injectable 40 milliGRAM(s) IV Push daily  senna 2 Tablet(s) Oral at bedtime  sodium chloride 8 Gram(s) Oral every 6 hours    Genitourinary Medications    Hematologic/Oncologic Medications  enoxaparin Injectable 40 milliGRAM(s) SubCutaneous daily    Antimicrobial/Immunologic Medications  meropenem  IVPB 1000 milliGRAM(s) IV Intermittent every 8 hours  vancomycin  IVPB 2000 milliGRAM(s) IV Intermittent every 12 hours    Endocrine/Metabolic Medications    Topical/Other Medications  artificial tears (preservative free) Ophthalmic Solution 2 Drop(s) Both EYES three times a day      ICU Vital Signs Last 24 Hrs  T(C): 36.3 (08 Jun 2021 07:20), Max: 36.4 (07 Jun 2021 17:47)  T(F): 97.4 (08 Jun 2021 07:20), Max: 97.6 (07 Jun 2021 17:47)  HR: 100 (08 Jun 2021 10:00) (67 - 104)  BP: 132/70 (08 Jun 2021 10:00) (118/78 - 166/94)  BP(mean): 94 (08 Jun 2021 10:00) (93 - 123)  ABP: --  ABP(mean): --  RR: 16 (08 Jun 2021 10:00) (14 - 22)  SpO2: 100% (08 Jun 2021 10:00) (99% - 100%)      I&O's Summary    07 Jun 2021 07:01  -  08 Jun 2021 07:00  --------------------------------------------------------  IN: 1650 mL / OUT: 1280 mL / NET: 370 mL    08 Jun 2021 07:01  -  08 Jun 2021 11:03  --------------------------------------------------------  IN: 200 mL / OUT: 400 mL / NET: -200 mL      I&O's Detail    07 Jun 2021 07:01  -  08 Jun 2021 07:00  --------------------------------------------------------  IN:    Enteral Tube Flush: 50 mL    IV PiggyBack: 500 mL    IV PiggyBack: 200 mL    IV PiggyBack: 500 mL    IV PiggyBack: 150 mL    Osmolite: 250 mL  Total IN: 1650 mL    OUT:    Voided (mL): 1280 mL  Total OUT: 1280 mL    Total NET: 370 mL      08 Jun 2021 07:01  -  08 Jun 2021 11:03  --------------------------------------------------------  IN:    Osmolite: 200 mL  Total IN: 200 mL    OUT:    Voided (mL): 400 mL  Total OUT: 400 mL    Total NET: -200 mL      PHYSICAL EXAM:    General/Neuro  RASS:      0       GCS:     = E   / V   / M      Deficits:                             alert & oriented x 3, no focal deficits  Pupils:    Lungs:      clear to auscultation, Normal expansion/effort.     Cardiovascular : S1, S2.  Regular rate and rhythm.  Peripheral edema   Cardiac Rhythm: Normal Sinus Rhythm    GI: Abdomen soft, Non-tender, Non-distended.    Gastrostomy / Jejunostomy tube in place.  Nasogastric tube in place.  Colostomy / Ileostomy.    Wound:    Extremities: Extremities warm, pink, well-perfused. Pulses:Rt     Lt    Derm: Good skin turgor, no skin breakdown.      :       Cole catheter in place.      CXR:     LABS:  CAPILLARY BLOOD GLUCOSE      POCT Blood Glucose.: 135 mg/dL (07 Jun 2021 22:51)  POCT Blood Glucose.: 97 mg/dL (07 Jun 2021 11:36)                          10.7   8.39  )-----------( 278      ( 08 Jun 2021 00:47 )             33.3       06-08    134<L>  |  96<L>  |  9<L>  ----------------------------<  158<H>  4.3   |  27  |  0.5<L>    Ca    10.4<H>      08 Jun 2021 00:47  Phos  4.5     06-08  Mg     2.0     06-08        PT/INR - ( 07 Jun 2021 22:55 )   PT: 14.50 sec;   INR: 1.26 ratio         PTT - ( 07 Jun 2021 22:55 )  PTT:22.3 sec          Culture - CSF with Gram Stain (collected 07 Jun 2021 19:43)  Source: .CSF  Gram Stain (08 Jun 2021 07:33):    No polymorphonuclear cells seen    No organisms seen    by cytocentrifuge       SANDRA DAS  [id number]  45y Male    Indication for ICU admission: s/p craniectomy for large L SDH  Admit Date:05-06-21  ICU Date: 05-07-21  OR Date: 05-06-21    No Known Allergies    PAST MEDICAL & SURGICAL HISTORY:  Alcohol abuse  GERD (gastroesophageal reflux disease)  ETOH abuse  Hypomagnesemia  Seizure disorder  H/O hemorrhoidectomy    Home Medications:  NONE    24HRS EVENT:  6/7  NIGHT:  -s/p  shunt (OR time 1.5 hrs, EBL 30,  LR, 1 g Ancef, Zofran/decadron at end of case)  -q1h neurochecks  -TF resumed    DAY  -resume tube feeds after or  -resume dvt ppx  -TLC to be dc in OR 2/2 shunt placement       DVT PTX: LVX  GI PTX: PPI    ***Tubes/Lines/Drains  ***  Peripheral IV  Left basilic midline 5/17  Arterial Line Right radial	Date 5/17  Trach/peg 5/21    REVIEW OF SYSTEMS  [ ] A ten-point review of systems was otherwise negative except as noted.  [x] Due to altered mental status/intubation, subjective information were not able to be obtained from the patient. History was obtained, to the extent possible, from review of the chart and collateral sources of information.       Daily Height in cm: 177.8 (07 Jun 2021 21:01)    Daily     Diet, NPO with Tube Feed:   Tube Feeding Modality: Orogastric  Osmolite 1.5 Jeff  Total Volume for 24 Hours (mL): 1200  Continuous  Starting Tube Feed Rate mL per Hour: 50  Until Goal Tube Feed Rate (mL per Hour): 50  Tube Feed Duration (in Hours): 24  Tube Feed Start Time: 00:00  No Carb Prosource TF     Qty per Day:  3 (06-08-21 @ 03:47)      CURRENT MEDS:  Neurologic Medications  acetaminophen   Tablet .. 650 milliGRAM(s) Oral every 6 hours PRN Temp greater or equal to 38.5C (101.3F), Moderate Pain (4 - 6), Severe Pain (7 - 10)  levETIRAcetam  IVPB 500 milliGRAM(s) IV Intermittent every 12 hours    Respiratory Medications    Cardiovascular Medications  lisinopril 20 milliGRAM(s) Oral daily  propranolol 20 milliGRAM(s) Oral every 8 hours    Gastrointestinal Medications  pantoprazole  Injectable 40 milliGRAM(s) IV Push daily  senna 2 Tablet(s) Oral at bedtime  sodium chloride 8 Gram(s) Oral every 6 hours    Genitourinary Medications    Hematologic/Oncologic Medications  enoxaparin Injectable 40 milliGRAM(s) SubCutaneous daily    Antimicrobial/Immunologic Medications  meropenem  IVPB 1000 milliGRAM(s) IV Intermittent every 8 hours  vancomycin  IVPB 2000 milliGRAM(s) IV Intermittent every 12 hours    Endocrine/Metabolic Medications    Topical/Other Medications  artificial tears (preservative free) Ophthalmic Solution 2 Drop(s) Both EYES three times a day      ICU Vital Signs Last 24 Hrs  T(C): 36.3 (08 Jun 2021 07:20), Max: 36.4 (07 Jun 2021 17:47)  T(F): 97.4 (08 Jun 2021 07:20), Max: 97.6 (07 Jun 2021 17:47)  HR: 100 (08 Jun 2021 10:00) (67 - 104)  BP: 132/70 (08 Jun 2021 10:00) (118/78 - 166/94)  BP(mean): 94 (08 Jun 2021 10:00) (93 - 123)  ABP: --  ABP(mean): --  RR: 16 (08 Jun 2021 10:00) (14 - 22)  SpO2: 100% (08 Jun 2021 10:00) (99% - 100%)      I&O's Summary    07 Jun 2021 07:01  -  08 Jun 2021 07:00  --------------------------------------------------------  IN: 1650 mL / OUT: 1280 mL / NET: 370 mL    08 Jun 2021 07:01  -  08 Jun 2021 11:03  --------------------------------------------------------  IN: 200 mL / OUT: 400 mL / NET: -200 mL      I&O's Detail    07 Jun 2021 07:01  -  08 Jun 2021 07:00  --------------------------------------------------------  IN:    Enteral Tube Flush: 50 mL    IV PiggyBack: 500 mL    IV PiggyBack: 200 mL    IV PiggyBack: 500 mL    IV PiggyBack: 150 mL    Osmolite: 250 mL  Total IN: 1650 mL    OUT:    Voided (mL): 1280 mL  Total OUT: 1280 mL    Total NET: 370 mL      08 Jun 2021 07:01  -  08 Jun 2021 11:03  --------------------------------------------------------  IN:    Osmolite: 200 mL  Total IN: 200 mL    OUT:    Voided (mL): 400 mL  Total OUT: 400 mL    Total NET: -200 mL      PHYSICAL EXAM:    General/Neuro  RASS:      0       GCS:   10t   Deficits: alert, withdraws to pain, spontaneous movement of left hand and leg. left sided upper and lower extremity deficit. pt does not tract, does not follow commands  Pupils: PERRLA bilaterally  HEENT: Craniotomy scar well healing staples in place. Left side now sunken.     Lungs: Trache, clear to auscultation, Normal expansion/effort.     Cardiovascular : S1, S2.  Regular rate and rhythm.  Peripheral edema   Cardiac Rhythm: Normal Sinus Rhythm    GI: Abdomen soft, Non-tender, Non-distended.    Gastrostomy (jain in place).    Wound: Craniotomy scar well healing staples in place    Extremities: Extremities warm, pink, well-perfused. Pulses: 2+ bilaterally     Derm: Good skin turgor, no skin breakdown.      :  Condom catheter in place      CXR:     LABS:  CAPILLARY BLOOD GLUCOSE      POCT Blood Glucose.: 135 mg/dL (07 Jun 2021 22:51)  POCT Blood Glucose.: 97 mg/dL (07 Jun 2021 11:36)                          10.7   8.39  )-----------( 278      ( 08 Jun 2021 00:47 )             33.3       06-08    134<L>  |  96<L>  |  9<L>  ----------------------------<  158<H>  4.3   |  27  |  0.5<L>    Ca    10.4<H>      08 Jun 2021 00:47  Phos  4.5     06-08  Mg     2.0     06-08        PT/INR - ( 07 Jun 2021 22:55 )   PT: 14.50 sec;   INR: 1.26 ratio         PTT - ( 07 Jun 2021 22:55 )  PTT:22.3 sec          Culture - CSF with Gram Stain (collected 07 Jun 2021 19:43)  Source: .CSF  Gram Stain (08 Jun 2021 07:33):    No polymorphonuclear cells seen    No organisms seen    by cytocentrifuge       SANDRA DAS  [id number]  45y Male    Indication for ICU admission: s/p craniectomy for large L SDH  Admit Date:05-06-21  ICU Date: 05-07-21  OR Date: 05-06-21    No Known Allergies    PAST MEDICAL & SURGICAL HISTORY:  Alcohol abuse  GERD (gastroesophageal reflux disease)  ETOH abuse  Hypomagnesemia  Seizure disorder  H/O hemorrhoidectomy    Home Medications:  NONE    24HRS EVENT:  6/7  NIGHT:  -s/p  shunt (OR time 1.5 hrs, EBL 30,  LR, 1 g Ancef, Zofran/decadron at end of case)  -q1h neurochecks  -TF resumed    DAY  -resume tube feeds after or  -resume dvt ppx  -TLC to be dc in OR 2/2 shunt placement       DVT PTX: LVX  GI PTX: PPI    ***Tubes/Lines/Drains  ***  Peripheral IV  Left basilic midline 5/17  Trach/peg 5/21    REVIEW OF SYSTEMS  [ ] A ten-point review of systems was otherwise negative except as noted.  [x] Due to altered mental status/intubation, subjective information were not able to be obtained from the patient. History was obtained, to the extent possible, from review of the chart and collateral sources of information.       Daily Height in cm: 177.8 (07 Jun 2021 21:01)    Daily     Diet, NPO with Tube Feed:   Tube Feeding Modality: Orogastric  Osmolite 1.5 Jeff  Total Volume for 24 Hours (mL): 1200  Continuous  Starting Tube Feed Rate mL per Hour: 50  Until Goal Tube Feed Rate (mL per Hour): 50  Tube Feed Duration (in Hours): 24  Tube Feed Start Time: 00:00  No Carb Prosource TF     Qty per Day:  3 (06-08-21 @ 03:47)      CURRENT MEDS:  Neurologic Medications  acetaminophen   Tablet .. 650 milliGRAM(s) Oral every 6 hours PRN Temp greater or equal to 38.5C (101.3F), Moderate Pain (4 - 6), Severe Pain (7 - 10)  levETIRAcetam  IVPB 500 milliGRAM(s) IV Intermittent every 12 hours    Respiratory Medications    Cardiovascular Medications  lisinopril 20 milliGRAM(s) Oral daily  propranolol 20 milliGRAM(s) Oral every 8 hours    Gastrointestinal Medications  pantoprazole  Injectable 40 milliGRAM(s) IV Push daily  senna 2 Tablet(s) Oral at bedtime  sodium chloride 8 Gram(s) Oral every 6 hours    Genitourinary Medications    Hematologic/Oncologic Medications  enoxaparin Injectable 40 milliGRAM(s) SubCutaneous daily    Antimicrobial/Immunologic Medications  meropenem  IVPB 1000 milliGRAM(s) IV Intermittent every 8 hours  vancomycin  IVPB 2000 milliGRAM(s) IV Intermittent every 12 hours    Endocrine/Metabolic Medications    Topical/Other Medications  artificial tears (preservative free) Ophthalmic Solution 2 Drop(s) Both EYES three times a day      ICU Vital Signs Last 24 Hrs  T(C): 36.3 (08 Jun 2021 07:20), Max: 36.4 (07 Jun 2021 17:47)  T(F): 97.4 (08 Jun 2021 07:20), Max: 97.6 (07 Jun 2021 17:47)  HR: 100 (08 Jun 2021 10:00) (67 - 104)  BP: 132/70 (08 Jun 2021 10:00) (118/78 - 166/94)  BP(mean): 94 (08 Jun 2021 10:00) (93 - 123)  ABP: --  ABP(mean): --  RR: 16 (08 Jun 2021 10:00) (14 - 22)  SpO2: 100% (08 Jun 2021 10:00) (99% - 100%)      I&O's Summary    07 Jun 2021 07:01  -  08 Jun 2021 07:00  --------------------------------------------------------  IN: 1650 mL / OUT: 1280 mL / NET: 370 mL    08 Jun 2021 07:01  -  08 Jun 2021 11:03  --------------------------------------------------------  IN: 200 mL / OUT: 400 mL / NET: -200 mL      I&O's Detail    07 Jun 2021 07:01  -  08 Jun 2021 07:00  --------------------------------------------------------  IN:    Enteral Tube Flush: 50 mL    IV PiggyBack: 500 mL    IV PiggyBack: 200 mL    IV PiggyBack: 500 mL    IV PiggyBack: 150 mL    Osmolite: 250 mL  Total IN: 1650 mL    OUT:    Voided (mL): 1280 mL  Total OUT: 1280 mL    Total NET: 370 mL      08 Jun 2021 07:01  -  08 Jun 2021 11:03  --------------------------------------------------------  IN:    Osmolite: 200 mL  Total IN: 200 mL    OUT:    Voided (mL): 400 mL  Total OUT: 400 mL    Total NET: -200 mL      PHYSICAL EXAM:    General/Neuro  RASS:      0       GCS:   10t   Deficits: alert, withdraws to pain, spontaneous movement of left hand and leg. left sided upper and lower extremity deficit. pt does not tract, does not follow commands  Pupils: PERRLA bilaterally  HEENT: Craniotomy scar well healing staples in place. Left side now sunken.     Lungs: Trache, clear to auscultation, Normal expansion/effort.     Cardiovascular : S1, S2.  Regular rate and rhythm.  Peripheral edema   Cardiac Rhythm: Normal Sinus Rhythm    GI: Abdomen soft, Non-tender, Non-distended.    Gastrostomy (jain in place).    Wound: Craniotomy scar well healing staples in place    Extremities: Extremities warm, pink, well-perfused. Pulses: 2+ bilaterally     Derm: Good skin turgor, no skin breakdown.      :  Condom catheter in place      CXR:     LABS:  CAPILLARY BLOOD GLUCOSE      POCT Blood Glucose.: 135 mg/dL (07 Jun 2021 22:51)  POCT Blood Glucose.: 97 mg/dL (07 Jun 2021 11:36)                          10.7   8.39  )-----------( 278      ( 08 Jun 2021 00:47 )             33.3       06-08    134<L>  |  96<L>  |  9<L>  ----------------------------<  158<H>  4.3   |  27  |  0.5<L>    Ca    10.4<H>      08 Jun 2021 00:47  Phos  4.5     06-08  Mg     2.0     06-08        PT/INR - ( 07 Jun 2021 22:55 )   PT: 14.50 sec;   INR: 1.26 ratio         PTT - ( 07 Jun 2021 22:55 )  PTT:22.3 sec          Culture - CSF with Gram Stain (collected 07 Jun 2021 19:43)  Source: .CSF  Gram Stain (08 Jun 2021 07:33):    No polymorphonuclear cells seen    No organisms seen    by cytocentrifuge

## 2021-06-09 LAB
ANION GAP SERPL CALC-SCNC: 7 MMOL/L — SIGNIFICANT CHANGE UP (ref 7–14)
ANION GAP SERPL CALC-SCNC: 8 MMOL/L — SIGNIFICANT CHANGE UP (ref 7–14)
BUN SERPL-MCNC: 12 MG/DL — SIGNIFICANT CHANGE UP (ref 10–20)
BUN SERPL-MCNC: 15 MG/DL — SIGNIFICANT CHANGE UP (ref 10–20)
CALCIUM SERPL-MCNC: 9.4 MG/DL — SIGNIFICANT CHANGE UP (ref 8.5–10.1)
CALCIUM SERPL-MCNC: 9.6 MG/DL — SIGNIFICANT CHANGE UP (ref 8.5–10.1)
CHLORIDE SERPL-SCNC: 106 MMOL/L — SIGNIFICANT CHANGE UP (ref 98–110)
CHLORIDE SERPL-SCNC: 108 MMOL/L — SIGNIFICANT CHANGE UP (ref 98–110)
CO2 SERPL-SCNC: 28 MMOL/L — SIGNIFICANT CHANGE UP (ref 17–32)
CO2 SERPL-SCNC: 29 MMOL/L — SIGNIFICANT CHANGE UP (ref 17–32)
CREAT SERPL-MCNC: 0.5 MG/DL — LOW (ref 0.7–1.5)
CREAT SERPL-MCNC: <0.5 MG/DL — LOW (ref 0.7–1.5)
GLUCOSE BLDC GLUCOMTR-MCNC: 132 MG/DL — HIGH (ref 70–99)
GLUCOSE SERPL-MCNC: 103 MG/DL — HIGH (ref 70–99)
GLUCOSE SERPL-MCNC: 133 MG/DL — HIGH (ref 70–99)
HCT VFR BLD CALC: 26.9 % — LOW (ref 42–52)
HGB BLD-MCNC: 8.8 G/DL — LOW (ref 14–18)
MAGNESIUM SERPL-MCNC: 1.8 MG/DL — SIGNIFICANT CHANGE UP (ref 1.8–2.4)
MCHC RBC-ENTMCNC: 27.7 PG — SIGNIFICANT CHANGE UP (ref 27–31)
MCHC RBC-ENTMCNC: 32.7 G/DL — SIGNIFICANT CHANGE UP (ref 32–37)
MCV RBC AUTO: 84.6 FL — SIGNIFICANT CHANGE UP (ref 80–94)
NRBC # BLD: 0 /100 WBCS — SIGNIFICANT CHANGE UP (ref 0–0)
PHOSPHATE SERPL-MCNC: 4.2 MG/DL — SIGNIFICANT CHANGE UP (ref 2.1–4.9)
PLATELET # BLD AUTO: 284 K/UL — SIGNIFICANT CHANGE UP (ref 130–400)
POTASSIUM SERPL-MCNC: 4 MMOL/L — SIGNIFICANT CHANGE UP (ref 3.5–5)
POTASSIUM SERPL-MCNC: 4.1 MMOL/L — SIGNIFICANT CHANGE UP (ref 3.5–5)
POTASSIUM SERPL-SCNC: 4 MMOL/L — SIGNIFICANT CHANGE UP (ref 3.5–5)
POTASSIUM SERPL-SCNC: 4.1 MMOL/L — SIGNIFICANT CHANGE UP (ref 3.5–5)
RBC # BLD: 3.18 M/UL — LOW (ref 4.7–6.1)
RBC # FLD: 13.9 % — SIGNIFICANT CHANGE UP (ref 11.5–14.5)
SODIUM SERPL-SCNC: 141 MMOL/L — SIGNIFICANT CHANGE UP (ref 135–146)
SODIUM SERPL-SCNC: 145 MMOL/L — SIGNIFICANT CHANGE UP (ref 135–146)
VANCOMYCIN TROUGH SERPL-MCNC: 27.8 UG/ML — HIGH (ref 5–10)
WBC # BLD: 6.21 K/UL — SIGNIFICANT CHANGE UP (ref 4.8–10.8)
WBC # FLD AUTO: 6.21 K/UL — SIGNIFICANT CHANGE UP (ref 4.8–10.8)

## 2021-06-09 PROCEDURE — 99291 CRITICAL CARE FIRST HOUR: CPT

## 2021-06-09 PROCEDURE — 71045 X-RAY EXAM CHEST 1 VIEW: CPT | Mod: 26

## 2021-06-09 PROCEDURE — 99231 SBSQ HOSP IP/OBS SF/LOW 25: CPT

## 2021-06-09 RX ORDER — MAGNESIUM SULFATE 500 MG/ML
2 VIAL (ML) INJECTION ONCE
Refills: 0 | Status: COMPLETED | OUTPATIENT
Start: 2021-06-09 | End: 2021-06-09

## 2021-06-09 RX ADMIN — Medication 650 MILLIGRAM(S): at 13:06

## 2021-06-09 RX ADMIN — Medication 650 MILLIGRAM(S): at 14:00

## 2021-06-09 RX ADMIN — SODIUM CHLORIDE 8 GRAM(S): 9 INJECTION INTRAMUSCULAR; INTRAVENOUS; SUBCUTANEOUS at 00:41

## 2021-06-09 RX ADMIN — MEROPENEM 100 MILLIGRAM(S): 1 INJECTION INTRAVENOUS at 13:07

## 2021-06-09 RX ADMIN — LEVETIRACETAM 400 MILLIGRAM(S): 250 TABLET, FILM COATED ORAL at 17:58

## 2021-06-09 RX ADMIN — MEROPENEM 100 MILLIGRAM(S): 1 INJECTION INTRAVENOUS at 21:23

## 2021-06-09 RX ADMIN — LEVETIRACETAM 400 MILLIGRAM(S): 250 TABLET, FILM COATED ORAL at 05:17

## 2021-06-09 RX ADMIN — Medication 2 DROP(S): at 13:07

## 2021-06-09 RX ADMIN — Medication 25 GRAM(S): at 03:21

## 2021-06-09 RX ADMIN — Medication 2 DROP(S): at 21:20

## 2021-06-09 RX ADMIN — SENNA PLUS 2 TABLET(S): 8.6 TABLET ORAL at 21:21

## 2021-06-09 RX ADMIN — LISINOPRIL 20 MILLIGRAM(S): 2.5 TABLET ORAL at 05:18

## 2021-06-09 RX ADMIN — SODIUM CHLORIDE 8 GRAM(S): 9 INJECTION INTRAMUSCULAR; INTRAVENOUS; SUBCUTANEOUS at 17:59

## 2021-06-09 RX ADMIN — PANTOPRAZOLE SODIUM 40 MILLIGRAM(S): 20 TABLET, DELAYED RELEASE ORAL at 11:26

## 2021-06-09 RX ADMIN — MEROPENEM 100 MILLIGRAM(S): 1 INJECTION INTRAVENOUS at 05:17

## 2021-06-09 RX ADMIN — SODIUM CHLORIDE 8 GRAM(S): 9 INJECTION INTRAMUSCULAR; INTRAVENOUS; SUBCUTANEOUS at 11:26

## 2021-06-09 RX ADMIN — SODIUM CHLORIDE 8 GRAM(S): 9 INJECTION INTRAMUSCULAR; INTRAVENOUS; SUBCUTANEOUS at 05:17

## 2021-06-09 RX ADMIN — ENOXAPARIN SODIUM 40 MILLIGRAM(S): 100 INJECTION SUBCUTANEOUS at 11:26

## 2021-06-09 RX ADMIN — Medication 2 DROP(S): at 05:18

## 2021-06-09 RX ADMIN — SODIUM CHLORIDE 8 GRAM(S): 9 INJECTION INTRAMUSCULAR; INTRAVENOUS; SUBCUTANEOUS at 23:00

## 2021-06-09 NOTE — PROGRESS NOTE ADULT - SUBJECTIVE AND OBJECTIVE BOX
Specialty: Neuro Critical Care     Interval Hx: 45-year-old male s/p fall, 5/6/2021. CTH, 5/6/2021, revealed acute large left subdural hemorrhage with associated left to right midline shift, with smaller right-sided subdurals, and mass effect. S/P Left hemicraniotomy for SDH evacuation, 5/7/2021. S/P cardiac arrest in OR, S/P revision of crani, 5/17/2021, with wound revision. Repeat CTH revealed new left cerebral hemisphere and right cerebellum infarcts. CTA revealed right vertebral artery stenosis. CTV revealed positive venous thrombosis. MRI brain revealed pseudomeningocele. S/P  shunt, 6/7/2021. On examination today, patient trached, on T-piece, keenly awake, however, does not follow commands, able to track to voice, with spontaneous movement noted LUE, right-sided hemiparesis, and no movement LLE. No acute events overnight.     HPI:  TRAUMA ACTIVATION LEVEL:  Code trauma  Patient is a 45yM w/ PMHx of Etoh abuse and seizures on kera seen as Trauma Alert upgraded to a Code trauma during examination in the ED where the patient was found to have a GCS of 4 s/p found down unresponsive.  Trauma assessment in ED: intubated for airway protection  (06 May 2021 22:29)    Past Medical and Surgical History:  Alcohol abuse  GERD (gastroesophageal reflux disease)  ETOH abuse  Hypomagnesemia  Seizure disorder  H/O hemorrhoidectomy    Allergies: No Known Allergies    ROS: Patient unable to participate in ROS due to neurologic status.     PE:  General: ill-appearing, trached, on T-piece, male of appropriately stated age, on no sedation, lying supine in bed. NAD  Mental status: Trached, on T-piece, no sedation. Keenly awake. Does not follow simple or complex commands. Track to voice. Mute  Cranial Nerves: Pupils 3mm brisk bilaterally. (+) blink to threat bilaterally. No gaze preference or nystagmus noted. (+) corneal reflex bilaterally (+) cough reflex (+) gag reflex. Face grossly symmetrical with no loss of nasolabial folds.   Motor: Grossly deconditioned. Spontaneous movement LUE. Right-sided hemiparesis with no movement to noxious stimuli LLE.   Sensation: Facial grimacing noted to noxious stimuli bilaterally throughout. Sensation intact, no neglect.     Vital Signs:  ICU Vital Signs Last 24 Hrs  T(C): 36.1 (09 Jun 2021 15:35), Max: 37.1 (09 Jun 2021 04:00)  T(F): 97 (09 Jun 2021 15:35), Max: 98.7 (09 Jun 2021 04:00)  HR: 80 (09 Jun 2021 14:00) (77 - 103)  BP: 121/81 (09 Jun 2021 14:00) (108/66 - 155/84)  BP(mean): 97 (09 Jun 2021 14:00) (82 - 111)  ABP: --  ABP(mean): --  RR: 19 (09 Jun 2021 14:00) (11 - 25)  SpO2: 100% (09 Jun 2021 14:00) (99% - 100%)    I&O's Detail:  08 Jun 2021 07:01  -  09 Jun 2021 07:00  --------------------------------------------------------  IN:    Enteral Tube Flush: 250 mL    IV PiggyBack: 500 mL    IV PiggyBack: 100 mL    IV PiggyBack: 200 mL    Osmolite: 1200 mL  Total IN: 2250 mL    OUT:    Voided (mL): 2140 mL  Total OUT: 2140 mL    Total NET: 110 mL    09 Jun 2021 07:01  -  09 Jun 2021 16:53  --------------------------------------------------------  IN:    Enteral Tube Flush: 150 mL    IV PiggyBack: 50 mL    Osmolite: 400 mL  Total IN: 600 mL    OUT:    Voided (mL): 550 mL    Voided (mL): 900 mL  Total OUT: 1450 mL    Total NET: -850 mL    Labs:  06-09    145  |  108  |  12  ----------------------------<  103<H>  4.1   |  29  |  0.5<L>    Ca    9.4      09 Jun 2021 06:30  Phos  4.2     06-09  Mg     1.8     06-09                      8.8    6.21  )-----------( 284      ( 09 Jun 2021 00:06 )             26.9     PT/INR - ( 07 Jun 2021 22:55 )   PT: 14.50 sec;   INR: 1.26 ratio    PTT - ( 07 Jun 2021 22:55 )  PTT:22.3 sec    Microbiology:  Culture - Fungal, CSF (collected 07 Jun 2021 19:43)  Source: .CSF  Preliminary Report (09 Jun 2021 06:49):    Testing in progress    Culture - CSF with Gram Stain (collected 07 Jun 2021 19:43)  Source: .CSF  Gram Stain (08 Jun 2021 07:33):    No polymorphonuclear cells seen    No organisms seen    by cytocentrifuge  Preliminary Report (09 Jun 2021 07:01):    No growth to date.    Medications Current and PRN:  MEDICATIONS  (STANDING):  artificial tears (preservative free) Ophthalmic Solution 2 Drop(s) Both EYES three times a day  enoxaparin Injectable 40 milliGRAM(s) SubCutaneous daily  levETIRAcetam  IVPB 500 milliGRAM(s) IV Intermittent every 12 hours  lisinopril 20 milliGRAM(s) Oral daily  meropenem  IVPB 1000 milliGRAM(s) IV Intermittent every 8 hours  pantoprazole   Suspension 40 milliGRAM(s) Oral daily  propranolol 20 milliGRAM(s) Oral every 8 hours  senna 2 Tablet(s) Oral at bedtime  sodium chloride 8 Gram(s) Oral every 6 hours    MEDICATIONS  (PRN):  acetaminophen   Tablet .. 650 milliGRAM(s) Oral every 6 hours PRN Temp greater or equal to 38.5C (101.3F), Moderate Pain (4 - 6), Severe Pain (7 - 10)    Imaging:  EXAM:  CT BRAIN (6/8/2021):   IMPRESSION:  Interval placement of a right frontal approach ventriculoperitoneal shunt catheter with tip terminating anterior to the right frontal horn.  Slightly decreased thickness of a left-sided extra-axial CSF density fluid collection traversing the craniectomy defect with similar underlying mass effect and midline shift..  Unchanged subacute infarcts as well as trace subarachnoid hemorrhage as detailed above.    EXAM:  MR BRAIN WAW IC (5/28/2021):   IMPRESSION:  1.9 cm enhancing soft tissue with internal mineralization along the anterior margin of the left frontal craniectomy reflect granulation tissue.  Diffuse thickening/enhancement of the bilateral pachymeninges, nonspecific but can be seen in setting of postop settings, intracranial hypotension, or meningitis. Recommend clinical correlation.  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 Redemonstrated subdural hematomas along the right cerebral and cerebellar convexities.  Evolving subacute infarcts in the medial left frontoparietal region, left insular cortex, and right cerebellum with associated gyral enhancements.  Peripherally enhancing evolving hemorrhagic contusion/hematoma in the left temporal occipital regions with scattered petechial gyral hemorrhage/enhancement.Moderate surrounding edema.    EXAM:  EEG AWAKE AND ASLEEP (5/19/2021):   State: Awake, Intubated  Symmetry: Symmetric  Background: 7-8 hz  Generalized Slowing: Yes, borderline - mild  Focal Slowing: No  Activation Procedure/Hyper Ventilation: No  Photic Stimulation: No  Epileptiform Activity: No  Impression:  Abnormal due to the presence of: generalized slowing as above    EXAM:  ECHO TTE WO CON COMP W DOPP (5/8/2021):   Summary:   1. Left ventricular ejection fraction, by visual estimation, is 45 to 50%.   2. Mildly decreased global left ventricular systolic function.   3. Multiple left ventricular regional wall motion abnormalities exist. See wall motion findings.   4. Normal left ventricular internal cavity size.   5. The left ventricular diastolic function could not be assessed in this study.   6. The mean global longitudinal peak strain by speckle tracking is -14.9% which is reduced.   7. Normal left atrial size.   8. Normal right atrial size.   9. No evidence of mitral valve regurgitation.  10. Mild tricuspid regurgitation.  11. LA volume Index is 23.3 ml/m² ml/m2.    EXAM:  CT BRAIN (5/6/2021):   Impression:  Large left subdural hemorrhage with associated left to right midline shift as above.  Smaller right-sided subdurals    Assessment:  45-year-old male with PMHx ETOH abuse, GERD, hypomagnesemia, seizure disorder, on Keppra, h/o hemorrhoidectomy, s/p fall, 5/6/2021. CTH, 5/6/2021, revealed acute large left subdural hemorrhage with associated left to right midline shift, with smaller right-sided subdurals, and mass effect. S/P Left hemicraniotomy for SDH evacuation, 5/7/2021. S/P cardiac arrest in OR, S/P revision of crani, 5/17/2021, with wound revision. Routine EEG, 5/19/2021, revealed borderline to mild generalized slowing with no epileptiform activity. Repeat CTH revealed new left cerebral hemisphere and right cerebellum infarcts. CTA revealed right vertebral artery stenosis. CTV revealed positive venous thrombosis. MRI brain revealed pseudomeningocele. S/P  shunt, 6/7/2021. On examination today, patient trached, on T-piece, keenly awake, however, does not follow commands, able to track to voice, with spontaneous movement noted LUE, right-sided hemiparesis, and no movement LLE.     Plan:  - From [facility] standpoint, okay to be downgraded to floor  - No further neurological workup at this time  - Continue Keppra 500mg q12hrs  - Continue Salt tabs 8 grams q6hrs; trend Serum Na  - Aspiration precautions  - Delirium precautions  - Seizure precautions   - Stat CTH if any decline in neurological examination and call [facility]/Neurology  - Medical management as per primary team  - Ventilator management as per primary team      Will sign-off at this time. Please feel free to re-consult with any concerns. Thank you.         Cassie Estrada, [facility]  Please feel free to contact for any questions or concerns   #[phone]

## 2021-06-09 NOTE — PROGRESS NOTE ADULT - SUBJECTIVE AND OBJECTIVE BOX
THIAGO SANDRA  45y, Male  Allergy: No Known Allergies      LOS  34d    CHIEF COMPLAINT: found down unresponsive (09 Jun 2021 01:46)      INTERVAL EVENTS/HPI  - No acute events overnight  - T(F): , Max: 98.7 (06-09-21 @ 04:00)  - Tolerating medication  - WBC Count: 6.21 (06-09-21 @ 00:06)  WBC Count: 8.39 (06-08-21 @ 00:47)     - Creatinine, Serum: 0.5 (06-09-21 @ 06:30)  Creatinine, Serum: <0.5 (06-09-21 @ 00:06)       ROS  ***    VITALS:  T(F): 98.3, Max: 98.7 (06-09-21 @ 04:00)  HR: 95  BP: 133/86  RR: 21Vital Signs Last 24 Hrs  T(C): 36.8 (09 Jun 2021 07:38), Max: 37.1 (09 Jun 2021 04:00)  T(F): 98.3 (09 Jun 2021 07:38), Max: 98.7 (09 Jun 2021 04:00)  HR: 95 (09 Jun 2021 08:00) (77 - 103)  BP: 133/86 (09 Jun 2021 08:00) (106/67 - 155/84)  BP(mean): 105 (09 Jun 2021 08:00) (81 - 111)  RR: 21 (09 Jun 2021 08:00) (10 - 21)  SpO2: 100% (09 Jun 2021 08:00) (100% - 100%)    PHYSICAL EXAM:  ***    FH: Non-contributory  Social Hx: Non-contributory    TESTS & MEASUREMENTS:                        8.8    6.21  )-----------( 284      ( 09 Jun 2021 00:06 )             26.9     06-09    145  |  108  |  12  ----------------------------<  103<H>  4.1   |  29  |  0.5<L>    Ca    9.4      09 Jun 2021 06:30  Phos  4.2     06-09  Mg     1.8     06-09      eGFR if Non African American: 130 mL/min/1.73M2 (06-09-21 @ 06:30)  eGFR if African American: 151 mL/min/1.73M2 (06-09-21 @ 06:30)  eGFR if Non African American: 142 mL/min/1.73M2 (06-09-21 @ 00:06)  eGFR if African American: 165 mL/min/1.73M2 (06-09-21 @ 00:06)  eGFR if Non African American: 130 mL/min/1.73M2 (06-08-21 @ 16:23)  eGFR if : 151 mL/min/1.73M2 (06-08-21 @ 16:23)  eGFR if Non African American: 130 mL/min/1.73M2 (06-08-21 @ 11:22)  eGFR if African American: 151 mL/min/1.73M2 (06-08-21 @ 11:22)          Culture - Fungal, CSF (collected 06-07-21 @ 19:43)  Source: .CSF  Preliminary Report (06-09-21 @ 06:49):    Testing in progress    Culture - CSF with Gram Stain (collected 06-07-21 @ 19:43)  Source: .CSF  Gram Stain (06-08-21 @ 07:33):    No polymorphonuclear cells seen    No organisms seen    by cytocentrifuge  Preliminary Report (06-09-21 @ 07:01):    No growth to date.    Culture - Blood (collected 05-28-21 @ 00:15)  Source: .Blood Blood  Final Report (06-02-21 @ 08:01):    No Growth Final    Culture - Blood (collected 05-28-21 @ 00:15)  Source: .Blood Blood  Final Report (06-02-21 @ 08:01):    No Growth Final    Culture - Blood (collected 05-23-21 @ 19:19)  Source: .Blood Blood-Peripheral  Final Report (05-29-21 @ 03:00):    No Growth Final    Culture - Body Fluid with Gram Stain (collected 05-21-21 @ 12:00)  Source: .Body Fluid CSF  Gram Stain (05-22-21 @ 02:58):    polymorphonuclear leukocytes seen    Gram positive cocci in pairs seen    by cytocentrifuge  Final Report (05-26-21 @ 18:04):    Few Enterococcus faecalis  Organism: Enterococcus faecalis (05-26-21 @ 18:04)  Organism: Enterococcus faecalis (05-26-21 @ 18:04)      -  Ampicillin: S <=2 Predicts results to ampicillin/sulbactam, amoxacillin-clavulanate and  piperacillin-tazobactam.      -  Tetra/Doxy: R >8      -  Vancomycin: S 2      Method Type: AISHA    Culture - Blood (collected 05-20-21 @ 02:00)  Source: .Blood Blood  Final Report (05-25-21 @ 07:01):    No Growth Final    Culture - Blood (collected 05-19-21 @ 00:27)  Source: .Blood None  Final Report (05-24-21 @ 07:00):    No Growth Final    Culture - Surgical Swab (collected 05-16-21 @ 08:54)  Source: .Surgical Swab None  Final Report (05-21-21 @ 16:44):    Rare Coag Negative Staphylococcus  Organism: Coag Negative Staphylococcus (05-21-21 @ 16:44)  Organism: Coag Negative Staphylococcus (05-21-21 @ 16:44)      -  Ampicillin/Sulbactam: S <=8/4      -  Cefazolin: S <=4      -  Clindamycin: S <=0.25      -  Erythromycin: S <=0.25      -  Gentamicin: S <=1 Should not be used as monotherapy      -  Oxacillin: S <=0.25      -  Penicillin: R 0.25      -  RIF- Rifampin: S <=1 Should not be used as monotherapy      -  Tetra/Doxy: S <=1      -  Trimethoprim/Sulfamethoxazole: S <=0.5/9.5      -  Vancomycin: S 0.5      Method Type: AISHA    Culture - Blood (collected 05-13-21 @ 11:19)  Source: .Blood None  Final Report (05-18-21 @ 22:03):    No Growth Final    Culture - Blood (collected 05-11-21 @ 11:18)  Source: .Blood None  Gram Stain (05-12-21 @ 02:53):    Growth in anaerobic bottle: Gram Negative Rods    Growth in aerobic bottle: Gram Negative Rods  Final Report (05-13-21 @ 16:09):    Growth in aerobic and anaerobic bottles: Klebsiella variicola    ***Blood Panel PCR results on this specimen are available    approximately 3 hours after the Gram stain result.***    Gram stain, PCR, and/or culture results may not always    correspond due to difference in methodologies.    ************************************************************    This PCR assay was performed by multiplex PCR. This    Assay tests for 66 bacterial and resistance gene targets.    Please refer to the [facility] test directory    at [email or web address] for details.  Organism: Blood Culture PCR  Klebsiella variicola (05-13-21 @ 16:09)  Organism: Klebsiella variicola (05-13-21 @ 16:09)      -  Amikacin: S <=16      -  Ampicillin: R <=8 These ampicillin results predict results for amoxicillin      -  Ampicillin/Sulbactam: S <=4/2 Enterobacter, Citrobacter, and Serratia may develop resistance during prolonged therapy (3-4 days)      -  Aztreonam: S <=4      -  Cefazolin: S <=2 Enterobacter, Citrobacter, and Serratia may develop resistance during prolonged therapy (3-4 days)      -  Cefepime: S <=2      -  Cefoxitin: S <=8      -  Ceftriaxone: S <=1 Enterobacter, Citrobacter, and Serratia may develop resistance during prolonged therapy      -  Ciprofloxacin: S <=0.25      -  Ertapenem: S <=0.5      -  Gentamicin: S <=2      -  Imipenem: S <=1      -  Levofloxacin: S <=0.5      -  Meropenem: S <=1      -  Piperacillin/Tazobactam: S <=8      -  Tobramycin: S <=2      -  Trimethoprim/Sulfamethoxazole: S <=0.5/9.5      Method Type: AISHA  Organism: Blood Culture PCR (05-13-21 @ 16:09)      -  Klebsiella pneumoniae: Detec      Method Type: PCR    Culture - Bronchial (collected 05-11-21 @ 10:12)  Source: Bronch Wash Bronchoalveolar Lavage  Gram Stain (05-12-21 @ 07:04):    Few polymorphonuclear leukocytes per low power field    No Squamous epithelial cells per low power field    Few Gram Positive Rods per oil power field  Final Report (05-13-21 @ 16:19):    Moderate Klebsiella variicola    Moderate Serratia marcescens    Normal Respiratory Liz present  Organism: Klebsiella variicola  Serratia marcescens (05-13-21 @ 16:19)  Organism: Serratia marcescens (05-13-21 @ 16:19)      -  Amikacin: S <=16      -  Amoxicillin/Clavulanic Acid: R >16/8      -  Ampicillin: R <=8 These ampicillin results predict results for amoxicillin      -  Ampicillin/Sulbactam: R 16/8 Enterobacter, Citrobacter, and Serratia may develop resistance during prolonged therapy (3-4 days)      -  Aztreonam: S <=4      -  Cefazolin: R >16 Enterobacter, Citrobacter, and Serratia may develop resistance during prolonged therapy (3-4 days)      -  Cefepime: S <=2      -  Cefoxitin: R <=8      -  Ceftriaxone: S <=1 Enterobacter, Citrobacter, and Serratia may develop resistance during prolonged therapy      -  Ciprofloxacin: S <=0.25      -  Ertapenem: S <=0.5      -  Gentamicin: S <=2      -  Levofloxacin: S <=0.5      -  Meropenem: S <=1      -  Piperacillin/Tazobactam: S <=8      -  Tobramycin: S <=2      -  Trimethoprim/Sulfamethoxazole: S <=0.5/9.5      Method Type: AISHA  Organism: Klebsiella variicola (05-13-21 @ 16:19)      -  Amikacin: S <=16      -  Amoxicillin/Clavulanic Acid: R >16/8      -  Ampicillin: R <=8 These ampicillin results predict results for amoxicillin      -  Ampicillin/Sulbactam: I 16/8 Enterobacter, Citrobacter, and Serratia may develop resistance during prolonged therapy (3-4 days)      -  Aztreonam: R >16      -  Cefazolin: R >16 Enterobacter, Citrobacter, and Serratia may develop resistance during prolonged therapy (3-4 days)      -  Cefepime: S <=2      -  Cefoxitin: S <=8      -  Ceftriaxone: S <=1 Enterobacter, Citrobacter, and Serratia may develop resistance during prolonged therapy      -  Ciprofloxacin: S <=0.25      -  Ertapenem: S <=0.5      -  Gentamicin: S <=2      -  Imipenem: S <=1      -  Levofloxacin: S <=0.5      -  Meropenem: S <=1      -  Piperacillin/Tazobactam: S <=8      -  Tobramycin: S 4      -  Trimethoprim/Sulfamethoxazole: S <=0.5/9.5      Method Type: AISHA            INFECTIOUS DISEASES TESTING  Vancomycin Level, Trough: 27.7 (06-08-21 @ 16:23)  Vancomycin Level, Trough: 19.4 (06-07-21 @ 04:20)  COVID-19 PCR: NotDetec (06-06-21 @ 13:11)  COVID-19 PCR: NotDetec (06-06-21 @ 08:40)  Vancomycin Level, Trough: 13.3 (06-05-21 @ 17:47)  Vancomycin Level, Trough: 19.9 (06-03-21 @ 05:25)  Vancomycin Level, Trough: 33.6 (06-03-21 @ 02:10)  Vancomycin Level, Trough: 15.7 (05-31-21 @ 11:59)  Vancomycin Level, Random: 6.7 (05-30-21 @ 11:35)  Vancomycin Level, Trough: 21.7 (05-29-21 @ 11:20)  Vancomycin Level, Trough: 19.0 (05-28-21 @ 11:00)  Vancomycin Level, Trough: 9.0 (05-27-21 @ 05:50)  Vancomycin Level, Trough: 7.6 (05-24-21 @ 05:54)  COVID-19 PCR: NotDetec (05-21-21 @ 04:30)  COVID-19 PCR: NotDetec (05-14-21 @ 18:58)  Vancomycin Level, Trough: 4.1 (05-13-21 @ 05:50)  HIV-1/2 Combo Result: Nonreact (05-09-21 @ 23:30)  Rapid RVP Result: NotDetec (05-06-21 @ 22:44)  COVID-19 PCR: NotDetec (04-06-21 @ 16:46)  COVID-19 PCR: NotDetec (03-29-21 @ 19:35)      INFLAMMATORY MARKERS      RADIOLOGY & ADDITIONAL TESTS:  I have personally reviewed the last available Chest xray  CXR  Xray Chest 1 View AP/PA:   EXAM:  XR CHEST 1 VIEW            PROCEDURE DATE:  06/07/2021            INTERPRETATION:  Clinical History / Reason for exam: Shortness of breath    Comparison : Chest radiograph 6/7/2021.    Technique/Positioning: Single frontal chest radiograph.    Findings:    Support devices: Tracheostomy tube in place. Partially imaged tubing overlying the right chest.    Cardiac/mediastinum/hilum: Unchanged    Lung parenchyma/Pleura: New/increasing left-sided pulmonary opacities. No pneumothorax.    Skeleton/soft tissues: Unchanged    Impression:    New/increasing left-sided pulmonary opacities.              JESSICA RUTH MD; Attending Radiologist  This document has been electronically signed. Jun 8 2021  9:54AM (06-07-21 @ 22:25)      CT      CARDIOLOGY TESTING      MEDICATIONS  artificial tears (preservative free) Ophthalmic Solution 2 Both EYES three times a day  enoxaparin Injectable 40 SubCutaneous daily  levETIRAcetam  IVPB 500 IV Intermittent every 12 hours  lisinopril 20 Oral daily  meropenem  IVPB 1000 IV Intermittent every 8 hours  pantoprazole   Suspension 40 Oral daily  propranolol 20 Oral every 8 hours  senna 2 Oral at bedtime  sodium chloride 8 Oral every 6 hours      WEIGHT  Weight (kg): 74.9 (06-07-21 @ 21:01)  Creatinine, Serum: 0.5 mg/dL (06-09-21 @ 06:30)  Creatinine, Serum: <0.5 mg/dL (06-09-21 @ 00:06)  Creatinine, Serum: 0.5 mg/dL (06-08-21 @ 16:23)  Creatinine, Serum: 0.5 mg/dL (06-08-21 @ 11:22)      ANTIBIOTICS:  meropenem  IVPB 1000 milliGRAM(s) IV Intermittent every 8 hours      All available historical records have been reviewed       THIAGO SANDRA  45y, Male  Allergy: No Known Allergies      LOS  34d    CHIEF COMPLAINT: found down unresponsive (09 Jun 2021 01:46)      INTERVAL EVENTS/HPI  - No acute events overnight  - T(F): , Max: 98.7 (06-09-21 @ 04:00)  - Tolerating medication  - WBC Count: 6.21 (06-09-21 @ 00:06)  WBC Count: 8.39 (06-08-21 @ 00:47)     - Creatinine, Serum: 0.5 (06-09-21 @ 06:30)  Creatinine, Serum: <0.5 (06-09-21 @ 00:06)       ROS  unable to obtain history secondary to patient's mental status and/or sedation     VITALS:  T(F): 98.3, Max: 98.7 (06-09-21 @ 04:00)  HR: 95  BP: 133/86  RR: 21Vital Signs Last 24 Hrs  T(C): 36.8 (09 Jun 2021 07:38), Max: 37.1 (09 Jun 2021 04:00)  T(F): 98.3 (09 Jun 2021 07:38), Max: 98.7 (09 Jun 2021 04:00)  HR: 95 (09 Jun 2021 08:00) (77 - 103)  BP: 133/86 (09 Jun 2021 08:00) (106/67 - 155/84)  BP(mean): 105 (09 Jun 2021 08:00) (81 - 111)  RR: 21 (09 Jun 2021 08:00) (10 - 21)  SpO2: 100% (09 Jun 2021 08:00) (100% - 100%)    PHYSICAL EXAM:  Gen: NAD, resting in bed, trach   HEENT: Normocephalic, atraumatic, deformity, incision clean, no erythema/purulence   Neck: supple, no lymphadenopathy  CV: Regular rate & regular rhythm  Lungs: decreased BS at bases, no fremitus  Abdomen: Soft, BS present  Ext: Warm, well perfused  Neuro: not following commands, awake  Skin: no rash, no erythema  Lines: no phlebitis     FH: Non-contributory  Social Hx: Non-contributory    TESTS & MEASUREMENTS:                        8.8    6.21  )-----------( 284      ( 09 Jun 2021 00:06 )             26.9     06-09    145  |  108  |  12  ----------------------------<  103<H>  4.1   |  29  |  0.5<L>    Ca    9.4      09 Jun 2021 06:30  Phos  4.2     06-09  Mg     1.8     06-09      eGFR if Non African American: 130 mL/min/1.73M2 (06-09-21 @ 06:30)  eGFR if African American: 151 mL/min/1.73M2 (06-09-21 @ 06:30)  eGFR if Non African American: 142 mL/min/1.73M2 (06-09-21 @ 00:06)  eGFR if African American: 165 mL/min/1.73M2 (06-09-21 @ 00:06)  eGFR if Non African American: 130 mL/min/1.73M2 (06-08-21 @ 16:23)  eGFR if : 151 mL/min/1.73M2 (06-08-21 @ 16:23)  eGFR if Non African American: 130 mL/min/1.73M2 (06-08-21 @ 11:22)  eGFR if African American: 151 mL/min/1.73M2 (06-08-21 @ 11:22)          Culture - Fungal, CSF (collected 06-07-21 @ 19:43)  Source: .CSF  Preliminary Report (06-09-21 @ 06:49):    Testing in progress    Culture - CSF with Gram Stain (collected 06-07-21 @ 19:43)  Source: .CSF  Gram Stain (06-08-21 @ 07:33):    No polymorphonuclear cells seen    No organisms seen    by cytocentrifuge  Preliminary Report (06-09-21 @ 07:01):    No growth to date.    Culture - Blood (collected 05-28-21 @ 00:15)  Source: .Blood Blood  Final Report (06-02-21 @ 08:01):    No Growth Final    Culture - Blood (collected 05-28-21 @ 00:15)  Source: .Blood Blood  Final Report (06-02-21 @ 08:01):    No Growth Final    Culture - Blood (collected 05-23-21 @ 19:19)  Source: .Blood Blood-Peripheral  Final Report (05-29-21 @ 03:00):    No Growth Final    Culture - Body Fluid with Gram Stain (collected 05-21-21 @ 12:00)  Source: .Body Fluid CSF  Gram Stain (05-22-21 @ 02:58):    polymorphonuclear leukocytes seen    Gram positive cocci in pairs seen    by cytocentrifuge  Final Report (05-26-21 @ 18:04):    Few Enterococcus faecalis  Organism: Enterococcus faecalis (05-26-21 @ 18:04)  Organism: Enterococcus faecalis (05-26-21 @ 18:04)      -  Ampicillin: S <=2 Predicts results to ampicillin/sulbactam, amoxacillin-clavulanate and  piperacillin-tazobactam.      -  Tetra/Doxy: R >8      -  Vancomycin: S 2      Method Type: AISHA    Culture - Blood (collected 05-20-21 @ 02:00)  Source: .Blood Blood  Final Report (05-25-21 @ 07:01):    No Growth Final    Culture - Blood (collected 05-19-21 @ 00:27)  Source: .Blood None  Final Report (05-24-21 @ 07:00):    No Growth Final    Culture - Surgical Swab (collected 05-16-21 @ 08:54)  Source: .Surgical Swab None  Final Report (05-21-21 @ 16:44):    Rare Coag Negative Staphylococcus  Organism: Coag Negative Staphylococcus (05-21-21 @ 16:44)  Organism: Coag Negative Staphylococcus (05-21-21 @ 16:44)      -  Ampicillin/Sulbactam: S <=8/4      -  Cefazolin: S <=4      -  Clindamycin: S <=0.25      -  Erythromycin: S <=0.25      -  Gentamicin: S <=1 Should not be used as monotherapy      -  Oxacillin: S <=0.25      -  Penicillin: R 0.25      -  RIF- Rifampin: S <=1 Should not be used as monotherapy      -  Tetra/Doxy: S <=1      -  Trimethoprim/Sulfamethoxazole: S <=0.5/9.5      -  Vancomycin: S 0.5      Method Type: AISHA    Culture - Blood (collected 05-13-21 @ 11:19)  Source: .Blood None  Final Report (05-18-21 @ 22:03):    No Growth Final    Culture - Blood (collected 05-11-21 @ 11:18)  Source: .Blood None  Gram Stain (05-12-21 @ 02:53):    Growth in anaerobic bottle: Gram Negative Rods    Growth in aerobic bottle: Gram Negative Rods  Final Report (05-13-21 @ 16:09):    Growth in aerobic and anaerobic bottles: Klebsiella variicola    ***Blood Panel PCR results on this specimen are available    approximately 3 hours after the Gram stain result.***    Gram stain, PCR, and/or culture results may not always    correspond due to difference in methodologies.    ************************************************************    This PCR assay was performed by multiplex PCR. This    Assay tests for 66 bacterial and resistance gene targets.    Please refer to the [facility] test directory    at [email or web address] for details.  Organism: Blood Culture PCR  Klebsiella variicola (05-13-21 @ 16:09)  Organism: Klebsiella variicola (05-13-21 @ 16:09)      -  Amikacin: S <=16      -  Ampicillin: R <=8 These ampicillin results predict results for amoxicillin      -  Ampicillin/Sulbactam: S <=4/2 Enterobacter, Citrobacter, and Serratia may develop resistance during prolonged therapy (3-4 days)      -  Aztreonam: S <=4      -  Cefazolin: S <=2 Enterobacter, Citrobacter, and Serratia may develop resistance during prolonged therapy (3-4 days)      -  Cefepime: S <=2      -  Cefoxitin: S <=8      -  Ceftriaxone: S <=1 Enterobacter, Citrobacter, and Serratia may develop resistance during prolonged therapy      -  Ciprofloxacin: S <=0.25      -  Ertapenem: S <=0.5      -  Gentamicin: S <=2      -  Imipenem: S <=1      -  Levofloxacin: S <=0.5      -  Meropenem: S <=1      -  Piperacillin/Tazobactam: S <=8      -  Tobramycin: S <=2      -  Trimethoprim/Sulfamethoxazole: S <=0.5/9.5      Method Type: AISHA  Organism: Blood Culture PCR (05-13-21 @ 16:09)      -  Klebsiella pneumoniae: Detec      Method Type: PCR    Culture - Bronchial (collected 05-11-21 @ 10:12)  Source: Bronch Wash Bronchoalveolar Lavage  Gram Stain (05-12-21 @ 07:04):    Few polymorphonuclear leukocytes per low power field    No Squamous epithelial cells per low power field    Few Gram Positive Rods per oil power field  Final Report (05-13-21 @ 16:19):    Moderate Klebsiella variicola    Moderate Serratia marcescens    Normal Respiratory Liz present  Organism: Klebsiella variicola  Serratia marcescens (05-13-21 @ 16:19)  Organism: Serratia marcescens (05-13-21 @ 16:19)      -  Amikacin: S <=16      -  Amoxicillin/Clavulanic Acid: R >16/8      -  Ampicillin: R <=8 These ampicillin results predict results for amoxicillin      -  Ampicillin/Sulbactam: R 16/8 Enterobacter, Citrobacter, and Serratia may develop resistance during prolonged therapy (3-4 days)      -  Aztreonam: S <=4      -  Cefazolin: R >16 Enterobacter, Citrobacter, and Serratia may develop resistance during prolonged therapy (3-4 days)      -  Cefepime: S <=2      -  Cefoxitin: R <=8      -  Ceftriaxone: S <=1 Enterobacter, Citrobacter, and Serratia may develop resistance during prolonged therapy      -  Ciprofloxacin: S <=0.25      -  Ertapenem: S <=0.5      -  Gentamicin: S <=2      -  Levofloxacin: S <=0.5      -  Meropenem: S <=1      -  Piperacillin/Tazobactam: S <=8      -  Tobramycin: S <=2      -  Trimethoprim/Sulfamethoxazole: S <=0.5/9.5      Method Type: AISHA  Organism: Klebsiella variicola (05-13-21 @ 16:19)      -  Amikacin: S <=16      -  Amoxicillin/Clavulanic Acid: R >16/8      -  Ampicillin: R <=8 These ampicillin results predict results for amoxicillin      -  Ampicillin/Sulbactam: I 16/8 Enterobacter, Citrobacter, and Serratia may develop resistance during prolonged therapy (3-4 days)      -  Aztreonam: R >16      -  Cefazolin: R >16 Enterobacter, Citrobacter, and Serratia may develop resistance during prolonged therapy (3-4 days)      -  Cefepime: S <=2      -  Cefoxitin: S <=8      -  Ceftriaxone: S <=1 Enterobacter, Citrobacter, and Serratia may develop resistance during prolonged therapy      -  Ciprofloxacin: S <=0.25      -  Ertapenem: S <=0.5      -  Gentamicin: S <=2      -  Imipenem: S <=1      -  Levofloxacin: S <=0.5      -  Meropenem: S <=1      -  Piperacillin/Tazobactam: S <=8      -  Tobramycin: S 4      -  Trimethoprim/Sulfamethoxazole: S <=0.5/9.5      Method Type: [facility]            INFECTIOUS DISEASES TESTING  Vancomycin Level, Trough: 27.7 (06-08-21 @ 16:23)  Vancomycin Level, Trough: 19.4 (06-07-21 @ 04:20)  COVID-19 PCR: NotDetec (06-06-21 @ 13:11)  COVID-19 PCR: NotDetec (06-06-21 @ 08:40)  Vancomycin Level, Trough: 13.3 (06-05-21 @ 17:47)  Vancomycin Level, Trough: 19.9 (06-03-21 @ 05:25)  Vancomycin Level, Trough: 33.6 (06-03-21 @ 02:10)  Vancomycin Level, Trough: 15.7 (05-31-21 @ 11:59)  Vancomycin Level, Random: 6.7 (05-30-21 @ 11:35)  Vancomycin Level, Trough: 21.7 (05-29-21 @ 11:20)  Vancomycin Level, Trough: 19.0 (05-28-21 @ 11:00)  Vancomycin Level, Trough: 9.0 (05-27-21 @ 05:50)  Vancomycin Level, Trough: 7.6 (05-24-21 @ 05:54)  COVID-19 PCR: NotDetec (05-21-21 @ 04:30)  COVID-19 PCR: NotDetec (05-14-21 @ 18:58)  Vancomycin Level, Trough: 4.1 (05-13-21 @ 05:50)  HIV-1/2 Combo Result: Nonreact (05-09-21 @ 23:30)  Rapid RVP Result: NotDetec (05-06-21 @ 22:44)  COVID-19 PCR: NotDetec (04-06-21 @ 16:46)  COVID-19 PCR: NotDetec (03-29-21 @ 19:35)      INFLAMMATORY MARKERS      RADIOLOGY & ADDITIONAL TESTS:  I have personally reviewed the last available Chest xray  CXR  Xray Chest 1 View AP/PA:   EXAM:  XR CHEST 1 VIEW            PROCEDURE DATE:  06/07/2021            INTERPRETATION:  Clinical History / Reason for exam: Shortness of breath    Comparison : Chest radiograph 6/7/2021.    Technique/Positioning: Single frontal chest radiograph.    Findings:    Support devices: Tracheostomy tube in place. Partially imaged tubing overlying the right chest.    Cardiac/mediastinum/hilum: Unchanged    Lung parenchyma/Pleura: New/increasing left-sided pulmonary opacities. No pneumothorax.    Skeleton/soft tissues: Unchanged    Impression:    New/increasing left-sided pulmonary opacities.              JESSICA RUTH MD; Attending Radiologist  This document has been electronically signed. Jun 8 2021  9:54AM (06-07-21 @ 22:25)      CT      CARDIOLOGY TESTING      MEDICATIONS  artificial tears (preservative free) Ophthalmic Solution 2 Both EYES three times a day  enoxaparin Injectable 40 SubCutaneous daily  levETIRAcetam  IVPB 500 IV Intermittent every 12 hours  lisinopril 20 Oral daily  meropenem  IVPB 1000 IV Intermittent every 8 hours  pantoprazole   Suspension 40 Oral daily  propranolol 20 Oral every 8 hours  senna 2 Oral at bedtime  sodium chloride 8 Oral every 6 hours      WEIGHT  Weight (kg): 74.9 (06-07-21 @ 21:01)  Creatinine, Serum: 0.5 mg/dL (06-09-21 @ 06:30)  Creatinine, Serum: <0.5 mg/dL (06-09-21 @ 00:06)  Creatinine, Serum: 0.5 mg/dL (06-08-21 @ 16:23)  Creatinine, Serum: 0.5 mg/dL (06-08-21 @ 11:22)      ANTIBIOTICS:  meropenem  IVPB 1000 milliGRAM(s) IV Intermittent every 8 hours      All available historical records have been reviewed

## 2021-06-09 NOTE — PROGRESS NOTE ADULT - SUBJECTIVE AND OBJECTIVE BOX
Subjective: 45yMale with a pmhx of SUBDURAL HEMATOMA;RESPIRATORY FAILURE;FALL    ^FALL    No pertinent family history in first degree relatives    Family history of essential hypertension (Father)    Handoff    MEWS Score    No pertinent past medical history    Alcohol abuse    GERD (gastroesophageal reflux disease)    ETOH abuse    Hypomagnesemia    Seizure disorder    Subdural hematoma    Hydrocephalus    S/P subdural hematoma evacuation    Subdural hematoma    Hydrocephalus    Subdural hematoma    Subdural hematoma    Respiratory failure    ETOH abuse    Palliative care by specialist    Insertion of non-tunneled central venous catheter (CVC) in patient age 5 years of age or older    Craniotomy, decompressive    Decompressive craniotomy    Placement, tracheostomy and percutaneous endoscopic gastrostomy    Laparoscopy, single incision    Ventriculoperitoneal shunt    No significant past surgical history    H/O hemorrhoidectomy    FALL    22    Respiratory failure    Fall    [email or web address]      POD# 29 S/P Left Craniectomy for Evac of SDH  POD# 19 S/P Wound Revision  POD#1 s/p placement of right frontal VPS strata II programmable valve set at 1.5, Lap assist for peritoneal cath placement    Pt seen and examined at bedside.  Pt is awake, eyes opened.  Follows commands on left.  Reportedly used L hand in mitten restraint to pull off head dressing on R.  Replaced by RN.  CSF cx prelim neg.   Allergies    No Known Allergies    Intolerances        Imaging:< from: CT Head No Cont (06.08.21 @ 09:09) >  IMPRESSION:  Interval placement of a right frontal approach ventriculoperitoneal shunt catheter with tip terminating anterior to the right frontal horn.    Slightly decreased thickness of a left-sided extra-axial CSF density fluid collection traversing the craniectomy defect with similar underlying mass effect and midline shift..    Unchanged subacute infarcts as well as trace subarachnoid hemorrhage as detailed above.    < end of copied text >      Vital Signs Last 24 Hrs  T(C): 36.8 (09 Jun 2021 07:38), Max: 37.1 (09 Jun 2021 04:00)  T(F): 98.3 (09 Jun 2021 07:38), Max: 98.7 (09 Jun 2021 04:00)  HR: 95 (09 Jun 2021 08:00) (77 - 103)  BP: 133/86 (09 Jun 2021 08:00) (106/67 - 155/84)  BP(mean): 105 (09 Jun 2021 08:00) (81 - 111)  RR: 21 (09 Jun 2021 08:00) (10 - 21)  SpO2: 100% (09 Jun 2021 08:00) (100% - 100%)      acetaminophen   Tablet .. 650 milliGRAM(s) Oral every 6 hours PRN  artificial tears (preservative free) Ophthalmic Solution 2 Drop(s) Both EYES three times a day  enoxaparin Injectable 40 milliGRAM(s) SubCutaneous daily  levETIRAcetam  IVPB 500 milliGRAM(s) IV Intermittent every 12 hours  lisinopril 20 milliGRAM(s) Oral daily  meropenem  IVPB 1000 milliGRAM(s) IV Intermittent every 8 hours  pantoprazole   Suspension 40 milliGRAM(s) Oral daily  propranolol 20 milliGRAM(s) Oral every 8 hours  senna 2 Tablet(s) Oral at bedtime  sodium chloride 8 Gram(s) Oral every 6 hours        06-08-21 @ 07:01  -  06-09-21 @ 07:00  --------------------------------------------------------  IN: 2250 mL / OUT: 2140 mL / NET: 110 mL        REVIEW OF SYSTEMS    [ ] A ten-point review of systems was otherwise negative except as noted.  [x ] Due to altered mental status/intubation, subjective information were not able to be obtained from the patient. History was obtained, to the extent possible, from review of the chart and collateral sources of information.          Awake, no verbal response  PERRL  tracks   no facial droop  moves LUE/LLE spontaneously and sometimes to command  no movt of RUE today  slight w/d to RLE  craniectomy site: slightly sunken, incision clean dry intact  VPS sites: clean and intact        CBC Full  -  ( 09 Jun 2021 00:06 )  WBC Count : 6.21 K/uL  RBC Count : 3.18 M/uL  Hemoglobin : 8.8 g/dL  Hematocrit : 26.9 %  Platelet Count - Automated : 284 K/uL  Mean Cell Volume : 84.6 fL  Mean Cell Hemoglobin : 27.7 pg  Mean Cell Hemoglobin Concentration : 32.7 g/dL  Auto Neutrophil # : x  Auto Lymphocyte # : x  Auto Monocyte # : x  Auto Eosinophil # : x  Auto Basophil # : x  Auto Neutrophil % : x  Auto Lymphocyte % : x  Auto Monocyte % : x  Auto Eosinophil % : x  Auto Basophil % : x    06-09    145  |  108  |  12  ----------------------------<  103<H>  4.1   |  29  |  0.5<L>    Ca    9.4      09 Jun 2021 06:30  Phos  4.2     06-09  Mg     1.8     06-09      PT/INR - ( 07 Jun 2021 22:55 )   PT: 14.50 sec;   INR: 1.26 ratio         PTT - ( 07 Jun 2021 22:55 )  PTT:22.3 sec        Assessment/Plan:   f/u SICU plan for PEG  cont to trend serial Na levels  on ppx Lovenox  F/u final CSF cultures sent from OR  continue broad spectrum abx   downgrade to floor, waiting for bed  d/w attg

## 2021-06-09 NOTE — PROGRESS NOTE ADULT - ASSESSMENT
Assessment & Plan  45M s/p fall with large left SDH and neurological status change, level 1 for emergent craniectomy complicated by subgleal hematoma/collection s/p wound revision and drainage on 5/16.    NEURO:      S/P craniectomy for large left SDH    S/P  shunt 6/7     - GCS 10T, Q2H neuro checks, No sedation     - Keppra 500mg BID for sz ppx    Hyponatremia, now resolved     -  Cerebral salt wasting     -  d/c'ed 3%, continued on salt tabs 8q6      - completed Fludricortasone x 6 doses    Maintian Head of bed @ 30 degrees    Craniectomy (no left side bone flap) Helmet --> does not need while laying in bed only     during PT/OT   OR 5/16:     -Revision craniectomy wound with complex closure for persistent drainage, subgaleal hematoma- e. 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  *** NSX -eval CT H for pre-op planning: increased L to R MLS 1.3cm w/ mass effect on lateral ventricles.    RTOR (6/7): s/p  shunt    -q4h neurochecks, CT Head s/p  shunt completed    RESP:   Respiratoy Failure    s/p trach 5/21    tolerating T-piece since 5/24    ABG prn    AM CXR        CARDS:     S/P cardiac arrest in OR & MTP     - Hypertension controlled on Lisinopril 20mg     - Propranolol 20mg q8    GI/NUTR:     Diet: TF (Osmolite 1.5) @ 50 via Gtube (using jain in situ)     GI Prophylaxis- PPI    Bowel regimen - Senna         Last BM 6/6    /RENAL:   No acute issues    Voiding via condom cath - monitor UO    Q6 BMP, serum osm      - Hyponatremic in setting of Cerebral salt wasting - goal eunatremic      - d/c'ed 3%, continued on salt tabs 8g q6h      -completed Fludrocortisone x6 doses  BUN/Cr- 15/0.5  Electrolytes- Na+ 141 / K+ 4.0 / Mg 1.8 / Ph 4.2  --> magnesium repleted  Monitor & replete elytes prn      HEME/ONC:   No acute issues    Labs:   Hgb stable, 8.6 > 10.7 > 8.8  maintain Hg >8, MTP on this admission  DVT prophylaxis: Lovenox 40  5/26 DVT sono neg  -T&S 6/6    ID:  Craniectomy site wound infection  Borderline leukopenia - WBC: 10 (post-op) > 8 > 6  Afebrile  Antibiotics- Seamus, Vanco - continue through 7/14  - Vanco trough 6/7 @ 4pm - 19.4, -6/8 Vanco Trough 27.7, d/c'd for now, re-check level in am  Cultures     - 5/11- u/a +leuk     - blood culture 5/13, 5/19, 5/20, 5/23, 5/28 No growth      -Cranial Culture 5/21-e faceium     - Reculture if spikes  -6/6 Covid swab NEG      ENDO:  FS controlled  q6 POC glucose   Sliding scale insulin PRN  A1c 5.2    DISPO: Vent unit   Assessment & Plan  45M s/p fall with large left SDH and neurological status change, level 1 for emergent craniectomy complicated by subgleal hematoma/collection s/p wound revision and drainage on 5/16.    NEURO:      S/P craniectomy for large left SDH    S/P  shunt 6/7     - GCS 10T, Q2H neuro checks, No sedation     - Keppra 500mg BID for sz ppx    Hyponatremia, now resolved     - Salt tabs 8 q6     -  Cerebral salt wasting     -  d/c'ed 3%, continued on salt tabs 8q6      - completed Fludricortasone x 6 doses    Maintian Head of bed @ 30 degrees    Craniectomy (no left side bone flap) Helmet --> does not need while laying in bed only     during PT/OT   OR 5/16:     -Revision craniectomy wound with complex closure for persistent drainage, subgaleal hematoma- e. 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  *** NSX -eval CT H for pre-op planning: increased L to R MLS 1.3cm w/ mass effect on lateral ventricles.    RTOR (6/7): s/p  shunt    -q4h neurochecks, CT Head s/p  shunt completed    RESP:   Respiratoy Failure    s/p trach 5/21    tolerating T-piece since 5/24    ABG prn    AM CXR        CARDS:     S/P cardiac arrest in OR & MTP     - Hypertension controlled on Lisinopril 20mg     - Propranolol 20mg q8    GI/NUTR:     Diet: TF (Osmolite 1.5) @ 50 via Gtube (using jain in situ)     GI Prophylaxis- PPI    Bowel regimen - Senna         Last BM 6/6    /RENAL:   No acute issues    Voiding via condom cath - monitor UO    Q6 BMP, serum osm      - Hyponatremic in setting of Cerebral salt wasting - goal eunatremic      - d/c'ed 3%, continued on salt tabs 8g q6h      -completed Fludrocortisone x6 doses  BUN/Cr- 12/0.5  Electrolytes- Na+ 145 / K+ 4.1 / Mg 1.8 / Ph 4.2  --> magnesium repleted  Monitor & replete elytes prn      HEME/ONC:   No acute issues    Labs:   Hgb stable, 8.6 > 10.7 > 8.8  maintain Hg >8, MTP on this admission  DVT prophylaxis: Lovenox 40  5/26 DVT sono neg  -T&S 6/6    ID:  Craniectomy site wound infection  Borderline leukopenia - WBC: 10 (post-op) > 8 > 6  Afebrile  Antibiotics- Seamus, Vanco - continue through 7/14  - Vanco trough 6/7 @ 4pm - 19.4, -6/8 Vanco Trough 27.7, d/c'd for now, re-check level in am  Cultures     - 5/11- u/a +leuk     - blood culture 5/13, 5/19, 5/20, 5/23, 5/28 No growth      -Cranial Culture 5/21-e faceium     - Reculture if spikes  -6/6 Covid swab NEG      ENDO:  FS controlled  q6 POC glucose   Sliding scale insulin PRN  A1c 5.2    DISPO: Vent unit

## 2021-06-09 NOTE — PROGRESS NOTE ADULT - ASSESSMENT
ASSESSMENT  45yM w/ PMHx of Etoh abuse and seizures on keppra  found down unresponsive admitted 5/6. s/p craniectomy for large L SDH    IMPRESSION  #RESOLVED Fevers secondary to craniotomy site collection and thrombus    6/7 CSF   No growth to date.    s/p 6/7 Right frontal VPS strata II programmable valve set at 1.5, Lap assist for peritoneal cath placement- no signs of infection    5/27 KIKE removed    No CSF studies    5/23 BCX NG    5/21 aspirate craniotomy site WCX e. faecalis (S amp)    5/16 swab OR coNS (S oxacillin)    No leukocytosis, rule out non-infectious causes / central fever    5/19 BCX NGTD     5/18 UA unremarkable   < from: MR Head w/wo IV Cont (05.28.21 @ 20:10) >  1.9 cm enhancing soft tissue with internal mineralization along the anterior margin of the left frontal craniectomy reflect granulation tissue.  Diffuse thickening/enhancement of the bilateral pachymeninges, nonspecific but can be seen in setting of postop settings, intracranial hypotension, or meningitis.  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 Redemonstrated subdural hematomas along the right cerebral and cerebellar convexities.  Evolving subacute infarcts in the medial left frontoparietal region, left insular cortex, and right cerebellum with associated gyral enhancements.  Peripherally enhancing evolving hemorrhagic contusion/hematoma in the left temporal occipital regions with scattered petechial gyral hemorrhage/enhancement.Moderate surrounding edema.  < from: CT Head No Cont (05.26.21 @ 12:49) >  Since prior CT of 5/16/2021 there is slight increased thickness of a mixed density extra-axial collection within the craniectomy defect.. 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 Please note evaluation for abscess is limited without intravenous contrast. A follow-up MRI of the brain with and without contrast is recommended for further evaluation.  Decreased blood products noted within the left temporal occipital region with unchanged areas of subacute infarct.    CT 5/20 1.  Redemonstrated filling defect within the left transverse sinus, sigmoid sinus and proximal internal jugular vein compatible with thrombus. When compared to the CTA from 5/13, there has been interval partial recanalization of the left transverse sinus.  2.  Redemonstrated large left-sided craniectomy. The previously seen left scalp drain has been removed.  3.  Increasing size of the mixed density fluid collection/hematoma extending from the left extra-axial space to the overlying scalp, overall measuring 4 cm in width, previously 3 cm.  4.  Increasing frontal convexity subdural hygromas measuring 8 mm on the right and 9 mm on the left.  5.  Left to right midline shift of 4 mm, previously 3 mm.  6.  Redemonstrated acute/subacute infarcts within the left temporal lobe and insula. Additional previously demonstrated multifocal infarcts and edema are not as well delineated on this exam, recommend follow-up head CT.  #Klebsiella bacteremia , BAL also with Klebsiella but no PNA on imaging (CT/CXR)    5/13 BCX NGTD     5/11 BCX kleb variicola (S)    5/11 BAL   Moderate Klebsiella variicola &  Moderate Serratia marcescens  < from: Xray Chest 1 View- PORTABLE-Routine (05.13.21 @ 06:28) >No radiographic evidence of acute cardiopulmonary disease.    #SDH s/p craniectomy    5/16 OR WCX   Rare Coag Negative Staphylococcus (S oxacillin)- likely skin colonizer     5/16 s/p Revision craniotomy wound with complex closure for persistent drainage, subgaleal hematoma    Repeat CTH development of acute infarcts in left cerebral hemisphere and right cerebellum  #CT atelectasis   #HIV/Hep panel NR  Creatinine, Serum: <0.5 (05-14-21 @ 05:30)      RECOMMENDATIONS  - Vancomycin Level, Trough: 27.7 (06-08-21 @ 16:23)- Dose 18:19- HOLD FURTHER VANC DOSING. ADD ON AM LEVEL TO LABS & CHECK AM LEVEL 6/10  - Seamus 2g q8h IV and Vanc IV 5/25-, s/p unasyn  - ABX 7 days post-op , then D/C 6/13  - Per SICU/ NSYG      If any questions, please call or send a message on [employer] Teams  Spectra [phone]

## 2021-06-09 NOTE — PROGRESS NOTE ADULT - SUBJECTIVE AND OBJECTIVE BOX
SANDRA DAS  [id number]  45y Male    Indication for ICU admission: s/p craniectomy for large L SDH  Admit Date:05-06-21  ICU Date: 05-07-21  OR Date: 05-06-21    No Known Allergies    PAST MEDICAL & SURGICAL HISTORY:  Alcohol abuse  GERD (gastroesophageal reflux disease)  ETOH abuse  Hypomagnesemia  Seizure disorder  H/O hemorrhoidectomy    Home Medications:  NONE    24HRS EVENT:  6/8  Night  -no acute events overnight  -am vanc trough  -2g mag repleted    Day  -resume lovenox   -per neuro no 3% continue salt tabs and serial Na  -F/U Head CT  -continue abx 7 days post op until 7/14  -dc to vent unit pending bed  -Vanco Trough 27.7, d/c'd for now, re-check level in am    DVT PTX: LVX  GI PTX: PPI    ***Tubes/Lines/Drains  ***  Peripheral IV  Left basilic midline 5/17  Arterial Line Right radial	Date 5/17  Trach/peg 5/21    REVIEW OF SYSTEMS  [ ] A ten-point review of systems was otherwise negative except as noted.  [x] Due to altered mental status/intubation, subjective information were not able to be obtained from the patient. History was obtained, to the extent possible, from review of the chart and collateral sources of information.    	   SANDRA DAS  [id number]  45y Male    Indication for ICU admission: s/p craniectomy for large L SDH  Admit Date:21  ICU Date: 21  OR Date: 21    No Known Allergies    PAST MEDICAL & SURGICAL HISTORY:  Alcohol abuse  GERD (gastroesophageal reflux disease)  ETOH abuse  Hypomagnesemia  Seizure disorder  H/O hemorrhoidectomy    Home Medications:  NONE    24HRS EVENT:    Night  -no acute events overnight  -am vanc trough  -2g mag repleted    Day  -resume lovenox   -per neuro no 3% continue salt tabs and serial Na  -F/U Head CT  -continue abx 7 days post op until   -dc to vent unit pending bed  -Vanco Trough 27.7, d/c'd for now, re-check level in am    DVT PTX: LVX  GI PTX: PPI    ***Tubes/Lines/Drains  ***  Peripheral IV  Left basilic midline   Arterial Line Right radial	Date   Trach/peg     REVIEW OF SYSTEMS  [ ] A ten-point review of systems was otherwise negative except as noted.  [x] Due to altered mental status/intubation, subjective information were not able to be obtained from the patient. History was obtained, to the extent possible, from review of the chart and collateral sources of information.    	  Daily     Daily Weight in k.1 (2021 05:00)    Diet, NPO with Tube Feed:   Tube Feeding Modality: Orogastric  Osmolite 1.5 Jeff  Total Volume for 24 Hours (mL): 1200  Continuous  Starting Tube Feed Rate mL per Hour: 50  Until Goal Tube Feed Rate (mL per Hour): 50  Tube Feed Duration (in Hours): 24  Tube Feed Start Time: 00:00  No Carb Prosource TF     Qty per Day:  3 (21 @ 03:47)      CURRENT MEDS:  Neurologic Medications  acetaminophen   Tablet .. 650 milliGRAM(s) Oral every 6 hours PRN Temp greater or equal to 38.5C (101.3F), Moderate Pain (4 - 6), Severe Pain (7 - 10)  levETIRAcetam  IVPB 500 milliGRAM(s) IV Intermittent every 12 hours    Respiratory Medications    Cardiovascular Medications  lisinopril 20 milliGRAM(s) Oral daily  propranolol 20 milliGRAM(s) Oral every 8 hours    Gastrointestinal Medications  pantoprazole   Suspension 40 milliGRAM(s) Oral daily  senna 2 Tablet(s) Oral at bedtime  sodium chloride 8 Gram(s) Oral every 6 hours    Genitourinary Medications    Hematologic/Oncologic Medications  enoxaparin Injectable 40 milliGRAM(s) SubCutaneous daily    Antimicrobial/Immunologic Medications  meropenem  IVPB 1000 milliGRAM(s) IV Intermittent every 8 hours    Endocrine/Metabolic Medications    Topical/Other Medications  artificial tears (preservative free) Ophthalmic Solution 2 Drop(s) Both EYES three times a day      ICU Vital Signs Last 24 Hrs  T(C): 36.1 (2021 15:35), Max: 37.1 (2021 04:00)  T(F): 97 (2021 15:35), Max: 98.7 (2021 04:00)  HR: 80 (2021 14:00) (77 - 103)  BP: 121/81 (2021 14:00) (108/66 - 155/84)  BP(mean): 97 (2021 14:00) (82 - 111)  ABP: --  ABP(mean): --  RR: 19 (2021 14:00) (11 - 25)  SpO2: 100% (2021 14:00) (99% - 100%)      Adult Advanced Hemodynamics Last 24 Hrs  CVP(mm Hg): --  CVP(cm H2O): --  CO: --  CI: --  PA: --  PA(mean): --  PCWP: --  SVR: --  SVRI: --  PVR: --  PVRI: --          I&O's Summary    2021 07:01  -  2021 07:00  --------------------------------------------------------  IN: 2250 mL / OUT: 2140 mL / NET: 110 mL    2021 07:01  -  2021 16:46  --------------------------------------------------------  IN: 600 mL / OUT: 1450 mL / NET: -850 mL      I&O's Detail    2021 07:01  -  2021 07:00  --------------------------------------------------------  IN:    Enteral Tube Flush: 250 mL    IV PiggyBack: 500 mL    IV PiggyBack: 100 mL    IV PiggyBack: 200 mL    Osmolite: 1200 mL  Total IN: 2250 mL    OUT:    Voided (mL): 2140 mL  Total OUT: 2140 mL    Total NET: 110 mL      2021 07:01  -  2021 16:46  --------------------------------------------------------  IN:    Enteral Tube Flush: 150 mL    IV PiggyBack: 50 mL    Osmolite: 400 mL  Total IN: 600 mL    OUT:    Voided (mL): 550 mL    Voided (mL): 900 mL  Total OUT: 1450 mL    Total NET: -850 mL          PHYSICAL EXAM:    General/Neuro  GCS 10T  Deficits:  alert, follows command on B/l UE, moves LLE, PERRLA    Lungs:      clear to auscultation, Normal expansion/effort.     Cardiovascular : S1, S2.  Regular rate and rhythm.  Cardiac Rhythm: Normal Sinus Rhythm    GI: Abdomen soft, Non-tender, Non-distended.    Gastrostomy in place    Extremities: Extremities warm, pink, well-perfused.     Derm: Good skin turgor, no skin breakdown.      : Condom catheter in place.      CXR:       LABS:  CAPILLARY BLOOD GLUCOSE      POCT Blood Glucose.: 132 mg/dL (2021 00:29)                          8.8    6.21  )-----------( 284      ( 2021 00:06 )             26.9       06-09    145  |  108  |  12  ----------------------------<  103<H>  4.1   |  29  |  0.5<L>    Ca    9.4      2021 06:30  Phos  4.2     06-  Mg     1.8     06-09        PT/INR - ( 2021 22:55 )   PT: 14.50 sec;   INR: 1.26 ratio         PTT - ( 2021 22:55 )  PTT:22.3 sec          Culture - Fungal, CSF (collected 2021 19:43)  Source: .CSF  Preliminary Report (2021 06:49):    Testing in progress    Culture - CSF with Gram Stain (collected 2021 19:43)  Source: .CSF  Gram Stain (2021 07:33):    No polymorphonuclear cells seen    No organisms seen    by cytocentrifuge  Preliminary Report (2021 07:01):    No growth to date.

## 2021-06-09 NOTE — PROGRESS NOTE ADULT - ATTENDING COMMENTS
doing well   improving neuro and motor exam   tolerating Tc   tolerating TF to goal   discharge planning in progress   transfer to SDU .

## 2021-06-09 NOTE — PHARMACOTHERAPY INTERVENTION NOTE - NSPHARMCOMMASP
ASP - Lab/ test recommended
ASP - Renal dose adjustment
ASP - Duration of therapy
ASP - Lab/ test recommended
ASP - Dose optimization/Non-Renal dose adjustment
ASP - Duration of therapy

## 2021-06-10 LAB
ANION GAP SERPL CALC-SCNC: 10 MMOL/L — SIGNIFICANT CHANGE UP (ref 7–14)
ANION GAP SERPL CALC-SCNC: 9 MMOL/L — SIGNIFICANT CHANGE UP (ref 7–14)
BASOPHILS # BLD AUTO: 0.01 K/UL — SIGNIFICANT CHANGE UP (ref 0–0.2)
BASOPHILS NFR BLD AUTO: 0.2 % — SIGNIFICANT CHANGE UP (ref 0–1)
BUN SERPL-MCNC: 11 MG/DL — SIGNIFICANT CHANGE UP (ref 10–20)
BUN SERPL-MCNC: 12 MG/DL — SIGNIFICANT CHANGE UP (ref 10–20)
CALCIUM SERPL-MCNC: 9.4 MG/DL — SIGNIFICANT CHANGE UP (ref 8.5–10.1)
CALCIUM SERPL-MCNC: 9.4 MG/DL — SIGNIFICANT CHANGE UP (ref 8.5–10.1)
CHLORIDE SERPL-SCNC: 111 MMOL/L — HIGH (ref 98–110)
CHLORIDE SERPL-SCNC: 113 MMOL/L — HIGH (ref 98–110)
CO2 SERPL-SCNC: 27 MMOL/L — SIGNIFICANT CHANGE UP (ref 17–32)
CO2 SERPL-SCNC: 29 MMOL/L — SIGNIFICANT CHANGE UP (ref 17–32)
CREAT SERPL-MCNC: <0.5 MG/DL — LOW (ref 0.7–1.5)
CREAT SERPL-MCNC: <0.5 MG/DL — LOW (ref 0.7–1.5)
EOSINOPHIL # BLD AUTO: 0.06 K/UL — SIGNIFICANT CHANGE UP (ref 0–0.7)
EOSINOPHIL NFR BLD AUTO: 1.4 % — SIGNIFICANT CHANGE UP (ref 0–8)
GLUCOSE BLDC GLUCOMTR-MCNC: 101 MG/DL — HIGH (ref 70–99)
GLUCOSE SERPL-MCNC: 112 MG/DL — HIGH (ref 70–99)
GLUCOSE SERPL-MCNC: 114 MG/DL — HIGH (ref 70–99)
HCT VFR BLD CALC: 27.8 % — LOW (ref 42–52)
HGB BLD-MCNC: 8.8 G/DL — LOW (ref 14–18)
IMM GRANULOCYTES NFR BLD AUTO: 0.2 % — SIGNIFICANT CHANGE UP (ref 0.1–0.3)
LYMPHOCYTES # BLD AUTO: 1.21 K/UL — SIGNIFICANT CHANGE UP (ref 1.2–3.4)
LYMPHOCYTES # BLD AUTO: 28.3 % — SIGNIFICANT CHANGE UP (ref 20.5–51.1)
MAGNESIUM SERPL-MCNC: 2 MG/DL — SIGNIFICANT CHANGE UP (ref 1.8–2.4)
MCHC RBC-ENTMCNC: 27.8 PG — SIGNIFICANT CHANGE UP (ref 27–31)
MCHC RBC-ENTMCNC: 31.7 G/DL — LOW (ref 32–37)
MCV RBC AUTO: 88 FL — SIGNIFICANT CHANGE UP (ref 80–94)
MONOCYTES # BLD AUTO: 0.68 K/UL — HIGH (ref 0.1–0.6)
MONOCYTES NFR BLD AUTO: 15.9 % — HIGH (ref 1.7–9.3)
NEUTROPHILS # BLD AUTO: 2.3 K/UL — SIGNIFICANT CHANGE UP (ref 1.4–6.5)
NEUTROPHILS NFR BLD AUTO: 54 % — SIGNIFICANT CHANGE UP (ref 42.2–75.2)
NRBC # BLD: 0 /100 WBCS — SIGNIFICANT CHANGE UP (ref 0–0)
PHOSPHATE SERPL-MCNC: 4.3 MG/DL — SIGNIFICANT CHANGE UP (ref 2.1–4.9)
PLATELET # BLD AUTO: 264 K/UL — SIGNIFICANT CHANGE UP (ref 130–400)
POTASSIUM SERPL-MCNC: 4 MMOL/L — SIGNIFICANT CHANGE UP (ref 3.5–5)
POTASSIUM SERPL-MCNC: 4 MMOL/L — SIGNIFICANT CHANGE UP (ref 3.5–5)
POTASSIUM SERPL-SCNC: 4 MMOL/L — SIGNIFICANT CHANGE UP (ref 3.5–5)
POTASSIUM SERPL-SCNC: 4 MMOL/L — SIGNIFICANT CHANGE UP (ref 3.5–5)
RBC # BLD: 3.16 M/UL — LOW (ref 4.7–6.1)
RBC # FLD: 14.1 % — SIGNIFICANT CHANGE UP (ref 11.5–14.5)
SODIUM SERPL-SCNC: 148 MMOL/L — HIGH (ref 135–146)
SODIUM SERPL-SCNC: 151 MMOL/L — HIGH (ref 135–146)
VANCOMYCIN FLD-MCNC: 9.3 UG/ML — SIGNIFICANT CHANGE UP (ref 5–10)
WBC # BLD: 4.27 K/UL — LOW (ref 4.8–10.8)
WBC # FLD AUTO: 4.27 K/UL — LOW (ref 4.8–10.8)

## 2021-06-10 PROCEDURE — 99251: CPT

## 2021-06-10 PROCEDURE — 99291 CRITICAL CARE FIRST HOUR: CPT

## 2021-06-10 PROCEDURE — 70450 CT HEAD/BRAIN W/O DYE: CPT | Mod: 26

## 2021-06-10 PROCEDURE — 71045 X-RAY EXAM CHEST 1 VIEW: CPT | Mod: 26

## 2021-06-10 PROCEDURE — 74150 CT ABDOMEN W/O CONTRAST: CPT | Mod: 26

## 2021-06-10 RX ORDER — SODIUM CHLORIDE 9 MG/ML
6 INJECTION INTRAMUSCULAR; INTRAVENOUS; SUBCUTANEOUS EVERY 6 HOURS
Refills: 0 | Status: DISCONTINUED | OUTPATIENT
Start: 2021-06-10 | End: 2021-06-10

## 2021-06-10 RX ORDER — SODIUM CHLORIDE 9 MG/ML
8 INJECTION INTRAMUSCULAR; INTRAVENOUS; SUBCUTANEOUS EVERY 6 HOURS
Refills: 0 | Status: DISCONTINUED | OUTPATIENT
Start: 2021-06-10 | End: 2021-06-16

## 2021-06-10 RX ORDER — VANCOMYCIN HCL 1 G
1500 VIAL (EA) INTRAVENOUS EVERY 12 HOURS
Refills: 0 | Status: DISCONTINUED | OUTPATIENT
Start: 2021-06-10 | End: 2021-06-13

## 2021-06-10 RX ADMIN — ENOXAPARIN SODIUM 40 MILLIGRAM(S): 100 INJECTION SUBCUTANEOUS at 11:39

## 2021-06-10 RX ADMIN — Medication 2 DROP(S): at 21:06

## 2021-06-10 RX ADMIN — MEROPENEM 100 MILLIGRAM(S): 1 INJECTION INTRAVENOUS at 21:06

## 2021-06-10 RX ADMIN — LISINOPRIL 20 MILLIGRAM(S): 2.5 TABLET ORAL at 05:35

## 2021-06-10 RX ADMIN — MEROPENEM 100 MILLIGRAM(S): 1 INJECTION INTRAVENOUS at 15:12

## 2021-06-10 RX ADMIN — Medication 300 MILLIGRAM(S): at 01:41

## 2021-06-10 RX ADMIN — Medication 2 DROP(S): at 05:35

## 2021-06-10 RX ADMIN — SODIUM CHLORIDE 8 GRAM(S): 9 INJECTION INTRAMUSCULAR; INTRAVENOUS; SUBCUTANEOUS at 17:03

## 2021-06-10 RX ADMIN — SODIUM CHLORIDE 8 GRAM(S): 9 INJECTION INTRAMUSCULAR; INTRAVENOUS; SUBCUTANEOUS at 06:32

## 2021-06-10 RX ADMIN — SENNA PLUS 2 TABLET(S): 8.6 TABLET ORAL at 21:06

## 2021-06-10 RX ADMIN — LEVETIRACETAM 400 MILLIGRAM(S): 250 TABLET, FILM COATED ORAL at 17:02

## 2021-06-10 RX ADMIN — Medication 2 DROP(S): at 14:27

## 2021-06-10 RX ADMIN — SODIUM CHLORIDE 8 GRAM(S): 9 INJECTION INTRAMUSCULAR; INTRAVENOUS; SUBCUTANEOUS at 11:37

## 2021-06-10 RX ADMIN — LEVETIRACETAM 400 MILLIGRAM(S): 250 TABLET, FILM COATED ORAL at 05:36

## 2021-06-10 RX ADMIN — SODIUM CHLORIDE 8 GRAM(S): 9 INJECTION INTRAMUSCULAR; INTRAVENOUS; SUBCUTANEOUS at 23:00

## 2021-06-10 RX ADMIN — PANTOPRAZOLE SODIUM 40 MILLIGRAM(S): 20 TABLET, DELAYED RELEASE ORAL at 11:38

## 2021-06-10 RX ADMIN — MEROPENEM 100 MILLIGRAM(S): 1 INJECTION INTRAVENOUS at 05:36

## 2021-06-10 RX ADMIN — Medication 300 MILLIGRAM(S): at 17:02

## 2021-06-10 NOTE — PROGRESS NOTE ADULT - SUBJECTIVE AND OBJECTIVE BOX
SANDRA JUANAALEC  [id number]  45y Male    Indication for ICU admission: s/p craniectomy for large L SDH  Admit Date:05-06-21  ICU Date: 05-07-21  OR Date: 05-06-21    No Known Allergies    PAST MEDICAL & SURGICAL HISTORY:  Alcohol abuse  GERD (gastroesophageal reflux disease)  ETOH abuse  Hypomagnesemia  Seizure disorder  H/O hemorrhoidectomy    Home Medications:  NONE    24HRS EVENT:  6/9  written transfer to Vent unit ( accepted by Dr. Elizabeth)  Vanco level is 27.8 this am ( got last nights 6pm dose)  D/c d vanco until level is appropriate  Na 145    night  -vanco restarted 1500 q12 (trough 9)  -Na 145 -> 151 ->     DVT PTX: LVX  GI PTX: PPI    ***Tubes/Lines/Drains  ***  Peripheral IV  Left basilic midline 5/17  Arterial Line Right radial	Date 5/17  Trach/peg 5/21    REVIEW OF SYSTEMS  [ ] A ten-point review of systems was otherwise negative except as noted.  [x] Due to altered mental status/intubation, subjective information were not able to be obtained from the patient. History was obtained, to the extent possible, from review of the chart and collateral sources of information.  	     SANDRAANJU DAS  [id number]  45y Male    Indication for ICU admission: s/p craniectomy for large L SDH  Admit Date:21  ICU Date: 21  OR Date: 21    No Known Allergies    PAST MEDICAL & SURGICAL HISTORY:  Alcohol abuse  GERD (gastroesophageal reflux disease)  ETOH abuse  Hypomagnesemia  Seizure disorder  H/O hemorrhoidectomy    Home Medications:  NONE    24HRS EVENT:    written transfer to Vent unit ( accepted by Dr. Elizabeth)  Vanco level is 27.8 this am ( got last nights 6pm dose)  D/c d vanco until level is appropriate  Na 145    night  -vanco restarted 1500 q12 (trough 9)  -Na 145 -> 151 ->     DVT PTX: LVX  GI PTX: PPI    ***Tubes/Lines/Drains  ***  Peripheral IV  Left basilic midline   Arterial Line Right radial	Date   Trach/peg     REVIEW OF SYSTEMS  [ ] A ten-point review of systems was otherwise negative except as noted.  [x] Due to altered mental status/intubation, subjective information were not able to be obtained from the patient. History was obtained, to the extent possible, from review of the chart and collateral sources of information.  	    Daily     Daily Weight in k.7 (10 Hayden 2021 06:00)    Diet, NPO with Tube Feed:   Tube Feeding Modality: Orogastric  Osmolite 1.5 Jeff  Total Volume for 24 Hours (mL): 1200  Continuous  Starting Tube Feed Rate mL per Hour: 50  Until Goal Tube Feed Rate (mL per Hour): 50  Tube Feed Duration (in Hours): 24  Tube Feed Start Time: 00:00  No Carb Prosource TF     Qty per Day:  3 (21 @ 03:47)      CURRENT MEDS:  Neurologic Medications  acetaminophen   Tablet .. 650 milliGRAM(s) Oral every 6 hours PRN Temp greater or equal to 38.5C (101.3F), Moderate Pain (4 - 6), Severe Pain (7 - 10)  levETIRAcetam  IVPB 500 milliGRAM(s) IV Intermittent every 12 hours    Respiratory Medications    Cardiovascular Medications  lisinopril 20 milliGRAM(s) Oral daily  propranolol 20 milliGRAM(s) Oral every 8 hours    Gastrointestinal Medications  pantoprazole   Suspension 40 milliGRAM(s) Oral daily  senna 2 Tablet(s) Oral at bedtime  sodium chloride 8 Gram(s) Oral every 6 hours    Genitourinary Medications    Hematologic/Oncologic Medications  enoxaparin Injectable 40 milliGRAM(s) SubCutaneous daily    Antimicrobial/Immunologic Medications  meropenem  IVPB 1000 milliGRAM(s) IV Intermittent every 8 hours  vancomycin  IVPB 1500 milliGRAM(s) IV Intermittent every 12 hours    Endocrine/Metabolic Medications    Topical/Other Medications  artificial tears (preservative free) Ophthalmic Solution 2 Drop(s) Both EYES three times a day      ICU Vital Signs Last 24 Hrs  T(C): 36 (10 Hayden 2021 08:00), Max: 37 (2021 12:00)  T(F): 96.8 (10 Hayden 2021 08:00), Max: 98.6 (2021 12:00)  HR: 89 (10 Hayden 2021 09:30) (71 - 104)  BP: 136/69 (10 Hayden 2021 09:00) (105/70 - 149/82)  BP(mean): 97 (10 Hayden 2021 09:) (83 - 117)  ABP: --  ABP(mean): --  RR: 18 (10 Hayden 2021 09:30) (10 - 25)  SpO2: 100% (10 Hayden 2021 09:30) (99% - 100%)      I&O's Summary    2021 07:  -  10 Hayden 2021 07:00  --------------------------------------------------------  IN: 2300 mL / OUT: 2800 mL / NET: -500 mL    10 Hayden 2021 07:  -  10 Hayden 2021 10:37  --------------------------------------------------------  IN: 100 mL / OUT: 300 mL / NET: -200 mL      I&O's Detail    2021 07:01  -  10 Hayden 2021 07:00  --------------------------------------------------------  IN:    Enteral Tube Flush: 300 mL    IV PiggyBack: 500 mL    IV PiggyBack: 200 mL    IV PiggyBack: 100 mL    Osmolite: 1200 mL  Total IN: 2300 mL    OUT:    Voided (mL): 550 mL    Voided (mL): 2250 mL  Total OUT: 2800 mL    Total NET: -500 mL      10 Hayden 2021 07:01  -  10 Hayden 2021 10:37  --------------------------------------------------------  IN:    Osmolite: 100 mL  Total IN: 100 mL    OUT:    Voided (mL): 300 mL  Total OUT: 300 mL    Total NET: -200 mL          PHYSICAL EXAM:    General/Neuro  GCS:  10  Deficits:  alert, looks around, moves left upper and lower extremity spontaneously. Increased movement to rue and rle from baseline although still minimal.   Pupils: PERRLA  Wound: craniotomy incision with staples in place well healing, skull is sunken on left side s/p  shunt placement   Lungs: Trache color in place.  clear to auscultation, Normal expansion/effort.     Cardiovascular : S1, S2.  Regular rate and rhythm.    Cardiac Rhythm: Normal Sinus Rhythm    GI: Abdomen soft, Non-tender, Non-distended.  Cole in place of peg tube    Extremities: Extremities warm, pink, well-perfused. Pulses: 2+ bilat    Derm: Good skin turgor, no skin breakdown.      : Condom catheter in place.      CXR:     LABS:  CAPILLARY BLOOD GLUCOSE                          8.8    4.27  )-----------( 264      ( 2021 23:58 )             27.8       06-10    148<H>  |  111<H>  |  11  ----------------------------<  112<H>  4.0   |  27  |  <0.5<L>    Ca    9.4      10 Hayden 2021 04:31  Phos  4.3     -  Mg     2.0     -09      Culture - Fungal, CSF (collected 2021 19:43)  Source: .CSF  Preliminary Report (2021 06:49):    Testing in progress    Culture - CSF with Gram Stain (collected 2021 19:43)  Source: .CSF  Gram Stain (2021 07:33):    No polymorphonuclear cells seen    No organisms seen    by cytocentrifuge  Preliminary Report (10 Hayden 2021 07:26):    No growth

## 2021-06-10 NOTE — PROGRESS NOTE ADULT - CRITICAL CARE SERVICES PROVIDED
Critical care services provided

## 2021-06-10 NOTE — PROGRESS NOTE ADULT - ASSESSMENT
Assessment & Plan  45M s/p fall with large left SDH and neurological status change, level 1 for emergent craniectomy complicated by subgleal hematoma/collection s/p wound revision and drainage on 5/16.    NEURO:      S/P craniectomy for large left SDH    S/P  shunt 6/7     - GCS 10T, Q4H neuro checks, No sedation     - Keppra 500mg BID for sz ppx    Hyponatremia, now resolved     -  Cerebral salt wasting     -  d/c'ed 3%, continued on salt tabs 8q6      - completed Fludricortasone x 6 doses    Maintian Head of bed @ 30 degrees    Craniectomy (no left side bone flap) Helmet --> does not need while laying in bed only     during PT/OT   OR 5/16:     -Revision craniectomy wound with complex closure for persistent drainage, subgaleal hematoma- e. 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  *** NSX -eval CT H for pre-op planning: increased L to R MLS 1.3cm w/ mass effect on lateral ventricles.    RTOR (6/7): s/p  shunt    RESP:   Respiratoy Failure    s/p trach 5/21    tolerating T-piece since 5/24    ABG prn    AM CXR        CARDS:     S/P cardiac arrest in OR & MTP     - Hypertension controlled on Lisinopril 20mg     - Propranolol 20mg q8    GI/NUTR:     Diet: TF (Osmolite 1.5) @ 50 via Gtube (using jain in situ)     GI Prophylaxis- PPI    Bowel regimen - Senna         Last BM 6/9    /RENAL:   No acute issues    Voiding via condom cath - monitor UO    Q6 BMP, serum osm      - Hyponatremic in setting of Cerebral salt wasting - goal eunatremic      - d/c'ed 3%, continued on salt tabs 8g q6h      - completed Fludrocortisone x6 doses  BUN/Cr- 12/0.5  Electrolytes-Na 151 // K 4.0 // Mg 2.0 //  Phos 4.3  Monitor & replete elytes prn      HEME/ONC:   No acute issues  Hgb stable: 8.8 -> 8.8  maintain Hg >8, MTP on this admission  DVT prophylaxis: Lovenox 40  5/26 DVT sono neg  -T&S 6/6    ID:  Craniectomy site wound infection  Borderline leukopenia - WBC: 6.2 -> 4.2  Afebrile  Antibiotics- Seamus, Vanco - continue through 7/14  - Vanco trough: 9 --> restarted 1500 q12   Cultures     - 5/11- u/a +leuk     - blood culture 5/13, 5/19, 5/20, 5/23, 5/28 No growth      -Cranial Culture 5/21-e faceium     -f/u 6/7 CSF culture     - Reculture if spikes  -6/6 Covid swab NEG      ENDO:  FS controlled  q6 POC glucose   Sliding scale insulin PRN  A1c 5.2    DISPO: Vent unit Assessment & Plan  45M s/p fall with large left SDH and neurological status change, level 1 for emergent craniectomy complicated by subgleal hematoma/collection s/p wound revision and drainage on 5/16.    NEURO:      S/P craniectomy for large left SDH    S/P  shunt 6/7     - GCS 10T, Q4H neuro checks, No sedation     - Keppra 500mg BID for sz ppx    Hyponatremia, now resolved     -  Cerebral salt wasting     -  d/c'ed 3%, continued on salt tabs 8q6      - completed Fludricortasone x 6 doses    Maintain Head of bed @ 30 degrees    Craniectomy (no left side bone flap) Helmet --> does not need while laying in bed only     during PT/OT   OR 5/16:     -Revision craniectomy wound with complex closure for persistent drainage, subgaleal hematoma- e. 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  *** NSX -eval CT H for pre-op planning: increased L to R MLS 1.3cm w/ mass effect on lateral ventricles.    RTOR (6/7): s/p  shunt    RESP:   Respiratoy Failure    s/p trach 5/21    tolerating T-piece since 5/24    ABG prn    AM CXR        CARDS:     S/P cardiac arrest in OR & MTP     - Hypertension controlled on Lisinopril 20mg     - Propranolol 20mg q8    GI/NUTR:     Diet: TF (Osmolite 1.5) @ 50 via Gtube (using jain in situ)     GI Prophylaxis- PPI    Bowel regimen - Senna         Last BM 6/9    /RENAL:   No acute issues    Voiding via condom cath - monitor UO    Q6 BMP, serum osm      - Hyponatremic in setting of Cerebral salt wasting - goal eunatremic      - d/c'ed 3%, continued on salt tabs 8g q6h      - completed Fludrocortisone x6 doses  BUN/Cr- 12/0.5  Electrolytes-Na 148// K 4.0 // Mg 2.0 //  Phos 4.3  Monitor & replete elytes prn      HEME/ONC:   No acute issues  Hgb stable: 8.8 -> 8.8  maintain Hg >8, MTP on this admission  DVT prophylaxis: Lovenox 40  5/26 DVT sono neg  -T&S 6/6    ID:  Craniectomy site wound infection  Borderline leukopenia - WBC: 6.2 -> 4.2  Afebrile  Antibiotics- Seamus, Vanco - continue through 7/14  - Vanco trough: 9 --> restarted 1500 q12   Cultures     - 5/11- u/a +leuk     - blood culture 5/13, 5/19, 5/20, 5/23, 5/28 No growth      -Cranial Culture 5/21-e faceium     -f/u 6/7 CSF culture     - Reculture if spikes  -6/6 Covid swab NEG      ENDO:  FS controlled  q6 POC glucose   Sliding scale insulin PRN  A1c 5.2    DISPO: Vent unit

## 2021-06-10 NOTE — CONSULT NOTE ADULT - SUBJECTIVE AND OBJECTIVE BOX
INTERVENTIONAL RADIOLOGY CONSULT:     Procedure Requested: PEG tube placement    HPI:  TRAUMA ACTIVATION LEVEL:  Code trauma    HPI:    Patient is a 45yM w/ PMHx of Etoh abuse and seizures on keppra seen as Trauma Alert upgraded to a Code trauma during examination in the ED where the patient was found to have a GCS of 4 s/p found down unresponsive.      PAST MEDICAL & SURGICAL HISTORY:  Alcohol abuse  GERD (gastroesophageal reflux disease)  ETOH abuse  Hypomagnesemia  Seizure disorder  H/O hemorrhoidectomy        MEDICATIONS  (STANDING):  artificial tears (preservative free) Ophthalmic Solution 2 Drop(s) Both EYES three times a day  enoxaparin Injectable 40 milliGRAM(s) SubCutaneous daily  levETIRAcetam  IVPB 500 milliGRAM(s) IV Intermittent every 12 hours  lisinopril 20 milliGRAM(s) Oral daily  meropenem  IVPB 1000 milliGRAM(s) IV Intermittent every 8 hours  pantoprazole   Suspension 40 milliGRAM(s) Oral daily  propranolol 20 milliGRAM(s) Oral every 8 hours  senna 2 Tablet(s) Oral at bedtime  sodium chloride 8 Gram(s) Oral every 6 hours  vancomycin  IVPB 1500 milliGRAM(s) IV Intermittent every 12 hours    MEDICATIONS  (PRN):  acetaminophen   Tablet .. 650 milliGRAM(s) Oral every 6 hours PRN Temp greater or equal to 38.5C (101.3F), Moderate Pain (4 - 6), Severe Pain (7 - 10)      Allergies    No Known Allergies    Intolerances          FAMILY HISTORY:  Family history of essential hypertension (Father)        Physical Exam:   Vital Signs Last 24 Hrs  T(C): 35.9 (10 Hayden 2021 13:21), Max: 36.7 (09 Jun 2021 23:00)  T(F): 96.7 (10 Hayden 2021 13:21), Max: 98.1 (09 Jun 2021 23:00)  HR: 86 (10 Hayden 2021 13:21) (71 - 104)  BP: 147/90 (10 Hayden 2021 13:21) (120/75 - 149/82)  BP(mean): 97 (10 Hayden 2021 09:00) (93 - 117)  RR: 18 (10 Hayden 2021 13:21) (10 - 23)  SpO2: 100% (10 Hayden 2021 09:30) (100% - 100%)       Labs:                         8.8    4.27  )-----------( 264      ( 09 Jun 2021 23:58 )             27.8     06-10    148<H>  |  111<H>  |  11  ----------------------------<  112<H>  4.0   |  27  |  <0.5<L>    Ca    9.4      10 Hayden 2021 04:31  Phos  4.3     06-09  Mg     2.0     06-09            Radiology & Additional Studies:     Radiology imaging reviewed.       ASSESSMENT/ PLAN:   Patient is a 45yM w/ PMHx of Etoh abuse and seizures on keppra seen as Trauma Alert upgraded to a Code trauma during examination in the ED where the patient was found to have a GCS of 4 s/p found down unresponsive.    s/p Peg tube placement with surgery, which got disloged. A jain catheter was placed in the existing tract.  - consulted for gastrostomy tube reinsertion, planned for tomorrow 6/11  - draw CBC/CMP/coags prior to procedure  - keep NPO after midnight   - Hold anticoagulation      Thank you for the courtesy of this consult, please call [phone] with any further questions.

## 2021-06-10 NOTE — PROGRESS NOTE ADULT - CRITICAL CARE SERVICES
35
40
43
36
40
38
39
40
40
41
41
42
42
40
42
48
36
37
40
40
39
40

## 2021-06-10 NOTE — PROGRESS NOTE ADULT - ATTENDING COMMENTS
doing well   improved neuro exam   tolerating diet   transfer to Vent   discharge planning in progress

## 2021-06-10 NOTE — PROGRESS NOTE ADULT - SUBJECTIVE AND OBJECTIVE BOX
Neurosurgery Progress Note    POD# 30 S/P Left Craniectomy for Evac of SDH  POD# 20 S/P Wound Revision  POD#2 s/p Placement of Right Frontal VPS Strata II programmable valve set at 1.5, Lap assist for Peritoneal Cath Placement      Pt seen and examined in SICU.  No major over night events.  At present time the pt is resting in bed, in NAD, and hemodynamically stable.  Pt with tracheostomy in place, tolerating TPiece, awake and following simple commands.  Pt is downgraded to the floor status, IR to evaluate pt for PEG replacement.         Subjective: 45yMale with a pmhx of SUBDURAL HEMATOMA;RESPIRATORY FAILURE;FALL    ^SUBDURAL HEMATOMA;RESPIRATORY FAILURE;FALL    No pertinent family history in first degree relatives    Family history of essential hypertension (Father)    Handoff    MEWS Score    No pertinent past medical history    Alcohol abuse    GERD (gastroesophageal reflux disease)    ETOH abuse    Hypomagnesemia    Seizure disorder    Subdural hematoma    Hydrocephalus    S/P subdural hematoma evacuation    Subdural hematoma    Hydrocephalus    Subdural hematoma    Subdural hematoma    Respiratory failure    ETOH abuse    Palliative care by specialist    Insertion of non-tunneled central venous catheter (CVC) in patient age 5 years of age or older    Craniotomy, decompressive    Decompressive craniotomy    Placement, tracheostomy and percutaneous endoscopic gastrostomy    Laparoscopy, single incision    Ventriculoperitoneal shunt    No significant past surgical history    H/O hemorrhoidectomy    FALL    23    Respiratory failure    Fall    SysAdmin_VisitLink      POD# 30 S/P Left Craniectomy for Evac of SDH  POD# 20 S/P Wound Revision  POD#2 s/p Placement of Right Frontal VPS Strata II programmable valve set at 1.5, Lap assist for Peritoneal Cath Placement    Allergies    No Known Allergies    Intolerances        Vital Signs Last 24 Hrs  T(C): 36 (10 Hayden 2021 08:00), Max: 37 (09 Jun 2021 12:00)  T(F): 96.8 (10 Hayden 2021 08:00), Max: 98.6 (09 Jun 2021 12:00)  HR: 89 (10 Hayden 2021 09:30) (71 - 104)  BP: 136/69 (10 Hayden 2021 09:00) (105/70 - 149/82)  BP(mean): 97 (10 Hayden 2021 09:00) (83 - 117)  RR: 18 (10 Hayden 2021 09:30) (10 - 25)  SpO2: 100% (10 Hayden 2021 09:30) (99% - 100%)      acetaminophen   Tablet .. 650 milliGRAM(s) Oral every 6 hours PRN  artificial tears (preservative free) Ophthalmic Solution 2 Drop(s) Both EYES three times a day  enoxaparin Injectable 40 milliGRAM(s) SubCutaneous daily  levETIRAcetam  IVPB 500 milliGRAM(s) IV Intermittent every 12 hours  lisinopril 20 milliGRAM(s) Oral daily  meropenem  IVPB 1000 milliGRAM(s) IV Intermittent every 8 hours  pantoprazole   Suspension 40 milliGRAM(s) Oral daily  propranolol 20 milliGRAM(s) Oral every 8 hours  senna 2 Tablet(s) Oral at bedtime  sodium chloride 8 Gram(s) Oral every 6 hours  vancomycin  IVPB 1500 milliGRAM(s) IV Intermittent every 12 hours        06-09-21 @ 07:01  -  06-10-21 @ 07:00  --------------------------------------------------------  IN: 2300 mL / OUT: 2800 mL / NET: -500 mL    06-10-21 @ 07:01  -  06-10-21 @ 10:43  --------------------------------------------------------  IN: 100 mL / OUT: 300 mL / NET: -200 mL        REVIEW OF SYSTEMS    [ x] A ten-point review of systems was otherwise negative except as noted.  [ ] Due to altered mental status/intubation, subjective information were not able to be obtained from the patient. History was obtained, to the extent possible, from review of the chart and collateral sources of information.      Exam:  Pt off sedation  tracheostomy in place, tolerating TP  awake, following simple commands   PERRLA, EOMI  tracks   Motor: Moving L upper and lower extremities spont and with good strength   does not move RUE      CBC Full  -  ( 09 Jun 2021 23:58 )  WBC Count : 4.27 K/uL  RBC Count : 3.16 M/uL  Hemoglobin : 8.8 g/dL  Hematocrit : 27.8 %  Platelet Count - Automated : 264 K/uL  Mean Cell Volume : 88.0 fL  Mean Cell Hemoglobin : 27.8 pg  Mean Cell Hemoglobin Concentration : 31.7 g/dL  Auto Neutrophil # : 2.30 K/uL  Auto Lymphocyte # : 1.21 K/uL  Auto Monocyte # : 0.68 K/uL  Auto Eosinophil # : 0.06 K/uL  Auto Basophil # : 0.01 K/uL  Auto Neutrophil % : 54.0 %  Auto Lymphocyte % : 28.3 %  Auto Monocyte % : 15.9 %  Auto Eosinophil % : 1.4 %  Auto Basophil % : 0.2 %    06-10    148<H>  |  111<H>  |  11  ----------------------------<  112<H>  4.0   |  27  |  <0.5<L>    Ca    9.4      10 Jun 2021 04:31  Phos  4.3     06-09  Mg     2.0     06-09              Wound:  Craniectomy site: slightly sunken, incision clean dry intact  VPS sites: clean and intact, Strata II programmable valve set at 1.5,        Assessment/Plan:   This is a 45 year old gentleman, POD#2, S/P Placement of Right Frontal VPS      Neuro: Neuro checks Q4hr, continue Keppra 500mg BID, CTH with brain lab to create craniectomy flap  CV: keep euvolemic  Resp: Tracheostomy in place, TP as tolerated, HOB > 30  GI: PEG dislodged (6/3), TF via Cole cath placed in stoma per ICU, Pending IR eval for PEG replacement, CT Abd  : condom cath in place, replete lytes PRN  ID: on mp/vanco, CSF few e. faecalis 5/21, Tmax 98.1, WBC 4.27, ABX 7 days post-op, then D/C 6/13  DVT prophylaxis: loveox   PT rehab        Neurosurgery Progress Note    POD# 30 S/P Left Craniectomy for Evac of SDH  POD# 20 S/P Wound Revision  POD#2 s/p Placement of Right Frontal VPS Strata II programmable valve set at 1.5, Lap assist for Peritoneal Cath Placement      Pt seen and examined in SICU.  No major over night events.  At present time the pt is resting in bed, in NAD, and hemodynamically stable.  Pt with tracheostomy in place, tolerating TPiece, awake and following simple commands.  Pt is downgraded to the floor status, IR to evaluate pt for PEG replacement.         Subjective: 45yMale with a pmhx of SUBDURAL HEMATOMA;RESPIRATORY FAILURE;FALL    ^SUBDURAL HEMATOMA;RESPIRATORY FAILURE;FALL    No pertinent family history in first degree relatives    Family history of essential hypertension (Father)    Handoff    MEWS Score    No pertinent past medical history    Alcohol abuse    GERD (gastroesophageal reflux disease)    ETOH abuse    Hypomagnesemia    Seizure disorder    Subdural hematoma    Hydrocephalus    S/P subdural hematoma evacuation    Subdural hematoma    Hydrocephalus    Subdural hematoma    Subdural hematoma    Respiratory failure    ETOH abuse    Palliative care by specialist    Insertion of non-tunneled central venous catheter (CVC) in patient age 5 years of age or older    Craniotomy, decompressive    Decompressive craniotomy    Placement, tracheostomy and percutaneous endoscopic gastrostomy    Laparoscopy, single incision    Ventriculoperitoneal shunt    No significant past surgical history    H/O hemorrhoidectomy    FALL    23    Respiratory failure    Fall    SysAdmin_VisitLink      POD# 30 S/P Left Craniectomy for Evac of SDH  POD# 20 S/P Wound Revision  POD#2 s/p Placement of Right Frontal VPS Strata II programmable valve set at 1.5, Lap assist for Peritoneal Cath Placement    Allergies    No Known Allergies    Intolerances        Vital Signs Last 24 Hrs  T(C): 36 (10 Hayden 2021 08:00), Max: 37 (09 Jun 2021 12:00)  T(F): 96.8 (10 Hayden 2021 08:00), Max: 98.6 (09 Jun 2021 12:00)  HR: 89 (10 Hayden 2021 09:30) (71 - 104)  BP: 136/69 (10 Hayden 2021 09:00) (105/70 - 149/82)  BP(mean): 97 (10 Hayden 2021 09:00) (83 - 117)  RR: 18 (10 Hayden 2021 09:30) (10 - 25)  SpO2: 100% (10 Hayden 2021 09:30) (99% - 100%)      acetaminophen   Tablet .. 650 milliGRAM(s) Oral every 6 hours PRN  artificial tears (preservative free) Ophthalmic Solution 2 Drop(s) Both EYES three times a day  enoxaparin Injectable 40 milliGRAM(s) SubCutaneous daily  levETIRAcetam  IVPB 500 milliGRAM(s) IV Intermittent every 12 hours  lisinopril 20 milliGRAM(s) Oral daily  meropenem  IVPB 1000 milliGRAM(s) IV Intermittent every 8 hours  pantoprazole   Suspension 40 milliGRAM(s) Oral daily  propranolol 20 milliGRAM(s) Oral every 8 hours  senna 2 Tablet(s) Oral at bedtime  sodium chloride 8 Gram(s) Oral every 6 hours  vancomycin  IVPB 1500 milliGRAM(s) IV Intermittent every 12 hours        06-09-21 @ 07:01  -  06-10-21 @ 07:00  --------------------------------------------------------  IN: 2300 mL / OUT: 2800 mL / NET: -500 mL    06-10-21 @ 07:01  -  06-10-21 @ 10:43  --------------------------------------------------------  IN: 100 mL / OUT: 300 mL / NET: -200 mL        REVIEW OF SYSTEMS    [ x] A ten-point review of systems was otherwise negative except as noted.  [ ] Due to altered mental status/intubation, subjective information were not able to be obtained from the patient. History was obtained, to the extent possible, from review of the chart and collateral sources of information.      Exam:  Pt off sedation  tracheostomy in place, tolerating TP  awake, following simple commands   PERRLA, EOMI  tracks   Motor: Moving L upper and lower extremities spont and with good strength   does not move RUE      CBC Full  -  ( 09 Jun 2021 23:58 )  WBC Count : 4.27 K/uL  RBC Count : 3.16 M/uL  Hemoglobin : 8.8 g/dL  Hematocrit : 27.8 %  Platelet Count - Automated : 264 K/uL  Mean Cell Volume : 88.0 fL  Mean Cell Hemoglobin : 27.8 pg  Mean Cell Hemoglobin Concentration : 31.7 g/dL  Auto Neutrophil # : 2.30 K/uL  Auto Lymphocyte # : 1.21 K/uL  Auto Monocyte # : 0.68 K/uL  Auto Eosinophil # : 0.06 K/uL  Auto Basophil # : 0.01 K/uL  Auto Neutrophil % : 54.0 %  Auto Lymphocyte % : 28.3 %  Auto Monocyte % : 15.9 %  Auto Eosinophil % : 1.4 %  Auto Basophil % : 0.2 %    06-10    148<H>  |  111<H>  |  11  ----------------------------<  112<H>  4.0   |  27  |  <0.5<L>    Ca    9.4      10 Jun 2021 04:31  Phos  4.3     06-09  Mg     2.0     06-09              Wound:  Craniectomy site: slightly sunken, incision clean dry intact  VPS sites: clean and intact, Strata II programmable valve set at 1.5,        Assessment/Plan:   This is a 45 year old gentleman, POD#2, S/P Placement of Right Frontal VPS      Neuro: Neuro checks Q4hr, continue Keppra 500mg BID, CTH with brain lab for cranioplasty  CV: keep euvolemic  Resp: Tracheostomy in place, TP as tolerated, HOB > 30  GI: PEG dislodged (6/3), TF via Cole cath placed in stoma per ICU, Pending IR eval for PEG replacement, CT Abd  : condom cath in place, replete lytes PRN  ID: on mp/vanco, CSF few e. faecalis 5/21, Tmax 98.1, WBC 4.27, ABX 7 days post-op, then D/C 6/13  DVT prophylaxis: loveox   PT rehab        Neurosurgery Progress Note    POD# 30 S/P Left Craniectomy for Evac of SDH  POD# 20 S/P Wound Revision  POD#2 s/p Placement of Right Frontal VPS Strata II programmable valve set at 1.5, Lap assist for Peritoneal Cath Placement      Pt seen and examined in SICU.  No major over night events.  At present time the pt is resting in bed, in NAD, and hemodynamically stable.  Pt with tracheostomy in place, tolerating TPiece, awake and following simple commands.  Pt is downgraded to the floor status, IR to evaluate pt for PEG replacement.         Subjective: 45yMale with a pmhx of SUBDURAL HEMATOMA;RESPIRATORY FAILURE;FALL    ^SUBDURAL HEMATOMA;RESPIRATORY FAILURE;FALL    No pertinent family history in first degree relatives    Family history of essential hypertension (Father)    Handoff    MEWS Score    No pertinent past medical history    Alcohol abuse    GERD (gastroesophageal reflux disease)    ETOH abuse    Hypomagnesemia    Seizure disorder    Subdural hematoma    Hydrocephalus    S/P subdural hematoma evacuation    Subdural hematoma    Hydrocephalus    Subdural hematoma    Subdural hematoma    Respiratory failure    ETOH abuse    Palliative care by specialist    Insertion of non-tunneled central venous catheter (CVC) in patient age 5 years of age or older    Craniotomy, decompressive    Decompressive craniotomy    Placement, tracheostomy and percutaneous endoscopic gastrostomy    Laparoscopy, single incision    Ventriculoperitoneal shunt    No significant past surgical history    H/O hemorrhoidectomy    FALL    23    Respiratory failure    Fall    SysAdmin_VisitLink      POD# 30 S/P Left Craniectomy for Evac of SDH  POD# 20 S/P Wound Revision  POD#2 s/p Placement of Right Frontal VPS Strata II programmable valve set at 1.5, Lap assist for Peritoneal Cath Placement    Allergies    No Known Allergies    Intolerances        Vital Signs Last 24 Hrs  T(C): 36 (10 Hayden 2021 08:00), Max: 37 (09 Jun 2021 12:00)  T(F): 96.8 (10 Hayden 2021 08:00), Max: 98.6 (09 Jun 2021 12:00)  HR: 89 (10 Hayden 2021 09:30) (71 - 104)  BP: 136/69 (10 Hayden 2021 09:00) (105/70 - 149/82)  BP(mean): 97 (10 Hayden 2021 09:00) (83 - 117)  RR: 18 (10 Hayden 2021 09:30) (10 - 25)  SpO2: 100% (10 Hayden 2021 09:30) (99% - 100%)      acetaminophen   Tablet .. 650 milliGRAM(s) Oral every 6 hours PRN  artificial tears (preservative free) Ophthalmic Solution 2 Drop(s) Both EYES three times a day  enoxaparin Injectable 40 milliGRAM(s) SubCutaneous daily  levETIRAcetam  IVPB 500 milliGRAM(s) IV Intermittent every 12 hours  lisinopril 20 milliGRAM(s) Oral daily  meropenem  IVPB 1000 milliGRAM(s) IV Intermittent every 8 hours  pantoprazole   Suspension 40 milliGRAM(s) Oral daily  propranolol 20 milliGRAM(s) Oral every 8 hours  senna 2 Tablet(s) Oral at bedtime  sodium chloride 8 Gram(s) Oral every 6 hours  vancomycin  IVPB 1500 milliGRAM(s) IV Intermittent every 12 hours        06-09-21 @ 07:01  -  06-10-21 @ 07:00  --------------------------------------------------------  IN: 2300 mL / OUT: 2800 mL / NET: -500 mL    06-10-21 @ 07:01  -  06-10-21 @ 10:43  --------------------------------------------------------  IN: 100 mL / OUT: 300 mL / NET: -200 mL        REVIEW OF SYSTEMS    [ x] A ten-point review of systems was otherwise negative except as noted.  [ ] Due to altered mental status/intubation, subjective information were not able to be obtained from the patient. History was obtained, to the extent possible, from review of the chart and collateral sources of information.      Exam:  Pt off sedation  tracheostomy in place, tolerating TP  awake, following simple commands   PERRLA, EOMI  tracks   Motor: Moving L upper and lower extremities spont and with good strength   does not move RUE      CBC Full  -  ( 09 Jun 2021 23:58 )  WBC Count : 4.27 K/uL  RBC Count : 3.16 M/uL  Hemoglobin : 8.8 g/dL  Hematocrit : 27.8 %  Platelet Count - Automated : 264 K/uL  Mean Cell Volume : 88.0 fL  Mean Cell Hemoglobin : 27.8 pg  Mean Cell Hemoglobin Concentration : 31.7 g/dL  Auto Neutrophil # : 2.30 K/uL  Auto Lymphocyte # : 1.21 K/uL  Auto Monocyte # : 0.68 K/uL  Auto Eosinophil # : 0.06 K/uL  Auto Basophil # : 0.01 K/uL  Auto Neutrophil % : 54.0 %  Auto Lymphocyte % : 28.3 %  Auto Monocyte % : 15.9 %  Auto Eosinophil % : 1.4 %  Auto Basophil % : 0.2 %    06-10    148<H>  |  111<H>  |  11  ----------------------------<  112<H>  4.0   |  27  |  <0.5<L>    Ca    9.4      10 Jun 2021 04:31  Phos  4.3     06-09  Mg     2.0     06-09              Wound:  Craniectomy site: slightly sunken, incision clean dry intact  VPS sites: clean and intact, Strata II programmable valve set at 1.5,        Assessment/Plan:   This is a 45 year old gentleman, POD#2, S/P Placement of Right Frontal VPS      Neuro: Neuro checks Q4hr, continue Keppra 500mg BID, CTH with brain lab for cranioplasty  CV: keep euvolemic  Resp: Tracheostomy in place, TP as tolerated, HOB > 30  GI: PEG dislodged (6/3), TF via Cole cath placed in stoma per ICU, Pending IR eval for PEG replacement, CT Abd  : condom cath in place, + voiding, replete lytes PRN  ID: on mp/vanco, CSF few e. faecalis 5/21, Tmax 98.1, WBC 4.27, ABX 7 days post-op, then D/C 6/13  DVT prophylaxis: loveox   PT rehab

## 2021-06-11 LAB
ANION GAP SERPL CALC-SCNC: 9 MMOL/L — SIGNIFICANT CHANGE UP (ref 7–14)
APTT BLD: 32.8 SEC — SIGNIFICANT CHANGE UP (ref 27–39.2)
BASOPHILS # BLD AUTO: 0.01 K/UL — SIGNIFICANT CHANGE UP (ref 0–0.2)
BASOPHILS NFR BLD AUTO: 0.3 % — SIGNIFICANT CHANGE UP (ref 0–1)
BUN SERPL-MCNC: 9 MG/DL — LOW (ref 10–20)
CALCIUM SERPL-MCNC: 9.4 MG/DL — SIGNIFICANT CHANGE UP (ref 8.5–10.1)
CHLORIDE SERPL-SCNC: 106 MMOL/L — SIGNIFICANT CHANGE UP (ref 98–110)
CO2 SERPL-SCNC: 28 MMOL/L — SIGNIFICANT CHANGE UP (ref 17–32)
CREAT SERPL-MCNC: <0.5 MG/DL — LOW (ref 0.7–1.5)
EOSINOPHIL # BLD AUTO: 0.09 K/UL — SIGNIFICANT CHANGE UP (ref 0–0.7)
EOSINOPHIL NFR BLD AUTO: 2.4 % — SIGNIFICANT CHANGE UP (ref 0–8)
GLUCOSE BLDC GLUCOMTR-MCNC: 102 MG/DL — HIGH (ref 70–99)
GLUCOSE BLDC GLUCOMTR-MCNC: 89 MG/DL — SIGNIFICANT CHANGE UP (ref 70–99)
GLUCOSE SERPL-MCNC: 93 MG/DL — SIGNIFICANT CHANGE UP (ref 70–99)
HCT VFR BLD CALC: 27.2 % — LOW (ref 42–52)
HGB BLD-MCNC: 8.8 G/DL — LOW (ref 14–18)
IMM GRANULOCYTES NFR BLD AUTO: 0.3 % — SIGNIFICANT CHANGE UP (ref 0.1–0.3)
INR BLD: 1.26 RATIO — SIGNIFICANT CHANGE UP (ref 0.65–1.3)
LYMPHOCYTES # BLD AUTO: 1.07 K/UL — LOW (ref 1.2–3.4)
LYMPHOCYTES # BLD AUTO: 28.9 % — SIGNIFICANT CHANGE UP (ref 20.5–51.1)
MAGNESIUM SERPL-MCNC: 1.7 MG/DL — LOW (ref 1.8–2.4)
MCHC RBC-ENTMCNC: 27.9 PG — SIGNIFICANT CHANGE UP (ref 27–31)
MCHC RBC-ENTMCNC: 32.4 G/DL — SIGNIFICANT CHANGE UP (ref 32–37)
MCV RBC AUTO: 86.3 FL — SIGNIFICANT CHANGE UP (ref 80–94)
MONOCYTES # BLD AUTO: 0.52 K/UL — SIGNIFICANT CHANGE UP (ref 0.1–0.6)
MONOCYTES NFR BLD AUTO: 14.1 % — HIGH (ref 1.7–9.3)
NEUTROPHILS # BLD AUTO: 2 K/UL — SIGNIFICANT CHANGE UP (ref 1.4–6.5)
NEUTROPHILS NFR BLD AUTO: 54 % — SIGNIFICANT CHANGE UP (ref 42.2–75.2)
NRBC # BLD: 0 /100 WBCS — SIGNIFICANT CHANGE UP (ref 0–0)
PHOSPHATE SERPL-MCNC: 4.1 MG/DL — SIGNIFICANT CHANGE UP (ref 2.1–4.9)
PLATELET # BLD AUTO: 230 K/UL — SIGNIFICANT CHANGE UP (ref 130–400)
POTASSIUM SERPL-MCNC: 3.7 MMOL/L — SIGNIFICANT CHANGE UP (ref 3.5–5)
POTASSIUM SERPL-SCNC: 3.7 MMOL/L — SIGNIFICANT CHANGE UP (ref 3.5–5)
PROTHROM AB SERPL-ACNC: 14.5 SEC — HIGH (ref 9.95–12.87)
RBC # BLD: 3.15 M/UL — LOW (ref 4.7–6.1)
RBC # FLD: 13.7 % — SIGNIFICANT CHANGE UP (ref 11.5–14.5)
SODIUM SERPL-SCNC: 143 MMOL/L — SIGNIFICANT CHANGE UP (ref 135–146)
WBC # BLD: 3.7 K/UL — LOW (ref 4.8–10.8)
WBC # FLD AUTO: 3.7 K/UL — LOW (ref 4.8–10.8)

## 2021-06-11 PROCEDURE — 71045 X-RAY EXAM CHEST 1 VIEW: CPT | Mod: 26

## 2021-06-11 PROCEDURE — 99222 1ST HOSP IP/OBS MODERATE 55: CPT

## 2021-06-11 RX ADMIN — ENOXAPARIN SODIUM 40 MILLIGRAM(S): 100 INJECTION SUBCUTANEOUS at 11:14

## 2021-06-11 RX ADMIN — MEROPENEM 100 MILLIGRAM(S): 1 INJECTION INTRAVENOUS at 14:48

## 2021-06-11 RX ADMIN — SENNA PLUS 2 TABLET(S): 8.6 TABLET ORAL at 21:07

## 2021-06-11 RX ADMIN — Medication 300 MILLIGRAM(S): at 05:00

## 2021-06-11 RX ADMIN — Medication 2 DROP(S): at 05:01

## 2021-06-11 RX ADMIN — Medication 650 MILLIGRAM(S): at 21:48

## 2021-06-11 RX ADMIN — Medication 300 MILLIGRAM(S): at 17:55

## 2021-06-11 RX ADMIN — LEVETIRACETAM 400 MILLIGRAM(S): 250 TABLET, FILM COATED ORAL at 17:55

## 2021-06-11 RX ADMIN — PANTOPRAZOLE SODIUM 40 MILLIGRAM(S): 20 TABLET, DELAYED RELEASE ORAL at 11:18

## 2021-06-11 RX ADMIN — MEROPENEM 100 MILLIGRAM(S): 1 INJECTION INTRAVENOUS at 21:48

## 2021-06-11 RX ADMIN — SODIUM CHLORIDE 8 GRAM(S): 9 INJECTION INTRAMUSCULAR; INTRAVENOUS; SUBCUTANEOUS at 11:18

## 2021-06-11 RX ADMIN — LEVETIRACETAM 400 MILLIGRAM(S): 250 TABLET, FILM COATED ORAL at 05:00

## 2021-06-11 RX ADMIN — LISINOPRIL 20 MILLIGRAM(S): 2.5 TABLET ORAL at 05:00

## 2021-06-11 RX ADMIN — Medication 2 DROP(S): at 17:09

## 2021-06-11 RX ADMIN — SODIUM CHLORIDE 8 GRAM(S): 9 INJECTION INTRAMUSCULAR; INTRAVENOUS; SUBCUTANEOUS at 17:56

## 2021-06-11 RX ADMIN — Medication 2 DROP(S): at 21:48

## 2021-06-11 RX ADMIN — SODIUM CHLORIDE 8 GRAM(S): 9 INJECTION INTRAMUSCULAR; INTRAVENOUS; SUBCUTANEOUS at 05:01

## 2021-06-11 RX ADMIN — MEROPENEM 100 MILLIGRAM(S): 1 INJECTION INTRAVENOUS at 05:00

## 2021-06-11 NOTE — CONSULT NOTE ADULT - CONSULT REASON
Hyponatremia
PEG tube placement
Pre-op risk stratification and optimization
bacteremia
Thrombocytopenia
JESSIKA NEW
SDH
ARf
Goals of Care and Symptom Management
SDH
intubated status

## 2021-06-11 NOTE — PHARMACOTHERAPY INTERVENTION NOTE - INTERVENTION TYPE RECOOMEND
Therapy Recommended - Alternative treatment
Therapy Recommended - Formulary adherence
Pharmacokinetics Evaluation
Pharmacokinetics Evaluation
Therapy Recommended - Contraindication
Therapy Recommended - Additional therapy
Therapy Recommended - Drug indicated but not ordered
Therapy Recommended - Formulary adherence
Timing/Frequency of Administration Recommended

## 2021-06-11 NOTE — CONSULT NOTE ADULT - REASON FOR ADMISSION
found down unresponsive

## 2021-06-11 NOTE — CONSULT NOTE ADULT - CONSULT REQUESTED DATE/TIME
10-Hayden-2021 16:31
06-Jun-2021 15:18
11-May-2021
14-May-2021 00:00
09-May-2021 06:40
06-May-2021 22:49
12-May-2021
27-May-2021 07:47
07-May-2021 01:34
11-Jun-2021 08:22
03-Jun-2021 13:39

## 2021-06-11 NOTE — CONSULT NOTE ADULT - PROVIDER SPECIALTY LIST ADULT
SICU
Cardiology
Neurology
Pulmonology
Nephrology
Palliative Care
Infectious Disease
Intervent Radiology
Heme/Onc
Neurosurgery
Physiatry

## 2021-06-11 NOTE — PRE-OP CHECKLIST - SELECT TESTS ORDERED
BMP/CBC/Type and Screen/CXR/POCT Blood Glucose/COVID-19
102/BMP/CBC/CMP/PT/PTT/INR/Hepatic Function/Type and Screen/POCT Blood Glucose
CBC/CMP/PT/PTT/INR/Type and Screen/CXR/POCT Blood Glucose/COVID-19

## 2021-06-11 NOTE — PRE-OP CHECKLIST - NOTHING BY MOUTH SINCE
10-Hayden-2021 23:59
06-Jun-2021 23:59
[facility] Counseling: I discussed with the patient the risks of ixekizumab including but not limited to immunosuppression, serious infections, worsening of inflammatory bowel disease and drug reactions.  The patient understands that monitoring is required including a PPD at baseline and must alert us or the primary physician if symptoms of infection or other concerning signs are noted.

## 2021-06-11 NOTE — CONSULT NOTE ADULT - NSICDXPILOT_GEN_ALL_CORE
[place]

## 2021-06-11 NOTE — PHARMACOTHERAPY INTERVENTION NOTE - COMMENTS
45yM w/ PMHx of Etoh abuse and seizures on keppra  found down unresponsive admitted 5/6. s/p craniectomy for large L SDH    Allergies: No Known Allergies    Labs:   Blood Urea Nitrogen, Serum: 5 mg/dL (05-27-21 @ 23:42)  Blood Urea Nitrogen, Serum: 6 mg/dL (05-27-21 @ 11:00)  Blood Urea Nitrogen, Serum: 6 mg/dL (05-27-21 @ 05:50)  Blood Urea Nitrogen, Serum: 6 mg/dL (05-27-21 @ 00:00)  Blood Urea Nitrogen, Serum: 5 mg/dL (05-26-21 @ 12:20)  Blood Urea Nitrogen, Serum: 5 mg/dL (05-25-21 @ 23:30)    SCr (mg/dL) =  Creatinine, Serum: <0.5 mg/dL (05-27-21 @ 23:42)  Creatinine, Serum: <0.5 mg/dL (05-27-21 @ 11:00)  Creatinine, Serum: <0.5 mg/dL (05-27-21 @ 05:50)  Creatinine, Serum: <0.5 mg/dL (05-27-21 @ 00:00)  Creatinine, Serum: <0.5 mg/dL (05-26-21 @ 12:20)  Creatinine, Serum: <0.5 mg/dL (05-25-21 @ 23:30)    WBC =   WBC Count: 7.21 K/uL (05-27-21 @ 23:42)  WBC Count: 8.16 K/uL (05-27-21 @ 00:00)  WBC Count: 8.89 K/uL (05-25-21 @ 23:30)    Tmax = T(C): 36.9 (05-28-21 @ 06:00), Max: 38.7 (05-25-21 @ 16:00)  T(F): 98.4 (05-28-21 @ 06:00), Max: 101.6 (05-25-21 @ 16:00)    A/P:  The patient has currently on vancomycin 1500 mg IV every 8 hours and received a total of 3 doses on this regimen.  It is recommended to obtain a vancomycin trough level prior the fourth dose of a given regimen.    5/27------------13:18------------1500 mg  5/27------------21:47------------1500 mg  5/28------------05:01------------1500 mg    Recommendations   1. Please obtain a vancomycin trough level prior to the next scheduled vancomycin dose at 13:00 on 5/28/2021.    Contact pharmacy with any questions spectra [phone]
I spoke with Ariadna (spectra [id number]) vanco 1500 mg q8h recommended by ID
MD suspecting cerebral salt wasting syndrome, rec that can consider fludrocortisone 0.1mg TID. MD will check urine sodium to see if needed.
Patient was on vancomycin 2g every 12 hours and had elevated trough; dose was subsequently reduced to vancomycin 1500mg every 12 hours after the trough was <20 as appropriate    Obtain a steady state trough on 06/12 before the 06:00 dose to assess if vancomycin 1500mg every 12 hours is therapeutic  - Order placed     Recommendations based on 06/12 04:30 trough results:  - If trough drawn appropriately BEFORE the morning dose was given, hold vancomycin, re-check level with 11:00 AM labs. Once trough <20, re-dose with vancomycin 1250mg every 12 hours  - If trough 15-20 continue vancomycin 1500mg every 12 hours
Pt on propofol but still breathing over the vent. Rec to add on fentanyl for pain control. Propofol not helping with analgesia
Rec to consider stopping scheduled ibuprofen as Hg dropping. Ibuprofen added over the wknd due to APAP alone was not controlling her fever.
Rec to obtain Triglycerides while on propofol
vanco trough due at 2100 today. csf cx growing e.faecalis
Day 9 of cefepime for PNA (BAL growing Serratia/Klebsiella) + 1 out of 2 blood cx growing klebsiella. Pt still spiking fevers, but clinically improving. Relayed the msg from ID to DC and monitor off abx.
SICJOHNATHAN NAVARRO (spectra [id number]) ordered nicardipine 30 mg capsule which is not available.  I recommended to change to an alternative or fill out non-formulary form. As per Janneth Reed, PA will change therapy.  Janneth Reed approved nifedipine IR to be used.
md wanted to consider starting tolvaptan for 3% resistant hyponatremia. pt been on 80ml/hr of 3% nacl.   communicated to the team that we do have tolvaptan, will need to fill out non-formulary request form.   LFT ok, euvolemic
Pt currently on NA3% running at 50mL/hr but sodium goal still not met. MD would like to try bolus 100mL over 10 mins. Communicated to RN to monitor blood pressure closely when administering bolus and to slow down if pt becomes hypotensive.    Also the continuous infusion rate increased to 200mL/hr, ensured with PA that next BMP is 1000 this morning, and Na will be followed up
Pt true trough was low <5 range on vanco 1g iv q12hr. Rec to go up on dosing to 1250mg iv q8hr.
Rec to dose decrease cefepime to 1g iv q12hr based on crcl = 22mL/min
Vanco AM level still came back 27 today, (yesterday AM level was also 27). Noticed that vanco dose was still given PM yesterday despite high level yesterday. Rec to draw another vanco AM tomorrow, and to start at reduced dose of 1500 mg iv q12hr once level <20
pt was on vanco 1500 iv q8h (total daily 4.5g/day), trough came out to be 19.9. Rec to change to 2g iv q12hr (total 4g/day)
Day 11 of vanco until  drain shunt. Rec to obtain vanco trough tomorrow AM
Pt currently on clevidipine 4mg/hr for BP control, md wanting to titrate off the drip. Rec nicardipine initially for formulary adherence but does not carry. Nifedipine IR (non-formulary) but available. Rec initial dosing of 20mg q8hr
pt been spiking fever, md thinking to start abx on cefepime and vanco. provided dosing cefepime 1g iv q8hr and vanco 1g iv q12hr
reminded the team that today is day 6 of cefepime for gram neg bacteremia; tomorrow would be day 7, team planning 7 days

## 2021-06-11 NOTE — CHART NOTE - NSCHARTNOTESELECT_GEN_ALL_CORE
Neurosurgery/Event Note
Palliative Care - Social work/Event Note
Post op check/Event Note
Transfer Note
Anesthesia PACU note/Transfer Note
Event Note
Neurosurgery/Event Note
Palliative Care - Sign Off/Event Note
RD Limited/Event Note
Transfer Note
anesthesia ICU admission note
tertiary survey/Event Note

## 2021-06-11 NOTE — PROGRESS NOTE ADULT - SUBJECTIVE AND OBJECTIVE BOX
Subjective: 45yMale with a pmhx of SUBDURAL HEMATOMA;RESPIRATORY FAILURE;FALL    ^SUBDURAL HEMATOMA;RESPIRATORY FAILURE;FALL    No pertinent family history in first degree relatives    Family history of essential hypertension (Father)    Handoff    MEWS Score    No pertinent past medical history    Alcohol abuse    GERD (gastroesophageal reflux disease)    ETOH abuse    Hypomagnesemia    Seizure disorder    Subdural hematoma    Hydrocephalus    S/P subdural hematoma evacuation    Subdural hematoma    Hydrocephalus    Subdural hematoma    Subdural hematoma    Respiratory failure    ETOH abuse    Palliative care by specialist    Insertion of non-tunneled central venous catheter (CVC) in patient age 5 years of age or older    Craniotomy, decompressive    Decompressive craniotomy    Placement, tracheostomy and percutaneous endoscopic gastrostomy    Laparoscopy, single incision    Ventriculoperitoneal shunt    No significant past surgical history    H/O hemorrhoidectomy    FALL    23    Respiratory failure    Fall    [email or web address]      POD# 35 S/P Left Craniectomy for Evac of SDH  POD# 26 S/P Wound Revision  POD# 4 s/p placement of right frontal VPS strata II programmable valve set at 1.5, Lap assist for peritoneal cath placement  Patient is a 45 year-old male seen and examined at bedside on 3E. He is s/p PEG placement by IR today. Pt is awake, lying in bed with eyes opened and following commands. Pt unable to verbalize.    Allergies      Allergies    No Known Allergies    Intolerances        Vital Signs Last 24 Hrs  T(C): 36.3 (11 Jun 2021 09:24), Max: 36.3 (11 Jun 2021 09:24)  T(F): 97.4 (11 Jun 2021 09:24), Max: 97.4 (11 Jun 2021 09:24)  HR: 98 (11 Jun 2021 09:24) (85 - 98)  BP: 142/94 (11 Jun 2021 09:24) (131/64 - 147/90)  BP(mean): 90 (10 Hayden 2021 21:01) (90 - 90)  RR: 18 (11 Jun 2021 09:24) (18 - 18)  SpO2: 100% (11 Jun 2021 04:45) (100% - 100%)      acetaminophen   Tablet .. 650 milliGRAM(s) Oral every 6 hours PRN  artificial tears (preservative free) Ophthalmic Solution 2 Drop(s) Both EYES three times a day  enoxaparin Injectable 40 milliGRAM(s) SubCutaneous daily  levETIRAcetam  IVPB 500 milliGRAM(s) IV Intermittent every 12 hours  lisinopril 20 milliGRAM(s) Oral daily  meropenem  IVPB 1000 milliGRAM(s) IV Intermittent every 8 hours  pantoprazole   Suspension 40 milliGRAM(s) Oral daily  propranolol 20 milliGRAM(s) Oral every 8 hours  senna 2 Tablet(s) Oral at bedtime  sodium chloride 8 Gram(s) Oral every 6 hours  vancomycin  IVPB 1500 milliGRAM(s) IV Intermittent every 12 hours        06-10-21 @ 07:01  -  06-11-21 @ 07:00  --------------------------------------------------------  IN: 1200 mL / OUT: 1950 mL / NET: -750 mL        REVIEW OF SYSTEMS    [ ] A ten-point review of systems was otherwise negative except as noted.  [X] Due to altered mental status/intubation, subjective information were not able to be obtained from the patient. History was obtained, to the extent possible, from review of the chart and collateral sources of information.      Physical Exam:  General: Lying in bed, following commands  Awake and alert  Facial motions symmetric  PERRL, EOMI  Motor: MAEx4  5/5 power in LUE/LLE  5/5  strength on right  appropriate plantar/dorsiflexion on right  extends right knee, no right hip flexion  Wound: Incisions clean, dry, and intact without drainage      CBC Full  -  ( 11 Jun 2021 01:22 )  WBC Count : 3.70 K/uL  RBC Count : 3.15 M/uL  Hemoglobin : 8.8 g/dL  Hematocrit : 27.2 %  Platelet Count - Automated : 230 K/uL  Mean Cell Volume : 86.3 fL  Mean Cell Hemoglobin : 27.9 pg  Mean Cell Hemoglobin Concentration : 32.4 g/dL  Auto Neutrophil # : 2.00 K/uL  Auto Lymphocyte # : 1.07 K/uL  Auto Monocyte # : 0.52 K/uL  Auto Eosinophil # : 0.09 K/uL  Auto Basophil # : 0.01 K/uL  Auto Neutrophil % : 54.0 %  Auto Lymphocyte % : 28.9 %  Auto Monocyte % : 14.1 %  Auto Eosinophil % : 2.4 %  Auto Basophil % : 0.3 %    06-11    143  |  106  |  9<L>  ----------------------------<  93  3.7   |  28  |  <0.5<L>    Ca    9.4      11 Jun 2021 01:22  Phos  4.1     06-11  Mg     1.7     06-11      PT/INR - ( 11 Jun 2021 06:45 )   PT: 14.50 sec;   INR: 1.26 ratio         PTT - ( 11 Jun 2021 06:45 )  PTT:32.8 sec        Imaging:  < from: CT Head No Cont (06.10.21 @ 14:49) >  IMPRESSION:    Stable post surgical change reflecting left frontoparietal temporal craniectomy with slightly decreased large extra-axial collection along the left convexity since the prior CT from 6/8/2021. Stable to minimally decreased midline shift to the right measuring 1.2 cm.    Unchanged right frontal approach ventriculostomy catheter with the tip terminating in the region of the interhemispheric fissure.    Evolving subacute infarct in theleft temporal and occipital lobes, as well as in the right cerebellum. Previously seen trace left temporal subarachnoid hemorrhage is not well appreciated due to technique on the current exam.    < end of copied text >      Assessment/Plan:   Patient is a 45 year-old male s/p left craniectomy, wound revision, VPS placement. s/p PEG placement today.   -Resume continuous tube feeds  -OR cultures prelim negative  -DVT ppx, b/l sequentials, continue lovenox  -continue broad spectrum abx  -f/u  for authorization for [facility] 6.10 stable  -discuss with attending

## 2021-06-11 NOTE — CONSULT NOTE ADULT - CONSULT REQUESTED BY NAME
Dr. Dunn
Surgical team
Dr Longo
Eleazar Byrne
IM
MOHAN
SICU
Emergency medicine
trauma
Dr Back
Dr. Obrien

## 2021-06-11 NOTE — PROCEDURE NOTE - NSPROCNAME_GEN_A_CORE
Central Line Insertion
General
Interventional Radiology
Central Line Insertion
Arterial Puncture/Cannulation
Midline Insertion

## 2021-06-11 NOTE — CONSULT NOTE ADULT - ASSESSMENT
IMPRESSION:  acute resp failure s/p   fall and SDH   wound infection      PLAN:  from pulm try t-peice as much he tolerate   ABG prn  going for peg tube today   follwo lytes NA level   ABX as per ID   follow vancomycin level today

## 2021-06-11 NOTE — CHART NOTE - NSCHARTNOTEFT_GEN_A_CORE
#s/p left craniectomy, wound revision, VPS placement. s/p PEG placement today -Resume continuous tube feeds    Rn reports pt pulled out PEG tube overnight, however, IR fixed PEG tube this morning and now able to resume feeds. Previously tolerating feeds of osmolite 1.2 + prosource. No GI discomfort, LBM 6/9 per EMR. Alert, unable to speak, KIRTI; No NFPF, skin: surgical incision; 1+ R hand edema noted. No new labs for 9/25;low mg 9/21 - replete; meds reviewed. Wt's relatively stable. Continue with current. DASH/TLC Consistent carb diet order. Obtain daily wts #s/p left craniectomy, wound revision, VPS placement. s/p PEG placement today -Resume continuous tube feeds    Rn reports pt was receiving feeds through jain catheter until last night when he pulled it, however, IR put in PEG tube this morning and now able to resume feeds. Previously tolerating feeds of osmolite 1.2 + prosource. No GI discomfort, LBM 6/9 per EMR. Alert, unable to speak, KIRTI; trach/peg; No NFPF, skin: surgical incision; 1+ R hand edema noted per RN flowsheets. Labs reviewed -- low mg noted, replete; meds reviewed. Wts fluctuating likely 2/2 edema throughout admit.     Recommendations:  1. As medically feasible recommend changing continuous feeds to bolus feeds for long term nutrition support. Pt would benefit from a fiber formula. Recommend Jevity 1.2 @ 360ml q4hrs, hold 1 feed at 2 AM for a total of 5 feeds/day. Recommended regimen will provide 1800ml formula, 2160kcal, 100g protein, 1453ml free water. Additional water flushes per LIP.   2. maintain all aspiration precautions    [phone]  pt not @ risk, RD to f/u in 7 days.   RD remains available: Sandra Espana [phone]

## 2021-06-11 NOTE — CONSULT NOTE ADULT - SUBJECTIVE AND OBJECTIVE BOX
Patient is a 45y old  Male who presents with a chief complaint of found down unresponsive (10 Hayden 2021 16:31)      HPI:  TRAUMA ACTIVATION LEVEL:  Code trauma    HPI:    Patient is a 45yM w/ PMHx of Etoh abuse and seizures on keppra seen as Trauma Alert upgraded to a Code trauma during examination in the ED where the patient was found to have a GCS of 4 s/p found down unresponsive.  Trauma assessment in ED: intubated for airway protection ct scan large SDH , evacauation   now has trach on vent     (06 May 2021 22:29)      PAST MEDICAL & SURGICAL HISTORY:  Alcohol abuse    GERD (gastroesophageal reflux disease)    ETOH abuse    Hypomagnesemia    Seizure disorder    H/O hemorrhoidectomy      Allergies    No Known Allergies    Intolerances      Family history : no cardiovscular family history   Home Medications:    Occupation:  Alochol: Denied  Smoking: Denied  Drug Use: Denied  Marital Status:         ROS: as in HPI; All other systems reviewed are negative    ICU Vital Signs Last 24 Hrs  T(C): 35.6 (11 Jun 2021 06:32), Max: 36 (10 Hayden 2021 21:01)  T(F): 96.1 (11 Jun 2021 06:32), Max: 96.8 (10 Hayden 2021 21:01)  HR: 85 (11 Jun 2021 06:32) (85 - 89)  BP: 140/87 (11 Jun 2021 06:32) (131/64 - 147/90)  BP(mean): 90 (10 Hayden 2021 21:01) (90 - 97)  ABP: --  ABP(mean): --  RR: 18 (11 Jun 2021 06:32) (18 - 20)  SpO2: 100% (11 Jun 2021 04:45) (100% - 100%)        Physical Examination:    General: awake  he tack  with eyes     HEENT: Pupils equal, reactive to light.  Symmetric.    PULM: Clear to auscultation bilaterally, no significant sputum production    CVS: Regular rate and rhythm, no murmurs, rubs, or gallops    ABD: Soft, nondistended, nontender, normoactive bowel sounds, no masses    EXT: No edema, nontender, no clubbing     SKIN: Warm and well perfused, no rashes noted.    Neurology :      Musculoskeletal : move all extremity     Lymphatic system: no Palpable node               I&O's Detail    10 Hayden 2021 07:01  -  11 Jun 2021 07:00  --------------------------------------------------------  IN:    IV PiggyBack: 500 mL    IV PiggyBack: 100 mL    Osmolite: 600 mL  Total IN: 1200 mL    OUT:    Voided (mL): 1950 mL  Total OUT: 1950 mL    Total NET: -750 mL            LABS:                        8.8    3.70  )-----------( 230      ( 11 Jun 2021 01:22 )             27.2     11 Jun 2021 01:22    143    |  106    |  9      ----------------------------<  93     3.7     |  28     |  <0.5     Ca    9.4        11 Jun 2021 01:22  Phos  4.1       11 Jun 2021 01:22  Mg     1.7       11 Jun 2021 01:22            CAPILLARY BLOOD GLUCOSE      POCT Blood Glucose.: 102 mg/dL (11 Jun 2021 06:01)    PT/INR - ( 11 Jun 2021 06:45 )   PT: 14.50 sec;   INR: 1.26 ratio         PTT - ( 11 Jun 2021 06:45 )  PTT:32.8 sec    Culture        MEDICATIONS  (STANDING):  artificial tears (preservative free) Ophthalmic Solution 2 Drop(s) Both EYES three times a day  enoxaparin Injectable 40 milliGRAM(s) SubCutaneous daily  levETIRAcetam  IVPB 500 milliGRAM(s) IV Intermittent every 12 hours  lisinopril 20 milliGRAM(s) Oral daily  meropenem  IVPB 1000 milliGRAM(s) IV Intermittent every 8 hours  pantoprazole   Suspension 40 milliGRAM(s) Oral daily  propranolol 20 milliGRAM(s) Oral every 8 hours  senna 2 Tablet(s) Oral at bedtime  sodium chloride 8 Gram(s) Oral every 6 hours  vancomycin  IVPB 1500 milliGRAM(s) IV Intermittent every 12 hours    MEDICATIONS  (PRN):  acetaminophen   Tablet .. 650 milliGRAM(s) Oral every 6 hours PRN Temp greater or equal to 38.5C (101.3F), Moderate Pain (4 - 6), Severe Pain (7 - 10)        RADIOLOGY: ***     CXR: 6/10 positive trach in place no infiltrate   TLC:  OG:  ET tube:        CAM ICU:  ECHO:

## 2021-06-11 NOTE — PHARMACOTHERAPY INTERVENTION NOTE - INTERVENTION CATEGORIES
ASP
Therapy
Therapy
ASP
ASP
Monitoring
Therapy
Therapy
ASP
ASP
Therapy
ASP
Misc
Therapy
Therapy

## 2021-06-11 NOTE — PROCEDURE NOTE - NSINFORMCONSENT_GEN_A_CORE
Benefits, risks, and possible complications of procedure explained to patient/caregiver who verbalized understanding and gave written consent.
This was an emergent procedure.
This was an emergent procedure.
Benefits, risks, and possible complications of procedure explained to patient/caregiver who verbalized understanding and gave verbal consent.
Benefits, risks, and possible complications of procedure explained to patient/caregiver who verbalized understanding and gave written consent.
Benefits, risks, and possible complications of procedure explained to patient/caregiver who verbalized understanding and gave verbal consent.

## 2021-06-12 LAB
GLUCOSE BLDC GLUCOMTR-MCNC: 143 MG/DL — HIGH (ref 70–99)
GLUCOSE BLDC GLUCOMTR-MCNC: 164 MG/DL — HIGH (ref 70–99)
GLUCOSE BLDC GLUCOMTR-MCNC: 85 MG/DL — SIGNIFICANT CHANGE UP (ref 70–99)

## 2021-06-12 PROCEDURE — 99232 SBSQ HOSP IP/OBS MODERATE 35: CPT

## 2021-06-12 RX ADMIN — SODIUM CHLORIDE 8 GRAM(S): 9 INJECTION INTRAMUSCULAR; INTRAVENOUS; SUBCUTANEOUS at 17:49

## 2021-06-12 RX ADMIN — SENNA PLUS 2 TABLET(S): 8.6 TABLET ORAL at 21:06

## 2021-06-12 RX ADMIN — SODIUM CHLORIDE 8 GRAM(S): 9 INJECTION INTRAMUSCULAR; INTRAVENOUS; SUBCUTANEOUS at 00:27

## 2021-06-12 RX ADMIN — MEROPENEM 100 MILLIGRAM(S): 1 INJECTION INTRAVENOUS at 14:52

## 2021-06-12 RX ADMIN — MEROPENEM 100 MILLIGRAM(S): 1 INJECTION INTRAVENOUS at 05:41

## 2021-06-12 RX ADMIN — Medication 2 DROP(S): at 05:43

## 2021-06-12 RX ADMIN — LISINOPRIL 20 MILLIGRAM(S): 2.5 TABLET ORAL at 05:43

## 2021-06-12 RX ADMIN — Medication 2 DROP(S): at 21:07

## 2021-06-12 RX ADMIN — SODIUM CHLORIDE 8 GRAM(S): 9 INJECTION INTRAMUSCULAR; INTRAVENOUS; SUBCUTANEOUS at 23:08

## 2021-06-12 RX ADMIN — PANTOPRAZOLE SODIUM 40 MILLIGRAM(S): 20 TABLET, DELAYED RELEASE ORAL at 12:18

## 2021-06-12 RX ADMIN — LEVETIRACETAM 400 MILLIGRAM(S): 250 TABLET, FILM COATED ORAL at 17:49

## 2021-06-12 RX ADMIN — Medication 300 MILLIGRAM(S): at 17:49

## 2021-06-12 RX ADMIN — MEROPENEM 100 MILLIGRAM(S): 1 INJECTION INTRAVENOUS at 21:08

## 2021-06-12 RX ADMIN — LEVETIRACETAM 400 MILLIGRAM(S): 250 TABLET, FILM COATED ORAL at 05:41

## 2021-06-12 RX ADMIN — SODIUM CHLORIDE 8 GRAM(S): 9 INJECTION INTRAMUSCULAR; INTRAVENOUS; SUBCUTANEOUS at 05:43

## 2021-06-12 RX ADMIN — Medication 300 MILLIGRAM(S): at 06:03

## 2021-06-12 RX ADMIN — SODIUM CHLORIDE 8 GRAM(S): 9 INJECTION INTRAMUSCULAR; INTRAVENOUS; SUBCUTANEOUS at 12:18

## 2021-06-12 RX ADMIN — Medication 2 DROP(S): at 14:52

## 2021-06-12 RX ADMIN — ENOXAPARIN SODIUM 40 MILLIGRAM(S): 100 INJECTION SUBCUTANEOUS at 12:18

## 2021-06-12 NOTE — PROGRESS NOTE ADULT - SUBJECTIVE AND OBJECTIVE BOX
Patient is a 45y old  Male who presents with a chief complaint of found down unresponsive (11 Jun 2021 10:48)      Over Night Events:  Patient seen and examined.   on neck collar     ROS:  See HPI    PHYSICAL EXAM    ICU Vital Signs Last 24 Hrs  T(C): 35.6 (12 Jun 2021 05:00), Max: 36.6 (11 Jun 2021 14:00)  T(F): 96 (12 Jun 2021 05:00), Max: 97.8 (11 Jun 2021 14:00)  HR: 85 (12 Jun 2021 05:00) (83 - 98)  BP: 141/91 (12 Jun 2021 05:00) (141/91 - 155/92)  BP(mean): 112 (12 Jun 2021 05:00) (112 - 118)  ABP: --  ABP(mean): --  RR: 20 (12 Jun 2021 05:00) (18 - 20)  SpO2: 100% (12 Jun 2021 05:00) (100% - 100%)      General: open eyes   HEENT:     tarch            Lymph Nodes: NO cervical LN   Lungs: Bilateral BS  Cardiovascular: Regular   Abdomen: Soft, Positive BS  Extremities: No clubbing   Skin: warm   Neurological:   Musculoskeletal: move all ext     I&O's Detail    11 Jun 2021 07:01  -  12 Jun 2021 07:00  --------------------------------------------------------  IN:    Enteral Tube Flush: 200 mL    IV PiggyBack: 50 mL    IV PiggyBack: 100 mL    IV PiggyBack: 500 mL    IV PiggyBack: 50 mL    Jevity 1.2: 720 mL    Osmolite: 350 mL  Total IN: 1970 mL    OUT:    Voided (mL): 1050 mL  Total OUT: 1050 mL    Total NET: 920 mL          LABS:                          8.8    3.70  )-----------( 230      ( 11 Jun 2021 01:22 )             27.2         11 Jun 2021 01:22    143    |  106    |  9      ----------------------------<  93     3.7     |  28     |  <0.5     Ca    9.4        11 Jun 2021 01:22  Phos  4.1       11 Jun 2021 01:22  Mg     1.7       11 Jun 2021 01:22                                               PT/INR - ( 11 Jun 2021 06:45 )   PT: 14.50 sec;   INR: 1.26 ratio         PTT - ( 11 Jun 2021 06:45 )  PTT:32.8 sec                                                                                                                                                                                        MEDICATIONS  (STANDING):  artificial tears (preservative free) Ophthalmic Solution 2 Drop(s) Both EYES three times a day  enoxaparin Injectable 40 milliGRAM(s) SubCutaneous daily  levETIRAcetam  IVPB 500 milliGRAM(s) IV Intermittent every 12 hours  lisinopril 20 milliGRAM(s) Oral daily  meropenem  IVPB 1000 milliGRAM(s) IV Intermittent every 8 hours  pantoprazole   Suspension 40 milliGRAM(s) Oral daily  propranolol 20 milliGRAM(s) Oral every 8 hours  senna 2 Tablet(s) Oral at bedtime  sodium chloride 8 Gram(s) Oral every 6 hours  vancomycin  IVPB 1500 milliGRAM(s) IV Intermittent every 12 hours    MEDICATIONS  (PRN):  acetaminophen   Tablet .. 650 milliGRAM(s) Oral every 6 hours PRN Temp greater or equal to 38.5C (101.3F), Moderate Pain (4 - 6), Severe Pain (7 - 10)          Xrays:  TLC:  OG:  ET tube:                                                                                       ECHO:  CAM ICU:             Patient is a 45y old  Male who presents with a chief complaint of found down unresponsive (11 Jun 2021 10:48)      Over Night Events:  Patient seen and examined.   on  off the vent on t-piece      ROS:  See HPI    PHYSICAL EXAM    ICU Vital Signs Last 24 Hrs  T(C): 35.6 (12 Jun 2021 05:00), Max: 36.6 (11 Jun 2021 14:00)  T(F): 96 (12 Jun 2021 05:00), Max: 97.8 (11 Jun 2021 14:00)  HR: 85 (12 Jun 2021 05:00) (83 - 98)  BP: 141/91 (12 Jun 2021 05:00) (141/91 - 155/92)  BP(mean): 112 (12 Jun 2021 05:00) (112 - 118)  ABP: --  ABP(mean): --  RR: 20 (12 Jun 2021 05:00) (18 - 20)  SpO2: 100% (12 Jun 2021 05:00) (100% - 100%)      General: open eyes   HEENT:     tarch            Lymph Nodes: NO cervical LN   Lungs: Bilateral BS  Cardiovascular: Regular   Abdomen: Soft, Positive BS  Extremities: No clubbing   Skin: warm   Neurological:   Musculoskeletal: move all ext     I&O's Detail    11 Jun 2021 07:01  -  12 Jun 2021 07:00  --------------------------------------------------------  IN:    Enteral Tube Flush: 200 mL    IV PiggyBack: 50 mL    IV PiggyBack: 100 mL    IV PiggyBack: 500 mL    IV PiggyBack: 50 mL    Jevity 1.2: 720 mL    Osmolite: 350 mL  Total IN: 1970 mL    OUT:    Voided (mL): 1050 mL  Total OUT: 1050 mL    Total NET: 920 mL          LABS:                          8.8    3.70  )-----------( 230      ( 11 Jun 2021 01:22 )             27.2         11 Jun 2021 01:22    143    |  106    |  9      ----------------------------<  93     3.7     |  28     |  <0.5     Ca    9.4        11 Jun 2021 01:22  Phos  4.1       11 Jun 2021 01:22  Mg     1.7       11 Jun 2021 01:22                                               PT/INR - ( 11 Jun 2021 06:45 )   PT: 14.50 sec;   INR: 1.26 ratio         PTT - ( 11 Jun 2021 06:45 )  PTT:32.8 sec                                                                                                                                                                                        MEDICATIONS  (STANDING):  artificial tears (preservative free) Ophthalmic Solution 2 Drop(s) Both EYES three times a day  enoxaparin Injectable 40 milliGRAM(s) SubCutaneous daily  levETIRAcetam  IVPB 500 milliGRAM(s) IV Intermittent every 12 hours  lisinopril 20 milliGRAM(s) Oral daily  meropenem  IVPB 1000 milliGRAM(s) IV Intermittent every 8 hours  pantoprazole   Suspension 40 milliGRAM(s) Oral daily  propranolol 20 milliGRAM(s) Oral every 8 hours  senna 2 Tablet(s) Oral at bedtime  sodium chloride 8 Gram(s) Oral every 6 hours  vancomycin  IVPB 1500 milliGRAM(s) IV Intermittent every 12 hours    MEDICATIONS  (PRN):  acetaminophen   Tablet .. 650 milliGRAM(s) Oral every 6 hours PRN Temp greater or equal to 38.5C (101.3F), Moderate Pain (4 - 6), Severe Pain (7 - 10)          Xrays:  TLC:  OG:  ET tube:                                                                                       ECHO:  CAM ICU:

## 2021-06-13 LAB — GLUCOSE BLDC GLUCOMTR-MCNC: 86 MG/DL — SIGNIFICANT CHANGE UP (ref 70–99)

## 2021-06-13 PROCEDURE — 99232 SBSQ HOSP IP/OBS MODERATE 35: CPT

## 2021-06-13 RX ADMIN — Medication 2 DROP(S): at 13:34

## 2021-06-13 RX ADMIN — PANTOPRAZOLE SODIUM 40 MILLIGRAM(S): 20 TABLET, DELAYED RELEASE ORAL at 11:37

## 2021-06-13 RX ADMIN — Medication 300 MILLIGRAM(S): at 06:12

## 2021-06-13 RX ADMIN — LEVETIRACETAM 400 MILLIGRAM(S): 250 TABLET, FILM COATED ORAL at 05:07

## 2021-06-13 RX ADMIN — Medication 2 DROP(S): at 22:06

## 2021-06-13 RX ADMIN — Medication 300 MILLIGRAM(S): at 17:04

## 2021-06-13 RX ADMIN — LISINOPRIL 20 MILLIGRAM(S): 2.5 TABLET ORAL at 05:13

## 2021-06-13 RX ADMIN — Medication 2 DROP(S): at 05:15

## 2021-06-13 RX ADMIN — SODIUM CHLORIDE 8 GRAM(S): 9 INJECTION INTRAMUSCULAR; INTRAVENOUS; SUBCUTANEOUS at 05:14

## 2021-06-13 RX ADMIN — LEVETIRACETAM 400 MILLIGRAM(S): 250 TABLET, FILM COATED ORAL at 17:04

## 2021-06-13 RX ADMIN — SENNA PLUS 2 TABLET(S): 8.6 TABLET ORAL at 22:07

## 2021-06-13 RX ADMIN — SODIUM CHLORIDE 8 GRAM(S): 9 INJECTION INTRAMUSCULAR; INTRAVENOUS; SUBCUTANEOUS at 17:04

## 2021-06-13 RX ADMIN — SODIUM CHLORIDE 8 GRAM(S): 9 INJECTION INTRAMUSCULAR; INTRAVENOUS; SUBCUTANEOUS at 11:37

## 2021-06-13 RX ADMIN — MEROPENEM 100 MILLIGRAM(S): 1 INJECTION INTRAVENOUS at 05:28

## 2021-06-13 RX ADMIN — ENOXAPARIN SODIUM 40 MILLIGRAM(S): 100 INJECTION SUBCUTANEOUS at 11:37

## 2021-06-13 RX ADMIN — MEROPENEM 100 MILLIGRAM(S): 1 INJECTION INTRAVENOUS at 13:36

## 2021-06-13 NOTE — PROGRESS NOTE ADULT - SUBJECTIVE AND OBJECTIVE BOX
Patient is a 45y old  Male who presents with a chief complaint of found down unresponsive (13 Jun 2021 09:15)        Over Night Events:    no events. remains on T piece sats 100%    ROS:     All ROS are negative except HPI         PHYSICAL EXAM    ICU Vital Signs Last 24 Hrs  T(C): 36.3 (13 Jun 2021 04:24), Max: 36.3 (12 Jun 2021 20:32)  T(F): 97.4 (13 Jun 2021 04:24), Max: 97.4 (13 Jun 2021 04:24)  HR: 83 (13 Jun 2021 04:24) (79 - 83)  BP: 148/95 (13 Jun 2021 04:24) (133/89 - 148/95)  BP(mean): 117 (13 Jun 2021 04:24) (117 - 117)  ABP: --  ABP(mean): --  RR: 18 (13 Jun 2021 04:24) (18 - 18)  SpO2: 100% (13 Jun 2021 04:24) (97% - 100%)      CONSTITUTIONAL:  Well nourished.  NAD    ENT:   Airway patent,   Mouth with normal mucosa.   No thrush    EYES:   Pupils equal,   Round and reactive to light.    CARDIAC:   Normal rate,   Regular rhythm.    No edema      Vascular:  Normal systolic impulse  No Carotid bruits    RESPIRATORY:   No wheezing  Bilateral BS  Normal chest expansion  Not tachypneic,  No use of accessory muscles    GASTROINTESTINAL:  Abdomen soft,   Non-tender,   No guarding,   + BS    MUSCULOSKELETAL:   Range of motion is not limited,  No clubbing, cyanosis    NEUROLOGICAL:   Alert and oriented x3  following commands  No motor  deficits.    SKIN:   Skin normal color for race,   Warm and dry and intact.   No evidence of rash.    PSYCHIATRIC:   Normal mood and affect.   No apparent risk to self or others.    HEMATOLOGICAL:  No cervical  lymphadenopathy.  no inguinal lymphadenopathy      06-12-21 @ 07:01  -  06-13-21 @ 07:00  --------------------------------------------------------  IN:    Enteral Tube Flush: 200 mL    IV PiggyBack: 100 mL    IV PiggyBack: 500 mL    Jevity 1.2: 1800 mL  Total IN: 2600 mL    OUT:    Voided (mL): 700 mL  Total OUT: 700 mL    Total NET: 1900 mL      06-13-21 @ 07:01  -  06-13-21 @ 11:05  --------------------------------------------------------  IN:    Enteral Tube Flush: 100 mL    Jevity 1.2: 360 mL  Total IN: 460 mL    OUT:  Total OUT: 0 mL    Total NET: 460 mL          LABS:                                                                                                                                                                                                                                                                             MEDICATIONS  (STANDING):  artificial tears (preservative free) Ophthalmic Solution 2 Drop(s) Both EYES three times a day  enoxaparin Injectable 40 milliGRAM(s) SubCutaneous daily  levETIRAcetam  IVPB 500 milliGRAM(s) IV Intermittent every 12 hours  lisinopril 20 milliGRAM(s) Oral daily  meropenem  IVPB 1000 milliGRAM(s) IV Intermittent every 8 hours  pantoprazole   Suspension 40 milliGRAM(s) Oral daily  propranolol 20 milliGRAM(s) Oral every 8 hours  senna 2 Tablet(s) Oral at bedtime  sodium chloride 8 Gram(s) Oral every 6 hours  vancomycin  IVPB 1500 milliGRAM(s) IV Intermittent every 12 hours    MEDICATIONS  (PRN):  acetaminophen   Tablet .. 650 milliGRAM(s) Oral every 6 hours PRN Temp greater or equal to 38.5C (101.3F), Moderate Pain (4 - 6), Severe Pain (7 - 10)      New X-rays reviewed:                                                                                  ECHO    CXR interpreted by me:       Patient is a 45y old  Male who presents with a chief complaint of found down unresponsive (13 Jun 2021 09:15)        Over Night Events:    no events. remains on T piece sats 100%    ROS:     All ROS are negative except HPI         PHYSICAL EXAM    ICU Vital Signs Last 24 Hrs  T(C): 36.3 (13 Jun 2021 04:24), Max: 36.3 (12 Jun 2021 20:32)  T(F): 97.4 (13 Jun 2021 04:24), Max: 97.4 (13 Jun 2021 04:24)  HR: 83 (13 Jun 2021 04:24) (79 - 83)  BP: 148/95 (13 Jun 2021 04:24) (133/89 - 148/95)  BP(mean): 117 (13 Jun 2021 04:24) (117 - 117)  ABP: --  ABP(mean): --  RR: 18 (13 Jun 2021 04:24) (18 - 18)  SpO2: 100% (13 Jun 2021 04:24) (97% - 100%)      CONSTITUTIONAL:  Well nourished.  NAD    ENT:   Airway patent,   Mouth with normal mucosa.   No thrush    EYES:   Pupils equal,   Round and reactive to light.    CARDIAC:   Normal rate,   Regular rhythm.    No edema      Vascular:  Normal systolic impulse  No Carotid bruits    RESPIRATORY:   No wheezing  Bilateral BS  Normal chest expansion  Not tachypneic,  No use of accessory muscles    GASTROINTESTINAL:  Abdomen soft,   Non-tender,   No guarding,   + BS    MUSCULOSKELETAL:   Range of motion is not limited,  No clubbing, cyanosis    NEUROLOGICAL:   Alert  following commands  No motor  deficits.    SKIN:   Skin normal color for race,   Warm and dry   No evidence of rash.    PSYCHIATRIC:   Normal mood and affect.   No apparent risk to self or others.    HEMATOLOGICAL:  No cervical  lymphadenopathy.  no inguinal lymphadenopathy      06-12-21 @ 07:01  -  06-13-21 @ 07:00  --------------------------------------------------------  IN:    Enteral Tube Flush: 200 mL    IV PiggyBack: 100 mL    IV PiggyBack: 500 mL    Jevity 1.2: 1800 mL  Total IN: 2600 mL    OUT:    Voided (mL): 700 mL  Total OUT: 700 mL    Total NET: 1900 mL      06-13-21 @ 07:01  -  06-13-21 @ 11:05  --------------------------------------------------------  IN:    Enteral Tube Flush: 100 mL    Jevity 1.2: 360 mL  Total IN: 460 mL    OUT:  Total OUT: 0 mL    Total NET: 460 mL          LABS:                                                                                                                                                                                                                                                                             MEDICATIONS  (STANDING):  artificial tears (preservative free) Ophthalmic Solution 2 Drop(s) Both EYES three times a day  enoxaparin Injectable 40 milliGRAM(s) SubCutaneous daily  levETIRAcetam  IVPB 500 milliGRAM(s) IV Intermittent every 12 hours  lisinopril 20 milliGRAM(s) Oral daily  meropenem  IVPB 1000 milliGRAM(s) IV Intermittent every 8 hours  pantoprazole   Suspension 40 milliGRAM(s) Oral daily  propranolol 20 milliGRAM(s) Oral every 8 hours  senna 2 Tablet(s) Oral at bedtime  sodium chloride 8 Gram(s) Oral every 6 hours  vancomycin  IVPB 1500 milliGRAM(s) IV Intermittent every 12 hours    MEDICATIONS  (PRN):  acetaminophen   Tablet .. 650 milliGRAM(s) Oral every 6 hours PRN Temp greater or equal to 38.5C (101.3F), Moderate Pain (4 - 6), Severe Pain (7 - 10)      New X-rays reviewed:                                                                                  ECHO    CXR interpreted by me:

## 2021-06-13 NOTE — PROGRESS NOTE ADULT - SUBJECTIVE AND OBJECTIVE BOX
POD# 37 S/P Left Craniectomy for Evac of SDH  POD# 28 S/P Wound Revision  POD# 6 s/p placement of right frontal VPS strata II programmable valve set at 1.5, Lap assist for peritoneal cath placement        pt seen and examined at bedside pt is sleeping but arousable , follows commands pt on T piece           Vital Signs Last 24 Hrs  T(C): 36.3 (13 Jun 2021 04:24), Max: 36.3 (12 Jun 2021 20:32)  T(F): 97.4 (13 Jun 2021 04:24), Max: 97.4 (13 Jun 2021 04:24)  HR: 83 (13 Jun 2021 04:24) (79 - 83)  BP: 148/95 (13 Jun 2021 04:24) (133/89 - 148/95)  BP(mean): 117 (13 Jun 2021 04:24) (117 - 117)  RR: 18 (13 Jun 2021 04:24) (18 - 18)  SpO2: 100% (13 Jun 2021 04:24) (97% - 100%)    PHYSICAL EXAM:    Alert, PERRL   smiles   Moves left side spontaneously with good strength   moves Right foot to command   picked up Right hand  head cranial defect clean dry intact       MEDICATIONS:  Antibiotics:  meropenem  IVPB 1000 milliGRAM(s) IV Intermittent every 8 hours  vancomycin  IVPB 1500 milliGRAM(s) IV Intermittent every 12 hours    Neuro:  acetaminophen   Tablet .. 650 milliGRAM(s) Oral every 6 hours PRN  levETIRAcetam  IVPB 500 milliGRAM(s) IV Intermittent every 12 hours    Anticoagulation:  enoxaparin Injectable 40 milliGRAM(s) SubCutaneous daily    OTHER:  artificial tears (preservative free) Ophthalmic Solution 2 Drop(s) Both EYES three times a day  lisinopril 20 milliGRAM(s) Oral daily  pantoprazole   Suspension 40 milliGRAM(s) Oral daily  propranolol 20 milliGRAM(s) Oral every 8 hours  senna 2 Tablet(s) Oral at bedtime    IVF:  sodium chloride 8 Gram(s) Oral every 6 hours          A/p             POD# 37 S/P Left Craniectomy for Evac of SDH            POD# 28 S/P Wound Revision            POD# 6 s/p placement of right frontal VPS strata II programmable valve set at 1.5, Lap assist for peritoneal cath placement                               Stop antibiotics per ID note                                PT/ rehab                                will need TBI

## 2021-06-13 NOTE — PROGRESS NOTE ADULT - ATTENDING SUPERVISION STATEMENT
ACP
ACP/Resident
ACP/Resident
ACP
ACP/Resident
ACP/Resident
Resident
ACP
ACP/Resident
ACP/Resident
Fellow
Resident
ACP
ACP/Resident
ACP/Resident
Fellow
Resident
Resident
ACP
ACP/Resident
Resident
Resident
ACP/Resident
Resident
ACP/Resident
Resident

## 2021-06-13 NOTE — PROGRESS NOTE ADULT - ATTENDING COMMENTS
IMPRESSION:    acute resp failure s/p trach  fall and SDH s/p craniotomy  craniotomy wound infection    Plan as outlined above

## 2021-06-13 NOTE — PROGRESS NOTE ADULT - ASSESSMENT
IMPRESSION:    acute resp failure s/p trach  fall and SDH s/p craniotomy  craniotomy wound infection      PLAN:    T piece as tolerated  feeding per PEG tube  follow lytes  repeat BMP for NA level   finish Anitbx course per ID  follow vancomycin level today  Follow up NS

## 2021-06-14 LAB
ANION GAP SERPL CALC-SCNC: 11 MMOL/L — SIGNIFICANT CHANGE UP (ref 7–14)
BUN SERPL-MCNC: 6 MG/DL — LOW (ref 10–20)
CALCIUM SERPL-MCNC: 9.3 MG/DL — SIGNIFICANT CHANGE UP (ref 8.5–10.1)
CHLORIDE SERPL-SCNC: 105 MMOL/L — SIGNIFICANT CHANGE UP (ref 98–110)
CO2 SERPL-SCNC: 27 MMOL/L — SIGNIFICANT CHANGE UP (ref 17–32)
CREAT SERPL-MCNC: <0.5 MG/DL — LOW (ref 0.7–1.5)
GLUCOSE BLDC GLUCOMTR-MCNC: 134 MG/DL — HIGH (ref 70–99)
GLUCOSE SERPL-MCNC: 91 MG/DL — SIGNIFICANT CHANGE UP (ref 70–99)
POTASSIUM SERPL-MCNC: 3.6 MMOL/L — SIGNIFICANT CHANGE UP (ref 3.5–5)
POTASSIUM SERPL-SCNC: 3.6 MMOL/L — SIGNIFICANT CHANGE UP (ref 3.5–5)
SODIUM SERPL-SCNC: 143 MMOL/L — SIGNIFICANT CHANGE UP (ref 135–146)

## 2021-06-14 PROCEDURE — 99232 SBSQ HOSP IP/OBS MODERATE 35: CPT

## 2021-06-14 RX ADMIN — Medication 2 DROP(S): at 13:19

## 2021-06-14 RX ADMIN — SODIUM CHLORIDE 8 GRAM(S): 9 INJECTION INTRAMUSCULAR; INTRAVENOUS; SUBCUTANEOUS at 11:43

## 2021-06-14 RX ADMIN — PANTOPRAZOLE SODIUM 40 MILLIGRAM(S): 20 TABLET, DELAYED RELEASE ORAL at 11:43

## 2021-06-14 RX ADMIN — SODIUM CHLORIDE 8 GRAM(S): 9 INJECTION INTRAMUSCULAR; INTRAVENOUS; SUBCUTANEOUS at 17:54

## 2021-06-14 RX ADMIN — ENOXAPARIN SODIUM 40 MILLIGRAM(S): 100 INJECTION SUBCUTANEOUS at 11:42

## 2021-06-14 RX ADMIN — LEVETIRACETAM 400 MILLIGRAM(S): 250 TABLET, FILM COATED ORAL at 05:59

## 2021-06-14 RX ADMIN — SODIUM CHLORIDE 8 GRAM(S): 9 INJECTION INTRAMUSCULAR; INTRAVENOUS; SUBCUTANEOUS at 05:59

## 2021-06-14 RX ADMIN — Medication 2 DROP(S): at 21:21

## 2021-06-14 RX ADMIN — SODIUM CHLORIDE 8 GRAM(S): 9 INJECTION INTRAMUSCULAR; INTRAVENOUS; SUBCUTANEOUS at 23:25

## 2021-06-14 RX ADMIN — SODIUM CHLORIDE 8 GRAM(S): 9 INJECTION INTRAMUSCULAR; INTRAVENOUS; SUBCUTANEOUS at 00:48

## 2021-06-14 RX ADMIN — LISINOPRIL 20 MILLIGRAM(S): 2.5 TABLET ORAL at 05:59

## 2021-06-14 RX ADMIN — LEVETIRACETAM 400 MILLIGRAM(S): 250 TABLET, FILM COATED ORAL at 17:54

## 2021-06-14 RX ADMIN — Medication 2 DROP(S): at 06:00

## 2021-06-14 NOTE — PROGRESS NOTE ADULT - SUBJECTIVE AND OBJECTIVE BOX
OVERNIGHT EVENTS: events noted, on t piece, afebrile    Vital Signs Last 24 Hrs  T(C): 36.9 (13 Jun 2021 20:10), Max: 36.9 (13 Jun 2021 20:10)  T(F): 98.4 (13 Jun 2021 20:10), Max: 98.4 (13 Jun 2021 20:10)  HR: 80 (13 Jun 2021 20:10) (80 - 88)  BP: 149/91 (13 Jun 2021 20:10) (108/52 - 149/91)  BP(mean): 115 (13 Jun 2021 20:10) (74 - 115)  RR: 18 (13 Jun 2021 20:10) (18 - 18)  SpO2: 100%    PHYSICAL EXAMINATION:    GENERAL: comfortable    HEENT: Head is normocephalic and atraumatic. craniotomy scar, trach    NECK: Supple.    LUNGS:  dec bs both bases    HEART: Regular rate and rhythm without murmur.    ABDOMEN: Soft, nontender, and nondistended.      EXTREMITIES: Without any cyanosis, clubbing, rash, lesions or edema.    NEUROLOGIC: follows simple commands        LABS:                                06-12-21 @ 07:01  -  06-13-21 @ 07:00  --------------------------------------------------------  IN: 2600 mL / OUT: 700 mL / NET: 1900 mL    06-13-21 @ 07:01  -  06-14-21 @ 06:57  --------------------------------------------------------  IN: 2490 mL / OUT: 1150 mL / NET: 1340 mL        MICROBIOLOGY:      MEDICATIONS  (STANDING):  artificial tears (preservative free) Ophthalmic Solution 2 Drop(s) Both EYES three times a day  enoxaparin Injectable 40 milliGRAM(s) SubCutaneous daily  levETIRAcetam  IVPB 500 milliGRAM(s) IV Intermittent every 12 hours  lisinopril 20 milliGRAM(s) Oral daily  pantoprazole   Suspension 40 milliGRAM(s) Oral daily  propranolol 20 milliGRAM(s) Oral every 8 hours  senna 2 Tablet(s) Oral at bedtime  sodium chloride 8 Gram(s) Oral every 6 hours    MEDICATIONS  (PRN):  acetaminophen   Tablet .. 650 milliGRAM(s) Oral every 6 hours PRN Temp greater or equal to 38.5C (101.3F), Moderate Pain (4 - 6), Severe Pain (7 - 10)      RADIOLOGY & ADDITIONAL STUDIES:

## 2021-06-14 NOTE — PROGRESS NOTE ADULT - ASSESSMENT
IMPRESSION:  acute resp failure s/p fall and SDH sp trach  S/P Left Craniectomy for Evac of SDH  S/P Wound Revision  s/p placement of right frontal VPS       PLAN:    T piece  feeding  LE doppler  repeat CBC, CMP  PT/OT

## 2021-06-14 NOTE — PROGRESS NOTE ADULT - SUBJECTIVE AND OBJECTIVE BOX
POD# 38 S/P Left Craniectomy for Evac of SDH  POD# 29 S/P Wound Revision  POD# 7 s/p placement of right frontal VPS strata II programmable valve set at 1.5, Lap assist for peritoneal cath placement        Pt seen and examined at bedside. Pt is sleeping but easily arousable. Follows commands.  On T piece.         45yMale with a pmhx of SUBDURAL HEMATOMA;RESPIRATORY FAILURE;FALL    ^SUBDURAL HEMATOMA;RESPIRATORY FAILURE;FALL    No pertinent family history in first degree relatives    Family history of essential hypertension (Father)    Handoff    MEWS Score    No pertinent past medical history    Alcohol abuse    GERD (gastroesophageal reflux disease)    ETOH abuse    Hypomagnesemia    Seizure disorder    Subdural hematoma    Hydrocephalus    S/P subdural hematoma evacuation    Subdural hematoma    Hydrocephalus    Subdural hematoma    Subdural hematoma    Respiratory failure    ETOH abuse    Palliative care by specialist    Insertion of non-tunneled central venous catheter (CVC) in patient age 5 years of age or older    Craniotomy, decompressive    Decompressive craniotomy    Placement, tracheostomy and percutaneous endoscopic gastrostomy    Laparoscopy, single incision    Ventriculoperitoneal shunt    No significant past surgical history    H/O hemorrhoidectomy    FALL    22    Respiratory failure    Fall    [email or web address]      s/p     Allergies    No Known Allergies    Intolerances        Vital Signs Last 24 Hrs  T(C): 36.9 (13 Jun 2021 20:10), Max: 36.9 (13 Jun 2021 20:10)  T(F): 98.4 (13 Jun 2021 20:10), Max: 98.4 (13 Jun 2021 20:10)  HR: 80 (13 Jun 2021 20:10) (80 - 88)  BP: 149/91 (13 Jun 2021 20:10) (108/52 - 149/91)  BP(mean): 115 (13 Jun 2021 20:10) (74 - 115)  RR: 18 (13 Jun 2021 20:10) (18 - 18)  SpO2: --      acetaminophen   Tablet .. 650 milliGRAM(s) Oral every 6 hours PRN  artificial tears (preservative free) Ophthalmic Solution 2 Drop(s) Both EYES three times a day  enoxaparin Injectable 40 milliGRAM(s) SubCutaneous daily  levETIRAcetam  IVPB 500 milliGRAM(s) IV Intermittent every 12 hours  lisinopril 20 milliGRAM(s) Oral daily  pantoprazole   Suspension 40 milliGRAM(s) Oral daily  propranolol 20 milliGRAM(s) Oral every 8 hours  senna 2 Tablet(s) Oral at bedtime  sodium chloride 8 Gram(s) Oral every 6 hours        06-13-21 @ 07:01  -  06-14-21 @ 07:00  --------------------------------------------------------  IN: 2950 mL / OUT: 1150 mL / NET: 1800 mL        REVIEW OF SYSTEMS    [ ] A ten-point review of systems was otherwise negative except as noted.  [ x] Due to altered mental status/intubation, subjective information were not able to be obtained from the patient. History was obtained, to the extent possible, from review of the chart and collateral sources of information.      Exam:  Alert, mouths words  smiles, shakes head yes/no  PERRLA, EOMI  LUE/LLE 5/5  moves Right foot to command, wiggles toes   R hand  4/5,   R biceps/triceps 3+/5  Cranial flap soft, sunken;  clean dry intact   crani staples removed at bedside, EVD/VPS staples remaining       06-14    143  |  105  |  6<L>  ----------------------------<  91  3.6   |  27  |  <0.5<L>    Ca    9.3      14 Jun 2021 06:33          Assessment/Plan:        POD# 38 S/P Left Craniectomy for Evac of SDH           POD# 29 S/P Wound Revision           POD# 7 s/p placement of right frontal VPS strata II programmable valve set at 1.5, Lap assist for peritoneal cath placement                                             PT/OT/Speech/ Rehab                         will need TBI rehab                          SW for dc planning, pending auth for [facility]                         will need Covid swab if auth/bed obtained      d/w attending

## 2021-06-15 LAB
ANION GAP SERPL CALC-SCNC: 10 MMOL/L — SIGNIFICANT CHANGE UP (ref 7–14)
BUN SERPL-MCNC: 6 MG/DL — LOW (ref 10–20)
CALCIUM SERPL-MCNC: 9 MG/DL — SIGNIFICANT CHANGE UP (ref 8.5–10.1)
CHLORIDE SERPL-SCNC: 105 MMOL/L — SIGNIFICANT CHANGE UP (ref 98–110)
CO2 SERPL-SCNC: 23 MMOL/L — SIGNIFICANT CHANGE UP (ref 17–32)
CREAT SERPL-MCNC: <0.5 MG/DL — LOW (ref 0.7–1.5)
GLUCOSE BLDC GLUCOMTR-MCNC: 119 MG/DL — HIGH (ref 70–99)
GLUCOSE BLDC GLUCOMTR-MCNC: 95 MG/DL — SIGNIFICANT CHANGE UP (ref 70–99)
GLUCOSE BLDC GLUCOMTR-MCNC: 95 MG/DL — SIGNIFICANT CHANGE UP (ref 70–99)
GLUCOSE SERPL-MCNC: 144 MG/DL — HIGH (ref 70–99)
HCT VFR BLD CALC: 27.5 % — LOW (ref 42–52)
HGB BLD-MCNC: 9.1 G/DL — LOW (ref 14–18)
MAGNESIUM SERPL-MCNC: 1.5 MG/DL — LOW (ref 1.8–2.4)
MCHC RBC-ENTMCNC: 27.3 PG — SIGNIFICANT CHANGE UP (ref 27–31)
MCHC RBC-ENTMCNC: 33.1 G/DL — SIGNIFICANT CHANGE UP (ref 32–37)
MCV RBC AUTO: 82.6 FL — SIGNIFICANT CHANGE UP (ref 80–94)
NRBC # BLD: 0 /100 WBCS — SIGNIFICANT CHANGE UP (ref 0–0)
PHOSPHATE SERPL-MCNC: 3.8 MG/DL — SIGNIFICANT CHANGE UP (ref 2.1–4.9)
PLATELET # BLD AUTO: 182 K/UL — SIGNIFICANT CHANGE UP (ref 130–400)
POTASSIUM SERPL-MCNC: 4.1 MMOL/L — SIGNIFICANT CHANGE UP (ref 3.5–5)
POTASSIUM SERPL-SCNC: 4.1 MMOL/L — SIGNIFICANT CHANGE UP (ref 3.5–5)
RBC # BLD: 3.33 M/UL — LOW (ref 4.7–6.1)
RBC # FLD: 13.3 % — SIGNIFICANT CHANGE UP (ref 11.5–14.5)
SARS-COV-2 RNA SPEC QL NAA+PROBE: SIGNIFICANT CHANGE UP
SODIUM SERPL-SCNC: 138 MMOL/L — SIGNIFICANT CHANGE UP (ref 135–146)
WBC # BLD: 5.41 K/UL — SIGNIFICANT CHANGE UP (ref 4.8–10.8)
WBC # FLD AUTO: 5.41 K/UL — SIGNIFICANT CHANGE UP (ref 4.8–10.8)

## 2021-06-15 RX ORDER — MAGNESIUM SULFATE 500 MG/ML
2 VIAL (ML) INJECTION EVERY 4 HOURS
Refills: 0 | Status: COMPLETED | OUTPATIENT
Start: 2021-06-15 | End: 2021-06-15

## 2021-06-15 RX ADMIN — Medication 2 DROP(S): at 21:02

## 2021-06-15 RX ADMIN — SODIUM CHLORIDE 8 GRAM(S): 9 INJECTION INTRAMUSCULAR; INTRAVENOUS; SUBCUTANEOUS at 05:19

## 2021-06-15 RX ADMIN — SENNA PLUS 2 TABLET(S): 8.6 TABLET ORAL at 21:02

## 2021-06-15 RX ADMIN — Medication 650 MILLIGRAM(S): at 08:59

## 2021-06-15 RX ADMIN — Medication 2 DROP(S): at 05:18

## 2021-06-15 RX ADMIN — Medication 2 DROP(S): at 14:26

## 2021-06-15 RX ADMIN — LISINOPRIL 20 MILLIGRAM(S): 2.5 TABLET ORAL at 05:19

## 2021-06-15 RX ADMIN — Medication 650 MILLIGRAM(S): at 09:29

## 2021-06-15 RX ADMIN — SODIUM CHLORIDE 8 GRAM(S): 9 INJECTION INTRAMUSCULAR; INTRAVENOUS; SUBCUTANEOUS at 14:25

## 2021-06-15 RX ADMIN — Medication 50 GRAM(S): at 14:24

## 2021-06-15 RX ADMIN — LEVETIRACETAM 400 MILLIGRAM(S): 250 TABLET, FILM COATED ORAL at 17:17

## 2021-06-15 RX ADMIN — Medication 650 MILLIGRAM(S): at 21:02

## 2021-06-15 RX ADMIN — SODIUM CHLORIDE 8 GRAM(S): 9 INJECTION INTRAMUSCULAR; INTRAVENOUS; SUBCUTANEOUS at 17:17

## 2021-06-15 RX ADMIN — PANTOPRAZOLE SODIUM 40 MILLIGRAM(S): 20 TABLET, DELAYED RELEASE ORAL at 14:25

## 2021-06-15 RX ADMIN — ENOXAPARIN SODIUM 40 MILLIGRAM(S): 100 INJECTION SUBCUTANEOUS at 14:25

## 2021-06-15 RX ADMIN — LEVETIRACETAM 400 MILLIGRAM(S): 250 TABLET, FILM COATED ORAL at 05:19

## 2021-06-15 RX ADMIN — SODIUM CHLORIDE 8 GRAM(S): 9 INJECTION INTRAMUSCULAR; INTRAVENOUS; SUBCUTANEOUS at 23:01

## 2021-06-15 RX ADMIN — Medication 50 GRAM(S): at 12:37

## 2021-06-15 NOTE — PROGRESS NOTE ADULT - SUBJECTIVE AND OBJECTIVE BOX
Subjective: 45yMale with a pmhx of SUBDURAL HEMATOMA;RESPIRATORY FAILURE;FALL    ^SUBDURAL HEMATOMA;RESPIRATORY FAILURE;FALL    No pertinent family history in first degree relatives    Family history of essential hypertension (Father)    Handoff    MEWS Score    No pertinent past medical history    Alcohol abuse    GERD (gastroesophageal reflux disease)    ETOH abuse    Hypomagnesemia    Seizure disorder    Subdural hematoma    Hydrocephalus    S/P subdural hematoma evacuation    Subdural hematoma    Hydrocephalus    Subdural hematoma    Subdural hematoma    Respiratory failure    ETOH abuse    Palliative care by specialist    Insertion of non-tunneled central venous catheter (CVC) in patient age 5 years of age or older    Craniotomy, decompressive    Decompressive craniotomy    Placement, tracheostomy and percutaneous endoscopic gastrostomy    Laparoscopy, single incision    Ventriculoperitoneal shunt    No significant past surgical history    H/O hemorrhoidectomy    FALL    22    Respiratory failure    Fall    [email or web address]        POD# 39 S/P Left Craniectomy for Evac of SDH  POD# 30 S/P Wound Revision  POD# 8 s/p placement of right frontal VPS strata II programmable valve set at 1.5, Lap assist for peritoneal cath placement    Pt seen and examined at bedside. Pt is awake and alert.  Eyes open spontaneously.  Follows commands on left side, attempting to follow commands on right side.   On T piece.    Allergies    No Known Allergies    Intolerances        Imaging: < from: Xray Chest 1 View- PORTABLE-Routine (Xray Chest 1 View- PORTABLE-Routine in AM.) (06.11.21 @ 10:09) >  Impression:    No focal consolidation.    < end of copied text >      Vital Signs Last 24 Hrs  T(C): 38 (15 Hayden 2021 09:00), Max: 38 (15 Hayden 2021 09:00)  T(F): 100.4 (15 Hayden 2021 09:00), Max: 100.4 (15 Hayden 2021 09:00)  HR: 83 (15 Hayden 2021 09:00) (82 - 99)  BP: 126/84 (15 Hayden 2021 04:45) (126/84 - 165/91)  BP(mean): 101 (14 Jun 2021 20:51) (101 - 101)  RR: 16 (15 Hayden 2021 09:00) (16 - 20)  SpO2: 100% (15 Hayden 2021 09:00) (95% - 100%)      acetaminophen   Tablet .. 650 milliGRAM(s) Oral every 6 hours PRN  artificial tears (preservative free) Ophthalmic Solution 2 Drop(s) Both EYES three times a day  enoxaparin Injectable 40 milliGRAM(s) SubCutaneous daily  levETIRAcetam  IVPB 500 milliGRAM(s) IV Intermittent every 12 hours  lisinopril 20 milliGRAM(s) Oral daily  pantoprazole   Suspension 40 milliGRAM(s) Oral daily  propranolol 20 milliGRAM(s) Oral every 8 hours  senna 2 Tablet(s) Oral at bedtime  sodium chloride 8 Gram(s) Oral every 6 hours        06-14-21 @ 07:01  -  06-15-21 @ 07:00  --------------------------------------------------------  IN: 820 mL / OUT: 1600 mL / NET: -780 mL    06-15-21 @ 07:01  -  06-15-21 @ 11:06  --------------------------------------------------------  IN: 360 mL / OUT: 0 mL / NET: 360 mL        REVIEW OF SYSTEMS    [ ] A ten-point review of systems was otherwise negative except as noted.  [ x] Due to altered mental status/intubation, subjective information were not able to be obtained from the patient. History was obtained, to the extent possible, from review of the chart and collateral sources of information.      Neuro Exam:  Awake, alert  shakes head yes/no  follows basic commands  sticks tongue out  PERRLA  tracks  Motor:  able to lift arm off bed, attempts to    wiggles toes on right foot  moves left side well  Cranial flap soft, sunken  EVD/VPS staples remaining       CBC Full  -  ( 15 Hayden 2021 06:57 )  WBC Count : 5.41 K/uL  RBC Count : 3.33 M/uL  Hemoglobin : 9.1 g/dL  Hematocrit : 27.5 %  Platelet Count - Automated : 182 K/uL  Mean Cell Volume : 82.6 fL  Mean Cell Hemoglobin : 27.3 pg  Mean Cell Hemoglobin Concentration : 33.1 g/dL  Auto Neutrophil # : x  Auto Lymphocyte # : x  Auto Monocyte # : x  Auto Eosinophil # : x  Auto Basophil # : x  Auto Neutrophil % : x  Auto Lymphocyte % : x  Auto Monocyte % : x  Auto Eosinophil % : x  Auto Basophil % : x    06-15    138  |  105  |  6<L>  ----------------------------<  144<H>  4.1   |  23  |  <0.5<L>    Ca    9.0      15 Hayden 2021 06:57  Phos  3.8     06-15  Mg     1.5     06-15              Assessment/Plan:   cont PT/OT/rehab  will need TBI rehab   needs Covid swab before placement  SW for auth for placement  Mg replacement today  d/w attg

## 2021-06-16 ENCOUNTER — TRANSCRIPTION ENCOUNTER (OUTPATIENT)
Age: 46
End: 2021-06-16

## 2021-06-16 VITALS
HEART RATE: 92 BPM | TEMPERATURE: 101 F | RESPIRATION RATE: 18 BRPM | SYSTOLIC BLOOD PRESSURE: 121 MMHG | OXYGEN SATURATION: 100 % | DIASTOLIC BLOOD PRESSURE: 66 MMHG

## 2021-06-16 LAB
ANION GAP SERPL CALC-SCNC: 7 MMOL/L — SIGNIFICANT CHANGE UP (ref 7–14)
BUN SERPL-MCNC: 7 MG/DL — LOW (ref 10–20)
CALCIUM SERPL-MCNC: 9.5 MG/DL — SIGNIFICANT CHANGE UP (ref 8.5–10.1)
CHLORIDE SERPL-SCNC: 105 MMOL/L — SIGNIFICANT CHANGE UP (ref 98–110)
CO2 SERPL-SCNC: 27 MMOL/L — SIGNIFICANT CHANGE UP (ref 17–32)
CREAT SERPL-MCNC: 0.5 MG/DL — LOW (ref 0.7–1.5)
GLUCOSE BLDC GLUCOMTR-MCNC: 94 MG/DL — SIGNIFICANT CHANGE UP (ref 70–99)
GLUCOSE BLDC GLUCOMTR-MCNC: 98 MG/DL — SIGNIFICANT CHANGE UP (ref 70–99)
GLUCOSE SERPL-MCNC: 109 MG/DL — HIGH (ref 70–99)
MAGNESIUM SERPL-MCNC: 2 MG/DL — SIGNIFICANT CHANGE UP (ref 1.8–2.4)
POTASSIUM SERPL-MCNC: 4 MMOL/L — SIGNIFICANT CHANGE UP (ref 3.5–5)
POTASSIUM SERPL-SCNC: 4 MMOL/L — SIGNIFICANT CHANGE UP (ref 3.5–5)
SODIUM SERPL-SCNC: 139 MMOL/L — SIGNIFICANT CHANGE UP (ref 135–146)

## 2021-06-16 RX ADMIN — Medication 2 DROP(S): at 05:16

## 2021-06-16 RX ADMIN — PANTOPRAZOLE SODIUM 40 MILLIGRAM(S): 20 TABLET, DELAYED RELEASE ORAL at 11:45

## 2021-06-16 RX ADMIN — Medication 650 MILLIGRAM(S): at 15:16

## 2021-06-16 RX ADMIN — SODIUM CHLORIDE 8 GRAM(S): 9 INJECTION INTRAMUSCULAR; INTRAVENOUS; SUBCUTANEOUS at 11:44

## 2021-06-16 RX ADMIN — LEVETIRACETAM 400 MILLIGRAM(S): 250 TABLET, FILM COATED ORAL at 05:16

## 2021-06-16 RX ADMIN — SODIUM CHLORIDE 8 GRAM(S): 9 INJECTION INTRAMUSCULAR; INTRAVENOUS; SUBCUTANEOUS at 05:17

## 2021-06-16 RX ADMIN — ENOXAPARIN SODIUM 40 MILLIGRAM(S): 100 INJECTION SUBCUTANEOUS at 11:46

## 2021-06-16 RX ADMIN — LISINOPRIL 20 MILLIGRAM(S): 2.5 TABLET ORAL at 05:17

## 2021-06-16 NOTE — DISCHARGE NOTE PROVIDER - NSDCCPCAREPLAN_GEN_ALL_CORE_FT
PRINCIPAL DISCHARGE DIAGNOSIS  Diagnosis: Subdural hematoma  Assessment and Plan of Treatment:       SECONDARY DISCHARGE DIAGNOSES  Diagnosis: Respiratory failure  Assessment and Plan of Treatment:     Diagnosis: Fall  Assessment and Plan of Treatment:

## 2021-06-16 NOTE — PROGRESS NOTE ADULT - REASON FOR ADMISSION
found down unresponsive

## 2021-06-16 NOTE — DISCHARGE NOTE PROVIDER - HOSPITAL COURSE
45 year-old male w/ PMH of GERD, alcohol abuse with seizures that presented s/p found down unresponsive, found to have large Left SDH/SAH. Pt intubated in ED upon arrival. Pt was then admitted to the surgical service and subsequently underwent a left craniectomy for evacuation of SDH with Dr. Byrne on 5/7/21. Pt coded in OR secondary to hypotension and was given massive transfusion protocol. Pt was given single layer closure and postoperatively was transferred to the PACU and subsequently to SICU. Pt remained intubated and sedated and on 5/16/21 pt was brought back to OR with Dr. Byrne for revision of scalp wound and KIKE placement. KIKE removed on 5/20/21. Pt noted to have subgaleal collection on CTV of which collection drained at bedside by Dr. Byrne on 5/21/21. As extubation attempts unsuccessful, pt had Trach and PEG placed by IR on 5/21/21. Pt noted to have e.faecalis growing in OR cultures of which pt was seen by ID and started on Vancomycin and Meropenem IV 5/26/21. On 6/1/21 pt noted to have auto removed PEG. On 6/7/21, patient brought to OR for placement of right frontal VPS strata II programmable valve set at 1.5, Lap assist for peritoneal cath placement. Pt transferred to PACU and back to SICU post-operatively. On 6/9/21 pt downgraded from SICU to med/surg floor.    Patient's diet was advanced as tolerated and pain was controlled with IV, and subsequently po, pain medication as needed. Pt's post-operative hospital course was **uncomplicated/complicated by**.    At time of discharge, the patient was afebrile, tolerating a regular diet, voiding appropriately, ambulating without difficulty, making flatus, and the patient's pain was well controlled. VS were WNL and pt was hemodynamically stable. Pt was medically cleared for discharge and patient was provided with discharge instructions regarding, but not limited to medication regimen and follow up.   45 year-old male w/ PMH of GERD, alcohol abuse with seizures that presented s/p found down unresponsive, found to have large Left SDH/SAH. Pt intubated in ED upon arrival. Pt was then admitted to the surgical service and subsequently underwent a left craniectomy for evacuation of SDH with Dr. Byrne on 5/7/21. Pt coded in OR secondary to hypotension and was given massive transfusion protocol. Pt was given single layer closure and postoperatively was transferred to the PACU and subsequently to SICU. Pt remained intubated and sedated and on 5/16/21 pt was brought back to OR with Dr. Byrne for revision of scalp wound and KIKE placement. KIKE removed on 5/20/21. Pt noted to have subgaleal collection on CTV of which collection drained at bedside by Dr. Byrne on 5/21/21. As extubation attempts unsuccessful, pt had Trach and PEG placed by IR on 5/21/21. Pt noted to be febrile and have e.faecalis growing in OR cultures of which pt was seen by ID and started on Vancomycin and Meropenem IV 5/26/21. On 6/1/21 pt noted to have  self-removed PEG. On 6/7/21, patient brought to OR for placement of right frontal VPS strata II programmable valve set at 1.5, Lap assist for peritoneal cath placement. Pt transferred to PACU and back to SICU post-operatively. On 6/9/21 pt downgraded from SICU to med/surg floor. On 6/11/21 IR replaced patient's PEG tube. 6/13/21, patient completed course of antibiotics as recommended by infectious disease. Over pt's hospital course, pain was controlled with IV, and subsequently po pain medication as needed.  At time of discharge, the patient was afebrile, on tube feed diet, voiding appropriately, and the patient's pain was well controlled. VS were WNL and pt was hemodynamically stable. Pt was medically cleared for discharge and patient was provided with discharge instructions regarding, but not limited to medication regimen and follow up.

## 2021-06-16 NOTE — DISCHARGE NOTE PROVIDER - NSDCCPTREATMENT_GEN_ALL_CORE_FT
PRINCIPAL PROCEDURE  Procedure: Decompressive craniotomy  Findings and Treatment:        PRINCIPAL PROCEDURE  Procedure: Decompressive craniotomy  Findings and Treatment:       SECONDARY PROCEDURE  Procedure: Ventriculoperitoneal shunt  Findings and Treatment:     Procedure: Placement, tracheostomy and percutaneous endoscopic gastrostomy  Findings and Treatment:

## 2021-06-16 NOTE — DISCHARGE NOTE PROVIDER - NSDCMRMEDTOKEN_GEN_ALL_CORE_FT
chlorhexidine 0.12% mucous membrane liquid: 15 milliliter(s) mucous membrane 2 times a day  folic acid 1 mg oral tablet: 1 tab(s) orally once a day  folic acid 1 mg oral tablet: 1 tab(s) orally once a day  levETIRAcetam 1000 mg oral tablet: 1 tab(s) orally 2 times a day     Likely To be stopped after you see Dr. Padron ( neurologist)  magnesium oxide 400 mg (241.3 mg elemental magnesium) oral tablet: 1 tab(s) orally 3 times a day (with meals)  Multiple Vitamins oral tablet: 1 tab(s) orally once a day  Multiple Vitamins oral tablet: 1 tab(s) orally once a day  thiamine 100 mg oral tablet: 1 tab(s) orally every 8 hours   Start Date: 4/13/2021  End Date: 4/26/2021 ( Inclusive)   thiamine 100 mg oral tablet: 1 tab(s) orally once a day   Start Date: 4/27/2021  thiamine 100 mg oral tablet: 1 tab(s) orally once a day   folic acid 1 mg oral tablet: 1 tab(s) orally once a day  levETIRAcetam 1000 mg oral tablet: 1 tab(s) orally 2 times a day     Likely To be stopped after you see Dr. Padron ( neurologist)  Multiple Vitamins oral tablet: 1 tab(s) orally once a day  thiamine 100 mg oral tablet: 1 tab(s) orally once a day

## 2021-06-16 NOTE — PROGRESS NOTE ADULT - PROVIDER SPECIALTY LIST ADULT
Gastroenterology
Infectious Disease
Nephrology
Neurology
Neurosurgery
SICU
Infectious Disease
Infectious Disease
Neurology
Neurosurgery
Pulmonology
SICU
Infectious Disease
Neurology
Neurosurgery
Pulmonology
Pulmonology
SICU
Infectious Disease
Neurology
Neurosurgery
SICU
Infectious Disease
Neurology
Neurosurgery
SICU
Infectious Disease
Neurosurgery
Palliative Care

## 2021-06-16 NOTE — PROGRESS NOTE ADULT - SUBJECTIVE AND OBJECTIVE BOX
Subjective: 45yMale with a pmhx of SUBDURAL HEMATOMA;RESPIRATORY FAILURE;FALL    ^SUBDURAL HEMATOMA;RESPIRATORY FAILURE;FALL    No pertinent family history in first degree relatives    Family history of essential hypertension (Father)    Handoff    MEWS Score    No pertinent past medical history    Alcohol abuse    GERD (gastroesophageal reflux disease)    ETOH abuse    Hypomagnesemia    Seizure disorder    Subdural hematoma    Hydrocephalus    S/P subdural hematoma evacuation    Subdural hematoma    Hydrocephalus    Subdural hematoma    Subdural hematoma    Respiratory failure    ETOH abuse    Palliative care by specialist    Insertion of non-tunneled central venous catheter (CVC) in patient age 5 years of age or older    Craniotomy, decompressive    Decompressive craniotomy    Placement, tracheostomy and percutaneous endoscopic gastrostomy    Laparoscopy, single incision    Ventriculoperitoneal shunt    No significant past surgical history    H/O hemorrhoidectomy    FALL    23    Respiratory failure    Fall    [email or web address]      Patient is a 45 year-old male POD# 40 S/P Left Craniectomy for Evac of SDH  POD# 31 S/P Wound Revision  POD# 9 s/p placement of right frontal VPS strata II programmable valve set at 1.5, Lap assist for peritoneal cath placement    Pt seen and examined at bedside on 3E. Pt is awake and alert and follows some commands. No acute overnight events. Pt is trach'd and PEG'd. Eyes open spontaneously. Follows commands on left side, attempts to follow commands on right side.     Allergies    No Known Allergies    Intolerances        Vital Signs Last 24 Hrs  T(C): 37.6 (16 Jun 2021 04:46), Max: 38.4 (15 Hayden 2021 21:00)  T(F): 99.7 (16 Jun 2021 04:46), Max: 101.1 (15 Hayden 2021 21:00)  HR: 78 (16 Jun 2021 04:46) (73 - 85)  BP: 137/81 (16 Jun 2021 04:46) (117/75 - 141/79)  BP(mean): 104 (16 Jun 2021 04:46) (91 - 104)  RR: 18 (16 Jun 2021 08:35) (18 - 18)  SpO2: 100% (16 Jun 2021 08:35) (100% - 100%)      acetaminophen   Tablet .. 650 milliGRAM(s) Oral every 6 hours PRN  artificial tears (preservative free) Ophthalmic Solution 2 Drop(s) Both EYES three times a day  enoxaparin Injectable 40 milliGRAM(s) SubCutaneous daily  levETIRAcetam  IVPB 500 milliGRAM(s) IV Intermittent every 12 hours  lisinopril 20 milliGRAM(s) Oral daily  pantoprazole   Suspension 40 milliGRAM(s) Oral daily  propranolol 20 milliGRAM(s) Oral every 8 hours  senna 2 Tablet(s) Oral at bedtime  sodium chloride 8 Gram(s) Oral every 6 hours        06-15-21 @ 07:01  -  06-16-21 @ 07:00  --------------------------------------------------------  IN: 2000 mL / OUT: 400 mL / NET: 1600 mL        REVIEW OF SYSTEMS    [ ] A ten-point review of systems was otherwise negative except as noted.  [X] Due to altered mental status/intubation, subjective information were not able to be obtained from the patient. History was obtained, to the extent possible, from review of the chart and collateral sources of information.      Physical Exam:  General: Lying in bed, follows commands on Left  Awake, alert, opens eyes spontaneously  Facial motions symmetric  PERRL, EOMI  Motor: 5/5 power LUE, LLE  5/5  strength on right  Does not withdraw RLE  Wound: Cranial flap sunken, EVD/VPS site clean, dry, staples present      CBC Full  -  ( 15 Hayden 2021 06:57 )  WBC Count : 5.41 K/uL  RBC Count : 3.33 M/uL  Hemoglobin : 9.1 g/dL  Hematocrit : 27.5 %  Platelet Count - Automated : 182 K/uL  Mean Cell Volume : 82.6 fL  Mean Cell Hemoglobin : 27.3 pg  Mean Cell Hemoglobin Concentration : 33.1 g/dL  Auto Neutrophil # : x  Auto Lymphocyte # : x  Auto Monocyte # : x  Auto Eosinophil # : x  Auto Basophil # : x  Auto Neutrophil % : x  Auto Lymphocyte % : x  Auto Monocyte % : x  Auto Eosinophil % : x  Auto Basophil % : x    06-16    139  |  105  |  7<L>  ----------------------------<  109<H>  4.0   |  27  |  0.5<L>    Ca    9.5      16 Jun 2021 06:48  Phos  3.8     06-15  Mg     2.0     06-16        Imaging:  < from: CT Head No Cont (06.10.21 @ 14:49) >  IMPRESSION:    Stable post surgical change reflecting left frontoparietal temporal craniectomy with slightly decreased large extra-axial collection along the left convexity since the prior CT from 6/8/2021. Stable to minimally decreased midline shift to the right measuring 1.2 cm.    Unchanged right frontal approach ventriculostomy catheter with the tip terminating in the region of the interhemispheric fissure.    Evolving subacute infarct in theleft temporal and occipital lobes, as well as in the right cerebellum. Previously seen trace left temporal subarachnoid hemorrhage is not well appreciated due to technique on the current exam.      Assessment/Plan:   45 year-old male POD# 40 S/P Left Craniectomy for Evac of SDH, POD# 31 S/P Wound Revision, POD# 9 s/p placement of right frontal VPS.  -COVID neg 6/16  -PT/OT/Rehab  -DVT ppx  -F/U SW for bed placement   -Possible d/c to rehab today  -discuss with attending

## 2021-06-16 NOTE — DISCHARGE NOTE PROVIDER - NSDCFUADDINST_GEN_ALL_CORE_FT
- Upon discharge,  please call to schedule a follow up with Dr. Byrne in 1-2 weeks.  - Upon discharge, please call to make a follow up appointment with your primary care provider to discuss your recent hospitalization/operation.  - Run water and soap over incision sites and pat dry (no scrubbing). No submerging your incision sites in water (i.e. no swimming or baths) for 2-3 weeks and avoid exposing the area to jets/streams of water.   - You can resume your normal activities as tolerated, but avoid heavy (>15lb.) lifting and strenuous exercise for 4-6 weeks.    - Narcotic pain medicine tends to cause constipation, you have can take an over-the-counter stool softener 3 times a day to help prevent that. Hold the stool softener if you start to have loose stools.  - If you experience fevers, chills, increasing abdominal pain, nausea, vomiting, inability to pass stool or gas, bleeding, or any other acute symptoms, please call your doctor and report to the emergency room immediately for further management.

## 2021-06-16 NOTE — PROGRESS NOTE ADULT - THIS PATIENT HAS THE FOLLOWING CONDITION(S)/DIAGNOSES ON THIS ADMISSION:
Brain Compression / Herniation
Brain Compression / Herniation
Cerebral Edema
None
Brain Compression / Herniation
Brain Compression / Herniation
Cerebral Edema/Brain Compression / Herniation
None
Brain Compression / Herniation
Cerebral Edema/Brain Compression / Herniation
None
Brain Compression / Herniation
Cerebral Edema
Cerebral Edema
Cerebral Edema/Brain Compression / Herniation
None
Brain Compression / Herniation
Brain Compression / Herniation
Cerebral Edema
Cerebral Edema/Brain Compression / Herniation
None
Cerebral Edema/Brain Compression / Herniation

## 2021-06-16 NOTE — DISCHARGE NOTE PROVIDER - CARE PROVIDER_API CALL
Eleazar Byrne)  [facility]  [address] [place] [address], [address]  [place] [address]  Phone: [phone]  Fax: [phone]  Follow Up Time: 2 weeks

## 2021-06-16 NOTE — PROGRESS NOTE ADULT - NSICDXPILOT_GEN_ALL_CORE
[place]

## 2021-06-17 NOTE — PHYSICAL THERAPY INITIAL EVALUATION ADULT - NS ASR RISK AREAS PT EVAL
I called and left a message for Tremayne informing her to return my call for her results:    \"Knee x-ray shows mild arthritis on the medial/inside side of the knee.  Plan is as we discussed in the visit the topical cream and stretching.  No need to see a specialist at this point.  Resume activities as tolerated.  No limitations.\"      fall

## 2021-06-18 DIAGNOSIS — E87.1 HYPO-OSMOLALITY AND HYPONATREMIA: ICD-10-CM
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
## 2021-06-18 DIAGNOSIS — G40.909 EPILEPSY, UNSPECIFIED, NOT INTRACTABLE, WITHOUT STATUS EPILEPTICUS: ICD-10-CM

## 2021-06-18 DIAGNOSIS — J96.01 ACUTE RESPIRATORY FAILURE WITH HYPOXIA: ICD-10-CM

## 2021-06-18 DIAGNOSIS — G97.82 OTHER POSTPROCEDURAL COMPLICATIONS AND DISORDERS OF NERVOUS SYSTEM: ICD-10-CM

## 2021-06-18 DIAGNOSIS — G93.5 COMPRESSION OF BRAIN: ICD-10-CM

## 2021-06-18 DIAGNOSIS — K21.9 GASTRO-ESOPHAGEAL REFLUX DISEASE WITHOUT ESOPHAGITIS: ICD-10-CM

## 2021-06-18 DIAGNOSIS — G91.9 HYDROCEPHALUS, UNSPECIFIED: ICD-10-CM

## 2021-06-18 DIAGNOSIS — I10 ESSENTIAL (PRIMARY) HYPERTENSION: ICD-10-CM

## 2021-06-18 DIAGNOSIS — S06.1X9A TRAUMATIC CEREBRAL EDEMA WITH LOSS OF CONSCIOUSNESS OF UNSPECIFIED DURATION, INITIAL ENCOUNTER: ICD-10-CM

## 2021-06-18 DIAGNOSIS — S06.6X9A TRAUMATIC SUBARACHNOID HEMORRHAGE WITH LOSS OF CONSCIOUSNESS OF UNSPECIFIED DURATION, INITIAL ENCOUNTER: ICD-10-CM

## 2021-06-18 DIAGNOSIS — F10.229 ALCOHOL DEPENDENCE WITH INTOXICATION, UNSPECIFIED: ICD-10-CM

## 2021-06-18 DIAGNOSIS — R40.2432 GLASGOW COMA SCALE SCORE 3-8, AT ARRIVAL TO EMERGENCY DEPARTMENT: ICD-10-CM

## 2021-06-18 DIAGNOSIS — S06.5X9A TRAUMATIC SUBDURAL HEMORRHAGE WITH LOSS OF CONSCIOUSNESS OF UNSPECIFIED DURATION, INITIAL ENCOUNTER: ICD-10-CM

## 2021-06-18 DIAGNOSIS — D69.59 OTHER SECONDARY THROMBOCYTOPENIA: ICD-10-CM
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
## 2021-06-18 DIAGNOSIS — B95.2 ENTEROCOCCUS AS THE CAUSE OF DISEASES CLASSIFIED ELSEWHERE: ICD-10-CM

## 2021-06-18 DIAGNOSIS — D62 ACUTE POSTHEMORRHAGIC ANEMIA: ICD-10-CM
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
## 2021-06-18 DIAGNOSIS — E83.42 HYPOMAGNESEMIA: ICD-10-CM

## 2021-06-18 DIAGNOSIS — R13.10 DYSPHAGIA, UNSPECIFIED: ICD-10-CM
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
## 2021-06-18 DIAGNOSIS — B96.1 KLEBSIELLA PNEUMONIAE [K. PNEUMONIAE] AS THE CAUSE OF DISEASES CLASSIFIED ELSEWHERE: ICD-10-CM

## 2021-06-18 DIAGNOSIS — F10.288 ALCOHOL DEPENDENCE WITH OTHER ALCOHOL-INDUCED DISORDER: ICD-10-CM

## 2021-06-18 DIAGNOSIS — I97.711 INTRAOPERATIVE CARDIAC ARREST DURING OTHER SURGERY: ICD-10-CM

## 2021-08-04 ENCOUNTER — INPATIENT (INPATIENT)
Facility: HOSPITAL | Age: 46
LOS: 15 days | Discharge: HOME HEALTH | End: 2021-08-20
Attending: STUDENT IN AN ORGANIZED HEALTH CARE EDUCATION/TRAINING PROGRAM | Admitting: STUDENT IN AN ORGANIZED HEALTH CARE EDUCATION/TRAINING PROGRAM
Payer: COMMERCIAL

## 2021-08-04 VITALS
TEMPERATURE: 98 F | HEART RATE: 72 BPM | OXYGEN SATURATION: 97 % | HEIGHT: 70 IN | RESPIRATION RATE: 17 BRPM | SYSTOLIC BLOOD PRESSURE: 108 MMHG | DIASTOLIC BLOOD PRESSURE: 65 MMHG

## 2021-08-04 DIAGNOSIS — Z98.890 OTHER SPECIFIED POSTPROCEDURAL STATES: Chronic | ICD-10-CM

## 2021-08-04 LAB
ALBUMIN SERPL ELPH-MCNC: 4.4 G/DL — SIGNIFICANT CHANGE UP (ref 3.5–5.2)
ALLERGY+IMMUNOLOGY DIAG STUDY NOTE: SIGNIFICANT CHANGE UP
ALP SERPL-CCNC: 63 U/L — SIGNIFICANT CHANGE UP (ref 30–115)
ALT FLD-CCNC: 9 U/L — SIGNIFICANT CHANGE UP (ref 0–41)
ANION GAP SERPL CALC-SCNC: 10 MMOL/L — SIGNIFICANT CHANGE UP (ref 7–14)
ANTIBODY INTERPRETATION 2: SIGNIFICANT CHANGE UP
APTT BLD: 31 SEC — SIGNIFICANT CHANGE UP (ref 27–39.2)
AST SERPL-CCNC: 11 U/L — SIGNIFICANT CHANGE UP (ref 0–41)
BASOPHILS # BLD AUTO: 0.02 K/UL — SIGNIFICANT CHANGE UP (ref 0–0.2)
BASOPHILS NFR BLD AUTO: 0.4 % — SIGNIFICANT CHANGE UP (ref 0–1)
BILIRUB SERPL-MCNC: 0.2 MG/DL — SIGNIFICANT CHANGE UP (ref 0.2–1.2)
BLD GP AB SCN SERPL QL: SIGNIFICANT CHANGE UP
BUN SERPL-MCNC: 12 MG/DL — SIGNIFICANT CHANGE UP (ref 10–20)
CALCIUM SERPL-MCNC: 9.9 MG/DL — SIGNIFICANT CHANGE UP (ref 8.5–10.1)
CHLORIDE SERPL-SCNC: 100 MMOL/L — SIGNIFICANT CHANGE UP (ref 98–110)
CO2 SERPL-SCNC: 25 MMOL/L — SIGNIFICANT CHANGE UP (ref 17–32)
CREAT SERPL-MCNC: 0.8 MG/DL — SIGNIFICANT CHANGE UP (ref 0.7–1.5)
DAT IGG-SP REAG RBC-IMP: ABNORMAL
DIR ANTIGLOB POLYSPECIFIC INTERPRETATION: ABNORMAL
EOSINOPHIL # BLD AUTO: 0.1 K/UL — SIGNIFICANT CHANGE UP (ref 0–0.7)
EOSINOPHIL NFR BLD AUTO: 1.8 % — SIGNIFICANT CHANGE UP (ref 0–8)
GLUCOSE SERPL-MCNC: 87 MG/DL — SIGNIFICANT CHANGE UP (ref 70–99)
HCT VFR BLD CALC: 34.9 % — LOW (ref 42–52)
HGB BLD-MCNC: 11.8 G/DL — LOW (ref 14–18)
IAT COMP-SP REAG SERPL QL: SIGNIFICANT CHANGE UP
IMM GRANULOCYTES NFR BLD AUTO: 0.2 % — SIGNIFICANT CHANGE UP (ref 0.1–0.3)
INR BLD: 1.08 RATIO — SIGNIFICANT CHANGE UP (ref 0.65–1.3)
LYMPHOCYTES # BLD AUTO: 2.07 K/UL — SIGNIFICANT CHANGE UP (ref 1.2–3.4)
LYMPHOCYTES # BLD AUTO: 38 % — SIGNIFICANT CHANGE UP (ref 20.5–51.1)
MCHC RBC-ENTMCNC: 25.8 PG — LOW (ref 27–31)
MCHC RBC-ENTMCNC: 33.8 G/DL — SIGNIFICANT CHANGE UP (ref 32–37)
MCV RBC AUTO: 76.2 FL — LOW (ref 80–94)
MONOCYTES # BLD AUTO: 0.74 K/UL — HIGH (ref 0.1–0.6)
MONOCYTES NFR BLD AUTO: 13.6 % — HIGH (ref 1.7–9.3)
NEUTROPHILS # BLD AUTO: 2.51 K/UL — SIGNIFICANT CHANGE UP (ref 1.4–6.5)
NEUTROPHILS NFR BLD AUTO: 46 % — SIGNIFICANT CHANGE UP (ref 42.2–75.2)
NRBC # BLD: 0 /100 WBCS — SIGNIFICANT CHANGE UP (ref 0–0)
PLATELET # BLD AUTO: 259 K/UL — SIGNIFICANT CHANGE UP (ref 130–400)
POTASSIUM SERPL-MCNC: 4.1 MMOL/L — SIGNIFICANT CHANGE UP (ref 3.5–5)
POTASSIUM SERPL-SCNC: 4.1 MMOL/L — SIGNIFICANT CHANGE UP (ref 3.5–5)
PROT SERPL-MCNC: 7.2 G/DL — SIGNIFICANT CHANGE UP (ref 6–8)
PROTHROM AB SERPL-ACNC: 12.4 SEC — SIGNIFICANT CHANGE UP (ref 9.95–12.87)
RBC # BLD: 4.58 M/UL — LOW (ref 4.7–6.1)
RBC # FLD: 13.7 % — SIGNIFICANT CHANGE UP (ref 11.5–14.5)
SARS-COV-2 RNA SPEC QL NAA+PROBE: SIGNIFICANT CHANGE UP
SODIUM SERPL-SCNC: 135 MMOL/L — SIGNIFICANT CHANGE UP (ref 135–146)
WBC # BLD: 5.45 K/UL — SIGNIFICANT CHANGE UP (ref 4.8–10.8)
WBC # FLD AUTO: 5.45 K/UL — SIGNIFICANT CHANGE UP (ref 4.8–10.8)

## 2021-08-04 PROCEDURE — 70450 CT HEAD/BRAIN W/O DYE: CPT | Mod: 26

## 2021-08-04 PROCEDURE — 99232 SBSQ HOSP IP/OBS MODERATE 35: CPT

## 2021-08-04 PROCEDURE — 86077 PHYS BLOOD BANK SERV XMATCH: CPT

## 2021-08-04 PROCEDURE — 99285 EMERGENCY DEPT VISIT HI MDM: CPT

## 2021-08-04 PROCEDURE — 71045 X-RAY EXAM CHEST 1 VIEW: CPT | Mod: 26

## 2021-08-04 PROCEDURE — 93010 ELECTROCARDIOGRAM REPORT: CPT

## 2021-08-04 RX ORDER — LEVETIRACETAM 250 MG/1
1000 TABLET, FILM COATED ORAL
Refills: 0 | Status: DISCONTINUED | OUTPATIENT
Start: 2021-08-04 | End: 2021-08-05

## 2021-08-04 RX ORDER — SENNA PLUS 8.6 MG/1
2 TABLET ORAL AT BEDTIME
Refills: 0 | Status: DISCONTINUED | OUTPATIENT
Start: 2021-08-04 | End: 2021-08-05

## 2021-08-04 RX ORDER — FOLIC ACID 0.8 MG
1 TABLET ORAL DAILY
Refills: 0 | Status: DISCONTINUED | OUTPATIENT
Start: 2021-08-04 | End: 2021-08-05

## 2021-08-04 RX ORDER — ONDANSETRON 8 MG/1
4 TABLET, FILM COATED ORAL EVERY 6 HOURS
Refills: 0 | Status: DISCONTINUED | OUTPATIENT
Start: 2021-08-04 | End: 2021-08-05

## 2021-08-04 RX ORDER — THIAMINE MONONITRATE (VIT B1) 100 MG
100 TABLET ORAL DAILY
Refills: 0 | Status: DISCONTINUED | OUTPATIENT
Start: 2021-08-04 | End: 2021-08-05

## 2021-08-04 RX ORDER — SODIUM CHLORIDE 9 MG/ML
1000 INJECTION INTRAMUSCULAR; INTRAVENOUS; SUBCUTANEOUS
Refills: 0 | Status: DISCONTINUED | OUTPATIENT
Start: 2021-08-04 | End: 2021-08-05

## 2021-08-04 RX ORDER — PANTOPRAZOLE SODIUM 20 MG/1
40 TABLET, DELAYED RELEASE ORAL
Refills: 0 | Status: DISCONTINUED | OUTPATIENT
Start: 2021-08-04 | End: 2021-08-05

## 2021-08-04 RX ADMIN — Medication 1 TABLET(S): at 20:09

## 2021-08-04 RX ADMIN — Medication 1 MILLIGRAM(S): at 20:09

## 2021-08-04 RX ADMIN — LEVETIRACETAM 1000 MILLIGRAM(S): 250 TABLET, FILM COATED ORAL at 20:09

## 2021-08-04 NOTE — ED PROVIDER NOTE - OBJECTIVE STATEMENT
45y M pmh GERD, EtOH abuse, Seizures, multiple neurosurgerical surgerys form complicated SDH presents for neurosurgical intervention. Pt is scheduled for revision of VPS and Cranioplasty with Dr. Byrne. Denies any complaints at this time. Denies fever, ha, cp, sob, weakness, numbness, dysuria, hematuria, n/v/d/c

## 2021-08-04 NOTE — ED ADULT NURSE NOTE - NSIMPLEMENTINTERV_GEN_ALL_ED
Implemented All Fall Risk Interventions:  [place] to call system. Call bell, personal items and telephone within reach. Instruct patient to call for assistance. Room bathroom lighting operational. Non-slip footwear when patient is off stretcher. Physically safe environment: no spills, clutter or unnecessary equipment. Stretcher in lowest position, wheels locked, appropriate side rails in place. Provide visual cue, wrist band, yellow gown, etc. Monitor gait and stability. Monitor for mental status changes and reorient to person, place, and time. Review medications for side effects contributing to fall risk. Reinforce activity limits and safety measures with patient and family.

## 2021-08-04 NOTE — H&P ADULT - ASSESSMENT
45M with Hx of EtOH abuse s/p Craniectomy for evacuation of SDH course complicated by Code in OR 2/2 to Hypotension, scalp revision with OR cultures (+) for E. Faecalis, as well as placement of VPS Strata @ 1.5    PLAN   - Admit to neurosurgery service to the floor   - Preop for OR tomorrow with Dr. Byrne for Left Cranioplasty and revision of VPS   - Pending Medical Clearance   - NPO after midnight with IVF   - Pain control   - Discussed case with attending

## 2021-08-04 NOTE — H&P ADULT - HISTORY OF PRESENT ILLNESS
45M with PMH of GERD, previous EtOH abuse with seizures known to neurosurgery service with Dr. Byrne s/p L craniectomy for evacuation of SDH 5/7/2021. Patient coded in OR 2/2 to hypotension with massive transfusion protocol. Patient returned back to the OR with Dr. Byrne 5/16/2021 for revision of scalp wound and KIKE placement. Patient course further complicated with OR cultures (+) for E. Faecalis, subsequently causing ID starting IV Abx. Patient then was returned back to the OR for placement of Right frontal VPS Strata @ 1.5 6/7/2021. Patient did well post operatively and was downgraded from SICU to floor and was transferred to [facility]. Patient presenting here for for scheduled OR with Dr. Byrne for Revision of VPS and Cranioplasty 8/5/2021. Patient denies any HA, N/V, chest pain, fever, and abdominal pain.

## 2021-08-04 NOTE — H&P ADULT - NSHPPHYSICALEXAM_GEN_ALL_CORE
Awake, alert, following commands   PERRL, EOMI   Face symmetrical  Left sided sunken - no bone flap   MAEx4 with good strength   SILT

## 2021-08-04 NOTE — H&P ADULT - ATTENDING COMMENTS
case d/w pt and his wife by phone. At thispoint wife will cont to be NOK/HCP and give consent as pt only A&Ox1-2.  Based on recent CTH shunt is further migrated from ventricle but he does ot appear to be shunt dependent. Will proceed with cranioplasty. Risk, benefits and alternatives were discussed at length with wife including but not limited to bleeding, infection, CSF leak, stroke, seizure, intracerebral hemorrhage, neurologic deficit, death, need for further procedures. We will proceed.

## 2021-08-04 NOTE — ED PROVIDER NOTE - ATTENDING CONTRIBUTION TO CARE
46 yo male with PMH seizures, hx ETOH abuse, GERD, subdural hematoma s/p multiple surgeries sent in for admission to [facility] for planned surgical intervention. Pt denied any acute complaints. No fevers, HA, CP, SOB, abdominal pain.     VITAL SIGNS: noted  CONSTITUTIONAL: Well-developed; well-nourished; in no acute distress  HEAD: Normocephalic; atraumatic, helmet in place  EYES: PERRL, EOM intact; conjunctiva and sclera clear  ENT: No nasal discharge; airway clear. MMM  NECK: Supple; non tender.   CARD: S1, S2 normal; no murmurs, gallops, or rubs. Regular rate and rhythm  RESP: CTAB/L, no wheezes, rales or rhonchi  ABD: Normal bowel sounds; soft; non-distended; non-tender   EXT: Normal ROM. No calf tenderness or edema. Distal pulses intact  NEURO: Alert, oriented. Grossly unremarkable. No focal deficits  SKIN: Skin exam is warm and dry

## 2021-08-05 LAB
ANION GAP SERPL CALC-SCNC: 9 MMOL/L — SIGNIFICANT CHANGE UP (ref 7–14)
APTT BLD: 30.8 SEC — SIGNIFICANT CHANGE UP (ref 27–39.2)
BUN SERPL-MCNC: 10 MG/DL — SIGNIFICANT CHANGE UP (ref 10–20)
CALCIUM SERPL-MCNC: 9.5 MG/DL — SIGNIFICANT CHANGE UP (ref 8.5–10.1)
CHLORIDE SERPL-SCNC: 100 MMOL/L — SIGNIFICANT CHANGE UP (ref 98–110)
CO2 SERPL-SCNC: 25 MMOL/L — SIGNIFICANT CHANGE UP (ref 17–32)
COVID-19 SPIKE DOMAIN AB INTERP: POSITIVE
COVID-19 SPIKE DOMAIN ANTIBODY RESULT: 2.59 U/ML — HIGH
CREAT SERPL-MCNC: 0.7 MG/DL — SIGNIFICANT CHANGE UP (ref 0.7–1.5)
GLUCOSE SERPL-MCNC: 126 MG/DL — HIGH (ref 70–99)
HCT VFR BLD CALC: 33.3 % — LOW (ref 42–52)
HGB BLD-MCNC: 11.1 G/DL — LOW (ref 14–18)
INR BLD: 1.08 RATIO — SIGNIFICANT CHANGE UP (ref 0.65–1.3)
MCHC RBC-ENTMCNC: 25.7 PG — LOW (ref 27–31)
MCHC RBC-ENTMCNC: 33.3 G/DL — SIGNIFICANT CHANGE UP (ref 32–37)
MCV RBC AUTO: 77.1 FL — LOW (ref 80–94)
NRBC # BLD: 0 /100 WBCS — SIGNIFICANT CHANGE UP (ref 0–0)
PLATELET # BLD AUTO: 210 K/UL — SIGNIFICANT CHANGE UP (ref 130–400)
POTASSIUM SERPL-MCNC: 4.3 MMOL/L — SIGNIFICANT CHANGE UP (ref 3.5–5)
POTASSIUM SERPL-SCNC: 4.3 MMOL/L — SIGNIFICANT CHANGE UP (ref 3.5–5)
PROTHROM AB SERPL-ACNC: 12.4 SEC — SIGNIFICANT CHANGE UP (ref 9.95–12.87)
RBC # BLD: 4.32 M/UL — LOW (ref 4.7–6.1)
RBC # FLD: 13.7 % — SIGNIFICANT CHANGE UP (ref 11.5–14.5)
SARS-COV-2 IGG+IGM SERPL QL IA: 2.59 U/ML — HIGH
SARS-COV-2 IGG+IGM SERPL QL IA: POSITIVE
SODIUM SERPL-SCNC: 134 MMOL/L — LOW (ref 135–146)
WBC # BLD: 9.32 K/UL — SIGNIFICANT CHANGE UP (ref 4.8–10.8)
WBC # FLD AUTO: 9.32 K/UL — SIGNIFICANT CHANGE UP (ref 4.8–10.8)

## 2021-08-05 PROCEDURE — 62141 CRNOP SKULL DEFECT>5 CM DIAM: CPT | Mod: 58

## 2021-08-05 PROCEDURE — 70450 CT HEAD/BRAIN W/O DYE: CPT | Mod: 26

## 2021-08-05 PROCEDURE — 62141 CRNOP SKULL DEFECT>5 CM DIAM: CPT | Mod: AS,58

## 2021-08-05 RX ORDER — LEVETIRACETAM 250 MG/1
1000 TABLET, FILM COATED ORAL ONCE
Refills: 0 | Status: DISCONTINUED | OUTPATIENT
Start: 2021-08-05 | End: 2021-08-05

## 2021-08-05 RX ORDER — LEVETIRACETAM 250 MG/1
1000 TABLET, FILM COATED ORAL
Refills: 0 | Status: DISCONTINUED | OUTPATIENT
Start: 2021-08-05 | End: 2021-08-05

## 2021-08-05 RX ORDER — LEVETIRACETAM 250 MG/1
1000 TABLET, FILM COATED ORAL EVERY 12 HOURS
Refills: 0 | Status: DISCONTINUED | OUTPATIENT
Start: 2021-08-06 | End: 2021-08-17

## 2021-08-05 RX ORDER — FOLIC ACID 0.8 MG
1 TABLET ORAL DAILY
Refills: 0 | Status: DISCONTINUED | OUTPATIENT
Start: 2021-08-05 | End: 2021-08-20

## 2021-08-05 RX ORDER — CEFAZOLIN SODIUM 1 G
1000 VIAL (EA) INJECTION EVERY 8 HOURS
Refills: 0 | Status: DISCONTINUED | OUTPATIENT
Start: 2021-08-05 | End: 2021-08-10

## 2021-08-05 RX ORDER — SODIUM CHLORIDE 9 MG/ML
1000 INJECTION, SOLUTION INTRAVENOUS
Refills: 0 | Status: DISCONTINUED | OUTPATIENT
Start: 2021-08-05 | End: 2021-08-05

## 2021-08-05 RX ORDER — OXYCODONE AND ACETAMINOPHEN 5; 325 MG/1; MG/1
1 TABLET ORAL EVERY 4 HOURS
Refills: 0 | Status: DISCONTINUED | OUTPATIENT
Start: 2021-08-05 | End: 2021-08-05

## 2021-08-05 RX ORDER — ONDANSETRON 8 MG/1
4 TABLET, FILM COATED ORAL ONCE
Refills: 0 | Status: DISCONTINUED | OUTPATIENT
Start: 2021-08-05 | End: 2021-08-05

## 2021-08-05 RX ORDER — ACETAMINOPHEN 500 MG
650 TABLET ORAL EVERY 6 HOURS
Refills: 0 | Status: DISCONTINUED | OUTPATIENT
Start: 2021-08-05 | End: 2021-08-20

## 2021-08-05 RX ORDER — HYDROMORPHONE HYDROCHLORIDE 2 MG/ML
0.5 INJECTION INTRAMUSCULAR; INTRAVENOUS; SUBCUTANEOUS
Refills: 0 | Status: DISCONTINUED | OUTPATIENT
Start: 2021-08-05 | End: 2021-08-05

## 2021-08-05 RX ORDER — SODIUM CHLORIDE 9 MG/ML
1000 INJECTION INTRAMUSCULAR; INTRAVENOUS; SUBCUTANEOUS
Refills: 0 | Status: DISCONTINUED | OUTPATIENT
Start: 2021-08-05 | End: 2021-08-10

## 2021-08-05 RX ORDER — SENNA PLUS 8.6 MG/1
2 TABLET ORAL AT BEDTIME
Refills: 0 | Status: DISCONTINUED | OUTPATIENT
Start: 2021-08-05 | End: 2021-08-20

## 2021-08-05 RX ORDER — THIAMINE MONONITRATE (VIT B1) 100 MG
100 TABLET ORAL DAILY
Refills: 0 | Status: DISCONTINUED | OUTPATIENT
Start: 2021-08-05 | End: 2021-08-20

## 2021-08-05 RX ORDER — CEFAZOLIN SODIUM 1 G
500 VIAL (EA) INJECTION EVERY 8 HOURS
Refills: 0 | Status: DISCONTINUED | OUTPATIENT
Start: 2021-08-05 | End: 2021-08-05

## 2021-08-05 RX ORDER — HYDROMORPHONE HYDROCHLORIDE 2 MG/ML
1 INJECTION INTRAMUSCULAR; INTRAVENOUS; SUBCUTANEOUS
Refills: 0 | Status: DISCONTINUED | OUTPATIENT
Start: 2021-08-05 | End: 2021-08-05

## 2021-08-05 RX ORDER — PANTOPRAZOLE SODIUM 20 MG/1
40 TABLET, DELAYED RELEASE ORAL
Refills: 0 | Status: DISCONTINUED | OUTPATIENT
Start: 2021-08-05 | End: 2021-08-06

## 2021-08-05 RX ORDER — OXYCODONE AND ACETAMINOPHEN 5; 325 MG/1; MG/1
2 TABLET ORAL EVERY 4 HOURS
Refills: 0 | Status: DISCONTINUED | OUTPATIENT
Start: 2021-08-05 | End: 2021-08-05

## 2021-08-05 RX ADMIN — SENNA PLUS 2 TABLET(S): 8.6 TABLET ORAL at 23:49

## 2021-08-05 RX ADMIN — SODIUM CHLORIDE 75 MILLILITER(S): 9 INJECTION INTRAMUSCULAR; INTRAVENOUS; SUBCUTANEOUS at 00:00

## 2021-08-05 RX ADMIN — LEVETIRACETAM 1000 MILLIGRAM(S): 250 TABLET, FILM COATED ORAL at 06:45

## 2021-08-05 RX ADMIN — SODIUM CHLORIDE 75 MILLILITER(S): 9 INJECTION INTRAMUSCULAR; INTRAVENOUS; SUBCUTANEOUS at 20:36

## 2021-08-05 RX ADMIN — Medication 100 MILLIGRAM(S): at 23:48

## 2021-08-05 RX ADMIN — PANTOPRAZOLE SODIUM 40 MILLIGRAM(S): 20 TABLET, DELAYED RELEASE ORAL at 06:46

## 2021-08-05 NOTE — PRE-ANESTHESIA EVALUATION ADULT - NSANTHOSAYNRD_GEN_A_CORE
unknown/No. HANK screening performed.  STOP BANG Legend: 0-2 = LOW Risk; 3-4 = INTERMEDIATE Risk; 5-8 = HIGH Risk

## 2021-08-05 NOTE — PROGRESS NOTE ADULT - ASSESSMENT
45M with PMH of GERD, previous EtOH abuse with seizures known to neurosurgery service with Dr. Byrne s/p L craniectomy for evacuation of SDH 5/7/2021 now POD#0 s/p L cranioplasty for replacement of bone flap     PLAN   - Admit to Neuro ICU Q neurochecks   - Post op CTH tonight  - Continue HMV monitoring output   - Continue Keppra 1g BID   - Ancef postop ppx also for HMV ppx   - Pain control   - Advanced diet as tolerated   - OOB  - PT/OT/Physiatry ordered   - Discussed case with Dr. Byrne

## 2021-08-05 NOTE — PROGRESS NOTE ADULT - SUBJECTIVE AND OBJECTIVE BOX
NEUROSURGERY POST OP CHECK 08-05-21 @ 19:59    Dx: 45y Male with PMH of SDH with craniectomy in May 2021, wound revision returned to OR x2 now POD#0 s/p L cranioplasty for replacement of bone flap    Patient seen and examined at bedside. States that he is feeling well, denies any headaches, blurry vision. N/V. Patient moving all extremities antigravity. Awaiting Postop CTH    MEDICATIONS  (STANDING):  ceFAZolin   IVPB 1000 milliGRAM(s) IV Intermittent every 8 hours  folic acid 1 milliGRAM(s) Oral daily  lactated ringers. 1000 milliLiter(s) (100 mL/Hr) IV Continuous <Continuous>  levETIRAcetam 1000 milliGRAM(s) Oral two times a day  multivitamin 1 Tablet(s) Oral daily  pantoprazole    Tablet 40 milliGRAM(s) Oral before breakfast  senna 2 Tablet(s) Oral at bedtime  sodium chloride 0.9%. 1000 milliLiter(s) (75 mL/Hr) IV Continuous <Continuous>  thiamine 100 milliGRAM(s) Oral daily    MEDICATIONS  (PRN):  acetaminophen   Tablet .. 650 milliGRAM(s) Oral every 6 hours PRN Temp greater or equal to 38C (100.4F), Mild Pain (1 - 3)  HYDROmorphone  Injectable 0.5 milliGRAM(s) IV Push every 10 minutes PRN Moderate Pain (4 - 6)  HYDROmorphone  Injectable 1 milliGRAM(s) IV Push every 10 minutes PRN Severe Pain (7 - 10)  ondansetron Injectable 4 milliGRAM(s) IV Push once PRN Nausea and/or Vomiting  oxycodone    5 mG/acetaminophen 325 mG 1 Tablet(s) Oral every 4 hours PRN Moderate Pain (4 - 6)  oxycodone    5 mG/acetaminophen 325 mG 2 Tablet(s) Oral every 4 hours PRN Severe Pain (7 - 10)                          11.1   9.32  )-----------( 210      ( 05 Aug 2021 18:22 )             33.3     08-05    134<L>  |  100  |  10  ----------------------------<  126<H>  4.3   |  25  |  0.7    Ca    9.5      05 Aug 2021 18:22    TPro  7.2  /  Alb  4.4  /  TBili  0.2  /  DBili  x   /  AST  11  /  ALT  9   /  AlkPhos  63  08-04    I&O's Summary    05 Aug 2021 07:01  -  05 Aug 2021 19:59  --------------------------------------------------------  IN: 0 mL / OUT: 600 mL / NET: -600 mL    PHYSICAL EXAM:  Awake, alert, following commands   Oriented x2 (Self, place)   Face symmetrical   Head wrap in place  MAEx4 with good strength   SILT   Incision - C/D/I, with HMV     T(C): 36.5 (08-05-21 @ 17:30), Max: 36.8 (08-04-21 @ 22:56)  HR: 71 (08-05-21 @ 19:15) (58 - 83)  BP: 123/82 (08-05-21 @ 19:15) (101/62 - 151/87)  RR: 15 (08-05-21 @ 19:15) (12 - 21)  SpO2: 100% (08-05-21 @ 19:15) (100% - 100%)

## 2021-08-06 LAB
ALBUMIN SERPL ELPH-MCNC: 4.3 G/DL — SIGNIFICANT CHANGE UP (ref 3.5–5.2)
ALP SERPL-CCNC: 50 U/L — SIGNIFICANT CHANGE UP (ref 30–115)
ALT FLD-CCNC: 8 U/L — SIGNIFICANT CHANGE UP (ref 0–41)
ANION GAP SERPL CALC-SCNC: 10 MMOL/L — SIGNIFICANT CHANGE UP (ref 7–14)
ANION GAP SERPL CALC-SCNC: 11 MMOL/L — SIGNIFICANT CHANGE UP (ref 7–14)
APTT BLD: 28.1 SEC — SIGNIFICANT CHANGE UP (ref 27–39.2)
AST SERPL-CCNC: 9 U/L — SIGNIFICANT CHANGE UP (ref 0–41)
BILIRUB SERPL-MCNC: 0.4 MG/DL — SIGNIFICANT CHANGE UP (ref 0.2–1.2)
BUN SERPL-MCNC: 9 MG/DL — LOW (ref 10–20)
BUN SERPL-MCNC: 9 MG/DL — LOW (ref 10–20)
CALCIUM SERPL-MCNC: 9.1 MG/DL — SIGNIFICANT CHANGE UP (ref 8.5–10.1)
CALCIUM SERPL-MCNC: 9.3 MG/DL — SIGNIFICANT CHANGE UP (ref 8.5–10.1)
CHLORIDE SERPL-SCNC: 98 MMOL/L — SIGNIFICANT CHANGE UP (ref 98–110)
CHLORIDE SERPL-SCNC: 98 MMOL/L — SIGNIFICANT CHANGE UP (ref 98–110)
CO2 SERPL-SCNC: 22 MMOL/L — SIGNIFICANT CHANGE UP (ref 17–32)
CO2 SERPL-SCNC: 23 MMOL/L — SIGNIFICANT CHANGE UP (ref 17–32)
CREAT SERPL-MCNC: 0.6 MG/DL — LOW (ref 0.7–1.5)
CREAT SERPL-MCNC: 0.7 MG/DL — SIGNIFICANT CHANGE UP (ref 0.7–1.5)
GLUCOSE SERPL-MCNC: 105 MG/DL — HIGH (ref 70–99)
GLUCOSE SERPL-MCNC: 122 MG/DL — HIGH (ref 70–99)
HCT VFR BLD CALC: 31.3 % — LOW (ref 42–52)
HGB BLD-MCNC: 10.7 G/DL — LOW (ref 14–18)
INR BLD: 1.14 RATIO — SIGNIFICANT CHANGE UP (ref 0.65–1.3)
MAGNESIUM SERPL-MCNC: 1.6 MG/DL — LOW (ref 1.8–2.4)
MCHC RBC-ENTMCNC: 25.7 PG — LOW (ref 27–31)
MCHC RBC-ENTMCNC: 34.2 G/DL — SIGNIFICANT CHANGE UP (ref 32–37)
MCV RBC AUTO: 75.2 FL — LOW (ref 80–94)
NRBC # BLD: 0 /100 WBCS — SIGNIFICANT CHANGE UP (ref 0–0)
PHOSPHATE SERPL-MCNC: 5 MG/DL — HIGH (ref 2.1–4.9)
PLATELET # BLD AUTO: 211 K/UL — SIGNIFICANT CHANGE UP (ref 130–400)
POTASSIUM SERPL-MCNC: 3.6 MMOL/L — SIGNIFICANT CHANGE UP (ref 3.5–5)
POTASSIUM SERPL-MCNC: 4.5 MMOL/L — SIGNIFICANT CHANGE UP (ref 3.5–5)
POTASSIUM SERPL-SCNC: 3.6 MMOL/L — SIGNIFICANT CHANGE UP (ref 3.5–5)
POTASSIUM SERPL-SCNC: 4.5 MMOL/L — SIGNIFICANT CHANGE UP (ref 3.5–5)
PROT SERPL-MCNC: 6.6 G/DL — SIGNIFICANT CHANGE UP (ref 6–8)
PROTHROM AB SERPL-ACNC: 13.1 SEC — HIGH (ref 9.95–12.87)
RBC # BLD: 4.16 M/UL — LOW (ref 4.7–6.1)
RBC # FLD: 13.7 % — SIGNIFICANT CHANGE UP (ref 11.5–14.5)
SODIUM SERPL-SCNC: 130 MMOL/L — LOW (ref 135–146)
SODIUM SERPL-SCNC: 132 MMOL/L — LOW (ref 135–146)
WBC # BLD: 6.06 K/UL — SIGNIFICANT CHANGE UP (ref 4.8–10.8)
WBC # FLD AUTO: 6.06 K/UL — SIGNIFICANT CHANGE UP (ref 4.8–10.8)

## 2021-08-06 PROCEDURE — 99232 SBSQ HOSP IP/OBS MODERATE 35: CPT

## 2021-08-06 RX ORDER — MAGNESIUM SULFATE 500 MG/ML
2 VIAL (ML) INJECTION ONCE
Refills: 0 | Status: COMPLETED | OUTPATIENT
Start: 2021-08-06 | End: 2021-08-06

## 2021-08-06 RX ORDER — CHLORHEXIDINE GLUCONATE 213 G/1000ML
1 SOLUTION TOPICAL
Refills: 0 | Status: DISCONTINUED | OUTPATIENT
Start: 2021-08-06 | End: 2021-08-20

## 2021-08-06 RX ORDER — HEPARIN SODIUM 5000 [USP'U]/ML
5000 INJECTION INTRAVENOUS; SUBCUTANEOUS EVERY 8 HOURS
Refills: 0 | Status: DISCONTINUED | OUTPATIENT
Start: 2021-08-06 | End: 2021-08-20

## 2021-08-06 RX ORDER — FAMOTIDINE 10 MG/ML
20 INJECTION INTRAVENOUS DAILY
Refills: 0 | Status: DISCONTINUED | OUTPATIENT
Start: 2021-08-06 | End: 2021-08-06

## 2021-08-06 RX ADMIN — CHLORHEXIDINE GLUCONATE 1 APPLICATION(S): 213 SOLUTION TOPICAL at 08:14

## 2021-08-06 RX ADMIN — Medication 100 MILLIGRAM(S): at 21:10

## 2021-08-06 RX ADMIN — Medication 100 MILLIGRAM(S): at 11:42

## 2021-08-06 RX ADMIN — SENNA PLUS 2 TABLET(S): 8.6 TABLET ORAL at 21:05

## 2021-08-06 RX ADMIN — Medication 100 MILLIGRAM(S): at 05:02

## 2021-08-06 RX ADMIN — Medication 50 GRAM(S): at 06:28

## 2021-08-06 RX ADMIN — HEPARIN SODIUM 5000 UNIT(S): 5000 INJECTION INTRAVENOUS; SUBCUTANEOUS at 14:15

## 2021-08-06 RX ADMIN — LEVETIRACETAM 400 MILLIGRAM(S): 250 TABLET, FILM COATED ORAL at 17:02

## 2021-08-06 RX ADMIN — HEPARIN SODIUM 5000 UNIT(S): 5000 INJECTION INTRAVENOUS; SUBCUTANEOUS at 21:05

## 2021-08-06 RX ADMIN — Medication 1 TABLET(S): at 11:42

## 2021-08-06 RX ADMIN — Medication 1 MILLIGRAM(S): at 11:42

## 2021-08-06 RX ADMIN — LEVETIRACETAM 400 MILLIGRAM(S): 250 TABLET, FILM COATED ORAL at 05:02

## 2021-08-06 RX ADMIN — FAMOTIDINE 20 MILLIGRAM(S): 10 INJECTION INTRAVENOUS at 11:42

## 2021-08-06 RX ADMIN — Medication 100 MILLIGRAM(S): at 14:14

## 2021-08-06 NOTE — CONSULT NOTE ADULT - ASSESSMENT
IMPRESSION: Rehab of s/p left cranioplasty / hx of L craniectomy for evac of L SDH    PRECAUTIONS: [   ] Cardiac  [   ] Respiratory  [   ] Seizures [   ] Contact Isolation  [   ] Droplet Isolation  [   ] Other    Weight Bearing Status:     RECOMMENDATION:    Out of Bed to Chair     DVT/Decubiti Prophylaxis    REHAB PLAN:     [ x   ] Bedside P/T 3-5 times a week   [ x   ]   Bedside O/T  2-3 times a week             [    ] Speech Therapy               [    ]  No Rehab Therapy Indicated   Conditioning/ROM                                    ADL  Bed Mobility                                               Conditioning/ROM  Transfers                                                     Bed Mobility  Sitting /Standing Balance                         Transfers                                        Gait Training                                               Sitting/Standing Balance  Stair Training [   ]Applicable                    Home equipment Eval                                                                        Splinting  [   ] Only      GOALS:   ADL   [ x   ]   Independent                    Transfers  [  x  ] Independent                          Ambulation  [ x   ] Independent     [     ] With device                            [    ]  CG                                                         [    ]  CG                                                                  [    ] CG                            [    ] Min A                                                   [    ] Min A                                                              [    ] Min  A          DISCHARGE PLAN:   [    ]  Good candidate for Intensive Rehabilitation/Hospital based                                             Will tolerate 3hrs Intensive Rehab Daily                                       [ x    ]  Short Term Rehab in Skilled Nursing Facility  /  K rehab                                       [     ]  Home with Outpatient or  services                                         [     ]  Possible Candidate for Intensive Hospital based Rehab                                       
46 yo M pt here for a Neurosurgical intervention (revision of VPS and cranioplasty). Medical consultation requested for pre-op risk stratification.    No acute signs or symptoms suggestive of ACS, decompensated CHF or a CVA. RCRI - class III risk (moderate cardiac risk). EKG unremarkable. In the absence of clinical signs or symptoms that could suggest a decompensated cardiac process, no additional testing is necessary at this time (will not affect elder-operative management).     There are no other acutely decompensated medical ailments. Medications reviewed - can be continued elder-operatively. Overall, the patient is likely at an intermediate risk for the proposed intervention. He is optimized for the intervention as planned.     Thank you for allowing us to participate in the care of this patient. Please call with any questions or concerns ([phone] or if I am not available, the covering Hospitalist on call).

## 2021-08-06 NOTE — OCCUPATIONAL THERAPY INITIAL EVALUATION ADULT - ADDITIONAL COMMENTS
Pt poor historian 2/2 cognitive deficits. Pt from rehab facility ([facility]) and to return for continued rehab.

## 2021-08-06 NOTE — CHART NOTE - NSCHARTNOTEFT_GEN_A_CORE
Pre-op Note:   CLAUS [id number]    HPI:  45M with PMH of GERD, previous EtOH abuse with seizures known to neurosurgery service with Dr. Byrne s/p L craniectomy for evacuation of SDH 5/7/2021. Patient coded in OR 2/2 to hypotension with massive transfusion protocol. Patient returned back to the OR with Dr. Byrne 5/16/2021 for revision of scalp wound and KIKE placement. Patient course further complicated with OR cultures (+) for E. Faecalis, subsequently causing ID starting IV Abx. Patient then was returned back to the OR for placement of Right frontal VPS Strata @ 1.5 6/7/2021. Patient did well post operatively and was downgraded from SICU to floor and was transferred to [facility]. Patient presenting here for for scheduled OR with Dr. Byrne for Revision of VPS and Cranioplasty 8/5/2021. Patient denies any HA, N/V, chest pain, fever, and abdominal pain. (04 Aug 2021 18:12)    Patient scheduled for the OR tomorrow 8/5/2021 with Dr. Byrne for Revision of VPS and Left Cranioplasty for replacement of bone flap.     PAST MEDICAL & SURGICAL HISTORY:  Alcohol abuse  GERD (gastroesophageal reflux disease)  ETOH abuse  Hypomagnesemia  Seizure disorder  H/O hemorrhoidectomy    No Known Allergies    T(C): 36.9 (08-04-21 @ 16:49), Max: 36.9 (08-04-21 @ 16:49)  HR: 72 (08-04-21 @ 16:49) (72 - 72)  BP: 108/65 (08-04-21 @ 16:49) (108/65 - 108/65)  RR: 17 (08-04-21 @ 16:49) (17 - 17)  SpO2: 97% (08-04-21 @ 16:49) (97% - 97%)  Wt(kg): --    08-04    135  |  100  |  12  ----------------------------<  87  4.1   |  25  |  0.8    Ca    9.9      04 Aug 2021 18:05    TPro  7.2  /  Alb  4.4  /  TBili  0.2  /  DBili  x   /  AST  11  /  ALT  9   /  AlkPhos  63  08-04    CBC Full  -  ( 04 Aug 2021 18:05 )  WBC Count : 5.45 K/uL  RBC Count : 4.58 M/uL  Hemoglobin : 11.8 g/dL  Hematocrit : 34.9 %  Platelet Count - Automated : 259 K/uL  Mean Cell Volume : 76.2 fL  Mean Cell Hemoglobin : 25.8 pg  Mean Cell Hemoglobin Concentration : 33.8 g/dL  Auto Neutrophil # : 2.51 K/uL  Auto Lymphocyte # : 2.07 K/uL  Auto Monocyte # : 0.74 K/uL  Auto Eosinophil # : 0.10 K/uL  Auto Basophil # : 0.02 K/uL  Auto Neutrophil % : 46.0 %  Auto Lymphocyte % : 38.0 %  Auto Monocyte % : 13.6 %  Auto Eosinophil % : 1.8 %  Auto Basophil % : 0.4 %    PT/INR - ( 04 Aug 2021 18:05 )   PT: 12.40 sec;   INR: 1.08 ratio         PTT - ( 04 Aug 2021 18:05 )  PTT:31.0 sec    Pregnancy test: N/A  Type & Screen (in past 72hrs): Ordered, pending results  Medical clearance: Awaiting medical clearance  Anticoagulants: Held  Consent:
Pt not able to tolerate water with RN, coughing noted after swallowing. Called [facility] rehab, they state patient was on a PO Diet and medications were PO as well. PEG was no longer in use. Speech and swallow evaluation placed for tomorrow.
Specialty: Neurocritical Care     Interval Hx: 45-year-old male p/w scheduled revision of VPS and cranioplasty. Patient now POD #1 s/p left cranioplasty w/ replacement of bone flap, 8/5/2021.     HPI: 45M with PMH of GERD, previous EtOH abuse with seizures known to neurosurgery service with Dr. Byrne s/p L craniectomy for evacuation of SDH 5/7/2021. Patient coded in OR 2/2 to hypotension with massive transfusion protocol. Patient returned back to the OR with Dr. Byrne 5/16/2021 for revision of scalp wound and KIKE placement. Patient course further complicated with OR cultures (+) for E. Faecalis, subsequently causing ID starting IV Abx. Patient then was returned back to the OR for placement of Right frontal VPS Strata @ 1.5 6/7/2021. Patient did well post operatively and was downgraded from SICU to floor and was transferred to [facility]. Patient presenting here for for scheduled OR with Dr. Byrne for Revision of VPS and Cranioplasty 8/5/2021. Patient denies any HA, N/V, chest pain, fever, and abdominal pain. (04 Aug 2021 18:12)      Past Medical and Surgical Hx:  PAST MEDICAL & SURGICAL HISTORY:  Alcohol abuse  GERD (gastroesophageal reflux disease)  ETOH abuse  Hypomagnesemia  Seizure disorder  H/O hemorrhoidectomy        Allergies: No Known Allergies      ROS: Patient endorses no complaints at this time. Otherwise, 10-point ROS is negative.     PE:  General: well-nourished male of appropriately stated age, sedated on, on no sedation, lying supine in bed. NAD  Head: Dressing C/D/I, no evidence of bleeding. drain in place  ENT: No tonsillar erythema, exudates, or enlargement; Moist mucous membranes, Good dentition, No lesions  Eyes: EOMI, PERRLA, conjunctiva and sclera clear  NECK: Supple, No JVD, Normal thyroid, no enlarged nodes  Chest/Lungs: B/L good air entry, B/L CTA; No rales, rhonchi, or wheezing.   Heart: S1S2 normal, no S3, Regular rate and rhythm; No murmurs, rubs, or gallops  Abdomen: Soft, Nontender, Nondistended; Bowel sounds present  Extremities:  2+ Peripheral Pulses, No clubbing, cyanosis, or edema  Lymph: No lymphadenopathy noted  Skin: No rashes or lesions    Neurologic:  Mental status: Awake, alert, oriented to person however, disoriented to place and time. Unable to name objects. Follows simple commands. Mild dysarthria, with aphasia.  Cranial Nerves:  II: Visual fields are full to confrontation. Pupils equally round and reactive to light, 3mm brisk bilaterally  III, IV, VI: EOMI without nystagmus  V:  V1-V3 sensation intact bilaterally   VII: No facial droop, face is symmetric with normal eye closure and smile, no loss of nasolabial folds  XII: Tongue midline with no deviation   Motor: Normal bulk and tone. Strength 5/5 in B/L UE and LE,  strength 5/5. Slight RUE pronator drift noted  Sensation: Intact to light touch. No neglect.    Vital Signs:  ICU Vital Signs Last 24 Hrs  T(C): 36.4 (06 Aug 2021 16:00), Max: 36.5 (05 Aug 2021 17:30)  T(F): 97.5 (06 Aug 2021 16:00), Max: 97.7 (05 Aug 2021 17:30)  HR: 70 (06 Aug 2021 16:00) (54 - 88)  BP: 97/63 (06 Aug 2021 16:00) (93/56 - 135/85)  BP(mean): 74 (06 Aug 2021 16:00) (70 - 99)  ABP: --  ABP(mean): --  RR: 23 (06 Aug 2021 16:00) (6 - 26)  SpO2: 100% (06 Aug 2021 16:00) (94% - 100%)        I/Os:  I&O's Detail    05 Aug 2021 07:01  -  06 Aug 2021 07:00  --------------------------------------------------------  IN:    IV PiggyBack: 100 mL    sodium chloride 0.9%: 750 mL  Total IN: 850 mL    OUT:    Accordian (mL): 60 mL    Indwelling Catheter - Urethral (mL): 975 mL    Voided (mL): 600 mL  Total OUT: 1635 mL    Total NET: -785 mL      06 Aug 2021 07:01  -  06 Aug 2021 16:50  --------------------------------------------------------  IN:    sodium chloride 0.9%: 825 mL  Total IN: 825 mL    OUT:    Indwelling Catheter - Urethral (mL): 975 mL  Total OUT: 975 mL    Total NET: -150 mL          Labs:  08-06    130<L>  |  98  |  9<L>  ----------------------------<  122<H>  4.5   |  22  |  0.6<L>    Ca    9.3      06 Aug 2021 04:55  Phos  5.0     08-06  Mg     1.6     08-06    TPro  7.2  /  Alb  4.4  /  TBili  0.2  /  DBili  x   /  AST  11  /  ALT  9   /  AlkPhos  63  08-04    CBC Full  -  ( 06 Aug 2021 04:55 )  WBC Count : 6.06 K/uL  RBC Count : 4.16 M/uL  Hemoglobin : 10.7 g/dL  Hematocrit : 31.3 %  Platelet Count - Automated : 211 K/uL  Mean Cell Volume : 75.2 fL  Mean Cell Hemoglobin : 25.7 pg  Mean Cell Hemoglobin Concentration : 34.2 g/dL  Auto Neutrophil # : x  Auto Lymphocyte # : x  Auto Monocyte # : x  Auto Eosinophil # : x  Auto Basophil # : x  Auto Neutrophil % : x  Auto Lymphocyte % : x  Auto Monocyte % : x  Auto Eosinophil % : x  Auto Basophil % : x    PT/INR - ( 06 Aug 2021 04:55 )   PT: 13.10 sec;   INR: 1.14 ratio         PTT - ( 06 Aug 2021 04:55 )  PTT:28.1 sec  LIVER FUNCTIONS - ( 04 Aug 2021 18:05 )  Alb: 4.4 g/dL / Pro: 7.2 g/dL / ALK PHOS: 63 U/L / ALT: 9 U/L / AST: 11 U/L / GGT: x               Microbiology:      Medications Current and PRN:  MEDICATIONS  (STANDING):  ceFAZolin   IVPB 1000 milliGRAM(s) IV Intermittent every 8 hours  chlorhexidine 4% Liquid 1 Application(s) Topical <User Schedule>  famotidine    Tablet 20 milliGRAM(s) Oral daily  folic acid 1 milliGRAM(s) Oral daily  heparin   Injectable 5000 Unit(s) SubCutaneous every 8 hours  levETIRAcetam  IVPB 1000 milliGRAM(s) IV Intermittent every 12 hours  multivitamin 1 Tablet(s) Oral daily  senna 2 Tablet(s) Oral at bedtime  sodium chloride 0.9%. 1000 milliLiter(s) (75 mL/Hr) IV Continuous <Continuous>  thiamine 100 milliGRAM(s) Oral daily    MEDICATIONS  (PRN):  acetaminophen   Tablet .. 650 milliGRAM(s) Oral every 6 hours PRN Temp greater or equal to 38C (100.4F), Mild Pain (1 - 3)  oxycodone    5 mG/acetaminophen 325 mG 1 Tablet(s) Oral every 4 hours PRN Moderate Pain (4 - 6)  oxycodone    5 mG/acetaminophen 325 mG 2 Tablet(s) Oral every 4 hours PRN Severe Pain (7 - 10)      Radiology:  CTH w/o contrast 8/5/2021  IMPRESSION:  Since prior examination there hasbeen left hemispheric cranioplasty with expected postsurgical changes including subcutaneous emphysema and pneumocephalus.  Thin residual subdural collection underlying the cranioplasty with minimal midline shift to the right approximately 3 mm.  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    --- End of Report ---      PIERRE KRAMER MD; Resident Radiologist  This document has been electronically signed.  GARO UMANZOR MD; Attending Radiologist  This document has been electronically signed. Aug  6 2021  8:17AM      Assessment: 45-year-old male with PMHx ETOH abuse, GERD, seizure disorder, on Keppra, h/p hemorrhoidectomy recent admission s/p fall 5/6/2021 w/ Large left SDH, s/p left hemicraniotomy evacuation of SDH, 5/7/2021 c/b cardiac arrest intra-operatively 2/2 hypotension, revision of craniotomy w/ wound revision, 5/17/2021, (+) OR cultures E. Faecalis, w/ ABTx course and Right frontal  shunt Strata @ 1.5 placement, 6/7/2021, subsequently downgraded from SICU and transferred to [facility] for rehab p/w scheduled revision of VPS and cranioplasty. Patient now POD #1 s/p left cranioplasty w/ replacement of bone flap, 8/5/2021.    Plan:  #Neuro:  - Neuro checks q8hrs  - Seizure precautions  - ICU delirium precautions  - HMV as per NeuroSx; assess drainage qshift; notify NCC for increased drainage  - Continue Keppra 500mg q12hrs  - Continue Percocet 5/325 prn for pain  - Continue Tylenol 650mg PO q6hrs prn for temp > 38C or mild pain, level 1-3  -  c/s, re: discharge planning  - Stat CTH if any worsening or deterioration in neurological examination and call NCC/Neurology    #CV:  - Keep SBP < 140    #Resp:  - Aggressive chest PT/pulmonary toileting/NT suctioning as needed   - Incentive spirometry 10x/hr while awake  - HOB > 30 degrees  - Aspiration precautions  - Keep SaO2 > 92%    #Renal/Fluid/Electrolytes:  - Strict I/Os  - Daily weights  - Keep euvolemic  - Keep normonatremic   - Keep Magnesium level > 2  - Monitor lytes, replete as needed  - repeat CMP for Na (has been downtrending 139, 134, 130)    #GI:  - dysphagia 3 soft nectar consistency fluid   - Speech language pathology   - Continue Senna 2 tablets PO q24hrs HS  - Continue MTVI 1 tablet q24hrs  - Continue Folic acid 1mg q24hrs  - Continue Thiamine 100mg q24hrs    #Heme/Onc:  - SCS while in bed  - sub Q heparin (ok with neurosurgery)    #Endo:  - FS qAC/HS  - Keep normoglycemic    #ID:  - Keep normothermic; avoid fevers  - Continue Ancef 1gram q8hrs while HMV in place      Code status:  Full code  Disposition: neurology unit on the 3rd floor       MELANIE Delgado  Please feel free to contact for any questions or concerns. Thank you. Case discussed attending. Please see attestation below.   Extension: #[id number]
PACU ANESTHESIA ADMISSION NOTE      Procedure: Left cranioplasty  Post op diagnosis:  TBI    ____  Intubated  TV:______       Rate: ______      FiO2: ______    _x___  Patent Airway    _x___  Full return of protective reflexes    _x___  Full recovery from anesthesia / back to baseline status    Vitals  SPO2:-100% on 2l nc  HR:-62  RR:-12  B.P:-124/76  TEmp97.7:-    Mental Status:  _x___ Awake   ___x_ Alert   _____ Drowsy   _____ Sedated    Nausea/Vomiting:  _x___  NO       ______Yes,   See Post - Op Orders         Pain Scale (0-10):  __0___    Treatment: _x___ None    __x__ See Post - Op/PCA Orders    Post - Operative Fluids:   ___ Oral   ____x See Post - Op Orders    Plan: Discharge:   ____Home       ___Floor     ____x_Critical Care    _____  Other:___ICU report endorsed to Cassie FORMAN______________    Comments:  Report endorsed to RN in pacu  Vitals stable  No anesthesia issues or complications noted.  Discharge to patient to neuro icu when criteria met.

## 2021-08-06 NOTE — PROGRESS NOTE ADULT - ASSESSMENT
45M with etOH abuse found unresponsive with SDH / SAH s/p craniectomy with discarded bone flap coded in OR 5/2021 with wound revision x2, now POD#1 s/p L cranioplasty with replacement of bone flap 8/6/2021     PLAN   -  45M with etOH abuse found unresponsive with SDH / SAH s/p craniectomy with discarded bone flap coded in OR 5/2021 with wound revision x2, now POD#1 s/p L cranioplasty with replacement of bone flap 8/6/2021     PLAN   - Ok to downgrade to floor with Q4 neurochecks   - If any worsening in neurological exam, obtain stat CTH   - Continue Keppra   - Continue ppx abx   - S&S evaluation, no longer using PEG  - PT/OT/physiatry consult   - OOB as tolerated   - Continue HMV draining   - Ok for SQH   - Discussed case with Dr. Byrne

## 2021-08-06 NOTE — PHYSICAL THERAPY INITIAL EVALUATION ADULT - GENERAL OBSERVATIONS, REHAB EVAL
9:45-10:15. chart reviewed. Pt received semi-lopez at B/S, alert confused at times, oriented X 1, expressive aphasia, able to follow one step instructions and agreeable to PT evaluation. Pt was seen with OT Westley, Pt is impulsive. + IV, + L hemovac  + monitoring, R side slight weakness,

## 2021-08-06 NOTE — PROGRESS NOTE ADULT - SUBJECTIVE AND OBJECTIVE BOX
NEUROSURGERY NOTE  SANDRA DAS   08-06-21 @ 09:33    PAST 24HR EVENTS:  POD#1 s/p L cranioplasty for replacement of bone flap   Patient seen and examined at bedside. Doing well post op, denies any headaches, N/V over night. Patient baseline Ox1-2 but slightly more confused this AM, but neurologically stable. Post op CTH looks good with expected post op changes.   HMV output 60cc/24 hours. Patient failed PO diet, NCC spoke to [facility] and states patient tolerating PO intake and no longer using PEG. Patient to attempt S&S today.     HPI: 45y Male     PHYSICAL EXAM:  Vital Signs Last 24 Hrs  T(C): 36.1 (06 Aug 2021 08:00), Max: 36.7 (05 Aug 2021 13:35)  T(F): 97 (06 Aug 2021 08:00), Max: 98.1 (05 Aug 2021 13:35)  HR: 62 (06 Aug 2021 09:00) (54 - 88)  BP: 105/56 (06 Aug 2021 09:00) (93/56 - 151/87)  BP(mean): 72 (06 Aug 2021 09:00) (70 - 99)  RR: 16 (06 Aug 2021 09:00) (6 - 21)  SpO2: 98% (06 Aug 2021 09:00) (96% - 100%)    Awake, alert, following commands   Ox1 (person)   PERRL, EOMI   Face symmetrical   MAEx4 5/5   SILT   HMV 60cc/24hr   Incision - C/D/I, in headwrap     MEDS:   acetaminophen   Tablet .. 650 milliGRAM(s) Oral every 6 hours PRN  ceFAZolin   IVPB 1000 milliGRAM(s) IV Intermittent every 8 hours  chlorhexidine 4% Liquid 1 Application(s) Topical <User Schedule>  famotidine    Tablet 20 milliGRAM(s) Oral daily  folic acid 1 milliGRAM(s) Oral daily  heparin   Injectable 5000 Unit(s) SubCutaneous every 8 hours  levETIRAcetam  IVPB 1000 milliGRAM(s) IV Intermittent every 12 hours  multivitamin 1 Tablet(s) Oral daily  oxycodone    5 mG/acetaminophen 325 mG 1 Tablet(s) Oral every 4 hours PRN  oxycodone    5 mG/acetaminophen 325 mG 2 Tablet(s) Oral every 4 hours PRN  senna 2 Tablet(s) Oral at bedtime  sodium chloride 0.9%. 1000 milliLiter(s) IV Continuous <Continuous>  thiamine 100 milliGRAM(s) Oral daily      LABS:                        10.7   6.06  )-----------( 211      ( 06 Aug 2021 04:55 )             31.3     08-06    130<L>  |  98  |  9<L>  ----------------------------<  122<H>  4.5   |  22  |  0.6<L>    Ca    9.3      06 Aug 2021 04:55  Phos  5.0     08-06  Mg     1.6     08-06    TPro  7.2  /  Alb  4.4  /  TBili  0.2  /  DBili  x   /  AST  11  /  ALT  9   /  AlkPhos  63  08-04    PT/INR - ( 06 Aug 2021 04:55 )   PT: 13.10 sec;   INR: 1.14 ratio         PTT - ( 06 Aug 2021 04:55 )  PTT:28.1 sec    RADIOLOGY:   < from: CT Head No Cont (08.05.21 @ 23:31) >  FINDINGS:    Status post left cranioplasty  Overlying scalp staples and adjacent scalp subcutaneous emphysema and pneumocephalus. Left frontal extra-axial heterogeneous collection measuring 0.8 cm in thickness. Left frontal subcutaneous drainage catheter.    Unchanged right frontal approach ventriculoperitoneal shunt catheter with tip terminating in the anterior hemispheric fissure.    Demonstration of chronic infarcts involving the left parasagittal frontal and parietal lobes, left temporal occipital region as well as the right superior cerebellar hemisphere.    The ventricles and cortical sulci are within normal limits for age. No new acute intracranial hemorrhage or transcortical infarct.    Bilateral globes are intact.    The visualized paranasal sinuses and mastoids are well aerated.    IMPRESSION:    Since prior examination there hasbeen left hemispheric cranioplasty with expected postsurgical changes including subcutaneous emphysema and pneumocephalus.    Thin residual subdural collection underlying the cranioplasty with minimal midline shift to the right approximately 3 mm.    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

## 2021-08-06 NOTE — PHYSICAL THERAPY INITIAL EVALUATION ADULT - PERTINENT HX OF CURRENT PROBLEM, REHAB EVAL
45 M with PMH of GERD, previous EtOH abuse with seizures known to neurosurgery service with Dr. Byrne s/p L craniectomy for evacuation of SDH 5/7/2021. Patient coded in OR 2/2 to hypotension with massive transfusion protocol. Patient returned back to the OR with Dr. Byrne 5/16/2021 for revision of scalp wound and KIKE placement.

## 2021-08-06 NOTE — OCCUPATIONAL THERAPY INITIAL EVALUATION ADULT - PERTINENT HX OF CURRENT PROBLEM, REHAB EVAL
Patient then was returned back to the OR for placement of Right frontal VPS Strata @ 1.5 6/7/2021. Patient did well post operatively and was downgraded from SICU to floor and was transferred to [facility]. Patient presenting here for for scheduled OR with Dr. Byrne for Revision of VPS and Cranioplasty 8/5/2021. Patient denies any HA, N/V, chest pain, fever, and abdominal pain.

## 2021-08-06 NOTE — PROGRESS NOTE ADULT - SUBJECTIVE AND OBJECTIVE BOX
Specialty: Neurocritical Care     Interval Hx: 45-year-old male p/w scheduled revision of VPS and cranioplasty. Patient now POD #0 s/p left cranioplasty w/ replacement of bone flap, 8/5/2021.     HPI: 45M with PMH of GERD, previous EtOH abuse with seizures known to neurosurgery service with Dr. Byrne s/p L craniectomy for evacuation of SDH 5/7/2021. Patient coded in OR 2/2 to hypotension with massive transfusion protocol. Patient returned back to the OR with Dr. Byrne 5/16/2021 for revision of scalp wound and KIKE placement. Patient course further complicated with OR cultures (+) for E. Faecalis, subsequently causing ID starting IV Abx. Patient then was returned back to the OR for placement of Right frontal VPS Strata @ 1.5 6/7/2021. Patient did well post operatively and was downgraded from SICU to floor and was transferred to [facility]. Patient presenting here for for scheduled OR with Dr. Byrne for Revision of VPS and Cranioplasty 8/5/2021. Patient denies any HA, N/V, chest pain, fever, and abdominal pain. (04 Aug 2021 18:12)      Past Medical and Surgical Hx:  PAST MEDICAL & SURGICAL HISTORY:  Alcohol abuse  GERD (gastroesophageal reflux disease)  ETOH abuse  Hypomagnesemia  Seizure disorder  H/O hemorrhoidectomy        Allergies: No Known Allergies      ROS: Patient endorses no complaints at this time. Otherwise, 10-point ROS is negative.     PE:  General: well-nourished male of appropriately stated age, sedated on, on no sedation, lying supine in bed. NAD  Head: Dressing C/D/I, no evidence of bleeding. drain in place  ENT: No tonsillar erythema, exudates, or enlargement; Moist mucous membranes, Good dentition, No lesions  Eyes: EOMI, PERRLA, conjunctiva and sclera clear  NECK: Supple, No JVD, Normal thyroid, no enlarged nodes  Chest/Lungs: B/L good air entry, B/L CTA; No rales, rhonchi, or wheezing.   Heart: S1S2 normal, no S3, Regular rate and rhythm; No murmurs, rubs, or gallops  Abdomen: Soft, Nontender, Nondistended; Bowel sounds present  Extremities:  2+ Peripheral Pulses, No clubbing, cyanosis, or edema  Lymph: No lymphadenopathy noted  Skin: No rashes or lesions    Neurologic:  Mental status: Awake, alert, oriented to person however, disoriented to place and time. Unable to name objects. Follows simple commands. Mild dysarthria, with aphasia.  Cranial Nerves:  II: Visual fields are full to confrontation. Pupils equally round and reactive to light, 3mm brisk bilaterally  III, IV, VI: EOMI without nystagmus  V:  V1-V3 sensation intact bilaterally   VII: No facial droop, face is symmetric with normal eye closure and smile, no loss of nasolabial folds  XII: Tongue midline with no deviation   Motor: Normal bulk and tone. Strength 5/5 in B/L UE and LE,  strength 5/5. Slight RUE pronator drift noted  Sensation: Intact to light touch. No neglect.    Vital Signs:  ICU Vital Signs Last 24 Hrs  T(C): 36.4 (06 Aug 2021 16:00), Max: 36.5 (05 Aug 2021 17:30)  T(F): 97.5 (06 Aug 2021 16:00), Max: 97.7 (05 Aug 2021 17:30)  HR: 70 (06 Aug 2021 16:00) (54 - 88)  BP: 97/63 (06 Aug 2021 16:00) (93/56 - 135/85)  BP(mean): 74 (06 Aug 2021 16:00) (70 - 99)  ABP: --  ABP(mean): --  RR: 23 (06 Aug 2021 16:00) (6 - 26)  SpO2: 100% (06 Aug 2021 16:00) (94% - 100%)        I/Os:  I&O's Detail    05 Aug 2021 07:01  -  06 Aug 2021 07:00  --------------------------------------------------------  IN:    IV PiggyBack: 100 mL    sodium chloride 0.9%: 750 mL  Total IN: 850 mL    OUT:    Accordian (mL): 60 mL    Indwelling Catheter - Urethral (mL): 975 mL    Voided (mL): 600 mL  Total OUT: 1635 mL    Total NET: -785 mL      06 Aug 2021 07:01  -  06 Aug 2021 16:50  --------------------------------------------------------  IN:    sodium chloride 0.9%: 825 mL  Total IN: 825 mL    OUT:    Indwelling Catheter - Urethral (mL): 975 mL  Total OUT: 975 mL    Total NET: -150 mL          Labs:  08-06    130<L>  |  98  |  9<L>  ----------------------------<  122<H>  4.5   |  22  |  0.6<L>    Ca    9.3      06 Aug 2021 04:55  Phos  5.0     08-06  Mg     1.6     08-06    TPro  7.2  /  Alb  4.4  /  TBili  0.2  /  DBili  x   /  AST  11  /  ALT  9   /  AlkPhos  63  08-04    CBC Full  -  ( 06 Aug 2021 04:55 )  WBC Count : 6.06 K/uL  RBC Count : 4.16 M/uL  Hemoglobin : 10.7 g/dL  Hematocrit : 31.3 %  Platelet Count - Automated : 211 K/uL  Mean Cell Volume : 75.2 fL  Mean Cell Hemoglobin : 25.7 pg  Mean Cell Hemoglobin Concentration : 34.2 g/dL  Auto Neutrophil # : x  Auto Lymphocyte # : x  Auto Monocyte # : x  Auto Eosinophil # : x  Auto Basophil # : x  Auto Neutrophil % : x  Auto Lymphocyte % : x  Auto Monocyte % : x  Auto Eosinophil % : x  Auto Basophil % : x    PT/INR - ( 06 Aug 2021 04:55 )   PT: 13.10 sec;   INR: 1.14 ratio         PTT - ( 06 Aug 2021 04:55 )  PTT:28.1 sec  LIVER FUNCTIONS - ( 04 Aug 2021 18:05 )  Alb: 4.4 g/dL / Pro: 7.2 g/dL / ALK PHOS: 63 U/L / ALT: 9 U/L / AST: 11 U/L / GGT: x               Microbiology:      Medications Current and PRN:  MEDICATIONS  (STANDING):  ceFAZolin   IVPB 1000 milliGRAM(s) IV Intermittent every 8 hours  chlorhexidine 4% Liquid 1 Application(s) Topical <User Schedule>  famotidine    Tablet 20 milliGRAM(s) Oral daily  folic acid 1 milliGRAM(s) Oral daily  heparin   Injectable 5000 Unit(s) SubCutaneous every 8 hours  levETIRAcetam  IVPB 1000 milliGRAM(s) IV Intermittent every 12 hours  multivitamin 1 Tablet(s) Oral daily  senna 2 Tablet(s) Oral at bedtime  sodium chloride 0.9%. 1000 milliLiter(s) (75 mL/Hr) IV Continuous <Continuous>  thiamine 100 milliGRAM(s) Oral daily    MEDICATIONS  (PRN):  acetaminophen   Tablet .. 650 milliGRAM(s) Oral every 6 hours PRN Temp greater or equal to 38C (100.4F), Mild Pain (1 - 3)  oxycodone    5 mG/acetaminophen 325 mG 1 Tablet(s) Oral every 4 hours PRN Moderate Pain (4 - 6)  oxycodone    5 mG/acetaminophen 325 mG 2 Tablet(s) Oral every 4 hours PRN Severe Pain (7 - 10)      Radiology:  CTH w/o contrast 8/5/2021  IMPRESSION:  Since prior examination there hasbeen left hemispheric cranioplasty with expected postsurgical changes including subcutaneous emphysema and pneumocephalus.  Thin residual subdural collection underlying the cranioplasty with minimal midline shift to the right approximately 3 mm.  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    --- End of Report ---      PIERRE KRAMER MD; Resident Radiologist  This document has been electronically signed.  GARO UMANZOR MD; Attending Radiologist  This document has been electronically signed. Aug  6 2021  8:17AM      Assessment: 45-year-old male with PMHx ETOH abuse, GERD, seizure disorder, on Keppra, h/p hemorrhoidectomy recent admission s/p fall 5/6/2021 w/ Large left SDH, s/p left hemicraniotomy evacuation of SDH, 5/7/2021 c/b cardiac arrest intra-operatively 2/2 hypotension, revision of craniotomy w/ wound revision, 5/17/2021, (+) OR cultures E. Faecalis, w/ ABTx course and Right frontal  shunt Strata @ 1.5 placement, 6/7/2021, subsequently downgraded from SICU and transferred to [facility] for rehab p/w scheduled revision of VPS and cranioplasty. Patient now POD #1 s/p left cranioplasty w/ replacement of bone flap, 8/5/2021.    Plan:  #Neuro:  - Neuro checks q8hrs  - Seizure precautions  - ICU delirium precautions  - HMV as per NeuroSx; assess drainage qshift; notify NCC for increased drainage  - Continue Keppra 500mg q12hrs  - Continue Percocet 5/325 prn for pain  - Continue Tylenol 650mg PO q6hrs prn for temp > 38C or mild pain, level 1-3  -  c/s, re: discharge planning  - Stat CTH if any worsening or deterioration in neurological examination and call NCC/Neurology    #CV:  - Keep SBP < 140    #Resp:  - Aggressive chest PT/pulmonary toileting/NT suctioning as needed   - Incentive spirometry 10x/hr while awake  - HOB > 30 degrees  - Aspiration precautions  - Keep SaO2 > 92%    #Renal/Fluid/Electrolytes:  - Strict I/Os  - Daily weights  - Keep euvolemic  - Keep normonatremic   - Keep Magnesium level > 2  - Monitor lytes, replete as needed    #GI:  - dysphagia 3 soft nectar consistency fluid   - Speech language pathology   - Continue Senna 2 tablets PO q24hrs HS  - Continue MTVI 1 tablet q24hrs  - Continue Folic acid 1mg q24hrs  - Continue Thiamine 100mg q24hrs    #Heme/Onc:  - SCS while in bed  - sub Q heparin (ok with neurosurgery)    #Endo:  - FS qAC/HS  - Keep normoglycemic    #ID:  - Keep normothermic; avoid fevers  - Continue Ancef 1gram q8hrs while HMV in place      Code status:  Full code  Disposition: neurology unit on the 3rd floor       MELANIE Delgado  Please feel free to contact for any questions or concerns. Thank you. Case discussed attending. Please see attestation below.   Extension: #[phone]   Specialty: Neurocritical Care     Interval Hx: 45-year-old male p/w scheduled revision of VPS and cranioplasty. Patient now POD #0 s/p left cranioplasty w/ replacement of bone flap, 8/5/2021.     HPI: 45M with PMH of GERD, previous EtOH abuse with seizures known to neurosurgery service with Dr. Byrne s/p L craniectomy for evacuation of SDH 5/7/2021. Patient coded in OR 2/2 to hypotension with massive transfusion protocol. Patient returned back to the OR with Dr. Byrne 5/16/2021 for revision of scalp wound and KIKE placement. Patient course further complicated with OR cultures (+) for E. Faecalis, subsequently causing ID starting IV Abx. Patient then was returned back to the OR for placement of Right frontal VPS Strata @ 1.5 6/7/2021. Patient did well post operatively and was downgraded from SICU to floor and was transferred to [facility]. Patient presenting here for for scheduled OR with Dr. Byrne for Revision of VPS and Cranioplasty 8/5/2021. Patient denies any HA, N/V, chest pain, fever, and abdominal pain. (04 Aug 2021 18:12)      Past Medical and Surgical Hx:  PAST MEDICAL & SURGICAL HISTORY:  Alcohol abuse  GERD (gastroesophageal reflux disease)  ETOH abuse  Hypomagnesemia  Seizure disorder  H/O hemorrhoidectomy        Allergies: No Known Allergies      ROS: Patient endorses no complaints at this time. Otherwise, 10-point ROS is negative.     PE:  General: well-nourished male of appropriately stated age, sedated on, on no sedation, lying supine in bed. NAD  Head: Dressing C/D/I, no evidence of bleeding. drain in place  ENT: No tonsillar erythema, exudates, or enlargement; Moist mucous membranes, Good dentition, No lesions  Eyes: EOMI, PERRLA, conjunctiva and sclera clear  NECK: Supple, No JVD, Normal thyroid, no enlarged nodes  Chest/Lungs: B/L good air entry, B/L CTA; No rales, rhonchi, or wheezing.   Heart: S1S2 normal, no S3, Regular rate and rhythm; No murmurs, rubs, or gallops  Abdomen: Soft, Nontender, Nondistended; Bowel sounds present  Extremities:  2+ Peripheral Pulses, No clubbing, cyanosis, or edema  Lymph: No lymphadenopathy noted  Skin: No rashes or lesions    Neurologic:  Mental status: Awake, alert, oriented to person however, disoriented to place and time. Unable to name objects. Follows simple commands. Mild dysarthria, with aphasia.  Cranial Nerves:  II: Visual fields are full to confrontation. Pupils equally round and reactive to light, 3mm brisk bilaterally  III, IV, VI: EOMI without nystagmus  V:  V1-V3 sensation intact bilaterally   VII: No facial droop, face is symmetric with normal eye closure and smile, no loss of nasolabial folds  XII: Tongue midline with no deviation   Motor: Normal bulk and tone. Strength 5/5 in B/L UE and LE,  strength 5/5. Slight RUE pronator drift noted  Sensation: Intact to light touch. No neglect.    Vital Signs:  ICU Vital Signs Last 24 Hrs  T(C): 36.4 (06 Aug 2021 16:00), Max: 36.5 (05 Aug 2021 17:30)  T(F): 97.5 (06 Aug 2021 16:00), Max: 97.7 (05 Aug 2021 17:30)  HR: 70 (06 Aug 2021 16:00) (54 - 88)  BP: 97/63 (06 Aug 2021 16:00) (93/56 - 135/85)  BP(mean): 74 (06 Aug 2021 16:00) (70 - 99)  ABP: --  ABP(mean): --  RR: 23 (06 Aug 2021 16:00) (6 - 26)  SpO2: 100% (06 Aug 2021 16:00) (94% - 100%)        I/Os:  I&O's Detail    05 Aug 2021 07:01  -  06 Aug 2021 07:00  --------------------------------------------------------  IN:    IV PiggyBack: 100 mL    sodium chloride 0.9%: 750 mL  Total IN: 850 mL    OUT:    Accordian (mL): 60 mL    Indwelling Catheter - Urethral (mL): 975 mL    Voided (mL): 600 mL  Total OUT: 1635 mL    Total NET: -785 mL      06 Aug 2021 07:01  -  06 Aug 2021 16:50  --------------------------------------------------------  IN:    sodium chloride 0.9%: 825 mL  Total IN: 825 mL    OUT:    Indwelling Catheter - Urethral (mL): 975 mL  Total OUT: 975 mL    Total NET: -150 mL          Labs:  08-06    130<L>  |  98  |  9<L>  ----------------------------<  122<H>  4.5   |  22  |  0.6<L>    Ca    9.3      06 Aug 2021 04:55  Phos  5.0     08-06  Mg     1.6     08-06    TPro  7.2  /  Alb  4.4  /  TBili  0.2  /  DBili  x   /  AST  11  /  ALT  9   /  AlkPhos  63  08-04    CBC Full  -  ( 06 Aug 2021 04:55 )  WBC Count : 6.06 K/uL  RBC Count : 4.16 M/uL  Hemoglobin : 10.7 g/dL  Hematocrit : 31.3 %  Platelet Count - Automated : 211 K/uL  Mean Cell Volume : 75.2 fL  Mean Cell Hemoglobin : 25.7 pg  Mean Cell Hemoglobin Concentration : 34.2 g/dL  Auto Neutrophil # : x  Auto Lymphocyte # : x  Auto Monocyte # : x  Auto Eosinophil # : x  Auto Basophil # : x  Auto Neutrophil % : x  Auto Lymphocyte % : x  Auto Monocyte % : x  Auto Eosinophil % : x  Auto Basophil % : x    PT/INR - ( 06 Aug 2021 04:55 )   PT: 13.10 sec;   INR: 1.14 ratio         PTT - ( 06 Aug 2021 04:55 )  PTT:28.1 sec  LIVER FUNCTIONS - ( 04 Aug 2021 18:05 )  Alb: 4.4 g/dL / Pro: 7.2 g/dL / ALK PHOS: 63 U/L / ALT: 9 U/L / AST: 11 U/L / GGT: x               Microbiology:      Medications Current and PRN:  MEDICATIONS  (STANDING):  ceFAZolin   IVPB 1000 milliGRAM(s) IV Intermittent every 8 hours  chlorhexidine 4% Liquid 1 Application(s) Topical <User Schedule>  famotidine    Tablet 20 milliGRAM(s) Oral daily  folic acid 1 milliGRAM(s) Oral daily  heparin   Injectable 5000 Unit(s) SubCutaneous every 8 hours  levETIRAcetam  IVPB 1000 milliGRAM(s) IV Intermittent every 12 hours  multivitamin 1 Tablet(s) Oral daily  senna 2 Tablet(s) Oral at bedtime  sodium chloride 0.9%. 1000 milliLiter(s) (75 mL/Hr) IV Continuous <Continuous>  thiamine 100 milliGRAM(s) Oral daily    MEDICATIONS  (PRN):  acetaminophen   Tablet .. 650 milliGRAM(s) Oral every 6 hours PRN Temp greater or equal to 38C (100.4F), Mild Pain (1 - 3)  oxycodone    5 mG/acetaminophen 325 mG 1 Tablet(s) Oral every 4 hours PRN Moderate Pain (4 - 6)  oxycodone    5 mG/acetaminophen 325 mG 2 Tablet(s) Oral every 4 hours PRN Severe Pain (7 - 10)      Radiology:  CTH w/o contrast 8/5/2021  IMPRESSION:  Since prior examination there hasbeen left hemispheric cranioplasty with expected postsurgical changes including subcutaneous emphysema and pneumocephalus.  Thin residual subdural collection underlying the cranioplasty with minimal midline shift to the right approximately 3 mm.  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    --- End of Report ---      PIERRE KRAMER MD; Resident Radiologist  This document has been electronically signed.  GARO UMANZOR MD; Attending Radiologist  This document has been electronically signed. Aug  6 2021  8:17AM      Assessment: 45-year-old male with PMHx ETOH abuse, GERD, seizure disorder, on Keppra, h/p hemorrhoidectomy recent admission s/p fall 5/6/2021 w/ Large left SDH, s/p left hemicraniotomy evacuation of SDH, 5/7/2021 c/b cardiac arrest intra-operatively 2/2 hypotension, revision of craniotomy w/ wound revision, 5/17/2021, (+) OR cultures E. Faecalis, w/ ABTx course and Right frontal  shunt Strata @ 1.5 placement, 6/7/2021, subsequently downgraded from SICU and transferred to [facility] for rehab p/w scheduled revision of VPS and cranioplasty. Patient now POD #1 s/p left cranioplasty w/ replacement of bone flap, 8/5/2021.    Plan:  #Neuro:  - Neuro checks q8hrs  - Seizure precautions  - ICU delirium precautions  - HMV as per NeuroSx; assess drainage qshift; notify NCC for increased drainage  - Continue Keppra 500mg q12hrs  - Continue Percocet 5/325 prn for pain  - Continue Tylenol 650mg PO q6hrs prn for temp > 38C or mild pain, level 1-3  -  c/s, re: discharge planning  - Stat CTH if any worsening or deterioration in neurological examination and call NCC/Neurology    #CV:  - Keep SBP < 140    #Resp:  - Aggressive chest PT/pulmonary toileting/NT suctioning as needed   - Incentive spirometry 10x/hr while awake  - HOB > 30 degrees  - Aspiration precautions  - Keep SaO2 > 92%    #Renal/Fluid/Electrolytes:  - Strict I/Os  - Daily weights  - Keep euvolemic  - Keep normonatremic   - Keep Magnesium level > 2  - Monitor lytes, replete as needed  - repeat CMP for Na (has been downtrending 139, 134, 130)    #GI:  - dysphagia 3 soft nectar consistency fluid   - Speech language pathology   - Continue Senna 2 tablets PO q24hrs HS  - Continue MTVI 1 tablet q24hrs  - Continue Folic acid 1mg q24hrs  - Continue Thiamine 100mg q24hrs    #Heme/Onc:  - SCS while in bed  - sub Q heparin (ok with neurosurgery)    #Endo:  - FS qAC/HS  - Keep normoglycemic    #ID:  - Keep normothermic; avoid fevers  - Continue Ancef 1gram q8hrs while HMV in place      Code status:  Full code  Disposition: neurology unit on the 3rd floor       MELANIE Delgado  Please feel free to contact for any questions or concerns. Thank you. Case discussed attending. Please see attestation below.   Extension: #[id number]   Specialty: Neurocritical Care     Interval Hx: 45-year-old male p/w scheduled revision of VPS and cranioplasty. Patient now POD #1 s/p left cranioplasty w/ replacement of bone flap, 8/5/2021.     HPI: 45M with PMH of GERD, previous EtOH abuse with seizures known to neurosurgery service with Dr. Byrne s/p L craniectomy for evacuation of SDH 5/7/2021. Patient coded in OR 2/2 to hypotension with massive transfusion protocol. Patient returned back to the OR with Dr. Byrne 5/16/2021 for revision of scalp wound and KIKE placement. Patient course further complicated with OR cultures (+) for E. Faecalis, subsequently causing ID starting IV Abx. Patient then was returned back to the OR for placement of Right frontal VPS Strata @ 1.5 6/7/2021. Patient did well post operatively and was downgraded from SICU to floor and was transferred to [facility]. Patient presenting here for for scheduled OR with Dr. Byrne for Revision of VPS and Cranioplasty 8/5/2021. Patient denies any HA, N/V, chest pain, fever, and abdominal pain. (04 Aug 2021 18:12)      Past Medical and Surgical Hx:  PAST MEDICAL & SURGICAL HISTORY:  Alcohol abuse  GERD (gastroesophageal reflux disease)  ETOH abuse  Hypomagnesemia  Seizure disorder  H/O hemorrhoidectomy        Allergies: No Known Allergies      ROS: Patient endorses no complaints at this time. Otherwise, 10-point ROS is negative.     PE:  General: well-nourished male of appropriately stated age, sedated on, on no sedation, lying supine in bed. NAD  Head: Dressing C/D/I, no evidence of bleeding. drain in place  ENT: No tonsillar erythema, exudates, or enlargement; Moist mucous membranes, Good dentition, No lesions  Eyes: EOMI, PERRLA, conjunctiva and sclera clear  NECK: Supple, No JVD, Normal thyroid, no enlarged nodes  Chest/Lungs: B/L good air entry, B/L CTA; No rales, rhonchi, or wheezing.   Heart: S1S2 normal, no S3, Regular rate and rhythm; No murmurs, rubs, or gallops  Abdomen: Soft, Nontender, Nondistended; Bowel sounds present  Extremities:  2+ Peripheral Pulses, No clubbing, cyanosis, or edema  Lymph: No lymphadenopathy noted  Skin: No rashes or lesions    Neurologic:  Mental status: Awake, alert, oriented to person however, disoriented to place and time. Unable to name objects. Follows simple commands. Mild dysarthria, with aphasia.  Cranial Nerves:  II: Visual fields are full to confrontation. Pupils equally round and reactive to light, 3mm brisk bilaterally  III, IV, VI: EOMI without nystagmus  V:  V1-V3 sensation intact bilaterally   VII: No facial droop, face is symmetric with normal eye closure and smile, no loss of nasolabial folds  XII: Tongue midline with no deviation   Motor: Normal bulk and tone. Strength 5/5 in B/L UE and LE,  strength 5/5. Slight RUE pronator drift noted  Sensation: Intact to light touch. No neglect.    Vital Signs:  ICU Vital Signs Last 24 Hrs  T(C): 36.4 (06 Aug 2021 16:00), Max: 36.5 (05 Aug 2021 17:30)  T(F): 97.5 (06 Aug 2021 16:00), Max: 97.7 (05 Aug 2021 17:30)  HR: 70 (06 Aug 2021 16:00) (54 - 88)  BP: 97/63 (06 Aug 2021 16:00) (93/56 - 135/85)  BP(mean): 74 (06 Aug 2021 16:00) (70 - 99)  ABP: --  ABP(mean): --  RR: 23 (06 Aug 2021 16:00) (6 - 26)  SpO2: 100% (06 Aug 2021 16:00) (94% - 100%)        I/Os:  I&O's Detail    05 Aug 2021 07:01  -  06 Aug 2021 07:00  --------------------------------------------------------  IN:    IV PiggyBack: 100 mL    sodium chloride 0.9%: 750 mL  Total IN: 850 mL    OUT:    Accordian (mL): 60 mL    Indwelling Catheter - Urethral (mL): 975 mL    Voided (mL): 600 mL  Total OUT: 1635 mL    Total NET: -785 mL      06 Aug 2021 07:01  -  06 Aug 2021 16:50  --------------------------------------------------------  IN:    sodium chloride 0.9%: 825 mL  Total IN: 825 mL    OUT:    Indwelling Catheter - Urethral (mL): 975 mL  Total OUT: 975 mL    Total NET: -150 mL          Labs:  08-06    130<L>  |  98  |  9<L>  ----------------------------<  122<H>  4.5   |  22  |  0.6<L>    Ca    9.3      06 Aug 2021 04:55  Phos  5.0     08-06  Mg     1.6     08-06    TPro  7.2  /  Alb  4.4  /  TBili  0.2  /  DBili  x   /  AST  11  /  ALT  9   /  AlkPhos  63  08-04    CBC Full  -  ( 06 Aug 2021 04:55 )  WBC Count : 6.06 K/uL  RBC Count : 4.16 M/uL  Hemoglobin : 10.7 g/dL  Hematocrit : 31.3 %  Platelet Count - Automated : 211 K/uL  Mean Cell Volume : 75.2 fL  Mean Cell Hemoglobin : 25.7 pg  Mean Cell Hemoglobin Concentration : 34.2 g/dL  Auto Neutrophil # : x  Auto Lymphocyte # : x  Auto Monocyte # : x  Auto Eosinophil # : x  Auto Basophil # : x  Auto Neutrophil % : x  Auto Lymphocyte % : x  Auto Monocyte % : x  Auto Eosinophil % : x  Auto Basophil % : x    PT/INR - ( 06 Aug 2021 04:55 )   PT: 13.10 sec;   INR: 1.14 ratio         PTT - ( 06 Aug 2021 04:55 )  PTT:28.1 sec  LIVER FUNCTIONS - ( 04 Aug 2021 18:05 )  Alb: 4.4 g/dL / Pro: 7.2 g/dL / ALK PHOS: 63 U/L / ALT: 9 U/L / AST: 11 U/L / GGT: x               Microbiology:      Medications Current and PRN:  MEDICATIONS  (STANDING):  ceFAZolin   IVPB 1000 milliGRAM(s) IV Intermittent every 8 hours  chlorhexidine 4% Liquid 1 Application(s) Topical <User Schedule>  famotidine    Tablet 20 milliGRAM(s) Oral daily  folic acid 1 milliGRAM(s) Oral daily  heparin   Injectable 5000 Unit(s) SubCutaneous every 8 hours  levETIRAcetam  IVPB 1000 milliGRAM(s) IV Intermittent every 12 hours  multivitamin 1 Tablet(s) Oral daily  senna 2 Tablet(s) Oral at bedtime  sodium chloride 0.9%. 1000 milliLiter(s) (75 mL/Hr) IV Continuous <Continuous>  thiamine 100 milliGRAM(s) Oral daily    MEDICATIONS  (PRN):  acetaminophen   Tablet .. 650 milliGRAM(s) Oral every 6 hours PRN Temp greater or equal to 38C (100.4F), Mild Pain (1 - 3)  oxycodone    5 mG/acetaminophen 325 mG 1 Tablet(s) Oral every 4 hours PRN Moderate Pain (4 - 6)  oxycodone    5 mG/acetaminophen 325 mG 2 Tablet(s) Oral every 4 hours PRN Severe Pain (7 - 10)      Radiology:  CTH w/o contrast 8/5/2021  IMPRESSION:  Since prior examination there hasbeen left hemispheric cranioplasty with expected postsurgical changes including subcutaneous emphysema and pneumocephalus.  Thin residual subdural collection underlying the cranioplasty with minimal midline shift to the right approximately 3 mm.  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    --- End of Report ---      PIERRE KRAMER MD; Resident Radiologist  This document has been electronically signed.  GARO UMANZOR MD; Attending Radiologist  This document has been electronically signed. Aug  6 2021  8:17AM      Assessment: 45-year-old male with PMHx ETOH abuse, GERD, seizure disorder, on Keppra, h/p hemorrhoidectomy recent admission s/p fall 5/6/2021 w/ Large left SDH, s/p left hemicraniotomy evacuation of SDH, 5/7/2021 c/b cardiac arrest intra-operatively 2/2 hypotension, revision of craniotomy w/ wound revision, 5/17/2021, (+) OR cultures E. Faecalis, w/ ABTx course and Right frontal  shunt Strata @ 1.5 placement, 6/7/2021, subsequently downgraded from SICU and transferred to [facility] for rehab p/w scheduled revision of VPS and cranioplasty. Patient now POD #1 s/p left cranioplasty w/ replacement of bone flap, 8/5/2021.    Plan:  #Neuro:  - Neuro checks q8hrs  - Seizure precautions  - ICU delirium precautions  - HMV as per NeuroSx; assess drainage qshift; notify NCC for increased drainage  - Continue Keppra 500mg q12hrs  - Continue Percocet 5/325 prn for pain  - Continue Tylenol 650mg PO q6hrs prn for temp > 38C or mild pain, level 1-3  -  c/s, re: discharge planning  - Stat CTH if any worsening or deterioration in neurological examination and call NCC/Neurology    #CV:  - Keep SBP < 140    #Resp:  - Aggressive chest PT/pulmonary toileting/NT suctioning as needed   - Incentive spirometry 10x/hr while awake  - HOB > 30 degrees  - Aspiration precautions  - Keep SaO2 > 92%    #Renal/Fluid/Electrolytes:  - Strict I/Os  - Daily weights  - Keep euvolemic  - Keep normonatremic   - Keep Magnesium level > 2  - Monitor lytes, replete as needed  - repeat CMP for Na (has been downtrending 139, 134, 130)    #GI:  - dysphagia 3 soft nectar consistency fluid   - Speech language pathology   - Continue Senna 2 tablets PO q24hrs HS  - Continue MTVI 1 tablet q24hrs  - Continue Folic acid 1mg q24hrs  - Continue Thiamine 100mg q24hrs    #Heme/Onc:  - SCS while in bed  - sub Q heparin (ok with neurosurgery)    #Endo:  - FS qAC/HS  - Keep normoglycemic    #ID:  - Keep normothermic; avoid fevers  - Continue Ancef 1gram q8hrs while HMV in place      Code status:  Full code  Disposition: neurology unit on the 3rd floor       MELANIE Delgado  Please feel free to contact for any questions or concerns. Thank you. Case discussed attending. Please see attestation below.   Extension: #[phone]

## 2021-08-07 LAB
ANION GAP SERPL CALC-SCNC: 8 MMOL/L — SIGNIFICANT CHANGE UP (ref 7–14)
BUN SERPL-MCNC: 9 MG/DL — LOW (ref 10–20)
CA-I BLD-SCNC: 1.21 MMOL/L — SIGNIFICANT CHANGE UP (ref 1.12–1.3)
CALCIUM SERPL-MCNC: 8.5 MG/DL — SIGNIFICANT CHANGE UP (ref 8.5–10.1)
CHLORIDE SERPL-SCNC: 104 MMOL/L — SIGNIFICANT CHANGE UP (ref 98–110)
CO2 SERPL-SCNC: 22 MMOL/L — SIGNIFICANT CHANGE UP (ref 17–32)
CREAT SERPL-MCNC: 0.6 MG/DL — LOW (ref 0.7–1.5)
GLUCOSE SERPL-MCNC: 85 MG/DL — SIGNIFICANT CHANGE UP (ref 70–99)
HCT VFR BLD CALC: 24.9 % — LOW (ref 42–52)
HGB BLD-MCNC: 8.5 G/DL — LOW (ref 14–18)
MAGNESIUM SERPL-MCNC: 1.7 MG/DL — LOW (ref 1.8–2.4)
MCHC RBC-ENTMCNC: 25.8 PG — LOW (ref 27–31)
MCHC RBC-ENTMCNC: 34.1 G/DL — SIGNIFICANT CHANGE UP (ref 32–37)
MCV RBC AUTO: 75.7 FL — LOW (ref 80–94)
NRBC # BLD: 0 /100 WBCS — SIGNIFICANT CHANGE UP (ref 0–0)
PHOSPHATE SERPL-MCNC: 3.2 MG/DL — SIGNIFICANT CHANGE UP (ref 2.1–4.9)
PLATELET # BLD AUTO: 155 K/UL — SIGNIFICANT CHANGE UP (ref 130–400)
POTASSIUM SERPL-MCNC: 3.7 MMOL/L — SIGNIFICANT CHANGE UP (ref 3.5–5)
POTASSIUM SERPL-SCNC: 3.7 MMOL/L — SIGNIFICANT CHANGE UP (ref 3.5–5)
RBC # BLD: 3.29 M/UL — LOW (ref 4.7–6.1)
RBC # FLD: 14.1 % — SIGNIFICANT CHANGE UP (ref 11.5–14.5)
SODIUM SERPL-SCNC: 134 MMOL/L — LOW (ref 135–146)
WBC # BLD: 4.25 K/UL — LOW (ref 4.8–10.8)
WBC # FLD AUTO: 4.25 K/UL — LOW (ref 4.8–10.8)

## 2021-08-07 RX ORDER — MAGNESIUM SULFATE 500 MG/ML
2 VIAL (ML) INJECTION ONCE
Refills: 0 | Status: COMPLETED | OUTPATIENT
Start: 2021-08-07 | End: 2021-08-07

## 2021-08-07 RX ORDER — POTASSIUM CHLORIDE 20 MEQ
40 PACKET (EA) ORAL ONCE
Refills: 0 | Status: COMPLETED | OUTPATIENT
Start: 2021-08-07 | End: 2021-08-07

## 2021-08-07 RX ADMIN — Medication 1 MILLIGRAM(S): at 11:13

## 2021-08-07 RX ADMIN — Medication 50 GRAM(S): at 06:48

## 2021-08-07 RX ADMIN — SENNA PLUS 2 TABLET(S): 8.6 TABLET ORAL at 22:06

## 2021-08-07 RX ADMIN — HEPARIN SODIUM 5000 UNIT(S): 5000 INJECTION INTRAVENOUS; SUBCUTANEOUS at 05:04

## 2021-08-07 RX ADMIN — Medication 100 MILLIGRAM(S): at 11:13

## 2021-08-07 RX ADMIN — HEPARIN SODIUM 5000 UNIT(S): 5000 INJECTION INTRAVENOUS; SUBCUTANEOUS at 22:05

## 2021-08-07 RX ADMIN — Medication 100 MILLIGRAM(S): at 05:04

## 2021-08-07 RX ADMIN — Medication 1 TABLET(S): at 11:13

## 2021-08-07 RX ADMIN — CHLORHEXIDINE GLUCONATE 1 APPLICATION(S): 213 SOLUTION TOPICAL at 06:06

## 2021-08-07 RX ADMIN — Medication 100 MILLIGRAM(S): at 13:01

## 2021-08-07 RX ADMIN — LEVETIRACETAM 400 MILLIGRAM(S): 250 TABLET, FILM COATED ORAL at 05:04

## 2021-08-07 RX ADMIN — HEPARIN SODIUM 5000 UNIT(S): 5000 INJECTION INTRAVENOUS; SUBCUTANEOUS at 13:01

## 2021-08-07 RX ADMIN — SODIUM CHLORIDE 75 MILLILITER(S): 9 INJECTION INTRAMUSCULAR; INTRAVENOUS; SUBCUTANEOUS at 17:55

## 2021-08-07 RX ADMIN — LEVETIRACETAM 400 MILLIGRAM(S): 250 TABLET, FILM COATED ORAL at 19:55

## 2021-08-07 RX ADMIN — Medication 40 MILLIEQUIVALENT(S): at 11:52

## 2021-08-07 RX ADMIN — Medication 100 MILLIGRAM(S): at 22:05

## 2021-08-07 NOTE — PROGRESS NOTE ADULT - SUBJECTIVE AND OBJECTIVE BOX
45yMale    HPI:  45M with PMH of GERD, previous EtOH abuse with seizures known to neurosurgery service with Dr. Byrne s/p L craniectomy for evacuation of SDH 5/7/2021. Patient coded in OR 2/2 to hypotension with massive transfusion protocol. Patient returned back to the OR with Dr. Byrne 5/16/2021 for revision of scalp wound and KIKE placement. Patient course further complicated with OR cultures (+) for E. Faecalis, subsequently causing ID starting IV Abx. Patient then was returned back to the OR for placement of Right frontal VPS Strata @ 1.5 6/7/2021. Patient did well post operatively and was downgraded from SICU to floor and was transferred to [facility]. Patient presenting here for for scheduled OR with Dr. Byrne for Revision of VPS and Cranioplasty 8/5/2021. Patient denies any HA, N/V, chest pain, fever, and abdominal pain. (04 Aug 2021 18:12)      PAST MEDICAL & SURGICAL HISTORY:  Alcohol abuse    GERD (gastroesophageal reflux disease)    ETOH abuse    Hypomagnesemia    Seizure disorder    H/O hemorrhoidectomy        Last 24 hour updates:      Outside Neurologist:     Home Medications:      ceFAZolin   IVPB 1000 milliGRAM(s) IV Intermittent every 8 hours  chlorhexidine 4% Liquid 1 Application(s) Topical <User Schedule>  folic acid 1 milliGRAM(s) Oral daily  heparin   Injectable 5000 Unit(s) SubCutaneous every 8 hours  levETIRAcetam  IVPB 1000 milliGRAM(s) IV Intermittent every 12 hours  multivitamin 1 Tablet(s) Oral daily  potassium chloride   Solution 40 milliEquivalent(s) Oral once  senna 2 Tablet(s) Oral at bedtime  sodium chloride 0.9%. 1000 milliLiter(s) IV Continuous <Continuous>  thiamine 100 milliGRAM(s) Oral daily            T(F): 97.3 (08-07-21 @ 08:00), Max: 98 (08-07-21 @ 00:00)  HR: 98 (08-07-21 @ 09:00) (68 - 98)  BP: 112/72 (08-07-21 @ 09:00) (95/56 - 119/75)  RR: 15 (08-07-21 @ 09:00) (9 - 26)  SpO2: 100% (08-07-21 @ 09:00) (94% - 100%)                        8.5    4.25  )-----------( 155      ( 07 Aug 2021 05:11 )             24.9     08-07    134<L>  |  104  |  9<L>  ----------------------------<  85  3.7   |  22  |  0.6<L>    Ca    8.5      07 Aug 2021 05:11  Phos  3.2     08-07  Mg     1.7     08-07    TPro  6.6  /  Alb  4.3  /  TBili  0.4  /  DBili  x   /  AST  9   /  ALT  8   /  AlkPhos  50  08-06    PT/INR - ( 06 Aug 2021 04:55 )   PT: 13.10 sec;   INR: 1.14 ratio         PTT - ( 06 Aug 2021 04:55 )  PTT:28.1 sec    LIVER FUNCTIONS - ( 06 Aug 2021 16:39 )  Alb: 4.3 g/dL / Pro: 6.6 g/dL / ALK PHOS: 50 U/L / ALT: 8 U/L / AST: 9 U/L / GGT: x                     I&O's Detail    06 Aug 2021 07:01  -  07 Aug 2021 07:00  --------------------------------------------------------  IN:    IV PiggyBack: 100 mL    sodium chloride 0.9%: 1950 mL  Total IN: 2050 mL    OUT:    Accordian (mL): 80 mL    Indwelling Catheter - Urethral (mL): 1860 mL  Total OUT: 1940 mL    Total NET: 110 mL      07 Aug 2021 07:01  -  07 Aug 2021 09:21  --------------------------------------------------------  IN:    sodium chloride 0.9%: 150 mL  Total IN: 150 mL    OUT:    Indwelling Catheter - Urethral (mL): 175 mL  Total OUT: 175 mL    Total NET: -25 mL      Neuro Exam:    Neurological:  Mental Status: Alert and Oriented to person, place, and time, cooperative, pleasant. Affect appropriate, thought, speech, and language coherent. Short- and long-term memory, abstract thinking and calculation intact.  Cranial Nerves:  I: intact  II, III, IV, VI: PERRLA, EOM intact. Bobo intact by confrontation. No cranial nerve palsy or nystagmus noted.  V: facial sensation intact; masseter/ temporal muscles intact, corneal reflex intact bilaterally  VII: face symmetrical at rest and with expression  VIII: intact to finger rub in the right ear and left ear  IX and X: no hoarseness, gag intact, palate/ uvula rise symmetrically  XI: SCM/ trapezius strength intact bilateral  XII: no dysarthria, tongue weakness/fasiculation   Motor: Normal muscle bulk/tone. Good posture. Strength 5+/5+ upper & lower extremities  Sensory: Sensation to light touch, pain, vibration, proprioception, and stereognosis intact to trunk and bilaterally to both upper and lower extremities. Normal two point discrimination.   Cerebellar Function: Normal gait including tandem, heel and toe walking. Romberg and pronator drift negative. Normal rapid alternating movements and point to point test.  Reflexes: No clonus      Last CTH:    Last CTA/MRA:    Last CTP:    Last MRI:    Last EEG:    Last Echo:       Neuro Crit Note     HPI:  45M with PMH of GERD, previous EtOH abuse with seizures known to neurosurgery service with Dr. Byrne s/p L craniectomy for evacuation of SDH 5/7/2021. Patient coded in OR 2/2 to hypotension with massive transfusion protocol. Patient returned back to the OR with Dr. Byrne 5/16/2021 for revision of scalp wound and KIKE placement. Patient course further complicated with OR cultures (+) for E. Faecalis, subsequently causing ID starting IV Abx. Patient then was returned back to the OR for placement of Right frontal VPS Strata @ 1.5 6/7/2021. Patient did well post operatively and was downgraded from SICU to floor and was transferred to [facility]. Patient presenting here for for scheduled OR with Dr. Byrne for Revision of VPS and Cranioplasty 8/5/2021. Patient denies any HA, N/V, chest pain, fever, and abdominal pain.     PAST MEDICAL & SURGICAL HISTORY:    Alcohol abuse    GERD (gastroesophageal reflux disease)    ETOH abuse    Hypomagnesemia    Seizure disorder    H/O hemorrhoidectomy      Last 24 hour updates:      Current Medications:   ceFAZolin   IVPB 1000 milliGRAM(s) IV Intermittent every 8 hours  chlorhexidine 4% Liquid 1 Application(s) Topical <User Schedule>  folic acid 1 milliGRAM(s) Oral daily  heparin   Injectable 5000 Unit(s) SubCutaneous every 8 hours  levETIRAcetam  IVPB 1000 milliGRAM(s) IV Intermittent every 12 hours  multivitamin 1 Tablet(s) Oral daily  potassium chloride   Solution 40 milliEquivalent(s) Oral once  senna 2 Tablet(s) Oral at bedtime  sodium chloride 0.9%. 1000 milliLiter(s) IV Continuous <Continuous>  thiamine 100 milliGRAM(s) Oral daily      Vital Signs:   T(F): 97.3 (08-07-21 @ 08:00), Max: 98 (08-07-21 @ 00:00)  HR: 98 (08-07-21 @ 09:00) (68 - 98)  BP: 112/72 (08-07-21 @ 09:00) (95/56 - 119/75)  RR: 15 (08-07-21 @ 09:00) (9 - 26)  SpO2: 100% (08-07-21 @ 09:00) (94% - 100%)                        8.5    4.25  )-----------( 155      ( 07 Aug 2021 05:11 )             24.9     08-07    134<L>  |  104  |  9<L>  ----------------------------<  85  3.7   |  22  |  0.6<L>    Ca    8.5      07 Aug 2021 05:11  Phos  3.2     08-07  Mg     1.7     08-07    TPro  6.6  /  Alb  4.3  /  TBili  0.4  /  DBili  x   /  AST  9   /  ALT  8   /  AlkPhos  50  08-06    PT/INR - ( 06 Aug 2021 04:55 )   PT: 13.10 sec;   INR: 1.14 ratio         PTT - ( 06 Aug 2021 04:55 )  PTT:28.1 sec    LIVER FUNCTIONS - ( 06 Aug 2021 16:39 )  Alb: 4.3 g/dL / Pro: 6.6 g/dL / ALK PHOS: 50 U/L / ALT: 8 U/L / AST: 9 U/L / GGT: x                   I&O's Detail    06 Aug 2021 07:01  -  07 Aug 2021 07:00  --------------------------------------------------------  IN:    IV PiggyBack: 100 mL    sodium chloride 0.9%: 1950 mL  Total IN: 2050 mL    OUT:    Accordian (mL): 80 mL    Indwelling Catheter - Urethral (mL): 1860 mL  Total OUT: 1940 mL    Total NET: 110 mL      07 Aug 2021 07:01  -  07 Aug 2021 09:21  --------------------------------------------------------  IN:    sodium chloride 0.9%: 150 mL  Total IN: 150 mL    OUT:    Indwelling Catheter - Urethral (mL): 175 mL  Total OUT: 175 mL    Total NET: -25 mL      Neurologic:  Mental status: Awake, alert, oriented to person however, disoriented to place and time. Unable to name objects. Follows simple commands. Mild dysarthria, with severe aphasia.  Cranial Nerves:  II: Visual fields are full to confrontation. Pupils equally round and reactive to light, 3mm brisk bilaterally  III, IV, VI: EOMI without nystagmus  V:  V1-V3 sensation intact bilaterally   VII: No facial droop, face is symmetric with normal eye closure and smile, no loss of nasolabial folds  XII: Tongue midline with no deviation   Motor: Normal bulk and tone. Strength 5/5 in B/L UE and LE,  strength 5/5. Slight RUE pronator drift noted  No dysmetria   Sensation: Intact to light touch. No neglect.         Last CTH:    CT Head No Cont (08.05.21 @ 23:31)     IMPRESSION:    Since prior examination there hasbeen left hemispheric cranioplasty with expected postsurgical changes including subcutaneous emphysema and pneumocephalus.    Thin residual subdural collection underlying the cranioplasty with minimal midline shift to the right approximately 3 mm.    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        CT Head No Cont (08.05.21 @ 09:02)     IMPRESSION:  Preoperative evaluation for cranioplasty status post left hemispheric craniectomy.

## 2021-08-07 NOTE — PROGRESS NOTE ADULT - SUBJECTIVE AND OBJECTIVE BOX
POD# 2 S/P Left Cranioplasty for replacement 0f bone flap     pt seen and examined at bedside pt is alert , follows commands        Vital Signs Last 24 Hrs  T(C): 36.4 (07 Aug 2021 12:00), Max: 36.7 (07 Aug 2021 00:00)  T(F): 97.6 (07 Aug 2021 12:00), Max: 98 (07 Aug 2021 00:00)  HR: 92 (07 Aug 2021 13:00) (68 - 98)  BP: 115/64 (07 Aug 2021 13:00) (94/58 - 119/75)  BP(mean): 82 (07 Aug 2021 13:00) (68 - 94)  RR: 14 (07 Aug 2021 13:00) (9 - 24)  SpO2: 100% (07 Aug 2021 13:00) (94% - 100%)    I&O's Detail    06 Aug 2021 07:01  -  07 Aug 2021 07:00  --------------------------------------------------------  IN:    IV PiggyBack: 100 mL    sodium chloride 0.9%: 1950 mL  Total IN: 2050 mL    OUT:    Accordian (mL): 80 mL    Indwelling Catheter - Urethral (mL): 1860 mL  Total OUT: 1940 mL    Total NET: 110 mL      07 Aug 2021 07:01  -  07 Aug 2021 13:53  --------------------------------------------------------  IN:    sodium chloride 0.9%: 450 mL  Total IN: 450 mL    OUT:    Indwelling Catheter - Urethral (mL): 475 mL  Total OUT: 475 mL    Total NET: -25 mL        I&O's Summary    06 Aug 2021 07:01  -  07 Aug 2021 07:00  --------------------------------------------------------  IN: 2050 mL / OUT: 1940 mL / NET: 110 mL    07 Aug 2021 07:01  -  07 Aug 2021 13:53  --------------------------------------------------------  IN: 450 mL / OUT: 475 mL / NET: -25 mL        PHYSICAL EXAM:    A&OX1 ( Person ) , PERRL   MAEX4   MS bilateral UE"s 5/5        bilateral LE's  5/5   Head incision clean dry intact        KIKE  50 cc       LABS:                        8.5    4.25  )-----------( 155      ( 07 Aug 2021 05:11 )             24.9     08-07    134<L>  |  104  |  9<L>  ----------------------------<  85  3.7   |  22  |  0.6<L>    Ca    8.5      07 Aug 2021 05:11  Phos  3.2     08-07  Mg     1.7     08-07    TPro  6.6  /  Alb  4.3  /  TBili  0.4  /  DBili  x   /  AST  9   /  ALT  8   /  AlkPhos  50  08-06    PT/INR - ( 06 Aug 2021 04:55 )   PT: 13.10 sec;   INR: 1.14 ratio         PTT - ( 06 Aug 2021 04:55 )  PTT:28.1 sec      Allergies    No Known Allergies    Intolerances      MEDICATIONS:  Antibiotics:  ceFAZolin   IVPB 1000 milliGRAM(s) IV Intermittent every 8 hours    Neuro:  acetaminophen   Tablet .. 650 milliGRAM(s) Oral every 6 hours PRN  levETIRAcetam  IVPB 1000 milliGRAM(s) IV Intermittent every 12 hours  oxycodone    5 mG/acetaminophen 325 mG 1 Tablet(s) Oral every 4 hours PRN  oxycodone    5 mG/acetaminophen 325 mG 2 Tablet(s) Oral every 4 hours PRN    Anticoagulation:  heparin   Injectable 5000 Unit(s) SubCutaneous every 8 hours    OTHER:  chlorhexidine 4% Liquid 1 Application(s) Topical <User Schedule>  senna 2 Tablet(s) Oral at bedtime    IVF:  folic acid 1 milliGRAM(s) Oral daily  multivitamin 1 Tablet(s) Oral daily  sodium chloride 0.9%. 1000 milliLiter(s) IV Continuous <Continuous>  thiamine 100 milliGRAM(s) Oral daily    CULTURES:    RADIOLOGY & ADDITIONAL TESTS:      ASSESSMENT:  45y Male s/p    SUBDURAL HEMATOMA    ^BRUNILDA VIVAR    No pertinent family history in first degree relatives    Family history of essential hypertension (Father)    Handoff    MEWS Score    No pertinent past medical history    Alcohol abuse    GERD (gastroesophageal reflux disease)    ETOH abuse    Hypomagnesemia    Seizure disorder    Subdural hematoma    Subdural hematoma    Subdural hematoma    Cranioplasty, for skull defect greater than 5 cm in diameter    No significant past surgical history    H/O hemorrhoidectomy    BRUNILDA VIVAR    47    SysAdmin_VisitLink      A/p     S/P Left Cranioplasty for replacement of bone flap                   DC KIKE drain                   Transfer to floor                   PT/OT/ Rehab

## 2021-08-07 NOTE — PROGRESS NOTE ADULT - ASSESSMENT
Assessment: 45-year-old male with PMHx ETOH abuse, GERD, seizure disorder, on Keppra, h/p hemorrhoidectomy recent admission s/p fall 5/6/2021 w/ Large left SDH, s/p left hemicraniotomy evacuation of SDH, 5/7/2021 c/b cardiac arrest intra-operatively 2/2 hypotension, revision of craniotomy w/ wound revision, 5/17/2021, (+) OR cultures E. Faecalis, w/ ABTx course and Right frontal  shunt Strata @ 1.5 placement, 6/7/2021, subsequently downgraded from SICU and transferred to [facility] for rehab p/w scheduled revision of VPS and cranioplasty. Patient now POD #2 s/p left cranioplasty w/ replacement of bone flap, 8/5/2021. Pt neurological exam unchanged, hemodynamically stable.     Plan:  #Neuro:  - Neuro checks q8hrs  - HMV as per NeuroSx; assess drainage qshift; notify NCC for increased drainage  - Continue Keppra 100g q12hrs  - Continue Percocet 5/325 prn for pain  - Continue Tylenol 650mg PO q6hrs prn for temp > 38C or mild pain, level 1-3    #CV:  - Keep SBP < 140    #Resp:  - HOB > 30 degrees  - Aspiration precautions  - Keep SaO2 > 92%    #Renal/Fluid/Electrolytes:  - Strict I/Os  - Daily weights  - Keep euvolemic  - Keep normonatremic   - Keep Magnesium level > 2  - NS at 75 cc/hr, sodium Na+   - Voiding trial     #GI:  - dysphagia 3 soft nectar consistency fluid   - Continue Senna 2 tablets PO q24hrs HS  - Continue MTVI 1 tablet q24hrs  - Continue Folic acid 1mg q24hrs  - Continue Thiamine 100mg q24hrs    #Heme/Onc:  - SCS while in bed  - Sub Q heparin dvt ppx   - F/u CBC at 16:00 for downtrending hemoglobin     #Endo:  - FS qAC/HS  - Keep normoglycemic    #ID:  - Keep normothermic; avoid fevers  - Continue Ancef 1gram q8hrs while HMV in place      Code status:  Full code  Disposition: 4C     Spoke to Torrie NAVARRO Neurosurgery     Alyson Arcos NP   Extension: #[phone]

## 2021-08-08 LAB
ALBUMIN SERPL ELPH-MCNC: 4.2 G/DL — SIGNIFICANT CHANGE UP (ref 3.5–5.2)
ALP SERPL-CCNC: 53 U/L — SIGNIFICANT CHANGE UP (ref 30–115)
ALT FLD-CCNC: 8 U/L — SIGNIFICANT CHANGE UP (ref 0–41)
ANION GAP SERPL CALC-SCNC: 11 MMOL/L — SIGNIFICANT CHANGE UP (ref 7–14)
AST SERPL-CCNC: 11 U/L — SIGNIFICANT CHANGE UP (ref 0–41)
BILIRUB SERPL-MCNC: 0.3 MG/DL — SIGNIFICANT CHANGE UP (ref 0.2–1.2)
BUN SERPL-MCNC: 6 MG/DL — LOW (ref 10–20)
CALCIUM SERPL-MCNC: 9.4 MG/DL — SIGNIFICANT CHANGE UP (ref 8.5–10.1)
CHLORIDE SERPL-SCNC: 102 MMOL/L — SIGNIFICANT CHANGE UP (ref 98–110)
CO2 SERPL-SCNC: 23 MMOL/L — SIGNIFICANT CHANGE UP (ref 17–32)
CREAT SERPL-MCNC: 0.7 MG/DL — SIGNIFICANT CHANGE UP (ref 0.7–1.5)
GLUCOSE SERPL-MCNC: 80 MG/DL — SIGNIFICANT CHANGE UP (ref 70–99)
HCT VFR BLD CALC: 30.4 % — LOW (ref 42–52)
HGB BLD-MCNC: 10.2 G/DL — LOW (ref 14–18)
MAGNESIUM SERPL-MCNC: 1.9 MG/DL — SIGNIFICANT CHANGE UP (ref 1.8–2.4)
MCHC RBC-ENTMCNC: 25.8 PG — LOW (ref 27–31)
MCHC RBC-ENTMCNC: 33.6 G/DL — SIGNIFICANT CHANGE UP (ref 32–37)
MCV RBC AUTO: 77 FL — LOW (ref 80–94)
NRBC # BLD: 0 /100 WBCS — SIGNIFICANT CHANGE UP (ref 0–0)
PHOSPHATE SERPL-MCNC: 4.8 MG/DL — SIGNIFICANT CHANGE UP (ref 2.1–4.9)
PLATELET # BLD AUTO: 196 K/UL — SIGNIFICANT CHANGE UP (ref 130–400)
POTASSIUM SERPL-MCNC: 3.7 MMOL/L — SIGNIFICANT CHANGE UP (ref 3.5–5)
POTASSIUM SERPL-SCNC: 3.7 MMOL/L — SIGNIFICANT CHANGE UP (ref 3.5–5)
PROT SERPL-MCNC: 6.6 G/DL — SIGNIFICANT CHANGE UP (ref 6–8)
RBC # BLD: 3.95 M/UL — LOW (ref 4.7–6.1)
RBC # FLD: 14.2 % — SIGNIFICANT CHANGE UP (ref 11.5–14.5)
SODIUM SERPL-SCNC: 136 MMOL/L — SIGNIFICANT CHANGE UP (ref 135–146)
WBC # BLD: 4.86 K/UL — SIGNIFICANT CHANGE UP (ref 4.8–10.8)
WBC # FLD AUTO: 4.86 K/UL — SIGNIFICANT CHANGE UP (ref 4.8–10.8)

## 2021-08-08 RX ADMIN — HEPARIN SODIUM 5000 UNIT(S): 5000 INJECTION INTRAVENOUS; SUBCUTANEOUS at 05:46

## 2021-08-08 RX ADMIN — Medication 1 MILLIGRAM(S): at 11:11

## 2021-08-08 RX ADMIN — SODIUM CHLORIDE 75 MILLILITER(S): 9 INJECTION INTRAMUSCULAR; INTRAVENOUS; SUBCUTANEOUS at 07:14

## 2021-08-08 RX ADMIN — SODIUM CHLORIDE 75 MILLILITER(S): 9 INJECTION INTRAMUSCULAR; INTRAVENOUS; SUBCUTANEOUS at 21:21

## 2021-08-08 RX ADMIN — LEVETIRACETAM 400 MILLIGRAM(S): 250 TABLET, FILM COATED ORAL at 05:46

## 2021-08-08 RX ADMIN — Medication 100 MILLIGRAM(S): at 21:18

## 2021-08-08 RX ADMIN — Medication 100 MILLIGRAM(S): at 14:18

## 2021-08-08 RX ADMIN — SENNA PLUS 2 TABLET(S): 8.6 TABLET ORAL at 21:21

## 2021-08-08 RX ADMIN — HEPARIN SODIUM 5000 UNIT(S): 5000 INJECTION INTRAVENOUS; SUBCUTANEOUS at 14:18

## 2021-08-08 RX ADMIN — CHLORHEXIDINE GLUCONATE 1 APPLICATION(S): 213 SOLUTION TOPICAL at 05:45

## 2021-08-08 RX ADMIN — Medication 1 TABLET(S): at 11:11

## 2021-08-08 RX ADMIN — Medication 100 MILLIGRAM(S): at 11:11

## 2021-08-08 RX ADMIN — Medication 100 MILLIGRAM(S): at 05:44

## 2021-08-08 RX ADMIN — LEVETIRACETAM 400 MILLIGRAM(S): 250 TABLET, FILM COATED ORAL at 18:29

## 2021-08-08 RX ADMIN — HEPARIN SODIUM 5000 UNIT(S): 5000 INJECTION INTRAVENOUS; SUBCUTANEOUS at 21:21

## 2021-08-08 NOTE — PROGRESS NOTE ADULT - SUBJECTIVE AND OBJECTIVE BOX
POD # 3    S/P Left Cranioplasty for replacement of bone flap       pt seen and examined at bedside pt is alert , follows commands         Vital Signs Last 24 Hrs  T(C): 35.9 (08 Aug 2021 09:15), Max: 36.8 (07 Aug 2021 17:00)  T(F): 96.6 (08 Aug 2021 09:15), Max: 98.2 (07 Aug 2021 17:00)  HR: 70 (08 Aug 2021 09:15) (68 - 94)  BP: 127/71 (08 Aug 2021 09:15) (94/58 - 127/71)  BP(mean): 82 (07 Aug 2021 13:00) (69 - 82)  RR: 18 (08 Aug 2021 09:15) (14 - 19)  SpO2: 100% (08 Aug 2021 09:15) (98% - 100%)    PHYSICAL EXAM:    A&OX1 ( person ) , PERRL   EOM ( I )   MAEX4   MS bilateral UE"s 5/5        bilateral LE;s 5/5   incision clean dry intact       MEDICATIONS:  Antibiotics:  ceFAZolin   IVPB 1000 milliGRAM(s) IV Intermittent every 8 hours    Neuro:  acetaminophen   Tablet .. 650 milliGRAM(s) Oral every 6 hours PRN  levETIRAcetam  IVPB 1000 milliGRAM(s) IV Intermittent every 12 hours  oxycodone    5 mG/acetaminophen 325 mG 1 Tablet(s) Oral every 4 hours PRN  oxycodone    5 mG/acetaminophen 325 mG 2 Tablet(s) Oral every 4 hours PRN    Anticoagulation:  heparin   Injectable 5000 Unit(s) SubCutaneous every 8 hours    OTHER:  chlorhexidine 4% Liquid 1 Application(s) Topical <User Schedule>  senna 2 Tablet(s) Oral at bedtime    IVF:  folic acid 1 milliGRAM(s) Oral daily  multivitamin 1 Tablet(s) Oral daily  sodium chloride 0.9%. 1000 milliLiter(s) IV Continuous <Continuous>  thiamine 100 milliGRAM(s) Oral daily        LABS:                        10.2   4.86  )-----------( 196      ( 08 Aug 2021 07:00 )             30.4     08-08    136  |  102  |  6<L>  ----------------------------<  80  3.7   |  23  |  0.7    Ca    9.4      08 Aug 2021 07:00  Phos  4.8     08-08  Mg     1.9     08-08    TPro  6.6  /  Alb  4.2  /  TBili  0.3  /  DBili  x   /  AST  11  /  ALT  8   /  AlkPhos  53  08-08          A/p                   S/P Left Cranioplasty for replacement of  bone flap                                             PT /rehab

## 2021-08-09 RX ADMIN — HEPARIN SODIUM 5000 UNIT(S): 5000 INJECTION INTRAVENOUS; SUBCUTANEOUS at 22:07

## 2021-08-09 RX ADMIN — LEVETIRACETAM 400 MILLIGRAM(S): 250 TABLET, FILM COATED ORAL at 05:10

## 2021-08-09 RX ADMIN — CHLORHEXIDINE GLUCONATE 1 APPLICATION(S): 213 SOLUTION TOPICAL at 05:10

## 2021-08-09 RX ADMIN — Medication 1 MILLIGRAM(S): at 11:27

## 2021-08-09 RX ADMIN — Medication 1 TABLET(S): at 11:27

## 2021-08-09 RX ADMIN — SENNA PLUS 2 TABLET(S): 8.6 TABLET ORAL at 22:07

## 2021-08-09 RX ADMIN — Medication 100 MILLIGRAM(S): at 05:10

## 2021-08-09 RX ADMIN — HEPARIN SODIUM 5000 UNIT(S): 5000 INJECTION INTRAVENOUS; SUBCUTANEOUS at 13:09

## 2021-08-09 RX ADMIN — LEVETIRACETAM 400 MILLIGRAM(S): 250 TABLET, FILM COATED ORAL at 17:01

## 2021-08-09 RX ADMIN — Medication 100 MILLIGRAM(S): at 13:09

## 2021-08-09 RX ADMIN — Medication 100 MILLIGRAM(S): at 11:27

## 2021-08-09 RX ADMIN — HEPARIN SODIUM 5000 UNIT(S): 5000 INJECTION INTRAVENOUS; SUBCUTANEOUS at 05:11

## 2021-08-09 RX ADMIN — Medication 100 MILLIGRAM(S): at 22:07

## 2021-08-09 NOTE — PROGRESS NOTE ADULT - SUBJECTIVE AND OBJECTIVE BOX
Hospital Course:   POD# 4, s/p left cranioplasty, TBI      Vital Signs Last 24 Hrs  T(C): 36.8 (09 Aug 2021 09:00), Max: 36.8 (09 Aug 2021 09:00)  T(F): 98.2 (09 Aug 2021 09:00), Max: 98.2 (09 Aug 2021 09:00)  HR: 91 (09 Aug 2021 09:00) (74 - 91)  BP: 117/72 (09 Aug 2021 09:00) (100/58 - 137/81)  BP(mean): --  RR: 18 (09 Aug 2021 09:00) (18 - 18)  SpO2: 100% (09 Aug 2021 09:00) (97% - 100%)    I&O's Detail    08 Aug 2021 07:01  -  09 Aug 2021 07:00  --------------------------------------------------------  IN:    sodium chloride 0.9%: 825 mL  Total IN: 825 mL    OUT:    Voided (mL): 725 mL  Total OUT: 725 mL    Total NET: 100 mL      09 Aug 2021 07:01  -  09 Aug 2021 11:51  --------------------------------------------------------  IN:  Total IN: 0 mL    OUT:    Voided (mL): 950 mL  Total OUT: 950 mL    Total NET: -950 mL        I&O's Summary    08 Aug 2021 07:01  -  09 Aug 2021 07:00  --------------------------------------------------------  IN: 825 mL / OUT: 725 mL / NET: 100 mL    09 Aug 2021 07:01  -  09 Aug 2021 11:51  --------------------------------------------------------  IN: 0 mL / OUT: 950 mL / NET: -950 mL        PHYSICAL EXAM:  Neurological: awake, alert to self, confused at times, poor memory cooperative, follows commands, FOLEY with improving strength no drift grasp improving, verbalizing well, tongue midline, in good spirits      Incision/Wound: healing well, no drainage, staples intact    LABS:                        10.2   4.86  )-----------( 196      ( 08 Aug 2021 07:00 )             30.4     08-08    136  |  102  |  6<L>  ----------------------------<  80  3.7   |  23  |  0.7    Ca    9.4      08 Aug 2021 07:00  Phos  4.8     08-08  Mg     1.9     08-08    TPro  6.6  /  Alb  4.2  /  TBili  0.3  /  DBili  x   /  AST  11  /  ALT  8   /  AlkPhos  53  08-08            CAPILLARY BLOOD GLUCOSE          Drug Levels: [] N/A    CSF Analysis: [] N/A      Allergies    No Known Allergies    Intolerances      MEDICATIONS:  Antibiotics:  ceFAZolin   IVPB 1000 milliGRAM(s) IV Intermittent every 8 hours    Neuro:  acetaminophen   Tablet .. 650 milliGRAM(s) Oral every 6 hours PRN  levETIRAcetam  IVPB 1000 milliGRAM(s) IV Intermittent every 12 hours  oxycodone    5 mG/acetaminophen 325 mG 1 Tablet(s) Oral every 4 hours PRN  oxycodone    5 mG/acetaminophen 325 mG 2 Tablet(s) Oral every 4 hours PRN    Anticoagulation:  heparin   Injectable 5000 Unit(s) SubCutaneous every 8 hours    OTHER:  chlorhexidine 4% Liquid 1 Application(s) Topical <User Schedule>  senna 2 Tablet(s) Oral at bedtime    IVF:  folic acid 1 milliGRAM(s) Oral daily  multivitamin 1 Tablet(s) Oral daily  sodium chloride 0.9%. 1000 milliLiter(s) IV Continuous <Continuous>  thiamine 100 milliGRAM(s) Oral daily    CULTURES:    RADIOLOGY & ADDITIONAL TESTS:      ASSESSMENT:  45y Male s/p cranioplasty, TBI    SUBDURAL HEMATOMA    ^SUBDURAL HEMATOMA    No pertinent family history in first degree relatives    Family history of essential hypertension (Father)    Handoff    MEWS Score    No pertinent past medical history    Alcohol abuse    GERD (gastroesophageal reflux disease)    ETOH abuse    Hypomagnesemia    Seizure disorder    Subdural hematoma    Subdural hematoma    Subdural hematoma    Cranioplasty, for skull defect greater than 5 cm in diameter    No significant past surgical history    H/O hemorrhoidectomy    BRUNILDA MALONE [place]    47    SysAdmin_VisitLink        PLAN: patient needs TBI rehab, PT for ambulation

## 2021-08-10 ENCOUNTER — TRANSCRIPTION ENCOUNTER (OUTPATIENT)
Age: 46
End: 2021-08-10

## 2021-08-10 PROCEDURE — 99251: CPT

## 2021-08-10 RX ORDER — POLYETHYLENE GLYCOL 3350 17 G/17G
17 POWDER, FOR SOLUTION ORAL
Qty: 0 | Refills: 0 | DISCHARGE
Start: 2021-08-10

## 2021-08-10 RX ORDER — HEPARIN SODIUM 5000 [USP'U]/ML
5000 INJECTION INTRAVENOUS; SUBCUTANEOUS
Qty: 0 | Refills: 0 | DISCHARGE
Start: 2021-08-10

## 2021-08-10 RX ORDER — SENNA PLUS 8.6 MG/1
2 TABLET ORAL
Qty: 0 | Refills: 0 | DISCHARGE
Start: 2021-08-10

## 2021-08-10 RX ORDER — ACETAMINOPHEN 500 MG
2 TABLET ORAL
Qty: 0 | Refills: 0 | DISCHARGE
Start: 2021-08-10

## 2021-08-10 RX ORDER — POLYETHYLENE GLYCOL 3350 17 G/17G
17 POWDER, FOR SOLUTION ORAL DAILY
Refills: 0 | Status: DISCONTINUED | OUTPATIENT
Start: 2021-08-10 | End: 2021-08-15

## 2021-08-10 RX ADMIN — HEPARIN SODIUM 5000 UNIT(S): 5000 INJECTION INTRAVENOUS; SUBCUTANEOUS at 21:11

## 2021-08-10 RX ADMIN — CHLORHEXIDINE GLUCONATE 1 APPLICATION(S): 213 SOLUTION TOPICAL at 05:09

## 2021-08-10 RX ADMIN — Medication 1 MILLIGRAM(S): at 11:19

## 2021-08-10 RX ADMIN — LEVETIRACETAM 400 MILLIGRAM(S): 250 TABLET, FILM COATED ORAL at 17:14

## 2021-08-10 RX ADMIN — POLYETHYLENE GLYCOL 3350 17 GRAM(S): 17 POWDER, FOR SOLUTION ORAL at 11:19

## 2021-08-10 RX ADMIN — HEPARIN SODIUM 5000 UNIT(S): 5000 INJECTION INTRAVENOUS; SUBCUTANEOUS at 05:10

## 2021-08-10 RX ADMIN — Medication 100 MILLIGRAM(S): at 11:19

## 2021-08-10 RX ADMIN — Medication 1 TABLET(S): at 11:19

## 2021-08-10 RX ADMIN — HEPARIN SODIUM 5000 UNIT(S): 5000 INJECTION INTRAVENOUS; SUBCUTANEOUS at 13:16

## 2021-08-10 RX ADMIN — Medication 100 MILLIGRAM(S): at 05:09

## 2021-08-10 RX ADMIN — LEVETIRACETAM 400 MILLIGRAM(S): 250 TABLET, FILM COATED ORAL at 05:09

## 2021-08-10 RX ADMIN — SENNA PLUS 2 TABLET(S): 8.6 TABLET ORAL at 21:11

## 2021-08-10 NOTE — DISCHARGE NOTE PROVIDER - NSDCMRMEDTOKEN_GEN_ALL_CORE_FT
acetaminophen 325 mg oral tablet: 2 tab(s) orally every 6 hours, As needed, Temp greater or equal to 38C (100.4F), Mild Pain (1 - 3)  folic acid 1 mg oral tablet: 1 tab(s) orally once a day  heparin: 5000 unit(s) subcutaneous every 8 hours  levETIRAcetam 1000 mg oral tablet: 1 tab(s) orally 2 times a day     Likely To be stopped after you see Dr. Padron ( neurologist)  Multiple Vitamins oral tablet: 1 tab(s) orally once a day  oxycodone-acetaminophen 5 mg-325 mg oral tablet: 1 tab(s) orally every 4 hours, As needed, Moderate Pain (4 - 6)  oxycodone-acetaminophen 5 mg-325 mg oral tablet: 2 tab(s) orally every 4 hours, As needed, Severe Pain (7 - 10)  polyethylene glycol 3350 oral powder for reconstitution: 17 gram(s) orally once a day  senna oral tablet: 2 tab(s) orally once a day (at bedtime)  thiamine 100 mg oral tablet: 1 tab(s) orally once a day   folic acid 1 mg oral tablet: 1 tab(s) orally once a day  levETIRAcetam 1000 mg oral tablet: 1 tab(s) orally 2 times a day     Likely To be stopped after you see Dr. Padron ( neurologist)  Multiple Vitamins oral tablet: 1 tab(s) orally once a day  polyethylene glycol 3350 oral powder for reconstitution: 17 gram(s) orally once a day  senna oral tablet: 2 tab(s) orally once a day (at bedtime)  thiamine 100 mg oral tablet: 1 tab(s) orally once a day   folic acid 1 mg oral tablet: 1 tab(s) orally once a day MDD:1  levETIRAcetam 1000 mg oral tablet: 1 tab(s) orally 2 times a day     Likely To be stopped after you see Dr. Padron ( neurologist) MDD:40  Multiple Vitamins oral tablet: 1 tab(s) orally once a day  polyethylene glycol 3350 oral powder for reconstitution: 17 gram(s) orally once a day  senna oral tablet: 2 tab(s) orally once a day (at bedtime)  thiamine 100 mg oral tablet: 1 tab(s) orally once a day MDD:1

## 2021-08-10 NOTE — PROGRESS NOTE ADULT - ASSESSMENT
45M with etOH abuse found unresponsive with SDH / SAH s/p craniectomy with discarded bone flap coded in OR 5/2021 with wound revision x2, now POD#1 s/p L cranioplasty with replacement of bone flap 8/6/2021     PLAN   - PT/OT/Rehab   - Q4 neurochecks   - Pain control   - Dispo planning   - Discussed case with attending

## 2021-08-10 NOTE — DISCHARGE NOTE PROVIDER - NSDCACTIVITY_GEN_ALL_CORE
Showering allowed/Stairs allowed/Walking - Indoors allowed/Walking - Outdoors allowed Do not drive or operate machinery/Showering allowed/Do not make important decisions/Stairs allowed/Walking - Indoors allowed/No heavy lifting/straining/Walking - Outdoors allowed

## 2021-08-10 NOTE — DISCHARGE NOTE PROVIDER - CARE PROVIDER_API CALL
Eleazar Byrne)  [facility]  [address] [place] [address], [address]  [place] [address]  Phone: [phone]  Fax: [phone]  Follow Up Time: 1 week

## 2021-08-10 NOTE — DISCHARGE NOTE PROVIDER - NSDCCPTREATMENT_GEN_ALL_CORE_FT
PRINCIPAL PROCEDURE  Procedure: Cranioplasty for skull defect larger than 5 cm diameter  Findings and Treatment:

## 2021-08-10 NOTE — DISCHARGE NOTE PROVIDER - NSDCFUADDINST_GEN_ALL_CORE_FT
- Upon discharge,  please call to schedule a follow up with Dr. Byrne in 1-2 weeks.  - Upon discharge, please call to make a follow up appointment with your primary care provider to discuss your recent hospitalization/operation.  - Keep dressings dry for 48 hours after which you may remove dressing and cleanse with soap and water in the shower (no scrubbing). Leave the steri strips (white tape) on your incisions, they will fall off on their own. Run water and soap over incision sites and pat dry (no scrubbing). No submerging your incision sites in water (i.e. no swimming or baths) for 2-3 weeks and avoid exposing the area to jets/streams of water.   - You can resume your normal activities as tolerated, but avoid heavy (>15lb.) lifting and strenuous exercise for 4-6 weeks.    - ***You were prescribed narcotic pain medications, take these only as needed.  Your pain should subside over the next few days.  While taking narcotic pain medications, you should not drive or operate heavy machinery.  If you were prescribed Percocet (oxycodone-acetaminophen) and are taking it with other medications containing acetaminophen (Tylenol), reduce your dose of percocet so that you  do not exceed 3,000 mg of acetaminophen per day.  - Narcotic pain medicine tends to cause constipation, you have can take an over-the-counter stool softener 3 times a day to help prevent that. Hold the stool softener if you start to have loose stools.  - If you experience fevers, chills, increasing abdominal pain, nausea, vomiting, inability to pass stool or gas, bleeding, or any other acute symptoms, please call your doctor and report to the emergency room immediately for further management.

## 2021-08-10 NOTE — CONSULT NOTE ADULT - SUBJECTIVE AND OBJECTIVE BOX
INTERVENTIONAL RADIOLOGY CONSULT:     Procedure Requested: Removal of gastrostomy tube    HPI:  45M with PMH of GERD, previous EtOH abuse with seizures known to neurosurgery service with Dr. Byrne s/p L craniectomy for evacuation of SDH 5/7/2021. Patient coded in OR 2/2 to hypotension with massive transfusion protocol. Patient returned back to the OR with Dr. Byrne 5/16/2021 for revision of scalp wound and KIKE placement. Patient course further complicated with OR cultures (+) for E. Faecalis, subsequently causing ID starting IV Abx. Patient then was returned back to the OR for placement of Right frontal VPS Strata @ 1.5 6/7/2021. Patient did well post operatively and was downgraded from SICU to floor and was transferred to [facility]. Patient presenting here for for scheduled OR with Dr. Byrne for Revision of VPS and Cranioplasty 8/5/2021. Patient denies any HA, N/V, chest pain, fever, and abdominal pain. (04 Aug 2021 18:12)      PAST MEDICAL & SURGICAL HISTORY:  Alcohol abuse    GERD (gastroesophageal reflux disease)    ETOH abuse    Hypomagnesemia    Seizure disorder    H/O hemorrhoidectomy        MEDICATIONS  (STANDING):  chlorhexidine 4% Liquid 1 Application(s) Topical <User Schedule>  folic acid 1 milliGRAM(s) Oral daily  heparin   Injectable 5000 Unit(s) SubCutaneous every 8 hours  levETIRAcetam  IVPB 1000 milliGRAM(s) IV Intermittent every 12 hours  multivitamin 1 Tablet(s) Oral daily  polyethylene glycol 3350 17 Gram(s) Oral daily  senna 2 Tablet(s) Oral at bedtime  thiamine 100 milliGRAM(s) Oral daily    MEDICATIONS  (PRN):  acetaminophen   Tablet .. 650 milliGRAM(s) Oral every 6 hours PRN Temp greater or equal to 38C (100.4F), Mild Pain (1 - 3)  oxycodone    5 mG/acetaminophen 325 mG 2 Tablet(s) Oral every 4 hours PRN Severe Pain (7 - 10)  oxycodone    5 mG/acetaminophen 325 mG 1 Tablet(s) Oral every 4 hours PRN Moderate Pain (4 - 6)    Allergies  No Known Allergies    FAMILY HISTORY:  Family history of essential hypertension (Father)    Physical Exam:   Vital Signs Last 24 Hrs  T(C): 37.2 (10 Aug 2021 17:16), Max: 37.2 (09 Aug 2021 21:18)  T(F): 98.9 (10 Aug 2021 17:16), Max: 98.9 (09 Aug 2021 21:18)  HR: 94 (10 Aug 2021 17:16) (63 - 94)  BP: 107/72 (10 Aug 2021 17:16) (101/59 - 119/66)  BP(mean): --  RR: 18 (10 Aug 2021 17:16) (18 - 20)  SpO2: 98% (10 Aug 2021 17:16) (97% - 99%)    Radiology & Additional Studies:   Radiology imaging reviewed.     ASSESSMENT/ PLAN:   44 yo M with gastrostomy tube in place. IR consulted for removal as patient is now able to tolerate PO. Plan for removal tomorrow, 8/11    Thank you for the courtesy of this consult, please call [phone] with any further questions.   
HPI:  45M with PMH of GERD, previous EtOH abuse with seizures known to neurosurgery service with Dr. Byrne s/p L craniectomy for evacuation of SDH 5/7/2021. Patient coded in OR 2/2 to hypotension with massive transfusion protocol. Patient returned back to the OR with Dr. Byrne 5/16/2021 for revision of scalp wound and KIKE placement. Patient course further complicated with OR cultures (+) for E. Faecalis, subsequently causing ID starting IV Abx. Patient then was returned back to the OR for placement of Right frontal VPS Strata @ 1.5 6/7/2021. Patient did well post operatively and was downgraded from SICU to floor and was transferred to [facility]. Patient presenting here for for scheduled OR with Dr. Byrne for Revision of VPS and Cranioplasty 8/5/2021. Patient denies any HA, N/V, chest pain, fever, and abdominal pain.         PAST MEDICAL & SURGICAL HISTORY:  Alcohol abuse    GERD (gastroesophageal reflux disease)    ETOH abuse    Hypomagnesemia    Seizure disorder    H/O hemorrhoidectomy        Hospital Course:    TODAY'S SUBJECTIVE & REVIEW OF SYMPTOMS:     Constitutional WNL   Cardio WNL   Resp WNL   GI WNL  Heme WNL  Endo WNL  Skin WNL  MSK pain  Neuro WNL  Cognitive WNL  Psych WNL      MEDICATIONS  (STANDING):  ceFAZolin   IVPB 1000 milliGRAM(s) IV Intermittent every 8 hours  chlorhexidine 4% Liquid 1 Application(s) Topical <User Schedule>  famotidine    Tablet 20 milliGRAM(s) Oral daily  folic acid 1 milliGRAM(s) Oral daily  heparin   Injectable 5000 Unit(s) SubCutaneous every 8 hours  levETIRAcetam  IVPB 1000 milliGRAM(s) IV Intermittent every 12 hours  multivitamin 1 Tablet(s) Oral daily  senna 2 Tablet(s) Oral at bedtime  sodium chloride 0.9%. 1000 milliLiter(s) (100 mL/Hr) IV Continuous <Continuous>  thiamine 100 milliGRAM(s) Oral daily    MEDICATIONS  (PRN):  acetaminophen   Tablet .. 650 milliGRAM(s) Oral every 6 hours PRN Temp greater or equal to 38C (100.4F), Mild Pain (1 - 3)  oxycodone    5 mG/acetaminophen 325 mG 1 Tablet(s) Oral every 4 hours PRN Moderate Pain (4 - 6)  oxycodone    5 mG/acetaminophen 325 mG 2 Tablet(s) Oral every 4 hours PRN Severe Pain (7 - 10)      FAMILY HISTORY:  Family history of essential hypertension (Father)        Allergies    No Known Allergies    Intolerances        SOCIAL HISTORY:    [  ] Etoh  [  ] Smoking  [  ] Substance abuse     Home Environment:  [   ] Home Alone  [   ] Lives with Family  [   ] Home Health Aid    Dwelling:  [   ] Apartment  [   ] Private House  [   ] Adult Home  [   ] Skilled Nursing Facility      [  x ] Short Term  [   ] Long Term  [   ] Stairs       Elevator [   ]    FUNCTIONAL STATUS PTA: (Check all that apply)  Ambulation: [    ]Independent    [ x  ] Dependent     [   ] Non-Ambulatory  Assistive Device: [   ] SA Cane  [   ]  Q Cane  [  x ] Walker  [   ]  Wheelchair  ADL : [   ] Independent  [  x  ]  Dependent       Vital Signs Last 24 Hrs  T(C): 36.1 (06 Aug 2021 08:00), Max: 36.7 (05 Aug 2021 13:35)  T(F): 97 (06 Aug 2021 08:00), Max: 98.1 (05 Aug 2021 13:35)  HR: 82 (06 Aug 2021 10:00) (54 - 88)  BP: 119/67 (06 Aug 2021 10:00) (93/56 - 151/87)  BP(mean): 86 (06 Aug 2021 10:00) (70 - 99)  RR: 26 (06 Aug 2021 10:00) (6 - 26)  SpO2: 98% (06 Aug 2021 10:00) (96% - 100%)      PHYSICAL EXAM: Awake & Alert  GENERAL: NAD  HEAD:  Normocephalic  CHEST/LUNG: Clear   HEART: S1S2+  ABDOMEN: Soft, Nontender  EXTREMITIES:  no calf tenderness    NERVOUS SYSTEM:  Cranial Nerves 2-12 intact [   ] Abnormal  [   ]  ROM: WFL all extremities [  x ]  Abnormal [   ]  Motor Strength: WFL all extremities  [ x  ]  Abnormal [   ]  Sensation: intact to light touch [ x  ] Abnormal [   ]    FUNCTIONAL STATUS:  Bed Mobility: Independent [   ]  Supervision [   ]  Needs Assistance [ x  ]  N/A [   ]  Transfers: Independent [   ]  Supervision [   ]  Needs Assistance [ x  ]  N/A [   ]   Ambulation: Independent [   ]  Supervision [   ]  Needs Assistance [   ]  N/A [   ]  ADL: Independent [   ] Requires Assistance [   ] N/A [   ]      LABS:                        10.7   6.06  )-----------( 211      ( 06 Aug 2021 04:55 )             31.3     08-06    130<L>  |  98  |  9<L>  ----------------------------<  122<H>  4.5   |  22  |  0.6<L>    Ca    9.3      06 Aug 2021 04:55  Phos  5.0     08-06  Mg     1.6     08-06    TPro  7.2  /  Alb  4.4  /  TBili  0.2  /  DBili  x   /  AST  11  /  ALT  9   /  AlkPhos  63  08-04    PT/INR - ( 06 Aug 2021 04:55 )   PT: 13.10 sec;   INR: 1.14 ratio         PTT - ( 06 Aug 2021 04:55 )  PTT:28.1 sec      RADIOLOGY & ADDITIONAL STUDIES:  
Specialty: Neuro Critical Care     Interval Hx: 45-year-old male p/w scheduled revision of VPS and cranioplasty. Patient now POD #0 s/p left cranioplasty w/ replacement of bone flap, 2021.     HPI:  45M with PMH of GERD, previous EtOH abuse with seizures known to neurosurgery service with Dr. Byrne s/p L craniectomy for evacuation of SDH 2021. Patient coded in OR 2/2 to hypotension with massive transfusion protocol. Patient returned back to the OR with Dr. Byrne 2021 for revision of scalp wound and KIKE placement. Patient course further complicated with OR cultures (+) for E. Faecalis, subsequently causing ID starting IV Abx. Patient then was returned back to the OR for placement of Right frontal VPS Strata @ 1.5 2021. Patient did well post operatively and was downgraded from SICU to floor and was transferred to [facility]. Patient presenting here for for scheduled OR with Dr. Byrne for Revision of VPS and Cranioplasty 2021. Patient denies any HA, N/V, chest pain, fever, and abdominal pain. (04 Aug 2021 18:12)    Past Medical and Surgical History:  Alcohol abuse  GERD (gastroesophageal reflux disease)  ETOH abuse  Hypomagnesemia  Seizure disorder  H/O hemorrhoidectomy    Allergies: No Known Allergies    ROS: Patient endorses slight headache and incisional pain. Patient denies nausea/vomiting, blurred vision, double vision, or dizziness. Otherwise, 10-point ROS is negative.     PE:  General: well-nourished male of appropriately stated age, sedated on, on no sedation, lying supine in bed. NAD  Head: Dressing C/D/I, no evidence of bleeding. HMV accordion drain in place  ENT: No tonsillar erythema, exudates, or enlargement; Moist mucous membranes, Good dentition, No lesions  Eyes: EOMI, PERRLA, conjunctiva and sclera clear  NECK: Supple, No JVD, Normal thyroid, no enlarged nodes  Chest/Lungs: B/L good air entry, B/L CTA; No rales, rhonchi, or wheezing.   Heart: S1S2 normal, no S3, Regular rate and rhythm; No murmurs, rubs, or gallops  Abdomen: Soft, Nontender, Nondistended; Bowel sounds present  Extremities:  2+ Peripheral Pulses, No clubbing, cyanosis, or edema  Lymph: No lymphadenopathy noted  Skin: No rashes or lesions    Neurologic:  Mental status: Awake, alert, oriented to person however, disoriented to place and time. Unable to name objects. Follows simple commands. Mild dysarthria, with aphasia.  Cranial Nerves:  II: Visual fields are full to confrontation. Pupils equally round and reactive to light, 3mm brisk bilaterally  III, IV, VI: EOMI without nystagmus  V:  V1-V3 sensation intact bilaterally   VII: No facial droop, face is symmetric with normal eye closure and smile, no loss of nasolabial folds  XII: Tongue midline with no deviation   Motor: Normal bulk and tone. Strength 5/5 in B/L UE and LE,  strength 5/5. Slight RUE pronator drift noted  Sensation: Intact to light touch. No neglect.    NIHSS:  ·  1a Level of Consciousness: 0  ·  1b Level of Consciousness: 2 (unable to state month and age)	  ·  1c Level of Consciousness: 0  ·  2 Best Gaze: 0   ·  3 Visual: 0  ·  4 Facial Palsy: 0  ·  5a Motor Left Arm: 0  ·  5b Motor Right Arm: 1 (drift, does not hit bed) 	  ·  6a Motor Left Le  ·  6b Motor Right Le    ·  7 Limb Ataxia: 0  ·  8 Sensory: 0  ·  9 Best language: 2 (severe aphasia)  ·  10 Dysarthria: 1 (mild dysarthria)  ·  11 Extinction and Inattention: 0    Total NIHSS Score = 7    Vital Signs:  ICU Vital Signs Last 24 Hrs  T(C): 36.7 (05 Aug 2021 13:57), Max: 36.8 (04 Aug 2021 22:56)  T(F): 98.1 (05 Aug 2021 13:35), Max: 98.2 (04 Aug 2021 22:56)  HR: 83 (05 Aug 2021 13:57) (58 - 83)  BP: 151/87 (05 Aug 2021 13:57) (101/62 - 151/87)  BP(mean): --  ABP: --  ABP(mean): --  RR: 18 (05 Aug 2021 13:57) (18 - 18)  SpO2: 100% (05 Aug 2021 06:57) (100% - 100%)    I&O's Detail:  05 Aug 2021 07:01  -  05 Aug 2021 18:18  --------------------------------------------------------  IN:  Total IN: 0 mL    OUT:    Voided (mL): 600 mL  Total OUT: 600 mL    Total NET: -600 mL    Labs:      135  |  100  |  12  ----------------------------<  87  4.1   |  25  |  0.8    Ca    9.9      04 Aug 2021 18:05    TPro  7.2  /  Alb  4.4  /  TBili  0.2  /  DBili  x   /  AST  11  /  ALT  9   /  AlkPhos  63                            11.8   5.45  )-----------( 259      ( 04 Aug 2021 18:05 )             34.9     PT/INR - ( 04 Aug 2021 18:05 )   PT: 12.40 sec;   INR: 1.08 ratio    PTT - ( 04 Aug 2021 18:05 )  PTT:31.0 sec  LIVER FUNCTIONS - ( 04 Aug 2021 18:05 )  Alb: 4.4 g/dL / Pro: 7.2 g/dL / ALK PHOS: 63 U/L / ALT: 9 U/L / AST: 11 U/L / GGT: x           Medications Current and PRN:  MEDICATIONS  (STANDING):  ceFAZolin   IVPB 1000 milliGRAM(s) IV Intermittent every 8 hours  folic acid 1 milliGRAM(s) Oral daily  levETIRAcetam 1000 milliGRAM(s) Oral two times a day  multivitamin 1 Tablet(s) Oral daily  pantoprazole    Tablet 40 milliGRAM(s) Oral before breakfast  senna 2 Tablet(s) Oral at bedtime  sodium chloride 0.9%. 1000 milliLiter(s) (75 mL/Hr) IV Continuous <Continuous>  thiamine 100 milliGRAM(s) Oral daily    MEDICATIONS  (PRN):  acetaminophen   Tablet .. 650 milliGRAM(s) Oral every 6 hours PRN Temp greater or equal to 38C (100.4F), Mild Pain (1 - 3)  oxycodone    5 mG/acetaminophen 325 mG 1 Tablet(s) Oral every 4 hours PRN Moderate Pain (4 - 6)  oxycodone    5 mG/acetaminophen 325 mG 2 Tablet(s) Oral every 4 hours PRN Severe Pain (7 - 10)    Imaging:  EXAM:  CT BRAIN (2021):   IMPRESSION:  Preoperative evaluation for cranioplasty status post left hemispheric craniectomy.    EXAM:  CT BRAIN (2021):   IMPRESSION:  Status post left frontoparietal temporal craniectomy.  Trace residual subdural collection within the left frontal region measuring approximately 0.9 cm in thickness.  No new acute intracranial hemorrhage or transcortical infarct.    Assessment:  45-year-old male with PMHx ETOH abuse, GERD, seizure disorder, on Keppra, h/p hemorrhoidectomy recent admission s/p fall 2021 w/ Large left SDH, s/p left hemicraniotomy evacuation of SDH, 2021 c/b cardiac arrest intra-operatively 2/2 hypotension, revision of craniotomy w/ wound revision, 2021, (+) OR cultures E. Faecalis, w/ ABTx course and Right frontal  shunt Strata @ 1.5 placement, 2021, subsequently downgraded from SICU and transferred to [facility] for rehab p/w scheduled revision of VPS and cranioplasty. Patient now POD #0 s/p left cranioplasty w/ replacement of bone flap, 2021. Admit to Neuro ICU for post-surgical neurologic management.     Plan:  #Neuro:  - Neuro checks q1hrs  - Seizure precautions  - ICU delirium precautions  - Repeat CTH tonight, 2021  - HMV as per NeuroSx; assess drainage qshift; notify NCC for increased drainage  - Continue Keppra 500mg q12hrs  - Continue Percocet 5/325 prn for pain  - Continue Tylenol 650mg PO q6hrs prn for temp > 38C or mild pain, level 1-3  - PT/OT c/s   - Physiatry c/s  -  c/s, re: discharge planning  - Stat CTH if any worsening or deterioration in neurological examination and call NCC/Neurology    #CV:  - Keep SBP < 140    #Resp:  - Aggressive chest PT/pulmonary toileting/NT suctioning as needed   - Incentive spirometry 10x/hr while awake  - HOB > 35 degrees  - Aspiration precautions  - Keep SaO2 > 95%    #Renal/Fluid/Electrolytes:  - Strict I/Os  - Daily weights  - Keep euvolemic  - Keep normonatremic   - Keep Magnesium level > 2  - Monitor lytes, replete as needed    #GI:  - Clear liquid diet, advance to regular diet as tolerated  - Dysphagia screening/speech and swallow evaluation  - Speech language pathology   - Continue Protonix 40mg PO q24hrs  - Continue Senna 2 tablets PO q24hrs HS  - Continue MTVI 1 tablet q24hrs  - Continue Folic acid 1mg q24hrs  - Continue Thiamine 100mg q24hrs    #Heme/Onc:  - SCS while in bed  - Hold A/C and A/P for now    #Endo:  - FS qAC/HS  - Keep normoglycemic    #ID:  - Keep normothermic; avoid fevers  - Continue Ancef 1gram q8hrs while HMV in place      Code status: Full code  Disposition: ICU      GHAZAL Trujillo-BC  Please feel free to contact for any questions or concerns   #[id number]  
CC:   He is here for revision of VPS and cranioplasty - planned for 08/05/2021    Interval history:  At the time of my evaluation, the patient denied any acute concerns or complaints. No new concerns per discussion with Nursing Staff.    ROS:  Constitutional: no fevers; no chills  Eyes: no conjunctivitis; no itching  ENT: no dysphagia; no odynophagia  CVS: no PND; no orthopnea; no chest pain  Resp: no SOB; no coughing  GI: no nausea; no vomiting; no diarrhea; no abd pain  : no dysuria; no hematuria  MSK: no myalgias; no arthralgias  Skin: no rashes; no ulcers  Neuro: no focal weakness; no headache  All other systems reviewed and are negative    In-patient medications:  folic acid 1 milliGRAM(s) Oral daily  levETIRAcetam 1000 milliGRAM(s) Oral two times a day  multivitamin 1 Tablet(s) Oral daily  pantoprazole    Tablet 40 milliGRAM(s) Oral before breakfast  sodium chloride 0.9%. 1000 milliLiter(s) (75 mL/Hr) IV Continuous <Continuous>  thiamine 100 milliGRAM(s) Oral daily  ondansetron Injectable 4 milliGRAM(s) IV Push every 6 hours PRN Nausea and/or Vomiting  senna 2 Tablet(s) Oral at bedtime PRN Constipation    Exam:  Vitals: BP = 112/66; P = 58; T = 98.2; RR = 18; SpO2 > 95 on room air  General: appears stated age; cooperative  Eyes: anicteric sclera; moist conjunctiva; PERRL; EOMI  HENT: left scalp sunken (post-op); clear oropharynx w/ MMM; nL hard/soft palate  Neck: supple w/ FROM; trachea midline  Lungs: no tachypnea, accessory muscle use, wheezing, rhonci or rales  CVS: RRR; S1 and S2 w/o MRGs  Abd: BS+; soft; non-tender to palpation x 4; no masses or HSM  Ext: no peripheral edema; pulses palpable distally  Skin: normal temp, turgor and texture; no rashes, ulcers or nodules  Neuro: CN II-XII intact; able to move all extremities; sensation grossly intact to light touch  Psych: appropriate affect; alert and oriented to person, place, time and situation    Labs reviewed:   H&H 11.8/34.9  MCV 76.2    Imaging reviewed:  S/p left frontoparietal temporal craniectomy  Trace residual subdural collection within the left frontal region (0.9 cm in thickness)  No new hemorrhage or infarct    EKG reviewed:  Sinus arrhythmia

## 2021-08-10 NOTE — DISCHARGE NOTE PROVIDER - HOSPITAL COURSE
45M with PMH of GERD, previous EtOH abuse with seizures known to neurosurgery service with Dr. Byrne s/p L craniectomy for evacuation of SDH 5/7/2021. Patient coded in OR 2/2 to hypotension with massive transfusion protocol. Patient returned back to the OR with Dr. Byrne 5/16/2021 for revision of scalp wound and KIKE placement. Patient course further complicated with OR cultures (+) for E. Faecalis, subsequently causing ID starting IV Abx. Patient then was returned back to the OR for placement of Right frontal VPS Strata @ 1.5 6/7/2021. Patient did well post operatively and was downgraded from SICU to floor and was transferred to [facility]. Patient presenting here for for scheduled OR with Dr. Byrne for Cranioplasty 8/5/2021. Patient denies any HA, N/V, chest pain, fever, and abdominal pain.    Patient is now s/p L cranioplasty with replacement of bone flap with Dr. Byrne. Patient tolerated procedure well and did not encounter any problems intraop. Patient doing well post op and transferred from PACU to Neuro ICU. Post op CTH looks good. Patient had SG HMV, which was dc'd. Patient was downgraded from Neuro ICU to the floor. Patient denies any headaches, N/V, tolerating normal feeds and PO diet, and having normal urination and BM. Patient is cleared for discharge and PT/OT/Rehab recommending Short Term Rehab. Patient is to follow up with Dr. Byrne in 1 week for outpatient follow up and removal of staples. 4w up and removal of staples.      5M with PMH of GERD, previous EtOH abuse with seizures known to neurosurgery service with Dr. Byrne s/p L craniectomy for evacuation of SDH 5/7/2021. Patient coded in OR 2/2 to hypotension with massive transfusion protocol. Patient returned back to the OR with Dr. Byrne 5/16/2021 for revision of scalp wound and KIKE placement. Patient course further complicated with OR cultures (+) for E. Faecalis, subsequently causing ID starting IV Abx. Patient then was returned back to the OR for placement of Right frontal VPS Strata @ 1.5 6/7/2021. Patient did well post operatively and was downgraded from SICU to floor and was transferred to [facility]. Patient presenting here for for scheduled OR with Dr. Byrne for Cranioplasty 8/5/2021. Patient denies any HA, N/V, chest pain, fever, and abdominal pain.    Patient is now s/p L cranioplasty with replacement of bone flap with Dr. Byrne. Patient tolerated procedure well and did not encounter any problems intraop. Patient doing well post op and transferred from PACU to Neuro ICU. Post op CTH looks good. Patient had SG HMV, which was dc'd. Patient was downgraded from Neuro ICU to the floor. Patient denies any headaches, N/V, tolerating normal feeds and PO diet, and having normal urination and BM. Patient is cleared for discharge and PT/OT/Rehab recommending Short Term Rehab. Patient is to follow up with Dr. Byrne in 1 week for outpatient follow up.   5M with PMH of GERD, previous EtOH abuse with seizures known to neurosurgery service with Dr. Byrne s/p L craniectomy for evacuation of SDH 5/7/2021. Patient coded in OR 2/2 to hypotension with massive transfusion protocol. Patient returned back to the OR with Dr. Byrne 5/16/2021 for revision of scalp wound and KIKE placement. Patient course further complicated with OR cultures (+) for E. Faecalis, subsequently causing ID starting IV Abx. Patient then was returned back to the OR for placement of Right frontal VPS Strata @ 1.5 6/7/2021. Patient did well post operatively and was downgraded from SICU to floor and was transferred to [facility]. Patient presenting here for for scheduled OR with Dr. Byrne for Cranioplasty 8/5/2021. Patient denies any HA, N/V, chest pain, fever, and abdominal pain.    Patient is now s/p L cranioplasty with replacement of bone flap with Dr. Byrne. Patient tolerated procedure well and did not encounter any problems intraop. Patient doing well post op and transferred from PACU to Neuro ICU. Post op CTH looks good. Patient had SG HMV, which was dc'd. Patient was downgraded from Neuro ICU to the floor. Patient denies any headaches, N/V, tolerating normal feeds and PO diet, and having normal urination and BM. Patient is cleared for discharge and PT/OT/Rehab recommending Short Term Rehab. Patient is to follow up with Dr. Byrne in 1 week for outpatient follow up.   45M with PMH of GERD, previous EtOH abuse with seizures known to neurosurgery service with Dr. Byrne s/p L craniectomy for evacuation of SDH 5/7/2021. Patient coded in OR 2/2 to hypotension with massive transfusion protocol. Patient returned back to the OR with Dr. Byrne 5/16/2021 for revision of scalp wound and KIKE placement. Patient course further complicated with OR cultures (+) for E. Faecalis, subsequently causing ID starting IV Abx. Patient then was returned back to the OR for placement of Right frontal VPS Strata @ 1.5 6/7/2021. Patient did well post operatively and was downgraded from SICU to floor and was transferred to [facility]. Patient presenting here for for scheduled OR with Dr. Byrne for Cranioplasty 8/5/2021. Patient denies any HA, N/V, chest pain, fever, and abdominal pain.    Patient is now s/p L cranioplasty with replacement of bone flap with Dr. Byrne. Patient tolerated procedure well and did not encounter any problems intraop. Patient doing well post op and transferred from PACU to Neuro ICU. Post op CTH looks good. Patient had SG HMV, which was dc'd. Patient was downgraded from Neuro ICU to the floor. Patient denies any headaches, N/V, tolerating normal feeds and PO diet, and having normal urination and BM. Patient is cleared for discharge and PT/OT/Rehab recommending Short Term Rehab. Patient is to follow up with Dr. Byrne in 1 week for outpatient follow up.

## 2021-08-11 LAB — SARS-COV-2 RNA SPEC QL NAA+PROBE: SIGNIFICANT CHANGE UP

## 2021-08-11 RX ADMIN — LEVETIRACETAM 400 MILLIGRAM(S): 250 TABLET, FILM COATED ORAL at 05:25

## 2021-08-11 RX ADMIN — LEVETIRACETAM 400 MILLIGRAM(S): 250 TABLET, FILM COATED ORAL at 17:54

## 2021-08-11 RX ADMIN — CHLORHEXIDINE GLUCONATE 1 APPLICATION(S): 213 SOLUTION TOPICAL at 05:25

## 2021-08-11 RX ADMIN — HEPARIN SODIUM 5000 UNIT(S): 5000 INJECTION INTRAVENOUS; SUBCUTANEOUS at 21:06

## 2021-08-11 RX ADMIN — Medication 1 MILLIGRAM(S): at 11:14

## 2021-08-11 RX ADMIN — Medication 1 TABLET(S): at 11:14

## 2021-08-11 RX ADMIN — Medication 100 MILLIGRAM(S): at 11:14

## 2021-08-11 RX ADMIN — HEPARIN SODIUM 5000 UNIT(S): 5000 INJECTION INTRAVENOUS; SUBCUTANEOUS at 13:15

## 2021-08-11 RX ADMIN — SENNA PLUS 2 TABLET(S): 8.6 TABLET ORAL at 21:06

## 2021-08-11 RX ADMIN — HEPARIN SODIUM 5000 UNIT(S): 5000 INJECTION INTRAVENOUS; SUBCUTANEOUS at 05:26

## 2021-08-11 RX ADMIN — POLYETHYLENE GLYCOL 3350 17 GRAM(S): 17 POWDER, FOR SOLUTION ORAL at 11:14

## 2021-08-11 NOTE — PROGRESS NOTE ADULT - SUBJECTIVE AND OBJECTIVE BOX
INTERVENTIONAL RADIOLOGY BRIEF-OPERATIVE NOTE    Procedure:  G tube removal    Pre-Op Diagnosis:  G tube no longer needed    Post-Op Diagnosis:  Same    Attending: Rafi  Resident: None    Anesthesia (type):  None     Contrast: None    Estimated Blood Loss:  None    Condition:   [ ] Critical  [ ] Serious  [ ] Fair   [ ] Good    Findings/Follow up Plan of Care:  Successful G tube removal.  Sterile dressing applied.    Complications: None    Disposition:  Floor      Please call Interventional Radiology [phone] with any questions, concerns, or issues.

## 2021-08-11 NOTE — PROGRESS NOTE ADULT - SUBJECTIVE AND OBJECTIVE BOX
SANDRA THIAGO   08-11-21 @ 10:38    PAST 24HR EVENTS:  POD# 6 s/p L cranioplasty for replacement of bone flap.  Patient seen and examined at bedside, doing well. NAEO Denies HA/N/V.  NPO for gastrostomy tube removal today.      s/p     Allergies    No Known Allergies    Intolerances        Vital Signs Last 24 Hrs  T(C): 37.1 (11 Aug 2021 08:50), Max: 37.2 (10 Aug 2021 17:16)  T(F): 98.8 (11 Aug 2021 08:50), Max: 98.9 (10 Aug 2021 17:16)  HR: 69 (11 Aug 2021 08:50) (63 - 94)  BP: 104/69 (11 Aug 2021 08:50) (102/65 - 118/67)  BP(mean): --  RR: 20 (11 Aug 2021 08:50) (18 - 20)  SpO2: 99% (11 Aug 2021 08:50) (96% - 99%)      acetaminophen   Tablet .. 650 milliGRAM(s) Oral every 6 hours PRN  chlorhexidine 4% Liquid 1 Application(s) Topical <User Schedule>  folic acid 1 milliGRAM(s) Oral daily  heparin   Injectable 5000 Unit(s) SubCutaneous every 8 hours  levETIRAcetam  IVPB 1000 milliGRAM(s) IV Intermittent every 12 hours  multivitamin 1 Tablet(s) Oral daily  oxycodone    5 mG/acetaminophen 325 mG 2 Tablet(s) Oral every 4 hours PRN  oxycodone    5 mG/acetaminophen 325 mG 1 Tablet(s) Oral every 4 hours PRN  polyethylene glycol 3350 17 Gram(s) Oral daily  senna 2 Tablet(s) Oral at bedtime  thiamine 100 milliGRAM(s) Oral daily        08-10-21 @ 07:01  -  08-11-21 @ 07:00  --------------------------------------------------------  IN: 450 mL / OUT: 650 mL / NET: -200 mL        REVIEW OF SYSTEMS    [ ] A ten-point review of systems was otherwise negative except as noted.  [ x] Due to altered mental status/intubation, subjective information were not able to be obtained from the patient. History was obtained, to the extent possible, from review of the chart and collateral sources of information.      Exam:  AAOX2. slight memory deficit   tongue midline, facial motions symmetric  PERRLA, EOMI  Pronator Drift: negative  Finger to Nose intact  Motor: MAEx4, 5/5 power in b/l UE and LE  Sensation: intact to touch in all extremities  Incision : C/D/I, staples in place, no edema, erythema or drainage          Imaging:  < from: CT Head No Cont (08.05.21 @ 23:31) >  FINDINGS:    Status post left cranioplasty  Overlying scalp staples and adjacent scalp subcutaneous emphysema and pneumocephalus. Left frontal extra-axial heterogeneous collection measuring 0.8 cm in thickness. Left frontal subcutaneous drainage catheter.    Unchanged right frontal approach ventriculoperitoneal shunt catheter with tip terminating in the anterior hemispheric fissure.    Demonstration of chronic infarcts involving the left parasagittal frontal and parietal lobes, left temporal occipital region as well as the right superior cerebellar hemisphere.    The ventricles and cortical sulci are within normal limits for age. No new acute intracranial hemorrhage or transcortical infarct.    Bilateral globes are intact.    The visualized paranasal sinuses and mastoids are well aerated.    IMPRESSION:    Since prior examination there hasbeen left hemispheric cranioplasty with expected postsurgical changes including subcutaneous emphysema and pneumocephalus.    Thin residual subdural collection underlying the cranioplasty with minimal midline shift to the right approximately 3 mm.    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    < end of copied text >    Assessment/Plan:   45M with etOH abuse found unresponsive with SDH / SAH s/p craniectomy with discarded bone flap coded in OR 5/2021 with wound revision x2, now POD#6 s/p L cranioplasty with replacement of bone flap 8/6/2021     PLAN:  - NPO for gastrostomy tube removal  - PT/OT/Rehab   - Q4 neurochecks   - Pain control   - Dispo planning - pending [facility]  - Discussed case with attending      SANDRA THIAGO   08-11-21 @ 10:38    PAST 24HR EVENTS:  POD# 6 s/p L cranioplasty for replacement of bone flap.  Patient seen and examined at bedside, doing well. NAEO Denies HA/N/V.  NPO for gastrostomy tube removal today.      s/p     Allergies    No Known Allergies    Intolerances        Vital Signs Last 24 Hrs  T(C): 37.1 (11 Aug 2021 08:50), Max: 37.2 (10 Aug 2021 17:16)  T(F): 98.8 (11 Aug 2021 08:50), Max: 98.9 (10 Aug 2021 17:16)  HR: 69 (11 Aug 2021 08:50) (63 - 94)  BP: 104/69 (11 Aug 2021 08:50) (102/65 - 118/67)  BP(mean): --  RR: 20 (11 Aug 2021 08:50) (18 - 20)  SpO2: 99% (11 Aug 2021 08:50) (96% - 99%)      acetaminophen   Tablet .. 650 milliGRAM(s) Oral every 6 hours PRN  chlorhexidine 4% Liquid 1 Application(s) Topical <User Schedule>  folic acid 1 milliGRAM(s) Oral daily  heparin   Injectable 5000 Unit(s) SubCutaneous every 8 hours  levETIRAcetam  IVPB 1000 milliGRAM(s) IV Intermittent every 12 hours  multivitamin 1 Tablet(s) Oral daily  oxycodone    5 mG/acetaminophen 325 mG 2 Tablet(s) Oral every 4 hours PRN  oxycodone    5 mG/acetaminophen 325 mG 1 Tablet(s) Oral every 4 hours PRN  polyethylene glycol 3350 17 Gram(s) Oral daily  senna 2 Tablet(s) Oral at bedtime  thiamine 100 milliGRAM(s) Oral daily        08-10-21 @ 07:01  -  08-11-21 @ 07:00  --------------------------------------------------------  IN: 450 mL / OUT: 650 mL / NET: -200 mL        REVIEW OF SYSTEMS    [ ] A ten-point review of systems was otherwise negative except as noted.  [ x] Due to altered mental status/intubation, subjective information were not able to be obtained from the patient. History was obtained, to the extent possible, from review of the chart and collateral sources of information.      Exam:  AAOX2. slight memory deficit   tongue midline, facial motions symmetric  PERRLA, EOMI  Pronator Drift: negative  Finger to Nose intact  Motor: MAEx4, 5/5 power in b/l UE and LE  Sensation: intact to touch in all extremities  Incision : C/D/I, staples in place, no edema, erythema or drainage          Imaging:  < from: CT Head No Cont (08.05.21 @ 23:31) >  FINDINGS:    Status post left cranioplasty  Overlying scalp staples and adjacent scalp subcutaneous emphysema and pneumocephalus. Left frontal extra-axial heterogeneous collection measuring 0.8 cm in thickness. Left frontal subcutaneous drainage catheter.    Unchanged right frontal approach ventriculoperitoneal shunt catheter with tip terminating in the anterior hemispheric fissure.    Demonstration of chronic infarcts involving the left parasagittal frontal and parietal lobes, left temporal occipital region as well as the right superior cerebellar hemisphere.    The ventricles and cortical sulci are within normal limits for age. No new acute intracranial hemorrhage or transcortical infarct.    Bilateral globes are intact.    The visualized paranasal sinuses and mastoids are well aerated.    IMPRESSION:    Since prior examination there hasbeen left hemispheric cranioplasty with expected postsurgical changes including subcutaneous emphysema and pneumocephalus.    Thin residual subdural collection underlying the cranioplasty with minimal midline shift to the right approximately 3 mm.    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    < end of copied text >    Assessment/Plan:   45M with etOH abuse found unresponsive with SDH / SAH s/p craniectomy with discarded bone flap coded in OR 5/2021 with wound revision x2, now POD#6 s/p L cranioplasty with replacement of bone flap 8/6/2021     PLAN:  - NPO for gastrostomy tube removal  - PT/OT/Rehab   - Q4 neurochecks   - Pain control   - Dispo planning - pending [facility], will need covid swab prior to dc  - Discussed case with attending

## 2021-08-11 NOTE — PROGRESS NOTE ADULT - SUBJECTIVE AND OBJECTIVE BOX
INTERVENTIONAL RADIOLOGY CONSULT:     Procedure Requested: Removal of gastrostomy tube    HPI:  45M with PMH of GERD, previous EtOH abuse with seizures known to neurosurgery service with Dr. Byrne s/p L craniectomy for evacuation of SDH 5/7/2021. Patient coded in OR 2/2 to hypotension with massive transfusion protocol. Patient returned back to the OR with Dr. Byrne 5/16/2021 for revision of scalp wound and KIKE placement. Patient course further complicated with OR cultures (+) for E. Faecalis, subsequently causing ID starting IV Abx. Patient then was returned back to the OR for placement of Right frontal VPS Strata @ 1.5 6/7/2021. Patient did well post operatively and was downgraded from SICU to floor and was transferred to [facility]. Patient presenting here for for scheduled OR with Dr. Byrne for Revision of VPS and Cranioplasty 8/5/2021. Patient denies any HA, N/V, chest pain, fever, and abdominal pain. (04 Aug 2021 18:12)      PAST MEDICAL & SURGICAL HISTORY:  Alcohol abuse    GERD (gastroesophageal reflux disease)    ETOH abuse    Hypomagnesemia    Seizure disorder    H/O hemorrhoidectomy        MEDICATIONS  (STANDING):  chlorhexidine 4% Liquid 1 Application(s) Topical <User Schedule>  folic acid 1 milliGRAM(s) Oral daily  heparin   Injectable 5000 Unit(s) SubCutaneous every 8 hours  levETIRAcetam  IVPB 1000 milliGRAM(s) IV Intermittent every 12 hours  multivitamin 1 Tablet(s) Oral daily  polyethylene glycol 3350 17 Gram(s) Oral daily  senna 2 Tablet(s) Oral at bedtime  thiamine 100 milliGRAM(s) Oral daily    MEDICATIONS  (PRN):  acetaminophen   Tablet .. 650 milliGRAM(s) Oral every 6 hours PRN Temp greater or equal to 38C (100.4F), Mild Pain (1 - 3)  oxycodone    5 mG/acetaminophen 325 mG 2 Tablet(s) Oral every 4 hours PRN Severe Pain (7 - 10)  oxycodone    5 mG/acetaminophen 325 mG 1 Tablet(s) Oral every 4 hours PRN Moderate Pain (4 - 6)    Allergies  No Known Allergies    FAMILY HISTORY:  Family history of essential hypertension (Father)    Physical Exam:   Vital Signs Last 24 Hrs  T(C): 37.1 (11 Aug 2021 08:50), Max: 37.2 (10 Aug 2021 17:16)  T(F): 98.8 (11 Aug 2021 08:50), Max: 98.9 (10 Aug 2021 17:16)  HR: 69 (11 Aug 2021 08:50) (63 - 94)  BP: 104/69 (11 Aug 2021 08:50) (102/65 - 118/67)  BP(mean): --  RR: 20 (11 Aug 2021 08:50) (18 - 20)  SpO2: 99% (11 Aug 2021 08:50) (96% - 99%)    Radiology & Additional Studies: Radiology imaging reviewed.     ASSESSMENT/ PLAN:   44 yo patient with prior gastrostomy tube placement. IR consulted for removal as patient is now tolerating PO. Plan for removal today. Confirmed with primary team again that patient is able to tolerate PO and does not need gastrostomy tube.     Risks, benefits, and alternatives to treatment discussed. All questions answered with understanding.    Thank you for the courtesy of this consult, please call [phone] with any further questions.

## 2021-08-12 RX ADMIN — HEPARIN SODIUM 5000 UNIT(S): 5000 INJECTION INTRAVENOUS; SUBCUTANEOUS at 05:22

## 2021-08-12 RX ADMIN — POLYETHYLENE GLYCOL 3350 17 GRAM(S): 17 POWDER, FOR SOLUTION ORAL at 12:18

## 2021-08-12 RX ADMIN — Medication 1 MILLIGRAM(S): at 12:18

## 2021-08-12 RX ADMIN — Medication 1 TABLET(S): at 12:18

## 2021-08-12 RX ADMIN — Medication 100 MILLIGRAM(S): at 12:18

## 2021-08-12 RX ADMIN — LEVETIRACETAM 400 MILLIGRAM(S): 250 TABLET, FILM COATED ORAL at 17:57

## 2021-08-12 RX ADMIN — LEVETIRACETAM 400 MILLIGRAM(S): 250 TABLET, FILM COATED ORAL at 05:21

## 2021-08-12 RX ADMIN — HEPARIN SODIUM 5000 UNIT(S): 5000 INJECTION INTRAVENOUS; SUBCUTANEOUS at 14:57

## 2021-08-12 RX ADMIN — SENNA PLUS 2 TABLET(S): 8.6 TABLET ORAL at 21:11

## 2021-08-12 RX ADMIN — HEPARIN SODIUM 5000 UNIT(S): 5000 INJECTION INTRAVENOUS; SUBCUTANEOUS at 21:13

## 2021-08-12 NOTE — PROGRESS NOTE ADULT - SUBJECTIVE AND OBJECTIVE BOX
SANDRA DAS   08-12-21 @ 10:38    PAST 24HR EVENTS:  POD# 7 s/p L cranioplasty for replacement of bone flap.  Patient seen and examined at bedside, doing well. NAEO Denies HA/N/V.    No Known Allergies  Intolerances    Vital Signs Last 24 Hrs  T(C): 37.1 (11 Aug 2021 08:50), Max: 37.2 (10 Aug 2021 17:16)  T(F): 98.8 (11 Aug 2021 08:50), Max: 98.9 (10 Aug 2021 17:16)  HR: 69 (11 Aug 2021 08:50) (63 - 94)  BP: 104/69 (11 Aug 2021 08:50) (102/65 - 118/67)  BP(mean): --  RR: 20 (11 Aug 2021 08:50) (18 - 20)  SpO2: 99% (11 Aug 2021 08:50) (96% - 99%)    acetaminophen   Tablet .. 650 milliGRAM(s) Oral every 6 hours PRN  chlorhexidine 4% Liquid 1 Application(s) Topical <User Schedule>  folic acid 1 milliGRAM(s) Oral daily  heparin   Injectable 5000 Unit(s) SubCutaneous every 8 hours  levETIRAcetam  IVPB 1000 milliGRAM(s) IV Intermittent every 12 hours  multivitamin 1 Tablet(s) Oral daily  oxycodone    5 mG/acetaminophen 325 mG 2 Tablet(s) Oral every 4 hours PRN  oxycodone    5 mG/acetaminophen 325 mG 1 Tablet(s) Oral every 4 hours PRN  polyethylene glycol 3350 17 Gram(s) Oral daily  senna 2 Tablet(s) Oral at bedtime  thiamine 100 milliGRAM(s) Oral daily    08-10-21 @ 07:01  -  08-11-21 @ 07:00  --------------------------------------------------------  IN: 450 mL / OUT: 650 mL / NET: -200 mL    REVIEW OF SYSTEMS    [ ] A ten-point review of systems was otherwise negative except as noted.  [ x] Due to altered mental status/intubation, subjective information were not able to be obtained from the patient. History was obtained, to the extent possible, from review of the chart and collateral sources of information.      Exam:  AAOX2. slight memory deficit   tongue midline, facial motions symmetric  PERRLA, EOMI  Pronator Drift: negative  Finger to Nose intact  Motor: MAEx4, 5/5 power in b/l UE and LE  Sensation: intact to touch in all extremities  Incision : C/D/I, staples in place, no edema, erythema or drainage    Imaging:  < from: CT Head No Cont (08.05.21 @ 23:31) >  FINDINGS:    Status post left cranioplasty  Overlying scalp staples and adjacent scalp subcutaneous emphysema and pneumocephalus. Left frontal extra-axial heterogeneous collection measuring 0.8 cm in thickness. Left frontal subcutaneous drainage catheter.    Unchanged right frontal approach ventriculoperitoneal shunt catheter with tip terminating in the anterior hemispheric fissure.    Demonstration of chronic infarcts involving the left parasagittal frontal and parietal lobes, left temporal occipital region as well as the right superior cerebellar hemisphere.    The ventricles and cortical sulci are within normal limits for age. No new acute intracranial hemorrhage or transcortical infarct.    Bilateral globes are intact.    The visualized paranasal sinuses and mastoids are well aerated.    IMPRESSION:    Since prior examination there hasbeen left hemispheric cranioplasty with expected postsurgical changes including subcutaneous emphysema and pneumocephalus.    Thin residual subdural collection underlying the cranioplasty with minimal midline shift to the right approximately 3 mm.    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    < end of copied text >

## 2021-08-13 RX ADMIN — CHLORHEXIDINE GLUCONATE 1 APPLICATION(S): 213 SOLUTION TOPICAL at 08:12

## 2021-08-13 RX ADMIN — Medication 100 MILLIGRAM(S): at 11:51

## 2021-08-13 RX ADMIN — Medication 1 TABLET(S): at 11:51

## 2021-08-13 RX ADMIN — HEPARIN SODIUM 5000 UNIT(S): 5000 INJECTION INTRAVENOUS; SUBCUTANEOUS at 05:51

## 2021-08-13 RX ADMIN — LEVETIRACETAM 400 MILLIGRAM(S): 250 TABLET, FILM COATED ORAL at 17:49

## 2021-08-13 RX ADMIN — LEVETIRACETAM 400 MILLIGRAM(S): 250 TABLET, FILM COATED ORAL at 05:50

## 2021-08-13 RX ADMIN — POLYETHYLENE GLYCOL 3350 17 GRAM(S): 17 POWDER, FOR SOLUTION ORAL at 11:52

## 2021-08-13 RX ADMIN — HEPARIN SODIUM 5000 UNIT(S): 5000 INJECTION INTRAVENOUS; SUBCUTANEOUS at 14:48

## 2021-08-13 RX ADMIN — Medication 1 MILLIGRAM(S): at 11:51

## 2021-08-13 RX ADMIN — HEPARIN SODIUM 5000 UNIT(S): 5000 INJECTION INTRAVENOUS; SUBCUTANEOUS at 21:13

## 2021-08-13 RX ADMIN — SENNA PLUS 2 TABLET(S): 8.6 TABLET ORAL at 21:13

## 2021-08-13 NOTE — SWALLOW BEDSIDE ASSESSMENT ADULT - SWALLOW EVAL: RECOMMENDED DIET
Dysphagia Diet III Mechanical soft consistency with cut up meats with Nectar-thickened liquids
advance to regular consistency diet w/ thin liquids
regular consistency diet w/ thin liquids

## 2021-08-13 NOTE — SWALLOW BEDSIDE ASSESSMENT ADULT - SWALLOW EVAL: DIAGNOSIS
+toleration of thin, puree and regular solids w/o overt s/s of penetration/aspiration
oropharyngeal swallow is WFL for all tested consistencies
+ toleration observed without overt symptoms of penetration/aspiration for soft and nectar thick liquids. Suspected pharyngeal dysphagia for thins

## 2021-08-13 NOTE — SWALLOW BEDSIDE ASSESSMENT ADULT - SLP PERTINENT HISTORY OF CURRENT PROBLEM
45M with etOH abuse found unresponsive with SDH / SAH s/p craniectomy with discarded bone flap coded in OR 5/2021 with wound revision x2, now POD#1 s/p L cranioplasty with replacement of bone flap 8/6/2021  pt was transferred from [facility]. He has PEG however has been on a soft and nectar thick diet
45M with etOH abuse found unresponsive with SDH / SAH s/p craniectomy with discarded bone flap coded in OR 5/2021 with wound revision x2, now POD#1 s/p L cranioplasty with replacement of bone flap 8/6/2021  pt was transferred from [facility]. He has PEG however has been on a a po diet
45M with etOH abuse found unresponsive with SDH / SAH s/p craniectomy with discarded bone flap coded in OR 5/2021 with wound revision x2, now POD#1 s/p L cranioplasty with replacement of bone flap 8/6/2021  pt was transferred from [facility]. He has PEG however has been on a soft and nectar thick diet

## 2021-08-13 NOTE — PROGRESS NOTE ADULT - ASSESSMENT
45M with etOH abuse found unresponsive with SDH / SAH s/p craniectomy with discarded bone flap coded in OR 5/2021 with wound revision x2, now POD#8 s/p L cranioplasty with replacement of bone flap 8/6/2021     PLAN:  - PT/OT/Rehab   - Q4 neurochecks   - Pain control   - Dispo planning - pending Dr. Byrne to review [facility] peer for [facility] rehab centers  - Discussed case with attending

## 2021-08-13 NOTE — SWALLOW BEDSIDE ASSESSMENT ADULT - NS SPL SWALLOW CLINIC TRIAL FT
suspected pharyngeal dysphagia
+toleration
+toleration of thin, puree and regular solids w/o overt s/s of penetration/aspiration

## 2021-08-13 NOTE — PROGRESS NOTE ADULT - SUBJECTIVE AND OBJECTIVE BOX
NEUROSURGERY NOTE  SANDRA DAS   08-13-21 @ 12:43    PAST 24HR EVENTS:  POD# 8 s/p L cranioplasty for replacement of bone flap.  Patient seen and examined at bedside, doing well. Denies MARTIN/N/V.    HPI: 45y Male     PHYSICAL EXAM:  Vital Signs Last 24 Hrs  T(C): 36.2 (13 Aug 2021 08:54), Max: 36.9 (12 Aug 2021 12:54)  T(F): 97.1 (13 Aug 2021 08:54), Max: 98.4 (12 Aug 2021 12:54)  HR: 77 (13 Aug 2021 08:54) (74 - 88)  BP: 119/62 (13 Aug 2021 08:54) (103/69 - 121/63)  BP(mean): --  RR: 20 (13 Aug 2021 08:54) (18 - 20)  SpO2: 100% (13 Aug 2021 08:54) (96% - 100%)    AAOX2. slight memory deficit   tongue midline, facial motions symmetric  PERRLA, EOMI  Pronator Drift: negative  Finger to Nose intact  Motor: MAEx4, 5/5 power in b/l UE and LE  Sensation: intact to touch in all extremities  Incision : C/D/I, staples in place, no edema, erythema or drainage    MEDS:   acetaminophen   Tablet .. 650 milliGRAM(s) Oral every 6 hours PRN  chlorhexidine 4% Liquid 1 Application(s) Topical <User Schedule>  folic acid 1 milliGRAM(s) Oral daily  heparin   Injectable 5000 Unit(s) SubCutaneous every 8 hours  levETIRAcetam  IVPB 1000 milliGRAM(s) IV Intermittent every 12 hours  multivitamin 1 Tablet(s) Oral daily  polyethylene glycol 3350 17 Gram(s) Oral daily  senna 2 Tablet(s) Oral at bedtime  thiamine 100 milliGRAM(s) Oral daily      LABS:    RADIOLOGY:

## 2021-08-13 NOTE — SWALLOW BEDSIDE ASSESSMENT ADULT - SLP GENERAL OBSERVATIONS
Pt received in bed awake and alert ate 100% of lunch tray
Pt received in bed awake and alert on room air, d/c plan is pending potentially to [facility]
pt awake oriented to self and place. + confusion noted. + aphasia, + dysarthria, and apraxia of speech

## 2021-08-14 RX ADMIN — Medication 1 TABLET(S): at 11:39

## 2021-08-14 RX ADMIN — HEPARIN SODIUM 5000 UNIT(S): 5000 INJECTION INTRAVENOUS; SUBCUTANEOUS at 05:00

## 2021-08-14 RX ADMIN — Medication 100 MILLIGRAM(S): at 11:39

## 2021-08-14 RX ADMIN — SENNA PLUS 2 TABLET(S): 8.6 TABLET ORAL at 21:13

## 2021-08-14 RX ADMIN — HEPARIN SODIUM 5000 UNIT(S): 5000 INJECTION INTRAVENOUS; SUBCUTANEOUS at 21:13

## 2021-08-14 RX ADMIN — HEPARIN SODIUM 5000 UNIT(S): 5000 INJECTION INTRAVENOUS; SUBCUTANEOUS at 13:22

## 2021-08-14 RX ADMIN — LEVETIRACETAM 400 MILLIGRAM(S): 250 TABLET, FILM COATED ORAL at 05:00

## 2021-08-14 RX ADMIN — LEVETIRACETAM 400 MILLIGRAM(S): 250 TABLET, FILM COATED ORAL at 18:03

## 2021-08-14 RX ADMIN — Medication 1 MILLIGRAM(S): at 11:39

## 2021-08-14 NOTE — PROGRESS NOTE ADULT - ATTENDING COMMENTS
Agree with plan as above.
History, events, data and scans reviewed, patient examined at bedside on 08/07/2021. Above plan of care was formulated and implemented with my approval.

## 2021-08-14 NOTE — PROGRESS NOTE ADULT - SUBJECTIVE AND OBJECTIVE BOX
Hospital Course:   POD# 9 s/p left cranioplasty, TBI       Vital Signs Last 24 Hrs  T(C): 36.1 (14 Aug 2021 09:00), Max: 36.6 (13 Aug 2021 16:41)  T(F): 97 (14 Aug 2021 09:00), Max: 97.8 (13 Aug 2021 16:41)  HR: 79 (14 Aug 2021 09:00) (59 - 88)  BP: 103/68 (14 Aug 2021 09:00) (98/62 - 124/65)  BP(mean): --  RR: 18 (14 Aug 2021 09:00) (17 - 20)  SpO2: 97% (14 Aug 2021 09:00) (97% - 99%)    I&O's Detail    13 Aug 2021 07:01  -  14 Aug 2021 07:00  --------------------------------------------------------  IN:    IV PiggyBack: 100 mL    Oral Fluid: 330 mL  Total IN: 430 mL    OUT:    Voided (mL): 1000 mL  Total OUT: 1000 mL    Total NET: -570 mL      14 Aug 2021 07:01  -  14 Aug 2021 12:27  --------------------------------------------------------  IN:    Oral Fluid: 380 mL  Total IN: 380 mL    OUT:    Voided (mL): 250 mL  Total OUT: 250 mL    Total NET: 130 mL        I&O's Summary    13 Aug 2021 07:01  -  14 Aug 2021 07:00  --------------------------------------------------------  IN: 430 mL / OUT: 1000 mL / NET: -570 mL    14 Aug 2021 07:01  -  14 Aug 2021 12:27  --------------------------------------------------------  IN: 380 mL / OUT: 250 mL / NET: 130 mL        PHYSICAL EXAM:  Neurological: cooperative, follows commands, PERRL, EOMI, alert to self only, confused at times, memory fair, FOLEY well with good strength and coordination, verbalizing well, tolerating diet    Incision/Wound: scalp incision intact, staples intact, no evidence of infection, no drainage or evidence of fluid collection      LABS:    CAPILLARY BLOOD GLUCOSE    Drug Levels: [] N/A    CSF Analysis: [] N/A      Allergies    No Known Allergies    Intolerances      MEDICATIONS:  Antibiotics:    Neuro:  acetaminophen   Tablet .. 650 milliGRAM(s) Oral every 6 hours PRN  levETIRAcetam  IVPB 1000 milliGRAM(s) IV Intermittent every 12 hours    Anticoagulation:  heparin   Injectable 5000 Unit(s) SubCutaneous every 8 hours    OTHER:  chlorhexidine 4% Liquid 1 Application(s) Topical <User Schedule>  polyethylene glycol 3350 17 Gram(s) Oral daily  senna 2 Tablet(s) Oral at bedtime    IVF:  folic acid 1 milliGRAM(s) Oral daily  multivitamin 1 Tablet(s) Oral daily  thiamine 100 milliGRAM(s) Oral daily    CULTURES:    RADIOLOGY & ADDITIONAL TESTS:      ASSESSMENT:  45y Male s/p    SUBDURAL HEMATOMA    ^SUBDURAL HEMATOMA    No pertinent family history in first degree relatives    Family history of essential hypertension (Father)    Handoff    MEWS Score    No pertinent past medical history    Alcohol abuse    GERD (gastroesophageal reflux disease)    ETOH abuse    Hypomagnesemia    Seizure disorder    Subdural hematoma    Subdural hematoma    Cranioplasty for skull defect larger than 5 cm diameter    Subdural hematoma    Cranioplasty, for skull defect greater than 5 cm in diameter    No significant past surgical history    H/O hemorrhoidectomy    BRUNILDA MALONE JFWILLIAM    47    SysAdmin_VisitLink        PLAN: continue PT, Rehab, dispo planning

## 2021-08-15 RX ADMIN — Medication 1 TABLET(S): at 11:00

## 2021-08-15 RX ADMIN — SENNA PLUS 2 TABLET(S): 8.6 TABLET ORAL at 21:19

## 2021-08-15 RX ADMIN — Medication 100 MILLIGRAM(S): at 11:00

## 2021-08-15 RX ADMIN — Medication 1 MILLIGRAM(S): at 11:00

## 2021-08-15 RX ADMIN — HEPARIN SODIUM 5000 UNIT(S): 5000 INJECTION INTRAVENOUS; SUBCUTANEOUS at 13:35

## 2021-08-15 RX ADMIN — LEVETIRACETAM 400 MILLIGRAM(S): 250 TABLET, FILM COATED ORAL at 17:26

## 2021-08-15 RX ADMIN — HEPARIN SODIUM 5000 UNIT(S): 5000 INJECTION INTRAVENOUS; SUBCUTANEOUS at 05:37

## 2021-08-15 RX ADMIN — HEPARIN SODIUM 5000 UNIT(S): 5000 INJECTION INTRAVENOUS; SUBCUTANEOUS at 21:19

## 2021-08-15 RX ADMIN — LEVETIRACETAM 400 MILLIGRAM(S): 250 TABLET, FILM COATED ORAL at 05:36

## 2021-08-15 NOTE — PROGRESS NOTE ADULT - SUBJECTIVE AND OBJECTIVE BOX
Subjective: 45yMale with a pmhx of SUBDURAL HEMATOMA    ^SUBDURAL HEMATOMA    No pertinent family history in first degree relatives    Family history of essential hypertension (Father)    Handoff    MEWS Score    No pertinent past medical history    Alcohol abuse    GERD (gastroesophageal reflux disease)    ETOH abuse    Hypomagnesemia    Seizure disorder    Subdural hematoma    Subdural hematoma    Cranioplasty for skull defect larger than 5 cm diameter    Subdural hematoma    Cranioplasty, for skull defect greater than 5 cm in diameter    No significant past surgical history    H/O hemorrhoidectomy    [facility]ER [facility]    47    SysAdmin_VisitLink        POD# 10  s/p left cranioplasty, TBI     Pt seen and examined at bedside.  Pt is AAOX2, pleasant, communicative.  Follows commands. Denies headaches.  Allergies    No Known Allergies    Intolerances        Vital Signs Last 24 Hrs  T(C): 36.2 (15 Aug 2021 05:00), Max: 36.4 (14 Aug 2021 18:00)  T(F): 97.1 (15 Aug 2021 05:00), Max: 97.6 (14 Aug 2021 18:00)  HR: 68 (15 Aug 2021 05:00) (68 - 72)  BP: 111/72 (15 Aug 2021 05:00) (101/55 - 121/76)  BP(mean): --  RR: 18 (15 Aug 2021 05:00) (18 - 18)  SpO2: 96% (15 Aug 2021 05:00) (96% - 98%)      acetaminophen   Tablet .. 650 milliGRAM(s) Oral every 6 hours PRN  chlorhexidine 4% Liquid 1 Application(s) Topical <User Schedule>  folic acid 1 milliGRAM(s) Oral daily  heparin   Injectable 5000 Unit(s) SubCutaneous every 8 hours  levETIRAcetam  IVPB 1000 milliGRAM(s) IV Intermittent every 12 hours  multivitamin 1 Tablet(s) Oral daily  polyethylene glycol 3350 17 Gram(s) Oral daily  senna 2 Tablet(s) Oral at bedtime  thiamine 100 milliGRAM(s) Oral daily        08-14-21 @ 07:01  -  08-15-21 @ 07:00  --------------------------------------------------------  IN: 380 mL / OUT: 900 mL / NET: -520 mL        REVIEW OF SYSTEMS    [x ] A ten-point review of systems was otherwise negative except as noted.  [ ] Due to altered mental status/intubation, subjective information were not able to be obtained from the patient. History was obtained, to the extent possible, from review of the chart and collateral sources of information.      Neuro Exam:  AAOX2. speech clear  facial motions symmetric  PERRL  Motor: MAEx4  incision healing  no drainage        Assessment/Plan:   await peer to peer for rehab placement  cont PT/OT/Rehab   Pain control   d/w attg   None

## 2021-08-16 RX ADMIN — LEVETIRACETAM 400 MILLIGRAM(S): 250 TABLET, FILM COATED ORAL at 05:20

## 2021-08-16 RX ADMIN — Medication 100 MILLIGRAM(S): at 11:36

## 2021-08-16 RX ADMIN — HEPARIN SODIUM 5000 UNIT(S): 5000 INJECTION INTRAVENOUS; SUBCUTANEOUS at 21:31

## 2021-08-16 RX ADMIN — Medication 1 TABLET(S): at 11:36

## 2021-08-16 RX ADMIN — LEVETIRACETAM 400 MILLIGRAM(S): 250 TABLET, FILM COATED ORAL at 17:23

## 2021-08-16 RX ADMIN — Medication 1 MILLIGRAM(S): at 11:36

## 2021-08-16 RX ADMIN — HEPARIN SODIUM 5000 UNIT(S): 5000 INJECTION INTRAVENOUS; SUBCUTANEOUS at 14:15

## 2021-08-16 RX ADMIN — SENNA PLUS 2 TABLET(S): 8.6 TABLET ORAL at 21:31

## 2021-08-16 RX ADMIN — HEPARIN SODIUM 5000 UNIT(S): 5000 INJECTION INTRAVENOUS; SUBCUTANEOUS at 05:21

## 2021-08-16 NOTE — PROGRESS NOTE ADULT - SUBJECTIVE AND OBJECTIVE BOX
Subjective: 45yMale with a pmhx of SUBDURAL HEMATOMA    ^SUBDURAL HEMATOMA    No pertinent family history in first degree relatives    Family history of essential hypertension (Father)    Handoff    MEWS Score    No pertinent past medical history    Alcohol abuse    GERD (gastroesophageal reflux disease)    ETOH abuse    Hypomagnesemia    Seizure disorder    Subdural hematoma    Subdural hematoma    Cranioplasty for skull defect larger than 5 cm diameter    Subdural hematoma    Cranioplasty, for skull defect greater than 5 cm in diameter    No significant past surgical history    H/O hemorrhoidectomy    TRASNFER [facility]    47    SysAdmin_VisitLink      POD# 11  s/p left cranioplasty, TBI     Pt seen and examined at bedside. Lying in bed with no complaints. Denies headaches. Neuro exam remains grossly stable.    Allergies    No Known Allergies    Intolerances        Vital Signs Last 24 Hrs  T(C): 36 (16 Aug 2021 08:44), Max: 36.8 (15 Aug 2021 16:47)  T(F): 96.8 (16 Aug 2021 08:44), Max: 98.2 (15 Aug 2021 16:47)  HR: 87 (16 Aug 2021 08:44) (60 - 87)  BP: 113/70 (16 Aug 2021 08:44) (103/75 - 123/73)  BP(mean): --  RR: 18 (16 Aug 2021 08:44) (18 - 18)  SpO2: 99% (16 Aug 2021 08:44) (97% - 99%)      acetaminophen   Tablet .. 650 milliGRAM(s) Oral every 6 hours PRN  chlorhexidine 4% Liquid 1 Application(s) Topical <User Schedule>  folic acid 1 milliGRAM(s) Oral daily  heparin   Injectable 5000 Unit(s) SubCutaneous every 8 hours  levETIRAcetam  IVPB 1000 milliGRAM(s) IV Intermittent every 12 hours  multivitamin 1 Tablet(s) Oral daily  senna 2 Tablet(s) Oral at bedtime  thiamine 100 milliGRAM(s) Oral daily        08-15-21 @ 07:01  -  08-16-21 @ 07:00  --------------------------------------------------------  IN: 100 mL / OUT: 1175 mL / NET: -1075 mL          Neuro Exam:  AAOX2-- self, place with choices   speech clear  facial motions symmetric  PERRL b/l  Motor: MAEx4  incision healing well with no signs of infection or inflammation noted         Assessment/Plan:   await peer to peer for rehab placement  continue to work with PT/OT/Rehab teams   Pain control PRN      Above case and plan d/w Dr. Norton

## 2021-08-17 LAB
ANION GAP SERPL CALC-SCNC: 13 MMOL/L — SIGNIFICANT CHANGE UP (ref 7–14)
BUN SERPL-MCNC: 10 MG/DL — SIGNIFICANT CHANGE UP (ref 10–20)
CALCIUM SERPL-MCNC: 9.3 MG/DL — SIGNIFICANT CHANGE UP (ref 8.5–10.1)
CHLORIDE SERPL-SCNC: 97 MMOL/L — LOW (ref 98–110)
CO2 SERPL-SCNC: 20 MMOL/L — SIGNIFICANT CHANGE UP (ref 17–32)
CREAT SERPL-MCNC: 0.7 MG/DL — SIGNIFICANT CHANGE UP (ref 0.7–1.5)
GLUCOSE SERPL-MCNC: 86 MG/DL — SIGNIFICANT CHANGE UP (ref 70–99)
HCT VFR BLD CALC: 31.7 % — LOW (ref 42–52)
HGB BLD-MCNC: 11 G/DL — LOW (ref 14–18)
MCHC RBC-ENTMCNC: 25.9 PG — LOW (ref 27–31)
MCHC RBC-ENTMCNC: 34.7 G/DL — SIGNIFICANT CHANGE UP (ref 32–37)
MCV RBC AUTO: 74.6 FL — LOW (ref 80–94)
NRBC # BLD: 0 /100 WBCS — SIGNIFICANT CHANGE UP (ref 0–0)
PLATELET # BLD AUTO: 183 K/UL — SIGNIFICANT CHANGE UP (ref 130–400)
POTASSIUM SERPL-MCNC: 4.2 MMOL/L — SIGNIFICANT CHANGE UP (ref 3.5–5)
POTASSIUM SERPL-SCNC: 4.2 MMOL/L — SIGNIFICANT CHANGE UP (ref 3.5–5)
RBC # BLD: 4.25 M/UL — LOW (ref 4.7–6.1)
RBC # FLD: 14.3 % — SIGNIFICANT CHANGE UP (ref 11.5–14.5)
SODIUM SERPL-SCNC: 130 MMOL/L — LOW (ref 135–146)
WBC # BLD: 4.21 K/UL — LOW (ref 4.8–10.8)
WBC # FLD AUTO: 4.21 K/UL — LOW (ref 4.8–10.8)

## 2021-08-17 RX ORDER — LEVETIRACETAM 250 MG/1
1000 TABLET, FILM COATED ORAL
Refills: 0 | Status: DISCONTINUED | OUTPATIENT
Start: 2021-08-17 | End: 2021-08-20

## 2021-08-17 RX ADMIN — CHLORHEXIDINE GLUCONATE 1 APPLICATION(S): 213 SOLUTION TOPICAL at 05:32

## 2021-08-17 RX ADMIN — LEVETIRACETAM 400 MILLIGRAM(S): 250 TABLET, FILM COATED ORAL at 05:32

## 2021-08-17 RX ADMIN — SENNA PLUS 2 TABLET(S): 8.6 TABLET ORAL at 21:22

## 2021-08-17 RX ADMIN — HEPARIN SODIUM 5000 UNIT(S): 5000 INJECTION INTRAVENOUS; SUBCUTANEOUS at 21:22

## 2021-08-17 RX ADMIN — Medication 100 MILLIGRAM(S): at 13:30

## 2021-08-17 RX ADMIN — HEPARIN SODIUM 5000 UNIT(S): 5000 INJECTION INTRAVENOUS; SUBCUTANEOUS at 13:30

## 2021-08-17 RX ADMIN — Medication 1 MILLIGRAM(S): at 13:30

## 2021-08-17 RX ADMIN — LEVETIRACETAM 1000 MILLIGRAM(S): 250 TABLET, FILM COATED ORAL at 17:16

## 2021-08-17 RX ADMIN — Medication 1 TABLET(S): at 13:30

## 2021-08-17 RX ADMIN — HEPARIN SODIUM 5000 UNIT(S): 5000 INJECTION INTRAVENOUS; SUBCUTANEOUS at 05:32

## 2021-08-17 NOTE — PROGRESS NOTE ADULT - SUBJECTIVE AND OBJECTIVE BOX
Subjective: 45yMale with a pmhx of SUBDURAL HEMATOMA    ^SUBDURAL HEMATOMA    No pertinent family history in first degree relatives    Family history of essential hypertension (Father)    Handoff    MEWS Score    No pertinent past medical history    Alcohol abuse    GERD (gastroesophageal reflux disease)    ETOH abuse    Hypomagnesemia    Seizure disorder    Subdural hematoma    Subdural hematoma    Cranioplasty for skull defect larger than 5 cm diameter    Subdural hematoma    Cranioplasty, for skull defect greater than 5 cm in diameter    No significant past surgical history    H/O hemorrhoidectomy    [facility]    47    SysAdmin_VisitLink         POD# 11  s/p left cranioplasty, TBI     Pt seen and examined at bedside.  Pt is AAOX2, pleasant, communicative.  Follows commands readily. Denies headaches, dizziness or vision trouble.  Remains confused at times to details.   Allergies    No Known Allergies    Intolerances          Vital Signs Last 24 Hrs  T(C): 36.2 (17 Aug 2021 09:00), Max: 36.5 (16 Aug 2021 18:16)  T(F): 97.2 (17 Aug 2021 09:00), Max: 97.7 (16 Aug 2021 18:16)  HR: 77 (17 Aug 2021 09:00) (69 - 87)  BP: 104/59 (17 Aug 2021 09:00) (99/59 - 113/72)  BP(mean): --  RR: 18 (17 Aug 2021 09:00) (16 - 18)  SpO2: 99% (17 Aug 2021 09:00) (91% - 100%)      acetaminophen   Tablet .. 650 milliGRAM(s) Oral every 6 hours PRN  chlorhexidine 4% Liquid 1 Application(s) Topical <User Schedule>  folic acid 1 milliGRAM(s) Oral daily  heparin   Injectable 5000 Unit(s) SubCutaneous every 8 hours  levETIRAcetam  IVPB 1000 milliGRAM(s) IV Intermittent every 12 hours  multivitamin 1 Tablet(s) Oral daily  senna 2 Tablet(s) Oral at bedtime  thiamine 100 milliGRAM(s) Oral daily        08-16-21 @ 07:01  -  08-17-21 @ 07:00  --------------------------------------------------------  IN: 240 mL / OUT: 600 mL / NET: -360 mL        REVIEW OF SYSTEMS    [x ] A ten-point review of systems was otherwise negative except as noted.  [ ] Due to altered mental status/intubation, subjective information were not able to be obtained from the patient. History was obtained, to the extent possible, from review of the chart and collateral sources of information.      Neuro Exam:  AAOX2. speech clear  facial motions symmetric  PERRL  Motor: MAEx4  incision healing  no drainage      CBC Full  -  ( 17 Aug 2021 04:58 )  WBC Count : 4.21 K/uL  RBC Count : 4.25 M/uL  Hemoglobin : 11.0 g/dL  Hematocrit : 31.7 %  Platelet Count - Automated : 183 K/uL  Mean Cell Volume : 74.6 fL  Mean Cell Hemoglobin : 25.9 pg  Mean Cell Hemoglobin Concentration : 34.7 g/dL  Auto Neutrophil # : x  Auto Lymphocyte # : x  Auto Monocyte # : x  Auto Eosinophil # : x  Auto Basophil # : x  Auto Neutrophil % : x  Auto Lymphocyte % : x  Auto Monocyte % : x  Auto Eosinophil % : x  Auto Basophil % : x    08-17    130<L>  |  97<L>  |  10  ----------------------------<  86  4.2   |  20  |  0.7    Ca    9.3      17 Aug 2021 04:58              Assessment/Plan:   Peer to peer completed by attg, CM to send more info  Await auth for rehab  change Keppra to ABHAY Arrington w neuropsychiatrist: they do NOT see patient's in acute hospital setting.  If pt goes to inpt rehab at [facility], may be seen there.  Otherwise, pt should f/u w neuropsych at outpt rehab.  d/w attg

## 2021-08-18 RX ADMIN — SENNA PLUS 2 TABLET(S): 8.6 TABLET ORAL at 21:24

## 2021-08-18 RX ADMIN — Medication 1 MILLIGRAM(S): at 11:06

## 2021-08-18 RX ADMIN — Medication 1 TABLET(S): at 11:06

## 2021-08-18 RX ADMIN — Medication 100 MILLIGRAM(S): at 11:06

## 2021-08-18 RX ADMIN — HEPARIN SODIUM 5000 UNIT(S): 5000 INJECTION INTRAVENOUS; SUBCUTANEOUS at 13:06

## 2021-08-18 RX ADMIN — LEVETIRACETAM 1000 MILLIGRAM(S): 250 TABLET, FILM COATED ORAL at 17:16

## 2021-08-18 RX ADMIN — HEPARIN SODIUM 5000 UNIT(S): 5000 INJECTION INTRAVENOUS; SUBCUTANEOUS at 05:19

## 2021-08-18 RX ADMIN — LEVETIRACETAM 1000 MILLIGRAM(S): 250 TABLET, FILM COATED ORAL at 05:19

## 2021-08-18 RX ADMIN — CHLORHEXIDINE GLUCONATE 1 APPLICATION(S): 213 SOLUTION TOPICAL at 05:20

## 2021-08-18 RX ADMIN — HEPARIN SODIUM 5000 UNIT(S): 5000 INJECTION INTRAVENOUS; SUBCUTANEOUS at 21:24

## 2021-08-18 NOTE — PROGRESS NOTE ADULT - SUBJECTIVE AND OBJECTIVE BOX
POD # 12    S/P  left Cranioplasty     pt seen and examined at bedside pt is alert follows commands       Vital Signs Last 24 Hrs  T(C): 35.8 (18 Aug 2021 08:57), Max: 36.4 (17 Aug 2021 17:00)  T(F): 96.5 (18 Aug 2021 08:57), Max: 97.5 (17 Aug 2021 17:00)  HR: 68 (18 Aug 2021 08:57) (60 - 76)  BP: 106/62 (18 Aug 2021 08:57) (103/61 - 123/64)  BP(mean): --  RR: 18 (18 Aug 2021 08:57) (18 - 18)  SpO2: 100% (18 Aug 2021 08:57) (98% - 100%)    PHYSICAL EXAM:    A&OX2 ( person , Place )  PERRL   EOM ( I )   MAEX4   MS bilateral UE's 5/5         bilateral LE's 5/5  head incision clean dry intact     MEDICATIONS:  Antibiotics:    Neuro:  acetaminophen   Tablet .. 650 milliGRAM(s) Oral every 6 hours PRN  levETIRAcetam 1000 milliGRAM(s) Oral two times a day    Anticoagulation:  heparin   Injectable 5000 Unit(s) SubCutaneous every 8 hours    OTHER:  chlorhexidine 4% Liquid 1 Application(s) Topical <User Schedule>  senna 2 Tablet(s) Oral at bedtime    IVF:  folic acid 1 milliGRAM(s) Oral daily  multivitamin 1 Tablet(s) Oral daily  thiamine 100 milliGRAM(s) Oral daily        LABS:                        11.0   4.21  )-----------( 183      ( 17 Aug 2021 04:58 )             31.7     08-17    130<L>  |  97<L>  |  10  ----------------------------<  86  4.2   |  20  |  0.7    Ca    9.3      17 Aug 2021 04:58          A/p          S/P Left Cranioplasty                awaiting authorization for rehab

## 2021-08-19 ENCOUNTER — TRANSCRIPTION ENCOUNTER (OUTPATIENT)
Age: 46
End: 2021-08-19

## 2021-08-19 RX ADMIN — LEVETIRACETAM 1000 MILLIGRAM(S): 250 TABLET, FILM COATED ORAL at 17:11

## 2021-08-19 RX ADMIN — Medication 1 MILLIGRAM(S): at 12:33

## 2021-08-19 RX ADMIN — LEVETIRACETAM 1000 MILLIGRAM(S): 250 TABLET, FILM COATED ORAL at 05:35

## 2021-08-19 RX ADMIN — Medication 1 TABLET(S): at 12:33

## 2021-08-19 RX ADMIN — HEPARIN SODIUM 5000 UNIT(S): 5000 INJECTION INTRAVENOUS; SUBCUTANEOUS at 21:03

## 2021-08-19 RX ADMIN — SENNA PLUS 2 TABLET(S): 8.6 TABLET ORAL at 21:03

## 2021-08-19 RX ADMIN — HEPARIN SODIUM 5000 UNIT(S): 5000 INJECTION INTRAVENOUS; SUBCUTANEOUS at 14:27

## 2021-08-19 RX ADMIN — HEPARIN SODIUM 5000 UNIT(S): 5000 INJECTION INTRAVENOUS; SUBCUTANEOUS at 05:34

## 2021-08-19 RX ADMIN — Medication 100 MILLIGRAM(S): at 12:33

## 2021-08-19 NOTE — PROGRESS NOTE ADULT - SUBJECTIVE AND OBJECTIVE BOX
POD # 13    S/P  left Cranioplasty       Pt seen and examined at bedside pt is alert, follow commands       Vital Signs Last 24 Hrs  T(C): 37 (19 Aug 2021 09:00), Max: 37 (19 Aug 2021 09:00)  T(F): 98.6 (19 Aug 2021 09:00), Max: 98.6 (19 Aug 2021 09:00)  HR: 87 (19 Aug 2021 09:00) (62 - 87)  BP: 110/58 (19 Aug 2021 09:00) (110/58 - 133/71)  BP(mean): --  RR: 18 (19 Aug 2021 09:00) (18 - 18)  SpO2: 100% (19 Aug 2021 09:00) (98% - 100%)    PHYSICAL EXAM:    A&OX3 , PERRL   EOM ( I )   MAEX4  MS bilateral UE's 5/5        bilateral LE's 5/5   Head incision clean dry intact            staples removed without complications       MEDICATIONS:  Antibiotics:    Neuro:  acetaminophen   Tablet .. 650 milliGRAM(s) Oral every 6 hours PRN  levETIRAcetam 1000 milliGRAM(s) Oral two times a day    Anticoagulation:  heparin   Injectable 5000 Unit(s) SubCutaneous every 8 hours    OTHER:  chlorhexidine 4% Liquid 1 Application(s) Topical <User Schedule>  senna 2 Tablet(s) Oral at bedtime    IVF:  folic acid 1 milliGRAM(s) Oral daily  multivitamin 1 Tablet(s) Oral daily  thiamine 100 milliGRAM(s) Oral daily      A/p         S/p Left Cranioplasty               await Rehab authorization

## 2021-08-19 NOTE — DISCHARGE NOTE NURSING/CASE MANAGEMENT/SOCIAL WORK - NSDCVIVACCINE_GEN_ALL_CORE_FT
Tdap; 06-May-2021 22:39; Enrique Luke (RN); [employer]; [id number] (Exp. Date: 23-May-2023); IntraMuscular; Deltoid Right.; 0.5 milliLiter(s); VIS (VIS Published: 09-May-2013, VIS Presented: 06-May-2021);

## 2021-08-19 NOTE — DISCHARGE NOTE NURSING/CASE MANAGEMENT/SOCIAL WORK - NSDCPEFALRISK_GEN_ALL_CORE
For information on Fall & injury Prevention, visit [email or web address]

## 2021-08-19 NOTE — DISCHARGE NOTE NURSING/CASE MANAGEMENT/SOCIAL WORK - PATIENT PORTAL LINK FT
You can access the FollowMyHealth Patient Portal offered by [facility] by registering at the following website: [email or web address]. By joining [facility]’s FollowMyHealth portal, you will also be able to view your health information using other applications (apps) compatible with our system.

## 2021-08-20 VITALS
RESPIRATION RATE: 18 BRPM | TEMPERATURE: 98 F | HEART RATE: 76 BPM | SYSTOLIC BLOOD PRESSURE: 110 MMHG | OXYGEN SATURATION: 100 % | DIASTOLIC BLOOD PRESSURE: 71 MMHG

## 2021-08-20 RX ORDER — LEVETIRACETAM 250 MG/1
1 TABLET, FILM COATED ORAL
Qty: 42 | Refills: 0
Start: 2021-08-20 | End: 2021-09-09

## 2021-08-20 RX ORDER — ACETAMINOPHEN WITH CODEINE 300MG-30MG
1 TABLET ORAL
Qty: 30 | Refills: 0
Start: 2021-08-20

## 2021-08-20 RX ORDER — FOLIC ACID 0.8 MG
1 TABLET ORAL
Qty: 30 | Refills: 0
Start: 2021-08-20 | End: 2021-09-18

## 2021-08-20 RX ORDER — THIAMINE MONONITRATE (VIT B1) 100 MG
1 TABLET ORAL
Qty: 30 | Refills: 0
Start: 2021-08-20 | End: 2021-09-18

## 2021-08-20 RX ORDER — TRAMADOL HYDROCHLORIDE 50 MG/1
1 TABLET ORAL
Qty: 30 | Refills: 0
Start: 2021-08-20

## 2021-08-20 RX ORDER — POLYETHYLENE GLYCOL 3350 17 G/17G
17 POWDER, FOR SOLUTION ORAL
Qty: 238 | Refills: 0
Start: 2021-08-20

## 2021-08-20 RX ADMIN — HEPARIN SODIUM 5000 UNIT(S): 5000 INJECTION INTRAVENOUS; SUBCUTANEOUS at 05:25

## 2021-08-20 RX ADMIN — HEPARIN SODIUM 5000 UNIT(S): 5000 INJECTION INTRAVENOUS; SUBCUTANEOUS at 13:39

## 2021-08-20 RX ADMIN — Medication 100 MILLIGRAM(S): at 12:40

## 2021-08-20 RX ADMIN — CHLORHEXIDINE GLUCONATE 1 APPLICATION(S): 213 SOLUTION TOPICAL at 07:09

## 2021-08-20 RX ADMIN — Medication 1 TABLET(S): at 12:40

## 2021-08-20 RX ADMIN — LEVETIRACETAM 1000 MILLIGRAM(S): 250 TABLET, FILM COATED ORAL at 05:26

## 2021-08-20 RX ADMIN — Medication 1 MILLIGRAM(S): at 12:40

## 2021-08-20 NOTE — PROGRESS NOTE ADULT - THIS PATIENT HAS THE FOLLOWING CONDITION(S)/DIAGNOSES ON THIS ADMISSION:
Brain Compression / Herniation
None
Cerebral Edema
None
Brain Compression / Herniation
Cerebral Edema
None
None

## 2021-08-20 NOTE — PROGRESS NOTE ADULT - REASON FOR ADMISSION
Preop for Cranioplasty

## 2021-08-20 NOTE — PROGRESS NOTE ADULT - SUBJECTIVE AND OBJECTIVE BOX
POD # 14    S/P left Cranioplasty     pt seen and examined at bedside pt is alert       Vital Signs Last 24 Hrs  T(C): 36.1 (20 Aug 2021 09:17), Max: 36.9 (19 Aug 2021 13:01)  T(F): 97 (20 Aug 2021 09:17), Max: 98.4 (19 Aug 2021 13:01)  HR: 91 (20 Aug 2021 09:17) (68 - 91)  BP: 107/62 (20 Aug 2021 09:17) (102/64 - 121/68)  BP(mean): --  RR: 18 (20 Aug 2021 09:17) (18 - 18)  SpO2: 100% (20 Aug 2021 09:17) (97% - 100%)    PHYSICAL EXAM:    A&OX3, PERRL  EOM ( I )   MAEX4   MS bilateral UE's 5/5         bilateral LE's 5/5  incision clean dry intact         MEDICATIONS:  Antibiotics:    Neuro:  acetaminophen   Tablet .. 650 milliGRAM(s) Oral every 6 hours PRN  levETIRAcetam 1000 milliGRAM(s) Oral two times a day    Anticoagulation:  heparin   Injectable 5000 Unit(s) SubCutaneous every 8 hours    OTHER:  chlorhexidine 4% Liquid 1 Application(s) Topical <User Schedule>  senna 2 Tablet(s) Oral at bedtime    IVF:  folic acid 1 milliGRAM(s) Oral daily  multivitamin 1 Tablet(s) Oral daily  thiamine 100 milliGRAM(s) Oral daily        A/p          S/p Left Cranioplasty                neuro stable                DC home today

## 2021-08-26 DIAGNOSIS — F10.10 ALCOHOL ABUSE, UNCOMPLICATED: ICD-10-CM

## 2021-08-26 DIAGNOSIS — E83.42 HYPOMAGNESEMIA: ICD-10-CM

## 2021-08-26 DIAGNOSIS — S06.5X9A TRAUMATIC SUBDURAL HEMORRHAGE WITH LOSS OF CONSCIOUSNESS OF UNSPECIFIED DURATION, INITIAL ENCOUNTER: ICD-10-CM

## 2021-08-26 DIAGNOSIS — Z42.8 ENCOUNTER FOR OTHER PLASTIC AND RECONSTRUCTIVE SURGERY FOLLOWING MEDICAL PROCEDURE OR HEALED INJURY: ICD-10-CM

## 2021-08-26 DIAGNOSIS — T85.02XA DISPLACEMENT OF VENTRICULAR INTRACRANIAL (COMMUNICATING) SHUNT, INITIAL ENCOUNTER: ICD-10-CM

## 2021-08-26 DIAGNOSIS — Z87.820 PERSONAL HISTORY OF TRAUMATIC BRAIN INJURY: ICD-10-CM

## 2021-08-26 DIAGNOSIS — K21.9 GASTRO-ESOPHAGEAL REFLUX DISEASE WITHOUT ESOPHAGITIS: ICD-10-CM

## 2021-08-26 DIAGNOSIS — R47.1 DYSARTHRIA AND ANARTHRIA: ICD-10-CM

## 2021-08-26 DIAGNOSIS — G40.909 EPILEPSY, UNSPECIFIED, NOT INTRACTABLE, WITHOUT STATUS EPILEPTICUS: ICD-10-CM

## 2021-08-26 DIAGNOSIS — R47.01 APHASIA: ICD-10-CM
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
## 2021-09-01 ENCOUNTER — APPOINTMENT (OUTPATIENT)
Dept: NEUROSURGERY | Facility: CLINIC | Age: 46
End: 2021-09-01
Payer: COMMERCIAL

## 2021-09-01 ENCOUNTER — TRANSCRIPTION ENCOUNTER (OUTPATIENT)
Age: 46
End: 2021-09-01

## 2021-09-01 PROCEDURE — 99024 POSTOP FOLLOW-UP VISIT: CPT

## 2021-09-07 NOTE — PHYSICAL EXAM
[FreeTextEntry1] : Constitutional: Well appearing, no distress\par HEENT: Normocephalic Atraumatic\par Psychiatric: Alert and oriented to all spheres, normal mood\par Pulmonary: no respiratory distress\par Abdomen: non-distended\par Vascular/Extremities: no edema, no cyanosis, no clubbing\par \par \par Neurologic: \par CN II-XII grossly intact, except expressive aphasia\par ROM: Full in cervical and lumbar spine\par Palpation: no pain to palpation in cervical spine, no pain to palpation in lumbar spine\par Strength: Full strength in all major muscle groups, no atrophy, 4/5 weakness r side\par Sensation: Full sensation to light touch in all extremities\par \par \par \par Gait: antalgic\par \par INc c/d/I\par \par \par \par \par

## 2021-09-14 ENCOUNTER — OUTPATIENT (OUTPATIENT)
Dept: OUTPATIENT SERVICES | Facility: HOSPITAL | Age: 46
LOS: 1 days | Discharge: HOME | End: 2021-09-14
Payer: COMMERCIAL

## 2021-09-14 DIAGNOSIS — I63.9 CEREBRAL INFARCTION, UNSPECIFIED: ICD-10-CM

## 2021-09-14 DIAGNOSIS — Z98.890 OTHER SPECIFIED POSTPROCEDURAL STATES: Chronic | ICD-10-CM

## 2021-09-14 DIAGNOSIS — S06.9X9A UNSPECIFIED INTRACRANIAL INJURY WITH LOSS OF CONSCIOUSNESS OF UNSPECIFIED DURATION, INITIAL ENCOUNTER: ICD-10-CM

## 2021-09-14 PROCEDURE — 70450 CT HEAD/BRAIN W/O DYE: CPT | Mod: 26

## 2021-09-29 ENCOUNTER — APPOINTMENT (OUTPATIENT)
Dept: NEUROSURGERY | Facility: CLINIC | Age: 46
End: 2021-09-29
Payer: COMMERCIAL

## 2021-09-29 PROCEDURE — 99024 POSTOP FOLLOW-UP VISIT: CPT

## 2021-09-29 NOTE — HISTORY OF PRESENT ILLNESS
[FreeTextEntry1] : Patient doing well. Improving quantity and clarity of speech. R sided weakness cont to improve 4+/5 mild spasticity with gait. Right VPS prominent and bothering patient. CTH done reviewed, stable, no HCP. VPS ventricular cath has cont to migrate out of position as brain is accomadting midline position. \par \par Patient is performing all his own ADLs and some chores around the house. He needs no queus from wife to do so\par \par Home Pt/OT is finishing. He has some speech therapy remaining.

## 2021-09-29 NOTE — PHYSICAL EXAM
[FreeTextEntry1] : A&Ox3\par PERRL\par FS\par TM\par MAEW with mild right hemiparesis 4+/5\par gait mildly spastic but stable and walking wiht assistive device

## 2021-10-08 ENCOUNTER — RX RENEWAL (OUTPATIENT)
Age: 46
End: 2021-10-08

## 2021-10-11 ENCOUNTER — APPOINTMENT (OUTPATIENT)
Dept: CARDIOLOGY | Facility: CLINIC | Age: 46
End: 2021-10-11
Payer: COMMERCIAL

## 2021-10-11 ENCOUNTER — NON-APPOINTMENT (OUTPATIENT)
Age: 46
End: 2021-10-11

## 2021-10-11 VITALS
BODY MASS INDEX: 26.46 KG/M2 | DIASTOLIC BLOOD PRESSURE: 80 MMHG | SYSTOLIC BLOOD PRESSURE: 110 MMHG | HEIGHT: 71 IN | HEART RATE: 75 BPM | WEIGHT: 189 LBS | TEMPERATURE: 97.1 F

## 2021-10-11 DIAGNOSIS — I42.6 ALCOHOLIC CARDIOMYOPATHY: ICD-10-CM

## 2021-10-11 DIAGNOSIS — I51.89 OTHER ILL-DEFINED HEART DISEASES: ICD-10-CM

## 2021-10-11 PROCEDURE — 99214 OFFICE O/P EST MOD 30 MIN: CPT

## 2021-10-11 PROCEDURE — 93000 ELECTROCARDIOGRAM COMPLETE: CPT

## 2021-10-11 NOTE — DISCUSSION/SUMMARY
[FreeTextEntry1] : IV adenosine thallium stress test\par 2D echo doppler.\par PAST has not been performed yet.\par He is scheduled for this week and according to his wife, he will be having lab testing performed.\par Patient is to have the above testing prior to proceeding with removal of his  shunt.\par Patient was counseled on the need to avoid any further alcohol use both as a consequence of his brain trauma, but also due to his cardiomyopathy.\par RV after above testing in 1-2 weeks.

## 2021-10-11 NOTE — PHYSICAL EXAM
[Well Nourished] : well nourished [No Acute Distress] : no acute distress [Normal Conjunctiva] : normal conjunctiva [Normal Venous Pressure] : normal venous pressure [No Carotid Bruit] : no carotid bruit [Normal] : normal S1, S2, no murmur, no rub, no gallop [Clear Lung Fields] : clear lung fields [Good Air Entry] : good air entry [No Respiratory Distress] : no respiratory distress  [Soft] : abdomen soft [Non Tender] : non-tender [No Edema] : no edema [No Clubbing] : no clubbing [No Cyanosis] : no cyanosis [Memory Deficit] : memory deficit [Cognitive Impairment] : cognitive impairment [de-identified] : Expressive aphasia

## 2021-10-11 NOTE — REASON FOR VISIT
[FreeTextEntry1] : 45 year old Polish speaking male with a PMH of traumatic brain injury following a fall in his bathroom while under the influence of alcohol presents for an initial Cardiology evaluation prior to proceeding with  shunt removal.  The patient has a history of cardiac arrest with his prior neurosurgery and required hospitalization for 1 month in the ICU according to the patient's spouse who acted as the patient's historian to his recent unfortunate medical events.\par The patient apparently had a cardiomyopathy which was presumed to be a consequence of his alcohol use.\par He underwent a left decompressive craniectomy for a large acute subdural hemorrhage with cerebral herniation The procedure was complicated by a cardiac arrest.

## 2021-10-11 NOTE — HISTORY OF PRESENT ILLNESS
[FreeTextEntry1] : Traumatic brain injury s/p fall in the bathroom\par Subdural hematoma\par Herniation of the brain\par Multiple hemispheric infarcts involving left frontal, parasagittal,parietal, and temporal lobes noted on head CT scan\par Craniectomy on 5/6/21\par Cardiac arrest\par Cardiomyopathy\par Alcohol ingestion\par Placement of a ventriculoperitoneal shunt

## 2021-10-11 NOTE — ASSESSMENT
[FreeTextEntry1] : History of cardiac arrest s/p neurosurgery for subdural hemorrhage with cerebral herniation and brain compression\par History of a cardiomyopathy etiology alcohol or idiopathic\par R/O ASHD\par Seizure disorder\par  shunt\par

## 2021-10-14 ENCOUNTER — OUTPATIENT (OUTPATIENT)
Dept: OUTPATIENT SERVICES | Facility: HOSPITAL | Age: 46
LOS: 1 days | Discharge: HOME | End: 2021-10-14
Payer: COMMERCIAL

## 2021-10-14 VITALS
RESPIRATION RATE: 20 BRPM | SYSTOLIC BLOOD PRESSURE: 103 MMHG | WEIGHT: 191.8 LBS | OXYGEN SATURATION: 99 % | HEIGHT: 73 IN | TEMPERATURE: 97 F | HEART RATE: 70 BPM | DIASTOLIC BLOOD PRESSURE: 62 MMHG

## 2021-10-14 DIAGNOSIS — Z98.890 OTHER SPECIFIED POSTPROCEDURAL STATES: Chronic | ICD-10-CM

## 2021-10-14 DIAGNOSIS — S06.9X9D UNSPECIFIED INTRACRANIAL INJURY WITH LOSS OF CONSCIOUSNESS OF UNSPECIFIED DURATION, SUBSEQUENT ENCOUNTER: ICD-10-CM

## 2021-10-14 DIAGNOSIS — Z01.818 ENCOUNTER FOR OTHER PREPROCEDURAL EXAMINATION: ICD-10-CM

## 2021-10-14 LAB
ALBUMIN SERPL ELPH-MCNC: 4.7 G/DL — SIGNIFICANT CHANGE UP (ref 3.5–5.2)
ALLERGY+IMMUNOLOGY DIAG STUDY NOTE: SIGNIFICANT CHANGE UP
ALP SERPL-CCNC: 68 U/L — SIGNIFICANT CHANGE UP (ref 30–115)
ALT FLD-CCNC: 9 U/L — SIGNIFICANT CHANGE UP (ref 0–41)
ANION GAP SERPL CALC-SCNC: 11 MMOL/L — SIGNIFICANT CHANGE UP (ref 7–14)
APTT BLD: 31 SEC — SIGNIFICANT CHANGE UP (ref 27–39.2)
AST SERPL-CCNC: 14 U/L — SIGNIFICANT CHANGE UP (ref 0–41)
BASOPHILS # BLD AUTO: 0.01 K/UL — SIGNIFICANT CHANGE UP (ref 0–0.2)
BASOPHILS NFR BLD AUTO: 0.2 % — SIGNIFICANT CHANGE UP (ref 0–1)
BILIRUB SERPL-MCNC: 0.5 MG/DL — SIGNIFICANT CHANGE UP (ref 0.2–1.2)
BLD GP AB SCN SERPL QL: SIGNIFICANT CHANGE UP
BUN SERPL-MCNC: 10 MG/DL — SIGNIFICANT CHANGE UP (ref 10–20)
CALCIUM SERPL-MCNC: 9.7 MG/DL — SIGNIFICANT CHANGE UP (ref 8.5–10.1)
CHLORIDE SERPL-SCNC: 98 MMOL/L — SIGNIFICANT CHANGE UP (ref 98–110)
CO2 SERPL-SCNC: 23 MMOL/L — SIGNIFICANT CHANGE UP (ref 17–32)
CREAT SERPL-MCNC: 0.8 MG/DL — SIGNIFICANT CHANGE UP (ref 0.7–1.5)
DAT IGG-SP REAG RBC-IMP: ABNORMAL
DIR ANTIGLOB POLYSPECIFIC INTERPRETATION: ABNORMAL
EOSINOPHIL # BLD AUTO: 0.12 K/UL — SIGNIFICANT CHANGE UP (ref 0–0.7)
EOSINOPHIL NFR BLD AUTO: 2.7 % — SIGNIFICANT CHANGE UP (ref 0–8)
GLUCOSE SERPL-MCNC: 77 MG/DL — SIGNIFICANT CHANGE UP (ref 70–99)
HCT VFR BLD CALC: 38.4 % — LOW (ref 42–52)
HGB BLD-MCNC: 13.2 G/DL — LOW (ref 14–18)
IAT COMP-SP REAG SERPL QL: SIGNIFICANT CHANGE UP
IMM GRANULOCYTES NFR BLD AUTO: 0.2 % — SIGNIFICANT CHANGE UP (ref 0.1–0.3)
INR BLD: 1.02 RATIO — SIGNIFICANT CHANGE UP (ref 0.65–1.3)
LYMPHOCYTES # BLD AUTO: 1.66 K/UL — SIGNIFICANT CHANGE UP (ref 1.2–3.4)
LYMPHOCYTES # BLD AUTO: 37.6 % — SIGNIFICANT CHANGE UP (ref 20.5–51.1)
MCHC RBC-ENTMCNC: 26.5 PG — LOW (ref 27–31)
MCHC RBC-ENTMCNC: 34.4 G/DL — SIGNIFICANT CHANGE UP (ref 32–37)
MCV RBC AUTO: 77.1 FL — LOW (ref 80–94)
MONOCYTES # BLD AUTO: 0.48 K/UL — SIGNIFICANT CHANGE UP (ref 0.1–0.6)
MONOCYTES NFR BLD AUTO: 10.9 % — HIGH (ref 1.7–9.3)
MRSA PCR RESULT.: NEGATIVE — SIGNIFICANT CHANGE UP
NEUTROPHILS # BLD AUTO: 2.14 K/UL — SIGNIFICANT CHANGE UP (ref 1.4–6.5)
NEUTROPHILS NFR BLD AUTO: 48.4 % — SIGNIFICANT CHANGE UP (ref 42.2–75.2)
NRBC # BLD: 0 /100 WBCS — SIGNIFICANT CHANGE UP (ref 0–0)
PLATELET # BLD AUTO: 184 K/UL — SIGNIFICANT CHANGE UP (ref 130–400)
POTASSIUM SERPL-MCNC: 4.3 MMOL/L — SIGNIFICANT CHANGE UP (ref 3.5–5)
POTASSIUM SERPL-SCNC: 4.3 MMOL/L — SIGNIFICANT CHANGE UP (ref 3.5–5)
PROT SERPL-MCNC: 7.2 G/DL — SIGNIFICANT CHANGE UP (ref 6–8)
PROTHROM AB SERPL-ACNC: 11.7 SEC — SIGNIFICANT CHANGE UP (ref 9.95–12.87)
RBC # BLD: 4.98 M/UL — SIGNIFICANT CHANGE UP (ref 4.7–6.1)
RBC # FLD: 13.7 % — SIGNIFICANT CHANGE UP (ref 11.5–14.5)
SODIUM SERPL-SCNC: 132 MMOL/L — LOW (ref 135–146)
WBC # BLD: 4.42 K/UL — LOW (ref 4.8–10.8)
WBC # FLD AUTO: 4.42 K/UL — LOW (ref 4.8–10.8)

## 2021-10-14 PROCEDURE — 93010 ELECTROCARDIOGRAM REPORT: CPT

## 2021-10-14 PROCEDURE — 86077 PHYS BLOOD BANK SERV XMATCH: CPT

## 2021-10-14 NOTE — H&P PST ADULT - NSICDXFAMILYHX_GEN_ALL_CORE_FT
FAMILY HISTORY:  Father  Still living? Yes, Estimated age: 71-80  Family history of essential hypertension, Age at diagnosis: Age Unknown  FH: HTN (hypertension), Age at diagnosis: Age Unknown

## 2021-10-14 NOTE — H&P PST ADULT - HISTORY OF PRESENT ILLNESS
Patient is a 45 year old male presenting to PAST in preparation for Removal of  Shunt    on 10/28 under gen anesthesia by Dr. Carlson  Pt reports he had a fall in May. He sustained a subdural hematoma and required evacuation of same . A  shunt was placed at this time  .He no longer requires it and has been scheduled for removal.  reports no c/o cp,sob,palpitations,cough  or dysuria  1-2 fos without sob    Patient denies any signs or symptoms of COVID 19 and denies contact with known positive individuals. The pt is also not vaccinated.  They have an appointment for repeat COVID testing pre-procedure and acknowledge its time and place.  They were instructed to quarantine pre-procedure, practice exposure control measures, continue to self-monitor and report any concerns to their proceduralist.    Anesthesia Alert  NO--Difficult Airway  NO--History of neck surgery or radiation  NO--Limited ROM of neck  NO--History of Malignant hyperthermia  NO--Personal or family history of Pseudocholinesterase deficiency  NO--Prior Anesthesia Complication  NO--Latex Allergy  NO--Loose teeth  NO--History of Rheumatoid Arthritis  NO--HANK  NO-- BLEEDING RISK  NO--Other_____    Patient verbalized understanding of instructions and was given the opportunity to ask questions and have them answered.  As per patient, this is their complete medical and surgical history, including medications both prescribed or over the counter.

## 2021-10-15 ENCOUNTER — OUTPATIENT (OUTPATIENT)
Dept: OUTPATIENT SERVICES | Facility: HOSPITAL | Age: 46
LOS: 1 days | Discharge: HOME | End: 2021-10-15

## 2021-10-15 DIAGNOSIS — Z02.9 ENCOUNTER FOR ADMINISTRATIVE EXAMINATIONS, UNSPECIFIED: ICD-10-CM

## 2021-10-15 DIAGNOSIS — Z98.890 OTHER SPECIFIED POSTPROCEDURAL STATES: Chronic | ICD-10-CM

## 2021-10-15 LAB — SODIUM SERPL-SCNC: 140 MMOL/L — SIGNIFICANT CHANGE UP (ref 135–146)

## 2021-10-20 ENCOUNTER — APPOINTMENT (OUTPATIENT)
Dept: CARDIOLOGY | Facility: CLINIC | Age: 46
End: 2021-10-20
Payer: COMMERCIAL

## 2021-10-20 PROCEDURE — 93306 TTE W/DOPPLER COMPLETE: CPT

## 2021-10-26 ENCOUNTER — LABORATORY RESULT (OUTPATIENT)
Age: 46
End: 2021-10-26

## 2021-10-26 ENCOUNTER — OUTPATIENT (OUTPATIENT)
Dept: OUTPATIENT SERVICES | Facility: HOSPITAL | Age: 46
LOS: 1 days | Discharge: HOME | End: 2021-10-26

## 2021-10-26 DIAGNOSIS — Z82.3 FAMILY HISTORY OF STROKE: ICD-10-CM

## 2021-10-26 DIAGNOSIS — Z82.49 FAMILY HISTORY OF ISCHEMIC HEART DISEASE AND OTHER DISEASES OF THE CIRCULATORY SYSTEM: ICD-10-CM

## 2021-10-26 DIAGNOSIS — Z98.890 OTHER SPECIFIED POSTPROCEDURAL STATES: Chronic | ICD-10-CM

## 2021-10-26 DIAGNOSIS — Z11.59 ENCOUNTER FOR SCREENING FOR OTHER VIRAL DISEASES: ICD-10-CM

## 2021-10-28 NOTE — PROGRESS NOTE ADULT - SUBJECTIVE AND OBJECTIVE BOX
POD# 36 S/P Left Craniectomy for Evac of SDH  POD# 27 S/P Wound Revision  POD# 5 s/p placement of right frontal VPS strata II programmable valve set at 1.5, Lap assist for peritoneal cath placement      pt seen and examined at bedside pt is awake smiles, follows commands     Vital Signs Last 24 Hrs  T(C): 35.6 (12 Jun 2021 05:00), Max: 36.6 (11 Jun 2021 14:00)  T(F): 96 (12 Jun 2021 05:00), Max: 97.8 (11 Jun 2021 14:00)  HR: 85 (12 Jun 2021 05:00) (83 - 98)  BP: 141/91 (12 Jun 2021 05:00) (141/91 - 155/92)  BP(mean): 112 (12 Jun 2021 05:00) (112 - 118)  RR: 20 (12 Jun 2021 05:00) (18 - 20)  SpO2: 100% (12 Jun 2021 05:00) (100% - 100%)    PHYSICAL EXAM:      Alert, Follows commands   PERRL   smiles , shakes head yes and no   Moves left side spontaneously with good strength   moves Right foot to command   picked up Right hand to say good bye and blow kisses  head cranial defect clean dry intact     MEDICATIONS:  Antibiotics:  meropenem  IVPB 1000 milliGRAM(s) IV Intermittent every 8 hours  vancomycin  IVPB 1500 milliGRAM(s) IV Intermittent every 12 hours    Neuro:  acetaminophen   Tablet .. 650 milliGRAM(s) Oral every 6 hours PRN  levETIRAcetam  IVPB 500 milliGRAM(s) IV Intermittent every 12 hours    Anticoagulation:  enoxaparin Injectable 40 milliGRAM(s) SubCutaneous daily    OTHER:  artificial tears (preservative free) Ophthalmic Solution 2 Drop(s) Both EYES three times a day  lisinopril 20 milliGRAM(s) Oral daily  pantoprazole   Suspension 40 milliGRAM(s) Oral daily  propranolol 20 milliGRAM(s) Oral every 8 hours  senna 2 Tablet(s) Oral at bedtime    IVF:  sodium chloride 8 Gram(s) Oral every 6 hours        LABS:                        8.8    3.70  )-----------( 230      ( 11 Jun 2021 01:22 )             27.2     06-11    143  |  106  |  9<L>  ----------------------------<  93  3.7   |  28  |  <0.5<L>    Ca    9.4      11 Jun 2021 01:22  Phos  4.1     06-11  Mg     1.7     06-11      PT/INR - ( 11 Jun 2021 06:45 )   PT: 14.50 sec;   INR: 1.26 ratio         PTT - ( 11 Jun 2021 06:45 )  PTT:32.8 sec      A/p            POD# 36 S/P Left Craniectomy for Evac of SDH           POD# 27 S/P Wound Revision           POD# 5 s/p placement of right frontal VPS strata II programmable valve set at 1.5, Lap assist for peritoneal cath placement                        Antibiotics till tomorrow                         PT/OT/Speech/ Rehab                         will need TBI rehab      Deven Barron (DO)  Orthopaedic Surgery  [address] [place] [address]  Phone: [phone]  Fax: [phone]  Follow Up Time:     Andrei Almanza)  Plastic Surgery  [address] [facility], [address]  [place], [place] [id number]  Phone: [phone]  Fax: [phone]  Follow Up Time:     Wolf Suazo)  Orthopaedic Surgery  [address] [facility], [facility] [address]  [place] [address]  Phone: [phone]  Fax: [phone]  Follow Up Time:

## 2021-11-04 LAB — HUMAN ERYTHROCYTE ANTIGEN PANEL RESULT: SIGNIFICANT CHANGE UP

## 2021-11-08 ENCOUNTER — RX RENEWAL (OUTPATIENT)
Age: 46
End: 2021-11-08

## 2021-11-11 ENCOUNTER — OUTPATIENT (OUTPATIENT)
Dept: OUTPATIENT SERVICES | Facility: HOSPITAL | Age: 46
LOS: 1 days | Discharge: HOME | End: 2021-11-11
Payer: COMMERCIAL

## 2021-11-11 VITALS
WEIGHT: 190.04 LBS | RESPIRATION RATE: 16 BRPM | SYSTOLIC BLOOD PRESSURE: 114 MMHG | HEIGHT: 73 IN | DIASTOLIC BLOOD PRESSURE: 66 MMHG | HEART RATE: 76 BPM | TEMPERATURE: 98 F | OXYGEN SATURATION: 100 %

## 2021-11-11 DIAGNOSIS — Z98.890 OTHER SPECIFIED POSTPROCEDURAL STATES: Chronic | ICD-10-CM

## 2021-11-11 DIAGNOSIS — Z01.818 ENCOUNTER FOR OTHER PREPROCEDURAL EXAMINATION: ICD-10-CM

## 2021-11-11 DIAGNOSIS — S06.9X9D UNSPECIFIED INTRACRANIAL INJURY WITH LOSS OF CONSCIOUSNESS OF UNSPECIFIED DURATION, SUBSEQUENT ENCOUNTER: ICD-10-CM

## 2021-11-11 LAB
ALLERGY+IMMUNOLOGY DIAG STUDY NOTE: SIGNIFICANT CHANGE UP
BLD GP AB SCN SERPL QL: SIGNIFICANT CHANGE UP
DAT IGG-SP REAG RBC-IMP: ABNORMAL
DIR ANTIGLOB POLYSPECIFIC INTERPRETATION: ABNORMAL
IAT COMP-SP REAG SERPL QL: SIGNIFICANT CHANGE UP

## 2021-11-11 PROCEDURE — 86077 PHYS BLOOD BANK SERV XMATCH: CPT

## 2021-11-11 NOTE — H&P PST ADULT - NSICDXPASTMEDICALHX_GEN_ALL_CORE_FT
PAST MEDICAL HISTORY:  ETOH abuse     GERD (gastroesophageal reflux disease)     Hypomagnesemia     Seizure disorder fall 5/2021 at home-last seizure

## 2021-11-11 NOTE — H&P PST ADULT - NSICDXPASTSURGICALHX_GEN_ALL_CORE_FT
PAST SURGICAL HISTORY:  H/O hemorrhoidectomy     Status post evacuation of subdural hematoma  shunt-5/2021, Prolonged [id number] and PEG

## 2021-11-11 NOTE — H&P PST ADULT - ADDITIONAL PE
Slow reaction time, was unable to recall his  and spell name correctly[Per wife -has difficulty with expressing self -receiving speech RX]

## 2021-11-12 ENCOUNTER — LABORATORY RESULT (OUTPATIENT)
Age: 46
End: 2021-11-12

## 2021-11-12 ENCOUNTER — APPOINTMENT (OUTPATIENT)
Dept: NEUROPSYCHOLOGY | Facility: CLINIC | Age: 46
End: 2021-11-12

## 2021-11-12 ENCOUNTER — OUTPATIENT (OUTPATIENT)
Dept: OUTPATIENT SERVICES | Facility: HOSPITAL | Age: 46
LOS: 1 days | Discharge: HOME | End: 2021-11-12

## 2021-11-12 DIAGNOSIS — Z98.890 OTHER SPECIFIED POSTPROCEDURAL STATES: Chronic | ICD-10-CM

## 2021-11-12 DIAGNOSIS — Z11.59 ENCOUNTER FOR SCREENING FOR OTHER VIRAL DISEASES: ICD-10-CM

## 2021-11-12 PROBLEM — G40.909 EPILEPSY, UNSPECIFIED, NOT INTRACTABLE, WITHOUT STATUS EPILEPTICUS: Chronic | Status: ACTIVE | Noted: 2021-03-29

## 2021-11-15 ENCOUNTER — RESULT REVIEW (OUTPATIENT)
Age: 46
End: 2021-11-15

## 2021-11-15 ENCOUNTER — OUTPATIENT (OUTPATIENT)
Dept: OUTPATIENT SERVICES | Facility: HOSPITAL | Age: 46
LOS: 1 days | Discharge: HOME | End: 2021-11-15
Payer: COMMERCIAL

## 2021-11-15 DIAGNOSIS — R07.9 CHEST PAIN, UNSPECIFIED: ICD-10-CM

## 2021-11-15 DIAGNOSIS — Z98.890 OTHER SPECIFIED POSTPROCEDURAL STATES: Chronic | ICD-10-CM

## 2021-11-15 PROCEDURE — G1004: CPT

## 2021-11-15 PROCEDURE — 78452 HT MUSCLE IMAGE SPECT MULT: CPT | Mod: 26

## 2021-11-15 PROCEDURE — 93016 CV STRESS TEST SUPVJ ONLY: CPT

## 2021-11-15 PROCEDURE — 93018 CV STRESS TEST I&R ONLY: CPT

## 2021-11-19 ENCOUNTER — APPOINTMENT (OUTPATIENT)
Dept: NEUROPSYCHOLOGY | Facility: CLINIC | Age: 46
End: 2021-11-19

## 2021-11-26 ENCOUNTER — APPOINTMENT (OUTPATIENT)
Dept: NEUROPSYCHOLOGY | Facility: CLINIC | Age: 46
End: 2021-11-26

## 2021-11-29 ENCOUNTER — LABORATORY RESULT (OUTPATIENT)
Age: 46
End: 2021-11-29

## 2021-11-29 ENCOUNTER — APPOINTMENT (OUTPATIENT)
Dept: CARDIOLOGY | Facility: CLINIC | Age: 46
End: 2021-11-29
Payer: COMMERCIAL

## 2021-11-29 VITALS
TEMPERATURE: 96 F | DIASTOLIC BLOOD PRESSURE: 78 MMHG | WEIGHT: 192 LBS | HEIGHT: 71 IN | SYSTOLIC BLOOD PRESSURE: 110 MMHG | BODY MASS INDEX: 26.88 KG/M2

## 2021-11-29 DIAGNOSIS — I51.89 OTHER ILL-DEFINED HEART DISEASES: ICD-10-CM

## 2021-11-29 PROCEDURE — 99214 OFFICE O/P EST MOD 30 MIN: CPT

## 2021-11-29 NOTE — REVIEW OF SYSTEMS
[Numbness (Hypoesthesia)] : numbness [Tingling (Paresthesia)] : tingling [Weakness] : weakness [Limb Weakness (Paresis)] : limb weakness (Paresis) [Speech Disturbance] : speech disturbance [Confusion] : confusion was observed [Memory Lapses Or Loss] : memory lapses or loss [de-identified] : Right sided extremity weakness, paresthesias, numbness, and limb weakness, with expressive aphasia and loss of memory

## 2021-11-29 NOTE — DISCUSSION/SUMMARY
[FreeTextEntry1] : IV adenosine thallium stress test and 2D echo doppler results were reviewed with the patient and his spouse. Copies of the results were provided to them.\par The patient represents an intermediate risk for a perioperative cardiac event representing a 5-7% risk for MI, CHF, arrhythmia, and 2% mortality risk. He may proceed from a cardiac standpoint for removal of his  shunt at this level of risk.\par This was explained to the patient and his spouse.\par He is scheduled for his surgery in December.\par Patient was counseled on the need to avoid any further alcohol use both as a consequence of his brain trauma, but also due to prevention of a cardiomyopathy.\par RV in 1 year.

## 2021-11-29 NOTE — PHYSICAL EXAM
[Well Nourished] : well nourished [No Acute Distress] : no acute distress [Normal Conjunctiva] : normal conjunctiva [Normal Venous Pressure] : normal venous pressure [No Carotid Bruit] : no carotid bruit [Normal] : normal S1, S2, no murmur, no rub, no gallop [Clear Lung Fields] : clear lung fields [Good Air Entry] : good air entry [No Respiratory Distress] : no respiratory distress  [Soft] : abdomen soft [Non Tender] : non-tender [No Edema] : no edema [No Cyanosis] : no cyanosis [No Clubbing] : no clubbing [Memory Deficit] : memory deficit [Cognitive Impairment] : cognitive impairment [de-identified] : Expressive aphasia

## 2021-11-29 NOTE — ASSESSMENT
[FreeTextEntry1] : History of cardiac arrest s/p neurosurgery for subdural hemorrhage with cerebral herniation and brain compression\par History of a cardiomyopathy etiology alcohol or idiopathic\par Normal LV systolic function on nuclear SPECT scans and 2D echo \par Grade 1 diastolic dysfunction\par Seizure disorder\par  shunt\par

## 2021-11-29 NOTE — REASON FOR VISIT
[FreeTextEntry1] : Patient is here for the results of his nuclear stress thallium and 2D echo doppler. He is scheduled for removal of his  shunt in December.

## 2021-12-01 NOTE — ASU PATIENT PROFILE, ADULT - FALL HARM RISK - UNIVERSAL INTERVENTIONS
Bed in lowest position, wheels locked, appropriate side rails in place/Call bell, personal items and telephone in reach/Instruct patient to call for assistance before getting out of bed or chair/Non-slip footwear when patient is out of bed/Windfall to call system/Physically safe environment - no spills, clutter or unnecessary equipment/Purposeful Proactive Rounding/Room/bathroom lighting operational, light cord in reach

## 2021-12-01 NOTE — ASU PATIENT PROFILE, ADULT - NSICDXPASTSURGICALHX_GEN_ALL_CORE_FT
PAST SURGICAL HISTORY:  H/O hemorrhoidectomy     Status post evacuation of subdural hematoma  shunt-5/2021, Prolonged uqfmthveubzh-lkah-cunbqmaycryw and PEG

## 2021-12-02 ENCOUNTER — INPATIENT (INPATIENT)
Facility: HOSPITAL | Age: 46
LOS: 0 days | Discharge: HOME | End: 2021-12-03
Attending: STUDENT IN AN ORGANIZED HEALTH CARE EDUCATION/TRAINING PROGRAM | Admitting: STUDENT IN AN ORGANIZED HEALTH CARE EDUCATION/TRAINING PROGRAM
Payer: COMMERCIAL

## 2021-12-02 ENCOUNTER — APPOINTMENT (OUTPATIENT)
Dept: NEUROSURGERY | Facility: HOSPITAL | Age: 46
End: 2021-12-02
Payer: COMMERCIAL

## 2021-12-02 VITALS
DIASTOLIC BLOOD PRESSURE: 71 MMHG | RESPIRATION RATE: 17 BRPM | SYSTOLIC BLOOD PRESSURE: 121 MMHG | OXYGEN SATURATION: 98 % | HEART RATE: 67 BPM | WEIGHT: 179.9 LBS | TEMPERATURE: 98 F | HEIGHT: 73 IN

## 2021-12-02 DIAGNOSIS — Z98.890 OTHER SPECIFIED POSTPROCEDURAL STATES: Chronic | ICD-10-CM

## 2021-12-02 LAB
ALBUMIN SERPL ELPH-MCNC: 4.5 G/DL — SIGNIFICANT CHANGE UP (ref 3.5–5.2)
ALP SERPL-CCNC: 63 U/L — SIGNIFICANT CHANGE UP (ref 30–115)
ALT FLD-CCNC: 7 U/L — SIGNIFICANT CHANGE UP (ref 0–41)
ANION GAP SERPL CALC-SCNC: 18 MMOL/L — HIGH (ref 7–14)
APTT BLD: 32.1 SEC — SIGNIFICANT CHANGE UP (ref 27–39.2)
AST SERPL-CCNC: 15 U/L — SIGNIFICANT CHANGE UP (ref 0–41)
BILIRUB SERPL-MCNC: 0.8 MG/DL — SIGNIFICANT CHANGE UP (ref 0.2–1.2)
BUN SERPL-MCNC: 9 MG/DL — LOW (ref 10–20)
CALCIUM SERPL-MCNC: 9.4 MG/DL — SIGNIFICANT CHANGE UP (ref 8.5–10.1)
CHLORIDE SERPL-SCNC: 100 MMOL/L — SIGNIFICANT CHANGE UP (ref 98–110)
CO2 SERPL-SCNC: 18 MMOL/L — SIGNIFICANT CHANGE UP (ref 17–32)
CREAT SERPL-MCNC: 0.7 MG/DL — SIGNIFICANT CHANGE UP (ref 0.7–1.5)
GLUCOSE SERPL-MCNC: 75 MG/DL — SIGNIFICANT CHANGE UP (ref 70–99)
HCT VFR BLD CALC: 40.2 % — LOW (ref 42–52)
HGB BLD-MCNC: 14 G/DL — SIGNIFICANT CHANGE UP (ref 14–18)
INR BLD: 1.03 RATIO — SIGNIFICANT CHANGE UP (ref 0.65–1.3)
MCHC RBC-ENTMCNC: 27.5 PG — SIGNIFICANT CHANGE UP (ref 27–31)
MCHC RBC-ENTMCNC: 34.8 G/DL — SIGNIFICANT CHANGE UP (ref 32–37)
MCV RBC AUTO: 79 FL — LOW (ref 80–94)
NRBC # BLD: 0 /100 WBCS — SIGNIFICANT CHANGE UP (ref 0–0)
PLATELET # BLD AUTO: 133 K/UL — SIGNIFICANT CHANGE UP (ref 130–400)
POTASSIUM SERPL-MCNC: 4.1 MMOL/L — SIGNIFICANT CHANGE UP (ref 3.5–5)
POTASSIUM SERPL-SCNC: 4.1 MMOL/L — SIGNIFICANT CHANGE UP (ref 3.5–5)
PROT SERPL-MCNC: 7.2 G/DL — SIGNIFICANT CHANGE UP (ref 6–8)
PROTHROM AB SERPL-ACNC: 11.8 SEC — SIGNIFICANT CHANGE UP (ref 9.95–12.87)
RBC # BLD: 5.09 M/UL — SIGNIFICANT CHANGE UP (ref 4.7–6.1)
RBC # FLD: 13 % — SIGNIFICANT CHANGE UP (ref 11.5–14.5)
SODIUM SERPL-SCNC: 136 MMOL/L — SIGNIFICANT CHANGE UP (ref 135–146)
WBC # BLD: 7.67 K/UL — SIGNIFICANT CHANGE UP (ref 4.8–10.8)
WBC # FLD AUTO: 7.67 K/UL — SIGNIFICANT CHANGE UP (ref 4.8–10.8)

## 2021-12-02 PROCEDURE — 22856 TOT DISC ARTHRP 1NTRSPC CRV: CPT

## 2021-12-02 PROCEDURE — 62256 REMOVE BRAIN CAVITY SHUNT: CPT | Mod: AS

## 2021-12-02 PROCEDURE — 70450 CT HEAD/BRAIN W/O DYE: CPT | Mod: 26

## 2021-12-02 RX ORDER — HYDROMORPHONE HYDROCHLORIDE 2 MG/ML
1 INJECTION INTRAMUSCULAR; INTRAVENOUS; SUBCUTANEOUS ONCE
Refills: 0 | Status: DISCONTINUED | OUTPATIENT
Start: 2021-12-02 | End: 2021-12-03

## 2021-12-02 RX ORDER — SENNA PLUS 8.6 MG/1
2 TABLET ORAL AT BEDTIME
Refills: 0 | Status: DISCONTINUED | OUTPATIENT
Start: 2021-12-02 | End: 2021-12-03

## 2021-12-02 RX ORDER — ONDANSETRON 8 MG/1
4 TABLET, FILM COATED ORAL ONCE
Refills: 0 | Status: DISCONTINUED | OUTPATIENT
Start: 2021-12-02 | End: 2021-12-03

## 2021-12-02 RX ORDER — OXYCODONE AND ACETAMINOPHEN 5; 325 MG/1; MG/1
1 TABLET ORAL EVERY 6 HOURS
Refills: 0 | Status: DISCONTINUED | OUTPATIENT
Start: 2021-12-02 | End: 2021-12-03

## 2021-12-02 RX ORDER — SODIUM CHLORIDE 9 MG/ML
1000 INJECTION, SOLUTION INTRAVENOUS
Refills: 0 | Status: DISCONTINUED | OUTPATIENT
Start: 2021-12-02 | End: 2021-12-03

## 2021-12-02 RX ORDER — CEFAZOLIN SODIUM 1 G
1000 VIAL (EA) INJECTION EVERY 8 HOURS
Refills: 0 | Status: COMPLETED | OUTPATIENT
Start: 2021-12-02 | End: 2021-12-03

## 2021-12-02 RX ORDER — LEVETIRACETAM 250 MG/1
500 TABLET, FILM COATED ORAL EVERY 12 HOURS
Refills: 0 | Status: DISCONTINUED | OUTPATIENT
Start: 2021-12-02 | End: 2021-12-03

## 2021-12-02 RX ORDER — HYDROMORPHONE HYDROCHLORIDE 2 MG/ML
0.5 INJECTION INTRAMUSCULAR; INTRAVENOUS; SUBCUTANEOUS
Refills: 0 | Status: DISCONTINUED | OUTPATIENT
Start: 2021-12-02 | End: 2021-12-03

## 2021-12-02 RX ORDER — ONDANSETRON 8 MG/1
4 TABLET, FILM COATED ORAL EVERY 6 HOURS
Refills: 0 | Status: DISCONTINUED | OUTPATIENT
Start: 2021-12-02 | End: 2021-12-03

## 2021-12-02 RX ORDER — THIAMINE MONONITRATE (VIT B1) 100 MG
100 TABLET ORAL DAILY
Refills: 0 | Status: DISCONTINUED | OUTPATIENT
Start: 2021-12-02 | End: 2021-12-03

## 2021-12-02 RX ORDER — POLYETHYLENE GLYCOL 3350 17 G/17G
17 POWDER, FOR SOLUTION ORAL DAILY
Refills: 0 | Status: DISCONTINUED | OUTPATIENT
Start: 2021-12-02 | End: 2021-12-03

## 2021-12-02 RX ORDER — PANTOPRAZOLE SODIUM 20 MG/1
40 TABLET, DELAYED RELEASE ORAL
Refills: 0 | Status: DISCONTINUED | OUTPATIENT
Start: 2021-12-02 | End: 2021-12-03

## 2021-12-02 RX ORDER — OXYCODONE AND ACETAMINOPHEN 5; 325 MG/1; MG/1
2 TABLET ORAL EVERY 6 HOURS
Refills: 0 | Status: DISCONTINUED | OUTPATIENT
Start: 2021-12-02 | End: 2021-12-03

## 2021-12-02 RX ORDER — ACETAMINOPHEN 500 MG
650 TABLET ORAL EVERY 6 HOURS
Refills: 0 | Status: DISCONTINUED | OUTPATIENT
Start: 2021-12-02 | End: 2021-12-03

## 2021-12-02 RX ADMIN — HYDROMORPHONE HYDROCHLORIDE 0.5 MILLIGRAM(S): 2 INJECTION INTRAMUSCULAR; INTRAVENOUS; SUBCUTANEOUS at 15:10

## 2021-12-02 RX ADMIN — SODIUM CHLORIDE 100 MILLILITER(S): 9 INJECTION, SOLUTION INTRAVENOUS at 15:25

## 2021-12-02 RX ADMIN — HYDROMORPHONE HYDROCHLORIDE 0.5 MILLIGRAM(S): 2 INJECTION INTRAMUSCULAR; INTRAVENOUS; SUBCUTANEOUS at 15:40

## 2021-12-02 RX ADMIN — Medication 100 MILLIGRAM(S): at 21:31

## 2021-12-02 RX ADMIN — SODIUM CHLORIDE 100 MILLILITER(S): 9 INJECTION, SOLUTION INTRAVENOUS at 19:54

## 2021-12-02 NOTE — PATIENT PROFILE ADULT - FALL HARM RISK - HARM RISK INTERVENTIONS

## 2021-12-02 NOTE — PATIENT PROFILE ADULT - NSPRESCRALCSCORE_GEN_A_NUR_CAL
Body Location Override (Optional - Billing Will Still Be Based On Selected Body Map Location If Applicable): right anterior shoulder
Detail Level: Detailed
Was A Bandage Applied: Yes
Punch Size In Mm: 3
Biopsy Type: H and E
Anesthesia Type: 1% lidocaine with epinephrine
Anesthesia Volume In Cc (Will Not Render If 0): 1
Additional Anesthesia Volume In Cc (Will Not Render If 0): 0
Hemostasis: Electrocautery
Epidermal Sutures: none, closed by secondary intention
Wound Care: Bacitracin
Dressing: bandage
Suture Removal: 14 days
Patient Will Remove Sutures At Home?: No
Lab: -[id number]
Lab Facility: [id number]
Path Notes (To The Dermatopathologist): 3mm
Consent: Written consent was obtained and risks were reviewed including but not limited to scarring, infection, bleeding, scabbing, incomplete removal, nerve damage and allergy to anesthesia.
Post-Care Instructions: I reviewed with the patient in detail post-care instructions. Patient is to keep the biopsy site dry overnight, and then apply bacitracin twice daily until healed. Patient may apply hydrogen peroxide soaks to remove any crusting.
Home Suture Removal Text: Patient was provided a home suture removal kit and will remove their sutures at home. If they have any questions or difficulties they will call the office.
Notification Instructions: Patient will be notified of biopsy results. However, patient instructed to call the office if not contacted within 2 weeks.
Billing Type: [employer]
Information: Selecting Yes will display possible errors in your note based on the variables you have selected. This validation is only offered as a suggestion for you. PLEASE NOTE THAT THE VALIDATION TEXT WILL BE REMOVED WHEN YOU FINALIZE YOUR NOTE. IF YOU WANT TO FAX A PRELIMINARY NOTE YOU WILL NEED TO TOGGLE THIS TO 'NO' IF YOU DO NOT WANT IT IN YOUR FAXED NOTE.
2

## 2021-12-02 NOTE — ASU DISCHARGE PLAN (ADULT/PEDIATRIC) - NS MD DC FALL RISK RISK
For information on Fall & Injury Prevention, visit: [email or web address] OR  [email or web address] OR  https://www.cdc.gov/steadi/patient.html

## 2021-12-02 NOTE — PRE-ANESTHESIA EVALUATION ADULT - NSANTHPMHFT_GEN_ALL_CORE
47 y/o M with pmh of GERD, h/o GERD, h/o TBI s/p fall requiring emergent craniotomy complicated by cardiac arrest. Now returning for removal of  shunt

## 2021-12-02 NOTE — ASU PREOP CHECKLIST - AS BP NONINV METHOD
[address]  INPATIENT OCCUPATIONAL THERAPY  [facility] NEUROSCIENCES 4A  EVALUATION    Time:  Time In: 1108  Time Out: 1124  Timed Code Treatment Minutes: 0 Minutes  Minutes: 16          Date: 2019  Patient Name: Tita Jenkins,   Gender: female      MRN: [id number]  : 1948  (74 y.o.)  Referring Practitioner: Dr. Immanuel Antunez  Diagnosis: ureteral stone  Additional Pertinent Hx: Per MD note: 69 y.o. female with significant past medical history of stroke with R sided weakness, bacteremia and discitis/osteomyelitis of the thoracic spine--biopsy grew proteus on IV antibiotic therapy outpatient, recurrent UTIs who presented to [facility] secondary to R sided back pain and recurrent nausea and vomiting. Pt's  reports she has been having right sided back pain for 2 weeks time and then started with nausea and vomiting that has been every 15 minutes at time. CT at outside facility reportedly shows a right sided 8 mm proximal ureteral stone with hydronephrosis.     Restrictions/Precautions:  Fall Risk, Modified Diet, Swallowing - Thickened Liquids            Other position/activity restrictions: bedbound       Past Medical History:   Diagnosis Date    Cerebral artery occlusion with cerebral infarction ([facility] [address]     Right sided weakness    Chronic kidney disease     Diabetes mellitus ([address]     Discitis     GERD (gastroesophageal reflux disease)     Dysphagia/GERD    Hypertension      Past Surgical History:   Procedure Laterality Date    CYSTO/URETERO/PYELOSCOPY, CALCULUS TX Right 2019    CYSTO, RIGHT URETEROSCOPY, RIGHT RENOSCOPY, BASKET RETRIEVAL OF STONES, REMOVAL OF BLADDER STONES, STENT PLACEMENT RIGHT URETER performed by Brenda Jones MD at [facility]  Chart Reviewed: Yes(orders, progress notes)  Patient assessed for rehabilitation services?: Yes  Family / Caregiver Present: Yes(sister in law)    Subjective: drowsy in bed    General:   Vision: Tolerance:  Activity Tolerance: Patient limited by fatigue, Treatment limited secondary to decreased cognition    Treatment Initiated:  Short session d/t limited Pt participation/drowsiness. Pt then coughing & dry heaving-nurse notified. Sister in law reports this is what the Pt has been doing at home. Assessment:  Assessment: Pt demo decreased participation in grooming & self feeding tasks as well as decreased cognition with limited direction following & arousal for interaction with family. Continued OT for trial to improve these performance areas. Performance deficits / Impairments: Decreased ADL status, Decreased cognition, Decreased strength, Decreased ROM, Decreased endurance  Prognosis: Fair, Poor    Clinical Decision Making: Clinical Decision making was of High Complexity as the result of analysis of data from a comprehensive assessment, the presence of comorbidities affecting the plan of care and the need for signficant modifications or assistance required to complete the evaluation. Discharge Recommendations:  Discharge Recommendations: Continue to assess pending progress    Patient Education:  Patient Education: OT role  Barriers to Learning: decreased cognition    Equipment Recommendations:  Equipment Needed: No    Safety:  Safety Devices in place: Yes  Type of devices:  All fall risk precautions in place, Call light within reach, Nurse notified       Plan:  Times per week: 3-5x (trial)  Current Treatment Recommendations: Self-Care / ADL, ROM, Strengthening    Goals:  Patient goals : sister in law reports to get Pt more therapy at nursing faciliaty    Short term goals  Time Frame for Short term goals: 2 weeks  Short term goal 1: Follow 50% of simple commands to increase participation in grooming tasks  Short term goal 2: Complete grooming tasks with mod A  Short term goal 3: Tolerate further assessment of mobility by OTR if/when appropriate  Long term goals  Time Frame for Long term goals : No LTG set d/t short ELOS    Evaluation Complexity: Based on the findings of patient history, examination, clinical presentation, and decision making during this evaluation, this patient is of high complexity. electronic

## 2021-12-02 NOTE — ASU DISCHARGE PLAN (ADULT/PEDIATRIC) - CARE PROVIDER_API CALL
Eleazar Byrne)  Surgical Physicians  [address] [place] [address], [address]  [place] [address]  Phone: [phone]  Fax: [phone]  Follow Up Time: 2 weeks

## 2021-12-02 NOTE — CHART NOTE - NSCHARTNOTEFT_GEN_A_CORE
PACU ANESTHESIA ADMISSION NOTE      Procedure: Removal of  shunt    ____  Intubated  TV:______       Rate: ______      FiO2: ______    __x__  Patent Airway    __x__  Full return of protective reflexes    __x__  Full recovery from anesthesia / back to baseline status    Vitals:  T(C): 36.7 (12-02-21 @ 11:57), Max: 36.7 (12-02-21 @ 11:09)  HR: 67 (12-02-21 @ 11:57) (67 - 67)  BP: 121/71 (12-02-21 @ 11:57) (121/71 - 121/71)  RR: 17 (12-02-21 @ 11:57) (17 - 17)  SpO2: 98% (12-02-21 @ 11:57) (98% - 98%)    Mental Status:  __x__ Awake   ___x__ Alert   _____ Drowsy   _____ Sedated    Nausea/Vomiting:  __x__ NO  ______Yes,   See Post - Op Orders          Pain Scale (0-10):  ___0__    Treatment: ____ None    __x__ See Post - Op/PCA Orders    Post - Operative Fluids:   ____ Oral   __x__ See Post - Op Orders    Plan: Discharge:   __x__Home       _____Floor     _____Critical Care    _____  Other:_________________    Comments: Patient had smooth intraoperative event, no anesthesia complication.  PACU Vital signs: HR:   79         BP:    154    / 74         RR: 16            O2 Sat:      100 %     Temp 97.8F

## 2021-12-03 ENCOUNTER — RX RENEWAL (OUTPATIENT)
Age: 46
End: 2021-12-03

## 2021-12-03 ENCOUNTER — TRANSCRIPTION ENCOUNTER (OUTPATIENT)
Age: 46
End: 2021-12-03

## 2021-12-03 VITALS
TEMPERATURE: 98 F | RESPIRATION RATE: 18 BRPM | DIASTOLIC BLOOD PRESSURE: 62 MMHG | SYSTOLIC BLOOD PRESSURE: 101 MMHG | HEART RATE: 75 BPM

## 2021-12-03 PROCEDURE — 99238 HOSP IP/OBS DSCHRG MGMT 30/<: CPT

## 2021-12-03 RX ORDER — HEPARIN SODIUM 5000 [USP'U]/ML
5000 INJECTION INTRAVENOUS; SUBCUTANEOUS EVERY 8 HOURS
Refills: 0 | Status: DISCONTINUED | OUTPATIENT
Start: 2021-12-03 | End: 2021-12-03

## 2021-12-03 RX ADMIN — LEVETIRACETAM 500 MILLIGRAM(S): 250 TABLET, FILM COATED ORAL at 05:16

## 2021-12-03 RX ADMIN — Medication 100 MILLIGRAM(S): at 05:13

## 2021-12-03 RX ADMIN — Medication 100 MILLIGRAM(S): at 13:52

## 2021-12-03 RX ADMIN — SODIUM CHLORIDE 100 MILLILITER(S): 9 INJECTION, SOLUTION INTRAVENOUS at 05:24

## 2021-12-03 RX ADMIN — PANTOPRAZOLE SODIUM 40 MILLIGRAM(S): 20 TABLET, DELAYED RELEASE ORAL at 05:14

## 2021-12-03 RX ADMIN — HEPARIN SODIUM 5000 UNIT(S): 5000 INJECTION INTRAVENOUS; SUBCUTANEOUS at 13:51

## 2021-12-03 RX ADMIN — POLYETHYLENE GLYCOL 3350 17 GRAM(S): 17 POWDER, FOR SOLUTION ORAL at 11:11

## 2021-12-03 RX ADMIN — Medication 650 MILLIGRAM(S): at 05:13

## 2021-12-03 RX ADMIN — Medication 100 MILLIGRAM(S): at 11:11

## 2021-12-03 NOTE — DISCHARGE NOTE PROVIDER - HOSPITAL COURSE
this is a 45 year old male with hx of fall, s/p craniectomy and  shunt .  pt  has become shunt non dependent .  Pt now wishes the  Shunt to come out for cosmetic reasons .  He did well and was d/c on POD #1

## 2021-12-03 NOTE — PHYSICAL THERAPY INITIAL EVALUATION ADULT - GAIT TRAINING, PT EVAL
By discharge: patient will be able to ambulate 150 feet x 1 without AD independently. Stairs will be assessed when appropriate.

## 2021-12-03 NOTE — PHYSICAL THERAPY INITIAL EVALUATION ADULT - GENERAL OBSERVATIONS, REHAB EVAL
8:52-9:26. Patient encountered in bed, NAD, +IV locked by Clara ARCE, b/l sequentials. BP in supine: 108/62 HR: 91bpm, BP in sittin/68 HR: 102bpm. Patient left in b/s chair, NAD, +IV reconnected by Ana ARCE.

## 2021-12-03 NOTE — DISCHARGE NOTE PROVIDER - CARE PROVIDER_API CALL
Eleazar Byrne)  [facility]  [address] [place] [address], [address]  [place] [address]  Phone: [phone]  Fax: [phone]  Follow Up Time:

## 2021-12-03 NOTE — PHYSICAL THERAPY INITIAL EVALUATION ADULT - TRANSFER TRAINING, PT EVAL
History of Present Illness


General


Chief Complaint:  Allergic Reaction


Source:  Patient





Present Illness


HPI


43-year-old female presents to ED for evaluation.  Patient notes swelling to 

her face and lips and her hands.  Started today shortly after applying a new 

lotion.  Patient denies any known allergies.  Denies any tongue swelling or 

throat swelling.  Denies any shortness of breath.  No other aggravating 

relieving factors.  Denies any other associated symptoms


Allergies:  


Coded Allergies:  


     No Known Allergies (Unverified , 4/27/13)





Patient History


Past Medical History:  asthma


Past Surgical History:  none


Pertinent Family History:  none


Social History:  Denies: smoking, alcohol use, drug use


Last Menstrual Period:  01/23/18


Pregnant Now:  No


Immunizations:  UTD


Reviewed Nursing Documentation:  PMH: Agreed, PSxH: Agreed





Nursing Documentation-PMH


Hx Asthma:  Yes





Review of Systems


All Other Systems:  negative except mentioned in HPI





Physical Exam





Vital Signs








  Date Time  Temp Pulse Resp B/P (MAP) Pulse Ox O2 Delivery O2 Flow Rate FiO2


 


2/20/18 14:08 99.1 98 18 166/87 98 Room Air  





 99.1       








Sp02 EP Interpretation:  reviewed, normal


General Appearance:  no apparent distress, alert, GCS 15, non-toxic


Head:  normocephalic


Eyes:  bilateral eye normal inspection, bilateral eye PERRL


ENT:  TMs + canals normal, other - lip swelling


Neck:  full range of motion, supple/symm/no masses, other - no stridor


Respiratory:  chest non-tender, lungs clear, normal breath sounds, speaking 

full sentences


Cardiovascular #1:  regular rate, rhythm, no edema


Gastrointestinal:  normal bowel sounds, non tender, soft, non-distended, no 

guarding, no rebound


Rectal:  deferred


Genitourinary:  no CVA tenderness


Musculoskeletal:  normal inspection


Neurologic:  alert, oriented x3, responsive, motor strength/tone normal, 

sensory intact, speech normal


Psychiatric:  normal inspection


Skin:  other - urticaria to hands


Lymphatic:  normal inspection





Medical Decision Making


Diagnostic Impression:  


 Primary Impression:  


 Allergic reaction


 Qualified Codes:  T78.40XA - Allergy, unspecified, initial encounter


ER Course


Hospital Course 


43-year-old female presents ED complaining of lip and facial swelling and hand 

swelling with itchiness after using a new lotion.





Differential diagnoses include: allergic reaction, angioedema, cellulitis 





Clinical course


Patient placed on stretcher.  Cardiac monitor.  After initial history and 

physical, I ordered Solu-Medrol, Benadryl, pepcid IV fluids





Upon reassessment patient states she feels better.  Facial swelling lip 

swelling has improved.  Urticaria has since resolved.  Patient observed with no 

evidence of stridor or tongue swelling. Therefore I believe patient can be 

safely discharged to home.





i.  I feel this is a highly complex case requiring extensive working including 

EKG/Rhythm strip, Xray/CT/US, Blood/urine lab work, repeat exams while in ED, 

and administration of strong opiates/narcotics for pain control, admission to 

hospital or close patient follow up.  





Diagnosis - allergic reaction 





Stable and discharged to home with prescriptions for Zantac, prednisone, 

Benadryl.  Followup with PMD.  Return to ED if symptoms recur or worsen





Last Vital Signs








  Date Time  Temp Pulse Resp B/P (MAP) Pulse Ox O2 Delivery O2 Flow Rate FiO2


 


2/20/18 14:42 99.1  18 136/90 98 Room Air  





 99.1       


 


2/20/18 14:08  98      








Status:  improved


Disposition:  HOME, SELF-CARE


Condition:  Stable


Scripts


Ranitidine Hcl* (ZANTAC*) 150 Mg Tablet


150 MG ORAL TWICE A DAY, #30 TAB


   Prov: JUANCARLOS BABB M.D.         2/20/18 


Diphenhydramine Hcl* (DIPHENHYDRAMINE HCL*) 25 Mg Capsule


25 MG ORAL Q6H Y for Itching for 5 Days, #30 CAP 0 Refills


   Prov: JUANCARLOS BABB M.D.         2/20/18 


Prednisone* (PREDNISONE*) 20 Mg Tablet


40 MG ORAL DAILY, #10 TAB


   Prov: JUANCARLOS BABB M.D.         2/20/18


Referrals:  


[facility],REFERRING (PCP)











JUANCARLOS BABB M.D. Feb 20, 2018 15:09
By discharge: patient will be able to sit<>stand independently.

## 2021-12-03 NOTE — DISCHARGE NOTE PROVIDER - NSDCFUADDINST_GEN_ALL_CORE_FT
may shower do not scrub incision site , use baby shampoo   may leave incision open to the air   follow up with Dr Byrne in 2 weeks   do not drink alcohol or drive while taking pain medications

## 2021-12-03 NOTE — DISCHARGE NOTE NURSING/CASE MANAGEMENT/SOCIAL WORK - NSDCPEFALRISK_GEN_ALL_CORE
For information on Fall & Injury Prevention, visit: [email or web address] OR  [email or web address] OR  https://www.cdc.gov/steadi/patient.html

## 2021-12-03 NOTE — DISCHARGE NOTE NURSING/CASE MANAGEMENT/SOCIAL WORK - PATIENT PORTAL LINK FT
You can access the FollowMyHealth Patient Portal offered by [facility] by registering at the following website: [email or web address]. By joining [email or web address] [email or web address] portal, you will also be able to view your health information using other applications (apps) compatible with our system.

## 2021-12-03 NOTE — DISCHARGE NOTE PROVIDER - NSDCCPCAREPLAN_GEN_ALL_CORE_FT
PRINCIPAL DISCHARGE DIAGNOSIS  Diagnosis: Shunt malfunction  Assessment and Plan of Treatment:

## 2021-12-03 NOTE — DISCHARGE NOTE PROVIDER - NSDCMRMEDTOKEN_GEN_ALL_CORE_FT
levETIRAcetam 1000 mg oral tablet: 1 tab(s) orally 2 times a day     Likely To be stopped after you see Dr. Padron ( neurologist) MDD:40  oxycodone-acetaminophen 5 mg-325 mg oral tablet: 1 tab(s) orally every 4 hours, As Needed -for moderate pain MDD:6   thiamine 100 mg oral tablet: 1 tab(s) orally once a day MDD:1   levETIRAcetam 1000 mg oral tablet: 1 tab(s) orally 2 times a day     Likely To be stopped after you see Dr. Padron ( neurologist) MDD:40  oxycodone-acetaminophen 5 mg-325 mg oral tablet: 1 tab(s) orally every 6 hours, As needed, Moderate Pain (4 - 6)  thiamine 100 mg oral tablet: 1 tab(s) orally once a day MDD:1

## 2021-12-03 NOTE — PHYSICAL THERAPY INITIAL EVALUATION ADULT - GAIT DISTANCE, PT EVAL
Patient ambulated 80 feet x1 using RW and close supervision. Patient ambulated 80 feet x 1 without AD and close supervision

## 2021-12-03 NOTE — PROGRESS NOTE ADULT - SUBJECTIVE AND OBJECTIVE BOX
NEUROSURGERY NOTE  SANDRA DAS   12-03-21 @ 11:59    PAST 24HR EVENTS:  POD#1 s/p removal of VPS   patient seen and examined at bedside, denies any headaches, N/V.   Patient ambulating with PT. tolerating PO diet.     HPI: 46y Male     PHYSICAL EXAM:  Awake, alert, following commands   PERRL, EOMI   MAEx4 with good strength   No drift   Incision - C/D/I     Vital Signs Last 24 Hrs  T(C): 37.6 (03 Dec 2021 04:55), Max: 37.6 (03 Dec 2021 04:55)  T(F): 99.7 (03 Dec 2021 04:55), Max: 99.7 (03 Dec 2021 04:55)  HR: 82 (03 Dec 2021 04:55) (58 - 100)  BP: 114/75 (03 Dec 2021 04:55) (114/75 - 154/74)  BP(mean): 90 (03 Dec 2021 04:55) (90 - 90)  RR: 18 (03 Dec 2021 04:55) (12 - 23)  SpO2: 97% (03 Dec 2021 01:34) (97% - 100%)  I&O's Detail    02 Dec 2021 07:01  -  03 Dec 2021 07:00  --------------------------------------------------------  IN:    IV PiggyBack: 100 mL    Lactated Ringers: 1200 mL  Total IN: 1300 mL    OUT:    Voided (mL): 900 mL  Total OUT: 900 mL    Total NET: 400 mL      03 Dec 2021 07:01  -  03 Dec 2021 11:59  --------------------------------------------------------  IN:  Total IN: 0 mL    OUT:    Voided (mL): 375 mL  Total OUT: 375 mL    Total NET: -375 mL    I&O's Summary  02 Dec 2021 07:01  -  03 Dec 2021 07:00  --------------------------------------------------------  IN: 1300 mL / OUT: 900 mL / NET: 400 mL    03 Dec 2021 07:01  -  03 Dec 2021 11:59  --------------------------------------------------------  IN: 0 mL / OUT: 375 mL / NET: -375 mL    MEDS:   acetaminophen     Tablet .. 650 milliGRAM(s) Oral every 6 hours PRN  ceFAZolin   IVPB 1000 milliGRAM(s) IV Intermittent every 8 hours  HYDROmorphone  Injectable 0.5 milliGRAM(s) IV Push every 10 minutes PRN  HYDROmorphone  Injectable 1 milliGRAM(s) IV Push once PRN  lactated ringers. 1000 milliLiter(s) IV Continuous <Continuous>  levETIRAcetam 500 milliGRAM(s) Oral every 12 hours  ondansetron Injectable 4 milliGRAM(s) IV Push once PRN  ondansetron Injectable 4 milliGRAM(s) IV Push every 6 hours PRN  oxycodone    5 mG/acetaminophen 325 mG 1 Tablet(s) Oral every 6 hours PRN  oxycodone    5 mG/acetaminophen 325 mG 2 Tablet(s) Oral every 6 hours PRN  pantoprazole    Tablet 40 milliGRAM(s) Oral before breakfast  polyethylene glycol 3350 17 Gram(s) Oral daily  senna 2 Tablet(s) Oral at bedtime PRN  thiamine 100 milliGRAM(s) Oral daily      LABS:                        14.0   7.67  )-----------( 133      ( 02 Dec 2021 19:26 )             40.2     12-02    136  |  100  |  9<L>  ----------------------------<  75  4.1   |  18  |  0.7    Ca    9.4      02 Dec 2021 19:26    TPro  7.2  /  Alb  4.5  /  TBili  0.8  /  DBili  x   /  AST  15  /  ALT  7   /  AlkPhos  63  12-02    PT/INR - ( 02 Dec 2021 19:26 )   PT: 11.80 sec;   INR: 1.03 ratio         PTT - ( 02 Dec 2021 19:26 )  PTT:32.1 sec    RADIOLOGY:   < from: CT Head No Cont (12.02.21 @ 16:48) >  FINDINGS:    There has been interval removal of a previously seen right transfrontal shunt catheter as well as the shunt reservoir. There are expected postsurgical changes including overlying soft tissueswelling and subcutaneous emphysema, as well as small pneumocephalus. There is trace linear hemorrhage along the prior catheter tract.    Redemonstration of large left craniectomy with cranioplasty. A fluid collection underlying the cranioplasty has decreased measuring 5 mm, previously 10 mm. The previously seen subdural collection over the left anterior frontal convexity has nearly completely resolved.    Stable chronic infarcts involving the parasagittal left frontal and parietal lobes as well as the left temporal and occipital lobes. There is also stable chronic infarct involving the right superior cerebellar hemisphere.    New small hyperdensity within the right subfrontal region on series 6 image 17, likely reflecting trace subarachnoid hemorrhage.    There is minor maxillary and ethmoid sinus mucosal thickening. The mastoids are clear.    IMPRESSION:  In comparison to the prior head CT dated 9/14/2021:    1.  Interval removal of the previously seen right transfrontal shunt catheter and reservoir. Expected postsurgical changes as described, and trace linear hemorrhage noted along the prior catheter tract. There is also trace right inferior frontal subarachnoid hemorrhage (series 6 image 17), potentially postsurgical.    2.  Redemonstrated large left craniectomy/cranioplasty. Decreased size of the fluid collection underlying the cranioplasty (now 5 mm, previously 10 mm), and near-complete resolution of the previously seen left anterior frontal subdural collection.    3.  Stable chronic infarcts involving the left cerebral hemisphere and right cerebellar hemisphere.    < end of copied text >      
NEUROSURGERY NOTE  SANDRA DAS   12-02-21 @ 17:29    PAST 24HR EVENTS:  POD#0 s/p removal of VPS   Patient seen and examined at bedside, states that he has a very mild headache. No N/V.     HPI: 46y Male     PHYSICAL EXAM:  Vital Signs Last 24 Hrs  T(C): 36.1 (02 Dec 2021 16:30), Max: 36.8 (02 Dec 2021 15:37)  T(F): 97 (02 Dec 2021 16:30), Max: 98.3 (02 Dec 2021 15:37)  HR: 66 (02 Dec 2021 17:00) (58 - 93)  BP: 135/85 (02 Dec 2021 17:00) (120/81 - 154/74)  BP(mean): --  RR: 14 (02 Dec 2021 17:00) (12 - 23)  SpO2: 100% (02 Dec 2021 17:00) (98% - 100%)  I&O's Detail  I&O's Summary    Awake, alert, following commands   Ox3   Face symmetrical   PERRL, EOMI  MAEx4   No drift   Incision C/D/I, in headwrap    MEDS:   acetaminophen     Tablet .. 650 milliGRAM(s) Oral every 6 hours PRN  ceFAZolin   IVPB 1000 milliGRAM(s) IV Intermittent every 8 hours  HYDROmorphone  Injectable 0.5 milliGRAM(s) IV Push every 10 minutes PRN  HYDROmorphone  Injectable 1 milliGRAM(s) IV Push once PRN  lactated ringers. 1000 milliLiter(s) IV Continuous <Continuous>  levETIRAcetam 500 milliGRAM(s) Oral every 12 hours  ondansetron Injectable 4 milliGRAM(s) IV Push once PRN  ondansetron Injectable 4 milliGRAM(s) IV Push every 6 hours PRN  oxycodone    5 mG/acetaminophen 325 mG 1 Tablet(s) Oral every 6 hours PRN  oxycodone    5 mG/acetaminophen 325 mG 2 Tablet(s) Oral every 6 hours PRN  pantoprazole    Tablet 40 milliGRAM(s) Oral before breakfast  polyethylene glycol 3350 17 Gram(s) Oral daily  senna 2 Tablet(s) Oral at bedtime PRN    LABS:    RADIOLOGY:   < from: CT Head No Cont (12.02.21 @ 16:48) >    FINDINGS:  There has been interval removal of a previously seen right transfrontal shunt catheter as well as the shunt reservoir. There are expected postsurgical changes including overlying soft tissueswelling and subcutaneous emphysema, as well as small pneumocephalus. There is trace linear hemorrhage along the prior catheter tract.  Redemonstration of large left craniectomy with cranioplasty. A fluid collection underlying the cranioplasty has decreased measuring 5 mm, previously 10 mm. The previously seen subdural collection over the left anterior frontal convexity has nearly completely resolved.  Stable chronic infarcts involving the parasagittal left frontal and parietal lobes as well as the left temporal and occipital lobes. There is also stable chronic infarct involving the right superior cerebellar hemisphere.  New small hyperdensity within the right subfrontal region on series 6 image 17, likely reflecting trace subarachnoid hemorrhage.  There is minor maxillary and ethmoid sinus mucosal thickening. The mastoids are clear.    IMPRESSION:  In comparison to the prior head CT dated 9/14/2021:  1.  Interval removal of the previously seen right transfrontal shunt catheter and reservoir. Expected postsurgical changes as described, and trace linear hemorrhage noted along the prior catheter tract. There is also trace right inferior frontal subarachnoid hemorrhage (series 6 image 17), potentially postsurgical.  2.  Redemonstrated large left craniectomy/cranioplasty. Decreased size of the fluid collection underlying the cranioplasty (now 5 mm, previously 10 mm), and near-complete resolution of the previously seen left anterior frontal subdural collection.

## 2021-12-03 NOTE — PROGRESS NOTE ADULT - ASSESSMENT
45M with etOH abuse found unresponsive with SDH / SAH s/p craniectomy with discarded bone flap coded in OR 5/2021 with wound revision x2, s/p L cranioplasty with replacement of bone flap 8/6/2021, now POD#0 s/p removal of VPS     PLAN   - Admit to neurosurgery for observation   - Pain control   - Keppra   - Ancef x 24 hours   - OOB, ambulate as tolerated   - PT   - SCD   - Advanced diet as tolerated   - Discussed case with attending 
45M with etOH abuse found unresponsive with SDH / SAH s/p craniectomy with discarded bone flap coded in OR 5/2021 with wound revision x2, s/p L cranioplasty with replacement of bone flap 8/6/2021, now POD#1 s/p removal of VPS     PLAN   - Pain control   - Keppra   - DC planning   - Ok for SQH  - Discussed case with attending

## 2021-12-07 ENCOUNTER — NON-APPOINTMENT (OUTPATIENT)
Age: 46
End: 2021-12-07

## 2021-12-07 NOTE — PHYSICAL THERAPY INITIAL EVALUATION ADULT - THERAPY FREQUENCY, PT EVAL
[Contraception/ Emergency Contraception/ Safe Sexual Practices] : contraception, emergency contraception, safe sexual practices 3-5x/week

## 2021-12-09 DIAGNOSIS — G40.909 EPILEPSY, UNSPECIFIED, NOT INTRACTABLE, WITHOUT STATUS EPILEPTICUS: ICD-10-CM

## 2021-12-09 DIAGNOSIS — K21.9 GASTRO-ESOPHAGEAL REFLUX DISEASE WITHOUT ESOPHAGITIS: ICD-10-CM

## 2021-12-09 DIAGNOSIS — F10.10 ALCOHOL ABUSE, UNCOMPLICATED: ICD-10-CM

## 2021-12-09 DIAGNOSIS — Z98.890 OTHER SPECIFIED POSTPROCEDURAL STATES: ICD-10-CM

## 2021-12-09 DIAGNOSIS — Z86.73 PERSONAL HISTORY OF TRANSIENT ISCHEMIC ATTACK (TIA), AND CEREBRAL INFARCTION WITHOUT RESIDUAL DEFICITS: ICD-10-CM

## 2021-12-09 DIAGNOSIS — Z45.41 ENCOUNTER FOR ADJUSTMENT AND MANAGEMENT OF CEREBROSPINAL FLUID DRAINAGE DEVICE: ICD-10-CM

## 2021-12-15 ENCOUNTER — APPOINTMENT (OUTPATIENT)
Dept: NEUROSURGERY | Facility: CLINIC | Age: 46
End: 2021-12-15
Payer: COMMERCIAL

## 2021-12-15 VITALS — HEIGHT: 71 IN | WEIGHT: 190 LBS | BODY MASS INDEX: 26.6 KG/M2

## 2021-12-15 PROCEDURE — 99024 POSTOP FOLLOW-UP VISIT: CPT

## 2021-12-20 NOTE — HISTORY OF PRESENT ILLNESS
[FreeTextEntry1] : I am seeing Naeem in f/u after removal of non functional  VPS in setting of resolved traumatic HCP. Doing well. No headache. Will keep sutures until next week. Incision healing well. NO signs infections.

## 2021-12-20 NOTE — ASSESSMENT
[FreeTextEntry1] : Doing well. F/u next week with PA for suture removal. Pt cleared to return to OT, speech therapy and PT.

## 2021-12-21 ENCOUNTER — APPOINTMENT (OUTPATIENT)
Dept: NEUROSURGERY | Facility: CLINIC | Age: 46
End: 2021-12-21
Payer: COMMERCIAL

## 2021-12-21 DIAGNOSIS — Z48.02 ENCOUNTER FOR REMOVAL OF SUTURES: ICD-10-CM

## 2021-12-21 PROCEDURE — 99024 POSTOP FOLLOW-UP VISIT: CPT

## 2021-12-22 ENCOUNTER — APPOINTMENT (OUTPATIENT)
Dept: NEUROPSYCHOLOGY | Facility: CLINIC | Age: 46
End: 2021-12-22

## 2021-12-22 ENCOUNTER — OUTPATIENT (OUTPATIENT)
Dept: OUTPATIENT SERVICES | Facility: HOSPITAL | Age: 46
LOS: 1 days | Discharge: HOME | End: 2021-12-22

## 2021-12-22 DIAGNOSIS — Z98.890 OTHER SPECIFIED POSTPROCEDURAL STATES: Chronic | ICD-10-CM

## 2021-12-22 DIAGNOSIS — S06.9X9D UNSPECIFIED INTRACRANIAL INJURY WITH LOSS OF CONSCIOUSNESS OF UNSPECIFIED DURATION, SUBSEQUENT ENCOUNTER: ICD-10-CM

## 2021-12-22 DIAGNOSIS — S06.9X9A UNSPECIFIED INTRACRANIAL INJURY WITH LOSS OF CONSCIOUSNESS OF UNSPECIFIED DURATION, INITIAL ENCOUNTER: ICD-10-CM

## 2021-12-23 ENCOUNTER — OUTPATIENT (OUTPATIENT)
Dept: OUTPATIENT SERVICES | Facility: HOSPITAL | Age: 46
LOS: 1 days | Discharge: HOME | End: 2021-12-23

## 2021-12-23 DIAGNOSIS — R26.81 UNSTEADINESS ON FEET: ICD-10-CM

## 2021-12-23 DIAGNOSIS — R47.1 DYSARTHRIA AND ANARTHRIA: ICD-10-CM

## 2021-12-23 DIAGNOSIS — Z98.890 OTHER SPECIFIED POSTPROCEDURAL STATES: Chronic | ICD-10-CM

## 2021-12-23 DIAGNOSIS — M25.512 PAIN IN LEFT SHOULDER: ICD-10-CM

## 2022-01-27 ENCOUNTER — OUTPATIENT (OUTPATIENT)
Dept: OUTPATIENT SERVICES | Facility: HOSPITAL | Age: 47
LOS: 1 days | Discharge: HOME | End: 2022-01-27

## 2022-01-27 DIAGNOSIS — M25.512 PAIN IN LEFT SHOULDER: ICD-10-CM

## 2022-01-27 DIAGNOSIS — Z98.890 OTHER SPECIFIED POSTPROCEDURAL STATES: Chronic | ICD-10-CM

## 2022-03-02 ENCOUNTER — APPOINTMENT (OUTPATIENT)
Dept: NEUROLOGY | Facility: CLINIC | Age: 47
End: 2022-03-02
Payer: COMMERCIAL

## 2022-03-02 DIAGNOSIS — Z98.890 OTHER SPECIFIED POSTPROCEDURAL STATES: ICD-10-CM

## 2022-03-02 PROCEDURE — 99214 OFFICE O/P EST MOD 30 MIN: CPT

## 2022-03-03 NOTE — HISTORY OF PRESENT ILLNESS
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 Pt was a heavy drinker and stopped drinking for few days due to stomach pain. Wife found pt having GTC seizure on the couch. Pt was taken to the ER evaluated and discharged home. \par Second seizure March 2021. Once again pt did not drink for few days and had a cluster of seizures. In the ER pt was found to have hyponatremia, hypomagnesemia, thrombocytopenia.Pt bit his tongue during seizure which required sutures. Pt was intubated and spent 2 weeks in the hospital. VEEG was done - no seizure activity. Pt was given Keppra 1000 mgs BID while admitted but prior to discharge Keppra was discontinued. Pt discharge 4/12/21\par \par As per EMR 5/6/2021 BIBA from home, pt's wife heard the pt fall in the bathroom. Wife found pt down in\par bathroom, has laceration to the bridge of his nose and he was unresponsive. Pt presented to ED with GCS 4 and Left blown pupil. CT head showed an acute L SDH with mass effect pt was urgently taken to the OR for SDH evacuation. Pt spend 3 month in the hospital was trached, Peg was placed and he was discharged to Rehab.\par \par Today Naeem is doing great. Trach has been removed he is breathing well on his own. Peg removed. Eating regular diet. Pt is ambulating without assistance. Currently on disability. Able to do own ADLs. Getting speech therapy.\par \par Pt is on Keppra 1000 mgs BID, no recent levels.\par No reported seizures since prolonged hospitalization last year.\par \par Risk Factors:\par Born full term without complications\par + TBI\par Denies family history of seizure disorder\par Denies CNS infection\par \par \par Social:\par , 2 children\par Finished 12 years of school in [place]\par Used to work as a faulkner and currently on disability\par Former alcohol abuse. \par

## 2022-03-03 NOTE — DISCUSSION/SUMMARY
[Risks Associated with Driving/[place] Law] : As per my usual protocol, the patient was advised in regards to risks and driving privileges associated with the [place] Guidelines.  [Safety Recommendations] : The patient was advised in regards to the risk of seizures and general seizure safety recommendations including not to be bathing alone, climbing to high places and operating heavy machinery. [Compliance with Medications] : The importance of compliance with medications was reinforced. [Medication Side Effects] : High frequency and serious potential medication adverse effects were reviewed with the patient, including but not exclusive to psychiatric effects.  Information sheets on medication side effects were made available to the patient in our clinic.  The patient or advocate agrees to notify us for any concerns. [Bone Health Education] : Patient was educated in regards to bone health and an increased risk of osteoporosis in patients with epilepsy. [Sleep Hygiene/Sleep Disruption Risks] : Sleep hygiene and the risks of sleep disruption were discussed. 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 Cartagena\par \par Jamilah Ferguson, DNP, ACNP-BC

## 2022-03-11 ENCOUNTER — LABORATORY RESULT (OUTPATIENT)
Age: 47
End: 2022-03-11

## 2022-03-14 ENCOUNTER — INPATIENT (INPATIENT)
Facility: HOSPITAL | Age: 47
LOS: 1 days | Discharge: HOME | End: 2022-03-16
Attending: STUDENT IN AN ORGANIZED HEALTH CARE EDUCATION/TRAINING PROGRAM | Admitting: STUDENT IN AN ORGANIZED HEALTH CARE EDUCATION/TRAINING PROGRAM
Payer: COMMERCIAL

## 2022-03-14 VITALS
SYSTOLIC BLOOD PRESSURE: 111 MMHG | HEART RATE: 72 BPM | DIASTOLIC BLOOD PRESSURE: 67 MMHG | HEIGHT: 73 IN | WEIGHT: 206.13 LBS | TEMPERATURE: 97 F | RESPIRATION RATE: 16 BRPM

## 2022-03-14 DIAGNOSIS — Z98.890 OTHER SPECIFIED POSTPROCEDURAL STATES: Chronic | ICD-10-CM

## 2022-03-14 LAB
25(OH)D3 SERPL-MCNC: 20 NG/ML
ALBUMIN SERPL ELPH-MCNC: 4.3 G/DL — SIGNIFICANT CHANGE UP (ref 3.5–5.2)
ALBUMIN SERPL ELPH-MCNC: 5.1 G/DL
ALP BLD-CCNC: 84 U/L
ALP SERPL-CCNC: 81 U/L — SIGNIFICANT CHANGE UP (ref 30–115)
ALT FLD-CCNC: 9 U/L — SIGNIFICANT CHANGE UP (ref 0–41)
ALT SERPL-CCNC: 10 U/L
ANION GAP SERPL CALC-SCNC: 10 MMOL/L — SIGNIFICANT CHANGE UP (ref 7–14)
ANION GAP SERPL CALC-SCNC: 12 MMOL/L
AST SERPL-CCNC: 10 U/L — SIGNIFICANT CHANGE UP (ref 0–41)
AST SERPL-CCNC: 12 U/L
BASOPHILS # BLD AUTO: 0.01 K/UL
BASOPHILS # BLD AUTO: 0.02 K/UL — SIGNIFICANT CHANGE UP (ref 0–0.2)
BASOPHILS NFR BLD AUTO: 0.2 %
BASOPHILS NFR BLD AUTO: 0.4 % — SIGNIFICANT CHANGE UP (ref 0–1)
BILIRUB SERPL-MCNC: 0.4 MG/DL — SIGNIFICANT CHANGE UP (ref 0.2–1.2)
BILIRUB SERPL-MCNC: 0.7 MG/DL
BUN SERPL-MCNC: 10 MG/DL
BUN SERPL-MCNC: 14 MG/DL — SIGNIFICANT CHANGE UP (ref 10–20)
CALCIUM SERPL-MCNC: 9.5 MG/DL — SIGNIFICANT CHANGE UP (ref 8.5–10.1)
CALCIUM SERPL-MCNC: 9.7 MG/DL
CHLORIDE SERPL-SCNC: 101 MMOL/L
CHLORIDE SERPL-SCNC: 103 MMOL/L — SIGNIFICANT CHANGE UP (ref 98–110)
CO2 SERPL-SCNC: 25 MMOL/L
CO2 SERPL-SCNC: 25 MMOL/L — SIGNIFICANT CHANGE UP (ref 17–32)
CREAT SERPL-MCNC: 0.9 MG/DL
CREAT SERPL-MCNC: 0.9 MG/DL — SIGNIFICANT CHANGE UP (ref 0.7–1.5)
EGFR: 106 ML/MIN/1.73M2
EGFR: 106 ML/MIN/1.73M2 — SIGNIFICANT CHANGE UP
EOSINOPHIL # BLD AUTO: 0.18 K/UL
EOSINOPHIL # BLD AUTO: 0.21 K/UL — SIGNIFICANT CHANGE UP (ref 0–0.7)
EOSINOPHIL NFR BLD AUTO: 3.8 %
EOSINOPHIL NFR BLD AUTO: 3.8 % — SIGNIFICANT CHANGE UP (ref 0–8)
GLUCOSE SERPL-MCNC: 105 MG/DL
GLUCOSE SERPL-MCNC: 98 MG/DL — SIGNIFICANT CHANGE UP (ref 70–99)
HCT VFR BLD CALC: 41 % — LOW (ref 42–52)
HCT VFR BLD CALC: 44.3 %
HGB BLD-MCNC: 14.2 G/DL — SIGNIFICANT CHANGE UP (ref 14–18)
HGB BLD-MCNC: 15 G/DL
IMM GRANULOCYTES NFR BLD AUTO: 0.2 %
IMM GRANULOCYTES NFR BLD AUTO: 0.2 % — SIGNIFICANT CHANGE UP (ref 0.1–0.3)
LEVETIRACETAM SERPL-MCNC: 17.4 UG/ML
LYMPHOCYTES # BLD AUTO: 1.66 K/UL
LYMPHOCYTES # BLD AUTO: 2.23 K/UL — SIGNIFICANT CHANGE UP (ref 1.2–3.4)
LYMPHOCYTES # BLD AUTO: 40 % — SIGNIFICANT CHANGE UP (ref 20.5–51.1)
LYMPHOCYTES NFR BLD AUTO: 34.9 %
MAGNESIUM SERPL-MCNC: 1.8 MG/DL
MAGNESIUM SERPL-MCNC: 1.8 MG/DL — SIGNIFICANT CHANGE UP (ref 1.8–2.4)
MAN DIFF?: NORMAL
MCHC RBC-ENTMCNC: 27.9 PG
MCHC RBC-ENTMCNC: 28.1 PG — SIGNIFICANT CHANGE UP (ref 27–31)
MCHC RBC-ENTMCNC: 33.9 G/DL
MCHC RBC-ENTMCNC: 34.6 G/DL — SIGNIFICANT CHANGE UP (ref 32–37)
MCV RBC AUTO: 81 FL — SIGNIFICANT CHANGE UP (ref 80–94)
MCV RBC AUTO: 82.3 FL
MONOCYTES # BLD AUTO: 0.5 K/UL
MONOCYTES # BLD AUTO: 0.65 K/UL — HIGH (ref 0.1–0.6)
MONOCYTES NFR BLD AUTO: 10.5 %
MONOCYTES NFR BLD AUTO: 11.6 % — HIGH (ref 1.7–9.3)
NEUTROPHILS # BLD AUTO: 2.4 K/UL
NEUTROPHILS # BLD AUTO: 2.46 K/UL — SIGNIFICANT CHANGE UP (ref 1.4–6.5)
NEUTROPHILS NFR BLD AUTO: 44 % — SIGNIFICANT CHANGE UP (ref 42.2–75.2)
NEUTROPHILS NFR BLD AUTO: 50.4 %
NRBC # BLD: 0 /100 WBCS — SIGNIFICANT CHANGE UP (ref 0–0)
PLATELET # BLD AUTO: 157 K/UL — SIGNIFICANT CHANGE UP (ref 130–400)
PLATELET # BLD AUTO: 170 K/UL
POTASSIUM SERPL-MCNC: 4 MMOL/L — SIGNIFICANT CHANGE UP (ref 3.5–5)
POTASSIUM SERPL-SCNC: 4 MMOL/L — SIGNIFICANT CHANGE UP (ref 3.5–5)
POTASSIUM SERPL-SCNC: 4.3 MMOL/L
PROT SERPL-MCNC: 6.9 G/DL — SIGNIFICANT CHANGE UP (ref 6–8)
PROT SERPL-MCNC: 7.6 G/DL
RBC # BLD: 5.06 M/UL — SIGNIFICANT CHANGE UP (ref 4.7–6.1)
RBC # BLD: 5.38 M/UL
RBC # FLD: 12.6 % — SIGNIFICANT CHANGE UP (ref 11.5–14.5)
RBC # FLD: 13.2 %
SODIUM SERPL-SCNC: 138 MMOL/L
SODIUM SERPL-SCNC: 138 MMOL/L — SIGNIFICANT CHANGE UP (ref 135–146)
WBC # BLD: 5.58 K/UL — SIGNIFICANT CHANGE UP (ref 4.8–10.8)
WBC # FLD AUTO: 4.76 K/UL
WBC # FLD AUTO: 5.58 K/UL — SIGNIFICANT CHANGE UP (ref 4.8–10.8)

## 2022-03-14 PROCEDURE — 99221 1ST HOSP IP/OBS SF/LOW 40: CPT

## 2022-03-14 PROCEDURE — 99223 1ST HOSP IP/OBS HIGH 75: CPT

## 2022-03-14 RX ORDER — LANOLIN ALCOHOL/MO/W.PET/CERES
3 CREAM (GRAM) TOPICAL AT BEDTIME
Refills: 0 | Status: DISCONTINUED | OUTPATIENT
Start: 2022-03-14 | End: 2022-03-14

## 2022-03-14 RX ORDER — CHLORHEXIDINE GLUCONATE 213 G/1000ML
1 SOLUTION TOPICAL
Refills: 0 | Status: DISCONTINUED | OUTPATIENT
Start: 2022-03-14 | End: 2022-03-16

## 2022-03-14 RX ORDER — LEVETIRACETAM 250 MG/1
1000 TABLET, FILM COATED ORAL EVERY 12 HOURS
Refills: 0 | Status: DISCONTINUED | OUTPATIENT
Start: 2022-03-14 | End: 2022-03-15

## 2022-03-14 RX ORDER — ACETAMINOPHEN 500 MG
650 TABLET ORAL EVERY 6 HOURS
Refills: 0 | Status: DISCONTINUED | OUTPATIENT
Start: 2022-03-14 | End: 2022-03-16

## 2022-03-14 RX ORDER — ONDANSETRON 8 MG/1
4 TABLET, FILM COATED ORAL EVERY 8 HOURS
Refills: 0 | Status: DISCONTINUED | OUTPATIENT
Start: 2022-03-14 | End: 2022-03-16

## 2022-03-14 RX ORDER — CHOLECALCIFEROL (VITAMIN D3) 125 MCG
2000 CAPSULE ORAL DAILY
Refills: 0 | Status: DISCONTINUED | OUTPATIENT
Start: 2022-03-14 | End: 2022-03-16

## 2022-03-14 RX ORDER — ENOXAPARIN SODIUM 100 MG/ML
40 INJECTION SUBCUTANEOUS EVERY 24 HOURS
Refills: 0 | Status: DISCONTINUED | OUTPATIENT
Start: 2022-03-14 | End: 2022-03-16

## 2022-03-14 RX ADMIN — LEVETIRACETAM 1000 MILLIGRAM(S): 250 TABLET, FILM COATED ORAL at 21:37

## 2022-03-14 NOTE — H&P ADULT - HISTORY OF PRESENT ILLNESS
45F w/PMHx etOH abuse found unresponsive with SDH / SAH s/p craniectomy with discarded bone flap coded in OR 5/2021 with wound revision x2, s/p L cranioplasty with replacement of bone flap 8/6/2021, removal of VPS 12/21 presents for elective V-EEG monitoring.  Patient doing well since last admission 12/21 s/p VPS removal.  Patient reports last seizure was 5/21 during initial hospitalization.  Patient seen and assessed at bedside, no acute complaints.  No CP/SOB.  No abdominal or urinary complaints.

## 2022-03-14 NOTE — CONSULT NOTE ADULT - ATTENDING COMMENTS
Agree with the Hx and the plan  He is here for further characterization base on his desire to come off AED  The Hx and the MRI is showing high risk for seizure  will do the V-EEG for further risk assessment  will start the v-eeg  seizure precautions   No change in AED

## 2022-03-14 NOTE — H&P ADULT - ASSESSMENT
45F w/PMHx etOH abuse found unresponsive with SDH / SAH s/p craniectomy with discarded bone flap coded in OR 5/2021 with wound revision x2, s/p L cranioplasty with replacement of bone flap 8/6/2021, removal of VPS 12/21 presents for elective V-EEG monitoring.    #Hx of SDH/SAH with Hx of Seizures  - admit to medicine service  - Neurology c/s for V-EEG monitoring  - Seizure Precautions  - Lorazepam 2mg IVP q4h PRN seizure like activity  - AED per Neuro  - labs per Neuro    Diet: Regular  Activity: OOB  DVT PPX: Lovenox  GI PPX: Not indicated  Code status: Full  Dispo: Home when V-EEG completed  CHG 4% QD

## 2022-03-14 NOTE — H&P ADULT - ATTENDING COMMENTS
1 year old male presents to ED with parent for c/o fever, diarrhea, and chills.    Edited where appropriate.

## 2022-03-14 NOTE — CONSULT NOTE ADULT - SUBJECTIVE AND OBJECTIVE BOX
Neurology/Epilepsy Consult:    ANIA DASGORZ 46y Male  MRN-[id number]    Patient is a 46y old right-handed Male who presents for elective VEEG monitoring        HPI: History obtained from patient and wife. EMR and outpatient records reviewed.      45F w/PMHx etOH abuse found unresponsive with SDH / SAH s/p craniectomy with discarded bone flap coded in OR 2021 with wound revision x2, s/p L cranioplasty with replacement of bone flap 2021, removal of VPS  presents for elective V-EEG monitoring.  Patient doing well since last admission  s/p VPS removal.  Patient reports last seizure was  during initial hospitalization.  Patient seen and assessed at bedside, no acute complaints.  No CP/SOB.  No abdominal or urinary complaints.     (14 Mar 2022 10:11)        PAST MEDICAL & SURGICAL HISTORY:  TBI 2021  Left SDH evacuation 2021   shunt 2021  Trach removed  Peg removed  ETOH abuse  Seizure disorder  Cardiomyopathy  H/O hemorrhoidectomy          FAMILY HISTORY:  No seizures        Social History:  Lives with wife, 1 child  Currently not employed        Risk factors:  Born full-term, , no complications  Normal growth and development  No febrile seizures/CNS infections  + head trauma with loss of consciousness 2021      Allergy:  No Known Allergies        Home Medications:  Keppra 1000mg q12hrs        MEDICATIONS  (STANDING):  chlorhexidine 4% Liquid 1 Application(s) Topical <User Schedule>  cholecalciferol 2000 Unit(s) Oral daily  enoxaparin Injectable 40 milliGRAM(s) SubCutaneous every 24 hours  levETIRAcetam 1000 milliGRAM(s) Oral every 12 hours    MEDICATIONS  (PRN):  acetaminophen     Tablet .. 650 milliGRAM(s) Oral every 6 hours PRN Temp greater or equal to 38C (100.4F), Mild Pain (1 - 3)  LORazepam   Injectable 2 milliGRAM(s) IV Push three times a day PRN generalized tonic-clonic seizure lasting longer than 2 minutes  ondansetron Injectable 4 milliGRAM(s) IV Push every 8 hours PRN Nausea and/or Vomiting          T(F): 96.7 (22 @ 14:07), Max: 97 (22 @ 10:32)  HR: 74 (22 @ 14:07) (72 - 74)  BP: 99/64 (22 @ 14:07) (99/64 - 111/67)  RR: 16 (22 @ 14:07) (16 - 16)  SpO2: --        Neurologic Examination:  General:  Appearance is consistent with chronologic age.  No abnormal facies.   General: The patient is oriented to person, place, time and date.  Recent and remote memory intact.  Follows 3-4-step directions. Language is slow with normal repetition, comprehension and naming.  Nondysarthric.    Cranial nerves: EOMI w/o nystagmus, skew or reported double vision.  PERRL.  No ptosis/weakness of eyelid closure.  Facial sensation is normal with normal bite.  No facial asymmetry.  Hearing grossly intact b/l.  Palate elevates midline.  Tongue midline.  Motor examination:   Normal tone, bulk and range of motion.  No tenderness, twitching, tremors or involuntary movements.  Formal Muscle Strength Testin-/5 RUE & RLE; 5/5 LUE & LLE.   Minimal R drift.  Reflexes:   2+ b/l   Sensory examination:   Intact to light touch and pinprick, pain.  Cerebellum:   FTN dysmetria on the right.  Gait is spastic paretic, steady, no difficulty with turns.        Labs:   3/11/2022 COVID-19 PCR negative    3/4/2022 Keppra 17.4 mcg/ml (trough), Vitamin d 20 ng/ml        Neuroimaging:  CT Head:   < from: CT Head No Cont (21 @ 16:48) >  IMPRESSION:  In comparison to the prior head CT dated 2021:    1.  Interval removal of the previously seen right transfrontal shunt catheter and reservoir. Expected postsurgical changes as described, and trace linear hemorrhage noted along the prior catheter tract. There is also trace right inferior frontal subarachnoid hemorrhage (series 6 image 17), potentially postsurgical.    2.  Redemonstrated large left craniectomy/cranioplasty. Decreased size of the fluid collection underlying the cranioplasty (now 5 mm, previously 10 mm), and near-complete resolution of the previously seen left anterior frontal subdural collection.    3.  Stable chronic infarcts involving the left cerebral hemisphere and right cerebellar hemisphere.    < end of copied text >        MRI Brain:   < from: MR Head w/wo IV Cont (21 @ 20:10) >  IMPRESSION:    1.9 cm enhancing soft tissue with internal mineralization along the anterior margin of the left frontal craniectomy reflect granulation tissue.    Diffuse thickening/enhancement of the bilateral pachymeninges, nonspecific but can be seen in setting of postop settings, intracranial hypotension, or meningitis. Recommend clinical correlation.    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 Redemonstrated subdural hematomas along the right cerebral and cerebellar convexities.    Evolving subacute infarcts in the medial left frontoparietal region, left insular cortex, and right cerebellum with associated gyral enhancements.    Peripherally enhancing evolving hemorrhagic contusion/hematoma in the left temporal occipital regions with scattered petechial gyral hemorrhage/enhancement. Moderate surrounding edema.    < end of copied text >          VEEG 3/30 - 2021:  < from: EEG w/ Video Each 12-26 Hours, Unmonitored (21 @ 09:00) >  Awake:  Day 1- Diffusely attenuated background. After about 1 AM there is improvement with background consisting of diffuse theta. It is notoptimally organized and there is no sustained PDR.  Day 2- During periods of arousal background is obscured by muscle artifact.      Asleep:  Day 1- No clearly formed sleep architecture  Day 2- No clearly formed sleep architecture      Focal Slowing:  None      Generalized Slowing:  There is moderate generalized slowing    Breach Artifact:  None    Interictal Activity  None    Activation and Provocation Procedures:  None performed    Events:  None reported. No electrographic seizures    Impression  Abnormal due to presence of diffuse attenuation evolving into generalized slowing.      Clinical Correlation  Findings consistent with diffuse cerebral dysfunction. Cannot rule out effect of sedating medication.    < end of copied text >          Assessment:  This is a 46y Male with h/o         Discussed with Dr. Cartagena        Plan:   - VEEG monitoring for better characterization and assessment  - Seizure precautions  - Continue home dose of Keppra 1000mg q12hrs for now (ordered)  - Start Vitamin D supplementation (ordered)  - Ativan 2mg IV PRN for generalized tonic-clonic seizure lasting longer than 2 minutes, or two consecutive seizures without return to baseline in-between (ordered)  - CBC, CMP, Mg, Keppra level trough (ordered)  - Keep Mg above 2    Plan discussed with patient and wife in details, all questions answered.    Discussed with medical and nursing teams. Neurology/Epilepsy Consult:    SANDRA DAS 46y Male  MRN-[id number]    Patient is a 46y old right-handed Male who presents for elective VEEG monitoring        HPI: History obtained from patient and wife. EMR and outpatient records reviewed.  On May 6, 2021 patient admitted s/p fall, CT revealed acute large left subdural hemorrhage with associated left to right midline shift, with smaller right-sided subdurals, and mass effect. Patient underwent Left hemicraniotomy for SDH evacuation, 2021, S/P cardiac arrest in OR due to hypotension and blood loss. S/P revision of crani and KIKE placement 2021 due to scalp wound. Repeat CTH revealed new left cerebral hemisphere and right cerebellum infarcts, CTA revealed right vertebral artery stenosis, CTV revealed positive venous thrombosis. MRI brain revealed pseudomeningocele. Patient is S/P  shunt placement 2021.  In 2021 patient underwent cranioplasty and  shunt revision, then shunt was removed in 2021.  Patient has history of seizures since 2018 when he presented s/p GTC seizure in context of alcohol withdrawal. Patient was not admitted.  In 2021 he presented after a cluster of seizures. Patient had GI issues and stopped drinking for several days. He was admitted with diagnosis of status epilepticus, was intubated and had prolonged hospitalization.       PAST MEDICAL & SURGICAL HISTORY:  TBI 2021  Left craniectomy for SDH evacuation 2021   shunt 2021   shunt removal 2021  Trach removed  Peg removed  ETOH abuse  Seizure disorder  Cardiomyopathy  H/O hemorrhoidectomy          FAMILY HISTORY:  No seizures        Social History:  Lives with wife, 1 child  Currently not employed        Risk factors:  Born full-term, , no complications  Normal growth and development  No febrile seizures/CNS infections  + head trauma with loss of consciousness 2021      Allergy:  No Known Allergies        Home Medications:  Keppra 1000mg q12hrs        MEDICATIONS  (STANDING):  chlorhexidine 4% Liquid 1 Application(s) Topical <User Schedule>  cholecalciferol 2000 Unit(s) Oral daily  enoxaparin Injectable 40 milliGRAM(s) SubCutaneous every 24 hours  levETIRAcetam 1000 milliGRAM(s) Oral every 12 hours    MEDICATIONS  (PRN):  acetaminophen     Tablet .. 650 milliGRAM(s) Oral every 6 hours PRN Temp greater or equal to 38C (100.4F), Mild Pain (1 - 3)  LORazepam   Injectable 2 milliGRAM(s) IV Push three times a day PRN generalized tonic-clonic seizure lasting longer than 2 minutes  ondansetron Injectable 4 milliGRAM(s) IV Push every 8 hours PRN Nausea and/or Vomiting          T(F): 96.7 (22 @ 14:07), Max: 97 (22 @ 10:32)  HR: 74 (22 @ 14:07) (72 - 74)  BP: 99/64 (22 @ 14:07) (99/64 - 111/67)  RR: 16 (22 @ 14:07) (16 - 16)  SpO2: --        Neurologic Examination:  General:  Appearance is consistent with chronologic age.  No abnormal facies.   General: The patient is oriented to person, place, time and date.  Recent and remote memory intact.  Follows 3-4-step directions. Language is slow with normal repetition, comprehension and naming.  Nondysarthric.    Cranial nerves: EOMI w/o nystagmus, skew or reported double vision.  PERRL.  No ptosis/weakness of eyelid closure.  Facial sensation is normal with normal bite.  No facial asymmetry.  Hearing grossly intact b/l.  Palate elevates midline.  Tongue midline.  Motor examination:   Normal tone, bulk and range of motion.  No tenderness, twitching, tremors or involuntary movements.  Formal Muscle Strength Testin-/5 RUE & RLE; 5/5 LUE & LLE.   Minimal R drift.  Reflexes:   2+ b/l   Sensory examination:   Intact to light touch and pinprick, pain.  Cerebellum:   FTN dysmetria on the right.  Gait is spastic paretic, steady, no difficulty with turns.        Labs:   3/11/2022 COVID-19 PCR negative    3/4/2022 Keppra 17.4 mcg/ml (trough), Vitamin d 20 ng/ml        Neuroimaging:  CT Head:   < from: CT Head No Cont (21 @ 16:48) >  IMPRESSION:  In comparison to the prior head CT dated 2021:    1.  Interval removal of the previously seen right transfrontal shunt catheter and reservoir. Expected postsurgical changes as described, and trace linear hemorrhage noted along the prior catheter tract. There is also trace right inferior frontal subarachnoid hemorrhage (series 6 image 17), potentially postsurgical.    2.  Redemonstrated large left craniectomy/cranioplasty. Decreased size of the fluid collection underlying the cranioplasty (now 5 mm, previously 10 mm), and near-complete resolution of the previously seen left anterior frontal subdural collection.    3.  Stable chronic infarcts involving the left cerebral hemisphere and right cerebellar hemisphere.    < end of copied text >        MRI Brain:   < from: MR Head w/wo IV Cont (21 @ 20:10) >  IMPRESSION:    1.9 cm enhancing soft tissue with internal mineralization along the anterior margin of the left frontal craniectomy reflect granulation tissue.    Diffuse thickening/enhancement of the bilateral pachymeninges, nonspecific but can be seen in setting of postop settings, intracranial hypotension, or meningitis. Recommend clinical correlation.    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 Redemonstrated subdural hematomas along the right cerebral and cerebellar convexities.    Evolving subacute infarcts in the medial left frontoparietal region, left insular cortex, and right cerebellum with associated gyral enhancements.    Peripherally enhancing evolving hemorrhagic contusion/hematoma in the left temporal occipital regions with scattered petechial gyral hemorrhage/enhancement. Moderate surrounding edema.    < end of copied text >          VEEG 3/30 - 2021:  < from: EEG w/ Video Each 12-26 Hours, Unmonitored (21 @ 09:00) >  Awake:  Day 1- Diffusely attenuated background. After about 1 AM there is improvement with background consisting of diffuse theta. It is notoptimally organized and there is no sustained PDR.  Day 2- During periods of arousal background is obscured by muscle artifact.      Asleep:  Day 1- No clearly formed sleep architecture  Day 2- No clearly formed sleep architecture      Focal Slowing:  None      Generalized Slowing:  There is moderate generalized slowing    Breach Artifact:  None    Interictal Activity  None    Activation and Provocation Procedures:  None performed    Events:  None reported. No electrographic seizures    Impression  Abnormal due to presence of diffuse attenuation evolving into generalized slowing.      Clinical Correlation  Findings consistent with diffuse cerebral dysfunction. Cannot rule out effect of sedating medication.    < end of copied text >          Assessment:  This is a 46y Male with h/o ETOH abuse, seizures, traumatic left SDH in May 2021, Left cranectomy/cranioplasty,  shunt placement 2021 and removal 2021,         Discussed with Dr. Cartagena        Plan:   - VEEG monitoring for better characterization and assessment  - Seizure precautions  - Continue home dose of Keppra 1000mg q12hrs for now (ordered)  - Start Vitamin D supplementation (ordered)  - Ativan 2mg IV PRN for generalized tonic-clonic seizure lasting longer than 2 minutes, or two consecutive seizures without return to baseline in-between (ordered)  - CBC, CMP, Mg, Keppra level trough (ordered)  - Keep Mg above 2    Plan discussed with patient and wife in details, all questions answered.    Discussed with medical and nursing teams. Neurology/Epilepsy Consult:    SANDRA DAS 46y Male  MRN-[id number]    Patient is a 46y old right-handed Male who presents for elective VEEG monitoring        HPI: History obtained from patient and wife. EMR and outpatient records reviewed.  On May 6, 2021 patient admitted s/p fall, CT revealed acute large left subdural hemorrhage with associated left to right midline shift, with smaller right-sided subdurals, and mass effect. Patient underwent Left hemicraniotomy for SDH evacuation, 2021, S/P cardiac arrest in OR due to hypotension and blood loss. S/P revision of crani and KIKE placement 2021 due to scalp wound. Repeat CTH revealed new left cerebral hemisphere and right cerebellum infarcts, CTA revealed right vertebral artery stenosis, CTV revealed positive venous thrombosis. MRI brain revealed pseudomeningocele. Patient is S/P  shunt placement 2021.  In 2021 patient underwent cranioplasty and  shunt revision, then shunt was removed in 2021.  Patient has history of seizures since 2018 when he presented s/p GTC seizure in context of alcohol withdrawal. Patient was not admitted.  In 2021 he presented after a cluster of seizures. Patient had GI issues and stopped drinking for several days. He was admitted with diagnosis of status epilepticus, was intubated and had prolonged hospitalization. patient was on Keppra 1000mg q12hrs during admission, discharged 2021 and Keppra was stopped       PAST MEDICAL & SURGICAL HISTORY:  TBI 2021  Left craniectomy for SDH evacuation 2021   shunt 2021   shunt removal 2021  Trach removed  Peg removed  ETOH abuse  Seizure disorder  Cardiomyopathy  H/O hemorrhoidectomy          FAMILY HISTORY:  No seizures        Social History:  Lives with wife, 1 child  Currently not employed        Risk factors:  Born full-term, , no complications  Normal growth and development  No febrile seizures/CNS infections  + head trauma with loss of consciousness 2021      Allergy:  No Known Allergies        Home Medications:  Keppra 1000mg q12hrs        MEDICATIONS  (STANDING):  chlorhexidine 4% Liquid 1 Application(s) Topical <User Schedule>  cholecalciferol 2000 Unit(s) Oral daily  enoxaparin Injectable 40 milliGRAM(s) SubCutaneous every 24 hours  levETIRAcetam 1000 milliGRAM(s) Oral every 12 hours    MEDICATIONS  (PRN):  acetaminophen     Tablet .. 650 milliGRAM(s) Oral every 6 hours PRN Temp greater or equal to 38C (100.4F), Mild Pain (1 - 3)  LORazepam   Injectable 2 milliGRAM(s) IV Push three times a day PRN generalized tonic-clonic seizure lasting longer than 2 minutes  ondansetron Injectable 4 milliGRAM(s) IV Push every 8 hours PRN Nausea and/or Vomiting          T(F): 96.7 (22 @ 14:07), Max: 97 (22 @ 10:32)  HR: 74 (22 @ 14:07) (72 - 74)  BP: 99/64 (22 @ 14:07) (99/64 - 111/67)  RR: 16 (22 @ 14:07) (16 - 16)  SpO2: --        Neurologic Examination:  General:  Appearance is consistent with chronologic age.  No abnormal facies.   General: The patient is oriented to person, place, time and date.  Recent and remote memory intact.  Follows 3-4-step directions. Language is slow with normal repetition, comprehension and naming.  Nondysarthric.    Cranial nerves: EOMI w/o nystagmus, skew or reported double vision.  PERRL.  No ptosis/weakness of eyelid closure.  Facial sensation is normal with normal bite.  No facial asymmetry.  Hearing grossly intact b/l.  Palate elevates midline.  Tongue midline.  Motor examination:   Normal tone, bulk and range of motion.  No tenderness, twitching, tremors or involuntary movements.  Formal Muscle Strength Testin-/5 RUE & RLE; 5/5 LUE & LLE.   Minimal R drift.  Reflexes:   2+ b/l   Sensory examination:   Intact to light touch and pinprick, pain.  Cerebellum:   FTN dysmetria on the right.  Gait is spastic paretic, steady, no difficulty with turns.        Labs:   3/11/2022 COVID-19 PCR negative    3/4/2022 Keppra 17.4 mcg/ml (trough), Vitamin d 20 ng/ml        Neuroimaging:  CT Head:   < from: CT Head No Cont (12.02.21 @ 16:48) >  IMPRESSION:  In comparison to the prior head CT dated 2021:    1.  Interval removal of the previously seen right transfrontal shunt catheter and reservoir. Expected postsurgical changes as described, and trace linear hemorrhage noted along the prior catheter tract. There is also trace right inferior frontal subarachnoid hemorrhage (series 6 image 17), potentially postsurgical.    2.  Redemonstrated large left craniectomy/cranioplasty. Decreased size of the fluid collection underlying the cranioplasty (now 5 mm, previously 10 mm), and near-complete resolution of the previously seen left anterior frontal subdural collection.    3.  Stable chronic infarcts involving the left cerebral hemisphere and right cerebellar hemisphere.    < end of copied text >        MRI Brain:   < from: MR Head w/wo IV Cont (21 @ 20:10) >  IMPRESSION:    1.9 cm enhancing soft tissue with internal mineralization along the anterior margin of the left frontal craniectomy reflect granulation tissue.    Diffuse thickening/enhancement of the bilateral pachymeninges, nonspecific but can be seen in setting of postop settings, intracranial hypotension, or meningitis. Recommend clinical correlation.    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 Redemonstrated subdural hematomas along the right cerebral and cerebellar convexities.    Evolving subacute infarcts in the medial left frontoparietal region, left insular cortex, and right cerebellum with associated gyral enhancements.    Peripherally enhancing evolving hemorrhagic contusion/hematoma in the left temporal occipital regions with scattered petechial gyral hemorrhage/enhancement. Moderate surrounding edema.    < end of copied text >          VEEG 3/30 - 2021:  < from: EEG w/ Video Each 12-26 Hours, Unmonitored (21 @ 09:00) >  Awake:  Day 1- Diffusely attenuated background. After about 1 AM there is improvement with background consisting of diffuse theta. It is notoptimally organized and there is no sustained PDR.  Day 2- During periods of arousal background is obscured by muscle artifact.      Asleep:  Day 1- No clearly formed sleep architecture  Day 2- No clearly formed sleep architecture      Focal Slowing:  None      Generalized Slowing:  There is moderate generalized slowing    Breach Artifact:  None    Interictal Activity  None    Activation and Provocation Procedures:  None performed    Events:  None reported. No electrographic seizures    Impression  Abnormal due to presence of diffuse attenuation evolving into generalized slowing.      Clinical Correlation  Findings consistent with diffuse cerebral dysfunction. Cannot rule out effect of sedating medication.    < end of copied text >          Assessment:  This is a 46y Male with h/o ETOH abuse, seizures, traumatic left SDH in May 2021, Left cranectomy/cranioplasty,  shunt placement 2021 and removal 2021,         Discussed with Dr. Cartagena        Plan:   - VEEG monitoring for better characterization and assessment  - Seizure precautions  - Continue home dose of Keppra 1000mg q12hrs for now (ordered)  - Start Vitamin D supplementation (ordered)  - Ativan 2mg IV PRN for generalized tonic-clonic seizure lasting longer than 2 minutes, or two consecutive seizures without return to baseline in-between (ordered)  - CBC, CMP, Mg, Keppra level trough (ordered)  - Keep Mg above 2    Plan discussed with patient and wife in details, all questions answered.    Discussed with medical and nursing teams. Neurology/Epilepsy Consult:    SANDRA DAS 46y Male  MRN-[id number]    Patient is a 46y old right-handed Male who presents for elective VEEG monitoring        HPI: History obtained from patient and wife. EMR and outpatient records reviewed.  Patient has history of seizures since 2018 when he presented s/p GTC seizure in context of alcohol withdrawal, was not admitted.  On 2021 he presented after a cluster of seizures. Prior to that patient had GI complaints and stopped drinking for several days. He was admitted with diagnosis of status epilepticus, was intubated and had prolonged hospitalization. Patient was started on Keppra 1000mg q12hrs during admission, discharged 2021 and Keppra was stopped shortly after.  On May 6, 2021 patient admitted s/p fall. Patient's wife heard his falling and found patient unresponsive on the bathroom floor. CT revealed acute large left subdural hemorrhage with associated left to right midline shift, with smaller right-sided subdurals, and mass effect. Patient underwent Left hemicraniotomy for SDH evacuation, 2021, S/P cardiac arrest in OR due to hypotension and blood loss. S/P revision of crani and KIKE placement 2021 due to scalp wound. Repeat CTH revealed new left cerebral hemisphere and right cerebellum infarcts, CTA revealed right vertebral artery stenosis, CTV revealed positive venous thrombosis. MRI brain revealed pseudomeningocele. Patient is S/P  shunt placement 2021, trach and PEG.  In 2021 patient underwent cranioplasty and  shunt revision, then shunt was removed in 2021.   No other seizure reported. Patient denies any alcohol use since May 2021. He is taking Keppra 1000mg q12hrs, denies any missed doses, tolerating well.  At this point trach and PEG removed, patient is able to tolerate regular diet. He is able to ambulate without assistive devises, does not need any assistance with ADLs.He is currently on disability, continues speech therapy.          PAST MEDICAL & SURGICAL HISTORY:  TBI 2021  Left craniectomy for SDH evacuation 2021   shunt 2021   shunt removal 2021  Trach removed  Peg removed  ETOH abuse  Seizure disorder  Cardiomyopathy  H/O hemorrhoidectomy          FAMILY HISTORY:  No seizures        Social History:  Lives with wife, 1 child  Currently not employed        Risk factors:  Born full-term, , no complications  Normal growth and development  No febrile seizures/CNS infections  + head trauma with loss of consciousness 2021      Allergy:  No Known Allergies        Home Medications:  Keppra 1000mg q12hrs        MEDICATIONS  (STANDING):  chlorhexidine 4% Liquid 1 Application(s) Topical <User Schedule>  cholecalciferol 2000 Unit(s) Oral daily  enoxaparin Injectable 40 milliGRAM(s) SubCutaneous every 24 hours  levETIRAcetam 1000 milliGRAM(s) Oral every 12 hours    MEDICATIONS  (PRN):  acetaminophen     Tablet .. 650 milliGRAM(s) Oral every 6 hours PRN Temp greater or equal to 38C (100.4F), Mild Pain (1 - 3)  LORazepam   Injectable 2 milliGRAM(s) IV Push three times a day PRN generalized tonic-clonic seizure lasting longer than 2 minutes  ondansetron Injectable 4 milliGRAM(s) IV Push every 8 hours PRN Nausea and/or Vomiting          T(F): 96.7 (22 @ 14:07), Max: 97 (22 @ 10:32)  HR: 74 (22 @ 14:07) (72 - 74)  BP: 99/64 (22 @ 14:07) (99/64 - 111/67)  RR: 16 (22 @ 14:07) (16 - 16)  SpO2: --        Neurologic Examination:  General:  Appearance is consistent with chronologic age.  No abnormal facies.   General: The patient is oriented to person, place, time and date.  Recent and remote memory intact.  Follows 3-4-step directions. Language is slow with normal repetition, comprehension and naming.  Nondysarthric.    Cranial nerves: EOMI w/o nystagmus, skew or reported double vision.  PERRL.  No ptosis/weakness of eyelid closure.  Facial sensation is normal with normal bite.  No facial asymmetry.  Hearing grossly intact b/l.  Palate elevates midline.  Tongue midline.  Motor examination:   Normal tone, bulk and range of motion.  No tenderness, twitching, tremors or involuntary movements.  Formal Muscle Strength Testin-/5 RUE & RLE; 5/5 LUE & LLE.   Minimal R drift.  Reflexes:   2+ b/l   Sensory examination:   Intact to light touch and pinprick, pain.  Cerebellum:   FTN dysmetria on the right.  Gait is spastic paretic, steady, no difficulty with turns.        Labs:   3/11/2022 COVID-19 PCR negative    3/4/2022 Keppra 17.4 mcg/ml (trough), Vitamin d 20 ng/ml        Neuroimaging:  CT Head:   < from: CT Head No Cont (21 @ 16:48) >  IMPRESSION:  In comparison to the prior head CT dated 2021:    1.  Interval removal of the previously seen right transfrontal shunt catheter and reservoir. Expected postsurgical changes as described, and trace linear hemorrhage noted along the prior catheter tract. There is also trace right inferior frontal subarachnoid hemorrhage (series 6 image 17), potentially postsurgical.    2.  Redemonstrated large left craniectomy/cranioplasty. Decreased size of the fluid collection underlying the cranioplasty (now 5 mm, previously 10 mm), and near-complete resolution of the previously seen left anterior frontal subdural collection.    3.  Stable chronic infarcts involving the left cerebral hemisphere and right cerebellar hemisphere.    < end of copied text >        MRI Brain:   < from: MR Head w/wo IV Cont (21 @ 20:10) >  IMPRESSION:    1.9 cm enhancing soft tissue with internal mineralization along the anterior margin of the left frontal craniectomy reflect granulation tissue.    Diffuse thickening/enhancement of the bilateral pachymeninges, nonspecific but can be seen in setting of postop settings, intracranial hypotension, or meningitis. Recommend clinical correlation.    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 Redemonstrated subdural hematomas along the right cerebral and cerebellar convexities.    Evolving subacute infarcts in the medial left frontoparietal region, left insular cortex, and right cerebellum with associated gyral enhancements.    Peripherally enhancing evolving hemorrhagic contusion/hematoma in the left temporal occipital regions with scattered petechial gyral hemorrhage/enhancement. Moderate surrounding edema.    < end of copied text >          VEEG 3/30 - 2021:  < from: EEG w/ Video Each 12-26 Hours, Unmonitored (21 @ 09:00) >  Awake:  Day 1- Diffusely attenuated background. After about 1 AM there is improvement with background consisting of diffuse theta. It is notoptimally organized and there is no sustained PDR.  Day 2- During periods of arousal background is obscured by muscle artifact.      Asleep:  Day 1- No clearly formed sleep architecture  Day 2- No clearly formed sleep architecture      Focal Slowing:  None      Generalized Slowing:  There is moderate generalized slowing    Breach Artifact:  None    Interictal Activity  None    Activation and Provocation Procedures:  None performed    Events:  None reported. No electrographic seizures    Impression  Abnormal due to presence of diffuse attenuation evolving into generalized slowing.      Clinical Correlation  Findings consistent with diffuse cerebral dysfunction. Cannot rule out effect of sedating medication.    < end of copied text >          Assessment:  This is a 46y Male with h/o ETOH abuse, seizures, traumatic left SDH in May 2021, Left cranectomy/cranioplasty,  shunt placement 2021 and removal 2021,         Discussed with Dr. Cartagena        Plan:   - VEEG monitoring for better characterization and assessment  - Seizure precautions  - Continue home dose of Keppra 1000mg q12hrs for now (ordered)  - Start Vitamin D supplementation (ordered)  - Ativan 2mg IV PRN for generalized tonic-clonic seizure lasting longer than 2 minutes, or two consecutive seizures without return to baseline in-between (ordered)  - CBC, CMP, Mg, Keppra level trough (ordered)  - Keep Mg above 2    Plan discussed with patient and wife in details, all questions answered.    Discussed with medical and nursing teams. Neurology/Epilepsy Consult:    SANDRA DAS 46y Male  MRN-[id number]    Patient is a 46y old right-handed Male who presents for elective VEEG monitoring        HPI: History obtained from patient and wife. EMR and outpatient records reviewed.  Patient has history of seizures since 2018 when he presented s/p GTC seizure in context of alcohol withdrawal, was not admitted.  On 2021 he presented after a cluster of seizures. Prior to that patient had GI complaints and stopped drinking for several days. He was admitted with diagnosis of status epilepticus, was intubated and had prolonged hospitalization. Patient was started on Keppra 1000mg q12hrs during admission, discharged 2021 and Keppra was stopped shortly after.  On May 6, 2021 patient admitted s/p fall. Patient's wife heard him falling and found patient unresponsive on the bathroom floor. CT revealed acute large left subdural hemorrhage with associated left to right midline shift, with smaller right-sided subdurals, and mass effect. Patient underwent Left hemicraniotomy for SDH evacuation, 2021, S/P cardiac arrest in OR due to hypotension and blood loss. S/P revision of crani and KIKE placement 2021 due to scalp wound. Repeat CTH revealed new left cerebral hemisphere and right cerebellum infarcts, CTA revealed right vertebral artery stenosis, CTV revealed positive venous thrombosis. MRI brain revealed pseudomeningocele. Patient is S/P  shunt placement 2021, trach and PEG.  In 2021 patient underwent cranioplasty and  shunt revision, then shunt was removed in 2021.   No other seizure reported. Patient denies any alcohol use since May 2021. He is taking Keppra 1000mg q12hrs, denies any missed doses, tolerating well.  At this point trach and PEG removed, patient is able to tolerate regular diet. He is able to ambulate without assistive devises, does not need any assistance with ADLs.He is currently on disability, continues speech therapy.  He was evaluated by Dr. Cartagena last week and recommended EMU admission.        PAST MEDICAL & SURGICAL HISTORY:  TBI 2021  Left craniectomy for SDH evacuation 2021   shunt 2021   shunt removal 2021  Trach removed  Peg removed  ETOH abuse  Seizure disorder  Cardiomyopathy  H/O hemorrhoidectomy          FAMILY HISTORY:  No seizures        Social History:  Lives with wife, 1 child  Currently not employed        Risk factors:  Born full-term, , no complications  Normal growth and development  No febrile seizures/CNS infections  + head trauma with loss of consciousness 2021      Allergy:  No Known Allergies        Home Medications:  Keppra 1000mg q12hrs        MEDICATIONS  (STANDING):  chlorhexidine 4% Liquid 1 Application(s) Topical <User Schedule>  cholecalciferol 2000 Unit(s) Oral daily  enoxaparin Injectable 40 milliGRAM(s) SubCutaneous every 24 hours  levETIRAcetam 1000 milliGRAM(s) Oral every 12 hours    MEDICATIONS  (PRN):  acetaminophen     Tablet .. 650 milliGRAM(s) Oral every 6 hours PRN Temp greater or equal to 38C (100.4F), Mild Pain (1 - 3)  LORazepam   Injectable 2 milliGRAM(s) IV Push three times a day PRN generalized tonic-clonic seizure lasting longer than 2 minutes  ondansetron Injectable 4 milliGRAM(s) IV Push every 8 hours PRN Nausea and/or Vomiting          T(F): 96.7 (22 @ 14:07), Max: 97 (22 @ 10:32)  HR: 74 (22 @ 14:07) (72 - 74)  BP: 99/64 (22 @ 14:07) (99/64 - 111/67)  RR: 16 (22 @ 14:07) (16 - 16)  SpO2: --        Neurologic Examination:  General:  Appearance is consistent with chronologic age.  No abnormal facies.   General: The patient is oriented to person, place, time and date.  Recent and remote memory intact.  Follows 3-4-step directions. Language is slow with normal repetition, comprehension and naming.  Nondysarthric.    Cranial nerves: EOMI w/o nystagmus, skew or reported double vision.  PERRL.  No ptosis/weakness of eyelid closure.  Facial sensation is normal with normal bite.  No facial asymmetry.  Hearing grossly intact b/l.  Palate elevates midline.  Tongue midline.  Motor examination:   Normal tone, bulk and range of motion.  No tenderness, twitching, tremors or involuntary movements.  Formal Muscle Strength Testin-/5 RUE & RLE; 5/5 LUE & LLE.   Minimal R drift.  Reflexes:   2+ b/l   Sensory examination:   Intact to light touch and pinprick, pain.  Cerebellum:   FTN dysmetria on the right.  Gait is spastic paretic, steady, no difficulty with turns.        Labs:   3/11/2022 COVID-19 PCR negative    3/4/2022 Keppra 17.4 mcg/ml (trough), Vitamin d 20 ng/ml        Neuroimaging:  CT Head:   < from: CT Head No Cont (21 @ 16:48) >  IMPRESSION:  In comparison to the prior head CT dated 2021:    1.  Interval removal of the previously seen right transfrontal shunt catheter and reservoir. Expected postsurgical changes as described, and trace linear hemorrhage noted along the prior catheter tract. There is also trace right inferior frontal subarachnoid hemorrhage (series 6 image 17), potentially postsurgical.    2.  Redemonstrated large left craniectomy/cranioplasty. Decreased size of the fluid collection underlying the cranioplasty (now 5 mm, previously 10 mm), and near-complete resolution of the previously seen left anterior frontal subdural collection.    3.  Stable chronic infarcts involving the left cerebral hemisphere and right cerebellar hemisphere.    < end of copied text >        MRI Brain:   < from: MR Head w/wo IV Cont (21 @ 20:10) >  IMPRESSION:    1.9 cm enhancing soft tissue with internal mineralization along the anterior margin of the left frontal craniectomy reflect granulation tissue.    Diffuse thickening/enhancement of the bilateral pachymeninges, nonspecific but can be seen in setting of postop settings, intracranial hypotension, or meningitis. Recommend clinical correlation.    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 Redemonstrated subdural hematomas along the right cerebral and cerebellar convexities.    Evolving subacute infarcts in the medial left frontoparietal region, left insular cortex, and right cerebellum with associated gyral enhancements.    Peripherally enhancing evolving hemorrhagic contusion/hematoma in the left temporal occipital regions with scattered petechial gyral hemorrhage/enhancement. Moderate surrounding edema.    < end of copied text >          VEEG 3/30 - 2021:  < from: EEG w/ Video Each 12-26 Hours, Unmonitored (21 @ 09:00) >  Awake:  Day 1- Diffusely attenuated background. After about 1 AM there is improvement with background consisting of diffuse theta. It is notoptimally organized and there is no sustained PDR.  Day 2- During periods of arousal background is obscured by muscle artifact.      Asleep:  Day 1- No clearly formed sleep architecture  Day 2- No clearly formed sleep architecture      Focal Slowing:  None      Generalized Slowing:  There is moderate generalized slowing    Breach Artifact:  None    Interictal Activity  None    Activation and Provocation Procedures:  None performed    Events:  None reported. No electrographic seizures    Impression  Abnormal due to presence of diffuse attenuation evolving into generalized slowing.      Clinical Correlation  Findings consistent with diffuse cerebral dysfunction. Cannot rule out effect of sedating medication.    < end of copied text >          Assessment:  This is a 46y Male with h/o ETOH abuse, seizures, traumatic left SDH in May 2021, Left cranectomy/cranioplasty,  shunt placement and removal, chronic strokes, remains clinically seizure-free on Keppra.        Discussed with Dr. Cartagena        Plan:   - VEEG monitoring for better characterization and treatment plan  - Seizure precautions  - Continue home dose of Keppra 1000mg q12hrs for now (ordered)  - Start Vitamin D supplementation (ordered)  - Ativan 2mg IV PRN for generalized tonic-clonic seizure lasting longer than 2 minutes, or two consecutive seizures without return to baseline in-between (ordered)  - CBC, CMP, Mg, Keppra level trough (ordered)  - Keep Mg above 2    Plan discussed with patient and wife in details, all questions answered.    Discussed with medical and nursing teams.

## 2022-03-14 NOTE — H&P ADULT - NSICDXPASTSURGICALHX_GEN_ALL_CORE_FT
PAST SURGICAL HISTORY:  H/O hemorrhoidectomy     Status post evacuation of subdural hematoma  shunt-5/2021, Prolonged [id number] and PEG

## 2022-03-14 NOTE — H&P ADULT - NSHPPHYSICALEXAM_GEN_ALL_CORE
Vitals Pending    PHYSICAL EXAM:  GENERAL: NAD, well-developed  SKIN: No rashes or lesions  HEAD:  Atraumatic, Normocephalic  EYES: EOMI, PERRLA, conjunctiva and sclera clear  NECK: Supple, No JVD  CHEST/LUNG: Clear to auscultation bilaterally; No wheeze  HEART: Regular rate and rhythm; No murmurs, rubs, or gallops  ABDOMEN: Soft, Nontender, Nondistended; Bowel sounds present  EXTREMITIES:  No clubbing, cyanosis, or edema  CNS: AAOx3

## 2022-03-15 PROCEDURE — 99231 SBSQ HOSP IP/OBS SF/LOW 25: CPT

## 2022-03-15 PROCEDURE — 95720 EEG PHY/QHP EA INCR W/VEEG: CPT

## 2022-03-15 RX ORDER — LEVETIRACETAM 250 MG/1
750 TABLET, FILM COATED ORAL EVERY 12 HOURS
Refills: 0 | Status: DISCONTINUED | OUTPATIENT
Start: 2022-03-15 | End: 2022-03-16

## 2022-03-15 RX ADMIN — LEVETIRACETAM 1000 MILLIGRAM(S): 250 TABLET, FILM COATED ORAL at 09:51

## 2022-03-15 RX ADMIN — LEVETIRACETAM 750 MILLIGRAM(S): 250 TABLET, FILM COATED ORAL at 20:59

## 2022-03-15 RX ADMIN — Medication 2000 UNIT(S): at 11:07

## 2022-03-15 NOTE — PROGRESS NOTE ADULT - ASSESSMENT
45F w/PMHx etOH abuse found unresponsive with SDH / SAH s/p craniectomy with discarded bone flap coded in OR 5/2021 with wound revision x2, s/p L cranioplasty with replacement of bone flap 8/6/2021, removal of VPS 12/21 presents for elective V-EEG monitoring.    #Hx of SDH/SAH with Hx of Seizures  - Neurology-continue V-EEG monitoring  - Seizure Precautions  - Lorazepam 2mg IVP q4h PRN seizure like activity  - AED per Neuro  - labs per Neuro    Diet: Regular  Activity: OOB  DVT PPX: Lovenox  GI PPX: Not indicated  Code status: Full  Dispo: Home when V-EEG completed

## 2022-03-16 ENCOUNTER — TRANSCRIPTION ENCOUNTER (OUTPATIENT)
Age: 47
End: 2022-03-16

## 2022-03-16 VITALS
DIASTOLIC BLOOD PRESSURE: 61 MMHG | HEART RATE: 62 BPM | SYSTOLIC BLOOD PRESSURE: 107 MMHG | TEMPERATURE: 96 F | RESPIRATION RATE: 16 BRPM

## 2022-03-16 LAB
ALBUMIN SERPL ELPH-MCNC: 4.7 G/DL — SIGNIFICANT CHANGE UP (ref 3.5–5.2)
ALP SERPL-CCNC: 83 U/L — SIGNIFICANT CHANGE UP (ref 30–115)
ALT FLD-CCNC: 9 U/L — SIGNIFICANT CHANGE UP (ref 0–41)
ANION GAP SERPL CALC-SCNC: 10 MMOL/L — SIGNIFICANT CHANGE UP (ref 7–14)
AST SERPL-CCNC: 11 U/L — SIGNIFICANT CHANGE UP (ref 0–41)
BASOPHILS # BLD AUTO: 0.01 K/UL — SIGNIFICANT CHANGE UP (ref 0–0.2)
BASOPHILS NFR BLD AUTO: 0.2 % — SIGNIFICANT CHANGE UP (ref 0–1)
BILIRUB SERPL-MCNC: 0.7 MG/DL — SIGNIFICANT CHANGE UP (ref 0.2–1.2)
BUN SERPL-MCNC: 14 MG/DL — SIGNIFICANT CHANGE UP (ref 10–20)
CALCIUM SERPL-MCNC: 9.9 MG/DL — SIGNIFICANT CHANGE UP (ref 8.5–10.1)
CHLORIDE SERPL-SCNC: 104 MMOL/L — SIGNIFICANT CHANGE UP (ref 98–110)
CO2 SERPL-SCNC: 25 MMOL/L — SIGNIFICANT CHANGE UP (ref 17–32)
CREAT SERPL-MCNC: 1 MG/DL — SIGNIFICANT CHANGE UP (ref 0.7–1.5)
EGFR: 93 ML/MIN/1.73M2 — SIGNIFICANT CHANGE UP
EOSINOPHIL # BLD AUTO: 0.17 K/UL — SIGNIFICANT CHANGE UP (ref 0–0.7)
EOSINOPHIL NFR BLD AUTO: 3 % — SIGNIFICANT CHANGE UP (ref 0–8)
GLUCOSE SERPL-MCNC: 94 MG/DL — SIGNIFICANT CHANGE UP (ref 70–99)
HCT VFR BLD CALC: 44.8 % — SIGNIFICANT CHANGE UP (ref 42–52)
HGB BLD-MCNC: 15.6 G/DL — SIGNIFICANT CHANGE UP (ref 14–18)
IMM GRANULOCYTES NFR BLD AUTO: 0.2 % — SIGNIFICANT CHANGE UP (ref 0.1–0.3)
LYMPHOCYTES # BLD AUTO: 1.86 K/UL — SIGNIFICANT CHANGE UP (ref 1.2–3.4)
LYMPHOCYTES # BLD AUTO: 33.3 % — SIGNIFICANT CHANGE UP (ref 20.5–51.1)
MAGNESIUM SERPL-MCNC: 1.8 MG/DL — SIGNIFICANT CHANGE UP (ref 1.8–2.4)
MCHC RBC-ENTMCNC: 28.3 PG — SIGNIFICANT CHANGE UP (ref 27–31)
MCHC RBC-ENTMCNC: 34.8 G/DL — SIGNIFICANT CHANGE UP (ref 32–37)
MCV RBC AUTO: 81.2 FL — SIGNIFICANT CHANGE UP (ref 80–94)
MONOCYTES # BLD AUTO: 0.77 K/UL — HIGH (ref 0.1–0.6)
MONOCYTES NFR BLD AUTO: 13.8 % — HIGH (ref 1.7–9.3)
NEUTROPHILS # BLD AUTO: 2.77 K/UL — SIGNIFICANT CHANGE UP (ref 1.4–6.5)
NEUTROPHILS NFR BLD AUTO: 49.5 % — SIGNIFICANT CHANGE UP (ref 42.2–75.2)
NRBC # BLD: 0 /100 WBCS — SIGNIFICANT CHANGE UP (ref 0–0)
PHOSPHATE SERPL-MCNC: 3.6 MG/DL — SIGNIFICANT CHANGE UP (ref 2.1–4.9)
PLATELET # BLD AUTO: 155 K/UL — SIGNIFICANT CHANGE UP (ref 130–400)
POTASSIUM SERPL-MCNC: 4.3 MMOL/L — SIGNIFICANT CHANGE UP (ref 3.5–5)
POTASSIUM SERPL-SCNC: 4.3 MMOL/L — SIGNIFICANT CHANGE UP (ref 3.5–5)
PROT SERPL-MCNC: 7.4 G/DL — SIGNIFICANT CHANGE UP (ref 6–8)
RBC # BLD: 5.52 M/UL — SIGNIFICANT CHANGE UP (ref 4.7–6.1)
RBC # FLD: 12.4 % — SIGNIFICANT CHANGE UP (ref 11.5–14.5)
SODIUM SERPL-SCNC: 139 MMOL/L — SIGNIFICANT CHANGE UP (ref 135–146)
WBC # BLD: 5.59 K/UL — SIGNIFICANT CHANGE UP (ref 4.8–10.8)
WBC # FLD AUTO: 5.59 K/UL — SIGNIFICANT CHANGE UP (ref 4.8–10.8)

## 2022-03-16 PROCEDURE — 95720 EEG PHY/QHP EA INCR W/VEEG: CPT

## 2022-03-16 PROCEDURE — 99231 SBSQ HOSP IP/OBS SF/LOW 25: CPT

## 2022-03-16 PROCEDURE — 99239 HOSP IP/OBS DSCHRG MGMT >30: CPT

## 2022-03-16 RX ORDER — LEVETIRACETAM 250 MG/1
1000 TABLET, FILM COATED ORAL EVERY 12 HOURS
Refills: 0 | Status: DISCONTINUED | OUTPATIENT
Start: 2022-03-16 | End: 2022-03-16

## 2022-03-16 RX ORDER — PYRIDOXINE HCL (VITAMIN B6) 100 MG
1 TABLET ORAL
Qty: 30 | Refills: 5
Start: 2022-03-16 | End: 2022-09-11

## 2022-03-16 RX ORDER — LEVETIRACETAM 250 MG/1
1 TABLET, FILM COATED ORAL
Qty: 60 | Refills: 5
Start: 2022-03-16 | End: 2022-09-11

## 2022-03-16 RX ORDER — LEVETIRACETAM 250 MG/1
250 TABLET, FILM COATED ORAL ONCE
Refills: 0 | Status: COMPLETED | OUTPATIENT
Start: 2022-03-16 | End: 2022-03-16

## 2022-03-16 RX ORDER — CHOLECALCIFEROL (VITAMIN D3) 125 MCG
1 CAPSULE ORAL
Qty: 30 | Refills: 5
Start: 2022-03-16 | End: 2022-09-11

## 2022-03-16 RX ADMIN — Medication 2000 UNIT(S): at 10:15

## 2022-03-16 RX ADMIN — LEVETIRACETAM 750 MILLIGRAM(S): 250 TABLET, FILM COATED ORAL at 10:15

## 2022-03-16 RX ADMIN — LEVETIRACETAM 250 MILLIGRAM(S): 250 TABLET, FILM COATED ORAL at 11:01

## 2022-03-16 NOTE — DISCHARGE NOTE PROVIDER - NSDCMRMEDTOKEN_GEN_ALL_CORE_FT
Keppra 1000 mg oral tablet: 1 tab(s) orally every 12 hours   Vitamin B6 100 mg oral tablet: 1 tab(s) orally once a day   Vitamin D3 50 mcg (2000 intl units) oral tablet: 1 tab(s) orally once a day

## 2022-03-16 NOTE — DISCHARGE NOTE PROVIDER - HOSPITAL COURSE
46M w/PMHx ETOH abuse found unresponsive with SDH / SAH s/p craniectomy with discarded bone flap coded in OR 5/2021 with wound revision x2, s/p L cranioplasty with replacement of bone flap 8/6/2021, removal of VPS 12/21 presents for elective V-EEG monitoring.  Pt was monitored for 48 h. Today VEEG monitoring completed, patient is cleared for discharge from neurology standpoint.  He will be discharged on Keppra 1000mg q12hrs (pre-admission dose) and started on Vitamin D 2000 IU daily and Vitamin B6 100mg daily.  He will follow up with Dr. Cartagena on June 1, 2022 at 11:30 pm.

## 2022-03-16 NOTE — DISCHARGE NOTE PROVIDER - CARE PROVIDER_API CALL
Karel Cartagena)  Neurology  [address] [place] [address], [address] [place] [address]  Phone: [phone]  Fax: [phone]  Established Patient  Scheduled Appointment: 06/01/2022 11:30 AM

## 2022-03-16 NOTE — DISCHARGE NOTE PROVIDER - NSDCCPCAREPLAN_GEN_ALL_CORE_FT
PRINCIPAL DISCHARGE DIAGNOSIS  Diagnosis: Seizure  Assessment and Plan of Treatment: Follow up with Dr. Cartagena on June 1, 2022 at 11:30 pm.  [address] [place] [address], [address]  [phone]  Discharge seizure medications are:  Keppra 1000mg q12hrs (pre-admission dose)  Also, you are  started on:  Vitamin D 2000 IU daily  Vitamin B6 100mg daily  Rx sent to the pharmacy.  Please have CBC, CMP, Keppra level trough  done prior to follow up.  Continue outpatient speech therapy at [address] [place] [address].  Detailed written and verbal instructions regarding seizure precautions and follow up plan are given to you and your  wife Brianne by neurology team.

## 2022-03-16 NOTE — PROGRESS NOTE ADULT - SUBJECTIVE AND OBJECTIVE BOX
45F w/PMHx etOH abuse found unresponsive with SDH / SAH s/p craniectomy with discarded bone flap coded in OR 5/2021 with wound revision x2, s/p L cranioplasty with replacement of bone flap 8/6/2021, removal of VPS 12/21 presents for elective V-EEG monitoring.    Today:  Seen at bedside, no new complaints.          REVIEW OF SYSTEMS:  No new complaints      MEDICATIONS  (STANDING):  chlorhexidine 4% Liquid 1 Application(s) Topical <User Schedule>  cholecalciferol 2000 Unit(s) Oral daily  enoxaparin Injectable 40 milliGRAM(s) SubCutaneous every 24 hours  levETIRAcetam 750 milliGRAM(s) Oral every 12 hours    MEDICATIONS  (PRN):  acetaminophen     Tablet .. 650 milliGRAM(s) Oral every 6 hours PRN Temp greater or equal to 38C (100.4F), Mild Pain (1 - 3)  LORazepam   Injectable 2 milliGRAM(s) IV Push three times a day PRN generalized tonic-clonic seizure lasting longer than 2 minutes  ondansetron Injectable 4 milliGRAM(s) IV Push every 8 hours PRN Nausea and/or Vomiting      Allergies  No Known Allergies        FAMILY HISTORY:  Family history of essential hypertension (Father)  FH: HTN (hypertension) (Father)        Vital Signs Last 24 Hrs  T(C): 35.9 (15 Mar 2022 13:56), Max: 36.7 (14 Mar 2022 21:25)  T(F): 96.7 (15 Mar 2022 13:56), Max: 98 (14 Mar 2022 21:25)  HR: 76 (15 Mar 2022 13:56) (67 - 76)  BP: 100/65 (15 Mar 2022 13:56) (98/53 - 105/54)  RR: 18 (15 Mar 2022 13:56) (18 - 18)    PHYSICAL EXAM:  GENERAL: NAD, well-developed  SKIN: No rashes or lesions  HEAD:  Atraumatic, Normocephalic  EYES: EOMI, PERRLA, conjunctiva and sclera clear  NECK: Supple, No JVD  CHEST/LUNG: Clear to auscultation bilaterally; No wheeze  HEART: Regular rate and rhythm; No murmurs, rubs, or gallops  ABDOMEN: Soft, Nontender, Nondistended; Bowel sounds present  EXTREMITIES:  No clubbing, cyanosis, or edema  CNS: AAOx3      LABS:                        14.2   5.58  )-----------( 157      ( 14 Mar 2022 18:45 )             41.0     03-14    138  |  103  |  14  ----------------------------<  98  4.0   |  25  |  0.9    Ca    9.5      14 Mar 2022 18:45  Mg     1.8     03-14    TPro  6.9  /  Alb  4.3  /  TBili  0.4  /  DBili  x   /  AST  10  /  ALT  9   /  AlkPhos  81  03-14        
Epilepsy Attending Note:     SANDRA DAS    46y Male  MRN [id number]    Vital Signs Last 24 Hrs  T(C): 35.6 (16 Mar 2022 04:59), Max: 36.3 (15 Mar 2022 21:00)  T(F): 96 (16 Mar 2022 04:59), Max: 97.3 (15 Mar 2022 21:00)  HR: 62 (16 Mar 2022 04:59) (62 - 76)  BP: 107/61 (16 Mar 2022 04:59) (100/65 - 110/63)  BP(mean): --  RR: 16 (16 Mar 2022 04:59) (16 - 18)  SpO2: --                          15.6   5.59  )-----------( 155      ( 16 Mar 2022 06:31 )             44.8       03-16    139  |  104  |  14  ----------------------------<  94  4.3   |  25  |  1.0    Ca    9.9      16 Mar 2022 06:31  Phos  3.6     03-16  Mg     1.8     03-16    TPro  7.4  /  Alb  4.7  /  TBili  0.7  /  DBili  x   /  AST  11  /  ALT  9   /  AlkPhos  83  03-16      MEDICATIONS  (STANDING):  chlorhexidine 4% Liquid 1 Application(s) Topical <User Schedule>  cholecalciferol 2000 Unit(s) Oral daily  enoxaparin Injectable 40 milliGRAM(s) SubCutaneous every 24 hours  levETIRAcetam 1000 milliGRAM(s) Oral every 12 hours    MEDICATIONS  (PRN):  acetaminophen     Tablet .. 650 milliGRAM(s) Oral every 6 hours PRN Temp greater or equal to 38C (100.4F), Mild Pain (1 - 3)  LORazepam   Injectable 2 milliGRAM(s) IV Push three times a day PRN generalized tonic-clonic seizure lasting longer than 2 minutes  ondansetron Injectable 4 milliGRAM(s) IV Push every 8 hours PRN Nausea and/or Vomiting            VEEG in the last 24 hours:    Background - continuous, symmetrical, organized, reaching frequencies in the range of 8-9 Hz that is expressed better form the left side. The recording shows evidence of left hemispheric breach artifact and higher amplitude from the left side.    Focal and generalized slowin. Left hemispheric focal slowing  2. No generalized slowing    Interictal activity - rare left FT sharps that are clearly epileptiform    Events - none    Seizures - none    Impression: Abnormal VEEG as above    Plan -   Will d/c VEEG  Discharge on Keppra 1000mg q12hrs  Add Vitamin b6 and vitamin D supplementation  Follow up as scheduled  Continue with [facility] rehab
Epilepsy Attending Note:     SANDRA DAS    46y Male  MRN [id number]    Vital Signs Last 24 Hrs  T(C): 35.7 (15 Mar 2022 05:00), Max: 36.7 (14 Mar 2022 21:25)  T(F): 96.3 (15 Mar 2022 05:00), Max: 98 (14 Mar 2022 21:25)  HR: 73 (15 Mar 2022 05:00) (67 - 74)  BP: 105/54 (15 Mar 2022 05:00) (98/53 - 105/54)  BP(mean): --  RR: 18 (15 Mar 2022 05:00) (16 - 18)  SpO2: --                          14.2   5.58  )-----------( 157      ( 14 Mar 2022 18:45 )             41.0       03-14    138  |  103  |  14  ----------------------------<  98  4.0   |  25  |  0.9    Ca    9.5      14 Mar 2022 18:45  Mg     1.8     03-14    TPro  6.9  /  Alb  4.3  /  TBili  0.4  /  DBili  x   /  AST  10  /  ALT  9   /  AlkPhos  81  03-14      MEDICATIONS  (STANDING):  chlorhexidine 4% Liquid 1 Application(s) Topical <User Schedule>  cholecalciferol 2000 Unit(s) Oral daily  enoxaparin Injectable 40 milliGRAM(s) SubCutaneous every 24 hours  levETIRAcetam 1000 milliGRAM(s) Oral every 12 hours    MEDICATIONS  (PRN):  acetaminophen     Tablet .. 650 milliGRAM(s) Oral every 6 hours PRN Temp greater or equal to 38C (100.4F), Mild Pain (1 - 3)  LORazepam   Injectable 2 milliGRAM(s) IV Push three times a day PRN generalized tonic-clonic seizure lasting longer than 2 minutes  ondansetron Injectable 4 milliGRAM(s) IV Push every 8 hours PRN Nausea and/or Vomiting          VEEG in the last 24 hours:    Background - continuous, symmetrical, organized, reaching frequencies in the range of 8-9 Hz that is expressed better form the left side. The recording shows evidence of left hemispheric breach artifact and higher amplitude from the left side.    Focal and generalized slowin. Left hemispheric focal slowing  2. No generalized slowing    Interictal activity - rare left FT sharp transients     Events - none    Seizures - none    Impression: Abnormal VEEG as above    Plan -   Continue VEEG monitoring  Seizure precautions  Decrease Keppra to 750mg q12hrs as of tonight  
Epilepsy Team Discharge Note:    VEEG monitoring completed, patient is cleared for discharge from neurology standpoint.    Follow up neurology appointment is scheduled with Dr. Cartagena   for June 1, 2022 at 11:30 pm.    [address] [place] [address], [address]  [phone]    Discharge seizure medications are:  Keppra 1000mg q12hrs (pre-admission dose)    Also, patient is started on:  Vitamin D 2000 IU daily  Vitamin B6 100mg daily    Rx sent to the pharmacy.    Patient was also given Rx for CBC, CMP, Keppra level trough to be done prior to follow up.    Patient will continue outpatient speech therapy at [address] [place] [address].    Detailed written and verbal instructions regarding seizure precautions and follow up plan are given to the patient and wife Brianen, all questions answered. Patient and wife verbalized understanding.    Discussed with medical and nursing teams.

## 2022-03-16 NOTE — DISCHARGE NOTE PROVIDER - PROVIDER TOKENS
PROVIDER:[TOKEN:[[id number]],SCHEDULEDAPPT:[06/01/2022],SCHEDULEDAPPTTIME:[11:30 AM],ESTABLISHEDPATIENT:[T]]

## 2022-03-16 NOTE — DISCHARGE NOTE PROVIDER - ATTENDING DISCHARGE PHYSICAL EXAMINATION:
On exam General awake, alert NAD, Lungs clear to ausculation b/l, Heart regular rhythm, Abdomen: soft, non tender non distended, Ext no edema .  Pt denies any active medical complaints at this time, stating that he feels fine.

## 2022-03-16 NOTE — DISCHARGE NOTE NURSING/CASE MANAGEMENT/SOCIAL WORK - PATIENT PORTAL LINK FT
You can access the FollowMyHealth Patient Portal offered by [facility] by registering at the following website: [email or web address]. By joining [facility]’s FollowMyHealth portal, you will also be able to view your health information using other applications (apps) compatible with our system.

## 2022-03-16 NOTE — DISCHARGE NOTE NURSING/CASE MANAGEMENT/SOCIAL WORK - NSDCPEFALRISK_GEN_ALL_CORE
For information on Fall & Injury Prevention, visit: [email or web address] OR  [email or web address] OR  https://www.cdc.gov/steadi/patient.html

## 2022-03-18 DIAGNOSIS — Z86.74 PERSONAL HISTORY OF SUDDEN CARDIAC ARREST: ICD-10-CM

## 2022-03-18 DIAGNOSIS — R56.9 UNSPECIFIED CONVULSIONS: ICD-10-CM

## 2022-03-18 DIAGNOSIS — K21.9 GASTRO-ESOPHAGEAL REFLUX DISEASE WITHOUT ESOPHAGITIS: ICD-10-CM

## 2022-03-18 DIAGNOSIS — Z91.81 HISTORY OF FALLING: ICD-10-CM

## 2022-03-18 DIAGNOSIS — Z87.820 PERSONAL HISTORY OF TRAUMATIC BRAIN INJURY: ICD-10-CM

## 2022-03-18 DIAGNOSIS — Z87.898 PERSONAL HISTORY OF OTHER SPECIFIED CONDITIONS: ICD-10-CM

## 2022-03-18 DIAGNOSIS — G40.909 EPILEPSY, UNSPECIFIED, NOT INTRACTABLE, WITHOUT STATUS EPILEPTICUS: ICD-10-CM

## 2022-03-18 DIAGNOSIS — Z87.19 PERSONAL HISTORY OF OTHER DISEASES OF THE DIGESTIVE SYSTEM: ICD-10-CM

## 2022-03-18 DIAGNOSIS — Z98.2 PRESENCE OF CEREBROSPINAL FLUID DRAINAGE DEVICE: ICD-10-CM

## 2022-03-18 DIAGNOSIS — E55.9 VITAMIN D DEFICIENCY, UNSPECIFIED: ICD-10-CM

## 2022-03-18 DIAGNOSIS — E53.1 PYRIDOXINE DEFICIENCY: ICD-10-CM

## 2022-03-19 LAB — LEVETIRACETAM SERPL-MCNC: 11.3 UG/ML — SIGNIFICANT CHANGE UP (ref 10–40)

## 2022-04-29 NOTE — ED PROCEDURE NOTE - CPROC ED COMPLICATIONS1
Patient with history of CKD4   Cr is 2.2 stable at baseline   Patient has been on IVF  cc/hr since last night would continue 12 hours post cath then stop   Monitor RFP in the next 48 hours   I did explain to patient that there is a risk of worsening kidney function with contrast and that giving NS may reduce the risk but not eliminate the risk. He also understands that worsening kidney function from contrast also includes possible need of dialysis. He expressed understanding.   Phos at goal no need for binders   C/w calcitriol        no

## 2022-05-02 ENCOUNTER — OUTPATIENT (OUTPATIENT)
Dept: OUTPATIENT SERVICES | Facility: HOSPITAL | Age: 47
LOS: 1 days | Discharge: HOME | End: 2022-05-02

## 2022-05-02 DIAGNOSIS — R26.81 UNSTEADINESS ON FEET: ICD-10-CM

## 2022-05-02 DIAGNOSIS — M25.512 PAIN IN LEFT SHOULDER: ICD-10-CM

## 2022-05-02 DIAGNOSIS — Z98.890 OTHER SPECIFIED POSTPROCEDURAL STATES: Chronic | ICD-10-CM

## 2022-06-01 ENCOUNTER — APPOINTMENT (OUTPATIENT)
Dept: NEUROLOGY | Facility: CLINIC | Age: 47
End: 2022-06-01
Payer: COMMERCIAL

## 2022-06-01 VITALS
HEIGHT: 71 IN | WEIGHT: 208 LBS | SYSTOLIC BLOOD PRESSURE: 128 MMHG | BODY MASS INDEX: 29.12 KG/M2 | OXYGEN SATURATION: 97 % | DIASTOLIC BLOOD PRESSURE: 68 MMHG | TEMPERATURE: 97.5 F | HEART RATE: 74 BPM

## 2022-06-01 PROCEDURE — 99214 OFFICE O/P EST MOD 30 MIN: CPT

## 2022-06-01 RX ORDER — LEVETIRACETAM 1000 MG/1
1000 TABLET, FILM COATED ORAL
Qty: 75 | Refills: 5 | Status: DISCONTINUED | COMMUNITY
Start: 2021-09-08 | End: 2022-06-01

## 2022-06-01 RX ORDER — FOLIC ACID 1 MG/1
1 TABLET ORAL
Qty: 30 | Refills: 0 | Status: DISCONTINUED | COMMUNITY
Start: 2021-09-16 | End: 2022-06-01

## 2022-06-02 NOTE — HISTORY OF PRESENT ILLNESS
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 Rare left FT sharps that are clearly epileptiform.\par Pt was discharged on Keppra 1000 mgs BID. In April pt had a breakthrough seizure in the context of stopping medication for few days. He was under the impression that he can not drive while taking Keppra and stopped it on his own. \par Wife also mentioned that pt had one episode of brief period of unconsciousness for about 2 min. It appeared as Naeem was sleeping while sitting up but she could not wake him up. As soon as he snapped out of it he was back to baseline and said he was sleeping.\par Pt does feel tiered on the current dose. Did not do blood work. \par Pt had his first seizure in 2018 in the context of alcohol withdrawal. Second seizure March 2021.\par Overall pt and wife see slight improvement in his memory. No improvement in speech.

## 2022-06-02 NOTE — PHYSICAL EXAM
[General Appearance - Alert] : alert [General Appearance - In No Acute Distress] : in no acute distress [Oriented To Time, Place, And Person] : oriented to person, place, and time [Person] : oriented to person [Place] : oriented to place [Time] : oriented to time [Cranial Nerves Optic (II)] : visual acuity intact bilaterally,  visual fields full to confrontation, pupils equal round and reactive to light [Cranial Nerves Oculomotor (III)] : extraocular motion intact [Cranial Nerves Trigeminal (V)] : facial sensation intact symmetrically [Cranial Nerves Facial (VII)] : face symmetrical [Cranial Nerves Vestibulocochlear (VIII)] : hearing was intact bilaterally [Cranial Nerves Glossopharyngeal (IX)] : tongue and palate midline [Cranial Nerves Accessory (XI - Cranial And Spinal)] : head turning and shoulder shrug symmetric [Cranial Nerves Hypoglossal (XII)] : there was no tongue deviation with protrusion [Involuntary Movements] : no involuntary movements were seen [No Muscle Atrophy] : normal bulk in all four extremities [Motor Handedness Right-Handed] : the patient is right hand dominant [Motor Strength Upper Extremities Right] : there was weakness of the right upper extremity [Motor Strength Lower Extremities Right] : there was weakness of the right lower extremity [2+] : Ankle jerk left 2+ [Naming Objects] : no difficulty naming common objects [Short Term Intact] : short term memory impaired [Fluency] : fluency not intact [Motor Strength Upper Extremities Left] : strength was normal in the left upper extremity [Motor Strength Lower Extremities Left] : strength was normal in the left lower extremity [Limited Balance] : balance was intact [Past-pointing] : there was no past-pointing [Tremor] : no tremor present [Coordination - Dysmetria Impaired Finger-to-Nose Bilateral] : not present [FreeTextEntry5] : expressive aphasia, dysarthria  [FreeTextEntry8] : Spastic paritic gait

## 2022-06-02 NOTE — DISCUSSION/SUMMARY
[Risks Associated with Driving/[place] Law] : As per my usual protocol, the patient was advised in regards to risks and driving privileges associated with the [place] Guidelines.  [Safety Recommendations] : The patient was advised in regards to the risk of seizures and general seizure safety recommendations including not to be bathing alone, climbing to high places and operating heavy machinery. [Compliance with Medications] : The importance of compliance with medications was reinforced. [Medication Side Effects] : High frequency and serious potential medication adverse effects were reviewed with the patient, including but not exclusive to psychiatric effects.  Information sheets on medication side effects were made available to the patient in our clinic.  The patient or advocate agrees to notify us for any concerns. [Bone Health Education] : Patient was educated in regards to bone health and an increased risk of osteoporosis in patients with epilepsy. [Sleep Hygiene/Sleep Disruption Risks] : Sleep hygiene and the risks of sleep disruption were discussed. [FreeTextEntry1] : Naeem is a 46 year old right handed male with PMH of Alcohol abuse, TBI, seizure disorder, Subdural hematoma with cerebral herniation s/p craniectomy, VPS and cranioplasty with resent breakthrough seizure due to missed dose.\par \par Plan:\par - change Keppra to 2250 XR Q HS\par - Rx given for blood work to be done 2 weeks after the dose increase\par - continue Vit B daily\par - start Vit D supplementation\par - follow up in 3-4 months\par - no driving as per [place] law\par \par Case discussed with Dr. Cartagena\par \par Jamilah Ferguson, DNP, ACNP-BC

## 2022-06-10 NOTE — PATIENT PROFILE ADULT - ARE SIGNIFICANT INDICATORS COMPLETE.
What Type Of Note Output Would You Prefer (Optional)?: Standard Output What Is The Reason For Today's Visit?: Full Body Skin Examination What Is The Reason For Today's Visit? (Being Monitored For X): concerning skin lesions on an annual basis Yes

## 2022-06-24 LAB
25(OH)D3 SERPL-MCNC: 25 NG/ML
ALBUMIN SERPL ELPH-MCNC: 4.8 G/DL
ALP BLD-CCNC: 85 U/L
ALT SERPL-CCNC: 12 U/L
ANION GAP SERPL CALC-SCNC: 13 MMOL/L
AST SERPL-CCNC: 13 U/L
BASOPHILS # BLD AUTO: 0.02 K/UL
BASOPHILS NFR BLD AUTO: 0.4 %
BILIRUB SERPL-MCNC: 0.6 MG/DL
BUN SERPL-MCNC: 8 MG/DL
CALCIUM SERPL-MCNC: 8.9 MG/DL
CHLORIDE SERPL-SCNC: 99 MMOL/L
CO2 SERPL-SCNC: 22 MMOL/L
CREAT SERPL-MCNC: 0.8 MG/DL
EGFR: 110 ML/MIN/1.73M2
EOSINOPHIL # BLD AUTO: 0.18 K/UL
EOSINOPHIL NFR BLD AUTO: 3.2 %
GLUCOSE SERPL-MCNC: 86 MG/DL
HCT VFR BLD CALC: 43.1 %
HGB BLD-MCNC: 15 G/DL
IMM GRANULOCYTES NFR BLD AUTO: 0.4 %
LYMPHOCYTES # BLD AUTO: 1.76 K/UL
LYMPHOCYTES NFR BLD AUTO: 31.1 %
MAN DIFF?: NORMAL
MCHC RBC-ENTMCNC: 28.5 PG
MCHC RBC-ENTMCNC: 34.8 G/DL
MCV RBC AUTO: 81.9 FL
MONOCYTES # BLD AUTO: 0.63 K/UL
MONOCYTES NFR BLD AUTO: 11.1 %
NEUTROPHILS # BLD AUTO: 3.05 K/UL
NEUTROPHILS NFR BLD AUTO: 53.8 %
PLATELET # BLD AUTO: 180 K/UL
POTASSIUM SERPL-SCNC: 4 MMOL/L
PROT SERPL-MCNC: 7.4 G/DL
RBC # BLD: 5.26 M/UL
RBC # FLD: 12.2 %
SODIUM SERPL-SCNC: 134 MMOL/L
WBC # FLD AUTO: 5.66 K/UL

## 2022-06-29 LAB — LEVETIRACETAM SERPL-MCNC: 14.4 UG/ML

## 2022-07-06 NOTE — PROCEDURE NOTE - NSANATOMICLLOCATION_GEN_A_CORE
Referral entered on 7/6/2022 by LYLA Cameron for Well controlled intermittent asthma.   right/radial artery

## 2022-08-09 NOTE — CONSULT NOTE ADULT - SUBJECTIVE AND OBJECTIVE BOX
Patient is a 42y old  Male who presents with a chief complaint of seizure on the day of admission (30 Oct 2018 22:40)      HPI:  43 yo male patient with no pertinent past medical history except for alcohol dependence and abuse, brought in because of seizure like activity on the day of presentation.  Patient is a heavy alcohol drinker at baseline, but for the last 3 weeks, he was drinking around a bottle of vodka every day. On the day of presentation he had 2 episodes of vomiting in the morning, so he did not drink alcohol that day. In the afternoon, he was sitting in the sofa when suddenly, according to the wife, his eyes uprolled, and he made a weird sound as if he was shouting, then he lost consciousness, and became stiff. He did not have jerky movements of his extremities, but he was stiff during the whole episode. It lasted for like 2 minutes, after that he woke up but was confused for like half an hour. He went back to his baseline in ED.  No current symptoms. no chest pain, no palpitation, no SOB, no restlessness, no abdominal pain, no vomiting, no diarrhea, no urinary symptoms. No sweating/diaphoresis. (30 Oct 2018 22:40)    Currently comfortable, no s/s s/o withdrawal       PAST MEDICAL & SURGICAL HISTORY:  GERD (gastroesophageal reflux disease)  Alcohol abuse  H/O hemorrhoidectomy      SOCIAL HX:   Smoking       -ve                   ETOH          as above                   Other    FAMILY HISTORY:  Family history of essential hypertension (Father)  :  No known cardiovacular family hisotry     ROS:  See HPI     Allergies    No Known Allergies    Intolerances          PHYSICAL EXAM    ICU Vital Signs Last 24 Hrs  T(C): 38.1 (30 Oct 2018 21:24), Max: 38.1 (30 Oct 2018 20:21)  T(F): 100.5 (30 Oct 2018 21:24), Max: 100.5 (30 Oct 2018 20:21)  HR: 82 (31 Oct 2018 04:00) (80 - 96)  BP: 119/77 (31 Oct 2018 04:00) (118/69 - 183/103)  BP(mean): --  ABP: --  ABP(mean): --  RR: 18 (31 Oct 2018 04:00) (14 - 20)  SpO2: 100% (31 Oct 2018 04:00) (98% - 100%)      General: In NAD   HEENT:  DIANA              Lymphatic system: No cervical LN   Lungs: Bilateral BS  Cardiovascular: Regular  Gastrointestinal: Soft, Positive BS  Musculoskeletal: No clubbing.  Moves all extremities.  Full range of motion   Skin: Warm.  Intact  Neurological: No motor or sensory deficit         LABS:                          14.3   3.94  )-----------( 60       ( 30 Oct 2018 19:15 )             38.4                                               10-30    128<L>  |  83<L>  |  9<L>  ----------------------------<  137<H>  3.4<L>   |  23  |  0.7    Ca    8.2<L>      30 Oct 2018 19:15  Mg     1.0     10-30    TPro  6.6  /  Alb  4.2  /  TBili  2.5<H>  /  DBili  x   /  AST  250<H>  /  ALT  231<H>  /  AlkPhos  69  10-30                                                 CARDIAC MARKERS ( 30 Oct 2018 19:15 )  x     / <0.01 ng/mL / 445 U/L / x     / x                                                LIVER FUNCTIONS - ( 30 Oct 2018 19:15 )  Alb: 4.2 g/dL / Pro: 6.6 g/dL / ALK PHOS: 69 U/L / ALT: 231 U/L / AST: 250 U/L / GGT: x                                                                                                                                       X-Rays         no infiltrate                                                                             ECHO    MEDICATIONS  (STANDING):  chlorhexidine 4% Liquid 1 Application(s) Topical <User Schedule>  enoxaparin Injectable 40 milliGRAM(s) SubCutaneous daily  folic acid 1 milliGRAM(s) Oral daily  folic acid Injectable 5 milliGRAM(s) IV Push Once  LORazepam     Tablet   Oral   LORazepam     Tablet 2 milliGRAM(s) Oral every 4 hours  LORazepam     Tablet 2 milliGRAM(s) Oral every 6 hours  multivitamin 1 Tablet(s) Oral daily  pantoprazole    Tablet 40 milliGRAM(s) Oral before breakfast  sodium chloride 0.9%. 1000 milliLiter(s) (125 mL/Hr) IV Continuous <Continuous>  thiamine 100 milliGRAM(s) Oral daily  thiamine IVPB 500 milliGRAM(s) IV Intermittent Once    MEDICATIONS  (PRN):  LORazepam     Tablet 2 milliGRAM(s) Oral every 4 hours PRN withdrawal
11

## 2022-09-30 ENCOUNTER — APPOINTMENT (OUTPATIENT)
Dept: NEUROLOGY | Facility: CLINIC | Age: 47
End: 2022-09-30

## 2022-09-30 VITALS
HEIGHT: 68 IN | SYSTOLIC BLOOD PRESSURE: 122 MMHG | OXYGEN SATURATION: 99 % | DIASTOLIC BLOOD PRESSURE: 70 MMHG | WEIGHT: 207.19 LBS | HEART RATE: 88 BPM | BODY MASS INDEX: 31.4 KG/M2 | TEMPERATURE: 97.6 F

## 2022-09-30 PROCEDURE — 99214 OFFICE O/P EST MOD 30 MIN: CPT

## 2022-09-30 RX ORDER — LEVETIRACETAM 1000 MG/1
1000 TABLET, FILM COATED ORAL TWICE DAILY
Qty: 60 | Refills: 2 | Status: DISCONTINUED | COMMUNITY
End: 2022-09-30

## 2022-09-30 RX ORDER — ERGOCALCIFEROL 1.25 MG/1
1.25 MG CAPSULE, LIQUID FILLED ORAL
Qty: 5 | Refills: 3 | Status: ACTIVE | COMMUNITY
Start: 2022-09-30 | End: 1900-01-01

## 2022-10-03 NOTE — DISCUSSION/SUMMARY
[Risks [place] with D[place]] : As per my usual protocol, the patient was advised in regards to risks and driving privileges associated with the [place] Guidelines.  [Safety Recommendations] : The patient was advised in regards to the risk of seizures and general seizure safety recommendations including not to be bathing alone, climbing to high places and operating heavy machinery. [Compliance with Medications] : The importance of compliance with medications was reinforced. [Medication Side Effects] : High frequency and serious potential medication adverse effects were reviewed with the patient, including but not exclusive to psychiatric effects.  Information sheets on medication side effects were made available to the patient in our clinic.  The patient or advocate agrees to notify us for any concerns. [Bone Health Education] : Patient was educated in regards to bone health and an increased risk of osteoporosis in patients with epilepsy. [Sleep Hygiene/Sleep Disruption Risks] : Sleep hygiene and the risks of sleep disruption were discussed. [FreeTextEntry1] : Naeem is a 46 year old right handed male with PMH of Alcohol abuse, TBI, seizure disorder, Subdural hematoma with cerebral herniation s/p craniectomy, VPS(now removed) and cranioplasty  presented for a follow up.\par \par Plan:\par - continue Keppra 2250 XR Q HS\par - Rx given for blood work to be done 2 weeks before follow up\par - continue Vit B daily\par - start Vit D supplementation, Rx sent\par - follow up in 6 month\par - no driving for now, will revisit this topic at next visit\par - REEG\par \par Case discussed with Dr. Cartagena\par \par Jamilah Ferguson, DNP, ACNP-BC

## 2022-10-03 NOTE — PHYSICAL EXAM
[General Appearance - Alert] : alert [General Appearance - In No Acute Distress] : in no acute distress [Oriented To Time, Place, And Person] : oriented to person, place, and time [Person] : oriented to person [Place] : oriented to place [Time] : oriented to time [Naming Objects] : no difficulty naming common objects [Cranial Nerves Optic (II)] : visual acuity intact bilaterally,  visual fields full to confrontation, pupils equal round and reactive to light [Cranial Nerves Oculomotor (III)] : extraocular motion intact [Cranial Nerves Trigeminal (V)] : facial sensation intact symmetrically [Cranial Nerves Facial (VII)] : face symmetrical [Cranial Nerves Vestibulocochlear (VIII)] : hearing was intact bilaterally [Cranial Nerves Glossopharyngeal (IX)] : tongue and palate midline [Cranial Nerves Accessory (XI - Cranial And Spinal)] : head turning and shoulder shrug symmetric [Cranial Nerves Hypoglossal (XII)] : there was no tongue deviation with protrusion [Involuntary Movements] : no involuntary movements were seen [No Muscle Atrophy] : normal bulk in all four extremities [Motor Handedness Right-Handed] : the patient is right hand dominant [Motor Strength Upper Extremities Right] : there was weakness of the right upper extremity [Motor Strength Lower Extremities Right] : there was weakness of the right lower extremity [2+] : Ankle jerk left 2+ [Short Term Intact] : short term memory impaired [Fluency] : fluency not intact [Motor Strength Upper Extremities Left] : strength was normal in the left upper extremity [Motor Strength Lower Extremities Left] : strength was normal in the left lower extremity [Limited Balance] : balance was intact [Past-pointing] : there was no past-pointing [Tremor] : no tremor present [Coordination - Dysmetria Impaired Finger-to-Nose Bilateral] : not present [FreeTextEntry5] : expressive aphasia, dysarthria  [FreeTextEntry8] : Spastic paritic gait

## 2022-10-03 NOTE — HISTORY OF PRESENT ILLNESS
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  No breakthrough events. Sleeping better, feels less tiered. \par Feels that his speech is improving slowly. Still has some residual weakness on the right. Occasional action tremor of right hand which was not seen during the visit today. \par Blood work reviewed from june 23, 2022\par Keppra level 14.4\par Vit D 25\par \par VEEG done 3/2022 showed focal and generalized slowing: left hemispheric focal slowing, no generalized slowing. Rare left FT sharps that are clearly epileptiform.\par

## 2022-12-19 NOTE — DISCHARGE NOTE NURSING/CASE MANAGEMENT/SOCIAL WORK - NSDPLANG ASIS_GEN_ALL_CORE
No Render Risk Assessment In Note?: no Recommendations (Free Text): Sunscreen Recommendation Preamble: The following recommendations were made during the visit: use apply cider vinegar and duck tape to wart. Detail Level: Zone

## 2023-03-01 ENCOUNTER — RX RENEWAL (OUTPATIENT)
Age: 48
End: 2023-03-01

## 2023-03-01 LAB
25(OH)D3 SERPL-MCNC: 20 NG/ML
ALBUMIN SERPL ELPH-MCNC: 4.9 G/DL
ALP BLD-CCNC: 59 U/L
ALT SERPL-CCNC: 12 U/L
ANION GAP SERPL CALC-SCNC: 11 MMOL/L
AST SERPL-CCNC: 15 U/L
BASOPHILS # BLD AUTO: 0.01 K/UL
BASOPHILS NFR BLD AUTO: 0.2 %
BILIRUB SERPL-MCNC: 0.9 MG/DL
BUN SERPL-MCNC: 11 MG/DL
CALCIUM SERPL-MCNC: 9.3 MG/DL
CHLORIDE SERPL-SCNC: 103 MMOL/L
CO2 SERPL-SCNC: 25 MMOL/L
CREAT SERPL-MCNC: 1 MG/DL
EGFR: 93 ML/MIN/1.73M2
EOSINOPHIL # BLD AUTO: 0.13 K/UL
EOSINOPHIL NFR BLD AUTO: 2.9 %
GLUCOSE SERPL-MCNC: 96 MG/DL
HCT VFR BLD CALC: 43.6 %
HGB BLD-MCNC: 14.9 G/DL
IMM GRANULOCYTES NFR BLD AUTO: 0 %
LYMPHOCYTES # BLD AUTO: 1.64 K/UL
LYMPHOCYTES NFR BLD AUTO: 36.2 %
MAN DIFF?: NORMAL
MCHC RBC-ENTMCNC: 28.8 PG
MCHC RBC-ENTMCNC: 34.2 G/DL
MCV RBC AUTO: 84.2 FL
MONOCYTES # BLD AUTO: 0.52 K/UL
MONOCYTES NFR BLD AUTO: 11.5 %
NEUTROPHILS # BLD AUTO: 2.23 K/UL
NEUTROPHILS NFR BLD AUTO: 49.2 %
PLATELET # BLD AUTO: 179 K/UL
POTASSIUM SERPL-SCNC: 4.3 MMOL/L
PROT SERPL-MCNC: 7.5 G/DL
RBC # BLD: 5.18 M/UL
RBC # FLD: 12.3 %
SODIUM SERPL-SCNC: 139 MMOL/L
WBC # FLD AUTO: 4.53 K/UL

## 2023-03-03 LAB — LEVETIRACETAM SERPL-MCNC: 26.2 UG/ML

## 2023-03-06 ENCOUNTER — APPOINTMENT (OUTPATIENT)
Dept: NEUROLOGY | Facility: CLINIC | Age: 48
End: 2023-03-06
Payer: COMMERCIAL

## 2023-03-06 VITALS
SYSTOLIC BLOOD PRESSURE: 124 MMHG | HEART RATE: 88 BPM | WEIGHT: 215 LBS | HEIGHT: 71 IN | BODY MASS INDEX: 30.1 KG/M2 | DIASTOLIC BLOOD PRESSURE: 88 MMHG | TEMPERATURE: 97.6 F | OXYGEN SATURATION: 99 %

## 2023-03-06 PROCEDURE — 95816 EEG AWAKE AND DROWSY: CPT

## 2023-03-06 PROCEDURE — 99213 OFFICE O/P EST LOW 20 MIN: CPT

## 2023-03-06 NOTE — ASSESSMENT
[FreeTextEntry1] : partial epilepsy\par TBI \par R/O mood D/O\par \par \par plan\par F/U in 3 months\par If no seizure consider clearing to drive\par OT

## 2023-03-06 NOTE — HISTORY OF PRESENT ILLNESS
[FreeTextEntry1] : Wendy , is here for the F/U accompanied by his wife. No events  , no seizure. last event/seizure was in April 2022\par He says he takes his meds . He is not working. Asking when he can drive\par His mood is described as good with days that he feels depressed and is laying on the couch and watching TV\par His sleep is overall OK\par He is at times having difficulty with motor planning and processing.\par Today they went to [employer].\par Before going he knew what toilet he wanted to buy, however there he appeared overwhelmed with difficulty of making decision \par He says he is also having difficulty with erection\par No spinal and joint pain\par He did have a Keppra level done that .was not trough ( done in am and he is on XR)\par His EEG done today is also really good\par

## 2023-07-07 NOTE — PHYSICAL EXAM
[FreeTextEntry1] : A&Ox3\par still with decreased quantity of expressive speech\par \par receptive speech excellent in two languages\par mild right sided weakness and spasticity\par \par right frontal incision healing well. \par 
DOS

## 2023-07-18 ENCOUNTER — APPOINTMENT (OUTPATIENT)
Dept: NEUROLOGY | Facility: CLINIC | Age: 48
End: 2023-07-18
Payer: COMMERCIAL

## 2023-07-18 VITALS
DIASTOLIC BLOOD PRESSURE: 70 MMHG | HEART RATE: 72 BPM | SYSTOLIC BLOOD PRESSURE: 118 MMHG | HEIGHT: 71 IN | TEMPERATURE: 98.4 F | BODY MASS INDEX: 29.54 KG/M2 | OXYGEN SATURATION: 98 % | WEIGHT: 211 LBS

## 2023-07-18 PROCEDURE — 99214 OFFICE O/P EST MOD 30 MIN: CPT

## 2023-07-18 NOTE — HISTORY OF PRESENT ILLNESS
[FreeTextEntry1] : Leandro is here with his wife . He is at his baseline . appears to be happy and the wife is also satisfied and happy , however she says as far as his level of function is concerned he is at his baseline . No change from the last time and no seizures and or any events suggestive of seizure.\par He is not doing that much . sleeps well. wakes up refreshed and is not having excessive sleepiness.\par He watches TV. Is certainly different and less able in regard to doing things like the time before his injury\par Is not fixing things around the house.\par Occasionally  uses tiredness as a reason for not getting engaged.\par When they go out to visit family , he often sits quiet .\par They don't have that much of social [place]\par He is not participating in any cognitive rehab program\par no aches and pains\par No nocturnal events\par watches some TV and loves to have Trump back as the president\par \par

## 2023-07-18 NOTE — ASSESSMENT
[FreeTextEntry1] : 1-TBI\par 2- seizure D/O\par ( seizure free > one year)\par \par \par plan\par level\par F/U\par Cognitive Rehab

## 2023-07-18 NOTE — PHYSICAL EXAM
[FreeTextEntry1] : A/A/Ox 3\par has difficulty remembering the name of the current president but picks up the right one when given choices\par  0/3 recall corrected to 1/3\par follows 3 step commands\par CN- normal\par no paraphasic errors\par + decreased dexterity both sides right lside affected more\par + fixation\par + mild end point dysmetria on the right\par stable gait

## 2023-07-20 NOTE — H&P ADULT - NSTOBACCOSCREENHP_GEN_A_NCS
Unable to assess due to patient's cognitive impairment [place]rel Counseling:  I discussed with the patient the risks of etanercept including but not limited to myelosuppression, immunosuppression, autoimmune hepatitis, demyelinating diseases, lymphoma, and infections.  The patient understands that monitoring is required including a PPD at baseline and must alert us or the primary physician if symptoms of infection or other concerning signs are noted.

## 2023-08-08 NOTE — ANESTHESIA FOLLOW-UP NOTE - NSINTERVIEW_GEN_ALL_CORE
Interviewed and evaluated Helical Rim Advancement Flap Text: The defect edges were debeveled with a #15 blade scalpel.  Given the location of the defect and the proximity to free margins (helical rim) a double helical rim advancement flap was deemed most appropriate.  Using a sterile surgical marker, the appropriate advancement flaps were drawn incorporating the defect and placing the expected incisions between the helical rim and antihelix where possible.  The area thus outlined was incised through and through with a #15 scalpel blade.  With a skin hook and iris scissors, the flaps were gently and sharply undermined and freed up.

## 2023-09-14 NOTE — PROVIDER CONTACT NOTE (MEDICATION) - SITUATION
Spoke to pt. Still some swelling. Denies numbness, tingling. Still with discomfort.  Agreed to go to [facility] for eval.
Pt due for miralax

## 2023-10-05 NOTE — H&P PST ADULT - WEIGHT IN LBS
Bioprosthetic MVR, AF, HTN with cholangitis, pancreatitis now s/p ERCP with sphincterotomy, PD and CBD stents, ATN  physical as above  continue zosyn  follow renal function  she is oliguric with some pulmonary edema and basilar consolidation on POCUS but with stable respiratory status prefer to defer lasix for now  start diet  HR is low so hold metoprolol and andtihypertensives  restart warfarin, no need for bridging for now  decision making of high complexity
Pt seen and d/w fellow.  Doing well, monitor LFTs.  Plan for outpt repeat ERCP for stent and stone removal.
Patient is a 89 F with Bioprosthetic MV for Rheumatic MS, Afib with SSS, s/p PPM in 2016, on  warfarin, HTN, HLD, HFpEF p/w abdominal pain Found to have cholecystitis and ascending cholangitis, as well as gallstone pancreatitis.  Cardiology initially consulted for Preoperative CV risk assessment and Optimization for ERCP / perc silvana.    Review of Studies  - TTE 10/2/23: . Normal left ventricular size and systolic function. Normal right ventricular size and systolic function. Dilated left atrium. Mild aortic regurgitation. Moderate aortic stenosis. The peak transvalvular velocity is 2.80 m/s, the mean transvalvular gradient is 15.00 mmHg, and the LVOT/AV   velocity ratio is 0.53. The aortic valve area (estimated via the continuity method) is 1.07 cm². Bioprosthetic valve is seen in the mitral position. The mean transvalvular gradient is 6.00 mmHg at a heart rate of 58 bpm. There is trace mitral regurgitation. Mild-to-moderate tricuspid regurgitation.   Pulmonary hypertension present, pulmonary artery systolic pressure is 83 mmHg. No pericardial effusion.      #Atrial Fibrillation s/p Cryoablation, MYKE closure  - Patient with [id number] of 5 placing her at higher thromboembolic risk. She has a history of MYKE occlusion, however MYKE noted to be partially closed with evidence of sludge on last EMERALD from 2019  - PT S/P IV Vitamine K by primary team on 10/1 for coumadin reversal with INR on 10/2 of 1.43. Would aim to resume coumadin soon as safe from surgical standpoint.  - No INR noted on labs today. Please send daily INT  - On Toprol 50 mg daily at home as well as Digoxin 125 mcg. Patient Currently in NSR, rate controlled with intermittent pacing  - Please send digoxin level with am labs. Can cont to hold both  - If HR persistently in the 's or patient in Afib with RVR can initiate Lopressor 2.5 mg IV q/6 with holding parameters    #HFpEF  - Clinically patient without jVD or lower extremity edema. Pulmonary exam hard to asses as she does not readily follow commands. CXR with evidence of mild pulmonary vascular congestions. Standing fluids has been discontinued  - Please Cont to hold Toprol.  - For now would continue to hold Home Lasix 20 and Spironolactone 25 mg.  - Will reassess need for diuresis on the daily    # Moderate AS/ Rheumatic MS s/p Bioprosthetic MV/Mild ot Moderate TR  - Patient with Moderate aortic stenosis on Echo with peak transvalvular velocity of 2.80 m/s, mean transvalvular gradient is 15.00 mmHg, with SUSY of 1.07 cm² via continuity method  - Normal functioning Bioprosthetic MV.  - Please be judicious with fluid resuscitation. Patient with poor inspiratory effort on Face tent requiring Oxygen.   - Will consider resuming Lasix/ Spironolactone  - Surveillance Echo if symptomatic otherwise in 1-2 years
cr improving, good urine output, likely restart home diuretic tomorrow  remove baez  stable for downgrade from SICU
Bioprosthetic MVR< AF, hypothyroidism now with cholangitis, pancreatitis  physical as above  ERCP with sphincterotomy, PD and CBD stents  continue zosyn  B lines throughout lungs on POCUS and CXR with right infiltrate and bilateral haziness c/w aspiration and pulm edema  DC IVF  UO increasing; would avoid fluid bolusses for now  hold AC  hold digoxin, spironolactone  breathing is comfortable on 40% face tent  decision making of high complexity
Please see attending attestation from cardiology consult note from 10/1
190

## 2023-11-10 ENCOUNTER — RX RENEWAL (OUTPATIENT)
Age: 48
End: 2023-11-10

## 2023-11-10 RX ORDER — LEVETIRACETAM 750 MG/1
750 TABLET, EXTENDED RELEASE ORAL AT BEDTIME
Qty: 270 | Refills: 2 | Status: ACTIVE | COMMUNITY
Start: 2022-06-01 | End: 1900-01-01

## 2023-11-22 LAB
ALBUMIN SERPL ELPH-MCNC: 5 G/DL
ALP BLD-CCNC: 51 U/L
ALT SERPL-CCNC: 17 U/L
ANION GAP SERPL CALC-SCNC: 11 MMOL/L
AST SERPL-CCNC: 15 U/L
BILIRUB SERPL-MCNC: 1 MG/DL
BUN SERPL-MCNC: 10 MG/DL
CALCIUM SERPL-MCNC: 9.8 MG/DL
CHLORIDE SERPL-SCNC: 102 MMOL/L
CO2 SERPL-SCNC: 27 MMOL/L
CREAT SERPL-MCNC: 1 MG/DL
EGFR: 92 ML/MIN/1.73M2
GLUCOSE SERPL-MCNC: 99 MG/DL
POTASSIUM SERPL-SCNC: 4.6 MMOL/L
PROT SERPL-MCNC: 7.6 G/DL
SODIUM SERPL-SCNC: 140 MMOL/L

## 2023-11-27 LAB — LEVETIRACETAM SERPL-MCNC: 28.8 UG/ML

## 2023-11-28 ENCOUNTER — APPOINTMENT (OUTPATIENT)
Dept: NEUROLOGY | Facility: CLINIC | Age: 48
End: 2023-11-28
Payer: COMMERCIAL

## 2023-11-28 VITALS
SYSTOLIC BLOOD PRESSURE: 118 MMHG | TEMPERATURE: 97.4 F | HEART RATE: 82 BPM | WEIGHT: 213.13 LBS | HEIGHT: 71 IN | BODY MASS INDEX: 29.84 KG/M2 | DIASTOLIC BLOOD PRESSURE: 67 MMHG | OXYGEN SATURATION: 98 %

## 2023-11-28 DIAGNOSIS — I46.9 CARDIAC ARREST, CAUSE UNSPECIFIED: ICD-10-CM

## 2023-11-28 PROCEDURE — 99213 OFFICE O/P EST LOW 20 MIN: CPT

## 2023-11-28 PROCEDURE — 95816 EEG AWAKE AND DROWSY: CPT

## 2023-11-29 PROBLEM — I46.9 CARDIAC ARREST, CAUSE UNSPECIFIED: Status: ACTIVE | Noted: 2021-10-11

## 2023-12-09 NOTE — PRE-OP CHECKLIST - BOWEL PREP
n/a
Gen: well appearing, no acute distress  Head: + left eyebrow abrasion, upper lip laceration  EENT: EOMI, PERRLA, moist mucous membranes, no scleral icterus, no JVD  Lung: no increased work of breathing, clear to auscultation bilaterally, no wheezing, rales, rhonchi, speaking in full sentences  CV: regular rate, regular rhythm, + b/l leg edema   Abd: soft, non-tender, non-distended, no rebound tenderness or guarding; no CVA tenderness  MSK: No edema, no visible deformities, full range of motion in all 4 extremities  Neuro: Awake, alert, no focal neurologic deficits  Skin: left eyebrow abrasion 4 cm, upper lip laceration 1 cm  Psych: normal affect, normal speech
n/a

## 2024-03-04 NOTE — ED PROVIDER NOTE - DATE/TIME 1
Pt rating pain 10/10, no PRN ordered. Could we get something ordered?     IV dilaudid PRN put in   30-Oct-2018 20:26

## 2024-05-08 ENCOUNTER — APPOINTMENT (OUTPATIENT)
Dept: NEUROLOGY | Facility: CLINIC | Age: 49
End: 2024-05-08
Payer: COMMERCIAL

## 2024-05-08 VITALS
BODY MASS INDEX: 28.49 KG/M2 | TEMPERATURE: 98 F | HEIGHT: 73 IN | SYSTOLIC BLOOD PRESSURE: 103 MMHG | WEIGHT: 215 LBS | OXYGEN SATURATION: 97 % | RESPIRATION RATE: 20 BRPM | HEART RATE: 63 BPM | DIASTOLIC BLOOD PRESSURE: 66 MMHG

## 2024-05-08 DIAGNOSIS — S06.9XAA UNSPECIFIED INTRACRANIAL INJURY WITH LOSS OF CONSCIOUSNESS STATUS UNKNOWN, INITIAL ENCOUNTER: ICD-10-CM

## 2024-05-08 DIAGNOSIS — S06.5XAA TRAUMATIC SUBDURAL HEMORRHAGE WITH LOSS OF CONSCIOUSNESS STATUS UNKNOWN, INITIAL ENCOUNTER: ICD-10-CM

## 2024-05-08 DIAGNOSIS — R56.9 UNSPECIFIED CONVULSIONS: ICD-10-CM

## 2024-05-08 PROCEDURE — G2211 COMPLEX E/M VISIT ADD ON: CPT

## 2024-05-08 PROCEDURE — 99213 OFFICE O/P EST LOW 20 MIN: CPT

## 2024-05-11 PROBLEM — S06.9XAA TRAUMATIC BRAIN INJURY: Status: ACTIVE | Noted: 2021-09-01

## 2024-05-11 NOTE — HISTORY OF PRESENT ILLNESS
[FreeTextEntry1] : González. is here with his wife for the F/U. They are both happy with the current level but the wife also reports that González , is not persistent. He starts a project but is not finishing it. He is going to the GYM, does stretch and also tries to do things around the house He is reporting feeling tense and slightly rigid on the right side and also not having a right side dexterity as he wants?  He does have his regular F/U with the PMD He did not gain weight No events suggestive of Sz. Good mood sleeping well No spinal and joint pain

## 2024-05-11 NOTE — PHYSICAL EXAM
[FreeTextEntry1] : A/A/Ox3 good mood good attention at times for some words turns to his wife ( ? language barrier or..) Normal CN exam + right paretic gait stable gait.

## 2024-05-11 NOTE — ASSESSMENT
[FreeTextEntry1] : Cardiac arrest, cause unspecified (427.5) (I46.9) Seizures (780.39) (R56.9) TBI seizure D/O skin rash   Plan F/U with his dermatologist Hydrotherapy F/U with levels

## 2024-06-10 NOTE — H&P ADULT - NSICDXPILOT_GEN_ALL_CORE
[place]aine [As Noted in HPI] : as noted in HPI [Nasal Congestion] : nasal congestion [Sinus Pressure] : sinus pressure [Negative] : Heme/Lymph

## 2024-06-13 NOTE — DISCHARGE NOTE PROVIDER - NSDCHC_MEDRECSTATUS_GEN_ALL_CORE
Karolina Hopkins, CCC-A  Audiologist - [facility] [address] [place] [address]  [email or web address]  [phone]    Patient: Lupe Jewell   MRN: [id number]  [address] [facility]   Home Phone [phone]   Work Phone Not on file.   Mobile [phone]   : 1946  [place]: 2024      AUDIOLOGICAL EVALUATION      RECOMMENDATIONS:   It is recommended that Lupe Jewell:  Follow up medically with Dr. Jensen.    Receive binaural hearing aids to improve speech understanding.  Continue to receive audiological monitoring annually.  Use precaution and/or hearing protection in noisy environments.    If you should have any questions or concerns regarding the above information, please do not hesitate to contact me at [phone].      _______________________________  Karolina Hopkins, ULICES-A  Audiologist         Admission Reconciliation is Not Complete  Discharge Reconciliation is Completed

## 2024-07-02 NOTE — ED ADULT NURSE NOTE - CHIEF COMPLAINT
The patient is a 45y Male complaining of seizures.
Triggering events leading to humiliation, shame, and/or despair (e.g., Loss of relationship, financial or health status) (real or anticipated)/Pending incarceration or homelessness/Inadequate social supports

## 2024-11-05 ENCOUNTER — LABORATORY RESULT (OUTPATIENT)
Age: 49
End: 2024-11-05

## 2024-11-14 ENCOUNTER — APPOINTMENT (OUTPATIENT)
Dept: NEUROLOGY | Facility: CLINIC | Age: 49
End: 2024-11-14
Payer: COMMERCIAL

## 2024-11-14 ENCOUNTER — NON-APPOINTMENT (OUTPATIENT)
Age: 49
End: 2024-11-14

## 2024-11-14 VITALS
DIASTOLIC BLOOD PRESSURE: 76 MMHG | HEART RATE: 64 BPM | WEIGHT: 220 LBS | OXYGEN SATURATION: 98 % | SYSTOLIC BLOOD PRESSURE: 115 MMHG | HEIGHT: 72 IN | RESPIRATION RATE: 18 BRPM | BODY MASS INDEX: 29.8 KG/M2

## 2024-11-14 DIAGNOSIS — R56.9 UNSPECIFIED CONVULSIONS: ICD-10-CM

## 2024-11-14 PROCEDURE — 99213 OFFICE O/P EST LOW 20 MIN: CPT

## 2024-11-14 PROCEDURE — 95816 EEG AWAKE AND DROWSY: CPT

## 2024-12-25 NOTE — H&P PST ADULT - FUNCTIONAL ASSESSMENT - BASIC MOBILITY PT AGE POP HIDDEN
[facility] INPATIENT ENCOUNTER  DAILY PROGRESS NOTE    Date:  12/25/2024, Hospital Day: 3  Attending Physician:  No Carlos DO     Subjective   SUBJECTIVE     HISTORY OF PRESENT ILLNESS:       James Wright is a 53 year old male with a past medical history of HFrEF (last EF 20% in 11/15/2024;  lbs), HTN, HLD, asthma/COPD, substance use disorder (cocaine and marijuana), and housing instability (now incarcerated)  presenting with syncopal episodes.     Interval History:  NAEO    Today, patient continues to endorse chest pain unchanged from prior. Says the tizanidine and lidocaine patches have not made much of a difference. Denies SOB, fever, cough, or swelling.  No concerns with eating or drinking. Reports a brief episode this morning when he \"blacked out\" but then later clarified he had dozed off. Officers in the room did not notice anything unusual.       HISTORIES:     I have reviewed the past medical history, family history, social history, medications, and allergies listed in the medical record as obtained by nursing and support staff and agree with their documentation.    Objective   OBJECTIVE     VITAL SIGNS:    /73 (BP Location: RUE - Right upper extremity, Patient Position: Semi-Bhakta's)   Pulse 70   Temp 97.4 °F (36.3 °C) (Oral)   Resp 18   Ht 6' 1\" (1.854 m)   Wt 79.4 kg (175 lb)   BMI 23.09 kg/m²   BSA 2.03 m²     Intake/Output         12/24 0700  12/25 0659 12/25 0700  12/26 0659    P.O. 540 337    Total Intake 540 337    Urine  800    Total Output  800    Net +540 -463          Unmeasured Urine Occurrence 1 x     Unmeasured Stool Occurrence 1 x           Admission weight: 82.1 kg (181 lb), Weight change:      PHYSICAL EXAM:    GENERAL:  Alert, cooperative, no distress, appears stated age.  HEENT:  Sclerae white, conjunctivae clear, EOM (extraocular movements) intact, no oropharyngeal erythema or lesions. Moist mucous membranes.  NECK:   Supple, symmetrical, trachea midline, no adenopathy.  CARDIO:  RRR (regular rate and rhythm).  Normal S1, S2.  No murmurs, gallops, or rubs.  Reproducible chest wall tenderness.  LUNGS:  Clear to auscultation bilaterally, respirations unlabored.  ABDOMEN:  Soft, nontender, and nondistended. No rebound or guarding.  EXTREMITIES:  ROM (range of motion) within normal limits.  Warm and well perfused.  No clubbing or cyanosis. No peripheral edema noted.   SKIN:  Skin color, texture, turgor normal.  No rashes,  NEUROLOGIC:  No focal neuro deficits. Strength 5/5 bilateral upper and lower extremities.   PSYCH:  Normal mood and affect.  Thought process and judgment appropriate.     LABORATORY DATA:    Recent Labs   Lab 12/25/24  0532 12/24/24  0458   SODIUM 139 140   POTASSIUM 4.6 4.5   CHLORIDE 104 104   CO2 30 27   BUN 17 17   CREATININE 0.83 0.80   GLUCOSE 90 87   BILIRUBIN 0.3 0.3   AST 15 19   GPT 19 21     Recent Labs   Lab 12/25/24  0532 12/24/24  0458   WBC 7.0 6.4   HGB 12.2* 12.1*   HCT 38.8* 38.8*    358     No results available in last 24 hours  Recent Labs   Lab 12/25/24  0532 12/24/24  0458   ALBUMIN 2.8* 2.9*   CALCIUM 8.9 9.0   MG 1.9 2.0       Assessment    ASSESSMENT & PLAN     Principal Problem:    Syncope, unspecified syncope type  Active Problems:    Chronic obstructive pulmonary disease (COPD)  (CMD)    Benign essential HTN    Chronic HFrEF (heart failure with reduced ejection fraction)  (CMD)    Cocaine use    Primary pulmonary HTN  (CMD)    Chronic chest pain    Incarceration  Resolved Problems:    * No resolved hospital problems. *    Syncope  HFrEF (Last EF 20% on 11/15/24, systolic dysfunction)  Chronic chest pain  Mild pHTN  HTN  Suspect syncope due to low cardiac function. No prodrome. Has not been using lifevest due to being incarcerated. Trop neg on admission. NTproBNP 1589 on admission but improved compared to 5 days ago. No volume overload on exam. Presyncope episodes with no  correlation with any tele change. Chronic chest pain reproducible on exam, suspect MSK, low concern for PE as no hypoxia. ED provider discussed patient with cardiology, Dr. Borjas who recommended TTE, which showed EF 35%.   - Telemetry   - Cardiology c/s    -- Recommend no further work up at this time.   -- Continue current meds - on full GDMT for HFrEF: jardiance 10mg, entresto 24-26mg BID, aldactone 25mg, lasix 40mg, toprol-xl 50mg BID   -- Follow up with Cardiology outpatient for ischemic work up. Requested cardio fellow to assist with scheduling appointment.   - to be provided with spare life vest prior to discharge. Unable to reach rep Violet([phone]). Called [employer] help line ([phone]) and was told no rep has been assigned to patient today. They can have life vest delivered tomorrow, 12/26. Will delay discharge until life vest can be secured as patient is high risk for cardiac arrest.   - Fluid restriction  - Strict I/O's  - Weigh daily  - Tizanidine/lidocaine patch for chest wall pain     Left leg nodule  Has been treated as cellulitis with antibiotics 11/2024. No hotness to touch on admission though patient reports chronic 2-3cm nodule that has not resolved. Some hyperpigmentation skin changes surrounding nodule.  - Continue to monitor for cellulitis vs abscess   - Follow up PCP     COPD/asthma  No exacerbation. Stable.     Incarcerated  [facility].     Cocaine user  Last cocaine use about a week ago before he was incarcerated. He snorts, does not smoke or inject cocaine.  - advised cessation of cocaine use.     IVF:   Current Facility-Administered Medications   Medication Dose Route Frequency Provider Last Rate Last Admin     Electrolytes: replacement per protocol, N/A for dialysis patients  Diet:   One Time Diet Cardiac  Regular; Yes, Medical Nutrition Management by Rd (Registered Dietitian); Fluid Restrict 2000 Ml (1200 From Dietary) Diet  Safety Tray Deliver per Site  Process -  Note.  Lines/Drains/Access: peripheral IV  Bowel Regimen: polyethylene glycol enteral    DVT/VTE Prophylaxis:  VTE Pharmacologic Prophylaxis: Lovenox  VTE Mechanical Prophylaxis:  SCDs & TEDs    CODE STATUS Full Resuscitation    Discharge Planning    Barriers to discharge: Patient is not medically ready and needs to remain in the hospital today due to echo  Anticipated discharge destination: police custody   Expected Discharge Date: 12/25/2024        This patient was discussed with No Carlos DO.    Paras Nolasco MD  Family Medicine Teaching Service  Please page [phone] for questions or concerns.                 Adult

## 2025-01-03 NOTE — PROGRESS NOTE ADULT - SUBJECTIVE AND OBJECTIVE BOX
Behavioral Health Psychotherapy Progress Note    Psychotherapy Provided: Individual Psychotherapy     1. Anxiety        2. Separation anxiety disorder            Goals addressed in session: Goal 1     DATA: Therapist worked with Mateusz by engaging in playing a game that he requested and talking with him about his interests.  He brought up that at home he had been asking a lot of questions lately and did so in a way that he seemed to be very upset with himself.  Therapist processed this to let him know that in their space in therapy it was okay to be asking questions and that she was there to help him with recognizing if he was feeling upset or overwhelmed and that was why he was asking.  Mateusz also stated a few times that he felt he was talking a lot.  She processed with him that he might be feeling excited to have session as they hadn't met for a few weeks due to the school holiday and that sometimes that excitement can lead to a lot of expression.  She also processed how they were playing a preferred game and he expressed that he understood what she meant and that he did feel happy to be there.  Mateusz also did very well with handling that he had to play the game slightly different than what he'd done before when he was at home or with his uncle since therapist had a different system.  In doing so he showed good processing and frustration tolerance for the differences.  During this session, this clinician used the following therapeutic modalities: Cognitive Behavioral Therapy    Substance Abuse was not addressed during this session. If the client is diagnosed with a co-occurring substance use disorder, please indicate any changes in the frequency or amount of use: na. Stage of change for addressing substance use diagnoses: No substance use/Not applicable    ASSESSMENT:  Mateusz Hayes presents with a Euthymic/ normal mood.     his affect is Normal range and intensity, which is congruent, with his mood and the  SANDRA DAS  [id number]  45y Male    Indication for ICU admission: s/p craniectomy for large L SDH  Admit Date:05-06-21  ICU Date: 05-07-21  OR Date: 05-06-21    No Known Allergies    PAST MEDICAL & SURGICAL HISTORY:  Alcohol abuse  GERD (gastroesophageal reflux disease)  ETOH abuse  Hypomagnesemia  Seizure disorder  H/O hemorrhoidectomy      Home Medications:        24HRS EVENT:  5/8  Night  PLT 71, 1 unit PLT transfused (as per NSGY, PLT > 100)  tachy to 110-120  15 KPhos    DAY  - starting TF osmolite 20cc goal 60cc  - IVL  - added bowel regimen -Senna  - Plt 77 > +1u platelets  - still holding HSQ  - Na goal 145-150, current Na 148  - ECHO: EF 45-50, mild TR        DVT PTX: held as per primary team    GI PTX: PPI    ***Tubes/Lines/Drains  ***  Peripheral IV  Central Venous Line     	Date   Arterial Line	L rad	                Date   OGT, ETT  Urinary Catheter		Indication: Strict I&O    Date Placed:       REVIEW OF SYSTEMS  [ ] A ten-point review of systems was otherwise negative except as noted.  [x] Due to altered mental status/intubation, subjective information were not able to be obtained from the patient. History was obtained, to the extent possible, from review of the chart and collateral sources of information.           "content of the session. The client has made progress on their goals.   Mateusz Hayes presents with a none risk of suicide, none risk of self-harm, and none risk of harm to others.    For any risk assessment that surpasses a \"low\" rating, a safety plan must be developed.    A safety plan was indicated: no  If yes, describe in detail na    PLAN: Between sessions, Mateusz Hayes will practice expressing his ideas with others when feeling upset. At the next session, the therapist will use Cognitive Behavioral Therapy to address his ability to manage negative emotions in social settings.    Behavioral Health Treatment Plan and Discharge Planning: Mateusz Hayes is aware of and agrees to continue to work on their treatment plan. They have identified and are working toward their discharge goals. yes    Depression Follow-up Plan Completed: Not applicable    Visit start and stop times:    01/03/25  Start Time: 1300  Stop Time: 1330  Total Visit Time: 30 minutes  " SANDRA DAS  [id number]  45y Male    Indication for ICU admission: s/p craniectomy for large L SDH  Admit Date:05-06-21  ICU Date: 05-07-21  OR Date: 05-06-21    No Known Allergies    PAST MEDICAL & SURGICAL HISTORY:  Alcohol abuse  GERD (gastroesophageal reflux disease)  ETOH abuse  Hypomagnesemia  Seizure disorder  H/O hemorrhoidectomy      Home Medications:        24HRS EVENT:  5/8  Night  PLT 71, 1 unit PLT transfused (as per NSGY, PLT > 100)  tachy to 110-120  15 KPhos    DAY  - starting TF osmolite 20cc goal 60cc  - IVL  - added bowel regimen -Senna  - Plt 77 > +1u platelets  - still holding HSQ  - Na goal 145-150, current Na 148  - ECHO: EF 45-50, mild TR        DVT PTX: held as per primary team    GI PTX: PPI    ***Tubes/Lines/Drains  ***  Peripheral IV  Central Venous Line     	Date   Arterial Line	L rad	                Date   OGT, ETT  Urinary Catheter		Indication: Strict I&O    Date Placed:       REVIEW OF SYSTEMS  [ ] A ten-point review of systems was otherwise negative except as noted.  [x] Due to altered mental status/intubation, subjective information were not able to be obtained from the patient. History was obtained, to the extent possible, from review of the chart and collateral sources of information.        CURRENT MEDS:  Neurologic Medications  acetaminophen   Tablet .. 650 milliGRAM(s) Oral every 6 hours PRN Temp greater or equal to 38.5C (101.3F)  fentaNYL    Injectable 25 MICROGram(s) IV Push every 4 hours PRN Severe Pain (7 - 10)  levETIRAcetam  IVPB 500 milliGRAM(s) IV Intermittent every 12 hours  propofol Infusion 5 MICROgram(s)/kG/Min IV Continuous <Continuous>    Respiratory Medications    Cardiovascular Medications    Gastrointestinal Medications  pantoprazole   Suspension 40 milliGRAM(s) Oral daily  senna 2 Tablet(s) Oral at bedtime    Genitourinary Medications    Hematologic/Oncologic Medications    Antimicrobial/Immunologic Medications    Endocrine/Metabolic Medications  insulin lispro (ADMELOG) corrective regimen sliding scale   SubCutaneous every 6 hours    Topical/Other Medications  chlorhexidine 4% Liquid 1 Application(s) Topical <User Schedule>      ICU Vital Signs Last 24 Hrs  T(C): 36.8 (09 May 2021 08:00), Max: 37.7 (08 May 2021 23:00)  T(F): 98.2 (09 May 2021 08:00), Max: 99.9 (08 May 2021 23:00)  HR: 109 (09 May 2021 09:11) (89 - 123)  BP: --  BP(mean): --  ABP: 146/74 (09 May 2021 09:00) (95/77 - 166/83)  ABP(mean): 94 (09 May 2021 09:00) (84 - 117)  RR: 16 (09 May 2021 09:00) (16 - 21)  SpO2: 100% (09 May 2021 09:11) (100% - 100%)      Mode: AC/ CMV (Assist Control/ Continuous Mandatory Ventilation)  RR (machine): 16  TV (machine): 450  FiO2: 30  PEEP: 5  ITime: 1  MAP: 10  PIP: 18    ABG - ( 09 May 2021 02:25 )  pH, Arterial: 7.51  pH, Blood: x     /  pCO2: 35    /  pO2: 148   / HCO3: 28    / Base Excess: 4.3   /  SaO2: 100           I&O's Detail    08 May 2021 07:01  -  09 May 2021 07:00  --------------------------------------------------------  IN:    Enteral Tube Flush: 50 mL    IV PiggyBack: 312.5 mL    IV PiggyBack: 100 mL    Osmolite: 870 mL    Platelets - Single Donor: 519 mL    Propofol: 554.7 mL    sodium chloride 0.9%: 1200 mL  Total IN: 3606.2 mL    OUT:    Indwelling Catheter - Urethral (mL): 1581 mL  Total OUT: 1581 mL    Total NET: 2025.2 mL      09 May 2021 07:01  -  09 May 2021 09:26  --------------------------------------------------------  IN:    Osmolite: 90 mL    Propofol: 47.4 mL  Total IN: 137.4 mL    OUT:  Total OUT: 0 mL    Total NET: 137.4 mL        I&O's Summary    08 May 2021 07:01  -  09 May 2021 07:00  --------------------------------------------------------  IN: 3606.2 mL / OUT: 1581 mL / NET: 2025.2 mL    09 May 2021 07:01  -  09 May 2021 09:26  --------------------------------------------------------  IN: 137.4 mL / OUT: 0 mL / NET: 137.4 mL        LABS:    CAPILLARY BLOOD GLUCOSE      POCT Blood Glucose.: 116 mg/dL (09 May 2021 06:22)  POCT Blood Glucose.: 148 mg/dL (08 May 2021 23:38)  POCT Blood Glucose.: 106 mg/dL (08 May 2021 17:35)  POCT Blood Glucose.: 126 mg/dL (08 May 2021 11:27)                          7.6    4.85  )-----------( 103      ( 09 May 2021 02:26 )             23.4         05-08    144  |  114<H>  |  13  ----------------------------<  145<H>  3.8   |  22  |  0.5<L>      Calcium, Total Serum: 7.3 mg/dL (05-08-21 @ 23:30)         Blood Gas Arterial, Lactate: 1.1 mmoL/L (05-09-21 @ 02:25)  Blood Gas Arterial, Lactate: 0.8 mmoL/L (05-08-21 @ 15:09)  Blood Gas Arterial, Lactate: 1.3 mmoL/L (05-08-21 @ 03:33)  Blood Gas Arterial, Lactate: 2.3 mmoL/L (05-07-21 @ 15:23)  Blood Gas Arterial, Lactate: 3.1 mmoL/L (05-07-21 @ 10:36)  Blood Gas Arterial, Lactate: 6.6 mmoL/L (05-07-21 @ 06:54)  Blood Gas Arterial, Lactate: 9.6 mmoL/L (05-07-21 @ 02:35)  Lactate, Blood: 8.6 mmol/L (05-07-21 @ 00:59)  Blood Gas Arterial, Lactate: 7.7 mmoL/L (05-07-21 @ 00:22)  Lactate, Blood: 6.0 mmol/L (05-06-21 @ 22:25)    ABG - ( 09 May 2021 02:25 )  pH: 7.51  /  pCO2: 35    /  pO2: 148   / HCO3: 28    / Base Excess: 4.3   /  SaO2: 100             ABG - ( 08 May 2021 15:09 )  pH: 7.50  /  pCO2: 33    /  pO2: 165   / HCO3: 25    / Base Excess: 2.2   /  SaO2: 100           ABG - ( 08 May 2021 03:33 )  pH: 7.50  /  pCO2: 33    /  pO2: 196   / HCO3: 26    / Base Excess: 2.6   /  SaO2: 100           Coags:     14.50  ----< 1.26    ( 08 May 2021 04:25 )     27.3        CARDIAC MARKERS ( 07 May 2021 16:38 )  x     / <0.01 ng/mL / x     / x     / 2.4 ng/mL        PHYSICAL EXAM:    General/Neuro  RASS:    -4  | Deep sedation | No response to voice  GCS 6T  Pupils: Sluggish, but reactive  withdraws to pain b/l LE, could not illicit response in upper extremities    Lungs:      mechanical breath sounds bilaterally    Cardiovascular : S1, S2.  Regular rate and rhythm.    Cardiac Rhythm: Normal Sinus Rhythm    GI: Abdomen soft, Non-distended.    Orogastric tube in place.        Extremities: Extremities warm, well-perfused.     Derm: Good skin turgor, no skin breakdown.      :       Cole catheter in place.

## 2025-01-08 NOTE — PATIENT PROFILE ADULT - FUNCTIONAL ASSESSMENT - BASIC MOBILITY 6.
A user error has taken place: encounter opened in error, closed for administrative reasons.    4 = No assist / stand by assistance

## 2025-04-29 NOTE — PROGRESS NOTE ADULT - SUBJECTIVE AND OBJECTIVE BOX
PROCEDURE: Colonoscopy ([id number])    DIAGNOSIS: Colon Cancer Screening Z12.11    **Referral to Dr. Sandra    KIDNEY CONCERNS: No Suprep - No recent labs to determine current kidney function    PROCEDURE LOCATION: Hospital Only (uncontrolled HTN - 196/92 on 4/23/2025 OV)    SLEEP APNEA: NO    BMI GREATER THAN 35: No, 23.03    SEDATION: MAC Only    DIABETIC: No     Glucose >250: No recent labs    Hemoglobin <9: No recent labs    Current use or HX of cocaine, meth or heroin use within the past 12 months (MAC): No   --If yes, cocaine, meth or heroin? N/A     ANTIPLATELET / ANTICOAGULATION: Patient takes Clopidogrel (Plavix) daily and will need to get clearance from prescribing provider to hold 7 days prior to procedure.    --Per 4/23/2025 external office visit note, pt is only taking Amlodipine and Doxycycline at this time. However, Epic lists that pt is taking Plavix. Please confirm if patient is taking Plavix. Okay to disregard if he is not.    MEDICATION HOLDS: N/A    SPECIAL INSTRUCTIONS: N/A      Nurse chart review complete. Please contact patient to schedule their Colonoscopy procedure.     SANDRA DAS  [id number]  45y Male    Indication for ICU admission: s/p craniectomy for large L SDH  Admit Date:05-06-21  ICU Date: 05-07-21  OR Date: 05-06-21    No Known Allergies    PAST MEDICAL & SURGICAL HISTORY:  Alcohol abuse  GERD (gastroesophageal reflux disease)  ETOH abuse  Hypomagnesemia  Seizure disorder  H/O hemorrhoidectomy      Home Medications:        24HRS EVENT:  NIGHT  -DDAVP 20mcg x1 & Heme consult per NSX  -1 pack platelets for Plts 50s >81, 2nd PLT  -tachy imroving 110-120  -GCS 7T      DAY  -Post Op HCT - post op changes, resolution of MLS, stable SDH along cerebral falx and L tentorium, new trace scattered SAH along the right cerebral sulci and right cerebellar sulci and new IVP in the b/l occipital horns.  -NS @ 120  -GCS 6 on AM rounds  -10 vit K  -CE neg x1  -propofol started  -d/c precedex  -1 Pack of platelets given  -Coags pending post platelets  -tachy and febrile -->1g tylenol          DVT PTX: held as per primary team    GI PTX: PPI    ***Tubes/Lines/Drains  ***  Peripheral IV  Central Venous Line     	Date   Arterial Line	L rad	                Date   OGT, ETT  Urinary Catheter		Indication: Strict I&O    Date Placed:       REVIEW OF SYSTEMS    [ ] A ten-point review of systems was otherwise negative except as noted.  [ x] Due to altered mental status/intubation, subjective information were not able to be obtained from the patient. History was obtained, to the extent possible, from review of the chart and collateral sources of information.           SANDRA DAS  [id number]  45y Male    Indication for ICU admission: s/p craniectomy for large L SDH  Admit Date:05-06-21  ICU Date: 05-07-21  OR Date: 05-06-21    No Known Allergies    PAST MEDICAL & SURGICAL HISTORY:  Alcohol abuse  GERD (gastroesophageal reflux disease)  ETOH abuse  Hypomagnesemia  Seizure disorder  H/O hemorrhoidectomy      Home Medications:        24HRS EVENT:  NIGHT  -DDAVP 20mcg x1 & Heme consult per NSX  -1 pack platelets for Plts 50s >81, 2nd PLT  -tachy imroving 110-120  -GCS 7T      DAY  -Post Op HCT - post op changes, resolution of MLS, stable SDH along cerebral falx and L tentorium, new trace scattered SAH along the right cerebral sulci and right cerebellar sulci and new IVP in the b/l occipital horns.  -NS @ 120  -GCS 6 on AM rounds  -10 vit K  -CE neg x1  -propofol started  -d/c precedex  -1 Pack of platelets given  -Coags pending post platelets  -tachy and febrile -->1g tylenol          DVT PTX: held as per primary team    GI PTX: PPI    ***Tubes/Lines/Drains  ***  Peripheral IV  Central Venous Line     	Date   Arterial Line	L rad	                Date   OGT, ETT  Urinary Catheter		Indication: Strict I&O    Date Placed:       REVIEW OF SYSTEMS    [ ] A ten-point review of systems was otherwise negative except as noted.  [ x] Due to altered mental status/intubation, subjective information were not able to be obtained from the patient. History was obtained, to the extent possible, from review of the chart and collateral sources of information.      Daily     Daily     Diet, NPO with Tube Feed:   Tube Feeding Modality: Orogastric  Osmolite 1.0 Jeff  Total Volume for 24 Hours (mL): 1440  Continuous  Starting Tube Feed Rate mL per Hour: 20  Increase Tube Feed Rate by (mL): 10     Every 2 hours  Until Goal Tube Feed Rate (mL per Hour): 60  Tube Feed Duration (in Hours): 24  Tube Feed Start Time: 09:40 (05-08-21 @ 09:39)      CURRENT MEDS:  Neurologic Medications  acetaminophen   Tablet .. 650 milliGRAM(s) Oral every 6 hours PRN Temp greater or equal to 38.5C (101.3F)  fentaNYL    Injectable 25 MICROGram(s) IV Push every 4 hours PRN Severe Pain (7 - 10)  levETIRAcetam  IVPB 500 milliGRAM(s) IV Intermittent every 12 hours  propofol Infusion 5 MICROgram(s)/kG/Min IV Continuous <Continuous>    Respiratory Medications    Cardiovascular Medications    Gastrointestinal Medications  senna 2 Tablet(s) Oral at bedtime  sodium chloride 0.9%. 1000 milliLiter(s) IV Continuous <Continuous>    Genitourinary Medications    Hematologic/Oncologic Medications    Antimicrobial/Immunologic Medications    Endocrine/Metabolic Medications  insulin lispro (ADMELOG) corrective regimen sliding scale   SubCutaneous every 6 hours    Topical/Other Medications  chlorhexidine 4% Liquid 1 Application(s) Topical <User Schedule>      ICU Vital Signs Last 24 Hrs  T(C): 36.6 (08 May 2021 08:08), Max: 38.1 (07 May 2021 14:46)  T(F): 97.9 (08 May 2021 08:08), Max: 100.5 (07 May 2021 14:46)  HR: 92 (08 May 2021 08:50) (92 - 136)  BP: 96/57 (07 May 2021 10:00) (96/57 - 96/57)  BP(mean): --  ABP: 151/89 (08 May 2021 08:00) (3/70 - 151/89)  ABP(mean): 111 (08 May 2021 08:00) (65 - 121)  RR: 17 (08 May 2021 08:00) (17 - 24)  SpO2: 100% (08 May 2021 08:50) (100% - 100%)      Mode: AC/ CMV (Assist Control/ Continuous Mandatory Ventilation)  RR (machine): 16  TV (machine): 450  FiO2: 30  PEEP: 5  ITime: 1  MAP: 9  PIP: 22    ABG - ( 08 May 2021 03:33 )  pH, Arterial: 7.50  pH, Blood: x     /  pCO2: 33    /  pO2: 196   / HCO3: 26    / Base Excess: 2.6   /  SaO2: 100                 I&O's Summary    07 May 2021 07:01  -  08 May 2021 07:00  --------------------------------------------------------  IN: 4871.8 mL / OUT: 1120 mL / NET: 3751.8 mL    08 May 2021 07:01  -  08 May 2021 09:52  --------------------------------------------------------  IN: 287.4 mL / OUT: 100 mL / NET: 187.4 mL      I&O's Detail    07 May 2021 07:01  -  08 May 2021 07:00  --------------------------------------------------------  IN:    Dexmedetomidine: 19.8 mL    IV PiggyBack: 1050 mL    Norepinephrine: 15 mL    Platelets - Single Donor: 400 mL    Propofol: 457.4 mL    sodium chloride 0.9%: 250 mL    sodium chloride 0.9%: 2640 mL    sodium chloride 3%: 30 mL    Vasopressin: 9.6 mL  Total IN: 4871.8 mL    OUT:    Indwelling Catheter - Urethral (mL): 1120 mL  Total OUT: 1120 mL    Total NET: 3751.8 mL      08 May 2021 07:01  -  08 May 2021 09:52  --------------------------------------------------------  IN:    Propofol: 47.4 mL    sodium chloride 0.9%: 240 mL  Total IN: 287.4 mL    OUT:    Indwelling Catheter - Urethral (mL): 100 mL  Total OUT: 100 mL    Total NET: 187.4 mL          PHYSICAL EXAM:    General/Neuro  RASS:    -4  | Deep sedation | No response to voice, but any movement to physical stimulation  GCS 7T  Deficits: no movement in right upper or right lower extremity  Pupils: Sluggish, but reactive    Lungs:      mechanical breath sounds bilaterally    Cardiovascular : S1, S2.  Regular rate and rhythm.    Cardiac Rhythm: Normal Sinus Rhythm    GI: Abdomen soft, Non-distended.    Nasogastric tube in place.        Extremities: Extremities warm, pink, well-perfused.     Derm: Good skin turgor, no skin breakdown.      :       Cole catheter in place.      CXR:       LABS:  CAPILLARY BLOOD GLUCOSE      POCT Blood Glucose.: 130 mg/dL (08 May 2021 06:47)  POCT Blood Glucose.: 140 mg/dL (08 May 2021 00:11)  POCT Blood Glucose.: 151 mg/dL (07 May 2021 18:25)  POCT Blood Glucose.: 155 mg/dL (07 May 2021 11:45)                          9.5    7.07  )-----------( 67       ( 08 May 2021 04:25 )             28.4       05-08    146  |  114<H>  |  14  ----------------------------<  146<H>  3.9   |  21  |  0.6<L>    Ca    7.1<L>      08 May 2021 04:25  Phos  3.1     05-08  Mg     2.0     05-08    TPro  5.9<L>  /  Alb  3.1<L>  /  TBili  0.9  /  DBili  x   /  AST  146<H>  /  ALT  47<H>  /  AlkPhos  120<H>  05-06      PT/INR - ( 08 May 2021 04:25 )   PT: 14.50 sec;   INR: 1.26 ratio         PTT - ( 08 May 2021 04:25 )  PTT:27.3 sec  CARDIAC MARKERS ( 07 May 2021 16:38 )  x     / <0.01 ng/mL / x     / x     / 2.4 ng/mL  CARDIAC MARKERS ( 07 May 2021 09:17 )  x     / <0.01 ng/mL / 292 U/L / x     / 4.2 ng/mL  CARDIAC MARKERS ( 07 May 2021 04:16 )  x     / <0.01 ng/mL / x     / x     / x

## 2025-05-23 ENCOUNTER — RX RENEWAL (OUTPATIENT)
Age: 50
End: 2025-05-23

## 2025-06-09 NOTE — HISTORY OF PRESENT ILLNESS
[FreeTextEntry1] : I am seeing Naeem in /fu he is s/p severe TBI, SDH, cerebral herniation, craniectomy, VPS and now recent cranioplasty with artificial flap. he has made a tremendous recovery. He is still having expressive aphasia but has excellent language reception in both polish and English. He has some mild residual right sided weakness that cont to improve. \par \par Pt is coming to the house. OT eval is to be done. 
[facility] [place]  Cardiology  [address], [address]  [place] [address]  Phone: [phone]  Fax: [phone]  Follow Up Time: Urgent    [facility]  Medicine  [address], [place]   Phone: [phone]  Fax:   Follow Up Time: Urgent

## 2025-06-16 ENCOUNTER — APPOINTMENT (OUTPATIENT)
Dept: NEUROLOGY | Facility: CLINIC | Age: 50
End: 2025-06-16
Payer: COMMERCIAL

## 2025-06-16 VITALS
BODY MASS INDEX: 28.39 KG/M2 | SYSTOLIC BLOOD PRESSURE: 129 MMHG | HEIGHT: 73 IN | DIASTOLIC BLOOD PRESSURE: 82 MMHG | OXYGEN SATURATION: 99 % | WEIGHT: 214.19 LBS | RESPIRATION RATE: 19 BRPM | HEART RATE: 88 BPM

## 2025-06-16 PROCEDURE — 99213 OFFICE O/P EST LOW 20 MIN: CPT

## 2025-08-13 ENCOUNTER — RX RENEWAL (OUTPATIENT)
Age: 50
End: 2025-08-13

## 2025-08-15 ENCOUNTER — APPOINTMENT (OUTPATIENT)
Dept: NEUROPSYCHOLOGY | Facility: CLINIC | Age: 50
End: 2025-08-15

## 2025-09-18 ENCOUNTER — APPOINTMENT (OUTPATIENT)
Dept: NEUROPSYCHOLOGY | Facility: CLINIC | Age: 50
End: 2025-09-18